# Patient Record
Sex: MALE | Race: WHITE | Employment: OTHER | URBAN - METROPOLITAN AREA
[De-identification: names, ages, dates, MRNs, and addresses within clinical notes are randomized per-mention and may not be internally consistent; named-entity substitution may affect disease eponyms.]

---

## 2017-02-14 RX ORDER — ROSUVASTATIN CALCIUM 10 MG/1
TABLET, FILM COATED ORAL
Qty: 30 TAB | Refills: 6 | Status: SHIPPED | OUTPATIENT
Start: 2017-02-14 | End: 2017-09-26 | Stop reason: SDUPTHER

## 2017-06-06 ENCOUNTER — OFFICE VISIT (OUTPATIENT)
Dept: CARDIOLOGY CLINIC | Age: 77
End: 2017-06-06

## 2017-06-06 VITALS
DIASTOLIC BLOOD PRESSURE: 80 MMHG | BODY MASS INDEX: 27 KG/M2 | HEIGHT: 66 IN | OXYGEN SATURATION: 98 % | SYSTOLIC BLOOD PRESSURE: 130 MMHG | HEART RATE: 82 BPM | WEIGHT: 168 LBS

## 2017-06-06 DIAGNOSIS — I48.20 CHRONIC ATRIAL FIBRILLATION (HCC): Chronic | ICD-10-CM

## 2017-06-06 DIAGNOSIS — I10 ESSENTIAL HYPERTENSION: Chronic | ICD-10-CM

## 2017-06-06 DIAGNOSIS — I25.10 CORONARY ARTERY DISEASE INVOLVING NATIVE CORONARY ARTERY OF NATIVE HEART WITHOUT ANGINA PECTORIS: Primary | ICD-10-CM

## 2017-06-06 DIAGNOSIS — E78.00 HYPERCHOLESTEROLEMIA: Chronic | ICD-10-CM

## 2017-06-06 RX ORDER — CHLORTHALIDONE 25 MG/1
25 TABLET ORAL DAILY
Refills: 0 | COMMUNITY
Start: 2017-06-04 | End: 2018-07-12

## 2017-06-06 RX ORDER — AMLODIPINE BESYLATE 10 MG/1
10 TABLET ORAL DAILY
Refills: 0 | COMMUNITY
Start: 2017-06-04 | End: 2020-01-01 | Stop reason: ALTCHOICE

## 2017-06-06 NOTE — MR AVS SNAPSHOT
Visit Information Date & Time Provider Department Dept. Phone Encounter #  
 6/6/2017 11:40 AM Annamaria Bradley MD [facility] [address] [id number] Upcoming Health Maintenance Date Due DTaP/Tdap/Td series (1 - Tdap) 4/7/1960 ZOSTER VACCINE AGE 60> 4/7/1999 GLAUCOMA SCREENING Q2Y 4/7/2004 Pneumococcal 65+ Low/Medium Risk (1 of 2 - PCV13) 4/7/2004 MEDICARE YEARLY EXAM 4/7/2004 INFLUENZA AGE 9 TO ADULT 8/1/2017 Allergies as of 6/6/2017  Review Complete On: 9/6/2016 By: Annamaria Bradley MD  
  
 Severity Noted Reaction Type Reactions Codeine  06/14/2011    Swelling Morphine  01/03/2014    Itching Sulfa (Sulfonamide Antibiotics)  06/14/2011    Other (comments) Kidney problems. Current Immunizations  Reviewed on 11/3/2012 No immunizations on file. Not reviewed this visit You Were Diagnosed With   
  
 Codes Comments Coronary artery disease involving native coronary artery of native heart without angina pectoris    -  Primary ICD-10-CM: I25.10 ICD-9-CM: 414.01 Chronic atrial fibrillation (HCC)     ICD-10-CM: A52.7 ICD-9-CM: 427.31 Essential hypertension     ICD-10-CM: I10 
ICD-9-CM: 401.9 Hypercholesterolemia     ICD-10-CM: E78.00 ICD-9-CM: 272.0 Vitals BP Pulse Height(growth percentile) Weight(growth percentile) SpO2 BMI  
 130/80 82 5' 6\" (1.676 m) 168 lb (76.2 kg) 98% 27.12 kg/m2 Smoking Status Former Smoker Vitals History BMI and BSA Data Body Mass Index Body Surface Area  
 27.12 kg/m 2 1.88 m 2 Preferred Pharmacy Pharmacy Name Phone [facility], [address] Your Updated Medication List  
  
   
This list is accurate as of: 6/6/17 12:38 PM.  Always use your most recent med list.  
  
  
  
  
 albuterol 90 mcg/actuation inhaler Commonly known as:  PROVENTIL HFA, VENTOLIN HFA, PROAIR HFA Take 2 Puffs by inhalation every four (4) hours as needed for Wheezing or Shortness of Breath. amLODIPine 10 mg tablet Commonly known as:  NORVASC  
  
 aspirin 81 mg chewable tablet Take 1 Tab by mouth daily. calcium-vitamin D 250-125 mg-unit tablet Commonly known as:  OSCAL 250 Take 1 Tab by mouth daily. chlorthalidone 25 mg tablet Commonly known as:  HYGROTEN  
  
 cyclobenzaprine 10 mg tablet Commonly known as:  FLEXERIL Take 1 Tab by mouth three (3) times daily as needed for Muscle Spasm(s). diclofenac EC 50 mg EC tablet Commonly known as:  VOLTAREN Take 1 Tab by mouth three (3) times daily as needed. DIGITEK 0.125 mg tablet Generic drug:  digoxin  
take 1 tablet by mouth once daily  
  
 dilTIAZem 90 mg tablet Commonly known as:  CARDIZEM Take 1 Tab by mouth Before breakfast, lunch, dinner and at bedtime. ferrous sulfate 325 mg (65 mg iron) tablet  
  
 fluticasone 50 mcg/actuation nasal spray Commonly known as:  Monica Fray 2 Sprays by Both Nostrils route daily. furosemide 20 mg tablet Commonly known as:  LASIX OXYGEN-AIR DELIVERY SYSTEMS  
2 L/min by Does Not Apply route daily. Continuous. Company is [employer]  
  
 polyethylene glycol 17 gram/dose powder Commonly known as:  MIRALAX  
  
 pravastatin 40 mg tablet Commonly known as:  PRAVACHOL  
  
 QVAR 80 mcg/actuation Aero Generic drug:  beclomethasone SENNA PLUS 8.6-50 mg per tablet Generic drug:  senna-docusate  
  
 umeclidinium-vilanterol 62.5-25 mcg/actuation inhaler Commonly known as:  Akshat Brooms Take 1 Puff by inhalation daily. Indications: BRONCHOSPASM PREVENTION WITH COPD * warfarin 5 mg tablet Commonly known as:  COUMADIN  
  
 * warfarin 7.5 mg tablet Commonly known as:  COUMADIN Take 1 Tab by mouth every evening. * Notice: This list has 2 medication(s) that are the same as other medications prescribed for you. Read the directions carefully, and ask your doctor or other care provider to review them with you. We Performed the Following AMB POC EKG ROUTINE W/ 12 LEADS, INTER & REP [20270 CPT(R)] Introducing [facility] SERVICES! Michael Mojica introduces [employer] patient portal. Now you can access parts of your medical record, email your doctor's office, and request medication refills online. 1. In your internet browser, go to [email or web address] 2. Click on the First Time User? Click Here link in the Sign In box. You will see the New Member Sign Up page. 3. Enter your [employer] Access Code exactly as it appears below. You will not need to use this code after youve completed the sign-up process. If you do not sign up before the expiration date, you must request a new code. · [employer] Access Code: [email or web address] Expires: 9/4/2017 11:04 AM 
 
4. Enter the last four digits of your Social Security Number (xxxx) and Date of Birth (mm/dd/yyyy) as indicated and click Submit. You will be taken to the next sign-up page. 5. Create a [employer] ID. This will be your [employer] login ID and cannot be changed, so think of one that is secure and easy to remember. 6. Create a [employer] password. You can change your password at any time. 7. Enter your Password Reset Question and Answer. This can be used at a later time if you forget your password. 8. Enter your e-mail address. You will receive e-mail notification when new information is available in [address] 9. Click Sign Up. You can now view and download portions of your medical record. 10. Click the Download Summary menu link to download a portable copy of your medical information. If you have questions, please visit the Frequently Asked Questions section of the [employer] website.  Remember, [employer] is NOT to be used for urgent needs. For medical emergencies, dial 911. Now available from your iPhone and Android! Please provide this summary of care documentation to your next provider. Your primary care clinician is listed as Annette Medina. If you have any questions after today's visit, please call [phone].

## 2017-06-06 NOTE — PROGRESS NOTES
1. Have you been to the ER, urgent care clinic since your last visit? Hospitalized since your last visit? No     2. Have you seen or consulted any other health care providers outside of the [address] [place] [address] since your last visit? Include any pap smears or colon screening.  No

## 2017-06-06 NOTE — PROGRESS NOTES
HISTORY OF PRESENT ILLNESS  Toñito Sanchez is a 65 y.o. male. ASSESSMENT and PLAN    Mr. Berto Reyes has history of chronic atrial fibrillation, mild CAD (by coronary angiography 2012), severe COPD on oxygen therapy, as well as history of AAA stent graft. He is back on Coumadin at this point. In January of 2016, he developed severe back pains, diagnosed with lumbar spine osteomyelitis and abscess. His echocardiogram in February of 2016 showed normal ejection fraction without wall motion abnormality, severely dilated left atrium, and severe pulmonary hypertension with peak pressure of 60 mmHg.  CAD:   He has history of mild CAD by angiography in 2012.  CAF: Atrial fibrillation rate is well controlled. He remains on Coumadin.  BP:   Well controlled   HR:    Controlled A. fib   CHF:   There is no evidence of decompensated CHF noted.  Weight:   His weight today is 168 pounds. This is stable.  Cholesterol:   Target LDL <70. He remains on Pravachol 40 mg daily.  Anti-platelet:   Remains on ASA, and Coumadin. From cardiac standpoint, he should be able to tolerate cataract surgery. I suspect that Coumadin can be continued perioperatively. I will plan on seeing him back in 9 months. Thank you. Encounter Diagnoses   Name Primary?  Coronary artery disease involving native coronary artery of native heart without angina pectoris Yes    Chronic atrial fibrillation ([address]     Essential hypertension     Hypercholesterolemia      current treatment plan is effective, no change in therapy  lab results and schedule of future lab studies reviewed with patient  reviewed diet, exercise and weight control  cardiovascular risk and specific lipid/LDL goals reviewed  use of aspirin to prevent MI and TIA's discussed      HPI   Today, Mr. María Marquez has no complaints of chest pains or increased shortness of breath. He has baseline shortness of breath and dyspnea on exertion without significant change.   He wears oxygen. He denies any orthopnea or PND. He denies any palpitations or dizziness. Review of Systems   Respiratory: Positive for shortness of breath. Cardiovascular: Negative for chest pain, palpitations, orthopnea, claudication, leg swelling and PND. All other systems reviewed and are negative. Physical Exam   Constitutional: He is oriented to person, place, and time. He appears well-developed and well-nourished. HENT:   Head: Normocephalic. Eyes: Conjunctivae are normal.   Neck: Neck supple. No JVD present. Carotid bruit is not present. No thyromegaly present. Cardiovascular: Normal rate. An irregularly irregular rhythm present. Pulmonary/Chest: Breath sounds normal.   Abdominal: Bowel sounds are normal.   Musculoskeletal: He exhibits no edema. Neurological: He is alert and oriented to person, place, and time. Skin: Skin is warm and dry. Nursing note and vitals reviewed. PCP: Og Garsia MD    Past Medical History:   Diagnosis Date    Aneurysm [facility])     AAA repair 2006 & 2012    Aorto-iliac duplex 02/13/2015    Tech difficult. Patent aorta bi-iliac endograft w/o leak or limb dysfunction.  Arrhythmia     a fib    Cardiac cath 11/01/2012    RCA (sm, nondom) patent. LM patent. LAD 25%. CX (dom) 45% mid. LVEDP 12 mmHg. No WMA. PA 27/12. W 10-12. CO/CI 5.2/2.6 (TD).  Cardiac echocardiogram, abnormal 02/20/2016    EF 55%. No WMA. Indeterminate diastolic fx. RVSP 60 mmHg. Severe LAE. Mild MR. Mod TR.  IVCE. Similar to study of 10/26/12.  Cardiovascular aorto-iliac duplex 02/13/2015    Patent aorta bi-iliac endovascular graft w/o leak or limb dysfunction. Sac measures 4.21 x 4.47 cm (4.4 x 4.6 cm on 1/31/14).  Cardiovascular LE peripheral arterial testing 07/29/2013    No significant LE arterial disease bilaterally. R GHADA 1. 12.  L GHADA 1. 12.  R DBI 0.83. L DBI 0.71. Exercise deferred.     Cardiovascular renal duplex 10/31/2012    No RA stenosis. Intrinsic/med disease in left kidney. Patent aortic endograft. Patent, thrombus-free renal veins bilaterally.  Carotid duplex 07/29/2013    Mild <50% bilateral ICA plaquing.  Chronic lung disease     Cigarette smoker     Congestive heart failure (CHF) (HCC)     COPD (chronic obstructive pulmonary disease) (HCC)     and emphysema; likely secondary to tobacco abuse    Difficult intubation     Dyslipidemia     low HDL    Emphysema     HTN (hypertension)     Hypercholesterolemia     Increased prostate specific antigen (PSA) velocity     Long term (current) use of anticoagulants     coumadin    Osteoarthritis     Osteomyelitis (HCC)     Paroxysmal atrial fibrillation (HCC)     Peripheral vascular disease ([address]     AAA repair 12/2007    Persistent atrial Fibrillation     Persistent atrial fibrillation ([address]     Unspecified adverse effect of anesthesia     difficulty breathing placed in ICU Oct 2012 (AAA repair)       Past Surgical History:   Procedure Laterality Date    BRONCHOSCOPY DIAGNOSTIC  11/2/2012         CARDIAC CATHETERIZATION  11/1/2012         COLONOSCOPY N/A 7/26/2016    COLONOSCOPY with Polypectomies performed by Benjamin Galdamez MD at [address] HX AAA REPAIR      2006 & 2012    HX HEART CATHETERIZATION  3/4/2009    1. RCA small, nondominant; patent. 2. LMCA patent. 3. LAD is long, wrapped around apical vessel. Diffuse, 20-30% stenosis noted. 4. CCA is large, dominant vessel. Diffuse 20-30% stenosis. 5. LVEDP is 16 mmHg. 6. Overall left ventricular systolic function mildly diminshed with est. EF 45%. Mild, global hypokinesis noted. 7. No significant mitral regurgitation or aortic stenosis noted.  (see report)    HX HERNIA REPAIR  2/2014    rt inguinal     HX HERNIA REPAIR  01/2016    LEFT INGUINAL HERNIA REPAIR DR. Braulio Nickerson    REPAIR ING HERNIA,5+Y/O,JESSIE Left 1-20-16    Dr. Cathy Washington  11/1/2012            Current Outpatient Prescriptions Medication Sig Dispense Refill    QVAR 80 mcg/actuation aero   0    chlorthalidone (HYGROTEN) 25 mg tablet   0    amLODIPine (NORVASC) 10 mg tablet   0    DIGITEK 125 mcg tablet take 1 tablet by mouth once daily 30 Tab 6    fluticasone (FLONASE) 50 mcg/actuation nasal spray 2 Sprays by Both Nostrils route daily.  pravastatin (PRAVACHOL) 40 mg tablet   0    polyethylene glycol (MIRALAX) 17 gram/dose powder   0    warfarin (COUMADIN) 5 mg tablet   0    warfarin (COUMADIN) 7.5 mg tablet Take 1 Tab by mouth every evening. 3 Tab 0    albuterol (PROVENTIL HFA, VENTOLIN HFA, PROAIR HFA) 90 mcg/actuation inhaler Take 2 Puffs by inhalation every four (4) hours as needed for Wheezing or Shortness of Breath. 1 Inhaler 5    OXYGEN-AIR DELIVERY SYSTEMS 2 L/min by Does Not Apply route daily. Continuous. Company is [employer]         cyclobenzaprine (FLEXERIL) 10 mg tablet Take 1 Tab by mouth three (3) times daily as needed for Muscle Spasm(s). 30 Tab 0    diclofenac EC (VOLTAREN) 50 mg EC tablet Take 1 Tab by mouth three (3) times daily as needed. 30 Tab 0    calcium-vitamin D (OSCAL 250) 250-125 mg-unit tablet Take 1 Tab by mouth daily.  SENNA PLUS 8.6-50 mg per tablet   0    furosemide (LASIX) 20 mg tablet   0    aspirin 81 mg chewable tablet Take 1 Tab by mouth daily. 10 Tab 0    diltiazem (CARDIZEM) 90 mg tablet Take 1 Tab by mouth Before breakfast, lunch, dinner and at bedtime. 60 Tab 0    ferrous sulfate 325 mg (65 mg iron) tablet   0    umeclidinium-vilanterol (ANORO ELLIPTA) 62.5-25 mcg/actuation dsdv Take 1 Puff by inhalation daily.  Indications: BRONCHOSPASM PREVENTION WITH COPD 1 Inhaler 5       The patient has a family history of    Social History   Substance Use Topics    Smoking status: Former Smoker     Packs/day: 1.00     Years: 54.00     Types: Cigarettes     Quit date: 10/23/2012    Smokeless tobacco: Never Used    Alcohol use No      Comment: quit drinking alcohol 26 years ago, patient states stopped in \"1983\"       Lab Results   Component Value Date/Time    HDL Cholesterol 25 03/26/2010 10:30 AM        BP Readings from Last 3 Encounters:   06/06/17 130/80   09/06/16 132/84   08/09/16 135/80        Pulse Readings from Last 3 Encounters:   06/06/17 82   09/06/16 75   08/09/16 72       Wt Readings from Last 3 Encounters:   06/06/17 76.2 kg (168 lb)   09/06/16 77.6 kg (171 lb)   08/08/16 73.7 kg (162 lb 6.4 oz)         EKG: unchanged from previous tracings, nonspecific ST and T waves changes, atrial fibrillation, rate 82.

## 2017-08-11 ENCOUNTER — HOSPITAL ENCOUNTER (OUTPATIENT)
Dept: GENERAL RADIOLOGY | Age: 77
Discharge: HOME | End: 2017-08-11
Payer: MEDICARE

## 2017-08-11 DIAGNOSIS — M79.605 MUSCULOSKELETAL PAIN OF LEFT LOWER EXTREMITY: ICD-10-CM

## 2017-08-11 PROCEDURE — 73502 X-RAY EXAM HIP UNI 2-3 VIEWS: CPT

## 2017-08-11 PROCEDURE — 72100 X-RAY EXAM L-S SPINE 2/3 VWS: CPT

## 2017-08-11 PROCEDURE — 73552 X-RAY EXAM OF FEMUR 2/>: CPT

## 2017-08-25 ENCOUNTER — HOSPITAL ENCOUNTER (INPATIENT)
Age: 77
LOS: 6 days | Discharge: HOME HEALTH | DRG: 871 | End: 2017-08-31
Attending: EMERGENCY MEDICINE | Admitting: FAMILY MEDICINE
Payer: MEDICARE

## 2017-08-25 ENCOUNTER — TELEPHONE (OUTPATIENT)
Dept: ORTHOPEDIC SURGERY | Age: 77
End: 2017-08-25

## 2017-08-25 ENCOUNTER — APPOINTMENT (OUTPATIENT)
Dept: MRI IMAGING | Age: 77
DRG: 871 | End: 2017-08-25
Attending: EMERGENCY MEDICINE
Payer: MEDICARE

## 2017-08-25 ENCOUNTER — APPOINTMENT (OUTPATIENT)
Dept: CT IMAGING | Age: 77
DRG: 871 | End: 2017-08-25
Attending: EMERGENCY MEDICINE
Payer: MEDICARE

## 2017-08-25 ENCOUNTER — APPOINTMENT (OUTPATIENT)
Dept: CT IMAGING | Age: 77
DRG: 871 | End: 2017-08-25
Attending: ORTHOPAEDIC SURGERY
Payer: MEDICARE

## 2017-08-25 DIAGNOSIS — K68.12 PSOAS ABSCESS (HCC): Primary | ICD-10-CM

## 2017-08-25 PROBLEM — L02.91 ABSCESS: Status: ACTIVE | Noted: 2017-08-25

## 2017-08-25 LAB
ANION GAP SERPL CALC-SCNC: 8 MMOL/L (ref 3–18)
APPEARANCE UR: CLEAR
BACTERIA URNS QL MICRO: ABNORMAL /HPF
BASOPHILS # BLD: 0 K/UL (ref 0–0.1)
BASOPHILS NFR BLD: 0 % (ref 0–2)
BILIRUB UR QL: NEGATIVE
BUN SERPL-MCNC: 15 MG/DL (ref 7–18)
BUN/CREAT SERPL: 16 (ref 12–20)
CALCIUM SERPL-MCNC: 9.3 MG/DL (ref 8.5–10.1)
CHLORIDE SERPL-SCNC: 95 MMOL/L (ref 100–108)
CO2 SERPL-SCNC: 29 MMOL/L (ref 21–32)
COLOR UR: YELLOW
CREAT SERPL-MCNC: 0.96 MG/DL (ref 0.6–1.3)
CRP SERPL-MCNC: 17.9 MG/DL (ref 0–0.3)
DIFFERENTIAL METHOD BLD: ABNORMAL
DIGOXIN SERPL-MCNC: 0.9 NG/ML (ref 0.9–2)
EOSINOPHIL # BLD: 0 K/UL (ref 0–0.4)
EOSINOPHIL NFR BLD: 0 % (ref 0–5)
EPITH CASTS URNS QL MICRO: NEGATIVE /LPF (ref 0–5)
ERYTHROCYTE [DISTWIDTH] IN BLOOD BY AUTOMATED COUNT: 15.5 % (ref 11.6–14.5)
ERYTHROCYTE [SEDIMENTATION RATE] IN BLOOD: >140 MM/HR (ref 0–20)
GLUCOSE SERPL-MCNC: 106 MG/DL (ref 74–99)
GLUCOSE UR STRIP.AUTO-MCNC: NEGATIVE MG/DL
HCT VFR BLD AUTO: 31.6 % (ref 36–48)
HGB BLD-MCNC: 9.8 G/DL (ref 13–16)
HGB UR QL STRIP: NEGATIVE
HYALINE CASTS URNS QL MICRO: ABNORMAL /LPF (ref 0–2)
INR PPP: 2.8 (ref 0.8–1.2)
KETONES UR QL STRIP.AUTO: NEGATIVE MG/DL
LACTATE BLD-SCNC: 0.5 MMOL/L (ref 0.4–2)
LEUKOCYTE ESTERASE UR QL STRIP.AUTO: NEGATIVE
LYMPHOCYTES # BLD: 1.6 K/UL (ref 0.9–3.6)
LYMPHOCYTES NFR BLD: 12 % (ref 21–52)
MCH RBC QN AUTO: 26.6 PG (ref 24–34)
MCHC RBC AUTO-ENTMCNC: 31 G/DL (ref 31–37)
MCV RBC AUTO: 85.6 FL (ref 74–97)
MONOCYTES # BLD: 0.8 K/UL (ref 0.05–1.2)
MONOCYTES NFR BLD: 6 % (ref 3–10)
MUCOUS THREADS URNS QL MICRO: ABNORMAL /LPF
NEUTS SEG # BLD: 11 K/UL (ref 1.8–8)
NEUTS SEG NFR BLD: 82 % (ref 40–73)
NITRITE UR QL STRIP.AUTO: NEGATIVE
PH UR STRIP: 7 [PH] (ref 5–8)
PLATELET # BLD AUTO: 324 K/UL (ref 135–420)
PMV BLD AUTO: 8.6 FL (ref 9.2–11.8)
POTASSIUM SERPL-SCNC: 3.5 MMOL/L (ref 3.5–5.5)
PROT UR STRIP-MCNC: 30 MG/DL
PROTHROMBIN TIME: 28.5 SEC (ref 11.5–15.2)
RBC # BLD AUTO: 3.69 M/UL (ref 4.7–5.5)
RBC #/AREA URNS HPF: ABNORMAL /HPF (ref 0–5)
SODIUM SERPL-SCNC: 132 MMOL/L (ref 136–145)
SP GR UR REFRACTOMETRY: 1.02 (ref 1–1.03)
UROBILINOGEN UR QL STRIP.AUTO: 1 EU/DL (ref 0.2–1)
WBC # BLD AUTO: 13.4 K/UL (ref 4.6–13.2)
WBC URNS QL MICRO: ABNORMAL /HPF (ref 0–4)

## 2017-08-25 PROCEDURE — 80048 BASIC METABOLIC PNL TOTAL CA: CPT | Performed by: EMERGENCY MEDICINE

## 2017-08-25 PROCEDURE — 86140 C-REACTIVE PROTEIN: CPT | Performed by: ORTHOPAEDIC SURGERY

## 2017-08-25 PROCEDURE — 81001 URINALYSIS AUTO W/SCOPE: CPT | Performed by: EMERGENCY MEDICINE

## 2017-08-25 PROCEDURE — 80162 ASSAY OF DIGOXIN TOTAL: CPT | Performed by: EMERGENCY MEDICINE

## 2017-08-25 PROCEDURE — 74177 CT ABD & PELVIS W/CONTRAST: CPT

## 2017-08-25 PROCEDURE — 80076 HEPATIC FUNCTION PANEL: CPT

## 2017-08-25 PROCEDURE — 74011250637 HC RX REV CODE- 250/637: Performed by: EMERGENCY MEDICINE

## 2017-08-25 PROCEDURE — 83605 ASSAY OF LACTIC ACID: CPT

## 2017-08-25 PROCEDURE — 93005 ELECTROCARDIOGRAM TRACING: CPT

## 2017-08-25 PROCEDURE — 85610 PROTHROMBIN TIME: CPT | Performed by: EMERGENCY MEDICINE

## 2017-08-25 PROCEDURE — A9585 GADOBUTROL INJECTION: HCPCS | Performed by: EMERGENCY MEDICINE

## 2017-08-25 PROCEDURE — 74011250636 HC RX REV CODE- 250/636: Performed by: EMERGENCY MEDICINE

## 2017-08-25 PROCEDURE — 72157 MRI CHEST SPINE W/O & W/DYE: CPT

## 2017-08-25 PROCEDURE — 96374 THER/PROPH/DIAG INJ IV PUSH: CPT

## 2017-08-25 PROCEDURE — 72158 MRI LUMBAR SPINE W/O & W/DYE: CPT

## 2017-08-25 PROCEDURE — 74011636320 HC RX REV CODE- 636/320: Performed by: EMERGENCY MEDICINE

## 2017-08-25 PROCEDURE — 85025 COMPLETE CBC W/AUTO DIFF WBC: CPT | Performed by: EMERGENCY MEDICINE

## 2017-08-25 PROCEDURE — 65270000029 HC RM PRIVATE

## 2017-08-25 PROCEDURE — 85652 RBC SED RATE AUTOMATED: CPT | Performed by: ORTHOPAEDIC SURGERY

## 2017-08-25 PROCEDURE — 87040 BLOOD CULTURE FOR BACTERIA: CPT | Performed by: ORTHOPAEDIC SURGERY

## 2017-08-25 PROCEDURE — 99285 EMERGENCY DEPT VISIT HI MDM: CPT

## 2017-08-25 RX ORDER — ONDANSETRON 2 MG/ML
4 INJECTION INTRAMUSCULAR; INTRAVENOUS
Status: COMPLETED | OUTPATIENT
Start: 2017-08-25 | End: 2017-08-25

## 2017-08-25 RX ORDER — HYDROCODONE BITARTRATE AND ACETAMINOPHEN 5; 325 MG/1; MG/1
1 TABLET ORAL
Status: COMPLETED | OUTPATIENT
Start: 2017-08-25 | End: 2017-08-25

## 2017-08-25 RX ORDER — IPRATROPIUM BROMIDE AND ALBUTEROL SULFATE 2.5; .5 MG/3ML; MG/3ML
3 SOLUTION RESPIRATORY (INHALATION)
COMMUNITY
End: 2018-05-03 | Stop reason: ALTCHOICE

## 2017-08-25 RX ORDER — WARFARIN 2.5 MG/1
2.5 TABLET ORAL
Status: ON HOLD | COMMUNITY
End: 2017-08-31

## 2017-08-25 RX ADMIN — ONDANSETRON 4 MG: 2 INJECTION INTRAMUSCULAR; INTRAVENOUS at 12:11

## 2017-08-25 RX ADMIN — GADOBUTROL 7 ML: 604.72 INJECTION INTRAVENOUS at 19:43

## 2017-08-25 RX ADMIN — HYDROCODONE BITARTRATE AND ACETAMINOPHEN 1 TABLET: 5; 325 TABLET ORAL at 20:42

## 2017-08-25 RX ADMIN — HYDROCODONE BITARTRATE AND ACETAMINOPHEN 1 TABLET: 5; 325 TABLET ORAL at 12:11

## 2017-08-25 RX ADMIN — IOPAMIDOL 75 ML: 612 INJECTION, SOLUTION INTRAVENOUS at 20:36

## 2017-08-25 NOTE — IP AVS SNAPSHOT
[facility] [place] 
 
 
 [address] [facility] [address] Patient: Christian Melendez MRN: [id number] TMC:9/0/3554 Current Discharge Medication List  
  
START taking these medications Dose & Instructions Dispensing Information Comments Morning Noon Evening Bedtime  
 docusate sodium 100 mg capsule Commonly known as:  Britany Kulwinder Your last dose was: Your next dose is:    
   
   
 Dose:  100 mg Take 1 Cap by mouth two (2) times a day for 90 days. Quantity:  60 Cap Refills:  2 HYDROcodone-acetaminophen  mg tablet Commonly known as:  Leslye Loredo Your last dose was: Your next dose is:    
   
   
 Dose:  1 Tab Take 1 Tab by mouth every four (4) hours as needed. Max Daily Amount: 6 Tabs. Quantity:  15 Tab Refills:  0  
     
   
   
   
  
 OTHER Your last dose was: Your next dose is:    
   
   
 Pt to receive IV antibiotics with home health, ordered by infectious diseases - IV ertapenem Quantity:  1 Each Refills:  0  
     
   
   
   
  
 potassium chloride 20 mEq packet Commonly known as:  KLOR-CON Your last dose was: Your next dose is:    
   
   
 Dose:  20 mEq Take 1 Packet by mouth daily. Quantity:  30 Packet Refills:  0 CONTINUE these medications which have CHANGED Dose & Instructions Dispensing Information Comments Morning Noon Evening Bedtime * polyethylene glycol 17 gram/dose powder Commonly known as:  Shreya Cancel What changed:  Another medication with the same name was added. Make sure you understand how and when to take each. Your last dose was: Your next dose is:    
   
   
  Refills:  0  
     
   
   
   
  
 * polyethylene glycol 17 gram packet Commonly known as:  Shreya Cancel What changed:   You were already taking a medication with the same name, and this prescription was added. Make sure you understand how and when to take each. Your last dose was: Your next dose is:    
   
   
 Dose:  17 g Take 1 Packet by mouth daily. Quantity:  30 Packet Refills:  0  
     
   
   
   
  
 * warfarin 2.5 mg tablet Commonly known as:  COUMADIN What changed:   
- when to take this 
- additional instructions - Another medication with the same name was removed. Continue taking this medication, and follow the directions you see here. Your last dose was: Your next dose is:    
   
   
 Dose:  2.5 mg Take 1 Tab by mouth daily. Take 2 pills (5 mg) Mon, Tues, Wed, Thur, Sat, Sun; and 1 pill only (2.5 mg) Fri  
 Quantity:  60 Tab Refills:  0  
     
   
   
   
  
 * warfarin 5 mg tablet Commonly known as:  COUMADIN What changed:   
- how much to take 
- how to take this - when to take this 
- additional instructions - Another medication with the same name was removed. Continue taking this medication, and follow the directions you see here. Your last dose was: Your next dose is:    
   
   
 Dose:  5 mg Take 1 Tab by mouth once for 1 dose. Only for 9/1 Quantity:  1 Tab Refills:  0  
     
   
   
   
  
 * Notice: This list has 4 medication(s) that are the same as other medications prescribed for you. Read the directions carefully, and ask your doctor or other care provider to review them with you. CONTINUE these medications which have NOT CHANGED Dose & Instructions Dispensing Information Comments Morning Noon Evening Bedtime  
 albuterol 90 mcg/actuation inhaler Commonly known as:  PROVENTIL HFA, VENTOLIN HFA, PROAIR HFA Your last dose was: Your next dose is:    
   
   
 Dose:  2 Puff Take 2 Puffs by inhalation every four (4) hours as needed for Wheezing or Shortness of Breath. Quantity:  1 Inhaler Refills:  5 albuterol-ipratropium 2.5 mg-0.5 mg/3 ml Nebu Commonly known as:  Minal Frame Your last dose was: Your next dose is:    
   
   
 Dose:  3 mL  
3 mL by Nebulization route every six (6) hours as needed. Refills:  0  
     
   
   
   
  
 amLODIPine 10 mg tablet Commonly known as:  Achilles Elkmont Your last dose was: Your next dose is:    
   
   
  Refills:  0  
     
   
   
   
  
 aspirin 81 mg chewable tablet Your last dose was: Your next dose is:    
   
   
 Dose:  81 mg Take 1 Tab by mouth daily. Quantity:  10 Tab Refills:  0  
     
   
   
   
  
 calcium-vitamin D 250-125 mg-unit tablet Commonly known as:  OSCAL 250 Your last dose was: Your next dose is:    
   
   
 Dose:  1 Tab Take 1 Tab by mouth daily. Refills:  0  
     
   
   
   
  
 chlorthalidone 25 mg tablet Commonly known as:  Verlena Alina Your last dose was: Your next dose is:    
   
   
  Refills:  0  
     
   
   
   
  
 cyclobenzaprine 10 mg tablet Commonly known as:  FLEXERIL Your last dose was: Your next dose is:    
   
   
 Dose:  10 mg Take 1 Tab by mouth three (3) times daily as needed for Muscle Spasm(s). Quantity:  30 Tab Refills:  0  
     
   
   
   
  
 diclofenac EC 50 mg EC tablet Commonly known as:  VOLTAREN Your last dose was: Your next dose is:    
   
   
 Dose:  50 mg Take 1 Tab by mouth three (3) times daily as needed. Quantity:  30 Tab Refills:  0 DIGITEK 0.125 mg tablet Generic drug:  digoxin Your last dose was: Your next dose is:    
   
   
 take 1 tablet by mouth once daily Quantity:  30 Tab Refills:  6  
     
   
   
   
  
 dilTIAZem 90 mg tablet Commonly known as:  CARDIZEM Your last dose was: Your next dose is:    
   
   
 Dose:  90 mg Take 1 Tab by mouth Before breakfast, lunch, dinner and at bedtime. Quantity:  60 Tab Refills:  0  
     
   
   
   
  
 ferrous sulfate 325 mg (65 mg iron) tablet Your last dose was: Your next dose is:    
   
   
  Refills:  0  
     
   
   
   
  
 fluticasone 50 mcg/actuation nasal spray Commonly known as:  Mely Mcintyre Your last dose was: Your next dose is:    
   
   
 Dose:  2 Spray 2 Sprays by Both Nostrils route daily. Refills:  0  
     
   
   
   
  
 furosemide 20 mg tablet Commonly known as:  LASIX Your last dose was: Your next dose is:    
   
   
  Refills:  0  
     
   
   
   
  
 ketorolac 0.5 % ophthalmic solution Commonly known as:  Odalys Oneill Your last dose was: Your next dose is:    
   
   
 Dose:  1 Drop Administer 1 Drop to right eye daily. Refills:  0 OXYGEN-AIR DELIVERY SYSTEMS Your last dose was: Your next dose is:    
   
   
 Dose:  2 L/min 2 L/min by Does Not Apply route daily. Continuous. Company is [employer] Refills:  0  
     
   
   
   
  
 pravastatin 40 mg tablet Commonly known as:  PRAVACHOL Your last dose was: Your next dose is:    
   
   
  Refills:  0  
     
   
   
   
  
 prednisoLONE acetate 1 % ophthalmic suspension Commonly known as:  PRED FORTE Your last dose was: Your next dose is:    
   
   
 Dose:  1 Drop Administer 1 Drop to right eye four (4) times daily. Refills:  0 QVAR 80 mcg/actuation [employer] Generic drug:  beclomethasone Your last dose was: Your next dose is:    
   
   
  Refills:  0 SENNA PLUS 8.6-50 mg per tablet Generic drug:  senna-docusate Your last dose was: Your next dose is:    
   
   
  Refills:  0  
     
   
   
   
  
 umeclidinium-vilanterol 62.5-25 mcg/actuation inhaler Commonly known as:  Lanny Chauhan Your last dose was: Your next dose is: Dose:  1 Puff Take 1 Puff by inhalation daily. Indications: BRONCHOSPASM PREVENTION WITH COPD Quantity:  1 Inhaler Refills:  5 Where to Get Your Medications These medications were sent to  [facility], [address] [place] [address], [address] [address] Phone:  [phone]  
  potassium chloride 20 mEq packet Information on where to get these meds will be given to you by the nurse or doctor. ! Ask your nurse or doctor about these medications  
  docusate sodium 100 mg capsule HYDROcodone-acetaminophen  mg tablet OTHER  
 polyethylene glycol 17 gram packet  
 warfarin 2.5 mg tablet  
 warfarin 5 mg tablet

## 2017-08-25 NOTE — Clinical Note
Status[de-identified] Inpatient [101] Type of Bed: Telemetry [19] Inpatient Hospitalization Certified Necessary for the Following Reasons: 3. Patient receiving treatment that can only be provided in an inpatient setting (further clarification in H&P documentation) Admitting Diagnosis: [facility]so[facility] [742009] Admitting Physician: Paul Guerra [[id number]] Attending Physician: Paul Guerra [[id number]] Estimated Length of Stay: 3-4 Midnights Discharge Plan[de-identified] Home with Office Follow-up

## 2017-08-25 NOTE — IP AVS SNAPSHOT
Mariza Quiles 
 
 
 [address] [facility] [address] Patient: Thang Burgess MRN: [id number] YEY:[birth year] You are allergic to the following Allergen Reactions Codeine Swelling Morphine Itching Sulfa (Sulfonamide Antibiotics) Other (comments) Kidney problems. Recent Documentation Height Weight BMI Smoking Status 1.689 m 78.5 kg 27.5 kg/m2 Former Smoker Unresulted Labs Order Current Status CULTURE, BLOOD Preliminary result CULTURE, BLOOD Preliminary result CULTURE, BODY FLUID W GRAM STAIN Preliminary result Emergency Contacts Name Discharge Info Relation Home Work Mobile [employer] 9952 CAREGIVER [3] Spouse [3] [phone] [facility] CAREGIVER [3] Daughter [21] [phone] [facility] DISCHARGE CAREGIVER [3] Daughter [21] [phone] About your hospitalization You were admitted on:  August 25, 2017 You last received care in the:  [address] [place] [address] You were discharged on:  August 31, 2017 Unit phone number:  [phone] Why you were hospitalized Your primary diagnosis was:  Psoas Abscess (Hcc) Your diagnoses also included:  Abscess Providers Seen During Your Hospitalizations Provider Role Specialty Primary office phone Reyna Infante MD Attending Provider Emergency Medicine [phone] Macie Guadalupe DO Attending Provider Emergency Medicine [phone] Jahaira Miranda MD Attending Provider [facility] [phone] Nela Trujillo MD Attending Provider [facility] [phone] Your Primary Care Physician (PCP) Primary Care Physician Office Phone Office Fax Cindy Spencer [phone] Follow-up Information Follow up With Details Comments Contact Info Jessika Mcgraw MD On 9/11/2017 Per office 9/11/17, @ 9:50am. [address] [place] [address] 
[phone] MRI Lumbar Spine On 10/10/2017 Appointment at 1400 Sangita Acosta MD On 9/5/2017 Per office Sept. 5, 2017 , @ 1:20pm.............................................. [facility] [place] [address] 
[phone] Your Appointments Friday September 01, 2017  8:00 AM EDT INFUSION 30 with HBV INFUSION NURSE 4  
HBV OP INFUSION ([place]) [address] [address] 200 [facility] Se  
[phone] Note: Patient must have a  if they take medication(s) that impairs their ability to operate a motor vehicle Saturday September 02, 2017  9:00 AM EDT INFUSION 30 with HBV INFUSION NURSE 1  
HBV OP INFUSION ([place]) [address] 030 200 [facility] Se  
[phone] Note: Patient must have a  if they take medication(s) that impairs their ability to operate a motor vehicle Sunday September 03, 2017  9:00 AM EDT INFUSION 30 with HBV INFUSION NURSE 1  
HBV OP INFUSION ([place]) [address] [address] 200 [facility] [address]  
[phone] Note: Patient must have a  if they take medication(s) that impairs their ability to operate a motor vehicle Monday September 04, 2017  9:30 AM EDT INFUSION 30 with HBV INFUSION NURSE 1  
HBV OP INFUSION ([place]) [address] [address] 200 [facility] Se  
[phone] Note: Patient must have a  if they take medication(s) that impairs their ability to operate a motor vehicle Tuesday September 05, 2017  2:00 PM EDT INFUSION 30 with HBV INFUSION NURSE 5  
HBV OP INFUSION ([place]) [address] 196 200 [facility] Se  
[phone] Note: Patient must have a  if they take medication(s) that impairs their ability to operate a motor vehicle Wednesday September 06, 2017  8:00 AM EDT INFUSION 30 with HBV INFUSION NURSE 3  
HBV OP INFUSION ([place]) [address], Leighann [address] [address] 200 [facility] Se  
[phone] Note: Patient must have a  if they take medication(s) that impairs their ability to operate a motor vehicle Thursday September 07, 2017 11:00 AM EDT INFUSION 30 with HBV INFUSION NURSE 2  
HBV OP INFUSION ([place]) [address], Leighann Dancer 368 200 [facility] Se  
[phone] Note: Patient must have a  if they take medication(s) that impairs their ability to operate a motor vehicle Friday September 08, 2017  9:00 AM EDT INFUSION 30 with HBV INFUSION NURSE 3  
HBV OP INFUSION ([place]) [address], Leighann Dancer 449 200 [facility] Se  
[phone] Note: Patient must have a  if they take medication(s) that impairs their ability to operate a motor vehicle Saturday September 09, 2017  9:00 AM EDT INFUSION 30 with HBV INFUSION NURSE 1  
HBV OP INFUSION ([place]) [address], Leighann Dancer 358 200 [facility] Se  
[phone] Note: Patient must have a  if they take medication(s) that impairs their ability to operate a motor vehicle Jamir September 10, 2017  9:00 AM EDT INFUSION 30 with HBV INFUSION NURSE 1  
HBV OP INFUSION ([place]) [address], Leighann Dancer 713 200 [facility] Se  
[phone] Note: Patient must have a  if they take medication(s) that impairs their ability to operate a motor vehicle Monday September 11, 2017  9:50 AM EDT Follow Up with Patricia Jeans, NP  
[facility] ([address] [place] [address]) [address] [facility] [address]  
[phone] Monday September 11, 2017  1:00 PM EDT INFUSION 30 with HBV INFUSION NURSE 1  
 HBV OP INFUSION ([place]) [address], [facility] 828 200 [facility] [address]  
[phone] Note: Patient must have a  if they take medication(s) that impairs their ability to operate a motor vehicle Tuesday September 12, 2017  9:00 AM EDT INFUSION 30 with HBV INFUSION NURSE 4  
HBV OP INFUSION ([place]) [address], [facility] 057 200 [facility] [address]  
[phone] Note: Patient must have a  if they take medication(s) that impairs their ability to operate a motor vehicle Wednesday September 13, 2017  1:00 PM EDT INFUSION 30 with HBV INFUSION NURSE 1  
HBV OP INFUSION ([place]) [address], [facility] 091 200 [facility] [address]  
[phone] Note: Patient must have a  if they take medication(s) that impairs their ability to operate a motor vehicle Thursday September 14, 2017  1:00 PM EDT INFUSION 30 with HBV INFUSION NURSE 5  
HBV OP INFUSION ([place]) [address], [facility] 335 200 [facility] [address]  
[phone] Note: Patient must have a  if they take medication(s) that impairs their ability to operate a motor vehicle Friday September 15, 2017 11:00 AM EDT INFUSION 30 with HBV INFUSION NURSE 3  
HBV OP INFUSION ([place]) [address], [facility] 886 200 [facility] [address]  
[phone] Note: Patient must have a  if they take medication(s) that impairs their ability to operate a motor vehicle Saturday September 16, 2017  9:00 AM EDT INFUSION 30 with HBV INFUSION NURSE 1  
HBV OP INFUSION ([place]) [address], [facility] 815 200 [facility] [address]  
[phone] Note: Patient must have a  if they take medication(s) that impairs their ability to operate a motor vehicle  Sunday September 17, 2017  9:00 AM EDT  
 INFUSION 30 with HBV INFUSION NURSE 1  
HBV OP INFUSION ([place]) [address] 039 200 [facility] Se  
[phone] Note: Patient must have a  if they take medication(s) that impairs their ability to operate a motor vehicle Monday September 18, 2017 10:00 AM EDT INFUSION 30 with HBV INFUSION NURSE 2  
HBV OP INFUSION ([place]) [address] 771 200 [facility] Se  
[phone] Note: Patient must have a  if they take medication(s) that impairs their ability to operate a motor vehicle Tuesday September 19, 2017 10:00 AM EDT INFUSION 30 with HBV INFUSION NURSE 3  
[facility] ([place]) [facility] 200 [facility] Se  
[phone] Note: Patient must have a  if they take medication(s) that impairs their ability to operate a motor vehicle Wednesday September 20, 2017 10:00 AM EDT INFUSION 30 with HBV INFUSION NURSE 1  
HBV OP INFUSION ([place]) [address] 931 200 [facility] Se  
[phone] Note: Patient must have a  if they take medication(s) that impairs their ability to operate a motor vehicle Thursday September 21, 2017 11:00 AM EDT INFUSION 30 with HBV INFUSION NURSE 3  
HBV OP INFUSION ([place]) [address] 200 [facility] Se  
[phone] Note: Patient must have a  if they take medication(s) that impairs their ability to operate a motor vehicle Friday September 22, 2017 11:00 AM EDT INFUSION 30 with HBV INFUSION NURSE 4  
[facility]USION ([place]) [facility], [place] [address] 200 [facility] Se  
[phone] Note: Patient must have a  if they take medication(s) that impairs their ability to operate a motor vehicle Saturday September 23, 2017  9:00 AM EDT INFUSION 30 with HBV INFUSION NURSE 1  
HBV OP INFUSION ([place]) [address] [address] 200 [facility] Se  
[phone] Note: Patient must have a  if they take medication(s) that impairs their ability to operate a motor vehicle Sunday September 24, 2017  9:00 AM EDT INFUSION 30 with HBV INFUSION NURSE 1  
[facility]USION ([place]) [facility], [place] 936 200 [facility] Se  
[phone] Note: Patient must have a  if they take medication(s) that impairs their ability to operate a motor vehicle Current Discharge Medication List  
  
START taking these medications Dose & Instructions Dispensing Information Comments Morning Noon Evening Bedtime  
 docusate sodium 100 mg capsule Commonly known as:  Alberta Wilkins Your last dose was: Your next dose is:    
   
   
 Dose:  100 mg Take 1 Cap by mouth two (2) times a day for 90 days. Quantity:  60 Cap Refills:  2 HYDROcodone-acetaminophen  mg tablet Commonly known as:  Keke Iron Your last dose was: Your next dose is:    
   
   
 Dose:  1 Tab Take 1 Tab by mouth every four (4) hours as needed. Max Daily Amount: 6 Tabs. Quantity:  15 Tab Refills:  0  
     
   
   
   
  
 OTHER Your last dose was: Your next dose is:    
   
   
 Pt to receive IV antibiotics with home health, ordered by infectious diseases - IV ertapenem Quantity:  1 Each Refills:  0  
     
   
   
   
  
 potassium chloride 20 mEq packet Commonly known as:  KLOR-CON Your last dose was: Your next dose is:    
   
   
 Dose:  20 mEq Take 1 Packet by mouth daily. Quantity:  30 Packet Refills:  0 CONTINUE these medications which have CHANGED Dose & Instructions Dispensing Information Comments Morning Noon Evening Bedtime * polyethylene glycol 17 gram/dose powder Commonly known as:  Abhi Coma What changed:  Another medication with the same name was added. Make sure you understand how and when to take each. Your last dose was: Your next dose is:    
   
   
  Refills:  0  
     
   
   
   
  
 * polyethylene glycol 17 gram packet Commonly known as:  Abhi Coma What changed: You were already taking a medication with the same name, and this prescription was added. Make sure you understand how and when to take each. Your last dose was: Your next dose is:    
   
   
 Dose:  17 g Take 1 Packet by mouth daily. Quantity:  30 Packet Refills:  0  
     
   
   
   
  
 * warfarin 2.5 mg tablet Commonly known as:  COUMADIN What changed:   
- when to take this 
- additional instructions - Another medication with the same name was removed. Continue taking this medication, and follow the directions you see here. Your last dose was: Your next dose is:    
   
   
 Dose:  2.5 mg Take 1 Tab by mouth daily. Take 2 pills (5 mg) Mon, Tues, Wed, Thur, Sat, Sun; and 1 pill only (2.5 mg) Fri  
 Quantity:  60 Tab Refills:  0  
     
   
   
   
  
 * warfarin 5 mg tablet Commonly known as:  COUMADIN What changed:   
- how much to take 
- how to take this - when to take this 
- additional instructions - Another medication with the same name was removed. Continue taking this medication, and follow the directions you see here. Your last dose was: Your next dose is:    
   
   
 Dose:  5 mg Take 1 Tab by mouth once for 1 dose. Only for 9/1 Quantity:  1 Tab Refills:  0  
     
   
   
   
  
 * Notice: This list has 4 medication(s) that are the same as other medications prescribed for you. Read the directions carefully, and ask your doctor or other care provider to review them with you. CONTINUE these medications which have NOT CHANGED Dose & Instructions Dispensing Information Comments Morning Noon Evening Bedtime  
 albuterol 90 mcg/actuation inhaler Commonly known as:  PROVENTIL HFA, VENTOLIN HFA, PROAIR HFA Your last dose was: Your next dose is:    
   
   
 Dose:  2 Puff Take 2 Puffs by inhalation every four (4) hours as needed for Wheezing or Shortness of Breath. Quantity:  1 Inhaler Refills:  5  
     
   
   
   
  
 albuterol-ipratropium 2.5 mg-0.5 mg/3 ml Nebu Commonly known as:  Luana Gerard Your last dose was: Your next dose is:    
   
   
 Dose:  3 mL  
3 mL by Nebulization route every six (6) hours as needed. Refills:  0  
     
   
   
   
  
 amLODIPine 10 mg tablet Commonly known as:  Linda Grebe Your last dose was: Your next dose is:    
   
   
  Refills:  0  
     
   
   
   
  
 aspirin 81 mg chewable tablet Your last dose was: Your next dose is:    
   
   
 Dose:  81 mg Take 1 Tab by mouth daily. Quantity:  10 Tab Refills:  0  
     
   
   
   
  
 calcium-vitamin D 250-125 mg-unit tablet Commonly known as:  OSCAL 250 Your last dose was: Your next dose is:    
   
   
 Dose:  1 Tab Take 1 Tab by mouth daily. Refills:  0  
     
   
   
   
  
 chlorthalidone 25 mg tablet Commonly known as:  Asa Fails Your last dose was: Your next dose is:    
   
   
  Refills:  0  
     
   
   
   
  
 cyclobenzaprine 10 mg tablet Commonly known as:  FLEXERIL Your last dose was: Your next dose is:    
   
   
 Dose:  10 mg Take 1 Tab by mouth three (3) times daily as needed for Muscle Spasm(s). Quantity:  30 Tab Refills:  0  
     
   
   
   
  
 diclofenac EC 50 mg EC tablet Commonly known as:  VOLTAREN Your last dose was: Your next dose is:    
   
   
 Dose:  50 mg Take 1 Tab by mouth three (3) times daily as needed. Quantity:  30 Tab Refills:  0 DIGITEK 0.125 mg tablet Generic drug:  digoxin Your last dose was: Your next dose is:    
   
   
 take 1 tablet by mouth once daily Quantity:  30 Tab Refills:  6  
     
   
   
   
  
 dilTIAZem 90 mg tablet Commonly known as:  CARDIZEM Your last dose was: Your next dose is:    
   
   
 Dose:  90 mg Take 1 Tab by mouth Before breakfast, lunch, dinner and at bedtime. Quantity:  60 Tab Refills:  0  
     
   
   
   
  
 ferrous sulfate 325 mg (65 mg iron) tablet Your last dose was: Your next dose is:    
   
   
  Refills:  0  
     
   
   
   
  
 fluticasone 50 mcg/actuation nasal spray Commonly known as:  Wenda Maze Your last dose was: Your next dose is:    
   
   
 Dose:  2 Spray 2 Sprays by Both Nostrils route daily. Refills:  0  
     
   
   
   
  
 furosemide 20 mg tablet Commonly known as:  LASIX Your last dose was: Your next dose is:    
   
   
  Refills:  0  
     
   
   
   
  
 ketorolac 0.5 % ophthalmic solution Commonly known as:  Pedro Ka Your last dose was: Your next dose is:    
   
   
 Dose:  1 Drop Administer 1 Drop to right eye daily. Refills:  0 OXYGEN-AIR DELIVERY SYSTEMS Your last dose was: Your next dose is:    
   
   
 Dose:  2 L/min 2 L/min by Does Not Apply route daily. Continuous. Company is [employer] Refills:  0  
     
   
   
   
  
 pravastatin 40 mg tablet Commonly known as:  PRAVACHOL Your last dose was: Your next dose is:    
   
   
  Refills:  0  
     
   
   
   
  
 prednisoLONE acetate 1 % ophthalmic suspension Commonly known as:  PRED FORTE Your last dose was: Your next dose is:    
   
   
 Dose:  1 Drop Administer 1 Drop to right eye four (4) times daily. Refills:  0 QVAR 80 mcg/actuation [employer] Generic drug:  beclomethasone Your last dose was: Your next dose is:    
   
   
  Refills:  0 SENNA PLUS 8.6-50 mg per tablet Generic drug:  senna-docusate Your last dose was: Your next dose is:    
   
   
  Refills:  0  
     
   
   
   
  
 umeclidinium-vilanterol 62.5-25 mcg/actuation inhaler Commonly known as:  More Allyer Your last dose was: Your next dose is:    
   
   
 Dose:  1 Puff Take 1 Puff by inhalation daily. Indications: BRONCHOSPASM PREVENTION WITH COPD Quantity:  1 Inhaler Refills:  5 Where to Get Your Medications These medications were sent to [address] [place] [address], [address] [place] [address], [address] [place] [id number] Phone:  [phone]  
  potassium chloride 20 mEq packet Information on where to get these meds will be given to you by the nurse or doctor. ! Ask your nurse or doctor about these medications  
  docusate sodium 100 mg capsule HYDROcodone-acetaminophen  mg tablet OTHER  
 polyethylene glycol 17 gram packet  
 warfarin 2.5 mg tablet  
 warfarin 5 mg tablet Discharge Instructions Skin Abscess: Care Instructions Your Care Instructions A skin abscess is a bacterial infection that forms a pocket of pus. A boil is a kind of skin abscess. The doctor may have cut an opening in the abscess so that the pus can drain out. You may have gauze in the cut so that the abscess will stay open and keep draining. You may need antibiotics. You will need to follow up with your doctor to make sure the infection has gone away. The doctor has checked you carefully, but problems can develop later. If you notice any problems or new symptoms, get medical treatment right away. Follow-up care is a key part of your treatment and safety.  Be sure to make and go to all appointments, and call your doctor if you are having problems. It's also a good idea to know your test results and keep a list of the medicines you take. How can you care for yourself at home? · Apply warm and dry compresses, a heating pad set on low, or a hot water bottle 3 or 4 times a day for pain. Keep a cloth between the heat source and your skin. · If your doctor prescribed antibiotics, take them as directed. Do not stop taking them just because you feel better. You need to take the full course of antibiotics. · Take pain medicines exactly as directed. ¨ If the doctor gave you a prescription medicine for pain, take it as prescribed. ¨ If you are not taking a prescription pain medicine, ask your doctor if you can take an over-the-counter medicine. · Keep your bandage clean and dry. Change the bandage whenever it gets wet or dirty, or at least one time a day. · If the abscess was packed with gauze: 
¨ Keep follow-up appointments to have the gauze changed or removed. If the doctor instructed you to remove the gauze, gently pull out all of the gauze when your doctor tells you to. ¨ After the gauze is removed, soak the area in warm water for 15 to 20 minutes 2 times a day, until the wound closes. When should you call for help? Call your doctor now or seek immediate medical care if: 
· You have signs of worsening infection, such as: 
¨ Increased pain, swelling, warmth, or redness. ¨ Red streaks leading from the infected skin. ¨ Pus draining from the wound. ¨ A fever. Watch closely for changes in your health, and be sure to contact your doctor if: 
· You do not get better as expected. Where can you learn more? Go to http://brittney-eliseo.info/. Enter T216 in the search box to learn more about \"Skin Abscess: Care Instructions. \" Current as of: October 13, 2016 Content Version: 11.3 © 2045-7224 [employer].  Care instructions adapted under license by [address] (which disclaims liability or warranty for this information). If you have questions about a medical condition or this instruction, always ask your healthcare professional. [address] any warranty or liability for your use of this information. Patient armband removed and given to patient to take home. Patient was informed of the privacy risks if armband lost or stolen MyChart Activation Thank you for requesting access to [employer]. Please follow the instructions below to securely access and download your online medical record. S[employer]ak allows you to send messages to your doctor, view your test results, renew your prescriptions, schedule appointments, and more. How Do I Sign Up? 1. In your internet browser, go to [email or web address] 
2. Click on the First Time User? Click Here link in the Sign In box. You will be redirect to the New Member Sign Up page. 3. Enter your Syncbak Access Code exactly as it appears below. You will not need to use this code after youve completed the sign-up process. If you do not sign up before the expiration date, you must request a new code. Syncbak Access Code: [email or web address] Expires: 2017 11:04 AM (This is the date your Syncbak access code will ) 4. Enter the last four digits of your Social Security Number (xxxx) and Date of Birth (mm/dd/yyyy) as indicated and click Submit. You will be taken to the next sign-up page. 5. Create a Syncbak ID. This will be your Syncbak login ID and cannot be changed, so think of one that is secure and easy to remember. 6. Create a Syncbak password. You can change your password at any time. 7. Enter your Password Reset Question and Answer. This can be used at a later time if you forget your password. 8. Enter your e-mail address. You will receive e-mail notification when new information is available in Wilfred Basilio 9. Click Sign Up. You can now view and download portions of your medical record. 10. Click the Download Summary menu link to download a portable copy of your medical information. Additional Information If you have questions, please visit the Frequently Asked Questions section of the [employer] website at [email or web address] Remember, MyChart is NOT to be used for urgent needs. For medical emergencies, dial 911. DISCHARGE SUMMARY from Nurse The following personal items are in your possession at time of discharge: 
 
Dental Appliances: Lowers, Uppers, At bedside Home Medications: None Jewelry: None Clothing: None Other Valuables: At bedside, Cell Phone, With patient PATIENT INSTRUCTIONS: 
 
 
F-face looks uneven A-arms unable to move or move unevenly S-speech slurred or non-existent T-time-call 911 as soon as signs and symptoms begin-DO NOT go Back to bed or wait to see if you get better-TIME IS BRAIN. Warning Signs of HEART ATTACK Call 911 if you have these symptoms: 
? Chest discomfort. Most heart attacks involve discomfort in the center of the chest that lasts more than a few minutes, or that goes away and comes back. It can feel like uncomfortable pressure, squeezing, fullness, or pain. ? Discomfort in other areas of the upper body. Symptoms can include pain or discomfort in one or both arms, the back, neck, jaw, or stomach. ? Shortness of breath with or without chest discomfort. ? Other signs may include breaking out in a cold sweat, nausea, or lightheadedness. Don't wait more than five minutes to call [address]! Fast action can save your life. Calling 911 is almost always the fastest way to get lifesaving treatment. Emergency Medical Services staff can begin treatment when they arrive  up to an hour sooner than if someone gets to the hospital by car. The discharge information has been reviewed with the patient. The patient verbalized understanding. Discharge medications reviewed with the patient and appropriate educational materials and side effects teaching were provided. Discharge Orders Procedure Order Date Status Priority Quantity Spec Type Associated Dx CBC WITH AUTOMATED DIFF 08/31/17 1318 Future Routine 1 Blood CBC WITH AUTOMATED DIFF 08/31/17 1318 Future Routine 1 Blood CBC WITH AUTOMATED DIFF 08/31/17 1318 Future Routine 1 Blood CBC WITH AUTOMATED DIFF 08/31/17 1318 Future Routine 1 Blood CBC WITH AUTOMATED DIFF 08/31/17 1318 Future Routine 1 Blood CBC WITH AUTOMATED DIFF 08/31/17 1318 Future Routine 1 Blood CBC WITH AUTOMATED DIFF 08/31/17 1318 Future Routine 1 Blood CBC WITH AUTOMATED DIFF 08/31/17 1318 Future Routine 1 Blood METABOLIC PANEL, BASIC 35/40/84 1318 Future Routine 1 Blood METABOLIC PANEL, BASIC 01/62/89 1318 Future Routine 1 Blood METABOLIC PANEL, BASIC 13/71/30 1318 Future Routine 1 Blood METABOLIC PANEL, BASIC 52/60/68 1318 Future Routine 1 Blood METABOLIC PANEL, BASIC 52/94/74 1318 Future Routine 1 Blood METABOLIC PANEL, BASIC 54/01/37 1318 Future Routine 1 Blood METABOLIC PANEL, BASIC 52/51/11 1318 Future Routine 1 Blood METABOLIC PANEL, BASIC 99/45/24 1318 Future Routine 1 Blood C REACTIVE PROTEIN, QT 08/31/17 1318 Future Routine 1 Serum C REACTIVE PROTEIN, QT 08/31/17 1318 Future Routine 1 Serum C REACTIVE PROTEIN, QT 08/31/17 1318 Future Routine 1 Serum C REACTIVE PROTEIN, QT 08/31/17 1318 Future Routine 1 Serum C REACTIVE PROTEIN, QT 08/31/17 1318 Future Routine 1 Serum C REACTIVE PROTEIN, QT 08/31/17 1318 Future Routine 1 Serum C REACTIVE PROTEIN, QT 08/31/17 1318 Future Routine 1 Serum C REACTIVE PROTEIN, QT 08/31/17 1318 Future Routine 1 Serum MyChart Announcement We are excited to announce that we are making your provider's discharge notes available to you in [employer]. You will see these notes when they are completed and signed by the physician that discharged you from your recent hospital stay. If you have any questions or concerns about any information you see in [employer], please call the Health Information Department where you were seen or reach out to your Primary Care Provider for more information about your plan of care. Introducing [facility]! [employer] introduces [employer] patient portal. Now you can access parts of your medical record, email your doctor's office, and request medication refills online. 1. In your internet browser, go to [email or web address] 2. Click on the First Time User? Click Here link in the Sign In box. You will see the New Member Sign Up page. 3. Enter your [employer] Access Code exactly as it appears below. You will not need to use this code after youve completed the sign-up process. If you do not sign up before the expiration date, you must request a new code. · [employer] Access Code: [email or web address] Expires: 9/4/2017 11:04 AM 
 
4. Enter the last four digits of your Social Security Number (xxxx) and Date of Birth (mm/dd/yyyy) as indicated and click Submit. You will be taken to the next sign-up page. 5. Create a [employer] ID. This will be your [employer] login ID and cannot be changed, so think of one that is secure and easy to remember. 6. Create a [employer] password. You can change your password at any time. 7. Enter your Password Reset Question and Answer. This can be used at a later time if you forget your password. 8. Enter your e-mail address. You will receive e-mail notification when new information is available in [address] 9. Click Sign Up. You can now view and download portions of your medical record. 10. Click the Download Summary menu link to download a portable copy of your medical information. If you have questions, please visit the Frequently Asked Questions section of the [employer] website. Remember, [employer] is NOT to be used for urgent needs. For medical emergencies, dial 911. Now available from your iPhone and Android! General Information Please provide this summary of care documentation to your next provider. Patient Signature:  ____________________________________________________________ Date:  ____________________________________________________________  
  
[facility] Provider Signature:  ____________________________________________________________ Date:  ____________________________________________________________

## 2017-08-25 NOTE — TELEPHONE ENCOUNTER
Radha called from [facility] ER in reference to patient; she said just call Dr. Candace Orellana back at [phone].   Patient is in [facility] ER #2

## 2017-08-25 NOTE — ED NOTES
Called MRI to check on status of pt wait time for MRI and screening form could not be located, so I refaxed screening form. Pt alert and oriented in bed. Both side rails are up and call button is within reach. Pt in NAD at this time.

## 2017-08-25 NOTE — PROGRESS NOTES
Consult    Patient: Mac Moya MRN: [id number]  SSN: [id number]    YOB: 1940  Age: 65 y.o. Sex: male      Subjective:      Mac Moya is a 65 y.o. male who is being seen for low-back pain. 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 Before this he was treated for a psoas abscess and sepsis. He was treated with IV antx for three months and his infections and pain resolved. He presents today with low-back pain that started about a month ago. He denies any injury or activity that started his pain. His pain is in the lower-back at the S1 location, right at the buttock line. It is diffuse and is radiating into both hips. It achey. He describes it as a really bad \"tooth ache. \" He has no leg pain, no thoracic pain. Denies bladder/bowel dysfunction, saddle paresthesia, BLE weakness other than due to pain, or other neurological deficits. He is also having left hip pain, he has left hip pain upon ROM, he has some arthritis in that hip. He is also reporting right groin pain. It hurts when pressing on it. It is slightly swollen, but it is soft to touch, no redness. He has a hx of a hernia repair. No hernia felt upon exam. He denies any pain with urination, I suspect that this is something other than back pain, could be a difficult to assess re-hernia or other issue. Past Medical History:   Diagnosis Date    Aneurysm [facility])     AAA repair 2006 & 2012    Aorto-iliac duplex 02/13/2015    Tech difficult. Patent aorta bi-iliac endograft w/o leak or limb dysfunction.  Arrhythmia     a fib    Cardiac cath 11/01/2012    RCA (sm, nondom) patent. LM patent. LAD 25%. CX (dom) 45% mid. LVEDP 12 mmHg. No WMA. PA 27/12. W 10-12. CO/CI 5.2/2.6 (TD).  Cardiac echocardiogram, abnormal 02/20/2016    EF 55%. No WMA. Indeterminate diastolic fx. RVSP 60 mmHg. Severe LAE. Mild MR. Mod TR.  IVCE. Similar to study of 10/26/12.    [facility] Cardiovascular aorto-iliac duplex 02/13/2015    Patent aorta bi-iliac endovascular graft w/o leak or limb dysfunction. Sac measures 4.21 x 4.47 cm (4.4 x 4.6 cm on 1/31/14).  Cardiovascular LE peripheral arterial testing 07/29/2013    No significant LE arterial disease bilaterally. R GHADA 1. 12.  L GHADA 1. 12.  R DBI 0.83. L DBI 0.71. Exercise deferred.  Cardiovascular renal duplex 10/31/2012    No RA stenosis. Intrinsic/med disease in left kidney. Patent aortic endograft. Patent, thrombus-free renal veins bilaterally.  Carotid duplex 07/29/2013    Mild <50% bilateral ICA plaquing.  Chronic lung disease     Cigarette smoker     Congestive heart failure (CHF) (HCC)     COPD (chronic obstructive pulmonary disease) (HCC)     and emphysema; likely secondary to tobacco abuse    Difficult intubation     Dyslipidemia     low HDL    Emphysema     HTN (hypertension)     Hypercholesterolemia     Increased prostate specific antigen (PSA) velocity     Long term (current) use of anticoagulants     coumadin    Osteoarthritis     Osteomyelitis (HCC)     Paroxysmal atrial fibrillation (HCC)     Peripheral vascular disease ([address]     AAA repair 12/2007    Persistent atrial Fibrillation     Persistent atrial fibrillation ([address]     Unspecified adverse effect of anesthesia     difficulty breathing placed in ICU Oct 2012 (AAA repair)     Past Surgical History:   Procedure Laterality Date    BRONCHOSCOPY DIAGNOSTIC  11/2/2012         CARDIAC CATHETERIZATION  11/1/2012         COLONOSCOPY N/A 7/26/2016    COLONOSCOPY with Polypectomies performed by Aleshia St MD at [address] HX AAA REPAIR      2006 & 2012    HX HEART CATHETERIZATION  3/4/2009    1. RCA small, nondominant; patent. 2. LMCA patent. 3. LAD is long, wrapped around apical vessel. Diffuse, 20-30% stenosis noted. 4. CCA is large, dominant vessel. Diffuse 20-30% stenosis. 5. LVEDP is 16 mmHg.  6. Overall left ventricular systolic function mildly diminshed with est. EF 45%. Mild, global hypokinesis noted. 7. No significant mitral regurgitation or aortic stenosis noted. (see report)    HX HERNIA REPAIR  2/2014    rt inguinal     HX HERNIA REPAIR  01/2016    LEFT INGUINAL HERNIA REPAIR DR. Ivory Habermann HERNIA,5+Y/O,JESSIE Left 1-20-16    Dr. Karen Garcia  11/1/2012           Family History   Problem Relation Age of Onset    Heart Surgery Father      BYPASS    Stroke Father     Heart Surgery Mother      BYPASS    Coronary Artery Disease Mother     Stroke Mother      Social History   Substance Use Topics    Smoking status: Former Smoker     Packs/day: 1.00     Years: 54.00     Types: Cigarettes     Quit date: 10/23/2012    Smokeless tobacco: Never Used    Alcohol use No      Comment: quit drinking alcohol 26 years ago, patient states stopped in \"1983\"      Current Outpatient Prescriptions   Medication Sig Dispense Refill    QVAR 80 mcg/actuation aero   0    chlorthalidone (HYGROTEN) 25 mg tablet   0    amLODIPine (NORVASC) 10 mg tablet   0    DIGITEK 125 mcg tablet take 1 tablet by mouth once daily 30 Tab 6    cyclobenzaprine (FLEXERIL) 10 mg tablet Take 1 Tab by mouth three (3) times daily as needed for Muscle Spasm(s). 30 Tab 0    pravastatin (PRAVACHOL) 40 mg tablet   0    warfarin (COUMADIN) 5 mg tablet   0    albuterol (PROVENTIL HFA, VENTOLIN HFA, PROAIR HFA) 90 mcg/actuation inhaler Take 2 Puffs by inhalation every four (4) hours as needed for Wheezing or Shortness of Breath. 1 Inhaler 5    OXYGEN-AIR DELIVERY SYSTEMS 2 L/min by Does Not Apply route daily. Continuous. Company is [employer]         diclofenac EC (VOLTAREN) 50 mg EC tablet Take 1 Tab by mouth three (3) times daily as needed. 30 Tab 0    fluticasone (FLONASE) 50 mcg/actuation nasal spray 2 Sprays by Both Nostrils route daily.  calcium-vitamin D (OSCAL 250) 250-125 mg-unit tablet Take 1 Tab by mouth daily.       SENNA PLUS 8.6-50 mg per tablet 0    polyethylene glycol (MIRALAX) 17 gram/dose powder   0    furosemide (LASIX) 20 mg tablet   0    warfarin (COUMADIN) 7.5 mg tablet Take 1 Tab by mouth every evening. 3 Tab 0    aspirin 81 mg chewable tablet Take 1 Tab by mouth daily. 10 Tab 0    diltiazem (CARDIZEM) 90 mg tablet Take 1 Tab by mouth Before breakfast, lunch, dinner and at bedtime. 60 Tab 0    ferrous sulfate 325 mg (65 mg iron) tablet   0    umeclidinium-vilanterol (ANORO ELLIPTA) 62.5-25 mcg/actuation dsdv Take 1 Puff by inhalation daily. Indications: BRONCHOSPASM PREVENTION WITH COPD 1 Inhaler 5        Allergies   Allergen Reactions    Codeine Swelling    Morphine Itching    Sulfa (Sulfonamide Antibiotics) Other (comments)     Kidney problems. Review of Systems:  Gastrointestinal: negative  Genitourinary:negative  Musculoskeletal:positive for myalgias, arthralgias and low-back pain, left hip pain and right groin pain  Neurological: negative    Objective:     Vitals:    08/25/17 1144 08/25/17 1306   BP: (!) 124/95    Pulse: (!) 102    Resp: 21    Temp: 98.2 °F (36.8 °C)    SpO2: 94% 98%   Weight: 163 lb (73.9 kg)    Height: 5' 6.5\" (1.689 m)         Physical Exam:  ABDOMEN: soft, non-tender. No masses,  no organomegaly, abnormal findings:  tenderness moderate right inguinal pain upon palpation with very mild swelling  EXTREMITIES:  extremities normal, atraumatic, no cyanosis or edema  SKIN: Normal.    Musculoskeletal/NEUROLOGIC:    Lumbar spine:  No rash, ecchymosis, or gross obliquity. No fasciculations. No focal atrophy is noted. Tenderness to palpation bilateral low back in the L5/S1 location. No tenderness to palpation at the sciatic notch. Sensation grossly intact to light touch.     Straight leg raise negative bilaterally     Hip Flex Quads Hamstrings Ankle DF EHL Ankle PF   Right +4/5 +4/5 +4/5 +4/5 +4/5 +4/5   Left +4/5 +4/5 +4/5 +4/5 +4/5 +4/5       Assessment:     Hospital Problems  Date Reviewed: 6/6/2017    None Plan:     Thoracic and Lumbar MRIs to be read by Dr. Henna Lai. Investigate right inguinal pain upon palpation.     Signed By: Darion Nixon NP     August 25, 2017

## 2017-08-25 NOTE — ED NOTES
Pt resting comfortably in bed with HOB at 30 degree angle. Pt has daughter in room and call button within reach. Pt has both side rails up.

## 2017-08-25 NOTE — ED NOTES
Assumed care of patient from [facility]. Pt is AOX4; pt on stretcher, in gown, on monitor. Pt has IV access. Pt is awaiting MRI results at this time. Pt rating pain 8/10 in back will notify ED MD. Pt has family bedside.

## 2017-08-25 NOTE — ED NOTES
PT resting in bed with daughter in room. Side rails are up and call button within reach. Pt in NAD at this time.

## 2017-08-25 NOTE — ED PROVIDER NOTES
HPI Comments: 11:47 AM   Sraah Bruno is a 65 y.o. Male with PMHx of COPD, PVD, HTN, COPD, A fib and OA who presents to the ED with a chief complaint of intermittent low back pain for the last month. Pt states pain radiating to both hips equally. He denies any recent falls or trauma. No dysuria or hematuria, no bowel or bladder incontinence, no lower extremity numbness or weakness. No fevers or chills. Pain does not radiate down his legs. He usually treats his pain with tylenol, \"sometimes it works and sometimes it doesn't but it sure didn't work last night\". He has discussed symptoms with his PMD and \"they've got me scheduled to see Dr. Long Adams but not until next month and I can't feel like this for that long\". He denies any other acute symptoms or complaints. The history is provided by the patient. No  was used. Past Medical History:   Diagnosis Date    Aneurysm [facility])     AAA repair 2006 & 2012    Aorto-iliac duplex 02/13/2015    Tech difficult. Patent aorta bi-iliac endograft w/o leak or limb dysfunction.  Arrhythmia     a fib    Cardiac cath 11/01/2012    RCA (sm, nondom) patent. LM patent. LAD 25%. CX (dom) 45% mid. LVEDP 12 mmHg. No WMA. PA 27/12. W 10-12. CO/CI 5.2/2.6 (TD).  Cardiac echocardiogram, abnormal 02/20/2016    EF 55%. No WMA. Indeterminate diastolic fx. RVSP 60 mmHg. Severe LAE. Mild MR. Mod TR.  IVCE. Similar to study of 10/26/12.  Cardiovascular aorto-iliac duplex 02/13/2015    Patent aorta bi-iliac endovascular graft w/o leak or limb dysfunction. Sac measures 4.21 x 4.47 cm (4.4 x 4.6 cm on 1/31/14).  Cardiovascular LE peripheral arterial testing 07/29/2013    No significant LE arterial disease bilaterally. R GHADA 1. 12.  L GHADA 1. 12.  R DBI 0.83. L DBI 0.71. Exercise deferred.  Cardiovascular renal duplex 10/31/2012    No RA stenosis. Intrinsic/med disease in left kidney. Patent aortic endograft.   Patent, thrombus-free renal veins bilaterally.  Carotid duplex 07/29/2013    Mild <50% bilateral ICA plaquing.  Chronic lung disease     Cigarette smoker     Congestive heart failure (CHF) (HCC)     COPD (chronic obstructive pulmonary disease) (HCC)     and emphysema; likely secondary to tobacco abuse    Difficult intubation     Dyslipidemia     low HDL    Emphysema     HTN (hypertension)     Hypercholesterolemia     Increased prostate specific antigen (PSA) velocity     Long term (current) use of anticoagulants     coumadin    Osteoarthritis     Osteomyelitis (HCC)     Paroxysmal atrial fibrillation (HCC)     Peripheral vascular disease ([address]     AAA repair 12/2007    Persistent atrial Fibrillation     Persistent atrial fibrillation ([address]     Unspecified adverse effect of anesthesia     difficulty breathing placed in ICU Oct 2012 (AAA repair)       Past Surgical History:   Procedure Laterality Date    BRONCHOSCOPY DIAGNOSTIC  11/2/2012         CARDIAC CATHETERIZATION  11/1/2012         COLONOSCOPY N/A 7/26/2016    COLONOSCOPY with Polypectomies performed by Lily Philippe MD at [address] HX AAA REPAIR      2006 & 2012    HX HEART CATHETERIZATION  3/4/2009    1. RCA small, nondominant; patent. 2. LMCA patent. 3. LAD is long, wrapped around apical vessel. Diffuse, 20-30% stenosis noted. 4. CCA is large, dominant vessel. Diffuse 20-30% stenosis. 5. LVEDP is 16 mmHg. 6. Overall left ventricular systolic function mildly diminshed with est. EF 45%. Mild, global hypokinesis noted. 7. No significant mitral regurgitation or aortic stenosis noted.  (see report)    HX HERNIA REPAIR  2/2014    rt inguinal     HX HERNIA REPAIR  01/2016    LEFT INGUINAL HERNIA REPAIR DR. Cristiane Rendon    REPAIR ING HERNIA,5+Y/O,JESSIE Left 1-20-16    Dr. Manda Meckel  11/1/2012              Family History:   Problem Relation Age of Onset    Heart Surgery Father      BYPASS    Stroke Father    Sim Weiner Heart Surgery Mother      BYPASS    Coronary Artery Disease Mother     Stroke Mother        Social History     Social History    Marital status:      Spouse name: N/A    Number of children: N/A    Years of education: N/A     Occupational History    Not on file. Social History Main Topics    Smoking status: Former Smoker     Packs/day: 1.00     Years: 54.00     Types: Cigarettes     Quit date: 10/23/2012    Smokeless tobacco: Never Used    Alcohol use No      Comment: quit drinking alcohol 26 years ago, patient states stopped in \"1983\"    Drug use: No    Sexual activity: Not on file     Other Topics Concern    Not on file     Social History Narrative         ALLERGIES: Codeine; Morphine; and Sulfa (sulfonamide antibiotics)    Review of Systems   Constitutional: Negative for chills, diaphoresis and fever. HENT: Negative. Eyes: Negative for visual disturbance. Respiratory: Negative for chest tightness and shortness of breath. Cardiovascular: Negative for chest pain, palpitations and leg swelling. Gastrointestinal: Negative for abdominal pain, constipation, diarrhea and vomiting. Endocrine: Negative. Genitourinary: Negative for difficulty urinating, discharge, dysuria, flank pain, hematuria, penile swelling and scrotal swelling. Musculoskeletal: Positive for arthralgias, back pain and myalgias. Negative for gait problem, neck pain and neck stiffness. Skin: Negative for pallor. Allergic/Immunologic: Negative. Neurological: Negative for dizziness, syncope, weakness, light-headedness, numbness and headaches. Hematological: Does not bruise/bleed easily. There were no vitals filed for this visit. Physical Exam   Constitutional: He is oriented to person, place, and time. He appears well-developed and well-nourished. No distress. Resting comfortably on stretcher   HENT:   Head: Normocephalic and atraumatic.    MM moist   Eyes: Conjunctivae and EOM are normal. No scleral icterus. Sclera clear bilaterally   Neck: Normal range of motion. Neck supple. No JVD present. Non-tender to palpation   Cardiovascular: Normal rate, regular rhythm and normal heart sounds. Exam reveals no gallop and no friction rub. No murmur heard. Pulmonary/Chest: Effort normal and breath sounds normal. No respiratory distress. He has no wheezes. He has no rales. He exhibits no tenderness. No crepitance with palpation   Abdominal: Soft. He exhibits no distension. There is no tenderness. Genitourinary:   Genitourinary Comments: No CVA tenderness   Musculoskeletal: He exhibits tenderness. Normal inspection of upper extremities. No edema noted to bilateral lower extremities. Normal ROM to bilateral hips. Mild tenderness to palpation across his low back and SI joints, no focal midline tenderness. Lymphadenopathy:     He has no cervical adenopathy. Neurological: He is alert and oriented to person, place, and time. No cranial nerve deficit. He exhibits normal muscle tone. Coordination normal.   Normal motor and sensation bilaterally to upper and lower extremities   Skin: Skin is warm and dry. No rash noted. He is not diaphoretic. No erythema. Psychiatric:   Normal mood and affect. Vitals reviewed. MDM  Number of Diagnoses or Management Options  Diagnosis management comments: Pt with a month of intermittent non-traumatic low back pain, exacerbated last night. Unalarming exam and VS.  Will check labs and UA, treat pain and reassess. Pt states he is nauseated because he hasn't had breakfast, will give PO. Reviewed old chart:  MRI L-spine done in May 2016 showed evidence of disciitis to L3-4. Repeat MRI done 8/3/16 showed decreased enhancement to L3-4 but new findings of possible discitis to T10-11, no epidural abscess, no perispinal abscess, no osteomyelitis, resolution of enhancing edema to L psoas. Indium scan on 8/8/16 showed no evidence of spinal infection.  No acute findings on XRs done 8/11/17. Pt denies any trauma or acute neurologic symptoms, no infectious symptoms. Will check labs and UA, treat pain. Anticipate discharge to home with PMD and spine follow up. Spoke with Dr. Micky Dunn NP, Harley Blake. She has discussed case with Dr. Pema Ro and he would like repeat MRI T and L spine with and without contrast.    4:31 PM  Discussed with MRI tech, states that MRI can't be done until 6:30 due to scheduled outpatient studies. She is aware that pt's disposition is pending MRI results that were ordered by consultant. MRI ordered at 13:30.        Amount and/or Complexity of Data Reviewed  Clinical lab tests: ordered and reviewed  Tests in the radiology section of CPT®: reviewed and ordered  Decide to obtain previous medical records or to obtain history from someone other than the patient: yes  Obtain history from someone other than the patient: yes  Review and summarize past medical records: yes  Discuss the patient with other providers: yes  Independent visualization of images, tracings, or specimens: yes    Risk of Complications, Morbidity, and/or Mortality  Presenting problems: moderate  Management options: moderate    Patient Progress  Patient progress: stable    ED Course       Procedures

## 2017-08-25 NOTE — ED NOTES
Rounds made. Pt repositioned for comfort. NAD noted. Denies any needs at this time. Pt informed of status.

## 2017-08-25 NOTE — TELEPHONE ENCOUNTER
I have already spoken with Dr. Kim Fox and Dr. Jeevan Jimenez regarding this pt. Hx of discitis Thoracic and possible lumbar in 2016. Admitted in ER for low-back pain.  Thoracic and Lumbar MRIs to be done

## 2017-08-26 ENCOUNTER — APPOINTMENT (OUTPATIENT)
Dept: GENERAL RADIOLOGY | Age: 77
DRG: 871 | End: 2017-08-26
Attending: FAMILY MEDICINE
Payer: MEDICARE

## 2017-08-26 LAB
AMPHET UR QL SCN: NEGATIVE
ANION GAP SERPL CALC-SCNC: 9 MMOL/L (ref 3–18)
ATRIAL RATE: 110 BPM
ATRIAL RATE: 98 BPM
BARBITURATES UR QL SCN: NEGATIVE
BASOPHILS # BLD: 0 K/UL (ref 0–0.1)
BASOPHILS NFR BLD: 0 % (ref 0–2)
BENZODIAZ UR QL: NEGATIVE
BUN SERPL-MCNC: 18 MG/DL (ref 7–18)
BUN/CREAT SERPL: 17 (ref 12–20)
CALCIUM SERPL-MCNC: 8.4 MG/DL (ref 8.5–10.1)
CALCULATED R AXIS, ECG10: -34 DEGREES
CALCULATED R AXIS, ECG10: -37 DEGREES
CALCULATED T AXIS, ECG11: 63 DEGREES
CALCULATED T AXIS, ECG11: 76 DEGREES
CANNABINOIDS UR QL SCN: NEGATIVE
CHLORIDE SERPL-SCNC: 98 MMOL/L (ref 100–108)
CO2 SERPL-SCNC: 27 MMOL/L (ref 21–32)
COCAINE UR QL SCN: NEGATIVE
CREAT SERPL-MCNC: 1.07 MG/DL (ref 0.6–1.3)
DIAGNOSIS, 93000: NORMAL
DIAGNOSIS, 93000: NORMAL
DIFFERENTIAL METHOD BLD: ABNORMAL
EOSINOPHIL # BLD: 0 K/UL (ref 0–0.4)
EOSINOPHIL NFR BLD: 0 % (ref 0–5)
ERYTHROCYTE [DISTWIDTH] IN BLOOD BY AUTOMATED COUNT: 15.6 % (ref 11.6–14.5)
GLUCOSE SERPL-MCNC: 113 MG/DL (ref 74–99)
HCT VFR BLD AUTO: 28.3 % (ref 36–48)
HDSCOM,HDSCOM: ABNORMAL
HGB BLD-MCNC: 8.7 G/DL (ref 13–16)
INR PPP: 2.1 (ref 0.8–1.2)
LYMPHOCYTES # BLD: 1.5 K/UL (ref 0.9–3.6)
LYMPHOCYTES NFR BLD: 14 % (ref 21–52)
MAGNESIUM SERPL-MCNC: 2 MG/DL (ref 1.6–2.6)
MCH RBC QN AUTO: 26.3 PG (ref 24–34)
MCHC RBC AUTO-ENTMCNC: 30.7 G/DL (ref 31–37)
MCV RBC AUTO: 85.5 FL (ref 74–97)
METHADONE UR QL: NEGATIVE
MONOCYTES # BLD: 0.9 K/UL (ref 0.05–1.2)
MONOCYTES NFR BLD: 8 % (ref 3–10)
NEUTS SEG # BLD: 8.5 K/UL (ref 1.8–8)
NEUTS SEG NFR BLD: 78 % (ref 40–73)
OPIATES UR QL: POSITIVE
PCP UR QL: NEGATIVE
PLATELET # BLD AUTO: 313 K/UL (ref 135–420)
PMV BLD AUTO: 9.2 FL (ref 9.2–11.8)
POTASSIUM SERPL-SCNC: 3.3 MMOL/L (ref 3.5–5.5)
POTASSIUM SERPL-SCNC: 3.7 MMOL/L (ref 3.5–5.5)
PROTHROMBIN TIME: 22.6 SEC (ref 11.5–15.2)
Q-T INTERVAL, ECG07: 320 MS
Q-T INTERVAL, ECG07: 328 MS
QRS DURATION, ECG06: 96 MS
QRS DURATION, ECG06: 98 MS
QTC CALCULATION (BEZET), ECG08: 418 MS
QTC CALCULATION (BEZET), ECG08: 419 MS
RBC # BLD AUTO: 3.31 M/UL (ref 4.7–5.5)
SODIUM SERPL-SCNC: 134 MMOL/L (ref 136–145)
SODIUM UR-SCNC: 80 MMOL/L (ref 20–110)
VENTRICULAR RATE, ECG03: 103 BPM
VENTRICULAR RATE, ECG03: 98 BPM
WBC # BLD AUTO: 11 K/UL (ref 4.6–13.2)

## 2017-08-26 PROCEDURE — 36415 COLL VENOUS BLD VENIPUNCTURE: CPT

## 2017-08-26 PROCEDURE — 80307 DRUG TEST PRSMV CHEM ANLYZR: CPT

## 2017-08-26 PROCEDURE — 97110 THERAPEUTIC EXERCISES: CPT

## 2017-08-26 PROCEDURE — 65660000000 HC RM CCU STEPDOWN

## 2017-08-26 PROCEDURE — 74011250636 HC RX REV CODE- 250/636: Performed by: FAMILY MEDICINE

## 2017-08-26 PROCEDURE — 84300 ASSAY OF URINE SODIUM: CPT

## 2017-08-26 PROCEDURE — 74011000250 HC RX REV CODE- 250: Performed by: FAMILY MEDICINE

## 2017-08-26 PROCEDURE — 87086 URINE CULTURE/COLONY COUNT: CPT

## 2017-08-26 PROCEDURE — 74011250637 HC RX REV CODE- 250/637: Performed by: STUDENT IN AN ORGANIZED HEALTH CARE EDUCATION/TRAINING PROGRAM

## 2017-08-26 PROCEDURE — 94640 AIRWAY INHALATION TREATMENT: CPT

## 2017-08-26 PROCEDURE — 74011000258 HC RX REV CODE- 258: Performed by: FAMILY MEDICINE

## 2017-08-26 PROCEDURE — 85025 COMPLETE CBC W/AUTO DIFF WBC: CPT

## 2017-08-26 PROCEDURE — 83735 ASSAY OF MAGNESIUM: CPT

## 2017-08-26 PROCEDURE — 71020 XR CHEST PA LAT: CPT

## 2017-08-26 PROCEDURE — 74011250636 HC RX REV CODE- 250/636: Performed by: STUDENT IN AN ORGANIZED HEALTH CARE EDUCATION/TRAINING PROGRAM

## 2017-08-26 PROCEDURE — 74011250637 HC RX REV CODE- 250/637: Performed by: FAMILY MEDICINE

## 2017-08-26 PROCEDURE — 97161 PT EVAL LOW COMPLEX 20 MIN: CPT

## 2017-08-26 PROCEDURE — 85610 PROTHROMBIN TIME: CPT | Performed by: FAMILY MEDICINE

## 2017-08-26 PROCEDURE — 84132 ASSAY OF SERUM POTASSIUM: CPT

## 2017-08-26 RX ORDER — METRONIDAZOLE 500 MG/100ML
500 INJECTION, SOLUTION INTRAVENOUS
Status: DISPENSED | OUTPATIENT
Start: 2017-08-26 | End: 2017-08-26

## 2017-08-26 RX ORDER — NALOXONE HYDROCHLORIDE 0.4 MG/ML
0.4 INJECTION, SOLUTION INTRAMUSCULAR; INTRAVENOUS; SUBCUTANEOUS AS NEEDED
Status: DISCONTINUED | OUTPATIENT
Start: 2017-08-26 | End: 2017-08-31 | Stop reason: HOSPADM

## 2017-08-26 RX ORDER — AMLODIPINE BESYLATE 10 MG/1
10 TABLET ORAL DAILY
Status: DISCONTINUED | OUTPATIENT
Start: 2017-08-26 | End: 2017-08-31 | Stop reason: HOSPADM

## 2017-08-26 RX ORDER — METRONIDAZOLE 500 MG/100ML
500 INJECTION, SOLUTION INTRAVENOUS EVERY 6 HOURS
Status: DISCONTINUED | OUTPATIENT
Start: 2017-08-26 | End: 2017-08-28

## 2017-08-26 RX ORDER — CIPROFLOXACIN 2 MG/ML
400 INJECTION, SOLUTION INTRAVENOUS EVERY 12 HOURS
Status: DISCONTINUED | OUTPATIENT
Start: 2017-08-26 | End: 2017-08-27

## 2017-08-26 RX ORDER — HYDROCODONE BITARTRATE AND ACETAMINOPHEN 5; 325 MG/1; MG/1
1 TABLET ORAL
Status: DISCONTINUED | OUTPATIENT
Start: 2017-08-26 | End: 2017-08-27

## 2017-08-26 RX ORDER — METRONIDAZOLE 500 MG/100ML
500 INJECTION, SOLUTION INTRAVENOUS EVERY 6 HOURS
Status: DISCONTINUED | OUTPATIENT
Start: 2017-08-26 | End: 2017-08-26

## 2017-08-26 RX ORDER — SODIUM CHLORIDE 9 MG/ML
125 INJECTION, SOLUTION INTRAVENOUS CONTINUOUS
Status: DISCONTINUED | OUTPATIENT
Start: 2017-08-26 | End: 2017-08-29

## 2017-08-26 RX ORDER — DIGOXIN 125 MCG
0.12 TABLET ORAL DAILY
Status: DISCONTINUED | OUTPATIENT
Start: 2017-08-26 | End: 2017-08-31 | Stop reason: HOSPADM

## 2017-08-26 RX ORDER — CHLORTHALIDONE 25 MG/1
25 TABLET ORAL DAILY
Status: DISCONTINUED | OUTPATIENT
Start: 2017-08-26 | End: 2017-08-31 | Stop reason: HOSPADM

## 2017-08-26 RX ORDER — ACETAMINOPHEN 325 MG/1
650 TABLET ORAL
Status: DISCONTINUED | OUTPATIENT
Start: 2017-08-26 | End: 2017-08-28

## 2017-08-26 RX ORDER — IPRATROPIUM BROMIDE AND ALBUTEROL SULFATE 2.5; .5 MG/3ML; MG/3ML
3 SOLUTION RESPIRATORY (INHALATION)
Status: DISCONTINUED | OUTPATIENT
Start: 2017-08-26 | End: 2017-08-30

## 2017-08-26 RX ORDER — METRONIDAZOLE 500 MG/100ML
500 INJECTION, SOLUTION INTRAVENOUS
Status: COMPLETED | OUTPATIENT
Start: 2017-08-26 | End: 2017-08-28

## 2017-08-26 RX ORDER — DOCUSATE SODIUM 100 MG/1
100 CAPSULE, LIQUID FILLED ORAL 2 TIMES DAILY
Status: DISCONTINUED | OUTPATIENT
Start: 2017-08-26 | End: 2017-08-31 | Stop reason: HOSPADM

## 2017-08-26 RX ORDER — PHYTONADIONE 5 MG/1
2.5 TABLET ORAL
Status: DISCONTINUED | OUTPATIENT
Start: 2017-08-26 | End: 2017-08-26

## 2017-08-26 RX ADMIN — METRONIDAZOLE 500 MG: 500 INJECTION, SOLUTION INTRAVENOUS at 17:14

## 2017-08-26 RX ADMIN — DOCUSATE SODIUM 100 MG: 100 CAPSULE, LIQUID FILLED ORAL at 10:49

## 2017-08-26 RX ADMIN — FLUTICASONE FUROATE 1 PUFF: 100 POWDER RESPIRATORY (INHALATION) at 08:18

## 2017-08-26 RX ADMIN — VANCOMYCIN HYDROCHLORIDE 1500 MG: 10 INJECTION, POWDER, LYOPHILIZED, FOR SOLUTION INTRAVENOUS at 05:53

## 2017-08-26 RX ADMIN — HYDROCODONE BITARTRATE AND ACETAMINOPHEN 1 TABLET: 5; 325 TABLET ORAL at 17:14

## 2017-08-26 RX ADMIN — CIPROFLOXACIN 400 MG: 2 INJECTION, SOLUTION INTRAVENOUS at 22:32

## 2017-08-26 RX ADMIN — DIGOXIN 0.12 MG: 0.12 TABLET ORAL at 10:49

## 2017-08-26 RX ADMIN — LIDOCAINE HYDROCHLORIDE: 10 INJECTION, SOLUTION EPIDURAL; INFILTRATION; INTRACAUDAL; PERINEURAL at 09:00

## 2017-08-26 RX ADMIN — SODIUM CHLORIDE 125 ML/HR: 900 INJECTION, SOLUTION INTRAVENOUS at 10:49

## 2017-08-26 RX ADMIN — LIDOCAINE HYDROCHLORIDE: 10 INJECTION, SOLUTION EPIDURAL; INFILTRATION; INTRACAUDAL; PERINEURAL at 10:48

## 2017-08-26 RX ADMIN — HYDROCODONE BITARTRATE AND ACETAMINOPHEN 1 TABLET: 5; 325 TABLET ORAL at 22:36

## 2017-08-26 RX ADMIN — ACETAMINOPHEN 650 MG: 325 TABLET, FILM COATED ORAL at 13:00

## 2017-08-26 RX ADMIN — METRONIDAZOLE 500 MG: 500 INJECTION, SOLUTION INTRAVENOUS at 11:30

## 2017-08-26 RX ADMIN — HYDROCODONE BITARTRATE AND ACETAMINOPHEN 1 TABLET: 5; 325 TABLET ORAL at 00:42

## 2017-08-26 RX ADMIN — SODIUM CHLORIDE 125 ML/HR: 900 INJECTION, SOLUTION INTRAVENOUS at 00:47

## 2017-08-26 RX ADMIN — METRONIDAZOLE 500 MG: 500 INJECTION, SOLUTION INTRAVENOUS at 10:50

## 2017-08-26 RX ADMIN — HYDROCODONE BITARTRATE AND ACETAMINOPHEN 1 TABLET: 5; 325 TABLET ORAL at 06:42

## 2017-08-26 RX ADMIN — CIPROFLOXACIN 400 MG: 2 INJECTION, SOLUTION INTRAVENOUS at 10:50

## 2017-08-26 RX ADMIN — PHYTONADIONE 1 MG: 10 INJECTION, EMULSION INTRAMUSCULAR; INTRAVENOUS; SUBCUTANEOUS at 16:21

## 2017-08-26 RX ADMIN — AMLODIPINE BESYLATE 10 MG: 10 TABLET ORAL at 10:48

## 2017-08-26 RX ADMIN — DOCUSATE SODIUM 100 MG: 100 CAPSULE, LIQUID FILLED ORAL at 17:14

## 2017-08-26 RX ADMIN — CHLORTHALIDONE 25 MG: 25 TABLET ORAL at 10:49

## 2017-08-26 RX ADMIN — LIDOCAINE HYDROCHLORIDE: 10 INJECTION, SOLUTION EPIDURAL; INFILTRATION; INTRACAUDAL; PERINEURAL at 12:00

## 2017-08-26 RX ADMIN — HYDROCODONE BITARTRATE AND ACETAMINOPHEN 1 TABLET: 5; 325 TABLET ORAL at 10:45

## 2017-08-26 RX ADMIN — LIDOCAINE HYDROCHLORIDE: 10 INJECTION, SOLUTION EPIDURAL; INFILTRATION; INTRACAUDAL; PERINEURAL at 11:00

## 2017-08-26 NOTE — H&P
Admission History and Physical  [facility]      Patient: Caryle Gimenez MRN: [id number]  [facility]: [id number]    YOB: 1940  Age: 65 y.o. Sex: male      DOA: 8/25/2017       HPI:     Mr. Caryle Gimenez is a 65year old male with a PMH significant for chronic atrial fibrillation, hypertension, CAD and COPD who is presenting with lower back and hip pain. Patient notes that the pain began around 3 weeks ago, was relapsing and remitting at first but has become constant over the past three days. The pain is across his lower back and both of his hips, is dull and aching and radiates to the groin. No alleviating factors, standing from a seated position aggravates the pain. No recent falls, no fever/chills, weakness. Patient was treated in February and March of 2016 for L psoas abscess and enterobacter septicemia and T10/11, L3/4 discitis and osteo. He was treated with zosyn, vancomycin, levofloxacin, meropenem and ertapenem. Lumbar, hip and femur x-rays were ordered 8/11 after the patient endorsed pain during a regularly scheduled coumadin visit, X-rays largely unremarkable part from chronic grade 1 anterolisthesis of L3,4 and multilevel degenerative changes. No fractures evident on X-rays. ED Course: Dr. Adalberto Villasenor was consulted who recommended MRI and CT.  EKG indicative of afib  1138   Arrived   1201   CBC WITH AUTOMATED DIFF      METABOLIC PANEL, BASIC      PROTHROMBIN TIME + INR      DIGOXIN     C REACTIVE PROTEIN, QT      SED RATE (ESR)    1211   ONDANSETRON HCL/PF 4 mg     HYDROCODONE/ACETAMINOPHEN 1 Tab   1234   URINALYSIS W/ RFLX MICROSCOPIC      URINE MICROSCOPIC ONLY    1900   MRI [facility] SPINE W WO CONT     MRI LUMB SPINE W WO CONT   1943   GADOBUTROL 7 mL   2036   IOPAMIDOL 75 mL   2042   HYDROCODONE/ACETAMINOPHEN 1 Tab   2101   CT ABD PELV W CONT   2118   EKG, 12 LEAD, INITIAL   2122   Admitted   2130   CULTURE, BLOOD       Review of Systems   Constitutional: Negative for chills and fever. HENT: Negative for congestion, nosebleeds and sore throat. Chronic hearing loss  Occasional tinnitus   Eyes: Negative for blurred vision and double vision. Respiratory: Negative for cough, hemoptysis, sputum production and shortness of breath. Cardiovascular: Negative for chest pain and palpitations. Gastrointestinal: Negative for abdominal pain, blood in stool, constipation, diarrhea, heartburn, melena, nausea and vomiting. Genitourinary: Positive for frequency. Negative for dysuria and hematuria. Musculoskeletal: Negative for falls. Skin: Negative for rash. Neurological: Negative for dizziness, tremors, sensory change, focal weakness, weakness and headaches. Endo/Heme/Allergies: Does not bruise/bleed easily. Past Medical History:   Diagnosis Date    Aneurysm [facility])     AAA repair 2006 & 2012    Aorto-iliac duplex 02/13/2015    Tech difficult. Patent aorta bi-iliac endograft w/o leak or limb dysfunction.  Arrhythmia     a fib    Cardiac cath 11/01/2012    RCA (sm, nondom) patent. LM patent. LAD 25%. CX (dom) 45% mid. LVEDP 12 mmHg. No WMA. PA 27/12. W 10-12. CO/CI 5.2/2.6 (TD).  Cardiac echocardiogram, abnormal 02/20/2016    EF 55%. No WMA. Indeterminate diastolic fx. RVSP 60 mmHg. Severe LAE. Mild MR. Mod TR.  IVCE. Similar to study of 10/26/12.  Cardiovascular aorto-iliac duplex 02/13/2015    Patent aorta bi-iliac endovascular graft w/o leak or limb dysfunction. Sac measures 4.21 x 4.47 cm (4.4 x 4.6 cm on 1/31/14).  Cardiovascular LE peripheral arterial testing 07/29/2013    No significant LE arterial disease bilaterally. R GHADA 1. 12.  L GHADA 1. 12.  R DBI 0.83. L DBI 0.71. Exercise deferred.  Cardiovascular renal duplex 10/31/2012    No RA stenosis. Intrinsic/med disease in left kidney. Patent aortic endograft. Patent, thrombus-free renal veins bilaterally.     Carotid duplex 07/29/2013    Mild <50% bilateral ICA plaquing.  Chronic lung disease     Cigarette smoker     Congestive heart failure (CHF) (HCC)     COPD (chronic obstructive pulmonary disease) (HCC)     and emphysema; likely secondary to tobacco abuse    Difficult intubation     Dyslipidemia     low HDL    Emphysema     HTN (hypertension)     Hypercholesterolemia     Increased prostate specific antigen (PSA) velocity     Long term (current) use of anticoagulants     coumadin    Osteoarthritis     Osteomyelitis (HCC)     Paroxysmal atrial fibrillation (HCC)     Peripheral vascular disease ([address]     AAA repair 12/2007    Persistent atrial Fibrillation     Persistent atrial fibrillation ([address]     Unspecified adverse effect of anesthesia     difficulty breathing placed in ICU Oct 2012 (AAA repair)       Past Surgical History:   Procedure Laterality Date    BRONCHOSCOPY DIAGNOSTIC  11/2/2012         CARDIAC CATHETERIZATION  11/1/2012         COLONOSCOPY N/A 7/26/2016    COLONOSCOPY with Polypectomies performed by Shantal Teran MD at [address] [place] [address] HX AAA REPAIR      2006 & 2012    HX HEART CATHETERIZATION  3/4/2009    1. RCA small, nondominant; patent. 2. LMCA patent. 3. LAD is long, wrapped around apical vessel. Diffuse, 20-30% stenosis noted. 4. CCA is large, dominant vessel. Diffuse 20-30% stenosis. 5. LVEDP is 16 mmHg. 6. Overall left ventricular systolic function mildly diminshed with est. EF 45%. Mild, global hypokinesis noted. 7. No significant mitral regurgitation or aortic stenosis noted.  (see report)    HX HERNIA REPAIR  2/2014    rt inguinal     HX HERNIA REPAIR  01/2016    LEFT INGUINAL HERNIA REPAIR DR. Ivy Fountain ING HERNIA,5+Y/O,JESSIE Left 1-20-16    Dr. Melissa Zayas  11/1/2012            Family History   Problem Relation Age of Onset    Heart Surgery Father      BYPASS    Stroke Father     Heart Surgery Mother      BYPASS    Coronary Artery Disease Mother     Stroke Mother        Social History Social History    Marital status:      Spouse name: N/A    Number of children: N/A    Years of education: N/A     Social History Main Topics    Smoking status: Former Smoker     Packs/day: 1.00     Years: 54.00     Types: Cigarettes     Quit date: 10/23/2012    Smokeless tobacco: Never Used    Alcohol use No      Comment: quit drinking alcohol 26 years ago, patient states stopped in \"\"    Drug use: No    Sexual activity: Not Asked     Other Topics Concern    None     Social History Narrative       Allergies   Allergen Reactions    Codeine Swelling    Morphine Itching    Sulfa (Sulfonamide Antibiotics) Other (comments)     Kidney problems. Prior to Admission Medications   Prescriptions Last Dose Informant Patient Reported? Taking? DIGITEK 125 mcg tablet 2017 at Unknown time  No Yes   Sig: take 1 tablet by mouth once daily   OXYGEN-AIR DELIVERY SYSTEMS 2017 at Unknown time  Yes Yes   Si L/min by Does Not Apply route daily. Continuous. Company is [employer]      QVAR 80 mcg/actuation aero 2017 at Unknown time  Yes Yes   SENNA PLUS 8.6-50 mg per tablet Not Taking at Unknown time  Yes No   albuterol (PROVENTIL HFA, VENTOLIN HFA, PROAIR HFA) 90 mcg/actuation inhaler 2017 at Unknown time  No Yes   Sig: Take 2 Puffs by inhalation every four (4) hours as needed for Wheezing or Shortness of Breath. albuterol-ipratropium (DUO-NEB) 2.5 mg-0.5 mg/3 ml nebu   Yes Yes   Sig: 3 mL by Nebulization route every six (6) hours as needed. amLODIPine (NORVASC) 10 mg tablet 2017 at Unknown time  Yes Yes   aspirin 81 mg chewable tablet Not Taking at Unknown time  No No   Sig: Take 1 Tab by mouth daily. calcium-vitamin D (OSCAL 250) 250-125 mg-unit tablet Not Taking at Unknown time  Yes No   Sig: Take 1 Tab by mouth daily.    chlorthalidone (HYGROTEN) 25 mg tablet 2017 at Unknown time  Yes Yes   cyclobenzaprine (FLEXERIL) 10 mg tablet   No No   Sig: Take 1 Tab by mouth three (3) times daily as needed for Muscle Spasm(s). diclofenac EC (VOLTAREN) 50 mg EC tablet Not Taking at Unknown time  No No   Sig: Take 1 Tab by mouth three (3) times daily as needed. diltiazem (CARDIZEM) 90 mg tablet Not Taking at Unknown time  No No   Sig: Take 1 Tab by mouth Before breakfast, lunch, dinner and at bedtime. ferrous sulfate 325 mg (65 mg iron) tablet Not Taking at Unknown time  Yes No   fluticasone (FLONASE) 50 mcg/actuation nasal spray Not Taking at Unknown time  Yes No   Si Sprays by Both Nostrils route daily. furosemide (LASIX) 20 mg tablet Not Taking at Unknown time  Yes No   polyethylene glycol (MIRALAX) 17 gram/dose powder Not Taking at Unknown time  Yes No   pravastatin (PRAVACHOL) 40 mg tablet   Yes No   umeclidinium-vilanterol (ANORO ELLIPTA) 62.5-25 mcg/actuation dsdv   No No   Sig: Take 1 Puff by inhalation daily. Indications: BRONCHOSPASM PREVENTION WITH COPD   warfarin (COUMADIN) 2.5 mg tablet   Yes Yes   Sig: Take 2.5 mg by mouth Every Friday. 5 mg Mon, Tues, Wed, Thur, Sat, Sun  2.5 mg Fri   warfarin (COUMADIN) 5 mg tablet   Yes Yes   warfarin (COUMADIN) 7.5 mg tablet Not Taking at Unknown time  No No   Sig: Take 1 Tab by mouth every evening.       Facility-Administered Medications: None       Physical Exam:     Patient Vitals for the past 24 hrs:   Temp Pulse Resp BP SpO2   17 0002 99.2 °F (37.3 °C) (!) 116 22 164/83 96 %   17 2300 - (!) 120 24 130/71 98 %   17 2258 - (!) 106 24 - 97 %   175 - (!) 120 26 - 98 %   170 - (!) 117 22 - 99 %   175 - (!) 103 18 - 99 %   17 2200 - 99 16 - 99 %   175 - (!) 104 22 106/61 97 %   170 - (!) 106 18 114/83 99 %   17 - (!) 101 15 109/64 98 %   17 - - - 130/60 97 %   17 - - - - 100 %   17 - - - (!) 113/93 98 %   17 - - - - 99 %   17 - - - - 99 %   17 - - - - 98 %   17 - - - 141/76 -   17 1306 - - - - 98 %   17 1144 98.2 °F (36.8 °C) (!) 102 21 (!) 124/95 94 %       Physical Exam   Constitutional: He is oriented to person, place, and time. He appears well-developed and well-nourished. No distress. HENT:   Head: Normocephalic and atraumatic. Right Ear: External ear normal.   Left Ear: External ear normal.   Mouth/Throat: Oropharynx is clear and moist. No oropharyngeal exudate. Eyes: Conjunctivae and EOM are normal. Pupils are equal, round, and reactive to light. Right eye exhibits no discharge. Left eye exhibits no discharge. No scleral icterus. Neck: Normal range of motion. No tracheal deviation present. Cardiovascular: Exam reveals no gallop and no friction rub. No murmur heard. Irregular rate and rhythm    Pulmonary/Chest: Effort normal and breath sounds normal. No stridor. No respiratory distress. He has no wheezes. He has no rales. Abdominal: Soft. Bowel sounds are normal. He exhibits no distension and no mass. There is no tenderness. There is no rebound and no guarding. Reducible, soft ventral hernia approximately 10 cm x 3 cm    Genitourinary: Rectum normal. Rectal exam shows guaiac negative stool. Genitourinary Comments: Enlarged prostate without tenderness. Prostate is smooth and symmetrically enlarged   Musculoskeletal: Normal range of motion. He exhibits no edema or deformity. Tenderness to the L5 spinous process  Tenderness parapsinal to L5 and across to iliac spines B/L   Neurological: He is alert and oriented to person, place, and time. He has normal reflexes. No cranial nerve deficit. He exhibits normal muscle tone. Skin: Skin is warm and dry. No rash noted. He is not diaphoretic. No erythema. No pallor. Psychiatric: He has a normal mood and affect. His behavior is normal. Judgment and thought content normal.       [email or web address] CT  Impression:      1.  There is a left paraspinal and psoas abscess centered at approximately L3,  measuring 4.2 cm maximally. Adjacent inflammation extends to the posterior and  slightly to the left lateral margin of the aorta at the level of the aortic  aneurysm which is otherwise stable. 2. Infrarenal abdominal aortic aneurysm measures 4.5 cm maximally, similar to  comparison exam, and demonstrates a thickened aortic wall which is also stable. Some periaortic stranding, in part probably related to the adjacent psoas  abscess. Some of this however appears chronic. 3. Multiple hepatic cysts. 4. Sclerotic changes to the L3 vertebral body, likely chronic osteomyelitis. 5. Discussed findings #1 with Dr. Inge Wei on August 25, 2017 at approximately  9:21 PM.       Recent Results (from the past 12 hour(s))   EKG, 12 LEAD, INITIAL    Collection Time: 08/25/17  9:18 PM   Result Value Ref Range    Ventricular Rate 103 BPM    Atrial Rate 110 BPM    QRS Duration 96 ms    Q-T Interval 320 ms    QTC Calculation (Bezet) 419 ms    Calculated R Axis -37 degrees    Calculated T Axis 76 degrees    Diagnosis       Atrial fibrillation with rapid ventricular response  Left axis deviation  Septal infarct , age undetermined  Abnormal ECG  When compared with ECG of 18-FEB-2016 18:18,  Septal infarct is now present  Nonspecific T wave abnormality no longer evident in Inferior leads     POC LACTIC ACID    Collection Time: 08/25/17  9:49 PM   Result Value Ref Range    Lactic Acid (POC) 0.5 0.4 - 2.0 mmol/L         Assessment/Plan:   65 y.o. male with PMH PMH significant for chronic atrial fibrillation, hypertension, CAD (s/p AAA stent placement) and COPD now admitted with psoas abscess with discitis. Sepsis 2/2 psoas abscess and discitis 08/25 MRI pending. 08/25 CT with 4.3cm left paraspinal and psoas abscess at L3, adjacent inflammation extends posterior and to the left lateral margin of aorta; sclerotic changes to L3 likely chronic osteomyelitis.  Patient was treated for T10/11, L3/4 discitis and osteomyelitis and L psoas abscess with enterobacter blood stream infection in 2016.   - CRP elevated at 17.9, lactic acid 0.5  - Leukocytosis 13.4  Management:  - NS @ 125  - Vancomycin dosed per pharmacy  - Cipro dosed per pharmacy   - Metronidazole dosed per pharmcy  - Norco 5:325 Q6H for pain. Colace for opoid induced constipation.   - Ice packs for pain  To do:  - FU blood cultures, urine cultures, respiratory cultures, CXR  - FU UDS  - Consult ID, IR  - NPO in case of intervention tomorrow    Atrial fibrillation, chronic followed by Dr. Ginette Boucher as an outpatient. - Digoxin level 0.9, INR 2.8  - CHADS-VASC score is 4 (HTN, aortic disease, age>75). - Hold home warfarin. Warfarin has a half life of almost 2 days, should expect INR to decrease to 2 within 2-3 days. Consider vitamin K and heparin to bring down INR more rapidly if needed. - Continue home digoxin dose at 125 mcg   - Continue home amlodipine 10 mg   - Daily magnesium  - FU IR recommendations for anticoagulation and INR goal before surgery. Hyponatremia   - 132 on admission  - FU urine Na, serum osmolality    Hypertension  - Continue home chlorthalidone  - Daily BMP to monitor kidney function    COPD former smoker of 58 years  - Duonebs q6H PRN  - Arnuity ellipta 100 mcg (pharmacy substitution for patients home QVAR 80mcg 2 puffs BID)  - On O2 at baseline. Nasal canula titrated to keep O2 between 88-94%    Diet: NPO   DVT Prophylaxis: SCDs  Code Status: Full    Disposition and anticipated LOS: Dispo home; LOS >2 midnights    Tamela Montiel MD   PGY- [facility]  08/26/17 12:34 AM           Senior Resident History and Physical  [facility]    HPI:     Sarah Bruno is a 65 y.o. male with now being admitted with complaint of back pain. HPI, ROS, PMH, PSH, Family Hx, Social Hx, Home medications, and allergies as above in intern H&P. Which has been reviewed in full.  Additional comments to the subjective history include:    Patient was admitted 2/18/2016 - 3/2/2016 treated for enterobacter sepsis secondary to Left illiopsoas abscess L3 osteomyelitis confirmed by MRI lumbar spine and pelvis, treated with IR drainage, vancomycin, zosyn, and levaquin and on discharge a 52 day course of meropenem TID     Patient was again admitted 08/03/2016, discharged 08/09/2016 with discharge diagnosis T10-T11 discitis, iliopsoas and L3-L4 abscess,  treated with Vancomycin and Cefepime IV however with negative blood cultures. Following the above patient reported resolution of hip pain, until 1 month ago when he noticed worsening left hip pain. 08/11/2017 patient was evaluated at [address] [place] [address] complaining of hip pain rated 5-80/10, Radiating down the left leg described as aching like a toothache. Tylenol 650mg every 6 hours did not help. Patient was prescribed Diclofenac Gel 1% and Lidocaine 5% patches which he did not use. Radiographs lumbar spine, left hip, left femur were obtain, which showed: left femur: no acute abnormality; Left hip: osteopenia, degenerative SI joints and pubic symphysis, no fracture; lumbar spine: no convincing evidence of acute fracture or malalignment, Multilevel degenerative changes, and chronic grade 1 anterolisthesis of L3 on L4    Patient was scheduled to followup with Dr Renaldo Gamble for further evaluation given his significant history and concern for another abcess or osteomyelitis, but the appointment was scheduled for September 2017 and patient did not feel he could have waited that long so came to [facility] for evaluation today. He did experience chills the day before presenting to the emergency room.     Physical Exam:     Visit Vitals    /76 (BP 1 Location: Left arm, BP Patient Position: At rest)    Pulse (!) 102    Temp 98.2 °F (36.8 °C)    Resp 21    Ht 5' 6.5\" (1.689 m)    Wt 73.9 kg (163 lb)    SpO2 99%    BMI 25.91 kg/m2       Physical Exam Constitutional: He is oriented to person, place, and time. He appears well-developed and well-nourished. No distress. HENT:   Head: Normocephalic and atraumatic. Right Ear: External ear normal.   Left Ear: External ear normal.   Mouth/Throat: Oropharynx is clear and moist.   Eyes: Conjunctivae and EOM are normal. Pupils are equal, round, and reactive to light. Right eye exhibits no discharge. Left eye exhibits no discharge. No scleral icterus. Neck: Normal range of motion. Neck supple. No JVD present. No tracheal deviation present. No thyromegaly present. Cardiovascular: Normal heart sounds and intact distal pulses. An irregularly irregular rhythm present. Occasional extrasystoles are present. Tachycardia present. PMI is not displaced. Exam reveals no gallop and no friction rub. No murmur heard. Pulmonary/Chest: Effort normal and breath sounds normal. No stridor. No respiratory distress. He has no wheezes. He has no rales. He exhibits no tenderness. Abdominal: Soft. Bowel sounds are normal. There is no tenderness. A hernia is present. Hernia confirmed positive in the right inguinal area (old incision consistent with history of hernia repair). Genitourinary: Testes normal and penis normal. Right testis shows no tenderness. Left testis shows no tenderness. Musculoskeletal:        Right hip: He exhibits decreased range of motion and decreased strength. He exhibits no tenderness. Left hip: He exhibits decreased range of motion and decreased strength. He exhibits no bony tenderness and no swelling. Lumbar back: He exhibits tenderness, bony tenderness and pain. He exhibits no spasm. SLR positive B/L, left side worse than right. B/L hip decreased ROM secondary to pain, L worse than R  Lumbar spine tender to palpation over spinous processes, facet joints, SI joints B/L, piriformis B/L. Lymphadenopathy:     He has no cervical adenopathy. He has no axillary adenopathy. Neurological: He is alert and oriented to person, place, and time. He has normal reflexes. No cranial nerve deficit. Coordination normal.   Skin: Skin is warm and dry. No rash noted. He is not diaphoretic. No erythema. No pallor. Nursing note and vitals reviewed. Labs Reviewed    Imagin2017: MRI lumb spine with Left psoas abscess and L4/5 discitis, read per Dr Wyatt Hendrickson    2016  MRI LUMBAR SPINE WITHOUT AND WITH CONTRAST   History: Left lumbar and hip pain with history of spine infection.   Comparison: MRI 2016   Technique: Lumbar spine scanned with sagittal T1, T2 and STIR sequences followed  by axial T1 and T2 sequences. Contrast was given and followed by sagittal T1  fat-sat and axial T1 postcontrast imaging. 7.5 cc Gadavist was given  intravenously with no immediate complications noted.   Findings:   Numbering of the lumbar vertebra is in concordance with prior imaging. Stable  anterolisthesis of L3 on L4. Vertebral body heights are within normal limits. The conus medullaris looks normal and terminates at the T12 level. No thickening  or clumping of the cauda equina nerve roots. No pathologic distal cord or nerve  enhancement.   There is persistent hyperintense signal within the L3-4 disc space and adjoining  vertebral bodies. The marrow edema is slightly less pronounced. There is  associated disc and bone enhancement, also slightly less pronounced. No epidural  phlegmon or abscess. Asymmetric enhancement within the left psoas muscle with no  evidence of rim-enhancing fluid collection to suggest abscess. Suspected new level of infection at T10-11, although not definitively new as  this level was not previously imaged. There is abnormal STIR hyperintense signal  within the disc space, much more pronounced on the left, with associated  marginal disc enhancement, also most pronounced on the left.  No adjoining marrow  signal abnormality or bone enhancement.    Partially visualized infrarenal aortic aneurysm, bilateral renal and hepatic T2  hyperintense cystic lesions.   Degenerative changes have not significantly changed. There is bulging of disc at  T11-12, T12-L1, L1-2 and L2-3 which cause mild central spinal canal stenoses. Bulges also causes varying degrees of mild to moderate foraminal stenoses. At  L3-4, there is severe multifactorial central spinal canal stenosis due to  combination of bulging disc, markedly hypertrophic facets and ligamentum  flavum. Both neural foramen are stenosed with compression of the exiting L3 nerve  roots. At L4-5, there is bulging disc and moderate facet hypertrophy causing  mild central and moderately severe foraminal stenoses. No disc disease or spinal  stenosis at L5-S1. Impression:  When compared to 5/5/2016 there is persistent but slightly diminished abnormal  signal and enhancement within the L3-4 disc space and adjoining vertebral  bodies. No epidural or paraspinous abscess. Indeterminate whether there is  persistent active infection at this level. Suspected new level of infectious discitis at T10-11 which was not previously  imaged. No adjoining vertebral osteomyelitis. Stable anterolisthesis at L3-4 with severe multifactorial central spinal canal  and foraminal stenoses causing L3 nerve root compression. Other levels of  degenerative change are also stable. Partially imaged known infrarenal abdominal aortic aneurysm and small cystic  lesions in the kidneys and liver. 08/11/2017 LEFT FEMUR RADIOGRAPH-2 VIEWS  INDICATION: Left leg pain  COMPARISON: Correlation with same day left hip x-ray  FINDINGS:  No evidence of acute fracture. Left hip and left knee joint appear intact. Moderate osteoarthrosis noted at the left knee. Vascular calcifications noted. No significant soft tissue abnormality. IMPRESSION  IMPRESSION:  No acute abnormality.     08/11/2017 LEFT HIP RADIOGRAPH-2 VIEWS   INDICATION: Left hip pain   COMPARISON: Correlation with same day left femur x-ray. MRI left hip 8/3/2016.   FINDINGS:  No evidence of acute fracture or dislocation. Bones appear osteopenic. Joint  spaces appear preserved. Degenerative changes noted at the sacroiliac joints and  pubic symphysis. The bony pelvis and sacrum otherwise appear grossly intact. Vascular stents noted at the lower abdomen.   IMPRESSION  IMPRESSION:  No convincing evidence of acute fracture. 8/11/2017. LUMBAR SPINE RADIOGRAPH-3 VIEWS   INDICATION: Left lower back pain. History of osteomyelitis.  COMPARISON: Correlation with same day left hip x-ray. CT abdomen/pelvis  8/4/2016.   FINDINGS: AP, lateral, and spot L5/S1 views were obtained.   Transitional S1 segment with rudimentary S1 disc. No evidence of acute fracture  or malalignment. Evaluation for spondylolysis is limited in the absence of  oblique views. Stable anterolisthesis of L3 on L4. Multilevel degenerative  spondylosis and disc disease noted, most prominent at L4/L5. Chronic mild  wedging of lower thoracic vertebral bodies. Facet joints appear aligned but  demonstrate hypertrophic changes. Visualized portion of the bony pelvis and  sacrum are grossly intact. Aortobiiliac graft again noted, in similar  configuration as prior exam.  IMPRESSION  IMPRESSION:  1. No convincing evidence of acute fracture or malalignment. 2. Multilevel degenerative changes, as above. 3. Chronic grade 1 anterolisthesis of L3 on L4      Assessment/Plan:   65 y.o. male with PMH Osteomyelitis, psoas abscess, HTN, Atrial fibrillation, CAD, Pulmonary hypertension, COPD now admitted with Sepsis secondary to left paraspinal and psoas abscess . Sepsis secondary to left paraspinal and psoas abscess and L4/5 discitis, with bilateral lumbar spinous process, facet joint, and SI joint pain, left hip pain radiating to left groin, B/L SLR positive, in setting of patient with history of multfocal osteomyelitis and prior psoas abscess.  Patient has 3/4 SIRS criteria (HR>90, RR>20, WBC>12,000) however note that patient's atrial fibrillation and COPD may mimic tachycardia and tachypnea respectively. No lactic acidosis (0.5), 1 single episode of hypotension with MAP <70 (/61). Will monitor vitals closely with low threshold for transfer to ICU. CRP elevated at 17.9, ESR>140 further supporting an inflammatory process. · CT abdomen pelvis w/ contrast shows \"left paraspinal and psoas abscess centered at approximately L3, measuring 4.2 cm maximally. Adjacent inflammation extends to the posterior and slightly to the left lateral margin of the aorta at the level of the aortic Aneurysm. ... Sclerotic changes to the L3 vertebral body, likely chronic osteomyelitis. \"   · With wet read of MRI lumbar spine showing Left psoas abscess and L4/5 discitis, read per Dr Kevin Lund. · FU official read MRI  · FU PA/Lat CXR   · No indication for 30ml/kg bolus as patient does not have SBP<90 or lactate >=4 mMol/L  · NS @ 125cc/hr  · Blood, urine, respiratory cultures  · I/Os  · Dr Kevin Lund (Orth[facility]ine T: [phone]) consulted and following  · Interventional radiology consult for percutaneous drainage, consider General Surgery consult also. · UDS given discitis, positive drug screen   · Blood cultures, urine culture, sputum culture   · Broad spectrum antibiotics; vancomycin, ciprofloxacin and metronidazole  · Norco 5/325mg q4hrs PRN pain; Narcan PRN  · Ice packs PRN   · Sepsis goals:   · CVP 8-12mmHg;   · MAP >=65mmHg;   · urine output >=0.5ml/kg/hr;  · Blood glucose goal <180mg/dL  · Transfuse for HGB<7 g/dL    Right inguinal hernia, with tender mass in right inguinal area tender to palpation with pain radiating to testicles. CT finding of normal appendix but noted to extend into a fat-containing right inguinal hernia. Likely exacerbated due to problem #1. Will monitor clinically. Chronic Atrial fibrillation,  follwed by Dr Shannon Sanchez, Cardiology. Pulmonary hypertension with peak pressure 60mmHg. Mild CAD by angiography in 2012  · Current symptoms: none  · Current control agent: digoxin 125mcg  Every day   · Current anticoagulant: [id number] every other day except 2.5mg Fridays  · INR at goal  · Will hold warfarin for anticipated interventional radiology or general surgery intervention,   · monitor daily PT/INR. · Consider vitamin K or FFP if INR slow to respond. · Weight based unfractionated heparin  · Remote cardiac monitoring    Hyponatremia, asymptomatic Na 132. In setting of patient taking chlorthalidone under physiologic stress may be euvolemic, may also be secondary  To dehydration in setting of sepsis. · Will monitor with daily BMP. NS @ 125 cc/hr  · FU serum osmolality, urinary sodium      Hypertension. blood pressure in ED range 106/61141/76  · Vital signs per unit routine  · Continue home amlodipine 10mg every day   · Continue home chlorthalidone 25mg  · Labetalol 5mg IV PRN for BP>220/110    Chronic Obstructive Pulmonary Disease (COPD). Followed in the past by Dr Huyen Camarena. No spirometry or PFTs on record.    · O2 sats goal 90-94%  · Continue home 2L O2 via NC  · Duonebs PRN  · Fluticasone furoate 100mcg/puff every day  · Incentive spirometry       Iza ALDRIDGE,BS   PGY-3 120 [facility]  08/25/17 9:57 PM

## 2017-08-26 NOTE — PROGRESS NOTES
Kinetic Dosing- Initial Progress Note    Pharmacy Consult ordered by Dr. Ravi Grayson     Indication: psoas abscess, possible chronic osteo    Patient clinical status and labs ordered/reviewed. Pt Weight Weight: 76.2 kg (168 lb)   Serum Creatinine Lab Results   Component Value Date/Time    Creatinine 1.07 08/26/2017 04:00 AM    Creatinine, POC 0.8 05/05/2016 05:16 PM       Creatinine Clearance Estimated Creatinine Clearance: 52.3 mL/min (based on Cr of 1.07). BUN Lab Results   Component Value Date/Time    BUN 18 08/26/2017 04:00 AM    BUN, POC 10 07/26/2016 10:50 AM       WBC Lab Results   Component Value Date/Time    WBC 11.0 08/26/2017 04:00 AM      Temperature Temp: (!) 100.6 °F (38.1 °C)     HR Pulse (Heart Rate): 81     BP BP: 125/79           Kinetic Dosing Parameters:   Vd = 63L     K = 0.035 hr-1              t ½ = 20h    Drug Levels:   Vancomycin    No results for input(s): VANCP, VANCT, VANCR, VANRA in the last 72 hours. Gentamicin   No results for input(s): GENP, GENT in the last 72 hours. No lab exists for component:  GENR   Tobramycin   No results for input(s): TOBP, TOBT, TOBR in the last 72 hours. Amikacin   No results for input(s): Stiven Lyons in the last 72 hours.     No lab exists for component:  [place]ear[place] Ohms, AMIKR,  DAMIKR     Dose for naïve patient was initiated at: vancomycin 1500mg ivpb q24h     Continue to monitor    Sign: [facility]  Date: 8/26/2017  Time: 8:01 AM

## 2017-08-26 NOTE — PROGRESS NOTES
Intern Progress Note  [facility]       Patient: Saqib Canas MRN: [id number]  CSN: [id number]    YOB: 1940  Age: 65 y.o. Sex: male    DOA: 8/25/2017 LOS:  LOS: 1 day                    Subjective:     Acute events: Febrile to 100.6F overnight. Patient still reporting left sided paraspinal lumbar spine pain, with pain in left hip radiating into groin. Having difficulty moving lower extremities due to pain. No other acute complaints or concerns. Pain reported as being as bad as 8-10/10 in the ED has been reported as 4/10 with Norco 5/325 PO q4hrs, patient endorses adequate analgesia. Patient MEWS score 3 @ 11:44 yesterday, now his MEWS score is 4 due to tachycardia (however patient does have atrial fibrillation). Review of Systems   Constitutional: Negative for chills, fever and malaise/fatigue. Respiratory: Negative for cough and shortness of breath. Cardiovascular: Negative for chest pain, palpitations and leg swelling. Gastrointestinal: Negative for abdominal pain and heartburn. Musculoskeletal: Positive for back pain and joint pain. Negative for myalgias and neck pain. Neurological: Negative for tingling, tremors, sensory change, speech change and loss of consciousness.        Objective:      Patient Vitals for the past 24 hrs:   Temp Pulse Resp BP SpO2   08/26/17 0002 99.2 °F (37.3 °C) (!) 116 22 164/83 96 %   08/25/17 2300 - (!) 120 24 130/71 98 %   08/25/17 2258 - (!) 106 24 - 97 %   08/25/17 2245 - (!) 120 26 - 98 %   08/25/17 2230 - (!) 117 22 - 99 %   08/25/17 2215 - (!) 103 18 - 99 %   08/25/17 2200 - 99 16 - 99 %   08/25/17 2145 - (!) 104 22 106/61 97 %   08/25/17 2130 - (!) 106 18 114/83 99 %   08/25/17 2115 - (!) 101 15 109/64 98 %   08/25/17 2030 - - - 130/60 97 %   08/25/17 2015 - - - - 100 %   08/25/17 2000 - - - (!) 113/93 98 %   08/25/17 1946 - - - - 99 %   08/25/17 1945 - - - - 99 %   08/25/17 1944 - - - - 98 %   08/25/17 1941 - - - 141/76 - 08/25/17 1306 - - - - 98 %   08/25/17 1144 98.2 °F (36.8 °C) (!) 102 21 (!) 124/95 94 %       No intake or output data in the 24 hours ending 08/26/17 0452    Physical Exam  Physical Exam   Constitutional: He is oriented to person, place, and time. He appears well-developed and well-nourished. No distress. HENT:   Head: Normocephalic and atraumatic. Right Ear: External ear normal.   Left Ear: External ear normal.   Mouth/Throat: Oropharynx is clear and moist.   Eyes: Conjunctivae and EOM are normal. Pupils are equal, round, and reactive to light. Right eye exhibits no discharge. Left eye exhibits no discharge. No scleral icterus. Neck: Normal range of motion. Neck supple. No JVD present. No tracheal deviation present. No thyromegaly present. Cardiovascular: Normal heart sounds and intact distal pulses. An irregularly irregular rhythm present. Occasional extrasystoles are present. Tachycardia present. PMI is not displaced. Exam reveals no gallop and no friction rub. No murmur heard. Pulmonary/Chest: Effort normal and breath sounds normal. No stridor. No respiratory distress. He has no wheezes. He has no rales. He exhibits no tenderness. Abdominal: Soft. Bowel sounds are normal. There is no tenderness. A hernia is present. Hernia confirmed positive in the right inguinal area (old incision consistent with history of hernia repair). Genitourinary: Testes normal and penis normal. Right testis shows no tenderness. Left testis shows no tenderness. Musculoskeletal:        Right hip: He exhibits decreased range of motion and decreased strength. He exhibits no tenderness. Left hip: He exhibits decreased range of motion and decreased strength. He exhibits no bony tenderness and no swelling. Lumbar back: He exhibits tenderness, bony tenderness and pain. He exhibits no spasm. SLR positive B/L, left side worse than right.    B/L hip decreased ROM secondary to pain, L worse than R  Lumbar spine tender to palpation over spinous processes, facet joints, SI joints B/L, piriformis B/L. Lymphadenopathy:     He has no cervical adenopathy. He has no axillary adenopathy. Neurological: He is alert and oriented to person, place, and time. He has normal reflexes. No cranial nerve deficit. Coordination normal.   Skin: Skin is warm and dry. No rash noted. He is not diaphoretic. No erythema. No pallor. Nursing note and vitals reviewed. Lab/Data Reviewed: All lab results for the last 24 hours reviewed. Scheduled Medications Reviewed:  Current Facility-Administered Medications   Medication Dose Route Frequency    chlorthalidone (HYGROTEN) tablet 25 mg  25 mg Oral DAILY    amLODIPine (NORVASC) tablet 10 mg  10 mg Oral DAILY    digoxin (LANOXIN) tablet 0.125 mg  0.125 mg Oral DAILY    fluticasone furoate (ARNUITY ELLIPTA) 100 mcg/puff  1 Puff Inhalation DAILY    0.9% sodium chloride infusion  125 mL/hr IntraVENous CONTINUOUS    docusate sodium (COLACE) capsule 100 mg  100 mg Oral BID    vancomycin (VANCOCIN) 1,500 mg in 0.9% sodium chloride 500 mL IVPB  1,500 mg IntraVENous ONCE         Imaging, microbiology, and EKG/Telemetry:  08/25/2017: MRI lumb spine with Left psoas abscess and L4/5 discitis, read per Dr Rosana La     08/03/2016                MRI LUMBAR SPINE WITHOUT AND WITH CONTRAST   History: Left lumbar and hip pain with history of spine infection.   Comparison: MRI 5/5/2016   Technique: Lumbar spine scanned with sagittal T1, T2 and STIR sequences followed  by axial T1 and T2 sequences. Contrast was given and followed by sagittal T1  fat-sat and axial T1 postcontrast imaging. 7.5 cc Gadavist was given  intravenously with no immediate complications noted.   Findings:   Numbering of the lumbar vertebra is in concordance with prior imaging. Stable  anterolisthesis of L3 on L4. Vertebral body heights are within normal limits.   The conus medullaris looks normal and terminates at the T12 level. No thickening  or clumping of the cauda equina nerve roots. No pathologic distal cord or nerve  enhancement.   There is persistent hyperintense signal within the L3-4 disc space and adjoining  vertebral bodies. The marrow edema is slightly less pronounced. There is  associated disc and bone enhancement, also slightly less pronounced. No epidural  phlegmon or abscess. Asymmetric enhancement within the left psoas muscle with no  evidence of rim-enhancing fluid collection to suggest abscess. Suspected new level of infection at T10-11, although not definitively new as  this level was not previously imaged. There is abnormal STIR hyperintense signal  within the disc space, much more pronounced on the left, with associated  marginal disc enhancement, also most pronounced on the left. No adjoining marrow  signal abnormality or bone enhancement.    Partially visualized infrarenal aortic aneurysm, bilateral renal and hepatic T2  hyperintense cystic lesions.   Degenerative changes have not significantly changed. There is bulging of disc at  T11-12, T12-L1, L1-2 and L2-3 which cause mild central spinal canal stenoses. Bulges also causes varying degrees of mild to moderate foraminal stenoses. At  L3-4, there is severe multifactorial central spinal canal stenosis due to  combination of bulging disc, markedly hypertrophic facets and ligamentum  flavum. Both neural foramen are stenosed with compression of the exiting L3 nerve  roots. At L4-5, there is bulging disc and moderate facet hypertrophy causing  mild central and moderately severe foraminal stenoses. No disc disease or spinal  stenosis at L5-S1. Impression:  When compared to 5/5/2016 there is persistent but slightly diminished abnormal  signal and enhancement within the L3-4 disc space and adjoining vertebral  bodies. No epidural or paraspinous abscess. Indeterminate whether there is  persistent active infection at this level.   Suspected new level of infectious discitis at T10-11 which was not previously  imaged. No adjoining vertebral osteomyelitis. Stable anterolisthesis at L3-4 with severe multifactorial central spinal canal  and foraminal stenoses causing L3 nerve root compression. Other levels of  degenerative change are also stable. Partially imaged known infrarenal abdominal aortic aneurysm and small cystic  lesions in the kidneys and liver.        08/11/2017 LEFT FEMUR RADIOGRAPH-2 VIEWS  INDICATION: Left leg pain  COMPARISON: Correlation with same day left hip x-ray  FINDINGS:  No evidence of acute fracture. Left hip and left knee joint appear intact. Moderate osteoarthrosis noted at the left knee. Vascular calcifications noted. No significant soft tissue abnormality. IMPRESSION  IMPRESSION:  No acute abnormality.     08/11/2017 LEFT HIP RADIOGRAPH-2 VIEWS   INDICATION: Left hip pain   COMPARISON: Correlation with same day left femur x-ray. MRI left hip 8/3/2016.   FINDINGS:  No evidence of acute fracture or dislocation. Bones appear osteopenic. Joint  spaces appear preserved. Degenerative changes noted at the sacroiliac joints and  pubic symphysis. The bony pelvis and sacrum otherwise appear grossly intact. Vascular stents noted at the lower abdomen.   IMPRESSION  IMPRESSION:  No convincing evidence of acute fracture.     8/11/2017. LUMBAR SPINE RADIOGRAPH-3 VIEWS   INDICATION: Left lower back pain. History of osteomyelitis.  COMPARISON: Correlation with same day left hip x-ray. CT abdomen/pelvis  8/4/2016.   FINDINGS: AP, lateral, and spot L5/S1 views were obtained.   Transitional S1 segment with rudimentary S1 disc. No evidence of acute fracture  or malalignment. Evaluation for spondylolysis is limited in the absence of  oblique views. Stable anterolisthesis of L3 on L4. Multilevel degenerative  spondylosis and disc disease noted, most prominent at L4/L5. Chronic mild  wedging of lower thoracic vertebral bodies.  Facet joints appear aligned but  demonstrate hypertrophic changes. Visualized portion of the bony pelvis and  sacrum are grossly intact. Aortobiiliac graft again noted, in similar  configuration as prior exam.  IMPRESSION  IMPRESSION:  1. No convincing evidence of acute fracture or malalignment. 2. Multilevel degenerative changes, as above. 3. Chronic grade 1 anterolisthesis of L3 on L4    Assessment/Plan     65 y.o. male with PMH Osteomyelitis, psoas abscess, HTN, Atrial fibrillation, CAD, Pulmonary hypertension, COPD now admitted with Sepsis secondary to left paraspinal and psoas abscess .        Sepsis secondary to left paraspinal and psoas abscess and L4/5 discitis, with bilateral lumbar spinous process, facet joint, and SI joint pain, left hip pain radiating to left groin, B/L SLR positive, in setting of patient with history of multfocal osteomyelitis and prior psoas abscess. Patient has 3/4 SIRS criteria (HR>90, RR>20, WBC>12,000) however note that patient's atrial fibrillation and COPD may mimic tachycardia and tachypnea respectively. No lactic acidosis (0.5), 1 single episode of hypotension with MAP <70 (/61). Will monitor vitals closely with low threshold for transfer to ICU. CRP elevated at 17.9, ESR>140 further supporting an inflammatory process. · CT abdomen pelvis w/ contrast shows \"left paraspinal and psoas abscess centered at approximately L3, measuring 4.2 cm maximally. Adjacent inflammation extends to the posterior and slightly to the left lateral margin of the aorta at the level of the aortic Aneurysm. ... Sclerotic changes to the L3 vertebral body, likely chronic osteomyelitis. \"   · With wet read of MRI lumbar spine showing Left psoas abscess and L4/5 discitis, read per Dr Simone Woody.    · FU official read MRI  · FU PA/Lat CXR   · No indication for 30ml/kg bolus as patient does not have SBP<90 or lactate >=4 mMol/L  · NS @ 125cc/hr  · Blood, urine, respiratory cultures  · I/Os  · Dr Simone Woody (Ortho Spine T: [phone]) consulted and following  · Interventional radiology consult for percutaneous drainage, consider General Surgery consult also. · UDS given discitis, positive drug screen   · Blood cultures, urine culture, sputum culture   · Broad spectrum antibiotics; vancomycin, ciprofloxacin and metronidazole  · Norco 5/325mg q4hrs PRN pain; Narcan PRN  · Ice packs PRN   · Sepsis goals:   · CVP 8-12mmHg;   · MAP >=65mmHg;   · urine output >=0.5ml/kg/hr;  · Blood glucose goal <180mg/dL  · Transfuse for HGB<7 g/dL     Right inguinal hernia, with tender mass in right inguinal area tender to palpation with pain radiating to testicles. CT finding of normal appendix but noted to extend into a fat-containing right inguinal hernia. Likely exacerbated due to problem #1. Will monitor clinically.      Chronic Atrial fibrillation,  follwed by Dr Dwight Lin, Cardiology. Pulmonary hypertension with peak pressure 60mmHg. Mild CAD by angiography in 2012  · Current symptoms: none  · Current control agent: digoxin 125mcg  Every day   · Current anticoagulant: [id number] every other day except 2.5mg Fridays  · INR at goal  · Will hold warfarin for anticipated interventional radiology or general surgery intervention,   · monitor daily PT/INR. · Consider vitamin K or FFP if INR slow to respond. · Weight based unfractionated heparin  · Remote cardiac monitoring     Hyponatremia, asymptomatic Na 132. In setting of patient taking chlorthalidone under physiologic stress may be euvolemic, may also be secondary  To dehydration in setting of sepsis. · Will monitor with daily BMP. NS @ 125 cc/hr  · Urine sodium 80; FU serum osmolality      Hypertension. blood pressure overnight range 106/61164/83  · Vital signs per unit routine  · Continue home amlodipine 10mg every day   · Continue home chlorthalidone 25mg  · Labetalol 5mg IV PRN for BP>220/110     Chronic Obstructive Pulmonary Disease (COPD). Followed in the past by Dr Khushi Mckinley.  No spirometry or PFTs on record. · O2 sats goal 90-94%  · Continue home 2L O2 via NC  · Duonebs PRN  · Fluticasone furoate 100mcg/puff every day  · Incentive spirometry       Diet: NPO today for possible surgical intervention  DVT Prophylaxis: SCDs  Code Status: FULL  Point of Contact:     Name Relation Home Work Mobile    Sushil Daughter [phone]     Adriana Nathan Daughter [phone]       Mavis Blade [phone]               Disposition: 3N at present. Discharge disposition pending patient's progress during hospital course.      Tommy JONES   PGY-3 120 [facility]  08/26/17 4:52 AM

## 2017-08-26 NOTE — PROGRESS NOTES
conducted an initial consultation and Spiritual Assessment for Pineda Jackson, who is a 65 y. o.,male. Patients Primary Language is: [place]. According to the patients EMR Hoahaoism Affiliation is: Josue Arellano. The reason the Patient came to the hospital is:   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[address] 08/25/2017    Abscess 08/25/2017    Discitis 08/04/2016    Pyogenic inflammation of bone ([address] 07/01/2016    Sepsis ([address] 02/19/2016    Intraabdominal fluid collection 02/18/2016    Abdominal pain 02/18/2016    Warfarin-induced coagulopathy ([address] 01/19/2016    Hypoxemia requiring supplemental oxygen 12/28/2015    CAD (coronary artery disease) 12/08/2015    Left inguinal hernia 02/14/2014    Right inguinal hernia 12/24/2013    RLQ abdominal pain 12/23/2013    Status post AAA (abdominal aortic aneurysm) repair 12/23/2013    AAA (abdominal aortic aneurysm, ruptured) ([address] 12/23/2013    Unspecified constipation 12/23/2013    Chronic atrial fibrillation ([address] 05/08/2012    HTN (hypertension)     Hypercholesterolemia     Congestive heart failure (CHF) (HCC)     Peripheral vascular disease (HCC)     COPD (chronic obstructive pulmonary disease) ([address]         The  provided the following Interventions:  Initiated a relationship of care and support. Explored issues of priscilla, belief, spirituality and Sabianist/ritual needs while hospitalized. Listened empathically. Provided chaplaincy education. Provided information about Spiritual Care Services. Offered prayer and assurance of continued prayers on patient's behalf. Chart reviewed. The following outcomes were achieved:  Patient shared limited information about both their medical narrative and spiritual journey/beliefs. Patient processed feeling about current hospitalization. Patient expressed gratitude for the 's visit.     Assessment:  Patient does not have any Sabianist/cultural needs that will affect patients preferences in health care. Plan:  Chaplains will continue to follow and will provide pastoral care on an as needed/requested basis.  recommends bedside caregivers page  on duty if patient shows signs of acute spiritual or emotional distress.      Libra Alec

## 2017-08-26 NOTE — PROGRESS NOTES
Problem: Mobility Impaired (Adult and Pediatric)  Goal: *Acute Goals and Plan of Care (Insert Text)  Physical Therapy Goals  Initiated 8/26/2017 and to be accomplished within 7 day(s)  1. Patient will move from supine to sit and sit to supine in bed with independence. 2. Patient will transfer from bed to chair and chair to bed with minimal assistance/contact guard assist using the least restrictive device. 3. Patient will perform sit to stand with modified independence. 4. Patient will ambulate with minimal assistance/contact guard assist for 100 feet with the least restrictive device. 5. Patient will ascend/descend 5 stairs with bilateral handrail(s) with minimal assistance/contact guard assist with LRAD. PHYSICAL THERAPY EVALUATION     Patient: Chuyita Irizarry (89 y.o. male)  Date: 8/26/2017  Primary Diagnosis: Psoas abscess (HCC)  Psoas abscess ([facility] [address]  Abscess        Precautions: Fall, Sepsis         ASSESSMENT :  Based on the objective data described below, the patient presents with increased pain, decreased strength, decreased functional activity and impaired balance due to admission for sepsis from paraspinal and psoas abcess. Pt has history of chronic a-fib. Pt received in bed and agreeable to PT session. Pt cleared with RN, with 2L O2 via nasal canulla. Pt is able to complete bed mobility with mod I using bedrails and increased time to complete. Supine to sit with SBA. Able to sit on EOB without UE support with BLEs on floor. Sit to stand to RW with CGA. Trained and completed exercise in standing with bilateral UE support on RW. Completed standing hip flexion and standing marching, supine heel slide. Able to take several steps forward but ambulation and gait training limited due to bilateral IV lines. Pt returned to sitting with SBA with verbal cuing for proper hand placement. Sit to supine with mod I using bedrails. Able to scoot up and down bed independently.  Pt left in supine with all needs met and call bell in reach. Patient will benefit from skilled intervention to address the above impairments. Patients rehabilitation potential is considered to be Good  Factors which may influence rehabilitation potential include:   [ ]         None noted  [ ]         Mental ability/status  [ ]         Medical condition  [X]         Home/family situation and support systems  [ ]         Safety awareness  [X]         Pain tolerance/management  [ ]         Other:        PLAN :  Recommendations and Planned Interventions:  [X]           Bed Mobility Training             [ ]    Neuromuscular Re-Education  [X]           Transfer Training                   [ ]    Orthotic/Prosthetic Training  [X]           Gait Training                          [ ]    Modalities  [X]           Therapeutic Exercises          [ ]    Edema Management/Control  [X]           Therapeutic Activities            [X]    Patient and Family Training/Education  [ ]           Other (comment):     Frequency/Duration: Patient will be followed by physical therapy 1-2 times per day/4-7 days per week to address goals. Discharge Recommendations: To Be Determined  Further Equipment Recommendations for Discharge: states he has w/c and rolling walker at home       SUBJECTIVE:   Patient stated I won't do it if you hurt me.       OBJECTIVE DATA SUMMARY:       Past Medical History:   Diagnosis Date    Aneurysm [facility])       AAA repair 2006 & 2012    Aorto-iliac duplex 02/13/2015     Tech difficult. Patent aorta bi-iliac endograft w/o leak or limb dysfunction.  Arrhythmia       a fib    Cardiac cath 11/01/2012     RCA (sm, nondom) patent. LM patent. LAD 25%. CX (dom) 45% mid. LVEDP 12 mmHg. No WMA. PA 27/12. W 10-12. CO/CI 5.2/2.6 (TD).  Cardiac echocardiogram, abnormal 02/20/2016     EF 55%. No WMA. Indeterminate diastolic fx. RVSP 60 mmHg. Severe LAE. Mild MR. Mod TR.  IVCE. Similar to study of 10/26/12.     Cardiovascular aorto-iliac duplex 02/13/2015     Patent aorta bi-iliac endovascular graft w/o leak or limb dysfunction. Sac measures 4.21 x 4.47 cm (4.4 x 4.6 cm on 1/31/14).  Cardiovascular LE peripheral arterial testing 07/29/2013     No significant LE arterial disease bilaterally. R GHADA 1. 12.  L GHADA 1. 12.  R DBI 0.83. L DBI 0.71. Exercise deferred.  Cardiovascular renal duplex 10/31/2012     No RA stenosis. Intrinsic/med disease in left kidney. Patent aortic endograft. Patent, thrombus-free renal veins bilaterally.  Carotid duplex 07/29/2013     Mild <50% bilateral ICA plaquing.  Chronic lung disease      Cigarette smoker      Congestive heart failure (CHF) (HCC)      COPD (chronic obstructive pulmonary disease) (HCC)       and emphysema; likely secondary to tobacco abuse    Difficult intubation      Dyslipidemia       low HDL    Emphysema      HTN (hypertension)      Hypercholesterolemia      Increased prostate specific antigen (PSA) velocity      Long term (current) use of anticoagulants       coumadin    Osteoarthritis      Osteomyelitis (HCC)      Paroxysmal atrial fibrillation (HCC)      Peripheral vascular disease ([address]       AAA repair 12/2007    Persistent atrial Fibrillation      Persistent atrial fibrillation ([address]      Unspecified adverse effect of anesthesia       difficulty breathing placed in ICU Oct 2012 (AAA repair)     Past Surgical History:   Procedure Laterality Date    BRONCHOSCOPY DIAGNOSTIC   11/2/2012          CARDIAC CATHETERIZATION   11/1/2012          COLONOSCOPY N/A 7/26/2016     COLONOSCOPY with Polypectomies performed by Myra Ramos MD at [address] HX AAA REPAIR         2006 & 2012    HX HEART CATHETERIZATION   3/4/2009     1. RCA small, nondominant; patent. 2. LMCA patent. 3. LAD is long, wrapped around apical vessel. Diffuse, 20-30% stenosis noted. 4. CCA is large, dominant vessel. Diffuse 20-30% stenosis. 5. LVEDP is 16 mmHg.  6. Overall left ventricular systolic function mildly diminshed with est. EF 45%. Mild, global hypokinesis noted. 7. No significant mitral regurgitation or aortic stenosis noted. (see report)    HX HERNIA REPAIR   2/2014     rt inguinal     HX HERNIA REPAIR   01/2016     LEFT INGUINAL HERNIA REPAIR DR. Steve Travis    REPAIR ING HERNIA,5+Y/O,JESSIE Left 1-20-16     Dr. Savita Jeter   11/1/2012           Barriers to Learning/Limitations: None  Compensate with: N/A  Prior Level of Function/Home Situation: Pt reports living in 3 story house, but does not ascend to higher floors. He has 5 GAMAL house with handrails. He lives with his brother, with family close by for help if needed. He states that he is currently driving independently. Pt reports having w/c and RW at home for his use if needed. Home Situation  Home Environment: Private residence  # Steps to Enter: 5  One/Two Story Residence: Two story, live on 1st floor  Living Alone: No  Support Systems: Child(lakhwinder), Family member(s)  Patient Expects to be Discharged to[de-identified] Private residence  Current DME Used/Available at Home: [employer] Golas, Wheelchair  Strength:    Strength: Within functional limits (except for hip flexion c/o pain) 3-/5  Tone & Sensation:   Tone: Normal  Sensation: Intact   Range Of Motion:  AROM: Within functional limits (increased pain with hip flexion in supine)  Functional Mobility:  Bed Mobility:     Supine to Sit: Modified independent  Sit to Supine: Modified independent  Scooting: Modified independent (using bedrails to complete )  Transfers:  Sit to Stand: Contact guard assistance  Stand to Sit: Contact guard assistance  Balance:   Sitting: Intact  Standing: Impaired  Standing - Static: Good; Unsupported (within ELIZA)  Standing - Dynamic : Fair  Ambulation/Gait Training:             Only have to take several steps forward with RW without compaints of pain and no LOB and CGA              Therapeutic Exercises:   Trained in standing hip flexion and standing marching x10, supine heel slide x5   Pain:      3-4/10              Activity Tolerance:   Fair activity tolerance, increase in RR with minimal activity. Verbal cuing to breathe through nose instead of mouth. Please refer to the flowsheet for vital signs taken during this treatment. After treatment:   [ ] Patient left in no apparent distress sitting up in chair  [ ] Patient left sitting on EOB  [X] Patient left in no apparent distress in bed  [X] Call bell left within reach  [X] Nursing notified(Jamaal)  [ ] Caregiver present  [ ] Bed alarm activated         COMMUNICATION/EDUCATION:   [X]         Fall prevention education was provided and the patient/caregiver indicated understanding. [X]         Patient/family have participated as able in goal setting and plan of care. [X]         Patient/family agree to work toward stated goals and plan of care. [ ]         Patient understands intent and goals of therapy, but is neutral about his/her participation. [ ]         Patient is unable to participate in goal setting and plan of care. Thank you for this referral.  Lauren Sanches, PT   Time Calculation: 23 mins      Mobility  Current  CJ= 20-39%   Goal  CI= 1-19%. The severity rating is based on the Level of Assistance required for Functional Mobility and ADLs.   Eval Complexity: History: MEDIUM  Complexity : 1-2 comorbidities / personal factors will impact the outcome/ POC Exam:MEDIUM Complexity : 3 Standardized tests and measures addressing body structure, function, activity limitation and / or participation in recreation  Presentation: LOW Complexity : Stable, uncomplicated  Overall Complexity:LOW

## 2017-08-26 NOTE — ED NOTES
Hourly rounding complete. Safety  Pt resting   [x]  On stretcher with side rails up and bed in locked position, call bell within reach  [  ]  Sitting in chair with casters locked, call bell within reach    Toileting  [x  ] pt denies need to use bathroom  [  ] pt assisted to bathroom  [  ] pt assisted with bedpan  [  ] pt independent to bathroom as needed    Ongoing Plan of Care  Plan of care and expected time for test and results reviewed with pt.     Pain Management / Comfort  [  ] dimmed lights  [  ] warm blanket provided  [  ] pain assessed  [x  ] monitor alarms reviewed

## 2017-08-26 NOTE — ROUTINE PROCESS
TRANSFER - OUT REPORT:    Verbal report given to Lily Mooney RN (name) on Deni Carrera  being transferred to [address] [place] [address]  (unit) for routine progression of care       Report consisted of patients Situation, Background, Assessment and   Recommendations(SBAR). Information from the following report(s) SBAR, ED Summary, Intake/Output, MAR, Med Rec Status and Cardiac Rhythm AFib was reviewed with the receiving nurse. Lines:   Peripheral IV 08/25/17 Left Antecubital (Active)   Site Assessment Clean, dry, & intact 8/25/2017 12:06 PM   Phlebitis Assessment 0 8/25/2017 12:06 PM   Infiltration Assessment 0 8/25/2017 12:06 PM   Dressing Status Clean, dry, & intact 8/25/2017 12:06 PM   Dressing Type Transparent 8/25/2017 12:06 PM       Peripheral IV 08/25/17 Right Antecubital (Active)   Site Assessment Clean, dry, & intact 8/25/2017  9:49 PM   Phlebitis Assessment 0 8/25/2017  9:49 PM   Infiltration Assessment 0 8/25/2017  9:49 PM   Dressing Status Clean, dry, & intact 8/25/2017  9:49 PM   Dressing Type Transparent 8/25/2017  9:49 PM   Hub Color/Line Status Patent; Flushed 8/25/2017  9:49 PM        Opportunity for questions and clarification was provided.       Patient transported with:   Monitor  O2 @ 2 liters  Registered Nurse

## 2017-08-26 NOTE — ED NOTES
7:00 PM :Pt care assumed from Dr. Uche Loredo , ED provider. Pt complaint(s), current treatment plan, progression and available diagnostic results have been discussed thoroughly. Rounding occurred: yes  Intended Disposition: ADMIT   Pending diagnostic reports and/or labs (please list): CT scan results    9:10 PM Consult: I discussed care with Dr. Bright Adler (Ortho spine). It was a standard discussion including patient history, chief complaint, available diagnostic results, and predicted treatment course. Would like the pt admitted to [address] [place] [address]. He discussed the case with Dr. Teo Dumont, who agrees to admit the pt. Scribe Attestation      Alexis Greenfield acting as a scribe for and in the presence of Erna Richmond DO      August 25, 2017 at 9:30 PM       Provider Attestation:      I personally performed the services described in the documentation, reviewed the documentation, as recorded by the scribe in my presence, and it accurately and completely records my words and actions.  August 25, 2017 at 9:30 PM - Erna Richmond DO

## 2017-08-26 NOTE — PROGRESS NOTES
Reviewed results of MRI- appears to demonstrate new/ recurrent left psoas abscess - possibly associated with recurrent discitis at l4/5. PE vss afeb   Pain in sacrum and hip, pain on psoas stretch  No neurology    Labs  WBC 13k    ESR-p  CRP-p    CT abd/ pelvis pending    AP    Patient with recurrence of abscess  Similar to that which he had 18 months ago- this one less involving AAA. No involvement of epidural space. Last episode resolved fully with interventional radiology drainage and course of antibiotics. Had gm negative infection. Have ordered CT to better evaluate collection. Patient not septic, afebrile. Patient anti coagulated, may need to wait for coags to correct prior to drainage procedure. Consider ID consult. Given age and medical history would avoid surgical intervention unless patient fails non-operative intervention. Will follow with you.

## 2017-08-27 ENCOUNTER — APPOINTMENT (OUTPATIENT)
Dept: CT IMAGING | Age: 77
DRG: 871 | End: 2017-08-27
Attending: FAMILY MEDICINE
Payer: MEDICARE

## 2017-08-27 LAB
ANION GAP SERPL CALC-SCNC: 10 MMOL/L (ref 3–18)
BACTERIA SPEC CULT: NORMAL
BASOPHILS # BLD: 0 K/UL (ref 0–0.06)
BASOPHILS NFR BLD: 0 % (ref 0–3)
BUN SERPL-MCNC: 15 MG/DL (ref 7–18)
BUN/CREAT SERPL: 14 (ref 12–20)
CALCIUM SERPL-MCNC: 8.1 MG/DL (ref 8.5–10.1)
CHLORIDE SERPL-SCNC: 97 MMOL/L (ref 100–108)
CO2 SERPL-SCNC: 26 MMOL/L (ref 21–32)
CREAT SERPL-MCNC: 1.04 MG/DL (ref 0.6–1.3)
DIFFERENTIAL METHOD BLD: ABNORMAL
EOSINOPHIL # BLD: 0 K/UL (ref 0–0.4)
EOSINOPHIL NFR BLD: 0 % (ref 0–5)
ERYTHROCYTE [DISTWIDTH] IN BLOOD BY AUTOMATED COUNT: 15.4 % (ref 11.6–14.5)
GLUCOSE SERPL-MCNC: 103 MG/DL (ref 74–99)
HCT VFR BLD AUTO: 25.1 % (ref 36–48)
HGB BLD-MCNC: 7.7 G/DL (ref 13–16)
INR PPP: 1.8 (ref 0.8–1.2)
LYMPHOCYTES # BLD: 0.7 K/UL (ref 0.8–3.5)
LYMPHOCYTES NFR BLD: 7 % (ref 20–51)
MAGNESIUM SERPL-MCNC: 1.9 MG/DL (ref 1.6–2.6)
MCH RBC QN AUTO: 26.4 PG (ref 24–34)
MCHC RBC AUTO-ENTMCNC: 30.7 G/DL (ref 31–37)
MCV RBC AUTO: 86 FL (ref 74–97)
MONOCYTES # BLD: 0.4 K/UL (ref 0–1)
MONOCYTES NFR BLD: 4 % (ref 2–9)
NEUTS BAND NFR BLD MANUAL: 5 % (ref 0–5)
NEUTS SEG # BLD: 9.5 K/UL (ref 1.8–8)
NEUTS SEG NFR BLD: 84 % (ref 42–75)
OSMOLALITY SERPL: 279 MOSM/KG H2O (ref 280–300)
PLATELET # BLD AUTO: 240 K/UL (ref 135–420)
PLATELET COMMENTS,PCOM: ABNORMAL
PMV BLD AUTO: 9.3 FL (ref 9.2–11.8)
POTASSIUM SERPL-SCNC: 3.1 MMOL/L (ref 3.5–5.5)
POTASSIUM SERPL-SCNC: 3.6 MMOL/L (ref 3.5–5.5)
PROTHROMBIN TIME: 19.8 SEC (ref 11.5–15.2)
RBC # BLD AUTO: 2.92 M/UL (ref 4.7–5.5)
RBC MORPH BLD: ABNORMAL
RBC MORPH BLD: ABNORMAL
SERVICE CMNT-IMP: NORMAL
SODIUM SERPL-SCNC: 133 MMOL/L (ref 136–145)
WBC # BLD AUTO: 10.6 K/UL (ref 4.6–13.2)

## 2017-08-27 PROCEDURE — 36415 COLL VENOUS BLD VENIPUNCTURE: CPT | Performed by: FAMILY MEDICINE

## 2017-08-27 PROCEDURE — 74011250637 HC RX REV CODE- 250/637: Performed by: FAMILY MEDICINE

## 2017-08-27 PROCEDURE — 85610 PROTHROMBIN TIME: CPT | Performed by: FAMILY MEDICINE

## 2017-08-27 PROCEDURE — 74011000250 HC RX REV CODE- 250: Performed by: FAMILY MEDICINE

## 2017-08-27 PROCEDURE — 77030002986 CT DRAIN ABS W CATH PERC

## 2017-08-27 PROCEDURE — 0K9P30Z DRAINAGE OF LEFT HIP MUSCLE WITH DRAINAGE DEVICE, PERCUTANEOUS APPROACH: ICD-10-PCS | Performed by: RADIOLOGY

## 2017-08-27 PROCEDURE — 83930 ASSAY OF BLOOD OSMOLALITY: CPT | Performed by: FAMILY MEDICINE

## 2017-08-27 PROCEDURE — 77010033678 HC OXYGEN DAILY

## 2017-08-27 PROCEDURE — 74011000258 HC RX REV CODE- 258: Performed by: FAMILY MEDICINE

## 2017-08-27 PROCEDURE — 85025 COMPLETE CBC W/AUTO DIFF WBC: CPT

## 2017-08-27 PROCEDURE — 74011250637 HC RX REV CODE- 250/637: Performed by: STUDENT IN AN ORGANIZED HEALTH CARE EDUCATION/TRAINING PROGRAM

## 2017-08-27 PROCEDURE — 74011250636 HC RX REV CODE- 250/636: Performed by: FAMILY MEDICINE

## 2017-08-27 PROCEDURE — 84132 ASSAY OF SERUM POTASSIUM: CPT | Performed by: FAMILY MEDICINE

## 2017-08-27 PROCEDURE — 26990 DRAINAGE OF PELVIS LESION: CPT

## 2017-08-27 PROCEDURE — 94640 AIRWAY INHALATION TREATMENT: CPT

## 2017-08-27 PROCEDURE — 74011250636 HC RX REV CODE- 250/636: Performed by: RADIOLOGY

## 2017-08-27 PROCEDURE — 87040 BLOOD CULTURE FOR BACTERIA: CPT | Performed by: FAMILY MEDICINE

## 2017-08-27 PROCEDURE — 83735 ASSAY OF MAGNESIUM: CPT | Performed by: FAMILY MEDICINE

## 2017-08-27 PROCEDURE — 65660000000 HC RM CCU STEPDOWN

## 2017-08-27 PROCEDURE — 49406 IMAGE CATH FLUID PERI/RETRO: CPT

## 2017-08-27 PROCEDURE — 74011250636 HC RX REV CODE- 250/636: Performed by: STUDENT IN AN ORGANIZED HEALTH CARE EDUCATION/TRAINING PROGRAM

## 2017-08-27 RX ORDER — FENTANYL CITRATE 50 UG/ML
12.5-5 INJECTION, SOLUTION INTRAMUSCULAR; INTRAVENOUS
Status: DISCONTINUED | OUTPATIENT
Start: 2017-08-27 | End: 2017-08-27

## 2017-08-27 RX ORDER — HYDROCODONE BITARTRATE AND ACETAMINOPHEN 10; 325 MG/1; MG/1
1 TABLET ORAL
Status: DISCONTINUED | OUTPATIENT
Start: 2017-08-27 | End: 2017-08-31 | Stop reason: HOSPADM

## 2017-08-27 RX ADMIN — HYDROCODONE BITARTRATE AND ACETAMINOPHEN 1 TABLET: 10; 325 TABLET ORAL at 19:54

## 2017-08-27 RX ADMIN — PIPERACILLIN AND TAZOBACTAM 3.38 G: 3; .375 INJECTION, POWDER, LYOPHILIZED, FOR SOLUTION INTRAVENOUS; PARENTERAL at 21:27

## 2017-08-27 RX ADMIN — DOCUSATE SODIUM 100 MG: 100 CAPSULE, LIQUID FILLED ORAL at 10:06

## 2017-08-27 RX ADMIN — METRONIDAZOLE 500 MG: 500 INJECTION, SOLUTION INTRAVENOUS at 00:17

## 2017-08-27 RX ADMIN — LIDOCAINE HYDROCHLORIDE: 10 INJECTION, SOLUTION EPIDURAL; INFILTRATION; INTRACAUDAL; PERINEURAL at 11:10

## 2017-08-27 RX ADMIN — FENTANYL CITRATE 100 MCG: 50 INJECTION INTRAMUSCULAR; INTRAVENOUS at 09:00

## 2017-08-27 RX ADMIN — DIGOXIN 0.12 MG: 0.12 TABLET ORAL at 10:05

## 2017-08-27 RX ADMIN — ACETAMINOPHEN 650 MG: 325 TABLET, FILM COATED ORAL at 00:17

## 2017-08-27 RX ADMIN — METRONIDAZOLE 500 MG: 500 INJECTION, SOLUTION INTRAVENOUS at 12:59

## 2017-08-27 RX ADMIN — LIDOCAINE HYDROCHLORIDE: 10 INJECTION, SOLUTION EPIDURAL; INFILTRATION; INTRACAUDAL; PERINEURAL at 10:17

## 2017-08-27 RX ADMIN — METRONIDAZOLE 500 MG: 500 INJECTION, SOLUTION INTRAVENOUS at 05:54

## 2017-08-27 RX ADMIN — PIPERACILLIN AND TAZOBACTAM 3.38 G: 3; .375 INJECTION, POWDER, LYOPHILIZED, FOR SOLUTION INTRAVENOUS; PARENTERAL at 01:52

## 2017-08-27 RX ADMIN — HYDROCODONE BITARTRATE AND ACETAMINOPHEN 1 TABLET: 10; 325 TABLET ORAL at 15:19

## 2017-08-27 RX ADMIN — LIDOCAINE HYDROCHLORIDE: 10 INJECTION, SOLUTION EPIDURAL; INFILTRATION; INTRACAUDAL; PERINEURAL at 12:15

## 2017-08-27 RX ADMIN — METRONIDAZOLE 500 MG: 500 INJECTION, SOLUTION INTRAVENOUS at 17:58

## 2017-08-27 RX ADMIN — DOCUSATE SODIUM 100 MG: 100 CAPSULE, LIQUID FILLED ORAL at 17:58

## 2017-08-27 RX ADMIN — AMLODIPINE BESYLATE 10 MG: 10 TABLET ORAL at 10:05

## 2017-08-27 RX ADMIN — HYDROCODONE BITARTRATE AND ACETAMINOPHEN 1 TABLET: 10; 325 TABLET ORAL at 01:54

## 2017-08-27 RX ADMIN — ACETAMINOPHEN 650 MG: 325 TABLET, FILM COATED ORAL at 12:59

## 2017-08-27 RX ADMIN — PIPERACILLIN AND TAZOBACTAM 3.38 G: 3; .375 INJECTION, POWDER, LYOPHILIZED, FOR SOLUTION INTRAVENOUS; PARENTERAL at 14:50

## 2017-08-27 RX ADMIN — CHLORTHALIDONE 25 MG: 25 TABLET ORAL at 10:06

## 2017-08-27 RX ADMIN — SODIUM CHLORIDE 125 ML/HR: 900 INJECTION, SOLUTION INTRAVENOUS at 04:28

## 2017-08-27 RX ADMIN — FLUTICASONE FUROATE 1 PUFF: 100 POWDER RESPIRATORY (INHALATION) at 10:07

## 2017-08-27 RX ADMIN — HYDROCODONE BITARTRATE AND ACETAMINOPHEN 1 TABLET: 10; 325 TABLET ORAL at 10:16

## 2017-08-27 RX ADMIN — SODIUM CHLORIDE 125 ML/HR: 900 INJECTION, SOLUTION INTRAVENOUS at 20:18

## 2017-08-27 RX ADMIN — LIDOCAINE HYDROCHLORIDE: 10 INJECTION, SOLUTION EPIDURAL; INFILTRATION; INTRACAUDAL; PERINEURAL at 13:20

## 2017-08-27 RX ADMIN — VANCOMYCIN HYDROCHLORIDE 1500 MG: 10 INJECTION, POWDER, LYOPHILIZED, FOR SOLUTION INTRAVENOUS at 04:27

## 2017-08-27 NOTE — PROGRESS NOTES
Intern Progress Note  [facility]       Patient: Tam Hadley MRN: [id number]  CSN: [id number]    YOB: 1940  Age: 65 y.o. Sex: male    DOA: 8/25/2017 LOS:  LOS: 2 days                    Subjective:     Acute events:  Febrile to 102.9F overnight, tylenol given. Patient's still  left sided paraspinal lumbar spine pain. Pain regimen changed to Norco 10-325mg 1-2 tabs q4h PRN overnight. Since then, pain has been bearable rated as 2/10 in severity currently . Brief ROS: denies chest pain,  n/v, diarrhea    Objective:      Patient Vitals for the past 24 hrs:   Temp Pulse Resp BP SpO2   08/27/17 0416 99.2 °F (37.3 °C) (!) 107 20 127/76 94 %   08/27/17 0153 (!) 101 °F (38.3 °C) - - - -   08/27/17 0058 (!) 102.9 °F (39.4 °C) - - - -   08/26/17 2347 (!) 102.4 °F (39.1 °C) (!) 124 20 105/58 95 %   08/26/17 1956 99.3 °F (37.4 °C) (!) 137 20 126/73 95 %   08/26/17 1535 98.9 °F (37.2 °C) (!) 105 20 106/67 95 %   08/26/17 1421 99.8 °F (37.7 °C) - - - -   08/26/17 1150 (!) 101.2 °F (38.4 °C) (!) 114 20 106/67 98 %   08/26/17 0820 - - - - 98 %   08/26/17 0739 98.8 °F (37.1 °C) 100 20 112/71 96 %       Physical Exam:   Constitutional: He is oriented to person, place, and time. He appears well-developed and well-nourished. No distress. HENT: PERRLA  Cardiovascular: Normal heart sounds and intact distal pulses.  An irregularly irregular rhythm present.  Occasional extrasystoles are present. Tachycardia present.  PMI is not displaced.  Exam reveals no gallop and no friction rub. No murmur heard. Pulmonary/Chest: CTAB  Abdominal: Soft, distended. Bowel sounds are normal. There is no tenderness. A hernia is present. Hernia confirmed positive in the right inguinal area (old incision consistent with history of hernia repair). Lab/Data Reviewed:  Results for Rafiq Valle (MRN [id number]) as of 8/27/2017 06:42   Ref.  Range 8/27/2017 03:38   Sodium Latest Ref Range: 136 - 145 mmol/L 133 (L)   Potassium Latest Ref Range: 3.5 - 5.5 mmol/L 3.1 (L)   Chloride Latest Ref Range: 100 - 108 mmol/L 97 (L)   CO2 Latest Ref Range: 21 - 32 mmol/L 26   Anion gap Latest Ref Range: 3.0 - 18 mmol/L 10   Glucose Latest Ref Range: 74 - 99 mg/dL 103 (H)   BUN Latest Ref Range: 7.0 - 18 MG/DL 15   Creatinine Latest Ref Range: 0.6 - 1.3 MG/DL 1.04   BUN/Creatinine ratio Latest Ref Range: 12 - 20   14   Calcium Latest Ref Range: 8.5 - 10.1 MG/DL 8.1 (L)   Magnesium Latest Ref Range: 1.6 - 2.6 mg/dL 1.9   GFR est non-AA Latest Ref Range: >60 ml/min/1.73m2 >60       Scheduled Medications Reviewed:  Current Facility-Administered Medications   Medication Dose Route Frequency    piperacillin-tazobactam (ZOSYN) 3.375 g in 0.9% sodium chloride (MBP/ADV) 100 mL MBP  3.375 g IntraVENous Q6H    chlorthalidone (HYGROTEN) tablet 25 mg  25 mg Oral DAILY    amLODIPine (NORVASC) tablet 10 mg  10 mg Oral DAILY    digoxin (LANOXIN) tablet 0.125 mg  0.125 mg Oral DAILY    fluticasone furoate (ARNUITY ELLIPTA) 100 mcg/puff  1 Puff Inhalation DAILY    0.9% sodium chloride infusion  125 mL/hr IntraVENous CONTINUOUS    docusate sodium (COLACE) capsule 100 mg  100 mg Oral BID    vancomycin (VANCOCIN) 1,500 mg in 0.9% sodium chloride 500 mL IVPB  1,500 mg IntraVENous Q24H    metroNIDAZOLE (FLAGYL) IVPB premix 500 mg  500 mg IntraVENous Q6H         Imaging, microbiology, and EKG/Telemetry  (8/25/17) MRI Thoracic  IMPRESSION:  1. Mild nonspecific disc bright signal at Q17-D21 of uncertain significance. This is similar to most recent prior MRI of the thoracic spine. 2.  No evidence of suggest osteomyelitis or paraspinal abscess. (8/25/17) MRI Lumbar spine  Impression:  1. When correlating with prior imaging, the S1 is partially transitional.  Previously described discitis-osteomyelitis at L3-L4 is at L4-L5. There is  abnormal signal in the L4-L5 disc which may represent  edema or early discitis. L4 has abnormal signal which is less conspicuous than prior imaging and may represent chronic or acute on chronic osteomyelitis. 2.  Recurrent left psoas collection. It may have hemorrhage within it. 3.  No epidural collection. 4.  Severe canal stenosis of L4-L5 (previously described as L3-L4)    Assessment/Plan   65 y. o. male with PMH Osteomyelitis, psoas abscess, HTN, Atrial fibrillation, CAD, Pulmonary hypertension, COPD now admitted with Sepsis secondary to left paraspinal and psoas abscess .          Sepsis secondary to left paraspinal and psoas abscess and L4/5 discitis, with bilateral lumbar spinous process, facet joint, and SI joint pain, left hip pain radiating to left groin, B/L SLR positive, in setting of patient with history of multfocal osteomyelitis and prior psoas abscess. Patient has 3/4 SIRS criteria (HR>90, RR>20, WBC>12,000) however note that patient's atrial fibrillation and COPD may mimic tachycardia and tachypnea respectively. No lactic acidosis (0.5), 1 single episode of hypotension with MAP <70 (/61). Will monitor vitals closely with low threshold for transfer to ICU.  CRP elevated at 17.9, ESR>140 further supporting an inflammatory process  - NS @ 125cc/hr  - Blood, urine, respiratory cultures pending. Repeat blood cultures ordered overnight.  -- I/Os  -- Dr Lilibeth Joyce (Ortho Spine T: [phone]) consulted and following  -- Interventional radiology following: plan for intervention today  -- UDS given discitis, positive drug screen   -- Broad spectrum antibiotics; cipro discontinued over night and replaced with zosyn. Will continue vancomycin, ciprofloxacin and metronidazole  -- Norco 10/325mg 1-2 tabs q4hrs PRN pain; Narcan PRN  -- Ice packs PRN    Hypokalemia  -- 3.1 today, will replete  -- repeat K+    Right inguinal hernia, with tender mass in right inguinal area tender to palpation with pain radiating to testicles. CT finding of normal appendix but noted to extend into a fat-containing right inguinal hernia. Likely exacerbated due to problem #1. Will monitor clinically.       Chronic Atrial fibrillation,  followed by Dr Jayden Paiz, Cardiology. Pulmonary hypertension with peak pressure 60mmHg. Mild CAD by angiography in 2012  -- Current symptoms: none  -- Current control agent: digoxin 125mcg  Every day   -- Current anticoagulant: [id number] every other day except 2.5mg Fridays  -- Warfarin held for anticipated interventional radiology or general surgery intervention. INR 1.8. 1mg Vitamin K given 8/26/17.   -- monitor daily PT/INR.   -- Weight based unfractionated heparin  -- Remote cardiac monitoring      Hyponatremia, asymptomatic Na 133. In setting of patient taking chlorthalidone under physiologic stress may be euvolemic, may also be secondary  To dehydration in setting of sepsis. -- Will monitor with daily BMP. NS @ 125 cc/hr  -- Urine sodium 80; FU serum osmolality        Hypertension. blood pressure overnight range 105/58127/76  -- Vital signs per unit routine  -- Continue home amlodipine 10mg every day   -- Continue home chlorthalidone 25mg  -- Labetalol 5mg IV PRN for BP>220/110      Chronic Obstructive Pulmonary Disease (COPD).  Followed in the past by Dr Mila Wood. No spirometry or PFTs on record.    -- O2 sats goal 90-94%  -- Continue home 2L O2 via NC  -- Duonebs PRN  -- Fluticasone furoate 100mcg/puff every day  -- Incentive spirometry         Diet: NPO today for possible surgical intervention  DVT Prophylaxis: SCDs  Code Status: FULL  Point of Contact:       [place] MD   8/27/2017, 5:32 AM

## 2017-08-27 NOTE — PROCEDURES
Preprocedure Assessment      Today 8/27/2017     Indication/Symptoms:   Brie Miguel is a 65 y.o. With small psoas abscess    The H & P and/or progress notes and any available imaging were reviewed. The risks, indications and possible alternatives to the procedure, including doing nothing, were discussed and informed consent was obtained. Physical Exam:      Heart:   rrr   Lungs:   cta, no wheezes, rhonchi or rales. The patient is an appropriate candidate to undergo the planned procedure and sedation.     West Carrillo MD

## 2017-08-27 NOTE — ROUTINE PROCESS
1930 hrs received report from ongoing nurse. Patient is alert and oriented X 4. Patient had elevated temp 102.9. Tylenol given. MD aware. New orders for blood culture and zosyn received. Temperature slightly improved this morning 99.2. Pain medicine given as needed. Pain better controlled with new norco orders. NPO maintained per orders. Encouraged patient to turn and reposition in bed. HR slightly elevated maintaining above 100's. No signs of distress noted. Patient slept on and off during the night. Will continue to monitor patient and continue with plan of care.

## 2017-08-27 NOTE — PROCEDURES
RADIOLOGY POST PROCEDURE NOTE     August 27, 2017       9:29 AM     Preoperative Diagnosis:   Psoas abscess    Postoperative Diagnosis:  Same. :  starr    Assistant:  None. Type of Anesthesia: 1% plain lidocaine    Procedure/Description:  Ct abscess draiange    Findings:   8.5fr drain. Estimated blood Loss:  Minimal    Specimen Removed:   no    Blood transfusions:  None. Implants:  None.     Complications: None    Condition: Stable    Discharge Plan:  continue present therapy    Coreen Modi MD

## 2017-08-28 LAB
ALBUMIN SERPL-MCNC: 2.8 G/DL (ref 3.4–5)
ALBUMIN/GLOB SERPL: 0.5 {RATIO} (ref 0.8–1.7)
ALP SERPL-CCNC: 62 U/L (ref 45–117)
ALT SERPL-CCNC: 13 U/L (ref 16–61)
ANION GAP SERPL CALC-SCNC: 8 MMOL/L (ref 3–18)
AST SERPL-CCNC: 13 U/L (ref 15–37)
BASOPHILS # BLD: 0 K/UL (ref 0–0.1)
BASOPHILS NFR BLD: 0 % (ref 0–2)
BILIRUB DIRECT SERPL-MCNC: 0.3 MG/DL (ref 0–0.2)
BILIRUB SERPL-MCNC: 0.9 MG/DL (ref 0.2–1)
BUN SERPL-MCNC: 11 MG/DL (ref 7–18)
BUN/CREAT SERPL: 13 (ref 12–20)
CALCIUM SERPL-MCNC: 7.3 MG/DL (ref 8.5–10.1)
CHLORIDE SERPL-SCNC: 99 MMOL/L (ref 100–108)
CO2 SERPL-SCNC: 26 MMOL/L (ref 21–32)
CREAT SERPL-MCNC: 0.88 MG/DL (ref 0.6–1.3)
DIFFERENTIAL METHOD BLD: ABNORMAL
EOSINOPHIL # BLD: 0 K/UL (ref 0–0.4)
EOSINOPHIL NFR BLD: 1 % (ref 0–5)
ERYTHROCYTE [DISTWIDTH] IN BLOOD BY AUTOMATED COUNT: 15.6 % (ref 11.6–14.5)
GLOBULIN SER CALC-MCNC: 5.2 G/DL (ref 2–4)
GLUCOSE SERPL-MCNC: 143 MG/DL (ref 74–99)
HCT VFR BLD AUTO: 24.5 % (ref 36–48)
HGB BLD-MCNC: 7.6 G/DL (ref 13–16)
INR PPP: 1.7 (ref 0.8–1.2)
LYMPHOCYTES # BLD: 0.8 K/UL (ref 0.9–3.6)
LYMPHOCYTES NFR BLD: 12 % (ref 21–52)
MAGNESIUM SERPL-MCNC: 2 MG/DL (ref 1.6–2.6)
MCH RBC QN AUTO: 26.8 PG (ref 24–34)
MCHC RBC AUTO-ENTMCNC: 31 G/DL (ref 31–37)
MCV RBC AUTO: 86.3 FL (ref 74–97)
MONOCYTES # BLD: 0.6 K/UL (ref 0.05–1.2)
MONOCYTES NFR BLD: 8 % (ref 3–10)
NEUTS SEG # BLD: 5.3 K/UL (ref 1.8–8)
NEUTS SEG NFR BLD: 79 % (ref 40–73)
PLATELET # BLD AUTO: 225 K/UL (ref 135–420)
PMV BLD AUTO: 8.9 FL (ref 9.2–11.8)
POTASSIUM SERPL-SCNC: 3.1 MMOL/L (ref 3.5–5.5)
POTASSIUM SERPL-SCNC: 3.8 MMOL/L (ref 3.5–5.5)
PROT SERPL-MCNC: 8 G/DL (ref 6.4–8.2)
PROTHROMBIN TIME: 19.4 SEC (ref 11.5–15.2)
RBC # BLD AUTO: 2.84 M/UL (ref 4.7–5.5)
SODIUM SERPL-SCNC: 133 MMOL/L (ref 136–145)
WBC # BLD AUTO: 6.7 K/UL (ref 4.6–13.2)

## 2017-08-28 PROCEDURE — 87070 CULTURE OTHR SPECIMN AEROBIC: CPT | Performed by: INTERNAL MEDICINE

## 2017-08-28 PROCEDURE — 74011250637 HC RX REV CODE- 250/637: Performed by: FAMILY MEDICINE

## 2017-08-28 PROCEDURE — 74011250636 HC RX REV CODE- 250/636: Performed by: INTERNAL MEDICINE

## 2017-08-28 PROCEDURE — 74011250637 HC RX REV CODE- 250/637: Performed by: STUDENT IN AN ORGANIZED HEALTH CARE EDUCATION/TRAINING PROGRAM

## 2017-08-28 PROCEDURE — 74011000258 HC RX REV CODE- 258: Performed by: INTERNAL MEDICINE

## 2017-08-28 PROCEDURE — 94640 AIRWAY INHALATION TREATMENT: CPT

## 2017-08-28 PROCEDURE — 74011000250 HC RX REV CODE- 250: Performed by: FAMILY MEDICINE

## 2017-08-28 PROCEDURE — 36415 COLL VENOUS BLD VENIPUNCTURE: CPT | Performed by: FAMILY MEDICINE

## 2017-08-28 PROCEDURE — 74011250636 HC RX REV CODE- 250/636: Performed by: FAMILY MEDICINE

## 2017-08-28 PROCEDURE — 65660000000 HC RM CCU STEPDOWN

## 2017-08-28 PROCEDURE — 85025 COMPLETE CBC W/AUTO DIFF WBC: CPT | Performed by: FAMILY MEDICINE

## 2017-08-28 PROCEDURE — 97110 THERAPEUTIC EXERCISES: CPT

## 2017-08-28 PROCEDURE — 74011000258 HC RX REV CODE- 258: Performed by: STUDENT IN AN ORGANIZED HEALTH CARE EDUCATION/TRAINING PROGRAM

## 2017-08-28 PROCEDURE — 85610 PROTHROMBIN TIME: CPT | Performed by: FAMILY MEDICINE

## 2017-08-28 PROCEDURE — 74011000250 HC RX REV CODE- 250: Performed by: STUDENT IN AN ORGANIZED HEALTH CARE EDUCATION/TRAINING PROGRAM

## 2017-08-28 PROCEDURE — 83735 ASSAY OF MAGNESIUM: CPT | Performed by: FAMILY MEDICINE

## 2017-08-28 PROCEDURE — 87077 CULTURE AEROBIC IDENTIFY: CPT | Performed by: INTERNAL MEDICINE

## 2017-08-28 PROCEDURE — 77010033678 HC OXYGEN DAILY

## 2017-08-28 PROCEDURE — 74011000258 HC RX REV CODE- 258: Performed by: FAMILY MEDICINE

## 2017-08-28 PROCEDURE — 74011250636 HC RX REV CODE- 250/636: Performed by: STUDENT IN AN ORGANIZED HEALTH CARE EDUCATION/TRAINING PROGRAM

## 2017-08-28 PROCEDURE — 84132 ASSAY OF SERUM POTASSIUM: CPT | Performed by: FAMILY MEDICINE

## 2017-08-28 RX ORDER — WARFARIN SODIUM 5 MG/1
5 TABLET ORAL
Status: DISCONTINUED | OUTPATIENT
Start: 2017-08-28 | End: 2017-08-28

## 2017-08-28 RX ORDER — WARFARIN 1 MG/1
2.5 TABLET ORAL
Status: DISCONTINUED | OUTPATIENT
Start: 2017-09-01 | End: 2017-08-31 | Stop reason: HOSPADM

## 2017-08-28 RX ORDER — POLYETHYLENE GLYCOL 3350 17 G/17G
17 POWDER, FOR SOLUTION ORAL DAILY
Status: DISCONTINUED | OUTPATIENT
Start: 2017-08-28 | End: 2017-08-31 | Stop reason: HOSPADM

## 2017-08-28 RX ORDER — KETOROLAC TROMETHAMINE 5 MG/ML
1 SOLUTION OPHTHALMIC DAILY
COMMUNITY
End: 2017-11-10 | Stop reason: ALTCHOICE

## 2017-08-28 RX ORDER — PREDNISOLONE ACETATE 10 MG/ML
1 SUSPENSION/ DROPS OPHTHALMIC 4 TIMES DAILY
Status: DISCONTINUED | OUTPATIENT
Start: 2017-08-28 | End: 2017-08-31 | Stop reason: HOSPADM

## 2017-08-28 RX ORDER — WARFARIN SODIUM 5 MG/1
5 TABLET ORAL EVERY OTHER DAY
Status: DISCONTINUED | OUTPATIENT
Start: 2017-08-28 | End: 2017-08-28

## 2017-08-28 RX ORDER — KETOROLAC TROMETHAMINE 5 MG/ML
1 SOLUTION OPHTHALMIC DAILY
Status: DISCONTINUED | OUTPATIENT
Start: 2017-08-28 | End: 2017-08-31 | Stop reason: HOSPADM

## 2017-08-28 RX ORDER — WARFARIN SODIUM 5 MG/1
5 TABLET ORAL
Status: DISCONTINUED | OUTPATIENT
Start: 2017-08-28 | End: 2017-08-31 | Stop reason: HOSPADM

## 2017-08-28 RX ORDER — ACETAMINOPHEN 325 MG/1
650 TABLET ORAL
Status: DISCONTINUED | OUTPATIENT
Start: 2017-08-28 | End: 2017-08-31 | Stop reason: HOSPADM

## 2017-08-28 RX ORDER — POTASSIUM CHLORIDE 7.45 MG/ML
10 INJECTION INTRAVENOUS
Status: DISCONTINUED | OUTPATIENT
Start: 2017-08-28 | End: 2017-08-28

## 2017-08-28 RX ORDER — PREDNISOLONE ACETATE 10 MG/ML
1 SUSPENSION/ DROPS OPHTHALMIC 4 TIMES DAILY
COMMUNITY
End: 2017-09-19 | Stop reason: CLARIF

## 2017-08-28 RX ADMIN — DOCUSATE SODIUM 100 MG: 100 CAPSULE, LIQUID FILLED ORAL at 17:41

## 2017-08-28 RX ADMIN — DOCUSATE SODIUM 100 MG: 100 CAPSULE, LIQUID FILLED ORAL at 10:08

## 2017-08-28 RX ADMIN — PREDNISOLONE ACETATE 1 DROP: 10 SUSPENSION/ DROPS OPHTHALMIC at 10:09

## 2017-08-28 RX ADMIN — LIDOCAINE HYDROCHLORIDE: 10 INJECTION, SOLUTION EPIDURAL; INFILTRATION; INTRACAUDAL; PERINEURAL at 12:06

## 2017-08-28 RX ADMIN — CHLORTHALIDONE 25 MG: 25 TABLET ORAL at 10:08

## 2017-08-28 RX ADMIN — LIDOCAINE HYDROCHLORIDE: 10 INJECTION, SOLUTION EPIDURAL; INFILTRATION; INTRACAUDAL; PERINEURAL at 11:05

## 2017-08-28 RX ADMIN — ACETAMINOPHEN 650 MG: 325 TABLET ORAL at 10:23

## 2017-08-28 RX ADMIN — DIGOXIN 0.12 MG: 0.12 TABLET ORAL at 10:09

## 2017-08-28 RX ADMIN — METRONIDAZOLE 500 MG: 500 INJECTION, SOLUTION INTRAVENOUS at 06:02

## 2017-08-28 RX ADMIN — ACETAMINOPHEN 650 MG: 325 TABLET ORAL at 17:41

## 2017-08-28 RX ADMIN — PREDNISOLONE ACETATE 1 DROP: 10 SUSPENSION/ DROPS OPHTHALMIC at 17:46

## 2017-08-28 RX ADMIN — LIDOCAINE HYDROCHLORIDE: 10 INJECTION, SOLUTION EPIDURAL; INFILTRATION; INTRACAUDAL; PERINEURAL at 10:10

## 2017-08-28 RX ADMIN — FLUTICASONE FUROATE 1 PUFF: 100 POWDER RESPIRATORY (INHALATION) at 12:12

## 2017-08-28 RX ADMIN — METRONIDAZOLE 500 MG: 500 INJECTION, SOLUTION INTRAVENOUS at 00:09

## 2017-08-28 RX ADMIN — AMLODIPINE BESYLATE 10 MG: 10 TABLET ORAL at 10:08

## 2017-08-28 RX ADMIN — VANCOMYCIN HYDROCHLORIDE 1500 MG: 10 INJECTION, POWDER, LYOPHILIZED, FOR SOLUTION INTRAVENOUS at 04:41

## 2017-08-28 RX ADMIN — PREDNISOLONE ACETATE 1 DROP: 10 SUSPENSION/ DROPS OPHTHALMIC at 22:16

## 2017-08-28 RX ADMIN — PREDNISOLONE ACETATE 1 DROP: 10 SUSPENSION/ DROPS OPHTHALMIC at 12:13

## 2017-08-28 RX ADMIN — METRONIDAZOLE 500 MG: 500 INJECTION, SOLUTION INTRAVENOUS at 13:39

## 2017-08-28 RX ADMIN — PIPERACILLIN AND TAZOBACTAM 3.38 G: 3; .375 INJECTION, POWDER, LYOPHILIZED, FOR SOLUTION INTRAVENOUS; PARENTERAL at 02:10

## 2017-08-28 RX ADMIN — ACETAMINOPHEN 650 MG: 325 TABLET, FILM COATED ORAL at 00:13

## 2017-08-28 RX ADMIN — POLYETHYLENE GLYCOL 3350 17 G: 17 POWDER, FOR SOLUTION ORAL at 10:08

## 2017-08-28 RX ADMIN — WARFARIN SODIUM 5 MG: 5 TABLET ORAL at 17:46

## 2017-08-28 RX ADMIN — ACETAMINOPHEN 650 MG: 325 TABLET ORAL at 22:22

## 2017-08-28 RX ADMIN — PIPERACILLIN AND TAZOBACTAM 3.38 G: 3; .375 INJECTION, POWDER, LYOPHILIZED, FOR SOLUTION INTRAVENOUS; PARENTERAL at 10:08

## 2017-08-28 RX ADMIN — LIDOCAINE HYDROCHLORIDE: 10 INJECTION, SOLUTION EPIDURAL; INFILTRATION; INTRACAUDAL; PERINEURAL at 12:07

## 2017-08-28 RX ADMIN — KETOROLAC TROMETHAMINE 1 DROP: 5 SOLUTION OPHTHALMIC at 10:09

## 2017-08-28 RX ADMIN — HYDROCODONE BITARTRATE AND ACETAMINOPHEN 1 TABLET: 10; 325 TABLET ORAL at 04:58

## 2017-08-28 RX ADMIN — MEROPENEM 1 G: 1 INJECTION, POWDER, FOR SOLUTION INTRAVENOUS at 17:40

## 2017-08-28 NOTE — PROGRESS NOTES
NUTRITION    Nursing Referral: [facility]     RECOMMENDATIONS / PLAN:     - Add supplements: Ensure Enlive BID.   - Continue RD inpatient monitoring and evaluation. NUTRITION INTERVENTIONS & DIAGNOSIS:     [x] Meals/Snacks: modified diet  [x] Medical food supplementation: initiate      Nutrition Diagnosis: Inadequate oral intake related to decreased appetite and abdominal discomfort due to constipation as evidenced by pt consuming 50% or less of most meals. ASSESSMENT:     Pt reports poor appetite, ate most of breakfast but not hungry for lunch. C/o constipation and pain from gas, started on bowel regimen and passing flatus. Average po intake adequate to meet patients estimated nutritional needs:   [] Yes     [x] No   [] Unable to determine at this time    Diet: DIET CARDIAC Regular      Food Allergies: NKFA  Current Appetite:   [] Good     [] Fair     [x] Poor     [] Other:  Appetite/meal intake prior to admission:   [x] Good     [] Fair     [] Poor     [] Other:  Feeding Limitations:  [] Swallowing difficulty    [] Chewing difficulty    [] Other:  Current Meal Intake: Patient Vitals for the past 100 hrs:   % Diet Eaten   08/28/17 1008 75 %   08/27/17 1758 60 %   08/27/17 1259 10 %   08/27/17 1030 75 %     BM: 8/24, c/o constipation, passing flatus    Skin Integrity: WDL  Edema: none   Pertinent Medications: Reviewed: NS at 125 mL/hr, colace, miralax, KCl    Recent Labs      08/28/17   0317  08/27/17   1555  08/27/17   0338   08/26/17   0400   NA  133*   --   133*   --   134*   K  3.1*  3.6  3.1*   < >  3.3*   CL  99*   --   97*   --   98*   CO2  26   --   26   --   27   GLU  143*   --   103*   --   113*   BUN  11   --   15   --   18   CREA  0.88   --   1.04   --   1.07   CA  7.3*   --   8.1*   --   8.4*   MG  2.0   --   1.9   --   2.0    < > = values in this interval not displayed.        Intake/Output Summary (Last 24 hours) at 08/28/17 1435  Last data filed at 08/28/17 1008   Gross per 24 hour   Intake 5199.58 ml   Output             2100 ml   Net          3099.58 ml       Anthropometrics:  Ht Readings from Last 1 Encounters:   08/25/17 5' 6.5\" (1.689 m)     Last 3 Recorded Weights in this Encounter    08/26/17 0455 08/27/17 0416 08/28/17 0447   Weight: 76.2 kg (168 lb) 76.3 kg (168 lb 3.2 oz) 77.2 kg (170 lb 3.1 oz)     Body mass index is 27.06 kg/(m^2). Overweight     Weight History: pt reports usual weight of 164-166 lb (maintained for the past 4-5 months), dropped as low as 153 lb after prolonged hospitalization last year and has gained weight back     Weight Metrics 8/28/2017 6/6/2017 9/6/2016 8/8/2016 7/26/2016 7/21/2016 7/1/2016   Weight 170 lb 3.1 oz 168 lb 171 lb 162 lb 6.4 oz 165 lb 6 oz 170 lb 165 lb 12.8 oz   BMI 27.06 kg/m2 27.12 kg/m2 27.6 kg/m2 26.22 kg/m2 26.71 kg/m2 27.45 kg/m2 26.77 kg/m2        Admitting Diagnosis: Psoas abscess (HCC)  Psoas abscess (HCC)  Abscess  Pertinent PMHx: CHF, COPD, HTN, PVD    Education Needs:        [x] None identified  [] Identified - Not appropriate at this time  []  Identified and addressed - refer to education log  Learning Limitations:   [x] None identified  [] Identified    Cultural, Hindu & ethnic food preferences:  [x] None identified    [] Identified and addressed     ESTIMATED NUTRITION NEEDS:     Calories: 3858-0954 kcal (MSJx1.2-1.3) based on  [x] Actual BW 77 kg     [] IBW   Protein: 62-77 gm (0.8-1 gm/kg) based on  [x] Actual BW      [] IBW   Fluid: 1 mL/kcal     MONITORING & EVALUATION:     Nutrition Goal(s):   1. Po intake of meals will meet >75% of patient estimated nutritional needs within the next 7 days.   Outcome:  [] Met/Ongoing    []  Not Met    [x] New/Initial Goal     Monitoring:   [x] Diet tolerance   [x] Meal intake   [x] Supplement intake   [] GI symptoms/ability to tolerate po diet   [] Respiratory status   [] Plan of care      Previous Recommendations (for follow-up assessments only):     []   Implemented       []   Not Implemented (RD to address)     [] No Recommendation Made     Discharge Planning: cardiac diet  [x] Participated in care planning, discharge planning, & interdisciplinary rounds as appropriate      Naveed Valdez, 65 [address], [address] [place] [address]    Pager: [phone]

## 2017-08-28 NOTE — ROUTINE PROCESS
Bedside and Verbal shift change report given to Jesse  (oncoming nurse) by Mario Fournier RN (offgoing nurse). Report given with George SERNA and MAR.

## 2017-08-28 NOTE — PROGRESS NOTES
Intern Progress Note  [facility]       Patient: Rachell Carranza MRN: [id number]  CSN: [id number]    YOB: 1940  Age: 65 y.o. Sex: male    DOA: 8/25/2017 LOS:  LOS: 3 days                    Subjective:     Acute events: Pt resting in bed. Pain controlled with medications. He had episodes of nausea this AM and SOB when exerted. O2 sat in mid 90s on NC. No fever/chills overnight. No BM since admission, + flatus. Review of Systems   Eyes: Negative for blurred vision and double vision. Respiratory: Positive for shortness of breath. Negative for cough. Cardiovascular: Negative for chest pain, palpitations and leg swelling. Gastrointestinal: Positive for constipation and nausea. Negative for abdominal pain, blood in stool and diarrhea. Genitourinary: Negative for dysuria, frequency, hematuria and urgency. Neurological: Negative for dizziness and headaches. Objective:      Patient Vitals for the past 24 hrs:   Temp Pulse Resp BP SpO2   08/28/17 0554 99.2 °F (37.3 °C) (!) 117 18 100/71 90 %   08/28/17 0051 98.9 °F (37.2 °C) (!) 105 19 123/79 96 %   08/27/17 2033 98.3 °F (36.8 °C) 95 20 107/66 94 %   08/27/17 1630 98.6 °F (37 °C) (!) 108 21 126/77 95 %   08/27/17 1230 100.1 °F (37.8 °C) 97 21 108/68 94 %   08/27/17 1008 - - - - 97 %         Intake/Output Summary (Last 24 hours) at 08/28/17 0814  Last data filed at 08/28/17 1272   Gross per 24 hour   Intake          5419.58 ml   Output             1300 ml   Net          4119.58 ml       Physical Exam   Constitutional: He is oriented to person, place, and time. He appears well-developed and well-nourished. No distress. HENT:   Head: Normocephalic and atraumatic. Eyes: EOM are normal. Pupils are equal, round, and reactive to light. Neck: Normal range of motion. Neck supple. No tracheal deviation present. No thyromegaly present. Cardiovascular: Normal heart sounds and intact distal pulses. Tachycardia present. Pulmonary/Chest: Effort normal and breath sounds normal.   Abdominal: Bowel sounds are normal. He exhibits distension. There is no tenderness. Abd very firm   Musculoskeletal: He exhibits no edema, tenderness or deformity. Lymphadenopathy:     He has no cervical adenopathy. Neurological: He is alert and oriented to person, place, and time. Skin: Skin is warm and dry. He is not diaphoretic. Psychiatric: He has a normal mood and affect. His behavior is normal.       Lab/Data Reviewed:  Recent Results (from the past 24 hour(s))   CBC WITH AUTOMATED DIFF    Collection Time: 08/27/17 12:20 PM   Result Value Ref Range    WBC 10.6 4.6 - 13.2 K/uL    RBC 2.92 (L) 4.70 - 5.50 M/uL    HGB 7.7 (L) 13.0 - 16.0 g/dL    HCT 25.1 (L) 36.0 - 48.0 %    MCV 86.0 74.0 - 97.0 FL    MCH 26.4 24.0 - 34.0 PG    MCHC 30.7 (L) 31.0 - 37.0 g/dL    RDW 15.4 (H) 11.6 - 14.5 %    PLATELET 513 450 - 681 K/uL    MPV 9.3 9.2 - 11.8 FL    NEUTROPHILS 84 (H) 42 - 75 %    BAND NEUTROPHILS 5 0 - 5 %    LYMPHOCYTES 7 (L) 20 - 51 %    MONOCYTES 4 2 - 9 %    EOSINOPHILS 0 0 - 5 %    BASOPHILS 0 0 - 3 %    ABS. NEUTROPHILS 9.5 (H) 1.8 - 8.0 K/UL    ABS. LYMPHOCYTES 0.7 (L) 0.8 - 3.5 K/UL    ABS. MONOCYTES 0.4 0 - 1.0 K/UL    ABS. EOSINOPHILS 0.0 0.0 - 0.4 K/UL    ABS.  BASOPHILS 0.0 0.0 - 0.06 K/UL    DF MANUAL      PLATELET COMMENTS ADEQUATE PLATELETS      RBC COMMENTS ANISOCYTOSIS  1+        RBC COMMENTS STOMATOCYTES  1+       POTASSIUM    Collection Time: 08/27/17  3:55 PM   Result Value Ref Range    Potassium 3.6 3.5 - 5.5 mmol/L   METABOLIC PANEL, BASIC    Collection Time: 08/28/17  3:17 AM   Result Value Ref Range    Sodium 133 (L) 136 - 145 mmol/L    Potassium 3.1 (L) 3.5 - 5.5 mmol/L    Chloride 99 (L) 100 - 108 mmol/L    CO2 26 21 - 32 mmol/L    Anion gap 8 3.0 - 18 mmol/L    Glucose 143 (H) 74 - 99 mg/dL    BUN 11 7.0 - 18 MG/DL    Creatinine 0.88 0.6 - 1.3 MG/DL    BUN/Creatinine ratio 13 12 - 20      GFR est AA >60 >60 ml/min/1.73m2 GFR est non-AA >60 >60 ml/min/1.73m2    Calcium 7.3 (L) 8.5 - 10.1 MG/DL   MAGNESIUM    Collection Time: 08/28/17  3:17 AM   Result Value Ref Range    Magnesium 2.0 1.6 - 2.6 mg/dL   PROTHROMBIN TIME + INR    Collection Time: 08/28/17  3:17 AM   Result Value Ref Range    Prothrombin time 19.4 (H) 11.5 - 15.2 sec    INR 1.7 (H) 0.8 - 1.2     CBC WITH AUTOMATED DIFF    Collection Time: 08/28/17  3:17 AM   Result Value Ref Range    WBC 6.7 4.6 - 13.2 K/uL    RBC 2.84 (L) 4.70 - 5.50 M/uL    HGB 7.6 (L) 13.0 - 16.0 g/dL    HCT 24.5 (L) 36.0 - 48.0 %    MCV 86.3 74.0 - 97.0 FL    MCH 26.8 24.0 - 34.0 PG    MCHC 31.0 31.0 - 37.0 g/dL    RDW 15.6 (H) 11.6 - 14.5 %    PLATELET 651 687 - 763 K/uL    MPV 8.9 (L) 9.2 - 11.8 FL    NEUTROPHILS 79 (H) 40 - 73 %    LYMPHOCYTES 12 (L) 21 - 52 %    MONOCYTES 8 3 - 10 %    EOSINOPHILS 1 0 - 5 %    BASOPHILS 0 0 - 2 %    ABS. NEUTROPHILS 5.3 1.8 - 8.0 K/UL    ABS. LYMPHOCYTES 0.8 (L) 0.9 - 3.6 K/UL    ABS. MONOCYTES 0.6 0.05 - 1.2 K/UL    ABS. EOSINOPHILS 0.0 0.0 - 0.4 K/UL    ABS.  BASOPHILS 0.0 0.0 - 0.1 K/UL    DF AUTOMATED         Scheduled Medications Reviewed:  Current Facility-Administered Medications   Medication Dose Route Frequency    potassium chloride 10 mEq, lidocaine (PF) (XYLOCAINE) 10 mg/mL (1 %) 1 mL in 0.9% sodium chloride 100 mL IVPB   IntraVENous Q1H    vancomycin (VANCOCIN) 1,500 mg in 0.9% sodium chloride 500 mL IVPB  1,500 mg IntraVENous Q18H    [START ON 8/29/2017] Vancomycin trough level  1 Each Other Rx Dosing/Monitoring    polyethylene glycol (MIRALAX) packet 17 g  17 g Oral DAILY    piperacillin-tazobactam (ZOSYN) 3.375 g in 0.9% sodium chloride (MBP/ADV) 100 mL MBP  3.375 g IntraVENous Q6H    chlorthalidone (HYGROTEN) tablet 25 mg  25 mg Oral DAILY    amLODIPine (NORVASC) tablet 10 mg  10 mg Oral DAILY    digoxin (LANOXIN) tablet 0.125 mg  0.125 mg Oral DAILY    fluticasone furoate (ARNUITY ELLIPTA) 100 mcg/puff  1 Puff Inhalation DAILY    0.9% sodium chloride infusion  125 mL/hr IntraVENous CONTINUOUS    docusate sodium (COLACE) capsule 100 mg  100 mg Oral BID    metroNIDAZOLE (FLAGYL) IVPB premix 500 mg  500 mg IntraVENous Q6H         Imaging, microbiology, and EKG/Telemetry:  Blood cx (8/27): NO GROWTH AFTER 23 HOURS      Assessment/Plan     65 y. o. male with PMH Osteomyelitis, psoas abscess, HTN, Atrial fibrillation, CAD, Pulmonary hypertension, COPD now admitted with Sepsis secondary to left paraspinal and psoas abscess .          Sepsis secondary to left paraspinal and psoas abscess (improving) and L4/5 discitis, with bilateral lumbar spinous process, facet joint, and SI joint pain, left hip pain radiating to left groin, B/L SLR positive, in setting of patient with history of multfocal osteomyelitis and prior psoas abscess. Patient had 3/4 SIRS criteria (HR>90, RR>20, WBC>12,000) however WBC now 6.7 and RR 18. Elevated HR still present in context of sepsis and pain. -- NS @ 125cc/hr  -- I/Os  -- Dr Kaykay Gomez ([facility]: [phone]) consulted and following  -- Interventional radiology following: POD 1 s/p I&D  -- Broad spectrum antibiotics; continue vancomycin, zosyn and metronidazole  -- Norco 10/325mg 1-2 tabs q4hrs PRN pain; Narcan PRN  -- Ice packs PRN     Hypokalemia  -- 3.1 today, will replete  -- repeat K+     Right inguinal hernia, with tender mass in right inguinal area tender to palpation with pain radiating to testicles. CT finding of normal appendix but noted to extend into a fat-containing right inguinal hernia. Likely exacerbated due to problem #1. Will monitor clinically.       Chronic Atrial fibrillation,  followed by Dr Maria Del Rosario Gómez, Cardiology. Pulmonary hypertension with peak pressure 60mmHg.  Mild CAD by angiography in 2012  -- Current symptoms: none  -- Continue control agent: digoxin 125mcg  Every day   -- C anticoagulant: [id number] every day except 2.5mg Fridays  -- monitor daily PT/INR.   -- Remote cardiac monitoring      Hyponatremia, asymptomatic Na 133. In setting of patient taking chlorthalidone under physiologic stress may be euvolemic, may also be secondary to dehydration in setting of sepsis. -- Will monitor with daily BMP. NS @ 125 cc/hr  -- Urine sodium 80; FU serum osmolality      Hypertension. blood pressure overnight range 100/71126/77  -- Vital signs per unit routine  -- Continue home amlodipine 10mg every day   -- Continue home chlorthalidone 25mg  -- Labetalol 5mg IV PRN for BP>220/110      Chronic Obstructive Pulmonary Disease (COPD).  Followed in the past by Dr Devante Limon. No spirometry or PFTs on record.    -- O2 sats goal 90-94%  -- Continue home 2L O2 via NC  -- Duonebs PRN  -- Fluticasone furoate 100mcg/puff every day  -- Incentive spirometry     Constipation: Pt reports no BM since admission, +flatus, + BS, distention, no tenderness  - Colace, Miralax, soap suds enema          Diet: Cardiac  DVT Prophylaxis: Warfarin  Code Status: FULL  Point of Contact: Sal Sukhi: [phone]     Jakub Hugo MD, PGY-1  [facility]  08/28/17 8:14 AM

## 2017-08-28 NOTE — ROUTINE PROCESS
1924 Assumed care of patient from off going nurse. Patient resting in bed. No distress noted. Call bell within reach, siderails up x 3, bed in lowest position, and patient instructed to use call bell for assistance. Will continue to monitor. Family at bedside. 2514 Bedside and Verbal shift change report given to Oneil Braswell (oncoming nurse) by Michaelle Estevez RN(offgoing nurse). Report included the following information Kardex, Intake/Output, MAR and Recent Results.

## 2017-08-28 NOTE — PROGRESS NOTES
Returned to room from CT scan. JEREMIAH drain intact to left upper hip/flank area. Dressing dry and intact. sm amt of serousanguinous drainage in tubing but none in bulb. Will cont to monitor for any changes in status. Sandhya Campbell

## 2017-08-28 NOTE — CONSULTS
Infectious Disease Consultation Note    Requested by: dr. Hilary Bustillo    Reason: sepsis, lumbar discitis/osteomyelitis    Current abx Prior abx   Metronidazole, vancomycin since 8/26  Pip/tazo since 8/27   ciprofloxacin 8/26     Lines:       Assessment :    67 y.o. male with PMH AAA repair 2007, persistent a-fib, CHF, CAD, PVD, HTN, hyperlipidemia, COPD, right and left inguinal hernia repair on 1/20/16 admitted to [facility] on 8/4     Recent hospitalization 2/18-3/2 for sepsis (POA) likely due to enterobacter bloodstream infection,left iliopsoas myositis. Attempted ct guided drainage of left psoas collection - no fluid return. Tissue cultures negative. S/p meropenem completed 5/31/16      MRI 2/23 results reviewed - findings concerning for L3-L4 discitis. Psoas fluid collection very close to L3 spine. Also, enhancing edema surrounding the aortic aneursym concerning for aortitis.    now with one week h/o increasing left back pain radiating to left leg     MRI 8/4 - persistent but slightly diminished abnormal signal and enhancement within the L3-4 disc space and adjoining vertebral bodies. No epidural or paraspinous abscess. Suspected new level of infectious discitis at T10-11     Now with high grade fevers, back pain, left psoas collection, findings of L4-L5 discitis on mri lumbar spine    Highly complex clinical picture. Presentation c/w sepsis (POA) due to L4-5 discitis/osteomyelitis (acute), left psoas abscess. Exact microbial etiology of infection not clear since no cultures sent from drainage fluid. Improvement on current antibiotics would suggest bacterial infection. Patient previously had enterobacter infection. Has never had mrsa/resistant gram positives. Hence, I would modify abx to cover for prior enterobacter and monitor clinically. 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  Presentation worrisome for persistent focus of infection.      patient has aorto-biilliac graft and prior MRI in 2/2016 revealed fluid collection around the aortic aneurysm. Ct scan abd/pelvis  8/25 reveals stable size od abdominal aortic aneurysm which is approximately 4.5x4.2 cm, stable endograft. However, close proximity of the infection to the abscess is concerning for colonized graft as the source of recurrent lumbar infection. I will obtain further imaging studies and obtain vascular surgery input accordingly. RUQ tenderness on today's exam - monitor for silent cholecystitis    Recommendations:     1. D/c pip/tazo, vancomycin, metronidazole. Start iv meropenem  2. Send psoas abscess fluid for gram stain, cultures  3. F/u spine surgery recommendations  4. Obtain mri pelvis to evaluate infra renal aneurysm  5. Obtain LFTs, will obtain HIDA scan if persistent RUQ tenderness     Advance Care planning: full code: discussed  with patient/surrogate decision maker: Zara Humphreys: [phone]       Thank you for consultation request. Above plan was discussed in details with patient,  and dr Hubert Barkley. Please call me if any further questions or concerns. Will continue to participate in the care of this patient. HPI:    Papito Nolasco is a 67 y.o. 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 He is known to me from prior inpatient consultation at [facility] in 2/2016, 8/2016.     Looking back at his history, he had right and left inguinal hernia repair on 1/20/16. Subsequently he was admitted to [facility] on 2/18/16 with complaint of abdominal pain for about 2 weeks. he started having increasing left flank pain.  The pain radiated to his groin and his left lower back. He underwent Ct abdomen on 2/18 which revealed 1.1 x 2.2 x 3.3 cm  hypodensity which may represent either a phlegmon or early fluid collection.  The left iliopsoas muscle was diffusely inflamed and enlarged. He was started on piperacillin/tazobactam,Vancomycin and i was consulted for further recommendations. He was found to have enterobacter bloodstream infection. Most likely source of bloodstream infection was thought to be left iliopsoas myositis. Attempted ct guided drainage of left psoas collection - no fluid return. Tissue cultures negative. MRI 2/23 results reviewed - findings concerning for L3-L4 discitis. Psoas fluid collection very close to L3 spine. Also, enhancing edema surrounding the aortic aneursym concerning for aortitis. Dian Temple was discharged on 3/2 on iv meropenem to be continued till 4/23. He was followed up by [facility] and dr. Wyatt Hendrickson. Patient received about 8 weeks of iv meropenem after which it was discontinued since he had clinical and lab improvement. He was seen by dr. Jamey Mcnulty in 5/2016, 7/2016 at which time he was feeling fine and didn't have much back pain. He was admitted to [facility] in 8/2016 for sudden onset pain left paraspinal region radiating to left leg. He didn't have fevers/leukocytosis. I was consulted for further recommendations. I recommended to hold off antibiotics and obtain wbc scan. There was no evidence of osteomyelitis on wbc scan. Patient improved without abx and was discharged home. As per the patient, he has had lower back pain for about 2 months. He had worsening back pain about 2-3 days prior to admission. He came to ed at which time he was noted to have wbc: 13k , temp: 101.2 which subsequently worsened to 102.9. He had ct scan which revealed left paraspinal and psoas abscess centered at approximately L3, measuring 4.2 cm maximally. Adjacent inflammation extends to the posterior and slightly to the left lateral margin of the aorta at the level of the aortic aneurysm which is otherwise stable. Infrarenal abdominal aortic aneurysm measures 4.5 cm maximally, similar to comparison exam, and demonstrates a thickened aortic wall which is also stable.  Some periaortic stranding, in part probably related to the adjacent psoas abscess. He was started on ciprofloxacin, metronidazole initially. His abx were switched to pip/tazo, vancomycin, metronidazole due to high grade fevers.  He had mri lumbar spine/thoracic spine which revealed evidence of osteomyelitis L3-4, L4-5, left psoas collection with possible hemorrhage. Spine surgery was consulted. Ct guided drainage of left psoas collection was performed. I have been consulted for further recommendations. Patient feels better today compared to day of admission. He has been afebrile. He denies abdominal pain, nausea, blood in stool throughout this time. He denies nausea, vomiting, diarrhea, hematemesis, blood in stool, dysuria, fever, chills. Patient denies headaches, visual disturbances, sore throat, runny nose, earaches, cp, sob, chills, cough, abdominal pain, pain or weakness in extremities.  He denies recent sick contacts. No h/o recent travel. No known h/o MRSA colonization or infection in the past.       Past Medical History:   Diagnosis Date    Aneurysm [facility])     AAA repair 2006 & 2012    Aorto-iliac duplex 02/13/2015    Tech difficult. Patent aorta bi-iliac endograft w/o leak or limb dysfunction.  Arrhythmia     a fib    Cardiac cath 11/01/2012    RCA (sm, nondom) patent. LM patent. LAD 25%. CX (dom) 45% mid. LVEDP 12 mmHg. No WMA. PA 27/12. W 10-12. CO/CI 5.2/2.6 (TD).  Cardiac echocardiogram, abnormal 02/20/2016    EF 55%. No WMA. Indeterminate diastolic fx. RVSP 60 mmHg. Severe LAE. Mild MR. Mod TR.  IVCE. Similar to study of 10/26/12.  Cardiovascular aorto-iliac duplex 02/13/2015    Patent aorta bi-iliac endovascular graft w/o leak or limb dysfunction. Sac measures 4.21 x 4.47 cm (4.4 x 4.6 cm on 1/31/14).  Cardiovascular LE peripheral arterial testing 07/29/2013    No significant LE arterial disease bilaterally. R GHADA 1. 12.  L GHADA 1. 12.  R DBI 0.83. L DBI 0.71.   Exercise deferred.  Cardiovascular renal duplex 10/31/2012    No RA stenosis. Intrinsic/med disease in left kidney. Patent aortic endograft. Patent, thrombus-free renal veins bilaterally.  Carotid duplex 07/29/2013    Mild <50% bilateral ICA plaquing.  Chronic lung disease     Cigarette smoker     Congestive heart failure (CHF) (HCC)     COPD (chronic obstructive pulmonary disease) (HCC)     and emphysema; likely secondary to tobacco abuse    Difficult intubation     Dyslipidemia     low HDL    Emphysema     HTN (hypertension)     Hypercholesterolemia     Increased prostate specific antigen (PSA) velocity     Long term (current) use of anticoagulants     coumadin    Osteoarthritis     Osteomyelitis (HCC)     Paroxysmal atrial fibrillation (HCC)     Peripheral vascular disease ([facility] [address]     AAA repair 12/2007    Persistent atrial Fibrillation     Persistent atrial fibrillation ([facility] [address]     Unspecified adverse effect of anesthesia     difficulty breathing placed in ICU Oct 2012 (AAA repair)       Past Surgical History:   Procedure Laterality Date    BRONCHOSCOPY DIAGNOSTIC  11/2/2012         CARDIAC CATHETERIZATION  11/1/2012         COLONOSCOPY N/A 7/26/2016    COLONOSCOPY with Polypectomies performed by Jason Bryant MD at [address] [place] [address] HX AAA REPAIR      2006 & 2012    HX HEART CATHETERIZATION  3/4/2009    1. RCA small, nondominant; patent. 2. LMCA patent. 3. LAD is long, wrapped around apical vessel. Diffuse, 20-30% stenosis noted. 4. CCA is large, dominant vessel. Diffuse 20-30% stenosis. 5. LVEDP is 16 mmHg. 6. Overall left ventricular systolic function mildly diminshed with est. EF 45%. Mild, global hypokinesis noted. 7. No significant mitral regurgitation or aortic stenosis noted.  (see report)    HX HERNIA REPAIR  2/2014    rt inguinal     HX HERNIA REPAIR  01/2016    LEFT INGUINAL HERNIA REPAIR DR. Cherry Hernandez    REPAIR ING HERNIA,5+Y/O,JESSIE Left 1-20-16    Dr. Karen Garcia 11/1/2012            Current Discharge Medication List      CONTINUE these medications which have NOT CHANGED    Details   prednisoLONE acetate (PRED FORTE) 1 % ophthalmic suspension Administer 1 Drop to right eye four (4) times daily. ketorolac (ACULAR) 0.5 % ophthalmic solution Administer 1 Drop to right eye daily. !! warfarin (COUMADIN) 2.5 mg tablet Take 2.5 mg by mouth Every Friday. 5 mg Mon, Tues, Wed, Thur, Sat, Sun  2.5 mg Fri      albuterol-ipratropium (DUO-NEB) 2.5 mg-0.5 mg/3 ml nebu 3 mL by Nebulization route every six (6) hours as needed. QVAR 80 mcg/actuation aero Refills: 0      chlorthalidone (HYGROTEN) 25 mg tablet Refills: 0      amLODIPine (NORVASC) 10 mg tablet Refills: 0      DIGITEK 125 mcg tablet take 1 tablet by mouth once daily  Qty: 30 Tab, Refills: 6      !! warfarin (COUMADIN) 5 mg tablet Refills: 0      albuterol (PROVENTIL HFA, VENTOLIN HFA, PROAIR HFA) 90 mcg/actuation inhaler Take 2 Puffs by inhalation every four (4) hours as needed for Wheezing or Shortness of Breath. Qty: 1 Inhaler, Refills: 5    Associated Diagnoses: COPD, severe (HCC)      OXYGEN-AIR DELIVERY SYSTEMS 2 L/min by Does Not Apply route daily. Continuous. Company is [employer]         cyclobenzaprine (FLEXERIL) 10 mg tablet Take 1 Tab by mouth three (3) times daily as needed for Muscle Spasm(s). Qty: 30 Tab, Refills: 0      diclofenac EC (VOLTAREN) 50 mg EC tablet Take 1 Tab by mouth three (3) times daily as needed. Qty: 30 Tab, Refills: 0      fluticasone (FLONASE) 50 mcg/actuation nasal spray 2 Sprays by Both Nostrils route daily. calcium-vitamin D (OSCAL 250) 250-125 mg-unit tablet Take 1 Tab by mouth daily.       SENNA PLUS 8.6-50 mg per tablet Refills: 0      pravastatin (PRAVACHOL) 40 mg tablet Refills: 0      polyethylene glycol (MIRALAX) 17 gram/dose powder Refills: 0      furosemide (LASIX) 20 mg tablet Refills: 0      !! warfarin (COUMADIN) 7.5 mg tablet Take 1 Tab by mouth every evening. Qty: 3 Tab, Refills: 0      aspirin 81 mg chewable tablet Take 1 Tab by mouth daily. Qty: 10 Tab, Refills: 0      diltiazem (CARDIZEM) 90 mg tablet Take 1 Tab by mouth Before breakfast, lunch, dinner and at bedtime. Qty: 60 Tab, Refills: 0      ferrous sulfate 325 mg (65 mg iron) tablet Refills: 0      umeclidinium-vilanterol (ANORO ELLIPTA) 62.5-25 mcg/actuation dsdv Take 1 Puff by inhalation daily. Indications: BRONCHOSPASM PREVENTION WITH COPD  Qty: 1 Inhaler, Refills: 5       !! - Potential duplicate medications found. Please discuss with provider.           Current Facility-Administered Medications   Medication Dose Route Frequency    vancomycin (VANCOCIN) 1,500 mg in 0.9% sodium chloride 500 mL IVPB  1,500 mg IntraVENous Q18H    [START ON 8/29/2017] Vancomycin trough level  1 Each Other Rx Dosing/Monitoring    polyethylene glycol (MIRALAX) packet 17 g  17 g Oral DAILY    prednisoLONE acetate (PRED FORTE) 1 % ophthalmic suspension 1 Drop  1 Drop Right Eye QID    ketorolac (ACULAR) 0.5 % ophthalmic solution 1 Drop  1 Drop Right Eye DAILY    [START ON 9/1/2017] warfarin (COUMADIN) tablet 2.5 mg  2.5 mg Oral every Friday    WARFARIN INFORMATION NOTE (COUMADIN)   Other Q24H    warfarin (COUMADIN) tablet 5 mg  5 mg Oral Once per day on Sun Mon Tue Wed Thu Sat    acetaminophen (TYLENOL) tablet 650 mg  650 mg Oral Q6H PRN    HYDROcodone-acetaminophen (NORCO)  mg tablet 1 Tab  1 Tab Oral Q4H PRN    piperacillin-tazobactam (ZOSYN) 3.375 g in 0.9% sodium chloride (MBP/ADV) 100 mL MBP  3.375 g IntraVENous Q6H    albuterol-ipratropium (DUO-NEB) 2.5 MG-0.5 MG/3 ML  3 mL Nebulization Q6H PRN    chlorthalidone (HYGROTEN) tablet 25 mg  25 mg Oral DAILY    amLODIPine (NORVASC) tablet 10 mg  10 mg Oral DAILY    digoxin (LANOXIN) tablet 0.125 mg  0.125 mg Oral DAILY    fluticasone furoate (ARNUITY ELLIPTA) 100 mcg/puff  1 Puff Inhalation DAILY    0.9% sodium chloride infusion  125 mL/hr IntraVENous CONTINUOUS    naloxone (NARCAN) injection 0.4 mg  0.4 mg IntraVENous PRN    docusate sodium (COLACE) capsule 100 mg  100 mg Oral BID    metroNIDAZOLE (FLAGYL) IVPB premix 500 mg  500 mg IntraVENous Q6H       Allergies: Codeine; Morphine; and Sulfa (sulfonamide antibiotics)    Family History   Problem Relation Age of Onset    Heart Surgery Father      BYPASS    Stroke Father     Heart Surgery Mother      BYPASS    Coronary Artery Disease Mother     Stroke Mother      Social History     Social History    Marital status:      Spouse name: N/A    Number of children: N/A    Years of education: N/A     Occupational History    Not on file. Social History Main Topics    Smoking status: Former Smoker     Packs/day: 1.00     Years: 54.00     Types: Cigarettes     Quit date: 10/23/2012    Smokeless tobacco: Never Used    Alcohol use No      Comment: quit drinking alcohol 26 years ago, patient states stopped in \"\"    Drug use: No    Sexual activity: Not on file     Other Topics Concern    Not on file     Social History Narrative     History   Smoking Status    Former Smoker    Packs/day: 1.00    Years: 54.00    Types: Cigarettes    Quit date: 10/23/2012   Smokeless Tobacco    Never Used        Temp (24hrs), Av.8 °F (37.1 °C), Min:98.3 °F (36.8 °C), Max:99.2 °F (37.3 °C)    Visit Vitals    /71 (BP 1 Location: Left arm, BP Patient Position: At rest)    Pulse (!) 117    Temp 99.2 °F (37.3 °C)    Resp 18    Ht 5' 6.5\" (1.689 m)    Wt 77.2 kg (170 lb 3.1 oz)    SpO2 96%    BMI 27.06 kg/m2       ROS: 12 point ROS obtained in details.  Pertinent positives as mentioned in HPI,   otherwise negative    Physical Exam:    General: Well developed, well nourished male laying on the bed, alert, in no acute distress.      General:    sleepy, oriented x3    HEENT:   Normocephalic, atraumatic, PERRL, EOMI, no scleral icterus or pallor; no conjunctival hemmohage;  nasal and oral mucous are moist and without evidence of lesions. No thrush. Neck supple, no bruits.    Lymph Nodes:    no cervical, axillary or inguinal adenopathy    Lungs:    non-labored, bilaterally clear to aspiration- no crackles wheezes rales or rhonchi    Heart:   RRR, s1 and s2 irregular; no murmurs rubs or gallops, no edema, + pedal pulses    Abdomen:   soft, non-distended, active bowel sounds, no hepatomegaly, no splenomegaly. Appropriate surgical scars for stated surgeries. no tenderness left inguinal surgical site. No cva tenderness. No erythema of abdominal wall, mild RUQ tenderness   Genitourinary:   deferred    Extremities:    no clubbing, cyanosis; no joint effusions or swelling; Full ROM of all large joints to the upper and lower extremities; muscle mass appropriate for age    Neurologic:   No gross focal sensory abnormalities; 5/5 muscle strength to upper and lower extremities. Speech appropriate. Cranial nerves intact                         Skin:   No rash or ulcers noted    Wound:   Left inguinal surgical site wound healed       Back:  no spinal tenderness , minimal left CVA tenderness, no paraspinal guarding or rigidity       Psychiatric:   No suicidal or homicidal ideations, appropriate mood and affect                  Labs: Results:   Chemistry Recent Labs      08/28/17   0317  08/27/17   1555  08/27/17   0338   08/26/17   0400   GLU  143*   --   103*   --   113*   NA  133*   --   133*   --   134*   K  3.1*  3.6  3.1*   < >  3.3*   CL  99*   --   97*   --   98*   CO2  26   --   26   --   27   BUN  11   --   15   --   18   CREA  0.88   --   1.04   --   1.07   CA  7.3*   --   8.1*   --   8.4*   AGAP  8   --   10   --   9   BUCR  13   --   14   --   17    < > = values in this interval not displayed.       CBC w/Diff Recent Labs      08/28/17   0317  08/27/17   1220  08/26/17   0400   WBC  6.7  10.6  11.0   RBC  2.84*  2.92*  3.31*   HGB  7.6*  7.7*  8.7*   HCT  24.5*  25.1*  28.3*   PLT  225  240  313   GRANS 79*  84*  78*   LYMPH  12*  7*  14*   EOS  1  0  0      Microbiology Recent Labs      08/27/17   0625  08/27/17   0606  08/26/17   0219  08/25/17   2147  08/25/17   2130   CULT  NO GROWTH AFTER 23 HOURS  NO GROWTH AFTER 23 HOURS  23175  COLONIES/mL  MIXED GRAM POSITIVE RUDY, PROBABLE SKIN/GENITAL CONTAMINATION.     NO GROWTH 3 DAYS  NO GROWTH 3 DAYS          RADIOLOGY:    All available imaging studies/reports in [facility] for this admission were reviewed    Dr. Sonam Esparza, Infectious Disease Specialist  [phone]  August 28, 2017  2:19 PM

## 2017-08-29 ENCOUNTER — APPOINTMENT (OUTPATIENT)
Dept: MRI IMAGING | Age: 77
DRG: 871 | End: 2017-08-29
Attending: INTERNAL MEDICINE
Payer: MEDICARE

## 2017-08-29 ENCOUNTER — APPOINTMENT (OUTPATIENT)
Dept: INTERVENTIONAL RADIOLOGY/VASCULAR | Age: 77
DRG: 871 | End: 2017-08-29
Attending: INTERNAL MEDICINE
Payer: MEDICARE

## 2017-08-29 LAB
ALBUMIN SERPL-MCNC: 2.3 G/DL (ref 3.4–5)
ALBUMIN/GLOB SERPL: 0.5 {RATIO} (ref 0.8–1.7)
ALP SERPL-CCNC: 54 U/L (ref 45–117)
ALT SERPL-CCNC: 14 U/L (ref 16–61)
ANION GAP SERPL CALC-SCNC: 9 MMOL/L (ref 3–18)
AST SERPL-CCNC: 19 U/L (ref 15–37)
BASOPHILS # BLD: 0 K/UL (ref 0–0.1)
BASOPHILS NFR BLD: 0 % (ref 0–2)
BILIRUB DIRECT SERPL-MCNC: 0.2 MG/DL (ref 0–0.2)
BILIRUB SERPL-MCNC: 0.4 MG/DL (ref 0.2–1)
BUN SERPL-MCNC: 7 MG/DL (ref 7–18)
BUN/CREAT SERPL: 9 (ref 12–20)
CALCIUM SERPL-MCNC: 8 MG/DL (ref 8.5–10.1)
CHLORIDE SERPL-SCNC: 100 MMOL/L (ref 100–108)
CO2 SERPL-SCNC: 28 MMOL/L (ref 21–32)
CREAT SERPL-MCNC: 0.74 MG/DL (ref 0.6–1.3)
DATE LAST DOSE: ABNORMAL
DIFFERENTIAL METHOD BLD: ABNORMAL
EOSINOPHIL # BLD: 0 K/UL (ref 0–0.4)
EOSINOPHIL NFR BLD: 1 % (ref 0–5)
ERYTHROCYTE [DISTWIDTH] IN BLOOD BY AUTOMATED COUNT: 15.6 % (ref 11.6–14.5)
GLOBULIN SER CALC-MCNC: 4.7 G/DL (ref 2–4)
GLUCOSE SERPL-MCNC: 97 MG/DL (ref 74–99)
HCT VFR BLD AUTO: 25 % (ref 36–48)
HGB BLD-MCNC: 7.7 G/DL (ref 13–16)
INR PPP: 1.7 (ref 0.8–1.2)
LYMPHOCYTES # BLD: 0.8 K/UL (ref 0.9–3.6)
LYMPHOCYTES NFR BLD: 13 % (ref 21–52)
MAGNESIUM SERPL-MCNC: 2.1 MG/DL (ref 1.6–2.6)
MCH RBC QN AUTO: 26.5 PG (ref 24–34)
MCHC RBC AUTO-ENTMCNC: 30.8 G/DL (ref 31–37)
MCV RBC AUTO: 85.9 FL (ref 74–97)
MONOCYTES # BLD: 0.6 K/UL (ref 0.05–1.2)
MONOCYTES NFR BLD: 11 % (ref 3–10)
NEUTS SEG # BLD: 4.4 K/UL (ref 1.8–8)
NEUTS SEG NFR BLD: 75 % (ref 40–73)
PLATELET # BLD AUTO: 243 K/UL (ref 135–420)
PMV BLD AUTO: 8.9 FL (ref 9.2–11.8)
POTASSIUM SERPL-SCNC: 3.2 MMOL/L (ref 3.5–5.5)
POTASSIUM SERPL-SCNC: 3.4 MMOL/L (ref 3.5–5.5)
PROT SERPL-MCNC: 7 G/DL (ref 6.4–8.2)
PROTHROMBIN TIME: 19.6 SEC (ref 11.5–15.2)
RBC # BLD AUTO: 2.91 M/UL (ref 4.7–5.5)
REPORTED DOSE,DOSE: ABNORMAL UNITS
REPORTED DOSE/TIME,TMG: 2200
SODIUM SERPL-SCNC: 137 MMOL/L (ref 136–145)
VANCOMYCIN TROUGH SERPL-MCNC: 2.9 UG/ML (ref 10–20)
WBC # BLD AUTO: 5.8 K/UL (ref 4.6–13.2)

## 2017-08-29 PROCEDURE — 02HV33Z INSERTION OF INFUSION DEVICE INTO SUPERIOR VENA CAVA, PERCUTANEOUS APPROACH: ICD-10-PCS | Performed by: RADIOLOGY

## 2017-08-29 PROCEDURE — 85610 PROTHROMBIN TIME: CPT | Performed by: FAMILY MEDICINE

## 2017-08-29 PROCEDURE — 77001 FLUOROGUIDE FOR VEIN DEVICE: CPT

## 2017-08-29 PROCEDURE — 84132 ASSAY OF SERUM POTASSIUM: CPT

## 2017-08-29 PROCEDURE — 74011250637 HC RX REV CODE- 250/637: Performed by: FAMILY MEDICINE

## 2017-08-29 PROCEDURE — 65660000000 HC RM CCU STEPDOWN

## 2017-08-29 PROCEDURE — 94640 AIRWAY INHALATION TREATMENT: CPT

## 2017-08-29 PROCEDURE — 85025 COMPLETE CBC W/AUTO DIFF WBC: CPT | Performed by: FAMILY MEDICINE

## 2017-08-29 PROCEDURE — 77010033678 HC OXYGEN DAILY

## 2017-08-29 PROCEDURE — 74011250636 HC RX REV CODE- 250/636: Performed by: FAMILY MEDICINE

## 2017-08-29 PROCEDURE — 97110 THERAPEUTIC EXERCISES: CPT

## 2017-08-29 PROCEDURE — 74011250636 HC RX REV CODE- 250/636: Performed by: INTERNAL MEDICINE

## 2017-08-29 PROCEDURE — 97116 GAIT TRAINING THERAPY: CPT

## 2017-08-29 PROCEDURE — A9585 GADOBUTROL INJECTION: HCPCS | Performed by: FAMILY MEDICINE

## 2017-08-29 PROCEDURE — 80202 ASSAY OF VANCOMYCIN: CPT | Performed by: FAMILY MEDICINE

## 2017-08-29 PROCEDURE — 93005 ELECTROCARDIOGRAM TRACING: CPT

## 2017-08-29 PROCEDURE — 74011250637 HC RX REV CODE- 250/637: Performed by: STUDENT IN AN ORGANIZED HEALTH CARE EDUCATION/TRAINING PROGRAM

## 2017-08-29 PROCEDURE — 80076 HEPATIC FUNCTION PANEL: CPT | Performed by: FAMILY MEDICINE

## 2017-08-29 PROCEDURE — 74011000258 HC RX REV CODE- 258: Performed by: INTERNAL MEDICINE

## 2017-08-29 PROCEDURE — 36415 COLL VENOUS BLD VENIPUNCTURE: CPT | Performed by: FAMILY MEDICINE

## 2017-08-29 PROCEDURE — 74011000250 HC RX REV CODE- 250: Performed by: STUDENT IN AN ORGANIZED HEALTH CARE EDUCATION/TRAINING PROGRAM

## 2017-08-29 PROCEDURE — 74011000258 HC RX REV CODE- 258: Performed by: STUDENT IN AN ORGANIZED HEALTH CARE EDUCATION/TRAINING PROGRAM

## 2017-08-29 PROCEDURE — 77030021566 MRA ABD W WO CONT

## 2017-08-29 PROCEDURE — 74011250636 HC RX REV CODE- 250/636: Performed by: STUDENT IN AN ORGANIZED HEALTH CARE EDUCATION/TRAINING PROGRAM

## 2017-08-29 PROCEDURE — 83735 ASSAY OF MAGNESIUM: CPT | Performed by: FAMILY MEDICINE

## 2017-08-29 RX ORDER — SODIUM CHLORIDE AND POTASSIUM CHLORIDE .9; .15 G/100ML; G/100ML
SOLUTION INTRAVENOUS CONTINUOUS
Status: DISCONTINUED | OUTPATIENT
Start: 2017-08-29 | End: 2017-08-29

## 2017-08-29 RX ORDER — POTASSIUM CHLORIDE 1.5 G/1.77G
20 POWDER, FOR SOLUTION ORAL 2 TIMES DAILY WITH MEALS
Status: DISCONTINUED | OUTPATIENT
Start: 2017-08-29 | End: 2017-08-29

## 2017-08-29 RX ORDER — POTASSIUM CHLORIDE 1.5 G/1.77G
20 POWDER, FOR SOLUTION ORAL DAILY
Status: DISCONTINUED | OUTPATIENT
Start: 2017-08-30 | End: 2017-08-31 | Stop reason: HOSPADM

## 2017-08-29 RX ORDER — POTASSIUM CHLORIDE 1.5 G/1.77G
40 POWDER, FOR SOLUTION ORAL
Status: COMPLETED | OUTPATIENT
Start: 2017-08-29 | End: 2017-08-29

## 2017-08-29 RX ADMIN — DOCUSATE SODIUM 100 MG: 100 CAPSULE, LIQUID FILLED ORAL at 17:10

## 2017-08-29 RX ADMIN — GADOBUTROL 10 ML: 604.72 INJECTION INTRAVENOUS at 15:43

## 2017-08-29 RX ADMIN — POTASSIUM CHLORIDE 40 MEQ: 1.5 POWDER, FOR SOLUTION ORAL at 17:09

## 2017-08-29 RX ADMIN — HYDROCODONE BITARTRATE AND ACETAMINOPHEN 1 TABLET: 10; 325 TABLET ORAL at 17:10

## 2017-08-29 RX ADMIN — DIGOXIN 0.12 MG: 0.12 TABLET ORAL at 08:23

## 2017-08-29 RX ADMIN — SODIUM CHLORIDE 125 ML/HR: 900 INJECTION, SOLUTION INTRAVENOUS at 00:53

## 2017-08-29 RX ADMIN — PREDNISOLONE ACETATE 1 DROP: 10 SUSPENSION/ DROPS OPHTHALMIC at 09:18

## 2017-08-29 RX ADMIN — KETOROLAC TROMETHAMINE 1 DROP: 5 SOLUTION OPHTHALMIC at 09:18

## 2017-08-29 RX ADMIN — HYDROCODONE BITARTRATE AND ACETAMINOPHEN 1 TABLET: 10; 325 TABLET ORAL at 08:23

## 2017-08-29 RX ADMIN — POTASSIUM CHLORIDE: 2 INJECTION, SOLUTION, CONCENTRATE INTRAVENOUS at 09:19

## 2017-08-29 RX ADMIN — WARFARIN SODIUM 5 MG: 5 TABLET ORAL at 17:12

## 2017-08-29 RX ADMIN — DOCUSATE SODIUM 100 MG: 100 CAPSULE, LIQUID FILLED ORAL at 08:23

## 2017-08-29 RX ADMIN — AMLODIPINE BESYLATE 10 MG: 10 TABLET ORAL at 08:23

## 2017-08-29 RX ADMIN — PREDNISOLONE ACETATE 1 DROP: 10 SUSPENSION/ DROPS OPHTHALMIC at 19:10

## 2017-08-29 RX ADMIN — HYDROCODONE BITARTRATE AND ACETAMINOPHEN 1 TABLET: 10; 325 TABLET ORAL at 21:38

## 2017-08-29 RX ADMIN — CHLORTHALIDONE 25 MG: 25 TABLET ORAL at 08:23

## 2017-08-29 RX ADMIN — MEROPENEM 1 G: 1 INJECTION, POWDER, FOR SOLUTION INTRAVENOUS at 00:53

## 2017-08-29 RX ADMIN — MEROPENEM 1 G: 1 INJECTION, POWDER, FOR SOLUTION INTRAVENOUS at 17:09

## 2017-08-29 RX ADMIN — MEROPENEM 1 G: 1 INJECTION, POWDER, FOR SOLUTION INTRAVENOUS at 08:23

## 2017-08-29 RX ADMIN — PREDNISOLONE ACETATE 1 DROP: 10 SUSPENSION/ DROPS OPHTHALMIC at 12:10

## 2017-08-29 RX ADMIN — FLUTICASONE FUROATE 1 PUFF: 100 POWDER RESPIRATORY (INHALATION) at 09:10

## 2017-08-29 RX ADMIN — SODIUM CHLORIDE 125 ML/HR: 900 INJECTION, SOLUTION INTRAVENOUS at 07:53

## 2017-08-29 RX ADMIN — POLYETHYLENE GLYCOL 3350 17 G: 17 POWDER, FOR SOLUTION ORAL at 08:23

## 2017-08-29 RX ADMIN — HYDROCODONE BITARTRATE AND ACETAMINOPHEN 1 TABLET: 10; 325 TABLET ORAL at 02:55

## 2017-08-29 NOTE — PROGRESS NOTES
Occupational Therapy Note:  Orders received, chart reviewed and OT evaluation attempted. This patient is currently off the floor for testing. Will f/u as appropriate for this patient.  Sheryle Kidd Critser, OTR/L

## 2017-08-29 NOTE — PROGRESS NOTES
Problem: Mobility Impaired (Adult and Pediatric)  Goal: *Acute Goals and Plan of Care (Insert Text)  Physical Therapy Goals  Initiated 8/26/2017 and to be accomplished within 7 day(s)  1. Patient will move from supine to sit and sit to supine in bed with independence. 2. Patient will transfer from bed to chair and chair to bed with minimal assistance/contact guard assist using the least restrictive device. 3. Patient will perform sit to stand with modified independence. 4. Patient will ambulate with minimal assistance/contact guard assist for 100 feet with the least restrictive device. 5. Patient will ascend/descend 5 stairs with bilateral handrail(s) with minimal assistance/contact guard assist with LRAD. PHYSICAL THERAPY TREATMENT     Patient: Jennifer Mccracken (49 y.o. male)  Date: 8/29/2017  Diagnosis: Psoas abscess ([facility] [address]  Psoas abscess ([facility] [address]  Abscess Psoas abscess [facility])       Precautions: Fall  Chart, physical therapy assessment, plan of care and goals were reviewed. ASSESSMENT:  Increased transfer and ambulation proficiency noted as pt is able to perform throughout with decreased assist req'd. Pt demo's good use of RW with good compliance noted to all recommendations regarding safe and effective use of the AD. Activity tolerance cont's to improve as demo'd by increased single trial (w/o sitting) ambulation distance with decreased assist.  Progression toward goals:  [X]      Improving appropriately and progressing toward goals  [X]      Improving slowly and progressing toward goals  [ ]      Not making progress toward goals and plan of care will be adjusted       PLAN:   Patient continues to benefit from skilled intervention to address the above impairments. Continue treatment per established plan of care. Discharge Recommendations:  Home Health  Further Equipment Recommendations for Discharge:  N/A       SUBJECTIVE:   Patient stated Can I show you?   Pt's response to initiation of seated exercise routine. OBJECTIVE DATA SUMMARY:   Critical Behavior:  Neurologic State: Alert  Orientation Level: Oriented X4  Cognition: Appropriate for age attention/concentration     Functional Mobility Training:  Transfers:  Sit to Stand: Supervision;Modified independent (to RW)  Stand to Sit: Supervision;Modified independent  Balance:  Sitting: Intact  Standing: Impaired; With support  Standing - Static: Good  Standing - Dynamic : Fair  Ambulation/Gait Training:  Distance (ft): 120 Feet (ft) (0 standing rest periods)  Assistive Device: Walker, rolling (verbal cues for proximity to RW,step length,& erect posture)  Ambulation - Level of Assistance: Supervision  Gait Abnormalities: Decreased step clearance  Speed/Sylvia: Pace decreased (<100 feet/min)  Step Length: Left shortened;Right shortened  Therapeutic Exercises:   Hip IR/ER,hip abd/add,laq,h/t raises  Sets: 2  Reps: 10  Assist level: supervision  Pain:  Pt reports 0/10 pain or discomfort prior to treatment. Pt reports 0/10 pain or discomfort post treatment. Activity Tolerance:   Fair/fair+ considering pt's premorbid state. Please refer to the flowsheet for vital signs taken during this treatment.   After treatment:   [ ] Patient left in no apparent distress sitting up in chair  [X] Patient left in no apparent distress seated edge of bed  [X] Call bell left within reach  [ ] Nursing notified  [ ] Caregiver present  [ ] Bed alarm activated      Juhi Jasso PTA   Time Calculation: 23 mins

## 2017-08-29 NOTE — PROGRESS NOTES
*ATTENTION:  This note has been created by a medical student for educational purposes only. Please do not refer to the content of this note for clinical decision-making, billing, or other purposes. Please see attending physicians note to obtain clinical information on this patient. *       Progress Note  [facility]       Patient: Chaya Lindsey MRN: [id number]  CSN: [id number]    YOB: 1940  Age: 65 y.o. Sex: male    DOA: 8/25/2017 LOS:  LOS: 4 days                    Subjective:     Acute events: Pt sitting up and eating in bed. Pain controlled with medications, currently 6-7/10. Continue to have SOB when exerted. O2 sat in mid-upper 90s on NC. No fever/chills overnight. One BM occurred yesterday. Appetite improving. Review of Symptoms:  Eyes: Negative for diminished vision. Respiratory: Positive for shortness of breath. Negative for cough. Cardiovascular: Negative for chest pain, palpitations and leg swelling. Gastrointestinal: Positive for constipation. Negative for abdominal pain, blood in stool  Genitourinary: Negative for dysuria, frequency, hematuria and urgency.          Objective:      Patient Vitals for the past 24 hrs:   Temp Pulse Resp BP SpO2   08/29/17 0045 98.3 °F (36.8 °C) (!) 102 18 134/89 95 %   08/28/17 2048 98.5 °F (36.9 °C) 92 18 121/75 96 %   08/28/17 1641 97.8 °F (36.6 °C) 86 18 133/81 93 %   08/28/17 1212 97.8 °F (36.6 °C) 86 18 115/73 96 %   08/28/17 1008 - - - - 94 %         Intake/Output Summary (Last 24 hours) at 08/29/17 0832  Last data filed at 08/29/17 0831   Gross per 24 hour   Intake              490 ml   Output             2405 ml   Net            -1915 ml       Intake and Output:   Current Shift: 08/29 0701 - 08/29 1900  In: -   Out: 700 [Urine:700]  Last three shifts: 08/27 1901 - 08/29 0700  In: 3269.6 [P.O.:960; I.V.:2289.6]  Out: 3005 [Urine:3000; Drains:5]    Physical Exam:   Constitutional: He is oriented to person, place, and time. He appears well-developed and well-nourished. No distress. HENT:   Head: Normocephalic and atraumatic. Eyes: EOM are normal. Pupils are equal, round, and reactive to light. Neck: Normal range of motion. Neck supple. No tracheal deviation present. No thyromegaly present. Cardiovascular: Irregular rate and rhythm. Tachycardia present. Pulmonary/Chest: Effort normal and breath sounds normal.   Abdominal: Bowel sounds are normal. He exhibits less distension today. There is no tenderness. Abd is less firm today  Musculoskeletal: He exhibits no edema or deformity. Mild tenderness in lower R. back with palpation  Neurological: He is alert and oriented to person, place, and time. Skin: Skin is warm and dry. He is not diaphoretic. Psychiatric: He has a normal mood and affect. His behavior is normal.   Wound: bandage on left abdomin. Clean, intact and dry. Drains: Serosanguinous fluid drainage in left abdominal drain.   Lab/Data Review:   Recent Results (from the past 24 hour(s))   CULTURE, BODY FLUID W GRAM STAIN    Collection Time: 08/28/17  4:30 PM   Result Value Ref Range    Special Requests: PSOAS ABSCESS     GRAM STAIN RARE WBC'S      GRAM STAIN NO ORGANISMS SEEN      Culture result: PENDING    POTASSIUM    Collection Time: 08/28/17  4:35 PM   Result Value Ref Range    Potassium 3.8 3.5 - 5.5 mmol/L   CULTURE, RESPIRATORY/SPUTUM/BRONCH W GRAM STAIN    Collection Time: 08/28/17  5:15 PM   Result Value Ref Range    Special Requests: NO SPECIAL REQUESTS      GRAM STAIN >25 WBC/lpf      GRAM STAIN <10 EPI/lpf      GRAM STAIN FEW YEAST      GRAM STAIN MUCUS PRESENT      Culture result: PENDING    MAGNESIUM    Collection Time: 08/29/17  3:01 AM   Result Value Ref Range    Magnesium 2.1 1.6 - 2.6 mg/dL   PROTHROMBIN TIME + INR    Collection Time: 08/29/17  3:01 AM   Result Value Ref Range    Prothrombin time 19.6 (H) 11.5 - 15.2 sec    INR 1.7 (H) 0.8 - 1.2     CBC WITH AUTOMATED DIFF    Collection Time: 08/29/17  3:01 AM   Result Value Ref Range    WBC 5.8 4.6 - 13.2 K/uL    RBC 2.91 (L) 4.70 - 5.50 M/uL    HGB 7.7 (L) 13.0 - 16.0 g/dL    HCT 25.0 (L) 36.0 - 48.0 %    MCV 85.9 74.0 - 97.0 FL    MCH 26.5 24.0 - 34.0 PG    MCHC 30.8 (L) 31.0 - 37.0 g/dL    RDW 15.6 (H) 11.6 - 14.5 %    PLATELET 214 663 - 132 K/uL    MPV 8.9 (L) 9.2 - 11.8 FL    NEUTROPHILS 75 (H) 40 - 73 %    LYMPHOCYTES 13 (L) 21 - 52 %    MONOCYTES 11 (H) 3 - 10 %    EOSINOPHILS 1 0 - 5 %    BASOPHILS 0 0 - 2 %    ABS. NEUTROPHILS 4.4 1.8 - 8.0 K/UL    ABS. LYMPHOCYTES 0.8 (L) 0.9 - 3.6 K/UL    ABS. MONOCYTES 0.6 0.05 - 1.2 K/UL    ABS. EOSINOPHILS 0.0 0.0 - 0.4 K/UL    ABS. BASOPHILS 0.0 0.0 - 0.1 K/UL    DF AUTOMATED     HEPATIC FUNCTION PANEL    Collection Time: 08/29/17  3:01 AM   Result Value Ref Range    Protein, total 7.0 6.4 - 8.2 g/dL    Albumin 2.3 (L) 3.4 - 5.0 g/dL    Globulin 4.7 (H) 2.0 - 4.0 g/dL    A-G Ratio 0.5 (L) 0.8 - 1.7      Bilirubin, total 0.4 0.2 - 1.0 MG/DL    Bilirubin, direct 0.2 0.0 - 0.2 MG/DL    Alk.  phosphatase 54 45 - 117 U/L    AST (SGOT) 19 15 - 37 U/L    ALT (SGPT) 14 (L) 16 - 61 U/L   METABOLIC PANEL, BASIC    Collection Time: 08/29/17  3:01 AM   Result Value Ref Range    Sodium 137 136 - 145 mmol/L    Potassium 3.4 (L) 3.5 - 5.5 mmol/L    Chloride 100 100 - 108 mmol/L    CO2 28 21 - 32 mmol/L    Anion gap 9 3.0 - 18 mmol/L    Glucose 97 74 - 99 mg/dL    BUN 7 7.0 - 18 MG/DL    Creatinine 0.74 0.6 - 1.3 MG/DL    BUN/Creatinine ratio 9 (L) 12 - 20      GFR est AA >60 >60 ml/min/1.73m2    GFR est non-AA >60 >60 ml/min/1.73m2    Calcium 8.0 (L) 8.5 - 10.1 MG/DL       Scheduled Medications Reviewed:  Current Facility-Administered Medications   Medication Dose Route Frequency    potassium chloride 10 mEq, lidocaine (PF) (XYLOCAINE) 10 mg/mL (1 %) 1 mL in 0.9% sodium chloride 100 mL IVPB   IntraVENous Q1H    Vancomycin trough level  1 Each Other Rx Dosing/Monitoring    polyethylene glycol (MIRALAX) packet 17 g  17 g Oral DAILY    prednisoLONE acetate (PRED FORTE) 1 % ophthalmic suspension 1 Drop  1 Drop Right Eye QID    ketorolac (ACULAR) 0.5 % ophthalmic solution 1 Drop  1 Drop Right Eye DAILY    [START ON 9/1/2017] warfarin (COUMADIN) tablet 2.5 mg  2.5 mg Oral every Friday    WARFARIN INFORMATION NOTE (COUMADIN)   Other Q24H    warfarin (COUMADIN) tablet 5 mg  5 mg Oral Once per day on Sun Mon Tue Wed Thu Sat    meropenem (MERREM) 1 g in 0.9% sodium chloride (MBP/ADV) 50 mL MBP  1 g IntraVENous Q8H    chlorthalidone (HYGROTEN) tablet 25 mg  25 mg Oral DAILY    amLODIPine (NORVASC) tablet 10 mg  10 mg Oral DAILY    digoxin (LANOXIN) tablet 0.125 mg  0.125 mg Oral DAILY    fluticasone furoate (ARNUITY ELLIPTA) 100 mcg/puff  1 Puff Inhalation DAILY    0.9% sodium chloride infusion  125 mL/hr IntraVENous CONTINUOUS    docusate sodium (COLACE) capsule 100 mg  100 mg Oral BID         Assessment/Plan     65 y. o. male with PMH Osteomyelitis, psoas abscess, HTN, Atrial fibrillation, CAD, Pulmonary hypertension, COPD now admitted with Sepsis secondary to left paraspinal and psoas abscess .      Sepsis secondary to left paraspinal and psoas abscess (improving) and L4/5 discitis, with bilateral lumbar spinous process, facet joint, and SI joint pain, left hip pain radiating to left groin, B/L SLR positive, in setting of patient with history of multfocal osteomyelitis and prior psoas abscess. Patient had 3/4 SIRS criteria (HR>90, RR>20, WBC>12,000) however WBC now 5.8 and RR 18. Elevated HR still present.   --Continue NS @ 125cc/hr due to poor PO intake  -- I/Os  -- Dr Simone Woody ([facility]: [phone])  following  -- Interventional radiology following: POD 2 s/p I&D  -- Broad spectrum antibiotics; continue vancomycin, zosyn and metronidazole  -- Norco 10/325mg 1-2 tabs q4hrs PRN pain; Narcan PRN  -- Ice packs PRN  -- MRI pelvis ordered      Hypokalemia repleted this morning, was 3.4    -- FU serum osmolality          Chronic Atrial fibrillation,  followed by Dr Christel Au, Cardiology. Pulmonary hypertension with peak pressure 60mmHg. Mild CAD by angiography in 2012  -- Current symptoms: none  -- Continue control agent: digoxin 125mcg  Every day   -- C anticoagulant: [id number] every day except 2.5mg Fridays  -- monitor daily PT/INR.   -- Remote cardiac monitoring      Hyponatremia, resolved Na 137 today. --  FU serum osmolality      Hypertension. blood pressure overnight range 100/71142/84  -- Vital signs per unit routine  -- Continue home amlodipine 10mg every day   -- Continue home chlorthalidone 25mg  -- Labetalol 5mg IV PRN for BP>220/110      Chronic Obstructive Pulmonary Disease (COPD).  Followed in the past by Dr Mac Richmond. No spirometry or PFTs on record.    -- O2 sats goal 90-94%  -- Continue home 2L O2 via NC  -- Duonebs PRN  -- Fluticasone furoate 100mcg/puff every day  -- Incentive spirometry      Constipation: One BM last night, , +flatus, + BS, less distention, firm, no tenderness  - Continue colace, Miralax    Diet: Cardiac  DVT Prophylaxis: Warfarin  Code Status: FULL  Point of Contact: Megha santo: [phone]     Marco A Ramires, MS4 EVMS  8/29/2017

## 2017-08-29 NOTE — PROGRESS NOTES
Intern Progress Note  [facility]       Patient: Deni Carrera MRN: [id number]  CSN: [id number]    YOB: 1940  Age: 65 y.o. Sex: male    DOA: 8/25/2017 LOS:  LOS: 4 days                    Subjective:     Acute events: Pt resting in bed. Pain managed with medications. Pt feeling better after BM yesterday. Abd less distended and soft with good bowel sounds. Pt afebrile overnight and WBC improving. SOB improved. Pt still has RUQ tenderness and poor PO intake. Review of Systems   Constitutional: Negative for chills and fever. Eyes: Negative for blurred vision. Respiratory: Positive for shortness of breath. Negative for cough. Cardiovascular: Negative for chest pain, palpitations and leg swelling. Gastrointestinal: Positive for abdominal pain. Negative for blood in stool, constipation, diarrhea, nausea and vomiting. Genitourinary: Negative for dysuria, frequency, hematuria and urgency. Neurological: Negative for dizziness and headaches. Objective:      Patient Vitals for the past 24 hrs:   Temp Pulse Resp BP SpO2   08/29/17 0823 - 100 - 129/78 -   08/29/17 0045 98.3 °F (36.8 °C) (!) 102 18 134/89 95 %   08/28/17 2048 98.5 °F (36.9 °C) 92 18 121/75 96 %   08/28/17 1641 97.8 °F (36.6 °C) 86 18 133/81 93 %   08/28/17 1212 97.8 °F (36.6 °C) 86 18 115/73 96 %   08/28/17 1008 - - - - 94 %         Intake/Output Summary (Last 24 hours) at 08/29/17 0841  Last data filed at 08/29/17 0831   Gross per 24 hour   Intake              490 ml   Output             2405 ml   Net            -1915 ml       Physical Exam   Constitutional: He is oriented to person, place, and time. He appears well-developed and well-nourished. No distress. HENT:   Head: Normocephalic and atraumatic. Eyes: EOM are normal. Pupils are equal, round, and reactive to light. Neck: Normal range of motion. Neck supple. No tracheal deviation present. No thyromegaly present.    Cardiovascular: Normal rate, regular rhythm, normal heart sounds and intact distal pulses. Pulmonary/Chest: Effort normal and breath sounds normal.   Abdominal: Soft. Bowel sounds are normal. He exhibits distension. There is tenderness. Abd distension improved, Postive RUQ tenderness   Musculoskeletal: He exhibits no edema, tenderness or deformity. Lymphadenopathy:     He has no cervical adenopathy. Neurological: He is alert and oriented to person, place, and time. Skin: Skin is warm and dry. He is not diaphoretic. Psychiatric: He has a normal mood and affect.  His behavior is normal.       Lab/Data Reviewed:  Recent Results (from the past 24 hour(s))   CULTURE, BODY FLUID W GRAM STAIN    Collection Time: 08/28/17  4:30 PM   Result Value Ref Range    Special Requests: PSOAS ABSCESS     GRAM STAIN RARE WBC'S      GRAM STAIN NO ORGANISMS SEEN      Culture result: PENDING    POTASSIUM    Collection Time: 08/28/17  4:35 PM   Result Value Ref Range    Potassium 3.8 3.5 - 5.5 mmol/L   CULTURE, RESPIRATORY/SPUTUM/BRONCH W GRAM STAIN    Collection Time: 08/28/17  5:15 PM   Result Value Ref Range    Special Requests: NO SPECIAL REQUESTS      GRAM STAIN >25 WBC/lpf      GRAM STAIN <10 EPI/lpf      GRAM STAIN FEW YEAST      GRAM STAIN MUCUS PRESENT      Culture result: PENDING    MAGNESIUM    Collection Time: 08/29/17  3:01 AM   Result Value Ref Range    Magnesium 2.1 1.6 - 2.6 mg/dL   PROTHROMBIN TIME + INR    Collection Time: 08/29/17  3:01 AM   Result Value Ref Range    Prothrombin time 19.6 (H) 11.5 - 15.2 sec    INR 1.7 (H) 0.8 - 1.2     CBC WITH AUTOMATED DIFF    Collection Time: 08/29/17  3:01 AM   Result Value Ref Range    WBC 5.8 4.6 - 13.2 K/uL    RBC 2.91 (L) 4.70 - 5.50 M/uL    HGB 7.7 (L) 13.0 - 16.0 g/dL    HCT 25.0 (L) 36.0 - 48.0 %    MCV 85.9 74.0 - 97.0 FL    MCH 26.5 24.0 - 34.0 PG    MCHC 30.8 (L) 31.0 - 37.0 g/dL    RDW 15.6 (H) 11.6 - 14.5 %    PLATELET 607 211 - 340 K/uL    MPV 8.9 (L) 9.2 - 11.8 FL    NEUTROPHILS 75 (H) 40 - 73 %    LYMPHOCYTES 13 (L) 21 - 52 %    MONOCYTES 11 (H) 3 - 10 %    EOSINOPHILS 1 0 - 5 %    BASOPHILS 0 0 - 2 %    ABS. NEUTROPHILS 4.4 1.8 - 8.0 K/UL    ABS. LYMPHOCYTES 0.8 (L) 0.9 - 3.6 K/UL    ABS. MONOCYTES 0.6 0.05 - 1.2 K/UL    ABS. EOSINOPHILS 0.0 0.0 - 0.4 K/UL    ABS. BASOPHILS 0.0 0.0 - 0.1 K/UL    DF AUTOMATED     HEPATIC FUNCTION PANEL    Collection Time: 08/29/17  3:01 AM   Result Value Ref Range    Protein, total 7.0 6.4 - 8.2 g/dL    Albumin 2.3 (L) 3.4 - 5.0 g/dL    Globulin 4.7 (H) 2.0 - 4.0 g/dL    A-G Ratio 0.5 (L) 0.8 - 1.7      Bilirubin, total 0.4 0.2 - 1.0 MG/DL    Bilirubin, direct 0.2 0.0 - 0.2 MG/DL    Alk.  phosphatase 54 45 - 117 U/L    AST (SGOT) 19 15 - 37 U/L    ALT (SGPT) 14 (L) 16 - 61 U/L   METABOLIC PANEL, BASIC    Collection Time: 08/29/17  3:01 AM   Result Value Ref Range    Sodium 137 136 - 145 mmol/L    Potassium 3.4 (L) 3.5 - 5.5 mmol/L    Chloride 100 100 - 108 mmol/L    CO2 28 21 - 32 mmol/L    Anion gap 9 3.0 - 18 mmol/L    Glucose 97 74 - 99 mg/dL    BUN 7 7.0 - 18 MG/DL    Creatinine 0.74 0.6 - 1.3 MG/DL    BUN/Creatinine ratio 9 (L) 12 - 20      GFR est AA >60 >60 ml/min/1.73m2    GFR est non-AA >60 >60 ml/min/1.73m2    Calcium 8.0 (L) 8.5 - 10.1 MG/DL        Scheduled Medications Reviewed:  Current Facility-Administered Medications   Medication Dose Route Frequency    potassium chloride 10 mEq, lidocaine (PF) (XYLOCAINE) 10 mg/mL (1 %) 1 mL in 0.9% sodium chloride 100 mL IVPB   IntraVENous Q1H    Vancomycin trough level  1 Each Other Rx Dosing/Monitoring    polyethylene glycol (MIRALAX) packet 17 g  17 g Oral DAILY    prednisoLONE acetate (PRED FORTE) 1 % ophthalmic suspension 1 Drop  1 Drop Right Eye QID    ketorolac (ACULAR) 0.5 % ophthalmic solution 1 Drop  1 Drop Right Eye DAILY    [START ON 9/1/2017] warfarin (COUMADIN) tablet 2.5 mg  2.5 mg Oral every Friday    WARFARIN INFORMATION NOTE (COUMADIN)   Other Q24H    warfarin (COUMADIN) tablet 5 mg  5 mg Oral Once per day on Sun Mon Tue Wed Thu Sat    meropenem (MERREM) 1 g in 0.9% sodium chloride (MBP/ADV) 50 mL MBP  1 g IntraVENous Q8H    chlorthalidone (HYGROTEN) tablet 25 mg  25 mg Oral DAILY    amLODIPine (NORVASC) tablet 10 mg  10 mg Oral DAILY    digoxin (LANOXIN) tablet 0.125 mg  0.125 mg Oral DAILY    fluticasone furoate (ARNUITY ELLIPTA) 100 mcg/puff  1 Puff Inhalation DAILY    0.9% sodium chloride infusion  125 mL/hr IntraVENous CONTINUOUS    docusate sodium (COLACE) capsule 100 mg  100 mg Oral BID         Imaging, microbiology, and EKG/Telemetry:  Chest Xray no acute disease  8/25 Blood cx NG4D  8/27 blood cx NG2D  8/26 Urine cx: 90913 COLONIES/mL MIXED GRAM POSITIVE RUDY, PROBABLE SKIN/GENITAL CONTAMINATION. 8/28 Resp cx: few yeast  8/28 drain cx: No organisms seen  8/25 CT4.3cm left paraspinal and psoas abscess at L3, adjacent inflammation extends posterior and to the left lateral margin of aorta; sclerotic changes to L3 likely chronic osteomyelitis. 8/26: MRI thoracic spine :Mild nonspecific disc bright signal at U18-T91 of uncertain significance. No evidence of suggest osteomyelitis or paraspinal abscess. Assessment/Plan     65 y. o. male with PMH Osteomyelitis, psoas abscess, HTN, Atrial fibrillation, CAD, Pulmonary hypertension, COPD now admitted with Sepsis secondary to left paraspinal and psoas abscess .          Sepsis secondary to left paraspinal and psoas abscess (improving) and L4/5 discitis, with bilateral lumbar spinous process, facet joint, and SI joint pain, left hip pain radiating to left groin, B/L SLR positive, in setting of patient with history of multfocal osteomyelitis and prior psoas abscess. Patient had 3/4 SIRS criteria (HR>90, RR>20, WBC>12,000) however WBC now 5.8 and RR 18. Elevated HR improved with pain control.   -- NS 20 KCl @ 755cc/hr  -- I/Os  -- Dr Saqib Washington ([facility]: [phone]) consulted and following  -- Interventional radiology following: POD 2 s/p I&D  -- ID following: continue meropenem pending cultures   -- Norco 10/325mg 1 tab q4hrs PRN pain; Narcan PRN, Tylenol 650 q6h PRN  -- Ice packs PRN      Hypokalemia  -- 3.4 today, changing IVF to ns 20KCl @ 75mL/hr  -- repeat K+      Right inguinal hernia, with tender mass in right inguinal area tender to palpation with pain radiating to testicles. CT finding of normal appendix but noted to extend into a fat-containing right inguinal hernia. Likely exacerbated due to problem #1. Will monitor clinically.       Chronic Atrial fibrillation,  followed by Dr Jayden Paiz, Cardiology. Pulmonary hypertension with peak pressure 60mmHg. Mild CAD by angiography in 2012  -- Current symptoms: none  -- Continue control agent: digoxin 125mcg  Every day   -- C anticoagulant: [id number] every day except 2.5mg Fridays  -- monitor daily PT/INR.   -- Remote cardiac monitoring      Hyponatremia (resolved), Na 137 this AM   -- Will monitor with daily BMP. NS 20KCl @ 75 cc/hr  -- Urine sodium 80; FU serum osmolality      Hypertension (controlled). blood pressure overnight range 115/73142/84  -- Vital signs per unit routine  -- Continue home amlodipine 10mg every day   -- Continue home chlorthalidone 25mg  -- Labetalol 5mg IV PRN for BP>220/110      Chronic Obstructive Pulmonary Disease (COPD).  Followed in the past by Dr Mila Wood. No spirometry or PFTs on record.    -- O2 sats goal 90-94%  -- Continue home 2L O2 via NC  -- Duonebs PRN  -- Fluticasone furoate 100mcg/puff every day  -- Incentive spirometry      Constipation (improved): Pt had BM yesterday, +flatus, + BS, distention, RUQ tenderness  - Colace, Miralax, soap suds enema  - LFTs wnl  - MRI abd pelvis pending          Diet: Cardiac w/ nutritional supplements per nutrition  DVT Prophylaxis: Warfarin  Code Status: FULL  Point of Contact: Harmeet Blunt: [phone]     Marva Flor MD, PGY-1  [facility] Medicine  08/29/17 8:41 AM

## 2017-08-29 NOTE — PROGRESS NOTES
Infectious Disease Progress Note    Requested by: dr. Macel Gitelman    Reason: sepsis, lumbar discitis/osteomyelitis    Current abx Prior abx     Meropenem since 8/28 ciprofloxacin 8/26  Metronidazole, vancomycin  8/26-8/28  Pip/tazo since 8/27-8/28     Lines:       Assessment :    67 y.o. male with PMH AAA repair 2007, persistent a-fib, CHF, CAD, PVD, HTN, hyperlipidemia, COPD, right and left inguinal hernia repair on 1/20/16 admitted to [address] [place] [address] on 8/4     Recent hospitalization 2/18-3/2 for sepsis (POA) likely due to enterobacter bloodstream infection,left iliopsoas myositis. Attempted ct guided drainage of left psoas collection - no fluid return. Tissue cultures negative. S/p meropenem completed 5/31/16      MRI 2/23 results reviewed - findings concerning for L3-L4 discitis. Psoas fluid collection very close to L3 spine. Also, enhancing edema surrounding the aortic aneursym concerning for aortitis.    now with one week h/o increasing left back pain radiating to left leg     MRI 8/4 - persistent but slightly diminished abnormal signal and enhancement within the L3-4 disc space and adjoining vertebral bodies. No epidural or paraspinous abscess. Suspected new level of infectious discitis at T10-11     Now with high grade fevers, back pain, left psoas collection, findings of L4-L5 discitis on mri lumbar spine    Highly complex clinical picture. Presentation c/w sepsis (POA) due to L4-5 discitis/osteomyelitis (acute), left psoas abscess. Exact microbial etiology of infection not clear since no cultures sent from drainage fluid. Improvement on current antibiotics would suggest bacterial infection. Patient previously had enterobacter infection. Has never had mrsa/resistant gram positives. Hence, I would modify abx to cover for prior enterobacter and monitor clinically. 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  Presentation worrisome for persistent focus of infection.      patient has aorto-biilliac graft and prior MRI in 2/2016 revealed fluid collection around the aortic aneurysm. Ct scan abd/pelvis  8/25 reveals stable size od abdominal aortic aneurysm which is approximately 4.5x4.2 cm, stable endograft. However, close proximity of the infection to the abscess is concerning for colonized graft as the source of recurrent lumbar infection. I will obtain further imaging studies and obtain vascular surgery input accordingly. Clinically stable    Recommendations:     1. Continue iv meropenem  2. F/u psoas abscess cultures  3. F/u spine surgery recommendations  4. Obtain mri pelvis to evaluate infra renal aneurysm  5. Vascular surgery consultation based on mri results  6. picc line for outpt iv abx     Advance Care planning: full code: discussed  with patient/surrogate decision maker: Debby Long: [phone]     Above plan was discussed in details with patient,  and dr Linnette Peña. Please call me if any further questions or concerns. Will continue to participate in the care of this patient. subjective:      Patient feels better today compared to day of admission. He has been afebrile. He denies abdominal pain, nausea, blood in stool/urine. He denies nausea, vomiting, diarrhea, hematemesis, blood in stool, dysuria, fever, chills. Patient denies headaches, visual disturbances, sore throat, runny nose, earaches, cp, sob, chills, cough, abdominal pain, pain or weakness in extremities.        Home Medication List    Details   prednisoLONE acetate (PRED FORTE) 1 % ophthalmic suspension Administer 1 Drop to right eye four (4) times daily. ketorolac (ACULAR) 0.5 % ophthalmic solution Administer 1 Drop to right eye daily. !! warfarin (COUMADIN) 2.5 mg tablet Take 2.5 mg by mouth Every Friday. 5 mg Mon, Tues, Wed, Thur, Sat, Sun  2.5 mg Fri      albuterol-ipratropium (DUO-NEB) 2.5 mg-0.5 mg/3 ml nebu 3 mL by Nebulization route every six (6) hours as needed.       QVAR 80 mcg/actuation aero Refills: 0      chlorthalidone (HYGROTEN) 25 mg tablet Refills: 0      amLODIPine (NORVASC) 10 mg tablet Refills: 0      DIGITEK 125 mcg tablet take 1 tablet by mouth once daily  Qty: 30 Tab, Refills: 6      !! warfarin (COUMADIN) 5 mg tablet Refills: 0      albuterol (PROVENTIL HFA, VENTOLIN HFA, PROAIR HFA) 90 mcg/actuation inhaler Take 2 Puffs by inhalation every four (4) hours as needed for Wheezing or Shortness of Breath. Qty: 1 Inhaler, Refills: 5    Associated Diagnoses: COPD, severe (HCC)      OXYGEN-AIR DELIVERY SYSTEMS 2 L/min by Does Not Apply route daily. Continuous. Company is [employer]         cyclobenzaprine (FLEXERIL) 10 mg tablet Take 1 Tab by mouth three (3) times daily as needed for Muscle Spasm(s). Qty: 30 Tab, Refills: 0      diclofenac EC (VOLTAREN) 50 mg EC tablet Take 1 Tab by mouth three (3) times daily as needed. Qty: 30 Tab, Refills: 0      fluticasone (FLONASE) 50 mcg/actuation nasal spray 2 Sprays by Both Nostrils route daily. calcium-vitamin D (OSCAL 250) 250-125 mg-unit tablet Take 1 Tab by mouth daily. SENNA PLUS 8.6-50 mg per tablet Refills: 0      pravastatin (PRAVACHOL) 40 mg tablet Refills: 0      polyethylene glycol (MIRALAX) 17 gram/dose powder Refills: 0      furosemide (LASIX) 20 mg tablet Refills: 0      !! warfarin (COUMADIN) 7.5 mg tablet Take 1 Tab by mouth every evening. Qty: 3 Tab, Refills: 0      aspirin 81 mg chewable tablet Take 1 Tab by mouth daily. Qty: 10 Tab, Refills: 0      diltiazem (CARDIZEM) 90 mg tablet Take 1 Tab by mouth Before breakfast, lunch, dinner and at bedtime. Qty: 60 Tab, Refills: 0      ferrous sulfate 325 mg (65 mg iron) tablet Refills: 0      umeclidinium-vilanterol (ANORO ELLIPTA) 62.5-25 mcg/actuation dsdv Take 1 Puff by inhalation daily. Indications: BRONCHOSPASM PREVENTION WITH COPD  Qty: 1 Inhaler, Refills: 5       !! - Potential duplicate medications found. Please discuss with provider.           Current Facility-Administered Medications   Medication Dose Route Frequency    potassium chloride 10 mEq, lidocaine (PF) (XYLOCAINE) 10 mg/mL (1 %) 1 mL in 0.9% sodium chloride 100 mL IVPB   IntraVENous Q1H    Vancomycin trough level  1 Each Other Rx Dosing/Monitoring    polyethylene glycol (MIRALAX) packet 17 g  17 g Oral DAILY    prednisoLONE acetate (PRED FORTE) 1 % ophthalmic suspension 1 Drop  1 Drop Right Eye QID    ketorolac (ACULAR) 0.5 % ophthalmic solution 1 Drop  1 Drop Right Eye DAILY    [START ON 2017] warfarin (COUMADIN) tablet 2.5 mg  2.5 mg Oral every Friday    WARFARIN INFORMATION NOTE (COUMADIN)   Other Q24H    warfarin (COUMADIN) tablet 5 mg  5 mg Oral Once per day on Sun  Sat    acetaminophen (TYLENOL) tablet 650 mg  650 mg Oral Q6H PRN    meropenem (MERREM) 1 g in 0.9% sodium chloride (MBP/ADV) 50 mL MBP  1 g IntraVENous Q8H    HYDROcodone-acetaminophen (NORCO)  mg tablet 1 Tab  1 Tab Oral Q4H PRN    albuterol-ipratropium (DUO-NEB) 2.5 MG-0.5 MG/3 ML  3 mL Nebulization Q6H PRN    chlorthalidone (HYGROTEN) tablet 25 mg  25 mg Oral DAILY    amLODIPine (NORVASC) tablet 10 mg  10 mg Oral DAILY    digoxin (LANOXIN) tablet 0.125 mg  0.125 mg Oral DAILY    fluticasone furoate (ARNUITY ELLIPTA) 100 mcg/puff  1 Puff Inhalation DAILY    0.9% sodium chloride infusion  125 mL/hr IntraVENous CONTINUOUS    naloxone (NARCAN) injection 0.4 mg  0.4 mg IntraVENous PRN    docusate sodium (COLACE) capsule 100 mg  100 mg Oral BID       Allergies: Codeine; Morphine; and Sulfa (sulfonamide antibiotics)    Temp (24hrs), Av.2 °F (36.8 °C), Min:97.8 °F (36.6 °C), Max:98.5 °F (36.9 °C)    Visit Vitals    /78    Pulse 100    Temp 98.5 °F (36.9 °C)    Resp 18    Ht 5' 6.5\" (1.689 m)    Wt 79.3 kg (174 lb 12.8 oz)    SpO2 98%    BMI 27.79 kg/m2       ROS: 12 point ROS obtained in details.  Pertinent positives as mentioned in HPI,   otherwise negative    Physical Exam:    General: Well developed, well nourished male laying on the bed, alert, in no acute distress.      General:    sleepy, oriented x3    HEENT:   Normocephalic, atraumatic, PERRL, EOMI, no scleral icterus or pallor; no conjunctival hemmohage;  nasal and oral mucous are moist and without evidence of lesions. No thrush. Neck supple, no bruits.    Lymph Nodes:    no cervical, axillary or inguinal adenopathy    Lungs:    non-labored, bilaterally clear to aspiration- no crackles wheezes rales or rhonchi    Heart:   RRR, s1 and s2 irregular; no murmurs rubs or gallops, no edema, + pedal pulses    Abdomen:   soft, non-distended, active bowel sounds, no hepatomegaly, no splenomegaly. Appropriate surgical scars for stated surgeries. no tenderness left inguinal surgical site. No cva tenderness. No erythema of abdominal wall, no RUQ tenderness   Genitourinary:   deferred    Extremities:    no clubbing, cyanosis; no joint effusions or swelling; Full ROM of all large joints to the upper and lower extremities; muscle mass appropriate for age    Neurologic:   No gross focal sensory abnormalities; 5/5 muscle strength to upper and lower extremities. Speech appropriate.  Cranial nerves intact                         Skin:   No rash or ulcers noted    Wound:   Left inguinal surgical site wound healed       Back:  no spinal tenderness , minimal left CVA tenderness, no paraspinal guarding or rigidity       Psychiatric:   No suicidal or homicidal ideations, appropriate mood and affect                  Labs: Results:   Chemistry Recent Labs      08/29/17   0301  08/28/17   1635  08/28/17   0317   08/27/17   0338   GLU  97   --   143*   --   103*   NA  137   --   133*   --   133*   K  3.4*  3.8  3.1*   < >  3.1*   CL  100   --   99*   --   97*   CO2  28   --   26   --   26   BUN  7   --   11   --   15   CREA  0.74   --   0.88   --   1.04   CA  8.0*   --   7.3*   --   8.1*   AGAP  9   --   8   --   10   BUCR  9*   --   13   --   14   AP  54   --    --    --    --    TP  7.0   --    -- --    --    ALB  2.3*   --    --    --    --    GLOB  4.7*   --    --    --    --    AGRAT  0.5*   --    --    --    --     < > = values in this interval not displayed.       CBC w/Diff Recent Labs      08/29/17   0301  08/28/17   0317  08/27/17   1220   WBC  5.8  6.7  10.6   RBC  2.91*  2.84*  2.92*   HGB  7.7*  7.6*  7.7*   HCT  25.0*  24.5*  25.1*   PLT  243  225  240   GRANS  75*  79*  84*   LYMPH  13*  12*  7*   EOS  1  1  0      Microbiology Recent Labs      08/28/17   1715  08/28/17   1630  08/27/17   0625  08/27/17   0606   CULT  PENDING  PENDING  NO GROWTH 2 DAYS  NO GROWTH 2 DAYS          RADIOLOGY:    All available imaging studies/reports in [facility] for this admission were reviewed    Dr. Jennifer Lundy, Infectious Disease Specialist  [phone]  August 29, 2017  2:19 PM

## 2017-08-29 NOTE — DISCHARGE SUMMARY
Discharge Summary  [facility] [facility]      Patient: Godfrey Sandoval Age: 65 y.o. Sex: male  : 1940    MRN: [id number]      DOA: 2017      Discharge Date: 2017      Karyn Abreu MD      PCP: Adalberto Jamil MD        ================================================================    Reason for Admission:   Psoas abscess [facility])    Discharge Diagnoses:   Psoas abscess- improving with IV abx and I&D  Ileus- improving with bowel regimen  A fib- Stable on home medication  COPD- stable on 2L O2 at home, Duonebs BID  HTN- Controlled on home medication  CAD- stable on home medication  H/o AAA repair- stable      Important notes to PCP/ follow-up studies and evaluations   - Pt will require outpatient IV abx per picc line until 10/27. Ordered by infectious disease and will require weekly CBC, BMP, CRP while on abx. Results to be faxed to Dr. Tiara James  - Pt will need close follow up to monitor INR. Warfarin held for I&D & INR subtherapeutic  - Pt has R direct inguinal hernia. Will need follow up for possible repair. Pending labs and studies:  None  Operative Procedures:   I&D of psoas abscess by Dr. Vickie Torres    Discharge Medications:     Current Discharge Medication List      START taking these medications    Details   docusate sodium (COLACE) 100 mg capsule Take 1 Cap by mouth two (2) times a day for 90 days. Qty: 60 Cap, Refills: 2      HYDROcodone-acetaminophen (NORCO)  mg tablet Take 1 Tab by mouth every four (4) hours as needed. Max Daily Amount: 6 Tabs. Qty: 15 Tab, Refills: 0      polyethylene glycol (MIRALAX) 17 gram packet Take 1 Packet by mouth daily. Qty: 30 Packet, Refills: 0      potassium chloride (KLOR-CON) 20 mEq packet Take 1 Packet by mouth daily.   Qty: 30 Packet, Refills: 0      OTHER Pt to receive IV antibiotics with home health, ordered by infectious diseases  - IV ertapenem  Qty: 1 Each, Refills: 0         CONTINUE these medications which have CHANGED    Details   warfarin (COUMADIN) 2.5 mg tablet Take 1 Tab by mouth daily. Take 2 pills (5 mg) Mon, Tues, Wed, Thur, Sat, Sun; and 1 pill only  (2.5 mg) Fri  Qty: 60 Tab, Refills: 0      warfarin (COUMADIN) 5 mg tablet Take 1 Tab by mouth once for 1 dose. Only for 9/1  Qty: 1 Tab, Refills: 0      Pt normally takes 2.5mg warfarin on Fridays. Was given 5mg once to take tomorrow to help achieve therapeutic INR then will resume normal regimen. CONTINUE these medications which have NOT CHANGED    Details   prednisoLONE acetate (PRED FORTE) 1 % ophthalmic suspension Administer 1 Drop to right eye four (4) times daily. ketorolac (ACULAR) 0.5 % ophthalmic solution Administer 1 Drop to right eye daily. albuterol-ipratropium (DUO-NEB) 2.5 mg-0.5 mg/3 ml nebu 3 mL by Nebulization route every six (6) hours as needed. QVAR 80 mcg/actuation aero Refills: 0      chlorthalidone (HYGROTEN) 25 mg tablet Refills: 0      amLODIPine (NORVASC) 10 mg tablet Refills: 0      DIGITEK 125 mcg tablet take 1 tablet by mouth once daily  Qty: 30 Tab, Refills: 6      albuterol (PROVENTIL HFA, VENTOLIN HFA, PROAIR HFA) 90 mcg/actuation inhaler Take 2 Puffs by inhalation every four (4) hours as needed for Wheezing or Shortness of Breath. Qty: 1 Inhaler, Refills: 5    Associated Diagnoses: COPD, severe (HCC)      OXYGEN-AIR DELIVERY SYSTEMS 2 L/min by Does Not Apply route daily. Continuous. Company is [employer]         cyclobenzaprine (FLEXERIL) 10 mg tablet Take 1 Tab by mouth three (3) times daily as needed for Muscle Spasm(s). Qty: 30 Tab, Refills: 0      diclofenac EC (VOLTAREN) 50 mg EC tablet Take 1 Tab by mouth three (3) times daily as needed. Qty: 30 Tab, Refills: 0      fluticasone (FLONASE) 50 mcg/actuation nasal spray 2 Sprays by Both Nostrils route daily. calcium-vitamin D (OSCAL 250) 250-125 mg-unit tablet Take 1 Tab by mouth daily.       SENNA PLUS 8.6-50 mg per tablet Refills: 0      pravastatin (PRAVACHOL) 40 mg tablet Refills: 0      polyethylene glycol (MIRALAX) 17 gram/dose powder Refills: 0      furosemide (LASIX) 20 mg tablet Refills: 0      aspirin 81 mg chewable tablet Take 1 Tab by mouth daily. Qty: 10 Tab, Refills: 0      diltiazem (CARDIZEM) 90 mg tablet Take 1 Tab by mouth Before breakfast, lunch, dinner and at bedtime. Qty: 60 Tab, Refills: 0      ferrous sulfate 325 mg (65 mg iron) tablet Refills: 0      umeclidinium-vilanterol (ANORO ELLIPTA) 62.5-25 mcg/actuation dsdv Take 1 Puff by inhalation daily. Indications: BRONCHOSPASM PREVENTION WITH COPD  Qty: 1 Inhaler, Refills: 5                Disposition: Home with Home Health    Consultants:    Katrina Atkins MD- Interventional Radiology  Tessy Gardner MD- Infectious Diseases  [address] Course (including pertinent history and physical findings)   Pt is a 65year old male with a PMH significant for chronic atrial fibrillation, hypertension, CAD and COPD who presented to ED on 8/25 with lower back and hip pain. He had MRI lumbar spine/thoracic spine that showed psoas abscess, I&D was performed and pt improved on abx. Symptoms improved and labs normalized at discharge. Psoas abscess: Pt started on ciprofloxacin (8/26), metronidazole (8/26-8/28) initially. His abx were switched to pip/tazo(8/27-8/28), vancomycin , metronidazole due to high grade fevers and ortho surgery was consulted. Ct guided drainage of left psoas collection was performed on 8/27/2017 by Dr. Veronica Chang. JEREMIAH drain was placed to left upper hip/flank area, with serousanguinous drainage, removed before discharge. ID was consulted for antibiotic regimen: vancomycin and metronidazole were discontinued and meropenem (8/29) was started in place. Patient pain was managed with [place] and Tylenol PRN. Meropenem was changed to ertapenem IV as outpatient per ID and picc line in place for abx.  Pt will require weekly CBC, BMP, and CRP per ID and results should be faxed to Dr. Chano Denny. Ileus: likely 2/2 opioid treatment. Distended and firm abdomen with minimal tenderness, and did not have a bowel movement from admission until 8/28/2017. Patient continued a daily regimen of colace and miralax (8/28-) to assist with bowel movement. Pt had another BM on day of discharge and is being discharged with bowel regimen. Hypokalemia/Hyponatremia: Patient was noted to have hyponatremia and hypokalemia, which was managed with K+ repletion and IV NS. K repletion switched to PO and IVF stopped after PO intake improved. Chronic Afib: Home medications were continued for cAF, with warfarin restarted on 8/28 after I&D. HTN was well managed throughout with home medications. COPD O2 sats goal 90-94% was managed with home regimen and home oxygen 2L. Pt also received Duoneb treatments BID. Pt discharged with home regimen unchanged. Pt's other chronic conditions were managed with home medication regimen and pt discharged with current regimen unchanged. Summarized key findings and results (labs, imaging studies, ECHO, cardiac cath, endoscopies, etc):  Lactic acid 0.5  WBC 13.4>11.0>1.6>6.7>5.8>6.5>7.8  Na 132>134>133<133>137>134>134 K 3.5>3.3>3.6>3.1>3.4>3.2>3.5>3.5  INR 2.8>2.1>1.8>1.7>1.6>1.6  8/25 Blood cx NG6D  8/27 blood cx NG4D  8/26 Urine cx: 88509 COLONIES/mL MIXED GRAM POSITIVE RUDY, PROBABLE SKIN/GENITAL CONTAMINATION. 8/28 Resp cx: moderate candida tropicalis, few candida albicans  8/28 drain cx: NG3D  8/25 CT4.3cm left paraspinal and psoas abscess at L3, adjacent inflammation extends posterior and to the left lateral margin of aorta; sclerotic changes to L3 likely chronic osteomyelitis. 8/26: MRI thoracic spine :Mild nonspecific disc bright signal at C81-D90 of uncertain significance. No evidence of suggest osteomyelitis or paraspinal abscess.   EKG: Atrial fibrillation with premature ventricular or aberrantly conducted Complexes, Left axis deviation, Septal infarct (cited on or before 25-AUG-2017)  MRA: Recurrent psoas abscess due to L3/L4 discitis/osteomyelitis. Stable size since drain placement. Psoas collection abuts the aorta which is stable in appearance with wall thickening and enhancement as before. US Pelvis: Large fat-containing right inguinal hernia. On recent CT scan (August 25, 2017), the appendix is seen extending through the internal inguinal ring into the inguinal hernia. This is not redemonstrated on today's ultrasound but potentially if the appendix does not have air within it, it may be difficult to visualize sonographically. Functional status and cognitive function:    Tachycardia with ambulation.  Pt requires home O2  Status: alert, cooperative, no distress, appears stated age    Diet: Cardiac with nutritional supplements    Code status and advanced care plan: Full  [facility] of Contact: Saracarrie Perlao: [phone]     Follow-up:   Follow-up Information     Follow up With Details Comments 2500 Jarrod Cheatham MD On 9/5/2017  [address] [facility] 113 [address] [facility] JENNIFERKala Govea MD On 9/11/2017  . [facility]  [facility] [phone]      MRI Lumbar Spine On 10/10/2017 Appointment at 1400             ================================================================  Dali Vo MD, PGY-1  [facility]  08/31/17 3:37 PM

## 2017-08-29 NOTE — PROGRESS NOTES
vss afeb  Cont tachy  Pain improving wbc declining  Still no positive culture  Agree with roldan per ID

## 2017-08-30 LAB
ANION GAP SERPL CALC-SCNC: 6 MMOL/L (ref 3–18)
ATRIAL RATE: 267 BPM
BASOPHILS # BLD: 0 K/UL (ref 0–0.1)
BASOPHILS NFR BLD: 0 % (ref 0–2)
BUN SERPL-MCNC: 12 MG/DL (ref 7–18)
BUN/CREAT SERPL: 18 (ref 12–20)
CALCIUM SERPL-MCNC: 8.4 MG/DL (ref 8.5–10.1)
CALCULATED R AXIS, ECG10: -39 DEGREES
CALCULATED T AXIS, ECG11: 73 DEGREES
CHLORIDE SERPL-SCNC: 96 MMOL/L (ref 100–108)
CO2 SERPL-SCNC: 32 MMOL/L (ref 21–32)
CREAT SERPL-MCNC: 0.67 MG/DL (ref 0.6–1.3)
DIAGNOSIS, 93000: NORMAL
DIFFERENTIAL METHOD BLD: ABNORMAL
EOSINOPHIL # BLD: 0.1 K/UL (ref 0–0.4)
EOSINOPHIL NFR BLD: 1 % (ref 0–5)
ERYTHROCYTE [DISTWIDTH] IN BLOOD BY AUTOMATED COUNT: 15.5 % (ref 11.6–14.5)
GLUCOSE SERPL-MCNC: 105 MG/DL (ref 74–99)
HCT VFR BLD AUTO: 24.1 % (ref 36–48)
HGB BLD-MCNC: 7.5 G/DL (ref 13–16)
INR PPP: 1.6 (ref 0.8–1.2)
LYMPHOCYTES # BLD: 1.4 K/UL (ref 0.9–3.6)
LYMPHOCYTES NFR BLD: 22 % (ref 21–52)
MAGNESIUM SERPL-MCNC: 2.1 MG/DL (ref 1.6–2.6)
MCH RBC QN AUTO: 26.2 PG (ref 24–34)
MCHC RBC AUTO-ENTMCNC: 31.1 G/DL (ref 31–37)
MCV RBC AUTO: 84.3 FL (ref 74–97)
MONOCYTES # BLD: 0.7 K/UL (ref 0.05–1.2)
MONOCYTES NFR BLD: 12 % (ref 3–10)
NEUTS SEG # BLD: 4.2 K/UL (ref 1.8–8)
NEUTS SEG NFR BLD: 65 % (ref 40–73)
PLATELET # BLD AUTO: 284 K/UL (ref 135–420)
PMV BLD AUTO: 8.8 FL (ref 9.2–11.8)
POTASSIUM SERPL-SCNC: 3.5 MMOL/L (ref 3.5–5.5)
PROTHROMBIN TIME: 18.2 SEC (ref 11.5–15.2)
Q-T INTERVAL, ECG07: 326 MS
QRS DURATION, ECG06: 96 MS
QTC CALCULATION (BEZET), ECG08: 405 MS
RBC # BLD AUTO: 2.86 M/UL (ref 4.7–5.5)
SODIUM SERPL-SCNC: 134 MMOL/L (ref 136–145)
VENTRICULAR RATE, ECG03: 93 BPM
WBC # BLD AUTO: 6.5 K/UL (ref 4.6–13.2)

## 2017-08-30 PROCEDURE — 94640 AIRWAY INHALATION TREATMENT: CPT

## 2017-08-30 PROCEDURE — 77010033678 HC OXYGEN DAILY

## 2017-08-30 PROCEDURE — 36415 COLL VENOUS BLD VENIPUNCTURE: CPT | Performed by: FAMILY MEDICINE

## 2017-08-30 PROCEDURE — 65660000000 HC RM CCU STEPDOWN

## 2017-08-30 PROCEDURE — 85610 PROTHROMBIN TIME: CPT | Performed by: FAMILY MEDICINE

## 2017-08-30 PROCEDURE — 74011000250 HC RX REV CODE- 250: Performed by: FAMILY MEDICINE

## 2017-08-30 PROCEDURE — 74011250637 HC RX REV CODE- 250/637: Performed by: FAMILY MEDICINE

## 2017-08-30 PROCEDURE — 77030027138 HC INCENT SPIROMETER -A

## 2017-08-30 PROCEDURE — 74011250636 HC RX REV CODE- 250/636: Performed by: INTERNAL MEDICINE

## 2017-08-30 PROCEDURE — 83735 ASSAY OF MAGNESIUM: CPT | Performed by: FAMILY MEDICINE

## 2017-08-30 PROCEDURE — 74011000258 HC RX REV CODE- 258: Performed by: INTERNAL MEDICINE

## 2017-08-30 PROCEDURE — 80048 BASIC METABOLIC PNL TOTAL CA: CPT | Performed by: FAMILY MEDICINE

## 2017-08-30 PROCEDURE — 85025 COMPLETE CBC W/AUTO DIFF WBC: CPT | Performed by: FAMILY MEDICINE

## 2017-08-30 PROCEDURE — 74011250637 HC RX REV CODE- 250/637: Performed by: STUDENT IN AN ORGANIZED HEALTH CARE EDUCATION/TRAINING PROGRAM

## 2017-08-30 RX ORDER — WARFARIN 1 MG/1
2.5 TABLET ORAL ONCE
Status: COMPLETED | OUTPATIENT
Start: 2017-08-30 | End: 2017-08-30

## 2017-08-30 RX ORDER — IPRATROPIUM BROMIDE AND ALBUTEROL SULFATE 2.5; .5 MG/3ML; MG/3ML
3 SOLUTION RESPIRATORY (INHALATION)
Status: DISCONTINUED | OUTPATIENT
Start: 2017-08-30 | End: 2017-08-31 | Stop reason: HOSPADM

## 2017-08-30 RX ADMIN — KETOROLAC TROMETHAMINE 1 DROP: 5 SOLUTION OPHTHALMIC at 10:00

## 2017-08-30 RX ADMIN — HYDROCODONE BITARTRATE AND ACETAMINOPHEN 1 TABLET: 10; 325 TABLET ORAL at 18:30

## 2017-08-30 RX ADMIN — POTASSIUM CHLORIDE 20 MEQ: 1.5 POWDER, FOR SOLUTION ORAL at 09:48

## 2017-08-30 RX ADMIN — DOCUSATE SODIUM 100 MG: 100 CAPSULE, LIQUID FILLED ORAL at 17:34

## 2017-08-30 RX ADMIN — POLYETHYLENE GLYCOL 3350 17 G: 17 POWDER, FOR SOLUTION ORAL at 09:48

## 2017-08-30 RX ADMIN — DOCUSATE SODIUM 100 MG: 100 CAPSULE, LIQUID FILLED ORAL at 09:49

## 2017-08-30 RX ADMIN — HYDROCODONE BITARTRATE AND ACETAMINOPHEN 1 TABLET: 10; 325 TABLET ORAL at 09:49

## 2017-08-30 RX ADMIN — MEROPENEM 1 G: 1 INJECTION, POWDER, FOR SOLUTION INTRAVENOUS at 17:39

## 2017-08-30 RX ADMIN — AMLODIPINE BESYLATE 10 MG: 10 TABLET ORAL at 09:49

## 2017-08-30 RX ADMIN — WARFARIN SODIUM 5 MG: 5 TABLET ORAL at 17:55

## 2017-08-30 RX ADMIN — HYDROCODONE BITARTRATE AND ACETAMINOPHEN 1 TABLET: 10; 325 TABLET ORAL at 01:20

## 2017-08-30 RX ADMIN — MEROPENEM 1 G: 1 INJECTION, POWDER, FOR SOLUTION INTRAVENOUS at 01:20

## 2017-08-30 RX ADMIN — CHLORTHALIDONE 25 MG: 25 TABLET ORAL at 09:49

## 2017-08-30 RX ADMIN — PREDNISOLONE ACETATE 1 DROP: 10 SUSPENSION/ DROPS OPHTHALMIC at 10:00

## 2017-08-30 RX ADMIN — FLUTICASONE FUROATE 1 PUFF: 100 POWDER RESPIRATORY (INHALATION) at 08:19

## 2017-08-30 RX ADMIN — PREDNISOLONE ACETATE 1 DROP: 10 SUSPENSION/ DROPS OPHTHALMIC at 21:48

## 2017-08-30 RX ADMIN — WARFARIN SODIUM 2.5 MG: 1 TABLET ORAL at 17:33

## 2017-08-30 RX ADMIN — HYDROCODONE BITARTRATE AND ACETAMINOPHEN 1 TABLET: 10; 325 TABLET ORAL at 13:48

## 2017-08-30 RX ADMIN — MEROPENEM 1 G: 1 INJECTION, POWDER, FOR SOLUTION INTRAVENOUS at 09:47

## 2017-08-30 RX ADMIN — DIGOXIN 0.12 MG: 0.12 TABLET ORAL at 09:48

## 2017-08-30 RX ADMIN — IPRATROPIUM BROMIDE AND ALBUTEROL SULFATE 3 ML: .5; 3 SOLUTION RESPIRATORY (INHALATION) at 11:56

## 2017-08-30 RX ADMIN — PREDNISOLONE ACETATE 1 DROP: 10 SUSPENSION/ DROPS OPHTHALMIC at 17:57

## 2017-08-30 RX ADMIN — PREDNISOLONE ACETATE 1 DROP: 10 SUSPENSION/ DROPS OPHTHALMIC at 13:48

## 2017-08-30 RX ADMIN — IPRATROPIUM BROMIDE AND ALBUTEROL SULFATE 3 ML: .5; 3 SOLUTION RESPIRATORY (INHALATION) at 20:12

## 2017-08-30 NOTE — PROGRESS NOTES
Occupational Therapy Note:  This patient is requesting therapy hold until tomorrow. Will f/u as appropriate for this patient.  Sheryle Kidd Critser, OTR/L

## 2017-08-30 NOTE — HOME CARE
Rec HC Order for IV abx at home - sent information to HCP - Rec call back - pt has no coverage for IV abx at home - pt cost would be $156.44/day for IV abx at home - [facility] is option for IV abx as oupatient - CM notifed - Altagracia Viveros will speak with pt and offer options - [facility] will be available to any other [facility] needs as needed - ZAK Granados RN

## 2017-08-30 NOTE — PROGRESS NOTES
Received report on pt.from off going RN. Resting quietly in bed on rounds. Denies c/o pain or SOB at this time. No acute distress noted. Will cont to monitor for any changes in status.

## 2017-08-30 NOTE — PROGRESS NOTES
Problem: Mobility Impaired (Adult and Pediatric)  Goal: *Acute Goals and Plan of Care (Insert Text)  Physical Therapy Goals  Initiated 8/26/2017 and to be accomplished within 7 day(s)  1. Patient will move from supine to sit and sit to supine in bed with independence. 2. Patient will transfer from bed to chair and chair to bed with minimal assistance/contact guard assist using the least restrictive device. 3. Patient will perform sit to stand with modified independence. 4. Patient will ambulate with minimal assistance/contact guard assist for 100 feet with the least restrictive device. 5. Patient will ascend/descend 5 stairs with bilateral handrail(s) with minimal assistance/contact guard assist with LRAD. Attempted PT treatment, patient adamantly refuses to participate today. Patient states that he \"did too much yesterday\" and now wants to rest today. Educated patient on benefits of continuing to mobilize, however, patient continued to refuse. Will follow-up with patient tomorrow.

## 2017-08-30 NOTE — ROUTINE PROCESS
Assumed patient care after getting report from Geoffrey. Patient in bed, awake, alert and oriented x3. Complained of discomforts to back, made aware for the next time pain medication is due, agrees with th plan. Patient eating dinner at this time, with family at bedside. Call light placed within reach. Bed in low position. 2150 - Noted MEWS score of 3 due to HR, patient is resting in bed, no changes from previous assessment. Pain medication given for pain on his back. Will continue to monitor. 2348 - EKG done per [facility] order. Patient resting in bed in no distress. On monitor patient is afib with HR fluctuates in the high 90's - 120's non sustaining. Noted that heart rate goes up in the 150's when patient is up walking to the bathroom.

## 2017-08-30 NOTE — PROGRESS NOTES
Intern Progress Note  [facility]       Patient: Pineda Jackson MRN: [id number]  CSN: [id number]    YOB: 1940  Age: 65 y.o. Sex: male    DOA: 8/25/2017 LOS:  LOS: 5 days                    Subjective:     Acute events: Pt resting in bed comfortably. Pain well controlled with medications- 0/10 this AM. No BM in the last day, + flatus. Abd more distended and firm. Pt still has RUQ tenderness. No other complaints. Pt had elevated HR overnight while walking but remained asymptomatic. Review of Systems   Constitutional: Negative for chills and fever. Eyes: Negative for blurred vision and double vision. Respiratory: Negative for cough and shortness of breath. Cardiovascular: Negative for chest pain, palpitations and leg swelling. Gastrointestinal: Positive for constipation. Negative for abdominal pain, diarrhea, nausea and vomiting. Genitourinary: Negative for dysuria, frequency, hematuria and urgency. Neurological: Negative for dizziness and headaches. Objective:      Patient Vitals for the past 24 hrs:   Temp Pulse Resp BP SpO2   08/30/17 0350 97 °F (36.1 °C) 99 24 138/78 97 %   08/30/17 0132 98.2 °F (36.8 °C) (!) 101 18 125/69 95 %   08/29/17 2113 99 °F (37.2 °C) (!) 113 18 122/78 96 %   08/29/17 1729 98.7 °F (37.1 °C) (!) 105 18 120/58 95 %   08/29/17 1223 97 °F (36.1 °C) (!) 116 19 130/69 99 %   08/29/17 0823 - 100 - 129/78 -   08/29/17 0813 98.5 °F (36.9 °C) 94 18 116/70 98 %         Intake/Output Summary (Last 24 hours) at 08/30/17 0733  Last data filed at 08/30/17 0658   Gross per 24 hour   Intake              365 ml   Output             2000 ml   Net            -1635 ml       Physical Exam   Constitutional: He is oriented to person, place, and time. He appears well-developed and well-nourished. No distress. HENT:   Head: Normocephalic and atraumatic. Eyes: EOM are normal. Pupils are equal, round, and reactive to light. Neck: Normal range of motion. Neck supple. No tracheal deviation present. No thyromegaly present. Cardiovascular: Normal heart sounds and normal pulses. An irregularly irregular rhythm present. Pulmonary/Chest: Effort normal. He has wheezes. Abdominal: Soft. Bowel sounds are normal. He exhibits distension. There is tenderness. Musculoskeletal: He exhibits no edema, tenderness or deformity. Lymphadenopathy:     He has no cervical adenopathy. Neurological: He is alert and oriented to person, place, and time. Skin: Skin is warm and dry. He is not diaphoretic. Psychiatric: He has a normal mood and affect.  His behavior is normal.       Lab/Data Reviewed:  Recent Results (from the past 24 hour(s))   POTASSIUM    Collection Time: 08/29/17  1:33 PM   Result Value Ref Range    Potassium 3.2 (L) 3.5 - 5.5 mmol/L   VANCOMYCIN, TROUGH    Collection Time: 08/29/17  5:25 PM   Result Value Ref Range    Vancomycin,trough 2.9 (L) 10.0 - 20.0 ug/mL    Reported dose date: 20170828      Reported dose time: 2200      Reported dose: 1500 MG UNITS   EKG, 12 LEAD, INITIAL    Collection Time: 08/29/17 11:41 PM   Result Value Ref Range    Ventricular Rate 93 BPM    Atrial Rate 267 BPM    QRS Duration 96 ms    Q-T Interval 326 ms    QTC Calculation (Bezet) 405 ms    Calculated R Axis -39 degrees    Calculated T Axis 73 degrees    Diagnosis       Atrial fibrillation with premature ventricular or aberrantly conducted   complexes  Left axis deviation  Septal infarct (cited on or before 25-AUG-2017)  Abnormal ECG  When compared with ECG of 25-AUG-2017 21:18,  Questionable change in initial forces of Septal leads     MAGNESIUM    Collection Time: 08/30/17  3:16 AM   Result Value Ref Range    Magnesium 2.1 1.6 - 2.6 mg/dL   PROTHROMBIN TIME + INR    Collection Time: 08/30/17  3:16 AM   Result Value Ref Range    Prothrombin time 18.2 (H) 11.5 - 15.2 sec    INR 1.6 (H) 0.8 - 1.2     CBC WITH AUTOMATED DIFF    Collection Time: 08/30/17  3:16 AM   Result Value Ref Range    WBC 6.5 4.6 - 13.2 K/uL    RBC 2.86 (L) 4.70 - 5.50 M/uL    HGB 7.5 (L) 13.0 - 16.0 g/dL    HCT 24.1 (L) 36.0 - 48.0 %    MCV 84.3 74.0 - 97.0 FL    MCH 26.2 24.0 - 34.0 PG    MCHC 31.1 31.0 - 37.0 g/dL    RDW 15.5 (H) 11.6 - 14.5 %    PLATELET 244 647 - 765 K/uL    MPV 8.8 (L) 9.2 - 11.8 FL    NEUTROPHILS 65 40 - 73 %    LYMPHOCYTES 22 21 - 52 %    MONOCYTES 12 (H) 3 - 10 %    EOSINOPHILS 1 0 - 5 %    BASOPHILS 0 0 - 2 %    ABS. NEUTROPHILS 4.2 1.8 - 8.0 K/UL    ABS. LYMPHOCYTES 1.4 0.9 - 3.6 K/UL    ABS. MONOCYTES 0.7 0.05 - 1.2 K/UL    ABS. EOSINOPHILS 0.1 0.0 - 0.4 K/UL    ABS.  BASOPHILS 0.0 0.0 - 0.1 K/UL    DF AUTOMATED     METABOLIC PANEL, BASIC    Collection Time: 08/30/17  3:16 AM   Result Value Ref Range    Sodium 134 (L) 136 - 145 mmol/L    Potassium 3.5 3.5 - 5.5 mmol/L    Chloride 96 (L) 100 - 108 mmol/L    CO2 32 21 - 32 mmol/L    Anion gap 6 3.0 - 18 mmol/L    Glucose 105 (H) 74 - 99 mg/dL    BUN 12 7.0 - 18 MG/DL    Creatinine 0.67 0.6 - 1.3 MG/DL    BUN/Creatinine ratio 18 12 - 20      GFR est AA >60 >60 ml/min/1.73m2    GFR est non-AA >60 >60 ml/min/1.73m2    Calcium 8.4 (L) 8.5 - 10.1 MG/DL       Scheduled Medications Reviewed:  Current Facility-Administered Medications   Medication Dose Route Frequency    potassium chloride (KLOR-CON) packet 20 mEq  20 mEq Oral DAILY    polyethylene glycol (MIRALAX) packet 17 g  17 g Oral DAILY    prednisoLONE acetate (PRED FORTE) 1 % ophthalmic suspension 1 Drop  1 Drop Right Eye QID    ketorolac (ACULAR) 0.5 % ophthalmic solution 1 Drop  1 Drop Right Eye DAILY    [START ON 9/1/2017] warfarin (COUMADIN) tablet 2.5 mg  2.5 mg Oral every Friday    WARFARIN INFORMATION NOTE (COUMADIN)   Other Q24H    warfarin (COUMADIN) tablet 5 mg  5 mg Oral Once per day on Sun Mon Tue Wed Thu Sat    meropenem (MERREM) 1 g in 0.9% sodium chloride (MBP/ADV) 50 mL MBP  1 g IntraVENous Q8H    chlorthalidone (HYGROTEN) tablet 25 mg  25 mg Oral DAILY    amLODIPine (NORVASC) tablet 10 mg  10 mg Oral DAILY    digoxin (LANOXIN) tablet 0.125 mg  0.125 mg Oral DAILY    fluticasone furoate (ARNUITY ELLIPTA) 100 mcg/puff  1 Puff Inhalation DAILY    docusate sodium (COLACE) capsule 100 mg  100 mg Oral BID         Imaging, microbiology, and EKG/Telemetry:  EKG: Atrial fibrillation with premature ventricular or aberrantly conducted Complexes, Left axis deviation, Septal infarct (cited on or before 25-AUG-2017)    Assessment/Plan     65 y. o. male with PMH Osteomyelitis, psoas abscess, HTN, Atrial fibrillation, CAD, Pulmonary hypertension, COPD now admitted with Sepsis secondary to left paraspinal and psoas abscess .          Sepsis secondary to left paraspinal and psoas abscess (improving) and L4/5 discitis, with bilateral lumbar spinous process, B/L SLR positive, in setting of patient with history of multfocal osteomyelitis and prior psoas abscess. WBC now 6.5 and RR 24. Elevated HR with walking.  -- I/Os  -- Dr Ghazal Rivera ([facility]: [phone]) consulted and following  -- Interventional radiology following: POD 3 s/p I&D  -- ID following: continue meropenem pending cultures; picc line placed yesterday for abx   -- MRI ordered to r/o infrarenal aneurysm. Results pending. -- Norco 10/325mg 1 tab q4hrs PRN pain; Narcan PRN, Tylenol 650 q6h PRN  -- Ice packs PRN      Hypokalemia  -- 3.5 today, Klor-con 20mEq daily  -- repeat K+      Right inguinal hernia, with tender mass in right inguinal area tender to palpation with pain radiating to testicles. CT finding of normal appendix but noted to extend into a fat-containing right inguinal hernia. Likely exacerbated due to problem #1. Will monitor clinically.       Chronic Atrial fibrillation,  followed by Dr Chad Bass, Cardiology. Pulmonary hypertension with peak pressure 60mmHg.  Mild CAD by angiography in 2012; INR 1.6  -- Current symptoms: none  -- Continue control agent: digoxin 125mcg  Every day   -- C anticoagulant: [id number] every day except 2.5mg Fridays- added warfarin 2.5mg once and will increase Friday dose to 5mg. Will need close follow up for INR check outpatient. -- monitor daily PT/INR.   -- Remote cardiac monitoring  -- EKG showed afib w/ PVC      Hyponatremia (resolved), Na 137 this AM   -- Will monitor with daily BMP. NS 20KCl @ 75 cc/hr  -- Urine sodium 80; FU serum osmolality      Hypertension (controlled). blood pressure overnight range 116/70138/78  -- Vital signs per unit routine  -- Continue home amlodipine 10mg every day   -- Continue home chlorthalidone 25mg  -- Labetalol 5mg IV PRN for BP>220/110      Chronic Obstructive Pulmonary Disease (COPD).  Followed in the past by Dr Rigoberto Tom. No spirometry or PFTs on record.    -- O2 sats goal 90-94%  -- Continue home 2L O2 via NC  -- Duonebs BID  -- Fluticasone furoate 100mcg/puff every day  -- Incentive spirometry       Constipation (improved): No BM yesterday, +flatus, + BS, distention, RUQ tenderness  - Colace, Miralax, soap suds enema  - LFTs wnl          Diet: Cardiac w/ nutritional supplements per nutrition  DVT Prophylaxis: Warfarin  Code Status: FULL  Point of Contact: [place]: [phone]    Chriss Primrose, MD, PGY-1  [facility]  08/30/17 7:33 AM

## 2017-08-30 NOTE — PROGRESS NOTES
Care Management Interventions  PCP Verified by CM: Yes (PFM)  Mode of Transport at Discharge:  (TBD)  Transition of Care Consult (CM Consult): [address]: Yes  Physical Therapy Consult: Yes  Occupational Therapy Consult: Yes  Current Support Network: Own Home (Younger brother who is mild MR lives with pt )  Confirm Follow Up Transport: Family  Plan discussed with Pt/Family/Caregiver: Yes  Freedom of Choice Offered: Yes (home health)  Discharge Location  Discharge Placement: Home with home health    Pt is a 65year old admitted for psoas abscess. Pt is alert and oriented and alone in room. Pt reports that he lives alone and then states that his younger brother who is mild MR lives with him. Pt reports that prior to admission he was independent in his ADLs and that he has O2, walker, wheelchair, BSC, and hospital bed at home. Pt reports that his daughter Cherelle Finley [phone] is his emergency contact. Pt reports that he plans to return home on discharge. Pt needs IV antibiotics long term. Pt chose [facility]. FOC signed. Referral sent. Spoke to [place]. [address] called and states pt has a copay for his antibiotic of over $100 per day with home health but his antibiotic would be covered if he goes outpatient infusion. Pt chose to go outpatient at [address] [place] [address] 9-1-17 at 8am, [place] 9-2-17 at [facility] 9-3-17 at [address] [facility] infusion states they will schedule the rest of pt's appts when he come to infusion center. Pt given address, phone number [phone], and appt times. Order faxed to outpatient infusion center central scheduling [phone].

## 2017-08-30 NOTE — PROGRESS NOTES
3661 Receive pt report from night shift RN, Amanda Whyte. 1856 Pt received education about fall prevention    1945 pt stable all day, visited with family, pain remained under control, last pain med given at 31 75 62. Pt has been toileting and report given to oncoming night shift.  Pt resting comfortably in bed,

## 2017-08-30 NOTE — PROGRESS NOTES
Infectious Disease Progress Note    Requested by: dr. Dudley Boyd    Reason: sepsis, lumbar discitis/osteomyelitis    Current abx Prior abx     Meropenem since 8/28 ciprofloxacin 8/26  Metronidazole, vancomycin  8/26-8/28  Pip/tazo since 8/27-8/28     Lines:       Assessment :    67 y.o. male with PMH AAA repair 2007, persistent a-fib, CHF, CAD, PVD, HTN, hyperlipidemia, COPD, right and left inguinal hernia repair on 1/20/16 admitted to [facility] on 8/4     Recent hospitalization 2/18-3/2 for sepsis (POA) likely due to enterobacter bloodstream infection,left iliopsoas myositis. Attempted ct guided drainage of left psoas collection - no fluid return. Tissue cultures negative. S/p meropenem completed 5/31/16      MRI 2/23 results reviewed - findings concerning for L3-L4 discitis. Psoas fluid collection very close to L3 spine. Also, enhancing edema surrounding the aortic aneursym concerning for aortitis.    now with one week h/o increasing left back pain radiating to left leg     MRI 8/4 - persistent but slightly diminished abnormal signal and enhancement within the L3-4 disc space and adjoining vertebral bodies. No epidural or paraspinous abscess. Suspected new level of infectious discitis at T10-11     Now with high grade fevers, back pain, left psoas collection, findings of L4-L5 discitis on mri lumbar spine    Highly complex clinical picture. Presentation c/w sepsis (POA) due to L4-5 discitis/osteomyelitis (acute), left psoas abscess. Exact microbial etiology of infection not clear since no cultures sent from drainage fluid. Improvement on current antibiotics would suggest bacterial infection. Patient previously had enterobacter infection. Has never had mrsa/resistant gram positives. Hence, I would modify abx to cover for prior enterobacter and monitor clinically. 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  Presentation worrisome for persistent focus of infection.      patient has aorto-biilliac graft and prior MRI in 2/2016 revealed fluid collection around the aortic aneurysm. Ct scan abd/pelvis  8/25 reveals stable size od abdominal aortic aneurysm which is approximately 4.5x4.2 cm, stable endograft. However, close proximity of the infection to the abscess is concerning for colonized graft as the source of recurrent lumbar infection. I will obtain further imaging studies and obtain vascular surgery input accordingly. Clinically stable     RUQ pain/tenderness - normal bilirubin, no cholecystitis noted on CT scan. Await MRA abdomen. Recommendations:     1. Continue iv meropenem   2. F/u psoas abscess cultures  3. F/u spine surgery recommendations  4. F/u MRA abdomen results to evaluate for possible infection of infra renal/aortic aneurysm   5. Vascular surgery consultation to determine likelihood of colonized vascular graft leading to recurrent left psoas abscess - called    Will start making arrangements for outpatient iv abx - iv abx orders written     Advance Care planning: full code: discussed  with patient/surrogate decision maker: Rosario Stage: [phone]     Above plan was discussed in details with patient. Please call me if any further questions or concerns. Will continue to participate in the care of this patient. subjective:      Patient feels better today. Improved back pain. He has been afebrile. He denies abdominal pain, nausea, blood in stool/urine. He denies nausea, vomiting, diarrhea, hematemesis, blood in stool, dysuria, fever, chills. Patient denies headaches, visual disturbances, sore throat, runny nose, earaches, cp, sob, chills, cough, abdominal pain, pain or weakness in extremities.        Home Medication List    Details   prednisoLONE acetate (PRED FORTE) 1 % ophthalmic suspension Administer 1 Drop to right eye four (4) times daily. ketorolac (ACULAR) 0.5 % ophthalmic solution Administer 1 Drop to right eye daily.       !! warfarin (COUMADIN) 2.5 mg tablet Take 2.5 mg by mouth Every Friday. 5 mg Mon, Tues, Wed, Thur, Sat, Sun  2.5 mg Fri      albuterol-ipratropium (DUO-NEB) 2.5 mg-0.5 mg/3 ml nebu 3 mL by Nebulization route every six (6) hours as needed. QVAR 80 mcg/actuation aero Refills: 0      chlorthalidone (HYGROTEN) 25 mg tablet Refills: 0      amLODIPine (NORVASC) 10 mg tablet Refills: 0      DIGITEK 125 mcg tablet take 1 tablet by mouth once daily  Qty: 30 Tab, Refills: 6      !! warfarin (COUMADIN) 5 mg tablet Refills: 0      albuterol (PROVENTIL HFA, VENTOLIN HFA, PROAIR HFA) 90 mcg/actuation inhaler Take 2 Puffs by inhalation every four (4) hours as needed for Wheezing or Shortness of Breath. Qty: 1 Inhaler, Refills: 5    Associated Diagnoses: COPD, severe (HCC)      OXYGEN-AIR DELIVERY SYSTEMS 2 L/min by Does Not Apply route daily. Continuous. Company is [employer]         cyclobenzaprine (FLEXERIL) 10 mg tablet Take 1 Tab by mouth three (3) times daily as needed for Muscle Spasm(s). Qty: 30 Tab, Refills: 0      diclofenac EC (VOLTAREN) 50 mg EC tablet Take 1 Tab by mouth three (3) times daily as needed. Qty: 30 Tab, Refills: 0      fluticasone (FLONASE) 50 mcg/actuation nasal spray 2 Sprays by Both Nostrils route daily. calcium-vitamin D (OSCAL 250) 250-125 mg-unit tablet Take 1 Tab by mouth daily. SENNA PLUS 8.6-50 mg per tablet Refills: 0      pravastatin (PRAVACHOL) 40 mg tablet Refills: 0      polyethylene glycol (MIRALAX) 17 gram/dose powder Refills: 0      furosemide (LASIX) 20 mg tablet Refills: 0      !! warfarin (COUMADIN) 7.5 mg tablet Take 1 Tab by mouth every evening. Qty: 3 Tab, Refills: 0      aspirin 81 mg chewable tablet Take 1 Tab by mouth daily. Qty: 10 Tab, Refills: 0      diltiazem (CARDIZEM) 90 mg tablet Take 1 Tab by mouth Before breakfast, lunch, dinner and at bedtime.   Qty: 60 Tab, Refills: 0      ferrous sulfate 325 mg (65 mg iron) tablet Refills: 0      umeclidinium-vilanterol (ANORO ELLIPTA) 62.5-25 mcg/actuation dsdv Take 1 Puff by inhalation daily. Indications: BRONCHOSPASM PREVENTION WITH COPD  Qty: 1 Inhaler, Refills: 5       !! - Potential duplicate medications found. Please discuss with provider.           Current Facility-Administered Medications   Medication Dose Route Frequency    warfarin (COUMADIN) tablet 2.5 mg  2.5 mg Oral NOW    potassium chloride (KLOR-CON) packet 20 mEq  20 mEq Oral DAILY    polyethylene glycol (MIRALAX) packet 17 g  17 g Oral DAILY    prednisoLONE acetate (PRED FORTE) 1 % ophthalmic suspension 1 Drop  1 Drop Right Eye QID    ketorolac (ACULAR) 0.5 % ophthalmic solution 1 Drop  1 Drop Right Eye DAILY    [START ON 2017] warfarin (COUMADIN) tablet 2.5 mg  2.5 mg Oral every Friday    WARFARIN INFORMATION NOTE (COUMADIN)   Other Q24H    warfarin (COUMADIN) tablet 5 mg  5 mg Oral Once per day on Sun Mon Tue Wed Thu Sat    acetaminophen (TYLENOL) tablet 650 mg  650 mg Oral Q6H PRN    meropenem (MERREM) 1 g in 0.9% sodium chloride (MBP/ADV) 50 mL MBP  1 g IntraVENous Q8H    HYDROcodone-acetaminophen (NORCO)  mg tablet 1 Tab  1 Tab Oral Q4H PRN    albuterol-ipratropium (DUO-NEB) 2.5 MG-0.5 MG/3 ML  3 mL Nebulization Q6H PRN    chlorthalidone (HYGROTEN) tablet 25 mg  25 mg Oral DAILY    amLODIPine (NORVASC) tablet 10 mg  10 mg Oral DAILY    digoxin (LANOXIN) tablet 0.125 mg  0.125 mg Oral DAILY    fluticasone furoate (ARNUITY ELLIPTA) 100 mcg/puff  1 Puff Inhalation DAILY    naloxone (NARCAN) injection 0.4 mg  0.4 mg IntraVENous PRN    docusate sodium (COLACE) capsule 100 mg  100 mg Oral BID       Allergies: Codeine; Morphine; and Sulfa (sulfonamide antibiotics)    Temp (24hrs), Av °F (36.7 °C), Min:97 °F (36.1 °C), Max:99 °F (37.2 °C)    Visit Vitals    /78 (BP 1 Location: Left arm, BP Patient Position: At rest)    Pulse 99    Temp 97 °F (36.1 °C)    Resp 24    Ht 5' 6.5\" (1.689 m)    Wt 79.3 kg (174 lb 12.8 oz)    SpO2 93%    BMI 27.79 kg/m2       ROS: 12 point ROS obtained in details. Pertinent positives as mentioned in HPI,   otherwise negative    Physical Exam:    General: Well developed, well nourished male laying on the bed, alert, in no acute distress.      General:    sleepy, oriented x3    HEENT:   Normocephalic, atraumatic, PERRL, EOMI, no scleral icterus or pallor; no conjunctival hemmohage;  nasal and oral mucous are moist and without evidence of lesions. No thrush. Neck supple, no bruits.    Lymph Nodes:    no cervical, axillary or inguinal adenopathy    Lungs:    non-labored, bilaterally clear to aspiration- no crackles wheezes rales or rhonchi    Heart:   RRR, s1 and s2 irregular; no murmurs rubs or gallops, no edema, + pedal pulses    Abdomen:   soft, non-distended, active bowel sounds, no hepatomegaly, no splenomegaly. Appropriate surgical scars for stated surgeries. no tenderness left inguinal surgical site. No cva tenderness. No erythema of abdominal wall, no RUQ tenderness   Genitourinary:   deferred    Extremities:    no clubbing, cyanosis; no joint effusions or swelling; Full ROM of all large joints to the upper and lower extremities; muscle mass appropriate for age    Neurologic:   No gross focal sensory abnormalities; 5/5 muscle strength to upper and lower extremities. Speech appropriate.  Cranial nerves intact                         Skin:   No rash or ulcers noted    Wound:   Left inguinal surgical site wound healed       Back:  no spinal tenderness , minimal left CVA tenderness, no paraspinal guarding or rigidity       Psychiatric:   No suicidal or homicidal ideations, appropriate mood and affect                  Labs: Results:   Chemistry Recent Labs      08/30/17   0316  08/29/17   1333  08/29/17   0301   08/28/17   0317   GLU  105*   --   97   --   143*   NA  134*   --   137   --   133*   K  3.5  3.2*  3.4*   < >  3.1*   CL  96*   --   100   --   99*   CO2  32   --   28   --   26   BUN  12   --   7   --   11   CREA  0.67   --   0.74   --   0.88   CA 8.4*   --   8.0*   --   7.3*   AGAP  6   --   9   --   8   BUCR  18   --   9*   --   13   AP   --    --   54   --    --    TP   --    --   7.0   --    --    ALB   --    --   2.3*   --    --    GLOB   --    --   4.7*   --    --    AGRAT   --    --   0.5*   --    --     < > = values in this interval not displayed.       CBC w/Diff Recent Labs      08/30/17   0316  08/29/17   0301  08/28/17   0317   WBC  6.5  5.8  6.7   RBC  2.86*  2.91*  2.84*   HGB  7.5*  7.7*  7.6*   HCT  24.1*  25.0*  24.5*   PLT  284  243  225   GRANS  65  75*  79*   LYMPH  22  13*  12*   EOS  1  1  1      Microbiology Recent Labs      08/28/17   1715  08/28/17   1630   CULT  MODERATE YEAST*  NO GROWTH AFTER 18 HOURS          RADIOLOGY:    All available imaging studies/reports in [facility] for this admission were reviewed    Dr. Alfredo Hernandez, Infectious Disease Specialist  [phone]  August 30, 2017  2:19 PM

## 2017-08-30 NOTE — PROGRESS NOTES
Awaiting final result of MRA abd for possible filter infection, Dr. Antonietta English consulted  WBC 6.5  Blood Cultures Negative  +Yeast in respiratory culture  Spine: Reports that low-back pain has improved. No leg pain. 4/5 BLE strength throughout. Neuro Intact. Still having left psoas pain, pt reports that he feels it's higher up than previous infection site.     Rebekah Spring, NP

## 2017-08-30 NOTE — PROGRESS NOTES
*ATTENTION:  This note has been created by a medical student for educational purposes only. Please do not refer to the content of this note for clinical decision-making, billing, or other purposes. Please see attending physicians note to obtain clinical information on this patient. *       Intern Progress Note  [facility]       Patient: Skyla Meadows MRN: [id number]  CSN: [id number]    YOB: 1940  Age: 65 y.o. Sex: male    DOA: 8/25/2017 LOS:  LOS: 5 days                    Subjective:   Mr. Rasta Lofton is a 66 y/o M with a PMH of HTN, COPD, and Afib who presented on 08/25 with a L psoas abscess. Overnight complained of back pain and was given Norco. Nurse noted that heart rate increased with activity, but was otherwise asymptomatic. This morning, pt states he is generally doing well. Back pain is currently 2-3/10, much improved from the 10/10 upon admission. Has a little SPB. Denies fever, abd pain, palpitations. Still constipated, hasn't had a BM in 2 days, flatus. ROS   Review of Systems   Constitutional: Negative for chills and fever. Eyes: Negative for blurred vision and double vision. Respiratory: Negative for cough and shortness of breath. Cardiovascular: Negative for chest pain, palpitations and leg swelling. Gastrointestinal: Positive for constipation. Negative for abdominal pain, diarrhea, nausea and vomiting. Genitourinary: Negative for dysuria, frequency, hematuria and urgency. Neurological: Negative for dizziness and headaches.      Objective:      Patient Vitals for the past 24 hrs:   Temp Pulse Resp BP SpO2   08/30/17 0819 - - - - 93 %   08/30/17 0350 97 °F (36.1 °C) 99 24 138/78 97 %   08/30/17 0132 98.2 °F (36.8 °C) (!) 101 18 125/69 95 %   08/29/17 2113 99 °F (37.2 °C) (!) 113 18 122/78 96 %   08/29/17 1729 98.7 °F (37.1 °C) (!) 105 18 120/58 95 %   08/29/17 1223 97 °F (36.1 °C) (!) 116 19 130/69 99 %         Intake/Output Summary (Last 24 hours) at 08/30/17 9103  Last data filed at 08/30/17 2058   Gross per 24 hour   Intake              415 ml   Output             1300 ml   Net             -885 ml       Physical Exam   General: Alert, oriented, NAD  CV: irregularly irregular rhythm  Resp: wheezes bilaterally   Abd: slightly firm, distended, tenderness RUQ 5/10 upon palpation 0/10 when not palpated    Scheduled Medications Reviewed:  Current Facility-Administered Medications   Medication Dose Route Frequency    warfarin (COUMADIN) tablet 2.5 mg  2.5 mg Oral NOW    potassium chloride (KLOR-CON) packet 20 mEq  20 mEq Oral DAILY    polyethylene glycol (MIRALAX) packet 17 g  17 g Oral DAILY    prednisoLONE acetate (PRED FORTE) 1 % ophthalmic suspension 1 Drop  1 Drop Right Eye QID    ketorolac (ACULAR) 0.5 % ophthalmic solution 1 Drop  1 Drop Right Eye DAILY    [START ON 9/1/2017] warfarin (COUMADIN) tablet 2.5 mg  2.5 mg Oral every Friday    WARFARIN INFORMATION NOTE (COUMADIN)   Other Q24H    warfarin (COUMADIN) tablet 5 mg  5 mg Oral Once per day on Sun Mon Tue Wed Thu Sat    meropenem (MERREM) 1 g in 0.9% sodium chloride (MBP/ADV) 50 mL MBP  1 g IntraVENous Q8H    chlorthalidone (HYGROTEN) tablet 25 mg  25 mg Oral DAILY    amLODIPine (NORVASC) tablet 10 mg  10 mg Oral DAILY    digoxin (LANOXIN) tablet 0.125 mg  0.125 mg Oral DAILY    fluticasone furoate (ARNUITY ELLIPTA) 100 mcg/puff  1 Puff Inhalation DAILY    docusate sodium (COLACE) capsule 100 mg  100 mg Oral BID         Imaging, microbiology, and EKG/Telemetry:  Blood cultures - NGTD  EKG - A fib   MRA - pending    Assessment/Plan   L Psoas Abscess & L4/L5 Discitis  - s/p 3 I&D  - IV meropenem 1g q8  - Norco, Narcan, Tylenol PRN for pain  - Followed by: Ortho, ID, IR  - pending MRA to r/o infrarenal aneurysm per ID, WBC improved (6.5 today from 5.8 yesterday), afebrile currently, cultures neg  - continue to monitor for fever and pain    COPD  - on 2L nasal cannula - today 97%  - duonebs 3ml PRN  - fluticasone 100 mcg 1 puff daily    HTN  - amlodipine 10 mg daily PO  - chlorthalidone 25 mg daily    Chronic Afib   - asymptomatic  - digoxin 0.125mg daily   - warfarin 5mg daily 6 days/week - INR was 1.6 and PT 18.2, goal INR should be between 2-3 so will add additional 2.5 mg and get an INR check in 3 days     Constipation  - miralx (17 g) and colace (100 mg 2/day)  - soap suds enema tomorrow if constipation continues    Hypokalemia  - today 3.5 - up from 3.4 yesterday  - continue to monitor and replete 20 meQ PO daily if drops    Hyponatremia  - currently 134, drop from 137 yesterday  - likely due to poor PO intake  - monitor - if continues to drop replete with NS    Diet: Cardiac  DVT Prophylaxis: Warfarin  Code Status: Full      @SIG@

## 2017-08-30 NOTE — ROUTINE PROCESS
Bedside shift change report given to Clayton Dubois. (oncoming nurse) by Kristian Alexander RN (offgoing nurse). Report given with SBAR, Kardex, Intake/Output, MAR and Recent Results.

## 2017-08-30 NOTE — PROGRESS NOTES
Consult requested by ID  Patient with a recurrent psoas abscess and this is in close proximity to prior aneurysm repair    In February of 2016, this case was reviewed by Dr Rosana Shukla, when he was admitted at that time with the psoas abscess  There was concern of aortitis, EVAR 2007, extension proximal secondary to proximal growth 2012, never open procedure  It was noted that he had thickened aorta on all CT's available since 2012, prior 2008  No fluid on guided aspiration at that time    He now has fevers, back pain, left psoas collection, findings of L4-L5 discitis on mri lumbar spine     Etiology of infection not clear, and no cultures sent from drainage fluid done by IR. Close proximity of the infection to the abscess is concerning for colonized graft as the source of recurrent lumbar infection. MRA has been ordered and is pending review    Will notify Dr Rosana Shukla to review imaging once available and to offer suggestion/feedback     Reviewed ct.  EVAR stable  Psoas abcess noted, agree with MRI (pnd)  Consider GI source if disc normal

## 2017-08-30 NOTE — PROGRESS NOTES
Problem: Patient Education: Go to Patient Education Activity  Goal: Patient/Family Education  Outcome: Resolved/Met Date Met:  08/30/17  Pt repeated back that he knows when to call for help

## 2017-08-30 NOTE — PROGRESS NOTES
Problem: Falls - Risk of  Goal: *Absence of Falls  Document Jennifer Fall Risk and appropriate interventions in the flowsheet.    Outcome: Progressing Towards Goal  Fall Risk Interventions:  Mobility Interventions: Patient to call before getting OOB           Medication Interventions: Assess postural VS orthostatic hypotension, Patient to call before getting OOB, Teach patient to arise slowly     Elimination Interventions: Call light in reach, Patient to call for help with toileting needs, Urinal in reach     History of Falls Interventions: Door open when patient unattended

## 2017-08-30 NOTE — PROGRESS NOTES
*ATTENTION:  This note has been created by a medical student for educational purposes only. Please do not refer to the content of this note for clinical decision-making, billing, or other purposes. Please see attending physicians note to obtain clinical information on this patient. *         Progress Note  [facility]       Patient: Edgar Handy MRN: [id number]  CSN: [id number]    YOB: 1940  Age: 65 y.o. Sex: male    DOA: 8/25/2017 LOS:  LOS: 5 days                    Subjective:     Acute events: Pt resting in bed comfortably. Pain well controlled with medications- 2/10 this AM. No BM in the last day, + flatus. Abd more distended and less firm. Pt still has RUQ tenderness, unchanged from yesterday. Pt had elevated HR overnight while walking but remained asymptomatic with no other complaints.        Review of Systems   Constitutional: Negative for chills and fever. Eyes: Negative for blurred vision  Respiratory: Negative for cough and shortness of breath. Cardiovascular: Negative for chest pain, palpitations and leg swelling. Gastrointestinal: Positive for constipation. Negative for abdominal pain, diarrhea, nausea and vomiting. Genitourinary: Negative for dysuria, frequency, hematuria and urgency. Neurological: Negative for dizziness and headaches.    MSK: Negative for swelling       Objective:      Patient Vitals for the past 24 hrs:   Temp Pulse Resp BP SpO2   08/30/17 0350 97 °F (36.1 °C) 99 24 138/78 97 %   08/30/17 0132 98.2 °F (36.8 °C) (!) 101 18 125/69 95 %   08/29/17 2113 99 °F (37.2 °C) (!) 113 18 122/78 96 %   08/29/17 1729 98.7 °F (37.1 °C) (!) 105 18 120/58 95 %   08/29/17 1223 97 °F (36.1 °C) (!) 116 19 130/69 99 %   08/29/17 0823 - 100 - 129/78 -         Intake/Output Summary (Last 24 hours) at 08/30/17 0815  Last data filed at 08/30/17 0658   Gross per 24 hour   Intake              415 ml   Output             1800 ml   Net            -1385 ml Intake and Output:   Current Shift:    Last three shifts: 08/28 1901 - 08/30 0700  In: 415 [P.O.:360; I.V.:50]  Out: 2900 [Urine:2900]    Physical Exam:   GENERAL: alert, cooperative, no distress, appears stated age  LUNG: clear to auscultation bilaterally, diminished breath sounds R anterior, L anterior  HEART: irregularly irregular rhythm  ABDOMEN: soft, non-tender. Bowel sounds hyperactive. Less distention and less firm today. RUQ tenderness with palpation. EXTREMITIES:  extremities normal, atraumatic, no cyanosis or edema. Bruising on extremities. WOUND: Clean dry intact. Serosanguinous fluid appreciated in drainage  PSYCH: He has a normal mood and affect.  His behavior is normal.      Lab/Data Review:   Recent Results (from the past 24 hour(s))   POTASSIUM    Collection Time: 08/29/17  1:33 PM   Result Value Ref Range    Potassium 3.2 (L) 3.5 - 5.5 mmol/L   VANCOMYCIN, TROUGH    Collection Time: 08/29/17  5:25 PM   Result Value Ref Range    Vancomycin,trough 2.9 (L) 10.0 - 20.0 ug/mL    Reported dose date: 20170828      Reported dose time: 2200      Reported dose: 1500 MG UNITS   EKG, 12 LEAD, INITIAL    Collection Time: 08/29/17 11:41 PM   Result Value Ref Range    Ventricular Rate 93 BPM    Atrial Rate 267 BPM    QRS Duration 96 ms    Q-T Interval 326 ms    QTC Calculation (Bezet) 405 ms    Calculated R Axis -39 degrees    Calculated T Axis 73 degrees    Diagnosis       Atrial fibrillation with premature ventricular or aberrantly conducted   complexes  Left axis deviation  Septal infarct (cited on or before 25-AUG-2017)  Abnormal ECG  When compared with ECG of 25-AUG-2017 21:18,  Questionable change in initial forces of Septal leads     MAGNESIUM    Collection Time: 08/30/17  3:16 AM   Result Value Ref Range    Magnesium 2.1 1.6 - 2.6 mg/dL   PROTHROMBIN TIME + INR    Collection Time: 08/30/17  3:16 AM   Result Value Ref Range    Prothrombin time 18.2 (H) 11.5 - 15.2 sec    INR 1.6 (H) 0.8 - 1.2 CBC WITH AUTOMATED DIFF    Collection Time: 08/30/17  3:16 AM   Result Value Ref Range    WBC 6.5 4.6 - 13.2 K/uL    RBC 2.86 (L) 4.70 - 5.50 M/uL    HGB 7.5 (L) 13.0 - 16.0 g/dL    HCT 24.1 (L) 36.0 - 48.0 %    MCV 84.3 74.0 - 97.0 FL    MCH 26.2 24.0 - 34.0 PG    MCHC 31.1 31.0 - 37.0 g/dL    RDW 15.5 (H) 11.6 - 14.5 %    PLATELET 265 784 - 842 K/uL    MPV 8.8 (L) 9.2 - 11.8 FL    NEUTROPHILS 65 40 - 73 %    LYMPHOCYTES 22 21 - 52 %    MONOCYTES 12 (H) 3 - 10 %    EOSINOPHILS 1 0 - 5 %    BASOPHILS 0 0 - 2 %    ABS. NEUTROPHILS 4.2 1.8 - 8.0 K/UL    ABS. LYMPHOCYTES 1.4 0.9 - 3.6 K/UL    ABS. MONOCYTES 0.7 0.05 - 1.2 K/UL    ABS. EOSINOPHILS 0.1 0.0 - 0.4 K/UL    ABS.  BASOPHILS 0.0 0.0 - 0.1 K/UL    DF AUTOMATED     METABOLIC PANEL, BASIC    Collection Time: 08/30/17  3:16 AM   Result Value Ref Range    Sodium 134 (L) 136 - 145 mmol/L    Potassium 3.5 3.5 - 5.5 mmol/L    Chloride 96 (L) 100 - 108 mmol/L    CO2 32 21 - 32 mmol/L    Anion gap 6 3.0 - 18 mmol/L    Glucose 105 (H) 74 - 99 mg/dL    BUN 12 7.0 - 18 MG/DL    Creatinine 0.67 0.6 - 1.3 MG/DL    BUN/Creatinine ratio 18 12 - 20      GFR est AA >60 >60 ml/min/1.73m2    GFR est non-AA >60 >60 ml/min/1.73m2    Calcium 8.4 (L) 8.5 - 10.1 MG/DL       Scheduled Medications Reviewed:  Current Facility-Administered Medications   Medication Dose Route Frequency    potassium chloride (KLOR-CON) packet 20 mEq  20 mEq Oral DAILY    polyethylene glycol (MIRALAX) packet 17 g  17 g Oral DAILY    prednisoLONE acetate (PRED FORTE) 1 % ophthalmic suspension 1 Drop  1 Drop Right Eye QID    ketorolac (ACULAR) 0.5 % ophthalmic solution 1 Drop  1 Drop Right Eye DAILY    [START ON 9/1/2017] warfarin (COUMADIN) tablet 2.5 mg  2.5 mg Oral every Friday    WARFARIN INFORMATION NOTE (COUMADIN)   Other Q24H    warfarin (COUMADIN) tablet 5 mg  5 mg Oral Once per day on Sun Mon Tue Wed Thu Sat    meropenem (MERREM) 1 g in 0.9% sodium chloride (MBP/ADV) 50 mL MBP  1 g IntraVENous Q8H    chlorthalidone (HYGROTEN) tablet 25 mg  25 mg Oral DAILY    amLODIPine (NORVASC) tablet 10 mg  10 mg Oral DAILY    digoxin (LANOXIN) tablet 0.125 mg  0.125 mg Oral DAILY    fluticasone furoate (ARNUITY ELLIPTA) 100 mcg/puff  1 Puff Inhalation DAILY    docusate sodium (COLACE) capsule 100 mg  100 mg Oral BID         Imaging, microbiology, and EKG/Telemetry:  8/28/2017: Culture Respiratory Moderate yeast  8/30/2017: MRA ABD W WO CONT: Pending  8/29/2017: EKG 12 lead: Questionable change in initial forces of Septal leads (cited on or before 25-AUG-2017)  Assessment/Plan     65 y. o. male with PMH Osteomyelitis, psoas abscess, HTN, Atrial fibrillation, CAD, Pulmonary hypertension, COPD now admitted with Sepsis secondary to left paraspinal and psoas abscess .      Sepsis secondary to left paraspinal and psoas abscess (improving) and L4/5 discitis, with bilateral lumbar spinous process, B/L SLR positive, in setting of patient with history of multfocal osteomyelitis and prior psoas abscess. WBC now 6.5 and RR 24. Elevated HR with walking noted last night.  -- I/Os  -- Dr Emma Blackburn ([facility]: [phone]) consulted and following  -- Interventional radiology following: POD 3 s/p I&D  -- ID following: continue meropenem pending cultures. Moderate yeast in respiratory managed by ID.  -- MRI ordered to r/o infrarenal aneurysm. Results pending. Changed to MRA  -- Norco 10/325mg 1 tab q4hrs PRN pain; Narcan PRN, Tylenol 650 q6h PRN  -- Ice packs PRN      Hypokalemia  -- 3.5 today up from 3.2, Klor-con 20mEq daily  -- repeat K+ daily      Chronic Atrial fibrillation,  followed by Dr Juan Merritt, Cardiology. Pulmonary hypertension with peak pressure 60mmHg.  Mild CAD by angiography in 2012  -- Current symptoms: none  -- Continue control agent: digoxin 125mcg  Every day   -- C anticoagulant: [id number] every day except 2.5mg Fridays  -- INR 1.6- monitor daily PT/INR.   -- Remote cardiac monitoring      Hyponatremia (resolved), Na 134 this AM   -- Will monitor with daily BMP. DC'd NS yesterday  -- Encourage PO intake  Hypertension (controlled). blood pressure overnight range 116/70138/78  -- Vital signs per unit routine  -- Continue home amlodipine 10mg every day   -- Continue home chlorthalidone 25mg  -- Labetalol 5mg IV PRN for BP>220/110      Chronic Obstructive Pulmonary Disease (COPD).  Followed in the past by Dr Epifanio Marrero. No spirometry or PFTs on record.    -- O2 sats goal 90-94%  -- Continue home 2L O2 via NC  -- Duonebs PRN  -- Fluticasone furoate 100mcg/puff every day  -- Incentive spirometry       Constipation (improved): No BM yesterday, +flatus, + BS, distention, RUQ tenderness  - Colace, Miralax,   - soap suds enema ordered  - LFTs wnl  - MRA pending      Diet: Cardiac w/ nutritional supplements per nutrition  DVT Prophylaxis: Warfarin  Code Status: FULL  Point of Contact: Sharon Dire: [phone]    Alfie Austin MS4 OPALMS  8/30/2017

## 2017-08-31 ENCOUNTER — APPOINTMENT (OUTPATIENT)
Dept: ULTRASOUND IMAGING | Age: 77
DRG: 871 | End: 2017-08-31
Attending: FAMILY MEDICINE
Payer: MEDICARE

## 2017-08-31 VITALS
DIASTOLIC BLOOD PRESSURE: 71 MMHG | SYSTOLIC BLOOD PRESSURE: 107 MMHG | BODY MASS INDEX: 27.15 KG/M2 | OXYGEN SATURATION: 97 % | RESPIRATION RATE: 18 BRPM | TEMPERATURE: 98.2 F | HEIGHT: 67 IN | WEIGHT: 173 LBS | HEART RATE: 91 BPM

## 2017-08-31 LAB
ANION GAP SERPL CALC-SCNC: 6 MMOL/L (ref 3–18)
BACTERIA SPEC CULT: ABNORMAL
BACTERIA SPEC CULT: ABNORMAL
BACTERIA SPEC CULT: NORMAL
BACTERIA SPEC CULT: NORMAL
BASOPHILS # BLD: 0 K/UL (ref 0–0.1)
BASOPHILS NFR BLD: 0 % (ref 0–2)
BUN SERPL-MCNC: 12 MG/DL (ref 7–18)
BUN/CREAT SERPL: 17 (ref 12–20)
CALCIUM SERPL-MCNC: 8.6 MG/DL (ref 8.5–10.1)
CHLORIDE SERPL-SCNC: 93 MMOL/L (ref 100–108)
CO2 SERPL-SCNC: 35 MMOL/L (ref 21–32)
CREAT SERPL-MCNC: 0.72 MG/DL (ref 0.6–1.3)
DIFFERENTIAL METHOD BLD: ABNORMAL
EOSINOPHIL # BLD: 0.2 K/UL (ref 0–0.4)
EOSINOPHIL NFR BLD: 2 % (ref 0–5)
ERYTHROCYTE [DISTWIDTH] IN BLOOD BY AUTOMATED COUNT: 15.7 % (ref 11.6–14.5)
GLUCOSE SERPL-MCNC: 89 MG/DL (ref 74–99)
GRAM STN SPEC: ABNORMAL
HCT VFR BLD AUTO: 24.8 % (ref 36–48)
HGB BLD-MCNC: 7.7 G/DL (ref 13–16)
INR PPP: 1.6 (ref 0.8–1.2)
LYMPHOCYTES # BLD: 1.9 K/UL (ref 0.9–3.6)
LYMPHOCYTES NFR BLD: 24 % (ref 21–52)
MAGNESIUM SERPL-MCNC: 1.9 MG/DL (ref 1.6–2.6)
MCH RBC QN AUTO: 26.4 PG (ref 24–34)
MCHC RBC AUTO-ENTMCNC: 31 G/DL (ref 31–37)
MCV RBC AUTO: 84.9 FL (ref 74–97)
MONOCYTES # BLD: 0.8 K/UL (ref 0.05–1.2)
MONOCYTES NFR BLD: 10 % (ref 3–10)
NEUTS SEG # BLD: 5 K/UL (ref 1.8–8)
NEUTS SEG NFR BLD: 64 % (ref 40–73)
PLATELET # BLD AUTO: 317 K/UL (ref 135–420)
PMV BLD AUTO: 9.3 FL (ref 9.2–11.8)
POTASSIUM SERPL-SCNC: 3.5 MMOL/L (ref 3.5–5.5)
PROTHROMBIN TIME: 18.7 SEC (ref 11.5–15.2)
RBC # BLD AUTO: 2.92 M/UL (ref 4.7–5.5)
SERVICE CMNT-IMP: ABNORMAL
SERVICE CMNT-IMP: NORMAL
SERVICE CMNT-IMP: NORMAL
SODIUM SERPL-SCNC: 134 MMOL/L (ref 136–145)
WBC # BLD AUTO: 7.8 K/UL (ref 4.6–13.2)

## 2017-08-31 PROCEDURE — 36592 COLLECT BLOOD FROM PICC: CPT

## 2017-08-31 PROCEDURE — 97165 OT EVAL LOW COMPLEX 30 MIN: CPT

## 2017-08-31 PROCEDURE — 76882 US LMTD JT/FCL EVL NVASC XTR: CPT

## 2017-08-31 PROCEDURE — 80048 BASIC METABOLIC PNL TOTAL CA: CPT | Performed by: FAMILY MEDICINE

## 2017-08-31 PROCEDURE — 74011250636 HC RX REV CODE- 250/636: Performed by: INTERNAL MEDICINE

## 2017-08-31 PROCEDURE — 83735 ASSAY OF MAGNESIUM: CPT | Performed by: FAMILY MEDICINE

## 2017-08-31 PROCEDURE — 97530 THERAPEUTIC ACTIVITIES: CPT

## 2017-08-31 PROCEDURE — 74011250637 HC RX REV CODE- 250/637: Performed by: STUDENT IN AN ORGANIZED HEALTH CARE EDUCATION/TRAINING PROGRAM

## 2017-08-31 PROCEDURE — 85610 PROTHROMBIN TIME: CPT | Performed by: FAMILY MEDICINE

## 2017-08-31 PROCEDURE — 97116 GAIT TRAINING THERAPY: CPT

## 2017-08-31 PROCEDURE — 74011000250 HC RX REV CODE- 250: Performed by: FAMILY MEDICINE

## 2017-08-31 PROCEDURE — 74011250637 HC RX REV CODE- 250/637: Performed by: FAMILY MEDICINE

## 2017-08-31 PROCEDURE — 94640 AIRWAY INHALATION TREATMENT: CPT

## 2017-08-31 PROCEDURE — 0KPYX0Z REMOVAL OF DRAINAGE DEVICE FROM LOWER MUSCLE, EXTERNAL APPROACH: ICD-10-PCS | Performed by: ORTHOPAEDIC SURGERY

## 2017-08-31 PROCEDURE — 74011000258 HC RX REV CODE- 258: Performed by: INTERNAL MEDICINE

## 2017-08-31 PROCEDURE — 85025 COMPLETE CBC W/AUTO DIFF WBC: CPT | Performed by: FAMILY MEDICINE

## 2017-08-31 RX ORDER — DOCUSATE SODIUM 100 MG/1
100 CAPSULE, LIQUID FILLED ORAL 2 TIMES DAILY
Qty: 60 CAP | Refills: 2 | Status: SHIPPED | OUTPATIENT
Start: 2017-08-31 | End: 2017-11-10 | Stop reason: ALTCHOICE

## 2017-08-31 RX ORDER — POTASSIUM CHLORIDE 1.5 G/1.77G
20 POWDER, FOR SOLUTION ORAL DAILY
Qty: 30 PACKET | Refills: 0 | Status: SHIPPED | OUTPATIENT
Start: 2017-08-31 | End: 2017-11-10 | Stop reason: ALTCHOICE

## 2017-08-31 RX ORDER — HYDROCODONE BITARTRATE AND ACETAMINOPHEN 10; 325 MG/1; MG/1
1 TABLET ORAL
Qty: 15 TAB | Refills: 0 | Status: SHIPPED | OUTPATIENT
Start: 2017-08-31 | End: 2017-11-10 | Stop reason: ALTCHOICE

## 2017-08-31 RX ORDER — WARFARIN 2.5 MG/1
2.5 TABLET ORAL DAILY
Qty: 60 TAB | Refills: 0 | Status: SHIPPED | OUTPATIENT
Start: 2017-08-31 | End: 2019-05-29

## 2017-08-31 RX ORDER — POLYETHYLENE GLYCOL 3350 17 G/17G
17 POWDER, FOR SOLUTION ORAL DAILY
Qty: 30 PACKET | Refills: 0 | Status: SHIPPED | OUTPATIENT
Start: 2017-08-31 | End: 2017-11-10 | Stop reason: ALTCHOICE

## 2017-08-31 RX ORDER — WARFARIN SODIUM 5 MG/1
5 TABLET ORAL ONCE
Qty: 1 TAB | Refills: 0 | Status: SHIPPED | OUTPATIENT
Start: 2017-08-31 | End: 2017-08-31

## 2017-08-31 RX ADMIN — CHLORTHALIDONE 25 MG: 25 TABLET ORAL at 08:47

## 2017-08-31 RX ADMIN — PREDNISOLONE ACETATE 1 DROP: 10 SUSPENSION/ DROPS OPHTHALMIC at 12:18

## 2017-08-31 RX ADMIN — HYDROCODONE BITARTRATE AND ACETAMINOPHEN 1 TABLET: 10; 325 TABLET ORAL at 08:47

## 2017-08-31 RX ADMIN — MEROPENEM 1 G: 1 INJECTION, POWDER, FOR SOLUTION INTRAVENOUS at 08:46

## 2017-08-31 RX ADMIN — FLUTICASONE FUROATE 1 PUFF: 100 POWDER RESPIRATORY (INHALATION) at 08:05

## 2017-08-31 RX ADMIN — MEROPENEM 1 G: 1 INJECTION, POWDER, FOR SOLUTION INTRAVENOUS at 00:16

## 2017-08-31 RX ADMIN — HYDROCODONE BITARTRATE AND ACETAMINOPHEN 1 TABLET: 10; 325 TABLET ORAL at 15:50

## 2017-08-31 RX ADMIN — POLYETHYLENE GLYCOL 3350 17 G: 17 POWDER, FOR SOLUTION ORAL at 08:47

## 2017-08-31 RX ADMIN — IPRATROPIUM BROMIDE AND ALBUTEROL SULFATE 3 ML: .5; 3 SOLUTION RESPIRATORY (INHALATION) at 08:05

## 2017-08-31 RX ADMIN — HYDROCODONE BITARTRATE AND ACETAMINOPHEN 1 TABLET: 10; 325 TABLET ORAL at 00:16

## 2017-08-31 RX ADMIN — MEROPENEM 1 G: 1 INJECTION, POWDER, FOR SOLUTION INTRAVENOUS at 15:53

## 2017-08-31 RX ADMIN — PREDNISOLONE ACETATE 1 DROP: 10 SUSPENSION/ DROPS OPHTHALMIC at 08:54

## 2017-08-31 RX ADMIN — POTASSIUM CHLORIDE 20 MEQ: 1.5 POWDER, FOR SOLUTION ORAL at 08:47

## 2017-08-31 RX ADMIN — DIGOXIN 0.12 MG: 0.12 TABLET ORAL at 08:47

## 2017-08-31 RX ADMIN — KETOROLAC TROMETHAMINE 1 DROP: 5 SOLUTION OPHTHALMIC at 08:54

## 2017-08-31 RX ADMIN — ACETAMINOPHEN 650 MG: 325 TABLET ORAL at 03:47

## 2017-08-31 RX ADMIN — AMLODIPINE BESYLATE 10 MG: 10 TABLET ORAL at 08:47

## 2017-08-31 RX ADMIN — DOCUSATE SODIUM 100 MG: 100 CAPSULE, LIQUID FILLED ORAL at 08:47

## 2017-08-31 NOTE — PROGRESS NOTES
Problem: Self Care Deficits Care Plan (Adult)  Goal: *Acute Goals and Plan of Care (Insert Text)  Outcome: Resolved/Met Date Met:  08/31/17  OCCUPATIONAL THERAPY EVALUATION/DISCHARGE     Patient: Rachell Carranza (48 y.o. male)  Date: 8/31/2017  Primary Diagnosis: Psoas abscess ([address]  Psoas abscess ([address]  Abscess        Precautions:   Fall, Skin      ASSESSMENT AND RECOMMENDATIONS:  Based on the objective data described below, the patient was in bed upon arrival. Patient was independent with bed mobility and needed supervision during functional mobility/simulated toilet transfer with no AD. Patient was able to perform LE dressing(donning underwear) with supervision; educated patient to sit during self care tasks for safety. Patient reported he doesn't wear socks at home. Patient deferred to PT for mobility. Skilled acute care occupational therapy is not indicated at this time. Discharge Recommendations: Home Health  Further Equipment Recommendations for Discharge: shower chair (educated patient on recommendation)      Barriers to Learning/Limitations: None      COMPLEXITY      Eval Complexity: History: LOW Complexity : Brief history review ; Examination: LOW Complexity : 1-3 performance deficits relating to physical, cognitive , or psychosocial skils that result in activity limitations and / or participation restrictions ; Decision Making:LOW Complexity : No comorbidities that affect functional and no verbal or physical assistance needed to complete eval tasks  Assessment: Low Complexity          G-CODES:      Self Care  Current  CI= 1-19%   Goal  CI= 1-19%   D/C  CI= 1-19%. The severity rating is based on the Level of Assistance required for Functional Mobility and ADLs. SUBJECTIVE:   Patient stated I can do that.       OBJECTIVE DATA SUMMARY:       Past Medical History:   Diagnosis Date    Aneurysm [facility])       AAA repair 2006 & 2012    Aorto-iliac duplex 02/13/2015     Tech difficult. Patent aorta bi-iliac endograft w/o leak or limb dysfunction.  Arrhythmia       a fib    Cardiac cath 11/01/2012     RCA (sm, nondom) patent. LM patent. LAD 25%. CX (dom) 45% mid. LVEDP 12 mmHg. No WMA. PA 27/12. W 10-12. CO/CI 5.2/2.6 (TD).  Cardiac echocardiogram, abnormal 02/20/2016     EF 55%. No WMA. Indeterminate diastolic fx. RVSP 60 mmHg. Severe LAE. Mild MR. Mod TR.  IVCE. Similar to study of 10/26/12.  Cardiovascular aorto-iliac duplex 02/13/2015     Patent aorta bi-iliac endovascular graft w/o leak or limb dysfunction. Sac measures 4.21 x 4.47 cm (4.4 x 4.6 cm on 1/31/14).  Cardiovascular LE peripheral arterial testing 07/29/2013     No significant LE arterial disease bilaterally. R GHADA 1. 12.  L GHADA 1. 12.  R DBI 0.83. L DBI 0.71. Exercise deferred.  Cardiovascular renal duplex 10/31/2012     No RA stenosis. Intrinsic/med disease in left kidney. Patent aortic endograft. Patent, thrombus-free renal veins bilaterally.  Carotid duplex 07/29/2013     Mild <50% bilateral ICA plaquing.       Chronic lung disease      Cigarette smoker      Congestive heart failure (CHF) (HCC)      COPD (chronic obstructive pulmonary disease) (HCC)       and emphysema; likely secondary to tobacco abuse    Difficult intubation      Dyslipidemia       low HDL    Emphysema      HTN (hypertension)      Hypercholesterolemia      Increased prostate specific antigen (PSA) velocity      Long term (current) use of anticoagulants       coumadin    Osteoarthritis      Osteomyelitis (HCC)      Paroxysmal atrial fibrillation (HCC)      Peripheral vascular disease ([address]       AAA repair 12/2007    Persistent atrial Fibrillation      Persistent atrial fibrillation ([address]      Unspecified adverse effect of anesthesia       difficulty breathing placed in ICU Oct 2012 (AAA repair)     Past Surgical History:   Procedure Laterality Date    BRONCHOSCOPY DIAGNOSTIC   11/2/2012          CARDIAC CATHETERIZATION   11/1/2012          COLONOSCOPY N/A 7/26/2016     COLONOSCOPY with Polypectomies performed by Judy Thomason MD at [address] HX AAA REPAIR         2006 & 2012    HX HEART CATHETERIZATION   3/4/2009     1. RCA small, nondominant; patent. 2. LMCA patent. 3. LAD is long, wrapped around apical vessel. Diffuse, 20-30% stenosis noted. 4. CCA is large, dominant vessel. Diffuse 20-30% stenosis. 5. LVEDP is 16 mmHg. 6. Overall left ventricular systolic function mildly diminshed with est. EF 45%. Mild, global hypokinesis noted. 7. No significant mitral regurgitation or aortic stenosis noted. (see report)    HX HERNIA REPAIR   2/2014     rt inguinal     HX HERNIA REPAIR   01/2016     LEFT INGUINAL HERNIA REPAIR DR. Frederick Blackburn    REPAIR ING HERNIA,5+Y/O,JESSIE Left 1-20-16     Dr. Bakari Judd   11/1/2012           Prior Level of Function/Home Situation: Patient reported he was independent in basic self care tasks and functional mobility PTA. Home Situation  Home Environment: Private residence  # Steps to Enter: 5  One/Two Story Residence: Two story, live on 1st floor  Living Alone: No  Support Systems: Family member(s)  Patient Expects to be Discharged to[de-identified] Private residence  Current DME Used/Available at Home: Conner Diana, Wheelchair  Tub or Shower Type: Shower  [X]     Right hand dominant       [ ]     Left hand dominant  Cognitive/Behavioral Status:  Neurologic State: Alert  Orientation Level: Oriented X4  Cognition: Follows commands     Skin: No skin changes noted     Edema: No edema noted     Vision/Perceptual:       Acuity: Within Defined Limits       Coordination:  Coordination: Within functional limits (BUEs)       Balance:  Sitting: Intact  Standing: Impaired; Without support  Standing - Static: Good  Standing - Dynamic : Fair     Strength:  Strength:  Within functional limits ( strength)     Tone & Sensation:  Tone: Normal (BUEs)  Sensation: Intact (BUEs)     Range of Motion:  AROM: Within functional limits (BUEs)     Functional Mobility and Transfers for ADLs:  Bed Mobility:  Rolling: Independent  Supine to Sit: Independent  Sit to Supine: Independent  Scooting: Independent  Transfers:  Sit to Stand: Supervision              Toilet Transfer : Supervision (simulated with no AD)                 ADL Assessment:(clinical judgement based on functional mobility)  Feeding: Independent     Oral Facial Hygiene/Grooming: Supervision     Bathing: Supervision     Upper Body Dressing: Independent     Lower Body Dressing: Supervision     Toileting: Supervision     Pain:  Pt reports 7/10 pain or discomfort prior to treatment. Pt reports 7/10 pain or discomfort post treatment. Activity Tolerance:   Fair     Please refer to the flowsheet for vital signs taken during this treatment. After treatment:   [ ]  Patient left in no apparent distress sitting up in chair  [X]  Patient left in no apparent distress in bed  [X]  Call bell left within reach  [X]  Nursing notified  [X]  Caregiver present  [ ]  Bed alarm activated      COMMUNICATION/EDUCATION:   Communication/Collaboration:  [X]      Home safety education was provided and the patient/caregiver indicated understanding. [X]      Patient/family have participated as able and agree with findings and recommendations. [ ]      Patient is unable to participate in plan of care at this time.      Gloria Nicholas OTR/L  Time Calculation: 15 mins

## 2017-08-31 NOTE — ROUTINE PROCESS
Bedside shift change report given to [place] (oncoming nurse) by Palak Ansari RN (offgoing nurse). Report given with SBAR, Kardex, Intake/Output, MAR and Recent Results.

## 2017-08-31 NOTE — PROGRESS NOTES
Bedside and Verbal shift change report given to Anabel Mishra RN (oncoming nurse) by Rodney Manley RN (offgoing nurse). Report given with George SERNA and MAR.

## 2017-08-31 NOTE — PROGRESS NOTES
For dc today with fu for OP IV Antibiotics as set up yesterday by FABI. Pt has a copy of appointment dates & times with location at bedside & copy placed in chart.     HU Hankins RN Discharge Nurse and pt's RN Roxanen Ram

## 2017-08-31 NOTE — PROGRESS NOTES
vss afeb  Neuro intact  Pain much improved  Drain essentially dry  AP    Awaiting results of MRA and vascular consult  Please remove drain  I will see patient at already scheduled appointment on September 11 for follow up  Continue abx per ID  Assume DC if MRA cleared by vascular surgery

## 2017-08-31 NOTE — PROGRESS NOTES
Infectious Disease Progress Note    Requested by: dr. Eloy Vu    Reason: sepsis, lumbar discitis/osteomyelitis    Current abx Prior abx     Meropenem since 8/28 ciprofloxacin 8/26  Metronidazole, vancomycin  8/26-8/28  Pip/tazo since 8/27-8/28     Lines:       Assessment :    67 y.o. male with PMH AAA repair 2007, persistent a-fib, CHF, CAD, PVD, HTN, hyperlipidemia, COPD, right and left inguinal hernia repair on 1/20/16 admitted to [facility] on 8/4     Recent hospitalization 2/18-3/2 for sepsis (POA) likely due to enterobacter bloodstream infection,left iliopsoas myositis. Attempted ct guided drainage of left psoas collection - no fluid return. Tissue cultures negative. S/p meropenem completed 5/31/16      MRI 2/23 results reviewed - findings concerning for L3-L4 discitis. Psoas fluid collection very close to L3 spine. Also, enhancing edema surrounding the aortic aneursym concerning for aortitis.    now with one week h/o increasing left back pain radiating to left leg     MRI 8/4 - persistent but slightly diminished abnormal signal and enhancement within the L3-4 disc space and adjoining vertebral bodies. No epidural or paraspinous abscess. Suspected new level of infectious discitis at T10-11     Now with high grade fevers, back pain, left psoas collection, findings of L4-L5 discitis on mri lumbar spine    Highly complex clinical picture. Presentation c/w sepsis (POA) due to L4-5 discitis/osteomyelitis (acute), left psoas abscess. Exact microbial etiology of infection not clear since no cultures sent from drainage fluid. Improvement on current antibiotics would suggest bacterial infection. Patient previously had enterobacter infection. Has never had mrsa/resistant gram positives. Hence, I would modify abx to cover for prior enterobacter and monitor clinically. 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  Presentation worrisome for persistent focus of infection.      patient has aorto-biilliac graft and prior MRI in 2/2016 revealed fluid collection around the aortic aneurysm. Ct scan abd/pelvis  8/25 reveals stable size od abdominal aortic aneurysm which is approximately 4.5x4.2 cm, stable endograft. However, close proximity of the infection to the abscess is concerning for colonized graft as the source of recurrent lumbar infection. I will obtain further imaging studies and obtain vascular surgery input accordingly. Clinically stable     RUQ pain/tenderness - normal bilirubin, no cholecystitis noted on CT scan. Await MRA abdomen. Recommendations:     1. Continue iv meropenem while inpatient. Switch to iv ertapenem till 10/27/17 when discharged   2. Weekly cbc, bmp, crp while on antibiotics (labs to be faxed to me)  3. F/u with spine surgery as outpatient   4. F/u MRA abdomen results to evaluate for possible infection of infra renal/aortic aneurysm   5. Await Vascular surgery opinion regarding likelihood of colonized vascular graft leading to recurrent left psoas abscess - called  6. Obtain MRI lumbar spine after 6 weeks     Will start making arrangements for outpatient iv abx - iv abx orders written     Advance Care planning: full code: discussed  with patient/surrogate decision maker: Kathrin Gosselin: [phone]     Above plan was discussed in details with patient. Please call me if any further questions or concerns. Will continue to participate in the care of this patient. subjective:      Patient feels better today. Improved back pain. He has been afebrile. He denies abdominal pain, nausea, blood in stool/urine. He denies nausea, vomiting, diarrhea, hematemesis, blood in stool, dysuria, fever, chills.  Patient denies headaches, visual disturbances, sore throat, runny nose, earaches, cp, sob, chills, cough, abdominal pain, pain or weakness in extremities.        Home Medication List    Details   prednisoLONE acetate (PRED FORTE) 1 % ophthalmic suspension Administer 1 Drop to right eye four (4) times daily.      ketorolac (ACULAR) 0.5 % ophthalmic solution Administer 1 Drop to right eye daily. !! warfarin (COUMADIN) 2.5 mg tablet Take 2.5 mg by mouth Every Friday. 5 mg Mon, Tues, Wed, Thur, Sat, Sun  2.5 mg Fri      albuterol-ipratropium (DUO-NEB) 2.5 mg-0.5 mg/3 ml nebu 3 mL by Nebulization route every six (6) hours as needed. QVAR 80 mcg/actuation aero Refills: 0      chlorthalidone (HYGROTEN) 25 mg tablet Refills: 0      amLODIPine (NORVASC) 10 mg tablet Refills: 0      DIGITEK 125 mcg tablet take 1 tablet by mouth once daily  Qty: 30 Tab, Refills: 6      !! warfarin (COUMADIN) 5 mg tablet Refills: 0      albuterol (PROVENTIL HFA, VENTOLIN HFA, PROAIR HFA) 90 mcg/actuation inhaler Take 2 Puffs by inhalation every four (4) hours as needed for Wheezing or Shortness of Breath. Qty: 1 Inhaler, Refills: 5    Associated Diagnoses: COPD, severe (HCC)      OXYGEN-AIR DELIVERY SYSTEMS 2 L/min by Does Not Apply route daily. Continuous. Company is [employer]         cyclobenzaprine (FLEXERIL) 10 mg tablet Take 1 Tab by mouth three (3) times daily as needed for Muscle Spasm(s). Qty: 30 Tab, Refills: 0      diclofenac EC (VOLTAREN) 50 mg EC tablet Take 1 Tab by mouth three (3) times daily as needed. Qty: 30 Tab, Refills: 0      fluticasone (FLONASE) 50 mcg/actuation nasal spray 2 Sprays by Both Nostrils route daily. calcium-vitamin D (OSCAL 250) 250-125 mg-unit tablet Take 1 Tab by mouth daily. SENNA PLUS 8.6-50 mg per tablet Refills: 0      pravastatin (PRAVACHOL) 40 mg tablet Refills: 0      polyethylene glycol (MIRALAX) 17 gram/dose powder Refills: 0      furosemide (LASIX) 20 mg tablet Refills: 0      !! warfarin (COUMADIN) 7.5 mg tablet Take 1 Tab by mouth every evening. Qty: 3 Tab, Refills: 0      aspirin 81 mg chewable tablet Take 1 Tab by mouth daily. Qty: 10 Tab, Refills: 0      diltiazem (CARDIZEM) 90 mg tablet Take 1 Tab by mouth Before breakfast, lunch, dinner and at bedtime.   Qty: 60 Tab, Refills: 0      ferrous sulfate 325 mg (65 mg iron) tablet Refills: 0      umeclidinium-vilanterol (ANORO ELLIPTA) 62.5-25 mcg/actuation dsdv Take 1 Puff by inhalation daily. Indications: BRONCHOSPASM PREVENTION WITH COPD  Qty: 1 Inhaler, Refills: 5       !! - Potential duplicate medications found. Please discuss with provider.           Current Facility-Administered Medications   Medication Dose Route Frequency    albuterol-ipratropium (DUO-NEB) 2.5 MG-0.5 MG/3 ML  3 mL Nebulization BID RT    potassium chloride (KLOR-CON) packet 20 mEq  20 mEq Oral DAILY    polyethylene glycol (MIRALAX) packet 17 g  17 g Oral DAILY    prednisoLONE acetate (PRED FORTE) 1 % ophthalmic suspension 1 Drop  1 Drop Right Eye QID    ketorolac (ACULAR) 0.5 % ophthalmic solution 1 Drop  1 Drop Right Eye DAILY    [START ON 2017] warfarin (COUMADIN) tablet 2.5 mg  2.5 mg Oral every Friday    WARFARIN INFORMATION NOTE (COUMADIN)   Other Q24H    warfarin (COUMADIN) tablet 5 mg  5 mg Oral Once per day on Sun Mon Tue Wed Thu Sat    acetaminophen (TYLENOL) tablet 650 mg  650 mg Oral Q6H PRN    meropenem (MERREM) 1 g in 0.9% sodium chloride (MBP/ADV) 50 mL MBP  1 g IntraVENous Q8H    HYDROcodone-acetaminophen (NORCO)  mg tablet 1 Tab  1 Tab Oral Q4H PRN    chlorthalidone (HYGROTEN) tablet 25 mg  25 mg Oral DAILY    amLODIPine (NORVASC) tablet 10 mg  10 mg Oral DAILY    digoxin (LANOXIN) tablet 0.125 mg  0.125 mg Oral DAILY    fluticasone furoate (ARNUITY ELLIPTA) 100 mcg/puff  1 Puff Inhalation DAILY    naloxone (NARCAN) injection 0.4 mg  0.4 mg IntraVENous PRN    docusate sodium (COLACE) capsule 100 mg  100 mg Oral BID       Allergies: Codeine; Morphine; and Sulfa (sulfonamide antibiotics)    Temp (24hrs), Av.1 °F (36.7 °C), Min:97.3 °F (36.3 °C), Max:98.9 °F (37.2 °C)    Visit Vitals    /71 (BP 1 Location: Left arm, BP Patient Position: At rest)    Pulse 78    Temp 98.4 °F (36.9 °C)    Resp 18    Ht 5' 6.5\" (1.689 m)    Wt 78.5 kg (173 lb)    SpO2 93%    BMI 27.5 kg/m2       ROS: 12 point ROS obtained in details. Pertinent positives as mentioned in HPI,   otherwise negative    Physical Exam:    General: Well developed, well nourished male laying on the bed, alert, in no acute distress.      General:    sleepy, oriented x3    HEENT:   Normocephalic, atraumatic, PERRL, EOMI, no scleral icterus or pallor; no conjunctival hemmohage;  nasal and oral mucous are moist and without evidence of lesions. No thrush. Neck supple, no bruits.    Lymph Nodes:    no cervical, axillary or inguinal adenopathy    Lungs:    non-labored, bilaterally clear to aspiration- no crackles wheezes rales or rhonchi    Heart:   RRR, s1 and s2 irregular; no murmurs rubs or gallops, no edema, + pedal pulses    Abdomen:   soft, non-distended, active bowel sounds, no hepatomegaly, no splenomegaly. Appropriate surgical scars for stated surgeries. no tenderness left inguinal surgical site. No cva tenderness. No erythema of abdominal wall, no RUQ tenderness   Genitourinary:   deferred    Extremities:    no clubbing, cyanosis; no joint effusions or swelling; Full ROM of all large joints to the upper and lower extremities; muscle mass appropriate for age    Neurologic:   No gross focal sensory abnormalities; 5/5 muscle strength to upper and lower extremities. Speech appropriate.  Cranial nerves intact                         Skin:   No rash or ulcers noted    Wound:   Left inguinal surgical site wound healed       Back:  no spinal tenderness , minimal left CVA tenderness, no paraspinal guarding or rigidity       Psychiatric:   No suicidal or homicidal ideations, appropriate mood and affect                  Labs: Results:   Chemistry Recent Labs      08/31/17   0200  08/30/17   0316  08/29/17   1333  08/29/17   0301   GLU  89  105*   --   97   NA  134*  134*   --   137   K  3.5  3.5  3.2*  3.4*   CL  93*  96*   --   100   CO2  35*  32   --   28   BUN 12  12   --   7   CREA  0.72  0.67   --   0.74   CA  8.6  8.4*   --   8.0*   AGAP  6  6   --   9   BUCR  17  18   --   9*   AP   --    --    --   54   TP   --    --    --   7.0   ALB   --    --    --   2.3*   GLOB   --    --    --   4.7*   AGRAT   --    --    --   0.5*      CBC w/Diff Recent Labs      08/31/17   0200  08/30/17   0316  08/29/17   0301   WBC  7.8  6.5  5.8   RBC  2.92*  2.86*  2.91*   HGB  7.7*  7.5*  7.7*   HCT  24.8*  24.1*  25.0*   PLT  317  284  243   GRANS  64  65  75*   LYMPH  24  22  13*   EOS  2  1  1      Microbiology Recent Labs      08/28/17   1715  08/28/17   1630   CULT  MODERATE YEAST*  FEW CANDIDA ALBICANS*  NO GROWTH 2 DAYS          RADIOLOGY:    All available imaging studies/reports in [facility] for this admission were reviewed    Dr. Alfredo Hernandez, Infectious Disease Specialist  [phone]  August 31, 2017  2:19 PM

## 2017-08-31 NOTE — PROGRESS NOTES
Intern Progress Note  [facility]       Patient: Rachell Carranza MRN: [id number]  CSN: [id number]    YOB: 1940  Age: 65 y.o. Sex: male    DOA: 8/25/2017 LOS:  LOS: 6 days                    Subjective:     Acute events: Pt resting in bed comfortably. He had 1 watery BM this morning. No urinary complaints. No abd pain. No CP/SOB/cough. Pain well controlled with medications. No fever/chills. Pt reports some mild bilateral leg swelling. Review of Systems   Constitutional: Negative for chills and fever. Eyes: Negative for blurred vision. Respiratory: Negative for cough and shortness of breath. Cardiovascular: Positive for leg swelling. Negative for chest pain and palpitations. Gastrointestinal: Negative for abdominal pain, blood in stool, constipation, diarrhea and nausea. Genitourinary: Negative for dysuria, frequency, hematuria and urgency. Neurological: Negative for dizziness and headaches. Objective:      Patient Vitals for the past 24 hrs:   Temp Pulse Resp BP SpO2   08/31/17 0725 98.4 °F (36.9 °C) 78 18 122/71 93 %   08/31/17 0523 98.6 °F (37 °C) 74 18 124/76 97 %   08/31/17 0035 98.9 °F (37.2 °C) 79 19 123/75 95 %   08/30/17 1927 97.4 °F (36.3 °C) 67 18 115/74 98 %   08/30/17 1600 97.9 °F (36.6 °C) 96 20 117/71 96 %   08/30/17 0947 - 96 - 130/83 -   08/30/17 0819 - - - - 93 %         Intake/Output Summary (Last 24 hours) at 08/31/17 0802  Last data filed at 08/31/17 0736   Gross per 24 hour   Intake             1440 ml   Output             3700 ml   Net            -2260 ml       Physical Exam   Constitutional: He is oriented to person, place, and time. He appears well-developed and well-nourished. No distress. HENT:   Head: Normocephalic and atraumatic. Eyes: EOM are normal. Pupils are equal, round, and reactive to light. Neck: Normal range of motion. Neck supple. No thyromegaly present. Cardiovascular: Normal heart sounds.   An irregularly irregular rhythm present. Pulmonary/Chest: Effort normal. He has wheezes. Abdominal: Soft. Bowel sounds are normal. He exhibits distension. There is tenderness in the right upper quadrant. Musculoskeletal: He exhibits no tenderness or deformity. 1+ pitting edema bilaterally, non tender, warm to touch   Lymphadenopathy:     He has no cervical adenopathy. Neurological: He is alert and oriented to person, place, and time. Skin: Skin is warm and dry. He is not diaphoretic. Psychiatric: He has a normal mood and affect. His behavior is normal.       Lab/Data Reviewed:  Recent Results (from the past 24 hour(s))   MAGNESIUM    Collection Time: 08/31/17  2:00 AM   Result Value Ref Range    Magnesium 1.9 1.6 - 2.6 mg/dL   PROTHROMBIN TIME + INR    Collection Time: 08/31/17  2:00 AM   Result Value Ref Range    Prothrombin time 18.7 (H) 11.5 - 15.2 sec    INR 1.6 (H) 0.8 - 1.2     CBC WITH AUTOMATED DIFF    Collection Time: 08/31/17  2:00 AM   Result Value Ref Range    WBC 7.8 4.6 - 13.2 K/uL    RBC 2.92 (L) 4.70 - 5.50 M/uL    HGB 7.7 (L) 13.0 - 16.0 g/dL    HCT 24.8 (L) 36.0 - 48.0 %    MCV 84.9 74.0 - 97.0 FL    MCH 26.4 24.0 - 34.0 PG    MCHC 31.0 31.0 - 37.0 g/dL    RDW 15.7 (H) 11.6 - 14.5 %    PLATELET 841 427 - 223 K/uL    MPV 9.3 9.2 - 11.8 FL    NEUTROPHILS 64 40 - 73 %    LYMPHOCYTES 24 21 - 52 %    MONOCYTES 10 3 - 10 %    EOSINOPHILS 2 0 - 5 %    BASOPHILS 0 0 - 2 %    ABS. NEUTROPHILS 5.0 1.8 - 8.0 K/UL    ABS. LYMPHOCYTES 1.9 0.9 - 3.6 K/UL    ABS. MONOCYTES 0.8 0.05 - 1.2 K/UL    ABS. EOSINOPHILS 0.2 0.0 - 0.4 K/UL    ABS.  BASOPHILS 0.0 0.0 - 0.1 K/UL    DF AUTOMATED     METABOLIC PANEL, BASIC    Collection Time: 08/31/17  2:00 AM   Result Value Ref Range    Sodium 134 (L) 136 - 145 mmol/L    Potassium 3.5 3.5 - 5.5 mmol/L    Chloride 93 (L) 100 - 108 mmol/L    CO2 35 (H) 21 - 32 mmol/L    Anion gap 6 3.0 - 18 mmol/L    Glucose 89 74 - 99 mg/dL    BUN 12 7.0 - 18 MG/DL    Creatinine 0.72 0.6 - 1.3 MG/DL    BUN/Creatinine ratio 17 12 - 20      GFR est AA >60 >60 ml/min/1.73m2    GFR est non-AA >60 >60 ml/min/1.73m2    Calcium 8.6 8.5 - 10.1 MG/DL        Scheduled Medications Reviewed:  Current Facility-Administered Medications   Medication Dose Route Frequency    albuterol-ipratropium (DUO-NEB) 2.5 MG-0.5 MG/3 ML  3 mL Nebulization BID RT    potassium chloride (KLOR-CON) packet 20 mEq  20 mEq Oral DAILY    polyethylene glycol (MIRALAX) packet 17 g  17 g Oral DAILY    prednisoLONE acetate (PRED FORTE) 1 % ophthalmic suspension 1 Drop  1 Drop Right Eye QID    ketorolac (ACULAR) 0.5 % ophthalmic solution 1 Drop  1 Drop Right Eye DAILY    [START ON 9/1/2017] warfarin (COUMADIN) tablet 2.5 mg  2.5 mg Oral every Friday    WARFARIN INFORMATION NOTE (COUMADIN)   Other Q24H    warfarin (COUMADIN) tablet 5 mg  5 mg Oral Once per day on Sun Mon Tue Wed Thu Sat    meropenem (MERREM) 1 g in 0.9% sodium chloride (MBP/ADV) 50 mL MBP  1 g IntraVENous Q8H    chlorthalidone (HYGROTEN) tablet 25 mg  25 mg Oral DAILY    amLODIPine (NORVASC) tablet 10 mg  10 mg Oral DAILY    digoxin (LANOXIN) tablet 0.125 mg  0.125 mg Oral DAILY    fluticasone furoate (ARNUITY ELLIPTA) 100 mcg/puff  1 Puff Inhalation DAILY    docusate sodium (COLACE) capsule 100 mg  100 mg Oral BID         Imaging, microbiology, and EKG/Telemetry:  8/25 Blood cx NG6D  8/27 blood cx NG4D    Assessment/Plan     65 y. o. male with PMH Osteomyelitis, psoas abscess, HTN, Atrial fibrillation, CAD, Pulmonary hypertension, COPD now admitted with Sepsis secondary to left paraspinal and psoas abscess .          Sepsis secondary to left paraspinal and psoas abscess (improving) and L4/5 discitis, with bilateral lumbar spinous process, B/L SLR positive, in setting of patient with history of multfocal osteomyelitis and prior psoas abscess.  WBC now 7.8 and RR 18. HR improved over last day.  -- I/Os  -- Dr Jose Armando Amado following: pt cleared for d/c pending MRA  -- Interventional radiology following: POD 4 s/p I&D  -- ID following: continue meropenem pending cultures; picc line placed for abx   -- MRI ordered to r/o infrarenal aneurysm. Results pending. Vascular sx following  -- Norco 10/325mg 1 tab q4hrs PRN pain; Narcan PRN, Tylenol 650 q6h PRN  -- Ice packs PRN      Hypokalemia  -- 3.5 today, Klor-con 20mEq daily  -- repeat K+      Right inguinal hernia, with tender mass in right inguinal area tender to palpation with pain radiating to testicles. CT finding of normal appendix but noted to extend into a fat-containing right inguinal hernia. Likely exacerbated due to problem #1. Will monitor clinically.       Chronic Atrial fibrillation,  followed by Dr Aurora Canales, Cardiology. Pulmonary hypertension with peak pressure 60mmHg. Mild CAD by angiography in 2012; INR 1.6  -- Current symptoms: none  -- Continue control agent: digoxin 125mcg  Every day   -- C anticoagulant: [id number] every day except 2.5mg Fridays- added warfarin 2.5mg once and will increase Friday dose to 5mg. Will need close follow up for INR check outpatient. -- monitor daily PT/INR.   -- Remote cardiac monitoring  -- EKG showed afib w/ PVC      Hyponatremia (resolved), Na 134 this AM   -- Will monitor with daily BMP. NS 20KCl @ 75 cc/hr  -- Urine sodium 80; FU serum osmolality      Hypertension (controlled). blood pressure overnight range 115/74130/83  -- Vital signs per unit routine  -- Continue home amlodipine 10mg every day   -- Continue home chlorthalidone 25mg  -- Labetalol 5mg IV PRN for BP>220/110      Chronic Obstructive Pulmonary Disease (COPD).  Followed in the past by Dr Forrest Midland. No spirometry or PFTs on record. -- O2 sats goal 90-94%  -- Continue home 2L O2 via NC  -- Duonebs BID  -- Fluticasone furoate 100mcg/puff every day  -- Incentive spirometry       Constipation (improved):  BM this morning, +flatus, + BS, distention, RUQ tenderness  - Colace, Miralax  - LFTs wnl          Diet: Cardiac w/ nutritional supplements per nutrition  DVT Prophylaxis: Warfarin  Code Status: FULL  Point of Contact: Rosario Stage: [phone]    Isacc Forde MD, PGY-1  [facility]  08/31/17 8:02 AM

## 2017-08-31 NOTE — ROUTINE PROCESS
Assumed patient care. Patient in bed, awake, alert and oriented x3. Denies pain or discomforts at this time. Family at bedside. Call light placed within reach. Bed in low position.

## 2017-08-31 NOTE — PROGRESS NOTES
Problem: Falls - Risk of  Goal: *Absence of Falls  Document Jennifer Fall Risk and appropriate interventions in the flowsheet.    Outcome: Progressing Towards Goal  Fall Risk Interventions:  Mobility Interventions: Assess mobility with egress test, Communicate number of staff needed for ambulation/transfer, Patient to call before getting OOB, PT Consult for mobility concerns, PT Consult for assist device competence, Strengthening exercises (ROM-active/passive)           Medication Interventions: Assess postural VS orthostatic hypotension, Evaluate medications/consider consulting pharmacy, Patient to call before getting OOB, Teach patient to arise slowly     Elimination Interventions: Call light in reach, Patient to call for help with toileting needs     History of Falls Interventions: Consult care management for discharge planning, Door open when patient unattended, Evaluate medications/consider consulting pharmacy, Room close to nurse's station

## 2017-08-31 NOTE — DISCHARGE INSTRUCTIONS
Skin Abscess: Care Instructions  Your Care Instructions    A skin abscess is a bacterial infection that forms a pocket of pus. A boil is a kind of skin abscess. The doctor may have cut an opening in the abscess so that the pus can drain out. You may have gauze in the cut so that the abscess will stay open and keep draining. You may need antibiotics. You will need to follow up with your doctor to make sure the infection has gone away. The doctor has checked you carefully, but problems can develop later. If you notice any problems or new symptoms, get medical treatment right away. Follow-up care is a key part of your treatment and safety. Be sure to make and go to all appointments, and call your doctor if you are having problems. It's also a good idea to know your test results and keep a list of the medicines you take. How can you care for yourself at home? · Apply warm and dry compresses, a heating pad set on low, or a hot water bottle 3 or 4 times a day for pain. Keep a cloth between the heat source and your skin. · If your doctor prescribed antibiotics, take them as directed. Do not stop taking them just because you feel better. You need to take the full course of antibiotics. · Take pain medicines exactly as directed. ¨ If the doctor gave you a prescription medicine for pain, take it as prescribed. ¨ If you are not taking a prescription pain medicine, ask your doctor if you can take an over-the-counter medicine. · Keep your bandage clean and dry. Change the bandage whenever it gets wet or dirty, or at least one time a day. · If the abscess was packed with gauze:  ¨ Keep follow-up appointments to have the gauze changed or removed. If the doctor instructed you to remove the gauze, gently pull out all of the gauze when your doctor tells you to. ¨ After the gauze is removed, soak the area in warm water for 15 to 20 minutes 2 times a day, until the wound closes. When should you call for help?   Call your doctor now or seek immediate medical care if:  · You have signs of worsening infection, such as:  ¨ Increased pain, swelling, warmth, or redness. ¨ Red streaks leading from the infected skin. ¨ Pus draining from the wound. ¨ A fever. Watch closely for changes in your health, and be sure to contact your doctor if:  · You do not get better as expected. Where can you learn more? Go to http://brittney-eliseo.info/. Enter C060 in the search box to learn more about \"Skin Abscess: Care Instructions. \"  Current as of: October 13, 2016  Content Version: 11.3  © 1478-4046 [employer]. Care instructions adapted under license by [employer] (which disclaims liability or warranty for this information). If you have questions about a medical condition or this instruction, always ask your healthcare professional. [place] [address] any warranty or liability for your use of this information. Patient armband removed and given to patient to take home. Patient was informed of the privacy risks if armband lost or stolen    MyChart Activation    Thank you for requesting access to [employer]. Please follow the instructions below to securely access and download your online medical record. [employer] allows you to send messages to your doctor, view your test results, renew your prescriptions, schedule appointments, and more. How Do I Sign Up? 1. In your internet browser, go to [email or web address]  2. Click on the First Time User? Click Here link in the Sign In box. You will be redirect to the New Member Sign Up page. 3. Enter your [employer] Access Code exactly as it appears below. You will not need to use this code after youve completed the sign-up process. If you do not sign up before the expiration date, you must request a new code.     [facility] Access Code: [email or web address]  Expires: 9/4/2017 11:04 AM (This is the date your [employer] access code will )    4. Enter the last four digits of your Social Security Number (xxxx) and Date of Birth (mm/dd/yyyy) as indicated and click Submit. You will be taken to the next sign-up page. 5. Create a BuyItRideIt ID. This will be your [employer]It[employer]It login ID and cannot be changed, so think of one that is secure and easy to remember. 6. Create a BuyIt[employer]ideIt password. You can change your password at any time. 7. Enter your Password Reset Question and Answer. This can be used at a later time if you forget your password. 8. Enter your e-mail address. You will receive e-mail notification when new information is available in [address] 9. Click Sign Up. You can now view and download portions of your medical record. 10. Click the Download Summary menu link to download a portable copy of your medical information. Additional Information    If you have questions, please visit the Frequently Asked Questions section of the [employer]It[employer]It website at [email or web address] Remember, BuyItRideIt is NOT to be used for urgent needs. For medical emergencies, dial 911. DISCHARGE SUMMARY from Nurse    The following personal items are in your possession at time of discharge:    Dental Appliances: Lowers, Uppers, At bedside        Home Medications: None  Jewelry: None  Clothing: None  Other Valuables: At bedside, Cell Phone, With patient             PATIENT INSTRUCTIONS:    After general anesthesia or intravenous sedation, for 24 hours or while taking prescription Narcotics:  · Limit your activities  · Do not drive and operate hazardous machinery  · Do not make important personal or business decisions  · Do  not drink alcoholic beverages  · If you have not urinated within 8 hours after discharge, please contact your surgeon on call.     Report the following to your surgeon:  · Excessive pain, swelling, redness or odor of or around the surgical area  · Temperature over 100.5  · Nausea and vomiting lasting longer than 4 hours or if unable to take medications  · Any signs of decreased circulation or nerve impairment to extremity: change in color, persistent  numbness, tingling, coldness or increase pain  · Any questions        What to do at Home:  Recommended activity: Activity as tolerated    If you experience any of the following symptoms Nausea, vomiting, diarrhea, fever greater than 100.5, dizziness, severe headache, shortness of breath, chest pain, increased pain, please follow up with PCP. *  Please give a list of your current medications to your Primary Care Provider. *  Please update this list whenever your medications are discontinued, doses are      changed, or new medications (including over-the-counter products) are added. *  Please carry medication information at all times in case of emergency situations. These are general instructions for a healthy lifestyle:    No smoking/ No tobacco products/ Avoid exposure to second hand smoke    Surgeon General's Warning:  Quitting smoking now greatly reduces serious risk to your health. Obesity, smoking, and sedentary lifestyle greatly increases your risk for illness    A healthy diet, regular physical exercise & weight monitoring are important for maintaining a healthy lifestyle    You may be retaining fluid if you have a history of heart failure or if you experience any of the following symptoms:  Weight gain of 3 pounds or more overnight or 5 pounds in a week, increased swelling in our hands or feet or shortness of breath while lying flat in bed. Please call your doctor as soon as you notice any of these symptoms; do not wait until your next office visit. Recognize signs and symptoms of STROKE:    F-face looks uneven    A-arms unable to move or move unevenly    S-speech slurred or non-existent    T-time-call 911 as soon as signs and symptoms begin-DO NOT go       Back to bed or wait to see if you get better-TIME IS BRAIN.     Warning Signs of HEART ATTACK     Call 911 if you have these symptoms:   Chest discomfort. Most heart attacks involve discomfort in the center of the chest that lasts more than a few minutes, or that goes away and comes back. It can feel like uncomfortable pressure, squeezing, fullness, or pain.  Discomfort in other areas of the upper body. Symptoms can include pain or discomfort in one or both arms, the back, neck, jaw, or stomach.  Shortness of breath with or without chest discomfort.  Other signs may include breaking out in a cold sweat, nausea, or lightheadedness. Don't wait more than five minutes to call 911 - MINUTES MATTER! Fast action can save your life. Calling 911 is almost always the fastest way to get lifesaving treatment. Emergency Medical Services staff can begin treatment when they arrive -- up to an hour sooner than if someone gets to the hospital by car. The discharge information has been reviewed with the patient. The patient verbalized understanding. Discharge medications reviewed with the patient and appropriate educational materials and side effects teaching were provided.

## 2017-08-31 NOTE — PROGRESS NOTES
*ATTENTION:  This note has been created by a medical student for educational purposes only. Please do not refer to the content of this note for clinical decision-making, billing, or other purposes. Please see attending physicians note to obtain clinical information on this patient. *       Progress Note  [facility]       Patient: Mary Walker MRN: [id number]  CSN: [id number]    YOB: 1940  Age: 65 y.o. Sex: male    DOA: 8/25/2017 LOS:  LOS: 6 days                    Subjective:     Acute events: Pt sitting up comfortably in bed. Pain well controlled with medications per patient- 7/10 this AM. BM occurred this AM, + flatus. Abd continue to be distended and firm. Pt still has RUQ tenderness, unchanged. Pt had no elevated HR overnight. States breathing has been easier since albuterol treatments. Occupational therapy was declined yesterday. Has itchy eyes relieved by eyedrops from cataract surgery. No other complaints.      Review of Systems   Constitutional: Negative for chills and fever. Eyes: Negative for blurred vision  Respiratory: Negative for cough and shortness of breath.    Cardiovascular: Negative for chest pain, palpitations. Positive for leg swelling. Gastrointestinal: Positive for constipation leg swelling. Negative for abdominal pain, diarrhea, nausea and vomiting. Genitourinary: Negative for dysuria, frequency, and urgency. Neurological: Negative for dizziness and headaches. Orthopedic surgery request drain removal, assumed DC if cleared by vascular surgery  Occupational therapy was held yesterday  Left lower wound bandaged, clean dry and intact.  Minimal serosanguinous fluid appreciated in drain  Outpatient Abx therapy     Objective:      Patient Vitals for the past 24 hrs:   Temp Pulse Resp BP SpO2   08/31/17 0725 98.4 °F (36.9 °C) 78 18 122/71 93 %   08/31/17 0523 98.6 °F (37 °C) 74 18 124/76 97 %   08/31/17 0035 98.9 °F (37.2 °C) 79 19 123/75 95 % 08/30/17 1927 97.4 °F (36.3 °C) 67 18 115/74 98 %   08/30/17 1600 97.9 °F (36.6 °C) 96 20 117/71 96 %   08/30/17 0947 - 96 - 130/83 -   08/30/17 0819 - - - - 93 %         Intake/Output Summary (Last 24 hours) at 08/31/17 0827  Last data filed at 08/31/17 0736   Gross per 24 hour   Intake             1080 ml   Output             3700 ml   Net            -2620 ml       Physical Exam:   GENERAL: alert, cooperative, no distress, appears stated age  LUNG: clear to auscultation bilaterally, diminished breath sounds R anterior. Wheezing improved  HEART: irregularly irregular rhythm  ABDOMEN: soft, non-tender. Bowel sounds hyperactive. Diffuse distention and firmness. RUQ tenderness with palpation. EXTREMITIES:  Pitting edema +1 in bilateral lower extremeties. WOUND: Clean dry intact. Serosanguinous fluid appreciated in drainage site. PSYCH: He has a normal mood and affect. His behavior is normal.      Lab/Data Review:   Recent Results (from the past 24 hour(s))   MAGNESIUM    Collection Time: 08/31/17  2:00 AM   Result Value Ref Range    Magnesium 1.9 1.6 - 2.6 mg/dL   PROTHROMBIN TIME + INR    Collection Time: 08/31/17  2:00 AM   Result Value Ref Range    Prothrombin time 18.7 (H) 11.5 - 15.2 sec    INR 1.6 (H) 0.8 - 1.2     CBC WITH AUTOMATED DIFF    Collection Time: 08/31/17  2:00 AM   Result Value Ref Range    WBC 7.8 4.6 - 13.2 K/uL    RBC 2.92 (L) 4.70 - 5.50 M/uL    HGB 7.7 (L) 13.0 - 16.0 g/dL    HCT 24.8 (L) 36.0 - 48.0 %    MCV 84.9 74.0 - 97.0 FL    MCH 26.4 24.0 - 34.0 PG    MCHC 31.0 31.0 - 37.0 g/dL    RDW 15.7 (H) 11.6 - 14.5 %    PLATELET 390 936 - 265 K/uL    MPV 9.3 9.2 - 11.8 FL    NEUTROPHILS 64 40 - 73 %    LYMPHOCYTES 24 21 - 52 %    MONOCYTES 10 3 - 10 %    EOSINOPHILS 2 0 - 5 %    BASOPHILS 0 0 - 2 %    ABS. NEUTROPHILS 5.0 1.8 - 8.0 K/UL    ABS. LYMPHOCYTES 1.9 0.9 - 3.6 K/UL    ABS. MONOCYTES 0.8 0.05 - 1.2 K/UL    ABS. EOSINOPHILS 0.2 0.0 - 0.4 K/UL    ABS.  BASOPHILS 0.0 0.0 - 0.1 K/UL DF AUTOMATED     METABOLIC PANEL, BASIC    Collection Time: 08/31/17  2:00 AM   Result Value Ref Range    Sodium 134 (L) 136 - 145 mmol/L    Potassium 3.5 3.5 - 5.5 mmol/L    Chloride 93 (L) 100 - 108 mmol/L    CO2 35 (H) 21 - 32 mmol/L    Anion gap 6 3.0 - 18 mmol/L    Glucose 89 74 - 99 mg/dL    BUN 12 7.0 - 18 MG/DL    Creatinine 0.72 0.6 - 1.3 MG/DL    BUN/Creatinine ratio 17 12 - 20      GFR est AA >60 >60 ml/min/1.73m2    GFR est non-AA >60 >60 ml/min/1.73m2    Calcium 8.6 8.5 - 10.1 MG/DL       Scheduled Medications Reviewed:  Current Facility-Administered Medications   Medication Dose Route Frequency    albuterol-ipratropium (DUO-NEB) 2.5 MG-0.5 MG/3 ML  3 mL Nebulization BID RT    potassium chloride (KLOR-CON) packet 20 mEq  20 mEq Oral DAILY    polyethylene glycol (MIRALAX) packet 17 g  17 g Oral DAILY    prednisoLONE acetate (PRED FORTE) 1 % ophthalmic suspension 1 Drop  1 Drop Right Eye QID    ketorolac (ACULAR) 0.5 % ophthalmic solution 1 Drop  1 Drop Right Eye DAILY    [START ON 9/1/2017] warfarin (COUMADIN) tablet 2.5 mg  2.5 mg Oral every Friday    WARFARIN INFORMATION NOTE (COUMADIN)   Other Q24H    warfarin (COUMADIN) tablet 5 mg  5 mg Oral Once per day on Sun Mon Tue Wed Thu Sat    meropenem (MERREM) 1 g in 0.9% sodium chloride (MBP/ADV) 50 mL MBP  1 g IntraVENous Q8H    chlorthalidone (HYGROTEN) tablet 25 mg  25 mg Oral DAILY    amLODIPine (NORVASC) tablet 10 mg  10 mg Oral DAILY    digoxin (LANOXIN) tablet 0.125 mg  0.125 mg Oral DAILY    fluticasone furoate (ARNUITY ELLIPTA) 100 mcg/puff  1 Puff Inhalation DAILY    docusate sodium (COLACE) capsule 100 mg  100 mg Oral BID       8/29 MRA abd and pelvis Completed and pending  Assessment/Plan     65 y. o. male with PMH Osteomyelitis, psoas abscess, HTN, Atrial fibrillation, CAD, Pulmonary hypertension, COPD admitted with sepsis (now resolved) secondary to left paraspinal and psoas abscess .      Sepsis secondary to left paraspinal and psoas abscess (resolved) and L4/5 discitis, with bilateral lumbar spinous process, B/L SLR positive, in setting of patient with history of multfocal osteomyelitis and prior psoas abscess. WBC now 7.8 and RR 18. No elevated HR noted last night.  -- I/Os  -- Dr Vik Jeter ([facility]: [phone]) consulted and following  -- Interventional radiology following: POD 4 s/p I&D  -- ID following: continue meropenem pending cultures. Moderate yeast in respiratory managed by ID.  --  -- MRA ordered to r/o infrarenal aneurysm. Results pending. -- Norco 10/325mg 1 tab q4hrs PRN pain; Narcan PRN, Tylenol 650 q6h PRN  -- Ice packs PRN      Hypokalemia  -- 3.5 today, Klor-con 20mEq daily  -- repeat K+ daily      Chronic Atrial fibrillation,  followed by Dr Tash Loza, Cardiology. Pulmonary hypertension with peak pressure 60mmHg. Mild CAD by angiography in 2012  -- Current symptoms: none  -- Continue control agent: digoxin 125mcg  Every day   -- Additional 2.5 mg warfarin given yesterday due to INR not being therapeutic range. 2.5 mg warfarin on Friday plus additional 2.5 mg for Friday for INR. Continue anticoagulant: warfarin 5mg every day except 2.5mg Fridays  -- INR 1.6- monitor daily PT/INR.   -- Remote cardiac monitoring      Hyponatremia (resolved), Na 134 this AM   -- Will monitor with daily BMP. -- Encourage PO intake    Hypertension (controlled). blood pressure overnight range 120/70's  -- Vital signs per unit routine  -- Continue home amlodipine 10mg every day   -- Continue home chlorthalidone 25mg  -- Labetalol 5mg IV PRN for BP>220/110  -- Mild pitting edema 1+ noted, will continue to monitor. Consider furosemide if continue or worsens. PT was declined yesterday, promote activity      Chronic Obstructive Pulmonary Disease (COPD).  Followed in the past by Dr Jazmyn Mckinney. No spirometry or PFTs on record.    -- O2 sats goal 90-94%, O2 liters via NC was noted to elevated in room and turned back down to 2L  -- Continue home 2L O2 via NC  -- Duonebs 2 times daily  -- Fluticasone furoate 100mcg/puff every day  -- Incentive spirometry       Constipation (improved):  BM today, +flatus, + BS, distention, RUQ tenderness  - Colace, Miralax  - Desires enema, soap suds enema ordered no given yet  - MRA pending        Diet: Cardiac w/ nutritional supplements per nutrition  DVT Prophylaxis: Warfarin  Code Status: FULL  Point of Contact: Megha santo: [phone]    GLORIA Patrick [facility]  8:40AM  8/31/2017

## 2017-08-31 NOTE — ROUTINE PROCESS
Pt A/O x4, NAD, discharge instructions reviewed with pt/family by discharge nurse, [address] [place] [address] Ave DL PICC flushed/patent, personal belongings gathered, awaiting transport by hospital transport team    Dressing to left lower back, clean, dry, intact

## 2017-08-31 NOTE — PROGRESS NOTES
Problem: Mobility Impaired (Adult and Pediatric)  Goal: *Acute Goals and Plan of Care (Insert Text)  Physical Therapy Goals  Initiated 8/26/2017 and to be accomplished within 7 day(s)  1. Patient will move from supine to sit and sit to supine in bed with independence. 2. Patient will transfer from bed to chair and chair to bed with minimal assistance/contact guard assist using the least restrictive device. 3. Patient will perform sit to stand with modified independence. 4. Patient will ambulate with minimal assistance/contact guard assist for 100 feet with the least restrictive device. 5. Patient will ascend/descend 5 stairs with bilateral handrail(s) with minimal assistance/contact guard assist with LRAD. PHYSICAL THERAPY TREATMENT     Patient: Rachell Carranza (45 y.o. male)  Date: 8/31/2017  Diagnosis: Psoas abscess ([address]  Psoas abscess ([address]  Abscess Psoas abscess ([address]       Precautions: O2 dependent PLOF  Chart, physical therapy assessment, plan of care and goals were reviewed. ASSESSMENT:  Pt demo's increased ambulation proficiency and standing balance as indicated by the ability to ambulate consistent distance w/o AD. Pt activity tolerance improving as indicated by the ambulate with decreased O2 needs from previous session w/o difficulty. Pt [facility] to promote maximal strength,increased activity tolerance, and optimal safety with functional mobility. Progression toward goals:  [X]      Improving appropriately and progressing toward goals  [ ]      Improving slowly and progressing toward goals  [ ]      Not making progress toward goals and plan of care will be adjusted       PLAN:   Patient continues to benefit from skilled intervention to address the above impairments. Continue treatment per established plan of care.   Discharge Recommendations:  Home Health  Further Equipment Recommendations for Discharge:  N/A       SUBJECTIVE:   Patient stated I guess I will.   Pt reports that he will get up for gait training. OBJECTIVE DATA SUMMARY:   Critical Behavior:  Neurologic State: Alert  Orientation Level: Oriented X4  Cognition: Appropriate for age attention/concentration     Functional Mobility Training:  Bed Mobility:  Rolling: Independent  Supine to Sit: Independent  Transfers:  Sit to Stand: Supervision (distant supervision)  Stand to Sit: Supervision (slight cues for safety)  Bed to Chair: Supervision (distant supervision)  Other: stand step with 0 AD  Balance:  Sitting: Intact  Standing: Impaired; Without support  Standing - Static: Good  Standing - Dynamic : Fair  Ambulation/Gait Training:  Distance (ft): 110 Feet (ft) (Timed GUG 15 ft one way (46\" to complete))  Assistive Device:  (0 AD)  Ambulation - Level of Assistance: Supervision  Gait Abnormalities: Decreased step clearance  Base of Support: Widened  Speed/Sylvia: Slow  Step Length: Right shortened;Left shortened  Therapeutic Exercises:      Pain:  Pt reports 0/10 pain or discomfort prior to treatment. Pt reports 0/10 pain or discomfort post treatment. Activity Tolerance:   Fair: O2 requirements decreased to PLOF 2L O2 via NC. Please refer to the flowsheet for vital signs taken during this treatment.   After treatment:   [X] Patient left in no apparent distress sitting up in chair  [ ] Patient left in no apparent distress in bed  [X] Call bell left within reach  [ ] Nursing notified  [ ] Caregiver present  [ ] Bed alarm activated      Vickie uJarez PTA   Time Calculation: 23 mins

## 2017-09-01 ENCOUNTER — HOSPITAL ENCOUNTER (OUTPATIENT)
Dept: INFUSION THERAPY | Age: 77
Discharge: HOME | End: 2017-09-01
Payer: MEDICARE

## 2017-09-01 ENCOUNTER — APPOINTMENT (OUTPATIENT)
Dept: INFUSION THERAPY | Age: 77
End: 2017-09-01
Payer: MEDICARE

## 2017-09-01 VITALS
TEMPERATURE: 97.7 F | OXYGEN SATURATION: 96 % | DIASTOLIC BLOOD PRESSURE: 80 MMHG | SYSTOLIC BLOOD PRESSURE: 129 MMHG | RESPIRATION RATE: 20 BRPM | HEART RATE: 81 BPM

## 2017-09-01 PROCEDURE — 96365 THER/PROPH/DIAG IV INF INIT: CPT

## 2017-09-01 PROCEDURE — 74011250636 HC RX REV CODE- 250/636: Performed by: INTERNAL MEDICINE

## 2017-09-01 PROCEDURE — 74011000258 HC RX REV CODE- 258: Performed by: INTERNAL MEDICINE

## 2017-09-01 RX ORDER — HEPARIN SODIUM (PORCINE) LOCK FLUSH IV SOLN 100 UNIT/ML 100 UNIT/ML
SOLUTION INTRAVENOUS
Status: DISPENSED
Start: 2017-09-01 | End: 2017-09-01

## 2017-09-01 RX ORDER — SODIUM CHLORIDE 0.9 % (FLUSH) 0.9 %
10-40 SYRINGE (ML) INJECTION AS NEEDED
Status: DISCONTINUED | OUTPATIENT
Start: 2017-09-01 | End: 2017-09-05 | Stop reason: HOSPADM

## 2017-09-01 RX ORDER — HEPARIN SODIUM (PORCINE) LOCK FLUSH IV SOLN 100 UNIT/ML 100 UNIT/ML
500 SOLUTION INTRAVENOUS AS NEEDED
Status: ACTIVE | OUTPATIENT
Start: 2017-09-01 | End: 2017-09-02

## 2017-09-01 RX ADMIN — Medication 10 ML: at 09:12

## 2017-09-01 RX ADMIN — HEPARIN SODIUM (PORCINE) LOCK FLUSH IV SOLN 100 UNIT/ML 500 UNITS: 100 SOLUTION at 09:12

## 2017-09-01 RX ADMIN — Medication 10 ML: at 08:25

## 2017-09-01 RX ADMIN — SODIUM CHLORIDE 1 G: 900 INJECTION INTRAVENOUS at 08:29

## 2017-09-01 NOTE — PROGRESS NOTES
[facility] Progress Note    Date: 2017    Name: Tierney Osborne    MRN: [id number]         : 1940    Ertapenem 1 g Infusion    Mr. Aisha Perez to [facility], ambulatory at [phone] accompanied by his daughter. Pt was assessed and education was provided. Care notes reviewed and signed. Mr. Johnson Shoulder vitals were reviewed. Visit Vitals    /80 (BP 1 Location: Left arm, BP Patient Position: Sitting)    Pulse 81    Temp 97.7 °F (36.5 °C)    Resp 20    SpO2 96%       Right upper arm PICC flushed easily and had brisk blood return via red port. PICC dressing c/d/i and not due to be changed. Last dressing date 17. No swelling, redness, streaking, warmth or drainage noted in arm. Pt denied c/o pain in arm. Ertapenem 1g was infused at 100 ml/hr (over approximately 30 minutes). Mr. Aisha Perez tolerated infusion, and had no complaints at this time. He stated he did feel nauseated but he has been experiencing that on occasion. Red lumen of PICC flushed with NS 10 ml and Heparin 500 units. Lumens wrapped with guaze and paper tape. Stockinette placed over dressing for protection. Patient armband removed and shredded. Patient declined to stay for observation period, he stated he has already been receiving this medication in the hospital.    Mr. Aisha Perez was discharged from [place] [address] in stable condition at [address] [place] [address]. He is to return on 17 at 0900 for his next antibiotic appointment.     Ben Milner RN  2017

## 2017-09-02 ENCOUNTER — HOSPITAL ENCOUNTER (OUTPATIENT)
Dept: INFUSION THERAPY | Age: 77
Discharge: HOME | End: 2017-09-02
Payer: MEDICARE

## 2017-09-02 VITALS
DIASTOLIC BLOOD PRESSURE: 68 MMHG | RESPIRATION RATE: 20 BRPM | SYSTOLIC BLOOD PRESSURE: 111 MMHG | OXYGEN SATURATION: 98 % | TEMPERATURE: 98.1 F | HEART RATE: 77 BPM

## 2017-09-02 LAB
BACTERIA SPEC CULT: NORMAL
GRAM STN SPEC: NORMAL
GRAM STN SPEC: NORMAL
SERVICE CMNT-IMP: NORMAL

## 2017-09-02 PROCEDURE — 96365 THER/PROPH/DIAG IV INF INIT: CPT

## 2017-09-02 PROCEDURE — 74011250636 HC RX REV CODE- 250/636: Performed by: INTERNAL MEDICINE

## 2017-09-02 PROCEDURE — 74011000258 HC RX REV CODE- 258: Performed by: INTERNAL MEDICINE

## 2017-09-02 RX ORDER — HEPARIN SODIUM (PORCINE) LOCK FLUSH IV SOLN 100 UNIT/ML 100 UNIT/ML
500 SOLUTION INTRAVENOUS AS NEEDED
Status: DISPENSED | OUTPATIENT
Start: 2017-09-02 | End: 2017-09-03

## 2017-09-02 RX ORDER — SODIUM CHLORIDE 0.9 % (FLUSH) 0.9 %
10-40 SYRINGE (ML) INJECTION AS NEEDED
Status: DISCONTINUED | OUTPATIENT
Start: 2017-09-02 | End: 2017-09-05 | Stop reason: ALTCHOICE

## 2017-09-02 RX ADMIN — Medication 20 ML: at 09:13

## 2017-09-02 RX ADMIN — Medication 10 ML: at 09:49

## 2017-09-02 RX ADMIN — HEPARIN SODIUM (PORCINE) LOCK FLUSH IV SOLN 100 UNIT/ML 500 UNITS: 100 SOLUTION at 09:49

## 2017-09-02 RX ADMIN — SODIUM CHLORIDE 1 G: 900 INJECTION INTRAVENOUS at 09:14

## 2017-09-02 NOTE — PROGRESS NOTES
[facility] OPIC Progress Note    Date: 2017    Name: Elda Yeung    MRN: [id number]         : 1940      Mr. Reis arrived to [facility] at 0900. Mr. Fabián Ortega was assessed and education was provided. Mr. Syed Russo vitals were reviewed. Visit Vitals    /68 (BP 1 Location: Left arm, BP Patient Position: Sitting)    Pulse 77    Temp 98.1 °F (36.7 °C)    Resp 20    SpO2 98%       Pt with right upper arm double lumen PICC line. Each lumen flushes without difficulty and positive for blood return. Dressing CDI. No redness, bruising, or swelling noted at site and/ or extremity. Pt denies tenderness. Pt states he wasn't sure if he was supposed to be eating anything prior to his infusion visits and nurse informed him that it was okay for him to eat. Pt verbalized understanding. Asked pt if he would like something to eat or drink and pt declined snacks but asked for ice water which was provided to him as requested. Invanz 1gm IV was administered as ordered via purple lumen followed by normal saline flush. Pt did experience nausea/ dry heaves during infusion. Pt states this also happened yesterday during his infusion. Pt states he has been taking his morning medications on an empty stomach and reports not eating today (as mentioned previously). Encouraged pt to eat something in the morning (even if it's just crackers) to avoid taking his medications on an empty stomach. Advised pt if this doesn't help alleviate the nausea then he should notify his doctor to get a rx for an anti-nausea medication. Pt verbalized understanding. Mr. Fabián Ortega tolerated well without complaints. Flushed each lumen of pt's PICC line with heparin per order. Mr. Fabián Ortega was discharged from [place] [address] in stable condition at 0950. He is to return on 9/3/17 at 0900 for his next appointment.     Sage Hernandez RN  2017

## 2017-09-03 ENCOUNTER — HOSPITAL ENCOUNTER (OUTPATIENT)
Dept: INFUSION THERAPY | Age: 77
Discharge: HOME | End: 2017-09-03
Payer: MEDICARE

## 2017-09-03 VITALS
HEART RATE: 88 BPM | TEMPERATURE: 97.3 F | OXYGEN SATURATION: 97 % | RESPIRATION RATE: 20 BRPM | SYSTOLIC BLOOD PRESSURE: 121 MMHG | DIASTOLIC BLOOD PRESSURE: 79 MMHG

## 2017-09-03 PROCEDURE — 74011250636 HC RX REV CODE- 250/636: Performed by: INTERNAL MEDICINE

## 2017-09-03 PROCEDURE — 96365 THER/PROPH/DIAG IV INF INIT: CPT

## 2017-09-03 PROCEDURE — 74011000258 HC RX REV CODE- 258: Performed by: INTERNAL MEDICINE

## 2017-09-03 RX ORDER — HEPARIN SODIUM (PORCINE) LOCK FLUSH IV SOLN 100 UNIT/ML 100 UNIT/ML
500 SOLUTION INTRAVENOUS AS NEEDED
Status: DISPENSED | OUTPATIENT
Start: 2017-09-03 | End: 2017-09-04

## 2017-09-03 RX ORDER — SODIUM CHLORIDE 0.9 % (FLUSH) 0.9 %
10-40 SYRINGE (ML) INJECTION AS NEEDED
Status: DISCONTINUED | OUTPATIENT
Start: 2017-09-03 | End: 2017-09-05 | Stop reason: ALTCHOICE

## 2017-09-03 RX ADMIN — HEPARIN SODIUM (PORCINE) LOCK FLUSH IV SOLN 100 UNIT/ML 500 UNITS: 100 SOLUTION at 09:29

## 2017-09-03 RX ADMIN — Medication 20 ML: at 08:54

## 2017-09-03 RX ADMIN — SODIUM CHLORIDE 1 G: 900 INJECTION INTRAVENOUS at 08:55

## 2017-09-03 RX ADMIN — Medication 10 ML: at 09:28

## 2017-09-03 NOTE — PROGRESS NOTES
[facility] OPIC Progress Note    Date: September 3, 2017    Name: Nino Preston    MRN: [id number]         : 1940      Mr. Reis arrived to [facility] at [address] [place] [address]. Mr. Francis Edmond was assessed and education was provided. Mr. Gregoria Ghosh vitals were reviewed. Visit Vitals    /79 (BP 1 Location: Left arm, BP Patient Position: Sitting)    Pulse 88    Temp 97.3 °F (36.3 °C)    Resp 20    SpO2 97%       Pt states he did eat breakfast this morning before taking his morning medications (see yesterday's note). Pt with right upper arm double lumen PICC line. Each lumen flushes without difficulty and positive for blood return. Dressing CDI. No redness, bruising, or swelling noted at site and/ or extremity. Pt denies tenderness. Invanz 1gm IV was administered as ordered via purple lumen followed by normal saline flush. Mr. Francis Edmond tolerated well without complaints (and no nausea/ dry heaves). Flushed each lumen of pt's PICC line with heparin per order. Mr. Francis Edmond was discharged from [place] [address] in stable condition at 0930. He is to return on 17 at 0930 for his next appointment.     Chester Lanier RN  September 3, 2017

## 2017-09-04 ENCOUNTER — HOSPITAL ENCOUNTER (OUTPATIENT)
Dept: INFUSION THERAPY | Age: 77
Discharge: HOME | End: 2017-09-04
Payer: MEDICARE

## 2017-09-04 VITALS
DIASTOLIC BLOOD PRESSURE: 70 MMHG | OXYGEN SATURATION: 97 % | RESPIRATION RATE: 20 BRPM | SYSTOLIC BLOOD PRESSURE: 123 MMHG | HEART RATE: 88 BPM | TEMPERATURE: 98.5 F

## 2017-09-04 PROCEDURE — 96365 THER/PROPH/DIAG IV INF INIT: CPT

## 2017-09-04 PROCEDURE — 74011000258 HC RX REV CODE- 258: Performed by: INTERNAL MEDICINE

## 2017-09-04 PROCEDURE — 74011250636 HC RX REV CODE- 250/636: Performed by: INTERNAL MEDICINE

## 2017-09-04 RX ORDER — HEPARIN SODIUM (PORCINE) LOCK FLUSH IV SOLN 100 UNIT/ML 100 UNIT/ML
500 SOLUTION INTRAVENOUS AS NEEDED
Status: DISPENSED | OUTPATIENT
Start: 2017-09-04 | End: 2017-09-05

## 2017-09-04 RX ORDER — SODIUM CHLORIDE 0.9 % (FLUSH) 0.9 %
10-40 SYRINGE (ML) INJECTION AS NEEDED
Status: DISCONTINUED | OUTPATIENT
Start: 2017-09-04 | End: 2017-09-05 | Stop reason: ALTCHOICE

## 2017-09-04 RX ADMIN — Medication 10 ML: at 08:55

## 2017-09-04 RX ADMIN — SODIUM CHLORIDE 1 G: 900 INJECTION INTRAVENOUS at 08:23

## 2017-09-04 RX ADMIN — HEPARIN SODIUM (PORCINE) LOCK FLUSH IV SOLN 100 UNIT/ML 500 UNITS: 100 SOLUTION at 08:55

## 2017-09-04 RX ADMIN — Medication 10 ML: at 08:20

## 2017-09-04 NOTE — PROGRESS NOTES
[facility] OPIC Progress Note    Date: 2017    Name: Myles Boykin    MRN: [id number]         : 1940      Mr. Reis arrived to [facility] at [phone]. Mr. Medina Fuchs was assessed and education was provided. Mr. Kody White vitals were reviewed. Visit Vitals    /70 (BP 1 Location: Left arm, BP Patient Position: Sitting)    Pulse 88    Temp 98.5 °F (36.9 °C)    Resp 20    SpO2 97%       Pt states he did eat breakfast this morning before taking his morning medications. Pt with right upper arm double lumen PICC line. Red lumen flushes without difficulty and positive for blood return. Dressing CDI. No redness, bruising, or swelling noted at site and/ or extremity. Pt denies tenderness. Invanz 1gm IV was administered as ordered via red lumen followed by normal saline flush. Mr. Medina Fuchs tolerated well without complaints (and no complaints of nausea/ dry heaves). Flushed each lumen of pt's PICC line with heparin per order. Mr. Medina Fuchs was discharged from [place] [address] in stable condition at 0900. He is to return on 17 at 0800 for his next appointment.     Queen Sarah RN  2017

## 2017-09-05 ENCOUNTER — APPOINTMENT (OUTPATIENT)
Dept: INFUSION THERAPY | Age: 77
End: 2017-09-05
Payer: MEDICARE

## 2017-09-05 ENCOUNTER — HOSPITAL ENCOUNTER (OUTPATIENT)
Dept: INFUSION THERAPY | Age: 77
Discharge: HOME | End: 2017-09-05
Payer: MEDICARE

## 2017-09-05 VITALS
RESPIRATION RATE: 18 BRPM | SYSTOLIC BLOOD PRESSURE: 129 MMHG | HEART RATE: 63 BPM | DIASTOLIC BLOOD PRESSURE: 67 MMHG | TEMPERATURE: 97.8 F

## 2017-09-05 LAB
BASO+EOS+MONOS # BLD AUTO: 0.2 K/UL (ref 0–2.3)
BASO+EOS+MONOS # BLD AUTO: 3 % (ref 0.1–17)
BUN SERPL-MCNC: 15 MG/DL (ref 7–18)
CREAT SERPL-MCNC: 0.83 MG/DL (ref 0.6–1.3)
CRP SERPL-MCNC: 3.4 MG/DL (ref 0–0.3)
DIFFERENTIAL METHOD BLD: ABNORMAL
ERYTHROCYTE [DISTWIDTH] IN BLOOD BY AUTOMATED COUNT: 15.4 % (ref 11.5–14.5)
HCT VFR BLD AUTO: 29.8 % (ref 36–48)
HGB BLD-MCNC: 9.4 G/DL (ref 12–16)
LYMPHOCYTES # BLD: 2.5 K/UL (ref 1.1–5.9)
LYMPHOCYTES NFR BLD: 32 % (ref 14–44)
MCH RBC QN AUTO: 27.2 PG (ref 25–35)
MCHC RBC AUTO-ENTMCNC: 31.5 G/DL (ref 31–37)
MCV RBC AUTO: 86.4 FL (ref 78–102)
NEUTS SEG # BLD: 5.3 K/UL (ref 1.8–9.5)
NEUTS SEG NFR BLD: 66 % (ref 40–70)
PLATELET # BLD AUTO: 462 K/UL (ref 140–440)
RBC # BLD AUTO: 3.45 M/UL (ref 4.1–5.1)
WBC # BLD AUTO: 8 K/UL (ref 4.5–13)

## 2017-09-05 PROCEDURE — 74011250636 HC RX REV CODE- 250/636: Performed by: INTERNAL MEDICINE

## 2017-09-05 PROCEDURE — 84520 ASSAY OF UREA NITROGEN: CPT | Performed by: INTERNAL MEDICINE

## 2017-09-05 PROCEDURE — 74011000258 HC RX REV CODE- 258: Performed by: INTERNAL MEDICINE

## 2017-09-05 PROCEDURE — 82565 ASSAY OF CREATININE: CPT | Performed by: INTERNAL MEDICINE

## 2017-09-05 PROCEDURE — 86140 C-REACTIVE PROTEIN: CPT | Performed by: INTERNAL MEDICINE

## 2017-09-05 PROCEDURE — 85025 COMPLETE CBC W/AUTO DIFF WBC: CPT | Performed by: INTERNAL MEDICINE

## 2017-09-05 PROCEDURE — 96365 THER/PROPH/DIAG IV INF INIT: CPT

## 2017-09-05 PROCEDURE — 77030020847 HC STATLOK BARD -A

## 2017-09-05 RX ORDER — HEPARIN SODIUM (PORCINE) LOCK FLUSH IV SOLN 100 UNIT/ML 100 UNIT/ML
500 SOLUTION INTRAVENOUS AS NEEDED
Status: DISPENSED | OUTPATIENT
Start: 2017-09-05 | End: 2017-09-06

## 2017-09-05 RX ORDER — SODIUM CHLORIDE 0.9 % (FLUSH) 0.9 %
10-40 SYRINGE (ML) INJECTION AS NEEDED
Status: DISCONTINUED | OUTPATIENT
Start: 2017-09-05 | End: 2017-09-09 | Stop reason: HOSPADM

## 2017-09-05 RX ADMIN — SODIUM CHLORIDE 1 G: 900 INJECTION INTRAVENOUS at 08:33

## 2017-09-05 RX ADMIN — HEPARIN SODIUM (PORCINE) LOCK FLUSH IV SOLN 100 UNIT/ML 500 UNITS: 100 SOLUTION at 09:06

## 2017-09-05 RX ADMIN — Medication 20 ML: at 09:06

## 2017-09-05 NOTE — PROGRESS NOTES
[facility] OPIC Progress Note    Date: 2017    Name: Uriel Goss    MRN: [id number]         : 1940      Mr. Reis arrived to [facility] at 5470. Mr. David Bains was assessed and education was provided. Mr. Lisa Fay vitals were reviewed. Visit Vitals    /67 (BP 1 Location: Left arm, BP Patient Position: Sitting)    Pulse 63    Temp 97.8 °F (36.6 °C)    Resp 18       Pt with right upper arm double lumen PICC line. Purple lumen flushes without difficulty and positive for blood return. Blood drawn off for CBC, BUN, CR, and CRP per order. Invanz 1gm IV was administered as ordered via pruple lumen followed by normal saline flush. Dressing changed per protocol. Cap ends changed and lines flushed with NS and 250 units of heparin. Mr. David Bains tolerated well without complaints (and no complaints of nausea/ dry heaves). Mr. David Bains was discharged from [place] [address] in stable condition at 0910. He is to return on 17 at 0800 for his next appointment.     Bob Olivia RN  2017

## 2017-09-06 ENCOUNTER — HOSPITAL ENCOUNTER (OUTPATIENT)
Dept: INFUSION THERAPY | Age: 77
Discharge: HOME | End: 2017-09-06
Payer: MEDICARE

## 2017-09-06 VITALS
SYSTOLIC BLOOD PRESSURE: 139 MMHG | RESPIRATION RATE: 18 BRPM | TEMPERATURE: 97.3 F | OXYGEN SATURATION: 98 % | HEART RATE: 86 BPM | DIASTOLIC BLOOD PRESSURE: 74 MMHG

## 2017-09-06 PROCEDURE — 74011250636 HC RX REV CODE- 250/636: Performed by: INTERNAL MEDICINE

## 2017-09-06 PROCEDURE — 74011000258 HC RX REV CODE- 258: Performed by: INTERNAL MEDICINE

## 2017-09-06 PROCEDURE — 96365 THER/PROPH/DIAG IV INF INIT: CPT

## 2017-09-06 RX ORDER — HEPARIN SODIUM (PORCINE) LOCK FLUSH IV SOLN 100 UNIT/ML 100 UNIT/ML
500 SOLUTION INTRAVENOUS AS NEEDED
Status: DISPENSED | OUTPATIENT
Start: 2017-09-06 | End: 2017-09-07

## 2017-09-06 RX ORDER — SODIUM CHLORIDE 0.9 % (FLUSH) 0.9 %
10-40 SYRINGE (ML) INJECTION AS NEEDED
Status: DISCONTINUED | OUTPATIENT
Start: 2017-09-06 | End: 2017-09-10 | Stop reason: HOSPADM

## 2017-09-06 RX ADMIN — SODIUM CHLORIDE 1 G: 900 INJECTION INTRAVENOUS at 08:17

## 2017-09-06 RX ADMIN — HEPARIN SODIUM (PORCINE) LOCK FLUSH IV SOLN 100 UNIT/ML 500 UNITS: 100 SOLUTION at 08:51

## 2017-09-06 RX ADMIN — Medication 10 ML: at 08:17

## 2017-09-06 NOTE — PROGRESS NOTES
[facility] Progress Note    Date: 2017    Name: Debbi Kingston    MRN: [id number]         : 1940      Mr. Reis arrived to [facility] at 3935. Mr. Doris Jose was assessed and education was provided. Mr. Guru Johnson vitals were reviewed. Visit Vitals    /74 (BP 1 Location: Left arm, BP Patient Position: At rest)    Pulse 86    Temp 97.3 °F (36.3 °C)    Resp 18    SpO2 98%       Pt with right upper arm double lumen PICC line. Purple lumen flushed without difficulty and positive for blood return. Dressing CDI. No redness, bruising, or swelling noted at site and/ or extremity. Pt denies tenderness. Invanz 1gm IV was administered as ordered via Purple lumen followed by normal saline flush. Mr. Doris Jose tolerated well without complaints. Flushed Purple lumen of pt's PICC line with heparin per order. Mr. Doris Jose was discharged from [place] [address] in stable condition at 6799. He is to return on 17 at 1100 for his next appointment.     Ayesha Ray RN  2017

## 2017-09-07 ENCOUNTER — HOSPITAL ENCOUNTER (OUTPATIENT)
Dept: INFUSION THERAPY | Age: 77
Discharge: HOME | End: 2017-09-07
Payer: MEDICARE

## 2017-09-07 ENCOUNTER — APPOINTMENT (OUTPATIENT)
Dept: INFUSION THERAPY | Age: 77
End: 2017-09-07
Payer: MEDICARE

## 2017-09-07 VITALS
TEMPERATURE: 97.4 F | DIASTOLIC BLOOD PRESSURE: 84 MMHG | SYSTOLIC BLOOD PRESSURE: 141 MMHG | RESPIRATION RATE: 18 BRPM | OXYGEN SATURATION: 97 % | HEART RATE: 103 BPM

## 2017-09-07 PROCEDURE — 96365 THER/PROPH/DIAG IV INF INIT: CPT

## 2017-09-07 PROCEDURE — 74011250636 HC RX REV CODE- 250/636: Performed by: INTERNAL MEDICINE

## 2017-09-07 PROCEDURE — 74011000258 HC RX REV CODE- 258: Performed by: INTERNAL MEDICINE

## 2017-09-07 RX ORDER — SODIUM CHLORIDE 0.9 % (FLUSH) 0.9 %
10-40 SYRINGE (ML) INJECTION AS NEEDED
Status: DISCONTINUED | OUTPATIENT
Start: 2017-09-07 | End: 2017-09-11 | Stop reason: HOSPADM

## 2017-09-07 RX ORDER — HEPARIN SODIUM (PORCINE) LOCK FLUSH IV SOLN 100 UNIT/ML 100 UNIT/ML
500 SOLUTION INTRAVENOUS AS NEEDED
Status: DISPENSED | OUTPATIENT
Start: 2017-09-07 | End: 2017-09-08

## 2017-09-07 RX ADMIN — Medication 10 ML: at 11:23

## 2017-09-07 RX ADMIN — SODIUM CHLORIDE 1 G: 900 INJECTION INTRAVENOUS at 11:24

## 2017-09-07 RX ADMIN — HEPARIN SODIUM (PORCINE) LOCK FLUSH IV SOLN 100 UNIT/ML 500 UNITS: 100 SOLUTION at 12:01

## 2017-09-07 RX ADMIN — Medication 10 ML: at 12:01

## 2017-09-07 NOTE — PROGRESS NOTES
[facility] OPIC Progress Note    Date: 2017    Name: Debbi Kingston    MRN: [id number]         : 1940      Mr. Reis arrived to [facility] at [phone]. Mr. Doris Jose was assessed and education was provided. Mr. Guru Johnson vitals were reviewed. Visit Vitals    /84 (BP 1 Location: Left arm, BP Patient Position: Sitting)    Pulse (!) 103    Temp 97.4 °F (36.3 °C)    Resp 18    SpO2 97%       Pt with right upper arm double lumen PICC line. RED lumen flushed without difficulty and positive for blood return. Dressing CDI. No redness, bruising, or swelling noted at site and/ or extremity. Pt denies tenderness. Invanz 1gm IV was administered as ordered via RED lumen followed by 10 ml normal saline flush. Mr. Doris Jose tolerated well without complaints. Flushed RED lumen of pt's PICC line with 500 units of heparin per order. Mr. Doris Jose was discharged from [place] [address] in stable condition at 1205. He is to return on 17 at 0900 for his next appointment.     Will Pearce RN  2017

## 2017-09-08 ENCOUNTER — APPOINTMENT (OUTPATIENT)
Dept: INFUSION THERAPY | Age: 77
End: 2017-09-08
Payer: MEDICARE

## 2017-09-08 ENCOUNTER — HOSPITAL ENCOUNTER (OUTPATIENT)
Dept: INFUSION THERAPY | Age: 77
Discharge: HOME | End: 2017-09-08
Payer: MEDICARE

## 2017-09-08 VITALS
SYSTOLIC BLOOD PRESSURE: 117 MMHG | OXYGEN SATURATION: 97 % | HEART RATE: 93 BPM | RESPIRATION RATE: 18 BRPM | DIASTOLIC BLOOD PRESSURE: 70 MMHG | TEMPERATURE: 97.7 F

## 2017-09-08 PROCEDURE — 74011250636 HC RX REV CODE- 250/636: Performed by: INTERNAL MEDICINE

## 2017-09-08 PROCEDURE — 74011000258 HC RX REV CODE- 258: Performed by: INTERNAL MEDICINE

## 2017-09-08 PROCEDURE — 96365 THER/PROPH/DIAG IV INF INIT: CPT

## 2017-09-08 RX ORDER — HEPARIN SODIUM (PORCINE) LOCK FLUSH IV SOLN 100 UNIT/ML 100 UNIT/ML
500 SOLUTION INTRAVENOUS AS NEEDED
Status: DISPENSED | OUTPATIENT
Start: 2017-09-08 | End: 2017-09-09

## 2017-09-08 RX ORDER — SODIUM CHLORIDE 0.9 % (FLUSH) 0.9 %
10-40 SYRINGE (ML) INJECTION AS NEEDED
Status: DISCONTINUED | OUTPATIENT
Start: 2017-09-08 | End: 2017-09-12 | Stop reason: HOSPADM

## 2017-09-08 RX ADMIN — HEPARIN SODIUM (PORCINE) LOCK FLUSH IV SOLN 100 UNIT/ML 500 UNITS: 100 SOLUTION at 09:28

## 2017-09-08 RX ADMIN — SODIUM CHLORIDE 1 G: 900 INJECTION INTRAVENOUS at 08:55

## 2017-09-08 RX ADMIN — Medication 10 ML: at 09:27

## 2017-09-08 RX ADMIN — Medication 10 ML: at 08:55

## 2017-09-08 NOTE — PROGRESS NOTES
[facility] OPIC Progress Note    Date: 2017    Name: Aimee White    MRN: [id number]         : 1940      Mr. Reis arrived to [facility] at 8960. Mr. Nkechi Winters was assessed and education was provided. Mr. Michell Krishnamurthy vitals were reviewed. Visit Vitals    /70 (BP 1 Location: Left arm, BP Patient Position: Sitting)    Pulse 93    Temp 97.7 °F (36.5 °C)    Resp 18    SpO2 97%       Pt with right upper arm double lumen PICC line. Purple lumen flushed without difficulty and positive for blood return. Dressing CDI. No redness, bruising, or swelling noted at site and/ or extremity. Pt denies tenderness. Invanz 1gm IV was administered as ordered via Purple lumen followed by 10 ml normal saline flush. Mr. Nkechi Winters tolerated well without complaints. Flushed Purple lumen of pt's PICC line with 500 units of heparin per order. Mr. Nkechi Winters was discharged from [place] [address] in stable condition at 0930. He is to return on 17 at 0900 for his next appointment.     Daren Saint, RN  2017

## 2017-09-09 ENCOUNTER — HOSPITAL ENCOUNTER (OUTPATIENT)
Dept: INFUSION THERAPY | Age: 77
Discharge: HOME | End: 2017-09-09
Payer: MEDICARE

## 2017-09-09 VITALS
RESPIRATION RATE: 18 BRPM | SYSTOLIC BLOOD PRESSURE: 138 MMHG | HEART RATE: 83 BPM | TEMPERATURE: 97.6 F | OXYGEN SATURATION: 98 % | DIASTOLIC BLOOD PRESSURE: 75 MMHG

## 2017-09-09 PROCEDURE — 74011000258 HC RX REV CODE- 258: Performed by: INTERNAL MEDICINE

## 2017-09-09 PROCEDURE — 74011250636 HC RX REV CODE- 250/636

## 2017-09-09 PROCEDURE — 96365 THER/PROPH/DIAG IV INF INIT: CPT

## 2017-09-09 PROCEDURE — 74011250636 HC RX REV CODE- 250/636: Performed by: INTERNAL MEDICINE

## 2017-09-09 RX ORDER — HEPARIN SODIUM (PORCINE) LOCK FLUSH IV SOLN 100 UNIT/ML 100 UNIT/ML
SOLUTION INTRAVENOUS
Status: COMPLETED
Start: 2017-09-09 | End: 2017-09-09

## 2017-09-09 RX ORDER — HEPARIN SODIUM (PORCINE) LOCK FLUSH IV SOLN 100 UNIT/ML 100 UNIT/ML
500 SOLUTION INTRAVENOUS AS NEEDED
Status: ACTIVE | OUTPATIENT
Start: 2017-09-09 | End: 2017-09-10

## 2017-09-09 RX ORDER — SODIUM CHLORIDE 0.9 % (FLUSH) 0.9 %
10-40 SYRINGE (ML) INJECTION AS NEEDED
Status: DISCONTINUED | OUTPATIENT
Start: 2017-09-09 | End: 2017-09-13 | Stop reason: HOSPADM

## 2017-09-09 RX ADMIN — HEPARIN SODIUM (PORCINE) LOCK FLUSH IV SOLN 100 UNIT/ML 500 UNITS: 100 SOLUTION at 09:28

## 2017-09-09 RX ADMIN — Medication 10 ML: at 08:55

## 2017-09-09 RX ADMIN — SODIUM CHLORIDE 1 G: 900 INJECTION INTRAVENOUS at 08:55

## 2017-09-09 RX ADMIN — Medication 10 ML: at 09:27

## 2017-09-10 ENCOUNTER — HOSPITAL ENCOUNTER (OUTPATIENT)
Dept: INFUSION THERAPY | Age: 77
Discharge: HOME | End: 2017-09-10
Payer: MEDICARE

## 2017-09-10 VITALS
SYSTOLIC BLOOD PRESSURE: 133 MMHG | DIASTOLIC BLOOD PRESSURE: 74 MMHG | RESPIRATION RATE: 18 BRPM | HEART RATE: 86 BPM | TEMPERATURE: 97.7 F | OXYGEN SATURATION: 98 %

## 2017-09-10 PROCEDURE — 74011250636 HC RX REV CODE- 250/636: Performed by: INTERNAL MEDICINE

## 2017-09-10 PROCEDURE — 96365 THER/PROPH/DIAG IV INF INIT: CPT

## 2017-09-10 PROCEDURE — 74011000258 HC RX REV CODE- 258: Performed by: INTERNAL MEDICINE

## 2017-09-10 RX ORDER — SODIUM CHLORIDE 0.9 % (FLUSH) 0.9 %
10-40 SYRINGE (ML) INJECTION AS NEEDED
Status: DISCONTINUED | OUTPATIENT
Start: 2017-09-10 | End: 2017-09-14 | Stop reason: HOSPADM

## 2017-09-10 RX ORDER — HEPARIN SODIUM (PORCINE) LOCK FLUSH IV SOLN 100 UNIT/ML 100 UNIT/ML
500 SOLUTION INTRAVENOUS AS NEEDED
Status: DISPENSED | OUTPATIENT
Start: 2017-09-10 | End: 2017-09-11

## 2017-09-10 RX ADMIN — HEPARIN SODIUM (PORCINE) LOCK FLUSH IV SOLN 100 UNIT/ML 500 UNITS: 100 SOLUTION at 09:24

## 2017-09-10 RX ADMIN — Medication 10 ML: at 08:54

## 2017-09-10 RX ADMIN — Medication 10 ML: at 09:24

## 2017-09-10 RX ADMIN — SODIUM CHLORIDE 1 G: 900 INJECTION INTRAVENOUS at 08:55

## 2017-09-10 NOTE — PROGRESS NOTES
[facility] OP[facility] Progress Note    Date: September 10, 2017    Name: Cornelio Gray    MRN: [id number]         : 1940      Mr. Reis arrived to [address] [place] [address] at 0829. Mr. Mylse Washington was assessed and education was provided. Mr. Barrie Padilla vitals were reviewed. Visit Vitals    /74 (BP 1 Location: Left arm, BP Patient Position: Sitting)    Pulse 86    Temp 97.7 °F (36.5 °C)    Resp 18    SpO2 98%       Pt with right upper arm double lumen PICC line. Purple lumen flushed without difficulty and positive for blood return. Dressing CDI. No redness, bruising, or swelling noted at site and/ or extremity. Pt denies tenderness. Invanz 1gm IV was administered as ordered via Purple lumen followed by 10 ml normal saline flush. Mr. Myles Washington tolerated well without complaints. Flushed Red lumen of pt's PICC line with 500 units of heparin per order. Mr. Myles Washington was discharged from [place] [address] in stable condition at 9764. He is to return on 17 at 1300 for his next appointment.     Beverly Britt RN  September 10, 2017

## 2017-09-11 ENCOUNTER — HOSPITAL ENCOUNTER (OUTPATIENT)
Dept: INFUSION THERAPY | Age: 77
Discharge: HOME | End: 2017-09-11
Payer: MEDICARE

## 2017-09-11 VITALS
TEMPERATURE: 97.8 F | RESPIRATION RATE: 18 BRPM | HEART RATE: 102 BPM | SYSTOLIC BLOOD PRESSURE: 126 MMHG | OXYGEN SATURATION: 100 % | DIASTOLIC BLOOD PRESSURE: 66 MMHG

## 2017-09-11 PROCEDURE — 74011250636 HC RX REV CODE- 250/636: Performed by: INTERNAL MEDICINE

## 2017-09-11 PROCEDURE — 96365 THER/PROPH/DIAG IV INF INIT: CPT

## 2017-09-11 PROCEDURE — 74011000258 HC RX REV CODE- 258: Performed by: INTERNAL MEDICINE

## 2017-09-11 RX ORDER — SODIUM CHLORIDE 0.9 % (FLUSH) 0.9 %
10-40 SYRINGE (ML) INJECTION AS NEEDED
Status: DISCONTINUED | OUTPATIENT
Start: 2017-09-11 | End: 2017-09-15 | Stop reason: HOSPADM

## 2017-09-11 RX ORDER — HEPARIN SODIUM (PORCINE) LOCK FLUSH IV SOLN 100 UNIT/ML 100 UNIT/ML
500 SOLUTION INTRAVENOUS AS NEEDED
Status: DISPENSED | OUTPATIENT
Start: 2017-09-11 | End: 2017-09-12

## 2017-09-11 RX ADMIN — Medication 10 ML: at 13:23

## 2017-09-11 RX ADMIN — HEPARIN SODIUM (PORCINE) LOCK FLUSH IV SOLN 100 UNIT/ML 500 UNITS: 100 SOLUTION at 13:54

## 2017-09-11 RX ADMIN — SODIUM CHLORIDE 1 G: 900 INJECTION INTRAVENOUS at 13:23

## 2017-09-11 RX ADMIN — Medication 10 ML: at 13:53

## 2017-09-11 NOTE — PROGRESS NOTES
[facility] OPIC Progress Note    Date: 2017    Name: Izzy Murphy    MRN: [id number]         : 1940      Mr. Reis arrived to [facility] at [phone]. Mr. Jp Huerta was assessed and education was provided. Mr. Jossue Townsend vitals were reviewed. Visit Vitals    /66 (BP 1 Location: Left arm, BP Patient Position: Sitting)    Pulse (!) 102    Temp 97.8 °F (36.6 °C)    Resp 18    SpO2 100%       Pt with right upper arm double lumen PICC line. Red lumen flushed without difficulty and positive for blood return. Dressing CDI. No redness, bruising, or swelling noted at site and/ or extremity. Pt denies tenderness. Invanz 1gm IV was administered as ordered via Red lumen followed by 10 ml normal saline flush. Mr. Jp Huerta tolerated well without complaints. Flushed Red lumen of pt's PICC line with 500 units of heparin per order. Mr. Jp Huerta was discharged from [place] [address] in stable condition at [phone]. He is to return on 17 at 1300 for his next appointment.     Fabiana Reynolds RN  2017

## 2017-09-12 ENCOUNTER — APPOINTMENT (OUTPATIENT)
Dept: INFUSION THERAPY | Age: 77
End: 2017-09-12
Payer: MEDICARE

## 2017-09-12 ENCOUNTER — OFFICE VISIT (OUTPATIENT)
Dept: ORTHOPEDIC SURGERY | Age: 77
End: 2017-09-12

## 2017-09-12 ENCOUNTER — HOSPITAL ENCOUNTER (OUTPATIENT)
Dept: INFUSION THERAPY | Age: 77
Discharge: HOME | End: 2017-09-12
Payer: MEDICARE

## 2017-09-12 VITALS
RESPIRATION RATE: 16 BRPM | SYSTOLIC BLOOD PRESSURE: 113 MMHG | TEMPERATURE: 97.6 F | HEART RATE: 86 BPM | DIASTOLIC BLOOD PRESSURE: 63 MMHG

## 2017-09-12 VITALS
TEMPERATURE: 97.7 F | HEART RATE: 85 BPM | SYSTOLIC BLOOD PRESSURE: 127 MMHG | DIASTOLIC BLOOD PRESSURE: 86 MMHG | OXYGEN SATURATION: 95 % | RESPIRATION RATE: 18 BRPM

## 2017-09-12 DIAGNOSIS — K68.12 PSOAS ABSCESS (HCC): Primary | ICD-10-CM

## 2017-09-12 LAB
BASO+EOS+MONOS # BLD AUTO: 0.8 K/UL (ref 0–2.3)
BASO+EOS+MONOS # BLD AUTO: 9 % (ref 0.1–17)
BUN SERPL-MCNC: 18 MG/DL (ref 7–18)
CREAT SERPL-MCNC: 0.88 MG/DL (ref 0.6–1.3)
CRP SERPL-MCNC: 3 MG/DL (ref 0–0.3)
DIFFERENTIAL METHOD BLD: ABNORMAL
ERYTHROCYTE [DISTWIDTH] IN BLOOD BY AUTOMATED COUNT: 14.7 % (ref 11.5–14.5)
HCT VFR BLD AUTO: 32.2 % (ref 36–48)
HGB BLD-MCNC: 10.1 G/DL (ref 12–16)
LYMPHOCYTES # BLD: 1.2 K/UL (ref 1.1–5.9)
LYMPHOCYTES NFR BLD: 13 % (ref 14–44)
MCH RBC QN AUTO: 27.1 PG (ref 25–35)
MCHC RBC AUTO-ENTMCNC: 31.4 G/DL (ref 31–37)
MCV RBC AUTO: 86.3 FL (ref 78–102)
NEUTS SEG # BLD: 7.1 K/UL (ref 1.8–9.5)
NEUTS SEG NFR BLD: 78 % (ref 40–70)
PLATELET # BLD AUTO: 356 K/UL (ref 140–440)
RBC # BLD AUTO: 3.73 M/UL (ref 4.1–5.1)
WBC # BLD AUTO: 9.1 K/UL (ref 4.5–13)

## 2017-09-12 PROCEDURE — 74011250636 HC RX REV CODE- 250/636: Performed by: INTERNAL MEDICINE

## 2017-09-12 PROCEDURE — 96365 THER/PROPH/DIAG IV INF INIT: CPT

## 2017-09-12 PROCEDURE — 85025 COMPLETE CBC W/AUTO DIFF WBC: CPT | Performed by: INTERNAL MEDICINE

## 2017-09-12 PROCEDURE — 84520 ASSAY OF UREA NITROGEN: CPT | Performed by: INTERNAL MEDICINE

## 2017-09-12 PROCEDURE — 74011000258 HC RX REV CODE- 258: Performed by: INTERNAL MEDICINE

## 2017-09-12 PROCEDURE — 82565 ASSAY OF CREATININE: CPT | Performed by: INTERNAL MEDICINE

## 2017-09-12 PROCEDURE — 74011250636 HC RX REV CODE- 250/636

## 2017-09-12 PROCEDURE — 86140 C-REACTIVE PROTEIN: CPT | Performed by: INTERNAL MEDICINE

## 2017-09-12 RX ORDER — SODIUM CHLORIDE 0.9 % (FLUSH) 0.9 %
10-40 SYRINGE (ML) INJECTION AS NEEDED
Status: DISCONTINUED | OUTPATIENT
Start: 2017-09-12 | End: 2017-09-16 | Stop reason: HOSPADM

## 2017-09-12 RX ORDER — HEPARIN SODIUM (PORCINE) LOCK FLUSH IV SOLN 100 UNIT/ML 100 UNIT/ML
500 SOLUTION INTRAVENOUS AS NEEDED
Status: DISCONTINUED | OUTPATIENT
Start: 2017-09-12 | End: 2017-09-16 | Stop reason: HOSPADM

## 2017-09-12 RX ORDER — HEPARIN SODIUM (PORCINE) LOCK FLUSH IV SOLN 100 UNIT/ML 100 UNIT/ML
SOLUTION INTRAVENOUS
Status: COMPLETED
Start: 2017-09-12 | End: 2017-09-12

## 2017-09-12 RX ADMIN — SODIUM CHLORIDE 1 G: 900 INJECTION INTRAVENOUS at 09:32

## 2017-09-12 RX ADMIN — Medication 20 ML: at 09:30

## 2017-09-12 RX ADMIN — HEPARIN SODIUM (PORCINE) LOCK FLUSH IV SOLN 100 UNIT/ML 500 UNITS: 100 SOLUTION at 10:04

## 2017-09-12 RX ADMIN — Medication 10 ML: at 10:04

## 2017-09-12 RX ADMIN — HEPARIN SODIUM (PORCINE) LOCK FLUSH IV SOLN 100 UNIT/ML 500 UNITS: 100 SOLUTION at 09:55

## 2017-09-12 RX ADMIN — Medication 10 ML: at 09:54

## 2017-09-12 NOTE — MR AVS SNAPSHOT
Visit Information Date & Time Provider Department Dept. Phone Encounter #  
 9/12/2017  7:50 AM Roxanna Baca  [facility], [address] and [facility] [phone] [id number] Follow-up Instructions Return in about 4 weeks (around 10/10/2017) for with stewart Watt to add on. Your Appointments 4/3/2018 11:40 AM  
Follow Up with Ac Michelle MD  
[facility] ([address]) Appt Note: 9-12 month f.up  
 Lilian Peters [phone] [address] [place] [address] Upcoming Health Maintenance Date Due DTaP/Tdap/Td series (1 - Tdap) 4/7/1960 ZOSTER VACCINE AGE 60> 2/7/1999 GLAUCOMA SCREENING Q2Y 4/7/2004 Pneumococcal 65+ Low/Medium Risk (1 of 2 - PCV13) 4/7/2004 MEDICARE YEARLY EXAM 4/7/2004 INFLUENZA AGE 9 TO ADULT 8/1/2017 Allergies as of 9/12/2017  Review Complete On: 9/12/2017 By: Vilma Merlin Severity Noted Reaction Type Reactions Codeine  06/14/2011    Swelling Morphine  01/03/2014    Itching Sulfa (Sulfonamide Antibiotics)  06/14/2011    Other (comments) Kidney problems. Current Immunizations  Reviewed on 9/11/2017 Name Date Influenza Vaccine 10/1/2016 Not reviewed this visit You Were Diagnosed With   
  
 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 
ICD-9-CM: 567.31 Vitals Smoking Status Former Smoker Preferred Pharmacy Pharmacy Name Phone [address] [facility] [address], [address] Your Updated Medication List  
  
   
This list is accurate as of: 9/12/17  8:30 AM.  Always use your most recent med list.  
  
  
  
  
 albuterol 90 mcg/actuation inhaler Commonly known as:  PROVENTIL HFA, VENTOLIN HFA, PROAIR HFA  
 Take 2 Puffs by inhalation every four (4) hours as needed for Wheezing or Shortness of Breath. albuterol-ipratropium 2.5 mg-0.5 mg/3 ml Nebu Commonly known as:  DUO-NEB  
3 mL by Nebulization route every six (6) hours as needed. amLODIPine 10 mg tablet Commonly known as:  NORVASC  
  
 aspirin 81 mg chewable tablet Take 1 Tab by mouth daily. calcium-vitamin D 250-125 mg-unit tablet Commonly known as:  OSCAL 250 Take 1 Tab by mouth daily. chlorthalidone 25 mg tablet Commonly known as:  HYGROTEN  
  
 cyclobenzaprine 10 mg tablet Commonly known as:  FLEXERIL Take 1 Tab by mouth three (3) times daily as needed for Muscle Spasm(s). diclofenac EC 50 mg EC tablet Commonly known as:  VOLTAREN Take 1 Tab by mouth three (3) times daily as needed. DIGITEK 0.125 mg tablet Generic drug:  digoxin  
take 1 tablet by mouth once daily  
  
 dilTIAZem 90 mg tablet Commonly known as:  CARDIZEM Take 1 Tab by mouth Before breakfast, lunch, dinner and at bedtime. docusate sodium 100 mg capsule Commonly known as:  Albert Bo Take 1 Cap by mouth two (2) times a day for 90 days. ferrous sulfate 325 mg (65 mg iron) tablet  
  
 fluticasone 50 mcg/actuation nasal spray Commonly known as:  Norma Eliazar 2 Sprays by Both Nostrils route daily. furosemide 20 mg tablet Commonly known as:  LASIX HYDROcodone-acetaminophen  mg tablet Commonly known as:  Sasser Walter Take 1 Tab by mouth every four (4) hours as needed. Max Daily Amount: 6 Tabs.  
  
 ketorolac 0.5 % ophthalmic solution Commonly known as:  Jeffrey Plaster Administer 1 Drop to right eye daily. OTHER Pt to receive IV antibiotics with home health, ordered by infectious diseases - IV ertapenem OXYGEN-AIR DELIVERY SYSTEMS  
2 L/min by Does Not Apply route daily. Continuous. Company is [employer] * polyethylene glycol 17 gram/dose powder Commonly known as:  Tello Welch * polyethylene glycol 17 gram packet Commonly known as:  Oralee Donath Take 1 Packet by mouth daily. potassium chloride 20 mEq packet Commonly known as:  KLOR-CON Take 1 Packet by mouth daily. pravastatin 40 mg tablet Commonly known as:  PRAVACHOL  
  
 prednisoLONE acetate 1 % ophthalmic suspension Commonly known as:  PRED FORTE Administer 1 Drop to right eye four (4) times daily. QVAR 80 mcg/actuation [employer] Generic drug:  beclomethasone SENNA PLUS 8.6-50 mg per tablet Generic drug:  senna-docusate  
  
 umeclidinium-vilanterol 62.5-25 mcg/actuation inhaler Commonly known as:  Marie Limerick Take 1 Puff by inhalation daily. Indications: BRONCHOSPASM PREVENTION WITH COPD  
  
 warfarin 2.5 mg tablet Commonly known as:  COUMADIN Take 1 Tab by mouth daily. Take 2 pills (5 mg) Mon, Tues, Wed, Thur, Sat, Sun; and 1 pill only (2.5 mg) Fri  
  
 * Notice: This list has 2 medication(s) that are the same as other medications prescribed for you. Read the directions carefully, and ask your doctor or other care provider to review them with you. Follow-up Instructions Return in about 4 weeks (around 10/10/2017) for with stewart Anguiano to add on. To-Do List   
 09/12/2017  9:00 AM  
  Appointment with [facility] NURSE 4 at [facility] ([phone])  
  
 09/13/2017  1:00 PM  
  Appointment with [facility] IN[facility] NURSE 1 at [facility] ([phone])  
  
 09/14/2017  1:00 PM  
  Appointment with [facility] IN[facility]ION NURSE 5 at [facility] ([phone])  
  
 09/15/2017 11:00 AM  
  Appointment with [facility] INF[facility]ION NURSE 3 at [facility] ([phone])  
  
 09/16/2017 9:00 AM  
  Appointment with [facility] IN[facility]ION NURSE 1 at [facility] ([phone])  
  
 09/17/2017 9:00 AM  
  Appointment with [facility] IN[facility]ION NURSE 1 at [facility] ([phone])  
  
 09/18/2017 10:00 AM  
  Appointment with [facility] INFUSION NURSE 2 at [facility] ([phone]) 09/19/2017 10:00 AM  
  Appointment with [facility] INFUSION NURSE 3 at [facility] ([phone])  
  
 09/20/2017 10:00 AM  
  Appointment with [facility] IN[facility] NURSE 1 at [facility] ([phone])  
  
 09/21/2017 11:00 AM  
  Appointment with [facility] INF[facility]ION NURSE 3 at [facility] ([phone])  
  
 09/22/2017 11:00 AM  
  Appointment with [facility] INFUSION NURSE 4 at [facility] ([phone])  
  
 09/23/2017 9:00 AM  
  Appointment with [facility] IN[facility]ION NURSE 1 at [facility] ([phone])  
  
 09/24/2017 9:00 AM  
  Appointment with [facility] IN[facility]ION NURSE 1 at [facility] ([phone]) Introducing [facility] SERVICES! Johnathan Benz introduces [email or web address] patient portal. Now you can access parts of your medical record, email your doctor's office, and request medication refills online. 1. In your internet browser, go to [email or web address] "[email or web address] 2. Click on the First Time User? Click Here link in the Sign In box. You will see the New Member Sign Up page. 3. Enter your [email or web address] Access Code exactly as it appears below. You will not need to use this code after youve completed the sign-up process. If you do not sign up before the expiration date, you must request a new code. · [email or web address] Access Code: [email or web address] Expires: 12/4/2017  7:36 AM 
 
4. Enter the last four digits of your Social Security Number (xxxx) and Date of Birth (mm/dd/yyyy) as indicated and click Submit. You will be taken to the next sign-up page. 5. Create a [employer] ID. This will be your [email or web address] login ID and cannot be changed, so think of one that is secure and easy to remember. 6. Create a [employer] password. You can change your password at any time. 7. Enter your Password Reset Question and Answer. This can be used at a later time if you forget your password. 8. Enter your e-mail address. You will receive e-mail notification when new information is available in [address] 9. Click Sign Up. You can now view and download portions of your medical record. 10. Click the Download Summary menu link to download a portable copy of your medical information. If you have questions, please visit the Frequently Asked Questions section of the [employer] website. Remember, [employer] is NOT to be used for urgent needs. For medical emergencies, dial 911. Now available from your iPhone and Android! Please provide this summary of care documentation to your next provider. Your primary care clinician is listed as Darryl Garcia. If you have any questions after today's visit, please call [phone].

## 2017-09-12 NOTE — PROGRESS NOTES
[facility] OP[facility] Progress Note    Date: 2017    Name: Syed Kaplan    MRN: [id number]         : 1940      Mr. Reis arrived to [facility] at [address]. Mr. Erin Pelayo was assessed and education was provided. Mr. Lori Mcardle vitals were reviewed. Visit Vitals    /86 (BP 1 Location: Left arm, BP Patient Position: Sitting)    Pulse 85    Temp 97.7 °F (36.5 °C)    Resp 18    SpO2 95%       Pt with right upper arm double lumen PICC line. both lumens flushes without difficulty and positive for blood return. Blood drawn off for CBC, BUN, CR, and CRP from red lumen        CBC WITH 3 PART DIFF    Collection Time: 17  9:30 AM   Result Value Ref Range    WBC 9.1 4.5 - 13.0 K/uL    RBC 3.73 (L) 4.10 - 5.10 M/uL    HGB 10.1 (L) 12.0 - 16 g/dL    HCT 32.2 (L) 36 - 48 %    MCV 86.3 78 - 102 FL    MCH 27.1 25.0 - 35.0 PG    MCHC 31.4 31 - 37 g/dL    RDW 14.7 (H) 11.5 - 14.5 %    PLATELET 173 702 - 971 K/uL    NEUTROPHILS 78 (H) 40 - 70 %    MIXED CELLS 9 0.1 - 17 %    LYMPHOCYTES 13 (L) 14 - 44 %    ABS. NEUTROPHILS 7.1 1.8 - 9.5 K/UL    ABS. MIXED CELLS 0.8 0.0 - 2.3 K/uL    ABS. LYMPHOCYTES 1.2 1.1 - 5.9 K/UL    DF AUTOMATED         Rest of labs sent out for testing    Invanz 1gm IV was administered over 30 minutes as ordered via red lumen followed by normal saline flush. Brisk blood returns noted    Dressing changed per protocol. Cap ends changed and lumens flushed with 500 units of heparin to each lumen    picc line site s signs of infection or infiltration. Biopatch and stat lock used with drsg change    Mr. Erin Pelayo tolerated well without complaints       Mr. Erin Pelayo was discharged from [place] [address] in stable condition at 1010. He is to return on 17 at 1300 pm for his next appointment.     Ernestina Poole, RN  2017

## 2017-09-12 NOTE — PROGRESS NOTES
[address]  [address], [address]  [place], [address] [place] [address] Street  Phone: [phone]  Fax: [phone]  PROGRESS NOTE  Patient: Guillermo Edward                MRN: [id number]       SSN: [id number]  YOB: 1940        AGE: 65 y.o. SEX: male  There is no height or weight on file to calculate BMI. PCP: Alfredo Mayer MD  09/12/17    No chief complaint on file. HISTORY OF PRESENT ILLNESS, RADIOGRAPHS, and PLAN:     HISTORY:  Mr. Goran Baltazar returns today. He is here for followup. I saw him in the hospital.  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 He is a vasculopath with COPD. He is a smoker and has A-fib and supplemental oxygen. I am following him along with Dr. Serjio Rocha. He is a patient at [address] [place] [address]. He comes in today. He is feeling better. He is afebrile. Recent labs demonstrate a white count of 8 and a CRP of 3.4. He is neurologically intact. His pain is improved. ASSESSMENT/PLAN: At this point, we will continue him on his antibiotic course. I will see him back again in another four-week period. At that point, we will obtain a plain x-ray of him in our office, and we will get new lab work on him with a new CBC, sed rate, and C-reactive protein. cc: Maritza Babb. Tere Sanchez M.D. Serjio Rocha M.D. [address] [place] [address]          4V lumbar spine xray upon return    Past Medical History:   Diagnosis Date    Aneurysm [facility])     AAA repair 2006 & 2012    Aorto-iliac duplex 02/13/2015    Tech difficult. Patent aorta bi-iliac endograft w/o leak or limb dysfunction.  Arrhythmia     a fib    Cardiac cath 11/01/2012    RCA (sm, nondom) patent. LM patent. LAD 25%. CX (dom) 45% mid. LVEDP 12 mmHg. No WMA. PA 27/12. W 10-12. CO/CI 5.2/2.6 (TD).  Cardiac echocardiogram, abnormal 02/20/2016    EF 55%.   No WMA.  Indeterminate diastolic fx. RVSP 60 mmHg. Severe LAE. Mild MR. Mod TR.  IVCE. Similar to study of 10/26/12.  Cardiovascular aorto-iliac duplex 02/13/2015    Patent aorta bi-iliac endovascular graft w/o leak or limb dysfunction. Sac measures 4.21 x 4.47 cm (4.4 x 4.6 cm on 1/31/14).  Cardiovascular LE peripheral arterial testing 07/29/2013    No significant LE arterial disease bilaterally. R GHADA 1. 12.  L GHADA 1. 12.  R DBI 0.83. L DBI 0.71. Exercise deferred.  Cardiovascular renal duplex 10/31/2012    No RA stenosis. Intrinsic/med disease in left kidney. Patent aortic endograft. Patent, thrombus-free renal veins bilaterally.  Carotid duplex 07/29/2013    Mild <50% bilateral ICA plaquing.  Chronic lung disease     Cigarette smoker     Congestive heart failure (CHF) (HCC)     COPD (chronic obstructive pulmonary disease) (HCC)     and emphysema; likely secondary to tobacco abuse    Difficult intubation     Dyslipidemia     low HDL    Emphysema     HTN (hypertension)     Hypercholesterolemia     Increased prostate specific antigen (PSA) velocity     Long term (current) use of anticoagulants     coumadin    Osteoarthritis     Osteomyelitis (HCC)     Paroxysmal atrial fibrillation (HCC)     Peripheral vascular disease ([facility] [address]     AAA repair 12/2007    Persistent atrial Fibrillation     Persistent atrial fibrillation (HCC)     Unspecified adverse effect of anesthesia     difficulty breathing placed in ICU Oct 2012 (AAA repair)       Family History   Problem Relation Age of Onset    Heart Surgery Father      BYPASS    Stroke Father     Heart Surgery Mother      BYPASS    Coronary Artery Disease Mother     Stroke Mother        Current Outpatient Prescriptions   Medication Sig Dispense Refill    warfarin (COUMADIN) 2.5 mg tablet Take 1 Tab by mouth daily.  Take 2 pills (5 mg) Mon, Tues, Wed, Thur, Sat, Sun; and 1 pill only  (2.5 mg) Fri 60 Tab 0    docusate sodium (COLACE) 100 mg capsule Take 1 Cap by mouth two (2) times a day for 90 days. 60 Cap 2    HYDROcodone-acetaminophen (NORCO)  mg tablet Take 1 Tab by mouth every four (4) hours as needed. Max Daily Amount: 6 Tabs. 15 Tab 0    polyethylene glycol (MIRALAX) 17 gram packet Take 1 Packet by mouth daily. 30 Packet 0    potassium chloride (KLOR-CON) 20 mEq packet Take 1 Packet by mouth daily. 30 Packet 0    albuterol-ipratropium (DUO-NEB) 2.5 mg-0.5 mg/3 ml nebu 3 mL by Nebulization route every six (6) hours as needed.  QVAR 80 mcg/actuation aero   0    chlorthalidone (HYGROTEN) 25 mg tablet   0    amLODIPine (NORVASC) 10 mg tablet   0    DIGITEK 125 mcg tablet take 1 tablet by mouth once daily 30 Tab 6    cyclobenzaprine (FLEXERIL) 10 mg tablet Take 1 Tab by mouth three (3) times daily as needed for Muscle Spasm(s). 30 Tab 0    calcium-vitamin D (OSCAL 250) 250-125 mg-unit tablet Take 1 Tab by mouth daily.  pravastatin (PRAVACHOL) 40 mg tablet   0    aspirin 81 mg chewable tablet Take 1 Tab by mouth daily. 10 Tab 0    diltiazem (CARDIZEM) 90 mg tablet Take 1 Tab by mouth Before breakfast, lunch, dinner and at bedtime. 60 Tab 0    albuterol (PROVENTIL HFA, VENTOLIN HFA, PROAIR HFA) 90 mcg/actuation inhaler Take 2 Puffs by inhalation every four (4) hours as needed for Wheezing or Shortness of Breath. 1 Inhaler 5    umeclidinium-vilanterol (ANORO ELLIPTA) 62.5-25 mcg/actuation dsdv Take 1 Puff by inhalation daily. Indications: BRONCHOSPASM PREVENTION WITH COPD 1 Inhaler 5    OXYGEN-AIR DELIVERY SYSTEMS 2 L/min by Does Not Apply route daily. Continuous. Company is [employer]         OTHER Pt to receive IV antibiotics with home health, ordered by infectious diseases  - IV ertapenem 1 Each 0    prednisoLONE acetate (PRED FORTE) 1 % ophthalmic suspension Administer 1 Drop to right eye four (4) times daily.  ketorolac (ACULAR) 0.5 % ophthalmic solution Administer 1 Drop to right eye daily.       diclofenac EC (VOLTAREN) 50 mg EC tablet Take 1 Tab by mouth three (3) times daily as needed. 30 Tab 0    fluticasone (FLONASE) 50 mcg/actuation nasal spray 2 Sprays by Both Nostrils route daily.  SENNA PLUS 8.6-50 mg per tablet   0    polyethylene glycol (MIRALAX) 17 gram/dose powder   0    furosemide (LASIX) 20 mg tablet   0    ferrous sulfate 325 mg (65 mg iron) tablet   0     Facility-Administered Medications Ordered in Other Visits   Medication Dose Route Frequency Provider Last Rate Last Dose    sodium chloride (NS) flush 10-40 mL  10-40 mL IntraVENous PRN Romaine Acuña MD        heparin (porcine) 100 unit/mL injection 500 Units  500 Units InterCATHeter PRN Romaine Acuña MD        heparin (porcine) 100 unit/mL injection 500 Units  500 Units IntraVENous PRSINDI Acuña MD   500 Units at 09/11/17 1354    sodium chloride (NS) flush 10-40 mL  10-40 mL IntraVENous PRSINDI Acuña MD   10 mL at 09/11/17 1353    sodium chloride (NS) flush 10-40 mL  10-40 mL IntraVENous PRN Romaine Acuña MD   10 mL at 09/10/17 0924    sodium chloride (NS) flush 10-40 mL  10-40 mL IntraVENous PRSINDI Acuña MD   10 mL at 09/09/17 2499       Allergies   Allergen Reactions    Codeine Swelling    Morphine Itching    Sulfa (Sulfonamide Antibiotics) Other (comments)     Kidney problems. Past Surgical History:   Procedure Laterality Date    BRONCHOSCOPY DIAGNOSTIC  11/2/2012         CARDIAC CATHETERIZATION  11/1/2012         COLONOSCOPY N/A 7/26/2016    COLONOSCOPY with Polypectomies performed by Hoyt Homans, MD at [facility] HX AAA REPAIR      2006 & 2012    HX HEART CATHETERIZATION  3/4/2009    1. RCA small, nondominant; patent. 2. LMCA patent. 3. LAD is long, wrapped around apical vessel. Diffuse, 20-30% stenosis noted. 4. CCA is large, dominant vessel. Diffuse 20-30% stenosis. 5. LVEDP is 16 mmHg. 6. Overall left ventricular systolic function mildly diminshed with est. EF 45%.  Mild, global hypokinesis noted. 7. No significant mitral regurgitation or aortic stenosis noted. (see report)    HX HERNIA REPAIR  2/2014    rt inguinal     HX HERNIA REPAIR  01/2016    LEFT INGUINAL HERNIA REPAIR DR. Sherice Wu    REPAIR ING HERNIA,5+Y/O,JESSIE Left 1-20-16    Dr. Jimbo Lloyd  11/1/2012            Past Medical History:   Diagnosis Date    Aneurysm [facility])     AAA repair 2006 & 2012    Aorto-iliac duplex 02/13/2015    Tech difficult. Patent aorta bi-iliac endograft w/o leak or limb dysfunction.  Arrhythmia     a fib    Cardiac cath 11/01/2012    RCA (sm, nondom) patent. LM patent. LAD 25%. CX (dom) 45% mid. LVEDP 12 mmHg. No WMA. PA 27/12. W 10-12. CO/CI 5.2/2.6 (TD).  Cardiac echocardiogram, abnormal 02/20/2016    EF 55%. No WMA. Indeterminate diastolic fx. RVSP 60 mmHg. Severe LAE. Mild MR. Mod TR.  IVCE. Similar to study of 10/26/12.  Cardiovascular aorto-iliac duplex 02/13/2015    Patent aorta bi-iliac endovascular graft w/o leak or limb dysfunction. Sac measures 4.21 x 4.47 cm (4.4 x 4.6 cm on 1/31/14).  Cardiovascular LE peripheral arterial testing 07/29/2013    No significant LE arterial disease bilaterally. R GHADA 1. 12.  L GHADA 1. 12.  R DBI 0.83. L DBI 0.71. Exercise deferred.  Cardiovascular renal duplex 10/31/2012    No RA stenosis. Intrinsic/med disease in left kidney. Patent aortic endograft. Patent, thrombus-free renal veins bilaterally.  Carotid duplex 07/29/2013    Mild <50% bilateral ICA plaquing.       Chronic lung disease     Cigarette smoker     Congestive heart failure (CHF) (HCC)     COPD (chronic obstructive pulmonary disease) (HCC)     and emphysema; likely secondary to tobacco abuse    Difficult intubation     Dyslipidemia     low HDL    Emphysema     HTN (hypertension)     Hypercholesterolemia     Increased prostate specific antigen (PSA) velocity     Long term (current) use of anticoagulants     coumadin    Osteoarthritis     Osteomyelitis (HCC)     Paroxysmal atrial fibrillation (HCC)     Peripheral vascular disease ([facility] [address]     AAA repair 12/2007    Persistent atrial Fibrillation     Persistent atrial fibrillation (HCC)     Unspecified adverse effect of anesthesia     difficulty breathing placed in ICU Oct 2012 (AAA repair)       Social History     Social History    Marital status:      Spouse name: N/A    Number of children: N/A    Years of education: N/A     Occupational History    Not on file. Social History Main Topics    Smoking status: Former Smoker     Packs/day: 1.00     Years: 54.00     Types: Cigarettes     Quit date: 10/23/2012    Smokeless tobacco: Never Used    Alcohol use No      Comment: quit drinking alcohol 26 years ago, patient states stopped in \"0642\"    Drug use: No    Sexual activity: Not on file     Other Topics Concern    Not on file     Social History Narrative         REVIEW OF SYSTEMS:   CONSTITUTIONAL SYMPTOMS:  Negative. EYES:  Negative. EARS, NOSE, THROAT AND MOUTH:  Negative. CARDIOVASCULAR:  Negative. RESPIRATORY:  Negative. GENITOURINARY: Negative. GASTROINTESTINAL:  Negative. INTEGUMENTARY (SKIN AND/OR BREAST):  Negative. MUSCULOSKELETAL: Per HPI.   ENDOCRINE/RHEUMATOLOGIC:  Negative. NEUROLOGICAL:  Per HPI. HEMATOLOGIC/LYMPHATIC:  Negative. ALLERGIC/IMMUNOLOGIC:  Negative. PSYCHIATRIC:  Negative. PHYSICAL EXAMINATION:   There were no vitals taken for this visit. PAIN SCALE: /10    CONSTITUTIONAL: The patient is in no apparent distress and is alert and oriented x 3. HEENT: Normocephalic. Hearing grossly intact. NECK: Supple and symmetric. no tenderness, or masses were felt. RESPIRATORY: No labored breathing. CARDIOVASCULAR: The carotid pulses were normal. Peripheral pulses were 2+. CHEST: Normal AP diameter and normal contour without any kyphoscoliosis.   LYMPHATIC: No lymphadenopathy was appreciated in the neck, axillae or groin.  SKIN:Negative for scars, rashes, lesions, or ulcers on the right upper, right lower, left upper, left lower and trunk. NEUROLOGICAL: Alert and oriented x 3. Ambulation with walker. FWB. EXTREMITIES: See musculoskeletal.  MUSCULOSKELETAL:   Head and Neck:  Negative for misalignment, asymmetry, crepitation, defects, tenderness masses or effusions.  Left Upper Extremity: Inspection, percussion and palpation preformed. Vus sign is negative.  Right Upper Extremity: Inspection, percussion and palpation preformed. Vus sign is negative.  Spine, Ribs and Pelvis: Inspection, percussion and palpation preformed. Negative for misalignment, asymmetry, crepitation, defects, tenderness masses or effusions.  Left Lower Extremity: Inspection, percussion and palpation preformed. Negative straight leg raise.  Right Lower Extremity: Inspection, percussion and palpation preformed. Negative straight leg raise. SPINE EXAM:     Lumbar spine: No rash, ecchymosis, or gross obliquity. No focal atrophy is noted. ASSESSMENT    ICD-10-CM ICD-9-CM    1. [facility] K68.12 567.31 CBC WITH AUTOMATED DIFF      SED RATE (ESR)      C REACTIVE PROTEIN, QT       Written by Kale Queen, as dictated by Ulises Caceres MD.    I, Dr. Ulises Caceres MD, confirm that all documentation is accurate.

## 2017-09-13 ENCOUNTER — HOSPITAL ENCOUNTER (OUTPATIENT)
Dept: INFUSION THERAPY | Age: 77
Discharge: HOME | End: 2017-09-13
Payer: MEDICARE

## 2017-09-13 VITALS
DIASTOLIC BLOOD PRESSURE: 76 MMHG | HEART RATE: 93 BPM | SYSTOLIC BLOOD PRESSURE: 135 MMHG | TEMPERATURE: 97.7 F | RESPIRATION RATE: 18 BRPM | OXYGEN SATURATION: 98 %

## 2017-09-13 PROCEDURE — 74011250636 HC RX REV CODE- 250/636: Performed by: INTERNAL MEDICINE

## 2017-09-13 PROCEDURE — 74011000258 HC RX REV CODE- 258: Performed by: INTERNAL MEDICINE

## 2017-09-13 PROCEDURE — 96365 THER/PROPH/DIAG IV INF INIT: CPT

## 2017-09-13 RX ORDER — SODIUM CHLORIDE 0.9 % (FLUSH) 0.9 %
10-40 SYRINGE (ML) INJECTION AS NEEDED
Status: DISCONTINUED | OUTPATIENT
Start: 2017-09-13 | End: 2017-09-17 | Stop reason: HOSPADM

## 2017-09-13 RX ORDER — HEPARIN SODIUM (PORCINE) LOCK FLUSH IV SOLN 100 UNIT/ML 100 UNIT/ML
500 SOLUTION INTRAVENOUS AS NEEDED
Status: DISPENSED | OUTPATIENT
Start: 2017-09-13 | End: 2017-09-14

## 2017-09-13 RX ADMIN — Medication 20 ML: at 13:25

## 2017-09-13 RX ADMIN — HEPARIN SODIUM (PORCINE) LOCK FLUSH IV SOLN 100 UNIT/ML 500 UNITS: 100 SOLUTION at 13:25

## 2017-09-13 RX ADMIN — SODIUM CHLORIDE 1 G: 900 INJECTION INTRAVENOUS at 12:48

## 2017-09-13 RX ADMIN — Medication 10 ML: at 12:48

## 2017-09-13 NOTE — PROGRESS NOTES
[facility] Progress Note    Date: 2017    Name: Laxmi Bautista    MRN: [id number]         : 1940      Mr. Reis arrived to [facility] at [phone]. Mr. Anna Zarate was assessed and education was provided. Mr. Hilton Bailey vitals were reviewed. Visit Vitals    /76 (BP 1 Location: Left arm, BP Patient Position: Sitting)    Pulse 93    Temp 97.7 °F (36.5 °C)    Resp 18    SpO2 98%       Pt with right upper arm double lumen PICC line. Purple lumen flushed without difficulty and positive for blood return. Dressing CDI. No redness, bruising, or swelling noted at site and/ or extremity. Pt denies tenderness. Invanz 1gm IV was administered as ordered via Purple lumen followed by 10 ml normal saline flush. Mr. Anna Zarate tolerated well without complaints. Flushed purple lumen of pt's PICC line with 500 units of heparin per order. Mr. Anna Zarate was discharged from [place] [address] in stable condition at 1325. He is to return on 17 at 1300 for his next appointment.     Jesse Love RN  2017

## 2017-09-14 ENCOUNTER — HOSPITAL ENCOUNTER (OUTPATIENT)
Dept: INFUSION THERAPY | Age: 77
Discharge: HOME | End: 2017-09-14
Payer: MEDICARE

## 2017-09-14 ENCOUNTER — APPOINTMENT (OUTPATIENT)
Dept: INFUSION THERAPY | Age: 77
End: 2017-09-14
Payer: MEDICARE

## 2017-09-14 VITALS
RESPIRATION RATE: 18 BRPM | OXYGEN SATURATION: 96 % | SYSTOLIC BLOOD PRESSURE: 126 MMHG | DIASTOLIC BLOOD PRESSURE: 66 MMHG | TEMPERATURE: 97.9 F | HEART RATE: 96 BPM

## 2017-09-14 PROCEDURE — 74011250636 HC RX REV CODE- 250/636: Performed by: INTERNAL MEDICINE

## 2017-09-14 PROCEDURE — 96365 THER/PROPH/DIAG IV INF INIT: CPT

## 2017-09-14 PROCEDURE — 74011000258 HC RX REV CODE- 258: Performed by: INTERNAL MEDICINE

## 2017-09-14 RX ORDER — HEPARIN SODIUM (PORCINE) LOCK FLUSH IV SOLN 100 UNIT/ML 100 UNIT/ML
500 SOLUTION INTRAVENOUS AS NEEDED
Status: DISPENSED | OUTPATIENT
Start: 2017-09-14 | End: 2017-09-15

## 2017-09-14 RX ORDER — SODIUM CHLORIDE 0.9 % (FLUSH) 0.9 %
10-40 SYRINGE (ML) INJECTION AS NEEDED
Status: DISCONTINUED | OUTPATIENT
Start: 2017-09-14 | End: 2017-09-18 | Stop reason: HOSPADM

## 2017-09-14 RX ADMIN — Medication 10 ML: at 13:49

## 2017-09-14 RX ADMIN — SODIUM CHLORIDE 1 G: 900 INJECTION INTRAVENOUS at 13:14

## 2017-09-14 RX ADMIN — Medication 10 ML: at 13:14

## 2017-09-14 RX ADMIN — HEPARIN SODIUM (PORCINE) LOCK FLUSH IV SOLN 100 UNIT/ML 500 UNITS: 100 SOLUTION at 13:49

## 2017-09-14 NOTE — PROGRESS NOTES
[facility] Progress Note    Date: 2017    Name: Aubrey Frederick    MRN: [id number]         : 1940      Mr. Reis arrived to [facility] at . Mr. Willis Garcia was assessed and education was provided. Mr. Verónica Amezquita vitals were reviewed. Visit Vitals    /66 (BP 1 Location: Left arm, BP Patient Position: Sitting)    Pulse 96    Temp 97.9 °F (36.6 °C)    Resp 18    SpO2 96%       Pt with right upper arm double lumen PICC line. Purple lumen flushed without difficulty and positive for blood return. Dressing CDI. No redness, bruising, or swelling noted at site and/ or extremity. Pt denies tenderness. Invanz 1gm IV was administered as ordered via Purple lumen followed by 10 ml normal saline flush. Mr. Willis Garcia tolerated well without complaints. Flushed Purple lumen of pt's PICC line with 500 units of heparin per order. Mr. Willis Garcia was discharged from [place] [address] in stable condition at [phone]. He is to return on 9/15/17 at 1300 for his next appointment.     Jennie Verma RN  2017

## 2017-09-15 ENCOUNTER — HOSPITAL ENCOUNTER (OUTPATIENT)
Dept: INFUSION THERAPY | Age: 77
Discharge: HOME | End: 2017-09-15
Payer: MEDICARE

## 2017-09-15 ENCOUNTER — APPOINTMENT (OUTPATIENT)
Dept: INFUSION THERAPY | Age: 77
End: 2017-09-15
Payer: MEDICARE

## 2017-09-15 VITALS
TEMPERATURE: 98.1 F | RESPIRATION RATE: 18 BRPM | OXYGEN SATURATION: 96 % | HEART RATE: 93 BPM | DIASTOLIC BLOOD PRESSURE: 69 MMHG | SYSTOLIC BLOOD PRESSURE: 103 MMHG

## 2017-09-15 PROCEDURE — 74011000258 HC RX REV CODE- 258: Performed by: INTERNAL MEDICINE

## 2017-09-15 PROCEDURE — 96365 THER/PROPH/DIAG IV INF INIT: CPT

## 2017-09-15 PROCEDURE — 74011250636 HC RX REV CODE- 250/636

## 2017-09-15 PROCEDURE — 74011250636 HC RX REV CODE- 250/636: Performed by: INTERNAL MEDICINE

## 2017-09-15 RX ORDER — HEPARIN SODIUM (PORCINE) LOCK FLUSH IV SOLN 100 UNIT/ML 100 UNIT/ML
500 SOLUTION INTRAVENOUS AS NEEDED
Status: DISCONTINUED | OUTPATIENT
Start: 2017-09-15 | End: 2017-09-19 | Stop reason: HOSPADM

## 2017-09-15 RX ORDER — SODIUM CHLORIDE 0.9 % (FLUSH) 0.9 %
10-40 SYRINGE (ML) INJECTION AS NEEDED
Status: DISCONTINUED | OUTPATIENT
Start: 2017-09-15 | End: 2017-09-19 | Stop reason: HOSPADM

## 2017-09-15 RX ORDER — HEPARIN SODIUM (PORCINE) LOCK FLUSH IV SOLN 100 UNIT/ML 100 UNIT/ML
SOLUTION INTRAVENOUS
Status: COMPLETED
Start: 2017-09-15 | End: 2017-09-15

## 2017-09-15 RX ADMIN — Medication 10 ML: at 13:06

## 2017-09-15 RX ADMIN — Medication 10 ML: at 13:41

## 2017-09-15 RX ADMIN — HEPARIN SODIUM (PORCINE) LOCK FLUSH IV SOLN 100 UNIT/ML 500 UNITS: 100 SOLUTION at 13:42

## 2017-09-15 RX ADMIN — SODIUM CHLORIDE 1 G: 900 INJECTION INTRAVENOUS at 13:10

## 2017-09-15 NOTE — PROGRESS NOTES
[facility] Progress Note    Date: September 15, 2017    Name: Uriel Goss    MRN: [id number]         : 1940      Mr. Reis arrived to [facility] at 1300. Mr. David Bains was assessed and education was provided. Generalized pain 5/10. Took pain meds today. Positioned for comfort    Mr. Lisa Fay vitals were reviewed. Visit Vitals    /69 (BP 1 Location: Left arm, BP Patient Position: Sitting)    Pulse 93    Temp 98.1 °F (36.7 °C)    Resp 18    SpO2 96%       Pt with right upper arm double lumen PICC line. Red lumen flushed without difficulty and positive for blood return. Dressing CDI. No redness, bruising, or swelling noted at site and/ or extremity. Pt denies tenderness. Invanz 1gm IV was administered as ordered via red lumen followed by 10 ml normal saline flush. Picc line site and drsg remains unchanged. Mr. David Bains tolerated well without complaints. Flushed red lumen of pt's PICC line with 500 units of heparin per order. Lumens wrapped with coban and tubular drsg applied    Mr. David Bains was discharged from [place] [address] in stable condition at 1345. He is to return on 17 at 0900 for his next appointment.     Dewey Leiva RN  September 15, 2017

## 2017-09-16 ENCOUNTER — HOSPITAL ENCOUNTER (OUTPATIENT)
Dept: INFUSION THERAPY | Age: 77
Discharge: HOME | End: 2017-09-16
Payer: MEDICARE

## 2017-09-16 VITALS
OXYGEN SATURATION: 97 % | SYSTOLIC BLOOD PRESSURE: 117 MMHG | RESPIRATION RATE: 18 BRPM | DIASTOLIC BLOOD PRESSURE: 75 MMHG | HEART RATE: 87 BPM | TEMPERATURE: 97.7 F

## 2017-09-16 PROCEDURE — 96365 THER/PROPH/DIAG IV INF INIT: CPT

## 2017-09-16 PROCEDURE — 74011250636 HC RX REV CODE- 250/636: Performed by: INTERNAL MEDICINE

## 2017-09-16 PROCEDURE — 74011250636 HC RX REV CODE- 250/636

## 2017-09-16 PROCEDURE — 74011000258 HC RX REV CODE- 258: Performed by: INTERNAL MEDICINE

## 2017-09-16 RX ORDER — HEPARIN SODIUM (PORCINE) LOCK FLUSH IV SOLN 100 UNIT/ML 100 UNIT/ML
500 SOLUTION INTRAVENOUS AS NEEDED
Status: DISCONTINUED | OUTPATIENT
Start: 2017-09-16 | End: 2017-09-20 | Stop reason: HOSPADM

## 2017-09-16 RX ORDER — HEPARIN SODIUM (PORCINE) LOCK FLUSH IV SOLN 100 UNIT/ML 100 UNIT/ML
SOLUTION INTRAVENOUS
Status: COMPLETED
Start: 2017-09-16 | End: 2017-09-16

## 2017-09-16 RX ORDER — SODIUM CHLORIDE 0.9 % (FLUSH) 0.9 %
10-40 SYRINGE (ML) INJECTION AS NEEDED
Status: DISCONTINUED | OUTPATIENT
Start: 2017-09-16 | End: 2017-09-20 | Stop reason: HOSPADM

## 2017-09-16 RX ADMIN — Medication 10 ML: at 09:31

## 2017-09-16 RX ADMIN — SODIUM CHLORIDE 1 G: 900 INJECTION INTRAVENOUS at 09:00

## 2017-09-16 RX ADMIN — Medication 10 ML: at 09:00

## 2017-09-16 RX ADMIN — HEPARIN SODIUM (PORCINE) LOCK FLUSH IV SOLN 100 UNIT/ML 500 UNITS: 100 SOLUTION at 09:31

## 2017-09-16 NOTE — PROGRESS NOTES
[facility] Progress Note    Date: 2017    Name: Luisa Dacosta    MRN: [id number]         : 1940      Mr. Reis arrived to [facility] at 3435. Mr. Viki Washburn was assessed and education was provided. No complaints or concerns voiced. Positioned for comfort    Mr. Maryann Hanson vitals were reviewed. Visit Vitals    /75 (BP 1 Location: Left arm, BP Patient Position: Sitting)    Pulse 87    Temp 97.7 °F (36.5 °C)    Resp 18    SpO2 97%       Pt with right upper arm double lumen PICC line. purple lumen flushed without difficulty and positive for blood return. Dressing CDI. No redness, bruising, or swelling noted at site and/ or extremity. Pt denies tenderness. Invanz 1gm IV was administered as ordered via purple lumen followed by 10 ml normal saline flush. Picc line site and drsg remains unchanged. Mr. Viki Washburn tolerated well without complaints. Flushed purple lumen of pt's PICC line with 500 units of heparin per order. Lumens wrapped with coban and tubular drsg applied. Mr. Viki Washburn was discharged from [place] [address] in stable condition at 5671. He is to return on 17 at 0900 for his next appointment.     Clive Abreu RN  2017

## 2017-09-17 ENCOUNTER — HOSPITAL ENCOUNTER (OUTPATIENT)
Dept: INFUSION THERAPY | Age: 77
Discharge: HOME | End: 2017-09-17
Payer: MEDICARE

## 2017-09-17 VITALS
HEART RATE: 88 BPM | OXYGEN SATURATION: 96 % | SYSTOLIC BLOOD PRESSURE: 136 MMHG | RESPIRATION RATE: 18 BRPM | TEMPERATURE: 98.5 F | DIASTOLIC BLOOD PRESSURE: 80 MMHG

## 2017-09-17 PROCEDURE — 74011250636 HC RX REV CODE- 250/636

## 2017-09-17 PROCEDURE — 74011250636 HC RX REV CODE- 250/636: Performed by: INTERNAL MEDICINE

## 2017-09-17 PROCEDURE — 96365 THER/PROPH/DIAG IV INF INIT: CPT

## 2017-09-17 PROCEDURE — 74011000258 HC RX REV CODE- 258: Performed by: INTERNAL MEDICINE

## 2017-09-17 RX ORDER — SODIUM CHLORIDE 0.9 % (FLUSH) 0.9 %
10-40 SYRINGE (ML) INJECTION AS NEEDED
Status: DISCONTINUED | OUTPATIENT
Start: 2017-09-17 | End: 2017-09-21 | Stop reason: HOSPADM

## 2017-09-17 RX ORDER — HEPARIN SODIUM (PORCINE) LOCK FLUSH IV SOLN 100 UNIT/ML 100 UNIT/ML
500 SOLUTION INTRAVENOUS AS NEEDED
Status: DISCONTINUED | OUTPATIENT
Start: 2017-09-17 | End: 2017-09-21 | Stop reason: HOSPADM

## 2017-09-17 RX ORDER — HEPARIN SODIUM (PORCINE) LOCK FLUSH IV SOLN 100 UNIT/ML 100 UNIT/ML
SOLUTION INTRAVENOUS
Status: COMPLETED
Start: 2017-09-17 | End: 2017-09-17

## 2017-09-17 RX ADMIN — Medication 10 ML: at 09:26

## 2017-09-17 RX ADMIN — Medication 10 ML: at 08:55

## 2017-09-17 RX ADMIN — HEPARIN SODIUM (PORCINE) LOCK FLUSH IV SOLN 100 UNIT/ML 500 UNITS: 100 SOLUTION at 09:27

## 2017-09-17 RX ADMIN — SODIUM CHLORIDE 1 G: 900 INJECTION INTRAVENOUS at 08:55

## 2017-09-17 NOTE — PROGRESS NOTES
[facility] Progress Note    Date: 2017    Name: Isadora Mora    MRN: [id number]         : 1940      Mr. Reis arrived to [facility] at [address], ambulatory, and accompanied by family. Mr. Jessica Palacios was assessed and education was provided. Patient complained of nausea. States it started at home. States he does not have any pills for nausea at home. Patient currently drinking coffee. Advised not to drink any more coffee for now. Ginger ale offered, but patient declined same. Patient also short of breath at rest.    . O2 AT 2L CURRENTLY IN USE. SATS 90% ON ROOM AIR. O2 WAS NOT PLACED IN PATIENT'S NOSTRILS PROPERLY. RE-ADJUSTED. Positioned for comfort and reassured. Will monitor    Mr. Yelena Worley vitals were reviewed. Visit Vitals    /80 (BP 1 Location: Left arm, BP Patient Position: At rest)    Pulse 88    Temp 98.5 °F (36.9 °C)    Resp 18    SpO2 96%       Pt with right upper arm double lumen PICC line. red lumen flushed without difficulty and positive for blood return. Dressing CDI. No redness, bruising, or swelling noted at site and/ or extremity. Pt denies tenderness. Invanz 1gm IV was administered as ordered via red lumen followed by 10 ml normal saline flush. Picc line site and drsg remains unchanged. Mr. Jessica Palacios tolerated well without complaints. No complaints of nausea at end of treatment and shortness of breath noted only on exertion. sats 96%    Patient Vitals for the past 4 hrs:   Temp Pulse Resp BP SpO2   17 0928 98.5 °F (36.9 °C) 88 18 136/80 96 %   17 0848 97.8 °F (36.6 °C) 100 22 108/67 90 %         Flushed red lumen of pt's PICC line with 500 units of heparin per order. Lumens wrapped with coban and tubular drsg applied. Patient instructed to go to er if symptoms return, and no relief with home treatment such as peppermint and ginger tea as suggested. Mr. Jessica Palacios was discharged from [place] [address] in stable condition at 0930,accompanied by family. He is to return on 9/18/17 at 1000 for his next appointment.     Sreedhar Fuller RN  September 17, 2017

## 2017-09-18 ENCOUNTER — HOSPITAL ENCOUNTER (OUTPATIENT)
Dept: INFUSION THERAPY | Age: 77
Discharge: HOME | End: 2017-09-18
Payer: MEDICARE

## 2017-09-18 VITALS
SYSTOLIC BLOOD PRESSURE: 120 MMHG | TEMPERATURE: 97.4 F | DIASTOLIC BLOOD PRESSURE: 63 MMHG | HEART RATE: 78 BPM | OXYGEN SATURATION: 99 % | RESPIRATION RATE: 22 BRPM

## 2017-09-18 PROCEDURE — 74011250636 HC RX REV CODE- 250/636: Performed by: INTERNAL MEDICINE

## 2017-09-18 PROCEDURE — 96365 THER/PROPH/DIAG IV INF INIT: CPT

## 2017-09-18 PROCEDURE — 74011000258 HC RX REV CODE- 258: Performed by: INTERNAL MEDICINE

## 2017-09-18 RX ORDER — SODIUM CHLORIDE 0.9 % (FLUSH) 0.9 %
10-40 SYRINGE (ML) INJECTION AS NEEDED
Status: DISCONTINUED | OUTPATIENT
Start: 2017-09-18 | End: 2017-09-22 | Stop reason: HOSPADM

## 2017-09-18 RX ORDER — HEPARIN SODIUM (PORCINE) LOCK FLUSH IV SOLN 100 UNIT/ML 100 UNIT/ML
500 SOLUTION INTRAVENOUS AS NEEDED
Status: DISPENSED | OUTPATIENT
Start: 2017-09-18 | End: 2017-09-19

## 2017-09-18 RX ADMIN — SODIUM CHLORIDE 1 G: 900 INJECTION INTRAVENOUS at 10:03

## 2017-09-18 RX ADMIN — Medication 20 ML: at 10:44

## 2017-09-18 RX ADMIN — HEPARIN SODIUM (PORCINE) LOCK FLUSH IV SOLN 100 UNIT/ML 500 UNITS: 100 SOLUTION at 10:43

## 2017-09-18 NOTE — PROGRESS NOTES
[facility] Progress Note    Date: 2017    Name: Matt Rodriguez    MRN: [id number]         : 1940      Mr. Reis arrived to [facility] at 2735. Mr. Barrie Coelho was assessed and education was provided. Mr. Aamir Quarles vitals were reviewed. Visit Vitals    /63 (BP 1 Location: Left arm, BP Patient Position: At rest)    Pulse 78    Temp 97.4 °F (36.3 °C)    Resp 22    SpO2 99%       Pt with right upper arm double lumen PICC line. Purple lumen flushed without difficulty and positive for blood return. Dressing CDI. No redness, bruising, or swelling noted at site and/ or extremity. Pt denies tenderness. Invanz 1gm IV was administered as ordered via Purple lumen followed by 10 ml normal saline flush. Mr. Barrie Coelho tolerated well without complaints. Flushed Purple lumen of pt's PICC line with 500 units of heparin per order. Mr. Barrie Coelho was discharged from [place] [address] in stable condition at 1045. He is to return on 17 at 1000 for his next appointment.     Solomon Hoover RN  2017

## 2017-09-19 ENCOUNTER — APPOINTMENT (OUTPATIENT)
Dept: INFUSION THERAPY | Age: 77
End: 2017-09-19
Payer: MEDICARE

## 2017-09-19 ENCOUNTER — HOSPITAL ENCOUNTER (OUTPATIENT)
Dept: INFUSION THERAPY | Age: 77
Discharge: HOME | End: 2017-09-19
Payer: MEDICARE

## 2017-09-19 VITALS
HEART RATE: 74 BPM | SYSTOLIC BLOOD PRESSURE: 109 MMHG | RESPIRATION RATE: 22 BRPM | TEMPERATURE: 97.7 F | DIASTOLIC BLOOD PRESSURE: 64 MMHG

## 2017-09-19 PROCEDURE — 96365 THER/PROPH/DIAG IV INF INIT: CPT

## 2017-09-19 PROCEDURE — 77030020847 HC STATLOK BARD -A

## 2017-09-19 PROCEDURE — 74011250636 HC RX REV CODE- 250/636: Performed by: INTERNAL MEDICINE

## 2017-09-19 PROCEDURE — 74011000258 HC RX REV CODE- 258: Performed by: INTERNAL MEDICINE

## 2017-09-19 RX ORDER — SODIUM CHLORIDE 0.9 % (FLUSH) 0.9 %
10-40 SYRINGE (ML) INJECTION AS NEEDED
Status: DISCONTINUED | OUTPATIENT
Start: 2017-09-19 | End: 2017-09-23 | Stop reason: HOSPADM

## 2017-09-19 RX ORDER — HEPARIN SODIUM (PORCINE) LOCK FLUSH IV SOLN 100 UNIT/ML 100 UNIT/ML
500 SOLUTION INTRAVENOUS AS NEEDED
Status: DISPENSED | OUTPATIENT
Start: 2017-09-19 | End: 2017-09-20

## 2017-09-19 RX ADMIN — SODIUM CHLORIDE 1 G: 900 INJECTION INTRAVENOUS at 10:26

## 2017-09-19 RX ADMIN — HEPARIN SODIUM (PORCINE) LOCK FLUSH IV SOLN 100 UNIT/ML 500 UNITS: 100 SOLUTION at 11:00

## 2017-09-19 RX ADMIN — Medication 20 ML: at 10:24

## 2017-09-19 RX ADMIN — Medication 30 ML: at 11:00

## 2017-09-19 NOTE — PROGRESS NOTES
[facility] Progress Note    Date: 2017    Name: Gretel Perez    MRN: [id number]         : 1940    Mr. Reis was assessed and education was provided. Prior to arrival, patient was nauseated and took a Zofran. On arrival he had an episode of vomiting, dark brown clear fluid, approximately 200 ml. Mr. Sherrill Matias vitals were reviewed. Visit Vitals    /64 (BP 1 Location: Left arm, BP Patient Position: Sitting)    Pulse 74    Temp 97.7 °F (36.5 °C)    Resp 22     Patient Vitals for the past 12 hrs:   Temp Pulse Resp BP   17 1052 - 74 22 109/64   17 1000 97.7 °F (36.5 °C) (!) 106 22 150/76     Good blood return obtained from each lumen of PICC line at right upper arm. Each lumen then flushed with 10 ml NS. Lab results were obtained and reviewed. No results found for this or any previous visit (from the past 12 hour(s)). []  Vancomycin     [x]  Invanz 1 gram in 50 ml NS     []  Cubicin     []  Rocephin    was infused, via purple lumen, at 100 ml/hr. No s/s reaction noted. Purple lumen flushed with 10 ml NS. Dressing change at PICC line site. No irritation, drainage, redness or ecchymosis noted at site. Suture noted at insertion site, with 1 cm of catheter exposed. New  Stat Klaus,   Bio Patch, and Tegaderm dressing applied. Each lumen flushed with 10 ml NS and 2.5 ml of 100 units/ml Heparin with application of new Needless IV connectors. Lumens wrapped and secured. Mr. Yasmine Brooks tolerated infusion, and had no complaints at this time. Patient armband removed and shredded. Mr. Yasmine Brooks was discharged from [place] [address] in stable condition at 1105. He is to return on 2017 at 1000 for his next appointment for antibiotic.     David Corbett RN  2017  2:27 PM

## 2017-09-20 ENCOUNTER — HOSPITAL ENCOUNTER (OUTPATIENT)
Dept: INFUSION THERAPY | Age: 77
Discharge: HOME | End: 2017-09-20
Payer: MEDICARE

## 2017-09-20 VITALS
RESPIRATION RATE: 20 BRPM | SYSTOLIC BLOOD PRESSURE: 115 MMHG | OXYGEN SATURATION: 96 % | DIASTOLIC BLOOD PRESSURE: 69 MMHG | HEART RATE: 99 BPM | TEMPERATURE: 97.7 F

## 2017-09-20 LAB
BASO+EOS+MONOS # BLD AUTO: 0.4 K/UL (ref 0–2.3)
BASO+EOS+MONOS # BLD AUTO: 7 % (ref 0.1–17)
BUN SERPL-MCNC: 15 MG/DL (ref 7–18)
CREAT SERPL-MCNC: 0.91 MG/DL (ref 0.6–1.3)
CRP SERPL-MCNC: 2.5 MG/DL (ref 0–0.3)
DIFFERENTIAL METHOD BLD: ABNORMAL
ERYTHROCYTE [DISTWIDTH] IN BLOOD BY AUTOMATED COUNT: 14.4 % (ref 11.5–14.5)
HCT VFR BLD AUTO: 32.1 % (ref 36–48)
HGB BLD-MCNC: 10.3 G/DL (ref 12–16)
LYMPHOCYTES # BLD: 1.8 K/UL (ref 1.1–5.9)
LYMPHOCYTES NFR BLD: 28 % (ref 14–44)
MCH RBC QN AUTO: 27.5 PG (ref 25–35)
MCHC RBC AUTO-ENTMCNC: 32.1 G/DL (ref 31–37)
MCV RBC AUTO: 85.6 FL (ref 78–102)
NEUTS SEG # BLD: 4.3 K/UL (ref 1.8–9.5)
NEUTS SEG NFR BLD: 66 % (ref 40–70)
PLATELET # BLD AUTO: 283 K/UL (ref 140–440)
RBC # BLD AUTO: 3.75 M/UL (ref 4.1–5.1)
WBC # BLD AUTO: 6.5 K/UL (ref 4.5–13)

## 2017-09-20 PROCEDURE — 74011250636 HC RX REV CODE- 250/636: Performed by: INTERNAL MEDICINE

## 2017-09-20 PROCEDURE — 82565 ASSAY OF CREATININE: CPT | Performed by: INTERNAL MEDICINE

## 2017-09-20 PROCEDURE — 84520 ASSAY OF UREA NITROGEN: CPT | Performed by: INTERNAL MEDICINE

## 2017-09-20 PROCEDURE — 86140 C-REACTIVE PROTEIN: CPT | Performed by: INTERNAL MEDICINE

## 2017-09-20 PROCEDURE — 74011000258 HC RX REV CODE- 258: Performed by: INTERNAL MEDICINE

## 2017-09-20 PROCEDURE — 96365 THER/PROPH/DIAG IV INF INIT: CPT

## 2017-09-20 PROCEDURE — 85025 COMPLETE CBC W/AUTO DIFF WBC: CPT | Performed by: INTERNAL MEDICINE

## 2017-09-20 RX ORDER — SODIUM CHLORIDE 0.9 % (FLUSH) 0.9 %
10-40 SYRINGE (ML) INJECTION AS NEEDED
Status: DISCONTINUED | OUTPATIENT
Start: 2017-09-20 | End: 2017-09-24 | Stop reason: HOSPADM

## 2017-09-20 RX ORDER — HEPARIN SODIUM (PORCINE) LOCK FLUSH IV SOLN 100 UNIT/ML 100 UNIT/ML
500 SOLUTION INTRAVENOUS AS NEEDED
Status: DISPENSED | OUTPATIENT
Start: 2017-09-20 | End: 2017-09-21

## 2017-09-20 RX ADMIN — SODIUM CHLORIDE 1 G: 900 INJECTION INTRAVENOUS at 10:10

## 2017-09-20 RX ADMIN — HEPARIN SODIUM (PORCINE) LOCK FLUSH IV SOLN 100 UNIT/ML 500 UNITS: 100 SOLUTION at 10:46

## 2017-09-20 RX ADMIN — Medication 10 ML: at 10:46

## 2017-09-20 RX ADMIN — Medication 20 ML: at 10:10

## 2017-09-21 ENCOUNTER — HOSPITAL ENCOUNTER (OUTPATIENT)
Dept: INFUSION THERAPY | Age: 77
Discharge: HOME | End: 2017-09-21
Payer: MEDICARE

## 2017-09-21 ENCOUNTER — APPOINTMENT (OUTPATIENT)
Dept: INFUSION THERAPY | Age: 77
End: 2017-09-21
Payer: MEDICARE

## 2017-09-21 VITALS
DIASTOLIC BLOOD PRESSURE: 65 MMHG | HEART RATE: 100 BPM | SYSTOLIC BLOOD PRESSURE: 115 MMHG | TEMPERATURE: 98 F | OXYGEN SATURATION: 97 % | RESPIRATION RATE: 20 BRPM

## 2017-09-21 PROCEDURE — 74011000258 HC RX REV CODE- 258: Performed by: INTERNAL MEDICINE

## 2017-09-21 PROCEDURE — 74011250636 HC RX REV CODE- 250/636: Performed by: INTERNAL MEDICINE

## 2017-09-21 PROCEDURE — 96365 THER/PROPH/DIAG IV INF INIT: CPT

## 2017-09-21 RX ORDER — SODIUM CHLORIDE 0.9 % (FLUSH) 0.9 %
10-40 SYRINGE (ML) INJECTION AS NEEDED
Status: DISCONTINUED | OUTPATIENT
Start: 2017-09-21 | End: 2017-09-25 | Stop reason: HOSPADM

## 2017-09-21 RX ORDER — HEPARIN SODIUM (PORCINE) LOCK FLUSH IV SOLN 100 UNIT/ML 100 UNIT/ML
500 SOLUTION INTRAVENOUS AS NEEDED
Status: DISPENSED | OUTPATIENT
Start: 2017-09-21 | End: 2017-09-22

## 2017-09-21 RX ADMIN — Medication 10 ML: at 11:00

## 2017-09-21 RX ADMIN — HEPARIN SODIUM (PORCINE) LOCK FLUSH IV SOLN 100 UNIT/ML 500 UNITS: 100 SOLUTION at 11:34

## 2017-09-21 RX ADMIN — SODIUM CHLORIDE 1 G: 900 INJECTION INTRAVENOUS at 11:04

## 2017-09-21 RX ADMIN — Medication 10 ML: at 11:34

## 2017-09-22 ENCOUNTER — HOSPITAL ENCOUNTER (OUTPATIENT)
Dept: INFUSION THERAPY | Age: 77
Discharge: HOME | End: 2017-09-22
Payer: MEDICARE

## 2017-09-22 ENCOUNTER — APPOINTMENT (OUTPATIENT)
Dept: INFUSION THERAPY | Age: 77
End: 2017-09-22
Payer: MEDICARE

## 2017-09-22 VITALS
RESPIRATION RATE: 18 BRPM | DIASTOLIC BLOOD PRESSURE: 73 MMHG | TEMPERATURE: 98.1 F | OXYGEN SATURATION: 98 % | HEART RATE: 95 BPM | SYSTOLIC BLOOD PRESSURE: 128 MMHG

## 2017-09-22 PROCEDURE — 74011000258 HC RX REV CODE- 258: Performed by: INTERNAL MEDICINE

## 2017-09-22 PROCEDURE — 96365 THER/PROPH/DIAG IV INF INIT: CPT

## 2017-09-22 PROCEDURE — 74011250636 HC RX REV CODE- 250/636: Performed by: INTERNAL MEDICINE

## 2017-09-22 PROCEDURE — 74011250636 HC RX REV CODE- 250/636

## 2017-09-22 RX ORDER — HEPARIN SODIUM (PORCINE) LOCK FLUSH IV SOLN 100 UNIT/ML 100 UNIT/ML
500 SOLUTION INTRAVENOUS AS NEEDED
Status: ACTIVE | OUTPATIENT
Start: 2017-09-22 | End: 2017-09-23

## 2017-09-22 RX ORDER — SODIUM CHLORIDE 0.9 % (FLUSH) 0.9 %
10-40 SYRINGE (ML) INJECTION AS NEEDED
Status: DISCONTINUED | OUTPATIENT
Start: 2017-09-22 | End: 2017-09-26 | Stop reason: HOSPADM

## 2017-09-22 RX ORDER — HEPARIN SODIUM (PORCINE) LOCK FLUSH IV SOLN 100 UNIT/ML 100 UNIT/ML
SOLUTION INTRAVENOUS
Status: COMPLETED
Start: 2017-09-22 | End: 2017-09-22

## 2017-09-22 RX ADMIN — SODIUM CHLORIDE 1 G: 900 INJECTION INTRAVENOUS at 10:51

## 2017-09-22 RX ADMIN — Medication 10 ML: at 10:51

## 2017-09-22 RX ADMIN — HEPARIN SODIUM (PORCINE) LOCK FLUSH IV SOLN 100 UNIT/ML 500 UNITS: 100 SOLUTION at 11:23

## 2017-09-22 RX ADMIN — Medication 10 ML: at 11:23

## 2017-09-22 NOTE — PROGRESS NOTES
[facility] Progress Note    Date: 2017    Name: Kemar Carmen    MRN: [id number]         : 1940      Mr. Reis arrived to [facility] at [address]. Mr. Irlanda Mix was assessed and education was provided. Mr. Riana Yang vitals were reviewed. Visit Vitals    /73 (BP 1 Location: Left arm, BP Patient Position: Sitting)    Pulse 95    Temp 98.1 °F (36.7 °C)    Resp 18    SpO2 98%       Pt with right upper arm double lumen PICC line. Purple lumen flushed without difficulty and positive for blood return. Dressing CDI. No redness, bruising, or swelling noted at site and/ or extremity. Pt denies tenderness. Invanz 1gm IV was administered as ordered via Purple lumen followed by 10 ml normal saline flush. Mr. Irlanda Mix tolerated well without complaints. Flushed Purple lumen of pt's PICC line with 500 units of heparin per order. Mr. Irlanda Mix was discharged from [place] [address] in stable condition at 1125. He is to return on 17 at 0900 for his next appointment.     June Gunter RN  2017

## 2017-09-23 ENCOUNTER — HOSPITAL ENCOUNTER (OUTPATIENT)
Dept: INFUSION THERAPY | Age: 77
Discharge: HOME | End: 2017-09-23
Payer: MEDICARE

## 2017-09-23 VITALS
RESPIRATION RATE: 20 BRPM | TEMPERATURE: 97.7 F | DIASTOLIC BLOOD PRESSURE: 79 MMHG | SYSTOLIC BLOOD PRESSURE: 128 MMHG | HEART RATE: 90 BPM

## 2017-09-23 PROCEDURE — 96365 THER/PROPH/DIAG IV INF INIT: CPT

## 2017-09-23 PROCEDURE — 74011250636 HC RX REV CODE- 250/636: Performed by: INTERNAL MEDICINE

## 2017-09-23 PROCEDURE — 74011000258 HC RX REV CODE- 258: Performed by: INTERNAL MEDICINE

## 2017-09-23 RX ORDER — HEPARIN SODIUM (PORCINE) LOCK FLUSH IV SOLN 100 UNIT/ML 100 UNIT/ML
500 SOLUTION INTRAVENOUS AS NEEDED
Status: DISPENSED | OUTPATIENT
Start: 2017-09-23 | End: 2017-09-24

## 2017-09-23 RX ORDER — SODIUM CHLORIDE 0.9 % (FLUSH) 0.9 %
10-40 SYRINGE (ML) INJECTION AS NEEDED
Status: DISCONTINUED | OUTPATIENT
Start: 2017-09-23 | End: 2017-09-27 | Stop reason: HOSPADM

## 2017-09-23 RX ADMIN — SODIUM CHLORIDE 1 G: 900 INJECTION INTRAVENOUS at 08:55

## 2017-09-23 RX ADMIN — HEPARIN SODIUM (PORCINE) LOCK FLUSH IV SOLN 100 UNIT/ML 500 UNITS: 100 SOLUTION at 09:31

## 2017-09-23 RX ADMIN — Medication 10 ML: at 09:31

## 2017-09-23 RX ADMIN — Medication 10 ML: at 08:56

## 2017-09-23 NOTE — PROGRESS NOTES
[facility] OPIC Progress Note    Date: 2017    Name: Izzy Murphy    MRN: [id number]         : 1940      Mr. Reis arrived to [facility] at 0089. Mr. Jp Huerta was assessed and education was provided. Mr. Jossue Townsend vitals were reviewed. Visit Vitals    /79 (BP 1 Location: Left arm, BP Patient Position: Sitting)    Pulse 90    Temp 97.7 °F (36.5 °C)    Resp 20       Pt with right upper arm double lumen PICC line. Red lumen flushed without difficulty and positive for blood return. Dressing CDI. No redness, bruising, or swelling noted at site and/ or extremity. Pt denies tenderness. Invanz 1gm IV was administered as ordered via red lumen followed by 10 ml normal saline flush. Mr. Jp Huerta tolerated well without complaints. Flushed red lumen of pt's PICC line with 500 units of heparin per order. Mr. Jp Huerta was discharged from [place] [address] in stable condition at [address] [place] [address]. He is to return on 17 at 0900 for his next appointment.     Lynda Kaur RN  2017

## 2017-09-24 ENCOUNTER — HOSPITAL ENCOUNTER (OUTPATIENT)
Dept: INFUSION THERAPY | Age: 77
Discharge: HOME | End: 2017-09-24
Payer: MEDICARE

## 2017-09-24 VITALS
RESPIRATION RATE: 18 BRPM | TEMPERATURE: 98.6 F | DIASTOLIC BLOOD PRESSURE: 66 MMHG | OXYGEN SATURATION: 98 % | SYSTOLIC BLOOD PRESSURE: 114 MMHG | HEART RATE: 91 BPM

## 2017-09-24 PROCEDURE — 96365 THER/PROPH/DIAG IV INF INIT: CPT

## 2017-09-24 PROCEDURE — 74011000258 HC RX REV CODE- 258: Performed by: INTERNAL MEDICINE

## 2017-09-24 PROCEDURE — 74011250636 HC RX REV CODE- 250/636: Performed by: INTERNAL MEDICINE

## 2017-09-24 RX ORDER — SODIUM CHLORIDE 0.9 % (FLUSH) 0.9 %
10-40 SYRINGE (ML) INJECTION AS NEEDED
Status: DISCONTINUED | OUTPATIENT
Start: 2017-09-24 | End: 2017-09-28 | Stop reason: HOSPADM

## 2017-09-24 RX ORDER — HEPARIN SODIUM (PORCINE) LOCK FLUSH IV SOLN 100 UNIT/ML 100 UNIT/ML
500 SOLUTION INTRAVENOUS AS NEEDED
Status: ACTIVE | OUTPATIENT
Start: 2017-09-24 | End: 2017-09-25

## 2017-09-24 RX ORDER — HEPARIN SODIUM (PORCINE) LOCK FLUSH IV SOLN 100 UNIT/ML 100 UNIT/ML
SOLUTION INTRAVENOUS
Status: DISPENSED
Start: 2017-09-24 | End: 2017-09-24

## 2017-09-24 RX ADMIN — Medication 10 ML: at 08:51

## 2017-09-24 RX ADMIN — SODIUM CHLORIDE 1 G: 900 INJECTION INTRAVENOUS at 08:51

## 2017-09-24 RX ADMIN — HEPARIN SODIUM (PORCINE) LOCK FLUSH IV SOLN 100 UNIT/ML 500 UNITS: 100 SOLUTION at 09:21

## 2017-09-24 RX ADMIN — Medication 10 ML: at 09:20

## 2017-09-24 NOTE — PROGRESS NOTES
[facility] Progress Note    Date: 2017    Name: Ree Voss    MRN: [id number]         : 1940      Mr. Reis arrived to [place] at [address] [place] [address]. Mr. Mark Delarosa was assessed and education was provided. Mr. Percy Hammonds vitals were reviewed. Visit Vitals    /66 (BP 1 Location: Left arm, BP Patient Position: Sitting)    Pulse 91    Temp 98.6 °F (37 °C)    Resp 18    SpO2 98%     Pt with right upper arm double lumen PICC line. Purple lumen flushed without difficulty and positive for blood return. Dressing CDI. No redness, bruising, or swelling noted at site and/ or extremity. Pt denies tenderness. Invanz 1gm IV was administered as ordered via purple lumen followed by 10 ml normal saline flush. Mr. Mark Delarosa tolerated well without complaints. Flushed purple lumen of pt's PICC line with 500 units of heparin per order. Mr. Mark Delarosa was discharged from [place] [address] in stable condition at 7913. He is to return on 17 at 1000 for his next appointment.     Ruth Connor RN  2017

## 2017-09-25 ENCOUNTER — HOSPITAL ENCOUNTER (OUTPATIENT)
Dept: INFUSION THERAPY | Age: 77
Discharge: HOME | End: 2017-09-25
Payer: MEDICARE

## 2017-09-25 VITALS
HEART RATE: 100 BPM | RESPIRATION RATE: 22 BRPM | DIASTOLIC BLOOD PRESSURE: 72 MMHG | OXYGEN SATURATION: 97 % | TEMPERATURE: 97.7 F | SYSTOLIC BLOOD PRESSURE: 114 MMHG

## 2017-09-25 PROCEDURE — 74011000258 HC RX REV CODE- 258: Performed by: INTERNAL MEDICINE

## 2017-09-25 PROCEDURE — 74011250636 HC RX REV CODE- 250/636: Performed by: INTERNAL MEDICINE

## 2017-09-25 PROCEDURE — 96365 THER/PROPH/DIAG IV INF INIT: CPT

## 2017-09-25 RX ORDER — HEPARIN SODIUM (PORCINE) LOCK FLUSH IV SOLN 100 UNIT/ML 100 UNIT/ML
500 SOLUTION INTRAVENOUS AS NEEDED
Status: DISPENSED | OUTPATIENT
Start: 2017-09-25 | End: 2017-09-26

## 2017-09-25 RX ORDER — SODIUM CHLORIDE 0.9 % (FLUSH) 0.9 %
10-40 SYRINGE (ML) INJECTION AS NEEDED
Status: DISCONTINUED | OUTPATIENT
Start: 2017-09-25 | End: 2017-09-29 | Stop reason: HOSPADM

## 2017-09-25 RX ADMIN — Medication 40 ML: at 10:41

## 2017-09-25 RX ADMIN — HEPARIN SODIUM (PORCINE) LOCK FLUSH IV SOLN 100 UNIT/ML 500 UNITS: 100 SOLUTION at 10:41

## 2017-09-25 RX ADMIN — SODIUM CHLORIDE 1 G: 900 INJECTION INTRAVENOUS at 10:04

## 2017-09-25 NOTE — PROGRESS NOTES
[facility] O[facility] Progress Note    Date: 2017    Name: Aimee White    MRN: [id number]         : 1940      Mr. Reis arrived to [facility] at 1000. Mr. Nkechi Winters was assessed and education was provided. Mr. Michell Krishnamurthy vitals were reviewed. Visit Vitals    /72 (BP 1 Location: Left arm, BP Patient Position: Sitting)    Pulse 100    Temp 97.7 °F (36.5 °C)    Resp 22    SpO2 97%       Pt with right upper arm double lumen PICC line. Red lumen flushed without difficulty and positive for blood return. Dressing CDI. No redness, bruising, or swelling noted at site and/ or extremity. Pt denies tenderness. Invanz 1gm IV was administered as ordered via red lumen followed by 10 ml normal saline flush. Mr. Nkechi Winters tolerated well without complaints. Flushed red lumen of pt's PICC line with 500 units of heparin per order. Mr. Nkechi Winters was discharged from [place] [address] in stable condition at 1045. He is to return on 17 at 1000 for his next appointment.     Daphne Alvarez RN  2017

## 2017-09-26 ENCOUNTER — HOSPITAL ENCOUNTER (OUTPATIENT)
Dept: INFUSION THERAPY | Age: 77
Discharge: HOME | End: 2017-09-26
Payer: MEDICARE

## 2017-09-26 VITALS
DIASTOLIC BLOOD PRESSURE: 75 MMHG | HEART RATE: 80 BPM | RESPIRATION RATE: 18 BRPM | SYSTOLIC BLOOD PRESSURE: 130 MMHG | TEMPERATURE: 97.9 F | OXYGEN SATURATION: 96 %

## 2017-09-26 PROCEDURE — 96365 THER/PROPH/DIAG IV INF INIT: CPT

## 2017-09-26 PROCEDURE — 74011250636 HC RX REV CODE- 250/636: Performed by: INTERNAL MEDICINE

## 2017-09-26 PROCEDURE — 74011000258 HC RX REV CODE- 258: Performed by: INTERNAL MEDICINE

## 2017-09-26 PROCEDURE — 74011250636 HC RX REV CODE- 250/636

## 2017-09-26 PROCEDURE — 77030020847 HC STATLOK BARD -A

## 2017-09-26 RX ORDER — HEPARIN SODIUM (PORCINE) LOCK FLUSH IV SOLN 100 UNIT/ML 100 UNIT/ML
SOLUTION INTRAVENOUS
Status: COMPLETED
Start: 2017-09-26 | End: 2017-09-26

## 2017-09-26 RX ORDER — DIGOXIN 125 MCG
TABLET ORAL
Qty: 90 TAB | Refills: 3 | Status: SHIPPED | OUTPATIENT
Start: 2017-09-26 | End: 2020-01-01

## 2017-09-26 RX ORDER — HEPARIN SODIUM (PORCINE) LOCK FLUSH IV SOLN 100 UNIT/ML 100 UNIT/ML
500 SOLUTION INTRAVENOUS AS NEEDED
Status: DISCONTINUED | OUTPATIENT
Start: 2017-09-26 | End: 2017-09-30 | Stop reason: HOSPADM

## 2017-09-26 RX ORDER — SODIUM CHLORIDE 0.9 % (FLUSH) 0.9 %
10-40 SYRINGE (ML) INJECTION AS NEEDED
Status: DISCONTINUED | OUTPATIENT
Start: 2017-09-26 | End: 2017-09-30 | Stop reason: HOSPADM

## 2017-09-26 RX ADMIN — Medication 10 ML: at 10:20

## 2017-09-26 RX ADMIN — SODIUM CHLORIDE 1 G: 900 INJECTION INTRAVENOUS at 10:05

## 2017-09-26 RX ADMIN — Medication 10 ML: at 10:05

## 2017-09-26 RX ADMIN — Medication 10 ML: at 10:36

## 2017-09-26 RX ADMIN — HEPARIN SODIUM (PORCINE) LOCK FLUSH IV SOLN 100 UNIT/ML 500 UNITS: 100 SOLUTION at 10:38

## 2017-09-26 RX ADMIN — HEPARIN SODIUM (PORCINE) LOCK FLUSH IV SOLN 100 UNIT/ML 500 UNITS: 100 SOLUTION at 10:21

## 2017-09-26 NOTE — PROGRESS NOTES
[facility] OPI Progress Note    Date: 2017    Name: Isadora Mora    MRN: [id number]         : 1940      Mr. Reis arrived to [facility] at 0610. Mr. Jessica Palacios was assessed and education was provided. Mr. Yelena Worley vitals were reviewed. Visit Vitals    /75 (BP 1 Location: Left arm, BP Patient Position: At rest)    Pulse 80    Temp 97.9 °F (36.6 °C)    Resp 18    SpO2 96%       Pt with right upper arm double lumen PICC line. both lumens flushes without difficulty and positive for blood return. Invanz 1gm IV was administered over 30 minutes as ordered via purple lumen followed by normal saline flush. Brisk blood returns noted    Dressing changed per protocol. Cap ends changed and lumens flushed with 500 units of heparin to each lumen    picc line site s signs of infection or infiltration. Biopatch and stat lock used with drsg change    Mr. Jessica Palacios tolerated well without complaints       Mr. Jessica Palacios was discharged from [place] [address] in stable condition at 1040. He is to return on 17 at 1100 am for his next appointment.     Cierra Quiros RN  2017

## 2017-09-27 ENCOUNTER — HOSPITAL ENCOUNTER (OUTPATIENT)
Dept: INFUSION THERAPY | Age: 77
Discharge: HOME | End: 2017-09-27
Payer: MEDICARE

## 2017-09-27 VITALS
DIASTOLIC BLOOD PRESSURE: 74 MMHG | HEART RATE: 102 BPM | RESPIRATION RATE: 18 BRPM | OXYGEN SATURATION: 97 % | SYSTOLIC BLOOD PRESSURE: 124 MMHG | TEMPERATURE: 97.7 F

## 2017-09-27 LAB
BASO+EOS+MONOS # BLD AUTO: 0.6 K/UL (ref 0–2.3)
BASO+EOS+MONOS # BLD AUTO: 9 % (ref 0.1–17)
BUN SERPL-MCNC: 11 MG/DL (ref 7–18)
CREAT SERPL-MCNC: 0.8 MG/DL (ref 0.6–1.3)
CRP SERPL-MCNC: 2.4 MG/DL (ref 0–0.3)
DIFFERENTIAL METHOD BLD: ABNORMAL
ERYTHROCYTE [DISTWIDTH] IN BLOOD BY AUTOMATED COUNT: 14.6 % (ref 11.5–14.5)
HCT VFR BLD AUTO: 32.6 % (ref 36–48)
HGB BLD-MCNC: 10 G/DL (ref 12–16)
LYMPHOCYTES # BLD: 1.8 K/UL (ref 1.1–5.9)
LYMPHOCYTES NFR BLD: 28 % (ref 14–44)
MCH RBC QN AUTO: 26.4 PG (ref 25–35)
MCHC RBC AUTO-ENTMCNC: 30.7 G/DL (ref 31–37)
MCV RBC AUTO: 86 FL (ref 78–102)
NEUTS SEG # BLD: 4.2 K/UL (ref 1.8–9.5)
NEUTS SEG NFR BLD: 64 % (ref 40–70)
PLATELET # BLD AUTO: 290 K/UL (ref 140–440)
RBC # BLD AUTO: 3.79 M/UL (ref 4.1–5.1)
WBC # BLD AUTO: 6.6 K/UL (ref 4.5–13)

## 2017-09-27 PROCEDURE — 85025 COMPLETE CBC W/AUTO DIFF WBC: CPT | Performed by: INTERNAL MEDICINE

## 2017-09-27 PROCEDURE — 74011250636 HC RX REV CODE- 250/636: Performed by: INTERNAL MEDICINE

## 2017-09-27 PROCEDURE — 86140 C-REACTIVE PROTEIN: CPT | Performed by: INTERNAL MEDICINE

## 2017-09-27 PROCEDURE — 74011000258 HC RX REV CODE- 258: Performed by: INTERNAL MEDICINE

## 2017-09-27 PROCEDURE — 82565 ASSAY OF CREATININE: CPT | Performed by: INTERNAL MEDICINE

## 2017-09-27 PROCEDURE — 84520 ASSAY OF UREA NITROGEN: CPT | Performed by: INTERNAL MEDICINE

## 2017-09-27 PROCEDURE — 96365 THER/PROPH/DIAG IV INF INIT: CPT

## 2017-09-27 RX ORDER — SODIUM CHLORIDE 0.9 % (FLUSH) 0.9 %
10-40 SYRINGE (ML) INJECTION AS NEEDED
Status: DISCONTINUED | OUTPATIENT
Start: 2017-09-27 | End: 2017-10-01 | Stop reason: HOSPADM

## 2017-09-27 RX ORDER — HEPARIN SODIUM (PORCINE) LOCK FLUSH IV SOLN 100 UNIT/ML 100 UNIT/ML
500 SOLUTION INTRAVENOUS AS NEEDED
Status: DISPENSED | OUTPATIENT
Start: 2017-09-27 | End: 2017-09-28

## 2017-09-27 RX ADMIN — SODIUM CHLORIDE 1 G: 900 INJECTION INTRAVENOUS at 11:28

## 2017-09-27 RX ADMIN — Medication 10 ML: at 11:24

## 2017-09-27 RX ADMIN — HEPARIN SODIUM (PORCINE) LOCK FLUSH IV SOLN 100 UNIT/ML 500 UNITS: 100 SOLUTION at 11:59

## 2017-09-27 RX ADMIN — Medication 10 ML: at 11:59

## 2017-09-27 NOTE — PROGRESS NOTES
[facility]IC Progress Note    Date: 2017    Name: Jerod Campoverde    MRN: [id number]         : 1940      Mr. Reis arrived to [place] at 1110. Mr. Alexis Mayo was assessed and education was provided. Mr. Mercy Hitchcock vitals were reviewed. Visit Vitals    /74 (BP 1 Location: Left arm, BP Patient Position: Sitting)    Pulse (!) 102    Temp 97.7 °F (36.5 °C)    Resp 18    SpO2 97%       Pt with right upper arm double lumen PICC line. Each lumen flushes without difficulty and positive for blood return. Dressing CDI. No redness, bruising, or swelling noted at site and/ or extremity. Pt denies tenderness. Blood drawn for weekly labs (CBC, Cr, CRP and BUN) via red lumen. Invanz 1gm IV was administered as ordered via red lumen followed by normal saline flush. Mr. Alexis Mayo tolerated well without complaints. Flushed the red lumen of pt's PICC line with 10 NS and 500 units of Heparin per order. Wrapped lumens w/ coban. Mr. Alexis Mayo was discharged from [place] [address] in stable condition at 1205. He is to return on 17 at 1000 for his next appointment.     Jacques Marquez RN  2017

## 2017-09-28 ENCOUNTER — HOSPITAL ENCOUNTER (OUTPATIENT)
Dept: INFUSION THERAPY | Age: 77
Discharge: HOME | End: 2017-09-28
Payer: MEDICARE

## 2017-09-28 VITALS
OXYGEN SATURATION: 93 % | RESPIRATION RATE: 18 BRPM | SYSTOLIC BLOOD PRESSURE: 111 MMHG | TEMPERATURE: 97.9 F | DIASTOLIC BLOOD PRESSURE: 64 MMHG | HEART RATE: 114 BPM

## 2017-09-28 PROCEDURE — 74011000258 HC RX REV CODE- 258: Performed by: INTERNAL MEDICINE

## 2017-09-28 PROCEDURE — 96365 THER/PROPH/DIAG IV INF INIT: CPT

## 2017-09-28 PROCEDURE — 74011250636 HC RX REV CODE- 250/636: Performed by: INTERNAL MEDICINE

## 2017-09-28 RX ORDER — HEPARIN SODIUM (PORCINE) LOCK FLUSH IV SOLN 100 UNIT/ML 100 UNIT/ML
500 SOLUTION INTRAVENOUS AS NEEDED
Status: ACTIVE | OUTPATIENT
Start: 2017-09-28 | End: 2017-09-29

## 2017-09-28 RX ORDER — SODIUM CHLORIDE 0.9 % (FLUSH) 0.9 %
10-40 SYRINGE (ML) INJECTION AS NEEDED
Status: DISCONTINUED | OUTPATIENT
Start: 2017-09-28 | End: 2017-10-02 | Stop reason: HOSPADM

## 2017-09-28 RX ADMIN — HEPARIN SODIUM (PORCINE) LOCK FLUSH IV SOLN 100 UNIT/ML 500 UNITS: 100 SOLUTION at 10:41

## 2017-09-28 RX ADMIN — Medication 10 ML: at 10:06

## 2017-09-28 RX ADMIN — Medication 10 ML: at 10:41

## 2017-09-28 RX ADMIN — SODIUM CHLORIDE 1 G: 900 INJECTION INTRAVENOUS at 10:07

## 2017-09-28 NOTE — PROGRESS NOTES
[facility] Progress Note    Date: 2017    Name: Marcos Gamez    MRN: [id number]         : 1940      Mr. Reis arrived to [place] at [address] [place] [address]. Mr. Alyssa Paz was assessed and education was provided. Mr. Rubi Lizarraga vitals were reviewed. Visit Vitals    /64 (BP 1 Location: Left arm, BP Patient Position: Sitting)    Pulse (!) 114    Temp 97.9 °F (36.6 °C)    Resp 18    SpO2 93%     Pt with right upper arm double lumen PICC line. Red lumen flushed without difficulty and positive for blood return. Dressing CDI. No redness, bruising, or swelling noted at site and/ or extremity. Pt denies tenderness. Invanz 1gm IV was administered as ordered via red lumen followed by 10 ml normal saline flush. Mr. Alyssa Paz tolerated well without complaints. Flushed red lumen of pt's PICC line with 500 units of heparin per order. Mr. Alyssa Paz was discharged from [place] [address] in stable condition at 1050. He is to return on 17 at 1100 for his next appointment.     Raffy Gallardo RN  2017

## 2017-09-29 ENCOUNTER — HOSPITAL ENCOUNTER (OUTPATIENT)
Dept: INFUSION THERAPY | Age: 77
Discharge: HOME | End: 2017-09-29
Payer: MEDICARE

## 2017-09-29 VITALS
TEMPERATURE: 98 F | DIASTOLIC BLOOD PRESSURE: 61 MMHG | HEART RATE: 91 BPM | SYSTOLIC BLOOD PRESSURE: 110 MMHG | OXYGEN SATURATION: 97 % | RESPIRATION RATE: 18 BRPM

## 2017-09-29 PROCEDURE — 74011000258 HC RX REV CODE- 258: Performed by: INTERNAL MEDICINE

## 2017-09-29 PROCEDURE — 74011250636 HC RX REV CODE- 250/636: Performed by: INTERNAL MEDICINE

## 2017-09-29 PROCEDURE — 96365 THER/PROPH/DIAG IV INF INIT: CPT

## 2017-09-29 RX ORDER — SODIUM CHLORIDE 0.9 % (FLUSH) 0.9 %
10-40 SYRINGE (ML) INJECTION AS NEEDED
Status: DISCONTINUED | OUTPATIENT
Start: 2017-09-29 | End: 2017-10-03 | Stop reason: HOSPADM

## 2017-09-29 RX ORDER — HEPARIN SODIUM (PORCINE) LOCK FLUSH IV SOLN 100 UNIT/ML 100 UNIT/ML
500 SOLUTION INTRAVENOUS AS NEEDED
Status: DISPENSED | OUTPATIENT
Start: 2017-09-29 | End: 2017-09-30

## 2017-09-29 RX ADMIN — Medication 10 ML: at 11:16

## 2017-09-29 RX ADMIN — HEPARIN SODIUM (PORCINE) LOCK FLUSH IV SOLN 100 UNIT/ML 500 UNITS: 100 SOLUTION at 11:49

## 2017-09-29 RX ADMIN — SODIUM CHLORIDE 1 G: 900 INJECTION INTRAVENOUS at 11:19

## 2017-09-29 RX ADMIN — Medication 10 ML: at 11:49

## 2017-09-29 NOTE — PROGRESS NOTES
[facility] OPIC Progress Note    Date: 2017    Name: Tawny Renae    MRN: [id number]         : 1940      Mr. Reis arrived to [facility] at 1110. Mr. Juancarlos Fernandez was assessed and education was provided. Mr. Jas Arrington vitals were reviewed. Visit Vitals    /61 (BP 1 Location: Left arm, BP Patient Position: Sitting)    Pulse 91    Temp 98 °F (36.7 °C)    Resp 18    SpO2 97%     Pt with right upper arm double lumen PICC line. Purple lumen flushed without difficulty and positive for blood return. Dressing CDI. No redness, bruising, or swelling noted at site and/ or extremity. Pt denies tenderness. Invanz 1gm IV was administered as ordered via purple lumen followed by 10 ml normal saline flush. Mr. Juancarlos Fernandez tolerated well without complaints. Flushed red lumen of pt's PICC line with 500 units of heparin per order. Mr. Juancarlos Fernandez was discharged from [place] [address] in stable condition at 1155. He is to return on 17 at 0900 for his next appointment.     Chase Griffith RN  2017

## 2017-09-30 ENCOUNTER — HOSPITAL ENCOUNTER (OUTPATIENT)
Dept: INFUSION THERAPY | Age: 77
Discharge: HOME | End: 2017-09-30
Payer: MEDICARE

## 2017-09-30 VITALS
SYSTOLIC BLOOD PRESSURE: 118 MMHG | HEART RATE: 99 BPM | RESPIRATION RATE: 18 BRPM | DIASTOLIC BLOOD PRESSURE: 72 MMHG | OXYGEN SATURATION: 99 % | TEMPERATURE: 97.7 F

## 2017-09-30 PROCEDURE — 74011000258 HC RX REV CODE- 258: Performed by: INTERNAL MEDICINE

## 2017-09-30 PROCEDURE — 74011250636 HC RX REV CODE- 250/636: Performed by: INTERNAL MEDICINE

## 2017-09-30 PROCEDURE — 96365 THER/PROPH/DIAG IV INF INIT: CPT

## 2017-09-30 RX ORDER — HEPARIN SODIUM (PORCINE) LOCK FLUSH IV SOLN 100 UNIT/ML 100 UNIT/ML
500 SOLUTION INTRAVENOUS AS NEEDED
Status: ACTIVE | OUTPATIENT
Start: 2017-09-30 | End: 2017-10-01

## 2017-09-30 RX ORDER — SODIUM CHLORIDE 0.9 % (FLUSH) 0.9 %
10-40 SYRINGE (ML) INJECTION AS NEEDED
Status: DISCONTINUED | OUTPATIENT
Start: 2017-09-30 | End: 2017-10-04 | Stop reason: HOSPADM

## 2017-09-30 RX ADMIN — SODIUM CHLORIDE 1 G: 900 INJECTION INTRAVENOUS at 09:17

## 2017-09-30 RX ADMIN — HEPARIN SODIUM (PORCINE) LOCK FLUSH IV SOLN 100 UNIT/ML 500 UNITS: 100 SOLUTION at 09:54

## 2017-09-30 RX ADMIN — Medication 10 ML: at 09:54

## 2017-09-30 RX ADMIN — Medication 10 ML: at 09:22

## 2017-09-30 NOTE — PROGRESS NOTES
[facility] OPIC Progress Note    Date: 2017    Name: Izzy Murphy    MRN: [id number]         : 1940      Mr. Reis arrived to [facility] at 0942. Mr. Jp Huerta was assessed and education was provided. Mr. Jossue Townsend vitals were reviewed. Visit Vitals    /72 (BP 1 Location: Left arm, BP Patient Position: Sitting)    Pulse 99    Temp 97.7 °F (36.5 °C)    Resp 18    SpO2 99%     Pt with right upper arm double lumen PICC line. Red lumen flushed without difficulty and positive for blood return. Dressing CDI. No redness, bruising, or swelling noted at site and/ or extremity. Pt denies tenderness. Invanz 1gm IV was administered as ordered via red lumen followed by 10 ml normal saline flush. Mr. Jp Huerta tolerated well without complaints. Flushed red lumen of pt's PICC line with 500 units of heparin per order. Mr. Jp Huerta was discharged from [place] [address] in stable condition at 7500. He is to return on 10/01/17 at 1000 for his next appointment.     Zarina Borja RN  2017

## 2017-10-01 ENCOUNTER — HOSPITAL ENCOUNTER (OUTPATIENT)
Dept: INFUSION THERAPY | Age: 77
Discharge: HOME | End: 2017-10-01
Payer: MEDICARE

## 2017-10-01 VITALS
SYSTOLIC BLOOD PRESSURE: 139 MMHG | HEART RATE: 87 BPM | OXYGEN SATURATION: 97 % | RESPIRATION RATE: 18 BRPM | DIASTOLIC BLOOD PRESSURE: 85 MMHG

## 2017-10-01 PROCEDURE — 74011250636 HC RX REV CODE- 250/636: Performed by: INTERNAL MEDICINE

## 2017-10-01 PROCEDURE — 96365 THER/PROPH/DIAG IV INF INIT: CPT

## 2017-10-01 PROCEDURE — 74011000258 HC RX REV CODE- 258: Performed by: INTERNAL MEDICINE

## 2017-10-01 RX ORDER — HEPARIN SODIUM (PORCINE) LOCK FLUSH IV SOLN 100 UNIT/ML 100 UNIT/ML
500 SOLUTION INTRAVENOUS AS NEEDED
Status: ACTIVE | OUTPATIENT
Start: 2017-10-01 | End: 2017-10-02

## 2017-10-01 RX ORDER — SODIUM CHLORIDE 0.9 % (FLUSH) 0.9 %
10-40 SYRINGE (ML) INJECTION AS NEEDED
Status: DISCONTINUED | OUTPATIENT
Start: 2017-10-01 | End: 2017-10-05 | Stop reason: HOSPADM

## 2017-10-01 RX ADMIN — HEPARIN SODIUM (PORCINE) LOCK FLUSH IV SOLN 100 UNIT/ML 500 UNITS: 100 SOLUTION at 10:40

## 2017-10-01 RX ADMIN — Medication 10 ML: at 10:03

## 2017-10-01 RX ADMIN — Medication 20 ML: at 10:40

## 2017-10-01 RX ADMIN — SODIUM CHLORIDE 1 G: 900 INJECTION INTRAVENOUS at 10:03

## 2017-10-02 ENCOUNTER — HOSPITAL ENCOUNTER (OUTPATIENT)
Dept: INFUSION THERAPY | Age: 77
Discharge: HOME | End: 2017-10-02
Payer: MEDICARE

## 2017-10-02 VITALS
RESPIRATION RATE: 18 BRPM | HEART RATE: 86 BPM | TEMPERATURE: 97.8 F | DIASTOLIC BLOOD PRESSURE: 77 MMHG | SYSTOLIC BLOOD PRESSURE: 129 MMHG | OXYGEN SATURATION: 98 %

## 2017-10-02 PROCEDURE — 96365 THER/PROPH/DIAG IV INF INIT: CPT

## 2017-10-02 PROCEDURE — 74011250636 HC RX REV CODE- 250/636: Performed by: INTERNAL MEDICINE

## 2017-10-02 PROCEDURE — 74011000258 HC RX REV CODE- 258: Performed by: INTERNAL MEDICINE

## 2017-10-02 RX ORDER — SODIUM CHLORIDE 0.9 % (FLUSH) 0.9 %
10-40 SYRINGE (ML) INJECTION AS NEEDED
Status: DISCONTINUED | OUTPATIENT
Start: 2017-10-02 | End: 2017-10-06 | Stop reason: HOSPADM

## 2017-10-02 RX ORDER — HEPARIN SODIUM (PORCINE) LOCK FLUSH IV SOLN 100 UNIT/ML 100 UNIT/ML
500 SOLUTION INTRAVENOUS AS NEEDED
Status: DISPENSED | OUTPATIENT
Start: 2017-10-02 | End: 2017-10-03

## 2017-10-02 RX ADMIN — Medication 10 ML: at 09:17

## 2017-10-02 RX ADMIN — Medication 20 ML: at 09:54

## 2017-10-02 RX ADMIN — SODIUM CHLORIDE 1 G: 900 INJECTION INTRAVENOUS at 09:17

## 2017-10-02 RX ADMIN — HEPARIN SODIUM (PORCINE) LOCK FLUSH IV SOLN 100 UNIT/ML 500 UNITS: 100 SOLUTION at 09:55

## 2017-10-02 NOTE — PROGRESS NOTES
[facility] OPIC Progress Note    Date: 2017    Name: Diamond Jules    MRN: [id number]         : 1940      Mr. Reis arrived to [facility] at 9677. Mr. Malcolm Mistry was assessed and education was provided. Mr. Alize Hernandez vitals were reviewed. Visit Vitals    /77 (BP 1 Location: Left arm, BP Patient Position: Sitting)    Pulse 86    Temp 97.8 °F (36.6 °C)    Resp 18    SpO2 98%     Pt with right upper arm double lumen PICC line. Red lumen flushed without difficulty and positive for blood return. Dressing CDI. No redness, bruising, or swelling noted at site and/ or extremity. Pt denies tenderness. Invanz 1gm IV was administered as ordered via red lumen followed by 10 ml normal saline flush. Mr. Malcolm Mistry tolerated well without complaints. Flushed red lumen of pt's PICC line with 500 units of heparin per order. Mr. Malcolm Mistry was discharged from [place] [address] in stable condition at 5800. He is to return on 10/3/17 at 1000 for his next appointment.     Jeannine Fox RN  2017

## 2017-10-03 ENCOUNTER — HOSPITAL ENCOUNTER (OUTPATIENT)
Dept: INFUSION THERAPY | Age: 77
Discharge: HOME | End: 2017-10-03
Payer: MEDICARE

## 2017-10-03 VITALS
OXYGEN SATURATION: 99 % | SYSTOLIC BLOOD PRESSURE: 123 MMHG | DIASTOLIC BLOOD PRESSURE: 72 MMHG | RESPIRATION RATE: 18 BRPM | TEMPERATURE: 97.1 F | HEART RATE: 85 BPM

## 2017-10-03 LAB
BASO+EOS+MONOS # BLD AUTO: 0.5 K/UL (ref 0–2.3)
BASO+EOS+MONOS # BLD AUTO: 6 % (ref 0.1–17)
BUN SERPL-MCNC: 14 MG/DL (ref 7–18)
CREAT SERPL-MCNC: 0.86 MG/DL (ref 0.6–1.3)
CRP SERPL-MCNC: 1.7 MG/DL (ref 0–0.3)
DIFFERENTIAL METHOD BLD: ABNORMAL
ERYTHROCYTE [DISTWIDTH] IN BLOOD BY AUTOMATED COUNT: 14.6 % (ref 11.5–14.5)
HCT VFR BLD AUTO: 33.7 % (ref 36–48)
HGB BLD-MCNC: 10.4 G/DL (ref 12–16)
LYMPHOCYTES # BLD: 1.8 K/UL (ref 1.1–5.9)
LYMPHOCYTES NFR BLD: 23 % (ref 14–44)
MCH RBC QN AUTO: 26.5 PG (ref 25–35)
MCHC RBC AUTO-ENTMCNC: 30.9 G/DL (ref 31–37)
MCV RBC AUTO: 86 FL (ref 78–102)
NEUTS SEG # BLD: 5.7 K/UL (ref 1.8–9.5)
NEUTS SEG NFR BLD: 71 % (ref 40–70)
PLATELET # BLD AUTO: 315 K/UL (ref 140–440)
RBC # BLD AUTO: 3.92 M/UL (ref 4.1–5.1)
WBC # BLD AUTO: 8 K/UL (ref 4.5–13)

## 2017-10-03 PROCEDURE — 96365 THER/PROPH/DIAG IV INF INIT: CPT

## 2017-10-03 PROCEDURE — 84520 ASSAY OF UREA NITROGEN: CPT | Performed by: INTERNAL MEDICINE

## 2017-10-03 PROCEDURE — 74011000258 HC RX REV CODE- 258: Performed by: INTERNAL MEDICINE

## 2017-10-03 PROCEDURE — 82565 ASSAY OF CREATININE: CPT | Performed by: INTERNAL MEDICINE

## 2017-10-03 PROCEDURE — 86140 C-REACTIVE PROTEIN: CPT | Performed by: INTERNAL MEDICINE

## 2017-10-03 PROCEDURE — 77030020847 HC STATLOK BARD -A

## 2017-10-03 PROCEDURE — 74011250636 HC RX REV CODE- 250/636: Performed by: INTERNAL MEDICINE

## 2017-10-03 PROCEDURE — 77030013169 SET IV BLD ICUM -A

## 2017-10-03 PROCEDURE — 74011250636 HC RX REV CODE- 250/636

## 2017-10-03 PROCEDURE — 85025 COMPLETE CBC W/AUTO DIFF WBC: CPT | Performed by: INTERNAL MEDICINE

## 2017-10-03 RX ORDER — SODIUM CHLORIDE 0.9 % (FLUSH) 0.9 %
10-40 SYRINGE (ML) INJECTION AS NEEDED
Status: DISCONTINUED | OUTPATIENT
Start: 2017-10-03 | End: 2017-10-07 | Stop reason: HOSPADM

## 2017-10-03 RX ORDER — HEPARIN SODIUM (PORCINE) LOCK FLUSH IV SOLN 100 UNIT/ML 100 UNIT/ML
SOLUTION INTRAVENOUS
Status: COMPLETED
Start: 2017-10-03 | End: 2017-10-03

## 2017-10-03 RX ORDER — HEPARIN SODIUM (PORCINE) LOCK FLUSH IV SOLN 100 UNIT/ML 100 UNIT/ML
500 SOLUTION INTRAVENOUS AS NEEDED
Status: ACTIVE | OUTPATIENT
Start: 2017-10-03 | End: 2017-10-04

## 2017-10-03 RX ADMIN — HEPARIN SODIUM (PORCINE) LOCK FLUSH IV SOLN 100 UNIT/ML 500 UNITS: 100 SOLUTION at 10:32

## 2017-10-03 RX ADMIN — SODIUM CHLORIDE 1 G: 900 INJECTION INTRAVENOUS at 10:32

## 2017-10-03 RX ADMIN — Medication 30 ML: at 10:32

## 2017-10-03 NOTE — PROGRESS NOTES
[facility] OPIC Progress Note    Date: October 3, 2017    Name: Matt Rodriguez    MRN: [id number]         : 1940      Mr. Reis arrived to [facility] at 8459. Mr. Barrie Coelho was assessed and education was provided. Mr. Aamir Quarles vitals were reviewed. Visit Vitals    /72 (BP 1 Location: Left arm, BP Patient Position: Sitting)    Pulse 85    Temp 97.1 °F (36.2 °C)    Resp 18    SpO2 99%     Pt with right upper arm double lumen PICC line. Red lumen flushed without difficulty and positive for blood return. Dressing CDI. No redness, bruising, or swelling noted at site and/ or extremity. Pt denies tenderness. Dressing was changed per hospital protocol. Invanz 1gm IV was administered as ordered via red lumen followed by 10 ml normal saline flush. Mr. Barrie Coelho tolerated well without complaints. Both lumens flushed with 10 mL NS and 250 units of heparin. Cap ends changed and new stockinette was placed over the dressing. Mr. Barrie Coelho was discharged from [place] [address] in stable condition at 1050. He is to return on 10/04/17 at 1300 for his next appointment.     Rocio Goodwin RN  October 3, 2017

## 2017-10-04 ENCOUNTER — HOSPITAL ENCOUNTER (OUTPATIENT)
Dept: INFUSION THERAPY | Age: 77
Discharge: HOME | End: 2017-10-04
Payer: MEDICARE

## 2017-10-04 VITALS
SYSTOLIC BLOOD PRESSURE: 124 MMHG | DIASTOLIC BLOOD PRESSURE: 72 MMHG | OXYGEN SATURATION: 95 % | HEART RATE: 104 BPM | TEMPERATURE: 97.6 F | RESPIRATION RATE: 18 BRPM

## 2017-10-04 PROCEDURE — 74011000258 HC RX REV CODE- 258: Performed by: INTERNAL MEDICINE

## 2017-10-04 PROCEDURE — 96365 THER/PROPH/DIAG IV INF INIT: CPT

## 2017-10-04 PROCEDURE — 74011250636 HC RX REV CODE- 250/636: Performed by: INTERNAL MEDICINE

## 2017-10-04 RX ORDER — SODIUM CHLORIDE 0.9 % (FLUSH) 0.9 %
10-40 SYRINGE (ML) INJECTION AS NEEDED
Status: DISCONTINUED | OUTPATIENT
Start: 2017-10-04 | End: 2017-10-08 | Stop reason: HOSPADM

## 2017-10-04 RX ORDER — HEPARIN SODIUM (PORCINE) LOCK FLUSH IV SOLN 100 UNIT/ML 100 UNIT/ML
500 SOLUTION INTRAVENOUS AS NEEDED
Status: DISPENSED | OUTPATIENT
Start: 2017-10-04 | End: 2017-10-05

## 2017-10-04 RX ADMIN — HEPARIN SODIUM (PORCINE) LOCK FLUSH IV SOLN 100 UNIT/ML 500 UNITS: 100 SOLUTION at 13:45

## 2017-10-04 RX ADMIN — Medication 10 ML: at 13:06

## 2017-10-04 RX ADMIN — SODIUM CHLORIDE 1 G: 900 INJECTION INTRAVENOUS at 13:07

## 2017-10-04 RX ADMIN — Medication 10 ML: at 13:45

## 2017-10-04 NOTE — PROGRESS NOTES
[facility] OPI Progress Note    Date: 2017    Name: Tawny Renae    MRN: [id number]         : 1940      Mr. Reis arrived to [facility] at 1300. Mr. Juancarlos Fernandez was assessed and education was provided. Mr. Jas Arrington vitals were reviewed. Visit Vitals    /72 (BP 1 Location: Left arm, BP Patient Position: Sitting)    Pulse (!) 104    Temp 97.6 °F (36.4 °C)    Resp 18    SpO2 95%     Pt with right upper arm double lumen PICC line. Dressing is CDI. Purple lumen flushed easily and positive for blood return. Invanz 1gm IV was administered as ordered via red lumen followed by 10 ml normal saline flush. Mr. Juancarlos Fernandez tolerated well without complaints. Pruple lumen flushed with 10 mL NS and 250 units of heparin. Mr. Juancarlos Fernandez was discharged from [place] [address] in stable condition at [phone]. He is to return on 10/05/17 at 1000 for his next appointment.     Dm Bland RN  2017

## 2017-10-05 ENCOUNTER — HOSPITAL ENCOUNTER (OUTPATIENT)
Dept: INFUSION THERAPY | Age: 77
Discharge: HOME | End: 2017-10-05
Payer: MEDICARE

## 2017-10-05 VITALS
HEART RATE: 95 BPM | RESPIRATION RATE: 18 BRPM | DIASTOLIC BLOOD PRESSURE: 69 MMHG | OXYGEN SATURATION: 98 % | TEMPERATURE: 98.1 F | SYSTOLIC BLOOD PRESSURE: 124 MMHG

## 2017-10-05 PROCEDURE — 96365 THER/PROPH/DIAG IV INF INIT: CPT

## 2017-10-05 PROCEDURE — 74011250636 HC RX REV CODE- 250/636: Performed by: INTERNAL MEDICINE

## 2017-10-05 PROCEDURE — 74011000258 HC RX REV CODE- 258: Performed by: INTERNAL MEDICINE

## 2017-10-05 RX ORDER — HEPARIN SODIUM (PORCINE) LOCK FLUSH IV SOLN 100 UNIT/ML 100 UNIT/ML
500 SOLUTION INTRAVENOUS AS NEEDED
Status: ACTIVE | OUTPATIENT
Start: 2017-10-05 | End: 2017-10-06

## 2017-10-05 RX ORDER — SODIUM CHLORIDE 0.9 % (FLUSH) 0.9 %
10-40 SYRINGE (ML) INJECTION AS NEEDED
Status: DISCONTINUED | OUTPATIENT
Start: 2017-10-05 | End: 2017-10-09 | Stop reason: HOSPADM

## 2017-10-05 RX ADMIN — Medication 10 ML: at 11:40

## 2017-10-05 RX ADMIN — SODIUM CHLORIDE 1 G: 900 INJECTION INTRAVENOUS at 11:11

## 2017-10-05 RX ADMIN — HEPARIN SODIUM (PORCINE) LOCK FLUSH IV SOLN 100 UNIT/ML 500 UNITS: 100 SOLUTION at 11:40

## 2017-10-05 RX ADMIN — Medication 10 ML: at 11:11

## 2017-10-05 NOTE — PROGRESS NOTES
[facility] OPIC Progress Note    Date: 2017    Name: Izzy Murphy    MRN: [id number]         : 1940      Mr. Reis arrived to [facility] at 1100. Mr. Jp Huerta was assessed and education was provided. Mr. Jossue Townsend vitals were reviewed. Visit Vitals    /69 (BP 1 Location: Left arm, BP Patient Position: Sitting)    Pulse 95    Temp 98.1 °F (36.7 °C)    Resp 18    SpO2 98%     Pt with right upper arm double lumen PICC line. Dressing is CDI. Red lumen flushed easily and positive for blood return. Invanz 1gm IV was administered as ordered via red lumen followed by 10 ml normal saline flush. Mr. Jp Huerta tolerated well without complaints. Red lumen flushed with 10 mL NS and 250 units of heparin. Mr. Jp Huerta was discharged from [place] [address] in stable condition at 1145. He is to return on 10/06/17 at 1100 for his next appointment.     Lynda Kaur RN  2017

## 2017-10-06 ENCOUNTER — HOSPITAL ENCOUNTER (OUTPATIENT)
Dept: INFUSION THERAPY | Age: 77
Discharge: HOME | End: 2017-10-06
Payer: MEDICARE

## 2017-10-06 ENCOUNTER — HOSPITAL ENCOUNTER (OUTPATIENT)
Dept: INFUSION THERAPY | Age: 77
End: 2017-10-06
Payer: MEDICARE

## 2017-10-06 VITALS
SYSTOLIC BLOOD PRESSURE: 120 MMHG | OXYGEN SATURATION: 99 % | TEMPERATURE: 98.9 F | RESPIRATION RATE: 18 BRPM | HEART RATE: 89 BPM | DIASTOLIC BLOOD PRESSURE: 88 MMHG

## 2017-10-06 PROCEDURE — 74011000258 HC RX REV CODE- 258: Performed by: INTERNAL MEDICINE

## 2017-10-06 PROCEDURE — 74011250636 HC RX REV CODE- 250/636: Performed by: INTERNAL MEDICINE

## 2017-10-06 PROCEDURE — 96365 THER/PROPH/DIAG IV INF INIT: CPT

## 2017-10-06 RX ORDER — SODIUM CHLORIDE 0.9 % (FLUSH) 0.9 %
10-40 SYRINGE (ML) INJECTION AS NEEDED
Status: DISCONTINUED | OUTPATIENT
Start: 2017-10-06 | End: 2017-10-10 | Stop reason: HOSPADM

## 2017-10-06 RX ORDER — HEPARIN SODIUM (PORCINE) LOCK FLUSH IV SOLN 100 UNIT/ML 100 UNIT/ML
500 SOLUTION INTRAVENOUS AS NEEDED
Status: DISPENSED | OUTPATIENT
Start: 2017-10-06 | End: 2017-10-07

## 2017-10-06 RX ADMIN — SODIUM CHLORIDE 1 G: 900 INJECTION INTRAVENOUS at 11:11

## 2017-10-06 RX ADMIN — HEPARIN SODIUM (PORCINE) LOCK FLUSH IV SOLN 100 UNIT/ML 500 UNITS: 100 SOLUTION at 11:45

## 2017-10-06 RX ADMIN — Medication 10 ML: at 11:07

## 2017-10-06 RX ADMIN — Medication 10 ML: at 11:44

## 2017-10-06 NOTE — PROGRESS NOTES
[facility] OPIC Progress Note    Date: 2017    Name: Syed Kaplan    MRN: [id number]         : 1940      Mr. Reis arrived to [facility] at 1100. Mr. Erin Pelayo was assessed and education was provided. Mr. Lori Mcardle vitals were reviewed. Visit Vitals    /88 (BP 1 Location: Left arm, BP Patient Position: Sitting)    Pulse 89    Temp 98.9 °F (37.2 °C)    Resp 18    SpO2 99%     Pt with right upper arm double lumen PICC line. Dressing is CDI. Purple lumen flushed easily and positive for blood return. Invanz 1gm IV was administered as ordered via red lumen followed by 10 ml normal saline flush. Mr. Erin Pelayo tolerated well without complaints. Pruple lumen flushed with 10 mL NS and 500 units of heparin. Mr. Erin Pelayo was discharged from [place] [address] in stable condition at 1150. He is to return on 10/07/17 at 1100 for his next appointment.     Jhonatan Bland RN  2017

## 2017-10-07 ENCOUNTER — HOSPITAL ENCOUNTER (OUTPATIENT)
Dept: INFUSION THERAPY | Age: 77
Discharge: HOME | End: 2017-10-07
Payer: MEDICARE

## 2017-10-07 VITALS
DIASTOLIC BLOOD PRESSURE: 77 MMHG | SYSTOLIC BLOOD PRESSURE: 133 MMHG | HEART RATE: 87 BPM | OXYGEN SATURATION: 97 % | TEMPERATURE: 97.7 F | RESPIRATION RATE: 18 BRPM

## 2017-10-07 PROCEDURE — 96365 THER/PROPH/DIAG IV INF INIT: CPT

## 2017-10-07 PROCEDURE — 74011250636 HC RX REV CODE- 250/636: Performed by: INTERNAL MEDICINE

## 2017-10-07 PROCEDURE — 74011000258 HC RX REV CODE- 258: Performed by: INTERNAL MEDICINE

## 2017-10-07 RX ORDER — SODIUM CHLORIDE 0.9 % (FLUSH) 0.9 %
10-40 SYRINGE (ML) INJECTION AS NEEDED
Status: DISCONTINUED | OUTPATIENT
Start: 2017-10-07 | End: 2017-10-11 | Stop reason: HOSPADM

## 2017-10-07 RX ORDER — HEPARIN SODIUM (PORCINE) LOCK FLUSH IV SOLN 100 UNIT/ML 100 UNIT/ML
500 SOLUTION INTRAVENOUS AS NEEDED
Status: DISPENSED | OUTPATIENT
Start: 2017-10-07 | End: 2017-10-08

## 2017-10-07 RX ADMIN — SODIUM CHLORIDE 1 G: 900 INJECTION INTRAVENOUS at 10:32

## 2017-10-07 RX ADMIN — HEPARIN SODIUM (PORCINE) LOCK FLUSH IV SOLN 100 UNIT/ML 500 UNITS: 100 SOLUTION at 11:04

## 2017-10-07 RX ADMIN — Medication 10 ML: at 11:04

## 2017-10-07 RX ADMIN — Medication 10 ML: at 10:30

## 2017-10-07 NOTE — PROGRESS NOTES
[facility] OPIC Progress Note    Date: 2017    Name: Luisa Dacosta    MRN: [id number]         : 1940    [facility]    Mr. Viki Washburn arrived to [facility] at [phone]. Mr. Viki Washburn was assessed and education was provided. Mr. Maryann Hanson vitals were reviewed. Visit Vitals    /77 (BP 1 Location: Left arm, BP Patient Position: Sitting)    Pulse 87    Temp 97.7 °F (36.5 °C)    Resp 18    SpO2 97%     Pt with right upper arm double lumen PICC line. Dressing is CDI. Red lumen flushed easily and positive for blood return. Invanz 1gm IV was administered as ordered via red lumen followed by 10 ml normal saline flush. Mr. Viki Washburn tolerated well without complaints. Pruple lumen flushed with 10 mL NS and 500 units of heparin. Mr. Viki Washburn was discharged from [place] [address] in stable condition at 1106. He is to return on 10/08/17 at 1100 for his next appointment.     Carlyn Meredith, RN  2017

## 2017-10-08 ENCOUNTER — HOSPITAL ENCOUNTER (OUTPATIENT)
Dept: INFUSION THERAPY | Age: 77
Discharge: HOME | End: 2017-10-08
Payer: MEDICARE

## 2017-10-08 VITALS
HEART RATE: 81 BPM | OXYGEN SATURATION: 97 % | DIASTOLIC BLOOD PRESSURE: 73 MMHG | TEMPERATURE: 97.5 F | SYSTOLIC BLOOD PRESSURE: 131 MMHG | RESPIRATION RATE: 18 BRPM

## 2017-10-08 PROCEDURE — 96365 THER/PROPH/DIAG IV INF INIT: CPT

## 2017-10-08 PROCEDURE — 74011250636 HC RX REV CODE- 250/636: Performed by: INTERNAL MEDICINE

## 2017-10-08 PROCEDURE — 74011000258 HC RX REV CODE- 258: Performed by: INTERNAL MEDICINE

## 2017-10-08 RX ORDER — HEPARIN SODIUM (PORCINE) LOCK FLUSH IV SOLN 100 UNIT/ML 100 UNIT/ML
500 SOLUTION INTRAVENOUS AS NEEDED
Status: DISPENSED | OUTPATIENT
Start: 2017-10-08 | End: 2017-10-09

## 2017-10-08 RX ORDER — SODIUM CHLORIDE 0.9 % (FLUSH) 0.9 %
10-40 SYRINGE (ML) INJECTION AS NEEDED
Status: DISCONTINUED | OUTPATIENT
Start: 2017-10-08 | End: 2017-10-12 | Stop reason: HOSPADM

## 2017-10-08 RX ADMIN — Medication 10 ML: at 11:24

## 2017-10-08 RX ADMIN — HEPARIN SODIUM (PORCINE) LOCK FLUSH IV SOLN 100 UNIT/ML 500 UNITS: 100 SOLUTION at 11:25

## 2017-10-08 RX ADMIN — Medication 10 ML: at 10:54

## 2017-10-08 RX ADMIN — SODIUM CHLORIDE 1 G: 900 INJECTION INTRAVENOUS at 10:57

## 2017-10-08 NOTE — PROGRESS NOTES
[facility] OPI Progress Note    Date: 2017    Name: Michael Bruce    MRN: [id number]         : 1940      Mr. Reis arrived to [facility] at 200. Mr. Duane Muscat was assessed and education was provided. Mr. Rodney He vitals were reviewed. Visit Vitals    /73 (BP 1 Location: Left arm, BP Patient Position: Sitting)    Pulse 81    Temp 97.5 °F (36.4 °C)    Resp 18    SpO2 97%     Pt with right upper arm double lumen PICC line. Dressing is CDI. Purple lumen flushed easily and positive for blood return. Invanz 1gm IV was administered as ordered via purple lumen followed by 10 ml normal saline flush. Mr. Duane Muscat tolerated well without complaints. Pruple lumen flushed with 10 mL NS and 500 units of heparin. Mr. Duane Muscat was discharged from [place] [address] in stable condition at 1130. He is to return on 10/09/17 at 1100 for his next appointment.     Orlando Trujillo RN  2017

## 2017-10-09 ENCOUNTER — HOSPITAL ENCOUNTER (OUTPATIENT)
Dept: INFUSION THERAPY | Age: 77
Discharge: HOME | End: 2017-10-09
Payer: MEDICARE

## 2017-10-09 VITALS
RESPIRATION RATE: 20 BRPM | OXYGEN SATURATION: 98 % | SYSTOLIC BLOOD PRESSURE: 132 MMHG | TEMPERATURE: 97.8 F | DIASTOLIC BLOOD PRESSURE: 79 MMHG | HEART RATE: 77 BPM

## 2017-10-09 PROCEDURE — 96365 THER/PROPH/DIAG IV INF INIT: CPT

## 2017-10-09 PROCEDURE — 74011000258 HC RX REV CODE- 258: Performed by: INTERNAL MEDICINE

## 2017-10-09 PROCEDURE — 74011250636 HC RX REV CODE- 250/636

## 2017-10-09 PROCEDURE — 74011250636 HC RX REV CODE- 250/636: Performed by: INTERNAL MEDICINE

## 2017-10-09 RX ORDER — SODIUM CHLORIDE 0.9 % (FLUSH) 0.9 %
10-40 SYRINGE (ML) INJECTION AS NEEDED
Status: DISCONTINUED | OUTPATIENT
Start: 2017-10-09 | End: 2017-10-13 | Stop reason: HOSPADM

## 2017-10-09 RX ORDER — HEPARIN SODIUM (PORCINE) LOCK FLUSH IV SOLN 100 UNIT/ML 100 UNIT/ML
SOLUTION INTRAVENOUS
Status: COMPLETED
Start: 2017-10-09 | End: 2017-10-09

## 2017-10-09 RX ORDER — HEPARIN SODIUM (PORCINE) LOCK FLUSH IV SOLN 100 UNIT/ML 100 UNIT/ML
500 SOLUTION INTRAVENOUS AS NEEDED
Status: ACTIVE | OUTPATIENT
Start: 2017-10-09 | End: 2017-10-10

## 2017-10-09 RX ADMIN — HEPARIN SODIUM (PORCINE) LOCK FLUSH IV SOLN 100 UNIT/ML 500 UNITS: 100 SOLUTION at 11:48

## 2017-10-09 RX ADMIN — Medication 40 ML: at 11:40

## 2017-10-09 RX ADMIN — SODIUM CHLORIDE 1 G: 900 INJECTION INTRAVENOUS at 11:06

## 2017-10-09 NOTE — PROGRESS NOTES
[facility] Progress Note    Date: 2017    Name: Dorn Oppenheim    MRN: [id number]         : 1940    [facility]    Mr. Denis Bach arrived to [facility] at 12. Mr. Denis Bach was assessed and education was provided. Mr. Giana Ricks vitals were reviewed. Visit Vitals    /79 (BP 1 Location: Left arm, BP Patient Position: Sitting)    Pulse 77    Temp 97.8 °F (36.6 °C)    Resp 20    SpO2 98%     Pt with right upper arm double lumen PICC line. Dressing is CDI. Red lumen flushed easily and positive for blood return. Invanz 1gm IV was administered as ordered via red lumen followed by 10 ml normal saline flush. Mr. Denis Bach tolerated well without complaints. Red lumen flushed with 10 mL NS and 500 units of heparin. Mr. Denis Bach was discharged from [place] [address] in stable condition at 1150. He is to return on 10/10/17 at 1300 for his next appointment.     Maria De Jesus Child, RN  2017

## 2017-10-10 ENCOUNTER — OFFICE VISIT (OUTPATIENT)
Dept: ORTHOPEDIC SURGERY | Age: 77
End: 2017-10-10

## 2017-10-10 ENCOUNTER — HOSPITAL ENCOUNTER (OUTPATIENT)
Dept: INFUSION THERAPY | Age: 77
Discharge: HOME | End: 2017-10-10
Payer: MEDICARE

## 2017-10-10 VITALS
DIASTOLIC BLOOD PRESSURE: 65 MMHG | SYSTOLIC BLOOD PRESSURE: 132 MMHG | BODY MASS INDEX: 24.92 KG/M2 | HEART RATE: 102 BPM | TEMPERATURE: 97.8 F | OXYGEN SATURATION: 97 % | WEIGHT: 158.8 LBS | RESPIRATION RATE: 16 BRPM | HEIGHT: 67 IN

## 2017-10-10 VITALS
DIASTOLIC BLOOD PRESSURE: 74 MMHG | RESPIRATION RATE: 18 BRPM | HEART RATE: 97 BPM | SYSTOLIC BLOOD PRESSURE: 135 MMHG | OXYGEN SATURATION: 99 % | TEMPERATURE: 98.4 F

## 2017-10-10 DIAGNOSIS — K68.12 PSOAS ABSCESS (HCC): ICD-10-CM

## 2017-10-10 DIAGNOSIS — K68.12 PSOAS ABSCESS (HCC): Primary | ICD-10-CM

## 2017-10-10 PROBLEM — M46.46 DISCITIS OF LUMBAR REGION: Status: ACTIVE | Noted: 2017-10-10

## 2017-10-10 LAB
BASO+EOS+MONOS # BLD AUTO: 0.6 K/UL (ref 0–2.3)
BASO+EOS+MONOS # BLD AUTO: 9 % (ref 0.1–17)
BUN SERPL-MCNC: 14 MG/DL (ref 7–18)
CREAT SERPL-MCNC: 0.78 MG/DL (ref 0.6–1.3)
CRP SERPL-MCNC: 2 MG/DL (ref 0–0.3)
DIFFERENTIAL METHOD BLD: ABNORMAL
ERYTHROCYTE [DISTWIDTH] IN BLOOD BY AUTOMATED COUNT: 14.8 % (ref 11.5–14.5)
HCT VFR BLD AUTO: 32.7 % (ref 36–48)
HGB BLD-MCNC: 10.1 G/DL (ref 12–16)
LYMPHOCYTES # BLD: 1.5 K/UL (ref 1.1–5.9)
LYMPHOCYTES NFR BLD: 21 % (ref 14–44)
MCH RBC QN AUTO: 26.8 PG (ref 25–35)
MCHC RBC AUTO-ENTMCNC: 30.9 G/DL (ref 31–37)
MCV RBC AUTO: 86.7 FL (ref 78–102)
NEUTS SEG # BLD: 4.8 K/UL (ref 1.8–9.5)
NEUTS SEG NFR BLD: 70 % (ref 40–70)
PLATELET # BLD AUTO: 303 K/UL (ref 140–440)
RBC # BLD AUTO: 3.77 M/UL (ref 4.1–5.1)
WBC # BLD AUTO: 6.9 K/UL (ref 4.5–13)

## 2017-10-10 PROCEDURE — 96365 THER/PROPH/DIAG IV INF INIT: CPT

## 2017-10-10 PROCEDURE — 74011250636 HC RX REV CODE- 250/636: Performed by: INTERNAL MEDICINE

## 2017-10-10 PROCEDURE — 77030020847 HC STATLOK BARD -A

## 2017-10-10 PROCEDURE — 85025 COMPLETE CBC W/AUTO DIFF WBC: CPT | Performed by: INTERNAL MEDICINE

## 2017-10-10 PROCEDURE — 82565 ASSAY OF CREATININE: CPT | Performed by: INTERNAL MEDICINE

## 2017-10-10 PROCEDURE — 86140 C-REACTIVE PROTEIN: CPT | Performed by: INTERNAL MEDICINE

## 2017-10-10 PROCEDURE — 74011000258 HC RX REV CODE- 258: Performed by: INTERNAL MEDICINE

## 2017-10-10 PROCEDURE — 84520 ASSAY OF UREA NITROGEN: CPT | Performed by: INTERNAL MEDICINE

## 2017-10-10 RX ORDER — HEPARIN SODIUM (PORCINE) LOCK FLUSH IV SOLN 100 UNIT/ML 100 UNIT/ML
500 SOLUTION INTRAVENOUS AS NEEDED
Status: DISPENSED | OUTPATIENT
Start: 2017-10-10 | End: 2017-10-11

## 2017-10-10 RX ORDER — SODIUM CHLORIDE 0.9 % (FLUSH) 0.9 %
10-40 SYRINGE (ML) INJECTION AS NEEDED
Status: DISCONTINUED | OUTPATIENT
Start: 2017-10-10 | End: 2017-10-14 | Stop reason: HOSPADM

## 2017-10-10 RX ADMIN — Medication 20 ML: at 13:31

## 2017-10-10 RX ADMIN — HEPARIN SODIUM (PORCINE) LOCK FLUSH IV SOLN 100 UNIT/ML 500 UNITS: 100 SOLUTION at 13:31

## 2017-10-10 RX ADMIN — HEPARIN SODIUM (PORCINE) LOCK FLUSH IV SOLN 100 UNIT/ML 500 UNITS: 100 SOLUTION at 13:32

## 2017-10-10 RX ADMIN — SODIUM CHLORIDE 1 G: 900 INJECTION INTRAVENOUS at 12:59

## 2017-10-10 RX ADMIN — Medication 20 ML: at 13:30

## 2017-10-10 RX ADMIN — Medication 20 ML: at 12:58

## 2017-10-10 NOTE — PROGRESS NOTES
[address]  [address], [address]  [place], [facility] [address]  Phone: [phone]  Fax: [phone]  PROGRESS NOTE  Patient: Leda Alvarez                MRN: [id number]       SSN: [id number]  YOB: 1940        AGE: 65 y.o. SEX: male  Body mass index is 25.25 kg/(m^2). PCP: Marika Elias MD  10/10/17    Chief Complaint   Patient presents with    Back Pain     1 month follow up       [facility], RADIOGRAPHS, and PLAN:     HISTORY:  Mr. Will Carty returns today. He is status post his recurrent discitis, psoas abscess treated with extended antibiotic treatment by Dr. Deann Bradley. He is a vasculopath with COPD. He smokes, has A-fib, and is on supplemental oxygen. The patient deports from [facility]. He states he gets morning stiffness and pain. He is doing better. He has been on long-term antibiotics. His last blood work I am seeing from October 3, 2017, demonstrates a white count of 8. His C-reactive protein has now fallen to 1.7. At his last visit, it was at 3.4. His x-rays demonstrate a stable spine, osteopenia, no progressive erosion or evidence of progression of his osteomyelitis discitis. ASSESSMENT/PLAN: I am pleased by his continuing improvement. I would continue him on antibiotics at this point, as long as we continue to have his C-reactive protein keep falling to a more normal level. I will leave the ultimate determination as to the length of treatment to Dr. Shayy Morales of Infectious Disease. cc: Deann Bradley M.D. Marika Elias M.D.       Eloy Slider lumbar xray films upon return      Past Medical History:   Diagnosis Date    Aneurysm [facility])     AAA repair 2006 & 2012    Aorto-iliac duplex 02/13/2015    Tech difficult. Patent aorta bi-iliac endograft w/o leak or limb dysfunction.  Arrhythmia     a fib    Cardiac cath 11/01/2012    RCA (sm, nondom) patent. LM patent. LAD 25%. CX (dom) 45% mid. LVEDP 12 mmHg. No WMA. PA 27/12. W 10-12. CO/CI 5.2/2.6 (TD).  Cardiac echocardiogram, abnormal 02/20/2016    EF 55%. No WMA. Indeterminate diastolic fx. RVSP 60 mmHg. Severe LAE. Mild MR. Mod TR.  IVCE. Similar to study of 10/26/12.  Cardiovascular aorto-iliac duplex 02/13/2015    Patent aorta bi-iliac endovascular graft w/o leak or limb dysfunction. Sac measures 4.21 x 4.47 cm (4.4 x 4.6 cm on 1/31/14).  Cardiovascular LE peripheral arterial testing 07/29/2013    No significant LE arterial disease bilaterally. R GHADA 1. 12.  L GHADA 1. 12.  R DBI 0.83. L DBI 0.71. Exercise deferred.  Cardiovascular renal duplex 10/31/2012    No RA stenosis. Intrinsic/med disease in left kidney. Patent aortic endograft. Patent, thrombus-free renal veins bilaterally.  Carotid duplex 07/29/2013    Mild <50% bilateral ICA plaquing.       Chronic lung disease     Cigarette smoker     Congestive heart failure (CHF) (HCC)     COPD (chronic obstructive pulmonary disease) (HCC)     and emphysema; likely secondary to tobacco abuse    Difficult intubation     Dyslipidemia     low HDL    Emphysema     HTN (hypertension)     Hypercholesterolemia     Increased prostate specific antigen (PSA) velocity     Long term (current) use of anticoagulants     coumadin    Osteoarthritis     Osteomyelitis (HCC)     Paroxysmal atrial fibrillation (HCC)     Peripheral vascular disease ([facility] [address]     AAA repair 12/2007    Persistent atrial Fibrillation     Persistent atrial fibrillation (HCC)     Unspecified adverse effect of anesthesia     difficulty breathing placed in ICU Oct 2012 (AAA repair)       Family History   Problem Relation Age of Onset    Heart Surgery Father      BYPASS    Stroke Father     Heart Surgery Mother      BYPASS    Coronary Artery Disease Mother     Stroke Mother        Current Outpatient Prescriptions   Medication Sig Dispense Refill    digoxin (DIGITEK) 0.125 mg tablet take 1 tablet by mouth once daily 90 Tab 3    warfarin (COUMADIN) 2.5 mg tablet Take 1 Tab by mouth daily. Take 2 pills (5 mg) Mon, Tues, Wed, Thur, Sat, Sun; and 1 pill only  (2.5 mg) Fri 60 Tab 0    docusate sodium (COLACE) 100 mg capsule Take 1 Cap by mouth two (2) times a day for 90 days. 60 Cap 2    HYDROcodone-acetaminophen (NORCO)  mg tablet Take 1 Tab by mouth every four (4) hours as needed. Max Daily Amount: 6 Tabs. 15 Tab 0    polyethylene glycol (MIRALAX) 17 gram packet Take 1 Packet by mouth daily. 30 Packet 0    potassium chloride (KLOR-CON) 20 mEq packet Take 1 Packet by mouth daily. 30 Packet 0    QVAR 80 mcg/actuation aero   0    chlorthalidone (HYGROTEN) 25 mg tablet   0    amLODIPine (NORVASC) 10 mg tablet   0    fluticasone (FLONASE) 50 mcg/actuation nasal spray 2 Sprays by Both Nostrils route daily.  pravastatin (PRAVACHOL) 40 mg tablet   0    aspirin 81 mg chewable tablet Take 1 Tab by mouth daily. 10 Tab 0    albuterol (PROVENTIL HFA, VENTOLIN HFA, PROAIR HFA) 90 mcg/actuation inhaler Take 2 Puffs by inhalation every four (4) hours as needed for Wheezing or Shortness of Breath. 1 Inhaler 5    umeclidinium-vilanterol (ANORO ELLIPTA) 62.5-25 mcg/actuation dsdv Take 1 Puff by inhalation daily. Indications: BRONCHOSPASM PREVENTION WITH COPD 1 Inhaler 5    OXYGEN-AIR DELIVERY SYSTEMS 2 L/min by Does Not Apply route daily. Continuous. Company is [employer]         ketorolac (ACULAR) 0.5 % ophthalmic solution Administer 1 Drop to right eye daily.  albuterol-ipratropium (DUO-NEB) 2.5 mg-0.5 mg/3 ml nebu 3 mL by Nebulization route every six (6) hours as needed.        Facility-Administered Medications Ordered in Other Visits   Medication Dose Route Frequency Provider Last Rate Last Dose    heparin (porcine) 100 unit/mL injection 500 Units  500 Units IntraVENous PRN Alexei Steen MD        sodium chloride (NS) flush 10-40 mL  10-40 mL IntraVENous PRN Alexei Steen MD   40 mL at 10/09/17 1140    sodium chloride (NS) flush 10-40 mL  10-40 mL IntraVENous PRN Madie De La Fuente MD   10 mL at 10/08/17 1124    sodium chloride (NS) flush 10-40 mL  10-40 mL IntraVENous PRN Madie De La Fuente MD   10 mL at 10/07/17 1104       Allergies   Allergen Reactions    Codeine Swelling    Morphine Itching    Sulfa (Sulfonamide Antibiotics) Other (comments)     Kidney problems. Past Surgical History:   Procedure Laterality Date    BRONCHOSCOPY DIAGNOSTIC  11/2/2012         CARDIAC CATHETERIZATION  11/1/2012         COLONOSCOPY N/A 7/26/2016    COLONOSCOPY with Polypectomies performed by Julian Echavarria MD at [address] HX AAA REPAIR      2006 & 2012    HX HEART CATHETERIZATION  3/4/2009    1. RCA small, nondominant; patent. 2. LMCA patent. 3. LAD is long, wrapped around apical vessel. Diffuse, 20-30% stenosis noted. 4. CCA is large, dominant vessel. Diffuse 20-30% stenosis. 5. LVEDP is 16 mmHg. 6. Overall left ventricular systolic function mildly diminshed with est. EF 45%. Mild, global hypokinesis noted. 7. No significant mitral regurgitation or aortic stenosis noted. (see report)    HX HERNIA REPAIR  2/2014    rt inguinal     HX HERNIA REPAIR  01/2016    LEFT INGUINAL HERNIA REPAIR DR. Martinez Moroccan    REPAIR ING HERNIA,5+Y/O,JESSIE Left 1-20-16    Dr. Billy Zambrano  11/1/2012            Past Medical History:   Diagnosis Date    Aneurysm [facility])     AAA repair 2006 & 2012    Aorto-iliac duplex 02/13/2015    Tech difficult. Patent aorta bi-iliac endograft w/o leak or limb dysfunction.  Arrhythmia     a fib    Cardiac cath 11/01/2012    RCA (sm, nondom) patent. LM patent. LAD 25%. CX (dom) 45% mid. LVEDP 12 mmHg. No WMA. PA 27/12. W 10-12. CO/CI 5.2/2.6 (TD).  Cardiac echocardiogram, abnormal 02/20/2016    EF 55%. No WMA. Indeterminate diastolic fx. RVSP 60 mmHg. Severe LAE. Mild MR. Mod TR.  IVCE. Similar to study of 10/26/12.     Cardiovascular aorto-iliac duplex 02/13/2015 Patent aorta bi-iliac endovascular graft w/o leak or limb dysfunction. Sac measures 4.21 x 4.47 cm (4.4 x 4.6 cm on 1/31/14).  Cardiovascular LE peripheral arterial testing 07/29/2013    No significant LE arterial disease bilaterally. R GHADA 1. 12.  L GHADA 1. 12.  R DBI 0.83. L DBI 0.71. Exercise deferred.  Cardiovascular renal duplex 10/31/2012    No RA stenosis. Intrinsic/med disease in left kidney. Patent aortic endograft. Patent, thrombus-free renal veins bilaterally.  Carotid duplex 07/29/2013    Mild <50% bilateral ICA plaquing.  Chronic lung disease     Cigarette smoker     Congestive heart failure (CHF) (HCC)     COPD (chronic obstructive pulmonary disease) (HCC)     and emphysema; likely secondary to tobacco abuse    Difficult intubation     Dyslipidemia     low HDL    Emphysema     HTN (hypertension)     Hypercholesterolemia     Increased prostate specific antigen (PSA) velocity     Long term (current) use of anticoagulants     coumadin    Osteoarthritis     Osteomyelitis (HCC)     Paroxysmal atrial fibrillation (HCC)     Peripheral vascular disease ([address]     AAA repair 12/2007    Persistent atrial Fibrillation     Persistent atrial fibrillation (HCC)     Unspecified adverse effect of anesthesia     difficulty breathing placed in ICU Oct 2012 (AAA repair)       Social History     Social History    Marital status:      Spouse name: N/A    Number of children: N/A    Years of education: N/A     Occupational History    Not on file.      Social History Main Topics    Smoking status: Former Smoker     Packs/day: 1.00     Years: 54.00     Types: Cigarettes     Quit date: 10/23/2012    Smokeless tobacco: Never Used    Alcohol use No      Comment: quit drinking alcohol 26 years ago, patient states stopped in \"1983\"    Drug use: No    Sexual activity: Not on file     Other Topics Concern    Not on file     Social History Narrative         REVIEW OF SYSTEMS:   CONSTITUTIONAL SYMPTOMS:  Negative. EYES:  Negative. EARS, NOSE, THROAT AND MOUTH:  Negative. CARDIOVASCULAR:  Negative. RESPIRATORY:  Negative. GENITOURINARY: Negative. GASTROINTESTINAL:  Negative. INTEGUMENTARY (SKIN AND/OR BREAST):  Negative. MUSCULOSKELETAL: Per HPI.   ENDOCRINE/RHEUMATOLOGIC:  Negative. NEUROLOGICAL:  Per HPI. HEMATOLOGIC/LYMPHATIC:  Negative. ALLERGIC/IMMUNOLOGIC:  Negative. PSYCHIATRIC:  Negative. PHYSICAL EXAMINATION:   Visit Vitals    /65    Pulse (!) 102    Temp 97.8 °F (36.6 °C) (Oral)    Resp 16    Ht 5' 6.5\" (1.689 m)    Wt 158 lb 12.8 oz (72 kg)    SpO2 97%    BMI 25.25 kg/m2    PAIN SCALE: 4/10    CONSTITUTIONAL: The patient is in no apparent distress and is alert and oriented x 3. HEENT: Normocephalic. Hearing grossly intact. NECK: Supple and symmetric. no tenderness, or masses were felt. RESPIRATORY: No labored breathing. CARDIOVASCULAR: The carotid pulses were normal. Peripheral pulses were 2+. CHEST: Normal AP diameter and normal contour without any kyphoscoliosis. LYMPHATIC: No lymphadenopathy was appreciated in the neck, axillae or groin. SKIN: Negative for scars, rashes, lesions, or ulcers on the right upper, right lower, left upper, left lower and trunk. NEUROLOGICAL: Alert and oriented x 3. Ambulation without assistive device. FWB. EXTREMITIES: See musculoskeletal.  MUSCULOSKELETAL:   Head and Neck:  Negative for misalignment, asymmetry, crepitation, defects, tenderness masses or effusions.  Left Upper Extremity: Inspection, percussion and palpation preformed. Vus sign is negative.  Right Upper Extremity: Inspection, percussion and palpation preformed. Vus sign is negative.  Spine, Ribs and Pelvis: Inspection, percussion and palpation preformed. Negative for misalignment, asymmetry, crepitation, defects, tenderness masses or effusions.     Left Lower Extremity: Inspection, percussion and palpation preformed. Negative straight leg raise.  Right Lower Extremity: Inspection, percussion and palpation preformed. Negative straight leg raise. SPINE EXAM:     Lumbar spine: No rash, ecchymosis, or gross obliquity. No focal atrophy is noted. ASSESSMENT    ICD-10-CM ICD-9-CM    1. Psoas abscess ([facility] K68.12 567.31 AMB POC XRAY, SPINE, LUMBOSACRAL; 4+      CBC WITH AUTOMATED DIFF      SED RATE (ESR)      C REACTIVE PROTEIN, QT       Written by Odette Acosta, as dictated by Sudeep Engle MD.    I, Dr. Sudeep Engle MD, confirm that all documentation is accurate.

## 2017-10-10 NOTE — PROGRESS NOTES
[facility] Progress Note    Date: October 10, 2017    Name: Laxmi Bautista    MRN: [id number]         : 1940      Mr. Reis arrived to [facility] at [id number]. Mr. Anna Zarate was assessed and education was provided. Mr. Hilton Bailey vitals were reviewed. Visit Vitals    /74 (BP 1 Location: Left arm, BP Patient Position: Sitting)    Pulse 97    Temp 98.4 °F (36.9 °C)    Resp 18    SpO2 99%     Pt with right upper arm double lumen PICC line. Both lumen flushed without difficulty and positive for blood return. Dressing CDI. No redness, bruising, or swelling noted at site and/ or extremity. Pt denies tenderness. Blood drawn for weekly labs via the RED lumen for CBC, BUN, Dr and CRP. Port flushed with 10 ml NS. Lab results obtained and reviewed. Recent Results (from the past 12 hour(s))   CBC WITH 3 PART DIFF    Collection Time: 10/10/17 12:58 PM   Result Value Ref Range    WBC 6.9 4.5 - 13.0 K/uL    RBC 3.77 (L) 4.10 - 5.10 M/uL    HGB 10.1 (L) 12.0 - 16 g/dL    HCT 32.7 (L) 36 - 48 %    MCV 86.7 78 - 102 FL    MCH 26.8 25.0 - 35.0 PG    MCHC 30.9 (L) 31 - 37 g/dL    RDW 14.8 (H) 11.5 - 14.5 %    PLATELET 829 878 - 164 K/uL    NEUTROPHILS 70 40 - 70 %    MIXED CELLS 9 0.1 - 17 %    LYMPHOCYTES 21 14 - 44 %    ABS. NEUTROPHILS 4.8 1.8 - 9.5 K/UL    ABS. MIXED CELLS 0.6 0.0 - 2.3 K/uL    ABS. LYMPHOCYTES 1.5 1.1 - 5.9 K/UL    DF AUTOMATED       Dressing was changed per hospital protocol. Invanz 1gm IV was administered as ordered via RED lumen followed by 10 ml normal saline flush. Mr. Anna Zarate tolerated well without complaints. Both lumens flushed with 20 mL NS and 500 units of heparin. Cap ends changed and new stockinette was placed over the dressing. Mr. Anna Zarate was discharged from [place] [address] in stable condition at [phone]. He is to return on 10/11/17 at 1100 for his next appointment.     Brooklyn Irwin RN  October 10, 2017

## 2017-10-10 NOTE — MR AVS SNAPSHOT
Visit Information Date & Time Provider Department Dept. Phone Encounter #  
 10/10/2017 10:40 AM Higinio Closs,  [facility], [address] and [facility] [phone] [id number] Follow-up Instructions Return in about 1 month (around 11/10/2017) for with stewart Mauro to add on. Your Appointments 4/3/2018 11:40 AM  
Follow Up with Rima Mathur MD  
[facility] Appt Note: 9-12 month f.up  
 [place] [address] [address] [address] [phone] [address] [facility] [address] [facility] [address] Upcoming Health Maintenance Date Due DTaP/Tdap/Td series (1 - Tdap) 4/7/1960 ZOSTER VACCINE AGE 60> 2/7/1999 GLAUCOMA SCREENING Q2Y 4/7/2004 Pneumococcal 65+ Low/Medium Risk (1 of 2 - PCV13) 4/7/2004 MEDICARE YEARLY EXAM 4/7/2004 INFLUENZA AGE 9 TO ADULT 8/1/2017 Allergies as of 10/10/2017  Review Complete On: 10/10/2017 By: Higinio Closs, MD  
  
 Severity Noted Reaction Type Reactions Codeine  06/14/2011    Swelling Morphine  01/03/2014    Itching Sulfa (Sulfonamide Antibiotics)  06/14/2011    Other (comments) Kidney problems. Current Immunizations  Reviewed on 10/9/2017 Name Date Influenza Vaccine 10/1/2016 Not reviewed this visit You Were Diagnosed With   
  
 Codes Comments [facility]so[facility] ([facility]    -  Primary ICD-10-CM: Q91.51 
ICD-9-CM: 567.31 Vitals BP Pulse Temp Resp Height(growth percentile) Weight(growth percentile) 132/65 (!) 102 97.8 °F (36.6 °C) (Oral) 16 5' 6.5\" (1.689 m) 158 lb 12.8 oz (72 kg) SpO2 BMI Smoking Status 97% 25.25 kg/m2 Former Smoker BMI and BSA Data Body Mass Index Body Surface Area  
 25.25 kg/m 2 1.84 m 2 Preferred Pharmacy Pharmacy Name Phone [address], [address] [facility] Your Updated Medication List  
  
   
This list is accurate as of: 10/10/17 11:29 AM.  Always use your most recent med list.  
  
  
  
  
 albuterol 90 mcg/actuation inhaler Commonly known as:  PROVENTIL HFA, VENTOLIN HFA, PROAIR HFA Take 2 Puffs by inhalation every four (4) hours as needed for Wheezing or Shortness of Breath. albuterol-ipratropium 2.5 mg-0.5 mg/3 ml Nebu Commonly known as:  DUO-NEB  
3 mL by Nebulization route every six (6) hours as needed. amLODIPine 10 mg tablet Commonly known as:  NORVASC  
  
 aspirin 81 mg chewable tablet Take 1 Tab by mouth daily. chlorthalidone 25 mg tablet Commonly known as:  HYGROTEN  
  
 digoxin 0.125 mg tablet Commonly known as:  DIGITEK  
take 1 tablet by mouth once daily  
  
 docusate sodium 100 mg capsule Commonly known as:  Devens Peels Take 1 Cap by mouth two (2) times a day for 90 days. fluticasone 50 mcg/actuation nasal spray Commonly known as:  Enrique Peel 2 Sprays by Both Nostrils route daily. HYDROcodone-acetaminophen  mg tablet Commonly known as:  Yinka Yoon Take 1 Tab by mouth every four (4) hours as needed. Max Daily Amount: 6 Tabs.  
  
 ketorolac 0.5 % ophthalmic solution Commonly known as:  Alberto Barnes Administer 1 Drop to right eye daily. OXYGEN-AIR DELIVERY SYSTEMS  
2 L/min by Does Not Apply route daily. Continuous. Company is [employer]  
  
 polyethylene glycol 17 gram packet Commonly known as:  Ritu Floor Take 1 Packet by mouth daily. potassium chloride 20 mEq packet Commonly known as:  KLOR-CON Take 1 Packet by mouth daily. pravastatin 40 mg tablet Commonly known as:  PRAVACHOL  
  
 QVAR 80 mcg/actuation Aero Generic drug:  beclomethasone  
  
 umeclidinium-vilanterol 62.5-25 mcg/actuation inhaler Commonly known as:  Michail Shayne Take 1 Puff by inhalation daily. Indications: BRONCHOSPASM PREVENTION WITH COPD  
  
 warfarin 2.5 mg tablet Commonly known as:  COUMADIN Take 1 Tab by mouth daily. Take 2 pills (5 mg) Mon, Tues, Wed, Thur, Sat, Sun; and 1 pill only (2.5 mg) Fri We Performed the Following AMB POC XRAY, SPINE, LUMBOSACRAL; 4+ [id number] CPT(R)] Follow-up Instructions Return in about 1 month (around 11/10/2017) for with stewart Rutherford to add on. To-Do List   
 10/10/2017  1:00 PM  
  Appointment with [facility] NURSE 4 at [facility] ([phone]) 10/11/2017 11:00 AM  
  Appointment with [facility] NURSE 3 at [facility] ([phone]) 10/12/2017 10:00 AM  
  Appointment with [facility] NURSE 4 at [facility] ([phone]) 10/13/2017 11:00 AM  
  Appointment with [facility] NURSE 1 at [facility] ([phone]) 10/14/2017 11:00 AM  
  Appointment with [facility] NURSE 1 at [facility] ([phone]) 10/15/2017 9:00 AM  
  Appointment with [facility] IN[facility] NURSE 1 at [facility] ([phone]) 10/16/2017 10:00 AM  
  Appointment with [facility] IN[facility] NURSE 2 at [facility] ([phone]) 10/17/2017 10:00 AM  
  Appointment with [facility] INF[facility] NURSE 2 at [facility] ([phone]) 10/18/2017 10:00 AM  
  Appointment with [facility] INF[facility]ION NURSE 3 at [facility] ([phone])  10/19/2017 10:00 AM  
  Appointment with [facility] IN[facility] NURSE 5 at [facility] ([phone])  
  
 10/20/2017 11:00 AM  
  Appointment with [facility] INF[facility]ION NURSE 3 at [facility] ([phone])  
  
 10/21/2017 11:00 AM  
  Appointment with [facility] IN[facility] NURSE 1 at [facility] ([phone])  
  
 10/22/2017 11:00 AM  
  Appointment with [facility] INF[facility]ION NURSE 1 at [facility] ([phone])  
  
 10/23/2017 10:00 AM  
  Appointment with [facility] INF[facility] NURSE 2 at [facility] ([phone])  
  
 10/24/2017 10:00 AM  
  Appointment with [facility] IN[facility]ION NURSE 5 at [facility] ([phone])  
  
 10/25/2017 10:00 AM  
 Appointment with [facility] IN[facility] NURSE 3 at [facility] ([phone])  
  
 10/26/2017 10:00 AM  
  Appointment with HBV INFUSION NURSE 5 at [facility] ([phone])  
  
 10/27/2017 10:00 AM  
  Appointment with [facility] IN[facility]ION NURSE 1 at [facility] ([phone]) Introducing [facility] SERVICES! Aby Jose introduces [employer] patient portal. Now you can access parts of your medical record, email your doctor's office, and request medication refills online. 1. In your internet browser, go to [email or web address]. [email or web address]/[email or web address] 2. Click on the First Time User? Click Here link in the Sign In box. You will see the New Member Sign Up page. 3. Enter your [employer] Access Code exactly as it appears below. You will not need to use this code after youve completed the sign-up process. If you do not sign up before the expiration date, you must request a new code. · [employer] Access Code: [email or web address] Expires: 12/4/2017  7:36 AM 
 
4. Enter the last four digits of your Social Security Number (xxxx) and Date of Birth (mm/dd/yyyy) as indicated and click Submit. You will be taken to the next sign-up page. 5. Create a [employer] ID. This will be your [employer] login ID and cannot be changed, so think of one that is secure and easy to remember. 6. Create a [employer] password. You can change your password at any time. 7. Enter your Password Reset Question and Answer. This can be used at a later time if you forget your password. 8. Enter your e-mail address. You will receive e-mail notification when new information is available in [address] 9. Click Sign Up. You can now view and download portions of your medical record. 10. Click the Download Summary menu link to download a portable copy of your medical information. If you have questions, please visit the Frequently Asked Questions section of the [employer] website. Remember, [employer] is NOT to be used for urgent needs. For medical emergencies, dial 911. Now available from your iPhone and Android! Please provide this summary of care documentation to your next provider. Your primary care clinician is listed as Oralia Banks. If you have any questions after today's visit, please call [phone].

## 2017-10-11 ENCOUNTER — HOSPITAL ENCOUNTER (OUTPATIENT)
Dept: INFUSION THERAPY | Age: 77
Discharge: HOME | End: 2017-10-11
Payer: MEDICARE

## 2017-10-11 VITALS
HEART RATE: 84 BPM | OXYGEN SATURATION: 100 % | TEMPERATURE: 97.7 F | SYSTOLIC BLOOD PRESSURE: 129 MMHG | DIASTOLIC BLOOD PRESSURE: 77 MMHG | RESPIRATION RATE: 20 BRPM

## 2017-10-11 PROCEDURE — 96365 THER/PROPH/DIAG IV INF INIT: CPT

## 2017-10-11 PROCEDURE — 74011000258 HC RX REV CODE- 258: Performed by: INTERNAL MEDICINE

## 2017-10-11 PROCEDURE — 74011250636 HC RX REV CODE- 250/636: Performed by: INTERNAL MEDICINE

## 2017-10-11 RX ORDER — SODIUM CHLORIDE 0.9 % (FLUSH) 0.9 %
10-40 SYRINGE (ML) INJECTION AS NEEDED
Status: DISCONTINUED | OUTPATIENT
Start: 2017-10-11 | End: 2017-10-15 | Stop reason: HOSPADM

## 2017-10-11 RX ORDER — HEPARIN SODIUM (PORCINE) LOCK FLUSH IV SOLN 100 UNIT/ML 100 UNIT/ML
500 SOLUTION INTRAVENOUS AS NEEDED
Status: DISPENSED | OUTPATIENT
Start: 2017-10-11 | End: 2017-10-12

## 2017-10-11 RX ADMIN — Medication 10 ML: at 10:51

## 2017-10-11 RX ADMIN — Medication 20 ML: at 11:31

## 2017-10-11 RX ADMIN — Medication 10 ML: at 10:50

## 2017-10-11 RX ADMIN — Medication 20 ML: at 11:30

## 2017-10-11 RX ADMIN — HEPARIN SODIUM (PORCINE) LOCK FLUSH IV SOLN 100 UNIT/ML 500 UNITS: 100 SOLUTION at 11:30

## 2017-10-11 RX ADMIN — HEPARIN SODIUM (PORCINE) LOCK FLUSH IV SOLN 100 UNIT/ML 500 UNITS: 100 SOLUTION at 11:31

## 2017-10-11 RX ADMIN — SODIUM CHLORIDE 1 G: 900 INJECTION INTRAVENOUS at 11:00

## 2017-10-11 NOTE — PROGRESS NOTES
[facility] Progress Note    Date: 2017    Name: Sha Pham    MRN: [id number]         : 1940      Mr. Reis [facility] in the [facility] today, at [address] [facility], in stable condition, here for Daily IV Invanz antibiotic therapy. He was assessed and education was provided. Mr. Katy Chambers vitals were reviewed. Visit Vitals    /74 (BP 1 Location: Left arm, BP Patient Position: At rest;Sitting)    Pulse 89    Temp 97.6 °F (36.4 °C)    Resp 24    SpO2 100%             His right upper arm double lumen PICC line was noted to be dry and intact. Invanz (Ertapenem) 1 gram IV, was administered per order, and without incident, via the red lumen of his PICC line. After completion of the IV Invanz, both lumens of his PICC line were flushed very well per protocol, and without incident, and the PICC line was left dry and intact. Mr. Mikael Tejada tolerated well, and had no complaints. Mr. Mikael Tejada was discharged from [place] [address] in stable condition at 1135. Galileo Orellana He is to return on tomorrow, Thursday, 10-12-17,  at 1000,  for his next appointment, for daily IV Invanz antibiotic therapy.      Estiven Trinidad RN  2017  11:12 AM

## 2017-10-12 ENCOUNTER — HOSPITAL ENCOUNTER (OUTPATIENT)
Dept: INFUSION THERAPY | Age: 77
Discharge: HOME | End: 2017-10-12
Payer: MEDICARE

## 2017-10-12 VITALS
TEMPERATURE: 97.6 F | RESPIRATION RATE: 20 BRPM | OXYGEN SATURATION: 95 % | DIASTOLIC BLOOD PRESSURE: 79 MMHG | HEART RATE: 88 BPM | SYSTOLIC BLOOD PRESSURE: 130 MMHG

## 2017-10-12 PROCEDURE — 74011250636 HC RX REV CODE- 250/636: Performed by: INTERNAL MEDICINE

## 2017-10-12 PROCEDURE — 74011000258 HC RX REV CODE- 258: Performed by: INTERNAL MEDICINE

## 2017-10-12 PROCEDURE — 96365 THER/PROPH/DIAG IV INF INIT: CPT

## 2017-10-12 RX ORDER — SODIUM CHLORIDE 0.9 % (FLUSH) 0.9 %
10-40 SYRINGE (ML) INJECTION AS NEEDED
Status: DISCONTINUED | OUTPATIENT
Start: 2017-10-12 | End: 2017-10-16 | Stop reason: HOSPADM

## 2017-10-12 RX ORDER — HEPARIN SODIUM (PORCINE) LOCK FLUSH IV SOLN 100 UNIT/ML 100 UNIT/ML
500 SOLUTION INTRAVENOUS AS NEEDED
Status: ACTIVE | OUTPATIENT
Start: 2017-10-12 | End: 2017-10-13

## 2017-10-12 RX ADMIN — SODIUM CHLORIDE 1 G: 900 INJECTION INTRAVENOUS at 10:25

## 2017-10-12 RX ADMIN — Medication 20 ML: at 10:21

## 2017-10-12 RX ADMIN — Medication 20 ML: at 10:54

## 2017-10-12 RX ADMIN — HEPARIN SODIUM (PORCINE) LOCK FLUSH IV SOLN 100 UNIT/ML 500 UNITS: 100 SOLUTION at 10:54

## 2017-10-12 NOTE — PROGRESS NOTES
[address] [facility] Progress Note    Date: 2017    Name: Myles Boykin    MRN: [id number]         : 1940      Mr. Reis arrived in the [facility] today, at [phone], in stable condition, here for Daily IV Invanz antibiotic therapy. He was assessed and education was provided. Mr. Kody White vitals were reviewed. Visit Vitals    /79 (BP 1 Location: Left arm, BP Patient Position: Sitting)    Pulse 88    Temp 97.6 °F (36.4 °C)    Resp 20    SpO2 95%             His right upper arm double lumen PICC line was noted to be dry and intact. Invanz (Ertapenem) 1 gram IV, was administered per order, and without incident, via the red lumen of his PICC line. After completion of the IV Invanz, red lumen of his PICC line was flushed per protocol, and without incident, and the PICC line was left dry and intact. Mr. Mdeina Fuchs tolerated well, and had no complaints. Mr. Medina Fuchs was discharged from [place] [address] in stable condition at 1100. He is to return 10-13-17,  at 1100,  for his next appointment, for daily IV Invanz antibiotic therapy.      Emmie Shaw RN  2017  1400

## 2017-10-13 ENCOUNTER — HOSPITAL ENCOUNTER (OUTPATIENT)
Dept: INFUSION THERAPY | Age: 77
Discharge: HOME | End: 2017-10-13
Payer: MEDICARE

## 2017-10-13 VITALS
RESPIRATION RATE: 20 BRPM | SYSTOLIC BLOOD PRESSURE: 139 MMHG | TEMPERATURE: 97.5 F | OXYGEN SATURATION: 99 % | DIASTOLIC BLOOD PRESSURE: 72 MMHG | HEART RATE: 92 BPM

## 2017-10-13 PROCEDURE — 96365 THER/PROPH/DIAG IV INF INIT: CPT

## 2017-10-13 PROCEDURE — 74011000258 HC RX REV CODE- 258: Performed by: INTERNAL MEDICINE

## 2017-10-13 PROCEDURE — 74011250636 HC RX REV CODE- 250/636: Performed by: INTERNAL MEDICINE

## 2017-10-13 RX ORDER — SODIUM CHLORIDE 0.9 % (FLUSH) 0.9 %
10-40 SYRINGE (ML) INJECTION AS NEEDED
Status: DISCONTINUED | OUTPATIENT
Start: 2017-10-13 | End: 2017-10-17 | Stop reason: HOSPADM

## 2017-10-13 RX ORDER — HEPARIN SODIUM (PORCINE) LOCK FLUSH IV SOLN 100 UNIT/ML 100 UNIT/ML
500 SOLUTION INTRAVENOUS AS NEEDED
Status: DISPENSED | OUTPATIENT
Start: 2017-10-13 | End: 2017-10-14

## 2017-10-13 RX ADMIN — SODIUM CHLORIDE 1 G: 900 INJECTION INTRAVENOUS at 11:04

## 2017-10-13 RX ADMIN — HEPARIN SODIUM (PORCINE) LOCK FLUSH IV SOLN 100 UNIT/ML 500 UNITS: 100 SOLUTION at 11:38

## 2017-10-13 RX ADMIN — Medication 20 ML: at 11:38

## 2017-10-13 RX ADMIN — Medication 10 ML: at 11:04

## 2017-10-13 NOTE — PROGRESS NOTES
[facility] Progress Note    Date: 2017    Name: Nino Preston    MRN: [id number]         : 1940      Mr. Reis arrived in the [address] [place] [address] today, at 21 , in stable condition, here for Daily IV Invanz antibiotic therapy. He was assessed and education was provided. Mr. Gregoria Ghosh vitals were reviewed. Visit Vitals    /72 (BP 1 Location: Left arm, BP Patient Position: Sitting)    Pulse 92    Temp 97.5 °F (36.4 °C)    Resp 20    SpO2 99%     His right upper arm double lumen PICC line was noted to be dry and intact. Invanz (Ertapenem) 1 gram IV, was administered per order, and without incident, via the purple lumen of his PICC line. After completion of the IV Invanz,purple lumen of his PICC line was flushed per protocol, and without incident, and the PICC line was left dry and intact. Mr. Francis Edmond tolerated well, and had no complaints. Mr. Francis Edmond was discharged from [place] [address] in stable condition at 1140. He is to return 10-14-17,  at 1000,  for his next appointment, for daily IV Invanz antibiotic therapy.      Miller Casey RN  2017

## 2017-10-14 ENCOUNTER — HOSPITAL ENCOUNTER (OUTPATIENT)
Dept: INFUSION THERAPY | Age: 77
Discharge: HOME | End: 2017-10-14
Payer: MEDICARE

## 2017-10-14 VITALS
HEART RATE: 55 BPM | DIASTOLIC BLOOD PRESSURE: 73 MMHG | OXYGEN SATURATION: 99 % | TEMPERATURE: 97.9 F | SYSTOLIC BLOOD PRESSURE: 126 MMHG | RESPIRATION RATE: 20 BRPM

## 2017-10-14 PROCEDURE — 74011250636 HC RX REV CODE- 250/636

## 2017-10-14 PROCEDURE — 74011250636 HC RX REV CODE- 250/636: Performed by: INTERNAL MEDICINE

## 2017-10-14 PROCEDURE — 74011000258 HC RX REV CODE- 258: Performed by: INTERNAL MEDICINE

## 2017-10-14 PROCEDURE — 96365 THER/PROPH/DIAG IV INF INIT: CPT

## 2017-10-14 RX ORDER — SODIUM CHLORIDE 0.9 % (FLUSH) 0.9 %
10-40 SYRINGE (ML) INJECTION AS NEEDED
Status: DISCONTINUED | OUTPATIENT
Start: 2017-10-14 | End: 2017-10-18 | Stop reason: HOSPADM

## 2017-10-14 RX ORDER — HEPARIN SODIUM (PORCINE) LOCK FLUSH IV SOLN 100 UNIT/ML 100 UNIT/ML
SOLUTION INTRAVENOUS
Status: COMPLETED
Start: 2017-10-14 | End: 2017-10-14

## 2017-10-14 RX ORDER — HEPARIN SODIUM (PORCINE) LOCK FLUSH IV SOLN 100 UNIT/ML 100 UNIT/ML
500 SOLUTION INTRAVENOUS AS NEEDED
Status: ACTIVE | OUTPATIENT
Start: 2017-10-14 | End: 2017-10-15

## 2017-10-14 RX ADMIN — Medication 20 ML: at 11:30

## 2017-10-14 RX ADMIN — HEPARIN SODIUM (PORCINE) LOCK FLUSH IV SOLN 100 UNIT/ML 500 UNITS: 100 SOLUTION at 11:30

## 2017-10-14 RX ADMIN — HEPARIN SODIUM (PORCINE) LOCK FLUSH IV SOLN 100 UNIT/ML 500 UNITS: 100 SOLUTION at 11:31

## 2017-10-14 RX ADMIN — SODIUM CHLORIDE 1 G: 900 INJECTION INTRAVENOUS at 10:55

## 2017-10-14 RX ADMIN — Medication 10 ML: at 10:46

## 2017-10-14 RX ADMIN — Medication 20 ML: at 11:31

## 2017-10-14 RX ADMIN — Medication 10 ML: at 10:45

## 2017-10-14 NOTE — PROGRESS NOTES
[facility] Progress Note    Date: 2017    Name: Isadora Mora    MRN: [id number]         : 1940      Mr. Reis arrived in the [facility] today, at [phone], in stable condition, here for Daily IV Invanz antibiotic therapy. He was assessed and education was provided. Mr. Yelena Worley vitals were reviewed. Visit Vitals    /79 (BP 1 Location: Left arm, BP Patient Position: At rest;Sitting)    Pulse 73    Temp 98.7 °F (37.1 °C)    Resp 20    SpO2 99%                 His right upper arm double lumen PICC line was noted to be dry and intact.         Invanz (Ertapenem) 1 gram IV, was administered per order, and without incident, via the red lumen of his PICC line.         After completion of the IV Invanz, both lumens of his PICC line were flushed very well per protocol, and without incident, and the PICC line was left dry and intact. Mr. Jessica Palacios tolerated well, and had no complaints. Mr. Jessica Palacios was discharged from [place] [address] in stable condition at 1135. [facility] He is to return on tomorrow, , 10-15-17, at 0900, for his next appointment, for his next dose of IV Invanz antibiotic therapy.      Kourtney De León RN  2017  11:08 AM

## 2017-10-15 ENCOUNTER — HOSPITAL ENCOUNTER (OUTPATIENT)
Dept: INFUSION THERAPY | Age: 77
Discharge: HOME | End: 2017-10-15
Payer: MEDICARE

## 2017-10-15 VITALS
RESPIRATION RATE: 20 BRPM | DIASTOLIC BLOOD PRESSURE: 70 MMHG | TEMPERATURE: 98 F | HEART RATE: 83 BPM | OXYGEN SATURATION: 98 % | SYSTOLIC BLOOD PRESSURE: 122 MMHG

## 2017-10-15 PROCEDURE — 74011250636 HC RX REV CODE- 250/636: Performed by: INTERNAL MEDICINE

## 2017-10-15 PROCEDURE — 74011000258 HC RX REV CODE- 258: Performed by: INTERNAL MEDICINE

## 2017-10-15 PROCEDURE — 96365 THER/PROPH/DIAG IV INF INIT: CPT

## 2017-10-15 PROCEDURE — 74011250636 HC RX REV CODE- 250/636

## 2017-10-15 RX ORDER — SODIUM CHLORIDE 0.9 % (FLUSH) 0.9 %
10-40 SYRINGE (ML) INJECTION AS NEEDED
Status: DISCONTINUED | OUTPATIENT
Start: 2017-10-15 | End: 2017-10-19 | Stop reason: HOSPADM

## 2017-10-15 RX ORDER — HEPARIN SODIUM (PORCINE) LOCK FLUSH IV SOLN 100 UNIT/ML 100 UNIT/ML
500 SOLUTION INTRAVENOUS AS NEEDED
Status: ACTIVE | OUTPATIENT
Start: 2017-10-15 | End: 2017-10-16

## 2017-10-15 RX ORDER — HEPARIN SODIUM (PORCINE) LOCK FLUSH IV SOLN 100 UNIT/ML 100 UNIT/ML
SOLUTION INTRAVENOUS
Status: COMPLETED
Start: 2017-10-15 | End: 2017-10-15

## 2017-10-15 RX ADMIN — HEPARIN SODIUM (PORCINE) LOCK FLUSH IV SOLN 100 UNIT/ML 500 UNITS: 100 SOLUTION at 10:00

## 2017-10-15 RX ADMIN — SODIUM CHLORIDE 1 G: 900 INJECTION INTRAVENOUS at 09:15

## 2017-10-15 RX ADMIN — Medication 10 ML: at 09:06

## 2017-10-15 RX ADMIN — Medication 20 ML: at 10:00

## 2017-10-15 RX ADMIN — Medication 20 ML: at 10:01

## 2017-10-15 RX ADMIN — HEPARIN SODIUM (PORCINE) LOCK FLUSH IV SOLN 100 UNIT/ML 500 UNITS: 100 SOLUTION at 10:01

## 2017-10-15 RX ADMIN — Medication 10 ML: at 09:05

## 2017-10-15 NOTE — PROGRESS NOTES
[facility] Progress Note    Date: October 15, 2017    Name: Tawny Renae    MRN: [id number]         : 1940      Mr. Reis arrived in the [facility] today, at 0900, in stable condition, here for Daily IV Invanz antibiotic therapy. He was assessed and education was provided. Mr. Jas Arrington vitals were reviewed. Visit Vitals    /73 (BP 1 Location: Left arm, BP Patient Position: At rest;Sitting)    Pulse 84    Temp 98.1 °F (36.7 °C)    Resp 20    SpO2 98%               His right upper arm double lumen PICC line was noted to be dry and intact.           Invanz (Ertapenem) 1 gram IV, was administered per order, and without incident, via the red lumen of his PICC line.           After completion of the IV Invanz, both lumens of his PICC line were flushed very well per protocol, and without incident, and the PICC line was left dry and intact. Mr. Juancarlos Fernandez tolerated well, and had no complaints. Mr. Juancarlos Fernandez was discharged from [place] [address] in stable condition at 1005. Nahun Washington He is to return on tomorrow, Monday, 10-16-17,  at 1000,  for his next appointment, for daily IV Invanz antibiotic therapy.      Susan Waggoner RN  October 15, 2017  9:22 AM

## 2017-10-16 ENCOUNTER — HOSPITAL ENCOUNTER (OUTPATIENT)
Dept: INFUSION THERAPY | Age: 77
Discharge: HOME | End: 2017-10-16
Payer: MEDICARE

## 2017-10-16 VITALS
DIASTOLIC BLOOD PRESSURE: 73 MMHG | TEMPERATURE: 98.7 F | HEART RATE: 97 BPM | RESPIRATION RATE: 20 BRPM | OXYGEN SATURATION: 97 % | SYSTOLIC BLOOD PRESSURE: 125 MMHG

## 2017-10-16 PROCEDURE — 74011250636 HC RX REV CODE- 250/636: Performed by: INTERNAL MEDICINE

## 2017-10-16 PROCEDURE — 74011000258 HC RX REV CODE- 258: Performed by: INTERNAL MEDICINE

## 2017-10-16 PROCEDURE — 96365 THER/PROPH/DIAG IV INF INIT: CPT

## 2017-10-16 RX ORDER — SODIUM CHLORIDE 0.9 % (FLUSH) 0.9 %
10-40 SYRINGE (ML) INJECTION AS NEEDED
Status: DISCONTINUED | OUTPATIENT
Start: 2017-10-16 | End: 2017-10-20 | Stop reason: HOSPADM

## 2017-10-16 RX ORDER — HEPARIN SODIUM (PORCINE) LOCK FLUSH IV SOLN 100 UNIT/ML 100 UNIT/ML
500 SOLUTION INTRAVENOUS AS NEEDED
Status: DISPENSED | OUTPATIENT
Start: 2017-10-16 | End: 2017-10-17

## 2017-10-16 RX ADMIN — SODIUM CHLORIDE 1 G: 900 INJECTION INTRAVENOUS at 10:15

## 2017-10-16 RX ADMIN — Medication 10 ML: at 10:45

## 2017-10-16 RX ADMIN — Medication 10 ML: at 10:13

## 2017-10-16 RX ADMIN — HEPARIN SODIUM (PORCINE) LOCK FLUSH IV SOLN 100 UNIT/ML 500 UNITS: 100 SOLUTION at 10:45

## 2017-10-16 NOTE — PROGRESS NOTES
[facility] Progress Note    Date: 2017    Name: Nino Preston    MRN: [id number]         : 1940      Mr. Reis arrived to [facility] at [phone]. Mr. Francis Edmond was assessed and education was provided. Mr. Gregoria Ghosh vitals were reviewed. Visit Vitals    /73 (BP 1 Location: Left arm, BP Patient Position: Sitting)    Pulse 97    Temp 98.7 °F (37.1 °C)    Resp 20    SpO2 97%     Pt with right upper arm double lumen PICC line. Dressing is CDI. Purple lumen flushed easily and positive for blood return. Invanz 1gm IV was administered as ordered via purple lumen followed by 10 ml normal saline flush. Mr. Francis Edmond tolerated well without complaints. Pruple lumen flushed with 10 mL NS and 500 units of heparin. Mr. Francis Edmond was discharged from [place] [address] in stable condition at 1050. He is to return on 10/17/17 at 1000 for his next appointment.     Zeus Rutledge RN  2017

## 2017-10-17 ENCOUNTER — HOSPITAL ENCOUNTER (OUTPATIENT)
Dept: INFUSION THERAPY | Age: 77
Discharge: HOME | End: 2017-10-17
Payer: MEDICARE

## 2017-10-17 VITALS
TEMPERATURE: 98.1 F | RESPIRATION RATE: 18 BRPM | SYSTOLIC BLOOD PRESSURE: 144 MMHG | HEART RATE: 91 BPM | DIASTOLIC BLOOD PRESSURE: 76 MMHG | OXYGEN SATURATION: 100 %

## 2017-10-17 LAB
BASO+EOS+MONOS # BLD AUTO: 0.7 K/UL (ref 0–2.3)
BASO+EOS+MONOS # BLD AUTO: 10 % (ref 0.1–17)
BUN SERPL-MCNC: 12 MG/DL (ref 7–18)
CREAT SERPL-MCNC: 0.8 MG/DL (ref 0.6–1.3)
CRP SERPL-MCNC: 2.6 MG/DL (ref 0–0.3)
DIFFERENTIAL METHOD BLD: ABNORMAL
ERYTHROCYTE [DISTWIDTH] IN BLOOD BY AUTOMATED COUNT: 15.4 % (ref 11.5–14.5)
HCT VFR BLD AUTO: 32.3 % (ref 36–48)
HGB BLD-MCNC: 10 G/DL (ref 12–16)
LYMPHOCYTES # BLD: 2.1 K/UL (ref 1.1–5.9)
LYMPHOCYTES NFR BLD: 28 % (ref 14–44)
MCH RBC QN AUTO: 27 PG (ref 25–35)
MCHC RBC AUTO-ENTMCNC: 31 G/DL (ref 31–37)
MCV RBC AUTO: 87.3 FL (ref 78–102)
NEUTS SEG # BLD: 4.7 K/UL (ref 1.8–9.5)
NEUTS SEG NFR BLD: 63 % (ref 40–70)
PLATELET # BLD AUTO: 301 K/UL (ref 140–440)
RBC # BLD AUTO: 3.7 M/UL (ref 4.1–5.1)
WBC # BLD AUTO: 7.5 K/UL (ref 4.5–13)

## 2017-10-17 PROCEDURE — 36591 DRAW BLOOD OFF VENOUS DEVICE: CPT

## 2017-10-17 PROCEDURE — 85025 COMPLETE CBC W/AUTO DIFF WBC: CPT | Performed by: INTERNAL MEDICINE

## 2017-10-17 PROCEDURE — 77030020847 HC STATLOK BARD -A

## 2017-10-17 PROCEDURE — 74011250636 HC RX REV CODE- 250/636: Performed by: INTERNAL MEDICINE

## 2017-10-17 PROCEDURE — 74011000258 HC RX REV CODE- 258: Performed by: INTERNAL MEDICINE

## 2017-10-17 PROCEDURE — 84520 ASSAY OF UREA NITROGEN: CPT | Performed by: INTERNAL MEDICINE

## 2017-10-17 PROCEDURE — 96365 THER/PROPH/DIAG IV INF INIT: CPT

## 2017-10-17 PROCEDURE — 86140 C-REACTIVE PROTEIN: CPT | Performed by: INTERNAL MEDICINE

## 2017-10-17 PROCEDURE — 82565 ASSAY OF CREATININE: CPT | Performed by: INTERNAL MEDICINE

## 2017-10-17 PROCEDURE — 74011250636 HC RX REV CODE- 250/636

## 2017-10-17 RX ORDER — SODIUM CHLORIDE 0.9 % (FLUSH) 0.9 %
10-40 SYRINGE (ML) INJECTION AS NEEDED
Status: DISCONTINUED | OUTPATIENT
Start: 2017-10-17 | End: 2017-10-21 | Stop reason: HOSPADM

## 2017-10-17 RX ORDER — HEPARIN SODIUM (PORCINE) LOCK FLUSH IV SOLN 100 UNIT/ML 100 UNIT/ML
500 SOLUTION INTRAVENOUS AS NEEDED
Status: ACTIVE | OUTPATIENT
Start: 2017-10-17 | End: 2017-10-18

## 2017-10-17 RX ORDER — HEPARIN SODIUM (PORCINE) LOCK FLUSH IV SOLN 100 UNIT/ML 100 UNIT/ML
SOLUTION INTRAVENOUS
Status: COMPLETED
Start: 2017-10-17 | End: 2017-10-17

## 2017-10-17 RX ADMIN — HEPARIN SODIUM (PORCINE) LOCK FLUSH IV SOLN 100 UNIT/ML 500 UNITS: 100 SOLUTION at 10:37

## 2017-10-17 RX ADMIN — Medication 10 ML: at 10:38

## 2017-10-17 RX ADMIN — SODIUM CHLORIDE 1 G: 900 INJECTION INTRAVENOUS at 10:04

## 2017-10-17 RX ADMIN — HEPARIN SODIUM (PORCINE) LOCK FLUSH IV SOLN 100 UNIT/ML 500 UNITS: 100 SOLUTION at 10:39

## 2017-10-17 RX ADMIN — Medication 10 ML: at 10:00

## 2017-10-17 RX ADMIN — Medication 30 ML: at 10:36

## 2017-10-17 NOTE — PROGRESS NOTES
[facility] Progress Note    Date: 2017    Name: Luisa Dacosta    MRN: [id number]         : 1940      Mr. Reis arrived to [facility] at 3086. Mr. Viki Washburn was assessed and education was provided. No acute distress noted. Positioned for comfort. Back pain 4/10. Took pain meds prior opic admit    Mr. Maryann aHnson vitals were reviewed. Visit Vitals    /76 (BP 1 Location: Left arm, BP Patient Position: At rest)    Pulse 91    Temp 98.1 °F (36.7 °C)    Resp 18    SpO2 100%       Pt with right upper arm double lumen PICC line. Drsg to site d/i. No redness or swelling noted. Both lumens flushes without difficulty and positive for blood return. Blood drawn off for CBC, BUN, CR, and CRP from red lumen        CBC WITH 3 PART DIFF    Collection Time: 10/17/17 10:40 AM   Result Value Ref Range    WBC 7.5 4.5 - 13.0 K/uL    RBC 3.70 (L) 4.10 - 5.10 M/uL    HGB 10.0 (L) 12.0 - 16 g/dL    HCT 32.3 (L) 36 - 48 %    MCV 87.3 78 - 102 FL    MCH 27.0 25.0 - 35.0 PG    MCHC 31.0 31 - 37 g/dL    RDW 15.4 (H) 11.5 - 14.5 %    PLATELET 850 384 - 357 K/uL    NEUTROPHILS 63 40 - 70 %    MIXED CELLS 10 0.1 - 17 %    LYMPHOCYTES 28 14 - 44 %    ABS. NEUTROPHILS 4.7 1.8 - 9.5 K/UL    ABS. MIXED CELLS 0.7 0.0 - 2.3 K/uL    ABS. LYMPHOCYTES 2.1 1.1 - 5.9 K/UL    DF AUTOMATED         Rest of labs sent out for testing    Invanz 1gm IV was administered over 30 minutes as ordered via red lumen followed by normal saline flush. Brisk blood returns noted    Dressing changed per protocol. Cap ends changed and lumens flushed with 500 units of heparin to each lumen    picc line site s signs of infection or infiltration. Biopatch and stat lock used with drsg change. LUMENS WRAPPED WITH COBAN AND TUBULAR NET DRSG APPLIED. Mr. Viki Washburn tolerated well without complaints       Mr. Viki Washburn was discharged from [place] [address] in stable condition at 1045. He is to return on 10/18/17 at 1000 am for his next appointment.     Renata Ramos PRAVEEN Mcghee  October 17, 2017

## 2017-10-18 ENCOUNTER — HOSPITAL ENCOUNTER (OUTPATIENT)
Dept: INFUSION THERAPY | Age: 77
Discharge: HOME | End: 2017-10-18
Payer: MEDICARE

## 2017-10-18 VITALS
HEART RATE: 87 BPM | TEMPERATURE: 98.8 F | RESPIRATION RATE: 18 BRPM | OXYGEN SATURATION: 98 % | SYSTOLIC BLOOD PRESSURE: 124 MMHG | DIASTOLIC BLOOD PRESSURE: 71 MMHG

## 2017-10-18 PROCEDURE — 74011250636 HC RX REV CODE- 250/636

## 2017-10-18 PROCEDURE — 96365 THER/PROPH/DIAG IV INF INIT: CPT

## 2017-10-18 PROCEDURE — 74011000258 HC RX REV CODE- 258: Performed by: INTERNAL MEDICINE

## 2017-10-18 PROCEDURE — 74011250636 HC RX REV CODE- 250/636: Performed by: INTERNAL MEDICINE

## 2017-10-18 RX ORDER — SODIUM CHLORIDE 0.9 % (FLUSH) 0.9 %
10-40 SYRINGE (ML) INJECTION AS NEEDED
Status: DISCONTINUED | OUTPATIENT
Start: 2017-10-18 | End: 2017-10-22 | Stop reason: HOSPADM

## 2017-10-18 RX ORDER — HEPARIN SODIUM (PORCINE) LOCK FLUSH IV SOLN 100 UNIT/ML 100 UNIT/ML
SOLUTION INTRAVENOUS
Status: COMPLETED
Start: 2017-10-18 | End: 2017-10-18

## 2017-10-18 RX ORDER — HEPARIN SODIUM (PORCINE) LOCK FLUSH IV SOLN 100 UNIT/ML 100 UNIT/ML
500 SOLUTION INTRAVENOUS AS NEEDED
Status: DISCONTINUED | OUTPATIENT
Start: 2017-10-18 | End: 2017-10-22 | Stop reason: HOSPADM

## 2017-10-18 RX ADMIN — SODIUM CHLORIDE 1 G: 900 INJECTION INTRAVENOUS at 10:12

## 2017-10-18 RX ADMIN — HEPARIN SODIUM (PORCINE) LOCK FLUSH IV SOLN 100 UNIT/ML 500 UNITS: 100 SOLUTION at 10:41

## 2017-10-18 RX ADMIN — Medication 10 ML: at 10:10

## 2017-10-18 RX ADMIN — Medication 20 ML: at 10:41

## 2017-10-18 NOTE — PROGRESS NOTES
[facility] Progress Note    Date: 2017    Name: Uriel Goss    MRN: [id number]         : 1940      Mr. Reis arrived to [facility] at 1. No complaints or concerns voiced    Mr. Reis was assessed and education was provided. Mr. Lisa Fay vitals were reviewed. Visit Vitals    /71 (BP 1 Location: Left arm, BP Patient Position: Sitting)    Pulse 87    Temp 98.8 °F (37.1 °C)    Resp 18    SpO2 98%       Pt with right upper arm double lumen PICC line. Both lumens flushes without difficulty and positive for blood return. Site with dry drsg and without redness, tenderness or swelling. Invanz 1gm IV was administered over 30 minutes as ordered via purple lumen followed by normal saline flush. Brisk blood returns noted      Picc line remains intact with dry drsg at site. No redness or swelling noted. Lumens wrapped with coban and tubular net drsg applied        Mr. Reis tolerated well without complaints       Mr. David Bains was discharged from [place] [address] in stable condition at 1045. He is to return on 10/19/17 at 2 pm for his next appointment.     Dewey Leiva RN  2017

## 2017-10-19 ENCOUNTER — HOSPITAL ENCOUNTER (OUTPATIENT)
Dept: INFUSION THERAPY | Age: 77
Discharge: HOME | End: 2017-10-19
Payer: MEDICARE

## 2017-10-19 ENCOUNTER — APPOINTMENT (OUTPATIENT)
Dept: INFUSION THERAPY | Age: 77
End: 2017-10-19
Payer: MEDICARE

## 2017-10-19 VITALS
OXYGEN SATURATION: 97 % | RESPIRATION RATE: 18 BRPM | HEART RATE: 116 BPM | TEMPERATURE: 97.8 F | SYSTOLIC BLOOD PRESSURE: 148 MMHG | DIASTOLIC BLOOD PRESSURE: 80 MMHG

## 2017-10-19 PROCEDURE — 74011000258 HC RX REV CODE- 258: Performed by: INTERNAL MEDICINE

## 2017-10-19 PROCEDURE — 74011250636 HC RX REV CODE- 250/636: Performed by: INTERNAL MEDICINE

## 2017-10-19 PROCEDURE — 96365 THER/PROPH/DIAG IV INF INIT: CPT

## 2017-10-19 RX ORDER — SODIUM CHLORIDE 0.9 % (FLUSH) 0.9 %
10-40 SYRINGE (ML) INJECTION AS NEEDED
Status: DISCONTINUED | OUTPATIENT
Start: 2017-10-19 | End: 2017-10-23 | Stop reason: HOSPADM

## 2017-10-19 RX ORDER — HEPARIN SODIUM (PORCINE) LOCK FLUSH IV SOLN 100 UNIT/ML 100 UNIT/ML
500 SOLUTION INTRAVENOUS AS NEEDED
Status: DISPENSED | OUTPATIENT
Start: 2017-10-19 | End: 2017-10-20

## 2017-10-19 RX ADMIN — Medication 20 ML: at 14:32

## 2017-10-19 RX ADMIN — HEPARIN SODIUM (PORCINE) LOCK FLUSH IV SOLN 100 UNIT/ML 500 UNITS: 100 SOLUTION at 14:32

## 2017-10-19 RX ADMIN — SODIUM CHLORIDE 1 G: 900 INJECTION INTRAVENOUS at 14:00

## 2017-10-19 RX ADMIN — Medication 10 ML: at 14:00

## 2017-10-19 NOTE — PROGRESS NOTES
[facility] Progress Note    Date: 2017    Name: Ezio Toussaint    MRN: [id number]         : 1940      Mr. Reis arrived to [facility] at 1400. Mr. Jerry Armstrong was assessed and education was provided. Mr. Frederick Wagner vitals were reviewed. Visit Vitals    /80 (BP 1 Location: Left arm, BP Patient Position: Post activity)    Pulse (!) 116    Temp 97.8 °F (36.6 °C)    Resp 18    SpO2 97%     Pt with right upper arm double lumen PICC line. Dressing is CDI. Both lumens flushed easily and positive for blood return. Invanz 1gm IV was administered as ordered via red lumen followed by 10 ml normal saline flush. Mr. Jerry Armstrong tolerated well without complaints. Both lumens flushed with 10 mL NS and 250 units of heparin. Mr. Jerry Armstrong was discharged from [place] [address] in stable condition at 1440. He is to return on 10/20/17 at 1100 for his next appointment.     Geovanni Cho RN  2017

## 2017-10-20 ENCOUNTER — HOSPITAL ENCOUNTER (OUTPATIENT)
Dept: INFUSION THERAPY | Age: 77
Discharge: HOME | End: 2017-10-20
Payer: MEDICARE

## 2017-10-20 VITALS
TEMPERATURE: 98 F | OXYGEN SATURATION: 99 % | DIASTOLIC BLOOD PRESSURE: 68 MMHG | RESPIRATION RATE: 22 BRPM | SYSTOLIC BLOOD PRESSURE: 132 MMHG | HEART RATE: 88 BPM

## 2017-10-20 PROCEDURE — 74011000258 HC RX REV CODE- 258: Performed by: INTERNAL MEDICINE

## 2017-10-20 PROCEDURE — 74011250636 HC RX REV CODE- 250/636: Performed by: INTERNAL MEDICINE

## 2017-10-20 PROCEDURE — 96365 THER/PROPH/DIAG IV INF INIT: CPT

## 2017-10-20 RX ORDER — HEPARIN SODIUM (PORCINE) LOCK FLUSH IV SOLN 100 UNIT/ML 100 UNIT/ML
500 SOLUTION INTRAVENOUS AS NEEDED
Status: ACTIVE | OUTPATIENT
Start: 2017-10-20 | End: 2017-10-21

## 2017-10-20 RX ORDER — SODIUM CHLORIDE 0.9 % (FLUSH) 0.9 %
10-40 SYRINGE (ML) INJECTION AS NEEDED
Status: DISCONTINUED | OUTPATIENT
Start: 2017-10-20 | End: 2017-10-24 | Stop reason: HOSPADM

## 2017-10-20 RX ADMIN — Medication 10 ML: at 11:51

## 2017-10-20 RX ADMIN — SODIUM CHLORIDE 1 G: 900 INJECTION INTRAVENOUS at 11:15

## 2017-10-20 RX ADMIN — HEPARIN SODIUM (PORCINE) LOCK FLUSH IV SOLN 100 UNIT/ML 500 UNITS: 100 SOLUTION at 11:52

## 2017-10-20 RX ADMIN — Medication 20 ML: at 11:15

## 2017-10-21 ENCOUNTER — HOSPITAL ENCOUNTER (OUTPATIENT)
Dept: INFUSION THERAPY | Age: 77
Discharge: HOME | End: 2017-10-21
Payer: MEDICARE

## 2017-10-21 VITALS
HEART RATE: 89 BPM | TEMPERATURE: 97.4 F | RESPIRATION RATE: 20 BRPM | DIASTOLIC BLOOD PRESSURE: 87 MMHG | SYSTOLIC BLOOD PRESSURE: 138 MMHG

## 2017-10-21 PROCEDURE — 74011000258 HC RX REV CODE- 258: Performed by: INTERNAL MEDICINE

## 2017-10-21 PROCEDURE — 96365 THER/PROPH/DIAG IV INF INIT: CPT

## 2017-10-21 PROCEDURE — 74011250636 HC RX REV CODE- 250/636

## 2017-10-21 PROCEDURE — 74011250636 HC RX REV CODE- 250/636: Performed by: INTERNAL MEDICINE

## 2017-10-21 RX ORDER — SODIUM CHLORIDE 0.9 % (FLUSH) 0.9 %
10-40 SYRINGE (ML) INJECTION AS NEEDED
Status: DISCONTINUED | OUTPATIENT
Start: 2017-10-21 | End: 2017-10-25 | Stop reason: HOSPADM

## 2017-10-21 RX ORDER — HEPARIN SODIUM (PORCINE) LOCK FLUSH IV SOLN 100 UNIT/ML 100 UNIT/ML
SOLUTION INTRAVENOUS
Status: COMPLETED
Start: 2017-10-21 | End: 2017-10-21

## 2017-10-21 RX ORDER — HEPARIN SODIUM (PORCINE) LOCK FLUSH IV SOLN 100 UNIT/ML 100 UNIT/ML
500 SOLUTION INTRAVENOUS AS NEEDED
Status: ACTIVE | OUTPATIENT
Start: 2017-10-21 | End: 2017-10-22

## 2017-10-21 RX ADMIN — HEPARIN SODIUM (PORCINE) LOCK FLUSH IV SOLN 100 UNIT/ML 1000 UNITS: 100 SOLUTION at 10:12

## 2017-10-21 RX ADMIN — Medication 20 ML: at 09:38

## 2017-10-21 RX ADMIN — Medication 10 ML: at 10:13

## 2017-10-21 RX ADMIN — SODIUM CHLORIDE 1 G: 9 INJECTION, SOLUTION INTRAVENOUS at 09:39

## 2017-10-21 NOTE — PROGRESS NOTES
[facility] Progress Note    Date: 2017    Name: Leah Mercado    MRN: [id number]         : 1940      Mr. Reis arrived to [facility] at 2968. Mr. Hallie Quinones was assessed and education was provided. Mr. Leodan Rebolledo vitals were reviewed. Visit Vitals    /87 (BP 1 Location: Left arm, BP Patient Position: At rest;Sitting)    Pulse 89    Temp 97.4 °F (36.3 °C)    Resp 20       Pt with right upper arm double lumen PICC line. Each lumen flushes without difficulty and positive for blood return. Dressing CDI. No redness, bruising, or swelling noted at site and/ or extremity. Pt denies tenderness. Invanz 1gm IV was administered as ordered via purple lumen followed by normal saline flush. Mr. Hallie Quinones tolerated well without complaints. Flushed each lumen of pt's PICC line with heparin per order. Wrapped lumens w/ coban. Mr. Hallie Quinones was discharged from [place] [address] in stable condition at 1015. He is to return on 10/22/17 at 0930 for his next appointment.     Tian Max RN  2017  1057

## 2017-10-22 ENCOUNTER — HOSPITAL ENCOUNTER (OUTPATIENT)
Dept: INFUSION THERAPY | Age: 77
Discharge: HOME | End: 2017-10-22
Payer: MEDICARE

## 2017-10-22 VITALS
TEMPERATURE: 97.5 F | SYSTOLIC BLOOD PRESSURE: 150 MMHG | HEART RATE: 85 BPM | RESPIRATION RATE: 20 BRPM | DIASTOLIC BLOOD PRESSURE: 87 MMHG

## 2017-10-22 PROCEDURE — 96365 THER/PROPH/DIAG IV INF INIT: CPT

## 2017-10-22 PROCEDURE — 74011250636 HC RX REV CODE- 250/636: Performed by: INTERNAL MEDICINE

## 2017-10-22 PROCEDURE — 74011000258 HC RX REV CODE- 258: Performed by: INTERNAL MEDICINE

## 2017-10-22 PROCEDURE — 74011250636 HC RX REV CODE- 250/636

## 2017-10-22 RX ORDER — HEPARIN SODIUM (PORCINE) LOCK FLUSH IV SOLN 100 UNIT/ML 100 UNIT/ML
500 SOLUTION INTRAVENOUS AS NEEDED
Status: ACTIVE | OUTPATIENT
Start: 2017-10-22 | End: 2017-10-23

## 2017-10-22 RX ORDER — SODIUM CHLORIDE 0.9 % (FLUSH) 0.9 %
10-40 SYRINGE (ML) INJECTION AS NEEDED
Status: DISCONTINUED | OUTPATIENT
Start: 2017-10-22 | End: 2017-10-25 | Stop reason: ALTCHOICE

## 2017-10-22 RX ORDER — HEPARIN SODIUM (PORCINE) LOCK FLUSH IV SOLN 100 UNIT/ML 100 UNIT/ML
SOLUTION INTRAVENOUS
Status: COMPLETED
Start: 2017-10-22 | End: 2017-10-22

## 2017-10-22 RX ADMIN — HEPARIN SODIUM (PORCINE) LOCK FLUSH IV SOLN 100 UNIT/ML 1000 UNITS: 100 SOLUTION at 09:51

## 2017-10-22 RX ADMIN — Medication 20 ML: at 09:15

## 2017-10-22 RX ADMIN — Medication 10 ML: at 09:51

## 2017-10-22 RX ADMIN — SODIUM CHLORIDE 1 G: 900 INJECTION, SOLUTION INTRAVENOUS at 09:15

## 2017-10-22 NOTE — PROGRESS NOTES
[facility] Progress Note    Date: 2017    Name: Ree Voss    MRN: [id number]         : 1940      Mr. Reis arrived to [facility] at 1127. Mr. Mark Delarosa was assessed and education was provided. Mr. Percy Hammonds vitals were reviewed. Visit Vitals    /87 (BP 1 Location: Left arm, BP Patient Position: At rest;Sitting)    Pulse 85    Temp 97.5 °F (36.4 °C)    Resp 20       Pt with right upper arm double lumen PICC line. Each lumen flushes without difficulty and positive for blood return. Dressing CDI. No redness, bruising, or swelling noted at site and/ or extremity. Pt denies tenderness. Invanz 1gm IV was administered as ordered via red lumen followed by normal saline flush. Mr. Mark Delarosa tolerated well without complaints. Flushed each lumen of pt's PICC line with heparin per order. Wrapped lumens w/ coban. Mr. Mark Delarosa was discharged from [place] [address] in stable condition at 5914. He is to return on 10/23/17 at 1000 for his next appointment.     Shelly Goff RN  2017  1052

## 2017-10-23 ENCOUNTER — HOSPITAL ENCOUNTER (OUTPATIENT)
Dept: INFUSION THERAPY | Age: 77
Discharge: HOME | End: 2017-10-23
Payer: MEDICARE

## 2017-10-23 VITALS
RESPIRATION RATE: 20 BRPM | TEMPERATURE: 98.1 F | OXYGEN SATURATION: 95 % | DIASTOLIC BLOOD PRESSURE: 77 MMHG | HEART RATE: 91 BPM | SYSTOLIC BLOOD PRESSURE: 129 MMHG

## 2017-10-23 PROCEDURE — 74011250636 HC RX REV CODE- 250/636: Performed by: INTERNAL MEDICINE

## 2017-10-23 PROCEDURE — 96365 THER/PROPH/DIAG IV INF INIT: CPT

## 2017-10-23 PROCEDURE — 74011000258 HC RX REV CODE- 258: Performed by: INTERNAL MEDICINE

## 2017-10-23 RX ORDER — SODIUM CHLORIDE 0.9 % (FLUSH) 0.9 %
10-40 SYRINGE (ML) INJECTION AS NEEDED
Status: DISCONTINUED | OUTPATIENT
Start: 2017-10-23 | End: 2017-10-25 | Stop reason: ALTCHOICE

## 2017-10-23 RX ORDER — HEPARIN SODIUM (PORCINE) LOCK FLUSH IV SOLN 100 UNIT/ML 100 UNIT/ML
SOLUTION INTRAVENOUS
Status: DISPENSED
Start: 2017-10-23 | End: 2017-10-23

## 2017-10-23 RX ORDER — HEPARIN SODIUM (PORCINE) LOCK FLUSH IV SOLN 100 UNIT/ML 100 UNIT/ML
500 SOLUTION INTRAVENOUS AS NEEDED
Status: ACTIVE | OUTPATIENT
Start: 2017-10-23 | End: 2017-10-24

## 2017-10-23 RX ADMIN — HEPARIN SODIUM (PORCINE) LOCK FLUSH IV SOLN 100 UNIT/ML 500 UNITS: 100 SOLUTION at 10:44

## 2017-10-23 RX ADMIN — HEPARIN SODIUM (PORCINE) LOCK FLUSH IV SOLN 100 UNIT/ML 500 UNITS: 100 SOLUTION at 10:45

## 2017-10-23 RX ADMIN — Medication 10 ML: at 10:05

## 2017-10-23 RX ADMIN — SODIUM CHLORIDE 1 G: 900 INJECTION, SOLUTION INTRAVENOUS at 10:05

## 2017-10-23 RX ADMIN — Medication 40 ML: at 10:44

## 2017-10-23 NOTE — PROGRESS NOTES
[facility] Progress Note    Date: 2017    Name: Ritu Alvarez    MRN: [id number]         : 1940      Mr. Reis arrived to [facility] at 1000. Mr. Ronny Cole was assessed and education was provided. Mr. Bettye Link vitals were reviewed. Visit Vitals    /77 (BP 1 Location: Left arm, BP Patient Position: Sitting)    Pulse 91    Temp 98.1 °F (36.7 °C)    Resp 20    SpO2 95%       Pt with right upper arm double lumen PICC line. Each lumen flushes without difficulty and positive for blood return. Dressing CDI. No redness, bruising, or swelling noted at site and/ or extremity. Pt denies tenderness. Invanz 1gm IV was administered as ordered via purple lumen followed by normal saline flush. Mr. Ronny Cole tolerated well without complaints. Flushed each lumen of pt's PICC line with heparin per order. Wrapped lumens w/ coban. Mr. Ronny Cole was discharged from [place] [address] in stable condition at 1045. He is to return on 10/24/17 at 1000 for his next appointment.     Lulú Ruby RN  2017

## 2017-10-24 ENCOUNTER — HOSPITAL ENCOUNTER (OUTPATIENT)
Dept: INFUSION THERAPY | Age: 77
Discharge: HOME | End: 2017-10-24
Payer: MEDICARE

## 2017-10-24 VITALS
HEART RATE: 80 BPM | OXYGEN SATURATION: 99 % | SYSTOLIC BLOOD PRESSURE: 126 MMHG | DIASTOLIC BLOOD PRESSURE: 84 MMHG | RESPIRATION RATE: 20 BRPM | TEMPERATURE: 98.1 F

## 2017-10-24 LAB
BASO+EOS+MONOS # BLD AUTO: 0.8 K/UL (ref 0–2.3)
BASO+EOS+MONOS # BLD AUTO: 10 % (ref 0.1–17)
BUN SERPL-MCNC: 14 MG/DL (ref 7–18)
CREAT SERPL-MCNC: 0.83 MG/DL (ref 0.6–1.3)
CRP SERPL-MCNC: 2.3 MG/DL (ref 0–0.3)
DIFFERENTIAL METHOD BLD: ABNORMAL
ERYTHROCYTE [DISTWIDTH] IN BLOOD BY AUTOMATED COUNT: 14.7 % (ref 11.5–14.5)
HCT VFR BLD AUTO: 33.9 % (ref 36–48)
HGB BLD-MCNC: 10.6 G/DL (ref 12–16)
LYMPHOCYTES # BLD: 2.1 K/UL (ref 1.1–5.9)
LYMPHOCYTES NFR BLD: 27 % (ref 14–44)
MCH RBC QN AUTO: 26.9 PG (ref 25–35)
MCHC RBC AUTO-ENTMCNC: 31.3 G/DL (ref 31–37)
MCV RBC AUTO: 86 FL (ref 78–102)
NEUTS SEG # BLD: 5.1 K/UL (ref 1.8–9.5)
NEUTS SEG NFR BLD: 64 % (ref 40–70)
PLATELET # BLD AUTO: 289 K/UL (ref 140–440)
RBC # BLD AUTO: 3.94 M/UL (ref 4.1–5.1)
WBC # BLD AUTO: 8 K/UL (ref 4.5–13)

## 2017-10-24 PROCEDURE — 74011250636 HC RX REV CODE- 250/636: Performed by: INTERNAL MEDICINE

## 2017-10-24 PROCEDURE — 86140 C-REACTIVE PROTEIN: CPT | Performed by: INTERNAL MEDICINE

## 2017-10-24 PROCEDURE — 84520 ASSAY OF UREA NITROGEN: CPT | Performed by: INTERNAL MEDICINE

## 2017-10-24 PROCEDURE — 85025 COMPLETE CBC W/AUTO DIFF WBC: CPT | Performed by: INTERNAL MEDICINE

## 2017-10-24 PROCEDURE — 82565 ASSAY OF CREATININE: CPT | Performed by: INTERNAL MEDICINE

## 2017-10-24 PROCEDURE — 74011000258 HC RX REV CODE- 258: Performed by: INTERNAL MEDICINE

## 2017-10-24 PROCEDURE — 96365 THER/PROPH/DIAG IV INF INIT: CPT

## 2017-10-24 RX ORDER — HEPARIN SODIUM (PORCINE) LOCK FLUSH IV SOLN 100 UNIT/ML 100 UNIT/ML
500 SOLUTION INTRAVENOUS AS NEEDED
Status: DISPENSED | OUTPATIENT
Start: 2017-10-24 | End: 2017-10-25

## 2017-10-24 RX ORDER — SODIUM CHLORIDE 0.9 % (FLUSH) 0.9 %
10-40 SYRINGE (ML) INJECTION AS NEEDED
Status: DISCONTINUED | OUTPATIENT
Start: 2017-10-24 | End: 2017-10-25 | Stop reason: ALTCHOICE

## 2017-10-24 RX ADMIN — Medication 10 ML: at 10:11

## 2017-10-24 RX ADMIN — HEPARIN SODIUM (PORCINE) LOCK FLUSH IV SOLN 100 UNIT/ML 500 UNITS: 100 SOLUTION at 10:47

## 2017-10-24 RX ADMIN — Medication 20 ML: at 10:46

## 2017-10-24 RX ADMIN — SODIUM CHLORIDE 1 G: 900 INJECTION, SOLUTION INTRAVENOUS at 10:12

## 2017-10-24 RX ADMIN — HEPARIN SODIUM (PORCINE) LOCK FLUSH IV SOLN 100 UNIT/ML 500 UNITS: 100 SOLUTION at 10:48

## 2017-10-24 RX ADMIN — Medication 20 ML: at 10:47

## 2017-10-24 NOTE — PROGRESS NOTES
[facility]    Date: 2017    Name: Myles Boykin    MRN: [id number]         : 1940    Ertapenem Infusion    Mr. Medina Fuchs arrived to [place] at 1000. No changes noted since yesterday. Mr. Medina Fuchs was assessed and education was provided. Mr. Kody White vitals were reviewed. Visit Vitals    /84 (BP 1 Location: Left arm, BP Patient Position: Sitting)    Pulse 80    Temp 98.1 °F (36.7 °C)    Resp 20    SpO2 99%     Pt with right upper arm double lumen PICC line. Both lumen flushed without difficulty and positive for blood return. Dressing CDI. No redness, bruising, or swelling noted at site and/ or extremity. Pt denies tenderness. Blood drawn for weekly labs via the RED lumen for CBC, BUN, Cr and CRP. Port flushed with 10 ml NS. Lab results obtained and reviewed. Recent Results (from the past 12 hour(s))   CBC WITH 3 PART DIFF    Collection Time: 10/24/17 10:12 AM   Result Value Ref Range    WBC 8.0 4.5 - 13.0 K/uL    RBC 3.94 (L) 4.10 - 5.10 M/uL    HGB 10.6 (L) 12.0 - 16 g/dL    HCT 33.9 (L) 36 - 48 %    MCV 86.0 78 - 102 FL    MCH 26.9 25.0 - 35.0 PG    MCHC 31.3 31 - 37 g/dL    RDW 14.7 (H) 11.5 - 14.5 %    PLATELET 838 876 - 377 K/uL    NEUTROPHILS 64 40 - 70 %    MIXED CELLS 10 0.1 - 17 %    LYMPHOCYTES 27 14 - 44 %    ABS. NEUTROPHILS 5.1 1.8 - 9.5 K/UL    ABS. MIXED CELLS 0.8 0.0 - 2.3 K/uL    ABS. LYMPHOCYTES 2.1 1.1 - 5.9 K/UL    DF AUTOMATED       Dressing was changed per hospital protocol. Invanz 1gm IV was administered as ordered via RED lumen followed by 10 ml normal saline flush. Mr. Medina Fuchs tolerated well without complaints. Both lumens flushed with 20 mL NS and 500 units of heparin. Cap ends changed and new stockinette was placed over the dressing. Mr. Medina Fuchs was discharged from [place] [address] in stable condition at 1100. He is to return on 10/25/17 at 1000 for his next appointment.     Queen Sarah RN  2017

## 2017-10-25 ENCOUNTER — HOSPITAL ENCOUNTER (OUTPATIENT)
Dept: INFUSION THERAPY | Age: 77
Discharge: HOME | End: 2017-10-25
Payer: MEDICARE

## 2017-10-25 VITALS
SYSTOLIC BLOOD PRESSURE: 133 MMHG | OXYGEN SATURATION: 99 % | TEMPERATURE: 98.2 F | DIASTOLIC BLOOD PRESSURE: 84 MMHG | RESPIRATION RATE: 20 BRPM | HEART RATE: 89 BPM

## 2017-10-25 PROCEDURE — 74011250636 HC RX REV CODE- 250/636: Performed by: INTERNAL MEDICINE

## 2017-10-25 PROCEDURE — 74011000258 HC RX REV CODE- 258: Performed by: INTERNAL MEDICINE

## 2017-10-25 PROCEDURE — 96365 THER/PROPH/DIAG IV INF INIT: CPT

## 2017-10-25 RX ORDER — SODIUM CHLORIDE 0.9 % (FLUSH) 0.9 %
10-40 SYRINGE (ML) INJECTION AS NEEDED
Status: DISCONTINUED | OUTPATIENT
Start: 2017-10-25 | End: 2017-10-29 | Stop reason: HOSPADM

## 2017-10-25 RX ORDER — HEPARIN SODIUM (PORCINE) LOCK FLUSH IV SOLN 100 UNIT/ML 100 UNIT/ML
500 SOLUTION INTRAVENOUS AS NEEDED
Status: DISPENSED | OUTPATIENT
Start: 2017-10-25 | End: 2017-10-26

## 2017-10-25 RX ADMIN — Medication 20 ML: at 10:38

## 2017-10-25 RX ADMIN — HEPARIN SODIUM (PORCINE) LOCK FLUSH IV SOLN 100 UNIT/ML 500 UNITS: 100 SOLUTION at 10:39

## 2017-10-25 RX ADMIN — SODIUM CHLORIDE 1 G: 9 INJECTION, SOLUTION INTRAVENOUS at 09:57

## 2017-10-25 RX ADMIN — Medication 20 ML: at 10:39

## 2017-10-25 RX ADMIN — HEPARIN SODIUM (PORCINE) LOCK FLUSH IV SOLN 100 UNIT/ML 500 UNITS: 100 SOLUTION at 10:38

## 2017-10-25 RX ADMIN — Medication 20 ML: at 09:57

## 2017-10-25 NOTE — PROGRESS NOTES
[facility] Progress Note    Date: 2017    Name: Leah Mercado    MRN: [id number]         : 1940      Mr. Reis arrived to [facility] at [phone]. Mr. Hallie Quinones was assessed and education was provided. Mr. Leodan Rebolledo vitals were reviewed. Visit Vitals    /84 (BP 1 Location: Left arm, BP Patient Position: Sitting)    Pulse 89    Temp 98.2 °F (36.8 °C)    Resp 20    SpO2 99%       Pt with right upper arm double lumen PICC line. Each lumen flushes without difficulty and positive for blood return. Dressing CDI. No redness, bruising, or swelling noted at site and/ or extremity. Pt denies tenderness. Invanz 1gm IV was administered as ordered via purple lumen followed by normal saline flush. Mr. Hallie Quinones tolerated well without complaints. Flushed each lumen of pt's PICC line with heparin per order. Wrapped lumens w/ coban. Mr. Hallie Quinones was discharged from [place] [address] in stable condition at 1050. He is to return on 10/26/17 at 1000 for his next appointment.     Ninfa Guzman RN  2017

## 2017-10-26 ENCOUNTER — HOSPITAL ENCOUNTER (OUTPATIENT)
Dept: INFUSION THERAPY | Age: 77
Discharge: HOME | End: 2017-10-26
Payer: MEDICARE

## 2017-10-26 VITALS
RESPIRATION RATE: 22 BRPM | OXYGEN SATURATION: 99 % | DIASTOLIC BLOOD PRESSURE: 87 MMHG | TEMPERATURE: 97.9 F | SYSTOLIC BLOOD PRESSURE: 138 MMHG | HEART RATE: 78 BPM

## 2017-10-26 PROCEDURE — 96365 THER/PROPH/DIAG IV INF INIT: CPT

## 2017-10-26 PROCEDURE — 74011250636 HC RX REV CODE- 250/636: Performed by: INTERNAL MEDICINE

## 2017-10-26 PROCEDURE — 74011000258 HC RX REV CODE- 258: Performed by: INTERNAL MEDICINE

## 2017-10-26 RX ORDER — HEPARIN SODIUM (PORCINE) LOCK FLUSH IV SOLN 100 UNIT/ML 100 UNIT/ML
500 SOLUTION INTRAVENOUS AS NEEDED
Status: DISPENSED | OUTPATIENT
Start: 2017-10-26 | End: 2017-10-27

## 2017-10-26 RX ORDER — SODIUM CHLORIDE 0.9 % (FLUSH) 0.9 %
10-40 SYRINGE (ML) INJECTION AS NEEDED
Status: DISCONTINUED | OUTPATIENT
Start: 2017-10-26 | End: 2017-10-30 | Stop reason: HOSPADM

## 2017-10-26 RX ADMIN — SODIUM CHLORIDE 1 G: 9 INJECTION, SOLUTION INTRAVENOUS at 10:36

## 2017-10-26 RX ADMIN — Medication 20 ML: at 11:13

## 2017-10-26 RX ADMIN — Medication 20 ML: at 10:35

## 2017-10-26 RX ADMIN — HEPARIN SODIUM (PORCINE) LOCK FLUSH IV SOLN 100 UNIT/ML 500 UNITS: 100 SOLUTION at 11:14

## 2017-10-26 NOTE — PROGRESS NOTES
[facility] Progress Note    Date: 2017    Name: Diamond Jules    MRN: [id number]         : 1940    Mr. Reis was assessed and education was provided. Mr. Alize Hernandez vitals were reviewed. Visit Vitals    /87 (BP 1 Location: Left arm, BP Patient Position: Sitting)    Pulse 78    Temp 97.9 °F (36.6 °C)    Resp 22    SpO2 99%       Lab results were obtained and reviewed. No results found for this or any previous visit (from the past 12 hour(s)). Good blood return obtained frim each lumen of PICC line at right upper arm. Each lumen then flushed with 10 ml NS.        []  Vancomycin     [x]  Invanz 1 gram in 50 ml NS     []  Cubicin     []  Rocephin    was infused via purple lumen at 100 ml/hr. No s/s reaction noted. Each lumen flushed with 10 ml NS and 2.5 ml of 100 units/ml Heparin, then wrapped and secured. Mr. Malcolm Mistry tolerated infusion, and had no complaints at this time. Patient armband removed and shredded. Mr. Malcolm Mistry was discharged from [place] [address] in stable condition at 1115. He is to return on 10/27/2017 at 1000 for his next appointment last abx infusion.     Aviva Rowland RN  2017  11:18 AM

## 2017-10-27 ENCOUNTER — HOSPITAL ENCOUNTER (OUTPATIENT)
Dept: INFUSION THERAPY | Age: 77
Discharge: HOME | End: 2017-10-27
Payer: MEDICARE

## 2017-10-27 VITALS
TEMPERATURE: 97.9 F | RESPIRATION RATE: 18 BRPM | SYSTOLIC BLOOD PRESSURE: 133 MMHG | OXYGEN SATURATION: 98 % | DIASTOLIC BLOOD PRESSURE: 79 MMHG | HEART RATE: 88 BPM

## 2017-10-27 PROCEDURE — 74011000258 HC RX REV CODE- 258: Performed by: INTERNAL MEDICINE

## 2017-10-27 PROCEDURE — 74011250636 HC RX REV CODE- 250/636: Performed by: INTERNAL MEDICINE

## 2017-10-27 PROCEDURE — 96365 THER/PROPH/DIAG IV INF INIT: CPT

## 2017-10-27 PROCEDURE — 99212 OFFICE O/P EST SF 10 MIN: CPT

## 2017-10-27 RX ORDER — SODIUM CHLORIDE 0.9 % (FLUSH) 0.9 %
10-40 SYRINGE (ML) INJECTION AS NEEDED
Status: DISCONTINUED | OUTPATIENT
Start: 2017-10-27 | End: 2017-10-31 | Stop reason: HOSPADM

## 2017-10-27 RX ADMIN — Medication 10 ML: at 10:11

## 2017-10-27 RX ADMIN — SODIUM CHLORIDE 1 G: 9 INJECTION, SOLUTION INTRAVENOUS at 10:11

## 2017-10-27 NOTE — PROGRESS NOTES
[facility] Progress Note    Date: 2017    Name: Susan Resendez    MRN: [id number]         : 1940      Mr. Reis arrived to [facility] at [phone]. Mr. Soraida Middleton was assessed and education was provided. Mr. Dodie Laguna vitals were reviewed. Visit Vitals    /79 (BP 1 Location: Left arm, BP Patient Position: Sitting)    Pulse 88    Temp 97.9 °F (36.6 °C)    Resp 18    SpO2 98%     Pt with right upper arm double lumen PICC line. Dressing is CDI. Both lumens flushed easily and positive for blood return. Invanz 1gm IV was administered as ordered via red lumen followed by 10 ml normal saline flush. PICC line was removed per order. Pressure held over site and sterile dressing placed over site. Mr. Soraida Middleton tolerated well without complaints. VS stable. Patient Vitals for the past 12 hrs:   Temp Pulse Resp BP SpO2   10/27/17 1117 - 88 - 133/79 98 %   10/27/17 1007 97.9 °F (36.6 °C) (!) 104 18 119/74 97 %       Mr. Soraida Middleton was discharged from [place] [address] in stable condition at 1120. He has no further appointments with [facility] at this time.      Ashli Mendiola RN  2017

## 2017-11-10 ENCOUNTER — OFFICE VISIT (OUTPATIENT)
Dept: ORTHOPEDIC SURGERY | Age: 77
End: 2017-11-10

## 2017-11-10 VITALS
RESPIRATION RATE: 18 BRPM | OXYGEN SATURATION: 97 % | TEMPERATURE: 97.8 F | WEIGHT: 162.8 LBS | SYSTOLIC BLOOD PRESSURE: 145 MMHG | HEIGHT: 67 IN | HEART RATE: 105 BPM | DIASTOLIC BLOOD PRESSURE: 68 MMHG | BODY MASS INDEX: 25.55 KG/M2

## 2017-11-10 DIAGNOSIS — M46.46 DISCITIS OF LUMBAR REGION: ICD-10-CM

## 2017-11-10 DIAGNOSIS — K68.12 PSOAS ABSCESS (HCC): Primary | ICD-10-CM

## 2017-11-10 NOTE — PROGRESS NOTES
Eddie Palmer [facility] [address].  [address], [address]  [place] [address]  Phone: [phone]  Fax: [phone]  PROGRESS NOTE  Patient: Evelia Santos                MRN: [id number]       SSN: [id number]  YOB: 1940        AGE: 65 y.o. SEX: male  Body mass index is 25.88 kg/(m^2). PCP: Shireen Salvador MD  11/10/17    Chief Complaint   Patient presents with    Back Pain      month follow up       85 [facility], RADIOGRAPHS, and PLAN:     HISTORY:  Mr. Adalgisa Martinez returns today. He is status post his recurrent discitis with psoas abscess. He is off his antibiotics now for several weeks. His PICC line is out. He is feeling well. He is having minimal pain, just morning stiffness. No fevers or chills. His last C-reactive protein was 2.3, which is down from 2.6. His last white count was 8. ASSESSMENT/PLAN: He is doing as well as I could hope at this point. He has multiple co-morbidities. I think it is just a question of us watching and waiting. There is nothing really to be done. Hopefully, this has resolved his infectious process. If he has another flare, he may need another course of antibiotics and further treatment. He certainly is not a surgical candidate. He is extremely debilitated. I will see him back in three months or as needed. cc:  Johnny Renae M.D. Shireen Salvador MD         Past Medical History:   Diagnosis Date    Aneurysm [facility])     AAA repair 2006 & 2012    Aorto-iliac duplex 02/13/2015    Tech difficult. Patent aorta bi-iliac endograft w/o leak or limb dysfunction.  Arrhythmia     a fib    Cardiac cath 11/01/2012    RCA (sm, nondom) patent. LM patent. LAD 25%. CX (dom) 45% mid. LVEDP 12 mmHg. No WMA. PA 27/12. W 10-12. CO/CI 5.2/2.6 (TD).  Cardiac echocardiogram, abnormal 02/20/2016    EF 55%. No WMA. Indeterminate diastolic fx. RVSP 60 mmHg. Severe LAE. Mild MR. Mod TR.  IVCE. Similar to study of 10/26/12.  Cardiovascular aorto-iliac duplex 02/13/2015    Patent aorta bi-iliac endovascular graft w/o leak or limb dysfunction. Sac measures 4.21 x 4.47 cm (4.4 x 4.6 cm on 1/31/14).  Cardiovascular LE peripheral arterial testing 07/29/2013    No significant LE arterial disease bilaterally. R GHADA 1. 12.  L GHADA 1. 12.  R DBI 0.83. L DBI 0.71. Exercise deferred.  Cardiovascular renal duplex 10/31/2012    No RA stenosis. Intrinsic/med disease in left kidney. Patent aortic endograft. Patent, thrombus-free renal veins bilaterally.  Carotid duplex 07/29/2013    Mild <50% bilateral ICA plaquing.  Chronic lung disease     Cigarette smoker     Congestive heart failure (CHF) (HCC)     COPD (chronic obstructive pulmonary disease) (HCC)     and emphysema; likely secondary to tobacco abuse    Difficult intubation     Dyslipidemia     low HDL    Emphysema     HTN (hypertension)     Hypercholesterolemia     Increased prostate specific antigen (PSA) velocity     Long term (current) use of anticoagulants     coumadin    Osteoarthritis     Osteomyelitis (HCC)     Paroxysmal atrial fibrillation (HCC)     Peripheral vascular disease ([facility] [address]     AAA repair 12/2007    Persistent atrial Fibrillation     Persistent atrial fibrillation (HCC)     Unspecified adverse effect of anesthesia     difficulty breathing placed in ICU Oct 2012 (AAA repair)       Family History   Problem Relation Age of Onset    Heart Surgery Father      BYPASS    Stroke Father     Heart Surgery Mother      BYPASS    Coronary Artery Disease Mother     Stroke Mother        Current Outpatient Prescriptions   Medication Sig Dispense Refill    digoxin (DIGITEK) 0.125 mg tablet take 1 tablet by mouth once daily 90 Tab 3    warfarin (COUMADIN) 2.5 mg tablet Take 1 Tab by mouth daily.  Take 2 pills (5 mg) Mon, Tues, Wed, Thur, Sat, Sun; and 1 pill only  (2.5 mg) Fri 60 Tab 0    albuterol-ipratropium ([place]ball) 2.5 mg-0.5 mg/3 ml nebu 3 mL by Nebulization route every six (6) hours as needed.  QVAR 80 mcg/actuation aero   0    chlorthalidone (HYGROTEN) 25 mg tablet   0    amLODIPine (NORVASC) 10 mg tablet   0    pravastatin (PRAVACHOL) 40 mg tablet   0    albuterol (PROVENTIL HFA, VENTOLIN HFA, PROAIR HFA) 90 mcg/actuation inhaler Take 2 Puffs by inhalation every four (4) hours as needed for Wheezing or Shortness of Breath. 1 Inhaler 5    OXYGEN-AIR DELIVERY SYSTEMS 2 L/min by Does Not Apply route daily. Continuous. Company is [employer]            Allergies   Allergen Reactions    Codeine Swelling    Morphine Itching    Sulfa (Sulfonamide Antibiotics) Other (comments)     Kidney problems. Past Surgical History:   Procedure Laterality Date    BRONCHOSCOPY DIAGNOSTIC  11/2/2012         CARDIAC CATHETERIZATION  11/1/2012         COLONOSCOPY N/A 7/26/2016    COLONOSCOPY with Polypectomies performed by Lisbeth Cummings MD at [address] HX AAA REPAIR      2006 & 2012    HX HEART CATHETERIZATION  3/4/2009    1. RCA small, nondominant; patent. 2. LMCA patent. 3. LAD is long, wrapped around apical vessel. Diffuse, 20-30% stenosis noted. 4. CCA is large, dominant vessel. Diffuse 20-30% stenosis. 5. LVEDP is 16 mmHg. 6. Overall left ventricular systolic function mildly diminshed with est. EF 45%. Mild, global hypokinesis noted. 7. No significant mitral regurgitation or aortic stenosis noted. (see report)    HX HERNIA REPAIR  2/2014    rt inguinal     HX HERNIA REPAIR  01/2016    LEFT INGUINAL HERNIA REPAIR DR. Rachel Silva    REPAIR ING HERNIA,5+Y/O,JESSIE Left 1-20-16    Dr. Haris Blas  11/1/2012            Past Medical History:   Diagnosis Date    Aneurysm [facility])     AAA repair 2006 & 2012    Aorto-iliac duplex 02/13/2015    Tech difficult. Patent aorta bi-iliac endograft w/o leak or limb dysfunction.  Arrhythmia     a fib    Cardiac cath 11/01/2012    RCA (sm, nondom) patent.   LM patent. LAD 25%. CX (dom) 45% mid. LVEDP 12 mmHg. No WMA. PA 27/12. W 10-12. CO/CI 5.2/2.6 (TD).  Cardiac echocardiogram, abnormal 02/20/2016    EF 55%. No WMA. Indeterminate diastolic fx. RVSP 60 mmHg. Severe LAE. Mild MR. Mod TR.  IVCE. Similar to study of 10/26/12.  Cardiovascular aorto-iliac duplex 02/13/2015    Patent aorta bi-iliac endovascular graft w/o leak or limb dysfunction. Sac measures 4.21 x 4.47 cm (4.4 x 4.6 cm on 1/31/14).  Cardiovascular LE peripheral arterial testing 07/29/2013    No significant LE arterial disease bilaterally. R GHADA 1. 12.  L GHADA 1. 12.  R DBI 0.83. L DBI 0.71. Exercise deferred.  Cardiovascular renal duplex 10/31/2012    No RA stenosis. Intrinsic/med disease in left kidney. Patent aortic endograft. Patent, thrombus-free renal veins bilaterally.  Carotid duplex 07/29/2013    Mild <50% bilateral ICA plaquing.  Chronic lung disease     Cigarette smoker     Congestive heart failure (CHF) (HCC)     COPD (chronic obstructive pulmonary disease) (HCC)     and emphysema; likely secondary to tobacco abuse    Difficult intubation     Dyslipidemia     low HDL    Emphysema     HTN (hypertension)     Hypercholesterolemia     Increased prostate specific antigen (PSA) velocity     Long term (current) use of anticoagulants     coumadin    Osteoarthritis     Osteomyelitis (HCC)     Paroxysmal atrial fibrillation (HCC)     Peripheral vascular disease ([facility] [address]     AAA repair 12/2007    Persistent atrial Fibrillation     Persistent atrial fibrillation (HCC)     Unspecified adverse effect of anesthesia     difficulty breathing placed in ICU Oct 2012 (AAA repair)       Social History     Social History    Marital status:      Spouse name: N/A    Number of children: N/A    Years of education: N/A     Occupational History    Not on file.      Social History Main Topics    Smoking status: Former Smoker     Packs/day: 1.00     Years: 54.00 Types: Cigarettes     Quit date: 10/23/2012    Smokeless tobacco: Never Used    Alcohol use No      Comment: quit drinking alcohol 26 years ago, patient states stopped in \"1983\"    Drug use: No    Sexual activity: Not on file     Other Topics Concern    Not on file     Social History Narrative         REVIEW OF SYSTEMS:   CONSTITUTIONAL SYMPTOMS:  Negative. EYES:  Negative. EARS, NOSE, THROAT AND MOUTH:  Negative. CARDIOVASCULAR:  Negative. RESPIRATORY:  Negative. GENITOURINARY: Negative. GASTROINTESTINAL:  Negative. INTEGUMENTARY (SKIN AND/OR BREAST):  Negative. MUSCULOSKELETAL: Per HPI.   ENDOCRINE/RHEUMATOLOGIC:  Negative. NEUROLOGICAL:  Per HPI. HEMATOLOGIC/LYMPHATIC:  Negative. ALLERGIC/IMMUNOLOGIC:  Negative. PSYCHIATRIC:  Negative. PHYSICAL EXAMINATION:   Visit Vitals    /68    Pulse (!) 105    Temp 97.8 °F (36.6 °C) (Oral)    Resp 18    Ht 5' 6.5\" (1.689 m)    Wt 162 lb 12.8 oz (73.8 kg)    SpO2 97%    BMI 25.88 kg/m2    PAIN SCALE: 3/10    CONSTITUTIONAL: The patient is in no apparent distress and is alert and oriented x 3. HEENT: Normocephalic. Hearing grossly intact. NECK: Supple and symmetric. no tenderness, or masses were felt. RESPIRATORY: No labored breathing. CARDIOVASCULAR: The carotid pulses were normal. Peripheral pulses were 2+. CHEST: Normal AP diameter and normal contour without any kyphoscoliosis. LYMPHATIC: No lymphadenopathy was appreciated in the neck, axillae or groin. SKIN:  Negative for scars, rashes, lesions, or ulcers on the right upper, right lower, left upper, left lower and trunk. NEUROLOGICAL: Alert and oriented x 3. Ambulation without assistive device. FWB. EXTREMITIES: See musculoskeletal.  MUSCULOSKELETAL:   Head and Neck:  Negative for misalignment, asymmetry, crepitation, defects, tenderness masses or effusions.  Left Upper Extremity: Inspection, percussion and palpation preformed.   Vus sign is negative.  Right Upper Extremity: Inspection, percussion and palpation preformed. Vus sign is negative.  Spine, Ribs and Pelvis: Inspection, percussion and palpation preformed. Negative for misalignment, asymmetry, crepitation, defects, tenderness masses or effusions.  Left Lower Extremity: Inspection, percussion and palpation preformed. Negative straight leg raise.  Right Lower Extremity: Inspection, percussion and palpation preformed. Negative straight leg raise. SPINE EXAM:     Lumbar spine: No rash, ecchymosis, or gross obliquity. No focal atrophy is noted. ASSESSMENT    ICD-10-CM ICD-9-CM    1. [facility]so[facility] ([facility] K68.12 567.31    2. Discitis of lumbar region M46.46 722.93        Written by Lauren Hennessy, as dictated by Natalie Catherine MD.    I, Dr. Natalie Catherine MD, confirm that all documentation is accurate.

## 2017-11-10 NOTE — MR AVS SNAPSHOT
Visit Information Date & Time Provider Department Dept. Phone Encounter #  
 11/10/2017 12:50 PM Alyssa Fox MD [facility] [phone] [id number] Follow-up Instructions Return in about 1 year (around 11/10/2018). Your Appointments 4/3/2018 11:40 AM  
Follow Up with Machelle Jacobs MD  
[facility] ([address]) Appt Note: 9-12 month f.up  
 [facility] [address] [address] [address] [phone] [facility] [address] Upcoming Health Maintenance Date Due DTaP/Tdap/Td series (1 - Tdap) 4/7/1960 ZOSTER VACCINE AGE 60> 2/7/1999 GLAUCOMA SCREENING Q2Y 4/7/2004 Pneumococcal 65+ Low/Medium Risk (1 of 2 - PCV13) 4/7/2004 MEDICARE YEARLY EXAM 4/7/2004 Allergies as of 11/10/2017  Review Complete On: 11/10/2017 By: Alyssa Fox MD  
  
 Severity Noted Reaction Type Reactions Codeine  06/14/2011    Swelling Morphine  01/03/2014    Itching Sulfa (Sulfonamide Antibiotics)  06/14/2011    Other (comments) Kidney problems. Current Immunizations  Reviewed on 10/25/2017 Name Date Influenza Vaccine 10/4/2017, 10/1/2016 Not reviewed this visit You Were Diagnosed With   
  
 Codes Comments Psoas abscess ([facility] [address]    -  Primary ICD-10-CM: Q64.27 
ICD-9-CM: 567.31 Discitis of lumbar region     ICD-10-CM: M46.46 
ICD-9-CM: 722.93 Vitals BP Pulse Temp Resp Height(growth percentile) Weight(growth percentile) 145/68 (!) 105 97.8 °F (36.6 °C) (Oral) 18 5' 6.5\" (1.689 m) 162 lb 12.8 oz (73.8 kg) SpO2 BMI Smoking Status 97% 25.88 kg/m2 Former Smoker BMI and BSA Data Body Mass Index Body Surface Area  
 25.88 kg/m 2 1.86 m 2 Preferred Pharmacy Pharmacy Name Phone [address] [facility], [address] [facility] Your Updated Medication List  
  
   
This list is accurate as of: 11/10/17  1:55 PM.  Always use your most recent med list.  
  
  
  
  
 albuterol 90 mcg/actuation inhaler Commonly known as:  PROVENTIL HFA, VENTOLIN HFA, PROAIR HFA Take 2 Puffs by inhalation every four (4) hours as needed for Wheezing or Shortness of Breath. albuterol-ipratropium 2.5 mg-0.5 mg/3 ml Nebu Commonly known as:  DUO-NEB  
3 mL by Nebulization route every six (6) hours as needed. amLODIPine 10 mg tablet Commonly known as:  NORVASC  
  
 chlorthalidone 25 mg tablet Commonly known as:  HYGROTEN  
  
 digoxin 0.125 mg tablet Commonly known as:  DIGITEK  
take 1 tablet by mouth once daily OXYGEN-AIR DELIVERY SYSTEMS  
2 L/min by Does Not Apply route daily. Continuous. Company is [employer]  
  
 pravastatin 40 mg tablet Commonly known as:  PRAVACHOL  
  
 QVAR 80 mcg/actuation Aero Generic drug:  beclomethasone  
  
 warfarin 2.5 mg tablet Commonly known as:  COUMADIN Take 1 Tab by mouth daily. Take 2 pills (5 mg) Mon, Tues, Wed, Thur, Sat, Sun; and 1 pill only (2.5 mg) Fri Follow-up Instructions Return in about 1 year (around 11/10/2018). Introducing [facility]! Emma Valdes introduces [employer] patient portal. Now you can access parts of your medical record, email your doctor's office, and request medication refills online. 1. In your internet browser, go to [email or web address] 2. Click on the First Time User? Click Here link in the Sign In box. You will see the New Member Sign Up page. 3. Enter your [employer] Access Code exactly as it appears below. You will not need to use this code after youve completed the sign-up process. If you do not sign up before the expiration date, you must request a new code. · [employer] Access Code: [email or web address] Expires: 12/4/2017  6:36 AM 
 
 4. Enter the last four digits of your Social Security Number (xxxx) and Date of Birth (mm/dd/yyyy) as indicated and click Submit. You will be taken to the next sign-up page. 5. Create a [employer] ID. This will be your [employer] login ID and cannot be changed, so think of one that is secure and easy to remember. 6. Create a [employer] password. You can change your password at any time. 7. Enter your Password Reset Question and Answer. This can be used at a later time if you forget your password. 8. Enter your e-mail address. You will receive e-mail notification when new information is available in [address] 9. Click Sign Up. You can now view and download portions of your medical record. 10. Click the Download Summary menu link to download a portable copy of your medical information. If you have questions, please visit the Frequently Asked Questions section of the [employer] website. Remember, [employer] is NOT to be used for urgent needs. For medical emergencies, dial 911. Now available from your iPhone and Android! Please provide this summary of care documentation to your next provider. Your primary care clinician is listed as Mohinder Love. If you have any questions after today's visit, please call [phone].

## 2018-03-26 ENCOUNTER — HOSPITAL ENCOUNTER (EMERGENCY)
Age: 78
Discharge: HOME | End: 2018-03-27
Attending: EMERGENCY MEDICINE
Payer: MEDICARE

## 2018-03-26 ENCOUNTER — APPOINTMENT (OUTPATIENT)
Dept: CT IMAGING | Age: 78
End: 2018-03-26
Attending: EMERGENCY MEDICINE
Payer: MEDICARE

## 2018-03-26 VITALS
TEMPERATURE: 100.1 F | RESPIRATION RATE: 17 BRPM | HEART RATE: 91 BPM | OXYGEN SATURATION: 99 % | SYSTOLIC BLOOD PRESSURE: 140 MMHG | DIASTOLIC BLOOD PRESSURE: 92 MMHG

## 2018-03-26 DIAGNOSIS — R10.31 ABDOMINAL PAIN, RIGHT LOWER QUADRANT: Primary | ICD-10-CM

## 2018-03-26 LAB
ABO + RH BLD: NORMAL
ALBUMIN SERPL-MCNC: 3.5 G/DL (ref 3.4–5)
ALBUMIN/GLOB SERPL: 0.6 {RATIO} (ref 0.8–1.7)
ALP SERPL-CCNC: 67 U/L (ref 45–117)
ALT SERPL-CCNC: 15 U/L (ref 16–61)
ANION GAP SERPL CALC-SCNC: 11 MMOL/L (ref 3–18)
APPEARANCE UR: CLEAR
APTT PPP: 39.6 SEC (ref 23–36.4)
AST SERPL-CCNC: 16 U/L (ref 15–37)
BASOPHILS # BLD: 0 K/UL (ref 0–0.1)
BASOPHILS NFR BLD: 0 % (ref 0–2)
BILIRUB SERPL-MCNC: 0.6 MG/DL (ref 0.2–1)
BILIRUB UR QL: NEGATIVE
BLOOD GROUP ANTIBODIES SERPL: NORMAL
BUN SERPL-MCNC: 18 MG/DL (ref 7–18)
BUN/CREAT SERPL: 18 (ref 12–20)
CALCIUM SERPL-MCNC: 9.1 MG/DL (ref 8.5–10.1)
CHLORIDE SERPL-SCNC: 95 MMOL/L (ref 100–108)
CO2 SERPL-SCNC: 26 MMOL/L (ref 21–32)
COLOR UR: ABNORMAL
CREAT SERPL-MCNC: 1.02 MG/DL (ref 0.6–1.3)
DIFFERENTIAL METHOD BLD: ABNORMAL
EOSINOPHIL # BLD: 0 K/UL (ref 0–0.4)
EOSINOPHIL NFR BLD: 0 % (ref 0–5)
EPITH CASTS URNS QL MICRO: ABNORMAL /LPF (ref 0–5)
ERYTHROCYTE [DISTWIDTH] IN BLOOD BY AUTOMATED COUNT: 13.9 % (ref 11.6–14.5)
GLOBULIN SER CALC-MCNC: 5.6 G/DL (ref 2–4)
GLUCOSE SERPL-MCNC: 95 MG/DL (ref 74–99)
GLUCOSE UR STRIP.AUTO-MCNC: NEGATIVE MG/DL
HCT VFR BLD AUTO: 31.7 % (ref 36–48)
HGB BLD-MCNC: 10.1 G/DL (ref 13–16)
HGB UR QL STRIP: NEGATIVE
HYALINE CASTS URNS QL MICRO: ABNORMAL /LPF (ref 0–2)
INR PPP: 1.8 (ref 0.8–1.2)
KETONES UR QL STRIP.AUTO: ABNORMAL MG/DL
LEUKOCYTE ESTERASE UR QL STRIP.AUTO: NEGATIVE
LIPASE SERPL-CCNC: 147 U/L (ref 73–393)
LYMPHOCYTES # BLD: 1.4 K/UL (ref 0.9–3.6)
LYMPHOCYTES NFR BLD: 13 % (ref 21–52)
MCH RBC QN AUTO: 27.8 PG (ref 24–34)
MCHC RBC AUTO-ENTMCNC: 31.9 G/DL (ref 31–37)
MCV RBC AUTO: 87.3 FL (ref 74–97)
MONOCYTES # BLD: 0.6 K/UL (ref 0.05–1.2)
MONOCYTES NFR BLD: 6 % (ref 3–10)
MUCOUS THREADS URNS QL MICRO: ABNORMAL /LPF
NEUTS SEG # BLD: 8.6 K/UL (ref 1.8–8)
NEUTS SEG NFR BLD: 81 % (ref 40–73)
NITRITE UR QL STRIP.AUTO: NEGATIVE
PH UR STRIP: 6.5 [PH] (ref 5–8)
PLATELET # BLD AUTO: 321 K/UL (ref 135–420)
PMV BLD AUTO: 8.6 FL (ref 9.2–11.8)
POTASSIUM SERPL-SCNC: 3.7 MMOL/L (ref 3.5–5.5)
PROT SERPL-MCNC: 9.1 G/DL (ref 6.4–8.2)
PROT UR STRIP-MCNC: 100 MG/DL
PROTHROMBIN TIME: 20 SEC (ref 11.5–15.2)
RBC # BLD AUTO: 3.63 M/UL (ref 4.7–5.5)
RBC #/AREA URNS HPF: ABNORMAL /HPF (ref 0–5)
SODIUM SERPL-SCNC: 132 MMOL/L (ref 136–145)
SP GR UR REFRACTOMETRY: 1.02 (ref 1–1.03)
SPECIMEN EXP DATE BLD: NORMAL
UROBILINOGEN UR QL STRIP.AUTO: 1 EU/DL (ref 0.2–1)
WBC # BLD AUTO: 10.6 K/UL (ref 4.6–13.2)
WBC URNS QL MICRO: ABNORMAL /HPF (ref 0–4)

## 2018-03-26 PROCEDURE — 74011250637 HC RX REV CODE- 250/637: Performed by: EMERGENCY MEDICINE

## 2018-03-26 PROCEDURE — 99285 EMERGENCY DEPT VISIT HI MDM: CPT

## 2018-03-26 PROCEDURE — 85730 THROMBOPLASTIN TIME PARTIAL: CPT | Performed by: EMERGENCY MEDICINE

## 2018-03-26 PROCEDURE — 74174 CTA ABD&PLVS W/CONTRAST: CPT

## 2018-03-26 PROCEDURE — 85025 COMPLETE CBC W/AUTO DIFF WBC: CPT | Performed by: EMERGENCY MEDICINE

## 2018-03-26 PROCEDURE — 80053 COMPREHEN METABOLIC PANEL: CPT | Performed by: EMERGENCY MEDICINE

## 2018-03-26 PROCEDURE — 81001 URINALYSIS AUTO W/SCOPE: CPT | Performed by: EMERGENCY MEDICINE

## 2018-03-26 PROCEDURE — 85610 PROTHROMBIN TIME: CPT | Performed by: EMERGENCY MEDICINE

## 2018-03-26 PROCEDURE — 83690 ASSAY OF LIPASE: CPT | Performed by: EMERGENCY MEDICINE

## 2018-03-26 PROCEDURE — 74011636320 HC RX REV CODE- 636/320: Performed by: EMERGENCY MEDICINE

## 2018-03-26 PROCEDURE — 86900 BLOOD TYPING SEROLOGIC ABO: CPT | Performed by: EMERGENCY MEDICINE

## 2018-03-26 RX ORDER — TRAMADOL HYDROCHLORIDE 50 MG/1
50 TABLET ORAL
Qty: 10 TAB | Refills: 0 | Status: SHIPPED | OUTPATIENT
Start: 2018-03-26 | End: 2018-06-27

## 2018-03-26 RX ORDER — TRAMADOL HYDROCHLORIDE 50 MG/1
50 TABLET ORAL
Status: COMPLETED | OUTPATIENT
Start: 2018-03-26 | End: 2018-03-26

## 2018-03-26 RX ADMIN — TRAMADOL HYDROCHLORIDE 50 MG: 50 TABLET, FILM COATED ORAL at 23:31

## 2018-03-26 RX ADMIN — IOPAMIDOL 75 ML: 755 INJECTION, SOLUTION INTRAVENOUS at 20:42

## 2018-03-26 NOTE — ED TRIAGE NOTES
Patient complains of lower back pain and abdominal pain, he reports he was sent here by his provider

## 2018-03-26 NOTE — ED PROVIDER NOTES
EMERGENCY DEPARTMENT HISTORY AND PHYSICAL EXAM    7:43 PM      Date: 3/26/2018  Patient Name: Darrin Spencer    History of Presenting Illness     Chief Complaint   Patient presents with    Back Pain    Abdominal Pain         History Provided By: Patient    Chief Complaint: abd pain  Duration:  today  Timing:  Constant  Location: lower  Severity: 6 out of 10  Modifying Factors: Pt had a AAA stint placed in 2005 then later had it repaired. Associated Symptoms: back pain. Denies nausea, dysuria, hematuria, and vomiting. Additional History (Context): Darrin Spencer is a 65 y.o. male with COPD, Afib, HTN, CHF who presents with constant lower abd pain that started today. Associated sx are back pain. Denies nausea, dysuria, hematuria, and vomiting. Pt rates severity at a 6/10. Pt had a AAA stint placed in 2005 then later had it repaired. Pt has a shx of tobacco use but no recent shx of alcohol use. PCP: Juan Gasca MD    Current Outpatient Prescriptions   Medication Sig Dispense Refill    digoxin (DIGITEK) 0.125 mg tablet take 1 tablet by mouth once daily 90 Tab 3    warfarin (COUMADIN) 2.5 mg tablet Take 1 Tab by mouth daily. Take 2 pills (5 mg) Mon, Tues, Wed, Thur, Sat, Sun; and 1 pill only  (2.5 mg) Fri 60 Tab 0    albuterol-ipratropium (DUO-NEB) 2.5 mg-0.5 mg/3 ml nebu 3 mL by Nebulization route every six (6) hours as needed.  QVAR 80 mcg/actuation aero   0    chlorthalidone (HYGROTEN) 25 mg tablet   0    amLODIPine (NORVASC) 10 mg tablet   0    pravastatin (PRAVACHOL) 40 mg tablet   0    albuterol (PROVENTIL HFA, VENTOLIN HFA, PROAIR HFA) 90 mcg/actuation inhaler Take 2 Puffs by inhalation every four (4) hours as needed for Wheezing or Shortness of Breath. 1 Inhaler 5    OXYGEN-AIR DELIVERY SYSTEMS 2 L/min by Does Not Apply route daily. Continuous.  Company is [employer]            Past History     Past Medical History:  Past Medical History:   Diagnosis Date    Aneurysm ([address] AAA repair 2006 & 2012    Aorto-iliac duplex 02/13/2015    Tech difficult. Patent aorta bi-iliac endograft w/o leak or limb dysfunction.  Arrhythmia     a fib    Cardiac cath 11/01/2012    RCA (sm, nondom) patent. LM patent. LAD 25%. CX (dom) 45% mid. LVEDP 12 mmHg. No WMA. PA 27/12. W 10-12. CO/CI 5.2/2.6 (TD).  Cardiac echocardiogram, abnormal 02/20/2016    EF 55%. No WMA. Indeterminate diastolic fx. RVSP 60 mmHg. Severe LAE. Mild MR. Mod TR.  IVCE. Similar to study of 10/26/12.  Cardiovascular aorto-iliac duplex 02/13/2015    Patent aorta bi-iliac endovascular graft w/o leak or limb dysfunction. Sac measures 4.21 x 4.47 cm (4.4 x 4.6 cm on 1/31/14).  Cardiovascular LE peripheral arterial testing 07/29/2013    No significant LE arterial disease bilaterally. R GHADA 1. 12.  L GHADA 1. 12.  R DBI 0.83. L DBI 0.71. Exercise deferred.  Cardiovascular renal duplex 10/31/2012    No RA stenosis. Intrinsic/med disease in left kidney. Patent aortic endograft. Patent, thrombus-free renal veins bilaterally.  Carotid duplex 07/29/2013    Mild <50% bilateral ICA plaquing.       Chronic lung disease     Cigarette smoker     Congestive heart failure (CHF) (HCC)     COPD (chronic obstructive pulmonary disease) (HCC)     and emphysema; likely secondary to tobacco abuse    Difficult intubation     Dyslipidemia     low HDL    Emphysema     HTN (hypertension)     Hypercholesterolemia     Increased prostate specific antigen (PSA) velocity     Long term (current) use of anticoagulants     coumadin    Osteoarthritis     Osteomyelitis (HCC)     Paroxysmal atrial fibrillation (HCC)     Peripheral vascular disease ([address]     AAA repair 12/2007    Persistent atrial Fibrillation     Persistent atrial fibrillation (HCC)     Unspecified adverse effect of anesthesia     difficulty breathing placed in ICU Oct 2012 (AAA repair)       Past Surgical History:  Past Surgical History: Procedure Laterality Date    BRONCHOSCOPY DIAGNOSTIC  11/2/2012         CARDIAC CATHETERIZATION  11/1/2012         COLONOSCOPY N/A 7/26/2016    COLONOSCOPY with Polypectomies performed by Damian Guerra MD at [address] HX AAA REPAIR      2006 & 2012    HX HEART CATHETERIZATION  3/4/2009    1. RCA small, nondominant; patent. 2. LMCA patent. 3. LAD is long, wrapped around apical vessel. Diffuse, 20-30% stenosis noted. 4. CCA is large, dominant vessel. Diffuse 20-30% stenosis. 5. LVEDP is 16 mmHg. 6. Overall left ventricular systolic function mildly diminshed with est. EF 45%. Mild, global hypokinesis noted. 7. No significant mitral regurgitation or aortic stenosis noted. (see report)    HX HERNIA REPAIR  2/2014    rt inguinal     HX HERNIA REPAIR  01/2016    LEFT INGUINAL HERNIA REPAIR DR. Lauren Serna ING HERNIA,5+Y/O,JESSIE Left 1-20-16    Dr. Landen Sun  11/1/2012            Family History:  Family History   Problem Relation Age of Onset    Heart Surgery Father      BYPASS    Stroke Father     Heart Surgery Mother      BYPASS    Coronary Artery Disease Mother     Stroke Mother        Social History:  Social History   Substance Use Topics    Smoking status: Former Smoker     Packs/day: 1.00     Years: 54.00     Types: Cigarettes     Quit date: 10/23/2012    Smokeless tobacco: Never Used    Alcohol use No      Comment: quit drinking alcohol 26 years ago, patient states stopped in \"4119\"       Allergies: Allergies   Allergen Reactions    Codeine Swelling    Morphine Itching    Sulfa (Sulfonamide Antibiotics) Other (comments)     Kidney problems. Review of Systems       Review of Systems   Gastrointestinal: Positive for abdominal pain. Negative for nausea and vomiting. Genitourinary: Negative for dysuria and hematuria. Musculoskeletal: Positive for back pain. All other systems reviewed and are negative.         Physical Exam     Visit Vitals    BP (!) 140/92  Pulse 91    Temp 100.1 °F (37.8 °C)    Resp 17    SpO2 99%       Physical Exam:  . Patient Vitals for the past 12 hrs:   Temp Pulse Resp BP SpO2   03/26/18 2315 - 91 17 (!) 140/92 99 %   03/26/18 2300 - 95 19 138/86 98 %   03/26/18 2245 - 100 18 (!) 158/128 100 %   03/26/18 2230 - 99 21 141/82 100 %   03/26/18 2215 - (!) 111 23 (!) 148/97 95 %   03/26/18 2200 - 94 17 123/77 99 %   03/26/18 2145 - (!) 103 23 133/89 100 %   03/26/18 2130 - 94 18 139/86 100 %   03/26/18 2115 - (!) 102 24 144/80 100 %   03/26/18 2100 - 96 15 146/67 99 %   03/26/18 1945 - 98 15 127/68 99 %   03/26/18 1750 100.1 °F (37.8 °C) (!) 115 25 124/84 97 %     Gen: Well developed, well nourished 65 y.o. male  HEENT: Normocephalic, atraumatic  Respiratory: No accessory muscle use No wheeze, No rales, No rhonchi. Normal chest wall excursion. No subcutaneous air, no rib crepitus  Cardiovascular: Regular rhythm and rate, Normal pulses, Normal perfusion. No edema  Gastrointestinal: Non distended, Non tender, No masses. No ascites. No organomegaly. No evidence of trauma  Musculoskeletal: Full range of motion at all other tested joints. No joint effusions. Neuro: Normal strength, Normal sensation. Normal speech. No ataxia. Cranial Nerves II-XII normal as tested. Skin: No rash, petechia or purpura. Warm and dry  Psyche: No suicidal ideation, No homicidal ideation. No hallucinations. Organized thoughts.   Heme: Normal  : Deferred  Abd: non distended soft non-tender    Physical Exam      Diagnostic Study Results     Labs -  Recent Results (from the past 12 hour(s))   CBC WITH AUTOMATED DIFF    Collection Time: 03/26/18  7:10 PM   Result Value Ref Range    WBC 10.6 4.6 - 13.2 K/uL    RBC 3.63 (L) 4.70 - 5.50 M/uL    HGB 10.1 (L) 13.0 - 16.0 g/dL    HCT 31.7 (L) 36.0 - 48.0 %    MCV 87.3 74.0 - 97.0 FL    MCH 27.8 24.0 - 34.0 PG    MCHC 31.9 31.0 - 37.0 g/dL    RDW 13.9 11.6 - 14.5 %    PLATELET 668 927 - 730 K/uL    MPV 8.6 (L) 9.2 - 11.8 FL NEUTROPHILS 81 (H) 40 - 73 %    LYMPHOCYTES 13 (L) 21 - 52 %    MONOCYTES 6 3 - 10 %    EOSINOPHILS 0 0 - 5 %    BASOPHILS 0 0 - 2 %    ABS. NEUTROPHILS 8.6 (H) 1.8 - 8.0 K/UL    ABS. LYMPHOCYTES 1.4 0.9 - 3.6 K/UL    ABS. MONOCYTES 0.6 0.05 - 1.2 K/UL    ABS. EOSINOPHILS 0.0 0.0 - 0.4 K/UL    ABS. BASOPHILS 0.0 0.0 - 0.1 K/UL    DF AUTOMATED     METABOLIC PANEL, COMPREHENSIVE    Collection Time: 03/26/18  7:10 PM   Result Value Ref Range    Sodium 132 (L) 136 - 145 mmol/L    Potassium 3.7 3.5 - 5.5 mmol/L    Chloride 95 (L) 100 - 108 mmol/L    CO2 26 21 - 32 mmol/L    Anion gap 11 3.0 - 18 mmol/L    Glucose 95 74 - 99 mg/dL    BUN 18 7.0 - 18 MG/DL    Creatinine 1.02 0.6 - 1.3 MG/DL    BUN/Creatinine ratio 18 12 - 20      GFR est AA >60 >60 ml/min/1.73m2    GFR est non-AA >60 >60 ml/min/1.73m2    Calcium 9.1 8.5 - 10.1 MG/DL    Bilirubin, total 0.6 0.2 - 1.0 MG/DL    ALT (SGPT) 15 (L) 16 - 61 U/L    AST (SGOT) 16 15 - 37 U/L    Alk.  phosphatase 67 45 - 117 U/L    Protein, total 9.1 (H) 6.4 - 8.2 g/dL    Albumin 3.5 3.4 - 5.0 g/dL    Globulin 5.6 (H) 2.0 - 4.0 g/dL    A-G Ratio 0.6 (L) 0.8 - 1.7     LIPASE    Collection Time: 03/26/18  7:10 PM   Result Value Ref Range    Lipase 147 73 - 393 U/L   PROTHROMBIN TIME + INR    Collection Time: 03/26/18  7:10 PM   Result Value Ref Range    Prothrombin time 20.0 (H) 11.5 - 15.2 sec    INR 1.8 (H) 0.8 - 1.2     PTT    Collection Time: 03/26/18  7:10 PM   Result Value Ref Range    aPTT 39.6 (H) 23.0 - 36.4 SEC   TYPE & SCREEN    Collection Time: 03/26/18  8:16 PM   Result Value Ref Range    Crossmatch Expiration 03/29/2018     ABO/Rh(D) Ivania More POSITIVE     Antibody screen NEG    URINALYSIS W/ RFLX MICROSCOPIC    Collection Time: 03/26/18  8:31 PM   Result Value Ref Range    Color DARK YELLOW      Appearance CLEAR      Specific gravity 1.024 1.005 - 1.030      pH (UA) 6.5 5.0 - 8.0      Protein 100 (A) NEG mg/dL    Glucose NEGATIVE  NEG mg/dL    Ketone TRACE (A) NEG mg/dL Bilirubin NEGATIVE  NEG      Blood NEGATIVE  NEG      Urobilinogen 1.0 0.2 - 1.0 EU/dL    Nitrites NEGATIVE  NEG      Leukocyte Esterase NEGATIVE  NEG     URINE MICROSCOPIC ONLY    Collection Time: 03/26/18  8:31 PM   Result Value Ref Range    WBC 0 to 3 0 - 4 /hpf    RBC 0 to 3 0 - 5 /hpf    Epithelial cells 1+ 0 - 5 /lpf    Mucus 4+ (A) NEG /lpf    Hyaline cast 4 to 10 0 - 2 /lpf       Radiologic Studies -   CTA ABDOMEN PELV W CONT      no endoleak identified. stable aortic wall thickening. no acute abnormality or pelvic abnormality. stable hepatic and renal cysts. normal appendix win a right inguinal hernia. resolution of the left psoas abscess. chronic l3 sclerosis           Medical Decision Making   I am the first provider for this patient. I reviewed the vital signs, available nursing notes, past medical history, past surgical history, family history and social history. Vital Signs-Reviewed the patient's vital signs. Records Reviewed: Nursing Notes (Time of Review: 7:43 PM)    ED Course: Progress Notes, Reevaluation, and Consults:  11:28 PM Pt is tachy when he got up. He declines any further treatment in ER. Advised of risk and he is a low risk for out patient management. Diagnosis     Clinical Impression:   1. Abdominal pain, right lower quadrant        Disposition:     Follow-up Information     Follow up With Details Comments Contact Info     Call in 1 day      Glendy Brito MD Call in 1 day  [address] [facility]  [address] [facility]      Glendy Brito MD   [address] [facility]  [phone]             Patient's Medications   Start Taking    No medications on file   Continue Taking    ALBUTEROL (PROVENTIL HFA, VENTOLIN HFA, PROAIR HFA) 90 MCG/ACTUATION INHALER    Take 2 Puffs by inhalation every four (4) hours as needed for Wheezing or Shortness of Breath.     ALBUTEROL-IPRATROPIUM (DUO-NEB) 2.5 MG-0.5 MG/3 ML NEBU    3 mL by Nebulization route every six (6) hours as needed. AMLODIPINE (NORVASC) 10 MG TABLET        CHLORTHALIDONE (HYGROTEN) 25 MG TABLET        DIGOXIN (DIGITEK) 0.125 MG TABLET    take 1 tablet by mouth once daily    OXYGEN-AIR DELIVERY SYSTEMS    2 L/min by Does Not Apply route daily. Continuous. Company is [employer]       PRAVASTATIN (PRAVACHOL) 40 MG TABLET        QVAR 80 MCG/ACTUATION AERO        WARFARIN (COUMADIN) 2.5 MG TABLET    Take 1 Tab by mouth daily. Take 2 pills (5 mg) Mon, Tues, Wed, Thur, Sat, Sun; and 1 pill only  (2.5 mg) Fri   These Medications have changed    No medications on file   Stop Taking    No medications on file     _______________________________    Attestations:  [address] acting as a scribe for and in the presence of Kamaljit Belle MD      March 26, 2018 at 7:43 PM       Provider Attestation:      I personally performed the services described in the documentation, reviewed the documentation, as recorded by the scribe in my presence, and it accurately and completely records my words and actions.  March 26, 2018 at 7:43 PM - Thai Dennison MD    _______________________________

## 2018-03-27 NOTE — ED NOTES
PT's HR increases to 120s with movement - pt refuses fluids and states he is ready to go - requesting pain meds to take home.

## 2018-03-27 NOTE — DISCHARGE INSTRUCTIONS
Abdominal Pain: Care Instructions  Your Care Instructions    Abdominal pain has many possible causes. Some aren't serious and get better on their own in a few days. Others need more testing and treatment. If your pain continues or gets worse, you need to be rechecked and may need more tests to find out what is wrong. You may need surgery to correct the problem. Don't ignore new symptoms, such as fever, nausea and vomiting, urination problems, pain that gets worse, and dizziness. These may be signs of a more serious problem. Your doctor may have recommended a follow-up visit in the next 8 to 12 hours. If you are not getting better, you may need more tests or treatment. The doctor has checked you carefully, but problems can develop later. If you notice any problems or new symptoms, get medical treatment right away. Follow-up care is a key part of your treatment and safety. Be sure to make and go to all appointments, and call your doctor if you are having problems. It's also a good idea to know your test results and keep a list of the medicines you take. How can you care for yourself at home? · Rest until you feel better. · To prevent dehydration, drink plenty of fluids, enough so that your urine is light yellow or clear like water. Choose water and other caffeine-free clear liquids until you feel better. If you have kidney, heart, or liver disease and have to limit fluids, talk with your doctor before you increase the amount of fluids you drink. · If your stomach is upset, eat mild foods, such as rice, dry toast or crackers, bananas, and applesauce. Try eating several small meals instead of two or three large ones. · Wait until 48 hours after all symptoms have gone away before you have spicy foods, alcohol, and drinks that contain caffeine. · Do not eat foods that are high in fat. · Avoid anti-inflammatory medicines such as aspirin, ibuprofen (Advil, Motrin), and naproxen (Aleve).  These can cause stomach upset. Talk to your doctor if you take daily aspirin for another health problem. When should you call for help? Call 911 anytime you think you may need emergency care. For example, call if:  ? · You passed out (lost consciousness). ? · You pass maroon or very bloody stools. ? · You vomit blood or what looks like coffee grounds. ? · You have new, severe belly pain. ?Call your doctor now or seek immediate medical care if:  ? · Your pain gets worse, especially if it becomes focused in one area of your belly. ? · You have a new or higher fever. ? · Your stools are black and look like tar, or they have streaks of blood. ? · You have unexpected vaginal bleeding. ? · You have symptoms of a urinary tract infection. These may include:  ¨ Pain when you urinate. ¨ Urinating more often than usual.  ¨ Blood in your urine. ? · You are dizzy or lightheaded, or you feel like you may faint. ? Watch closely for changes in your health, and be sure to contact your doctor if:  ? · You are not getting better after 1 day (24 hours). Where can you learn more? Go to http://brittney-eliseo.info/. Enter M633 in the search box to learn more about \"Abdominal Pain: Care Instructions. \"  Current as of: March 20, 2017  Content Version: 11.4  © 9269-8263 [employer]. Care instructions adapted under license by [employer] (which disclaims liability or warranty for this information). If you have questions about a medical condition or this instruction, always ask your healthcare professional. [place] [address] any warranty or liability for your use of this information.

## 2018-03-27 NOTE — ED NOTES
I have reviewed discharge instructions with the patient. The patient verbalized understanding. Patient armband removed and shredded PT wheeled out of ED by RN.

## 2018-03-27 NOTE — ED NOTES
Pt placed into room from triage - pt is A. Ox4 and complaining of back and lower abd pain. HX of AAA repair. Type and Screen and Urine sent. Pt changed himself into gown. 20 - Left AC. Daughter at bedside.

## 2018-03-30 ENCOUNTER — OFFICE VISIT (OUTPATIENT)
Dept: SURGERY | Age: 78
End: 2018-03-30

## 2018-03-30 VITALS
RESPIRATION RATE: 22 BRPM | HEIGHT: 67 IN | SYSTOLIC BLOOD PRESSURE: 132 MMHG | OXYGEN SATURATION: 98 % | WEIGHT: 162.2 LBS | HEART RATE: 101 BPM | TEMPERATURE: 97.8 F | DIASTOLIC BLOOD PRESSURE: 60 MMHG | BODY MASS INDEX: 25.46 KG/M2

## 2018-03-30 DIAGNOSIS — K40.91 RECURRENT LEFT INGUINAL HERNIA: ICD-10-CM

## 2018-03-30 DIAGNOSIS — K40.91 RECURRENT RIGHT INGUINAL HERNIA: Primary | ICD-10-CM

## 2018-03-30 NOTE — PATIENT INSTRUCTIONS
If you have any questions or concerns about today's appointment, the verbal and/or written instructions you were given for follow up care, please call our office at [phone].     [facility] - [address]    [phone] office  [phone]

## 2018-04-02 NOTE — PROGRESS NOTES
General Surgery Consult      Edgar Handy  Admit date: (Not on file)    MRN: [id number]     : 1940     Age: 65 y.o. Attending Physician: Arely Lama MD [facility]      History of Present Illness:     Edgar Handy is a 65 y.o. male was referred for evaluation of Recurrent bilateral inguinal hernias. The patient has a complicated medical and surgical history. He has multiple cardiac and pulmonary condition including severe hypoxemia on oxygen. He had bilateral open inguinal hernia repair in the past. He has been complaining of bilateral groin pain. The pain had started about a year ago, but it's getting worse. His pain is localized in the groin area on the right and the left. At one point he went to the emergency room where a CT scan showed a right inguinal hernia but no evidence of obstruction. He was referred to me for possible surgical evaluation. I noticed today that the patient is on home oxygen and he is already short of breath. He said that his last surgery was done under spinal anesthesia.     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[address] 2017    Abscess 2017    Discitis of lumbar region 2016    Pyogenic inflammation of bone ([address] 2016    Sepsis ([address] 2016    Intraabdominal fluid collection 2016    Abdominal pain 2016    Warfarin-induced coagulopathy ([address] 2016    Hypoxemia requiring supplemental oxygen 2015    CAD (coronary artery disease) 2015    Left inguinal hernia 2014    Right inguinal hernia 2013    RLQ abdominal pain 2013    Status post AAA (abdominal aortic aneurysm) repair 2013    AAA (abdominal aortic aneurysm, ruptured) ([address] 2013    Unspecified constipation 2013    Chronic atrial fibrillation ([address] 2012    HTN (hypertension)     Hypercholesterolemia     Congestive heart failure (CHF) ([address]     Peripheral vascular disease ([address]     COPD (chronic obstructive pulmonary disease) ([address]      Past Medical History:   Diagnosis Date    Aneurysm [facility])     AAA repair 2006 & 2012    Aorto-iliac duplex 02/13/2015    Tech difficult. Patent aorta bi-iliac endograft w/o leak or limb dysfunction.  Arrhythmia     a fib    Cardiac cath 11/01/2012    RCA (sm, nondom) patent. LM patent. LAD 25%. CX (dom) 45% mid. LVEDP 12 mmHg. No WMA. PA 27/12. W 10-12. CO/CI 5.2/2.6 (TD).  Cardiac echocardiogram, abnormal 02/20/2016    EF 55%. No WMA. Indeterminate diastolic fx. RVSP 60 mmHg. Severe LAE. Mild MR. Mod TR.  IVCE. Similar to study of 10/26/12.  Cardiovascular aorto-iliac duplex 02/13/2015    Patent aorta bi-iliac endovascular graft w/o leak or limb dysfunction. Sac measures 4.21 x 4.47 cm (4.4 x 4.6 cm on 1/31/14).  Cardiovascular LE peripheral arterial testing 07/29/2013    No significant LE arterial disease bilaterally. R GHADA 1. 12.  L GHADA 1. 12.  R DBI 0.83. L DBI 0.71. Exercise deferred.  Cardiovascular renal duplex 10/31/2012    No RA stenosis. Intrinsic/med disease in left kidney. Patent aortic endograft. Patent, thrombus-free renal veins bilaterally.  Carotid duplex 07/29/2013    Mild <50% bilateral ICA plaquing.       Chronic lung disease     Cigarette smoker     Congestive heart failure (CHF) (HCC)     COPD (chronic obstructive pulmonary disease) (HCC)     and emphysema; likely secondary to tobacco abuse    Difficult intubation     Dyslipidemia     low HDL    Emphysema     HTN (hypertension)     Hypercholesterolemia     Increased prostate specific antigen (PSA) velocity     Long term (current) use of anticoagulants     coumadin    Osteoarthritis     Osteomyelitis (HCC)     Paroxysmal atrial fibrillation ([address]     Peripheral vascular disease ([address]     AAA repair 12/2007    Persistent atrial Fibrillation     Persistent atrial fibrillation ([address]     Unspecified adverse effect of anesthesia     difficulty breathing placed in ICU Oct 2012 (AAA repair)      Past Surgical History:   Procedure Laterality Date    BRONCHOSCOPY DIAGNOSTIC  11/2/2012         CARDIAC CATHETERIZATION  11/1/2012         COLONOSCOPY N/A 7/26/2016    COLONOSCOPY with Polypectomies performed by Magui Gonzalez MD at [address] [place] [address] HX AAA REPAIR      2006 & 2012    HX HEART CATHETERIZATION  3/4/2009    1. RCA small, nondominant; patent. 2. LMCA patent. 3. LAD is long, wrapped around apical vessel. Diffuse, 20-30% stenosis noted. 4. CCA is large, dominant vessel. Diffuse 20-30% stenosis. 5. LVEDP is 16 mmHg. 6. Overall left ventricular systolic function mildly diminshed with est. EF 45%. Mild, global hypokinesis noted. 7. No significant mitral regurgitation or aortic stenosis noted. (see report)    HX HERNIA REPAIR  2/2014    rt inguinal     HX HERNIA REPAIR  01/2016    LEFT INGUINAL HERNIA REPAIR DR. Amanda Yates    REPAIR ING HERNIA,5+Y/O,JESSIE Left 1-20-16    Dr. Tereza Harrison  11/1/2012           Social History   Substance Use Topics    Smoking status: Former Smoker     Packs/day: 1.00     Years: 54.00     Types: Cigarettes     Quit date: 10/23/2012    Smokeless tobacco: Never Used    Alcohol use No      Comment: quit drinking alcohol 26 years ago, patient states stopped in \"1983\"      History   Smoking Status    Former Smoker    Packs/day: 1.00    Years: 54.00    Types: Cigarettes    Quit date: 10/23/2012   Smokeless Tobacco    Never Used     Family History   Problem Relation Age of Onset    Heart Surgery Father      BYPASS    Stroke Father     Heart Surgery Mother      BYPASS    Coronary Artery Disease Mother     Stroke Mother       Current Outpatient Prescriptions   Medication Sig    traMADol (ULTRAM) 50 mg tablet Take 1 Tab by mouth every six (6) hours as needed for Pain. Max Daily Amount: 200 mg.     digoxin (DIGITEK) 0.125 mg tablet take 1 tablet by mouth once daily    warfarin (COUMADIN) 2.5 mg tablet Take 1 Tab by mouth daily. Take 2 pills (5 mg) Mon, Tues, Wed, Thur, Sat, Sun; and 1 pill only  (2.5 mg) Fri    albuterol-ipratropium (DUO-NEB) 2.5 mg-0.5 mg/3 ml nebu 3 mL by Nebulization route every six (6) hours as needed.  chlorthalidone (HYGROTEN) 25 mg tablet     amLODIPine (NORVASC) 10 mg tablet     albuterol (PROVENTIL HFA, VENTOLIN HFA, PROAIR HFA) 90 mcg/actuation inhaler Take 2 Puffs by inhalation every four (4) hours as needed for Wheezing or Shortness of Breath.  OXYGEN-AIR DELIVERY SYSTEMS 2 L/min by Does Not Apply route daily. Continuous. Company is [employer]       QVAR 80 mcg/actuation aero     pravastatin (PRAVACHOL) 40 mg tablet      No current facility-administered medications for this visit. Allergies   Allergen Reactions    Codeine Swelling    Morphine Itching    Sulfa (Sulfonamide Antibiotics) Other (comments)     Kidney problems. Review of Systems:  Constitutional: negative  Eyes: negative  Ears, Nose, Mouth, Throat, and Face: negative  Respiratory: positive for cough or dyspnea on exertion  Cardiovascular: negative  Gastrointestinal: positive for abdominal pain  Genitourinary:negative  Integument/Breast: negative  Hematologic/Lymphatic: negative  Musculoskeletal:negative  Neurological: negative  Behavioral/Psychiatric: negative  Endocrine: negative  Allergic/Immunologic: negative    Objective:     Visit Vitals    /60 (BP 1 Location: Left arm, BP Patient Position: Sitting)    Pulse (!) 101    Temp 97.8 °F (36.6 °C) (Oral)    Resp 22    Ht 5' 6.5\" (1.689 m)    Wt 73.6 kg (162 lb 3.2 oz)    SpO2 98%    BMI 25.79 kg/m2       Physical Exam:      General:  alert, oriented times 3 and afebrile   Eyes:  conjunctivae and sclerae normal, pupils equal, round, reactive to light   Throat & Neck: no erythema or exudates noted and neck supple and symmetrical; no palpable masses   Lungs:   mild retractions, wheezes diffuse.     Heart: Regular rate and rhythm   Abdomen:   rounded, soft, nontender, nondistended, no masses or organomegaly. There is a right inguinal hernia that is mildly tender to palpation and partially reducible. There is a left groin tenderness but no clear evidence of a hernia. Extremities: extremities normal, atraumatic, no cyanosis or edema   Skin: Normal.       Imaging and Lab Review:     CBC:   Lab Results   Component Value Date/Time    WBC 10.6 03/26/2018 07:10 PM    RBC 3.63 (L) 03/26/2018 07:10 PM    HGB 10.1 (L) 03/26/2018 07:10 PM    HCT 31.7 (L) 03/26/2018 07:10 PM    PLATELET 730 91/51/6740 07:10 PM     BMP:   Lab Results   Component Value Date/Time    Glucose 95 03/26/2018 07:10 PM    Sodium 132 (L) 03/26/2018 07:10 PM    Potassium 3.7 03/26/2018 07:10 PM    Chloride 95 (L) 03/26/2018 07:10 PM    CO2 26 03/26/2018 07:10 PM    BUN 18 03/26/2018 07:10 PM    Creatinine 1.02 03/26/2018 07:10 PM    Calcium 9.1 03/26/2018 07:10 PM     CMP:  Lab Results   Component Value Date/Time    Glucose 95 03/26/2018 07:10 PM    Sodium 132 (L) 03/26/2018 07:10 PM    Potassium 3.7 03/26/2018 07:10 PM    Chloride 95 (L) 03/26/2018 07:10 PM    CO2 26 03/26/2018 07:10 PM    BUN 18 03/26/2018 07:10 PM    Creatinine 1.02 03/26/2018 07:10 PM    Calcium 9.1 03/26/2018 07:10 PM    Anion gap 11 03/26/2018 07:10 PM    BUN/Creatinine ratio 18 03/26/2018 07:10 PM    Alk. phosphatase 67 03/26/2018 07:10 PM    Protein, total 9.1 (H) 03/26/2018 07:10 PM    Albumin 3.5 03/26/2018 07:10 PM    Globulin 5.6 (H) 03/26/2018 07:10 PM    A-G Ratio 0.6 (L) 03/26/2018 07:10 PM       No results found for this or any previous visit (from the past 24 hour(s)). images and reports reviewed    Assessment:   Jennifer Mccracken is a 65 y.o. male is presenting with a picture of symptomatic of recurrent right inguinal hernia and possibly recurrent left inguinal hernia.  I explained to the patient that his condition is very difficult because even if there is a recurrent inguinal hernias usually it is a very difficult surgery but his condition makes it more difficult because of his medical problems including his pulmonary function. I Discussed the possibility of strangulation, enlargement in size over time, and the risk of emergency surgery in the face of strangulation. I also discussed the use of prosthetic materials (mesh), including the risk of infection. Also discussed the risk of surgery including recurrence and the possible need for reoperation and removal of mesh if used, possibility of postoperative small bowel injury, obstruction or ileus, and the risks of general anesthetic. I explained to the the patient about the robotic hernia repair procedure. At this point I think it is better not to proceed with the surgery giving his medical condition. The patient needs to be stabilized and I would like to get medical clearance from his primary care and pulmonary before proceeding with anesthesia since it will be under general anesthesia because of it would be performed robotically ( because it was done open before, I cannot do it again under spinal).      Plan:     Get medical clearance for general anesthesia  If cleared up then we will proceed with robotic right possible bilateral recurrent inguinal hernias repair with placement of mesh    Please call me if you have any questions (cell phone: [phone])     Signed By: Carly Harden MD     April 2, 2018

## 2018-04-03 ENCOUNTER — OFFICE VISIT (OUTPATIENT)
Dept: CARDIOLOGY CLINIC | Age: 78
End: 2018-04-03

## 2018-04-03 VITALS
HEIGHT: 67 IN | BODY MASS INDEX: 25.43 KG/M2 | WEIGHT: 162 LBS | SYSTOLIC BLOOD PRESSURE: 148 MMHG | DIASTOLIC BLOOD PRESSURE: 78 MMHG | HEART RATE: 81 BPM | OXYGEN SATURATION: 98 %

## 2018-04-03 DIAGNOSIS — I27.20 PULMONARY HYPERTENSION (HCC): ICD-10-CM

## 2018-04-03 DIAGNOSIS — J43.9 PULMONARY EMPHYSEMA, UNSPECIFIED EMPHYSEMA TYPE (HCC): Chronic | ICD-10-CM

## 2018-04-03 DIAGNOSIS — I10 ESSENTIAL HYPERTENSION: Chronic | ICD-10-CM

## 2018-04-03 DIAGNOSIS — I48.20 CHRONIC ATRIAL FIBRILLATION (HCC): Primary | Chronic | ICD-10-CM

## 2018-04-03 DIAGNOSIS — E78.00 HYPERCHOLESTEROLEMIA: Chronic | ICD-10-CM

## 2018-04-03 NOTE — PROGRESS NOTES
1. Have you been to the ER, urgent care clinic since your last visit? Hospitalized since your last visit? Yes, radha peters on 8/25/17- 8/31/17     2. Have you seen or consulted any other health care providers outside of the [facility] since your last visit? Include any pap smears or colon screening.  No

## 2018-04-03 NOTE — PROGRESS NOTES
HISTORY OF PRESENT ILLNESS  Chuyita Irizarry is a 65 y.o. male. ASSESSMENT and PLAN    Mr. Gloria Gillis has history of chronic atrial fibrillation, mild CAD (by coronary angiography 2012), severe COPD on oxygen therapy, as well as history of AAA stent graft. He is back on Coumadin at this point. In January of 2016, he developed severe back pains, diagnosed with lumbar spine osteomyelitis and abscess. His echocardiogram in February of 2016 showed normal ejection fraction without wall motion abnormality, severely dilated left atrium, and severe pulmonary hypertension with peak pressure of 60 mmHg.  CAD:   He has history of only mild CAD by heart catheterization in 2012.  CAF: Remains in atrial fibrillation. Rate controlled.  BP:   Upper normal but acceptable.  HR:    Stable.  CHF:   Currently, there is no evidence of decompensated CHF.  Weight:   His weight today is 162 pounds. His baseline weight is 168 pounds.  Cholesterol:   Target LDL <70. Remains on Pravachol 40 mg daily.  Anti-platelet:   Remains on ASA, and Coumadin. He states that he may need hernia surgery. I will check a repeat echocardiogram to reassess his overall left ventricular systolic function and valvular integrity. I suspect that from cardiac standpoint, he would be able to tolerate the hernia surgery. Unfortunately, he has severe COPD and pulmonary hypertension; this may be the limiting factor. I will plan on seeing him back in 9 months. Thank you. Encounter Diagnoses   Name Primary?     Chronic atrial fibrillation (HCC) Yes    Essential hypertension     Hypercholesterolemia     Pulmonary emphysema, unspecified emphysema type ([address]     Pulmonary hypertension ([address]      current treatment plan is effective, no change in therapy  lab results and schedule of future lab studies reviewed with patient  reviewed diet, exercise and weight control  cardiovascular risk and specific lipid/LDL goals reviewed  use of aspirin to prevent MI and TIA's discussed      HPI  Today, Mr. Mao Bound has no complaints of chest pains. He has chronic shortness of breath and dyspnea on exertion. He denies any significant changes. He is continuously dependent on oxygen support. He denies any worsening orthopnea or PND. He denies any worsening shortness of breath or swelling. Review of Systems   Respiratory: Positive for cough, shortness of breath and wheezing. Cardiovascular: Negative for chest pain, palpitations, orthopnea, claudication, leg swelling and PND. All other systems reviewed and are negative. Physical Exam   Constitutional: He is oriented to person, place, and time. He appears well-developed and well-nourished. HENT:   Head: Normocephalic. Eyes: Conjunctivae are normal.   Neck: Neck supple. No JVD present. Carotid bruit is not present. No thyromegaly present. Cardiovascular: Normal rate. An irregularly irregular rhythm present. Pulmonary/Chest: Breath sounds normal.   Abdominal: Bowel sounds are normal.   Musculoskeletal: He exhibits no edema. Neurological: He is alert and oriented to person, place, and time. Skin: Skin is warm and dry. Nursing note and vitals reviewed. PCP: Paula Garcia MD    Past Medical History:   Diagnosis Date    Aneurysm [facility])     AAA repair 2006 & 2012    Aorto-iliac duplex 02/13/2015    Tech difficult. Patent aorta bi-iliac endograft w/o leak or limb dysfunction.  Arrhythmia     a fib    Cardiac cath 11/01/2012    RCA (sm, nondom) patent. LM patent. LAD 25%. CX (dom) 45% mid. LVEDP 12 mmHg. No WMA. PA 27/12. W 10-12. CO/CI 5.2/2.6 (TD).  Cardiac echocardiogram, abnormal 02/20/2016    EF 55%. No WMA. Indeterminate diastolic fx. RVSP 60 mmHg. Severe LAE. Mild MR. Mod TR.  IVCE. Similar to study of 10/26/12.  Cardiovascular aorto-iliac duplex 02/13/2015    Patent aorta bi-iliac endovascular graft w/o leak or limb dysfunction.   Sac measures 4.21 x 4.47 cm (4.4 x 4.6 cm on 1/31/14).  Cardiovascular LE peripheral arterial testing 07/29/2013    No significant LE arterial disease bilaterally. R GHADA 1. 12.  L GHADA 1. 12.  R DBI 0.83. L DBI 0.71. Exercise deferred.  Cardiovascular renal duplex 10/31/2012    No RA stenosis. Intrinsic/med disease in left kidney. Patent aortic endograft. Patent, thrombus-free renal veins bilaterally.  Carotid duplex 07/29/2013    Mild <50% bilateral ICA plaquing.  Chronic lung disease     Cigarette smoker     Congestive heart failure (CHF) (HCC)     COPD (chronic obstructive pulmonary disease) (HCC)     and emphysema; likely secondary to tobacco abuse    Difficult intubation     Dyslipidemia     low HDL    Emphysema     HTN (hypertension)     Hypercholesterolemia     Increased prostate specific antigen (PSA) velocity     Long term (current) use of anticoagulants     coumadin    Osteoarthritis     Osteomyelitis (HCC)     Paroxysmal atrial fibrillation (HCC)     Peripheral vascular disease ([address]     AAA repair 12/2007    Persistent atrial Fibrillation     Persistent atrial fibrillation [facility]     Unspecified adverse effect of anesthesia     difficulty breathing placed in ICU Oct 2012 (AAA repair)       Past Surgical History:   Procedure Laterality Date    BRONCHOSCOPY DIAGNOSTIC  11/2/2012         CARDIAC CATHETERIZATION  11/1/2012         COLONOSCOPY N/A 7/26/2016    COLONOSCOPY with Polypectomies performed by Marielena Santana MD at [address] HX AAA REPAIR      2006 & 2012    HX HEART CATHETERIZATION  3/4/2009    1. RCA small, nondominant; patent. 2. LMCA patent. 3. LAD is long, wrapped around apical vessel. Diffuse, 20-30% stenosis noted. 4. CCA is large, dominant vessel. Diffuse 20-30% stenosis. 5. LVEDP is 16 mmHg. 6. Overall left ventricular systolic function mildly diminshed with est. EF 45%. Mild, global hypokinesis noted. 7. No significant mitral regurgitation or aortic stenosis noted. (see report)    HX HERNIA REPAIR  2/2014    rt inguinal     HX HERNIA REPAIR  01/2016    LEFT INGUINAL HERNIA REPAIR DR. Amanda Camara    REPAIR ING HERNIA,5+Y/O,JESSIE Left 1-20-16    Dr. King Travis  11/1/2012            Current Outpatient Prescriptions   Medication Sig Dispense Refill    traMADol (ULTRAM) 50 mg tablet Take 1 Tab by mouth every six (6) hours as needed for Pain. Max Daily Amount: 200 mg. 10 Tab 0    digoxin (DIGITEK) 0.125 mg tablet take 1 tablet by mouth once daily 90 Tab 3    warfarin (COUMADIN) 2.5 mg tablet Take 1 Tab by mouth daily. Take 2 pills (5 mg) Mon, Tues, Wed, Thur, Sat, Sun; and 1 pill only  (2.5 mg) Fri 60 Tab 0    albuterol-ipratropium (DUO-NEB) 2.5 mg-0.5 mg/3 ml nebu 3 mL by Nebulization route every six (6) hours as needed.  QVAR 80 mcg/actuation aero   0    chlorthalidone (HYGROTEN) 25 mg tablet Take 25 mg by mouth daily. 0    amLODIPine (NORVASC) 10 mg tablet Take 10 mg by mouth daily. 0    OXYGEN-AIR DELIVERY SYSTEMS 2 L/min by Does Not Apply route daily. Continuous. Company is [employer]            The patient has a family history of    Social History   Substance Use Topics    Smoking status: Former Smoker     Packs/day: 1.00     Years: 54.00     Types: Cigarettes     Quit date: 10/23/2012    Smokeless tobacco: Never Used    Alcohol use No      Comment: quit drinking alcohol 26 years ago, patient states stopped in \"1983\"       Lab Results   Component Value Date/Time    HDL Cholesterol 25 (L) 03/26/2010 10:30 AM        BP Readings from Last 3 Encounters:   04/03/18 148/78   03/30/18 132/60   03/26/18 (!) 140/92        Pulse Readings from Last 3 Encounters:   04/03/18 81   03/30/18 (!) 101   03/26/18 91       Wt Readings from Last 3 Encounters:   04/03/18 73.5 kg (162 lb)   03/30/18 73.6 kg (162 lb 3.2 oz)   11/10/17 73.8 kg (162 lb 12.8 oz)         EKG: unchanged from previous tracings, atrial fibrillation, rate 81, IVCD, rare PVC.

## 2018-04-03 NOTE — MR AVS SNAPSHOT
[facility] [address] [address] [place] [id number] [phone] Patient: Deni Carrera MRN: [id number] KZY:[birth year] Visit Information Date & Time Provider Department Dept. Phone Encounter #  
 4/3/2018 11:40 AM Manuel Zuniga MD [facility] [address] [id number] Your Appointments 6/29/2018  1:15 PM  
Follow Up with Farnaz Hoffman MD  
[facility]) Appt Note: 6mo fu per patient request  
 [address] [facility] [address] [phone]  
  
   
 [address] [facility] [facility] [address] Upcoming Health Maintenance Date Due DTaP/Tdap/Td series (1 - Tdap) 4/7/1960 ZOSTER VACCINE AGE 60> 2/7/1999 GLAUCOMA SCREENING Q2Y 4/7/2004 Pneumococcal 65+ Low/Medium Risk (1 of 2 - PCV13) 4/7/2004 MEDICARE YEARLY EXAM 3/14/2018 Allergies as of 4/3/2018  Review Complete On: 3/30/2018 By: Davina Edwards Severity Noted Reaction Type Reactions Codeine  06/14/2011    Swelling Morphine  01/03/2014    Itching Sulfa (Sulfonamide Antibiotics)  06/14/2011    Other (comments) Kidney problems. Current Immunizations  Reviewed on 10/25/2017 Name Date Influenza Vaccine 10/4/2017, 10/1/2016 Not reviewed this visit You Were Diagnosed With   
  
 Codes Comments Chronic atrial fibrillation (HCC)    -  Primary ICD-10-CM: O09.1 ICD-9-CM: 427.31 Essential hypertension     ICD-10-CM: I10 
ICD-9-CM: 401.9 Hypercholesterolemia     ICD-10-CM: E78.00 ICD-9-CM: 272.0 Vitals BP Pulse Height(growth percentile) Weight(growth percentile) SpO2 BMI  
 148/78 81 5' 6.5\" (1.689 m) 162 lb (73.5 kg) 98% 25.76 kg/m2 Smoking Status Former Smoker Vitals History BMI and BSA Data Body Mass Index Body Surface Area  25.76 kg/m 2 1.86 m 2  
  
  
 Preferred Pharmacy Pharmacy Name Phone [facility], [address] Your Updated Medication List  
  
   
This list is accurate as of 4/3/18 12:18 PM.  Always use your most recent med list.  
  
  
  
  
 albuterol-ipratropium 2.5 mg-0.5 mg/3 ml Nebu Commonly known as:  DUO-NEB  
3 mL by Nebulization route every six (6) hours as needed. amLODIPine 10 mg tablet Commonly known as:  Kaiser Matar Take 10 mg by mouth daily. chlorthalidone 25 mg tablet Commonly known as:  Yanna Nirali Take 25 mg by mouth daily. digoxin 0.125 mg tablet Commonly known as:  DIGITEK  
take 1 tablet by mouth once daily OXYGEN-AIR DELIVERY SYSTEMS  
2 L/min by Does Not Apply route daily. Continuous. Company is [employer] QVAR 80 mcg/actuation [employer] Generic drug:  beclomethasone  
  
 traMADol 50 mg tablet Commonly known as:  ULTRAM  
Take 1 Tab by mouth every six (6) hours as needed for Pain. Max Daily Amount: 200 mg.  
  
 warfarin 2.5 mg tablet Commonly known as:  COUMADIN Take 1 Tab by mouth daily. Take 2 pills (5 mg) Mon, Tues, Wed, Thur, Sat, Sun; and 1 pill only (2.5 mg) Fri We Performed the Following AMB POC EKG ROUTINE W/ 12 LEADS, INTER & REP [52687 CPT(R)] To-Do List   
 04/03/2018 ECHO:  2D ECHO COMPLETE ADULT (TTE) W OR WO CONTR Introducing [facility]! Sylvain Blas introduces [facility] patient portal. Now you can access parts of your medical record, email your doctor's office, and request medication refills online. 1. In your internet browser, go to [email or web address] 2. Click on the First Time User? Click Here link in the Sign In box. You will see the New Member Sign Up page. 3. Enter your [facility] Access Code exactly as it appears below. You will not need to use this code after youve completed the sign-up process.  If you do not sign up before the expiration date, you must request a new code. · [employer] Access Code: [email or web address] Expires: 5/24/2018 12:26 PM 
 
4. Enter the last four digits of your Social Security Number (xxxx) and Date of Birth (mm/dd/yyyy) as indicated and click Submit. You will be taken to the next sign-up page. 5. Create a [employer] ID. This will be your [employer] login ID and cannot be changed, so think of one that is secure and easy to remember. 6. Create a [employer] password. You can change your password at any time. 7. Enter your Password Reset Question and Answer. This can be used at a later time if you forget your password. 8. Enter your e-mail address. You will receive e-mail notification when new information is available in [address]. 9. Click Sign Up. You can now view and download portions of your medical record. 10. Click the Download Summary menu link to download a portable copy of your medical information. If you have questions, please visit the Frequently Asked Questions section of the [employer] website. Remember, [employer] is NOT to be used for urgent needs. For medical emergencies, dial 911. Now available from your iPhone and Android! Please provide this summary of care documentation to your next provider. Your primary care clinician is listed as Gretel Flower. If you have any questions after today's visit, please call [phone].

## 2018-04-12 ENCOUNTER — HOSPITAL ENCOUNTER (OUTPATIENT)
Dept: NON INVASIVE DIAGNOSTICS | Age: 78
Discharge: HOME | End: 2018-04-12
Attending: INTERNAL MEDICINE
Payer: MEDICARE

## 2018-04-12 DIAGNOSIS — I48.20 CHRONIC ATRIAL FIBRILLATION (HCC): Chronic | ICD-10-CM

## 2018-04-12 PROCEDURE — 93306 TTE W/DOPPLER COMPLETE: CPT

## 2018-04-12 NOTE — PROGRESS NOTES
Jack Akhtar completed echocardiogram. Report to follow. I removed the band off and placed in shred box.

## 2018-04-13 NOTE — PROGRESS NOTES
Per your last note \" He states that he may need hernia surgery. I will check a repeat echocardiogram to reassess his overall left ventricular systolic function and valvular integrity. I suspect that from cardiac standpoint, he would be able to tolerate the hernia surgery. Unfortunately, he has severe COPD and pulmonary hypertension; this may be the limiting factor.

## 2018-04-19 ENCOUNTER — TELEPHONE (OUTPATIENT)
Dept: SURGERY | Age: 78
End: 2018-04-19

## 2018-04-19 NOTE — TELEPHONE ENCOUNTER
Mr. Adalgisa Martinez called inquiring about scheduling surgery. I informed Mr. Reis Dr. Shayy Arango is recommending that he proceed with obtaining pulmonary clearance prior to surgery since he last saw his pulmonologist Dr. Arsalan Adams two years ago. I informed Mr. Reis appointment with Dr. Arsalan Adams is June 6, 2018 however I could call Dr. Arsalan Adams office to inquire if they have an earlier appointment available. I placed a conference call to Dr. Arsalan Adams office and Mr. Adalgisa Martinez was able to get an earlier appointment on May 11, 2018 at 11:30am for breathing test and May 24, 2018 at 10:30am with Dr. Arsalan Adams. I explained to Mr. Reis once he's cleared by Dr. Arsalan Adams we'll contact him to proceed with scheduling surgery.

## 2018-05-03 ENCOUNTER — OFFICE VISIT (OUTPATIENT)
Dept: PULMONOLOGY | Age: 78
End: 2018-05-03

## 2018-05-03 VITALS
HEART RATE: 86 BPM | HEIGHT: 67 IN | WEIGHT: 156 LBS | BODY MASS INDEX: 24.48 KG/M2 | RESPIRATION RATE: 24 BRPM | SYSTOLIC BLOOD PRESSURE: 120 MMHG | TEMPERATURE: 98.1 F | DIASTOLIC BLOOD PRESSURE: 70 MMHG | OXYGEN SATURATION: 100 %

## 2018-05-03 DIAGNOSIS — Z86.79 STATUS POST AAA (ABDOMINAL AORTIC ANEURYSM) REPAIR: Chronic | ICD-10-CM

## 2018-05-03 DIAGNOSIS — R09.02 HYPOXEMIA REQUIRING SUPPLEMENTAL OXYGEN: ICD-10-CM

## 2018-05-03 DIAGNOSIS — Z87.891 EX-SMOKER: ICD-10-CM

## 2018-05-03 DIAGNOSIS — Z99.81 HYPOXEMIA REQUIRING SUPPLEMENTAL OXYGEN: ICD-10-CM

## 2018-05-03 DIAGNOSIS — K40.90 LEFT INGUINAL HERNIA: ICD-10-CM

## 2018-05-03 DIAGNOSIS — I10 ESSENTIAL HYPERTENSION: Chronic | ICD-10-CM

## 2018-05-03 DIAGNOSIS — Z98.890 STATUS POST AAA (ABDOMINAL AORTIC ANEURYSM) REPAIR: Chronic | ICD-10-CM

## 2018-05-03 DIAGNOSIS — I27.20 PULMONARY HTN (HCC): ICD-10-CM

## 2018-05-03 DIAGNOSIS — J44.9 CHRONIC OBSTRUCTIVE PULMONARY DISEASE, UNSPECIFIED COPD TYPE (HCC): Primary | ICD-10-CM

## 2018-05-03 RX ORDER — ALBUTEROL SULFATE 0.83 MG/ML
2.5 SOLUTION RESPIRATORY (INHALATION)
Qty: 24 EACH | Refills: 3 | Status: SHIPPED | OUTPATIENT
Start: 2018-05-03 | End: 2019-03-21 | Stop reason: SDUPTHER

## 2018-05-03 NOTE — PROGRESS NOTES
Chief Complaint   Patient presents with    COPD    Surgical Clearance     NP referred by Dr Edward August for surg clearance     1. Have you been to the ER, urgent care clinic since your last visit? Hospitalized since your last visit? Yes When: 4/2018    2. Have you seen or consulted any other health care providers outside of the [address] [place] [address] since your last visit? Include any pap smears or colon screening. No      Patient here today for surgical clearance. Walk in hallway on 2 liters O2 with sats dropping to 86 . O2 increased to 3 liters walked in hallway with sats to 95% .

## 2018-05-03 NOTE — PROGRESS NOTES
HISTORY OF PRESENT ILLNESS  Tam Hadley is a 77 y.o. male. HPI Comments: Follow up for this pt with severe COPD on home O2. Pt was started on Anoro with some improvement in overall dyspnea however was changed to Qvar for financial reasons. Pt has complaints of dyspnea with mild exertion but no chest pain. Pt derived some benefit from Pulmonary Rehab but has not continued regular exercise regimen after completing the 13 week program. He was last seen in the hospital in 2016 and has been lost to follow up. Pt is being seen today for pulmonary management prior to inguinal hernia surgery. He is on LTOT but appears dyspneic even at rest. He admits to frequent SOB and wheezing. Recent echo also shows elevated right heart pressures, see below. Pt notes intermittent pedal edema with no orthopnea or PND.'    COPD   The history is provided by the patient. This is a chronic problem. Episode onset: years. The problem has been gradually worsening. Associated symptoms include abdominal pain and shortness of breath. Pertinent negatives include no chest pain and no headaches. The symptoms are aggravated by exertion and walking. Review of Systems   Constitutional: Positive for malaise/fatigue. Negative for chills, diaphoresis, fever and weight loss. HENT: Positive for hearing loss. Negative for congestion, ear discharge, ear pain, nosebleeds, sinus pain, sore throat and tinnitus. Eyes: Negative for blurred vision, double vision, photophobia, pain, discharge and redness. Respiratory: Positive for shortness of breath and wheezing. Negative for hemoptysis, sputum production and stridor. Cough: dry. Cardiovascular: Positive for palpitations. Negative for chest pain, orthopnea, claudication, leg swelling and PND. Gastrointestinal: Positive for abdominal pain. Negative for blood in stool, constipation, diarrhea, heartburn, melena, nausea and vomiting.    Genitourinary: Negative for dysuria, flank pain, frequency, hematuria and urgency. Musculoskeletal: Negative for back pain, falls, joint pain, myalgias and neck pain. Skin: Negative for itching and rash. Neurological: Negative for dizziness, tingling, tremors, sensory change, speech change, focal weakness, seizures, loss of consciousness, weakness and headaches. Endo/Heme/Allergies: Negative for environmental allergies and polydipsia. Does not bruise/bleed easily. Psychiatric/Behavioral: Negative for depression, hallucinations, memory loss, substance abuse and suicidal ideas. The patient is not nervous/anxious and does not have insomnia. Past Medical History:   Diagnosis Date    Aneurysm [facility])     AAA repair 2006 & 2012    Aorto-iliac duplex 02/13/2015    Tech difficult. Patent aorta bi-iliac endograft w/o leak or limb dysfunction.  Arrhythmia     a fib    Cardiac cath 11/01/2012    RCA (sm, nondom) patent. LM patent. LAD 25%. CX (dom) 45% mid. LVEDP 12 mmHg. No WMA. PA 27/12. W 10-12. CO/CI 5.2/2.6 (TD).  Cardiac echocardiogram, abnormal 02/20/2016    EF 55%. No WMA. Indeterminate diastolic fx. RVSP 60 mmHg. Severe LAE. Mild MR. Mod TR.  IVCE. Similar to study of 10/26/12.  Cardiovascular aorto-iliac duplex 02/13/2015    Patent aorta bi-iliac endovascular graft w/o leak or limb dysfunction. Sac measures 4.21 x 4.47 cm (4.4 x 4.6 cm on 1/31/14).  Cardiovascular LE peripheral arterial testing 07/29/2013    No significant LE arterial disease bilaterally. R GHADA 1. 12.  L GHADA 1. 12.  R DBI 0.83. L DBI 0.71. Exercise deferred.  Cardiovascular renal duplex 10/31/2012    No RA stenosis. Intrinsic/med disease in left kidney. Patent aortic endograft. Patent, thrombus-free renal veins bilaterally.  Carotid duplex 07/29/2013    Mild <50% bilateral ICA plaquing.       Chronic lung disease     Cigarette smoker     Congestive heart failure (CHF) (HCC)     COPD (chronic obstructive pulmonary disease) ([address]     and emphysema; likely secondary to tobacco abuse    Difficult intubation     Dyslipidemia     low HDL    Emphysema     HTN (hypertension)     Hypercholesterolemia     Increased prostate specific antigen (PSA) velocity     Long term (current) use of anticoagulants     coumadin    Osteoarthritis     Osteomyelitis (HCC)     Paroxysmal atrial fibrillation (HCC)     Peripheral vascular disease ([facility]     AAA repair 12/2007    Persistent atrial Fibrillation     Persistent atrial fibrillation ([facility]     Unspecified adverse effect of anesthesia     difficulty breathing placed in ICU Oct 2012 (AAA repair)     Current Outpatient Prescriptions on File Prior to Visit   Medication Sig Dispense Refill    digoxin (DIGITEK) 0.125 mg tablet take 1 tablet by mouth once daily 90 Tab 3    warfarin (COUMADIN) 2.5 mg tablet Take 1 Tab by mouth daily. Take 2 pills (5 mg) Mon, Tues, Wed, Thur, Sat, Sun; and 1 pill only  (2.5 mg) Fri 60 Tab 0    QVAR 80 mcg/actuation aero   0    chlorthalidone (HYGROTEN) 25 mg tablet Take 25 mg by mouth daily. 0    amLODIPine (NORVASC) 10 mg tablet Take 10 mg by mouth daily. 0    OXYGEN-AIR DELIVERY SYSTEMS 2 L/min by Does Not Apply route daily. Continuous. Company is [employer]         traMADol (ULTRAM) 50 mg tablet Take 1 Tab by mouth every six (6) hours as needed for Pain. Max Daily Amount: 200 mg. 10 Tab 0     No current facility-administered medications on file prior to visit. Allergies   Allergen Reactions    Codeine Swelling    Morphine Itching    Sulfa (Sulfonamide Antibiotics) Other (comments)     Kidney problems. Social History     Social History    Marital status:      Spouse name: N/A    Number of children: N/A    Years of education: N/A     Occupational History    Not on file.      Social History Main Topics    Smoking status: Former Smoker     Packs/day: 1.00     Years: 54.00     Types: Cigarettes     Quit date: 10/23/2012    Smokeless tobacco: Never Used    Alcohol use No      Comment: quit drinking alcohol 26 years ago, patient states stopped in \"8068\"    Drug use: No    Sexual activity: Not on file     Other Topics Concern    Not on file     Social History Narrative     Blood pressure 120/70, pulse 86, temperature 98.1 °F (36.7 °C), temperature source Oral, resp. rate 24, height 5' 7\" (1.702 m), weight 70.8 kg (156 lb), SpO2 100 %. Ambulatory oxymetry per nurse note. Physical Exam   Constitutional: He is oriented to person, place, and time. He appears well-developed. No distress. Slender    HENT:   Head: Normocephalic and atraumatic. Nose: Nose normal.   Poor dentition   Eyes: Conjunctivae are normal. Pupils are equal, round, and reactive to light. Right eye exhibits no discharge. Left eye exhibits no discharge. No scleral icterus. Neck: No JVD present. No tracheal deviation present. No thyromegaly present. Cardiovascular: Normal rate and intact distal pulses. No murmur heard. Irregular rhythm   Pulmonary/Chest: Effort normal. No stridor. No respiratory distress. Wheezes: mild bilateral wheezes  He has no rales. He exhibits no tenderness. Distant breath sounds   Abdominal: Soft. He exhibits no mass. There is no tenderness. Musculoskeletal: He exhibits no edema or tenderness. Lymphadenopathy:     He has no cervical adenopathy. Neurological: He is alert and oriented to person, place, and time. Skin: Skin is warm and dry. No rash noted. He is not diaphoretic. No erythema. Psychiatric: He has a normal mood and affect. His behavior is normal.     Spirometry: severe obstruction FEV1 48%.  Reversible component demonstrated    2018  6:26 PM - Azeem, Card Result In       [address]  [place], [phone]    Transthoracic Echocardiogram    Patient: Enmanuel Ramirez  MRN: [id number]  [address] #: [de-identified]  : 1940  Age: 77 years  Gender: Male  Height: 67 in  Weight: 161.7 lb  BSA: 1.85 m-sq  BP: 148 / 78 mmHg  Study date: 12-Apr-2018  Status: Routine  Location: [facility] DMS ACC #: [id number]    Allergies: CODEINE, MORPHINE, SULFA (SULFONAMIDE ANTIBIOTICS)    Referring_Ordering Physician: Emmie Canales. Alex Villarreal MD  Interpreting Group: [facility]  Interpreting Physician: Lee Ann Tang DO  Technologist: Henna Santiago RDMS    SUMMARY:  Left ventricle: Size was normal. Systolic function was by visual assessment. Ejection fraction was estimated to be 55  %. There was hypokinesis of the basal-mid anteroseptal wall(s). Wall   thickness was mildly increased. The study was not  technically sufficient to allow evaluation of LV diastolic function. Right ventricle: Systolic pressure was mildly increased. Estimated peak   pressure was 46 mmHg. Left atrium: The atrium was severely dilated. LA volume index was 65 ml/m-sq. Right atrium: The atrium was moderately to severely dilated. The right atrial   area at systole was 27 cm-sq. COMPARISONS:  IVC has decreased in size. Comparison was made with the report of the   previous study of 20-Feb-2016. The actual study  was not available for direct comparison. LV thickness has increased from 10   mm to 13 mm. Pulmonary artery pressure has  decreased. Right atrium has increased in size. Otherwise no significant   change. INDICATIONS: Chronic Atrial fibrillation. HISTORY: Prior history: Congestive heart failure. Pulmonary hypertension. Atrial fibrillation. Risk factors:  hypertension and a history of coronary artery disease. Chronic lung disease. PROCEDURE: This was a routine study. The study included complete 2D imaging,   M-mode, complete spectral Doppler, and  color Doppler. The heart rate was 97 bpm, at the start of the study. Systolic   blood pressure was 148 mmHg, at the start  of the study. Diastolic blood pressure was 78 mmHg, at the start of the   study.  Echocardiographic views were limited by  poor acoustic window availability due to lung interference. Image quality was   fair. LEFT VENTRICLE: Size was normal. Systolic function was by visual assessment. Ejection fraction was estimated to be 55  %. There was hypokinesis of the basal-mid anteroseptal wall(s). Wall   thickness was mildly increased. DOPPLER: The study  was not technically sufficient to allow evaluation of LV diastolic function. RIGHT VENTRICLE: The size was at the upper limits of normal. Systolic   function was normal. DOPPLER: Systolic pressure  was mildly increased. Estimated peak pressure was 46 mmHg. LEFT ATRIUM: The atrium was severely dilated. LA volume index was 65 ml/m-sq. RIGHT ATRIUM: The atrium was moderately to severely dilated. MITRAL VALVE: There was annular calcification. There was mild calcification,   with mild chordal involvement. There was  normal leaflet separation. DOPPLER: There was no evidence for stenosis. There   was mild regurgitation. AORTIC VALVE: The valve was trileaflet. Leaflets exhibited mildly increased   thickness and normal cuspal separation. DOPPLER: There was no stenosis. There was trivial regurgitation. TRICUSPID VALVE: Normal valve structure. There was normal leaflet separation.   The chordae were fibrosed. DOPPLER: There  was no evidence for tricuspid stenosis. There was moderate regurgitation. Right atrial pressure estimate: 3 mmHg. PULMONIC VALVE: Leaflets exhibited normal thickness, no calcification, and   normal cuspal separation. DOPPLER: There was  trivial regurgitation. AORTA: The root exhibited normal size. SYSTEMIC VEINS: IVC: The inferior vena cava was normal in size and course. The respirophasic change in diameter was  more than 50%. PERICARDIUM: Insignificant pericardial effusion and/or pericardial fat was   present. MEASUREMENT TABLES    2D measurements  Aorta   (Reference normals)  Root diam   34 mm   (--)  Right atrium   (Reference normals)  Area sys   27 cm-sq   (8.3-19. 5)    SYSTEM MEASUREMENT TABLES    2D  Ao Diam: 3.42 cm  IVSd: 1.31 cm  LVIDd: 4.13 cm  LVIDs: 2.95 cm  LVPWd: 1.33 cm  IVC: 1.85 cm  LAAs A2C: 32.59 cm2  LAAs A4C: 29.44 cm2  LAESV A-L A2C: 133.77 ml  LAESV A-L A4C: 108.52 ml  LAESV Index (A-L): 65.3 ml/m2  LAESV MOD A2C: 128.95 ml  LAESV MOD A4C: 102 ml  LAESV(A-L): 120.81 ml  LALs A2C: 6.74 cm  LALs A4C: 6.78 cm  LVOT Diam: 2.25 cm  RA Area: 26.98 cm2  RV Minor: 4.38 cm    CW  TR Vmax: 3.28 m/s  TR maxP.08 mmHG    MM  Tapse: 1.95 cm    PW  LVOT VTI: 12.64 cm  LVOT Vmax: 0.87 m/s  LVOT Vmean: 0.54 m/s  LVOT Env. Ti: 234.02 ms  LVOT maxPG: 3.06 mmHG  LVOT meanP.5 mmHG  LVSI Dopp: 27.12 ml/m2  LVSV Dopp: 50.18 ml    Prepared and E-signed by    Raiza Cummings DO  Signed 2018 18:26:07                ASSESSMENT and PLAN  Encounter Diagnoses   Name Primary?  Chronic obstructive pulmonary disease, unspecified COPD type ([facility] [facility] Yes    Pulmonary HTN ([facility]     Status post AAA (abdominal aortic aneurysm) repair     Essential hypertension     Left inguinal hernia     Hypoxemia requiring supplemental oxygen      Pt with worsening SOB related to progression of COPD and development of Pulmonary HTN. Will need to optimize lung function by restarting LABA/LAMA combination. Pt provided with Stiolto sample and instructions on device use. Also given information on Rx assistance. Discontinue Qvar and change to Albtuerol nebulized from [facility]. Rx written. Pulmonary HTN probably worsened by suboptimal O2 dosing. Per ambulatory oximetry will increase O2 to 3 LNC with activity but continue 2 LNC at rest ant HS. Although ARISCAT score is intermediate for post op pulmonary complications, the severity of pt's obstruction does increase the likelihood of post op respiratory failure and need for mechanical ventilation, thus stressing importance of optimizing lung function. Reviewed spirometry and Echo with pt. RTC 4 weeks.

## 2018-06-05 ENCOUNTER — TELEPHONE (OUTPATIENT)
Dept: CARDIOLOGY CLINIC | Age: 78
End: 2018-06-05

## 2018-06-05 NOTE — TELEPHONE ENCOUNTER
----- Message from [id number] Denise Barron MD sent at 5/3/2018  2:49 PM EDT -----  Skin change in his echocardiogram.  His LV function is normal.  ----- Message -----     From: Constanza Bridges RN     Sent: 4/13/2018   8:18 AM       To: [id number] Denise Barron MD    Per your last note \" He states that he may need hernia surgery. I will check a repeat echocardiogram to reassess his overall left ventricular systolic function and valvular integrity. I suspect that from cardiac standpoint, he would be able to tolerate the hernia surgery. Unfortunately, he has severe COPD and pulmonary hypertension; this may be the limiting factor.

## 2018-06-05 NOTE — TELEPHONE ENCOUNTER
Called and informed patient his echocardiogram was normal.  This has been fully explained to the patient, who indicates understanding.

## 2018-06-05 NOTE — TELEPHONE ENCOUNTER
----- Message from [id number] Denise Barron MD sent at 5/3/2018  2:49 PM EDT -----  Skin change in his echocardiogram.  His LV function is normal.  ----- Message -----     From: Belkys Nolasco RN     Sent: 4/13/2018   8:18 AM       To: [id number] Denise Barron MD    Per your last note \" He states that he may need hernia surgery. I will check a repeat echocardiogram to reassess his overall left ventricular systolic function and valvular integrity. I suspect that from cardiac standpoint, he would be able to tolerate the hernia surgery. Unfortunately, he has severe COPD and pulmonary hypertension; this may be the limiting factor.

## 2018-06-06 ENCOUNTER — OFFICE VISIT (OUTPATIENT)
Dept: PULMONOLOGY | Age: 78
End: 2018-06-06

## 2018-06-06 VITALS
WEIGHT: 154 LBS | BODY MASS INDEX: 24.17 KG/M2 | HEART RATE: 84 BPM | TEMPERATURE: 97.5 F | DIASTOLIC BLOOD PRESSURE: 70 MMHG | OXYGEN SATURATION: 99 % | RESPIRATION RATE: 20 BRPM | HEIGHT: 67 IN | SYSTOLIC BLOOD PRESSURE: 134 MMHG

## 2018-06-06 DIAGNOSIS — R09.02 HYPOXEMIA REQUIRING SUPPLEMENTAL OXYGEN: ICD-10-CM

## 2018-06-06 DIAGNOSIS — K40.21 BILATERAL RECURRENT INGUINAL HERNIA WITHOUT OBSTRUCTION OR GANGRENE: ICD-10-CM

## 2018-06-06 DIAGNOSIS — Z99.81 HYPOXEMIA REQUIRING SUPPLEMENTAL OXYGEN: ICD-10-CM

## 2018-06-06 DIAGNOSIS — I48.20 CHRONIC ATRIAL FIBRILLATION (HCC): Chronic | ICD-10-CM

## 2018-06-06 DIAGNOSIS — I27.20 PULMONARY HYPERTENSION (HCC): ICD-10-CM

## 2018-06-06 DIAGNOSIS — J44.9 COPD, SEVERE (HCC): Primary | ICD-10-CM

## 2018-06-06 NOTE — PROGRESS NOTES
HISTORY OF PRESENT ILLNESS  Pineda Jackson is a 77 y.o. male with COPD FEV1 48%. HPI Comments: Follow up for this pt with severe COPD on home O2. Pt was started on Stiolto on last visit and notes improvement in dyspnea at rest and some improvement in DUMONT. Pt still with complaints of dyspnea with moderate exertion but no chest pain. Pt derived some benefit from Pulmonary Rehab before but has not continued regular exercise regimen after completing the 13 week program. He was last admitted to the hospital in 2016. Pt is being seen today for pulmonary management prior to inguinal hernia surgery. He complains of almost constant pain from this hernia and is using opiates up to three time daily. He is on LTOT and notes improved adherence to O2 therapy. He reports less frequent SOB and wheezing after starting Stiolto. Recent echo also shows elevated right heart pressures, see below. Pt notes intermittent pedal edema with no orthopnea or PND.'    COPD   The history is provided by the patient. This is a chronic problem. Episode onset: years. The problem has been gradually worsening. Associated symptoms include abdominal pain. Pertinent negatives include no chest pain and no headaches. Shortness of breath: improving. The symptoms are aggravated by exertion and walking. The symptoms are relieved by rest. Treatments tried: Stiolto. The treatment provided significant relief. Review of Systems   Constitutional: Positive for malaise/fatigue. Negative for chills, diaphoresis, fever and weight loss. HENT: Positive for hearing loss. Negative for congestion, ear discharge, ear pain, nosebleeds, sinus pain, sore throat and tinnitus. Eyes: Negative for blurred vision, double vision, photophobia, pain, discharge and redness. Respiratory: Negative for hemoptysis, sputum production and stridor. Cough: dry. Shortness of breath: improving. Wheezing: slightly improved.     Cardiovascular: Negative for chest pain, palpitations, orthopnea, claudication, leg swelling and PND. Gastrointestinal: Positive for abdominal pain. Negative for blood in stool, constipation, diarrhea, heartburn, melena, nausea and vomiting. Genitourinary: Negative for dysuria, flank pain, frequency, hematuria and urgency. Musculoskeletal: Negative for back pain, falls, joint pain, myalgias and neck pain. Skin: Negative for itching and rash. Neurological: Negative for dizziness, tingling, tremors, sensory change, speech change, focal weakness, seizures, loss of consciousness, weakness and headaches. Endo/Heme/Allergies: Negative for environmental allergies and polydipsia. Does not bruise/bleed easily. Psychiatric/Behavioral: Negative for depression, hallucinations, memory loss, substance abuse and suicidal ideas. The patient is not nervous/anxious and does not have insomnia. Past Medical History:   Diagnosis Date    Aneurysm [facility])     AAA repair 2006 & 2012    Aorto-iliac duplex 02/13/2015    Tech difficult. Patent aorta bi-iliac endograft w/o leak or limb dysfunction.  Arrhythmia     a fib    Cardiac cath 11/01/2012    RCA (sm, nondom) patent. LM patent. LAD 25%. CX (dom) 45% mid. LVEDP 12 mmHg. No WMA. PA 27/12. W 10-12. CO/CI 5.2/2.6 (TD).  Cardiac echocardiogram, abnormal 02/20/2016    EF 55%. No WMA. Indeterminate diastolic fx. RVSP 60 mmHg. Severe LAE. Mild MR. Mod TR.  IVCE. Similar to study of 10/26/12.  Cardiovascular aorto-iliac duplex 02/13/2015    Patent aorta bi-iliac endovascular graft w/o leak or limb dysfunction. Sac measures 4.21 x 4.47 cm (4.4 x 4.6 cm on 1/31/14).  Cardiovascular LE peripheral arterial testing 07/29/2013    No significant LE arterial disease bilaterally. R GHADA 1. 12.  L GHADA 1. 12.  R DBI 0.83. L DBI 0.71. Exercise deferred.  Cardiovascular renal duplex 10/31/2012    No RA stenosis. Intrinsic/med disease in left kidney. Patent aortic endograft.   Patent, thrombus-free renal veins bilaterally.  Carotid duplex 07/29/2013    Mild <50% bilateral ICA plaquing.  Chronic lung disease     Cigarette smoker     Congestive heart failure (CHF) (HCC)     COPD (chronic obstructive pulmonary disease) (HCC)     and emphysema; likely secondary to tobacco abuse    Difficult intubation     Dyslipidemia     low HDL    Emphysema     HTN (hypertension)     Hypercholesterolemia     Increased prostate specific antigen (PSA) velocity     Long term (current) use of anticoagulants     coumadin    Osteoarthritis     Osteomyelitis (HCC)     Paroxysmal atrial fibrillation (HCC)     Peripheral vascular disease ([facility]     AAA repair 12/2007    Persistent atrial Fibrillation     Persistent atrial fibrillation ([facility]     Unspecified adverse effect of anesthesia     difficulty breathing placed in ICU Oct 2012 (AAA repair)     Current Outpatient Prescriptions on File Prior to Visit   Medication Sig Dispense Refill    tiotropium-olodaterol (STIOLTO RESPIMAT) 2.5-2.5 mcg/actuation mist Take 2 Puffs by inhalation daily. 1 Inhaler 0    tiotropium-olodaterol (STIOLTO RESPIMAT) 2.5-2.5 mcg/actuation mist Take 2 Inhalation by inhalation daily. 1 Inhaler 5    albuterol (PROVENTIL VENTOLIN) 2.5 mg /3 mL (0.083 %) nebulizer solution 3 mL by Nebulization route every four (4) hours as needed for Wheezing. 24 Each 3    digoxin (DIGITEK) 0.125 mg tablet take 1 tablet by mouth once daily 90 Tab 3    warfarin (COUMADIN) 2.5 mg tablet Take 1 Tab by mouth daily. Take 2 pills (5 mg) Mon, Tues, Wed, Thur, Sat, Sun; and 1 pill only  (2.5 mg) Fri 60 Tab 0    chlorthalidone (HYGROTEN) 25 mg tablet Take 25 mg by mouth daily. 0    amLODIPine (NORVASC) 10 mg tablet Take 10 mg by mouth daily. 0    OXYGEN-AIR DELIVERY SYSTEMS 2 L/min by Does Not Apply route daily. Continuous.  Company is [employer]         traMADol (ULTRAM) 50 mg tablet Take 1 Tab by mouth every six (6) hours as needed for Pain. Max Daily Amount: 200 mg. 10 Tab 0    QVAR 80 mcg/actuation aero   0     No current facility-administered medications on file prior to visit. Allergies   Allergen Reactions    Codeine Swelling    Morphine Itching    Sulfa (Sulfonamide Antibiotics) Other (comments)     Kidney problems. Social History     Social History    Marital status:      Spouse name: N/A    Number of children: N/A    Years of education: N/A     Occupational History    Not on file. Social History Main Topics    Smoking status: Former Smoker     Packs/day: 1.00     Years: 54.00     Types: Cigarettes     Quit date: 10/23/2012    Smokeless tobacco: Never Used    Alcohol use No      Comment: quit drinking alcohol 26 years ago, patient states stopped in \"1983\"    Drug use: No    Sexual activity: Not on file     Other Topics Concern    Not on file     Social History Narrative     Blood pressure 134/70, pulse 84, temperature 97.5 °F (36.4 °C), temperature source Oral, resp. rate 20, height 5' 7\" (1.702 m), weight 69.9 kg (154 lb), SpO2 99 %. Ambulatory oxymetry per nurse note. Physical Exam   Constitutional: He is oriented to person, place, and time. He appears well-developed. No distress. Slender    HENT:   Head: Normocephalic and atraumatic. Nose: Nose normal.   Poor dentition   Eyes: Conjunctivae are normal. Pupils are equal, round, and reactive to light. Right eye exhibits no discharge. Left eye exhibits no discharge. No scleral icterus. Neck: No JVD present. No tracheal deviation present. No thyromegaly present. Cardiovascular: Normal rate and intact distal pulses. No murmur heard. Irregular rhythm   Pulmonary/Chest: Effort normal. No stridor. No respiratory distress. He has no wheezes. He has no rales. He exhibits no tenderness. Diminished breath sounds   Abdominal: Soft. He exhibits no mass. There is no tenderness. There is no rebound.    Musculoskeletal: He exhibits no edema or tenderness. Lymphadenopathy:     He has no cervical adenopathy. Neurological: He is alert and oriented to person, place, and time. Skin: Skin is warm and dry. No rash noted. He is not diaphoretic. No erythema. Psychiatric: He has a normal mood and affect. His behavior is normal.     Spirometry: severe obstruction FEV1 48%. Reversible component demonstrated    2018  6:26 PM - Azeem, Card Result In       [facility]  [facility], [phone]    Transthoracic Echocardiogram    Patient: Sonido Schreer  MRN: [id number]  [address] #: [de-identified]  : 1940  Age: 77 years  Gender: Male  Height: 67 in  Weight: 161.7 lb  BSA: 1.85 m-sq  BP: 148 / 78 mmHg  Study date: 2018  Status: Routine  Location: [facility] DMS [address] #: [id number]    Allergies: CODEINE, MORPHINE, SULFA (SULFONAMIDE ANTIBIOTICS)    Referring_Ordering Physician: Jeane Cortes. Nalini Quan MD  Interpreting Group: [facility] AND [facility] GROUP  Interpreting Physician: Yadi Martell DO  Technologist: Carlyn Chavez RDMS    SUMMARY:  Left ventricle: Size was normal. Systolic function was by visual assessment. Ejection fraction was estimated to be 55  %. There was hypokinesis of the basal-mid anteroseptal wall(s). Wall   thickness was mildly increased. The study was not  technically sufficient to allow evaluation of LV diastolic function. Right ventricle: Systolic pressure was mildly increased. Estimated peak   pressure was 46 mmHg. Left atrium: The atrium was severely dilated. LA volume index was 65 ml/m-sq. Right atrium: The atrium was moderately to severely dilated. The right atrial   area at systole was 27 cm-sq. COMPARISONS:  IVC has decreased in size. Comparison was made with the report of the   previous study of 2016. The actual study  was not available for direct comparison. LV thickness has increased from 10   mm to 13 mm. Pulmonary artery pressure has  decreased.  Right atrium has increased in size. Otherwise no significant   change. INDICATIONS: Chronic Atrial fibrillation. HISTORY: Prior history: Congestive heart failure. Pulmonary hypertension. Atrial fibrillation. Risk factors:  hypertension and a history of coronary artery disease. Chronic lung disease. PROCEDURE: This was a routine study. The study included complete 2D imaging,   M-mode, complete spectral Doppler, and  color Doppler. The heart rate was 97 bpm, at the start of the study. Systolic   blood pressure was 148 mmHg, at the start  of the study. Diastolic blood pressure was 78 mmHg, at the start of the   study. Echocardiographic views were limited by  poor acoustic window availability due to lung interference. Image quality was   fair. LEFT VENTRICLE: Size was normal. Systolic function was by visual assessment. Ejection fraction was estimated to be 55  %. There was hypokinesis of the basal-mid anteroseptal wall(s). Wall   thickness was mildly increased. DOPPLER: The study  was not technically sufficient to allow evaluation of LV diastolic function. RIGHT VENTRICLE: The size was at the upper limits of normal. Systolic   function was normal. DOPPLER: Systolic pressure  was mildly increased. Estimated peak pressure was 46 mmHg. LEFT ATRIUM: The atrium was severely dilated. LA volume index was 65 ml/m-sq. RIGHT ATRIUM: The atrium was moderately to severely dilated. MITRAL VALVE: There was annular calcification. There was mild calcification,   with mild chordal involvement. There was  normal leaflet separation. DOPPLER: There was no evidence for stenosis. There   was mild regurgitation. AORTIC VALVE: The valve was trileaflet. Leaflets exhibited mildly increased   thickness and normal cuspal separation. DOPPLER: There was no stenosis. There was trivial regurgitation. TRICUSPID VALVE: Normal valve structure. There was normal leaflet separation.   The chordae were fibrosed.  DOPPLER: There  was no evidence for tricuspid stenosis. There was moderate regurgitation. Right atrial pressure estimate: 3 mmHg. PULMONIC VALVE: Leaflets exhibited normal thickness, no calcification, and   normal cuspal separation. DOPPLER: There was  trivial regurgitation. AORTA: The root exhibited normal size. SYSTEMIC VEINS: IVC: The inferior vena cava was normal in size and course. The respirophasic change in diameter was  more than 50%. PERICARDIUM: Insignificant pericardial effusion and/or pericardial fat was   present. MEASUREMENT TABLES    2D measurements  Aorta   (Reference normals)  Root diam   34 mm   (--)  Right atrium   (Reference normals)  Area sys   27 cm-sq   (8.3-19. 5)    SYSTEM MEASUREMENT TABLES    2D  Ao Diam: 3.42 cm  IVSd: 1.31 cm  LVIDd: 4.13 cm  LVIDs: 2.95 cm  LVPWd: 1.33 cm  IVC: 1.85 cm  LAAs A2C: 32.59 cm2  LAAs A4C: 29.44 cm2  LAESV A-L A2C: 133.77 ml  LAESV A-L A4C: 108.52 ml  LAESV Index (A-L): 65.3 ml/m2  LAESV MOD A2C: 128.95 ml  LAESV MOD A4C: 102 ml  LAESV(A-L): 120.81 ml  LALs A2C: 6.74 cm  LALs A4C: 6.78 cm  LVOT Diam: 2.25 cm  RA Area: 26.98 cm2  RV Minor: 4.38 cm    CW  TR Vmax: 3.28 m/s  TR maxP.08 mmHG    MM  Tapse: 1.95 cm    PW  LVOT VTI: 12.64 cm  LVOT Vmax: 0.87 m/s  LVOT Vmean: 0.54 m/s  LVOT Env. Ti: 234.02 ms  LVOT maxPG: 3.06 mmHG  LVOT meanP.5 mmHG  LVSI Dopp: 27.12 ml/m2  LVSV Dopp: 50.18 ml    Prepared and E-signed by    Raiza Cummings DO  Signed 2018 18:26:07                ASSESSMENT and PLAN  Encounter Diagnoses   Name Primary?  COPD, severe ([address] Yes    Pulmonary hypertension ([address]     Bilateral recurrent inguinal hernia without obstruction or gangrene     Hypoxemia requiring supplemental oxygen     Chronic atrial fibrillation (HCC)      Pt with SOB related to severe COPD and Pulmonary HTN.    Would proceed with inguinal hernia surgery given the severity of pt's symptoms, and with the understanding of pt's intermediate risk for post op Pulmonary complications. Would prefer spinal or regional anesthesia over GET as much as possible given the extent of pt's lung disease. He is on dual bronchodilator therapy and ICS, pretty much maximal therapy. Would continue supplemental O2. Recommend one preoperative dose of Solumedrol 60 mg but none post op for concerns with wound healing. Close attention to bronchial hygiene, incentive spirometry and early mobilization. Pt to continue maintenance inhalers perioperatively. RTC 2 weeks after surgery.

## 2018-06-06 NOTE — PROGRESS NOTES
Chief Complaint   Patient presents with    COPD     1. Have you been to the ER, urgent care clinic since your last visit? Hospitalized since your last visit? No    2. Have you seen or consulted any other health care providers outside of the [address] [place] [address] since your last visit? Include any pap smears or colon screening. Yes Where: [facility]  [address] [facility]. [address], [address]  [facility]  () [phone]      Simple walk test done in office today. Qualifying O2 sats:     O2 Sat at rest on 3L O2 is : 98 %, Pulse 87, SOB scale 0    Walked: 34   m on 3L O2 : 97 %, Pulse 99, SOB scale 0      68   m on 3L O2 : 97 %, Pulse 115, SOB scale 0      102 m on 3L O2 : 96 %, Pulse 115, SOB scale 5      O2 Sat at rest on 3L is: 99 %, Pulse 134, SOB scale 5      Tech comments regarding testing: Patient walked a total of 102 meters with increase shortness of breath and fatigue.       Dexter Srivastava LPN

## 2018-06-17 ENCOUNTER — HOSPITAL ENCOUNTER (EMERGENCY)
Age: 78
Discharge: HOME | End: 2018-06-17
Attending: EMERGENCY MEDICINE
Payer: MEDICARE

## 2018-06-17 ENCOUNTER — APPOINTMENT (OUTPATIENT)
Dept: GENERAL RADIOLOGY | Age: 78
End: 2018-06-17
Attending: EMERGENCY MEDICINE
Payer: MEDICARE

## 2018-06-17 VITALS
BODY MASS INDEX: 24.75 KG/M2 | OXYGEN SATURATION: 100 % | DIASTOLIC BLOOD PRESSURE: 90 MMHG | RESPIRATION RATE: 12 BRPM | HEIGHT: 66 IN | HEART RATE: 82 BPM | SYSTOLIC BLOOD PRESSURE: 166 MMHG | WEIGHT: 154 LBS | TEMPERATURE: 97 F

## 2018-06-17 DIAGNOSIS — J44.1 COPD EXACERBATION (HCC): Primary | ICD-10-CM

## 2018-06-17 DIAGNOSIS — E87.6 HYPOKALEMIA: ICD-10-CM

## 2018-06-17 LAB
ALBUMIN SERPL-MCNC: 3.4 G/DL (ref 3.4–5)
ALBUMIN/GLOB SERPL: 0.7 {RATIO} (ref 0.8–1.7)
ALP SERPL-CCNC: 82 U/L (ref 45–117)
ALT SERPL-CCNC: 12 U/L (ref 16–61)
ANION GAP SERPL CALC-SCNC: 11 MMOL/L (ref 3–18)
AST SERPL-CCNC: 15 U/L (ref 15–37)
BASOPHILS # BLD: 0 K/UL (ref 0–0.1)
BASOPHILS NFR BLD: 0 % (ref 0–2)
BILIRUB SERPL-MCNC: 0.7 MG/DL (ref 0.2–1)
BUN SERPL-MCNC: 16 MG/DL (ref 7–18)
BUN/CREAT SERPL: 11 (ref 12–20)
CALCIUM SERPL-MCNC: 9 MG/DL (ref 8.5–10.1)
CHLORIDE SERPL-SCNC: 87 MMOL/L (ref 100–108)
CK MB CFR SERPL CALC: ABNORMAL % (ref 0–4)
CK MB SERPL-MCNC: <1 NG/ML (ref 5–25)
CK SERPL-CCNC: 37 U/L (ref 39–308)
CO2 SERPL-SCNC: 31 MMOL/L (ref 21–32)
CREAT SERPL-MCNC: 1.5 MG/DL (ref 0.6–1.3)
DIFFERENTIAL METHOD BLD: ABNORMAL
EOSINOPHIL # BLD: 0 K/UL (ref 0–0.4)
EOSINOPHIL NFR BLD: 0 % (ref 0–5)
ERYTHROCYTE [DISTWIDTH] IN BLOOD BY AUTOMATED COUNT: 14.4 % (ref 11.6–14.5)
GLOBULIN SER CALC-MCNC: 5 G/DL (ref 2–4)
GLUCOSE SERPL-MCNC: 117 MG/DL (ref 74–99)
HCT VFR BLD AUTO: 26.9 % (ref 36–48)
HGB BLD-MCNC: 8.6 G/DL (ref 13–16)
LYMPHOCYTES # BLD: 1.1 K/UL (ref 0.9–3.6)
LYMPHOCYTES NFR BLD: 12 % (ref 21–52)
MCH RBC QN AUTO: 24.3 PG (ref 24–34)
MCHC RBC AUTO-ENTMCNC: 32 G/DL (ref 31–37)
MCV RBC AUTO: 76 FL (ref 74–97)
MONOCYTES # BLD: 0.7 K/UL (ref 0.05–1.2)
MONOCYTES NFR BLD: 8 % (ref 3–10)
NEUTS SEG # BLD: 6.9 K/UL (ref 1.8–8)
NEUTS SEG NFR BLD: 80 % (ref 40–73)
PLATELET # BLD AUTO: 312 K/UL (ref 135–420)
PMV BLD AUTO: 8.3 FL (ref 9.2–11.8)
POTASSIUM SERPL-SCNC: 2.9 MMOL/L (ref 3.5–5.5)
PROT SERPL-MCNC: 8.4 G/DL (ref 6.4–8.2)
RBC # BLD AUTO: 3.54 M/UL (ref 4.7–5.5)
SODIUM SERPL-SCNC: 129 MMOL/L (ref 136–145)
TROPONIN I SERPL-MCNC: <0.02 NG/ML (ref 0–0.04)
WBC # BLD AUTO: 8.7 K/UL (ref 4.6–13.2)

## 2018-06-17 PROCEDURE — 74011000250 HC RX REV CODE- 250: Performed by: EMERGENCY MEDICINE

## 2018-06-17 PROCEDURE — 77030029684 HC NEB SM VOL KT MONA -A

## 2018-06-17 PROCEDURE — 93005 ELECTROCARDIOGRAM TRACING: CPT

## 2018-06-17 PROCEDURE — 99284 EMERGENCY DEPT VISIT MOD MDM: CPT

## 2018-06-17 PROCEDURE — 80053 COMPREHEN METABOLIC PANEL: CPT | Performed by: EMERGENCY MEDICINE

## 2018-06-17 PROCEDURE — 96365 THER/PROPH/DIAG IV INF INIT: CPT

## 2018-06-17 PROCEDURE — 74011250636 HC RX REV CODE- 250/636: Performed by: EMERGENCY MEDICINE

## 2018-06-17 PROCEDURE — 82550 ASSAY OF CK (CPK): CPT | Performed by: EMERGENCY MEDICINE

## 2018-06-17 PROCEDURE — 94640 AIRWAY INHALATION TREATMENT: CPT

## 2018-06-17 PROCEDURE — 74011250637 HC RX REV CODE- 250/637: Performed by: EMERGENCY MEDICINE

## 2018-06-17 PROCEDURE — 71046 X-RAY EXAM CHEST 2 VIEWS: CPT

## 2018-06-17 PROCEDURE — 85025 COMPLETE CBC W/AUTO DIFF WBC: CPT | Performed by: EMERGENCY MEDICINE

## 2018-06-17 PROCEDURE — 96375 TX/PRO/DX INJ NEW DRUG ADDON: CPT

## 2018-06-17 RX ORDER — POTASSIUM CHLORIDE 20 MEQ/1
40 TABLET, EXTENDED RELEASE ORAL
Status: COMPLETED | OUTPATIENT
Start: 2018-06-17 | End: 2018-06-17

## 2018-06-17 RX ORDER — POTASSIUM CHLORIDE 7.45 MG/ML
10 INJECTION INTRAVENOUS ONCE
Status: COMPLETED | OUTPATIENT
Start: 2018-06-17 | End: 2018-06-17

## 2018-06-17 RX ORDER — PREDNISONE 50 MG/1
50 TABLET ORAL DAILY
Qty: 5 TAB | Refills: 0 | Status: SHIPPED | OUTPATIENT
Start: 2018-06-17 | End: 2018-06-22

## 2018-06-17 RX ORDER — IPRATROPIUM BROMIDE AND ALBUTEROL SULFATE 2.5; .5 MG/3ML; MG/3ML
3 SOLUTION RESPIRATORY (INHALATION) ONCE
Status: COMPLETED | OUTPATIENT
Start: 2018-06-17 | End: 2018-06-17

## 2018-06-17 RX ORDER — ONDANSETRON 4 MG/1
4 TABLET, FILM COATED ORAL
Qty: 12 TAB | Refills: 0 | Status: SHIPPED | OUTPATIENT
Start: 2018-06-17 | End: 2018-07-12

## 2018-06-17 RX ADMIN — POTASSIUM CHLORIDE 10 MEQ: 7.46 INJECTION, SOLUTION INTRAVENOUS at 17:02

## 2018-06-17 RX ADMIN — IPRATROPIUM BROMIDE AND ALBUTEROL SULFATE 3 ML: 2.5; .5 SOLUTION RESPIRATORY (INHALATION) at 16:23

## 2018-06-17 RX ADMIN — POTASSIUM CHLORIDE 40 MEQ: 20 TABLET, EXTENDED RELEASE ORAL at 17:02

## 2018-06-17 RX ADMIN — METHYLPREDNISOLONE SODIUM SUCCINATE 125 MG: 125 INJECTION, POWDER, FOR SOLUTION INTRAMUSCULAR; INTRAVENOUS at 16:23

## 2018-06-17 NOTE — DISCHARGE INSTRUCTIONS
Chronic Obstructive Pulmonary Disease (COPD): Care Instructions  Your Care Instructions    Chronic obstructive pulmonary disease (COPD) is a general term for a group of lung diseases, including emphysema and chronic bronchitis. People with COPD have decreased airflow in and out of the lungs, which makes it hard to breathe. The airways also can get clogged with thick mucus. Cigarette smoking is a major cause of COPD. Although there is no cure for COPD, you can slow its progress. Following your treatment plan and taking care of yourself can help you feel better and live longer. Follow-up care is a key part of your treatment and safety. Be sure to make and go to all appointments, and call your doctor if you are having problems. It's also a good idea to know your test results and keep a list of the medicines you take. How can you care for yourself at home? ?Staying healthy  ? · Do not smoke. This is the most important step you can take to prevent more damage to your lungs. If you need help quitting, talk to your doctor about stop-smoking programs and medicines. These can increase your chances of quitting for good. ? · Avoid colds and flu. Get a pneumococcal vaccine shot. If you have had one before, ask your doctor whether you need a second dose. Get the flu vaccine every fall. If you must be around people with colds or the flu, wash your hands often. ? · Avoid secondhand smoke, air pollution, and high altitudes. Also avoid cold, dry air and hot, humid air. Stay at home with your windows closed when air pollution is bad. ?Medicines and oxygen therapy  ? · Take your medicines exactly as prescribed. Call your doctor if you think you are having a problem with your medicine. ? · You may be taking medicines such as:  ¨ Bronchodilators. These help open your airways and make breathing easier. Bronchodilators are either short-acting (work for 6 to 9 hours) or long-acting (work for 24 hours).  You inhale most bronchodilators, so they start to act quickly. Always carry your quick-relief inhaler with you in case you need it while you are away from home. ¨ Corticosteroids (prednisone, budesonide). These reduce airway inflammation. They come in pill or inhaled form. You must take these medicines every day for them to work well. ? · A spacer may help you get more inhaled medicine to your lungs. Ask your doctor or pharmacist if a spacer is right for you. If it is, ask how to use it properly. ? · Do not take any vitamins, over-the-counter medicine, or herbal products without talking to your doctor first.   ? · If your doctor prescribed antibiotics, take them as directed. Do not stop taking them just because you feel better. You need to take the full course of antibiotics. ? · Oxygen therapy boosts the amount of oxygen in your blood and helps you breathe easier. Use the flow rate your doctor has recommended, and do not change it without talking to your doctor first.   Activity  ? · Get regular exercise. Walking is an easy way to get exercise. Start out slowly, and walk a little more each day. ? · Pay attention to your breathing. You are exercising too hard if you cannot talk while you are exercising. ? · Take short rest breaks when doing household chores and other activities. ? · Learn breathing methods-such as breathing through pursed lips-to help you become less short of breath. ? · If your doctor has not set you up with a pulmonary rehabilitation program, talk to him or her about whether rehab is right for you. Rehab includes exercise programs, education about your disease and how to manage it, help with diet and other changes, and emotional support. Diet  ? · Eat regular, healthy meals. Use bronchodilators about 1 hour before you eat to make it easier to eat. Eat several small meals instead of three large ones. Drink beverages at the end of the meal. Avoid foods that are hard to chew.    ? · Eat foods that contain protein so that you do not lose muscle mass. ? · Talk with your doctor if you gain too much weight or if you lose weight without trying. ?Mental health  ? · Talk to your family, friends, or a therapist about your feelings. It is normal to feel frightened, angry, hopeless, helpless, and even guilty. Talking openly about bad feelings can help you cope. If these feelings last, talk to your doctor. When should you call for help? Call 911 anytime you think you may need emergency care. For example, call if:  ? · You have severe trouble breathing. ?Call your doctor now or seek immediate medical care if:  ? · You have new or worse trouble breathing. ? · You cough up blood. ? · You have a fever. ? Watch closely for changes in your health, and be sure to contact your doctor if:  ? · You cough more deeply or more often, especially if you notice more mucus or a change in the color of your mucus. ? · You have new or worse swelling in your legs or belly. ? · You are not getting better as expected. Where can you learn more? Go to http://brittney-eliseo.info/. Amy Arroyo in the search box to learn more about \"Chronic Obstructive Pulmonary Disease (COPD): Care Instructions. \"  Current as of: May 12, 2017  Content Version: 11.4  © 5632-9317 [employer]. Care instructions adapted under license by [employer] (which disclaims liability or warranty for this information). If you have questions about a medical condition or this instruction, always ask your healthcare professional. [address] any warranty or liability for your use of this information.

## 2018-06-17 NOTE — ED PROVIDER NOTES
EMERGENCY DEPARTMENT HISTORY AND PHYSICAL EXAM    3:28 PM      Date: 6/17/2018  Patient Name: Mago Ricks    History of Presenting Illness     Chief Complaint   Patient presents with    Generalized Body Aches    Nausea    Decreased Appetite         History Provided By: Patient and Patient's Daughter    Chief Complaint: body aches  Duration: 3 Days  Timing:  Constant  Location: generalized  Quality: Aching  Severity: Moderate  Modifying Factors:   Associated Symptoms: fatigue, reduced appetite, nausea, constipation      Additional History (Context): Mago  is a 77 y.o. male with history of hypertension, COPD, CHF, A Fib who presents with with generalized body aches with associated fatigue, nausea and reduced appetite that began 3 days ago. He denies diarrhea, vomiting or urinary symptoms. Patient has chronic SOB and is on O2 24 hours per day at home. He reports constipation associated with narcotic pain medication. No other symptoms or concerns were expressed. Pt takes Coumadin. He denies any current alcohol or tobacco use. PCP: Horace Aldridge MD    Current Outpatient Prescriptions   Medication Sig Dispense Refill    tiotropium-olodaterol (STIOLTO RESPIMAT) 2.5-2.5 mcg/actuation mist Take 2 Puffs by inhalation daily. 1 Inhaler 0    tiotropium-olodaterol (STIOLTO RESPIMAT) 2.5-2.5 mcg/actuation mist Take 2 Inhalation by inhalation daily. 1 Inhaler 5    albuterol (PROVENTIL VENTOLIN) 2.5 mg /3 mL (0.083 %) nebulizer solution 3 mL by Nebulization route every four (4) hours as needed for Wheezing. 24 Each 3    traMADol (ULTRAM) 50 mg tablet Take 1 Tab by mouth every six (6) hours as needed for Pain. Max Daily Amount: 200 mg. 10 Tab 0    digoxin (DIGITEK) 0.125 mg tablet take 1 tablet by mouth once daily 90 Tab 3    warfarin (COUMADIN) 2.5 mg tablet Take 1 Tab by mouth daily.  Take 2 pills (5 mg) Mon, Tues, Wed, Thur, Sat, Sun; and 1 pill only  (2.5 mg) Fri 60 Tab 0    QVAR 80 mcg/actuation aero   0    chlorthalidone (HYGROTEN) 25 mg tablet Take 25 mg by mouth daily. 0    amLODIPine (NORVASC) 10 mg tablet Take 10 mg by mouth daily. 0    OXYGEN-AIR DELIVERY SYSTEMS 2 L/min by Does Not Apply route daily. Continuous. Company is [employer]            Past History     Past Medical History:  Past Medical History:   Diagnosis Date    Aneurysm [facility])     AAA repair 2006 & 2012    Aorto-iliac duplex 02/13/2015    Tech difficult. Patent aorta bi-iliac endograft w/o leak or limb dysfunction.  Arrhythmia     a fib    Cardiac cath 11/01/2012    RCA (sm, nondom) patent. LM patent. LAD 25%. CX (dom) 45% mid. LVEDP 12 mmHg. No WMA. PA 27/12. W 10-12. CO/CI 5.2/2.6 (TD).  Cardiac echocardiogram, abnormal 02/20/2016    EF 55%. No WMA. Indeterminate diastolic fx. RVSP 60 mmHg. Severe LAE. Mild MR. Mod TR.  IVCE. Similar to study of 10/26/12.  Cardiovascular aorto-iliac duplex 02/13/2015    Patent aorta bi-iliac endovascular graft w/o leak or limb dysfunction. Sac measures 4.21 x 4.47 cm (4.4 x 4.6 cm on 1/31/14).  Cardiovascular LE peripheral arterial testing 07/29/2013    No significant LE arterial disease bilaterally. R GHADA 1. 12.  L GHADA 1. 12.  R DBI 0.83. L DBI 0.71. Exercise deferred.  Cardiovascular renal duplex 10/31/2012    No RA stenosis. Intrinsic/med disease in left kidney. Patent aortic endograft. Patent, thrombus-free renal veins bilaterally.  Carotid duplex 07/29/2013    Mild <50% bilateral ICA plaquing.       Chronic lung disease     Cigarette smoker     Congestive heart failure (CHF) (HCC)     COPD (chronic obstructive pulmonary disease) (HCC)     and emphysema; likely secondary to tobacco abuse    Difficult intubation     Dyslipidemia     low HDL    Emphysema     HTN (hypertension)     Hypercholesterolemia     Increased prostate specific antigen (PSA) velocity     Long term (current) use of anticoagulants     coumadin    Osteoarthritis     Osteomyelitis (HCC)     Paroxysmal atrial fibrillation (HCC)     Peripheral vascular disease ([address]     AAA repair 12/2007    Persistent atrial Fibrillation     Persistent atrial fibrillation (HCC)     Unspecified adverse effect of anesthesia     difficulty breathing placed in ICU Oct 2012 (AAA repair)       Past Surgical History:  Past Surgical History:   Procedure Laterality Date    BRONCHOSCOPY DIAGNOSTIC  11/2/2012         CARDIAC CATHETERIZATION  11/1/2012         COLONOSCOPY N/A 7/26/2016    COLONOSCOPY with Polypectomies performed by Martha Hui MD at [address] HX AAA REPAIR      2006 & 2012    HX HEART CATHETERIZATION  3/4/2009    1. RCA small, nondominant; patent. 2. LMCA patent. 3. LAD is long, wrapped around apical vessel. Diffuse, 20-30% stenosis noted. 4. CCA is large, dominant vessel. Diffuse 20-30% stenosis. 5. LVEDP is 16 mmHg. 6. Overall left ventricular systolic function mildly diminshed with est. EF 45%. Mild, global hypokinesis noted. 7. No significant mitral regurgitation or aortic stenosis noted. (see report)    HX HERNIA REPAIR  2/2014    rt inguinal     HX HERNIA REPAIR  01/2016    LEFT INGUINAL HERNIA REPAIR DR. Nicolasa More ING HERNIA,5+Y/O,JESSIE Left 1-20-16    Dr. Myriam Walsh  11/1/2012            Family History:  Family History   Problem Relation Age of Onset    Heart Surgery Father      BYPASS    Stroke Father     Heart Surgery Mother      BYPASS    Coronary Artery Disease Mother     Stroke Mother        Social History:  Social History   Substance Use Topics    Smoking status: Former Smoker     Packs/day: 1.00     Years: 54.00     Types: Cigarettes     Quit date: 10/23/2012    Smokeless tobacco: Never Used    Alcohol use No      Comment: quit drinking alcohol 26 years ago, patient states stopped in \"[id number]\"       Allergies:   Allergies   Allergen Reactions    Codeine Swelling    Morphine Itching    Sulfa (Sulfonamide Antibiotics) Other (comments)     Kidney problems. Review of Systems       Review of Systems   Constitutional: Positive for appetite change (reduced) and fatigue. Negative for chills, diaphoresis and fever. HENT: Negative. Negative for congestion, rhinorrhea and sore throat. Eyes: Negative. Negative for pain, discharge and redness. Respiratory: Positive for shortness of breath. Negative for cough, chest tightness and wheezing. Cardiovascular: Negative. Negative for chest pain. Gastrointestinal: Positive for constipation and nausea. Negative for abdominal pain, diarrhea and vomiting. Genitourinary: Negative. Negative for dysuria, flank pain, frequency, hematuria and urgency. Musculoskeletal: Positive for myalgias. Negative for back pain and neck pain. Skin: Negative. Negative for rash. Neurological: Negative. Negative for syncope, weakness, numbness and headaches. Psychiatric/Behavioral: Negative. All other systems reviewed and are negative. Physical Exam     Visit Vitals    /90    Pulse 82    Temp 97 °F (36.1 °C)    Resp 12    Ht 5' 6\" (1.676 m)    Wt 69.9 kg (154 lb)    SpO2 100%    BMI 24.86 kg/m2         Physical Exam   Constitutional: He appears well-developed and well-nourished. Non-toxic appearance. He does not have a sickly appearance. He appears ill. No distress. Home O2 constant    HENT:   Head: Normocephalic and atraumatic. Mouth/Throat: Oropharynx is clear and moist. No oropharyngeal exudate. Eyes: Conjunctivae and EOM are normal. Pupils are equal, round, and reactive to light. No scleral icterus. Neck: Trachea normal and normal range of motion. Neck supple. No hepatojugular reflux and no JVD present. No tracheal deviation present. No thyromegaly present. Cardiovascular: Normal rate, regular rhythm, S1 normal, S2 normal, normal heart sounds, intact distal pulses and normal pulses. Exam reveals no gallop, no S3 and no S4.     No murmur heard.  Pulses:       Radial pulses are 2+ on the right side, and 2+ on the left side. Dorsalis pedis pulses are 2+ on the right side, and 2+ on the left side. Pulmonary/Chest: Effort normal and breath sounds normal. No accessory muscle usage. Tachypnea noted. No respiratory distress. He has no decreased breath sounds. He has no wheezes. He has no rhonchi. He has no rales. Abdominal: Soft. Normal appearance and bowel sounds are normal. He exhibits no distension and no mass. There is no hepatosplenomegaly. There is no tenderness. There is no rigidity, no rebound, no guarding, no CVA tenderness, no tenderness at McBurney's point and negative Bradley's sign. Musculoskeletal: Normal range of motion. Strength 4/5 throughout   Lymphadenopathy:        Head (right side): No submental, no submandibular, no preauricular and no occipital adenopathy present. Head (left side): No submental, no submandibular, no preauricular and no occipital adenopathy present. He has no cervical adenopathy. Right: No supraclavicular adenopathy present. Left: No supraclavicular adenopathy present. Neurological: He is alert. He has normal strength and normal reflexes. He is not disoriented. No cranial nerve deficit or sensory deficit. Coordination and gait normal. GCS eye subscore is 4. GCS verbal subscore is 5. GCS motor subscore is 6. Grossly intact    Skin: Skin is warm, dry and intact. No rash noted. He is not diaphoretic. There is pallor. Psychiatric: He has a normal mood and affect. His speech is normal and behavior is normal. Judgment and thought content normal. Cognition and memory are normal.   Nursing note and vitals reviewed.         Diagnostic Study Results     Labs -  Recent Results (from the past 12 hour(s))   CBC WITH AUTOMATED DIFF    Collection Time: 06/17/18  4:15 PM   Result Value Ref Range    WBC 8.7 4.6 - 13.2 K/uL    RBC 3.54 (L) 4.70 - 5.50 M/uL    HGB 8.6 (L) 13.0 - 16.0 g/dL HCT 26.9 (L) 36.0 - 48.0 %    MCV 76.0 74.0 - 97.0 FL    MCH 24.3 24.0 - 34.0 PG    MCHC 32.0 31.0 - 37.0 g/dL    RDW 14.4 11.6 - 14.5 %    PLATELET 978 276 - 046 K/uL    MPV 8.3 (L) 9.2 - 11.8 FL    NEUTROPHILS 80 (H) 40 - 73 %    LYMPHOCYTES 12 (L) 21 - 52 %    MONOCYTES 8 3 - 10 %    EOSINOPHILS 0 0 - 5 %    BASOPHILS 0 0 - 2 %    ABS. NEUTROPHILS 6.9 1.8 - 8.0 K/UL    ABS. LYMPHOCYTES 1.1 0.9 - 3.6 K/UL    ABS. MONOCYTES 0.7 0.05 - 1.2 K/UL    ABS. EOSINOPHILS 0.0 0.0 - 0.4 K/UL    ABS. BASOPHILS 0.0 0.0 - 0.1 K/UL    DF AUTOMATED     METABOLIC PANEL, COMPREHENSIVE    Collection Time: 06/17/18  4:15 PM   Result Value Ref Range    Sodium 129 (L) 136 - 145 mmol/L    Potassium 2.9 (LL) 3.5 - 5.5 mmol/L    Chloride 87 (L) 100 - 108 mmol/L    CO2 31 21 - 32 mmol/L    Anion gap 11 3.0 - 18 mmol/L    Glucose 117 (H) 74 - 99 mg/dL    BUN 16 7.0 - 18 MG/DL    Creatinine 1.50 (H) 0.6 - 1.3 MG/DL    BUN/Creatinine ratio 11 (L) 12 - 20      GFR est AA 55 (L) >60 ml/min/1.73m2    GFR est non-AA 45 (L) >60 ml/min/1.73m2    Calcium 9.0 8.5 - 10.1 MG/DL    Bilirubin, total 0.7 0.2 - 1.0 MG/DL    ALT (SGPT) 12 (L) 16 - 61 U/L    AST (SGOT) 15 15 - 37 U/L    Alk.  phosphatase 82 45 - 117 U/L    Protein, total 8.4 (H) 6.4 - 8.2 g/dL    Albumin 3.4 3.4 - 5.0 g/dL    Globulin 5.0 (H) 2.0 - 4.0 g/dL    A-G Ratio 0.7 (L) 0.8 - 1.7     CARDIAC PANEL,(CK, CKMB & TROPONIN)    Collection Time: 06/17/18  4:15 PM   Result Value Ref Range    CK 37 (L) 39 - 308 U/L    CK - MB <1.0 <3.6 ng/ml    CK-MB Index  0.0 - 4.0 %     CALCULATION NOT PERFORMED WHEN RESULT IS BELOW LINEAR LIMIT    Troponin-I, Qt. <0.02 0.0 - 0.045 NG/ML   EKG, 12 LEAD, INITIAL    Collection Time: 06/17/18  4:32 PM   Result Value Ref Range    Ventricular Rate 74 BPM    Atrial Rate 182 BPM    QRS Duration 110 ms    Q-T Interval 394 ms    QTC Calculation (Bezet) 437 ms    Calculated R Axis -42 degrees    Calculated T Axis 66 degrees    Diagnosis       Atrial fibrillation  Left axis deviation  Septal infarct (cited on or before 25-AUG-2017)  Abnormal ECG  When compared with ECG of 29-AUG-2017 23:41,  ST now depressed in Lateral leads         Radiologic Studies -   XR CHEST PA LAT     No acute process. As interpreted by [employer], DO         Medical Decision Making   I am the first provider for this patient. I reviewed the vital signs, available nursing notes, past medical history, past surgical history, family history and social history. Vital Signs-Reviewed the patient's vital signs. Records Reviewed: Nursing Notes and Old Medical Records (Time of Review: 3:28 PM)    ED Course: Progress Notes, Reevaluation, and Consults:    Labs essentially normal with the exception of HGB of 8.6 (chronic), sodium of 129, potassium 2.9. Chest X-Ray showed No acute process. EKG showed Atrial Fibrillation with rate of 74 bpm. With no ST elevations or depressions. 4:51 PM 6/17/2018    Provider Notes (Medical Decision Making):  MDM  Number of Diagnoses or Management Options  COPD exacerbation [facility]):   Diagnosis management comments: Shortness of breath etiologies include chronic obstructive pulmonary disease (COPD), acute asthma exacerbation, congestive heart failure, pneumonia, acute bronchitis, pulmonary embolism, upper respiratory infection, cardiac event to include acute coronary syndrome, acute myocardial infarction or a combination of the above (ex URI on top of COPD thus causing respiratory distress). Diagnosis       I have reassessed the patient. Patient is feeling better. Patient will be prescribed Zofran and Prednisone. Patient was discharged in stable condition. Patient is to return to emergency department if any new or worsening condition. Clinical Impression:   1.  COPD exacerbation ([address]        Disposition: Discharge    Follow-up Information     Follow up With Details Comments [address] Jessica Pickens MD Call in 1 day ED visit follow-up [facility]  [address] [facility]      Horace Aldridge MD Call in 2 days ED visit follow-up [address] [address]      [address] [facility] EMERGENCY DEPT Go to As needed, If symptoms worsen [address] [place] [address]  [phone]           _______________________________    Attestations:  Scribe Attestation     Hanna Stone acting as a scribe for and in the presence of Princess Fili DO      June 17, 2018 at 3:28 PM       Provider Attestation:      I personally performed the services described in the documentation, reviewed the documentation, as recorded by the scribe in my presence, and it accurately and completely records my words and actions.  June 17, 2018 at 3:28 PM - Princess Fili DO    _______________________________

## 2018-06-17 NOTE — ED TRIAGE NOTES
Patient states he is feeling really bad. He complains of nausea, heaving without vomiting and just hurts all over for the past 2 or 3 days.

## 2018-06-17 NOTE — Clinical Note
Take your prescribed medication as directed. Follow up with your pulmonologist and primary care provider. Return to the emergency room with any new or worsening conditions.

## 2018-06-17 NOTE — ED NOTES
Patient alert and oriented, no distress. Ambulatory with steady gait at departure. Accompanied home with 2 family members.

## 2018-06-19 LAB
ATRIAL RATE: 182 BPM
CALCULATED R AXIS, ECG10: -42 DEGREES
CALCULATED T AXIS, ECG11: 66 DEGREES
DIAGNOSIS, 93000: NORMAL
Q-T INTERVAL, ECG07: 394 MS
QRS DURATION, ECG06: 110 MS
QTC CALCULATION (BEZET), ECG08: 437 MS
VENTRICULAR RATE, ECG03: 74 BPM

## 2018-06-22 ENCOUNTER — OFFICE VISIT (OUTPATIENT)
Dept: SURGERY | Age: 78
End: 2018-06-22

## 2018-06-22 VITALS
SYSTOLIC BLOOD PRESSURE: 162 MMHG | HEIGHT: 66 IN | BODY MASS INDEX: 24.91 KG/M2 | HEART RATE: 129 BPM | OXYGEN SATURATION: 92 % | WEIGHT: 155 LBS | RESPIRATION RATE: 20 BRPM | DIASTOLIC BLOOD PRESSURE: 80 MMHG

## 2018-06-22 DIAGNOSIS — R06.00 DYSPNEA, UNSPECIFIED TYPE: ICD-10-CM

## 2018-06-22 DIAGNOSIS — J42 CHRONIC BRONCHITIS, UNSPECIFIED CHRONIC BRONCHITIS TYPE (HCC): ICD-10-CM

## 2018-06-22 DIAGNOSIS — K40.91 RECURRENT RIGHT INGUINAL HERNIA: Primary | ICD-10-CM

## 2018-06-22 NOTE — PROGRESS NOTES
Miguel Todd has been given the following recommendations today due to his elevated BP reading: please follow up with pcp regarding elevated bp.

## 2018-06-22 NOTE — PROGRESS NOTES
General Surgery Consult      Chuyita Irizarry  Admit date: (Not on file)    MRN: [id number]     : 1940     Age: 77 y.o. Attending Physician: Leydi Gardner MD [facility]      History of Present Illness:     Chuyita Irizarry is a 77 y.o. male  who is well-known to me. The patient had bilateral open inguinal hernia repair in the past .  He has been having a bulge and some discomfort on the right groin at the site of a recurrent right inguinal hernia. However the patient has severe pulmonary disease as well as cardiac disease. And recently he was not clear for general anesthesia but only for epidural or spinal giving his severe pulmonary exacerbation. Also the patient is stating that his right inguinal hernia is not causing symptoms as much as before and is having only mild discomfort from time to time.      Patient Active Problem List    Diagnosis Date Noted    Pulmonary hypertension ([address] 2018    Psoas abscess ([address] 2017    Abscess 2017    Discitis of lumbar region 2016    Pyogenic inflammation of bone ([address] 2016    Sepsis ([address] 2016    Intraabdominal fluid collection 2016    Abdominal pain 2016    Warfarin-induced coagulopathy ([address] 2016    Hypoxemia requiring supplemental oxygen 2015    CAD (coronary artery disease) 2015    Left inguinal hernia 2014    Right inguinal hernia 2013    RLQ abdominal pain 2013    Status post AAA (abdominal aortic aneurysm) repair 2013    AAA (abdominal aortic aneurysm, ruptured) ([address] 2013    Unspecified constipation 2013    Chronic atrial fibrillation ([address] 2012    HTN (hypertension)     Hypercholesterolemia     Congestive heart failure (CHF) ([address]     Peripheral vascular disease (HCC)     COPD (chronic obstructive pulmonary disease) ([address]      Past Medical History:   Diagnosis Date    Aneurysm ([address]     AAA repair  &     Aorto-iliac duplex 02/13/2015    Tech difficult. Patent aorta bi-iliac endograft w/o leak or limb dysfunction.  Arrhythmia     a fib    Cardiac cath 11/01/2012    RCA (sm, nondom) patent. LM patent. LAD 25%. CX (dom) 45% mid. LVEDP 12 mmHg. No WMA. PA 27/12. W 10-12. CO/CI 5.2/2.6 (TD).  Cardiac echocardiogram, abnormal 02/20/2016    EF 55%. No WMA. Indeterminate diastolic fx. RVSP 60 mmHg. Severe LAE. Mild MR. Mod TR.  IVCE. Similar to study of 10/26/12.  Cardiovascular aorto-iliac duplex 02/13/2015    Patent aorta bi-iliac endovascular graft w/o leak or limb dysfunction. Sac measures 4.21 x 4.47 cm (4.4 x 4.6 cm on 1/31/14).  Cardiovascular LE peripheral arterial testing 07/29/2013    No significant LE arterial disease bilaterally. R GHADA 1. 12.  L GHADA 1. 12.  R DBI 0.83. L DBI 0.71. Exercise deferred.  Cardiovascular renal duplex 10/31/2012    No RA stenosis. Intrinsic/med disease in left kidney. Patent aortic endograft. Patent, thrombus-free renal veins bilaterally.  Carotid duplex 07/29/2013    Mild <50% bilateral ICA plaquing.       Chronic lung disease     Cigarette smoker     Congestive heart failure (CHF) (HCC)     COPD (chronic obstructive pulmonary disease) (HCC)     and emphysema; likely secondary to tobacco abuse    Difficult intubation     Dyslipidemia     low HDL    Emphysema     HTN (hypertension)     Hypercholesterolemia     Increased prostate specific antigen (PSA) velocity     Long term (current) use of anticoagulants     coumadin    Osteoarthritis     Osteomyelitis (HCC)     Paroxysmal atrial fibrillation (HCC)     Peripheral vascular disease ([address]     AAA repair 12/2007    Persistent atrial Fibrillation     Persistent atrial fibrillation ([address]     Unspecified adverse effect of anesthesia     difficulty breathing placed in ICU Oct 2012 (AAA repair)      Past Surgical History:   Procedure Laterality Date    BRONCHOSCOPY DIAGNOSTIC 11/2/2012         CARDIAC CATHETERIZATION  11/1/2012         COLONOSCOPY N/A 7/26/2016    COLONOSCOPY with Polypectomies performed by Tommy Jacobo MD at [address] HX AAA REPAIR      2006 & 2012    HX HEART CATHETERIZATION  3/4/2009    1. RCA small, nondominant; patent. 2. LMCA patent. 3. LAD is long, wrapped around apical vessel. Diffuse, 20-30% stenosis noted. 4. CCA is large, dominant vessel. Diffuse 20-30% stenosis. 5. LVEDP is 16 mmHg. 6. Overall left ventricular systolic function mildly diminshed with est. EF 45%. Mild, global hypokinesis noted. 7. No significant mitral regurgitation or aortic stenosis noted. (see report)    HX HERNIA REPAIR  2/2014    rt inguinal     HX HERNIA REPAIR  01/2016    LEFT INGUINAL HERNIA REPAIR DR. Sho English    REPAIR ING HERNIA,5+Y/O,JESSIE Left 1-20-16    Dr. Edward Valderrama  11/1/2012           Social History   Substance Use Topics    Smoking status: Former Smoker     Packs/day: 1.00     Years: 54.00     Types: Cigarettes     Quit date: 10/23/2012    Smokeless tobacco: Never Used    Alcohol use No      Comment: quit drinking alcohol 26 years ago, patient states stopped in \"1983\"      History   Smoking Status    Former Smoker    Packs/day: 1.00    Years: 54.00    Types: Cigarettes    Quit date: 10/23/2012   Smokeless Tobacco    Never Used     Family History   Problem Relation Age of Onset    Heart Surgery Father      BYPASS    Stroke Father     Heart Surgery Mother      BYPASS    Coronary Artery Disease Mother     Stroke Mother       Current Outpatient Prescriptions   Medication Sig    predniSONE (DELTASONE) 50 mg tablet Take 1 Tab by mouth daily for 5 days.  ondansetron hcl (ZOFRAN) 4 mg tablet Take 1 Tab by mouth every eight (8) hours as needed for Nausea.  tiotropium-olodaterol (STIOLTO RESPIMAT) 2.5-2.5 mcg/actuation mist Take 2 Puffs by inhalation daily.     tiotropium-olodaterol (STIOLTO RESPIMAT) 2.5-2.5 mcg/actuation mist Take 2 Inhalation by inhalation daily.  albuterol (PROVENTIL VENTOLIN) 2.5 mg /3 mL (0.083 %) nebulizer solution 3 mL by Nebulization route every four (4) hours as needed for Wheezing.  traMADol (ULTRAM) 50 mg tablet Take 1 Tab by mouth every six (6) hours as needed for Pain. Max Daily Amount: 200 mg.  digoxin (DIGITEK) 0.125 mg tablet take 1 tablet by mouth once daily    warfarin (COUMADIN) 2.5 mg tablet Take 1 Tab by mouth daily. Take 2 pills (5 mg) Mon, Tues, Wed, Thur, Sat, Sun; and 1 pill only  (2.5 mg) Fri    QVAR 80 mcg/actuation aero     chlorthalidone (HYGROTEN) 25 mg tablet Take 25 mg by mouth daily.  amLODIPine (NORVASC) 10 mg tablet Take 10 mg by mouth daily.  OXYGEN-AIR DELIVERY SYSTEMS 2 L/min by Does Not Apply route daily. Continuous. Company is [employer]        No current facility-administered medications for this visit. Allergies   Allergen Reactions    Codeine Swelling    Morphine Itching    Sulfa (Sulfonamide Antibiotics) Other (comments)     Kidney problems.         Review of Systems:  Constitutional: positive for fatigue  Eyes: negative  Ears, Nose, Mouth, Throat, and Face: negative  Respiratory: positive for cough, sputum, wheezing, dyspnea on exertion or chronic bronchitis  Cardiovascular: negative  Gastrointestinal: positive for abdominal pain  Genitourinary:negative  Integument/Breast: negative  Hematologic/Lymphatic: negative  Musculoskeletal:negative  Neurological: negative  Behavioral/Psychiatric: negative  Endocrine: negative  Allergic/Immunologic: negative    Objective:     Visit Vitals    /80    Pulse (!) 129    Resp 20    Ht 5' 6\" (1.676 m)    Wt 70.3 kg (155 lb)    SpO2 92%    BMI 25.02 kg/m2       Physical Exam:      General:  in no apparent distress, alert, oriented times 3 and afebrile   Eyes:  conjunctivae and sclerae normal, pupils equal, round, reactive to light   Throat & Neck: no erythema or exudates noted and neck supple and symmetrical; no palpable masses   Lungs:    Use of accessory muscle for breathing. Diffuse wheezing. Heart:  Regular rate and rhythm   Abdomen:   flat, soft, nontender, nondistended, no masses or organomegaly. There is a right inguinal hernia that is nontender but none reducible   Extremities: extremities normal, atraumatic, no cyanosis or edema   Skin: Normal.       Imaging and Lab Review:     CBC:   Lab Results   Component Value Date/Time    WBC 8.7 06/17/2018 04:15 PM    RBC 3.54 (L) 06/17/2018 04:15 PM    HGB 8.6 (L) 06/17/2018 04:15 PM    HCT 26.9 (L) 06/17/2018 04:15 PM    PLATELET 034 88/00/3613 04:15 PM     BMP:   Lab Results   Component Value Date/Time    Glucose 117 (H) 06/17/2018 04:15 PM    Sodium 129 (L) 06/17/2018 04:15 PM    Potassium 2.9 (LL) 06/17/2018 04:15 PM    Chloride 87 (L) 06/17/2018 04:15 PM    CO2 31 06/17/2018 04:15 PM    BUN 16 06/17/2018 04:15 PM    Creatinine 1.50 (H) 06/17/2018 04:15 PM    Calcium 9.0 06/17/2018 04:15 PM     CMP:  Lab Results   Component Value Date/Time    Glucose 117 (H) 06/17/2018 04:15 PM    Sodium 129 (L) 06/17/2018 04:15 PM    Potassium 2.9 (LL) 06/17/2018 04:15 PM    Chloride 87 (L) 06/17/2018 04:15 PM    CO2 31 06/17/2018 04:15 PM    BUN 16 06/17/2018 04:15 PM    Creatinine 1.50 (H) 06/17/2018 04:15 PM    Calcium 9.0 06/17/2018 04:15 PM    Anion gap 11 06/17/2018 04:15 PM    BUN/Creatinine ratio 11 (L) 06/17/2018 04:15 PM    Alk. phosphatase 82 06/17/2018 04:15 PM    Protein, total 8.4 (H) 06/17/2018 04:15 PM    Albumin 3.4 06/17/2018 04:15 PM    Globulin 5.0 (H) 06/17/2018 04:15 PM    A-G Ratio 0.7 (L) 06/17/2018 04:15 PM       No results found for this or any previous visit (from the past 24 hour(s)). images and reports reviewed    Assessment:   Soraida Pandya is a 77 y.o. male is presenting with a picture of recurrent right inguinal hernia.  I Discussed the possibility of incarceration, strangulation, enlargement in size over time, and the risk of emergency surgery in the face of strangulation. I also discussed the use of prosthetic materials (mesh), including the risk of infection. Also discussed the risk of surgery including recurrence and the possible need for reoperation and removal of mesh if used, possibility of postoperative small bowel injury, obstruction or ileus, and the risks of general anesthetic. I explained to the the patient and his daughter that he is at extremely high risk of major complication including death. Giving the fact that the patient has severe pulmonary disease, and he cannot be cleared for general anesthesia, as well as the fact that he had previous right inguinal hernia repair, and that his symptoms are very minimal to none, I think that the best option is to observe the hernia and do not proceed with the surgery. The patient and his daughter agreed on this plan. Plan:     Hold on the surgery because of the patient's medical condition.   Follow up with his pulmonologist and PCP  Follow-up with me as needed    Please call me if you have any questions (cell phone: [phone])     Signed By: Monse Appiah MD     June 22, 2018

## 2018-06-27 ENCOUNTER — APPOINTMENT (OUTPATIENT)
Dept: GENERAL RADIOLOGY | Age: 78
DRG: 291 | End: 2018-06-27
Attending: OTOLARYNGOLOGY
Payer: MEDICARE

## 2018-06-27 ENCOUNTER — HOSPITAL ENCOUNTER (INPATIENT)
Age: 78
LOS: 2 days | Discharge: HOME | DRG: 291 | End: 2018-06-29
Attending: EMERGENCY MEDICINE | Admitting: FAMILY MEDICINE
Payer: MEDICARE

## 2018-06-27 DIAGNOSIS — N28.9 ACUTE RENAL INSUFFICIENCY: ICD-10-CM

## 2018-06-27 DIAGNOSIS — E87.1 HYPONATREMIA: Primary | ICD-10-CM

## 2018-06-27 DIAGNOSIS — E87.6 HYPOKALEMIA: ICD-10-CM

## 2018-06-27 PROBLEM — I50.9 CHF EXACERBATION (HCC): Status: ACTIVE | Noted: 2018-06-27

## 2018-06-27 LAB
ALBUMIN SERPL-MCNC: 2.9 G/DL (ref 3.4–5)
ALBUMIN/GLOB SERPL: 0.6 {RATIO} (ref 0.8–1.7)
ALP SERPL-CCNC: 83 U/L (ref 45–117)
ALT SERPL-CCNC: 31 U/L (ref 16–61)
ANION GAP SERPL CALC-SCNC: 7 MMOL/L (ref 3–18)
APTT PPP: 58.1 SEC (ref 23–36.4)
AST SERPL-CCNC: 24 U/L (ref 15–37)
BASOPHILS # BLD: 0 K/UL (ref 0–0.1)
BASOPHILS NFR BLD: 0 % (ref 0–2)
BILIRUB SERPL-MCNC: 1.2 MG/DL (ref 0.2–1)
BNP SERPL-MCNC: ABNORMAL PG/ML (ref 0–1800)
BUN SERPL-MCNC: 25 MG/DL (ref 7–18)
BUN/CREAT SERPL: 18 (ref 12–20)
CALCIUM SERPL-MCNC: 8.3 MG/DL (ref 8.5–10.1)
CHLORIDE SERPL-SCNC: 84 MMOL/L (ref 100–108)
CK MB CFR SERPL CALC: ABNORMAL % (ref 0–4)
CK MB SERPL-MCNC: <1 NG/ML (ref 5–25)
CK SERPL-CCNC: 22 U/L (ref 39–308)
CO2 SERPL-SCNC: 34 MMOL/L (ref 21–32)
CREAT SERPL-MCNC: 1.39 MG/DL (ref 0.6–1.3)
DIFFERENTIAL METHOD BLD: ABNORMAL
EOSINOPHIL # BLD: 0 K/UL (ref 0–0.4)
EOSINOPHIL NFR BLD: 0 % (ref 0–5)
ERYTHROCYTE [DISTWIDTH] IN BLOOD BY AUTOMATED COUNT: 14.9 % (ref 11.6–14.5)
GLOBULIN SER CALC-MCNC: 5 G/DL (ref 2–4)
GLUCOSE SERPL-MCNC: 98 MG/DL (ref 74–99)
HCT VFR BLD AUTO: 26 % (ref 36–48)
HGB BLD-MCNC: 8.3 G/DL (ref 13–16)
INR PPP: 2.2 (ref 0.8–1.2)
LYMPHOCYTES # BLD: 1.3 K/UL (ref 0.9–3.6)
LYMPHOCYTES NFR BLD: 8 % (ref 21–52)
MCH RBC QN AUTO: 23.6 PG (ref 24–34)
MCHC RBC AUTO-ENTMCNC: 31.9 G/DL (ref 31–37)
MCV RBC AUTO: 74.1 FL (ref 74–97)
MONOCYTES # BLD: 1.7 K/UL (ref 0.05–1.2)
MONOCYTES NFR BLD: 10 % (ref 3–10)
NEUTS SEG # BLD: 14.1 K/UL (ref 1.8–8)
NEUTS SEG NFR BLD: 82 % (ref 40–73)
PLATELET # BLD AUTO: 379 K/UL (ref 135–420)
PMV BLD AUTO: 8.5 FL (ref 9.2–11.8)
POTASSIUM SERPL-SCNC: 2.9 MMOL/L (ref 3.5–5.5)
PROT SERPL-MCNC: 7.9 G/DL (ref 6.4–8.2)
PROTHROMBIN TIME: 23.6 SEC (ref 11.5–15.2)
RBC # BLD AUTO: 3.51 M/UL (ref 4.7–5.5)
SODIUM SERPL-SCNC: 125 MMOL/L (ref 136–145)
TROPONIN I SERPL-MCNC: 0.02 NG/ML (ref 0–0.04)
WBC # BLD AUTO: 17.1 K/UL (ref 4.6–13.2)

## 2018-06-27 PROCEDURE — 74011250637 HC RX REV CODE- 250/637: Performed by: FAMILY MEDICINE

## 2018-06-27 PROCEDURE — 71046 X-RAY EXAM CHEST 2 VIEWS: CPT

## 2018-06-27 PROCEDURE — 85610 PROTHROMBIN TIME: CPT

## 2018-06-27 PROCEDURE — 74011250636 HC RX REV CODE- 250/636: Performed by: EMERGENCY MEDICINE

## 2018-06-27 PROCEDURE — 99285 EMERGENCY DEPT VISIT HI MDM: CPT

## 2018-06-27 PROCEDURE — 96365 THER/PROPH/DIAG IV INF INIT: CPT

## 2018-06-27 PROCEDURE — 87186 SC STD MICRODIL/AGAR DIL: CPT

## 2018-06-27 PROCEDURE — 82553 CREATINE MB FRACTION: CPT

## 2018-06-27 PROCEDURE — 87070 CULTURE OTHR SPECIMN AEROBIC: CPT

## 2018-06-27 PROCEDURE — 85730 THROMBOPLASTIN TIME PARTIAL: CPT

## 2018-06-27 PROCEDURE — 83880 ASSAY OF NATRIURETIC PEPTIDE: CPT

## 2018-06-27 PROCEDURE — 94761 N-INVAS EAR/PLS OXIMETRY MLT: CPT

## 2018-06-27 PROCEDURE — 74011250636 HC RX REV CODE- 250/636: Performed by: FAMILY MEDICINE

## 2018-06-27 PROCEDURE — 80053 COMPREHEN METABOLIC PANEL: CPT

## 2018-06-27 PROCEDURE — 65270000029 HC RM PRIVATE

## 2018-06-27 PROCEDURE — 87077 CULTURE AEROBIC IDENTIFY: CPT

## 2018-06-27 PROCEDURE — 93005 ELECTROCARDIOGRAM TRACING: CPT

## 2018-06-27 PROCEDURE — 85025 COMPLETE CBC W/AUTO DIFF WBC: CPT

## 2018-06-27 RX ORDER — DOCUSATE SODIUM 100 MG/1
100 CAPSULE, LIQUID FILLED ORAL
Status: DISCONTINUED | OUTPATIENT
Start: 2018-06-27 | End: 2018-06-29 | Stop reason: HOSPADM

## 2018-06-27 RX ORDER — CHLORTHALIDONE 25 MG/1
25 TABLET ORAL DAILY
Status: DISCONTINUED | OUTPATIENT
Start: 2018-06-28 | End: 2018-06-27

## 2018-06-27 RX ORDER — FUROSEMIDE 10 MG/ML
20 INJECTION INTRAMUSCULAR; INTRAVENOUS ONCE
Status: COMPLETED | OUTPATIENT
Start: 2018-06-27 | End: 2018-06-27

## 2018-06-27 RX ORDER — POTASSIUM CHLORIDE 1.5 G/1.77G
40 POWDER, FOR SOLUTION ORAL 2 TIMES DAILY WITH MEALS
Status: DISCONTINUED | OUTPATIENT
Start: 2018-06-28 | End: 2018-06-29 | Stop reason: HOSPADM

## 2018-06-27 RX ORDER — POLYETHYLENE GLYCOL 3350 17 G/17G
17 POWDER, FOR SOLUTION ORAL
Status: DISCONTINUED | OUTPATIENT
Start: 2018-06-27 | End: 2018-06-29 | Stop reason: HOSPADM

## 2018-06-27 RX ORDER — ASPIRIN 81 MG/1
81 TABLET ORAL DAILY
COMMUNITY
End: 2019-01-08 | Stop reason: ALTCHOICE

## 2018-06-27 RX ORDER — DIGOXIN 125 MCG
0.12 TABLET ORAL DAILY
Status: DISCONTINUED | OUTPATIENT
Start: 2018-06-28 | End: 2018-06-29 | Stop reason: HOSPADM

## 2018-06-27 RX ORDER — ASPIRIN 81 MG/1
81 TABLET ORAL DAILY
Status: DISCONTINUED | OUTPATIENT
Start: 2018-06-28 | End: 2018-06-29 | Stop reason: HOSPADM

## 2018-06-27 RX ORDER — LEVOFLOXACIN 5 MG/ML
250 INJECTION, SOLUTION INTRAVENOUS EVERY 24 HOURS
Status: DISCONTINUED | OUTPATIENT
Start: 2018-06-28 | End: 2018-06-29

## 2018-06-27 RX ORDER — WARFARIN 2.5 MG/1
2.5 TABLET ORAL
Status: COMPLETED | OUTPATIENT
Start: 2018-06-27 | End: 2018-06-27

## 2018-06-27 RX ORDER — AMLODIPINE BESYLATE 10 MG/1
10 TABLET ORAL DAILY
Status: DISCONTINUED | OUTPATIENT
Start: 2018-06-28 | End: 2018-06-29 | Stop reason: HOSPADM

## 2018-06-27 RX ORDER — PRAVASTATIN SODIUM 40 MG/1
40 TABLET ORAL
COMMUNITY
End: 2020-01-01

## 2018-06-27 RX ORDER — HYDROCODONE BITARTRATE AND ACETAMINOPHEN 5; 325 MG/1; MG/1
1 TABLET ORAL
Status: DISCONTINUED | OUTPATIENT
Start: 2018-06-27 | End: 2018-06-29 | Stop reason: HOSPADM

## 2018-06-27 RX ORDER — LEVOFLOXACIN 5 MG/ML
500 INJECTION, SOLUTION INTRAVENOUS
Status: DISCONTINUED | OUTPATIENT
Start: 2018-06-29 | End: 2018-06-27

## 2018-06-27 RX ORDER — LEVOFLOXACIN 5 MG/ML
500 INJECTION, SOLUTION INTRAVENOUS
Status: COMPLETED | OUTPATIENT
Start: 2018-06-27 | End: 2018-06-27

## 2018-06-27 RX ORDER — POTASSIUM CHLORIDE 1.5 G/1.77G
20 POWDER, FOR SOLUTION ORAL
Status: DISCONTINUED | OUTPATIENT
Start: 2018-06-27 | End: 2018-06-27

## 2018-06-27 RX ORDER — ALBUTEROL SULFATE 0.83 MG/ML
2.5 SOLUTION RESPIRATORY (INHALATION)
Status: DISCONTINUED | OUTPATIENT
Start: 2018-06-27 | End: 2018-06-29

## 2018-06-27 RX ORDER — POLYETHYLENE GLYCOL 3350 17 G/17G
17 POWDER, FOR SOLUTION ORAL
COMMUNITY
End: 2020-01-01

## 2018-06-27 RX ORDER — PRAVASTATIN SODIUM 20 MG/1
40 TABLET ORAL
Status: DISCONTINUED | OUTPATIENT
Start: 2018-06-27 | End: 2018-06-29 | Stop reason: HOSPADM

## 2018-06-27 RX ORDER — LEVOFLOXACIN 5 MG/ML
500 INJECTION, SOLUTION INTRAVENOUS EVERY 24 HOURS
Status: DISCONTINUED | OUTPATIENT
Start: 2018-06-28 | End: 2018-06-27

## 2018-06-27 RX ORDER — DOCUSATE SODIUM 100 MG/1
100 CAPSULE, LIQUID FILLED ORAL
COMMUNITY
End: 2018-08-10 | Stop reason: ALTCHOICE

## 2018-06-27 RX ADMIN — LEVOFLOXACIN 500 MG: 5 INJECTION, SOLUTION INTRAVENOUS at 21:35

## 2018-06-27 RX ADMIN — PRAVASTATIN SODIUM 40 MG: 20 TABLET ORAL at 23:25

## 2018-06-27 RX ADMIN — HYDROCODONE BITARTRATE AND ACETAMINOPHEN 1 TABLET: 5; 325 TABLET ORAL at 23:25

## 2018-06-27 RX ADMIN — WARFARIN SODIUM 2.5 MG: 2.5 TABLET ORAL at 23:25

## 2018-06-27 RX ADMIN — FUROSEMIDE 20 MG: 10 INJECTION, SOLUTION INTRAMUSCULAR; INTRAVENOUS at 23:25

## 2018-06-27 NOTE — ED PROVIDER NOTES
[employer] [facility] [address]      6:59 PM    Date: 6/27/2018  Patient Name: Harriet Cook    History of Presenting Illness     Chief Complaint   Patient presents with    Shortness of Breath    Abnormal Lab Results       History Provided By: Patient    Chief Complaint: Abnormal labs  Duration:  2 days  Timing:  Acute  Location: N/a  Quality: N/a  Severity: N/a  Modifying Factors: N/a  Associated Symptoms: leg swelling    77 y.o. male with noted past medical history who presents to the emergency department with abnormal lab results for the past 2 days. Pt went to his PCP and had labs drawn on Monday. Today he was instructed to go to the ED as his labs resulted with abnormally low Na and K levels. The pt denies HA, CP, and SOB. He has a hx of COPD and is normally on 3L O2/min at home. Pt reports that his legs have been swollen since Sunday. As the patient is without physical symptoms or complaints of pain, there is no severity of pain, location of pain, quality of pain, or modifying factors. Nursing notes regarding the HPI and triage nursing notes were reviewed. Prior medical records were reviewed.      Current Facility-Administered Medications   Medication Dose Route Frequency Provider Last Rate Last Dose    potassium chloride 10 mEq, lidocaine (PF) (XYLOCAINE) 10 mg/mL (1 %) 1 mL in 0.9% sodium chloride 100 mL IVPB   IntraVENous Q1H Iris Little MD        albuterol (PROVENTIL VENTOLIN) nebulizer solution 2.5 mg  2.5 mg Nebulization Q4H PRN Umm Guerrero MD        amLODIPine (NORVASC) tablet 10 mg  10 mg Oral DAILY Umm Guerrero MD   10 mg at 06/28/18 1913    aspirin delayed-release tablet 81 mg  81 mg Oral DAILY Umm Guerrero MD   81 mg at 06/28/18 0905    digoxin (LANOXIN) tablet 0.125 mg  0.125 mg Oral DAILY Umm Guerrero MD   0.125 mg at 06/28/18 0905    docusate sodium (COLACE) capsule 100 mg  100 mg Oral BID PRN Umm Guerrero MD       [address] [place] [address] polyethylene glycol (MIRALAX) packet 17 g  17 g Oral DAILY PRN Willie Avitia MD        pravastatin (PRAVACHOL) tablet 40 mg  40 mg Oral QHS Willie Avitia MD   40 mg at 06/27/18 2325    tiotropium-olodaterol (STIOLTO RESPIMAT) 2.5-2.5 mcg/actuation mist  (Patient Supplied)  2 Puff Inhalation DAILY Willie Avitia MD        HYDROcodone-acetaminophen (NORCO) 5-325 mg per tablet 1 Tab  1 Tab Oral BID PRN Willie Avitia MD   1 Tab at 06/28/18 1914    WARFARIN INFORMATION NOTE (COUMADIN)   Other Q24H Aiden Tyler MD        potassium chloride (KLOR-CON) packet 40 mEq  40 mEq Oral BID WITH MEALS Willie Avitia MD   40 mEq at 06/28/18 1727    levoFLOXacin (LEVAQUIN) 250 mg in D5W IVPB  250 mg IntraVENous Q24H Maxim Su MD           Past History     Past Medical History:  Past Medical History:   Diagnosis Date    Aneurysm [facility])     AAA repair 2006 & 2012    Aorto-iliac duplex 02/13/2015    Tech difficult. Patent aorta bi-iliac endograft w/o leak or limb dysfunction.  Arrhythmia     a fib    Cardiac cath 11/01/2012    RCA (sm, nondom) patent. LM patent. LAD 25%. CX (dom) 45% mid. LVEDP 12 mmHg. No WMA. PA 27/12. W 10-12. CO/CI 5.2/2.6 (TD).  Cardiac echocardiogram, abnormal 02/20/2016    EF 55%. No WMA. Indeterminate diastolic fx. RVSP 60 mmHg. Severe LAE. Mild MR. Mod TR.  IVCE. Similar to study of 10/26/12.  Cardiovascular aorto-iliac duplex 02/13/2015    Patent aorta bi-iliac endovascular graft w/o leak or limb dysfunction. Sac measures 4.21 x 4.47 cm (4.4 x 4.6 cm on 1/31/14).  Cardiovascular LE peripheral arterial testing 07/29/2013    No significant LE arterial disease bilaterally. R GHADA 1. 12.  L GHADA 1. 12.  R DBI 0.83. L DBI 0.71. Exercise deferred.  Cardiovascular renal duplex 10/31/2012    No RA stenosis. Intrinsic/med disease in left kidney. Patent aortic endograft. Patent, thrombus-free renal veins bilaterally.     Carotid duplex 07/29/2013    Mild <50% bilateral ICA plaquing.  Chronic lung disease     Cigarette smoker     Congestive heart failure (CHF) (HCC)     COPD (chronic obstructive pulmonary disease) (HCC)     and emphysema; likely secondary to tobacco abuse    Difficult intubation     Dyslipidemia     low HDL    Emphysema     HTN (hypertension)     Hypercholesterolemia     Increased prostate specific antigen (PSA) velocity     Long term (current) use of anticoagulants     coumadin    Osteoarthritis     Osteomyelitis (HCC)     Paroxysmal atrial fibrillation (HCC)     Peripheral vascular disease ([address]     AAA repair 12/2007    Persistent atrial Fibrillation     Persistent atrial fibrillation (HCC)     Unspecified adverse effect of anesthesia     difficulty breathing placed in ICU Oct 2012 (AAA repair)       Past Surgical History:  Past Surgical History:   Procedure Laterality Date    BRONCHOSCOPY DIAGNOSTIC  11/2/2012         CARDIAC CATHETERIZATION  11/1/2012         COLONOSCOPY N/A 7/26/2016    COLONOSCOPY with Polypectomies performed by Erich Crum MD at [address] [place] [address] HX AAA REPAIR      2006 & 2012    HX HEART CATHETERIZATION  3/4/2009    1. RCA small, nondominant; patent. 2. LMCA patent. 3. LAD is long, wrapped around apical vessel. Diffuse, 20-30% stenosis noted. 4. CCA is large, dominant vessel. Diffuse 20-30% stenosis. 5. LVEDP is 16 mmHg. 6. Overall left ventricular systolic function mildly diminshed with est. EF 45%. Mild, global hypokinesis noted. 7. No significant mitral regurgitation or aortic stenosis noted.  (see report)    HX HERNIA REPAIR  2/2014    rt inguinal     HX HERNIA REPAIR  01/2016    LEFT INGUINAL HERNIA REPAIR DR. Karen Cruz    REPAIR ING HERNIA,5+Y/O,JESSIE Left 1-20-16    Dr. Kevin Laguerre  11/1/2012            Family History:  Family History   Problem Relation Age of Onset    Heart Surgery Father      BYPASS    Stroke Father     Heart Surgery Mother BYPASS    Coronary Artery Disease Mother     Stroke Mother        Social History:  Social History   Substance Use Topics    Smoking status: Former Smoker     Packs/day: 1.00     Years: 54.00     Types: Cigarettes     Quit date: 10/23/2012    Smokeless tobacco: Never Used    Alcohol use No      Comment: quit drinking alcohol 26 years ago, patient states stopped in \"1878\"       Allergies: Allergies   Allergen Reactions    Codeine Swelling    Morphine Itching    Sulfa (Sulfonamide Antibiotics) Other (comments)     Kidney problems. Patient's primary care provider (as noted in EPIC):  Parish Griffin MD    Review of Systems   Constitutional: Negative for diaphoresis. HENT: Negative for congestion. Eyes: Negative for discharge. Respiratory: Negative for stridor. Cardiovascular: Positive for leg swelling (bilateral). Negative for palpitations. Gastrointestinal: Negative for diarrhea. Genitourinary: Negative for flank pain. Musculoskeletal: Negative for back pain. Neurological: Negative for weakness. Psychiatric/Behavioral: Negative for hallucinations. All other systems reviewed and are negative. Visit Vitals    /68 (BP 1 Location: Left arm, BP Patient Position: At rest)    Pulse 72    Temp 97.8 °F (36.6 °C)    Resp 18    Ht 5' 6\" (1.676 m)    Wt 72 kg (158 lb 11.2 oz)    SpO2 99%    BMI 25.61 kg/m2       Patient Vitals for the past 12 hrs:   Temp Pulse Resp BP SpO2   06/28/18 1611 97.8 °F (36.6 °C) 72 18 159/68 99 %   06/28/18 1212 97.5 °F (36.4 °C) 80 18 160/76 98 %   06/28/18 0922 97.9 °F (36.6 °C) 70 18 169/72 99 %       PHYSICAL EXAM:    ENTM:  Nose:  no rhinorrhea. Throat:  no erythema or exudate, mucous membranes moist.  NECK:  No JVD. Supple  RESPIRATORY:  Chest clear, equal breath sounds, good air movement. CARDIOVASCULAR:  Regular rate and rhythm. No murmurs, rubs, or gallops. GI:  Normal bowel sounds, abdomen soft and non-tender.   No rebound or guarding. BACK:  Non-tender. UPPER EXT:  Normal inspection. LOWER EXT:  No edema, no calf tenderness. Distal pulses intact. NEURO:  Moves all four extremities, and grossly normal motor exam.  SKIN:  No rashes;  Normal for age. PSYCH:  Alert and normal affect. Diagnostic Study Results     Abnormal lab results from this emergency department encounter:  Labs Reviewed   CBC WITH AUTOMATED DIFF - Abnormal; Notable for the following:        Result Value    WBC 17.1 (*)     RBC 3.51 (*)     HGB 8.3 (*)     HCT 26.0 (*)     MCH 23.6 (*)     RDW 14.9 (*)     MPV 8.5 (*)     NEUTROPHILS 82 (*)     LYMPHOCYTES 8 (*)     ABS. NEUTROPHILS 14.1 (*)     ABS.  MONOCYTES 1.7 (*)     All other components within normal limits   METABOLIC PANEL, COMPREHENSIVE - Abnormal; Notable for the following:     Sodium 125 (*)     Potassium 2.9 (*)     Chloride 84 (*)     CO2 34 (*)     BUN 25 (*)     Creatinine 1.39 (*)     GFR est AA 60 (*)     GFR est non-AA 49 (*)     Calcium 8.3 (*)     Bilirubin, total 1.2 (*)     Albumin 2.9 (*)     Globulin 5.0 (*)     A-G Ratio 0.6 (*)     All other components within normal limits   CARDIAC PANEL,(CK, CKMB & TROPONIN) - Abnormal; Notable for the following:     CK 22 (*)     All other components within normal limits   NT-PRO BNP - Abnormal; Notable for the following:     NT pro-BNP 38670 (*)     All other components within normal limits   PROTHROMBIN TIME + INR - Abnormal; Notable for the following:     Prothrombin time 23.6 (*)     INR 2.2 (*)     All other components within normal limits   PTT - Abnormal; Notable for the following:     aPTT 58.1 (*)     All other components within normal limits   METABOLIC PANEL, BASIC - Abnormal; Notable for the following:     Sodium 127 (*)     Potassium 3.0 (*)     Chloride 84 (*)     CO2 33 (*)     Glucose 115 (*)     BUN 24 (*)     Creatinine 1.47 (*)     GFR est AA 56 (*)     GFR est non-AA 46 (*)     Calcium 7.4 (*)     All other components within normal limits   CBC WITH AUTOMATED DIFF - Abnormal; Notable for the following:     RBC 3.02 (*)     HGB 7.1 (*)     HCT 22.8 (*)     MCH 23.5 (*)     RDW 14.9 (*)     MPV 8.7 (*)     NEUTROPHILS 81 (*)     LYMPHOCYTES 8 (*)     MONOCYTES 11 (*)     ABS. NEUTROPHILS 10.6 (*)     ABS. MONOCYTES 1.4 (*)     All other components within normal limits   IRON PROFILE - Abnormal; Notable for the following:     Iron 17 (*)     All other components within normal limits   HGB & HCT - Abnormal; Notable for the following:     HGB 7.2 (*)     HCT 23.3 (*)     All other components within normal limits   PROTHROMBIN TIME + INR - Abnormal; Notable for the following:     Prothrombin time 24.8 (*)     INR 2.3 (*)     All other components within normal limits   POTASSIUM - Abnormal; Notable for the following:     Potassium 2.7 (*)     All other components within normal limits   CULTURE, RESPIRATORY/SPUTUM/BRONCH W GRAM STAIN   RETICULOCYTE COUNT   FERRITIN   MAGNESIUM       Lab values for this patient within approximately the last 12 hours:  Recent Results (from the past 12 hour(s))   PROTHROMBIN TIME + INR    Collection Time: 06/28/18  3:50 PM   Result Value Ref Range    Prothrombin time 24.8 (H) 11.5 - 15.2 sec    INR 2.3 (H) 0.8 - 1.2     POTASSIUM    Collection Time: 06/28/18  4:50 PM   Result Value Ref Range    Potassium 2.7 (LL) 3.5 - 5.5 mmol/L       Radiologist and cardiologist interpretations if available at time of this note:  No results found.     Medication(s) ordered for patient during this emergency visit encounter:  Medications   albuterol (PROVENTIL VENTOLIN) nebulizer solution 2.5 mg (not administered)   amLODIPine (NORVASC) tablet 10 mg (10 mg Oral Given 6/28/18 0905)   aspirin delayed-release tablet 81 mg (81 mg Oral Given 6/28/18 0905)   digoxin (LANOXIN) tablet 0.125 mg (0.125 mg Oral Given 6/28/18 0905)   docusate sodium (COLACE) capsule 100 mg (not administered)   polyethylene glycol (MIRALAX) packet 17 g (not administered)   pravastatin (PRAVACHOL) tablet 40 mg (40 mg Oral Given 6/27/18 2325)   tiotropium-olodaterol (STIOLTO RESPIMAT) 2.5-2.5 mcg/actuation mist  (Patient Supplied) (not administered)   HYDROcodone-acetaminophen (NORCO) 5-325 mg per tablet 1 Tab (1 Tab Oral Given 6/28/18 1914)   WARFARIN INFORMATION NOTE (COUMADIN) ( Other Given 6/27/18 2207)   potassium chloride (KLOR-CON) packet 40 mEq (40 mEq Oral Given 6/28/18 1727)   levoFLOXacin (LEVAQUIN) 250 mg in D5W IVPB (not administered)   potassium chloride 10 mEq, lidocaine (PF) (XYLOCAINE) 10 mg/mL (1 %) 1 mL in 0.9% sodium chloride 100 mL IVPB (not administered)   levoFLOXacin (LEVAQUIN) 500 mg in D5W IVPB (0 mg IntraVENous IV Completed 6/27/18 2251)   warfarin (COUMADIN) tablet 2.5 mg (2.5 mg Oral Given 6/27/18 2325)   furosemide (LASIX) injection 20 mg (20 mg IntraVENous Given 6/27/18 2325)   warfarin (COUMADIN) tablet 5 mg (5 mg Oral Given 6/28/18 1735)       Medical Decision Making     I am the first provider for this patient. I reviewed the vital signs, available nursing notes, past medical history, past surgical history, family history and social history. Vital Signs:  Reviewed the patient's vital signs. Admit to Physician    The patient was presented to the accepting physician, Dr. Tash Alas, [address] [place] [address] physician. I subsequently spoke to the [address] [place] [address] resident who will evaluate the patient and admit with consultation with the noted accepting physician. As the emergency physician, I wrote courtesy admission orders for the admitting physician. The courtesy orders included explicit instructions for the floor nursing staff to call the admitting attending physician upon patient arrival on the floor. Coding Diagnoses     Clinical Impression:   1. Hyponatremia    2. Hypokalemia    3. Acute renal insufficiency        Disposition     Disposition:  Admit. Radha Tate M.D.   MAYANK Board Certified Emergency Physician    Provider Attestation:  If a scribe was utilized in generation of this patient record, I personally performed the services described in the documentation, reviewed the documentation, as recorded by the scribe in my presence, and it accurately records the patient's history of presenting illness, review of systems, patient physical examination, and procedures performed by me as the attending physician. Celestino Coley M.D. AB Board Certified Emergency Physician  6/27/2018. Scribe Attestation     Bryan Moreno acting as a scribe for and in the presence of Anner Leventhal, MD      June 27, 2018 at 7:00 PM       Provider Attestation:      I personally performed the services described in the documentation, reviewed the documentation, as recorded by the scribe in my presence, and it accurately and completely records my words and actions.  June 27, 2018 at 7:00 PM - Anner Leventhal, MD

## 2018-06-27 NOTE — IP AVS SNAPSHOT
Summary of Care Report The Summary of Care report has been created to help improve care coordination. Users with access to [employer] or [employer] [facility] (Web-based application) may access additional patient information including the Discharge Summary. If you are not currently a [address] user and need more information, please call the number listed below in the [place] [address] section and ask to be connected with Medical Records. Facility Information Name Address Phone [place] [address] [facility] [address] [phone] Patient Information Patient Name Sex JORDEN Nelson ([id number]) Male 1940 Discharge Information Admitting Provider Service Area Unit Vickey Roth MD / [address] / [phone] Discharge Provider Discharge Date/Time Discharge Disposition Destination (none) 2018 (Pending) AHR (none) Patient Language Language ENGLISH [13] Hospital Problems as of 2018  Reviewed: 2018 12:03 PM by Dee Shaw MD  
  
  
  
 Class Noted - Resolved Last Modified POA Active Problems * (Principal)CHF exacerbation ([address]  2018 - Present 2018 by Nathalia Gonzalez MD Unknown Entered by Cesar Medina MD  
  
Non-Hospital Problems as of 2018  Reviewed: 2018 12:03 PM by Dee Shaw MD  
  
  
  
 Class Noted - Resolved Last Modified Active Problems Hypercholesterolemia (Chronic)  Unknown - Present 2016 by Mesha Meyer MD  
  Entered by Celio Escalona Congestive heart failure (CHF) ([facility] (Chronic)  Unknown - Present 2016 by Mesha Meyer MD  
  Entered by Celio Escalona Overview Signed 2013  8:12 PM by Jose Fields MD  
   compensated Peripheral vascular disease ([facility] (Chronic)  Unknown - Present 8/4/2016 by Delvis Estevez MD  
  Entered by Fernando Perdue Overview Deleted 12/24/2013  1:36 PM by Yanet Valdovinos MD  
     
  
  COPD (chronic obstructive pulmonary disease) ([facility] (Chronic)  Unknown - Present 8/4/2016 by Delvis Estevez MD  
  Entered by Fernando Perdue HTN (hypertension) (Chronic)  Unknown - Present 8/4/2016 by Delvis Estevez MD  
  Entered by Sebastien Hernandez Overview Deleted 12/23/2013  7:41 PM by Jareth Ji MD  
     
  
  Chronic atrial fibrillation [facility]) (Chronic)  5/8/2012 - Present 8/4/2016 by Delvis Estevez MD  
  Entered by Machelle Jacobs MD  
  RLQ abdominal pain  12/23/2013 - Present 12/24/2013 Entered by Jareth Ji MD  
  Status post AAA (abdominal aortic aneurysm) repair (Chronic)  12/23/2013 - Present 2/19/2016 by Renee Chung MD  
  Entered by Jareth Ji MD  
  Overview Addendum 12/24/2013  1:36 PM by Yanet Valdovinos MD  
   EVAR 12/2007 AAA (abdominal aortic aneurysm, ruptured) ([address] (Chronic)  12/23/2013 - Present 2/19/2016 by Renee Chung MD  
  Entered by Jareth Ji MD  
  Overview Addendum 12/24/2013  1:36 PM by Yanet Valdovinos MD  
   Admitted for severe type I endoleak in Nov 2012 and aortic cuff with suprarenal fixation was placed Unspecified constipation  12/23/2013 - Present 12/24/2013   Entered by Jareth Ji MD  
  Right inguinal hernia (Chronic)  12/24/2013 - Present 2/19/2016 by Renee Chung MD  
  Entered by Yanet Valdovinos MD  
  Left inguinal hernia  2/14/2014 - Present 2/19/2016 by Renee Chung MD  
  Entered by Sabi Byrne MD  
  CAD (coronary artery disease)  12/8/2015 - Present 12/8/2015 by Machelle Jacobs MD  
  Entered by Machelle Jacobs MD  
  Hypoxemia requiring supplemental oxygen  12/28/2015 - Present 2/19/2016 by Renee Chung MD  
  Entered by Josiah Sadler MD  
 Warfarin-induced coagulopathy ([facility] [address]  1/19/2016 - Present 1/19/2016 by Jakob Adams MD  
  Entered by Jakob Adams MD  
  Intraabdominal fluid collection  2/18/2016 - Present 2/19/2016 by Barrie Hensley MD  
  Entered by Theresa Walden MD  
  Abdominal pain  2/18/2016 - Present 2/18/2016 by Barrie Hensley MD  
  Entered by Barrie Hensley MD  
  Sepsis [facility])  2/19/2016 - Present 2/19/2016 by Barrie Hensley MD  
  Entered by Barrie Hensley MD  
  Pyogenic inflammation of bone ([address]  7/1/2016 - Present 7/1/2016 by Vickey Tam Entered by Vickey Tam Psoas abscess ([address]  8/25/2017 - Present 8/26/2017 by KAREN Mendieta Entered by Thai Elmore, DO Abscess  8/25/2017 - Present 8/25/2017 by Liz Jean MD  
  Entered by Liz Jean MD  
  Discitis of lumbar region  8/4/2016 - Present 10/10/2017 by Dick Chilel Entered by Dick Chilel Pulmonary hypertension ([facility]  4/3/2018 - Present 4/3/2018 by Qi Chavez MD  
  Entered by Qi Chavez MD  
  
You are allergic to the following Allergen Reactions Codeine Swelling Morphine Itching Sulfa (Sulfonamide Antibiotics) Other (comments) Kidney problems. Current Discharge Medication List  
  
START taking these medications Dose & Instructions Dispensing Information Comments  
 levoFLOXacin 250 mg tablet Commonly known as:  [facility] Start taking on:  6/30/2018 Dose:  250 mg Take 1 Tab by mouth every twenty-four (24) hours. Quantity:  3 Tab Refills:  0  
   
 melatonin 3 mg tablet Dose:  3 mg Take 1 Tab by mouth nightly as needed. Quantity:  30 Tab Refills:  0  
   
 potassium chloride 20 mEq packet Commonly known as:  KLOR-CON Dose:  40 mEq Take 2 Packets by mouth two (2) times daily (with meals). Quantity:  28 Each Refills:  0 CONTINUE these medications which have CHANGED Dose & Instructions Dispensing Information Comments  
 warfarin 2.5 mg tablet Commonly known as:  COUMADIN What changed:  additional instructions Dose:  2.5 mg Take 1 Tab by mouth daily. Take 2 pills (5 mg) Mon, Tues, Wed, Thur, Sat, Sun; and 1 pill only (2.5 mg) Fri  
 Quantity:  60 Tab Refills:  0 CONTINUE these medications which have NOT CHANGED Dose & Instructions Dispensing Information Comments  
 albuterol 2.5 mg /3 mL (0.083 %) nebulizer solution Commonly known as:  PROVENTIL VENTOLIN Dose:  2.5 mg  
3 mL by Nebulization route every four (4) hours as needed for Wheezing. Quantity:  24 Each Refills:  3  
   
 amLODIPine 10 mg tablet Commonly known as:  Baxter Rodolfo Dose:  10 mg Take 10 mg by mouth daily. Refills:  0  
   
 aspirin delayed-release 81 mg tablet Dose:  81 mg Take 81 mg by mouth daily. Refills:  0  
   
 chlorthalidone 25 mg tablet Commonly known as:  Maegan Bugler Dose:  25 mg Take 25 mg by mouth daily. Refills:  0  
   
 COLACE 100 mg capsule Generic drug:  docusate sodium Dose:  100 mg Take 100 mg by mouth two (2) times daily as needed for Constipation. Refills:  0  
   
 digoxin 0.125 mg tablet Commonly known as:  DIGITEK  
 take 1 tablet by mouth once daily Quantity:  90 Tab Refills:  3 MIRALAX 17 gram packet Generic drug:  polyethylene glycol Dose:  17 g Take 17 g by mouth daily as needed. Refills:  0  
   
 ondansetron hcl 4 mg tablet Commonly known as:  Eliana Antoni Dose:  4 mg Take 1 Tab by mouth every eight (8) hours as needed for Nausea. Quantity:  12 Tab Refills:  0 OXYGEN-AIR DELIVERY SYSTEMS Dose:  2 L/min 2 L/min by Does Not Apply route daily. Continuous. Company is [employer] Refills:  0  
   
 pravastatin 40 mg tablet Commonly known as:  PRAVACHOL Dose:  40 mg Take 40 mg by mouth nightly.   
 Refills:  0  
   
 tiotropium-olodaterol 2.5-2.5 mcg/actuation Mist  
 Commonly known as:  Nayana Herrera Dose:  2 Inhalation Take 2 Inhalation by inhalation daily. Quantity:  1 Inhaler Refills:  5 Current Immunizations Name Date Influenza Vaccine 10/4/2017, 10/1/2016 Follow-up Information Follow up With Details Comments Contact John Tijerina MD In 7 days Office closed.  will call Monday to make appointment. [address] [facility] [address] [place] [id number] 
[phone] Harrison Browlnee MD In 1 week Office closed.  will call Monday to make appointment. [address] [facility] [address] [facility] 
[phone] Discharge Instructions DISCHARGE SUMMARY from Nurse PATIENT INSTRUCTIONS: 
 
After general anesthesia or intravenous sedation, for 24 hours or while taking prescription Narcotics: · Limit your activities · Do not drive and operate hazardous machinery · Do not make important personal or business decisions · Do  not drink alcoholic beverages · If you have not urinated within 8 hours after discharge, please contact your surgeon on call. Report the following to your surgeon: 
· Excessive pain, swelling, redness or odor of or around the surgical area · Temperature over 100.5 · Nausea and vomiting lasting longer than 4 hours or if unable to take medications · Any signs of decreased circulation or nerve impairment to extremity: change in color, persistent  numbness, tingling, coldness or increase pain · Any questions What to do at Home: 
Recommended activity: Activity as tolerated, If you experience any of the following symptoms chest pain,increased shortness of breathe, please follow up with emergency department or primary physician. *  Please give a list of your current medications to your Primary Care Provider.  
 
*  Please update this list whenever your medications are discontinued, doses are 
 changed, or new medications (including over-the-counter products) are added. *  Please carry medication information at all times in case of emergency situations. These are general instructions for a healthy lifestyle: No smoking/ No tobacco products/ Avoid exposure to second hand smoke Surgeon General's Warning:  Quitting smoking now greatly reduces serious risk to your health. Obesity, smoking, and sedentary lifestyle greatly increases your risk for illness A healthy diet, regular physical exercise & weight monitoring are important for maintaining a healthy lifestyle You may be retaining fluid if you have a history of heart failure or if you experience any of the following symptoms:  Weight gain of 3 pounds or more overnight or 5 pounds in a week, increased swelling in our hands or feet or shortness of breath while lying flat in bed. Please call your doctor as soon as you notice any of these symptoms; do not wait until your next office visit. Recognize signs and symptoms of STROKE: 
 
F-face looks uneven A-arms unable to move or move unevenly S-speech slurred or non-existent T-time-call 911 as soon as signs and symptoms begin-DO NOT go Back to bed or wait to see if you get better-TIME IS BRAIN. Warning Signs of HEART ATTACK Call 911 if you have these symptoms: 
? Chest discomfort. Most heart attacks involve discomfort in the center of the chest that lasts more than a few minutes, or that goes away and comes back. It can feel like uncomfortable pressure, squeezing, fullness, or pain. ? Discomfort in other areas of the upper body. Symptoms can include pain or discomfort in one or both arms, the back, neck, jaw, or stomach. ? Shortness of breath with or without chest discomfort. ? Other signs may include breaking out in a cold sweat, nausea, or lightheadedness. Don't wait more than five minutes to call [address]!  Fast action can save your life. Calling 911 is almost always the fastest way to get lifesaving treatment. Emergency Medical Services staff can begin treatment when they arrive  up to an hour sooner than if someone gets to the hospital by car. The discharge information has been reviewed with the patient. The patient verbalized understanding. Discharge medications reviewed with the patient and appropriate educational materials and side effects teaching were provided. ___________________________________________________________________________________________________________________________________ Heart Failure: Care Instructions Your Care Instructions Heart failure occurs when your heart does not pump as much blood as the body needs. Failure does not mean that the heart has stopped pumping but rather that it is not pumping as well as it should. Over time, this causes fluid buildup in your lungs and other parts of your body. Fluid buildup can cause shortness of breath, fatigue, swollen ankles, and other problems. By taking medicines regularly, reducing sodium (salt) in your diet, checking your weight every day, and making lifestyle changes, you can feel better and live longer. Follow-up care is a key part of your treatment and safety. Be sure to make and go to all appointments, and call your doctor if you are having problems. It's also a good idea to know your test results and keep a list of the medicines you take. How can you care for yourself at home? Medicines ? · Be safe with medicines. Take your medicines exactly as prescribed. Call your doctor if you think you are having a problem with your medicine. ? · Do not take any vitamins, over-the-counter medicine, or herbal products without talking to your doctor first. Katharine Weissdian not take ibuprofen (Advil or Motrin) and naproxen (Aleve) without talking to your doctor first. They could make your heart failure worse. ? · You may be taking some of the following medicine. ¨ Beta-blockers can slow heart rate, decrease blood pressure, and improve your condition. Taking a beta-blocker may lower your chance of needing to be hospitalized. ¨ Angiotensin-converting enzyme inhibitors (ACEIs) reduce the heart's workload, lower blood pressure, and reduce swelling. Taking an ACEI may lower your chance of needing to be hospitalized again. ¨ Angiotensin II receptor blockers (ARBs) work like ACEIs. Your doctor may prescribe them instead of ACEIs. ¨ Diuretics, also called water pills, reduce swelling. ¨ Potassium supplements replace this important mineral, which is sometimes lost with diuretics. ¨ Aspirin and other blood thinners prevent blood clots, which can cause a stroke or heart attack. ? You will get more details on the specific medicines your doctor prescribes. Diet ? · Your doctor may suggest that you limit sodium to 2,000 milligrams (mg) a day or less. That is less than 1 teaspoon of salt a day, including all the salt you eat in cooking or in packaged foods. People get most of their sodium from processed foods. Fast food and restaurant meals also tend to be very high in sodium. ? · Ask your doctor how much liquid you can drink each day. You may have to limit liquids. ?Weight ? · Weigh yourself without clothing at the same time each day. Record your weight. Call your doctor if you have a sudden weight gain, such as more than 2 to 3 pounds in a day or 5 pounds in a week. (Your doctor may suggest a different range of weight gain.) A sudden weight gain may mean that your heart failure is getting worse. ? Activity level ? · Start light exercise (if your doctor says it is okay). Even if you can only do a small amount, exercise will help you get stronger, have more energy, and manage your weight and your stress. Walking is an easy way to get exercise. Start out by walking a little more than you did before.  Bit by bit, increase the amount you walk. ? · When you exercise, watch for signs that your heart is working too hard. You are pushing yourself too hard if you cannot talk while you are exercising. If you become short of breath or dizzy or have chest pain, stop, sit down, and rest.  
? · If you feel \"wiped out\" the day after you exercise, walk slower or for a shorter distance until you can work up to a better pace. ? · Get enough rest at night. Sleeping with 1 or 2 pillows under your upper body and head may help you breathe easier. ? Lifestyle changes ? · Do not smoke. Smoking can make a heart condition worse. If you need help quitting, talk to your doctor about stop-smoking programs and medicines. These can increase your chances of quitting for good. Quitting smoking may be the most important step you can take to protect your heart. ? · Limit alcohol to 2 drinks a day for men and 1 drink a day for women. Too much alcohol can cause health problems. ? · Avoid getting sick from colds and the flu. Get a pneumococcal vaccine shot. If you have had one before, ask your doctor whether you need another dose. Get a flu shot each year. If you must be around people with colds or the flu, wash your hands often. When should you call for help? Call 911 if you have symptoms of sudden heart failure such as: 
? · You have severe trouble breathing. ? · You cough up pink, foamy mucus. ? · You have a new irregular or rapid heartbeat. ?Call your doctor now or seek immediate medical care if: 
? · You have new or increased shortness of breath. ? · You are dizzy or lightheaded, or you feel like you may faint. ? · You have sudden weight gain, such as more than 2 to 3 pounds in a day or 5 pounds in a week. (Your doctor may suggest a different range of weight gain.) ? · You have increased swelling in your legs, ankles, or feet. ? · You are suddenly so tired or weak that you cannot do your usual activities. ?Watch closely for changes in your health, and be sure to contact your doctor if you develop new symptoms. Where can you learn more? Go to http://brittney-eliseo.info/. Enter O207 in the search box to learn more about \"Heart Failure: Care Instructions. \" Current as of: September 21, 2016 Content Version: 11.4 © 6023-6337 [employer]. Care instructions adapted under license by [employer] (which disclaims liability or warranty for this information). If you have questions about a medical condition or this instruction, always ask your healthcare professional. [address] any warranty or liability for your use of this information. Hypokalemia: Care Instructions Your Care Instructions Hypokalemia (say \"xr-ev-efg-LUTHER-leonid-uh\") is a low level of potassium. The heart, muscles, kidneys, and nervous system all need potassium to work well. This problem has many different causes. Kidney problems, diet, and medicines like diuretics and laxatives can cause it. So can vomiting or diarrhea. In some cases, cancer is the cause. Your doctor may do tests to find the cause of your low potassium levels. You may need medicines to bring your potassium levels back to normal. You may also need regular blood tests to check your potassium. If you have very low potassium, you may need intravenous (IV) medicines. You also may need tests to check the electrical activity of your heart. Heart problems caused by low potassium levels can be very serious. Follow-up care is a key part of your treatment and safety. Be sure to make and go to all appointments, and call your doctor if you are having problems. It's also a good idea to know your test results and keep a list of the medicines you take. How can you care for yourself at home? · If your doctor recommends it, eat foods that have a lot of potassium. These include fresh fruits, juices, and vegetables. They also include nuts, beans, and milk. · Be safe with medicines. If your doctor prescribes medicines or potassium supplements, take them exactly as directed. Call your doctor if you have any problems with your medicines. · Get your potassium levels tested as often as your doctor tells you. When should you call for help? Call 911 anytime you think you may need emergency care. For example, call if: 
? · You feel like your heart is missing beats. Heart problems caused by low potassium can cause death. ? · You passed out (lost consciousness). ? · You have a seizure. ?Call your doctor now or seek immediate medical care if: 
? · You feel weak or unusually tired. ? · You have severe arm or leg cramps. ? · You have tingling or numbness. ? · You feel sick to your stomach, or you vomit. ? · You have belly cramps. ? · You feel bloated or constipated. ? · You have to urinate a lot. ? · You feel very thirsty most of the time. ? · You are dizzy or lightheaded, or you feel like you may faint. ? · You feel depressed, or you lose touch with reality. ? Watch closely for changes in your health, and be sure to contact your doctor if: 
? · You do not get better as expected. Where can you learn more? Go to http://brittney-eliseo.info/. Enter G358 in the search box to learn more about \"Hypokalemia: Care Instructions. \" Current as of: May 12, 2017 Content Version: 11.4 © 9252-6356 [employer]. Care instructions adapted under license by [employer] (which disclaims liability or warranty for this information). If you have questions about a medical condition or this instruction, always ask your healthcare professional. [address] any warranty or liability for your use of this information. Hyponatremia: Care Instructions Your Care Instructions Hyponatremia (say \"ye-qx-uek-TREE-leonid-uh\") means that you don't have enough sodium in your blood. It can cause nausea, vomiting, and headaches. Or you may not feel hungry. In serious cases, it can cause seizures, a coma, or even death. Hyponatremia is not a disease. It is a problem caused by something else, such as medicines or exercising for a long time in hot weather. You can get hyponatremia if you lose a lot of fluids and then you drink a lot of water or other liquids that don't have much sodium. You can also get it if you have kidney, liver, heart, or other health problems. Treatment is focused on getting your sodium levels back to normal. 
Follow-up care is a key part of your treatment and safety. Be sure to make and go to all appointments, and call your doctor if you are having problems. It's also a good idea to know your test results and keep a list of the medicines you take. How can you care for yourself at home? · If your doctor recommends it, drink fluids that have sodium. Sports drinks are a good choice. Or you can eat salty foods. · If your doctor recommends it, limit the amount of water you drink. And limit fluids that are mostly water. These include tea, coffee, and juice. · Take your medicines exactly as prescribed. Call your doctor if you have any problems with your medicine. · Get your sodium levels tested when your doctor tells you to. When should you call for help? Call 911 anytime you think you may need emergency care. For example, call if: 
? · You have a seizure. ? · You passed out (lost consciousness). ?Call your doctor now or seek immediate medical care if: 
? · You are confused or it is hard to focus. ? · You have little or no appetite. ? · You feel sick to your stomach or you vomit. ? · You have a headache. ? · You have mood changes. ? · You feel more tired than usual. ? Watch closely for changes in your health, and be sure to contact your doctor if: ? · You do not get better as expected. Where can you learn more? Go to http://brittney-eliseo.info/. Enter D882 in the search box to learn more about \"Hyponatremia: Care Instructions. \" Current as of: October 14, 2016 Content Version: 11.4 © 3547-0330 [employer]. Care instructions adapted under license by [employer] (which disclaims liability or warranty for this information). If you have questions about a medical condition or this instruction, always ask your healthcare professional. [place] [address] any warranty or liability for your use of this information. Chart Review Routing History Recipient Method Report Sent By Jeovany Boucher MD  
Fax: [phone] Phone: [phone] Fax Provider Comm Report Huseyin Bowling [[id number]] 5/10/2012  6:58 AM 05/08/2012 Remington Purcell MD  
Fax: [phone] Phone: [phone] Fax IP Auto Routed [employer], [place] [[id number]] 10/24/2012  8:56 AM 10/24/2012 Remington Purcell MD  
Fax: [phone] Phone: [phone] Fax IP Auto Routed [employer], [place] [[id number]] 10/24/2012  3:46 PM 10/24/2012 Remington Purcell MD  
Fax: [phone] Phone: [phone] Fax IP Auto Routed Trans Polo Rodriguez MD [[id number]] 10/29/2012  3:12 PM 10/29/2012 Polo Rodriguez MD  
Phone: [phone] In Basket IP Auto Routed Trans Polo Rodriguez MD [[id number]] 10/29/2012  3:12 PM 10/29/2012 Remington Purcell MD  
Fax: [phone] Phone: [phone] Fax IP Auto Routed [employer], [place] [[id number]] 11/12/2012  8:29 AM 11/12/2012 Lakshmi Boucher MD  
Fax: [phone] Phone: [phone] Fax Provider Comm Report Huseyin Bowling [[id number]] 12/14/2012  2:09 PM 12/11/2012  
 pulmonary rehab Fax: [phone] Fax [address] [place] [address] CUSTOM LAB REPORT Igor Nazario [[id number]] 4/17/2013  4:47 PM 04/17/2013 Nataliia Nazario MD  
Fax: [phone] Phone: [phone] Fax Note Review Baldo Prado, [address] 2/11/2014 11:27 AM 02/11/2014 Mirian Whiteside MD  
Fax: [phone] Phone: [phone] Fax Note Review Baldo Prado, [address] 2/19/2015 12:00 PM 02/19/2015 Madeleine Franklin MD  
Phone: [phone] In [employer] [address] [[id number]] 2/22/2016  7:46 AM 02/22/2016 Natalie Veloz MD  
Fax: [phone] Phone: [phone] Fax [employer] [address] [[id number]] 2/22/2016  7:46 AM 02/22/2016 Mirian Whiteside MD  
Fax: [phone] Phone: [phone] Fax [id number] 7/5/2016  7:11 AM 07/01/2016

## 2018-06-27 NOTE — IP AVS SNAPSHOT
[id number] [place] [address] Roby Jenna Patient: Lillian Sosa MRN: [id number] About your hospitalization You were admitted on:  June 27, 2018 You last received care in the:  [facility] You were discharged on:  June 29, 2018 Why you were hospitalized Your primary diagnosis was:  Chf Exacerbation (Hcc) Follow-up Information Follow up With Details Comments Contact Info Kevin Madrid MD In 7 days Office closed.  will call Monday to make appointment. [address] [facility] [address] [place] [address] 
[phone] Fawn Mustafa MD In 1 week Office closed.  will call Monday to make appointment. [address] [facility] [address] [facility] 
[phone] Discharge Orders Procedure Order Date Status Priority Quantity Spec Type Associated Dx CALL YOUR DOCTOR For: Other Please call your primary care physician (PCP) if you develop fever greater than 101 F, chest pain, worsening shortness of breath at rest, uncontrolled nausea/vomiting, and any other concerning signs/symptoms. 06/29/18 1334 Normal Routine 1 Comments:  Please call your primary care physician (PCP) if you develop fever greater than 101 F, chest pain, worsening shortness of breath at rest, uncontrolled nausea/vomiting, and any other concerning signs/symptoms. Questions: For:  Other ACTIVITY AFTER DISCHARGE Patient should: Resume activity as tolerated. 06/29/18 1334 Normal Routine 1 Questions: Patient should:  Resume activity as tolerated. DIET CARDIAC No options chosen 06/29/18 1334 Normal Routine 1 Questions: Additional options:  No options chosen A check juan manuel indicates which time of day the medication should be taken. My Medications START taking these medications  Instructions Each Dose to Equal  
 Morning Noon Evening Bedtime  
 levoFLOXacin 250 mg tablet Commonly known as:  Ran Bird Start taking on:  6/30/2018 Your last dose was: Your next dose is: Take 1 Tab by mouth every twenty-four (24) hours. 250 mg  
    
   
   
   
  
 melatonin 3 mg tablet Your last dose was: Your next dose is: Take 1 Tab by mouth nightly as needed. 3 mg  
    
   
   
   
  
 potassium chloride 20 mEq packet Commonly known as:  KLOR-CON Your last dose was: Your next dose is: Take 2 Packets by mouth two (2) times daily (with meals). 40 mEq CHANGE how you take these medications Instructions Each Dose to Equal  
 Morning Noon Evening Bedtime  
 warfarin 2.5 mg tablet Commonly known as:  COUMADIN What changed:  additional instructions Your last dose was: Your next dose is: Take 1 Tab by mouth daily. Take 2 pills (5 mg) Mon, Tues, Wed, Thur, Sat, Sun; and 1 pill only (2.5 mg) Fri  
 2.5 mg  
    
   
   
   
  
  
CONTINUE taking these medications Instructions Each Dose to Equal  
 Morning Noon Evening Bedtime  
 albuterol 2.5 mg /3 mL (0.083 %) nebulizer solution Commonly known as:  PROVENTIL VENTOLIN Your last dose was: Your next dose is:    
   
   
 3 mL by Nebulization route every four (4) hours as needed for Wheezing. 2.5 mg  
    
   
   
   
  
 amLODIPine 10 mg tablet Commonly known as:  Nini Acevedo Your last dose was: Your next dose is: Take 10 mg by mouth daily. 10 mg  
    
   
   
   
  
 aspirin delayed-release 81 mg tablet Your last dose was: Your next dose is: Take 81 mg by mouth daily. 81 mg  
    
   
   
   
  
 chlorthalidone 25 mg tablet Commonly known as:  Frankey Shook Your last dose was: Your next dose is: Take 25 mg by mouth daily.   
 25 mg  
    
 COLACE 100 mg capsule Generic drug:  docusate sodium Your last dose was: Your next dose is: Take 100 mg by mouth two (2) times daily as needed for Constipation. 100 mg  
    
   
   
   
  
 digoxin 0.125 mg tablet Commonly known as:  Jaiden Ezequiel Your last dose was: Your next dose is:    
   
   
 take 1 tablet by mouth once daily MIRALAX 17 gram packet Generic drug:  polyethylene glycol Your last dose was: Your next dose is: Take 17 g by mouth daily as needed. 17 g  
    
   
   
   
  
 ondansetron hcl 4 mg tablet Commonly known as:  Preston Alba Your last dose was: Your next dose is: Take 1 Tab by mouth every eight (8) hours as needed for Nausea. 4 mg OXYGEN-AIR DELIVERY SYSTEMS Your last dose was: Your next dose is:    
   
   
 2 L/min by Does Not Apply route daily. Continuous. Company is [employer] 2 L/min  
    
   
   
   
  
 pravastatin 40 mg tablet Commonly known as:  PRAVACHOL Your last dose was: Your next dose is: Take 40 mg by mouth nightly. 40 mg  
    
   
   
   
  
 tiotropium-olodaterol 2.5-2.5 mcg/actuation Mist  
Commonly known as:  Gordon Primrose Your last dose was: Your next dose is: Take 2 Inhalation by inhalation daily. 2 Inhalation Where to Get Your Medications Information on where to get these meds will be given to you by the nurse or doctor. ! Ask your nurse or doctor about these medications  
  levoFLOXacin 250 mg tablet  
 melatonin 3 mg tablet  
 potassium chloride 20 mEq packet Discharge Instructions DISCHARGE SUMMARY from Nurse PATIENT INSTRUCTIONS: 
 
 
F-face looks uneven A-arms unable to move or move unevenly S-speech slurred or non-existent T-time-call 911 as soon as signs and symptoms begin-DO NOT go Back to bed or wait to see if you get better-TIME IS BRAIN. Warning Signs of HEART ATTACK Call 911 if you have these symptoms: 
? Chest discomfort. Most heart attacks involve discomfort in the center of the chest that lasts more than a few minutes, or that goes away and comes back. It can feel like uncomfortable pressure, squeezing, fullness, or pain. ? Discomfort in other areas of the upper body. Symptoms can include pain or discomfort in one or both arms, the back, neck, jaw, or stomach. ? Shortness of breath with or without chest discomfort. ? Other signs may include breaking out in a cold sweat, nausea, or lightheadedness. Don't wait more than five minutes to call [address]! Fast action can save your life. Calling 911 is almost always the fastest way to get lifesaving treatment. Emergency Medical Services staff can begin treatment when they arrive  up to an hour sooner than if someone gets to the hospital by car. The discharge information has been reviewed with the patient. The patient verbalized understanding. Discharge medications reviewed with the patient and appropriate educational materials and side effects teaching were provided. ___________________________________________________________________________________________________________________________________ Heart Failure: Care Instructions Your Care Instructions Heart failure occurs when your heart does not pump as much blood as the body needs. Failure does not mean that the heart has stopped pumping but rather that it is not pumping as well as it should.  Over time, this causes fluid buildup in your lungs and other parts of your body. Fluid buildup can cause shortness of breath, fatigue, swollen ankles, and other problems. By taking medicines regularly, reducing sodium (salt) in your diet, checking your weight every day, and making lifestyle changes, you can feel better and live longer. Follow-up care is a key part of your treatment and safety. Be sure to make and go to all appointments, and call your doctor if you are having problems. It's also a good idea to know your test results and keep a list of the medicines you take. How can you care for yourself at home? Medicines ? · Be safe with medicines. Take your medicines exactly as prescribed. Call your doctor if you think you are having a problem with your medicine. ? · Do not take any vitamins, over-the-counter medicine, or herbal products without talking to your doctor first. Ezzaedison Zamorai not take ibuprofen (Advil or Motrin) and naproxen (Aleve) without talking to your doctor first. They could make your heart failure worse. ? · You may be taking some of the following medicine. ¨ Beta-blockers can slow heart rate, decrease blood pressure, and improve your condition. Taking a beta-blocker may lower your chance of needing to be hospitalized. ¨ Angiotensin-converting enzyme inhibitors (ACEIs) reduce the heart's workload, lower blood pressure, and reduce swelling. Taking an ACEI may lower your chance of needing to be hospitalized again. ¨ Angiotensin II receptor blockers (ARBs) work like ACEIs. Your doctor may prescribe them instead of ACEIs. ¨ Diuretics, also called water pills, reduce swelling. ¨ Potassium supplements replace this important mineral, which is sometimes lost with diuretics. ¨ Aspirin and other blood thinners prevent blood clots, which can cause a stroke or heart attack. ? You will get more details on the specific medicines your doctor prescribes. Diet ? · Your doctor may suggest that you limit sodium to 2,000 milligrams (mg) a day or less. That is less than 1 teaspoon of salt a day, including all the salt you eat in cooking or in packaged foods. People get most of their sodium from processed foods. Fast food and restaurant meals also tend to be very high in sodium. ? · Ask your doctor how much liquid you can drink each day. You may have to limit liquids. ?Weight ? · Weigh yourself without clothing at the same time each day. Record your weight. Call your doctor if you have a sudden weight gain, such as more than 2 to 3 pounds in a day or 5 pounds in a week. (Your doctor may suggest a different range of weight gain.) A sudden weight gain may mean that your heart failure is getting worse. ? Activity level ? · Start light exercise (if your doctor says it is okay). Even if you can only do a small amount, exercise will help you get stronger, have more energy, and manage your weight and your stress. Walking is an easy way to get exercise. Start out by walking a little more than you did before. Bit by bit, increase the amount you walk. ? · When you exercise, watch for signs that your heart is working too hard. You are pushing yourself too hard if you cannot talk while you are exercising. If you become short of breath or dizzy or have chest pain, stop, sit down, and rest.  
? · If you feel \"wiped out\" the day after you exercise, walk slower or for a shorter distance until you can work up to a better pace. ? · Get enough rest at night. Sleeping with 1 or 2 pillows under your upper body and head may help you breathe easier. ? Lifestyle changes ? · Do not smoke. Smoking can make a heart condition worse. If you need help quitting, talk to your doctor about stop-smoking programs and medicines. These can increase your chances of quitting for good. Quitting smoking may be the most important step you can take to protect your heart. ? · Limit alcohol to 2 drinks a day for men and 1 drink a day for women. Too much alcohol can cause health problems. ? · Avoid getting sick from colds and the flu. Get a pneumococcal vaccine shot. If you have had one before, ask your doctor whether you need another dose. Get a flu shot each year. If you must be around people with colds or the flu, wash your hands often. When should you call for help? Call 911 if you have symptoms of sudden heart failure such as: 
? · You have severe trouble breathing. ? · You cough up pink, foamy mucus. ? · You have a new irregular or rapid heartbeat. ?Call your doctor now or seek immediate medical care if: 
? · You have new or increased shortness of breath. ? · You are dizzy or lightheaded, or you feel like you may faint. ? · You have sudden weight gain, such as more than 2 to 3 pounds in a day or 5 pounds in a week. (Your doctor may suggest a different range of weight gain.) ? · You have increased swelling in your legs, ankles, or feet. ? · You are suddenly so tired or weak that you cannot do your usual activities. ? Watch closely for changes in your health, and be sure to contact your doctor if you develop new symptoms. Where can you learn more? Go to http://brittney-eliseo.info/. Enter L911 in the search box to learn more about \"Heart Failure: Care Instructions. \" Current as of: September 21, 2016 Content Version: 11.4 © 1706-5214 Snyppit. Care instructions adapted under license by NurseBuddy (which disclaims liability or warranty for this information). If you have questions about a medical condition or this instruction, always ask your healthcare professional. [place] [address] any warranty or liability for your use of this information. Hypokalemia: Care Instructions Your Care Instructions Hypokalemia (say \"lg-lg-sdu-LUTHER-leonid-uh\") is a low level of potassium.  The heart, muscles, kidneys, and nervous system all need potassium to work well. This problem has many different causes. Kidney problems, diet, and medicines like diuretics and laxatives can cause it. So can vomiting or diarrhea. In some cases, cancer is the cause. Your doctor may do tests to find the cause of your low potassium levels. You may need medicines to bring your potassium levels back to normal. You may also need regular blood tests to check your potassium. If you have very low potassium, you may need intravenous (IV) medicines. You also may need tests to check the electrical activity of your heart. Heart problems caused by low potassium levels can be very serious. Follow-up care is a key part of your treatment and safety. Be sure to make and go to all appointments, and call your doctor if you are having problems. It's also a good idea to know your test results and keep a list of the medicines you take. How can you care for yourself at home? · If your doctor recommends it, eat foods that have a lot of potassium. These include fresh fruits, juices, and vegetables. They also include nuts, beans, and milk. · Be safe with medicines. If your doctor prescribes medicines or potassium supplements, take them exactly as directed. Call your doctor if you have any problems with your medicines. · Get your potassium levels tested as often as your doctor tells you. When should you call for help? Call 911 anytime you think you may need emergency care. For example, call if: 
? · You feel like your heart is missing beats. Heart problems caused by low potassium can cause death. ? · You passed out (lost consciousness). ? · You have a seizure. ?Call your doctor now or seek immediate medical care if: 
? · You feel weak or unusually tired. ? · You have severe arm or leg cramps. ? · You have tingling or numbness. ? · You feel sick to your stomach, or you vomit. ? · You have belly cramps. ? · You feel bloated or constipated. ? · You have to urinate a lot. ? · You feel very thirsty most of the time. ? · You are dizzy or lightheaded, or you feel like you may faint. ? · You feel depressed, or you lose touch with reality. ? Watch closely for changes in your health, and be sure to contact your doctor if: 
? · You do not get better as expected. Where can you learn more? Go to http://brittney-eliseo.info/. Enter G358 in the search box to learn more about \"Hypokalemia: Care Instructions. \" Current as of: May 12, 2017 Content Version: 11.4 © 8961-8051 LearnUp. Care instructions adapted under license by Breezeplay (which disclaims liability or warranty for this information). If you have questions about a medical condition or this instruction, always ask your healthcare professional. [place] [address] any warranty or liability for your use of this information. Hyponatremia: Care Instructions Your Care Instructions Hyponatremia (say \"lj-wp-ith-TREE-leonid-uh\") means that you don't have enough sodium in your blood. It can cause nausea, vomiting, and headaches. Or you may not feel hungry. In serious cases, it can cause seizures, a coma, or even death. Hyponatremia is not a disease. It is a problem caused by something else, such as medicines or exercising for a long time in hot weather. You can get hyponatremia if you lose a lot of fluids and then you drink a lot of water or other liquids that don't have much sodium. You can also get it if you have kidney, liver, heart, or other health problems. Treatment is focused on getting your sodium levels back to normal. 
Follow-up care is a key part of your treatment and safety. Be sure to make and go to all appointments, and call your doctor if you are having problems. It's also a good idea to know your test results and keep a list of the medicines you take. How can you care for yourself at home? · If your doctor recommends it, drink fluids that have sodium. Sports drinks are a good choice. Or you can eat salty foods. · If your doctor recommends it, limit the amount of water you drink. And limit fluids that are mostly water. These include tea, coffee, and juice. · Take your medicines exactly as prescribed. Call your doctor if you have any problems with your medicine. · Get your sodium levels tested when your doctor tells you to. When should you call for help? Call 911 anytime you think you may need emergency care. For example, call if: 
? · You have a seizure. ? · You passed out (lost consciousness). ?Call your doctor now or seek immediate medical care if: 
? · You are confused or it is hard to focus. ? · You have little or no appetite. ? · You feel sick to your stomach or you vomit. ? · You have a headache. ? · You have mood changes. ? · You feel more tired than usual. ? Watch closely for changes in your health, and be sure to contact your doctor if: 
? · You do not get better as expected. Where can you learn more? Go to http://brittney-eliseo.info/. Enter K445 in the search box to learn more about \"Hyponatremia: Care Instructions. \" Current as of: October 14, 2016 Content Version: 11.4 © 0980-8466 Healthwise, Incorporated. Care instructions adapted under license by [employer] (which disclaims liability or warranty for this information). If you have questions about a medical condition or this instruction, always ask your healthcare professional. [place] [address] any warranty or liability for your use of this information. Myworldwall Announcement We are excited to announce that we are making your provider's discharge notes available to you in Myworldwall.   You will see these notes when they are completed and signed by the physician that discharged you from your recent hospital stay. If you have any questions or concerns about any information you see in [employer], please call the Health Information Department where you were seen or reach out to your Primary Care Provider for more information about your plan of care. Introducing [facility]! Aby Jose introduces [employer] patient portal. Now you can access parts of your medical record, email your doctor's office, and request medication refills online. 1. In your internet browser, go to [email or web address] 2. Click on the First Time User? Click Here link in the Sign In box. You will see the New Member Sign Up page. 3. Enter your [employer] Access Code exactly as it appears below. You will not need to use this code after youve completed the sign-up process. If you do not sign up before the expiration date, you must request a new code. · [employer] Access Code: [email or web address] Expires: 9/4/2018 11:42 AM 
 
4. Enter the last four digits of your Social Security Number (xxxx) and Date of Birth (mm/dd/yyyy) as indicated and click Submit. You will be taken to the next sign-up page. 5. Create a [employer] ID. This will be your [employer] login ID and cannot be changed, so think of one that is secure and easy to remember. 6. Create a [employer] password. You can change your password at any time. 7. Enter your Password Reset Question and Answer. This can be used at a later time if you forget your password. 8. Enter your e-mail address. You will receive e-mail notification when new information is available in [address] 9. Click Sign Up. You can now view and download portions of your medical record. 10. Click the Download Summary menu link to download a portable copy of your medical information. If you have questions, please visit the Frequently Asked Questions section of the [employer] website. Remember, [employer] is NOT to be used for urgent needs. For medical emergencies, dial 911. Now available from your iPhone and Android! Introducing Ryan Still As a [facility]eb[facility]ove patient, I wanted to make you aware of our electronic visit tool called Ryan Still. [facility]an [facility] allows you to connect within minutes with a medical provider 24 hours a day, seven days a week via a mobile device or tablet or logging into a secure website from your computer. You can access Ryan Still from anywhere in the [place]. A virtual visit might be right for you when you have a simple condition and feel like you just dont want to get out of bed, or cant get away from work for an appointment, when your regular [facility] provider is not available (evenings, weekends or holidays), or when youre out of town and need minor care. Electronic visits cost only $49 and if the Esteban Marin [facility] [employer] provider determines a prescription is needed to treat your condition, one can be electronically transmitted to a nearby pharmacy*. Please take a moment to enroll today if you have not already done so. The enrollment process is free and takes just a few minutes. To enroll, please download the [employer] todd to your tablet or phone, or visit [email or web address] to enroll on your computer. And, as an [address] [place] [address] patient with a [employer] account, the results of your visits will be scanned into your electronic medical record and your primary care provider will be able to view the scanned results. We urge you to continue to see your regular Esteban Shove provider for your ongoing medical care. And while your primary care provider may not be the one available when you seek a Ryan Still virtual visit, the peace of mind you get from getting a real diagnosis real time can be priceless. For more information on Ryan Still, view our Frequently Asked Questions (FAQs) at [email or web address]. Sincerely, 
 
Nina Craft MD 
Chief Medical Officer Maylin Jessica Reddy *:  certain medications cannot be prescribed via Ryan Still Unresulted Labs-Please follow up with your PCP about these lab tests Order Current Status CULTURE, RESPIRATORY/SPUTUM/BRONCH W GRAM STAIN Preliminary result Providers Seen During Your Hospitalization Provider Specialty Primary office phone Francisco Javier Heath MD Emergency Medicine [phone] Puja Samuel MD Family Practice [phone] Hu Edge MD [facility] [phone] Your Primary Care Physician (PCP) Primary Care Physician Office Phone Office Fax Aisha Browne [phone] You are allergic to the following Allergen Reactions Codeine Swelling Morphine Itching Sulfa (Sulfonamide Antibiotics) Other (comments) Kidney problems. Recent Documentation Height Weight BMI Smoking Status 1.676 m 70.8 kg 25.18 kg/m2 Former Smoker Emergency Contacts Name Discharge Info Relation Home Work Mobile [address] [facility] CAREGIVER [3] Daughter [21] [phone] [facility] DISCHARGE CAREGIVER [3] Daughter [21] [phone] Patient Belongings The following personal items are in your possession at time of discharge: 
  Dental Appliances: Uppers, Lowers  Visual Aid: Glasses      Home Medications: None   Jewelry: None  Clothing: Undergarments    Other Valuables: Cell Phone, Other (comment) (dentures)  Personal Items Sent to Safe: no 
 
  
  
 Please provide this summary of care documentation to your next provider. Signatures-by signing, you are acknowledging that this After Visit Summary has been reviewed with you and you have received a copy. Patient Signature:  ____________________________________________________________ Date:  ____________________________________________________________  
  
Jamaal Truong  Provider Signature: ____________________________________________________________ Date:  ____________________________________________________________

## 2018-06-27 NOTE — IP AVS SNAPSHOT
[id number] [place] [address] [place] Patient: Reba Serra MRN: [id number] A check juan manuel indicates which time of day the medication should be taken. My Medications START taking these medications Instructions Each Dose to Equal  
 Morning Noon Evening Bedtime  
 levoFLOXacin 250 mg tablet Commonly known as:  Kimmie Pih Start taking on:  6/30/2018 Your last dose was: Your next dose is: Take 1 Tab by mouth every twenty-four (24) hours. 250 mg  
    
   
   
   
  
 melatonin 3 mg tablet Your last dose was: Your next dose is: Take 1 Tab by mouth nightly as needed. 3 mg  
    
   
   
   
  
 potassium chloride 20 mEq packet Commonly known as:  KLOR-CON Your last dose was: Your next dose is: Take 2 Packets by mouth two (2) times daily (with meals). 40 mEq CHANGE how you take these medications Instructions Each Dose to Equal  
 Morning Noon Evening Bedtime  
 warfarin 2.5 mg tablet Commonly known as:  COUMADIN What changed:  additional instructions Your last dose was: Your next dose is: Take 1 Tab by mouth daily. Take 2 pills (5 mg) Mon, Tues, Wed, Thur, Sat, Sun; and 1 pill only (2.5 mg) Fri  
 2.5 mg  
    
   
   
   
  
  
CONTINUE taking these medications Instructions Each Dose to Equal  
 Morning Noon Evening Bedtime  
 albuterol 2.5 mg /3 mL (0.083 %) nebulizer solution Commonly known as:  PROVENTIL VENTOLIN Your last dose was: Your next dose is:    
   
   
 3 mL by Nebulization route every four (4) hours as needed for Wheezing. 2.5 mg  
    
   
   
   
  
 amLODIPine 10 mg tablet Commonly known as:  Louisa Pace Your last dose was: Your next dose is: Take 10 mg by mouth daily.   
 10 mg  
    
   
   
   
 aspirin delayed-release 81 mg tablet Your last dose was: Your next dose is: Take 81 mg by mouth daily. 81 mg  
    
   
   
   
  
 chlorthalidone 25 mg tablet Commonly known as:  Helena Maxim Your last dose was: Your next dose is: Take 25 mg by mouth daily. 25 mg  
    
   
   
   
  
 COLACE 100 mg capsule Generic drug:  docusate sodium Your last dose was: Your next dose is: Take 100 mg by mouth two (2) times daily as needed for Constipation. 100 mg  
    
   
   
   
  
 digoxin 0.125 mg tablet Commonly known as:  Jesus Ambrosia Your last dose was: Your next dose is:    
   
   
 take 1 tablet by mouth once daily MIRALAX 17 gram packet Generic drug:  polyethylene glycol Your last dose was: Your next dose is: Take 17 g by mouth daily as needed. 17 g  
    
   
   
   
  
 ondansetron hcl 4 mg tablet Commonly known as:  Sophie Actrichard Your last dose was: Your next dose is: Take 1 Tab by mouth every eight (8) hours as needed for Nausea. 4 mg OXYGEN-AIR DELIVERY SYSTEMS Your last dose was: Your next dose is:    
   
   
 2 L/min by Does Not Apply route daily. Continuous. Company is [employer] 2 L/min  
    
   
   
   
  
 pravastatin 40 mg tablet Commonly known as:  PRAVACHOL Your last dose was: Your next dose is: Take 40 mg by mouth nightly. 40 mg  
    
   
   
   
  
 tiotropium-olodaterol 2.5-2.5 mcg/actuation Mist  
Commonly known as:  Manisha Teran Your last dose was: Your next dose is: Take 2 Inhalation by inhalation daily. 2 Inhalation Where to Get Your Medications Information on where to get these meds will be given to you by the nurse or doctor. ! Ask your nurse or doctor about these medications  
  levoFLOXacin 250 mg tablet  
 melatonin 3 mg tablet  
 potassium chloride 20 mEq packet

## 2018-06-28 LAB
ANION GAP SERPL CALC-SCNC: 10 MMOL/L (ref 3–18)
ATRIAL RATE: 76 BPM
BASOPHILS # BLD: 0 K/UL (ref 0–0.06)
BASOPHILS NFR BLD: 0 % (ref 0–2)
BUN SERPL-MCNC: 24 MG/DL (ref 7–18)
BUN/CREAT SERPL: 16 (ref 12–20)
CALCIUM SERPL-MCNC: 7.4 MG/DL (ref 8.5–10.1)
CALCULATED R AXIS, ECG10: -15 DEGREES
CALCULATED T AXIS, ECG11: 99 DEGREES
CHLORIDE SERPL-SCNC: 84 MMOL/L (ref 100–108)
CO2 SERPL-SCNC: 33 MMOL/L (ref 21–32)
CREAT SERPL-MCNC: 1.47 MG/DL (ref 0.6–1.3)
DIAGNOSIS, 93000: NORMAL
DIFFERENTIAL METHOD BLD: ABNORMAL
EOSINOPHIL # BLD: 0 K/UL (ref 0–0.4)
EOSINOPHIL NFR BLD: 0 % (ref 0–5)
ERYTHROCYTE [DISTWIDTH] IN BLOOD BY AUTOMATED COUNT: 14.9 % (ref 11.6–14.5)
FERRITIN SERPL-MCNC: 108 NG/ML (ref 8–388)
GLUCOSE SERPL-MCNC: 115 MG/DL (ref 74–99)
HCT VFR BLD AUTO: 22.8 % (ref 36–48)
HCT VFR BLD AUTO: 23.3 % (ref 36–48)
HGB BLD-MCNC: 7.1 G/DL (ref 13–16)
HGB BLD-MCNC: 7.2 G/DL (ref 13–16)
INR PPP: 2.3 (ref 0.8–1.2)
IRON SATN MFR SERPL: 7 %
IRON SERPL-MCNC: 17 UG/DL (ref 50–175)
LYMPHOCYTES # BLD: 1.1 K/UL (ref 0.9–3.6)
LYMPHOCYTES NFR BLD: 8 % (ref 21–52)
MAGNESIUM SERPL-MCNC: 2.1 MG/DL (ref 1.6–2.6)
MCH RBC QN AUTO: 23.5 PG (ref 24–34)
MCHC RBC AUTO-ENTMCNC: 31.1 G/DL (ref 31–37)
MCV RBC AUTO: 75.5 FL (ref 74–97)
MONOCYTES # BLD: 1.4 K/UL (ref 0.05–1.2)
MONOCYTES NFR BLD: 11 % (ref 3–10)
NEUTS SEG # BLD: 10.6 K/UL (ref 1.8–8)
NEUTS SEG NFR BLD: 81 % (ref 40–73)
PLATELET # BLD AUTO: 333 K/UL (ref 135–420)
PMV BLD AUTO: 8.7 FL (ref 9.2–11.8)
POTASSIUM SERPL-SCNC: 2.7 MMOL/L (ref 3.5–5.5)
POTASSIUM SERPL-SCNC: 3 MMOL/L (ref 3.5–5.5)
PROTHROMBIN TIME: 24.8 SEC (ref 11.5–15.2)
Q-T INTERVAL, ECG07: 392 MS
QRS DURATION, ECG06: 126 MS
QTC CALCULATION (BEZET), ECG08: 441 MS
RBC # BLD AUTO: 3.02 M/UL (ref 4.7–5.5)
RETICS/RBC NFR AUTO: 2 % (ref 0.5–2.3)
SODIUM SERPL-SCNC: 127 MMOL/L (ref 136–145)
TIBC SERPL-MCNC: 254 UG/DL (ref 250–450)
VENTRICULAR RATE, ECG03: 76 BPM
WBC # BLD AUTO: 13.1 K/UL (ref 4.6–13.2)

## 2018-06-28 PROCEDURE — 82728 ASSAY OF FERRITIN: CPT

## 2018-06-28 PROCEDURE — 83540 ASSAY OF IRON: CPT

## 2018-06-28 PROCEDURE — 36415 COLL VENOUS BLD VENIPUNCTURE: CPT

## 2018-06-28 PROCEDURE — 85045 AUTOMATED RETICULOCYTE COUNT: CPT

## 2018-06-28 PROCEDURE — 85610 PROTHROMBIN TIME: CPT

## 2018-06-28 PROCEDURE — 83735 ASSAY OF MAGNESIUM: CPT

## 2018-06-28 PROCEDURE — 74011250637 HC RX REV CODE- 250/637: Performed by: STUDENT IN AN ORGANIZED HEALTH CARE EDUCATION/TRAINING PROGRAM

## 2018-06-28 PROCEDURE — 84132 ASSAY OF SERUM POTASSIUM: CPT

## 2018-06-28 PROCEDURE — 85025 COMPLETE CBC W/AUTO DIFF WBC: CPT

## 2018-06-28 PROCEDURE — 74011250637 HC RX REV CODE- 250/637: Performed by: FAMILY MEDICINE

## 2018-06-28 PROCEDURE — 74011250636 HC RX REV CODE- 250/636: Performed by: FAMILY MEDICINE

## 2018-06-28 PROCEDURE — 74011250636 HC RX REV CODE- 250/636: Performed by: STUDENT IN AN ORGANIZED HEALTH CARE EDUCATION/TRAINING PROGRAM

## 2018-06-28 PROCEDURE — 74011000250 HC RX REV CODE- 250: Performed by: STUDENT IN AN ORGANIZED HEALTH CARE EDUCATION/TRAINING PROGRAM

## 2018-06-28 PROCEDURE — 65270000029 HC RM PRIVATE

## 2018-06-28 PROCEDURE — 74011000258 HC RX REV CODE- 258: Performed by: STUDENT IN AN ORGANIZED HEALTH CARE EDUCATION/TRAINING PROGRAM

## 2018-06-28 PROCEDURE — 85018 HEMOGLOBIN: CPT

## 2018-06-28 RX ORDER — WARFARIN SODIUM 5 MG/1
5 TABLET ORAL ONCE
Status: COMPLETED | OUTPATIENT
Start: 2018-06-28 | End: 2018-06-28

## 2018-06-28 RX ORDER — LANOLIN ALCOHOL/MO/W.PET/CERES
3 CREAM (GRAM) TOPICAL
Status: DISCONTINUED | OUTPATIENT
Start: 2018-06-28 | End: 2018-06-29 | Stop reason: HOSPADM

## 2018-06-28 RX ADMIN — HYDROCODONE BITARTRATE AND ACETAMINOPHEN 1 TABLET: 5; 325 TABLET ORAL at 09:17

## 2018-06-28 RX ADMIN — LIDOCAINE HYDROCHLORIDE: 10 INJECTION, SOLUTION EPIDURAL; INFILTRATION; INTRACAUDAL; PERINEURAL at 23:30

## 2018-06-28 RX ADMIN — PRAVASTATIN SODIUM 40 MG: 20 TABLET ORAL at 21:37

## 2018-06-28 RX ADMIN — ASPIRIN 81 MG: 81 TABLET, COATED ORAL at 09:05

## 2018-06-28 RX ADMIN — HYDROCODONE BITARTRATE AND ACETAMINOPHEN 1 TABLET: 5; 325 TABLET ORAL at 19:14

## 2018-06-28 RX ADMIN — DIGOXIN 0.12 MG: 125 TABLET ORAL at 09:05

## 2018-06-28 RX ADMIN — LEVOFLOXACIN 250 MG: 5 INJECTION, SOLUTION INTRAVENOUS at 21:37

## 2018-06-28 RX ADMIN — LIDOCAINE HYDROCHLORIDE: 10 INJECTION, SOLUTION EPIDURAL; INFILTRATION; INTRACAUDAL; PERINEURAL at 22:29

## 2018-06-28 RX ADMIN — LIDOCAINE HYDROCHLORIDE: 10 INJECTION, SOLUTION EPIDURAL; INFILTRATION; INTRACAUDAL; PERINEURAL at 20:32

## 2018-06-28 RX ADMIN — AMLODIPINE BESYLATE 10 MG: 10 TABLET ORAL at 09:05

## 2018-06-28 RX ADMIN — WARFARIN SODIUM 5 MG: 5 TABLET ORAL at 17:35

## 2018-06-28 RX ADMIN — POTASSIUM CHLORIDE 40 MEQ: 1.5 POWDER, FOR SOLUTION ORAL at 09:05

## 2018-06-28 RX ADMIN — POTASSIUM CHLORIDE 40 MEQ: 1.5 POWDER, FOR SOLUTION ORAL at 17:27

## 2018-06-28 NOTE — ROUTINE PROCESS
Assumed patient care. Received bedside shift report from Rd Dorantes. Patient in bed, awake, alert and oriented x4. Complained of right groin pain from hernia, medicated at this time. Family at the bedside. Denies other needs at this time. Updated of current plan of care, agrees with plans. Call light and personal items placed in reach. 11:32 PM - Due medications given, patient is unable to sleep, requesting for sleeping med. Placed page to MD for orders. 12:07 AM - Sleeping med given. Patient resting in bed. Denies other needs at this time. Call light and personal items placed in reach. 3:00 AM - Patient wakes up with SOB needing neb treatment per PRN orders. Oxygen sats 100% with HR in the 90's.     7:29 AM -Bedside shift change report given to Rd Dorantes (oncoming nurse) by Flroentino Salgado RN (offgoing nurse). Report given with SBAR, Kardex, Intake/Output, MAR and Recent Results.

## 2018-06-28 NOTE — ROUTINE PROCESS
TRANSFER - IN REPORT:    Verbal report received from Romeo(name) on [place]  being received from ER(unit) for routine progression of care      Report consisted of patients Situation, Background, Assessment and   Recommendations(SBAR). Information from the following report(s) MAR was reviewed with the receiving nurse. Opportunity for questions and clarification was provided. Assessment completed upon patients arrival to unit and care assumed.

## 2018-06-28 NOTE — H&P
Admission History and Physical  [facility]        Patient: Evelia Santos MRN: [id number]  CSN: [id number]    YOB: 1940  Age: 77 y.o. Sex: male       DOA: 6/27/2018      HPI:      Evelia Santos is a 77 y.o. male with PMH afib, anemia, BPH, COPD, CAD, CHF, HTN, pulm HTN, HLD, spondylosis, and chronic back pain, now presenting with complaint of SOB.     Pt reluctant to give HPI but reports recent SOB and reporting to PCP, where he was informed of abnormal lab values 2 days ago and asked to come back in today where labs were again abnormal. Pt asked to come in to ED by PCP for evaluation.      Of note, pt reported to ED on 6/17 with nausea, vomiting and body aches and had lab results similar to today but was discharged without treatment.      ED Course: CBC: WBC: 17.1, Hgb: 8.3  INR: 2.2  CMP: Na: 125, K: 2.9, Cr: 1.39, T bili: 1.2  Cardiac Enzymes unremarkable  BNP: 91867  EKG: Afib, nonspecific IV block, cannot rule out old AS infarct  CXR: Pre-existing COPD in lungs. Minimal to mild pulmonary vascular congestion. Minimal interstitial pulmonary edema is suspected at this time. At left lung base, mainly in lower lingula there are new atelectatic changes, and/or pneumonic infiltrates with small pleural effusion versus pleural reaction at the CP angle and CP gutter. Mildly accentuated fibrotic or atelectatic changes at right lung base. In the parahilar areas of left lung there are some focal densities, as described. These are new findings. The findings may due to focal infiltrates.  But mass or cavitary lesion cannot be excluded.     Review of Systems  General ROS: negative for  - chills, fever  Ophthalmic ROS: negative for - blurry vision, decreased vision or loss of vision  ENT ROS: negative for - headaches, hearing change or visual changes  Hematological and Lymphatic ROS: bruising  Respiratory ROS: negative for - cough, hemoptysis, shortness of breath, orthopnea, paroxysmal dyspnea, or wheezing  Cardiovascular ROS: negative for - chest pain, dyspnea on exertion, edema, loss of consciousness, or palpitations   Gastrointestinal ROS: negative for - abdominal pain, blood in stools, change in stools, constipation, diarrhea, hematemesis, melena, nausea/vomiting or swallowing difficulty/pain  Genito-Urinary ROS: negative for - dysuria, hematuria or urinary frequency/urgency  Musculoskeletal ROS: negative for - joint pain, joint swelling or muscle pain  Neurological ROS: negative for - dizziness, headaches, numbness/tingling or weakness  Dermatological ROS: negative for - rash or skin lesion changes             Past Medical History:   Diagnosis Date    Aneurysm [facility])       AAA repair 2006 & 2012    Aorto-iliac duplex 02/13/2015     Tech difficult. Patent aorta bi-iliac endograft w/o leak or limb dysfunction.  Arrhythmia       a fib    Cardiac cath 11/01/2012     RCA (sm, nondom) patent. LM patent. LAD 25%. CX (dom) 45% mid. LVEDP 12 mmHg. No WMA. PA 27/12. W 10-12. CO/CI 5.2/2.6 (TD).  Cardiac echocardiogram, abnormal 02/20/2016     EF 55%. No WMA. Indeterminate diastolic fx. RVSP 60 mmHg. Severe LAE. Mild MR. Mod TR.  IVCE. Similar to study of 10/26/12.  Cardiovascular aorto-iliac duplex 02/13/2015     Patent aorta bi-iliac endovascular graft w/o leak or limb dysfunction. Sac measures 4.21 x 4.47 cm (4.4 x 4.6 cm on 1/31/14).  Cardiovascular LE peripheral arterial testing 07/29/2013     No significant LE arterial disease bilaterally. R GHADA 1. 12.  L GHADA 1. 12.  R DBI 0.83. L DBI 0.71. Exercise deferred.  Cardiovascular renal duplex 10/31/2012     No RA stenosis. Intrinsic/med disease in left kidney. Patent aortic endograft. Patent, thrombus-free renal veins bilaterally.  Carotid duplex 07/29/2013     Mild <50% bilateral ICA plaquing.       Chronic lung disease      Cigarette smoker      Congestive heart failure (CHF) (HCC)      COPD (chronic obstructive pulmonary disease) (HCC)       and emphysema; likely secondary to tobacco abuse    Difficult intubation      Dyslipidemia       low HDL    Emphysema      HTN (hypertension)      Hypercholesterolemia      Increased prostate specific antigen (PSA) velocity      Long term (current) use of anticoagulants       coumadin    Osteoarthritis      Osteomyelitis (HCC)      Paroxysmal atrial fibrillation (HCC)      Peripheral vascular disease ([facility] [address]       AAA repair 12/2007    Persistent atrial Fibrillation      Persistent atrial fibrillation ([address]      Unspecified adverse effect of anesthesia       difficulty breathing placed in ICU Oct 2012 (AAA repair)               Past Surgical History:   Procedure Laterality Date    BRONCHOSCOPY DIAGNOSTIC   11/2/2012           CARDIAC CATHETERIZATION   11/1/2012           COLONOSCOPY N/A 7/26/2016     COLONOSCOPY with Polypectomies performed by Chandrakant Boone MD at [address] HX AAA REPAIR         2006 & 2012    HX HEART CATHETERIZATION   3/4/2009     1. RCA small, nondominant; patent. 2. LMCA patent. 3. LAD is long, wrapped around apical vessel. Diffuse, 20-30% stenosis noted. 4. CCA is large, dominant vessel. Diffuse 20-30% stenosis. 5. LVEDP is 16 mmHg. 6. Overall left ventricular systolic function mildly diminshed with est. EF 45%. Mild, global hypokinesis noted. 7. No significant mitral regurgitation or aortic stenosis noted.  (see report)    HX HERNIA REPAIR   2/2014     rt inguinal     HX HERNIA REPAIR   01/2016     LEFT INGUINAL HERNIA REPAIR DR. Yazmin Rae    REPAIR ING HERNIA,5+Y/O,JESSIE Left 1-20-16     Dr. Ganga Perdue   11/1/2012                       Family History   Problem Relation Age of Onset    Heart Surgery Father         BYPASS    Stroke Father      Heart Surgery Mother         BYPASS    Coronary Artery Disease Mother      Stroke Mother            Social History                Social History    Marital status:        Spouse name: N/A    Number of children: N/A    Years of education: N/A             Social History Main Topics    Smoking status: Former Smoker       Packs/day: 1.00       Years: 54.00       Types: Cigarettes       Quit date: 10/23/2012    Smokeless tobacco: Never Used    Alcohol use No         Comment: quit drinking alcohol 26 years ago, patient states stopped in \"\"    Drug use: No    Sexual activity: Not on file           Other Topics Concern    Not on file      Social History Narrative                  Allergies   Allergen Reactions    Codeine Swelling    Morphine Itching    Sulfa (Sulfonamide Antibiotics) Other (comments)       Kidney problems.         Prior to Admission Medications   Prescriptions Last Dose Informant Patient Reported? Taking? OXYGEN-AIR DELIVERY SYSTEMS     Yes No   Si L/min by Does Not Apply route daily. Continuous. Company is [employer]       QVAR 80 mcg/actuation aero     Yes No   albuterol (PROVENTIL VENTOLIN) 2.5 mg /3 mL (0.083 %) nebulizer solution     No No   Sig: 3 mL by Nebulization route every four (4) hours as needed for Wheezing. amLODIPine (NORVASC) 10 mg tablet     Yes No   Sig: Take 10 mg by mouth daily. chlorthalidone (HYGROTEN) 25 mg tablet     Yes No   Sig: Take 25 mg by mouth daily. digoxin (DIGITEK) 0.125 mg tablet     No No   Sig: take 1 tablet by mouth once daily   ondansetron hcl (ZOFRAN) 4 mg tablet     No No   Sig: Take 1 Tab by mouth every eight (8) hours as needed for Nausea. tiotropium-olodaterol (STIOLTO RESPIMAT) 2.5-2.5 mcg/actuation mist     No No   Sig: Take 2 Puffs by inhalation daily. tiotropium-olodaterol (STIOLTO RESPIMAT) 2.5-2.5 mcg/actuation mist     No No   Sig: Take 2 Inhalation by inhalation daily. traMADol (ULTRAM) 50 mg tablet     No No   Sig: Take 1 Tab by mouth every six (6) hours as needed for Pain.  Max Daily Amount: 200 mg.   warfarin (COUMADIN) 2.5 mg tablet     No No   Sig: Take 1 Tab by mouth daily. Take 2 pills (5 mg) Mon, Tues, Wed, Thur, Sat, Sun; and 1 pill only  (2.5 mg) Fri       Facility-Administered Medications: None         Physical Exam:      Patient Vitals for the past 24 hrs:    Temp Pulse Resp BP SpO2   06/27/18 2030 - 74 16 158/77 100 %   06/27/18 2015 - 74 18 156/80 100 %   06/27/18 1827 - - - - 97 %   06/27/18 1624 97.3 °F (36.3 °C) (!) 103 20 161/83 97 %         Physical Exam:   General:  Alert and Responsive and in No acute distress. HEENT: Conjunctiva pink, sclera anicteric. EOMI. Bilateral painless eyelid masses without erythema or drainiage. Pharynx moist, nonerythematous. Moist mucous membranes. Thyroid not enlarged, no nodules. No cervical, supraclavicular, occipital or submandibular lymphadenopathy. No other gross abnormalities appreciated. CV:  Irregularly irregular rhythm, normal rate. No murmurs, rubs, or gallops appreciated. No visible pulsations or thrills. Marked JVD    RESP:  Unlabored breathing. Bilateral crackles at lung bases, worse on R side. Equal expansion bilaterally. ABD:  Soft, nontender, nondistended. Normoactive bowel sounds. No hepatosplenomegaly. No suprapubic tenderness. RECTAL:  No masses or hemorrhoids. Hemoccult negative. MS:  No joint deformity or instability. No atrophy. Neuro:  5/5 strength bilateral upper extremities and lower extremities. A+Ox3. Ext:  Bilateral 2+ pitting edema to knees. 2+ radial and dp pulses bilaterally. Skin:  No rashes, lesions, or ulcers. Good turgor.     IMAGING:   CXR: Pre-existing COPD in lungs. Minimal to mild pulmonary vascular congestion. Minimal interstitial pulmonary edema is suspected at this time. At left lung base, mainly in lower lingula there are new atelectatic changes, and/or pneumonic infiltrates with small pleural effusion versus pleural reaction at the CP angle and CP gutter. Mildly accentuated fibrotic or atelectatic changes at right lung base.  In the parahilar areas of left lung there are some focal densities, as described. These are new findings. The findings may due to focal infiltrates. But mass or cavitary lesion cannot be excluded.      Recent Results           Recent Results (from the past 12 hour(s))   CBC WITH AUTOMATED DIFF     Collection Time: 06/27/18  5:08 PM   Result Value Ref Range     WBC 17.1 (H) 4.6 - 13.2 K/uL     RBC 3.51 (L) 4.70 - 5.50 M/uL     HGB 8.3 (L) 13.0 - 16.0 g/dL     HCT 26.0 (L) 36.0 - 48.0 %     MCV 74.1 74.0 - 97.0 FL     MCH 23.6 (L) 24.0 - 34.0 PG     MCHC 31.9 31.0 - 37.0 g/dL     RDW 14.9 (H) 11.6 - 14.5 %     PLATELET 515 246 - 263 K/uL     MPV 8.5 (L) 9.2 - 11.8 FL     NEUTROPHILS 82 (H) 40 - 73 %     LYMPHOCYTES 8 (L) 21 - 52 %     MONOCYTES 10 3 - 10 %     EOSINOPHILS 0 0 - 5 %     BASOPHILS 0 0 - 2 %     ABS. NEUTROPHILS 14.1 (H) 1.8 - 8.0 K/UL     ABS. LYMPHOCYTES 1.3 0.9 - 3.6 K/UL     ABS. MONOCYTES 1.7 (H) 0.05 - 1.2 K/UL     ABS. EOSINOPHILS 0.0 0.0 - 0.4 K/UL     ABS. BASOPHILS 0.0 0.0 - 0.1 K/UL     DF AUTOMATED      METABOLIC PANEL, COMPREHENSIVE     Collection Time: 06/27/18  5:08 PM   Result Value Ref Range     Sodium 125 (L) 136 - 145 mmol/L     Potassium 2.9 (LL) 3.5 - 5.5 mmol/L     Chloride 84 (L) 100 - 108 mmol/L     CO2 34 (H) 21 - 32 mmol/L     Anion gap 7 3.0 - 18 mmol/L     Glucose 98 74 - 99 mg/dL     BUN 25 (H) 7.0 - 18 MG/DL     Creatinine 1.39 (H) 0.6 - 1.3 MG/DL     BUN/Creatinine ratio 18 12 - 20       GFR est AA 60 (L) >60 ml/min/1.73m2     GFR est non-AA 49 (L) >60 ml/min/1.73m2     Calcium 8.3 (L) 8.5 - 10.1 MG/DL     Bilirubin, total 1.2 (H) 0.2 - 1.0 MG/DL     ALT (SGPT) 31 16 - 61 U/L     AST (SGOT) 24 15 - 37 U/L     Alk.  phosphatase 83 45 - 117 U/L     Protein, total 7.9 6.4 - 8.2 g/dL     Albumin 2.9 (L) 3.4 - 5.0 g/dL     Globulin 5.0 (H) 2.0 - 4.0 g/dL     A-G Ratio 0.6 (L) 0.8 - 1.7     CARDIAC PANEL,(CK, CKMB & TROPONIN)     Collection Time: 06/27/18  5:08 PM   Result Value Ref Range     CK 22 (L) 39 - 308 U/L   CK - MB <1.0 <3.6 ng/ml     CK-MB Index   0.0 - 4.0 %       CALCULATION NOT PERFORMED WHEN RESULT IS BELOW LINEAR LIMIT     Troponin-I, Qt. 0.02 0.0 - 0.045 NG/ML   NT-PRO BNP     Collection Time: 06/27/18  5:08 PM   Result Value Ref Range     NT pro-BNP 33855 (H) 0 - 1800 PG/ML   PROTHROMBIN TIME + INR     Collection Time: 06/27/18  5:08 PM   Result Value Ref Range     Prothrombin time 23.6 (H) 11.5 - 15.2 sec     INR 2.2 (H) 0.8 - 1.2     PTT     Collection Time: 06/27/18  5:08 PM   Result Value Ref Range     aPTT 58.1 (H) 23.0 - 36.4 SEC   EKG, 12 LEAD, INITIAL     Collection Time: 06/27/18  5:10 PM   Result Value Ref Range     Ventricular Rate 76 BPM     Atrial Rate 76 BPM     QRS Duration 126 ms     Q-T Interval 392 ms     QTC Calculation (Bezet) 441 ms     Calculated R Axis -15 degrees     Calculated T Axis 99 degrees     Diagnosis           Atrial fibrillation  Nonspecific intraventricular block  Cannot rule out Anteroseptal infarct (cited on or before 25-AUG-2017)  Abnormal ECG  When compared with ECG of 17-JUN-2018 16:32,  Questionable change in initial forces of Anterior leads               Assessment/Plan:   77 y.o. male with PMH afib, anemia, BPH, COPD, CAD, CHF, HTN, pulm HTN, HLD, spondylosis, and chronic back pain, now admitted with shortness of breath.     SOB/COPD/CHF: Pt has extensive h/o COPD and CHF. Pt has 2/4 SIRS ( and WBC 17.1 with left shift) but SOFA score of 6. Pts BNP: [id number]. DDX: CHF exacerbation vs CAP vs. COPD exacerbation. Pt on 3L O2 via NC at home. Echo (4/2018) : EF: 55-60%, hypokinesis of the basal-mid anteroseptal wall. Pt sees Dr. Daniel Menard for pulm and Dr. Jaramillo for cardiology.  500mg Levaquin given in ED.  - Admit to tele  - Continue supplemental O2  - Continue Stiolto Respimat 2 puffs daily  - Continue albuterol q4h prn  - Sputum cx  - Lasix 20mg IV once  - IS  - Monitor VS, CBC, BMP  - Daily weights  - Consider CT chest in AM     Hyponatremia: Na: 125, 129 on 6/17, 132 on 3/26, likely pre-renal.   - FR 1500mL  - FU BMP     Hypokalemia: K: 2.9, 2.9 on   - Klor-Con 40mEq BID  - FU BMP     TYRELL: Cr. 1.39, baseline ~0.8  - Hold home chlorthalidone  - Avoid nephrotoxic meds  - No IVF for now d/t pt's hypervolemia     Anemia: Hgb: 8.3, 8.6 on , baseline~10. Hemoccult negative, no blood in stools or black stools, no recent injuries or bleeding. Reports chronic easy bruising.  - FU CBC  - Iron profile, ferritin, retic count  - Transfuse at Hgb<7  - Monitor for signs of bleeding     Afib: rate controlled,  in ED. INR: 2.2. Pt has not been taking prescribed ASA. - Cardiac monitoring  - Continue Coumadin 2.5mg WF and 5mg other days  - Continue ASA 81mg  - Continue digoxin 125mcg daily     HTN: /83 in ED  - Continue home amlodipine 10mg daily  - Hold chlorthalidone 25mg daily d/t TYRELL and hypokalemia     HLD: Lipid panel (3/2017): TC: 120, T, HDL: 31, LDL: 65. Pt has not been taking pravastatin as prescribed. - Continue home pravastatin 40mg QHS     Inguinal hernia/chronic pain: Pt has seen Dr. Elham Pham for recurring R inguinal hernia s/p bilateral open inguinal hernia repair in the past. Pt is not good surgical candidate d/t pulm and cardio comorbidities. - Continue home Norco 5-300 BID PRN  - Monitor clinically     Diet: Cardiac, FR 1500mL  DVT Prophylaxis: Warfarin  Code Status: Full  Point of Contact: Ascencion Lanza                  Daughter                   [phone]     Disposition and anticipated LOS: >2 midnights     Sherri Raygoza MD, PGY-1  [facility]  18 9:03 PM        [address] [facility]  Admission History and Physical (Senior Addendum)  Date of Admission: 2018  Medical Record Number: [id number]    Patient: Gonsalo Caicedo  YOB: 1940   Age: 77 y.o.   Sex: male   DOA: 2018    History of Present Illness  Gonsalo Caicedo is a 77 y.o. male with PMHx of chronic atrial fibrillation, CHF, CAD, severe COPD on home O2, AAA (s/p repear) and pulmonary HTN who presented from his PCP's office today due to abnormal lab results. In the ER, patient endorsed shortness of breath and was evaluated for a possible CHF exacerbation. In the ER, patient stated that he was more winded than usual and this was worsening over the past few weeks. He also mentioned that his leg swelling worsened over the past few weeks as well. He said that his potassium and sodium were low which is why he came to the ER today as instructed by his PCP. Otherwise, patient didn't have too many additional complaints. He used to be a smoker for >50 years but quit in 2012. He has a remote history of alcohol use but quit in the 1980s. Brief ROS: No fevers or chills or blurry or double vision. Endorsed dry mouth but no sore throat. No chest discomfort other than with coughing. No heart palptiations or symptoms of a racing heart. Endorses a productive, clear sputum cough for the past few weeks and he has shortness of breath with ambulation but not at rest. Endorsed some nausea but no vomiting. He has constipation but no diarrhea. No thoughts of harming self or others. No focal neurological deficits per patient. ED Course:  -PTT,PT, BNP, Cardiac Panel, CMP, CBC, CXR    Full review of systems, past medical history, surgical history, social history, allergy list and prior to admissions medications per intern's note above    Physical Examination  Patient Vitals for the past 24 hrs:   Temp Pulse Resp BP SpO2   06/27/18 2030 - 74 16 158/77 100 %   06/27/18 2015 - 74 18 156/80 100 %   06/27/18 1827 - - - - 97 %   06/27/18 1624 97.3 °F (36.3 °C) (!) 103 20 161/83 97 %     General: Laying in bed. NAD. Vitals as above  HEENT: Ocular movements intact. Has moderate JVD with a positive hepatojugular reflex. Mucous membranes moist. No cervical LAD  CV: Irregular rate and rhythm. No murmurs appreciated  Resp: On 4LNC. Rales at the bases bilaterally. No increased WOB. Can speak in full sentences  Abd: Soft abdomen. Hepatomegaly. NABS  Rectal: Deferred  MS: Strength 5/5 in the hands and feet bilaterally  Ext: 2+ pitting edema up to the knees bilaterally. Radial pulses 1+ bilaterally  Skin: LE do not appear to be erythematous  Psych: Appropriate affect. Responds to questions appropriately    Pertinent Labs  -WBC 17.1, Hb, 8.3  -Na 125, K 2.9, Cl 84, HCO3 34, BUN 25, Cr 1.39, BNP 41157    Pertinent Imaging  -CXR: Pre-existing COPD, minimal to mild pulmonary vascular congestion. Minimal interstitial pulmonary edema is suspected. Left lung base demonstrates new atelectatic changes and/or pneumonic infiltrates with small pleural effusion vs pleural reaction. Mild fibrotic or atelectatic changes at right lung base. Focal densities in the left lung parahilar areas (which are new). Mass vs cavitary lesion vs focal infiltrates. Assessment and Plan  Priscilla Prakash is a 77 y.o. male with PMHx of chronic atrial fibrillation, CHF, CAD, severe COPD on home O2, AAA (s/p repear) and pulmonary HTN who presented from his PCP's office today due to abnormal lab results. He was found to be short of breath and was admitted for CHF exacerbation. Shortness of Breath: Given history of COPD and CXR finding and leukocytosis, patient certainly may have a pneumonia. He met 2/4 SIRS criteria and has elevated Cr (demonstrating end-organ dysfunction), however, given the vascular congestion, rales on examination, hepatomegaly, JVD as well as elevated NT-proBNP, he very well may be having an exacerbation of his CHF. Low concern for PE as his lower extremities did not demonstrate pain and his HR was controlled for being in atrial fibrillation (as well as normal O2 saturation on 3L)  -For CHF Exacerbation: IV Lasix 20mg once.  Can continue this after re-evaluation in the morning but higher doses in a patient who is Lasix naive who has renal insufficiency may be too excessive for this individual  -For PNA: Will cover with Levaquin CAP (500mg) Dose for 7-14 days. ER administered 500mg IV once and we will order 250mg of Levaquin IV once daily thereafter given his CrCl <50. I do not think he is septic but will order sputum cultures. No need to order lactic acid nor blood cultures  -Consider repeat Chest CT this admission to further evaluate CXR findings (will discuss with day team)  -Telemetry  -Strict I/O  -ICS    Hypervolemic Hypotonic Hyponatremia With Elevated Bicarbonate: Serums Osm 264 on admission (hypotonic) and he currently likely is experiencing a CHF exacerbation (lending him to be hypervolemic). His hyponatremia is likely due to these cause. He concurrently appears to be experiencing contraction alkalosis with the elevated HCO3  -Will fluid restrict  -IV Furosemide 20mg once with daily reassessments of Na, Cl and HCO3  -Daily BMP    Hypokalemia: Likely secondary to diuretics, steroids (according to outpatient notes, he recently was treated with prednisone for a COPD exacerbation), increased use of B2 agnoists given his COPD  -Will replete per protocol (oral 40mEq once now)  -Recheck in the morning    Acute Kidney Injury: Baseline Cr appears to be 0.8-1.0. Admission Cr was 1.39  -Daily BMP with the understanding that treating his CHF exacerbation may worsen his renal indices   -Will hold home Chlorthalidone at this time and re-evaluate    COPD  -Continue home Stiolto (tiotropium and olodaterol) and Albuterol PRN    Atrial Fibrillation  -INR 2.2 on admission; Digoxin 0.9 on 6/25/18  -Continue home Digoxin 125mcg qD, as well as Warfarin 2.5mg (Wed and Fri) and 5mg all other days    Moderate Coronary Artery Disease and Hyperlipidemia    -Not reportidly taking home Pravastatin 40mg qD or bASA.  We can resume these while hospitalized    Hypertension  -Given TYRELL, will hold Chlorthalidone 25mg qD for a few days  -Will continue with Amlodipine 10mg qD    Low Hemoglobin  -Likely a result of anemia of chronic disease given his severe COPD.  Hemoccult is negative per Dr. Tierney Gillespie  -Will check Iron panel, ferritin and reticulocyte count    Abdominal Hernia  -Not a candidate for surgery per his General Surgeon  -Is managed by [place] 5/300mg BID PRN in the outpatient setting which we can continue  -Bowel regimen and Zofran PRN for nausea    Diet/Nutrition: Cardiac Diet  DVT Prophylaxis: Petros Church MD, PGY-2  [facility]  June 27, 2018 9:12 PM

## 2018-06-28 NOTE — PROGRESS NOTES
Problem: Falls - Risk of  Goal: *Absence of Falls  Document Jennifer Fall Risk and appropriate interventions in the flowsheet. Outcome: Progressing Towards Goal  Fall Risk Interventions:  Mobility Interventions: Assess mobility with egress test                            Problem: Pressure Injury - Risk of  Goal: *Prevention of pressure injury  Document Claude Scale and appropriate interventions in the flowsheet.    Outcome: Progressing Towards Goal  Pressure Injury Interventions:  Sensory Interventions: Assess changes in LOC         Activity Interventions: Pressure redistribution bed/mattress(bed type)         Nutrition Interventions: Document food/fluid/supplement intake

## 2018-06-28 NOTE — MANAGEMENT PLAN
Discharge/Transition Planning    Interviewed patient. Verified demographics listed on face sheet with patient; all information correct. Pt has Medicare A&B and [employer] for insurance. Patient stated their PCP is  [facility] and last appt a week ago. Patient lives in single family home with brother; states his living quarters are all on first floor. Patient's NOK is daughter. Patient mostly independent with ADLs prior to admission. Rarely drives, family mostly drives. DME prior to admission: 3 liters of o2 visa NC through [employer] Discharge plan is Home and H2H at the least.    Patient has designated __daughter______________________ to participate in his/her discharge plan and to receive any needed information. Name: Augustina Mercado  Phone number:[phone]    Care Management Interventions  PCP Verified by CM: Yes ([facility])  Last Visit to PCP: 06/21/18  Mode of Transport at Discharge: Other (see comment) (family)  Transition of Care Consult (CM Consult): Discharge Planning  Current Support Network: Own Home (brother lives with him)  Confirm Follow Up Transport: Family  Plan discussed with Pt/Family/Caregiver: Yes  Freedom of Choice Offered: Yes  Discharge Location  Discharge Placement: Home     Reason for Admission:   CHF exac                RRAT Score:                  Do you (patient/family) have any concerns for transition/discharge? Not at this time              Plan for utilizing home health:     Likely yes    Likelihood of readmission?    Moderate            Transition of Care Plan:      Likely Home with [facility] vs [address] Rob Cramer RN BSN  Outcomes Manager  Pager # [phone]

## 2018-06-28 NOTE — PROGRESS NOTES
Intern Progress Note  [facility]       Patient: Juana Luther MRN: [id number]  CSN: [id number]    YOB: 1940  Age: 77 y.o. Sex: male    DOA: 6/27/2018 LOS:  LOS: 1 day                    Subjective:     Acute events: Afebrile; hemodynamically. Hemoglobin down-trending with no active signs of bleeding. Resting comfortably this AM and easily aroused to name. Reports breathing is improved. Feels that leg swelling is also improved. Reports being thirsty, but no other issues or complaints. Review of Systems   Constitutional: Negative for chills and fever. Respiratory: Positive for shortness of breath. Negative for cough. Cardiovascular: Negative for chest pain and palpitations. Gastrointestinal: Negative for abdominal pain, blood in stool, melena, nausea and vomiting. Genitourinary: Negative for dysuria, flank pain and hematuria. Neurological: Negative for dizziness and headaches. Objective:      Patient Vitals for the past 24 hrs:   Temp Pulse Resp BP SpO2   06/28/18 0425 97.4 °F (36.3 °C) 84 18 165/82 99 %   06/27/18 2312 97.6 °F (36.4 °C) 83 18 171/63 99 %   06/27/18 2245 - 79 19 142/75 100 %   06/27/18 2215 98.7 °F (37.1 °C) 86 21 154/88 100 %   06/27/18 2130 - 77 17 158/51 100 %   06/27/18 2100 - 76 18 161/69 100 %   06/27/18 2030 - 74 16 158/77 100 %   06/27/18 2015 - 74 18 156/80 100 %   06/27/18 1827 - - - - 97 %   06/27/18 1624 97.3 °F (36.3 °C) (!) 103 20 161/83 97 %       Intake/Output Summary (Last 24 hours) at 06/28/18 0651  Last data filed at 06/28/18 0058   Gross per 24 hour   Intake                0 ml   Output              660 ml   Net             -660 ml       Physical Exam:  General:  Alert and Responsive and in No acute distress. HEENT: Conjunctiva pink, sclera anicteric. EOMI.  Bilateral painless eyelid masses without erythema or drainiage.  Pharynx moist, nonerythematous.  Moist mucous membranes.  Thyroid not enlarged, no nodules.  No cervical, supraclavicular, occipital or submandibular lymphadenopathy.  No other gross abnormalities appreciated. CV:  Irregularly irregular rhythm, normal rate. No murmurs, rubs, or gallops appreciated. No visible pulsations or thrills. Improved JVD    RESP:  Unlabored breathing. Bilateral crackles at lung bases, worse on R side. Equal expansion bilaterally.    ABD:  Soft, nontender, nondistended. Normoactive bowel sounds. No hepatosplenomegaly. No suprapubic tenderness. RECTAL:  No masses or hemorrhoids.  Hemoccult negative. MS:  No joint deformity or instability.  No atrophy. Neuro:  5/5 strength bilateral upper extremities and lower extremities.  A+Ox3. Ext:  Bilateral 1+ pitting edema to knees; 2+ pedal edema.  1+ radial and dp pulses bilaterally. Skin:  No rashes, lesions, or ulcers.  Good turgor. Lab/Data Reviewed:  Recent Results (from the past 12 hour(s))   METABOLIC PANEL, BASIC    Collection Time: 06/28/18  2:23 AM   Result Value Ref Range    Sodium 127 (L) 136 - 145 mmol/L    Potassium 3.0 (L) 3.5 - 5.5 mmol/L    Chloride 84 (L) 100 - 108 mmol/L    CO2 33 (H) 21 - 32 mmol/L    Anion gap 10 3.0 - 18 mmol/L    Glucose 115 (H) 74 - 99 mg/dL    BUN 24 (H) 7.0 - 18 MG/DL    Creatinine 1.47 (H) 0.6 - 1.3 MG/DL    BUN/Creatinine ratio 16 12 - 20      GFR est AA 56 (L) >60 ml/min/1.73m2    GFR est non-AA 46 (L) >60 ml/min/1.73m2    Calcium 7.4 (L) 8.5 - 10.1 MG/DL   CBC WITH AUTOMATED DIFF    Collection Time: 06/28/18  2:23 AM   Result Value Ref Range    WBC 13.1 4.6 - 13.2 K/uL    RBC 3.02 (L) 4.70 - 5.50 M/uL    HGB 7.1 (L) 13.0 - 16.0 g/dL    HCT 22.8 (L) 36.0 - 48.0 %    MCV 75.5 74.0 - 97.0 FL    MCH 23.5 (L) 24.0 - 34.0 PG    MCHC 31.1 31.0 - 37.0 g/dL    RDW 14.9 (H) 11.6 - 14.5 %    PLATELET 887 196 - 547 K/uL    MPV 8.7 (L) 9.2 - 11.8 FL    NEUTROPHILS 81 (H) 40 - 73 %    LYMPHOCYTES 8 (L) 21 - 52 %    MONOCYTES 11 (H) 3 - 10 %    EOSINOPHILS 0 0 - 5 %    BASOPHILS 0 0 - 2 %    ABS. NEUTROPHILS 10.6 (H) 1.8 - 8.0 K/UL    ABS. LYMPHOCYTES 1.1 0.9 - 3.6 K/UL    ABS. MONOCYTES 1.4 (H) 0.05 - 1.2 K/UL    ABS. EOSINOPHILS 0.0 0.0 - 0.4 K/UL    ABS. BASOPHILS 0.0 0.0 - 0.06 K/UL    DF AUTOMATED     IRON PROFILE    Collection Time: 06/28/18  2:23 AM   Result Value Ref Range    Iron 17 (L) 50 - 175 ug/dL    TIBC 254 250 - 450 ug/dL    Iron % saturation 7 %   RETICULOCYTE COUNT    Collection Time: 06/28/18  2:23 AM   Result Value Ref Range    Reticulocyte count 2.0 0.5 - 2.3 %   FERRITIN    Collection Time: 06/28/18  2:23 AM   Result Value Ref Range    Ferritin 108 8 - 388 NG/ML     Scheduled Medications Reviewed:  Current Facility-Administered Medications   Medication Dose Route Frequency    amLODIPine (NORVASC) tablet 10 mg  10 mg Oral DAILY    aspirin delayed-release tablet 81 mg  81 mg Oral DAILY    digoxin (LANOXIN) tablet 0.125 mg  0.125 mg Oral DAILY    pravastatin (PRAVACHOL) tablet 40 mg  40 mg Oral QHS    tiotropium-olodaterol (STIOLTO RESPIMAT) 2.5-2.5 mcg/actuation mist  (Patient Supplied)  2 Puff Inhalation DAILY    WARFARIN INFORMATION NOTE (COUMADIN)   Other Q24H    potassium chloride (KLOR-CON) packet 40 mEq  40 mEq Oral BID WITH MEALS    levoFLOXacin (LEVAQUIN) 250 mg in D5W IVPB  250 mg IntraVENous Q24H       Imaging, microbiology, and EKG/Telemetry:  No results found. Assessment/Plan   77 y. o. male with PMH afib, anemia, BPH, COPD, CAD, CHF, HTN, pulm HTN, HLD, spondylosis, and chronic back pain, now admitted with shortness of breath.      Acute on Chronic Diastolic CHF Exacerbation: Followed by Dr. Nadira Mckeon (Cardiology) as outpatient. On admission, BNP elevated at 12,922. Echo (4/2018) showed EF 55% with hypokinesis of the basal-mid anteroseptal wall. LE edema improving.  - Telemetry  - Monitor VS, CBC, BMP  - Daily weights  - Strict I/O's    SIRS 2/2 CAP: Pt has 2/4 SIRS ( and WBC 17.1 with left shift) but SOFA score of 6.  CXR showed minimal to mild pulmonary vascular congestion, minimal pulmonary edema, new atelectatic changes in the left lung base, new focal densities in the parahilar areas of the left lung.  - Continue IV levaquin 250 mg q24h  - F/u Sputum cx  - Encourage IS use  - Daily CBC  - Consider CT chest in AM    Hyponatremia: Na: 127 this AM; hypotonic hypervolemic hyponatremia 2/2 CHF Exacerbation. - FR 1500mL  - Daily BMP      Hypokalemia: K 3.0 this AM; Mag 2.1 this AM  - Klor-Con 40mEq BID  - Daily BMP, Mag  - Replete electrolytes as needed per protocol      TYRELL: Cr. 1.47 this AM, Baseline Cr ~0.8  - Hold home chlorthalidone  - Avoid nephrotoxic meds  - No IVF for now d/t pt's hypervolemia  - Daily BMP    Chronic Hypoxic Respiratory Failure 2/2 COPD: Followed by Dr. Carlos Enrique Barnard (Pulmonology) as outpatient. On home 3L NC O2 at home. - Continue supplemental O2  - Continue Stiolto Respimat 2 puffs daily  - Continue albuterol q4h prn      Anemia of Chronic Disease: Hgb 7.1>7.2 this AM. Baseline Hgb ~10. Hemoccult negative, no blood in stools or black stools, no recent injuries or bleeding. Iron Profile: Iron 17; TIBC 254; Iron % 7; Ferritin 108. - Daily CBC  - Transfuse at Hgb <7  - Monitor for signs of bleeding  - Consider GI consult      Persistent Atrial Fibrillation: rate controlled,  in ED. INR: 2.2. Pt has not been taking prescribed ASA. - Cardiac monitoring  - Continue Coumadin 2.5mg WF and 5mg other days  - Continue ASA 81mg  - Continue digoxin 125mcg daily      HTN: BP ranging 142//63 in the past 24 hours.   - Continue home amlodipine 10mg daily  - Hold chlorthalidone 25 mg daily d/t TYRELL and hypokalemia      HLD: Lipid panel (3/2017): TC: 120, T, HDL: 31, LDL: 65. Pt has not been taking pravastatin as prescribed.   - Continue home pravastatin 40 mg QHS      Inguinal hernia/chronic pain: Pt has seen Dr. Issac Martinez for recurring R inguinal hernia s/p bilateral open inguinal hernia repair in the past. Pt is not good surgical candidate d/t pulm and cardio comorbidities.   - Continue home Norco 5-300 BID PRN  - Monitor clinically      Diet: Cardiac, FR 1500mL  DVT Prophylaxis: Warfarin  Code Status: Full  Point of Contact: Nitin Schneider, Daughter, [phone]      Disposition and anticipated LOS: Haily Painter MD, PGY-1  [facility] Medicine

## 2018-06-28 NOTE — CONSULTS
[facility] - Consult Note    Date of  Admission: 6/27/2018  4:52 PM   Primary Care Physician:  Elizabeth Ludwig MD     Assessment:     Patient Active Problem List   Diagnosis Code    Hypercholesterolemia E78.00    Congestive heart failure (CHF) ([address] I50.9    Peripheral vascular disease (HCC) I73.9    COPD (chronic obstructive pulmonary disease) ([facility]) J44.9    HTN (hypertension) I10    Chronic atrial fibrillation ([facility]) I48.2    RLQ abdominal pain R10.31    Status post AAA (abdominal aortic aneurysm) repair X20.875, Z86.79    AAA (abdominal aortic aneurysm, ruptured) ([address] I71.3    Unspecified constipation K59.00    Right inguinal hernia K40.90    Left inguinal hernia K40.90    CAD (coronary artery disease) I25.10    Hypoxemia requiring supplemental oxygen R09.02, Z99.81    Warfarin-induced coagulopathy ([facility]) D68.32, T45.515A    Intraabdominal fluid collection R18.8    Abdominal pain R10.9    Sepsis ([address] A41.9    Pyogenic inflammation of bone ([address] M86.9    Psoas abscess ([address] K68.12    Abscess L02.91    Discitis of lumbar region M46.46    Pulmonary hypertension ([address] I27.20    CHF exacerbation ([facility]) I50.9     -Acute hypoxic respiratory failure. Suspect primarily due to his COPD. Less likely CHF. -Acute COPD exacerbation, on home O2 at 3L  -Acute on chronic HFpEF, EF 55% on echo 9/39/2458, diastolic function not measured d/t technically difficult study. Suspect his elevated NT-proBNP due to chronic RV pressure overload.  -moderate pulmonary HTN with PA pressures of 60mmHg in 2016 improved to 46mmHg in April 2018  -Hyponatremia, 125 on admission  -TYRELL, likely 2/2 to dehydration with poor po intake and diuretic use  -Hypoalbuminemia  -Hx of mild CAD by cath in 2012  -Anemia of chronic disease  -Persistent afib, on digoxin for rate control and warfarin for [address]. INR therapeutic  -AAA, s/p stent graft  -Osteomyelitis of lumbar spine.  -HTN.  suboptimal control  -HLD  -R inguinal hernia, medically managed    Primary cardiologist Dr Benito Ryan:     -Appears on the dry side. No need for further IV diuresis. Suspect LE edema is chronic. Would encourage PO intake. -Defer to primary team for management of acute pulmonary issues.  -Continue aspirin, coumadin, digoxin, and norvasc.   -can add hydralazine/nitrates if further BP lowering needed. History of Present Illness: This is a 77 y.o. male admitted for CHF exacerbation ([facility] Patient complains of:  Sob    Pt is a 70yo  male with PMHx of COPD, HTN, HLD, persistent afib, and anemia of chronic disease, who presented with complaints of persistent sob over past few weeks. Pt's PCP also requested he go to the ED for evaluation of abnormal lab values. Pt reports being seen in the ED around Father's Day for shortness of breath and nausea and vomiting. Pt states he was sent home but states he hasn't been feeling well since. He has been lethargic with poor appetite and poor intake. He states he's been short of breath despite using his home O2. He's compliant with medications. Denies chest pain. Reports some mild LE edema. Patient reports feeling better today. Less short of breath. No other complaints.       Cardiac risk factors:   HTN  HLD  Male gender  CAD    Review of Symptoms:  Except as stated above include:  Constitutional:  Negative for fevers or chills  Respiratory:  Positive for shortness of breath and dry cough  Cardiovascular:  Negative for chest pain, positive for LE swelling  Gastrointestinal: negative for abd pain, nausea, or vomiting  Genitourinary:  Negative for hematuria or dysuria  Musculoskeletal:  Negative for weakness or falls  Neurological:  Negative for dizziness or syncope  Dermatological:  Negative for itching or rashes  Endocrinological: Negative for heat or cold intolerance  Psychological:  Negative for anxiety or depression     Past Medical History:     Past Medical History:   Diagnosis Date    Aneurysm [facility])     AAA repair 2006 & 2012    Aorto-iliac duplex 02/13/2015    Tech difficult. Patent aorta bi-iliac endograft w/o leak or limb dysfunction.  Arrhythmia     a fib    Cardiac cath 11/01/2012    RCA (sm, nondom) patent. LM patent. LAD 25%. CX (dom) 45% mid. LVEDP 12 mmHg. No WMA. PA 27/12. W 10-12. CO/CI 5.2/2.6 (TD).  Cardiac echocardiogram, abnormal 02/20/2016    EF 55%. No WMA. Indeterminate diastolic fx. RVSP 60 mmHg. Severe LAE. Mild MR. Mod TR.  IVCE. Similar to study of 10/26/12.  Cardiovascular aorto-iliac duplex 02/13/2015    Patent aorta bi-iliac endovascular graft w/o leak or limb dysfunction. Sac measures 4.21 x 4.47 cm (4.4 x 4.6 cm on 1/31/14).  Cardiovascular LE peripheral arterial testing 07/29/2013    No significant LE arterial disease bilaterally. R GHADA 1. 12.  L GHADA 1. 12.  R DBI 0.83. L DBI 0.71. Exercise deferred.  Cardiovascular renal duplex 10/31/2012    No RA stenosis. Intrinsic/med disease in left kidney. Patent aortic endograft. Patent, thrombus-free renal veins bilaterally.  Carotid duplex 07/29/2013    Mild <50% bilateral ICA plaquing.       Chronic lung disease     Cigarette smoker     Congestive heart failure (CHF) (HCC)     COPD (chronic obstructive pulmonary disease) (HCC)     and emphysema; likely secondary to tobacco abuse    Difficult intubation     Dyslipidemia     low HDL    Emphysema     HTN (hypertension)     Hypercholesterolemia     Increased prostate specific antigen (PSA) velocity     Long term (current) use of anticoagulants     coumadin    Osteoarthritis     Osteomyelitis (HCC)     Paroxysmal atrial fibrillation (HCC)     Peripheral vascular disease ([address]     AAA repair 12/2007    Persistent atrial Fibrillation     Persistent atrial fibrillation ([address]     Unspecified adverse effect of anesthesia     difficulty breathing placed in ICU Oct 2012 (AAA repair)         Social History:     Social History Social History    Marital status:      Spouse name: N/A    Number of children: N/A    Years of education: N/A     Social History Main Topics    Smoking status: Former Smoker     Packs/day: 1.00     Years: 54.00     Types: Cigarettes     Quit date: 10/23/2012    Smokeless tobacco: Never Used    Alcohol use No      Comment: quit drinking alcohol 26 years ago, patient states stopped in \"5057\"    Drug use: No    Sexual activity: Not on file     Other Topics Concern    Not on file     Social History Narrative        Family History:     Family History   Problem Relation Age of Onset    Heart Surgery Father      BYPASS    Stroke Father     Heart Surgery Mother      BYPASS    Coronary Artery Disease Mother     Stroke Mother         Medications: Allergies   Allergen Reactions    Codeine Swelling    Morphine Itching    Sulfa (Sulfonamide Antibiotics) Other (comments)     Kidney problems.         Current Facility-Administered Medications   Medication Dose Route Frequency    albuterol (PROVENTIL VENTOLIN) nebulizer solution 2.5 mg  2.5 mg Nebulization Q4H PRN    amLODIPine (NORVASC) tablet 10 mg  10 mg Oral DAILY    aspirin delayed-release tablet 81 mg  81 mg Oral DAILY    digoxin (LANOXIN) tablet 0.125 mg  0.125 mg Oral DAILY    docusate sodium (COLACE) capsule 100 mg  100 mg Oral BID PRN    polyethylene glycol (MIRALAX) packet 17 g  17 g Oral DAILY PRN    pravastatin (PRAVACHOL) tablet 40 mg  40 mg Oral QHS    tiotropium-olodaterol (STIOLTO RESPIMAT) 2.5-2.5 mcg/actuation mist  (Patient Supplied)  2 Puff Inhalation DAILY    HYDROcodone-acetaminophen (NORCO) 5-325 mg per tablet 1 Tab  1 Tab Oral BID PRN    WARFARIN INFORMATION NOTE (COUMADIN)   Other Q24H    potassium chloride (KLOR-CON) packet 40 mEq  40 mEq Oral BID WITH MEALS    levoFLOXacin (LEVAQUIN) 250 mg in D5W IVPB  250 mg IntraVENous Q24H         Physical Exam:     Visit Vitals    /76 (BP 1 Location: Left arm, BP Patient Position: At rest)    Pulse 80    Temp 97.5 °F (36.4 °C)    Resp 18    Ht 5' 6\" (1.676 m)    Wt 72 kg (158 lb 11.2 oz)    SpO2 98%    BMI 25.61 kg/m2     BP Readings from Last 3 Encounters:   06/28/18 160/76   06/22/18 162/80   06/17/18 166/90     Pulse Readings from Last 3 Encounters:   06/28/18 80   06/22/18 (!) 129   06/17/18 82     Wt Readings from Last 3 Encounters:   06/28/18 72 kg (158 lb 11.2 oz)   06/22/18 70.3 kg (155 lb)   06/17/18 69.9 kg (154 lb)       General:  Awake, alert, oriented x 3, resting in bed  Neck:  Supple without jvd  Lungs:  Bibasilar crackles, no wheezing or rales  Heart: irreg rate and rhythm   Abdomen:  Soft, non-tender  Extremities:  Atraumatic, 2+ bilat ankle edema  Skin: warm and dry  Neuro: no focal deficits  Psych: normal mood and affect     Data Review:     Recent Labs      06/28/18   0708  06/28/18   0223  06/27/18   1708   WBC   --   13.1  17.1*   HGB  7.2*  7.1*  8.3*   HCT  23.3*  22.8*  26.0*   PLT   --   333  379     Recent Labs      06/28/18   0708  06/28/18   0223  06/27/18   1708   NA   --   127*  125*   K   --   3.0*  2.9*   CL   --   84*  84*   CO2   --   33*  34*   GLU   --   115*  98   BUN   --   24*  25*   CREA   --   1.47*  1.39*   CA   --   7.4*  8.3*   MG  2.1   --    --    ALB   --    --   2.9*   SGOT   --    --   24   ALT   --    --   31   INR   --    --   2.2*       Results for orders placed or performed during the hospital encounter of 06/27/18   EKG, 12 LEAD, INITIAL   Result Value Ref Range    Ventricular Rate 76 BPM    Atrial Rate 76 BPM    QRS Duration 126 ms    Q-T Interval 392 ms    QTC Calculation (Bezet) 441 ms    Calculated R Axis -15 degrees    Calculated T Axis 99 degrees    Diagnosis       Atrial fibrillation  Nonspecific intraventricular block  Cannot rule out Anteroseptal infarct (cited on or before 25-AUG-2017)  Abnormal ECG  When compared with ECG of 17-JUN-2018 16:32,  Questionable change in initial forces of Anterior leads  Confirmed by Mesha Beaver ([id number]) on 6/28/2018 7:41:04 AM     Results for orders placed or performed in visit on 04/03/18   AMB POC EKG ROUTINE W/ 12 LEADS, INTER & REP    Narrative    See note       All Cardiac Markers in the last 24 hours:    Lab Results   Component Value Date/Time    CPK 22 (L) 06/27/2018 05:08 PM    CKMB <1.0 06/27/2018 05:08 PM    CKND1  06/27/2018 05:08 PM     CALCULATION NOT PERFORMED WHEN RESULT IS BELOW LINEAR LIMIT    TROIQ 0.02 06/27/2018 05:08 PM       Last Lipid:    Lab Results   Component Value Date/Time    HDL Cholesterol 25 (L) 03/26/2010 10:30 AM       Signed By: DELIA Rutledge     June 28, 2018      Hailey Mattson MD

## 2018-06-28 NOTE — ROUTINE PROCESS
TRANSFER - OUT REPORT:    Verbal report given to Anabel MORTON(name) on Jessica Isaacs  being transferred to [address] [place] [address] ([facility]) for routine progression of care       Report consisted of patients Situation, Background, Assessment and   Recommendations(SBAR). Information from the following report(s) SBAR, ED Summary, Procedure Summary and MAR was reviewed with the receiving nurse. Lines:   Peripheral IV 06/27/18 Left Antecubital (Active)   Site Assessment Clean, dry, & intact 6/27/2018  5:19 PM   Phlebitis Assessment 0 6/27/2018  5:19 PM   Infiltration Assessment 0 6/27/2018  5:19 PM   Dressing Status Clean, dry, & intact 6/27/2018  5:19 PM   Dressing Type Transparent 6/27/2018  5:19 PM   Hub Color/Line Status Pink;Flushed 6/27/2018  5:19 PM        Opportunity for questions and clarification was provided.       Patient transported with:   Monitor  O2 @ 4 liters  Registered Nurse

## 2018-06-28 NOTE — ROUTINE PROCESS
Received patient from ER per stretcher in stable condition. Patient awake, alert, oriented x 4 in no distress. SOB on exertion noted upon transfer to bed. Orientation to room provided. Call light and personal items in reach. Daughter at the bedside. Primary Nurse Dana Kaplan RN and Susi Anaya RN performed a dual skin assessment on this patient No impairment noted  Claude score is 20    7:52 AM - Bedside shift change report given to Gema Russell (oncoming nurse) by Yadira Al RN (offgoing nurse). Report given with SBAR, Kardex, Intake/Output, MAR and Recent Results.

## 2018-06-28 NOTE — PROGRESS NOTES
conducted an initial consultation and Spiritual Assessment for Antoni Beyer, who is a 77 y.o.,male. Patients Primary Language is: [place]. According to the patients EMR Scientologist Affiliation is: [place]. The reason the Patient came to the hospital is:   Patient Active Problem List    Diagnosis Date Noted    CHF exacerbation ([address] 06/27/2018    Pulmonary hypertension ([address] 04/03/2018    Psoas abscess ([address] 08/25/2017    Abscess 08/25/2017    Discitis of lumbar region 08/04/2016    Pyogenic inflammation of bone ([address] 07/01/2016    Sepsis ([address] 02/19/2016    Intraabdominal fluid collection 02/18/2016    Abdominal pain 02/18/2016    Warfarin-induced coagulopathy ([address] 01/19/2016    Hypoxemia requiring supplemental oxygen 12/28/2015    CAD (coronary artery disease) 12/08/2015    Left inguinal hernia 02/14/2014    Right inguinal hernia 12/24/2013    RLQ abdominal pain 12/23/2013    Status post AAA (abdominal aortic aneurysm) repair 12/23/2013    AAA (abdominal aortic aneurysm, ruptured) ([address] 12/23/2013    Unspecified constipation 12/23/2013    Chronic atrial fibrillation ([address] 05/08/2012    HTN (hypertension)     Hypercholesterolemia     Congestive heart failure (CHF) (HCC)     Peripheral vascular disease (HCC)     COPD (chronic obstructive pulmonary disease) ([address]         The  provided the following Interventions:  Initiated a relationship of care and support. Listened empathically. Provided information about Spiritual Care Services. Chart reviewed. The following outcomes where achieved:  Patient expressed gratitude for 's visit. Assessment:  Patient does not have any Jewish/cultural needs that will affect patients preferences in health care. Plan:  Chaplains will continue to follow and will provide pastoral care on an as needed/requested basis.    recommends bedside caregivers page  on duty if patient shows signs of acute spiritual or emotional distress.     [address]  [phone]

## 2018-06-28 NOTE — DISCHARGE SUMMARY
Discharge Summary  [facility]      Patient: Etelvina Miles Age: 77 y.o. Sex: male  : 1940    MRN: [id number]      Admission Date: 2018      Discharge Date: 2018      Attending:Charanjit Goncalves MD      PCP: Ross Scott MD        ================================================================    Reason for Admission:   1.) Acute on Chronic Diastolic CHF exacerbation  2.) Community Acquired Pneumonia (CAP)    Discharge Diagnoses:   1.) Acute on Chronic Diastolic CHF Exacerbation (improved; stable)  2.) CAP (treating)  3.) TYRELL (stable)  4.) Anemia of Chronic Disease (stable)  5.) Chronic Hypoxic Respiratory Failure 2/2 COPD (stable)  6.) Persistent Atrial Fibrillation (stable)  7.) HTN/HLD (stable)  8.) Inguinal Hernia/Chronic Pain (stable)    Important notes to PCP/ follow-up studies and evaluations   1. Per Cardiology, will need BMP checked in 1 week after discharge. 2. Discharged on oral potassium 40 mEq packet 2 times daily for 2 weeks; please consider changing to a different diuretic medication. 3. Consider CT Chest given significant CXR findings and severity of COPD disease. Pending labs and studies:  1.) Respiratory Culture (18)    Operative Procedures:   None    Discharge Medications:     Current Discharge Medication List      START taking these medications    Details   potassium chloride (KLOR-CON) 20 mEq packet Take 2 Packets by mouth two (2) times daily (with meals). Qty: 28 Each, Refills: 0      melatonin 3 mg tablet Take 1 Tab by mouth nightly as needed. Qty: 30 Tab, Refills: 0      levoFLOXacin (LEVAQUIN) 250 mg tablet Take 1 Tab by mouth every twenty-four (24) hours. Qty: 3 Tab, Refills: 0         CONTINUE these medications which have NOT CHANGED    Details   docusate sodium (COLACE) 100 mg capsule Take 100 mg by mouth two (2) times daily as needed for Constipation.       polyethylene glycol (MIRALAX) 17 gram packet Take 17 g by mouth daily as needed. ondansetron hcl (ZOFRAN) 4 mg tablet Take 1 Tab by mouth every eight (8) hours as needed for Nausea. Qty: 12 Tab, Refills: 0      tiotropium-olodaterol (STIOLTO RESPIMAT) 2.5-2.5 mcg/actuation mist Take 2 Inhalation by inhalation daily. Qty: 1 Inhaler, Refills: 5      albuterol (PROVENTIL VENTOLIN) 2.5 mg /3 mL (0.083 %) nebulizer solution 3 mL by Nebulization route every four (4) hours as needed for Wheezing. Qty: 24 Each, Refills: 3      digoxin (DIGITEK) 0.125 mg tablet take 1 tablet by mouth once daily  Qty: 90 Tab, Refills: 3      warfarin (COUMADIN) 2.5 mg tablet Take 1 Tab by mouth daily. Take 2 pills (5 mg) Mon, Tues, Wed, Thur, Sat, Sun; and 1 pill only  (2.5 mg) Fri  Qty: 60 Tab, Refills: 0      chlorthalidone (HYGROTEN) 25 mg tablet Take 25 mg by mouth daily. Refills: 0      amLODIPine (NORVASC) 10 mg tablet Take 10 mg by mouth daily. Refills: 0      aspirin delayed-release 81 mg tablet Take 81 mg by mouth daily. pravastatin (PRAVACHOL) 40 mg tablet Take 40 mg by mouth nightly. OXYGEN-AIR DELIVERY SYSTEMS 2 L/min by Does Not Apply route daily. Continuous. Company is [employer]              Disposition: Home    Consultants:    1.) Cardiology Specialists: Lewis Ledezma MD    Brief Hospital Course (including pertinent history and physical findings)  Mr. Makenzie Weller is a 77 y. o. male with PMH afib, anemia, BPH, COPD, CAD, CHF, HTN, pulm HTN, HLD, spondylosis, and chronic back pain, who was admitted with SOB 2/2 Acute on Chronic Diastolic CHF Exacerbation and discharged in a stable condition after further diuresis.     Acute on Chronic Diastolic CHF Exacerbation   Patient is followed by Dr. Ewa Todd (cardiology) as outpatient. On admission, patient had moderate JVD, bilateral crackles at lung bases, and bilateral 1+ pitting edema to knees. On admission, BNP elevated at 12,922 and CXR showed mild to moderate pulmonary vascular congestion and minimal pulmonary edema.  Patient was given IV lasix 20 mg x1 in the ED with improvement in diuresis but with increase in patient's creatinine. Cardiology was consulted during this admission and felt patient did not need further IV diuresis with suspicion that the LE edema is chronic. Patient continued to have adequate UOP during this admission with improvement in his breathing and swelling.     CAP  Patient met 2/4 SIRS ( and WBC 17.1 with left shift) on admission, but had SOFA score of 6. CXR showed minimal to mild pulmonary vascular congestion, minimal pulmonary edema, new atelectatic changes in the left lung base, new focal densities in the parahilar areas of the left lung. Patient was therefore treated for community acquired pneumonia and started on IV Levaquin which was transitioned to oral Levaquin prior to discharge. Patient's lab work and vitals were monitor routinely and patient was discharged in a stable condition.      TYRELL  Patient has a baseline creatinine of 0.8 and was found to have a creatinine of 1.39 on admission. Patient was given IV lasix 20 mg x1 for CHF exacerbation with a modest bump in creatinine. Cardiology was consulted and felt that further diuresis was not needed, but PO hydration was strongly encourage. Patient was also hypokalemic on admission and required a few IV runs and several oral packs for potassium replacement with notable improvement. Patient was also hyponatremia, which is believed to be a result of patient's hypervolemic state, and was monitored closely during this admission with notable improvement.       Anemia of Chronic Disease   Patient has a baseline hemoglobin of 10 but was found to have a hemoglobin of 8.7 on admission that had decreased to as low as hemoglobin of 7.1 during this hospitalization. Patient did not report any blood in stools or black stools and was hemoccult negative on admission. Patient's iron studies where consistent with findings suggestive of anemia of chronic disease.  Patient's hemoglobin was monitored closely during this admission and remained above transfusion threshold range.      Chronic Hypoxic Respiratory Failure/COPD/Persistent Atrial Fibrillation/HTN/HLD/Inguinal Hernia/Chronic Pain  Patient's remaining chronic medical conditions remained stable during this hospitalization. Patient's home medications for these chronic medical conditions were continued during this hospitalization, unless otherwise contraindicated, and resumed at discharge. Summarized key findings and results (labs, imaging studies, ECHO, cardiac cath, endoscopies, etc):    Recent Results (from the past 24 hour(s))   METABOLIC PANEL, BASIC    Collection Time: 06/29/18  4:50 AM   Result Value Ref Range    Sodium 127 (L) 136 - 145 mmol/L    Potassium 3.5 3.5 - 5.5 mmol/L    Chloride 87 (L) 100 - 108 mmol/L    CO2 32 21 - 32 mmol/L    Anion gap 8 3.0 - 18 mmol/L    Glucose 108 (H) 74 - 99 mg/dL    BUN 18 7.0 - 18 MG/DL    Creatinine 1.44 (H) 0.6 - 1.3 MG/DL    BUN/Creatinine ratio 13 12 - 20      GFR est AA 57 (L) >60 ml/min/1.73m2    GFR est non-AA 47 (L) >60 ml/min/1.73m2    Calcium 8.2 (L) 8.5 - 10.1 MG/DL   CBC WITH AUTOMATED DIFF    Collection Time: 06/29/18  4:50 AM   Result Value Ref Range    WBC 11.6 4.6 - 13.2 K/uL    RBC 3.14 (L) 4.70 - 5.50 M/uL    HGB 7.3 (L) 13.0 - 16.0 g/dL    HCT 23.3 (L) 36.0 - 48.0 %    MCV 74.2 74.0 - 97.0 FL    MCH 23.2 (L) 24.0 - 34.0 PG    MCHC 31.3 31.0 - 37.0 g/dL    RDW 15.0 (H) 11.6 - 14.5 %    PLATELET 167 553 - 262 K/uL    MPV 8.4 (L) 9.2 - 11.8 FL    NEUTROPHILS 80 (H) 40 - 73 %    LYMPHOCYTES 9 (L) 21 - 52 %    MONOCYTES 11 (H) 3 - 10 %    EOSINOPHILS 0 0 - 5 %    BASOPHILS 0 0 - 2 %    ABS. NEUTROPHILS 9.3 (H) 1.8 - 8.0 K/UL    ABS. LYMPHOCYTES 1.0 0.9 - 3.6 K/UL    ABS. MONOCYTES 1.3 (H) 0.05 - 1.2 K/UL    ABS. EOSINOPHILS 0.0 0.0 - 0.4 K/UL    ABS.  BASOPHILS 0.0 0.0 - 0.1 K/UL    DF MANUAL      PLATELET COMMENTS ADEQUATE PLATELETS      RBC COMMENTS ANISOCYTOSIS  1+ RBC COMMENTS POLYCHROMASIA  1+        RBC COMMENTS STOMATOCYTES  1+       PROTHROMBIN TIME + INR    Collection Time: 06/29/18  4:50 AM   Result Value Ref Range    Prothrombin time 27.1 (H) 11.5 - 15.2 sec    INR 2.6 (H) 0.8 - 1.2     MAGNESIUM    Collection Time: 06/29/18  4:50 AM   Result Value Ref Range    Magnesium 2.1 1.6 - 2.6 mg/dL   DIGOXIN    Collection Time: 06/29/18  4:50 AM   Result Value Ref Range    Digoxin level 1.3 0.9 - 2.0 NG/ML     CXR 6/27/18  IMPRESSIONS:     Pre-existing COPD in lungs.     Minimal to mild pulmonary vascular congestion. Minimal interstitial pulmonary  edema is suspected at this time.     At left lung base, mainly in lower lingula there are new atelectatic changes,  and/or pneumonic infiltrates with small pleural effusion versus pleural reaction  at the CP angle and CP gutter.     Mildly accentuated fibrotic or atelectatic changes at right lung base.     In the parahilar areas of left lung there are some focal densities, as  described. These are new findings. The findings may due to focal infiltrates. But mass or cavitary lesion cannot be excluded. If the densities persist on  follow-up chest x-ray, CT scan of chest would be indicated.     All Micro Results     Procedure Component Value Units Date/Time    CULTURE, RESPIRATORY/SPUTUM/BRONCH Debora Cordial STAIN [[id number]]  (Abnormal) Collected:  06/27/18 1920    Order Status:  Completed Specimen:  Sputum from Sputum Updated:  06/29/18 1322     Special Requests: NO SPECIAL REQUESTS        GRAM STAIN >25 WBC/lpf         <10 EPI/lpf                 MODERATE GRAM POSITIVE COCCI IN PAIRS              FEW GRAM POSITIVE COCCI IN CHAINS              MODERATE GRAM POSITIVE RODS      FEW YEAST         MUCUS PRESENT        Culture result:         MODERATE GRAM NEGATIVE RODS (A)              MODERATE NORMAL RESPIRATORY RUDY              CULTURE IN PROGRESS,FURTHER UPDATES TO FOLLOW        Functional status and cognitive function:    Ambulates without assistance. Status: alert, cooperative, no distress, appears stated age    Diet: Cardiac    Code status and advanced care plan: Full  [facility] of Contact: Lanie Alvarado, Daughter, [phone]    Patient Education:  Patient was educated on the following topics prior to discharge:  1.) Heart Failure  2.) Hypokalemia  3.) Hyponatremia    Follow-up:   Follow-up Information     Follow up With Details [address], [place] Call Schedule a hospital follow-up appointment within 7 days of discharge.  [address], [place] Call Schedule a hospital follow-up appointment [address]  [phone]          ================================================================  Rhonda Rodriguez MD, PGY-1  [address] [place] [address] Family Medicine

## 2018-06-29 VITALS
OXYGEN SATURATION: 100 % | DIASTOLIC BLOOD PRESSURE: 76 MMHG | SYSTOLIC BLOOD PRESSURE: 149 MMHG | HEART RATE: 76 BPM | WEIGHT: 156 LBS | TEMPERATURE: 97.7 F | HEIGHT: 66 IN | RESPIRATION RATE: 18 BRPM | BODY MASS INDEX: 25.07 KG/M2

## 2018-06-29 LAB
ANION GAP SERPL CALC-SCNC: 8 MMOL/L (ref 3–18)
BASOPHILS # BLD: 0 K/UL (ref 0–0.1)
BASOPHILS NFR BLD: 0 % (ref 0–2)
BUN SERPL-MCNC: 18 MG/DL (ref 7–18)
BUN/CREAT SERPL: 13 (ref 12–20)
CALCIUM SERPL-MCNC: 8.2 MG/DL (ref 8.5–10.1)
CHLORIDE SERPL-SCNC: 87 MMOL/L (ref 100–108)
CO2 SERPL-SCNC: 32 MMOL/L (ref 21–32)
CREAT SERPL-MCNC: 1.44 MG/DL (ref 0.6–1.3)
DIFFERENTIAL METHOD BLD: ABNORMAL
DIGOXIN SERPL-MCNC: 1.3 NG/ML (ref 0.9–2)
EOSINOPHIL # BLD: 0 K/UL (ref 0–0.4)
EOSINOPHIL NFR BLD: 0 % (ref 0–5)
ERYTHROCYTE [DISTWIDTH] IN BLOOD BY AUTOMATED COUNT: 15 % (ref 11.6–14.5)
GLUCOSE SERPL-MCNC: 108 MG/DL (ref 74–99)
HCT VFR BLD AUTO: 23.3 % (ref 36–48)
HGB BLD-MCNC: 7.3 G/DL (ref 13–16)
INR PPP: 2.6 (ref 0.8–1.2)
LYMPHOCYTES # BLD: 1 K/UL (ref 0.9–3.6)
LYMPHOCYTES NFR BLD: 9 % (ref 21–52)
MAGNESIUM SERPL-MCNC: 2.1 MG/DL (ref 1.6–2.6)
MCH RBC QN AUTO: 23.2 PG (ref 24–34)
MCHC RBC AUTO-ENTMCNC: 31.3 G/DL (ref 31–37)
MCV RBC AUTO: 74.2 FL (ref 74–97)
MONOCYTES # BLD: 1.3 K/UL (ref 0.05–1.2)
MONOCYTES NFR BLD: 11 % (ref 3–10)
NEUTS SEG # BLD: 9.3 K/UL (ref 1.8–8)
NEUTS SEG NFR BLD: 80 % (ref 40–73)
PLATELET # BLD AUTO: 295 K/UL (ref 135–420)
PLATELET COMMENTS,PCOM: ABNORMAL
PMV BLD AUTO: 8.4 FL (ref 9.2–11.8)
POTASSIUM SERPL-SCNC: 3.5 MMOL/L (ref 3.5–5.5)
PROTHROMBIN TIME: 27.1 SEC (ref 11.5–15.2)
RBC # BLD AUTO: 3.14 M/UL (ref 4.7–5.5)
RBC MORPH BLD: ABNORMAL
SODIUM SERPL-SCNC: 127 MMOL/L (ref 136–145)
WBC # BLD AUTO: 11.6 K/UL (ref 4.6–13.2)

## 2018-06-29 PROCEDURE — 94640 AIRWAY INHALATION TREATMENT: CPT

## 2018-06-29 PROCEDURE — 74011250637 HC RX REV CODE- 250/637: Performed by: STUDENT IN AN ORGANIZED HEALTH CARE EDUCATION/TRAINING PROGRAM

## 2018-06-29 PROCEDURE — 85610 PROTHROMBIN TIME: CPT

## 2018-06-29 PROCEDURE — 36415 COLL VENOUS BLD VENIPUNCTURE: CPT

## 2018-06-29 PROCEDURE — 80162 ASSAY OF DIGOXIN TOTAL: CPT

## 2018-06-29 PROCEDURE — 74011000258 HC RX REV CODE- 258: Performed by: STUDENT IN AN ORGANIZED HEALTH CARE EDUCATION/TRAINING PROGRAM

## 2018-06-29 PROCEDURE — 97162 PT EVAL MOD COMPLEX 30 MIN: CPT

## 2018-06-29 PROCEDURE — 77010033678 HC OXYGEN DAILY

## 2018-06-29 PROCEDURE — 74011250636 HC RX REV CODE- 250/636: Performed by: STUDENT IN AN ORGANIZED HEALTH CARE EDUCATION/TRAINING PROGRAM

## 2018-06-29 PROCEDURE — 80048 BASIC METABOLIC PNL TOTAL CA: CPT

## 2018-06-29 PROCEDURE — 85025 COMPLETE CBC W/AUTO DIFF WBC: CPT

## 2018-06-29 PROCEDURE — 74011000250 HC RX REV CODE- 250: Performed by: STUDENT IN AN ORGANIZED HEALTH CARE EDUCATION/TRAINING PROGRAM

## 2018-06-29 PROCEDURE — 74011000250 HC RX REV CODE- 250: Performed by: FAMILY MEDICINE

## 2018-06-29 PROCEDURE — 74011250637 HC RX REV CODE- 250/637: Performed by: FAMILY MEDICINE

## 2018-06-29 PROCEDURE — 83735 ASSAY OF MAGNESIUM: CPT

## 2018-06-29 RX ORDER — LEVOFLOXACIN 250 MG/1
250 TABLET ORAL EVERY 24 HOURS
Status: DISCONTINUED | OUTPATIENT
Start: 2018-06-29 | End: 2018-06-29 | Stop reason: HOSPADM

## 2018-06-29 RX ORDER — LEVOFLOXACIN 250 MG/1
250 TABLET ORAL EVERY 24 HOURS
Qty: 3 TAB | Refills: 0 | Status: SHIPPED | OUTPATIENT
Start: 2018-06-30 | End: 2018-07-12

## 2018-06-29 RX ORDER — ALBUTEROL SULFATE 0.83 MG/ML
2.5 SOLUTION RESPIRATORY (INHALATION)
Status: DISCONTINUED | OUTPATIENT
Start: 2018-06-29 | End: 2018-06-29 | Stop reason: HOSPADM

## 2018-06-29 RX ORDER — WARFARIN 2.5 MG/1
2.5 TABLET ORAL EVERY EVENING
Status: COMPLETED | OUTPATIENT
Start: 2018-06-29 | End: 2018-06-29

## 2018-06-29 RX ORDER — LANOLIN ALCOHOL/MO/W.PET/CERES
3 CREAM (GRAM) TOPICAL
Qty: 30 TAB | Refills: 0 | Status: SHIPPED | OUTPATIENT
Start: 2018-06-29 | End: 2019-05-24

## 2018-06-29 RX ORDER — POTASSIUM CHLORIDE 1.5 G/1.77G
40 POWDER, FOR SOLUTION ORAL 2 TIMES DAILY WITH MEALS
Qty: 28 EACH | Refills: 0 | Status: SHIPPED | OUTPATIENT
Start: 2018-06-29 | End: 2018-07-12

## 2018-06-29 RX ORDER — LEVOFLOXACIN 250 MG/1
250 TABLET ORAL EVERY 24 HOURS
Status: DISCONTINUED | OUTPATIENT
Start: 2018-06-29 | End: 2018-06-29

## 2018-06-29 RX ADMIN — POTASSIUM CHLORIDE 40 MEQ: 1.5 POWDER, FOR SOLUTION ORAL at 16:43

## 2018-06-29 RX ADMIN — WARFARIN SODIUM 2.5 MG: 2.5 TABLET ORAL at 18:09

## 2018-06-29 RX ADMIN — MELATONIN TAB 3 MG 3 MG: 3 TAB at 00:05

## 2018-06-29 RX ADMIN — LIDOCAINE HYDROCHLORIDE: 10 INJECTION, SOLUTION EPIDURAL; INFILTRATION; INTRACAUDAL; PERINEURAL at 00:25

## 2018-06-29 RX ADMIN — HYDROCODONE BITARTRATE AND ACETAMINOPHEN 1 TABLET: 5; 325 TABLET ORAL at 07:39

## 2018-06-29 RX ADMIN — ALBUTEROL SULFATE 2.5 MG: 2.5 SOLUTION RESPIRATORY (INHALATION) at 02:52

## 2018-06-29 RX ADMIN — HYDROCODONE BITARTRATE AND ACETAMINOPHEN 1 TABLET: 5; 325 TABLET ORAL at 16:43

## 2018-06-29 RX ADMIN — LIDOCAINE HYDROCHLORIDE: 10 INJECTION, SOLUTION EPIDURAL; INFILTRATION; INTRACAUDAL; PERINEURAL at 01:26

## 2018-06-29 RX ADMIN — LEVOFLOXACIN 250 MG: 250 TABLET, FILM COATED ORAL at 18:08

## 2018-06-29 RX ADMIN — DIGOXIN 0.12 MG: 125 TABLET ORAL at 08:41

## 2018-06-29 RX ADMIN — ASPIRIN 81 MG: 81 TABLET, COATED ORAL at 08:41

## 2018-06-29 RX ADMIN — ALBUTEROL SULFATE 2.5 MG: 2.5 SOLUTION RESPIRATORY (INHALATION) at 09:34

## 2018-06-29 RX ADMIN — POTASSIUM CHLORIDE 40 MEQ: 1.5 POWDER, FOR SOLUTION ORAL at 08:41

## 2018-06-29 RX ADMIN — AMLODIPINE BESYLATE 10 MG: 10 TABLET ORAL at 08:41

## 2018-06-29 NOTE — PROGRESS NOTES
Problem: Falls - Risk of  Goal: *Absence of Falls  Document Jennifer Fall Risk and appropriate interventions in the flowsheet. Outcome: Progressing Towards Goal  Fall Risk Interventions:  Mobility Interventions: Communicate number of staff needed for ambulation/transfer, Assess mobility with egress test         Medication Interventions: Evaluate medications/consider consulting pharmacy, Teach patient to arise slowly                  Problem: Pressure Injury - Risk of  Goal: *Prevention of pressure injury  Document Claude Scale and appropriate interventions in the flowsheet.    Outcome: Progressing Towards Goal  Pressure Injury Interventions:  Sensory Interventions: Pressure redistribution bed/mattress (bed type)         Activity Interventions: Pressure redistribution bed/mattress(bed type)         Nutrition Interventions: Document food/fluid/supplement intake

## 2018-06-29 NOTE — PROGRESS NOTES
[facility]  -  Progress Note      Patient: Lillian Sosa MRN: [id number]  SSN: [id number]    YOB: 1940  Age: 77 y.o. Sex: male      Admit Date: 6/27/2018  Patient seen and examined independently. Probably euvolemic or slightly dry at this point. Would need to be seen in the office fairly soon (<1 week) after discharge. Agree with assessment below. Will sign off and be available. [address] Problem List:     Hospital Problems  Date Reviewed: 6/6/2018          Codes Class Noted POA    * (Principal)CHF exacerbation ([facility] [address] ICD-10-CM: I50.9  ICD-9-CM: 428.0  6/27/2018 Unknown            -Acute hypoxic respiratory failure. Suspect primarily due to his COPD. Less likely CHF. -Acute COPD exacerbation, on home O2 at 3L  -Acute on chronic HFpEF, EF 55% on echo 8/93/7764, diastolic function not measured d/t technically difficult study. Suspect his elevated NT-proBNP due to chronic RV pressure overload.  -moderate pulmonary HTN with PA pressures of 60mmHg in 2016 improved to 46mmHg in April 2018  -Hyponatremia, 125 on admission  -TYRELL, likely 2/2 to dehydration with poor po intake and diuretic use  -Hypoalbuminemia  -Hx of mild CAD by cath in 2012  -Anemia of chronic disease  -Persistent afib, on digoxin for rate control and warfarin for [address]. INR therapeutic  -AAA, s/p stent graft  -Osteomyelitis of lumbar spine.  -HTN. suboptimal control  -HLD  -R inguinal hernia, medically managed     Primary cardiologist Dr Kalee Zhou:     -BP stable. Continue on norvasc.  -Rates controlled. Continue on digoxin. INR 2.6. Continue coumadin.  -Good UOP. Volume stable. Renal function stable. Recommend holding chlorthalidone while inpatient given renal insult and resuming upon d/c. Would have BMP checked one week after d/c.   -Still having episodes of difficulty breathing. Improved with nebulizer treatments. Remains on NC O2. Continue work up per primary team recs. Subjective:     States he is feeling better. Feels his LE swelling has improved and is at his baseline. He did not sleep well. He felt short of breath. Improved with nebulizer treatment overnight and again this morning.       Objective:      Patient Vitals for the past 8 hrs:   Temp Pulse Resp BP SpO2   06/29/18 1132 98 °F (36.7 °C) 70 18 142/66 99 %   06/29/18 0935 - - - - 99 %   06/29/18 0801 98.4 °F (36.9 °C) 85 18 156/65 98 %         Patient Vitals for the past 96 hrs:   Weight   06/29/18 0308 70.8 kg (156 lb)   06/28/18 0425 72 kg (158 lb 11.2 oz)   06/28/18 0344 72 kg (158 lb 11.2 oz)   06/27/18 1624 74.4 kg (164 lb)         Intake/Output Summary (Last 24 hours) at 06/29/18 1248  Last data filed at 06/29/18 0640   Gross per 24 hour   Intake              340 ml   Output             1225 ml   Net             -885 ml       Physical Exam:  General:  Awake, alert, oriented x 3, sitting on side of bed eating lunch  Neck:  Supple, no jvd  Lungs:  Diminished bases, no wheezing, crackles, or rales, on NC O2 with normal effort  Heart:  irreg rate and rhythm  Abdomen:  No tenderness to palpation  Extremities:  1+ bilat LE edema, atraumatic, no cyanosis    Data Review:     Labs: Results:       Chemistry Recent Labs      06/29/18   0450  06/28/18   1650  06/28/18   0708  06/28/18   0223  06/27/18   1708   GLU  108*   --    --   115*  98   NA  127*   --    --   127*  125*   K  3.5  2.7*   --   3.0*  2.9*   CL  87*   --    --   84*  84*   CO2  32   --    --   33*  34*   BUN  18   --    --   24*  25*   CREA  1.44*   --    --   1.47*  1.39*   CA  8.2*   --    --   7.4*  8.3*   MG  2.1   --   2.1   --    --    AGAP  8   --    --   10  7   BUCR  13   --    --   16  18   AP   --    --    --    --   83   TP   --    --    --    --   7.9   ALB   --    --    --    --   2.9*   GLOB   --    --    --    --   5.0*   AGRAT   --    --    --    --   0.6*      CBC w/Diff Recent Labs      06/29/18   0450  06/28/18   0708  06/28/18   9502 06/27/18   1708   WBC  11.6   --   13.1  17.1*   RBC  3.14*   --   3.02*  3.51*   HGB  7.3*  7.2*  7.1*  8.3*   HCT  23.3*  23.3*  22.8*  26.0*   PLT  295   --   333  379   GRANS  80*   --   81*  82*   LYMPH  9*   --   8*  8*   EOS  0   --   0  0      Cardiac Enzymes No results found for: CPK, CK, CKMMB, CKMB, RCK3, CKMBT, CKNDX, CKND1, OLGA, TROPT, TROIQ, SUELLEN, TROPT, TNIPOC, BNP, BNPP   Coagulation Recent Labs      06/29/18   0450  06/28/18   1550  06/27/18   1708   PTP  27.1*  24.8*  23.6*   INR  2.6*  2.3*  2.2*   APTT   --    --   58.1*       Lipid Panel Lab Results   Component Value Date/Time    HDL Cholesterol 25 (L) 03/26/2010 10:30 AM      BNP No results found for: BNP, BNPP, XBNPT   Liver Enzymes Recent Labs      06/27/18   1708   TP  7.9   ALB  2.9*   AP  83   SGOT  24      Digoxin    Thyroid Studies No results found for: T4, T3U, TSH, TSHEXT         Signed By: DELIA Keenan     June 29, 2018

## 2018-06-29 NOTE — MANAGEMENT PLAN
Discharge/Transition Planning      Care Management following. Likely plan is Home with H2H vs CHF, vs HH is therapy recommends.        Anton Brady RN BSN  Outcomes Manager  Pager # [phone]

## 2018-06-29 NOTE — PROGRESS NOTES
CMS went to speak with the pt in regards to the [employer] from Medicare About Your Rights. \"  Pt was able to review and sign the documents provided. No family were present at bedside. Signed documents can be found in the chart for review; additional copies were left with the pt for review.

## 2018-06-29 NOTE — PROGRESS NOTES
Problem: Mobility Impaired (Adult and Pediatric)  Goal: *Acute Goals and Plan of Care (Insert Text)  Acute goals not established. Patient reports/demonstrates baseline functional mobility, acute skilled PT services not indicated at this time. physical Therapy EVALUATION and Discharge    Patient: Lillian Sosa (27 y.o. male)  Date: 6/29/2018  Primary Diagnosis: CHF exacerbation [facility])  Precautions: Fall    OBJECTIVE/ASSESSMENT AND RECOMMENDATIONS:  Based on the objective data described below, the patient presents with baseline functional mobility including bed mobility, transfers, ambulation, and general activity tolerance following admission for CHF exacerbation. Patient presented today semi-reclined in bed, alert and agreeable to PT evaluation with daughter in room. Patient transferred to sitting EOB for objective assessment, stood without assistive device with SBA for safety. Patient provided with RW, ambulated 150 ft around unit with supervision for safety while on 4L O2 via NC. Patient then completed 5 steps using single HR with SBA, became short of breath and required seated rest break, O2 sats 96% on 4L o2. Patient then returned to room, transferred to sitting EOB and left with call bell in reach, needs met, and nurse notified. Education: bed mobility, transfers, ambulation, assistive device management, stairs, activity pacing, energy conservation -- patient verbalized/demonstrated understanding  Patient currently at baseline functional mobility, acute skilled physical therapy is not indicated at this time. Discharge Recommendations: None  Further Equipment Recommendations for Discharge: N/A - patient has all necessary equipment      SUBJECTIVE:   Patient stated I want one of those bikes for my arms, but I don't know where to get it.  Patient and daughter informed of possible options on [address] for patient's convenience.     OBJECTIVE DATA SUMMARY:     Past Medical History:   Diagnosis Date    Aneurysm [facility])     AAA repair 2006 & 2012    Aorto-iliac duplex 02/13/2015    Tech difficult. Patent aorta bi-iliac endograft w/o leak or limb dysfunction.  Arrhythmia     a fib    Cardiac cath 11/01/2012    RCA (sm, nondom) patent. LM patent. LAD 25%. CX (dom) 45% mid. LVEDP 12 mmHg. No WMA. PA 27/12. W 10-12. CO/CI 5.2/2.6 (TD).  Cardiac echocardiogram, abnormal 02/20/2016    EF 55%. No WMA. Indeterminate diastolic fx. RVSP 60 mmHg. Severe LAE. Mild MR. Mod TR.  IVCE. Similar to study of 10/26/12.  Cardiovascular aorto-iliac duplex 02/13/2015    Patent aorta bi-iliac endovascular graft w/o leak or limb dysfunction. Sac measures 4.21 x 4.47 cm (4.4 x 4.6 cm on 1/31/14).  Cardiovascular LE peripheral arterial testing 07/29/2013    No significant LE arterial disease bilaterally. R GHADA 1. 12.  L GHADA 1. 12.  R DBI 0.83. L DBI 0.71. Exercise deferred.  Cardiovascular renal duplex 10/31/2012    No RA stenosis. Intrinsic/med disease in left kidney. Patent aortic endograft. Patent, thrombus-free renal veins bilaterally.  Carotid duplex 07/29/2013    Mild <50% bilateral ICA plaquing.       Chronic lung disease     Cigarette smoker     Congestive heart failure (CHF) (HCC)     COPD (chronic obstructive pulmonary disease) (HCC)     and emphysema; likely secondary to tobacco abuse    Difficult intubation     Dyslipidemia     low HDL    Emphysema     HTN (hypertension)     Hypercholesterolemia     Increased prostate specific antigen (PSA) velocity     Long term (current) use of anticoagulants     coumadin    Osteoarthritis     Osteomyelitis (HCC)     Paroxysmal atrial fibrillation (HCC)     Peripheral vascular disease ([address]     AAA repair 12/2007    Persistent atrial Fibrillation     Persistent atrial fibrillation ([address]     Unspecified adverse effect of anesthesia     difficulty breathing placed in ICU Oct 2012 (AAA repair)     Past Surgical History:   Procedure Laterality Date  BRONCHOSCOPY DIAGNOSTIC  11/2/2012         CARDIAC CATHETERIZATION  11/1/2012         COLONOSCOPY N/A 7/26/2016    COLONOSCOPY with Polypectomies performed by Antoni Rsoado MD at [address] HX AAA REPAIR      2006 & 2012    HX HEART CATHETERIZATION  3/4/2009    1. RCA small, nondominant; patent. 2. LMCA patent. 3. LAD is long, wrapped around apical vessel. Diffuse, 20-30% stenosis noted. 4. CCA is large, dominant vessel. Diffuse 20-30% stenosis. 5. LVEDP is 16 mmHg. 6. Overall left ventricular systolic function mildly diminshed with est. EF 45%. Mild, global hypokinesis noted. 7. No significant mitral regurgitation or aortic stenosis noted. (see report)    HX HERNIA REPAIR  2/2014    rt inguinal     HX HERNIA REPAIR  01/2016    LEFT INGUINAL HERNIA REPAIR DR. Corcoran Pease    REPAIR ING HERNIA,5+Y/O,JESSIE Left 1-20-16    Dr. Brownlee [facility]  11/1/2012          Barriers to Learning/Limitations: None  Compensate with: N/A  Prior Level of Function/Home Situation: Patient lives with younger brother on first floor of 2 story home. He was independent with all home mobility PTA, occasionally uses RW for ambulation and needs 1 person assistance on stairs. Home Situation  Home Environment: Private residence  # Steps to Enter: 5  Rails to Enter: No  One/Two Story Residence: Two story, live on 1st floor  Living Alone: No  Support Systems: Family member(s)  Patient Expects to be Discharged to[de-identified] Private residence  Current DME Used/Available at Home: Walker, rolling, Wheelchair, Oxygen, portable  Critical Behavior:  Neurologic State: Alert  Psychosocial  Patient Behaviors: Calm; Cooperative  Family  Behaviors: Calm  Purposeful Interaction: Yes  Pt Identified Daily Priority: Clinical issues (comment)  Caritas Process: Establish trust;Teaching/learning  Caring Interventions: Reassure  Reassure: Informing  Strength:    Strength: Generally decreased, functional (BLE)  Tone & Sensation:   Tone: Normal (BLE)  Sensation: Intact (BLE)   Range Of Motion:  AROM: Within functional limits (BLE)  Functional Mobility:  Bed Mobility:  Supine to Sit: Supervision  Sit to Supine: Supervision  Scooting: Supervision  Transfers:  Sit to Stand: Supervision  Stand to Sit: Supervision  Balance:   Sitting: Intact  Standing: Intact; With support  Ambulation/Gait Training:  Distance (ft): 150 Feet (ft)  Assistive Device: Walker, rolling  Ambulation - Level of Assistance: Supervision  Base of Support: Center of gravity altered  Speed/Sylvia: Pace decreased (<100 feet/min)  Stairs:  Number of Stairs Trained: 5  Stairs - Level of Assistance: Stand-by assistance  Rail Use: Left   Pain:  Pre session: 5/10 hernia  Post session: 5/10 hernia  Activity Tolerance:   Fair/good  Please refer to the flowsheet for vital signs taken during this treatment. After treatment:   [] Patient left in no apparent distress sitting up in chair  [x] Patient left sitting on EOB  [] Patient left in no apparent distress in bed  [] Patient declined to be OOB at this time due to  [x] Call bell left within reach  [x] Nursing notified([place])  [x] Caregiver present  [] Bed alarm activated  [] SCDs in place  COMMUNICATION/EDUCATION:   [x]         Fall prevention education was provided and the patient/caregiver indicated understanding. [x]         Patient/family have participated as able in goal setting and plan of care. []         Patient/family agree to work toward stated goals and plan of care. []         Patient understands intent and goals of therapy, but is neutral about his/her participation. []         Patient is unable to participate in goal setting and plan of care. Thank you for this referral.  Lucrecia Brooks   Time Calculation: 18 mins    Mobility  Current  CI= 1-19%   Goal  CI= 1-19%. The severity rating is based on the Level of Assistance required for Functional Mobility and ADLs.     Eval Complexity: History: MEDIUM  Complexity : 1-2 comorbidities / personal factors will impact the outcome/ POC Exam:MEDIUM Complexity : 3 Standardized tests and measures addressing body structure, function, activity limitation and / or participation in recreation  Presentation: MEDIUM Complexity : Evolving with changing characteristics  Clinical Decision Making:Medium Complexity   Overall Complexity:MEDIUM

## 2018-06-29 NOTE — DISCHARGE INSTRUCTIONS
DISCHARGE SUMMARY from Nurse    PATIENT INSTRUCTIONS:    After general anesthesia or intravenous sedation, for 24 hours or while taking prescription Narcotics:  · Limit your activities  · Do not drive and operate hazardous machinery  · Do not make important personal or business decisions  · Do  not drink alcoholic beverages  · If you have not urinated within 8 hours after discharge, please contact your surgeon on call. Report the following to your surgeon:  · Excessive pain, swelling, redness or odor of or around the surgical area  · Temperature over 100.5  · Nausea and vomiting lasting longer than 4 hours or if unable to take medications  · Any signs of decreased circulation or nerve impairment to extremity: change in color, persistent  numbness, tingling, coldness or increase pain  · Any questions    What to do at Home:  Recommended activity: Activity as tolerated,       If you experience any of the following symptoms chest pain,increased shortness of breathe, please follow up with emergency department or primary physician. *  Please give a list of your current medications to your Primary Care Provider. *  Please update this list whenever your medications are discontinued, doses are      changed, or new medications (including over-the-counter products) are added. *  Please carry medication information at all times in case of emergency situations. These are general instructions for a healthy lifestyle:    No smoking/ No tobacco products/ Avoid exposure to second hand smoke  Surgeon General's Warning:  Quitting smoking now greatly reduces serious risk to your health.     Obesity, smoking, and sedentary lifestyle greatly increases your risk for illness    A healthy diet, regular physical exercise & weight monitoring are important for maintaining a healthy lifestyle    You may be retaining fluid if you have a history of heart failure or if you experience any of the following symptoms:  Weight gain of 3 pounds or more overnight or 5 pounds in a week, increased swelling in our hands or feet or shortness of breath while lying flat in bed. Please call your doctor as soon as you notice any of these symptoms; do not wait until your next office visit. Recognize signs and symptoms of STROKE:    F-face looks uneven    A-arms unable to move or move unevenly    S-speech slurred or non-existent    T-time-call 911 as soon as signs and symptoms begin-DO NOT go       Back to bed or wait to see if you get better-TIME IS BRAIN. Warning Signs of HEART ATTACK     Call 911 if you have these symptoms:   Chest discomfort. Most heart attacks involve discomfort in the center of the chest that lasts more than a few minutes, or that goes away and comes back. It can feel like uncomfortable pressure, squeezing, fullness, or pain.  Discomfort in other areas of the upper body. Symptoms can include pain or discomfort in one or both arms, the back, neck, jaw, or stomach.  Shortness of breath with or without chest discomfort.  Other signs may include breaking out in a cold sweat, nausea, or lightheadedness. Don't wait more than five minutes to call 911 - MINUTES MATTER! Fast action can save your life. Calling 911 is almost always the fastest way to get lifesaving treatment. Emergency Medical Services staff can begin treatment when they arrive -- up to an hour sooner than if someone gets to the hospital by car. The discharge information has been reviewed with the patient. The patient verbalized understanding. Discharge medications reviewed with the patient and appropriate educational materials and side effects teaching were provided. ___________________________________________________________________________________________________________________________________     Heart Failure: Care Instructions  Your Care Instructions    Heart failure occurs when your heart does not pump as much blood as the body needs.  Failure does not mean that the heart has stopped pumping but rather that it is not pumping as well as it should. Over time, this causes fluid buildup in your lungs and other parts of your body. Fluid buildup can cause shortness of breath, fatigue, swollen ankles, and other problems. By taking medicines regularly, reducing sodium (salt) in your diet, checking your weight every day, and making lifestyle changes, you can feel better and live longer. Follow-up care is a key part of your treatment and safety. Be sure to make and go to all appointments, and call your doctor if you are having problems. It's also a good idea to know your test results and keep a list of the medicines you take. How can you care for yourself at home? Medicines  ? · Be safe with medicines. Take your medicines exactly as prescribed. Call your doctor if you think you are having a problem with your medicine. ? · Do not take any vitamins, over-the-counter medicine, or herbal products without talking to your doctor first. Funmilayo Al[place]emal not take ibuprofen (Advil or Motrin) and naproxen (Aleve) without talking to your doctor first. They could make your heart failure worse. ? · You may be taking some of the following medicine. ¨ Beta-blockers can slow heart rate, decrease blood pressure, and improve your condition. Taking a beta-blocker may lower your chance of needing to be hospitalized. ¨ Angiotensin-converting enzyme inhibitors (ACEIs) reduce the heart's workload, lower blood pressure, and reduce swelling. Taking an ACEI may lower your chance of needing to be hospitalized again. ¨ Angiotensin II receptor blockers (ARBs) work like ACEIs. Your doctor may prescribe them instead of ACEIs. ¨ Diuretics, also called water pills, reduce swelling. ¨ Potassium supplements replace this important mineral, which is sometimes lost with diuretics. ¨ Aspirin and other blood thinners prevent blood clots, which can cause a stroke or heart attack. ? You will get more details on the specific medicines your doctor prescribes. Diet  ? · Your doctor may suggest that you limit sodium to 2,000 milligrams (mg) a day or less. That is less than 1 teaspoon of salt a day, including all the salt you eat in cooking or in packaged foods. People get most of their sodium from processed foods. Fast food and restaurant meals also tend to be very high in sodium. ? · Ask your doctor how much liquid you can drink each day. You may have to limit liquids. ?Weight  ? · Weigh yourself without clothing at the same time each day. Record your weight. Call your doctor if you have a sudden weight gain, such as more than 2 to 3 pounds in a day or 5 pounds in a week. (Your doctor may suggest a different range of weight gain.) A sudden weight gain may mean that your heart failure is getting worse. ? Activity level  ? · Start light exercise (if your doctor says it is okay). Even if you can only do a small amount, exercise will help you get stronger, have more energy, and manage your weight and your stress. Walking is an easy way to get exercise. Start out by walking a little more than you did before. Bit by bit, increase the amount you walk. ? · When you exercise, watch for signs that your heart is working too hard. You are pushing yourself too hard if you cannot talk while you are exercising. If you become short of breath or dizzy or have chest pain, stop, sit down, and rest.   ? · If you feel \"wiped out\" the day after you exercise, walk slower or for a shorter distance until you can work up to a better pace. ? · Get enough rest at night. Sleeping with 1 or 2 pillows under your upper body and head may help you breathe easier. ? Lifestyle changes  ? · Do not smoke. Smoking can make a heart condition worse. If you need help quitting, talk to your doctor about stop-smoking programs and medicines. These can increase your chances of quitting for good.  Quitting smoking may be the most important step you can take to protect your heart. ? · Limit alcohol to 2 drinks a day for men and 1 drink a day for women. Too much alcohol can cause health problems. ? · Avoid getting sick from colds and the flu. Get a pneumococcal vaccine shot. If you have had one before, ask your doctor whether you need another dose. Get a flu shot each year. If you must be around people with colds or the flu, wash your hands often. When should you call for help? Call 911 if you have symptoms of sudden heart failure such as:  ? · You have severe trouble breathing. ? · You cough up pink, foamy mucus. ? · You have a new irregular or rapid heartbeat. ?Call your doctor now or seek immediate medical care if:  ? · You have new or increased shortness of breath. ? · You are dizzy or lightheaded, or you feel like you may faint. ? · You have sudden weight gain, such as more than 2 to 3 pounds in a day or 5 pounds in a week. (Your doctor may suggest a different range of weight gain.)   ? · You have increased swelling in your legs, ankles, or feet. ? · You are suddenly so tired or weak that you cannot do your usual activities. ? Watch closely for changes in your health, and be sure to contact your doctor if you develop new symptoms. Where can you learn more? Go to http://brittney-eliseo.info/. Enter K233 in the search box to learn more about \"Heart Failure: Care Instructions. \"  Current as of: September 21, 2016  Content Version: 11.4  © 4005-7767 [employer]. Care instructions adapted under license by [employer] (which disclaims liability or warranty for this information). If you have questions about a medical condition or this instruction, always ask your healthcare professional. [place] [address] any warranty or liability for your use of this information. Hypokalemia: Care Instructions  Your Care Instructions    Hypokalemia (say \"tm-xs-dkg-LUTHER-leonid-uh\") is a low level of potassium. The heart, muscles, kidneys, and nervous system all need potassium to work well. This problem has many different causes. Kidney problems, diet, and medicines like diuretics and laxatives can cause it. So can vomiting or diarrhea. In some cases, cancer is the cause. Your doctor may do tests to find the cause of your low potassium levels. You may need medicines to bring your potassium levels back to normal. You may also need regular blood tests to check your potassium. If you have very low potassium, you may need intravenous (IV) medicines. You also may need tests to check the electrical activity of your heart. Heart problems caused by low potassium levels can be very serious. Follow-up care is a key part of your treatment and safety. Be sure to make and go to all appointments, and call your doctor if you are having problems. It's also a good idea to know your test results and keep a list of the medicines you take. How can you care for yourself at home? · If your doctor recommends it, eat foods that have a lot of potassium. These include fresh fruits, juices, and vegetables. They also include nuts, beans, and milk. · Be safe with medicines. If your doctor prescribes medicines or potassium supplements, take them exactly as directed. Call your doctor if you have any problems with your medicines. · Get your potassium levels tested as often as your doctor tells you. When should you call for help? Call 911 anytime you think you may need emergency care. For example, call if:  ? · You feel like your heart is missing beats. Heart problems caused by low potassium can cause death. ? · You passed out (lost consciousness). ? · You have a seizure. ?Call your doctor now or seek immediate medical care if:  ? · You feel weak or unusually tired. ? · You have severe arm or leg cramps. ? · You have tingling or numbness. ? · You feel sick to your stomach, or you vomit. ? · You have belly cramps.    ? · You feel bloated or constipated. ? · You have to urinate a lot. ? · You feel very thirsty most of the time. ? · You are dizzy or lightheaded, or you feel like you may faint. ? · You feel depressed, or you lose touch with reality. ? Watch closely for changes in your health, and be sure to contact your doctor if:  ? · You do not get better as expected. Where can you learn more? Go to http://brittney-eliseo.info/. Enter G358 in the search box to learn more about \"Hypokalemia: Care Instructions. \"  Current as of: May 12, 2017  Content Version: 11.4  © 8654-4616 [employer]. Care instructions adapted under license by [employer] (which disclaims liability or warranty for this information). If you have questions about a medical condition or this instruction, always ask your healthcare professional. [place] [address] any warranty or liability for your use of this information. Hyponatremia: Care Instructions  Your Care Instructions    Hyponatremia (say \"el-iy-ngr-TREE-loenid-uh\") means that you don't have enough sodium in your blood. It can cause nausea, vomiting, and headaches. Or you may not feel hungry. In serious cases, it can cause seizures, a coma, or even death. Hyponatremia is not a disease. It is a problem caused by something else, such as medicines or exercising for a long time in hot weather. You can get hyponatremia if you lose a lot of fluids and then you drink a lot of water or other liquids that don't have much sodium. You can also get it if you have kidney, liver, heart, or other health problems. Treatment is focused on getting your sodium levels back to normal.  Follow-up care is a key part of your treatment and safety. Be sure to make and go to all appointments, and call your doctor if you are having problems. It's also a good idea to know your test results and keep a list of the medicines you take.   How can you care for yourself at home?  · If your doctor recommends it, drink fluids that have sodium. Sports drinks are a good choice. Or you can eat salty foods. · If your doctor recommends it, limit the amount of water you drink. And limit fluids that are mostly water. These include tea, coffee, and juice. · Take your medicines exactly as prescribed. Call your doctor if you have any problems with your medicine. · Get your sodium levels tested when your doctor tells you to. When should you call for help? Call 911 anytime you think you may need emergency care. For example, call if:  ? · You have a seizure. ? · You passed out (lost consciousness). ?Call your doctor now or seek immediate medical care if:  ? · You are confused or it is hard to focus. ? · You have little or no appetite. ? · You feel sick to your stomach or you vomit. ? · You have a headache. ? · You have mood changes. ? · You feel more tired than usual.   ? Watch closely for changes in your health, and be sure to contact your doctor if:  ? · You do not get better as expected. Where can you learn more? Go to http://brittney-eliseo.info/. Enter A859 in the search box to learn more about \"Hyponatremia: Care Instructions. \"  Current as of: October 14, 2016  Content Version: 11.4  © 2710-2781 [employer]. Care instructions adapted under license by [employer] (which disclaims liability or warranty for this information). If you have questions about a medical condition or this instruction, always ask your healthcare professional. [address] any warranty or liability for your use of this information.

## 2018-06-29 NOTE — PROGRESS NOTES
Patient discharge education complete,IV(s) and Telemetry monitor discontinued. patient verbalizes understanding of instructions,VS stable,A/Ox3,prescriptions included with discharge packet.

## 2018-06-29 NOTE — PROGRESS NOTES
Intern Progress Note  [facility]       Patient: Dottie Edgar MRN: [id number]  CSN: [id number]    YOB: 1940  Age: 77 y.o. Sex: male    DOA: 6/27/2018 LOS:  LOS: 2 days                    Subjective:     Acute events: No acute events overnight. Afebrile; hemodynamically. Hemoglobin stable; no signs of active bleeding. Resting comfortably this AM and sitting on the side of bed. Reported that his breathing was tight earlier this AM and benefited from albuterol nebulizer treatment. Reports that his legs feel at their baseline. Otherwise no other issues or complaints. Review of Systems   Constitutional: Negative for chills and fever. Respiratory: Positive for cough, shortness of breath and wheezing. Cardiovascular: Negative for chest pain and palpitations. Gastrointestinal: Negative for abdominal pain, blood in stool, melena, nausea and vomiting. Genitourinary: Negative for dysuria, flank pain and hematuria. Neurological: Negative for dizziness and headaches. Objective:      Patient Vitals for the past 24 hrs:   Temp Pulse Resp BP SpO2   06/29/18 0801 98.4 °F (36.9 °C) 85 18 156/65 98 %   06/29/18 0308 98 °F (36.7 °C) 94 24 159/81 100 %   06/28/18 2315 99.5 °F (37.5 °C) 84 18 159/68 99 %   06/28/18 1901 99 °F (37.2 °C) 60 18 160/50 97 %   06/28/18 1611 97.8 °F (36.6 °C) 72 18 159/68 99 %   06/28/18 1212 97.5 °F (36.4 °C) 80 18 160/76 98 %   06/28/18 0922 97.9 °F (36.6 °C) 70 18 169/72 99 %       Intake/Output Summary (Last 24 hours) at 06/29/18 0854  Last data filed at 06/29/18 0640   Gross per 24 hour   Intake              340 ml   Output             1225 ml   Net             -885 ml       Physical Exam:  General:  Alert and Responsive and in No acute distress. HEENT: Conjunctiva pink, sclera anicteric. EOMI.  Bilateral painless eyelid masses without erythema or drainiage.  Pharynx moist, nonerythematous.  Moist mucous membranes.  Thyroid not enlarged, no nodules.  No cervical, supraclavicular, occipital or submandibular lymphadenopathy.  No other gross abnormalities appreciated. CV:  Irregularly irregular rhythm, normal rate. No murmurs, rubs, or gallops appreciated. No visible pulsations or thrills. No JVD.     RESP:  Unlabored breathing. Very faint crackles at b/l lung bases. Equal expansion bilaterally.    ABD:  Soft, nontender, nondistended. Normoactive bowel sounds. No hepatosplenomegaly. No suprapubic tenderness. RECTAL:  No masses or hemorrhoids.  Hemoccult negative. MS:  No joint deformity or instability.  No atrophy. Neuro:  5/5 strength bilateral upper extremities and lower extremities.  A+Ox3. Ext:  Trace leg edema; 1+ pedal edema.  1+ radial and dp pulses bilaterally. Skin:  No rashes, lesions, or ulcers.  Good turgor. Lab/Data Reviewed:  Recent Results (from the past 12 hour(s))   METABOLIC PANEL, BASIC    Collection Time: 06/29/18  4:50 AM   Result Value Ref Range    Sodium 127 (L) 136 - 145 mmol/L    Potassium 3.5 3.5 - 5.5 mmol/L    Chloride 87 (L) 100 - 108 mmol/L    CO2 32 21 - 32 mmol/L    Anion gap 8 3.0 - 18 mmol/L    Glucose 108 (H) 74 - 99 mg/dL    BUN 18 7.0 - 18 MG/DL    Creatinine 1.44 (H) 0.6 - 1.3 MG/DL    BUN/Creatinine ratio 13 12 - 20      GFR est AA 57 (L) >60 ml/min/1.73m2    GFR est non-AA 47 (L) >60 ml/min/1.73m2    Calcium 8.2 (L) 8.5 - 10.1 MG/DL   CBC WITH AUTOMATED DIFF    Collection Time: 06/29/18  4:50 AM   Result Value Ref Range    WBC 11.6 4.6 - 13.2 K/uL    RBC 3.14 (L) 4.70 - 5.50 M/uL    HGB 7.3 (L) 13.0 - 16.0 g/dL    HCT 23.3 (L) 36.0 - 48.0 %    MCV 74.2 74.0 - 97.0 FL    MCH 23.2 (L) 24.0 - 34.0 PG    MCHC 31.3 31.0 - 37.0 g/dL    RDW 15.0 (H) 11.6 - 14.5 %    PLATELET 378 935 - 602 K/uL    MPV 8.4 (L) 9.2 - 11.8 FL    NEUTROPHILS 80 (H) 40 - 73 %    LYMPHOCYTES 9 (L) 21 - 52 %    MONOCYTES 11 (H) 3 - 10 %    EOSINOPHILS 0 0 - 5 %    BASOPHILS 0 0 - 2 %    ABS. NEUTROPHILS 9.3 (H) 1.8 - 8.0 K/UL    ABS. LYMPHOCYTES 1.0 0.9 - 3.6 K/UL    ABS. MONOCYTES 1.3 (H) 0.05 - 1.2 K/UL    ABS. EOSINOPHILS 0.0 0.0 - 0.4 K/UL    ABS. BASOPHILS 0.0 0.0 - 0.1 K/UL    DF MANUAL      PLATELET COMMENTS ADEQUATE PLATELETS      RBC COMMENTS ANISOCYTOSIS  1+        RBC COMMENTS POLYCHROMASIA  1+        RBC COMMENTS STOMATOCYTES  1+       PROTHROMBIN TIME + INR    Collection Time: 06/29/18  4:50 AM   Result Value Ref Range    Prothrombin time 27.1 (H) 11.5 - 15.2 sec    INR 2.6 (H) 0.8 - 1.2     MAGNESIUM    Collection Time: 06/29/18  4:50 AM   Result Value Ref Range    Magnesium 2.1 1.6 - 2.6 mg/dL   DIGOXIN    Collection Time: 06/29/18  4:50 AM   Result Value Ref Range    Digoxin level 1.3 0.9 - 2.0 NG/ML     Scheduled Medications Reviewed:  Current Facility-Administered Medications   Medication Dose Route Frequency    albuterol (PROVENTIL VENTOLIN) nebulizer solution 2.5 mg  2.5 mg Nebulization Q6HWA RT    amLODIPine (NORVASC) tablet 10 mg  10 mg Oral DAILY    aspirin delayed-release tablet 81 mg  81 mg Oral DAILY    digoxin (LANOXIN) tablet 0.125 mg  0.125 mg Oral DAILY    pravastatin (PRAVACHOL) tablet 40 mg  40 mg Oral QHS    tiotropium-olodaterol (STIOLTO RESPIMAT) 2.5-2.5 mcg/actuation mist  (Patient Supplied)  2 Puff Inhalation DAILY    WARFARIN INFORMATION NOTE (COUMADIN)   Other Q24H    potassium chloride (KLOR-CON) packet 40 mEq  40 mEq Oral BID WITH MEALS    levoFLOXacin (LEVAQUIN) 250 mg in D5W IVPB  250 mg IntraVENous Q24H       Imaging, microbiology, and EKG/Telemetry:  No results found. Assessment/Plan   77 y. o. male with PMH afib, anemia, BPH, COPD, CAD, CHF, HTN, pulm HTN, HLD, spondylosis, and chronic back pain, now admitted with shortness of breath.      Acute on Chronic Diastolic CHF Exacerbation: Followed by Dr. Chris Rice (Cardiology) as outpatient. On admission, BNP elevated at 12,922. Echo (4/2018) showed EF 55% with hypokinesis of the basal-mid anteroseptal wall.  LE edema improving.  - Cardiology following, appreciate recs  - Telemetry  - Monitor VS, CBC, BMP  - Daily weights  - Strict I/O's    SIRS 2/2 CAP: Pt has 2/4 SIRS ( and WBC 17.1 with left shift) but SOFA score of 6. CXR showed minimal to mild pulmonary vascular congestion, minimal pulmonary edema, new atelectatic changes in the left lung base, new focal densities in the parahilar areas of the left lung.  - Continue IV levaquin 250 mg q24h  - F/u Sputum cx  - Encourage IS use  - Daily CBC  - Consider CT chest if respiratory status worsens    Hyponatremia: Na: 127 this AM; hypotonic hypervolemic hyponatremia 2/2 CHF Exacerbation. - FR 1500mL  - Daily BMP      Hypokalemia (resolved): K 3.5 this AM; Mag 2.1 this AM  - Continue Klor-Con 40mEq BID  - Daily BMP, Mag  - Replete electrolytes as needed per protocol      TYRELL: Baseline Cr ~0.8. Cr. 1.44 this AM and slowly improving.  - Hold home chlorthalidone d/t TYRELL  - Avoid nephrotoxic meds  - No IVF for now d/t hypervolemia  - Daily BMP    Chronic Hypoxic Respiratory Failure 2/2 COPD: Followed by Dr. Nathaniel Liang (Pulmonology) as outpatient. On home 3L NC O2 at home. - Continue supplemental O2  - Continue Stiolto Respimat 2 puffs daily  - Continue albuterol q6hwa per RT      Anemia of Chronic Disease: Hgb 7.3 this AM and stable. Baseline Hgb ~10. Hemoccult negative, no blood in stools or black stools, no recent injuries or bleeding. Iron Profile: Iron 17; TIBC 254; Iron % 7; Ferritin 108. - Daily CBC  - Transfuse at Hgb <7  - Monitor for signs of bleeding  - Consider GI consult if bleeding occurs      Persistent Atrial Fibrillation: rate controlled.  INR: 2.3 this AM.   - Cardiac monitoring  - Continue Coumadin 2.5mg WF and 5mg other days  - Continue ASA 81 mg daily  - Continue digoxin 125 mcg daily      HTN: BP ranging 159//72 in the past 24 hours  - Continue home amlodipine 10mg daily  - Hold chlorthalidone 25 mg daily d/t TYRELL  - Consider adding nitrate or hydralazine if not controlled      HLD: Lipid panel (3/2017): TC: 120, T, HDL: 31, LDL: 65. Pt has not been taking pravastatin as prescribed.   - Continue home pravastatin 40 mg QHS      Inguinal hernia/chronic pain: Pt has seen Dr. Sonido Price for recurring R inguinal hernia s/p bilateral open inguinal hernia repair in the past. Pt is not good surgical candidate d/t pulm and cardio comorbidities.   - Continue home Norco 5-300 BID PRN  - Monitor clinically      Diet: Cardiac, FR 1500mL  DVT Prophylaxis: Warfarin  Code Status: Full  Point of Contact: Nitin Schneider, Daughter, [phone]      Disposition and anticipated LOS: Haily Painter MD, PGY-1  [facility]

## 2018-07-01 LAB
BACTERIA SPEC CULT: ABNORMAL
BACTERIA SPEC CULT: ABNORMAL
GRAM STN SPEC: ABNORMAL
SERVICE CMNT-IMP: ABNORMAL

## 2018-07-02 ENCOUNTER — PATIENT OUTREACH (OUTPATIENT)
Dept: CARDIOLOGY CLINIC | Age: 78
End: 2018-07-02

## 2018-07-02 NOTE — PROGRESS NOTES
Heart Failure Follow Up Call    NN contacted the patient by telephone to perform CHF Follow Up. Verified  and address as identifiers. Pj Aguilar reports feeling ok. Daily Weight (document daily weights in flowsheets): Will begin weighing tomorrow   Amount:  156 lbs last hospital weight      Provider Notified:   No     Zone:(Pt Reported)  green     Goals      Improve activity tolerance and quality of life. 18 Patient to be able to report improvement in activity progression on or by 7/10/18       Maintains daily weight. 18 Patient will provide teach back on daily weight and to report weight gain of 3 lbs in a day or 5 lbs in a week to physician/NN on or by 18       Understands and adheres to diet. 18 Patient to be able to teach back cardiac diet of no salt added, on or by 18. Needs addressed from pathway:    24-48 Hours- or initial contact   Review/Verification:  ? Identify care team  ? Disposition (Home, SNF, IRF LTC, VIRAL, Hospice)  ? DC Instructions, Education, and HF Zones  ? Roland Vasquez in place. [facility], Dialysis center)   ? Evaluate DME needs; arrange home equipment  ? Advanced care planning (e.g.: Palliative Care; Hospice  ? Type of monitoring  ? Labs/Diagnostics to follow  ? Follow-up apts are arranged  ? Identify transportation    Med Rec*   ? Potassium,   ? Anticoagulant   Baseline Labs*  ? BMP  ? BUN/Creat.  ? PT/INR  ? Hgb/Hct  ? WBC    DOMENICA Documentation:  ? Goals  ? Challenges/Plan  ? Weight    ? Edema  ? Symptoms    Education Disease Management:  ? Cardiac Low-sodium Diet (No added sodium; 1500mg as indicated). If Diabetic:   ?  include carb-controlled   ? Fluid restriction (if indicated)  ? Comorbidity Management  ?  Daily Weights         PCP: Dr. Barrios Bio  Cardiologist: Dr. Laura Jane  Specialist:   (1-3 days): no  Follow up appointment with PCP (within 2-3 days): 18 @ 11:00  Follow up appointment with Cardiology (2-3 weeks) 7/10/18 @ 4:20    Cardiologist consult while IP: yes     Palliative consult while IP:    No     EF: 55% on 4/12/18  Type of HF:   HFpEF     Cardiac Device present: none      Heart Failure Medications: Potassium, Anticoagulat     Disposition of patient:  Hospital 2 Home     [facility] Services/Tele Monitoring:  Home Health/H2H     Social support: family    Do you have a Scale:    yes   How often do you weigh:  patient will begin weighing daily   Does patient have an Advance Directive:  not on file     Advance Directive scanned into patients chart:  No     Patient reminded that there are physicians on call 24 hours a day / 7 days a week (M-F 5pm to 8am and from Friday 5pm until Monday 8a for the weekend) should the patient have questions or concerns. Patient reminded to call 911 if situation is emergent or patient feels the situation is emergent. Pt verbalizes understanding.

## 2018-07-06 ENCOUNTER — PATIENT OUTREACH (OUTPATIENT)
Dept: CARDIOLOGY CLINIC | Age: 78
End: 2018-07-06

## 2018-07-06 NOTE — PROGRESS NOTES
NN contacted the patient by telephone to perform CHF follow Up. Noted Priorities/Plan: To have Home Health and build up strength. Daily Weight: 164 lbs at PCP office  Zone: green   Signs/Symptoms: Edema \"going down a lot\",; SOB chronic, especially with any exertion. Goals      Improve activity tolerance and quality of life. 7/2/18 Patient to be able to report improvement in activity progression on or by 7/10/18         Maintains daily weight. 7/2/18 Patient will provide teach back on daily weight and to report weight gain of 3 lbs in a day or 5 lbs in a week to physician/NN on or by 7/31/18       Understands and adheres to diet. 7/2/18 Patient to be able to teach back cardiac diet of no salt added, on or by 8/17/18. Needs addressed from pathway:   Week 1-4   Provide Daily Disease Management  (NN initiated)  ? Daily weight (Before Breakfast-  Daily Zone Identification (symptom management; weight, edema, SOB, activity/sleep changes)-notify provider immediately as indicated  ? Cardiac Low-sodium Diet (No added sodium; 1500mg as indicated). If  ? Confirm follow-up appointments/transportation. Reschedule if needed. Additional assessment   ? Activity tolerance assessment   (eg: Vital signs; level of consciousness; dyspnea on exertion; pillow usage; recliner vs bed)   ? Energy conservation management (balance activity with rest)  ? Home assessment/modifications * as indicated   ? Home health services  ? SNF utilization  Psychosocial: Reassurance/emotional support     Education:   ? Support system identification ( eg: Caregiver, meal planning, community resources, family, friends, Religion, support group)   ? Health literacy for heart failure           Have you been to an ER/Hospital since discharge from [facility]?    No      Have you followed up with PCP/Cardiologist/Specialist? Has attended PCP on 7/5/18 and scheduled with Dr. Bindu Quiroz on 7/10/18    Transportation: family  Diet: cardiac/low sodium  Activity/ADLs: no too weak at this time. Medications Reconciled at this time: at PCP office  Home health:  Company/Completion: no  Social Support: family    Patient reports \"feeling weak, not as strong as I thought I would be\" States that at PCP visit yesterday on 7/5/18 Dr. Luis Hearn was to see about getting him Home Health or admitted to SNF. NN called [facility] to see if patient received H2H/HH. Spoke with Horacio Dominguez she states there were no orders placed. NN called PCP office and spoke with Barrie Barnett to see if HH/SNF order has been placed. Dr. Gurinder Bruno office to contact NN. NN called Mr. Reis back to inform him that  Dr. Luis Hearn is checking with the hospital. Mr. Abdoulaye Bellamy states that the hospital just called him to see about getting him into [facility] facility. They are in contact with him. NN will follow up. Patient reminded that there is a physician on call 24 hours a day / 7 days a week (M-F 5pm to 8am and from Friday 5pm until Monday 8a for the weekend) should the patient have questions or concerns. Patient reminded to call 911 if situation is emergent or patient feels the situation is emergent. Pt verbalizes understanding.

## 2018-07-09 ENCOUNTER — HOSPITAL ENCOUNTER (INPATIENT)
Age: 78
LOS: 3 days | Discharge: NURSING, REHAB OR LONG-TERM CARE | DRG: 641 | End: 2018-07-12
Attending: EMERGENCY MEDICINE | Admitting: FAMILY MEDICINE
Payer: MEDICARE

## 2018-07-09 ENCOUNTER — APPOINTMENT (OUTPATIENT)
Dept: GENERAL RADIOLOGY | Age: 78
DRG: 641 | End: 2018-07-09
Attending: EMERGENCY MEDICINE
Payer: MEDICARE

## 2018-07-09 ENCOUNTER — APPOINTMENT (OUTPATIENT)
Dept: CT IMAGING | Age: 78
DRG: 641 | End: 2018-07-09
Attending: EMERGENCY MEDICINE
Payer: MEDICARE

## 2018-07-09 DIAGNOSIS — E87.6 HYPOKALEMIA: ICD-10-CM

## 2018-07-09 DIAGNOSIS — E87.1 HYPONATREMIA: Primary | ICD-10-CM

## 2018-07-09 LAB
ALBUMIN SERPL-MCNC: 2.8 G/DL (ref 3.4–5)
ALBUMIN/GLOB SERPL: 0.6 {RATIO} (ref 0.8–1.7)
ALP SERPL-CCNC: 84 U/L (ref 45–117)
ALT SERPL-CCNC: 26 U/L (ref 16–61)
ANION GAP SERPL CALC-SCNC: 11 MMOL/L (ref 3–18)
ANION GAP SERPL CALC-SCNC: 8 MMOL/L (ref 3–18)
AST SERPL-CCNC: 34 U/L (ref 15–37)
ATRIAL RATE: 77 BPM
BASOPHILS # BLD: 0 K/UL (ref 0–0.06)
BASOPHILS NFR BLD: 0 % (ref 0–2)
BILIRUB SERPL-MCNC: 0.7 MG/DL (ref 0.2–1)
BUN SERPL-MCNC: 12 MG/DL (ref 7–18)
BUN SERPL-MCNC: 13 MG/DL (ref 7–18)
BUN/CREAT SERPL: 11 (ref 12–20)
BUN/CREAT SERPL: 11 (ref 12–20)
CALCIUM SERPL-MCNC: 8.3 MG/DL (ref 8.5–10.1)
CALCIUM SERPL-MCNC: 8.5 MG/DL (ref 8.5–10.1)
CALCULATED R AXIS, ECG10: -41 DEGREES
CALCULATED T AXIS, ECG11: 89 DEGREES
CHLORIDE SERPL-SCNC: 79 MMOL/L (ref 100–108)
CHLORIDE SERPL-SCNC: 81 MMOL/L (ref 100–108)
CK MB CFR SERPL CALC: 4.1 % (ref 0–4)
CK MB SERPL-MCNC: 1.7 NG/ML (ref 5–25)
CK SERPL-CCNC: 41 U/L (ref 39–308)
CO2 SERPL-SCNC: 30 MMOL/L (ref 21–32)
CO2 SERPL-SCNC: 32 MMOL/L (ref 21–32)
CREAT SERPL-MCNC: 1.14 MG/DL (ref 0.6–1.3)
CREAT SERPL-MCNC: 1.17 MG/DL (ref 0.6–1.3)
DIAGNOSIS, 93000: NORMAL
DIFFERENTIAL METHOD BLD: ABNORMAL
DIGOXIN SERPL-MCNC: 1.1 NG/ML (ref 0.9–2)
EOSINOPHIL # BLD: 0 K/UL (ref 0–0.4)
EOSINOPHIL NFR BLD: 0 % (ref 0–5)
ERYTHROCYTE [DISTWIDTH] IN BLOOD BY AUTOMATED COUNT: 15.1 % (ref 11.6–14.5)
GLOBULIN SER CALC-MCNC: 4.9 G/DL (ref 2–4)
GLUCOSE SERPL-MCNC: 106 MG/DL (ref 74–99)
GLUCOSE SERPL-MCNC: 98 MG/DL (ref 74–99)
HCT VFR BLD AUTO: 26.1 % (ref 36–48)
HGB BLD-MCNC: 8.2 G/DL (ref 13–16)
INR PPP: 1.7 (ref 0.8–1.2)
LIPASE SERPL-CCNC: 130 U/L (ref 73–393)
LYMPHOCYTES # BLD: 1 K/UL (ref 0.9–3.6)
LYMPHOCYTES NFR BLD: 14 % (ref 21–52)
MAGNESIUM SERPL-MCNC: 2 MG/DL (ref 1.6–2.6)
MCH RBC QN AUTO: 22.7 PG (ref 24–34)
MCHC RBC AUTO-ENTMCNC: 31.4 G/DL (ref 31–37)
MCV RBC AUTO: 72.3 FL (ref 74–97)
MONOCYTES # BLD: 0.6 K/UL (ref 0.05–1.2)
MONOCYTES NFR BLD: 8 % (ref 3–10)
NEUTS SEG # BLD: 5.5 K/UL (ref 1.8–8)
NEUTS SEG NFR BLD: 78 % (ref 40–73)
PLATELET # BLD AUTO: 375 K/UL (ref 135–420)
PMV BLD AUTO: 8.3 FL (ref 9.2–11.8)
POTASSIUM SERPL-SCNC: 3.1 MMOL/L (ref 3.5–5.5)
POTASSIUM SERPL-SCNC: 3.3 MMOL/L (ref 3.5–5.5)
PROT SERPL-MCNC: 7.7 G/DL (ref 6.4–8.2)
PROTHROMBIN TIME: 19.3 SEC (ref 11.5–15.2)
Q-T INTERVAL, ECG07: 390 MS
QRS DURATION, ECG06: 110 MS
QTC CALCULATION (BEZET), ECG08: 423 MS
RBC # BLD AUTO: 3.61 M/UL (ref 4.7–5.5)
SODIUM SERPL-SCNC: 119 MMOL/L (ref 136–145)
SODIUM SERPL-SCNC: 122 MMOL/L (ref 136–145)
TROPONIN I SERPL-MCNC: 0.02 NG/ML (ref 0–0.04)
VENTRICULAR RATE, ECG03: 71 BPM
WBC # BLD AUTO: 7.1 K/UL (ref 4.6–13.2)

## 2018-07-09 PROCEDURE — 74011250637 HC RX REV CODE- 250/637: Performed by: EMERGENCY MEDICINE

## 2018-07-09 PROCEDURE — 71045 X-RAY EXAM CHEST 1 VIEW: CPT

## 2018-07-09 PROCEDURE — 74018 RADEX ABDOMEN 1 VIEW: CPT

## 2018-07-09 PROCEDURE — 83735 ASSAY OF MAGNESIUM: CPT | Performed by: EMERGENCY MEDICINE

## 2018-07-09 PROCEDURE — 77010033678 HC OXYGEN DAILY

## 2018-07-09 PROCEDURE — 83690 ASSAY OF LIPASE: CPT | Performed by: EMERGENCY MEDICINE

## 2018-07-09 PROCEDURE — 36415 COLL VENOUS BLD VENIPUNCTURE: CPT

## 2018-07-09 PROCEDURE — 74011000250 HC RX REV CODE- 250: Performed by: STUDENT IN AN ORGANIZED HEALTH CARE EDUCATION/TRAINING PROGRAM

## 2018-07-09 PROCEDURE — 85610 PROTHROMBIN TIME: CPT | Performed by: EMERGENCY MEDICINE

## 2018-07-09 PROCEDURE — 71250 CT THORAX DX C-: CPT

## 2018-07-09 PROCEDURE — 85025 COMPLETE CBC W/AUTO DIFF WBC: CPT | Performed by: EMERGENCY MEDICINE

## 2018-07-09 PROCEDURE — 80053 COMPREHEN METABOLIC PANEL: CPT | Performed by: EMERGENCY MEDICINE

## 2018-07-09 PROCEDURE — 74011250636 HC RX REV CODE- 250/636: Performed by: EMERGENCY MEDICINE

## 2018-07-09 PROCEDURE — 99285 EMERGENCY DEPT VISIT HI MDM: CPT

## 2018-07-09 PROCEDURE — 82550 ASSAY OF CK (CPK): CPT | Performed by: EMERGENCY MEDICINE

## 2018-07-09 PROCEDURE — 94640 AIRWAY INHALATION TREATMENT: CPT

## 2018-07-09 PROCEDURE — 93005 ELECTROCARDIOGRAM TRACING: CPT

## 2018-07-09 PROCEDURE — 74011250637 HC RX REV CODE- 250/637: Performed by: STUDENT IN AN ORGANIZED HEALTH CARE EDUCATION/TRAINING PROGRAM

## 2018-07-09 PROCEDURE — 80162 ASSAY OF DIGOXIN TOTAL: CPT

## 2018-07-09 PROCEDURE — 74011250636 HC RX REV CODE- 250/636: Performed by: FAMILY MEDICINE

## 2018-07-09 PROCEDURE — 65660000000 HC RM CCU STEPDOWN

## 2018-07-09 RX ORDER — ASPIRIN 81 MG/1
81 TABLET ORAL DAILY
Status: DISCONTINUED | OUTPATIENT
Start: 2018-07-10 | End: 2018-07-12 | Stop reason: HOSPADM

## 2018-07-09 RX ORDER — WARFARIN SODIUM 5 MG/1
5 TABLET ORAL
Status: COMPLETED | OUTPATIENT
Start: 2018-07-09 | End: 2018-07-09

## 2018-07-09 RX ORDER — DIGOXIN 125 MCG
0.12 TABLET ORAL DAILY
Status: DISCONTINUED | OUTPATIENT
Start: 2018-07-10 | End: 2018-07-12 | Stop reason: HOSPADM

## 2018-07-09 RX ORDER — DOCUSATE SODIUM 100 MG/1
100 CAPSULE, LIQUID FILLED ORAL
Status: DISCONTINUED | OUTPATIENT
Start: 2018-07-09 | End: 2018-07-10 | Stop reason: SDUPTHER

## 2018-07-09 RX ORDER — POTASSIUM CHLORIDE 20 MEQ/1
40 TABLET, EXTENDED RELEASE ORAL
Status: COMPLETED | OUTPATIENT
Start: 2018-07-09 | End: 2018-07-09

## 2018-07-09 RX ORDER — IPRATROPIUM BROMIDE AND ALBUTEROL SULFATE 2.5; .5 MG/3ML; MG/3ML
3 SOLUTION RESPIRATORY (INHALATION)
Status: DISCONTINUED | OUTPATIENT
Start: 2018-07-09 | End: 2018-07-10

## 2018-07-09 RX ORDER — SODIUM CHLORIDE 9 MG/ML
75 INJECTION, SOLUTION INTRAVENOUS ONCE
Status: COMPLETED | OUTPATIENT
Start: 2018-07-09 | End: 2018-07-09

## 2018-07-09 RX ORDER — POLYETHYLENE GLYCOL 3350 17 G/17G
17 POWDER, FOR SOLUTION ORAL
Status: DISCONTINUED | OUTPATIENT
Start: 2018-07-09 | End: 2018-07-12 | Stop reason: HOSPADM

## 2018-07-09 RX ORDER — AMLODIPINE BESYLATE 10 MG/1
10 TABLET ORAL DAILY
Status: DISCONTINUED | OUTPATIENT
Start: 2018-07-10 | End: 2018-07-12 | Stop reason: HOSPADM

## 2018-07-09 RX ORDER — LISINOPRIL 20 MG/1
20 TABLET ORAL DAILY
Status: DISCONTINUED | OUTPATIENT
Start: 2018-07-10 | End: 2018-07-12 | Stop reason: HOSPADM

## 2018-07-09 RX ORDER — PRAVASTATIN SODIUM 20 MG/1
40 TABLET ORAL
Status: DISCONTINUED | OUTPATIENT
Start: 2018-07-09 | End: 2018-07-12 | Stop reason: HOSPADM

## 2018-07-09 RX ORDER — ONDANSETRON 2 MG/ML
4 INJECTION INTRAMUSCULAR; INTRAVENOUS
Status: COMPLETED | OUTPATIENT
Start: 2018-07-09 | End: 2018-07-09

## 2018-07-09 RX ORDER — ONDANSETRON 4 MG/1
8 TABLET, ORALLY DISINTEGRATING ORAL
Status: DISCONTINUED | OUTPATIENT
Start: 2018-07-09 | End: 2018-07-10

## 2018-07-09 RX ORDER — SODIUM CHLORIDE 9 MG/ML
75 INJECTION, SOLUTION INTRAVENOUS CONTINUOUS
Status: DISCONTINUED | OUTPATIENT
Start: 2018-07-09 | End: 2018-07-10

## 2018-07-09 RX ADMIN — IPRATROPIUM BROMIDE AND ALBUTEROL SULFATE 3 ML: .5; 3 SOLUTION RESPIRATORY (INHALATION) at 21:14

## 2018-07-09 RX ADMIN — WARFARIN SODIUM 5 MG: 5 TABLET ORAL at 17:12

## 2018-07-09 RX ADMIN — IPRATROPIUM BROMIDE AND ALBUTEROL SULFATE 3 ML: .5; 3 SOLUTION RESPIRATORY (INHALATION) at 15:01

## 2018-07-09 RX ADMIN — POTASSIUM CHLORIDE 40 MEQ: 20 TABLET, EXTENDED RELEASE ORAL at 15:01

## 2018-07-09 RX ADMIN — PRAVASTATIN SODIUM 40 MG: 20 TABLET ORAL at 23:38

## 2018-07-09 RX ADMIN — SODIUM CHLORIDE 75 ML/HR: 900 INJECTION, SOLUTION INTRAVENOUS at 15:00

## 2018-07-09 RX ADMIN — ONDANSETRON 4 MG: 2 INJECTION INTRAMUSCULAR; INTRAVENOUS at 15:01

## 2018-07-09 RX ADMIN — ONDANSETRON 8 MG: 4 TABLET, ORALLY DISINTEGRATING ORAL at 17:12

## 2018-07-09 RX ADMIN — SODIUM CHLORIDE 75 ML/HR: 900 INJECTION, SOLUTION INTRAVENOUS at 23:40

## 2018-07-09 NOTE — ED NOTES
TRANSFER - OUT REPORT:    Verbal report given to Adin Velázquez) on [facility]  being transferred to [address] [place] [address](unit) for routine progression of care       Report consisted of patients Situation, Background, Assessment and   Recommendations(SBAR). Information from the following report(s) SBAR, ED Summary, MAR, Recent Results and Cardiac Rhythm sinus rhythm was reviewed with the receiving nurse. Lines:   Peripheral IV 07/09/18 Right Antecubital (Active)   Site Assessment Clean, dry, & intact 7/9/2018 12:04 PM   Phlebitis Assessment 0 7/9/2018 12:04 PM   Infiltration Assessment 0 7/9/2018 12:04 PM   Dressing Status Clean, dry, & intact 7/9/2018 12:04 PM   Dressing Type Tape;Transparent 7/9/2018 12:04 PM   Hub Color/Line Status Pink;Flushed;Patent 7/9/2018 12:04 PM   Action Taken Blood drawn 7/9/2018 12:04 PM        Opportunity for questions and clarification was provided.       Patient transported with:   transport

## 2018-07-09 NOTE — ED PROVIDER NOTES
EMERGENCY DEPARTMENT HISTORY AND PHYSICAL EXAM    12:25 PM      Date: 7/9/2018  Patient Name: Kip Plasencia    History of Presenting Illness     Chief Complaint   Patient presents with    Nausea         History Provided By: Patient    Chief Complaint: Nausea  Duration:  3 days  Timing:  Constant  Location: N/a  Quality: \"feels terrible\"  Severity: 0/10  Modifying Factors: None  Associated Symptoms: insomnia, fatigue, and loss of appetite      Additional History (Context): Kip Plasencia is a 77 y.o. male with COPD, CHF, HTN, and A-Fib  who presents with constant nausea for the past 3 days. Pt reports that he \"feels terrible and can't sleep. \" He has lost his appetite because of his nausea. The pt was evaluated in the ED last week for Hypokalemia. Fever, dysuria, vomiting, and cough were denied by the pt. Per the pt, he is normally on 4L o2 at home and his breathing is \"not better than normal.\" His prior herniorrhaphy and AAA repair. The pt is on warfarin for his A-fib. No other concerns, modifying factors, or symptoms were expressed by the pt at this time. PCP: Misbah Cho MD    Current Facility-Administered Medications   Medication Dose Route Frequency Provider Last Rate Last Dose    ondansetron (ZOFRAN) injection 4 mg  4 mg IntraVENous NOW Pauleen Laughter, DO        0.9% sodium chloride infusion  75 mL/hr IntraVENous ONCE Pauleen Laughter, DO        potassium chloride (K-DUR, KLOR-CON) SR tablet 40 mEq  40 mEq Oral NOW Pauleen Laughter, DO         Current Outpatient Prescriptions   Medication Sig Dispense Refill    potassium chloride (KLOR-CON) 20 mEq packet Take 2 Packets by mouth two (2) times daily (with meals). 28 Each 0    melatonin 3 mg tablet Take 1 Tab by mouth nightly as needed. 30 Tab 0    levoFLOXacin (LEVAQUIN) 250 mg tablet Take 1 Tab by mouth every twenty-four (24) hours. 3 Tab 0    aspirin delayed-release 81 mg tablet Take 81 mg by mouth daily.       pravastatin (PRAVACHOL) 40 mg tablet Take 40 mg by mouth nightly.  docusate sodium (COLACE) 100 mg capsule Take 100 mg by mouth two (2) times daily as needed for Constipation.  polyethylene glycol (MIRALAX) 17 gram packet Take 17 g by mouth daily as needed.  ondansetron hcl (ZOFRAN) 4 mg tablet Take 1 Tab by mouth every eight (8) hours as needed for Nausea. 12 Tab 0    tiotropium-olodaterol (STIOLTO RESPIMAT) 2.5-2.5 mcg/actuation mist Take 2 Inhalation by inhalation daily. 1 Inhaler 5    albuterol (PROVENTIL VENTOLIN) 2.5 mg /3 mL (0.083 %) nebulizer solution 3 mL by Nebulization route every four (4) hours as needed for Wheezing. 24 Each 3    digoxin (DIGITEK) 0.125 mg tablet take 1 tablet by mouth once daily 90 Tab 3    warfarin (COUMADIN) 2.5 mg tablet Take 1 Tab by mouth daily. Take 2 pills (5 mg) Mon, Tues, Wed, Thur, Sat, Sun; and 1 pill only  (2.5 mg) Fri (Patient taking differently: Take 2.5 mg by mouth daily. Take 2 pills (5 mg) Mon, Tues, Thur, Sat, Sun; and 1 pill only  (2.5 mg) Wed, Fri) 60 Tab 0    chlorthalidone (HYGROTEN) 25 mg tablet Take 25 mg by mouth daily. 0    amLODIPine (NORVASC) 10 mg tablet Take 10 mg by mouth daily. 0    OXYGEN-AIR DELIVERY SYSTEMS 2 L/min by Does Not Apply route daily. Continuous. Company is [employer]            Past History     Past Medical History:  Past Medical History:   Diagnosis Date    Aneurysm [facility])     AAA repair 2006 & 2012    Aorto-iliac duplex 02/13/2015    Tech difficult. Patent aorta bi-iliac endograft w/o leak or limb dysfunction.  Arrhythmia     a fib    Cardiac cath 11/01/2012    RCA (sm, nondom) patent. LM patent. LAD 25%. CX (dom) 45% mid. LVEDP 12 mmHg. No WMA. PA 27/12. W 10-12. CO/CI 5.2/2.6 (TD).  Cardiac echocardiogram, abnormal 02/20/2016    EF 55%. No WMA. Indeterminate diastolic fx. RVSP 60 mmHg. Severe LAE. Mild MR. Mod TR.  IVCE. Similar to study of 10/26/12.     Cardiovascular aorto-iliac duplex 02/13/2015    Patent aorta bi-iliac endovascular graft w/o leak or limb dysfunction. Sac measures 4.21 x 4.47 cm (4.4 x 4.6 cm on 1/31/14).  Cardiovascular LE peripheral arterial testing 07/29/2013    No significant LE arterial disease bilaterally. R GHADA 1. 12.  L GHADA 1. 12.  R DBI 0.83. L DBI 0.71. Exercise deferred.  Cardiovascular renal duplex 10/31/2012    No RA stenosis. Intrinsic/med disease in left kidney. Patent aortic endograft. Patent, thrombus-free renal veins bilaterally.  Carotid duplex 07/29/2013    Mild <50% bilateral ICA plaquing.  Chronic lung disease     Cigarette smoker     Congestive heart failure (CHF) (HCC)     COPD (chronic obstructive pulmonary disease) (HCC)     and emphysema; likely secondary to tobacco abuse    Difficult intubation     Dyslipidemia     low HDL    Emphysema     HTN (hypertension)     Hypercholesterolemia     Increased prostate specific antigen (PSA) velocity     Long term (current) use of anticoagulants     coumadin    Osteoarthritis     Osteomyelitis (HCC)     Paroxysmal atrial fibrillation (HCC)     Peripheral vascular disease ([address]     AAA repair 12/2007    Persistent atrial Fibrillation     Persistent atrial fibrillation (HCC)     Unspecified adverse effect of anesthesia     difficulty breathing placed in ICU Oct 2012 (AAA repair)       Past Surgical History:  Past Surgical History:   Procedure Laterality Date    BRONCHOSCOPY DIAGNOSTIC  11/2/2012         CARDIAC CATHETERIZATION  11/1/2012         COLONOSCOPY N/A 7/26/2016    COLONOSCOPY with Polypectomies performed by Amanda Durham MD at [address] HX AAA REPAIR      2006 & 2012    HX HEART CATHETERIZATION  3/4/2009    1. RCA small, nondominant; patent. 2. LMCA patent. 3. LAD is long, wrapped around apical vessel. Diffuse, 20-30% stenosis noted. 4. CCA is large, dominant vessel. Diffuse 20-30% stenosis. 5. LVEDP is 16 mmHg.  6. Overall left ventricular systolic function mildly diminshed with est. EF 45%. Mild, global hypokinesis noted. 7. No significant mitral regurgitation or aortic stenosis noted. (see report)    HX HERNIA REPAIR  2/2014    rt inguinal     HX HERNIA REPAIR  01/2016    LEFT INGUINAL HERNIA REPAIR DR. Elsie García ING HERNIA,5+Y/O,JESSIE Left 1-20-16    Dr. Montse Mueller  11/1/2012            Family History:  Family History   Problem Relation Age of Onset    Heart Surgery Father      BYPASS    Stroke Father     Heart Surgery Mother      BYPASS    Coronary Artery Disease Mother     Stroke Mother        Social History:  Social History   Substance Use Topics    Smoking status: Former Smoker     Packs/day: 1.00     Years: 54.00     Types: Cigarettes     Quit date: 10/23/2012    Smokeless tobacco: Never Used    Alcohol use No      Comment: quit drinking alcohol 26 years ago, patient states stopped in \"0335\"       Allergies: Allergies   Allergen Reactions    Codeine Swelling    Morphine Itching    Sulfa (Sulfonamide Antibiotics) Other (comments)     Kidney problems. Review of Systems     Review of Systems   Constitutional: Positive for activity change (insomnia), appetite change (loss) and fatigue. Negative for diaphoresis and fever. Respiratory: Negative for cough and shortness of breath. Cardiovascular: Negative for chest pain. Gastrointestinal: Positive for nausea. Negative for abdominal pain, diarrhea and vomiting. Genitourinary: Negative for dysuria. Skin: Negative for rash. Neurological: Negative for weakness. All other systems reviewed and are negative. Physical Exam     Visit Vitals    /77 (BP 1 Location: Left arm)    Pulse 71    Temp 97.1 °F (36.2 °C)    Resp 18    Ht 5' 6\" (1.676 m)    Wt 74.4 kg (164 lb)    SpO2 93%    BMI 26.47 kg/m2     Physical Exam   Constitutional: He is oriented to person, place, and time. Appears chronically ill. HENT:   Head: Normocephalic and atraumatic. Neck: Neck supple.  No JVD present. Cardiovascular: Normal rate. A regularly irregular rhythm present. Pulmonary/Chest: Effort normal. No respiratory distress. He has decreased breath sounds. He has wheezes (expiratory). Abdominal: Soft. He exhibits no distension. There is tenderness (mild generalized). There is no rebound and no guarding. Musculoskeletal: He exhibits no edema. Neurological: He is alert and oriented to person, place, and time. Skin: Skin is warm and dry. No erythema. Psychiatric: Judgment normal.         Diagnostic Study Results     Labs -  Recent Results (from the past 12 hour(s))   EKG, 12 LEAD, INITIAL    Collection Time: 07/09/18 11:56 AM   Result Value Ref Range    Ventricular Rate 71 BPM    Atrial Rate 77 BPM    QRS Duration 110 ms    Q-T Interval 390 ms    QTC Calculation (Bezet) 423 ms    Calculated R Axis -41 degrees    Calculated T Axis 89 degrees    Diagnosis       Atrial fibrillation  Left axis deviation  Anteroseptal infarct (cited on or before 25-AUG-2017)  Abnormal ECG  When compared with ECG of 27-JUN-2018 17:10,  QRS duration has decreased  Confirmed by Jennifer Lopez ([id number]) on 7/9/2018 12:40:11 PM     CBC WITH AUTOMATED DIFF    Collection Time: 07/09/18 12:00 PM   Result Value Ref Range    WBC 7.1 4.6 - 13.2 K/uL    RBC 3.61 (L) 4.70 - 5.50 M/uL    HGB 8.2 (L) 13.0 - 16.0 g/dL    HCT 26.1 (L) 36.0 - 48.0 %    MCV 72.3 (L) 74.0 - 97.0 FL    MCH 22.7 (L) 24.0 - 34.0 PG    MCHC 31.4 31.0 - 37.0 g/dL    RDW 15.1 (H) 11.6 - 14.5 %    PLATELET 553 152 - 166 K/uL    MPV 8.3 (L) 9.2 - 11.8 FL    NEUTROPHILS 78 (H) 40 - 73 %    LYMPHOCYTES 14 (L) 21 - 52 %    MONOCYTES 8 3 - 10 %    EOSINOPHILS 0 0 - 5 %    BASOPHILS 0 0 - 2 %    ABS. NEUTROPHILS 5.5 1.8 - 8.0 K/UL    ABS. LYMPHOCYTES 1.0 0.9 - 3.6 K/UL    ABS. MONOCYTES 0.6 0.05 - 1.2 K/UL    ABS. EOSINOPHILS 0.0 0.0 - 0.4 K/UL    ABS.  BASOPHILS 0.0 0.0 - 0.06 K/UL    DF AUTOMATED     METABOLIC PANEL, COMPREHENSIVE    Collection Time: 07/09/18 12:00 PM Result Value Ref Range    Sodium 119 (LL) 136 - 145 mmol/L    Potassium 3.1 (L) 3.5 - 5.5 mmol/L    Chloride 79 (L) 100 - 108 mmol/L    CO2 32 21 - 32 mmol/L    Anion gap 8 3.0 - 18 mmol/L    Glucose 106 (H) 74 - 99 mg/dL    BUN 13 7.0 - 18 MG/DL    Creatinine 1.17 0.6 - 1.3 MG/DL    BUN/Creatinine ratio 11 (L) 12 - 20      GFR est AA >60 >60 ml/min/1.73m2    GFR est non-AA >60 >60 ml/min/1.73m2    Calcium 8.5 8.5 - 10.1 MG/DL    Bilirubin, total 0.7 0.2 - 1.0 MG/DL    ALT (SGPT) 26 16 - 61 U/L    AST (SGOT) 34 15 - 37 U/L    Alk. phosphatase 84 45 - 117 U/L    Protein, total 7.7 6.4 - 8.2 g/dL    Albumin 2.8 (L) 3.4 - 5.0 g/dL    Globulin 4.9 (H) 2.0 - 4.0 g/dL    A-G Ratio 0.6 (L) 0.8 - 1.7     PROTHROMBIN TIME + INR    Collection Time: 07/09/18 12:00 PM   Result Value Ref Range    Prothrombin time 19.3 (H) 11.5 - 15.2 sec    INR 1.7 (H) 0.8 - 1.2     MAGNESIUM    Collection Time: 07/09/18 12:00 PM   Result Value Ref Range    Magnesium 2.0 1.6 - 2.6 mg/dL   CARDIAC PANEL,(CK, CKMB & TROPONIN)    Collection Time: 07/09/18 12:00 PM   Result Value Ref Range    CK 41 39 - 308 U/L    CK - MB 1.7 <3.6 ng/ml    CK-MB Index 4.1 (H) 0.0 - 4.0 %    Troponin-I, Qt. 0.02 0.0 - 0.045 NG/ML   LIPASE    Collection Time: 07/09/18 12:00 PM   Result Value Ref Range    Lipase 130 73 - 393 U/L       Radiologic Studies -   XR CHEST PORT   Final Result      XR ABD (KUB)    (Results Pending)   CT CHEST WO CONT    (Results Pending)     Further increased interstitial lung process since 6/27/2018, new from 6/17/2018. Stable additional recently new small left pleural effusion. Consider worsening  or recurrent interstitial infection, and ongoing interstitial pneumonitis. Consider CT chest evaluation to exclude mass as discussed on prior chest film. KUB: no acute process    Medical Decision Making   I am the first provider for this patient.     I reviewed the vital signs, available nursing notes, past medical history, past surgical history, family history and social history. Vital Signs-Reviewed the patient's vital signs. Pulse Oximetry Analysis - Nonhypoxic    EKG: Interpreted by the EP. Time Interpreted: 11:56   Rate: 71   Rhythm:    Interpretation: left axis deviation, A-Fib, no ST changes. Records Reviewed: Nursing Notes and Old Medical Records (Time of Review: 12:25 PM)    ED Course: Progress Notes, Reevaluation, and Consults:    Provider Notes (Medical Decision Making): 76 y/o male presents with fatigue and nausea. Appears chronically ill, check basic labs, screen for acs with ekg and trop. abd soft, on-surgical, doubt obstruction as no vomiting. At baseline O2 status, no changed in requirement. Noted hyponatremia, will start on IVF. Also noted cxr read, prior nodule, chart review shows no prior CT so will obtain CT chest to eval for lung mass. 1:03 PM Consult: I discussed care with Dr. Jose Guadalupe Rdz, [facility]. It was a standard discussion including patient history, chief complaint, available diagnostic results, and predicted treatment course. He accepts the pt. For Hospitalized Patients:    1. Hospitalization Decision Time:  The decision to hospitalize the patient was made by Dr. Chito Lea at 1:03 PM on 7/9/2018    2. Aspirin: Aspirin was not given because the patient did not present with a stroke at the time of their Emergency Department evaluation    Diagnosis     Clinical Impression:   1. Hyponatremia    2. Hypokalemia        Disposition: Admitted    Attestations:  Scribe Attestation     Akash Jose Angel acting as a scribe for and in the presence of Alma Aldridge DO      July 09, 2018 at 12:25 PM       Provider Attestation:      I personally performed the services described in the documentation, reviewed the documentation, as recorded by the scribe in my presence, and it accurately and completely records my words and actions.  July 09, 2018 at 12:25 PM - Alma Aldridge DO _______________________________

## 2018-07-09 NOTE — Clinical Note
Status[de-identified] Inpatient [101] Type of Bed: Telemetry [19] Inpatient Hospitalization Certified Necessary for the Following Reasons: 3. Patient receiving treatment that can only be provided in an inpatient setting (further clarification in H&P documentation) Admitting Diagnosis: Hyponatremia [973520] Admitting Physician: Ras White [[id number]] Attending Physician: Ras White [[id number]] Estimated Length of Stay: 2 Midnights Discharge Plan[de-identified] Home with Office Follow-up

## 2018-07-09 NOTE — IP AVS SNAPSHOT
[address] [address] [place] Patient: Peter Gray MRN: [id number] JMX:0[birth year]30 A check juan manuel indicates which time of day the medication should be taken. My Medications START taking these medications Instructions Each Dose to Equal  
 Morning Noon Evening Bedtime  
 lisinopril 20 mg tablet Commonly known as:  Alysa Kelly Start taking on:  7/13/2018 Your last dose was: Your next dose is: Take 1 Tab by mouth daily. 20 mg CHANGE how you take these medications Instructions Each Dose to Equal  
 Morning Noon Evening Bedtime  
 warfarin 2.5 mg tablet Commonly known as:  COUMADIN What changed:  additional instructions Your last dose was: Your next dose is: Take 1 Tab by mouth daily. Take 2 pills (5 mg) Mon, Tues, Wed, Thur, Sat, Sun; and 1 pill only (2.5 mg) Fri  
 2.5 mg  
    
   
   
   
  
  
CONTINUE taking these medications Instructions Each Dose to Equal  
 Morning Noon Evening Bedtime  
 albuterol 2.5 mg /3 mL (0.083 %) nebulizer solution Commonly known as:  PROVENTIL VENTOLIN Your last dose was: Your next dose is:    
   
   
 3 mL by Nebulization route every four (4) hours as needed for Wheezing. 2.5 mg  
    
   
   
   
  
 amLODIPine 10 mg tablet Commonly known as:  Sandra Clark Your last dose was: Your next dose is: Take 10 mg by mouth daily. 10 mg  
    
   
   
   
  
 aspirin delayed-release 81 mg tablet Your last dose was: Your next dose is: Take 81 mg by mouth daily. 81 mg  
    
   
   
   
  
 COLACE 100 mg capsule Generic drug:  docusate sodium Your last dose was: Your next dose is: Take 100 mg by mouth two (2) times daily as needed for Constipation.   
 100 mg  
    
   
   
   
  
 digoxin 0.125 mg tablet Commonly known as:  Jen Montes Your last dose was: Your next dose is:    
   
   
 take 1 tablet by mouth once daily  
     
   
   
   
  
 melatonin 3 mg tablet Your last dose was: Your next dose is: Take 1 Tab by mouth nightly as needed. 3 mg MIRALAX 17 gram packet Generic drug:  polyethylene glycol Your last dose was: Your next dose is: Take 17 g by mouth daily as needed. 17 g OXYGEN-AIR DELIVERY SYSTEMS Your last dose was: Your next dose is:    
   
   
 2 L/min by Does Not Apply route daily. Continuous. Company is [employer] 2 L/min  
    
   
   
   
  
 pravastatin 40 mg tablet Commonly known as:  PRAVACHOL Your last dose was: Your next dose is: Take 40 mg by mouth nightly. 40 mg  
    
   
   
   
  
 tiotropium-olodaterol 2.5-2.5 mcg/actuation Mist  
Commonly known as:  Bernetta Meigs Your last dose was: Your next dose is: Take 2 Inhalation by inhalation daily. 2 Inhalation STOP taking these medications   
 chlorthalidone 25 mg tablet Commonly known as:  HYGROTEN  
   
  
 levoFLOXacin 250 mg tablet Commonly known as:  LEVAQUIN  
   
  
 ondansetron hcl 4 mg tablet Commonly known as:  ZOFRAN  
   
  
 potassium chloride 20 mEq packet Commonly known as:  KLOR-CON Where to Get Your Medications Information on where to get these meds will be given to you by the nurse or doctor. ! Ask your nurse or doctor about these medications  
  lisinopril 20 mg tablet

## 2018-07-10 LAB
ANION GAP SERPL CALC-SCNC: 10 MMOL/L (ref 3–18)
ANION GAP SERPL CALC-SCNC: 11 MMOL/L (ref 3–18)
ANION GAP SERPL CALC-SCNC: 11 MMOL/L (ref 3–18)
ANION GAP SERPL CALC-SCNC: 7 MMOL/L (ref 3–18)
BASOPHILS # BLD: 0 K/UL (ref 0–0.06)
BASOPHILS NFR BLD: 0 % (ref 0–2)
BUN SERPL-MCNC: 13 MG/DL (ref 7–18)
BUN SERPL-MCNC: 14 MG/DL (ref 7–18)
BUN/CREAT SERPL: 11 (ref 12–20)
BUN/CREAT SERPL: 11 (ref 12–20)
BUN/CREAT SERPL: 12 (ref 12–20)
BUN/CREAT SERPL: 12 (ref 12–20)
CALCIUM SERPL-MCNC: 7.6 MG/DL (ref 8.5–10.1)
CALCIUM SERPL-MCNC: 7.6 MG/DL (ref 8.5–10.1)
CALCIUM SERPL-MCNC: 7.9 MG/DL (ref 8.5–10.1)
CALCIUM SERPL-MCNC: 8.2 MG/DL (ref 8.5–10.1)
CHLORIDE SERPL-SCNC: 81 MMOL/L (ref 100–108)
CHLORIDE SERPL-SCNC: 81 MMOL/L (ref 100–108)
CHLORIDE SERPL-SCNC: 84 MMOL/L (ref 100–108)
CHLORIDE SERPL-SCNC: 86 MMOL/L (ref 100–108)
CO2 SERPL-SCNC: 29 MMOL/L (ref 21–32)
CO2 SERPL-SCNC: 30 MMOL/L (ref 21–32)
CO2 SERPL-SCNC: 30 MMOL/L (ref 21–32)
CO2 SERPL-SCNC: 31 MMOL/L (ref 21–32)
CREAT SERPL-MCNC: 1.14 MG/DL (ref 0.6–1.3)
CREAT SERPL-MCNC: 1.18 MG/DL (ref 0.6–1.3)
CREAT SERPL-MCNC: 1.21 MG/DL (ref 0.6–1.3)
CREAT SERPL-MCNC: 1.24 MG/DL (ref 0.6–1.3)
DIFFERENTIAL METHOD BLD: ABNORMAL
EOSINOPHIL # BLD: 0 K/UL (ref 0–0.4)
EOSINOPHIL NFR BLD: 0 % (ref 0–5)
ERYTHROCYTE [DISTWIDTH] IN BLOOD BY AUTOMATED COUNT: 15.2 % (ref 11.6–14.5)
GLUCOSE SERPL-MCNC: 100 MG/DL (ref 74–99)
GLUCOSE SERPL-MCNC: 105 MG/DL (ref 74–99)
GLUCOSE SERPL-MCNC: 115 MG/DL (ref 74–99)
GLUCOSE SERPL-MCNC: 116 MG/DL (ref 74–99)
HCT VFR BLD AUTO: 25.5 % (ref 36–48)
HGB BLD-MCNC: 7.8 G/DL (ref 13–16)
INR PPP: 2 (ref 0.8–1.2)
L PNEUMO AG UR QL IA: NEGATIVE
LYMPHOCYTES # BLD: 0.8 K/UL (ref 0.9–3.6)
LYMPHOCYTES NFR BLD: 10 % (ref 21–52)
MCH RBC QN AUTO: 22.5 PG (ref 24–34)
MCHC RBC AUTO-ENTMCNC: 30.6 G/DL (ref 31–37)
MCV RBC AUTO: 73.5 FL (ref 74–97)
MONOCYTES # BLD: 0.7 K/UL (ref 0.05–1.2)
MONOCYTES NFR BLD: 8 % (ref 3–10)
NEUTS SEG # BLD: 7.1 K/UL (ref 1.8–8)
NEUTS SEG NFR BLD: 82 % (ref 40–73)
OSMOLALITY SERPL: 257 MOSM/KG H2O (ref 280–300)
PLATELET # BLD AUTO: 391 K/UL (ref 135–420)
PMV BLD AUTO: 8.5 FL (ref 9.2–11.8)
POTASSIUM SERPL-SCNC: 3 MMOL/L (ref 3.5–5.5)
POTASSIUM SERPL-SCNC: 3.2 MMOL/L (ref 3.5–5.5)
POTASSIUM SERPL-SCNC: 3.3 MMOL/L (ref 3.5–5.5)
POTASSIUM SERPL-SCNC: 3.8 MMOL/L (ref 3.5–5.5)
PROTHROMBIN TIME: 21.6 SEC (ref 11.5–15.2)
RBC # BLD AUTO: 3.47 M/UL (ref 4.7–5.5)
S PNEUM AG UR QL: NEGATIVE
SODIUM SERPL-SCNC: 121 MMOL/L (ref 136–145)
SODIUM SERPL-SCNC: 122 MMOL/L (ref 136–145)
SODIUM SERPL-SCNC: 124 MMOL/L (ref 136–145)
SODIUM SERPL-SCNC: 124 MMOL/L (ref 136–145)
WBC # BLD AUTO: 8.7 K/UL (ref 4.6–13.2)

## 2018-07-10 PROCEDURE — 65660000000 HC RM CCU STEPDOWN

## 2018-07-10 PROCEDURE — 74011250637 HC RX REV CODE- 250/637: Performed by: STUDENT IN AN ORGANIZED HEALTH CARE EDUCATION/TRAINING PROGRAM

## 2018-07-10 PROCEDURE — 74011250636 HC RX REV CODE- 250/636: Performed by: FAMILY MEDICINE

## 2018-07-10 PROCEDURE — 74011000250 HC RX REV CODE- 250: Performed by: FAMILY MEDICINE

## 2018-07-10 PROCEDURE — 74011000250 HC RX REV CODE- 250: Performed by: STUDENT IN AN ORGANIZED HEALTH CARE EDUCATION/TRAINING PROGRAM

## 2018-07-10 PROCEDURE — 87449 NOS EACH ORGANISM AG IA: CPT | Performed by: STUDENT IN AN ORGANIZED HEALTH CARE EDUCATION/TRAINING PROGRAM

## 2018-07-10 PROCEDURE — 80048 BASIC METABOLIC PNL TOTAL CA: CPT

## 2018-07-10 PROCEDURE — 85025 COMPLETE CBC W/AUTO DIFF WBC: CPT

## 2018-07-10 PROCEDURE — 74011250637 HC RX REV CODE- 250/637: Performed by: FAMILY MEDICINE

## 2018-07-10 PROCEDURE — 85610 PROTHROMBIN TIME: CPT

## 2018-07-10 PROCEDURE — 87450 LEGIONELLA PNEUMOPHILA AG, URINE: CPT | Performed by: STUDENT IN AN ORGANIZED HEALTH CARE EDUCATION/TRAINING PROGRAM

## 2018-07-10 PROCEDURE — 83930 ASSAY OF BLOOD OSMOLALITY: CPT

## 2018-07-10 PROCEDURE — 94640 AIRWAY INHALATION TREATMENT: CPT

## 2018-07-10 PROCEDURE — 36415 COLL VENOUS BLD VENIPUNCTURE: CPT

## 2018-07-10 PROCEDURE — 83935 ASSAY OF URINE OSMOLALITY: CPT

## 2018-07-10 RX ORDER — IPRATROPIUM BROMIDE AND ALBUTEROL SULFATE 2.5; .5 MG/3ML; MG/3ML
3 SOLUTION RESPIRATORY (INHALATION)
Status: DISCONTINUED | OUTPATIENT
Start: 2018-07-10 | End: 2018-07-12 | Stop reason: HOSPADM

## 2018-07-10 RX ORDER — UREA 10 %
1 LOTION (ML) TOPICAL
Status: DISCONTINUED | OUTPATIENT
Start: 2018-07-10 | End: 2018-07-12 | Stop reason: HOSPADM

## 2018-07-10 RX ORDER — ONDANSETRON 4 MG/1
8 TABLET, ORALLY DISINTEGRATING ORAL 2 TIMES DAILY
Status: DISCONTINUED | OUTPATIENT
Start: 2018-07-10 | End: 2018-07-12 | Stop reason: HOSPADM

## 2018-07-10 RX ORDER — WARFARIN SODIUM 5 MG/1
5 TABLET ORAL ONCE
Status: COMPLETED | OUTPATIENT
Start: 2018-07-10 | End: 2018-07-10

## 2018-07-10 RX ORDER — POTASSIUM CHLORIDE 20 MEQ/1
20 TABLET, EXTENDED RELEASE ORAL
Status: COMPLETED | OUTPATIENT
Start: 2018-07-10 | End: 2018-07-10

## 2018-07-10 RX ORDER — IPRATROPIUM BROMIDE AND ALBUTEROL SULFATE 2.5; .5 MG/3ML; MG/3ML
3 SOLUTION RESPIRATORY (INHALATION)
Status: DISCONTINUED | OUTPATIENT
Start: 2018-07-10 | End: 2018-07-10

## 2018-07-10 RX ORDER — SODIUM CHLORIDE AND POTASSIUM CHLORIDE .9; .15 G/100ML; G/100ML
SOLUTION INTRAVENOUS CONTINUOUS
Status: DISCONTINUED | OUTPATIENT
Start: 2018-07-10 | End: 2018-07-11

## 2018-07-10 RX ADMIN — ONDANSETRON 8 MG: 4 TABLET, ORALLY DISINTEGRATING ORAL at 17:41

## 2018-07-10 RX ADMIN — PRAVASTATIN SODIUM 40 MG: 20 TABLET ORAL at 21:46

## 2018-07-10 RX ADMIN — Medication 1 MG: at 21:46

## 2018-07-10 RX ADMIN — POTASSIUM CHLORIDE 20 MEQ: 20 TABLET, EXTENDED RELEASE ORAL at 16:11

## 2018-07-10 RX ADMIN — POTASSIUM CHLORIDE 20 MEQ: 20 TABLET, EXTENDED RELEASE ORAL at 14:01

## 2018-07-10 RX ADMIN — POTASSIUM CHLORIDE 20 MEQ: 20 TABLET, EXTENDED RELEASE ORAL at 09:49

## 2018-07-10 RX ADMIN — AMLODIPINE BESYLATE 10 MG: 10 TABLET ORAL at 08:44

## 2018-07-10 RX ADMIN — IPRATROPIUM BROMIDE AND ALBUTEROL SULFATE 3 ML: .5; 3 SOLUTION RESPIRATORY (INHALATION) at 13:23

## 2018-07-10 RX ADMIN — DIGOXIN 0.12 MG: 125 TABLET ORAL at 08:44

## 2018-07-10 RX ADMIN — IPRATROPIUM BROMIDE AND ALBUTEROL SULFATE 3 ML: .5; 3 SOLUTION RESPIRATORY (INHALATION) at 07:24

## 2018-07-10 RX ADMIN — LISINOPRIL 20 MG: 20 TABLET ORAL at 08:44

## 2018-07-10 RX ADMIN — IPRATROPIUM BROMIDE AND ALBUTEROL SULFATE 3 ML: .5; 3 SOLUTION RESPIRATORY (INHALATION) at 20:35

## 2018-07-10 RX ADMIN — SODIUM CHLORIDE AND POTASSIUM CHLORIDE: .9; .15 SOLUTION INTRAVENOUS at 21:49

## 2018-07-10 RX ADMIN — SODIUM CHLORIDE 75 ML/HR: 900 INJECTION, SOLUTION INTRAVENOUS at 12:10

## 2018-07-10 RX ADMIN — Medication 1 MG: at 00:55

## 2018-07-10 RX ADMIN — IPRATROPIUM BROMIDE AND ALBUTEROL SULFATE 3 ML: .5; 3 SOLUTION RESPIRATORY (INHALATION) at 01:46

## 2018-07-10 RX ADMIN — POTASSIUM CHLORIDE 20 MEQ: 20 TABLET, EXTENDED RELEASE ORAL at 12:11

## 2018-07-10 RX ADMIN — WARFARIN SODIUM 5 MG: 5 TABLET ORAL at 09:49

## 2018-07-10 RX ADMIN — ASPIRIN 81 MG: 81 TABLET, COATED ORAL at 08:44

## 2018-07-10 NOTE — CDMP QUERY
'CHF' has been documented. Please specify further. Please clarify if this patient is being treated/managed for:    => Chronic diastolic CHF  => Other Explanation of clinical findings  => Unable to Determine (no explanation of clinical findings)    The medical record reflects the following:  Risk:  -- PMH CHF, HTN    Clinical Indicators:  -- H&P:   last echo April 2018 showed EF 55%     -- discharged on 6/29/18 with documented \"Acute on Chronic Diastolic CHF Exacerbation\"     Treatment:   -- receiving Norvasc, Lanoxin, Lisinopril    Please clarify and document your clinical opinion in the progress notes and discharge summary including the definitive and/or presumptive diagnosis, (suspected or probable), related to the above clinical findings. Please include clinical findings supporting your diagnosis. If you DECLINE this query or would like to communicate with [facility], please utilize the \"[employer] message box\" at the TOP of the Progress Note on the right.       Thank you,  Tiara Torres [facility], [address] [place] [address]

## 2018-07-10 NOTE — CDMP QUERY
Documented home oxygen use. Please clarify if this patient is being treated/managed for:    => Chronic hypoxic respiratory failure  => Other Explanation of clinical findings  => Unable to Determine (no explanation of clinical findings)    The medical record reflects the following:  Risk:  -- PMH COPD, emphysema    Clinical Indicators:  -- H&P: COPD Patient reports SOB over the past several weeks. He is on 4L O2 at home    Treatment:   -- H&P:  - duonebs   - continue home stiloto  - supplemental oxygen     Please clarify and document your clinical opinion in the progress notes and discharge summary including the definitive and/or presumptive diagnosis, (suspected or probable), related to the above clinical findings. Please include clinical findings supporting your diagnosis. If you DECLINE this query or would like to communicate with [facility], please utilize the \"[employer] message box\" at the TOP of the Progress Note on the right.       Thank you,  Daren Nevarez [address]

## 2018-07-10 NOTE — ROUTINE PROCESS
Bedside and Verbal shift change report given to [address] (oncoming nurse) by Don Enrique RN (offgoing nurse). Report included the following information SBAR, Kardex and MAR.

## 2018-07-10 NOTE — PROGRESS NOTES
Intern Progress Note [address] Patient: Lanny Mercedes MRN: [id number]  CSN: [id number] YOB: 1940  Age: 77 y.o. Sex: male DOA: 7/9/2018 LOS:  LOS: 1 day Subjective:  
 
Acute events: Pt was resting comfortably in bed, eating dinner with family members present. No increased work of breathing. Review of Systems: 
Patient Endorses  
--------------------------------------------------------------------------------- Constitutional: Negative for fever. Respiratory: Negative for cough or increased work of breathing. Cardiovascular: Negative for chest pain. Gastrointestinal: Negative for nausea and vomiting. Objective:  
  
Patient Vitals for the past 24 hrs: 
 Temp Pulse Resp BP SpO2  
07/10/18 1523 97.9 °F (36.6 °C) (!) 56 18 126/66 99 % 07/10/18 1324 - - - - 98 % 07/10/18 1213 97.9 °F (36.6 °C) 72 20 123/55 99 % 07/10/18 1153 97.9 °F (36.6 °C) (!) 135 20 123/55 93 % 07/10/18 0922 97.7 °F (36.5 °C) 91 20 171/75 98 % 07/10/18 0804 96.3 °F (35.7 °C) (!) 103 20 150/74 96 % 07/10/18 0726 - - - - 99 % 07/10/18 0400 96.9 °F (36.1 °C) 60 20 165/85 99 % 07/10/18 0148 - - - - 98 % 07/10/18 0000 97.7 °F (36.5 °C) 87 18 160/86 99 % 07/09/18 2116 - - - - 98 % 07/09/18 2000 97.9 °F (36.6 °C) 82 20 (!) 166/96 100 % 07/09/18 1806 - 82 - 149/75 - Intake/Output Summary (Last 24 hours) at 07/10/18 5464 Last data filed at 07/10/18 1402 Gross per 24 hour Intake              240 ml Output              750 ml Net             -510 ml Physical Exam:  
General appearance: alert, NAD Lungs: mild coarse rhonchi bilaterally, more so in the right fields Heart: rrr Abdomen: soft, +bs, no ttp Pulses: radial and pedal pulses 2+ bilaterally Ext: hands are warm to touch with moderate swelling in right hand, feet are warm to touch bilaterally with bilateral 1+ pitting edema Lab/Data Reviewed: 
  
No results found for this visit on 07/09/18 (from the past 12 hour(s)). BMP:  
Lab Results Component Value Date/Time  (L) 07/10/2018 03:20 PM  
 K 3.3 (L) 07/10/2018 03:20 PM  
 CL 84 (L) 07/10/2018 03:20 PM  
 CO2 29 07/10/2018 03:20 PM  
 AGAP 11 07/10/2018 03:20 PM  
  (H) 07/10/2018 03:20 PM  
 BUN 14 07/10/2018 03:20 PM  
 CREA 1.18 07/10/2018 03:20 PM  
 GFRAA >60 07/10/2018 03:20 PM  
 GFRNA 60 (L) 07/10/2018 03:20 PM  
  
Scheduled Medications Reviewed: 
Current Facility-Administered Medications Medication Dose Route Frequency  melatonin tablet 1 mg  1 mg Oral QHS  ondansetron (ZOFRAN ODT) tablet 8 mg  8 mg Oral BID  albuterol-ipratropium (DUO-NEB) 2.5 MG-0.5 MG/3 ML  3 mL Nebulization QID RT  
 amLODIPine (NORVASC) tablet 10 mg  10 mg Oral DAILY  aspirin delayed-release tablet 81 mg  81 mg Oral DAILY  digoxin (LANOXIN) tablet 0.125 mg  0.125 mg Oral DAILY  pravastatin (PRAVACHOL) tablet 40 mg  40 mg Oral QHS  lisinopril (PRINIVIL, ZESTRIL) tablet 20 mg  20 mg Oral DAILY  WARFARIN INFORMATION NOTE (COUMADIN)   Other Q24H  
 0.9% sodium chloride infusion  75 mL/hr IntraVENous CONTINUOUS Imaging, microbiology, and EKG/Telemetry: 
none Assessment/Plan  
 
77 y. o. male with a PMH of afib, CHF, CAD, HTN, COPD on home oxygen, pulmonary HTN now admitted with intractable nausea and vomiting with hypoatremia. Nausea/Vomitting in the setting of Hyponatremia: 
- Nausea is improving, no bouts of emesis, abdominal exam is normal 
- Na is trending up appropriately 121< 124 COPD: 
- Shortness of breath is improving, he appears better on exam this afternoon 
- Coarse lung sounds are persistent but improved from this morning. 
- Will do a respiratory check this evening.   
 
 
Koby Coronado MD 
07/10/18    5:54 PM

## 2018-07-10 NOTE — PROGRESS NOTES
Patient is not available to be assessed at this time. No family at bedside.     [address] [facility] Certified [address] [place] [address]   [phone]

## 2018-07-10 NOTE — PROGRESS NOTES
MEWS score 4 due to elevated HR  Patient in A fib, HR checked manually with reading of 72. No intervention needed at this time.   Will continue to monitor

## 2018-07-10 NOTE — PROGRESS NOTES
Intern Progress Note [address] Patient: Peter Gray MRN: [id number]  CSN: [id number] YOB: 1940  Age: 77 y.o. Sex: male DOA: 7/9/2018 LOS:  LOS: 1 day Subjective:  
 
Acute events: No overnight events, patient continues to endorse nausea with fatigue and overall feeling poorly. No better or worse since admission. Review of Systems: 
Patient Endorses  
--------------------------------------------------------------------------------- Constitutional: Negative for fever, chills and diaphoresis. Positive for fatigue. Respiratory: Negative for cough and shortness of breath. Cardiovascular: Negative for chest pain and palpitations. Gastrointestinal: Positive for nausea. No vomitting. Objective:  
  
Patient Vitals for the past 24 hrs: 
 Temp Pulse Resp BP SpO2  
07/10/18 0804 97.5 °F (36.4 °C) 63 20 164/88 98 % 07/10/18 0726 - - - - 99 % 07/10/18 0400 96.9 °F (36.1 °C) 60 20 165/85 99 % 07/10/18 0148 - - - - 98 % 07/10/18 0000 97.7 °F (36.5 °C) 87 18 160/86 99 % 07/09/18 2116 - - - - 98 % 07/09/18 2000 97.9 °F (36.6 °C) 82 20 (!) 166/96 100 % 07/09/18 1806 - 82 - 149/75 -  
07/09/18 1701 97.8 °F (36.6 °C) 87 18 167/84 100 % 07/09/18 1530 - 73 16 160/73 100 % 07/09/18 1515 - 86 19 165/69 100 % 07/09/18 1500 - 72 14 158/87 100 % 07/09/18 1330 - 74 19 (!) 149/94 100 % 07/09/18 1315 - 67 14 151/72 100 % 07/09/18 1300 - 68 15 163/77 100 % 07/09/18 1152 97.1 °F (36.2 °C) 71 18 157/77 93 % Intake/Output Summary (Last 24 hours) at 07/10/18 0438 Last data filed at 07/10/18 0400 Gross per 24 hour Intake              240 ml Output              600 ml Net             -360 ml Physical Exam:  
General appearance: awake and alert, NAD Lungs: course lung sounds bilaterally Heart: rrr Abdomen: soft, +bs, no ttp Pulses: radial pulses 2+ bilaterally Ext: hands and feet are warm to touch with mild, non-pitting edema of the feet, cap refill in the feet are <2sec Lab/Data Reviewed: CBC WITH AUTOMATED DIFF Collection Time: 07/10/18  4:14 AM  
Result Value Ref Range WBC 8.7 4.6 - 13.2 K/uL  
 RBC 3.47 (L) 4.70 - 5.50 M/uL HGB 7.8 (L) 13.0 - 16.0 g/dL HCT 25.5 (L) 36.0 - 48.0 % MCV 73.5 (L) 74.0 - 97.0 FL  
 MCH 22.5 (L) 24.0 - 34.0 PG  
 MCHC 30.6 (L) 31.0 - 37.0 g/dL  
 RDW 15.2 (H) 11.6 - 14.5 % PLATELET 582 892 - 148 K/uL MPV 8.5 (L) 9.2 - 11.8 FL  
 NEUTROPHILS 82 (H) 40 - 73 % LYMPHOCYTES 10 (L) 21 - 52 % MONOCYTES 8 3 - 10 % EOSINOPHILS 0 0 - 5 % BASOPHILS 0 0 - 2 %  
 ABS. NEUTROPHILS 7.1 1.8 - 8.0 K/UL  
 ABS. LYMPHOCYTES 0.8 (L) 0.9 - 3.6 K/UL  
 ABS. MONOCYTES 0.7 0.05 - 1.2 K/UL  
 ABS. EOSINOPHILS 0.0 0.0 - 0.4 K/UL  
 ABS. BASOPHILS 0.0 0.0 - 0.06 K/UL  
 DF AUTOMATED BMP:  
Lab Results Component Value Date/Time  (L) 07/10/2018 04:14 AM  
 K 3.2 (L) 07/10/2018 04:14 AM  
 CL 81 (L) 07/10/2018 04:14 AM  
 CO2 30 07/10/2018 04:14 AM  
 AGAP 11 07/10/2018 04:14 AM  
  (H) 07/10/2018 04:14 AM  
 BUN 13 07/10/2018 04:14 AM  
 CREA 1.21 07/10/2018 04:14 AM  
 GFRAA >60 07/10/2018 04:14 AM  
 GFRNA 58 (L) 07/10/2018 04:14 AM  
  
 
Scheduled Medications Reviewed: 
Current Facility-Administered Medications Medication Dose Route Frequency  melatonin tablet 1 mg  1 mg Oral QHS  amLODIPine (NORVASC) tablet 10 mg  10 mg Oral DAILY  aspirin delayed-release tablet 81 mg  81 mg Oral DAILY  digoxin (LANOXIN) tablet 0.125 mg  0.125 mg Oral DAILY  pravastatin (PRAVACHOL) tablet 40 mg  40 mg Oral QHS  albuterol-ipratropium (DUO-NEB) 2.5 MG-0.5 MG/3 ML  3 mL Nebulization Q6H RT  
 lisinopril (PRINIVIL, ZESTRIL) tablet 20 mg  20 mg Oral DAILY  WARFARIN INFORMATION NOTE (COUMADIN)   Other Q24H  
 0.9% sodium chloride infusion  75 mL/hr IntraVENous CONTINUOUS Imaging, microbiology, and EKG/Telemetry: 
 
CHEST CT 7/9: 
1. Mild bilateral pleural effusions with bilateral interstitial and alveolar 
lung process, in the acute setting would be compatible with pneumonia. Close CT follow-up within the next 3 months is recommended to confirm improvement of the consolidative superior segment left lower lobe region, and other locations, for further exclusion of occult neoplasm. 2. Paratracheal and subcarinal new mediastinal lymphadenopathy. 3. Emphysema. 4. Cardiomegaly. 5. Abdominal aortic graft repair. Mild thoracic aortic ectasia. 6. Hepatic cysts. Assessment/Plan  
 
77 y. o. male with a PMH of afib, CHF, CAD, HTN, COPD on home oxygen, pulmonary HTN now admitted with intractable nausea and vomiting with hypoatremia. 
   
Nausea and Vomiting in the Setting of Hyponatremia:  Patient appears to be euvolemic at this time with very minimal pitting edema.  DDx includes SIADH vs lung mass vs thiazide diuretic use.  
-serum and urine osmolarity pending 
-BMP q6h. Goal increase in sodium is 4-6 mEq in a 24 hour period, not to exceed 8.  
-Will need repeat chest CT in 3 months 
-zofran for nausea, may need to order as scheduled 
-continue normal saline at 75mL/hr 
   
COPD 
-Continue PRN oxygen. Pt reports he is on 4L continuous at home. 
-Continue home stiolto respimat (tiotropim-olodatero) 2 puffs daily 
-PRN duonebs. 
   
Atrial Fibrillation: INR subtherapeutic in the ER. 
-admit to tele 
-continue home coumadin 5mg daily with 2.5mg on Wednesday and Friday 
-continue digoxin 125mcg daily 
-daily INR 
   
CHF, HTN, HLD:  BP above goal in the ED. 
-Continue home amlodipine, pravastatin. 
-Continue to hold chlorthalidone in the setting of hyponatremia 
-Continue 20mg lisinopril. Natalya He MD  
 
 I have personally seen and evaluated this patient. I have personally reviewed the resident note. I have personally discussed the management and plan of care of this patient. I agree with the note as written.  
07/10/18    8:07 AM

## 2018-07-10 NOTE — PROGRESS NOTES
Received in bed awake and alert. In no acute distress. Vital signs stable. Presently denies discomfort.  Tele#[id number]= SR without ectopics

## 2018-07-11 LAB
ANION GAP SERPL CALC-SCNC: 8 MMOL/L (ref 3–18)
ANION GAP SERPL CALC-SCNC: 9 MMOL/L (ref 3–18)
ANION GAP SERPL CALC-SCNC: 9 MMOL/L (ref 3–18)
BASOPHILS # BLD: 0 K/UL (ref 0–0.1)
BASOPHILS NFR BLD: 0 % (ref 0–2)
BUN SERPL-MCNC: 13 MG/DL (ref 7–18)
BUN SERPL-MCNC: 14 MG/DL (ref 7–18)
BUN SERPL-MCNC: 15 MG/DL (ref 7–18)
BUN/CREAT SERPL: 10 (ref 12–20)
BUN/CREAT SERPL: 11 (ref 12–20)
BUN/CREAT SERPL: 11 (ref 12–20)
CALCIUM SERPL-MCNC: 7.7 MG/DL (ref 8.5–10.1)
CALCIUM SERPL-MCNC: 7.7 MG/DL (ref 8.5–10.1)
CALCIUM SERPL-MCNC: 8.1 MG/DL (ref 8.5–10.1)
CHLORIDE SERPL-SCNC: 88 MMOL/L (ref 100–108)
CHLORIDE SERPL-SCNC: 90 MMOL/L (ref 100–108)
CHLORIDE SERPL-SCNC: 90 MMOL/L (ref 100–108)
CO2 SERPL-SCNC: 29 MMOL/L (ref 21–32)
CREAT SERPL-MCNC: 1.27 MG/DL (ref 0.6–1.3)
CREAT SERPL-MCNC: 1.32 MG/DL (ref 0.6–1.3)
CREAT SERPL-MCNC: 1.34 MG/DL (ref 0.6–1.3)
DIFFERENTIAL METHOD BLD: ABNORMAL
EOSINOPHIL # BLD: 0 K/UL (ref 0–0.4)
EOSINOPHIL NFR BLD: 0 % (ref 0–5)
ERYTHROCYTE [DISTWIDTH] IN BLOOD BY AUTOMATED COUNT: 15.4 % (ref 11.6–14.5)
GLUCOSE SERPL-MCNC: 93 MG/DL (ref 74–99)
GLUCOSE SERPL-MCNC: 93 MG/DL (ref 74–99)
GLUCOSE SERPL-MCNC: 99 MG/DL (ref 74–99)
HCT VFR BLD AUTO: 22.9 % (ref 36–48)
HCT VFR BLD AUTO: 23.4 % (ref 36–48)
HGB BLD-MCNC: 7 G/DL (ref 13–16)
HGB BLD-MCNC: 7.1 G/DL (ref 13–16)
INR PPP: 2.3 (ref 0.8–1.2)
LYMPHOCYTES # BLD: 1.1 K/UL (ref 0.9–3.6)
LYMPHOCYTES NFR BLD: 18 % (ref 21–52)
MAGNESIUM SERPL-MCNC: 2.1 MG/DL (ref 1.6–2.6)
MCH RBC QN AUTO: 22.5 PG (ref 24–34)
MCHC RBC AUTO-ENTMCNC: 30.6 G/DL (ref 31–37)
MCV RBC AUTO: 73.6 FL (ref 74–97)
MONOCYTES # BLD: 0.7 K/UL (ref 0.05–1.2)
MONOCYTES NFR BLD: 12 % (ref 3–10)
NEUTS SEG # BLD: 4.2 K/UL (ref 1.8–8)
NEUTS SEG NFR BLD: 70 % (ref 40–73)
OSMOLALITY UR: 323 MOSM/KG H2O (ref 300–900)
PLATELET # BLD AUTO: 353 K/UL (ref 135–420)
PMV BLD AUTO: 8.7 FL (ref 9.2–11.8)
POTASSIUM SERPL-SCNC: 3.8 MMOL/L (ref 3.5–5.5)
POTASSIUM SERPL-SCNC: 3.8 MMOL/L (ref 3.5–5.5)
POTASSIUM SERPL-SCNC: 3.9 MMOL/L (ref 3.5–5.5)
PROTHROMBIN TIME: 24.2 SEC (ref 11.5–15.2)
RBC # BLD AUTO: 3.11 M/UL (ref 4.7–5.5)
SODIUM SERPL-SCNC: 126 MMOL/L (ref 136–145)
SODIUM SERPL-SCNC: 127 MMOL/L (ref 136–145)
SODIUM SERPL-SCNC: 128 MMOL/L (ref 136–145)
WBC # BLD AUTO: 6 K/UL (ref 4.6–13.2)

## 2018-07-11 PROCEDURE — 94640 AIRWAY INHALATION TREATMENT: CPT

## 2018-07-11 PROCEDURE — 74011250637 HC RX REV CODE- 250/637: Performed by: STUDENT IN AN ORGANIZED HEALTH CARE EDUCATION/TRAINING PROGRAM

## 2018-07-11 PROCEDURE — 65660000000 HC RM CCU STEPDOWN

## 2018-07-11 PROCEDURE — 74011250637 HC RX REV CODE- 250/637: Performed by: FAMILY MEDICINE

## 2018-07-11 PROCEDURE — 97162 PT EVAL MOD COMPLEX 30 MIN: CPT

## 2018-07-11 PROCEDURE — 83735 ASSAY OF MAGNESIUM: CPT | Performed by: STUDENT IN AN ORGANIZED HEALTH CARE EDUCATION/TRAINING PROGRAM

## 2018-07-11 PROCEDURE — 80048 BASIC METABOLIC PNL TOTAL CA: CPT | Performed by: STUDENT IN AN ORGANIZED HEALTH CARE EDUCATION/TRAINING PROGRAM

## 2018-07-11 PROCEDURE — 85018 HEMOGLOBIN: CPT | Performed by: STUDENT IN AN ORGANIZED HEALTH CARE EDUCATION/TRAINING PROGRAM

## 2018-07-11 PROCEDURE — 74011250636 HC RX REV CODE- 250/636: Performed by: FAMILY MEDICINE

## 2018-07-11 PROCEDURE — 74011000250 HC RX REV CODE- 250: Performed by: FAMILY MEDICINE

## 2018-07-11 PROCEDURE — 77010033678 HC OXYGEN DAILY

## 2018-07-11 PROCEDURE — 85025 COMPLETE CBC W/AUTO DIFF WBC: CPT | Performed by: STUDENT IN AN ORGANIZED HEALTH CARE EDUCATION/TRAINING PROGRAM

## 2018-07-11 PROCEDURE — 97116 GAIT TRAINING THERAPY: CPT

## 2018-07-11 PROCEDURE — 97530 THERAPEUTIC ACTIVITIES: CPT

## 2018-07-11 PROCEDURE — 36415 COLL VENOUS BLD VENIPUNCTURE: CPT | Performed by: STUDENT IN AN ORGANIZED HEALTH CARE EDUCATION/TRAINING PROGRAM

## 2018-07-11 PROCEDURE — 80048 BASIC METABOLIC PNL TOTAL CA: CPT

## 2018-07-11 PROCEDURE — 97166 OT EVAL MOD COMPLEX 45 MIN: CPT

## 2018-07-11 PROCEDURE — 85610 PROTHROMBIN TIME: CPT | Performed by: STUDENT IN AN ORGANIZED HEALTH CARE EDUCATION/TRAINING PROGRAM

## 2018-07-11 RX ORDER — IPRATROPIUM BROMIDE AND ALBUTEROL SULFATE 2.5; .5 MG/3ML; MG/3ML
3 SOLUTION RESPIRATORY (INHALATION)
Status: DISCONTINUED | OUTPATIENT
Start: 2018-07-11 | End: 2018-07-12 | Stop reason: HOSPADM

## 2018-07-11 RX ORDER — WARFARIN 2.5 MG/1
2.5 TABLET ORAL ONCE
Status: COMPLETED | OUTPATIENT
Start: 2018-07-11 | End: 2018-07-11

## 2018-07-11 RX ORDER — LORAZEPAM 2 MG/ML
0.5 INJECTION INTRAMUSCULAR ONCE
Status: COMPLETED | OUTPATIENT
Start: 2018-07-11 | End: 2018-07-11

## 2018-07-11 RX ORDER — HYDROXYZINE PAMOATE 25 MG/1
25 CAPSULE ORAL
Status: DISCONTINUED | OUTPATIENT
Start: 2018-07-11 | End: 2018-07-12 | Stop reason: HOSPADM

## 2018-07-11 RX ADMIN — PRAVASTATIN SODIUM 40 MG: 20 TABLET ORAL at 21:44

## 2018-07-11 RX ADMIN — ASPIRIN 81 MG: 81 TABLET, COATED ORAL at 08:02

## 2018-07-11 RX ADMIN — ONDANSETRON 8 MG: 4 TABLET, ORALLY DISINTEGRATING ORAL at 08:02

## 2018-07-11 RX ADMIN — ONDANSETRON 8 MG: 4 TABLET, ORALLY DISINTEGRATING ORAL at 16:55

## 2018-07-11 RX ADMIN — IPRATROPIUM BROMIDE AND ALBUTEROL SULFATE 3 ML: .5; 3 SOLUTION RESPIRATORY (INHALATION) at 20:29

## 2018-07-11 RX ADMIN — IPRATROPIUM BROMIDE AND ALBUTEROL SULFATE 3 ML: .5; 3 SOLUTION RESPIRATORY (INHALATION) at 16:01

## 2018-07-11 RX ADMIN — IPRATROPIUM BROMIDE AND ALBUTEROL SULFATE 3 ML: .5; 3 SOLUTION RESPIRATORY (INHALATION) at 11:29

## 2018-07-11 RX ADMIN — Medication 1 MG: at 21:45

## 2018-07-11 RX ADMIN — LISINOPRIL 20 MG: 20 TABLET ORAL at 08:02

## 2018-07-11 RX ADMIN — DIGOXIN 0.12 MG: 125 TABLET ORAL at 08:02

## 2018-07-11 RX ADMIN — IPRATROPIUM BROMIDE AND ALBUTEROL SULFATE 3 ML: .5; 3 SOLUTION RESPIRATORY (INHALATION) at 08:32

## 2018-07-11 RX ADMIN — WARFARIN SODIUM 2.5 MG: 2.5 TABLET ORAL at 16:55

## 2018-07-11 RX ADMIN — LORAZEPAM 0.5 MG: 2 INJECTION INTRAMUSCULAR; INTRAVENOUS at 02:19

## 2018-07-11 RX ADMIN — AMLODIPINE BESYLATE 10 MG: 10 TABLET ORAL at 08:02

## 2018-07-11 NOTE — PROGRESS NOTES
CDMP Query     Pt admitted for hyponatremia. Pt is still recovering symptomatically from his pneumonia form his last admission that has been complicated by deconditioning and his chronic hypoxic respiratory failure.     Giselle Woodall MD PGY-2  [address]

## 2018-07-11 NOTE — PROGRESS NOTES
Problem: Self Care Deficits Care Plan (Adult)  Goal: *Acute Goals and Plan of Care (Insert Text)  Occupational Therapy Goals  Initiated 7/11/2018 within 7 day(s). 1.  Patient will perform grooming with supervision/set-up standing with fww  2. Patient will perform upper body bathing with minimal assistance/CGA maintain 02 >90%. 3.  Patient will perform lower body dressing with minimal assistance/contact guard assist.  4.  Patient will perform toilet transfers with supervision/set-up. 5.  Patient will perform all aspects of toileting with supervision/set-up. 6.  Patient will participate in upper extremity therapeutic exercise/activities with supervision/set-up for 5 minutes maintain 02>90%. Occupational Therapy EVALUATION    Patient: Gonsalo Caicedo (67 y.o. male)  Date: 7/11/2018  Primary Diagnosis: Hyponatremia  Hyponatremia        Precautions:  Fall    ASSESSMENT :  Based on the objective data described below, the patient presents difficulty with self care ADLs/IADLs, bilateral UE strength limitations, and reduced endurance/activity. PLOF was modified independence with all aspects of self care ADLs. Have adult daughter prepare meals at home. The patient has the following DME: rolling walker, wheelchair, and portable oxygen. Patient has walk-in shower with no grab bars and no shower chair. Patient is a fall risk with participation with ADLs. Demonstrates good family support with family members either reside with patient or live in close proximity all in the [address] [place] [address]. Patient reside in two [place] home, have access to bedroom/bathroom on the first level. Patient is cooperative and pleasant during evaluation. Patient is motivated to restore some level of functional ability for participation in mobility and self care. Patient is independent for self feeding and setup for grooming.  To properly challenge the patient education/recommendation to participate in self care ADLs in standing with support of w. Patient will benefit from skilled intervention to address the above impairments. Patients rehabilitation potential is considered to be Good  Factors which may influence rehabilitation potential include:   []             None noted  []             Mental ability/status  []             Medical condition  []             Home/family situation and support systems  []             Safety awareness  []             Pain tolerance/management  []             Other:      PLAN :  Recommendations and Planned Interventions:  []               Self Care Training                  []        Therapeutic Activities  []               Functional Mobility Training    []        Cognitive Retraining  []               Therapeutic Exercises           []        Endurance Activities  []               Balance Training                   []        Neuromuscular Re-Education  []               Visual/Perceptual Training     []   Home Safety Training  []               Patient Education                 []        Family Training/Education  []               Other (comment):    Frequency/Duration: Patient will be followed by occupational therapy 1-2 times per day/4-7 days per week to address goals. Discharge Recommendations: Andi Gurrola  Further Equipment Recommendations for Discharge: shower chair with backrest upon approval of insurance     Barriers to Learning/Limitations: yes;  physical  Compensate with: visual, verbal, tactile, kinesthetic cues/model     PATIENT COMPLEXITY      Eval Complexity: History: MEDIUM Complexity : Expanded review of history including physical, cognitive and psychosocial  history ; Examination: MEDIUM Complexity : 3-5 performance deficits relating to physical, cognitive , or psychosocial skils that result in activity limitations and / or participation restrictions; Decision Making:MEDIUM Complexity : Patient may present with comorbidities that affect occupational performnce.  Miniml to moderate modification of tasks or assistance (eg, physical or verbal ) with assesment(s) is necessary to enable patient to complete evaluation  Assessment: Moderat Complexity     G-CODES:     Self Care  Current  CK= 40-59%   Goal  CJ= 20-39%. The severity rating is based on the Level of Assistance required for Functional Mobility and ADLs. SUBJECTIVE:   Patient stated To big a chore to cook.     OBJECTIVE DATA SUMMARY:     Past Medical History:   Diagnosis Date    Aneurysm [facility])     AAA repair 2006 & 2012    Aorto-iliac duplex 02/13/2015    Tech difficult. Patent aorta bi-iliac endograft w/o leak or limb dysfunction.  Arrhythmia     a fib    Cardiac cath 11/01/2012    RCA (sm, nondom) patent. LM patent. LAD 25%. CX (dom) 45% mid. LVEDP 12 mmHg. No WMA. PA 27/12. W 10-12. CO/CI 5.2/2.6 (TD).  Cardiac echocardiogram, abnormal 02/20/2016    EF 55%. No WMA. Indeterminate diastolic fx. RVSP 60 mmHg. Severe LAE. Mild MR. Mod TR.  IVCE. Similar to study of 10/26/12.  Cardiovascular aorto-iliac duplex 02/13/2015    Patent aorta bi-iliac endovascular graft w/o leak or limb dysfunction. Sac measures 4.21 x 4.47 cm (4.4 x 4.6 cm on 1/31/14).  Cardiovascular LE peripheral arterial testing 07/29/2013    No significant LE arterial disease bilaterally. R GHADA 1. 12.  L GHADA 1. 12.  R DBI 0.83. L DBI 0.71. Exercise deferred.  Cardiovascular renal duplex 10/31/2012    No RA stenosis. Intrinsic/med disease in left kidney. Patent aortic endograft. Patent, thrombus-free renal veins bilaterally.  Carotid duplex 07/29/2013    Mild <50% bilateral ICA plaquing.       Chronic lung disease     Cigarette smoker     Congestive heart failure (CHF) (HCC)     COPD (chronic obstructive pulmonary disease) (HCC)     and emphysema; likely secondary to tobacco abuse    Difficult intubation     Dyslipidemia     low HDL    Emphysema     HTN (hypertension)     Hypercholesterolemia     Increased prostate specific antigen (PSA) velocity     Long term (current) use of anticoagulants     coumadin    Osteoarthritis     Osteomyelitis (HCC)     Paroxysmal atrial fibrillation (HCC)     Peripheral vascular disease ([facility] [address]     AAA repair 12/2007    Persistent atrial Fibrillation     Persistent atrial fibrillation (HCC)     Unspecified adverse effect of anesthesia     difficulty breathing placed in ICU Oct 2012 (AAA repair)     Past Surgical History:   Procedure Laterality Date    BRONCHOSCOPY DIAGNOSTIC  11/2/2012         CARDIAC CATHETERIZATION  11/1/2012         COLONOSCOPY N/A 7/26/2016    COLONOSCOPY with Polypectomies performed by Kristen Aguilar MD at [address] [place] [address] HX AAA REPAIR      2006 & 2012    HX HEART CATHETERIZATION  3/4/2009    1. RCA small, nondominant; patent. 2. LMCA patent. 3. LAD is long, wrapped around apical vessel. Diffuse, 20-30% stenosis noted. 4. CCA is large, dominant vessel. Diffuse 20-30% stenosis. 5. LVEDP is 16 mmHg. 6. Overall left ventricular systolic function mildly diminshed with est. EF 45%. Mild, global hypokinesis noted. 7. No significant mitral regurgitation or aortic stenosis noted.  (see report)    HX HERNIA REPAIR  2/2014    rt inguinal     HX HERNIA REPAIR  01/2016    LEFT INGUINAL HERNIA REPAIR DR. Les Hightowerman    REPAIR ING HERNIA,5+Y/O,JESSIE Left 1-20-16    Dr. Rosi Andujar  11/1/2012          Prior Level of Function/Home Situation:   Home Situation  Home Environment: Private residence  # Steps to Enter: 5  Rails to Enter: No  One/Two Story Residence: Two story, live on 1st floor  Living Alone: No (live with brother)  Support Systems: Family member(s) (brother and daughter)  Patient Expects to be Discharged to[de-identified] Skilled nursing facility  Current DME Used/Available at Home: Oxygen, portable, Walker, rolling, Wheelchair  Tub or Shower Type: Shower  [x]  Right hand dominant   []  Left hand dominant  Cognitive/Behavioral Status:  Neurologic State: Alert  Orientation Level: Oriented X4  Cognition: Follows commands  Safety/Judgement: Fall prevention    Skin: appears intact    Edema: none noted    Vision/Perceptual:    Acuity: Within Defined Limits    Corrective Lenses: Reading glasses    Coordination:  Coordination: Generally decreased, functional (UEs)  Fine Motor Skills-Upper: Left Intact; Right Intact (UEs)    Gross Motor Skills-Upper: Left Intact; Right Intact (UEs)    Balance:  Sitting: Intact  Standing: Impaired; With support  Standing - Static: Fair  Standing - Dynamic : Fair    Strength:  Strength: Generally decreased, functional (UEs)    Tone & Sensation:  Tone: Normal  Sensation: Intact    Range of Motion:  AROM: Generally decreased, functional (UEs)     Functional Mobility and Transfers for ADLs:  Bed Mobility:  Supine to Sit: Contact guard assistance  Scooting: Contact guard assistance  Transfers:  Sit to Stand: Contact guard assistance     ADL Assessment:  Feeding: Independent (with AE)    Oral Facial Hygiene/Grooming: Setup    Cognitive Retraining  Safety/Judgement: Fall prevention    Pain:  Pt reports 0/10 pain or discomfort prior to treatment.    Pt reports 0/10 pain or discomfort post treatment. Activity Tolerance:   Fair     Please refer to the flowsheet for vital signs taken during this treatment. After treatment:   [] Patient left in no apparent distress sitting up in chair  [x] Patient left in no apparent distress in bed  [x] Call bell left within reach  [] Nursing notified  [x] Caregiver present   [] Bed alarm activated    COMMUNICATION/EDUCATION:   [] Home safety education was provided and the patient/caregiver indicated understanding. [x] Patient/family have participated as able in goal setting and plan of care. [x] Patient/family agree to work toward stated goals and plan of care. [] Patient understands intent and goals of therapy, but is neutral about his/her participation.   [] Patient is unable to participate in goal setting and plan of care.     Thank you for this referral.  Checo Lopez MS OTR/L  Time Calculation: 8 mins

## 2018-07-11 NOTE — DISCHARGE SUMMARY
Discharge Summary [facility] Patient: Shireen Garcia Age: 77 y.o. Sex: male  : 1940 MRN: [id number] DOA: 2018 Discharge Date: 18 Kai Mckenna MD     
PCP: Ivan Arana MD     
 
================================================================ Reason for Admission: Hyponatremia Hyponatremia Discharge Diagnoses:  
Nausea/Vomitting w/ Hyponatremia- Resolved Hypokalemia- Resolved COPD- baseline CHF- baseline AFib- controlled Anemia- baseline Important notes to PCP/ follow-up studies and evaluations PCP 
- Follow up Coumadin management, discharge INR 2.3 
- Repeat BMP, H&H 
-  Follow up on B/blood sugar/dietary counseling - Repeat chest CT in 3 months Pending labs and studies: 
None Operative Procedures:  
None Discharge Medications:    
Current Discharge Medication List  
  
START taking these medications Details  
lisinopril (PRINIVIL, ZESTRIL) 20 mg tablet Take 1 Tab by mouth daily. Qty: 30 Tab, Refills: 0 CONTINUE these medications which have NOT CHANGED Details  
docusate sodium (COLACE) 100 mg capsule Take 100 mg by mouth two (2) times daily as needed for Constipation. polyethylene glycol (MIRALAX) 17 gram packet Take 17 g by mouth daily as needed. albuterol (PROVENTIL VENTOLIN) 2.5 mg /3 mL (0.083 %) nebulizer solution 3 mL by Nebulization route every four (4) hours as needed for Wheezing. Qty: 24 Each, Refills: 3  
  
digoxin (DIGITEK) 0.125 mg tablet take 1 tablet by mouth once daily 
Qty: 90 Tab, Refills: 3  
  
warfarin (COUMADIN) 2.5 mg tablet Take 1 Tab by mouth daily. Take 2 pills (5 mg) Mon, Tues, Wed, Thur, Sat, Sun; and 1 pill only (2.5 mg) Fri 
Qty: 60 Tab, Refills: 0  
  
amLODIPine (NORVASC) 10 mg tablet Take 10 mg by mouth daily. Refills: 0 OXYGEN-AIR DELIVERY SYSTEMS 2 L/min by Does Not Apply route daily. Continuous. Company is [employer] melatonin 3 mg tablet Take 1 Tab by mouth nightly as needed. Qty: 30 Tab, Refills: 0  
  
aspirin delayed-release 81 mg tablet Take 81 mg by mouth daily. pravastatin (PRAVACHOL) 40 mg tablet Take 40 mg by mouth nightly. tiotropium-olodaterol (STIOLTO RESPIMAT) 2.5-2.5 mcg/actuation mist Take 2 Inhalation by inhalation daily. Qty: 1 Inhaler, Refills: 5 STOP taking these medications  
  
 chlorthalidone (HYGROTEN) 25 mg tablet Comments:  
Reason for Stopping:   
   
 potassium chloride (KLOR-CON) 20 mEq packet Comments:  
Reason for Stopping:   
   
 levoFLOXacin (LEVAQUIN) 250 mg tablet Comments:  
Reason for Stopping:   
   
 ondansetron hcl (ZOFRAN) 4 mg tablet Comments:  
Reason for Stopping:   
   
  
 
 
 
Disposition: SNF Consultants:   
None on this admission. Brief Hospital Course (including pertinent history and physical findings) 77 y. o. male with a PMH of afib, CHF, CAD, HTN, COPD on home oxygen, presented to the ER on 7/9/18 with worsening fatigue, nausea w/ vomitting and SOB, stating he has been \"feeling crappy\" for the past several weeks. In the ER, he was found to be hyponatremic to 119, hypokalemic to 3.1, and an EKG finding of Afib. A non-contrast chest CT was significant for acute pneumonia with mediastinal LAD. This patient was previously admitted from 6/27 to 6/30 for a CHF exacerbation and pneumonia. He will be discharge to a skilled nursing facility by the hospital to home program.  
 
   
Nausea and Vomiting in the Setting of Hyponatremia:  Began 75ml/hr of NS w/ 20mEq of K once admitted.  Mr. Reis began feeling significantly better on HD2, he did have persistent nausea that was controlled with Zofran, but the vomiting had stopped. His Na trended up from 119 on admission to 128 on discharge. His K was repleted as indicated and was 3.5 on discharge. By HD2, he denied nausea and vomiting and reported feeling much better.  On discharge, he appeared to be euvolemic with mild lower leg swelling, but no pitting edema. Urine osmols were 323 and serum osmls 257. Will recommend a follow up chest CT in 3 months to r/o neoplasm. COPD: Pt states he is on 4L of O2 NC at home, during the day and while sleeping. He does report some baseline shortness of breath, but has not felt that he is significantly above that during this admission. He did use a 4L NC while inpatient. Will continue his home regiment of Stiolto Respimat 2inhalations by mouth daily, and albuterol 3ml nebs q4h prn.  
 
   
Atrial Fibrillation: His INR was subtherapeutic in the ER at 1.7. On discharge, INR was 2.3. His home coumadin regimen was continued. He was admitted to telemetry, which showed controlled AFib with HR in the 70s-80s during inpatient stay. Will continue home digoxin.  
 
   
CHF, HTN, HLD:  BP was above goal in the ED, but normotensive on discharge (130s/60s). Will hold chlorthalidone on discharge and switch to lisinopril. Continued amlodipine and pravastatin. Follow up with PCM. Summarized key findings and results (labs, imaging studies, ECHO, cardiac cath, endoscopies, etc): CBC w/Diff Lab Results Component Value Date/Time WBC 6.1 07/12/2018 03:24 AM  
 Hemoglobin, POC 10.9 (L) 07/26/2016 10:50 AM  
 HGB 7.4 (L) 07/12/2018 03:24 AM  
 Hematocrit, POC 32 (L) 07/26/2016 10:50 AM  
 HCT 24.8 (L) 07/12/2018 03:24 AM  
 PLATELET 392 34/58/0237 03:24 AM  
 MCV 72.7 (L) 07/12/2018 03:24 AM  
   
 
 
Chemistry Lab Results Component Value Date/Time  Sodium 132 (L) 07/12/2018 09:40 AM  
 Potassium 3.5 07/12/2018 09:40 AM  
 Chloride 92 (L) 07/12/2018 09:40 AM  
 CO2 32 07/12/2018 09:40 AM  
 Anion gap 8 07/12/2018 09:40 AM  
 Glucose 92 07/12/2018 09:40 AM  
 BUN 13 07/12/2018 09:40 AM  
 Creatinine 1.37 (H) 07/12/2018 09:40 AM  
 BUN/Creatinine ratio 9 (L) 07/12/2018 09:40 AM  
 GFR est AA >60 07/12/2018 09:40 AM  
 GFR est non-AA 50 (L) 07/12/2018 09:40 AM  
 Calcium 8.1 (L) 07/12/2018 09:40 AM  
   
 
 
Functional status and cognitive function:   
Ambulates with: Assistance Status: alert, cooperative, no distress, appears stated age Diet: Cardiac Code status and advanced care plan: Full Power Of [employer] Group of Contact: Wesley Miranda (daughter) [phone] Patient Education:  Patient was educated on the following topics prior to discharge: CHF, COPD Follow-up:  
Follow-up Information Follow up With Details Comments Contact Info Lj Bruner MD In 1 week  [address] [place] [address] 
[phone] 
  
  
 
 
 
================================================================ Irwin Peters MD 
07/12/18    2:12 PM

## 2018-07-11 NOTE — PROGRESS NOTES
Intern Progress Note [address] Patient: Lance Morales MRN: [id number]  CSN: [id number] YOB: 1940  Age: 77 y.o. Sex: male DOA: 7/9/2018 LOS:  LOS: 2 days Subjective:  
 
Acute events: Resident was paged last night for anxiety. He was given Ativan 0.5mg and went to sleep. No complaints this morning, he denies nausea and reports his shortness of breath is at baseline. Review of Systems: 
Patient Endorses  
--------------------------------------------------------------------------------- Constitutional: Negative for fever, chills and diaphoresis. Respiratory: Negative for cough and hemoptysis. Cardiovascular: Negative for chest pain and palpitations. Gastrointestinal: Negative for nausea and vomiting. Objective:  
  
Patient Vitals for the past 24 hrs: 
 Temp Pulse Resp BP SpO2  
07/11/18 0601 97.2 °F (36.2 °C) 63 18 143/64 100 % 07/10/18 2347 97 °F (36.1 °C) 60 19 123/62 98 % 07/10/18 2125 97.5 °F (36.4 °C) 69 19 129/69 100 % 07/10/18 2035 - - - - 98 % 07/10/18 1523 97.9 °F (36.6 °C) (!) 56 18 126/66 99 % 07/10/18 1324 - - - - 98 % 07/10/18 1213 97.9 °F (36.6 °C) 72 20 123/55 99 % 07/10/18 1153 97.9 °F (36.6 °C) (!) 135 20 123/55 93 % 07/10/18 0922 97.7 °F (36.5 °C) 91 20 171/75 98 % Intake/Output Summary (Last 24 hours) at 07/11/18 0815 Last data filed at 07/11/18 6874 Gross per 24 hour Intake                0 ml Output              675 ml Net             -675 ml Physical Exam:  
General appearance: alert, NAD Lungs: mild, course rhonchi in the bilateral lower lobes Heart: rrr Abdomen: soft, +bs, no ttp Pulses: radial pulses 2+ bilaterally Ext: mild edema of the bilateral feet, feet are warm to touch Lab/Data Reviewed: CBC WITH AUTOMATED DIFF Collection Time: 07/11/18  4:08 AM  
Result Value Ref Range WBC 6.0 4.6 - 13.2 K/uL  
 RBC 3.11 (L) 4.70 - 5.50 M/uL  HGB 7.0 (L) 13.0 - 16.0 g/dL HCT 22.9 (L) 36.0 - 48.0 % MCV 73.6 (L) 74.0 - 97.0 FL  
 MCH 22.5 (L) 24.0 - 34.0 PG  
 MCHC 30.6 (L) 31.0 - 37.0 g/dL  
 RDW 15.4 (H) 11.6 - 14.5 % PLATELET 114 183 - 623 K/uL MPV 8.7 (L) 9.2 - 11.8 FL  
 NEUTROPHILS 70 40 - 73 % LYMPHOCYTES 18 (L) 21 - 52 % MONOCYTES 12 (H) 3 - 10 % EOSINOPHILS 0 0 - 5 % BASOPHILS 0 0 - 2 %  
 ABS. NEUTROPHILS 4.2 1.8 - 8.0 K/UL  
 ABS. LYMPHOCYTES 1.1 0.9 - 3.6 K/UL  
 ABS. MONOCYTES 0.7 0.05 - 1.2 K/UL  
 ABS. EOSINOPHILS 0.0 0.0 - 0.4 K/UL  
 ABS. BASOPHILS 0.0 0.0 - 0.1 K/UL  
 DF AUTOMATED BMP:  
Lab Results Component Value Date/Time  (L) 07/11/2018 04:08 AM  
 K 3.9 07/11/2018 04:08 AM  
 CL 88 (L) 07/11/2018 04:08 AM  
 CO2 29 07/11/2018 04:08 AM  
 AGAP 9 07/11/2018 04:08 AM  
 GLU 93 07/11/2018 04:08 AM  
 BUN 13 07/11/2018 04:08 AM  
 CREA 1.27 07/11/2018 04:08 AM  
 GFRAA >60 07/11/2018 04:08 AM  
 GFRNA 55 (L) 07/11/2018 04:08 AM  
  
Scheduled Medications Reviewed: 
Current Facility-Administered Medications Medication Dose Route Frequency  melatonin tablet 1 mg  1 mg Oral QHS  ondansetron (ZOFRAN ODT) tablet 8 mg  8 mg Oral BID  albuterol-ipratropium (DUO-NEB) 2.5 MG-0.5 MG/3 ML  3 mL Nebulization QID RT  
 0.9% sodium chloride with KCl 20 mEq/L infusion   IntraVENous CONTINUOUS  
 amLODIPine (NORVASC) tablet 10 mg  10 mg Oral DAILY  aspirin delayed-release tablet 81 mg  81 mg Oral DAILY  digoxin (LANOXIN) tablet 0.125 mg  0.125 mg Oral DAILY  pravastatin (PRAVACHOL) tablet 40 mg  40 mg Oral QHS  lisinopril (PRINIVIL, ZESTRIL) tablet 20 mg  20 mg Oral DAILY  WARFARIN INFORMATION NOTE (COUMADIN)   Other Q24H Imaging, microbiology, and EKG/Telemetry: 
None Assessment/Plan  
 
77 y. o. male with a PMH of afib, CHF, CAD, HTN, COPD on home oxygen, pulmonary HTN was admitted with intractable nausea and vomiting with hypoatremia.  
  
Nausea/Vomitting in the setting of Hyponatremia: 
- No nausea/vomitting at this point 
- Na is trending up appropriately 121< 124< 126 
  
COPD: 
- Shortness of breath is has significantly improved, he reports SOB is at baseline - Lung sounds are significantly improved on exam 
 
Anemia: 
- Admission Hgb 8.2, has downtrended to 7.0 today. - Will check a noon H&H to ensure he is not trending down further before discharge Marilyn iWlcox MD 
07/11/18    8:15 AM

## 2018-07-11 NOTE — PROGRESS NOTES
Problem: Mobility Impaired (Adult and Pediatric)  Goal: *Acute Goals and Plan of Care (Insert Text)  Physical Therapy Goals  Initiated 7/11/2018 and to be accomplished within 7 day(s)  1. Patient will move from supine to sit and sit to supine , scoot up and down and roll side to side in bed with supervision/set-up. 2.  Patient will transfer from bed to chair and chair to bed with supervision/set-up using the least restrictive device. 3.  Patient will perform sit to stand with supervision/set-up. 4.  Patient will ambulate with supervision/set-up for >100 feet with the least restrictive device. 5.  Patient will ascend/descend 5 stairs with 0 handrail(s) with minimal assistance/contact guard assist.    Outcome: Progressing Towards Goal  physical Therapy EVALUATION    Patient: Dottie Edgar (40 y.o. male)  Date: 7/11/2018  Primary Diagnosis: Hyponatremia  Hyponatremia        Precautions:   Fall    ASSESSMENT :  PT orders received and patient cleared by nursing to participate with therapy. Patient is a 77 y.o. male admitted to the hospital due to fatique, nausea, SOB, and trouble sleeping. Pt admitted with dx of CHF and PNA. Patient consents to PT evaluation and treatment. Pt reports he has not been OOB yet. Educated pt on OOB 3-5 times a day with nursing including to the chair and walking to/from the bathroom. Pt currently on 4L of O2 which he uses at home. Pt becomes SOB with activities requiring rest breaks but SaO2 stays above 94% throughout therapy. Pt performed supine to sit with HOB raised CGA and scooting to EOB CGA. Pt performed sit to stands from bed CGA and from toilet min A. Pt ambulated total 20 feet in room with RW min A with 1 seated rest break. Pt ended therapy sitting in recliner with all needs met and alarm donned. Patient will benefit from skilled intervention to address the above impairments and increase functional independence.   Patients rehabilitation potential is considered to be Fair  Factors which may influence rehabilitation potential include:   []         None noted  []         Mental ability/status  [x]         Medical condition  []         Home/family situation and support systems  []         Safety awareness  []         Pain tolerance/management  []         Other:      PLAN :  Recommendations and Planned Interventions:  [x]           Bed Mobility Training             [x]    Neuromuscular Re-Education  [x]           Transfer Training                   []    Orthotic/Prosthetic Training  [x]           Gait Training                          []    Modalities  [x]           Therapeutic Exercises          []    Edema Management/Control  [x]           Therapeutic Activities            [x]    Patient and Family Training/Education  []           Other (comment):    Frequency/Duration: Patient will be followed by physical therapy 1-2 times per day to address goals. Discharge Recommendations: Andi Gurrola  Further Equipment Recommendations for Discharge: N/A     SUBJECTIVE:   Patient stated Just tired.     OBJECTIVE DATA SUMMARY:     Past Medical History:   Diagnosis Date    Aneurysm [facility])     AAA repair 2006 & 2012    Aorto-iliac duplex 02/13/2015    Tech difficult. Patent aorta bi-iliac endograft w/o leak or limb dysfunction.  Arrhythmia     a fib    Cardiac cath 11/01/2012    RCA (sm, nondom) patent. LM patent. LAD 25%. CX (dom) 45% mid. LVEDP 12 mmHg. No WMA. PA 27/12. W 10-12. CO/CI 5.2/2.6 (TD).  Cardiac echocardiogram, abnormal 02/20/2016    EF 55%. No WMA. Indeterminate diastolic fx. RVSP 60 mmHg. Severe LAE. Mild MR. Mod TR.  IVCE. Similar to study of 10/26/12.  Cardiovascular aorto-iliac duplex 02/13/2015    Patent aorta bi-iliac endovascular graft w/o leak or limb dysfunction. Sac measures 4.21 x 4.47 cm (4.4 x 4.6 cm on 1/31/14).  Cardiovascular LE peripheral arterial testing 07/29/2013    No significant LE arterial disease bilaterally. R GHADA 1. 12.  L GHADA 1. 12.  R DBI 0.83. L DBI 0.71. Exercise deferred.  Cardiovascular renal duplex 10/31/2012    No RA stenosis. Intrinsic/med disease in left kidney. Patent aortic endograft. Patent, thrombus-free renal veins bilaterally.  Carotid duplex 07/29/2013    Mild <50% bilateral ICA plaquing.  Chronic lung disease     Cigarette smoker     Congestive heart failure (CHF) (HCC)     COPD (chronic obstructive pulmonary disease) (HCC)     and emphysema; likely secondary to tobacco abuse    Difficult intubation     Dyslipidemia     low HDL    Emphysema     HTN (hypertension)     Hypercholesterolemia     Increased prostate specific antigen (PSA) velocity     Long term (current) use of anticoagulants     coumadin    Osteoarthritis     Osteomyelitis (HCC)     Paroxysmal atrial fibrillation (HCC)     Peripheral vascular disease ([address]     AAA repair 12/2007    Persistent atrial Fibrillation     Persistent atrial fibrillation [facility] [address]     Unspecified adverse effect of anesthesia     difficulty breathing placed in ICU Oct 2012 (AAA repair)     Past Surgical History:   Procedure Laterality Date    BRONCHOSCOPY DIAGNOSTIC  11/2/2012         CARDIAC CATHETERIZATION  11/1/2012         COLONOSCOPY N/A 7/26/2016    COLONOSCOPY with Polypectomies performed by Anjel Fuchs MD at [address] HX AAA REPAIR      2006 & 2012    HX HEART CATHETERIZATION  3/4/2009    1. RCA small, nondominant; patent. 2. LMCA patent. 3. LAD is long, wrapped around apical vessel. Diffuse, 20-30% stenosis noted. 4. CCA is large, dominant vessel. Diffuse 20-30% stenosis. 5. LVEDP is 16 mmHg. 6. Overall left ventricular systolic function mildly diminshed with est. EF 45%. Mild, global hypokinesis noted. 7. No significant mitral regurgitation or aortic stenosis noted.  (see report)    HX HERNIA REPAIR  2/2014    rt inguinal     HX HERNIA REPAIR  01/2016    LEFT INGUINAL HERNIA REPAIR DR. Lauren Keating    REPAIR [facility] HERNIA,5+Y/O,JESSIE Left 1-20-16    Dr. Tejinder Elias  11/1/2012          Barriers to Learning/Limitations: None  Compensate with: N/A  Prior Level of Function/Home Situation: Independent with mobility including gait using a RW at times. Pt states limited ambulation and mobility at home. Home Situation  Home Environment: Private residence  # Steps to Enter: 5  Rails to Enter: No  One/Two Story Residence: Two story, live on 1st floor  Living Alone: No  Support Systems: Family member(s) (lives with brother)  Patient Expects to be Discharged to[de-identified] Skilled nursing facility  Current DME Used/Available at Home: [employer] Olszewski, rolling, Oxygen, portable, Wheelchair  Critical Behavior:  Neurologic State: Alert  Psychosocial  Patient Behaviors: Calm; Cooperative  Strength:    Strength: Generally decreased, functional (B LE)  Tone & Sensation:   Tone: Normal (B LE)  Sensation: Intact (B LE)   Range Of Motion:  AROM: Within functional limits (B LE)  Functional Mobility:  Bed Mobility:  Supine to Sit: Contact guard assistance  Scooting: Contact guard assistance  Transfers:  Sit to Stand: Contact guard assistance/min a  Stand to Sit: Contact guard assistance  Balance:   Sitting: Intact  Standing: Impaired; With support  Standing - Static: Fair  Standing - Dynamic : Fair  Ambulation/Gait Training:  Distance (ft): 20 Feet (ft) (1 seated rest break)  Assistive Device: Walker, rolling  Ambulation - Level of Assistance: Minimal assistance  Base of Support: Center of gravity altered  Speed/Sylvia: Slow  Therapeutic Exercises:   Educated on ankle pumps  Pain:  Pre: 0/10    Post: 0/10    Activity Tolerance:   Fair-  Please refer to the flowsheet for vital signs taken during this treatment.   After treatment:   [x] Patient left in no apparent distress sitting up in chair  [] Patient left sitting on EOB  [] Patient left in no apparent distress in bed  [] Patient declined to be OOB at this time due to    [x] Call bell left within reach  [x] Nursing notified(Lani)  [] Caregiver present  [x] chair alarm activated  [x] Personal items in reach       COMMUNICATION/EDUCATION:   [x]         Fall prevention education was provided and the patient/caregiver indicated understanding. [x]         Patient/family have participated as able in goal setting and plan of care. [x]         Patient/family agree to work toward stated goals and plan of care. []         Patient understands intent and goals of therapy, but is neutral about his/her participation. []         Patient is unable to participate in goal setting and plan of care. Thank you for this referral.  Yady Lav, PT, DPT   Time Calculation: 40 mins      Mobility  Current  CJ= 20-39%   Goal  CI= 1-19%. The severity rating is based on the Level of Assistance required for Functional Mobility and ADLs.     Eval Complexity: History: MEDIUM  Complexity : 1-2 comorbidities / personal factors will impact the outcome/ POC Exam:HIGH Complexity : 4+ Standardized tests and measures addressing body structure, function, activity limitation and / or participation in recreation  Presentation: MEDIUM Complexity : Evolving with changing characteristics  Clinical Decision Making:Medium Complexity   Overall Complexity:MEDIUM

## 2018-07-11 NOTE — ROUTINE PROCESS
Received bedside report from [address], patient was resting in bed, HOB elevated, bed in lowest position and oriented to call button. Ana Sanchez of [facility] contacted, pt is complaining of being anxious and unable to sleep. Dr. Katherine Sanchez came up to speak to the patient. Devota Citizen Dr Katherine Sanchez gave a verbal order for ativan 0.5mg once. [facility] using SBAR, MAR, and Kardex.

## 2018-07-12 VITALS
OXYGEN SATURATION: 97 % | HEIGHT: 66 IN | WEIGHT: 164.5 LBS | TEMPERATURE: 97.9 F | DIASTOLIC BLOOD PRESSURE: 53 MMHG | SYSTOLIC BLOOD PRESSURE: 116 MMHG | HEART RATE: 69 BPM | RESPIRATION RATE: 18 BRPM | BODY MASS INDEX: 26.44 KG/M2

## 2018-07-12 LAB
ANION GAP SERPL CALC-SCNC: 8 MMOL/L (ref 3–18)
BASOPHILS # BLD: 0 K/UL (ref 0–0.1)
BASOPHILS NFR BLD: 0 % (ref 0–2)
BUN SERPL-MCNC: 13 MG/DL (ref 7–18)
BUN/CREAT SERPL: 9 (ref 12–20)
CALCIUM SERPL-MCNC: 8.1 MG/DL (ref 8.5–10.1)
CHLORIDE SERPL-SCNC: 92 MMOL/L (ref 100–108)
CO2 SERPL-SCNC: 32 MMOL/L (ref 21–32)
CREAT SERPL-MCNC: 1.37 MG/DL (ref 0.6–1.3)
DIFFERENTIAL METHOD BLD: ABNORMAL
EOSINOPHIL # BLD: 0 K/UL (ref 0–0.4)
EOSINOPHIL NFR BLD: 0 % (ref 0–5)
ERYTHROCYTE [DISTWIDTH] IN BLOOD BY AUTOMATED COUNT: 15.3 % (ref 11.6–14.5)
GLUCOSE SERPL-MCNC: 92 MG/DL (ref 74–99)
HCT VFR BLD AUTO: 24.8 % (ref 36–48)
HGB BLD-MCNC: 7.4 G/DL (ref 13–16)
INR PPP: 2.4 (ref 0.8–1.2)
LYMPHOCYTES # BLD: 1.2 K/UL (ref 0.9–3.6)
LYMPHOCYTES NFR BLD: 20 % (ref 21–52)
MCH RBC QN AUTO: 21.7 PG (ref 24–34)
MCHC RBC AUTO-ENTMCNC: 29.8 G/DL (ref 31–37)
MCV RBC AUTO: 72.7 FL (ref 74–97)
MONOCYTES # BLD: 0.7 K/UL (ref 0.05–1.2)
MONOCYTES NFR BLD: 11 % (ref 3–10)
NEUTS SEG # BLD: 4.2 K/UL (ref 1.8–8)
NEUTS SEG NFR BLD: 69 % (ref 40–73)
PLATELET # BLD AUTO: 397 K/UL (ref 135–420)
PLATELET COMMENTS,PCOM: ABNORMAL
PMV BLD AUTO: 8.6 FL (ref 9.2–11.8)
POTASSIUM SERPL-SCNC: 3.5 MMOL/L (ref 3.5–5.5)
PROTHROMBIN TIME: 25.6 SEC (ref 11.5–15.2)
RBC # BLD AUTO: 3.41 M/UL (ref 4.7–5.5)
RBC MORPH BLD: ABNORMAL
SODIUM SERPL-SCNC: 132 MMOL/L (ref 136–145)
WBC # BLD AUTO: 6.1 K/UL (ref 4.6–13.2)

## 2018-07-12 PROCEDURE — 94640 AIRWAY INHALATION TREATMENT: CPT

## 2018-07-12 PROCEDURE — 85610 PROTHROMBIN TIME: CPT

## 2018-07-12 PROCEDURE — 36415 COLL VENOUS BLD VENIPUNCTURE: CPT

## 2018-07-12 PROCEDURE — 77010033678 HC OXYGEN DAILY

## 2018-07-12 PROCEDURE — 74011250637 HC RX REV CODE- 250/637: Performed by: FAMILY MEDICINE

## 2018-07-12 PROCEDURE — 80048 BASIC METABOLIC PNL TOTAL CA: CPT

## 2018-07-12 PROCEDURE — 85025 COMPLETE CBC W/AUTO DIFF WBC: CPT

## 2018-07-12 PROCEDURE — 97530 THERAPEUTIC ACTIVITIES: CPT

## 2018-07-12 PROCEDURE — 74011250637 HC RX REV CODE- 250/637: Performed by: STUDENT IN AN ORGANIZED HEALTH CARE EDUCATION/TRAINING PROGRAM

## 2018-07-12 PROCEDURE — 74011000250 HC RX REV CODE- 250: Performed by: FAMILY MEDICINE

## 2018-07-12 PROCEDURE — 97116 GAIT TRAINING THERAPY: CPT

## 2018-07-12 RX ORDER — HYDROCODONE BITARTRATE AND ACETAMINOPHEN 5; 325 MG/1; MG/1
1 TABLET ORAL
Status: DISCONTINUED | OUTPATIENT
Start: 2018-07-12 | End: 2018-07-12 | Stop reason: HOSPADM

## 2018-07-12 RX ORDER — LISINOPRIL 20 MG/1
20 TABLET ORAL DAILY
Qty: 30 TAB | Refills: 0 | Status: SHIPPED | OUTPATIENT
Start: 2018-07-13 | End: 2020-01-01

## 2018-07-12 RX ORDER — DOCUSATE SODIUM 100 MG/1
100 CAPSULE, LIQUID FILLED ORAL DAILY
Status: DISCONTINUED | OUTPATIENT
Start: 2018-07-12 | End: 2018-07-12 | Stop reason: HOSPADM

## 2018-07-12 RX ADMIN — HYDROXYZINE PAMOATE 25 MG: 25 CAPSULE ORAL at 01:52

## 2018-07-12 RX ADMIN — IPRATROPIUM BROMIDE AND ALBUTEROL SULFATE 3 ML: .5; 3 SOLUTION RESPIRATORY (INHALATION) at 12:14

## 2018-07-12 RX ADMIN — IPRATROPIUM BROMIDE AND ALBUTEROL SULFATE 3 ML: .5; 3 SOLUTION RESPIRATORY (INHALATION) at 08:15

## 2018-07-12 RX ADMIN — AMLODIPINE BESYLATE 10 MG: 10 TABLET ORAL at 08:58

## 2018-07-12 RX ADMIN — DOCUSATE SODIUM 100 MG: 100 CAPSULE, LIQUID FILLED ORAL at 13:07

## 2018-07-12 RX ADMIN — IPRATROPIUM BROMIDE AND ALBUTEROL SULFATE 3 ML: .5; 3 SOLUTION RESPIRATORY (INHALATION) at 15:32

## 2018-07-12 RX ADMIN — ASPIRIN 81 MG: 81 TABLET, COATED ORAL at 08:58

## 2018-07-12 RX ADMIN — ONDANSETRON 8 MG: 4 TABLET, ORALLY DISINTEGRATING ORAL at 08:58

## 2018-07-12 RX ADMIN — LISINOPRIL 20 MG: 20 TABLET ORAL at 08:58

## 2018-07-12 RX ADMIN — DIGOXIN 0.12 MG: 125 TABLET ORAL at 08:58

## 2018-07-12 RX ADMIN — HYDROCODONE BITARTRATE AND ACETAMINOPHEN 1 TABLET: 5; 325 TABLET ORAL at 13:07

## 2018-07-12 NOTE — PROGRESS NOTES
Problem: Mobility Impaired (Adult and Pediatric)  Goal: *Acute Goals and Plan of Care (Insert Text)  Physical Therapy Goals  Initiated 7/11/2018 and to be accomplished within 7 day(s)  1. Patient will move from supine to sit and sit to supine , scoot up and down and roll side to side in bed with supervision/set-up. 2.  Patient will transfer from bed to chair and chair to bed with supervision/set-up using the least restrictive device. 3.  Patient will perform sit to stand with supervision/set-up. 4.  Patient will ambulate with supervision/set-up for >100 feet with the least restrictive device. 5.  Patient will ascend/descend 5 stairs with 0 handrail(s) with minimal assistance/contact guard assist.     physical Therapy TREATMENT    Patient: Kiki Pham (26 y.o. male)  Date: 7/12/2018  Diagnosis: Hyponatremia  Hyponatremia Hyponatremia       Precautions: Fall  Chart, physical therapy assessment, plan of care and goals were reviewed. ASSESSMENT:  Pt presents in bathroom and therapist required to wait for pt to finish. Pt also seen by resident due to increased c/o abdominal pain, causing increased time delay. Pt performed bed mobility with S and increased time. Pt performed sit<->stand with S and v/c for safe descent. Pt ambulated [phone] with RW and SBA with totalA for management of port o2 at 4L. Pt performed stand step txfr bed to recliner with HHA and v/c to prevent wt bearing on bedside table. Progression toward goals:  [x]      Improving appropriately and progressing toward goals  []      Improving slowly and progressing toward goals  []      Not making progress toward goals and plan of care will be adjusted     PLAN:  Patient continues to benefit from skilled intervention to address the above impairments. Continue treatment per established plan of care.   Discharge Recommendations:  Home Health  Further Equipment Recommendations for Discharge:  rolling walker     SUBJECTIVE:   Patient stated I just need some time to recover before walking again.     OBJECTIVE DATA SUMMARY:   Critical Behavior:  Neurologic State: Alert  Orientation Level: Oriented X4  Cognition: Follows commands  Safety/Judgement: Fall prevention  Functional Mobility Training:  Bed Mobility:     Supine to Sit: Supervision     Scooting: Supervision   Transfers:  Sit to Stand: Supervision  Stand to Sit: Supervision      Balance:  Sitting: Intact  Standing: Impaired; With support  Standing - Static: Fair  Standing - Dynamic : Fair  Ambulation/Gait Training:  Distance (ft): 120 Feet (ft) (x2 trials)  Assistive Device: Walker, rolling  Ambulation - Level of Assistance: Stand-by assistance        Gait Abnormalities: Decreased step clearance        Base of Support: Center of gravity altered     Speed/Sylvia: Slow  Step Length: Right shortened;Left shortened   Stairs:   NT        Neuro Re-Education:  NT  Therapeutic Exercises:   NT  Pain:   5/10 in lower abdomin before and after tx. Activity Tolerance:   fair  Please refer to the flowsheet for vital signs taken during this treatment.   After treatment:   [x] Patient left in no apparent distress sitting up in chair  [] Patient left in no apparent distress in bed  [x] Call bell left within reach  [] Nursing notified  [] Caregiver present  [] Bed alarm activated      Mary Celis PTA   Time Calculation: 44 mins

## 2018-07-12 NOTE — PROGRESS NOTES
Problem: Falls - Risk of  Goal: *Absence of Falls  Document Jennifer Fall Risk and appropriate interventions in the flowsheet. Outcome: Progressing Towards Goal  Fall Risk Interventions:  Mobility Interventions: Patient to call before getting OOB         Medication Interventions: Evaluate medications/consider consulting pharmacy    Elimination Interventions: Patient to call for help with toileting needs             Problem: Pressure Injury - Risk of  Goal: *Prevention of pressure injury  Document Claude Scale and appropriate interventions in the flowsheet.    Outcome: Progressing Towards Goal  Pressure Injury Interventions:       Moisture Interventions: Absorbent underpads    Activity Interventions: Pressure redistribution bed/mattress(bed type)    Mobility Interventions: Pressure redistribution bed/mattress (bed type)    Nutrition Interventions: Offer support with meals,snacks and hydration    Friction and Shear Interventions: HOB 30 degrees or less

## 2018-07-12 NOTE — PROGRESS NOTES
Intern Progress Note [address] Patient: Lillian Sosa MRN: [id number]  CSN: [id number] YOB: 1940  Age: 77 y.o. Sex: male DOA: 7/9/2018 LOS:  LOS: 3 days Subjective:  
 
Acute events: No overnight events. Pt was resting well in bed this morning. Review of Systems: 
Patient Endorses  
--------------------------------------------------------------------------------- Constitutional: Negative for fever, chills and diaphoresis. Respiratory: Negative for cough and hemoptysis. Cardiovascular: Negative for chest pain and palpitations. Gastrointestinal: Negative for nausea and vomiting. Objective:  
  
Patient Vitals for the past 24 hrs: 
 Temp Pulse Resp BP SpO2  
07/12/18 0445 98.6 °F (37 °C) 62 20 136/68 100 % 07/11/18 2333 98.4 °F (36.9 °C) 78 20 140/73 100 % 07/11/18 2029 - - - - 98 % 07/11/18 1951 97 °F (36.1 °C) 81 26 130/67 98 % 07/11/18 1602 - - - - 98 % 07/11/18 1601 97.5 °F (36.4 °C) 77 18 124/58 99 % 07/11/18 1130 - - - - 98 % 07/11/18 0833 - - - - 99 % Intake/Output Summary (Last 24 hours) at 07/12/18 0700 Last data filed at 07/12/18 3087 Gross per 24 hour Intake              120 ml Output             1125 ml Net            -1005 ml Physical Exam:  
General appearance: alert, oriented Lungs: mild, rhonchi in bilateral lower lobes Heart: rrr, distant sound Abdomen: soft, +bs, no ttp Pulses: radial pulses 2+ bilaterally Ext: feet are warm to touch, cap refill <2sec, mild pedal swelling Lab/Data Reviewed: CBC WITH AUTOMATED DIFF Collection Time: 07/12/18  3:24 AM  
Result Value Ref Range WBC 6.1 4.6 - 13.2 K/uL  
 RBC 3.41 (L) 4.70 - 5.50 M/uL HGB 7.4 (L) 13.0 - 16.0 g/dL HCT 24.8 (L) 36.0 - 48.0 % MCV 72.7 (L) 74.0 - 97.0 FL  
 MCH 21.7 (L) 24.0 - 34.0 PG  
 MCHC 29.8 (L) 31.0 - 37.0 g/dL  
 RDW 15.3 (H) 11.6 - 14.5 % PLATELET 924 857 - 842 K/uL  MPV 8.6 (L) 9.2 - 11.8 FL  
 NEUTROPHILS 69 40 - 73 % LYMPHOCYTES 20 (L) 21 - 52 % MONOCYTES 11 (H) 3 - 10 % EOSINOPHILS 0 0 - 5 % BASOPHILS 0 0 - 2 %  
 ABS. NEUTROPHILS 4.2 1.8 - 8.0 K/UL  
 ABS. LYMPHOCYTES 1.2 0.9 - 3.6 K/UL  
 ABS. MONOCYTES 0.7 0.05 - 1.2 K/UL  
 ABS. EOSINOPHILS 0.0 0.0 - 0.4 K/UL  
 ABS. BASOPHILS 0.0 0.0 - 0.1 K/UL  
 DF AUTOMATED PLATELET COMMENTS ADEQUATE PLATELETS    
 RBC COMMENTS MICROCYTOSIS 2+ 
    
 RBC COMMENTS ANISOCYTOSIS 1+ 
    
 RBC COMMENTS HYPOCHROMIA 1+ BMP:  
Lab Results Component Value Date/Time  (L) 07/11/2018 09:20 PM  
 K 3.8 07/11/2018 09:20 PM  
 CL 90 (L) 07/11/2018 09:20 PM  
 CO2 29 07/11/2018 09:20 PM  
 AGAP 9 07/11/2018 09:20 PM  
 GLU 99 07/11/2018 09:20 PM  
 BUN 15 07/11/2018 09:20 PM  
 CREA 1.34 (H) 07/11/2018 09:20 PM  
 GFRAA >60 07/11/2018 09:20 PM  
 GFRNA 51 (L) 07/11/2018 09:20 PM  
  
Scheduled Medications Reviewed: 
Current Facility-Administered Medications Medication Dose Route Frequency  melatonin tablet 1 mg  1 mg Oral QHS  ondansetron (ZOFRAN ODT) tablet 8 mg  8 mg Oral BID  albuterol-ipratropium (DUO-NEB) 2.5 MG-0.5 MG/3 ML  3 mL Nebulization QID RT  
 amLODIPine (NORVASC) tablet 10 mg  10 mg Oral DAILY  aspirin delayed-release tablet 81 mg  81 mg Oral DAILY  digoxin (LANOXIN) tablet 0.125 mg  0.125 mg Oral DAILY  pravastatin (PRAVACHOL) tablet 40 mg  40 mg Oral QHS  lisinopril (PRINIVIL, ZESTRIL) tablet 20 mg  20 mg Oral DAILY  WARFARIN INFORMATION NOTE (COUMADIN)   Other Q24H Imaging, microbiology, and EKG/Telemetry: No new imaging Assessment/Plan  
 
77 y. o. male with a PMH of afib, CHF, CAD, HTN, COPD on home oxygen, pulmonary HTN was admitted with intractable nausea and vomiting with hyponatremia 3 days ago.  
   
Nausea/Vomitting in the setting of Hyponatremia: 
- No nausea/vomitting at this point 
- Na has trended up appropriately 
   
COPD: 
- Shortness of breath is has significantly improved, he reports SOB is at baseline - Lung sounds are significantly improved on exam 
  
Anemia: 
- Hgb 7.4 today.   
 
 
Sowmya Kirk MD 
07/12/18    7:00 AM

## 2018-07-12 NOTE — PROGRESS NOTES
S: Called to patient's bedside due to increased pain. He points to his right groin as the site of the pain, where he has a known right inguinal hernia that is not operable. He denies nausea/vomitting, he did have a small BM today. He states the pain has become worse when he is turning and moving around in his bed. O:   GEN: alert, oriented, NAD  ABD: soft, +bs, no ttp  : right inguinal hernia palpated on the right, reducible    A: 68yo male with afib, CHF, CAD, HTN, and COPD, admitted for hyponatremia with nausea/vomitting/SOB, called to the room for increasing pain in his known right inguinal hernia. P:   1. Began home pain regimen of Norco bid. He has a pain contract with [facility]. 2. Begin bowel regimen with Miralax and Colace. 3. Pt is okay for discharge today, if he has SNF placement.

## 2018-07-12 NOTE — DISCHARGE INSTRUCTIONS
MomentFeedharPrevently Activation    Thank you for requesting access to Application Craft. Please follow the instructions below to securely access and download your online medical record. Application Craft allows you to send messages to your doctor, view your test results, renew your prescriptions, schedule appointments, and more. How Do I Sign Up? 1. In your internet browser, go to [email or web address]  2. Click on the First Time User? Click Here link in the Sign In box. You will be redirect to the New Member Sign Up page. 3. Enter your Application Craft Access Code exactly as it appears below. You will not need to use this code after youve completed the sign-up process. If you do not sign up before the expiration date, you must request a new code. Application Craft Access Code: [email or web address]  Expires: 2018 11:42 AM (This is the date your Application Craft access code will )    4. Enter the last four digits of your Social Security Number (xxxx) and Date of Birth (mm/dd/yyyy) as indicated and click Submit. You will be taken to the next sign-up page. 5. Create a Application Craft ID. This will be your Application Craft login ID and cannot be changed, so think of one that is secure and easy to remember. 6. Create a Application Craft password. You can change your password at any time. 7. Enter your Password Reset Question and Answer. This can be used at a later time if you forget your password. 8. Enter your e-mail address. You will receive e-mail notification when new information is available in [address]. 9. Click Sign Up. You can now view and download portions of your medical record. 10. Click the Download Summary menu link to download a portable copy of your medical information. Additional Information    If you have questions, please visit the Frequently Asked Questions section of the Application Craft website at [email or web address] Remember, Application Craft is NOT to be used for urgent needs. For medical emergencies, dial 911.       MomentFeedharPrevently Activation    Thank you for requesting access to StyleQ. Please follow the instructions below to securely access and download your online medical record. StyleQ allows you to send messages to your doctor, view your test results, renew your prescriptions, schedule appointments, and more. How Do I Sign Up?    11. In your internet browser, go to [email or web address]  12. Click on the First Time User? Click Here link in the Sign In box. You will be redirect to the New Member Sign Up page. 15. Enter your StyleQ Access Code exactly as it appears below. You will not need to use this code after youve completed the sign-up process. If you do not sign up before the expiration date, you must request a new code. StyleQ Access Code: [email or web address]  Expires: 2018 11:42 AM (This is the date your StyleQ access code will )    14. Enter the last four digits of your Social Security Number (xxxx) and Date of Birth (mm/dd/yyyy) as indicated and click Submit. You will be taken to the next sign-up page. 15. Create a StyleQ ID. This will be your StyleQ login ID and cannot be changed, so think of one that is secure and easy to remember. 12. Create a StyleQ password. You can change your password at any time. 16. Enter your Password Reset Question and Answer. This can be used at a later time if you forget your password. 25. Enter your e-mail address. You will receive e-mail notification when new information is available in [address] 19. Click Sign Up. You can now view and download portions of your medical record. 20. Click the Download Summary menu link to download a portable copy of your medical information. Additional Information    If you have questions, please visit the Frequently Asked Questions section of the StyleQ website at [email or web address] Remember, StyleQ is NOT to be used for urgent needs. For medical emergencies, dial 911.     Patient armband removed and shredded     Hypokalemia: Care Instructions  Your Care Instructions    Hypokalemia (say \"jg-qa-rlp-LUTHER-leonid-uh\") is a low level of potassium. The heart, muscles, kidneys, and nervous system all need potassium to work well. This problem has many different causes. Kidney problems, diet, and medicines like diuretics and laxatives can cause it. So can vomiting or diarrhea. In some cases, cancer is the cause. Your doctor may do tests to find the cause of your low potassium levels. You may need medicines to bring your potassium levels back to normal. You may also need regular blood tests to check your potassium. If you have very low potassium, you may need intravenous (IV) medicines. You also may need tests to check the electrical activity of your heart. Heart problems caused by low potassium levels can be very serious. Follow-up care is a key part of your treatment and safety. Be sure to make and go to all appointments, and call your doctor if you are having problems. It's also a good idea to know your test results and keep a list of the medicines you take. How can you care for yourself at home? · If your doctor recommends it, eat foods that have a lot of potassium. These include fresh fruits, juices, and vegetables. They also include nuts, beans, and milk. · Be safe with medicines. If your doctor prescribes medicines or potassium supplements, take them exactly as directed. Call your doctor if you have any problems with your medicines. · Get your potassium levels tested as often as your doctor tells you. When should you call for help? Call 911 anytime you think you may need emergency care. For example, call if:    · You feel like your heart is missing beats.  Heart problems caused by low potassium can cause death.     · You passed out (lost consciousness).     · You have a seizure.    Call your doctor now or seek immediate medical care if:    · You feel weak or unusually tired.     · You have severe arm or leg cramps.     · You have tingling or numbness.     · You feel sick to your stomach, or you vomit.     · You have belly cramps.     · You feel bloated or constipated.     · You have to urinate a lot.     · You feel very thirsty most of the time.     · You are dizzy or lightheaded, or you feel like you may faint.     · You feel depressed, or you lose touch with reality.    Watch closely for changes in your health, and be sure to contact your doctor if:    · You do not get better as expected. Where can you learn more? Go to http://brittney-eliseo.info/. Enter G358 in the search box to learn more about \"Hypokalemia: Care Instructions. \"  Current as of: May 12, 2017  Content Version: 11.7  © 0433-1043 [employer]. Care instructions adapted under license by [employer] (which disclaims liability or warranty for this information). If you have questions about a medical condition or this instruction, always ask your healthcare professional. [address] any warranty or liability for your use of this information.

## 2018-07-12 NOTE — ROUTINE PROCESS
Received bedside report from Karl, patient was sleeping in bed, HOB elevated, bed in lowest position and call button within reach of patient. Gave bedside report to Russell, using SBAR, MAR, and Kardex.

## 2018-07-12 NOTE — PROGRESS NOTES
This pt was accepted by [facility], pt and his family Daughter Hans Corralson-[phone] at bedside of this pt's placement and location, address and phone number to provide to this pt's other three children and sibling. Transportation set-up for 5:00 p.m. With [employer].

## 2018-07-13 ENCOUNTER — PATIENT OUTREACH (OUTPATIENT)
Dept: CARDIOLOGY CLINIC | Age: 78
End: 2018-07-13

## 2018-07-13 NOTE — PROGRESS NOTES
7/13/18 NN contacted [facility] [phone] and spoke with Sima Escobedo. Medication reconciliation completed. NN to follow up with Mr. Inocente Mendez once discharged from [facility].

## 2018-08-01 ENCOUNTER — HOME HEALTH ADMISSION (OUTPATIENT)
Dept: HOME HEALTH SERVICES | Facility: HOME HEALTH | Age: 78
End: 2018-08-01

## 2018-08-03 ENCOUNTER — HOME HEALTH ADMISSION (OUTPATIENT)
Dept: HOME HEALTH SERVICES | Facility: HOME HEALTH | Age: 78
End: 2018-08-03
Payer: MEDICARE

## 2018-08-04 ENCOUNTER — HOME CARE VISIT (OUTPATIENT)
Dept: SCHEDULING | Facility: HOME HEALTH | Age: 78
End: 2018-08-04
Payer: MEDICARE

## 2018-08-04 VITALS
RESPIRATION RATE: 20 BRPM | SYSTOLIC BLOOD PRESSURE: 120 MMHG | DIASTOLIC BLOOD PRESSURE: 78 MMHG | HEART RATE: 88 BPM | OXYGEN SATURATION: 98 % | TEMPERATURE: 96 F

## 2018-08-04 PROCEDURE — G0299 HHS/HOSPICE OF RN EA 15 MIN: HCPCS

## 2018-08-04 PROCEDURE — 400013 HH SOC

## 2018-08-04 PROCEDURE — 3331090002 HH PPS REVENUE DEBIT

## 2018-08-04 PROCEDURE — 3331090001 HH PPS REVENUE CREDIT

## 2018-08-05 PROCEDURE — 3331090001 HH PPS REVENUE CREDIT

## 2018-08-05 PROCEDURE — 3331090002 HH PPS REVENUE DEBIT

## 2018-08-06 PROCEDURE — 3331090002 HH PPS REVENUE DEBIT

## 2018-08-06 PROCEDURE — 3331090001 HH PPS REVENUE CREDIT

## 2018-08-07 ENCOUNTER — HOME CARE VISIT (OUTPATIENT)
Dept: SCHEDULING | Facility: HOME HEALTH | Age: 78
End: 2018-08-07
Payer: MEDICARE

## 2018-08-07 ENCOUNTER — HOME CARE VISIT (OUTPATIENT)
Dept: HOME HEALTH SERVICES | Facility: HOME HEALTH | Age: 78
End: 2018-08-07
Payer: MEDICARE

## 2018-08-07 VITALS
SYSTOLIC BLOOD PRESSURE: 136 MMHG | DIASTOLIC BLOOD PRESSURE: 70 MMHG | TEMPERATURE: 98.2 F | HEART RATE: 86 BPM | OXYGEN SATURATION: 98 %

## 2018-08-07 PROCEDURE — G0151 HHCP-SERV OF PT,EA 15 MIN: HCPCS

## 2018-08-07 PROCEDURE — G0300 HHS/HOSPICE OF LPN EA 15 MIN: HCPCS

## 2018-08-07 PROCEDURE — 3331090001 HH PPS REVENUE CREDIT

## 2018-08-07 PROCEDURE — 3331090002 HH PPS REVENUE DEBIT

## 2018-08-08 ENCOUNTER — PATIENT OUTREACH (OUTPATIENT)
Dept: CARDIOLOGY CLINIC | Age: 78
End: 2018-08-08

## 2018-08-08 VITALS
SYSTOLIC BLOOD PRESSURE: 120 MMHG | HEART RATE: 106 BPM | DIASTOLIC BLOOD PRESSURE: 68 MMHG | TEMPERATURE: 97.5 F | OXYGEN SATURATION: 99 % | RESPIRATION RATE: 20 BRPM

## 2018-08-08 PROCEDURE — 3331090001 HH PPS REVENUE CREDIT

## 2018-08-08 PROCEDURE — 3331090002 HH PPS REVENUE DEBIT

## 2018-08-08 NOTE — PROGRESS NOTES
NN contacted the patient by telephone to perform CHF follow Up. Noted Priorities/Plan:  Patient to work with PT for increased activity progression. To attend follow up appt with Cardiology on 8/10/18. Daily Weight: 150 lbs (decrease)  Zone: green   Signs/Symptoms: Edema \"much better\" I put my compression socks on in the morning and wear them during the day,; SOB only on exertion. Goals      Improve activity tolerance and quality of life. 7/2/18 Patient to be able to report improvement in activity progression on or by 7/10/18   8/8/18 Patient is feeling much better since release from SNF on 8/3/18, [facility] PT is following for improvement in activity progression.  Maintains daily weight. 7/2/18 Patient will provide teach back on daily weight and to report weight gain of 3 lbs in a day or 5 lbs in a week to physician/NN on or by 7/31/18       Understands and adheres to diet. 7/2/18 Patient to be able to teach back cardiac diet of no salt added, on or by 8/17/18. Needs addressed from pathway:   Week 1-4   Provide Daily Disease Management  (NN initiated)  ? Daily weight (Before Breakfast-  Daily Zone Identification (symptom management; weight, edema, SOB, activity/sleep changes)-notify provider immediately as indicated  ? Cardiac Low-sodium Diet (No added sodium; 1500mg as indicated). If  ? Confirm follow-up appointments/transportation. Reschedule if needed. Additional assessment   ? Activity tolerance assessment   (eg: Vital signs; level of consciousness; dyspnea on exertion; pillow usage; recliner vs bed)   ? Energy conservation management (balance activity with rest)  ? Home assessment/modifications * as indicated   ? Home health services  ? SNF utilization  Psychosocial: Reassurance/emotional support     Education:   ? Support system identification ( eg: Caregiver, meal planning, community resources, family, friends, [employer], support group)   ?  Health literacy for heart failure           Have you been to an ER/Hospital since discharge from [address]? No      Have you followed up with PCP/Cardiologist/Specialist?  Was at [facility] and discharged on 8/3/18. Will follow up with Cardiology on 8/10/18 @ 11:30    Transportation: family  Diet: cardiac/low sodium  Activity/ADLs: able to perform ADL's. Medications Reconciled at this time: no will reconcile at Cardiology appt  Home health:  Company/Completion: [address] started on 8/4/18  Social Support: family    [address] (if not addressed). Patient reminded that there is a physician on call 24 hours a day / 7 days a week (M-F 5pm to 8am and from Friday 5pm until Monday 8a for the weekend) should the patient have questions or concerns. Patient reminded to call 911 if situation is emergent or patient feels the situation is emergent. Pt verbalizes understanding.

## 2018-08-09 ENCOUNTER — HOME CARE VISIT (OUTPATIENT)
Dept: SCHEDULING | Facility: HOME HEALTH | Age: 78
End: 2018-08-09
Payer: MEDICARE

## 2018-08-09 PROCEDURE — G0157 HHC PT ASSISTANT EA 15: HCPCS

## 2018-08-09 PROCEDURE — 3331090002 HH PPS REVENUE DEBIT

## 2018-08-09 PROCEDURE — 3331090001 HH PPS REVENUE CREDIT

## 2018-08-10 ENCOUNTER — HOME CARE VISIT (OUTPATIENT)
Dept: HOME HEALTH SERVICES | Facility: HOME HEALTH | Age: 78
End: 2018-08-10
Payer: MEDICARE

## 2018-08-10 ENCOUNTER — OFFICE VISIT (OUTPATIENT)
Dept: CARDIOLOGY CLINIC | Age: 78
End: 2018-08-10

## 2018-08-10 VITALS
OXYGEN SATURATION: 98 % | WEIGHT: 152 LBS | HEART RATE: 101 BPM | DIASTOLIC BLOOD PRESSURE: 80 MMHG | SYSTOLIC BLOOD PRESSURE: 138 MMHG | BODY MASS INDEX: 24.43 KG/M2 | HEIGHT: 66 IN

## 2018-08-10 VITALS
SYSTOLIC BLOOD PRESSURE: 120 MMHG | DIASTOLIC BLOOD PRESSURE: 62 MMHG | TEMPERATURE: 98.7 F | OXYGEN SATURATION: 97 % | HEART RATE: 100 BPM

## 2018-08-10 DIAGNOSIS — E78.00 HYPERCHOLESTEROLEMIA: Chronic | ICD-10-CM

## 2018-08-10 DIAGNOSIS — I10 ESSENTIAL HYPERTENSION: Chronic | ICD-10-CM

## 2018-08-10 DIAGNOSIS — D64.9 ANEMIA, UNSPECIFIED TYPE: ICD-10-CM

## 2018-08-10 DIAGNOSIS — I48.20 CHRONIC ATRIAL FIBRILLATION (HCC): Primary | Chronic | ICD-10-CM

## 2018-08-10 DIAGNOSIS — I50.9 ACUTE ON CHRONIC CONGESTIVE HEART FAILURE, UNSPECIFIED HEART FAILURE TYPE (HCC): ICD-10-CM

## 2018-08-10 DIAGNOSIS — J43.9 PULMONARY EMPHYSEMA, UNSPECIFIED EMPHYSEMA TYPE (HCC): ICD-10-CM

## 2018-08-10 PROCEDURE — 3331090001 HH PPS REVENUE CREDIT

## 2018-08-10 PROCEDURE — 3331090002 HH PPS REVENUE DEBIT

## 2018-08-10 NOTE — PROGRESS NOTES
1. Have you been to the ER, urgent care clinic since your last visit? Hospitalized since your last visit?07/09/2018 for  Hyponatremia and 06/27/2018 for CHF    2. Have you seen or consulted any other health care providers outside of the [facility] since your last visit? Include any pap smears or colon screening.  No

## 2018-08-10 NOTE — PROGRESS NOTES
Evelia Santos presents today for a post-hospital appointment. He was hospitalize from 6/27/18 through 6/20/18 for acute on chronic diastolic heart failure, CAP, and COPD exacerbation. He was given lasix 20mg IV in the ER with resultant improved diuresis but his creatinine became elevated. He did not receive anymore lasix as his lower extremity edema was thought to be chronic. He was hypokalemic on admission and his potassium was supplemented. He was also found to be anemic (anemia of chronic disease) with a Hgb of 8.7 which dropped to as low as 7.1 during his hospital stay and recovered enough and did not require transfusions. He was discharged home after he stabilized. He returned to the hospital on 7/9/18 and was discharged on 7/12/18. He complained of worsening fatigue, nausea, vomiting, and shortness of breath. Chest x-ray showed acute pneumonia and he was also found to be hyponatremic and hypokalemic. He felt better after being hydrated with IV fluids and his potassium was again repleted. His admission sodium level was 119 and improved to 132 by discharge. He states that he was just discharged home from [address] [place] [address] where he stayed for a bit for rehabilitation. He reports that he has followed up with his PCP on 8/6/18 ([address] [place] [address]) and that labs were done. He has Telehealth monitoring. He is a 77year old male with history of chronic atrial fibrillation, mild CAD, severe COPD on oxygen therapy, and history of AAA stent graft/  He also has history of dyslipidemia, hypertension, and pulmonary hypertension. He was last seen by Dr Sonia Donaldson in early April 2018. In 2016, he was treated for osteomyelitis and sepsis and was on antibiotic therapy from 3/2/16 through 6/1/16. Since being discharged home, he states that he has been feeling okay. He is using oxygen at 2L/min at rest and 3L/min with activity (as prescribed by his pulmonologist, Dr Mumtaz Golden).   He admits to chronic lower extremity edema for which he wears compression stockings (but not wearing today). He states that the edema is normally not present in the morning but increases as the day progresses. He does try to keep them elevated during the day. Denies chest pain, tightness, heaviness, and palpitations. Denies shortness of breath at rest, dyspnea on exertion, orthopnea and PND. Denies abdominal bloating. Denies lightheadedness, dizziness, and syncope. Denies lower extremity edema and claudication. Denies nausea, vomiting, diarrhea, melena, hematochezia. Denies hematuria, urgency, frequency. Denies fever, chills. PMH:  Past Medical History:   Diagnosis Date    Aneurysm [facility])     AAA repair 2006 & 2012    Aorto-iliac duplex 02/13/2015    Tech difficult. Patent aorta bi-iliac endograft w/o leak or limb dysfunction.  Arrhythmia     a fib    Cardiac cath 11/01/2012    RCA (sm, nondom) patent. LM patent. LAD 25%. CX (dom) 45% mid. LVEDP 12 mmHg. No WMA. PA 27/12. W 10-12. CO/CI 5.2/2.6 (TD).  Cardiac echocardiogram, abnormal 02/20/2016    EF 55%. No WMA. Indeterminate diastolic fx. RVSP 60 mmHg. Severe LAE. Mild MR. Mod TR.  IVCE. Similar to study of 10/26/12.  Cardiovascular aorto-iliac duplex 02/13/2015    Patent aorta bi-iliac endovascular graft w/o leak or limb dysfunction. Sac measures 4.21 x 4.47 cm (4.4 x 4.6 cm on 1/31/14).  Cardiovascular LE peripheral arterial testing 07/29/2013    No significant LE arterial disease bilaterally. R GHADA 1. 12.  L GHADA 1. 12.  R DBI 0.83. L DBI 0.71. Exercise deferred.  Cardiovascular renal duplex 10/31/2012    No RA stenosis. Intrinsic/med disease in left kidney. Patent aortic endograft. Patent, thrombus-free renal veins bilaterally.  Carotid duplex 07/29/2013    Mild <50% bilateral ICA plaquing.       Chronic lung disease     Cigarette smoker     Congestive heart failure (CHF) (HCC)     COPD [facility]chronic obstructive pulmonary disease) ([facility]     and emphysema; likely secondary to tobacco abuse    Difficult intubation     Dyslipidemia     low HDL    Emphysema     HTN (hypertension)     Hypercholesterolemia     Increased prostate specific antigen (PSA) velocity     Long term (current) use of anticoagulants     coumadin    Osteoarthritis     Osteomyelitis (HCC)     Paroxysmal atrial fibrillation (HCC)     Peripheral vascular disease ([facility]     AAA repair 12/2007    Persistent atrial Fibrillation     Persistent atrial fibrillation ([facility]     Unspecified adverse effect of anesthesia     difficulty breathing placed in ICU Oct 2012 (AAA repair)       PSH:  Past Surgical History:   Procedure Laterality Date    BRONCHOSCOPY DIAGNOSTIC  11/2/2012         CARDIAC CATHETERIZATION  11/1/2012         COLONOSCOPY N/A 7/26/2016    COLONOSCOPY with Polypectomies performed by Katheryn Bruce MD at [address] [place] [address] AAA REPAIR      2006 & 2012    HX HEART CATHETERIZATION  3/4/2009    1. RCA small, nondominant; patent. 2. LMCA patent. 3. LAD is long, wrapped around apical vessel. Diffuse, 20-30% stenosis noted. 4. CCA is large, dominant vessel. Diffuse 20-30% stenosis. 5. LVEDP is 16 mmHg. 6. Overall left ventricular systolic function mildly diminshed with est. EF 45%. Mild, global hypokinesis noted. 7. No significant mitral regurgitation or aortic stenosis noted. (see report)    HX HERNIA REPAIR  2/2014    rt inguinal     HX HERNIA REPAIR  01/2016    LEFT INGUINAL HERNIA REPAIR DR. Brown Barnard    REPAIR ING HERNIA,5+Y/O,JESSIE Left 1-20-16    Dr. Oscar Abreu  11/1/2012            MEDS:  Current Outpatient Prescriptions   Medication Sig    acetaminophen (TYLENOL ARTHRITIS PAIN) 650 mg TbER Take 1,300 mg by mouth two (2) times daily as needed (Pain).  traZODone (DESYREL) 50 mg tablet Take 50 mg by mouth nightly.  tamsulosin (FLOMAX) 0.4 mg capsule Take 0.4 mg by mouth daily.     lisinopril (PRINIVIL, ZESTRIL) 20 mg tablet Take 1 Tab by mouth daily.  melatonin 3 mg tablet Take 1 Tab by mouth nightly as needed.  aspirin delayed-release 81 mg tablet Take 81 mg by mouth daily.  pravastatin (PRAVACHOL) 40 mg tablet Take 40 mg by mouth nightly.  polyethylene glycol (MIRALAX) 17 gram packet Take 17 g by mouth daily as needed (constipation).  tiotropium-olodaterol (STIOLTO RESPIMAT) 2.5-2.5 mcg/actuation mist Take 2 Inhalation by inhalation daily.  albuterol (PROVENTIL VENTOLIN) 2.5 mg /3 mL (0.083 %) nebulizer solution 3 mL by Nebulization route every four (4) hours as needed for Wheezing.  digoxin (DIGITEK) 0.125 mg tablet take 1 tablet by mouth once daily    warfarin (COUMADIN) 2.5 mg tablet Take 1 Tab by mouth daily. Take 2 pills (5 mg) Mon, Tues, Wed, Thur, Sat, Sun; and 1 pill only  (2.5 mg) Fri (Patient taking differently: Take 2.5 mg by mouth daily. Take 2 pills (5 mg) Mon, Tues, Thur, Sat, Sun; and 1 pill only  (2.5 mg) Wed, Fri)    amLODIPine (NORVASC) 10 mg tablet Take 10 mg by mouth daily.  OXYGEN-AIR DELIVERY SYSTEMS 2 L/min by Nasal route daily. Continuous. Company is [employer]        No current facility-administered medications for this visit. Allergies and Sensitivities:  Allergies   Allergen Reactions    Codeine Swelling    Morphine Itching    Sulfa (Sulfonamide Antibiotics) Other (comments)     Kidney problems. Family History:  Family History   Problem Relation Age of Onset    Heart Surgery Father      BYPASS    Stroke Father     Heart Surgery Mother      BYPASS    Coronary Artery Disease Mother     Stroke Mother        Social History:  He  reports that he quit smoking about 5 years ago. His smoking use included Cigarettes. He has a 54.00 pack-year smoking history. He has never used smokeless tobacco.  He  reports that he does not drink alcohol.       Physical:  Visit Vitals    /80    Pulse (!) 101    Ht 5' 6\" (1.676 m)    Wt 68.9 kg (152 lb)    SpO2 98%    BMI 24.53 kg/m2       His weight is down 10 pounds since his last visit. Exam:  Neck:  Supple, no JVD, no carotid bruits  CV:  Normal S1 and  S2, no murmurs, rubs, or gallops noted. Irregularly,  Irregular rhythm  Lungs:  Breath sounds are clear to ausculation but diminished throughout, no wheezes or rales. Scattered rhonchi initially heard but cleared with coughing. Abd:  Soft, non-tender, non-distended with good bowel sounds. No hepatosplenomegaly  Extremities:  1+ lower extremity edema especially around the ankles. Data:  EKG:    Read by Leyla Arias MD - atrial fibrillation, occasional ectopic ventricular beat.  Nonspecific QRS widening and anterior fascicular block.  Old anterior infarct.  Possible nonspecific ST abnormality      LABS:  Lab Results   Component Value Date/Time    Sodium 132 (L) 07/12/2018 09:40 AM    Potassium 3.5 07/12/2018 09:40 AM    Chloride 92 (L) 07/12/2018 09:40 AM    CO2 32 07/12/2018 09:40 AM    Glucose 92 07/12/2018 09:40 AM    BUN 13 07/12/2018 09:40 AM    Creatinine 1.37 (H) 07/12/2018 09:40 AM     Lab Results   Component Value Date/Time    HDL Cholesterol 25 (L) 03/26/2010 10:30 AM     Lab Results   Component Value Date/Time    ALT (SGPT) 26 07/09/2018 12:00 PM       Impression / Plan:  1. Atrial fibrillation, rate controlled and on Coumadin (managed by PCP)  2. Essential hypertension, blood pressure stable  3. COPD, on oxygen therapy  4. CAD, mild by coronary angiography in 2012  5. AAA, s/p repair, followed by vascular surgery  6. Anemia of chronic disease, managed by PCP    Mr. Marsh Mohs was seen today for a post-hospital follow-up. He was hospitalized from June 27 through June 30, 2018,  for 3 days for acute on chronic CHF, CAP, and COPD exacerbation. After he improved and stabilized, he was discharged home. He returned to the hospital on 7/9/18 with worsening fatigue, nausea, vomiting, and shortness of breath.   Chest x-ray showed acute pneumonia and he was also found to be hyponatremic and hypokalemic. He felt better after being hydrated with IV fluids and his potassium was again repleted. His admission sodium level was 119 and improved to 132 by discharge. He stayed at [facility] for strengthening and rehab and he states he was just recently discharged home. He reports that he followed up with his PCP ([address]) on 8/6/18, and will be following up with his pulmonologist.  He reports that labs were done and they are watching his Hgb and electrolytes closely. He reports that he is supposed to follow-up with them again in 2 weeks. He offers no cardiac complaints. His blood pressure is stable. He is in atrial fibrillation with rate of 101 bpm.  He denies chest pain and his shortness of breath is at his historical baseline. He uses his oxygen continuously at 2L/min at rest and 3L/min with activity. His weight is down 10 pounds since his last appointment and he does not exhibit any signs of volume overload. He has chronic mild lower extremity edema which he states is normally not present when he first wakes up in the morning but progressively increases throughout the day. He usually wears compression stockings/socks but is not wearing them today. No changes were made to his medication regimen at this time. He is monitored by Telehealth. If he notices consistently elevated heart rates, I asked that he call the office so that we can adjust his rate controlling medications. Congestive heart failure teaching reinforced today. Advised to limit sodium intake to no more than 2000mg per day and to also limit fluid intake to 48 ounces per day (unless otherwise specified). Advised to weigh daily every morning and record weights.   Instructed to call our office if progressive weight gain is noted over a 2 to 3 day period of time, shortness of breath increases, or if abdominal bloating, nausea, fatigue, or increased lower extremity edema is noted. Patient education material re:  CHF, low sodium diet, and sodium/fluid restrictions attached to his after visit summary. Greater that 50% of a 40 minute visit was spent in discussion, counseling, and answering questions. He will follow-up with Dr. Kasandra Degroot as scheduled and as needed. Ac Ray MSN, FNP-BC    Please note:  Portions of this chart were created with Dragon medical speech to text program.  Unrecognized errors may be present.

## 2018-08-10 NOTE — PATIENT INSTRUCTIONS
Continue present medication regimen  Please call the office if you notice your heart rate staying elevated based on Telehealth monitoring or if you notice your heart racing  Follow-up with Dr. Jr Fonseca as scheduled  Weigh daily and record  Limit sodium intake to 2000mg per day  Limit fluid intake to no more than  6, eight ounce glasses of any type of fluids per day (total of 48 ounces per day)  Call if you notice sudden or progressive weight gain (3-5 pounds in 2-3 days), increasing shortness of breath, abdominal bloating, increasing lower extremity edema, inability to lie flat or on your normal number of pillows, having to sit up to catch your breath, fatigue, increased somnolence (sleeping more), poor appetite         Heart Failure: Care Instructions  Your Care Instructions    Heart failure occurs when your heart does not pump as much blood as the body needs. Failure does not mean that the heart has stopped pumping but rather that it is not pumping as well as it should. Over time, this causes fluid buildup in your lungs and other parts of your body. Fluid buildup can cause shortness of breath, fatigue, swollen ankles, and other problems. By taking medicines regularly, reducing sodium (salt) in your diet, checking your weight every day, and making lifestyle changes, you can feel better and live longer. Follow-up care is a key part of your treatment and safety. Be sure to make and go to all appointments, and call your doctor if you are having problems. It's also a good idea to know your test results and keep a list of the medicines you take. How can you care for yourself at home? Medicines    · Be safe with medicines. Take your medicines exactly as prescribed.  Call your doctor if you think you are having a problem with your medicine.     · Do not take any vitamins, over-the-counter medicine, or herbal products without talking to your doctor first. Neetaa Amel not take ibuprofen (Advil or Motrin) and naproxen (Aleve) without talking to your doctor first. They could make your heart failure worse.     · You may be taking some of the following medicine. ¨ Beta-blockers can slow heart rate, decrease blood pressure, and improve your condition. Taking a beta-blocker may lower your chance of needing to be hospitalized. ¨ Angiotensin-converting enzyme inhibitors (ACEIs) reduce the heart's workload, lower blood pressure, and reduce swelling. Taking an ACEI may lower your chance of needing to be hospitalized again. ¨ Angiotensin II receptor blockers (ARBs) work like ACEIs. Your doctor may prescribe them instead of ACEIs. ¨ Diuretics, also called water pills, reduce swelling. ¨ Potassium supplements replace this important mineral, which is sometimes lost with diuretics. ¨ Aspirin and other blood thinners prevent blood clots, which can cause a stroke or heart attack.    You will get more details on the specific medicines your doctor prescribes. Diet    · Your doctor may suggest that you limit sodium to 2,000 milligrams (mg) a day or less. That is less than 1 teaspoon of salt a day, including all the salt you eat in cooking or in packaged foods. People get most of their sodium from processed foods. Fast food and restaurant meals also tend to be very high in sodium.     · Ask your doctor how much liquid you can drink each day. You may have to limit liquids.    Weight    · Weigh yourself without clothing at the same time each day. Record your weight. Call your doctor if you have a sudden weight gain, such as more than 2 to 3 pounds in a day or 5 pounds in a week. (Your doctor may suggest a different range of weight gain.) A sudden weight gain may mean that your heart failure is getting worse.    Activity level    · Start light exercise (if your doctor says it is okay). Even if you can only do a small amount, exercise will help you get stronger, have more energy, and manage your weight and your stress. Walking is an easy way to get exercise. Start out by walking a little more than you did before. Bit by bit, increase the amount you walk.     · When you exercise, watch for signs that your heart is working too hard. You are pushing yourself too hard if you cannot talk while you are exercising. If you become short of breath or dizzy or have chest pain, stop, sit down, and rest.     · If you feel \"wiped out\" the day after you exercise, walk slower or for a shorter distance until you can work up to a better pace.     · Get enough rest at night. Sleeping with 1 or 2 pillows under your upper body and head may help you breathe easier.    Lifestyle changes    · Do not smoke. Smoking can make a heart condition worse. If you need help quitting, talk to your doctor about stop-smoking programs and medicines. These can increase your chances of quitting for good. Quitting smoking may be the most important step you can take to protect your heart.     · Limit alcohol to 2 drinks a day for men and 1 drink a day for women. Too much alcohol can cause health problems.     · Avoid getting sick from colds and the flu. Get a pneumococcal vaccine shot. If you have had one before, ask your doctor whether you need another dose. Get a flu shot each year. If you must be around people with colds or the flu, wash your hands often. When should you call for help? Call 911 if you have symptoms of sudden heart failure such as:    · You have severe trouble breathing.     · You cough up pink, foamy mucus.     · You have a new irregular or rapid heartbeat.    Call your doctor now or seek immediate medical care if:    · You have new or increased shortness of breath.     · You are dizzy or lightheaded, or you feel like you may faint.     · You have sudden weight gain, such as more than 2 to 3 pounds in a day or 5 pounds in a week.  (Your doctor may suggest a different range of weight gain.)     · You have increased swelling in your legs, ankles, or feet.     · You are suddenly so tired or weak that you cannot do your usual activities.    Watch closely for changes in your health, and be sure to contact your doctor if you develop new symptoms. Where can you learn more? Go to http://brittney-eliseo.info/. Enter Q248 in the search box to learn more about \"Heart Failure: Care Instructions. \"  Current as of: May 10, 2017  Content Version: 11.7  © 3628-3650 [employer]. Care instructions adapted under license by [employer] (which disclaims liability or warranty for this information). If you have questions about a medical condition or this instruction, always ask your healthcare professional. [place] [address] any warranty or liability for your use of this information. Limiting Sodium and Fluids With Heart Failure: Care Instructions  Your Care Instructions    Sodium causes your body to hold on to extra water. This may cause your heart failure symptoms to get worse. Limiting sodium may help you feel better and lower your risk of having to go to the hospital.  People get most of their sodium from processed foods. Fast food and restaurant meals also tend to be very high in sodium. Your doctor may suggest that you limit sodium to 2,000 milligrams (mg) a day or less. That is less than 1 teaspoon of salt a day, including all the salt you eat in cooked or packaged foods. Usually, you have to limit the amount of liquids you drink only if your heart failure is severe. Limiting sodium alone often is enough to help your body get rid of extra fluids. However, your doctor may tell you to limit your fluid intake to a set amount each day. Follow-up care is a key part of your treatment and safety. Be sure to make and go to all appointments, and call your doctor if you are having problems. It's also a good idea to know your test results and keep a list of the medicines you take. How can you care for yourself at home?   Read food labels  · Read food labels on cans and food packages. The labels tell you how much sodium is in each serving. Make sure that you look at the serving size. If you eat more than the serving size, you have eaten more sodium than is listed for one serving. · Food labels also tell you the Percent Daily Value. If the Percent Daily Value says 50%, it means that you will get at least 50% of all the sodium you need for the entire day in one serving. Choose products with low Percent Daily Values for sodium. · Be aware that sodium can come in forms other than salt, including monosodium glutamate (MSG), sodium citrate, and sodium bicarbonate (baking soda). MSG is often added to Asian food. You can sometimes ask for food without MSG or salt. Buy low-sodium foods  · Buy foods that are labeled \"unsalted\" (no salt added), \"sodium-free\" (less than 5 mg of sodium per serving), or \"low-sodium\" (less than 140 mg of sodium per serving). A food labeled \"light sodium\" has less than half of the full-sodium version of that food. Foods labeled \"reduced-sodium\" may still have too much sodium. · Buy fresh vegetables or plain, frozen vegetables. Buy low-sodium versions of canned vegetables, soups, and other canned goods. Prepare low-sodium meals  · Use less salt each day when cooking. Reducing salt in this way will help you adjust to the taste. Do not add salt after cooking. Take the salt shaker off the table. · Flavor your food with garlic, lemon juice, onion, vinegar, herbs, and spices instead of salt. Do not use soy sauce, steak sauce, onion salt, garlic salt, mustard, or ketchup on your food. · Make your own salad dressings, sauces, and ketchup without adding salt. · Use less salt (or none) when recipes call for it. You can often use half the salt a recipe calls for without losing flavor. Other dishes like rice, pasta, and grains do not need added salt. · Rinse canned vegetables. This removes some-but not all-of the salt.   · Avoid water that has a naturally high sodium content or that has been treated with water softeners, which add sodium. Call your local water company to find out the sodium content of your water supply. If you buy bottled water, read the label and choose a sodium-free brand. Avoid high-sodium foods, such as:  · Smoked, cured, salted, and canned meat, fish, and poultry. · Ham, deras, hot dogs, and luncheon meats. · Regular, hard, and processed cheese and regular peanut butter. · Crackers with salted tops. · Frozen prepared meals. · Canned and dried soups, broths, and bouillon, unless labeled sodium-free or low-sodium. · Canned vegetables, unless labeled sodium-free or low-sodium. · Salted snack foods such as chips and pretzels. · Western Lisa fries, pizza, tacos, and other fast foods. · Pickles, olives, ketchup, and other condiments, especially soy sauce, unless labeled sodium-free or low-sodium. If you cannot cook for yourself  · Have family members or friends help you, or have someone cook low-sodium meals. · Check with your local senior nutrition program to find out where meals are served and whether they offer a low-sodium option. You can often find these programs through your local health department or hospital.  · Have meals delivered to your home. Most [facility] have a Meals on SHEILA [address]. These programs provide one hot meal a day for older adults, delivered to their homes. Ask whether these meals are low-sodium. Let them know that you are on a low-sodium diet. Limiting fluid intake  · Find a method that works for you. You might simply write down how much you drink every time you do. Some people keep a container filled with the amount of fluid allowed for that day. If they drink from a source other than the container, then they pour out that amount. · Measure your regular drinking glasses to find out how much fluid each one holds. Once you know this, you will not have to measure every time.   · Besides water, milk, juices, and other drinks, some foods have a lot of fluid. Count any foods that will melt (such as ice cream or gelatin dessert) or liquid foods (such as soup) as part of your fluid intake for the day. Where can you learn more? Go to http://brittney-eliseo.info/. Enter A166 in the search box to learn more about \"Limiting Sodium and Fluids With Heart Failure: Care Instructions. \"  Current as of: December 6, 2017  Content Version: 11.7  © 8922-1520 [employer]. Care instructions adapted under license by [employer] (which disclaims liability or warranty for this information). If you have questions about a medical condition or this instruction, always ask your healthcare professional. [address] any warranty or liability for your use of this information. Low Sodium Diet (2,000 Milligram): Care Instructions  Your Care Instructions    Too much sodium causes your body to hold on to extra water. This can raise your blood pressure and force your heart and kidneys to work harder. In very serious cases, this could cause you to be put in the hospital. It might even be life-threatening. By limiting sodium, you will feel better and lower your risk of serious problems. The most common source of sodium is salt. People get most of the salt in their diet from canned, prepared, and packaged foods. Fast food and restaurant meals also are very high in sodium. Your doctor will probably limit your sodium to less than 2,000 milligrams (mg) a day. This limit counts all the sodium in prepared and packaged foods and any salt you add to your food. Follow-up care is a key part of your treatment and safety. Be sure to make and go to all appointments, and call your doctor if you are having problems. It's also a good idea to know your test results and keep a list of the medicines you take. How can you care for yourself at home? Read food labels  · Read labels on cans and food packages.  The labels tell you how much sodium is in each serving. Make sure that you look at the serving size. If you eat more than the serving size, you have eaten more sodium. · Food labels also tell you the Percent Daily Value for sodium. Choose products with low Percent Daily Values for sodium. · Be aware that sodium can come in forms other than salt, including monosodium glutamate (MSG), sodium citrate, and sodium bicarbonate (baking soda). MSG is often added to Asian food. When you eat out, you can sometimes ask for food without MSG or added salt. Buy low-sodium foods  · Buy foods that are labeled \"unsalted\" (no salt added), \"sodium-free\" (less than 5 mg of sodium per serving), or \"low-sodium\" (less than 140 mg of sodium per serving). Foods labeled \"reduced-sodium\" and \"light sodium\" may still have too much sodium. Be sure to read the label to see how much sodium you are getting. · Buy fresh vegetables, or frozen vegetables without added sauces. Buy low-sodium versions of canned vegetables, soups, and other canned goods. Prepare low-sodium meals  · Cut back on the amount of salt you use in cooking. This will help you adjust to the taste. Do not add salt after cooking. One teaspoon of salt has about 2,300 mg of sodium. · Take the salt shaker off the table. · Flavor your food with garlic, lemon juice, onion, vinegar, herbs, and spices. Do not use soy sauce, lite soy sauce, steak sauce, onion salt, garlic salt, celery salt, mustard, or ketchup on your food. · Use low-sodium salad dressings, sauces, and ketchup. Or make your own salad dressings and sauces without adding salt. · Use less salt (or none) when recipes call for it. You can often use half the salt a recipe calls for without losing flavor. Other foods such as rice, pasta, and grains do not need added salt. · Rinse canned vegetables, and cook them in fresh water. This removes some-but not all-of the salt.   · Avoid water that is naturally high in sodium or that has been treated with water softeners, which add sodium. Call your local water company to find out the sodium content of your water supply. If you buy bottled water, read the label and choose a sodium-free brand. Avoid high-sodium foods  · Avoid eating:  ¨ Smoked, cured, salted, and canned meat, fish, and poultry. ¨ Ham, deras, hot dogs, and luncheon meats. ¨ Regular, hard, and processed cheese and regular peanut butter. ¨ Crackers with salted tops, and other salted snack foods such as pretzels, chips, and salted popcorn. ¨ Frozen prepared meals, unless labeled low-sodium. ¨ Canned and dried soups, broths, and bouillon, unless labeled sodium-free or low-sodium. ¨ Canned vegetables, unless labeled sodium-free or low-sodium. ¨ Western Lisa fries, pizza, tacos, and other fast foods. ¨ Pickles, olives, ketchup, and other condiments, especially soy sauce, unless labeled sodium-free or low-sodium. Where can you learn more? Go to http://brittney-eliseo.info/. Enter U247 in the search box to learn more about \"Low Sodium Diet (2,000 Milligram): Care Instructions. \"  Current as of: May 12, 2017  Content Version: 11.7  © 5881-7291 [employer]. Care instructions adapted under license by [employer] (which disclaims liability or warranty for this information). If you have questions about a medical condition or this instruction, always ask your healthcare professional. [place] [address] any warranty or liability for your use of this information.

## 2018-08-10 NOTE — MR AVS SNAPSHOT
[address] [place] [address] [address] [place] [address] 
[phone] Patient: Tracie Harvey MRN: [id number] Visit Information Date & Time Provider Department Dept. Phone Encounter #  
 8/10/2018 11:30 AM Tommie Evans NP [facility] [address] [id number] Your Appointments 1/8/2019 11:40 AM  
Follow Up with Jewel Hernandez MD  
[facility] ([facility]) Appt Note: 9-12- month f/up [place] [id number] [phone] [address] [place] [address] [address] Upcoming Health Maintenance Date Due DTaP/Tdap/Td series (1 - Tdap) 4/7/1960 ZOSTER VACCINE AGE 60> 2/7/1999 GLAUCOMA SCREENING Q2Y 4/7/2004 Pneumococcal 65+ Low/Medium Risk (1 of 2 - PCV13) 4/7/2004 MEDICARE YEARLY EXAM 6/22/2018 Influenza Age 5 to Adult 8/1/2018 Allergies as of 8/10/2018  Review Complete On: 8/10/2018 By: Tommie Evans NP Severity Noted Reaction Type Reactions Codeine  06/14/2011    Swelling Morphine  01/03/2014    Itching Sulfa (Sulfonamide Antibiotics)  06/14/2011    Other (comments) Kidney problems. Current Immunizations  Reviewed on 10/25/2017 Name Date Influenza Vaccine 10/4/2017, 10/1/2016 Not reviewed this visit You Were Diagnosed With   
  
 Codes Comments Chronic atrial fibrillation (HCC)    -  Primary ICD-10-CM: Q40.9 ICD-9-CM: 427.31 Essential hypertension     ICD-10-CM: I10 
ICD-9-CM: 401.9 Hypercholesterolemia     ICD-10-CM: E78.00 ICD-9-CM: 272.0 Acute on chronic congestive heart failure, unspecified heart failure type ([facility] [address]     ICD-10-CM: I50.9 ICD-9-CM: 428.0 Pulmonary emphysema, unspecified emphysema type ([facility] [address]     ICD-10-CM: J43.9 ICD-9-CM: 492.8 Anemia, unspecified type     ICD-10-CM: D64.9 ICD-9-CM: 726. 9 Vitals BP Pulse Height(growth percentile) Weight(growth percentile) SpO2 BMI  
 138/80 (!) 101 5' 6\" (1.676 m) 152 lb (68.9 kg) 98% 24.53 kg/m2 Smoking Status Former Smoker Vitals History BMI and BSA Data Body Mass Index Body Surface Area 24.53 kg/m 2 1.79 m 2 Preferred Pharmacy Pharmacy Name Phone [facility] [place], [address] [facility] [phone] Your Updated Medication List  
  
   
This list is accurate as of 8/10/18 12:07 PM.  Always use your most recent med list.  
  
  
  
  
 albuterol 2.5 mg /3 mL (0.083 %) nebulizer solution Commonly known as:  PROVENTIL VENTOLIN  
3 mL by Nebulization route every four (4) hours as needed for Wheezing. amLODIPine 10 mg tablet Commonly known as:  Turk Christopher Take 10 mg by mouth daily. aspirin delayed-release 81 mg tablet Take 81 mg by mouth daily. digoxin 0.125 mg tablet Commonly known as:  DIGITEK  
take 1 tablet by mouth once daily FLOMAX 0.4 mg capsule Generic drug:  tamsulosin Take 0.4 mg by mouth daily. lisinopril 20 mg tablet Commonly known as:  Merilynn Alonso Take 1 Tab by mouth daily. melatonin 3 mg tablet Take 1 Tab by mouth nightly as needed. MIRALAX 17 gram packet Generic drug:  polyethylene glycol Take 17 g by mouth daily as needed (constipation). OXYGEN-AIR DELIVERY SYSTEMS  
2 L/min by Nasal route daily. Continuous. Company is [employer]  
  
 pravastatin 40 mg tablet Commonly known as:  PRAVACHOL Take 40 mg by mouth nightly. tiotropium-olodaterol 2.5-2.5 mcg/actuation Mist  
Commonly known as:  Chiara Andes Take 2 Inhalation by inhalation daily. traZODone 50 mg tablet Commonly known as:  Arben Lipps Take 50 mg by mouth nightly. TYLENOL ARTHRITIS PAIN 650 mg Vida Galdamez Generic drug:  acetaminophen Take 1,300 mg by mouth two (2) times daily as needed (Pain). warfarin 2.5 mg tablet Commonly known as:  COUMADIN Take 1 Tab by mouth daily. Take 2 pills (5 mg) Mon, Tues, Wed, Thur, Sat, Sun; and 1 pill only (2.5 mg) Fri We Performed the Following AMB POC EKG ROUTINE W/ 12 LEADS, INTER & REP [26065 CPT(R)] To-Do List   
 08/13/2018 10:00 AM  
  Appointment with Uriel Huntley PTA at [address] [place] [address] REG MED CTR  
  
 08/14/2018 To Be Determined Appointment with Sally lGoria RN at [address] [place] [address] MED CTR  
  
 08/15/2018 8:00 AM  
  Appointment with Uriel Huntley PTA at [address] [place] [address] SCHEDULING/INTAKE  
  
 08/17/2018 To Be Determined Appointment with Sally Gloria RN at [address] [place] [address] MED CTR  
  
 08/20/2018 To Be Determined Appointment with Uriel Huntley PTA at [address] [place] [address] MED CTR  
  
 08/21/2018 To Be Determined Appointment with Sally Gloria RN at [address] [place] [address] MED CTR  
  
 08/22/2018 To Be Determined Appointment with Uriel Huntley PTA at [address] [place] [address] MED CTR  
  
 08/24/2018 To Be Determined Appointment with Sally Gloria RN at [address] [place] [address] MED CTR  
  
 08/27/2018 To Be Determined Appointment with Saint Sloan at [address] [place], [place] [address]sb[address]  Patient Instructions Continue present medication regimen Please call the office if you notice your heart rate staying elevated based on Telehealth monitoring or if you notice your heart racing Follow-up with Dr. Brittany Levine as scheduled Weigh daily and record Limit sodium intake to 2000mg per day Limit fluid intake to no more than  6, eight ounce glasses of any type of fluids per day (total of 48 ounces per day) Call if you notice sudden or progressive weight gain (3-5 pounds in 2-3 days), increasing shortness of breath, abdominal bloating, increasing lower extremity edema, inability to lie flat or on your normal number of pillows, having to sit up to catch your breath, fatigue, increased somnolence (sleeping more), poor appetite Heart Failure: Care Instructions Your Care Instructions Heart failure occurs when your heart does not pump as much blood as the body needs. Failure does not mean that the heart has stopped pumping but rather that it is not pumping as well as it should. Over time, this causes fluid buildup in your lungs and other parts of your body. Fluid buildup can cause shortness of breath, fatigue, swollen ankles, and other problems. By taking medicines regularly, reducing sodium (salt) in your diet, checking your weight every day, and making lifestyle changes, you can feel better and live longer. Follow-up care is a key part of your treatment and safety. Be sure to make and go to all appointments, and call your doctor if you are having problems. It's also a good idea to know your test results and keep a list of the medicines you take. How can you care for yourself at home? Medicines 
  · Be safe with medicines. Take your medicines exactly as prescribed. Call your doctor if you think you are having a problem with your medicine.  
  · Do not take any vitamins, over-the-counter medicine, or herbal products without talking to your doctor first. Vernon Clement not take ibuprofen (Advil or Motrin) and naproxen (Aleve) without talking to your doctor first. They could make your heart failure worse.  
  · You may be taking some of the following medicine. ¨ Beta-blockers can slow heart rate, decrease blood pressure, and improve your condition. Taking a beta-blocker may lower your chance of needing to be hospitalized. ¨ Angiotensin-converting enzyme inhibitors (ACEIs) reduce the heart's workload, lower blood pressure, and reduce swelling. Taking an ACEI may lower your chance of needing to be hospitalized again. ¨ Angiotensin II receptor blockers (ARBs) work like ACEIs. Your doctor may prescribe them instead of ACEIs. ¨ Diuretics, also called water pills, reduce swelling. ¨ Potassium supplements replace this important mineral, which is sometimes lost with diuretics. ¨ Aspirin and other blood thinners prevent blood clots, which can cause a stroke or heart attack.  
 You will get more details on the specific medicines your doctor prescribes. Diet 
  · Your doctor may suggest that you limit sodium to 2,000 milligrams (mg) a day or less. That is less than 1 teaspoon of salt a day, including all the salt you eat in cooking or in packaged foods. People get most of their sodium from processed foods. Fast food and restaurant meals also tend to be very high in sodium.  
  · Ask your doctor how much liquid you can drink each day. You may have to limit liquids.  
 Weight 
  · Weigh yourself without clothing at the same time each day. Record your weight. Call your doctor if you have a sudden weight gain, such as more than 2 to 3 pounds in a day or 5 pounds in a week. (Your doctor may suggest a different range of weight gain.) A sudden weight gain may mean that your heart failure is getting worse.  
 Activity level 
  · Start light exercise (if your doctor says it is okay). Even if you can only do a small amount, exercise will help you get stronger, have more energy, and manage your weight and your stress. Walking is an easy way to get exercise. Start out by walking a little more than you did before. Bit by bit, increase the amount you walk.  
  · When you exercise, watch for signs that your heart is working too hard.  You are pushing yourself too hard if you cannot talk while you are exercising. If you become short of breath or dizzy or have chest pain, stop, sit down, and rest.  
  · If you feel \"wiped out\" the day after you exercise, walk slower or for a shorter distance until you can work up to a better pace.  
  · Get enough rest at night. Sleeping with 1 or 2 pillows under your upper body and head may help you breathe easier.  
 Lifestyle changes 
  · Do not smoke. Smoking can make a heart condition worse. If you need help quitting, talk to your doctor about stop-smoking programs and medicines. These can increase your chances of quitting for good. Quitting smoking may be the most important step you can take to protect your heart.  
  · Limit alcohol to 2 drinks a day for men and 1 drink a day for women. Too much alcohol can cause health problems.  
  · Avoid getting sick from colds and the flu. Get a pneumococcal vaccine shot. If you have had one before, ask your doctor whether you need another dose. Get a flu shot each year. If you must be around people with colds or the flu, wash your hands often. When should you call for help? Call 911 if you have symptoms of sudden heart failure such as: 
  · You have severe trouble breathing.  
  · You cough up pink, foamy mucus.  
  · You have a new irregular or rapid heartbeat.  
 Call your doctor now or seek immediate medical care if: 
  · You have new or increased shortness of breath.  
  · You are dizzy or lightheaded, or you feel like you may faint.  
  · You have sudden weight gain, such as more than 2 to 3 pounds in a day or 5 pounds in a week. (Your doctor may suggest a different range of weight gain.)  
  · You have increased swelling in your legs, ankles, or feet.  
  · You are suddenly so tired or weak that you cannot do your usual activities.  
 Watch closely for changes in your health, and be sure to contact your doctor if you develop new symptoms. Where can you learn more? Go to http://brittney-eliseo.info/. Enter Q930 in the search box to learn more about \"Heart Failure: Care Instructions. \" Current as of: May 10, 2017 Content Version: 11.7 © 5543-8497 Job1001. Care instructions adapted under license by [employer]oo (which disclaims liability or warranty for this information). If you have questions about a medical condition or this instruction, always ask your healthcare professional. [address] any warranty or liability for your use of this information. Limiting Sodium and Fluids With Heart Failure: Care Instructions Your Care Instructions Sodium causes your body to hold on to extra water. This may cause your heart failure symptoms to get worse. Limiting sodium may help you feel better and lower your risk of having to go to the hospital. 
People get most of their sodium from processed foods. Fast food and restaurant meals also tend to be very high in sodium. Your doctor may suggest that you limit sodium to 2,000 milligrams (mg) a day or less. That is less than 1 teaspoon of salt a day, including all the salt you eat in cooked or packaged foods. Usually, you have to limit the amount of liquids you drink only if your heart failure is severe. Limiting sodium alone often is enough to help your body get rid of extra fluids. However, your doctor may tell you to limit your fluid intake to a set amount each day. Follow-up care is a key part of your treatment and safety. Be sure to make and go to all appointments, and call your doctor if you are having problems. It's also a good idea to know your test results and keep a list of the medicines you take. How can you care for yourself at home? Read food labels · Read food labels on cans and food packages. The labels tell you how much sodium is in each serving. Make sure that you look at the serving size. If you eat more than the serving size, you have eaten more sodium than is listed for one serving. · Food labels also tell you the Percent Daily Value. If the Percent Daily Value says 50%, it means that you will get at least 50% of all the sodium you need for the entire day in one serving. Choose products with low Percent Daily Values for sodium. · Be aware that sodium can come in forms other than salt, including monosodium glutamate (MSG), sodium citrate, and sodium bicarbonate (baking soda). MSG is often added to Asian food. You can sometimes ask for food without MSG or salt. Buy low-sodium foods · Buy foods that are labeled \"unsalted\" (no salt added), \"sodium-free\" (less than 5 mg of sodium per serving), or \"low-sodium\" (less than 140 mg of sodium per serving). A food labeled \"light sodium\" has less than half of the full-sodium version of that food. Foods labeled \"reduced-sodium\" may still have too much sodium. · Buy fresh vegetables or plain, frozen vegetables. Buy low-sodium versions of canned vegetables, soups, and other canned goods. Prepare low-sodium meals · Use less salt each day when cooking. Reducing salt in this way will help you adjust to the taste. Do not add salt after cooking. Take the salt shaker off the table. · Flavor your food with garlic, lemon juice, onion, vinegar, herbs, and spices instead of salt. Do not use soy sauce, steak sauce, onion salt, garlic salt, mustard, or ketchup on your food. · Make your own salad dressings, sauces, and ketchup without adding salt. · Use less salt (or none) when recipes call for it. You can often use half the salt a recipe calls for without losing flavor. Other dishes like rice, pasta, and grains do not need added salt. · Rinse canned vegetables. This removes some-but not all-of the salt. · Avoid water that has a naturally high sodium content or that has been treated with water softeners, which add sodium. Call your local water company to find out the sodium content of your water supply.  If you buy bottled water, read the label and choose a sodium-free brand. Avoid high-sodium foods, such as: 
· Smoked, cured, salted, and canned meat, fish, and poultry. · Ham, deras, hot dogs, and luncheon meats. · Regular, hard, and processed cheese and regular peanut butter. · Crackers with salted tops. · Frozen prepared meals. · Canned and dried soups, broths, and bouillon, unless labeled sodium-free or low-sodium. · Canned vegetables, unless labeled sodium-free or low-sodium. · Salted snack foods such as chips and pretzels. · Western Lisa fries, pizza, tacos, and other fast foods. · Pickles, olives, ketchup, and other condiments, especially soy sauce, unless labeled sodium-free or low-sodium. If you cannot cook for yourself · Have family members or friends help you, or have someone cook low-sodium meals. · Check with your local senior nutrition program to find out where meals are served and whether they offer a low-sodium option. You can often find these programs through your local health department or hospital. 
· Have meals delivered to your home. Most [facility] have a Meals on [employer]. These programs provide one hot meal a day for older adults, delivered to their homes. Ask whether these meals are low-sodium. Let them know that you are on a low-sodium diet. Limiting fluid intake · Find a method that works for you. You might simply write down how much you drink every time you do. Some people keep a container filled with the amount of fluid allowed for that day. If they drink from a source other than the container, then they pour out that amount. · Measure your regular drinking glasses to find out how much fluid each one holds. Once you know this, you will not have to measure every time. · Besides water, milk, juices, and other drinks, some foods have a lot of fluid.  Count any foods that will melt (such as ice cream or gelatin dessert) or liquid foods (such as soup) as part of your fluid intake for the day. Where can you learn more? Go to http://brittney-eliseo.info/. Enter A166 in the search box to learn more about \"Limiting Sodium and Fluids With Heart Failure: Care Instructions. \" Current as of: December 6, 2017 Content Version: 11.7 © 3409-2694 [employer]Faith Wireless Technology. Care instructions adapted under license by Intrapace (which disclaims liability or warranty for this information). If you have questions about a medical condition or this instruction, always ask your healthcare professional. [address] any warranty or liability for your use of this information. Low Sodium Diet (2,000 Milligram): Care Instructions Your Care Instructions Too much sodium causes your body to hold on to extra water. This can raise your blood pressure and force your heart and kidneys to work harder. In very serious cases, this could cause you to be put in the hospital. It might even be life-threatening. By limiting sodium, you will feel better and lower your risk of serious problems. The most common source of sodium is salt. People get most of the salt in their diet from canned, prepared, and packaged foods. Fast food and restaurant meals also are very high in sodium. Your doctor will probably limit your sodium to less than 2,000 milligrams (mg) a day. This limit counts all the sodium in prepared and packaged foods and any salt you add to your food. Follow-up care is a key part of your treatment and safety. Be sure to make and go to all appointments, and call your doctor if you are having problems. It's also a good idea to know your test results and keep a list of the medicines you take. How can you care for yourself at home? Read food labels · Read labels on cans and food packages. The labels tell you how much sodium is in each serving. Make sure that you look at the serving size. If you eat more than the serving size, you have eaten more sodium. · Food labels also tell you the Percent Daily Value for sodium. Choose products with low Percent Daily Values for sodium. · Be aware that sodium can come in forms other than salt, including monosodium glutamate (MSG), sodium citrate, and sodium bicarbonate (baking soda). MSG is often added to Asian food. When you eat out, you can sometimes ask for food without MSG or added salt. Buy low-sodium foods · Buy foods that are labeled \"unsalted\" (no salt added), \"sodium-free\" (less than 5 mg of sodium per serving), or \"low-sodium\" (less than 140 mg of sodium per serving). Foods labeled \"reduced-sodium\" and \"light sodium\" may still have too much sodium. Be sure to read the label to see how much sodium you are getting. · Buy fresh vegetables, or frozen vegetables without added sauces. Buy low-sodium versions of canned vegetables, soups, and other canned goods. Prepare low-sodium meals · Cut back on the amount of salt you use in cooking. This will help you adjust to the taste. Do not add salt after cooking. One teaspoon of salt has about 2,300 mg of sodium. · Take the salt shaker off the table. · Flavor your food with garlic, lemon juice, onion, vinegar, herbs, and spices. Do not use soy sauce, lite soy sauce, steak sauce, onion salt, garlic salt, celery salt, mustard, or ketchup on your food. · Use low-sodium salad dressings, sauces, and ketchup. Or make your own salad dressings and sauces without adding salt. · Use less salt (or none) when recipes call for it. You can often use half the salt a recipe calls for without losing flavor. Other foods such as rice, pasta, and grains do not need added salt. · Rinse canned vegetables, and cook them in fresh water. This removes some-but not all-of the salt. · Avoid water that is naturally high in sodium or that has been treated with water softeners, which add sodium. Call your local water company to find out the sodium content of your water supply.  If you buy bottled water, read the label and choose a sodium-free brand. Avoid high-sodium foods · Avoid eating: ¨ Smoked, cured, salted, and canned meat, fish, and poultry. ¨ Ham, deras, hot dogs, and luncheon meats. ¨ Regular, hard, and processed cheese and regular peanut butter. ¨ Crackers with salted tops, and other salted snack foods such as pretzels, chips, and salted popcorn. ¨ Frozen prepared meals, unless labeled low-sodium. ¨ Canned and dried soups, broths, and bouillon, unless labeled sodium-free or low-sodium. ¨ Canned vegetables, unless labeled sodium-free or low-sodium. ¨ Western Lisa fries, pizza, tacos, and other fast foods. ¨ Pickles, olives, ketchup, and other condiments, especially soy sauce, unless labeled sodium-free or low-sodium. Where can you learn more? Go to http://brittney-eliseo.info/. Enter D091 in the search box to learn more about \"Low Sodium Diet (2,000 Milligram): Care Instructions. \" Current as of: May 12, 2017 Content Version: 11.7 © 2823-1631 deeplocal. Care instructions adapted under license by [employer] (which disclaims liability or warranty for this information). If you have questions about a medical condition or this instruction, always ask your healthcare professional. [place] [address] any warranty or liability for your use of this information. Introducing [facility]! Misbah Cadet introduces WiziShop patient portal. Now you can access parts of your medical record, email your doctor's office, and request medication refills online. 1. In your internet browser, go to [email or web address] 2. Click on the First Time User? Click Here link in the Sign In box. You will see the New Member Sign Up page. 3. Enter your WiziShop Access Code exactly as it appears below. You will not need to use this code after youve completed the sign-up process.  If you do not sign up before the expiration date, you must request a new code. · GenieDB Access Code: [email or web address] Expires: 9/4/2018 11:42 AM 
 
4. Enter the last four digits of your Social Security Number (xxxx) and Date of Birth (mm/dd/yyyy) as indicated and click Submit. You will be taken to the next sign-up page. 5. Create a GenieDB ID. This will be your GenieDB login ID and cannot be changed, so think of one that is secure and easy to remember. 6. Create a GenieDB password. You can change your password at any time. 7. Enter your Password Reset Question and Answer. This can be used at a later time if you forget your password. 8. Enter your e-mail address. You will receive e-mail notification when new information is available in [address]. 9. Click Sign Up. You can now view and download portions of your medical record. 10. Click the Download Summary menu link to download a portable copy of your medical information. If you have questions, please visit the Frequently Asked Questions section of the GenieDB website. Remember, GenieDB is NOT to be used for urgent needs. For medical emergencies, dial 911. Now available from your iPhone and Android! Please provide this summary of care documentation to your next provider. Your primary care clinician is listed as Clarissa Merritt. If you have any questions after today's visit, please call [phone].

## 2018-08-11 PROCEDURE — 3331090002 HH PPS REVENUE DEBIT

## 2018-08-11 PROCEDURE — 3331090001 HH PPS REVENUE CREDIT

## 2018-08-12 PROCEDURE — 3331090002 HH PPS REVENUE DEBIT

## 2018-08-12 PROCEDURE — 3331090001 HH PPS REVENUE CREDIT

## 2018-08-13 ENCOUNTER — HOME CARE VISIT (OUTPATIENT)
Dept: SCHEDULING | Facility: HOME HEALTH | Age: 78
End: 2018-08-13
Payer: MEDICARE

## 2018-08-13 VITALS
DIASTOLIC BLOOD PRESSURE: 58 MMHG | HEART RATE: 100 BPM | OXYGEN SATURATION: 98 % | TEMPERATURE: 98.2 F | SYSTOLIC BLOOD PRESSURE: 118 MMHG

## 2018-08-13 PROCEDURE — 3331090001 HH PPS REVENUE CREDIT

## 2018-08-13 PROCEDURE — 3331090002 HH PPS REVENUE DEBIT

## 2018-08-13 PROCEDURE — G0152 HHCP-SERV OF OT,EA 15 MIN: HCPCS

## 2018-08-13 PROCEDURE — G0157 HHC PT ASSISTANT EA 15: HCPCS

## 2018-08-14 ENCOUNTER — HOME CARE VISIT (OUTPATIENT)
Dept: SCHEDULING | Facility: HOME HEALTH | Age: 78
End: 2018-08-14
Payer: MEDICARE

## 2018-08-14 ENCOUNTER — HOME CARE VISIT (OUTPATIENT)
Dept: HOME HEALTH SERVICES | Facility: HOME HEALTH | Age: 78
End: 2018-08-14
Payer: MEDICARE

## 2018-08-14 ENCOUNTER — PATIENT OUTREACH (OUTPATIENT)
Dept: CARDIOLOGY CLINIC | Age: 78
End: 2018-08-14

## 2018-08-14 VITALS
RESPIRATION RATE: 20 BRPM | OXYGEN SATURATION: 98 % | DIASTOLIC BLOOD PRESSURE: 67 MMHG | SYSTOLIC BLOOD PRESSURE: 120 MMHG | HEART RATE: 94 BPM | TEMPERATURE: 97.5 F

## 2018-08-14 PROCEDURE — G0299 HHS/HOSPICE OF RN EA 15 MIN: HCPCS

## 2018-08-14 PROCEDURE — 3331090002 HH PPS REVENUE DEBIT

## 2018-08-14 PROCEDURE — 3331090001 HH PPS REVENUE CREDIT

## 2018-08-14 NOTE — PROGRESS NOTES
NN contacted the patient by telephone to perform CHF follow Up. Noted Priorities/Plan:  Patient to work with PT for increased activity progression. To attend follow up appt with PCP on 8/21/18. Daily Weight: 148 lbs (decrease)  Zone: green   Signs/Symptoms: Edema very little, SOB none at this time; orthopnea none a this time sleeps with 1 pillow    Goals      Improve activity tolerance and quality of life. 7/2/18 Patient to be able to report improvement in activity progression on or by 7/10/18   8/8/18 Patient is feeling much better since release from SNF on 8/3/18, [address] is following for improvement in activity progression.  Maintains daily weight. 7/2/18 Patient will provide teach back on daily weight and to report weight gain of 3 lbs in a day or 5 lbs in a week to physician/NN on or by 7/31/18       Understands and adheres to diet. 7/2/18 Patient to be able to teach back cardiac diet of no salt added, on or by 8/17/18. Needs addressed from pathway:   Week 1-4   Provide Daily Disease Management  (NN initiated)  ? Daily weight (Before Breakfast-  Daily Zone Identification (symptom management; weight, edema, SOB, activity/sleep changes)-notify provider immediately as indicated  ? Cardiac Low-sodium Diet (No added sodium; 1500mg as indicated). If  ? Confirm follow-up appointments/transportation. Reschedule if needed. Additional assessment   ? Activity tolerance assessment   (eg: Vital signs; level of consciousness; dyspnea on exertion; pillow usage; recliner vs bed)   ? Energy conservation management (balance activity with rest)  ? Home assessment/modifications * as indicated   ? Home health services  ? SNF utilization  Psychosocial: Reassurance/emotional support     Education:   ? Support system identification ( eg: Caregiver, meal planning, community resources, family, friends, Latter-day, support group)   ?  Health literacy for heart failure Have you been to an ER/Hospital since discharge from [facility]? No      Have you followed up with PCP/Cardiologist/Specialist?  Was at [facility] and discharged on 8/3/18. Has attended cardiology appointment on 8/10/18. Transportation: family  Diet: cardiac/low sodium  Activity/ADLs: able to perform ADL's. Medications Reconciled at this time: reconciled at Cardiology on 8/10/18  Home health:  Company/Completion: [facility] started on 8/4/18  Social Support: family    [address] (not addressed). Patient reminded that there is a physician on call 24 hours a day / 7 days a week (M-F 5pm to 8am and from Friday 5pm until Monday 8a for the weekend) should the patient have questions or concerns. Patient reminded to call 911 if situation is emergent or patient feels the situation is emergent. Pt verbalizes understanding.

## 2018-08-15 ENCOUNTER — HOME CARE VISIT (OUTPATIENT)
Dept: SCHEDULING | Facility: HOME HEALTH | Age: 78
End: 2018-08-15
Payer: MEDICARE

## 2018-08-15 PROCEDURE — 3331090002 HH PPS REVENUE DEBIT

## 2018-08-15 PROCEDURE — 3331090001 HH PPS REVENUE CREDIT

## 2018-08-15 PROCEDURE — G0157 HHC PT ASSISTANT EA 15: HCPCS

## 2018-08-16 VITALS
HEART RATE: 97 BPM | SYSTOLIC BLOOD PRESSURE: 122 MMHG | TEMPERATURE: 98.7 F | DIASTOLIC BLOOD PRESSURE: 64 MMHG | OXYGEN SATURATION: 97 %

## 2018-08-16 PROCEDURE — 3331090001 HH PPS REVENUE CREDIT

## 2018-08-16 PROCEDURE — 3331090002 HH PPS REVENUE DEBIT

## 2018-08-17 ENCOUNTER — HOME CARE VISIT (OUTPATIENT)
Dept: HOME HEALTH SERVICES | Facility: HOME HEALTH | Age: 78
End: 2018-08-17
Payer: MEDICARE

## 2018-08-17 ENCOUNTER — PATIENT OUTREACH (OUTPATIENT)
Dept: CARDIOLOGY CLINIC | Age: 78
End: 2018-08-17

## 2018-08-17 ENCOUNTER — HOME CARE VISIT (OUTPATIENT)
Dept: SCHEDULING | Facility: HOME HEALTH | Age: 78
End: 2018-08-17
Payer: MEDICARE

## 2018-08-17 LAB
INR BLD: 2.2 (ref 0.9–1.1)
PT POC: 26.2 SECONDS (ref 11.8–14.9)

## 2018-08-17 PROCEDURE — 3331090001 HH PPS REVENUE CREDIT

## 2018-08-17 PROCEDURE — G0299 HHS/HOSPICE OF RN EA 15 MIN: HCPCS

## 2018-08-17 PROCEDURE — 3331090002 HH PPS REVENUE DEBIT

## 2018-08-17 NOTE — PROGRESS NOTES
NN contacted the patient by telephone to perform CHF follow Up. Noted Priorities/Plan:  Patient to work with PT for increased activity progression. To attend follow up appt with PCP on 8/21/18. Daily Weight: 149.5 lbs (stable)  Zone: green   Signs/Symptoms: Edema very little, SOB none at this time; orthopnea none a this time sleeps with 1 pillow    Goals      Improve activity tolerance and quality of life. 7/2/18 Patient to be able to report improvement in activity progression on or by 7/10/18   8/8/18 Patient is feeling much better since release from SNF on 8/3/18, [facility] PT is following for improvement in activity progression.  Maintains daily weight. 7/2/18 Patient will provide teach back on daily weight and to report weight gain of 3 lbs in a day or 5 lbs in a week to physician/NN on or by 7/31/18       Understands and adheres to diet. 7/2/18 Patient to be able to teach back cardiac diet of no salt added, on or by 8/17/18. Needs addressed from pathway:   Week 1-4   Provide Daily Disease Management  (NN initiated)  ? Daily weight (Before Breakfast-  Daily Zone Identification (symptom management; weight, edema, SOB, activity/sleep changes)-notify provider immediately as indicated  ? Cardiac Low-sodium Diet (No added sodium; 1500mg as indicated). If  ? Confirm follow-up appointments/transportation. Reschedule if needed. Additional assessment   ? Activity tolerance assessment   (eg: Vital signs; level of consciousness; dyspnea on exertion; pillow usage; recliner vs bed)   ? Energy conservation management (balance activity with rest)  ? Home assessment/modifications * as indicated   ? Home health services  ? SNF utilization  Psychosocial: Reassurance/emotional support     Education:   ? Support system identification ( eg: Caregiver, meal planning, community resources, family, friends, Adventist, support group)   ?  Health literacy for heart failure Have you been to an ER/Hospital since discharge from [facility]? No      Have you followed up with PCP/Cardiologist/Specialist?  Was at [facility] and discharged on 8/3/18. Has attended cardiology appointment on 8/10/18. Plan to attend PCP appt on 8/21/18. Transportation: family  Diet: cardiac/low sodium  Activity/ADLs: able to perform ADL's. Medications Reconciled at this time: reconciled at Cardiology on 8/10/18  Home health:  Company/Completion: [facility] started on 8/4/18  Social Support: family    [address] (not addressed). Patient reminded that there is a physician on call 24 hours a day / 7 days a week (M-F 5pm to 8am and from Friday 5pm until Monday 8a for the weekend) should the patient have questions or concerns. Patient reminded to call 911 if situation is emergent or patient feels the situation is emergent. Pt verbalizes understanding.

## 2018-08-18 VITALS
OXYGEN SATURATION: 93 % | TEMPERATURE: 97.7 F | SYSTOLIC BLOOD PRESSURE: 122 MMHG | RESPIRATION RATE: 20 BRPM | HEART RATE: 106 BPM | DIASTOLIC BLOOD PRESSURE: 60 MMHG

## 2018-08-18 PROCEDURE — 3331090001 HH PPS REVENUE CREDIT

## 2018-08-18 PROCEDURE — 3331090002 HH PPS REVENUE DEBIT

## 2018-08-19 PROCEDURE — 3331090001 HH PPS REVENUE CREDIT

## 2018-08-19 PROCEDURE — 3331090002 HH PPS REVENUE DEBIT

## 2018-08-20 ENCOUNTER — HOSPITAL ENCOUNTER (OUTPATIENT)
Dept: CT IMAGING | Age: 78
Discharge: HOME | End: 2018-08-20
Attending: FAMILY MEDICINE
Payer: MEDICARE

## 2018-08-20 ENCOUNTER — HOME CARE VISIT (OUTPATIENT)
Dept: HOME HEALTH SERVICES | Facility: HOME HEALTH | Age: 78
End: 2018-08-20
Payer: MEDICARE

## 2018-08-20 DIAGNOSIS — E87.1 HYPONATREMIA: ICD-10-CM

## 2018-08-20 DIAGNOSIS — R06.00 DYSPNEA: ICD-10-CM

## 2018-08-20 DIAGNOSIS — J44.9 CHRONIC OBSTRUCTIVE PULMONARY DISEASE (COPD) (HCC): ICD-10-CM

## 2018-08-20 DIAGNOSIS — J90 PLEURAL EFFUSION: ICD-10-CM

## 2018-08-20 PROCEDURE — 71250 CT THORAX DX C-: CPT

## 2018-08-20 PROCEDURE — 3331090001 HH PPS REVENUE CREDIT

## 2018-08-20 PROCEDURE — 3331090002 HH PPS REVENUE DEBIT

## 2018-08-21 ENCOUNTER — HOME CARE VISIT (OUTPATIENT)
Dept: HOME HEALTH SERVICES | Facility: HOME HEALTH | Age: 78
End: 2018-08-21
Payer: MEDICARE

## 2018-08-21 ENCOUNTER — HOME CARE VISIT (OUTPATIENT)
Dept: SCHEDULING | Facility: HOME HEALTH | Age: 78
End: 2018-08-21
Payer: MEDICARE

## 2018-08-21 PROCEDURE — 3331090002 HH PPS REVENUE DEBIT

## 2018-08-21 PROCEDURE — 3331090001 HH PPS REVENUE CREDIT

## 2018-08-21 PROCEDURE — G0157 HHC PT ASSISTANT EA 15: HCPCS

## 2018-08-22 VITALS
DIASTOLIC BLOOD PRESSURE: 62 MMHG | SYSTOLIC BLOOD PRESSURE: 130 MMHG | OXYGEN SATURATION: 97 % | HEART RATE: 100 BPM | TEMPERATURE: 98.2 F

## 2018-08-22 PROCEDURE — 3331090001 HH PPS REVENUE CREDIT

## 2018-08-22 PROCEDURE — 3331090002 HH PPS REVENUE DEBIT

## 2018-08-23 ENCOUNTER — HOME CARE VISIT (OUTPATIENT)
Dept: SCHEDULING | Facility: HOME HEALTH | Age: 78
End: 2018-08-23
Payer: MEDICARE

## 2018-08-23 PROCEDURE — G0157 HHC PT ASSISTANT EA 15: HCPCS

## 2018-08-23 PROCEDURE — 3331090001 HH PPS REVENUE CREDIT

## 2018-08-23 PROCEDURE — 3331090002 HH PPS REVENUE DEBIT

## 2018-08-24 ENCOUNTER — PATIENT OUTREACH (OUTPATIENT)
Dept: CARDIOLOGY CLINIC | Age: 78
End: 2018-08-24

## 2018-08-24 ENCOUNTER — HOME CARE VISIT (OUTPATIENT)
Dept: SCHEDULING | Facility: HOME HEALTH | Age: 78
End: 2018-08-24
Payer: MEDICARE

## 2018-08-24 VITALS
SYSTOLIC BLOOD PRESSURE: 118 MMHG | DIASTOLIC BLOOD PRESSURE: 64 MMHG | TEMPERATURE: 98.1 F | OXYGEN SATURATION: 96 % | HEART RATE: 84 BPM

## 2018-08-24 PROCEDURE — 3331090001 HH PPS REVENUE CREDIT

## 2018-08-24 PROCEDURE — G0299 HHS/HOSPICE OF RN EA 15 MIN: HCPCS

## 2018-08-24 PROCEDURE — 3331090002 HH PPS REVENUE DEBIT

## 2018-08-24 NOTE — PROGRESS NOTES
NN contacted the patient by telephone to perform CHF follow Up. Noted Priorities/Plan:  Patient to work with PT for increased activity progression. Daily Weight: 150 lbs (stable)  Zone: green   Signs/Symptoms: Edema none at this time, SOB none at this time; orthopnea none a this time sleeps with 1 pillow    Goals      Improve activity tolerance and quality of life. 7/2/18 Patient to be able to report improvement in activity progression on or by 7/10/18   8/8/18 Patient is feeling much better since release from SNF on 8/3/18, [facility] PT is following for improvement in activity progression. 8/24/18 Patient is doing well, working on a project cutting some boards and painting them.  Maintains daily weight. 7/2/18 Patient will provide teach back on daily weight and to report weight gain of 3 lbs in a day or 5 lbs in a week to physician/NN on or by 7/31/18       Understands and adheres to diet. 7/2/18 Patient to be able to teach back cardiac diet of no salt added, on or by 8/17/18. Needs addressed from pathway:   Week 1-4   Provide Daily Disease Management  (NN initiated)  ? Daily weight (Before Breakfast-  Daily Zone Identification (symptom management; weight, edema, SOB, activity/sleep changes)-notify provider immediately as indicated  ? Cardiac Low-sodium Diet (No added sodium; 1500mg as indicated). If  ? Confirm follow-up appointments/transportation. Reschedule if needed. Additional assessment   ? Activity tolerance assessment   (eg: Vital signs; level of consciousness; dyspnea on exertion; pillow usage; recliner vs bed)   ? Energy conservation management (balance activity with rest)  ? Home assessment/modifications * as indicated   ? Home health services  ? SNF utilization  Psychosocial: Reassurance/emotional support     Education:   ?  Support system identification ( eg: Caregiver, meal planning, community resources, family, friends, Yarsani, support group)   ? Health literacy for heart failure           Have you been to an ER/Hospital since discharge from [facility]? No      Have you followed up with PCP/Cardiologist/Specialist?  Was at [facility] and discharged on 8/3/18. Has attended cardiology appointment on 8/10/18 and attended PCP appt on 8/21/18. Transportation: family  Diet: cardiac/low sodium  Activity/ADLs: able to perform ADL's. Medications Reconciled at this time: reconciled at Cardiology on 8/10/18  Home health:  Company/Completion: [address] started on 8/4/18  Social Support: family    [address] (not addressed). Patient reminded that there is a physician on call 24 hours a day / 7 days a week (M-F 5pm to 8am and from Friday 5pm until Monday 8a for the weekend) should the patient have questions or concerns. Patient reminded to call 911 if situation is emergent or patient feels the situation is emergent. Pt verbalizes understanding.

## 2018-08-25 PROCEDURE — 3331090002 HH PPS REVENUE DEBIT

## 2018-08-25 PROCEDURE — 3331090001 HH PPS REVENUE CREDIT

## 2018-08-26 VITALS
SYSTOLIC BLOOD PRESSURE: 100 MMHG | TEMPERATURE: 97.5 F | OXYGEN SATURATION: 99 % | DIASTOLIC BLOOD PRESSURE: 60 MMHG | RESPIRATION RATE: 20 BRPM | HEART RATE: 83 BPM

## 2018-08-26 PROCEDURE — 3331090002 HH PPS REVENUE DEBIT

## 2018-08-26 PROCEDURE — 3331090001 HH PPS REVENUE CREDIT

## 2018-08-27 ENCOUNTER — HOME CARE VISIT (OUTPATIENT)
Dept: SCHEDULING | Facility: HOME HEALTH | Age: 78
End: 2018-08-27
Payer: MEDICARE

## 2018-08-27 PROCEDURE — 3331090001 HH PPS REVENUE CREDIT

## 2018-08-27 PROCEDURE — 3331090002 HH PPS REVENUE DEBIT

## 2018-08-27 PROCEDURE — G0151 HHCP-SERV OF PT,EA 15 MIN: HCPCS

## 2018-08-28 VITALS
HEART RATE: 78 BPM | DIASTOLIC BLOOD PRESSURE: 60 MMHG | TEMPERATURE: 98.8 F | OXYGEN SATURATION: 98 % | SYSTOLIC BLOOD PRESSURE: 128 MMHG

## 2018-08-28 PROCEDURE — 3331090002 HH PPS REVENUE DEBIT

## 2018-08-28 PROCEDURE — 3331090001 HH PPS REVENUE CREDIT

## 2018-08-29 ENCOUNTER — PATIENT OUTREACH (OUTPATIENT)
Dept: CARDIOLOGY CLINIC | Age: 78
End: 2018-08-29

## 2018-08-29 PROCEDURE — 3331090002 HH PPS REVENUE DEBIT

## 2018-08-29 PROCEDURE — 3331090001 HH PPS REVENUE CREDIT

## 2018-08-29 NOTE — PROGRESS NOTES
NN contacted the patient by telephone to perform CHF follow Up. Noted Priorities/Plan: To remain healthy and stay out of the hospital. 
 
Daily Weight: 151 lbs (stable) Zone: green Signs/Symptoms: Edema a little, elevating legs, SOB none at this time; orthopnea none a this time sleeps with 1 pillow Goals  Improve activity tolerance and quality of life. 7/2/18 Patient to be able to report improvement in activity progression on or by 7/10/18  
8/8/18 Patient is feeling much better since release from SNF on 8/3/18, [address] PT is following for improvement in activity progression. 8/24/18 Patient is doing well, working on a project cutting some boards and painting them.  Maintains daily weight. 7/2/18 Patient will provide teach back on daily weight and to report weight gain of 3 lbs in a day or 5 lbs in a week to physician/NN on or by 7/31/18  Understands and adheres to diet. 7/2/18 Patient to be able to teach back cardiac diet of no salt added, on or by 8/17/18. Needs addressed from pathway:  
Week 1-4 Provide Daily Disease Management 
(NN initiated) ? Daily weight (Before Breakfast- 
Daily Zone Identification (symptom management; weight, edema, SOB, activity/sleep changes)-notify provider immediately as indicated ? Cardiac Low-sodium Diet (No added sodium; 1500mg as indicated). If 
? Confirm follow-up appointments/transportation. Reschedule if needed. Additional assessment ? Activity tolerance assessment  
(eg: Vital signs; level of consciousness; dyspnea on exertion; pillow usage; recliner vs bed) ? Energy conservation management (balance activity with rest) ? Home assessment/modifications * as indicated Psychosocial: Reassurance/emotional support Education:  
? Support system identification ( eg: Caregiver, meal planning, community resources, family, friends, Mormon, support group) ? Health literacy for heart failure Have you been to an ER/Hospital since discharge from [facility]? No   
 
Have you followed up with PCP/Cardiologist/Specialist?  Was at [facility] and discharged on 8/3/18. Has attended cardiology appointment on 8/10/18 and attended PCP appt on 8/21/18. Next PCP appointment on 9/7/18. Transportation: family Diet: cardiac/low sodium Activity/ADLs: able to perform ADL's. Medications Reconciled at this time: reconciled at Cardiology on 8/10/18 Home health:  Company/Completion: [address] started on 8/4/18 and discharged on 8/27/18. Social Support: family Advance Care Plan (not addressed). Patient reminded that there is a physician on call 24 hours a day / 7 days a week (M-F 5pm to 8am and from Friday 5pm until Monday 8a for the weekend) should the patient have questions or concerns. Patient reminded to call 911 if situation is emergent or patient feels the situation is emergent. Pt verbalizes understanding.

## 2018-09-06 ENCOUNTER — PATIENT OUTREACH (OUTPATIENT)
Dept: CARDIOLOGY CLINIC | Age: 78
End: 2018-09-06

## 2018-09-06 NOTE — PROGRESS NOTES
NN contacted the patient by telephone to perform CHF follow Up. Noted Priorities/Plan: To remain healthy and stay out of the hospital. 
 
Daily Weight: 151 lbs (stable) Zone: green Signs/Symptoms: Edema a little, elevating legs, SOB none at this time; orthopnea none a this time sleeps with 1 pillow Goals  Improve activity tolerance and quality of life. 7/2/18 Patient to be able to report improvement in activity progression on or by 7/10/18  
8/8/18 Patient is feeling much better since release from SNF on 8/3/18, [facility] PT is following for improvement in activity progression. 8/24/18 Patient is doing well, working on a project cutting some boards and painting them.  Maintains daily weight. 7/2/18 Patient will provide teach back on daily weight and to report weight gain of 3 lbs in a day or 5 lbs in a week to physician/NN on or by 7/31/18  Understands and adheres to diet. 7/2/18 Patient to be able to teach back cardiac diet of no salt added, on or by 8/17/18. Needs addressed from pathway:  
Week 1-4 Provide Daily Disease Management 
(NN initiated) ? Daily weight (Before Breakfast- 
Daily Zone Identification (symptom management; weight, edema, SOB, activity/sleep changes)-notify provider immediately as indicated ? Cardiac Low-sodium Diet (No added sodium; 1500mg as indicated). If 
? Confirm follow-up appointments/transportation. Reschedule if needed. Additional assessment ? Activity tolerance assessment  
(eg: Vital signs; level of consciousness; dyspnea on exertion; pillow usage; recliner vs bed) ? Energy conservation management (balance activity with rest) ? Home assessment/modifications * as indicated Psychosocial: Reassurance/emotional support Education:  
? Support system identification ( eg: Caregiver, meal planning, community resources, family, friends, Lutheran, support group) ? Health literacy for heart failure Have you been to an ER/Hospital since discharge from [facility]? No   
 
Have you followed up with PCP/Cardiologist/Specialist?  Was at [facility] and discharged on 8/3/18. Has attended cardiology appointment on 8/10/18 and attended PCP appt on 8/21/18. Next PCP appointment on 9/7/18. Transportation: family Diet: cardiac/low sodium Activity/ADLs: able to perform ADL's. Medications Reconciled at this time: reconciled at Cardiology on 8/10/18 Home health:  Company/Completion: [address] started on 8/4/18 and discharged on 8/27/18. Social Support: family Advance Care Plan (not addressed). Patient reminded that there is a physician on call 24 hours a day / 7 days a week (M-F 5pm to 8am and from Friday 5pm until Monday 8a for the weekend) should the patient have questions or concerns. Patient reminded to call 911 if situation is emergent or patient feels the situation is emergent. Pt verbalizes understanding.

## 2018-09-11 ENCOUNTER — PATIENT OUTREACH (OUTPATIENT)
Dept: CARDIOLOGY CLINIC | Age: 78
End: 2018-09-11
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
## 2018-09-20 ENCOUNTER — PATIENT OUTREACH (OUTPATIENT)
Dept: CARDIOLOGY CLINIC | Age: 78
End: 2018-09-20

## 2018-09-20 NOTE — PROGRESS NOTES
NN contacted the patient by telephone to perform CHF follow Up. Noted Priorities/Plan: To remain healthy and stay out of the hospital. Patient is outside walking for exercise. Daily Weight: Forgot to weigh today. Zone: green Signs/Symptoms: Edema none at this time, SOB none at this time; orthopnea none a this time sleeps with 1 pillow Goals  Improve activity tolerance and quality of life. 7/2/18 Patient to be able to report improvement in activity progression on or by 7/10/18  
8/8/18 Patient is feeling much better since release from SNF on 8/3/18, [facility] PT is following for improvement in activity progression. 8/24/18 Patient is doing well, working on a project cutting some boards and painting them. 9/20/18 Patient is out walking, states \"I'm slow and have to take breaks\"  Maintains daily weight. 7/2/18 Patient will provide teach back on daily weight and to report weight gain of 3 lbs in a day or 5 lbs in a week to physician/NN on or by 7/31/18 9/20/18 Patient states \"I forgot to weigh myself, will try to remember tomorrow\".  Understands and adheres to diet. 7/2/18 Patient to be able to teach back cardiac diet of no salt added, on or by 8/17/18. Needs addressed from pathway:  
Week 5-8 Provide Daily Disease Management 
(NN initiated) ? Daily weight (Before Breakfast- 
Daily Zone Identification (symptom management; weight, edema, SOB, activity/sleep changes)-notify provider immediately as indicated ? Cardiac Low-sodium Diet (No added sodium; 1500mg as indicated). If 
? Confirm follow-up appointments/transportation. Reschedule if needed. Additional assessment ? Activity tolerance assessment  
(eg: Vital signs; level of consciousness; dyspnea on exertion; pillow usage; recliner vs bed) ? Energy conservation management (balance activity with rest) ? Home assessment/modifications * as indicated Psychosocial: Reassurance/emotional support Education:  
? Support system identification ( eg: Caregiver, meal planning, community resources, family, friends, Worship, support group) ? Health literacy for heart failure Have you been to an ER/Hospital since discharge from [facility]? No   
 
Have you followed up with PCP/Cardiologist/Specialist?  Was at [facility] and discharged on 8/3/18. Has attended cardiology appointment on 8/10/18 and attended PCP appt on 8/21/18. Has attended PCP appointment on 9/7/18. Next appointment is 10/4/18. Transportation: family Diet: cardiac/low sodium Activity/ADLs: able to perform ADL's. Medications Reconciled at this time: reconciled at Cardiology on 8/10/18 Home health:  Company/Completion: [address] started on 8/4/18 and discharged on 8/27/18. Social Support: family Advance Care Plan (not addressed). Patient reminded that there is a physician on call 24 hours a day / 7 days a week (M-F 5pm to 8am and from Friday 5pm until Monday 8a for the weekend) should the patient have questions or concerns. Patient reminded to call 911 if situation is emergent or patient feels the situation is emergent. Pt verbalizes understanding.

## 2018-09-26 ENCOUNTER — PATIENT OUTREACH (OUTPATIENT)
Dept: CARDIOLOGY CLINIC | Age: 78
End: 2018-09-26

## 2018-09-26 NOTE — PROGRESS NOTES
NN contacted the patient by telephone to perform CHF follow Up. Noted Priorities/Plan: To remain healthy and stay out of the hospital. Patient helped his brother with the generator and is resting now on the porch. Daily Weight: 150 (stable) Zone: green Signs/Symptoms: Edema none at this time, SOB none at this time; orthopnea none a this time sleeps with 1 pillow Goals  Improve activity tolerance and quality of life. 7/2/18 Patient to be able to report improvement in activity progression on or by 7/10/18  
8/8/18 Patient is feeling much better since release from SNF on 8/3/18, [facility] PT is following for improvement in activity progression. 8/24/18 Patient is doing well, working on a project cutting some boards and painting them. 9/20/18 Patient is out walking, states \"I'm slow and have to take breaks\"  Maintains daily weight. 7/2/18 Patient will provide teach back on daily weight and to report weight gain of 3 lbs in a day or 5 lbs in a week to physician/NN on or by 7/31/18 9/20/18 Patient states \"I forgot to weigh myself, will try to remember tomorrow\".  Understands and adheres to diet. 7/2/18 Patient to be able to teach back cardiac diet of no salt added, on or by 8/17/18. Needs addressed from pathway:  
Week 5-8 Provide Daily Disease Management 
(NN initiated) ? Daily weight (Before Breakfast- 
Daily Zone Identification (symptom management; weight, edema, SOB, activity/sleep changes)-notify provider immediately as indicated ? Cardiac Low-sodium Diet (No added sodium; 1500mg as indicated). If 
? Confirm follow-up appointments/transportation. Reschedule if needed. Additional assessment ? Activity tolerance assessment  
(eg: Vital signs; level of consciousness; dyspnea on exertion; pillow usage; recliner vs bed) ? Energy conservation management (balance activity with rest) ? Home assessment/modifications * as indicated Psychosocial: Reassurance/emotional support Education:  
? Support system identification ( eg: Caregiver, meal planning, community resources, family, friends, Rastafarian, support group) ? Health literacy for heart failure Have you been to an ER/Hospital since discharge from [facility]? No   
 
Have you followed up with PCP/Cardiologist/Specialist?  Was at [facility] and discharged on 8/3/18. Has attended cardiology appointment on 8/10/18 and attended PCP appt on 8/21/18. Has attended PCP appointment on 9/7/18. Next appointment is 10/4/18. Transportation: family Diet: cardiac/low sodium Activity/ADLs: able to perform ADL's. Medications Reconciled at this time: no 
Home health:  Company/Completion: [facility] started on 8/4/18 and discharged on 8/27/18. Social Support: family Advance Care Plan (not addressed). Patient reminded that there is a physician on call 24 hours a day / 7 days a week (M-F 5pm to 8am and from Friday 5pm until Monday 8a for the weekend) should the patient have questions or concerns. Patient reminded to call 911 if situation is emergent or patient feels the situation is emergent. Pt verbalizes understanding.

## 2018-10-12 ENCOUNTER — PATIENT OUTREACH (OUTPATIENT)
Dept: FAMILY MEDICINE CLINIC | Age: 78
End: 2018-10-12

## 2018-10-12 VITALS — BODY MASS INDEX: 24.21 KG/M2 | WEIGHT: 150 LBS

## 2018-10-12 NOTE — PROGRESS NOTES
Patient outreach made to patient. He states he is doing well. He saw his physician last Friday at [facility]. His weight then was 154 lb by their scales. See highlighted assessment below. Week 9-12 Provide Daily Disease Management 
(patient/caregiver initiated) ? Daily weight (Before Breakfast- 150 lb 
? Daily Zone Identification (symptom management; weight, edema, SOB, activity/sleep changes)-notify provider immediately as indicated- pt states he has a little swelling during the day but it goes down. Advised pt to elevate his feet. He verbalizes understanding. Zone [employer] ? Cardiac Low-sodium Diet (No added sodium; 1500mg as indicated). If Diabetic:  include carbohydrate controlled - pt is eating well and watching his salt intake ? Fluid restriction (if indicated) ? Comorbidity Management ? Confirm follow-up - patient is able to sate when his next appointment is in January. Appointments/transportation. Reschedule if needed- patient states he has transportation Additional Assessments ? Fall precautions-patient is using walker and wheelchair as needed ? Activity tolerance assessment - patient states he is walking around. He does not exert himself to where he gets SOB. He is using his oxygen. He increase to 3 liters per pulmonary when walking. 
(eg: Vital signs; level of consciousness; dyspnea on exertion; pillow usage; recliner vs bed) ? Energy conservation management (balance activity w/ rest) ? Labs/diagnostics *as indicated ? Medication Therapy *as directed ? Diet/appetite assessment- see above 
? ED/Hospital utilization Psychosocial:  Reassurance and emotional support Education/Discharge Planning ? Verify DME needs; arrange home equipment- no new needs required ? Arrange discharge follow-up appointments/transportation ? Advanced care planning (e.g.: Palliative Care; Hospice) *if indicated ? H2H; Home Health  (eg: Telehealth) ? Patient/Caregiver HF education literacy ? Patient/Caregiver verifies support systems (meals/medication/transportation needs,- patient lives with his brother  
? community resources Confirm Discharge plan and/or next level of care *Plan transition to LTC or Hospice if not progressing Goals  Improve activity tolerance and quality of life. 7/2/18 Patient to be able to report improvement in activity progression on or by 7/10/18  
8/8/18 Patient is feeling much better since release from SNF on 8/3/18, RAYMUNDO DELONG [facility] PT is following for improvement in activity progression. 8/24/18 Patient is doing well, working on a project cutting some boards and painting them. 9/20/18 Patient is out walking, states \"I'm slow and have to take breaks\"  Maintains daily weight. 7/2/18 Patient will provide teach back on daily weight and to report weight gain of 3 lbs in a day or 5 lbs in a week to physician/NN on or by 7/31/18 9/20/18 Patient states \"I forgot to weigh myself, will try to remember tomorrow\".  Understands and adheres to diet. 7/2/18 Patient to be able to teach back cardiac diet of no salt added, on or by 8/17/18.

## 2018-10-22 NOTE — PROGRESS NOTES
Vs 100/70 117 99.2  Wbc 6.7  Drain scant  Cultures negative  Pain in back improved  Moving leg better  AP  Still tachycardic  Not much out of drain but abscess small to begin with  Wbc down  Pain improving    reccomend  PT  abx per ID  Follow clinical course  Repeat ct/ esr/crp at interval dr stephanie sandhu

## 2018-10-25 ENCOUNTER — PATIENT OUTREACH (OUTPATIENT)
Dept: CARDIOLOGY CLINIC | Age: 78
End: 2018-10-25

## 2018-10-25 NOTE — PROGRESS NOTES
Patient has graduated from the Disease Specific Care Management  program on 10/25/18. Patient's symptoms are stable at this time. Patient/family has the ability to self-manage. Care management goals have been completed at this time. No further nurse navigator follow up scheduled. Goals Addressed This Visit's Progress  Improve activity tolerance and quality of life. On track 7/2/18 Patient to be able to report improvement in activity progression on or by 7/10/18  
8/8/18 Patient is feeling much better since release from SNF on 8/3/18, RAYMUNDO [facility] PT is following for improvement in activity progression. 8/24/18 Patient is doing well, working on a project cutting some boards and painting them. 9/20/18 Patient is out walking, states \"I'm slow and have to take breaks\"  Maintains daily weight. On track 7/2/18 Patient will provide teach back on daily weight and to report weight gain of 3 lbs in a day or 5 lbs in a week to physician/NN on or by 7/31/18 9/20/18 Patient states \"I forgot to weigh myself, will try to remember tomorrow\".  Understands and adheres to diet. On track 7/2/18 Patient to be able to teach back cardiac diet of no salt added, on or by 8/17/18. Pt has nurse navigator's contact information for any further questions, concerns, or needs. Patients upcoming visits:   
Future Appointments Date Time Provider Doug Whittington 10/31/2018  2:00 PM DELIA Narayanan 2[facility] JEFF MERCEDES  
11/9/2018  1:30 PM Kimi Sherman MD [address]  
1/8/2019 11:40 AM Tavia Peace MD [address] [place] [address]

## 2018-10-26 NOTE — PROGRESS NOTES
Nausea and Vomiting: Care Instructions  Your Care Instructions    When you are nauseated, you may feel weak and sweaty and notice a lot of saliva in your mouth. Nausea often leads to vomiting. Most of the time you do not need to worry about nausea and vomiting, but they can be signs of other illnesses. Two common causes of nausea and vomiting are stomach flu and food poisoning. Nausea and vomiting from viral stomach flu will usually start to improve within 24 hours. Nausea and vomiting from food poisoning may last from 12 to 48 hours. The doctor has checked you carefully, but problems can develop later. If you notice any problems or new symptoms, get medical treatment right away. Follow-up care is a key part of your treatment and safety. Be sure to make and go to all appointments, and call your doctor if you are having problems. It's also a good idea to know your test results and keep a list of the medicines you take. How can you care for yourself at home? · To prevent dehydration, drink plenty of fluids, enough so that your urine is light yellow or clear like water. Choose water and other caffeine-free clear liquids until you feel better. If you have kidney, heart, or liver disease and have to limit fluids, talk with your doctor before you increase the amount of fluids you drink. · Rest in bed until you feel better. · When you are able to eat, try clear soups, mild foods, and liquids until all symptoms are gone for 12 to 48 hours. Other good choices include dry toast, crackers, cooked cereal, and gelatin dessert, such as Jell-O. When should you call for help? Call 911 anytime you think you may need emergency care. For example, call if:    · You passed out (lost consciousness).    Call your doctor now or seek immediate medical care if:    · You have symptoms of dehydration, such as:  ? Dry eyes and a dry mouth. ? Passing only a little dark urine. ?  Feeling thirstier than usual.     · You have new or Problem: Mobility Impaired (Adult and Pediatric)  Goal: *Acute Goals and Plan of Care (Insert Text)  Physical Therapy Goals  Initiated 8/26/2017 and to be accomplished within 7 day(s)  1. Patient will move from supine to sit and sit to supine in bed with independence. 2. Patient will transfer from bed to chair and chair to bed with minimal assistance/contact guard assist using the least restrictive device. 3. Patient will perform sit to stand with modified independence. 4. Patient will ambulate with minimal assistance/contact guard assist for 100 feet with the least restrictive device. 5. Patient will ascend/descend 5 stairs with bilateral handrail(s) with minimal assistance/contact guard assist with LRAD. PHYSICAL THERAPY TREATMENT     Patient: Soraida Pandya (54 y.o. male)  Date: 8/28/2017  Diagnosis: Psoas abscess ([address]  Psoas abscess ([address]  Abscess Psoas abscess ([address]       Precautions:    Chart, physical therapy assessment, plan of care and goals were reviewed. ASSESSMENT:  Pt demonstrated increased activity tolerance as evident by pt ambulating 150ft with RW on 3L O2. Pt presented with minimal pain. Pt required 1 sitting rest break at 75ft, while ambulating pt had abnormal breath sounds. Progression toward goals:  [X]      Improving appropriately and progressing toward goals  [ ]      Improving slowly and progressing toward goals  [ ]      Not making progress toward goals and plan of care will be adjusted       PLAN:  Patient continues to benefit from skilled intervention to address the above impairments. Continue treatment per established plan of care. Discharge Recommendations:  Home Health  Further Equipment Recommendations for Discharge:  N/A          SUBJECTIVE:   Patient stated I'm ready to get up.       OBJECTIVE DATA SUMMARY:   Critical Behavior:  Neurologic State: Alert  Orientation Level: Oriented X4  Cognition: Appropriate for age attention/concentration     Functional Mobility Training:  Bed Mobility:  Supine to Sit: Modified independent  Sit to Supine: Modified independent  Balance:  Standing: Impaired; With support  Standing - Static: Good  Standing - Dynamic : Fair  Ambulation/Gait Training:  Distance (ft): 150 Feet (ft)  Assistive Device: Walker, rolling  Ambulation - Level of Assistance: Contact guard assistance  Gait Description (WDL): Exceptions to WDL  Gait Abnormalities: Decreased step clearance  Base of Support:  (forward flexed posture )  Speed/Sylvia: Pace decreased (<100 feet/min)  Step Length: Left shortened;Right shortened  Swing Pattern: Left asymmetrical;Right asymmetrical  Therapeutic Exercises:   Ambulation 150 ft with 2 sit<>stand transfers to increase activity tolerance. Pain:  Pain prior to treatment: 1-2  Pain after treatment: 1-2  Pain Location 1: Back  Pain Orientation 1: Left; Lower  Activity Tolerance:   Fair - some SOB  Please refer to the flowsheet for vital signs taken during this treatment.   After treatment:   [ ] Patient left in no apparent distress sitting up in chair  [X] Patient left in no apparent distress in bed  [X] Call bell left within reach  [ ] Nursing notified  [ ] Caregiver present  [ ] Bed alarm activated      Mona Wong, PT, DPT   Time Calculation: 25 mins worsening belly pain.     · You have a new or higher fever.     · You vomit blood or what looks like coffee grounds.    Watch closely for changes in your health, and be sure to contact your doctor if:    · You have ongoing nausea and vomiting.     · Your vomiting is getting worse.     · Your vomiting lasts longer than 2 days.     · You are not getting better as expected. Where can you learn more? Go to http://mellisa-reagan.info/. Enter 25 [id number] in the search box to learn more about \"Nausea and Vomiting: Care Instructions. \"  Current as of: November 20, 2017  Content Version: 11.8  © 7020-6295 [employer]. Care instructions adapted under license by [employer] (which disclaims liability or warranty for this information). If you have questions about a medical condition or this instruction, always ask your healthcare professional. [address] any warranty or liability for your use of this information.

## 2018-10-31 ENCOUNTER — OFFICE VISIT (OUTPATIENT)
Dept: VASCULAR SURGERY | Age: 78
End: 2018-10-31

## 2018-10-31 VITALS
HEIGHT: 66 IN | WEIGHT: 150 LBS | BODY MASS INDEX: 24.11 KG/M2 | DIASTOLIC BLOOD PRESSURE: 62 MMHG | SYSTOLIC BLOOD PRESSURE: 110 MMHG | HEART RATE: 90 BPM | RESPIRATION RATE: 24 BRPM

## 2018-10-31 DIAGNOSIS — I65.23 CAROTID STENOSIS, ASYMPTOMATIC, BILATERAL: ICD-10-CM

## 2018-10-31 DIAGNOSIS — Z95.828 HISTORY OF ENDOVASCULAR STENT GRAFT FOR ABDOMINAL AORTIC ANEURYSM (AAA): Primary | ICD-10-CM

## 2018-10-31 NOTE — PROGRESS NOTES
1. Have you been to an emergency room or urgent care clinic since your last visit? No  Hospitalized since your last visit? If yes, where, when, and reason for visit? No  2. Have you seen or consulted any other health care providers outside of the [facility] since your last visit including any procedures, health maintenance items. If yes, where, when and reason for visit?

## 2018-10-31 NOTE — PROGRESS NOTES
Dilia Cos Reis    Chief Complaint   Patient presents with    Abdominal Aortic Aneurysm       History and Physical    Mr Tricia Beyer hasn't been seen in a few years  He just had a medicare wellness visit with his PCP who suggested he come for vascular follow up. He has history of EVAR in 2006 but then had an endoleak that required urgent repair in 2012. He was originally following up. Not sure where lost to follow up    Nonetheless he did have a CTA in march of this year for other issues but it did comment on the EVAR and there were no issues    Past Medical History:   Diagnosis Date    Aneurysm [facility])     AAA repair 2006 & 2012    Aorto-iliac duplex 02/13/2015    Tech difficult. Patent aorta bi-iliac endograft w/o leak or limb dysfunction.  Arrhythmia     a fib    Cardiac cath 11/01/2012    RCA (sm, nondom) patent. LM patent. LAD 25%. CX (dom) 45% mid. LVEDP 12 mmHg. No WMA. PA 27/12. W 10-12. CO/CI 5.2/2.6 (TD).  Cardiac echocardiogram, abnormal 02/20/2016    EF 55%. No WMA. Indeterminate diastolic fx. RVSP 60 mmHg. Severe LAE. Mild MR. Mod TR.  IVCE. Similar to study of 10/26/12.  Cardiovascular aorto-iliac duplex 02/13/2015    Patent aorta bi-iliac endovascular graft w/o leak or limb dysfunction. Sac measures 4.21 x 4.47 cm (4.4 x 4.6 cm on 1/31/14).  Cardiovascular LE peripheral arterial testing 07/29/2013    No significant LE arterial disease bilaterally. R GHADA 1. 12.  L GHADA 1. 12.  R DBI 0.83. L DBI 0.71. Exercise deferred.  Cardiovascular renal duplex 10/31/2012    No RA stenosis. Intrinsic/med disease in left kidney. Patent aortic endograft. Patent, thrombus-free renal veins bilaterally.  Carotid duplex 07/29/2013    Mild <50% bilateral ICA plaquing.       Chronic lung disease     Cigarette smoker     Congestive heart failure (CHF) (HCC)     COPD (chronic obstructive pulmonary disease) (HCC)     and emphysema; likely secondary to tobacco abuse    Difficult intubation     Dyslipidemia     low HDL    Emphysema     HTN (hypertension)     Hypercholesterolemia     Increased prostate specific antigen (PSA) velocity     Long term (current) use of anticoagulants     coumadin    Osteoarthritis     Osteomyelitis (HCC)     Paroxysmal atrial fibrillation (HCC)     Peripheral vascular disease ([address]     AAA repair 12/2007    Persistent atrial Fibrillation     Persistent atrial fibrillation ([address]     Unspecified adverse effect of anesthesia     difficulty breathing placed in ICU Oct 2012 (AAA repair)     Patient Active Problem List   Diagnosis Code    Hypercholesterolemia E78.00    Congestive heart failure (CHF) (HCC) I50.9    Peripheral vascular disease (HCC) I73.9    COPD (chronic obstructive pulmonary disease) (HCC) J44.9    HTN (hypertension) I10    Chronic atrial fibrillation (HCC) I48.2    RLQ abdominal pain R10.31    Status post AAA (abdominal aortic aneurysm) repair Y22.463, Z86.79    AAA (abdominal aortic aneurysm, ruptured) ([facility] I71.3    Unspecified constipation K59.00    Right inguinal hernia K40.90    Left inguinal hernia K40.90    CAD (coronary artery disease) I25.10    Hypoxemia requiring supplemental oxygen R09.02, Z99.81    Warfarin-induced coagulopathy ([address] D68.32, T45.515A    Intraabdominal fluid collection R18.8    Abdominal pain R10.9    Sepsis ([address] A41.9    Pyogenic inflammation of bone ([address] M86.9    Psoas abscess ([address] K68.12    Abscess L02.91    Discitis of lumbar region M46.46    Pulmonary hypertension ([address] I27.20    CHF exacerbation ([address] I50.9    Hyponatremia E87.1     Past Surgical History:   Procedure Laterality Date    BRONCHOSCOPY DIAGNOSTIC  11/2/2012         CARDIAC CATHETERIZATION  11/1/2012         HX AAA REPAIR      2006 & 2012    HX HEART CATHETERIZATION  3/4/2009    1. RCA small, nondominant; patent. 2. LMCA patent. 3. LAD is long, wrapped around apical vessel. Diffuse, 20-30% stenosis noted.  4. CCA is large, dominant vessel. Diffuse 20-30% stenosis. 5. LVEDP is 16 mmHg. 6. Overall left ventricular systolic function mildly diminshed with est. EF 45%. Mild, global hypokinesis noted. 7. No significant mitral regurgitation or aortic stenosis noted. (see report)    HX HERNIA REPAIR  2/2014    rt inguinal     HX HERNIA REPAIR  01/2016    LEFT INGUINAL HERNIA REPAIR DR. Ebenezer Leiva    REPAIR ING HERNIA,5+Y/O,JESSIE Left 1-20-16    Dr. Deandre Sofia  11/1/2012          Current Outpatient Medications   Medication Sig Dispense Refill    ferrous sulfate 325 mg (65 mg iron) tablet Take 325 mg by mouth Daily (before breakfast). every other day  Indications: Iron Deficiency Anemia      ascorbic acid, vitamin C, (VITAMIN C) 500 mg tablet Take 500 mg by mouth daily. every other day  Indications: Vitamin C Deficiency      acetaminophen (TYLENOL ARTHRITIS PAIN) 650 mg TbER Take 1,300 mg by mouth two (2) times daily as needed (Pain).  traZODone (DESYREL) 50 mg tablet Take 50 mg by mouth nightly.  tamsulosin (FLOMAX) 0.4 mg capsule Take 0.4 mg by mouth daily.  lisinopril (PRINIVIL, ZESTRIL) 20 mg tablet Take 1 Tab by mouth daily. 30 Tab 0    melatonin 3 mg tablet Take 1 Tab by mouth nightly as needed. 30 Tab 0    aspirin delayed-release 81 mg tablet Take 81 mg by mouth daily.  pravastatin (PRAVACHOL) 40 mg tablet Take 40 mg by mouth nightly.  polyethylene glycol (MIRALAX) 17 gram packet Take 17 g by mouth daily as needed (constipation).  tiotropium-olodaterol (STIOLTO RESPIMAT) 2.5-2.5 mcg/actuation mist Take 2 Inhalation by inhalation daily. 1 Inhaler 5    albuterol (PROVENTIL VENTOLIN) 2.5 mg /3 mL (0.083 %) nebulizer solution 3 mL by Nebulization route every four (4) hours as needed for Wheezing. 24 Each 3    digoxin (DIGITEK) 0.125 mg tablet take 1 tablet by mouth once daily 90 Tab 3    warfarin (COUMADIN) 2.5 mg tablet Take 1 Tab by mouth daily.  Take 2 pills (5 mg) Mon, Tues, Wed, Thur, Sat, Sun; and 1 pill only  (2.5 mg) Fri (Patient taking differently: Take 2.5 mg by mouth daily. Take 2 pills (5 mg) Mon, Tues, Thur, Sat, Sun; and 1 pill only  (2.5 mg) Wed, Fri) 60 Tab 0    amLODIPine (NORVASC) 10 mg tablet Take 10 mg by mouth daily. 0    OXYGEN-AIR DELIVERY SYSTEMS 2 L/min by Nasal route daily. Continuous. Company is [employer]          Allergies   Allergen Reactions    Codeine Swelling    Morphine Itching    Sulfa (Sulfonamide Antibiotics) Other (comments)     Kidney problems. Review of Systems    Review of Systems - History obtained from the patient  General ROS: negative  Psychological ROS: negative  Ophthalmic ROS: negative  Respiratory ROS: positive for - shortness of breath  Cardiovascular ROS: negative  Gastrointestinal ROS: negative  Musculoskeletal ROS: negative  Neurological ROS: negative  Dermatological ROS: negative  Vascular ROS: negative    Physical   Visit Vitals  /62 (BP 1 Location: Left arm, BP Patient Position: Sitting)   Pulse 90   Resp 24   Ht 5' 6\" (1.676 m)   Wt 150 lb (68 kg)   BMI 24.21 kg/m²     General:  Alert, cooperative, no distress. Head:  Normocephalic, without obvious abnormality, atraumatic. Eyes:    Conjunctivae/corneas clear. Pupils equal, round, reactive to light. Extraocular movements intact. Neck:         Supple,no bruits   Lungs: On oxygen via nasal cannula   Heart:  Regular rate and rhythm   Extremities: Extremities normal, atraumatic, no cyanosis or edema. Skin: Skin color, texture, turgor normal. No rashes or lesions. Vascular studies:  CTA from March 2018: 1. Distal abdominal aortic aneurysm with patent, opacified aortic-biiliac endograftin place, as before. No evidence of endoleak around the endograft. Stablethickening of walls of distal abdominal aortic aneurysm redemonstrated.     2. The celiac artery and SMA are patent with minimal stenosis. Distal branches arenormally patent.  THOMAS is not opacified.     3.Mild to moderate stenosis with 50-60% diameter reduction involving the rightrenal artery. Minimal stenoses in left renal artery    Impression/Plan:     ICD-10-CM ICD-9-CM    1. History of endovascular stent graft for abdominal aortic aneurysm (AAA) Z95.828 V43.4 DUPLEX AORTA/IVC/ILIAC/GRAFTS COMPLETE   2. Carotid stenosis, asymptomatic, bilateral I65.23 433.10 DUPLEX CAROTID BILATERAL     433.30      No orders of the defined types were placed in this encounter. No need to repeat any imaging currently  Will plan duplex one year from the CTA which will be march 2019, and will do carotid surveillance at that time as well    Follow-up Disposition:  Return in about 5 months (around 3/31/2019). DELIA Brooke    Portions of this note have been entered using voice recognition software.

## 2018-11-09 ENCOUNTER — OFFICE VISIT (OUTPATIENT)
Dept: PULMONOLOGY | Age: 78
End: 2018-11-09

## 2018-11-09 VITALS
HEART RATE: 75 BPM | HEIGHT: 66 IN | SYSTOLIC BLOOD PRESSURE: 116 MMHG | WEIGHT: 154 LBS | DIASTOLIC BLOOD PRESSURE: 62 MMHG | BODY MASS INDEX: 24.75 KG/M2 | TEMPERATURE: 97.9 F | RESPIRATION RATE: 20 BRPM | OXYGEN SATURATION: 99 %

## 2018-11-09 DIAGNOSIS — J44.9 COPD, SEVERE (HCC): Primary | ICD-10-CM

## 2018-11-09 DIAGNOSIS — R09.02 HYPOXEMIA REQUIRING SUPPLEMENTAL OXYGEN: ICD-10-CM

## 2018-11-09 DIAGNOSIS — Z99.81 HYPOXEMIA REQUIRING SUPPLEMENTAL OXYGEN: ICD-10-CM

## 2018-11-09 DIAGNOSIS — I48.20 ATRIAL FIBRILLATION, CHRONIC (HCC): ICD-10-CM

## 2018-11-09 DIAGNOSIS — R91.8 GROUND GLASS OPACITY PRESENT ON IMAGING OF LUNG: ICD-10-CM

## 2018-11-09 DIAGNOSIS — I50.22 CHRONIC SYSTOLIC CONGESTIVE HEART FAILURE (HCC): ICD-10-CM

## 2018-11-09 DIAGNOSIS — I27.20 PULMONARY HTN (HCC): ICD-10-CM

## 2018-11-09 NOTE — PROGRESS NOTES
Chief Complaint Patient presents with  COPD  
  follow up from 6/6/2018; CXR 7/9/2018, CT 8/20/2018  Other  
  hypoxemia, Pulmonary HTN 1. Have you been to the ER, urgent care clinic since your last visit? Hospitalized since your last visit? Yes When: 7/9/2018-7/12/2018 [facility] Reason for visit: Hyponatremia 2. Have you seen or consulted any other health care providers outside of the [address] [place] [address] [place] since your last visit? Include any pap smears or colon screening. Yes Where: [facility] doctor [facility] [address] [place], [address] [address] [facility]) [phone] ([address] [place] [address]) [phone] Simple walk test done in office today. Qualifying O2 sats:  
 
O2 Sat at rest on 3L O2 is : 99 %, Pulse 77, SOB scale 0 Walked: 29   m on 3L O2 : 99 %, Pulse 72, SOB scale 0 
    68   m on 3L O2 : 98 %, Pulse 80, SOB scale 2  
    102 m on 3L O2 : 98 %, Pulse 88, SOB scale 2 O2 Sat at rest on 3L O2 is : 99 %, Pulse 80, SOB scale 2 Tech comments regarding testing: Patient did simple walk and walked a total of 102 meters on 3LPM O2 via nasal cannula. VSS with some C/O SOB while walking. Dr. Alfonso Pickett notified.  
 
Sudha Knight LPN

## 2018-11-09 NOTE — PROGRESS NOTES
HISTORY OF PRESENT ILLNESS Muna Salvador is a 77 y.o. male with COPD FEV1 48%. Follow up for this pt with severe COPD on home O2. Pt was started on Stiolto on last visit and notes improvement in dyspnea at rest and some improvement in DUMONT. Pt still with complaints of dyspnea with moderate exertion but no chest pain. Pt derived some benefit from Pulmonary Rehab before but has not continued regular exercise regimen after completing the 13 week program. He was last admitted to the hospital in 2016. Pt's previously planned hernia repair was cancelled due to illness. He states that pain is better controlled. Interval history includes 2 hospital admissions for Hyponatremia and CHF exacerbation with pneumonia. He is on LTOT and notes improved adherence to O2 therapy. He reports less frequent SOB and wheezing after starting Stiolto. Recent echo also shows elevated right heart pressures, see below. Pt notes unchanged pedal edema with no orthopnea or PND.' 
 
 
COPD The history is provided by the patient. This is a chronic problem. Episode onset: years. The problem occurs daily. The problem has been gradually worsening. Associated symptoms include shortness of breath. Pertinent negatives include no chest pain, no abdominal pain and no headaches. The symptoms are aggravated by exertion and walking. The symptoms are relieved by rest and medications. Treatments tried: Stiolto. The treatment provided significant relief. Review of Systems Constitutional: Positive for malaise/fatigue. Negative for chills, diaphoresis, fever and weight loss. HENT: Positive for hearing loss. Negative for congestion, ear discharge, ear pain, nosebleeds, sinus pain, sore throat and tinnitus. Eyes: Negative for blurred vision, double vision, photophobia, pain, discharge and redness. Respiratory: Positive for shortness of breath.  Negative for hemoptysis, sputum production and stridor. Cough: dry. Wheezing: slightly improved. Cardiovascular: Negative for chest pain, palpitations, orthopnea, claudication, leg swelling and PND. Gastrointestinal: Negative for abdominal pain, blood in stool, constipation, diarrhea, heartburn, melena, nausea and vomiting. Genitourinary: Negative for dysuria, flank pain, frequency, hematuria and urgency. Musculoskeletal: Negative for back pain, falls, joint pain, myalgias and neck pain. Skin: Negative for itching and rash. Neurological: Negative for dizziness, tingling, tremors, sensory change, speech change, focal weakness, seizures, loss of consciousness, weakness and headaches. Endo/Heme/Allergies: Negative for environmental allergies and polydipsia. Does not bruise/bleed easily. Psychiatric/Behavioral: Negative for depression, hallucinations, memory loss, substance abuse and suicidal ideas. The patient is not nervous/anxious and does not have insomnia. Past Medical History:  
Diagnosis Date  Aneurysm ([address] AAA repair 2006 & 2012  Aorto-iliac duplex 02/13/2015 Tech difficult. Patent aorta bi-iliac endograft w/o leak or limb dysfunction.  Arrhythmia   
 a fib  Cardiac cath 11/01/2012 RCA (sm, nondom) patent. LM patent. LAD 25%. CX (dom) 45% mid. LVEDP 12 mmHg. No WMA. PA 27/12. W 10-12. CO/CI 5.2/2.6 (TD).  Cardiac echocardiogram, abnormal 02/20/2016 EF 55%. No WMA. Indeterminate diastolic fx. RVSP 60 mmHg. Severe LAE. Mild MR. Mod TR.  IVCE. Similar to study of 10/26/12.  Cardiovascular aorto-iliac duplex 02/13/2015 Patent aorta bi-iliac endovascular graft w/o leak or limb dysfunction. Sac measures 4.21 x 4.47 cm (4.4 x 4.6 cm on 1/31/14).  Cardiovascular LE peripheral arterial testing 07/29/2013 No significant LE arterial disease bilaterally. R GHADA 1. 12.  L GHADA 1. 12.  R DBI 0.83. L DBI 0.71. Exercise deferred.  Cardiovascular renal duplex 10/31/2012 No RA stenosis. Intrinsic/med disease in left kidney. Patent aortic endograft. Patent, thrombus-free renal veins bilaterally.  Carotid duplex 07/29/2013 Mild <50% bilateral ICA plaquing.  Chronic lung disease  Cigarette smoker  Congestive heart failure ([facility]F) ([facility]  COPD (chronic obstructive pulmonary disease) (HCC)   
 and emphysema; likely secondary to tobacco abuse  Difficult intubation  Dyslipidemia   
 low HDL  Emphysema   
 HTN (hypertension)  Hypercholesterolemia  Increased prostate specific antigen (PSA) velocity  Long term (current) use of anticoagulants   
 coumadin  Osteoarthritis  Osteomyelitis ([facility]  Paroxysmal atrial fibrillation (HCC)  Peripheral vascular disease ([facility] AAA repair 12/2007  Persistent atrial Fibrillation  Persistent atrial fibrillation (HCC)  Unspecified adverse effect of anesthesia   
 difficulty breathing placed in ICU Oct 2012 (AAA repair) Current Outpatient Medications on File Prior to Visit Medication Sig Dispense Refill  ferrous sulfate 325 mg (65 mg iron) tablet Take 325 mg by mouth Daily (before breakfast). every other day  Indications: Iron Deficiency Anemia  ascorbic acid, vitamin C, (VITAMIN C) 500 mg tablet Take 500 mg by mouth daily. every other day  Indications: Vitamin C Deficiency  acetaminophen (TYLENOL ARTHRITIS PAIN) 650 mg TbER Take 1,300 mg by mouth two (2) times daily as needed (Pain).  traZODone (DESYREL) 50 mg tablet Take 50 mg by mouth nightly.  tamsulosin (FLOMAX) 0.4 mg capsule Take 0.4 mg by mouth daily.  lisinopril (PRINIVIL, ZESTRIL) 20 mg tablet Take 1 Tab by mouth daily. 30 Tab 0  pravastatin (PRAVACHOL) 40 mg tablet Take 40 mg by mouth nightly.  polyethylene glycol (MIRALAX) 17 gram packet Take 17 g by mouth daily as needed (constipation).  tiotropium-olodaterol (STIOLTO RESPIMAT) 2.5-2.5 mcg/actuation mist Take 2 Inhalation by inhalation daily. 1 Inhaler 5  
 albuterol (PROVENTIL VENTOLIN) 2.5 mg /3 mL (0.083 %) nebulizer solution 3 mL by Nebulization route every four (4) hours as needed for Wheezing. 24 Each 3  
 digoxin (DIGITEK) 0.125 mg tablet take 1 tablet by mouth once daily 90 Tab 3  warfarin (COUMADIN) 2.5 mg tablet Take 1 Tab by mouth daily. Take 2 pills (5 mg) Mon, Tues, Wed, Thur, Sat, Sun; and 1 pill only (2.5 mg) Fri (Patient taking differently: Take 2.5 mg by mouth daily. Take 2 pills (5 mg) Mon, Tues, Thur, Sat, Sun; and 1 pill only (2.5 mg) Wed, Fri) 60 Tab 0  
 amLODIPine (NORVASC) 10 mg tablet Take 10 mg by mouth daily. 0  
 OXYGEN-AIR DELIVERY SYSTEMS 2 L/min by Nasal route daily. Continuous. Company is [employer]  melatonin 3 mg tablet Take 1 Tab by mouth nightly as needed. 30 Tab 0  
 aspirin delayed-release 81 mg tablet Take 81 mg by mouth daily. No current facility-administered medications on file prior to visit. Allergies Allergen Reactions  Codeine Swelling  Morphine Itching  Sulfa (Sulfonamide Antibiotics) Other (comments) Kidney problems. Social History Socioeconomic History  Marital status:  Spouse name: Not on file  Number of children: Not on file  Years of education: Not on file  Highest education level: Not on file Social Needs  Financial resource strain: Not on file  Food insecurity - worry: Not on file  Food insecurity - inability: Not on file  Transportation needs - medical: Not on file  Transportation needs - non-medical: Not on file Occupational History  Not on file Tobacco Use  Smoking status: Former Smoker Packs/day: 1.00 Years: 54.00 Pack years: 54.00 Types: Cigarettes Last attempt to quit: 10/23/2012 Years since quittin.0  Smokeless tobacco: Never Used Substance and Sexual Activity  Alcohol use: No  
  Alcohol/week: 0.0 oz  
  Comment: quit drinking alcohol 26 years ago, patient states stopped in \"1983\"  Drug use: No  
 Sexual activity: Not on file Other Topics Concern  Not on file Social History Narrative  Not on file Blood pressure 116/62, pulse 75, temperature 97.9 °F (36.6 °C), temperature source Oral, resp. rate 20, height 5' 6\" (1.676 m), weight 69.9 kg (154 lb), SpO2 99 %. Ambulatory oxymetry per nurse note. Physical Exam  
Constitutional: He is oriented to person, place, and time. He appears well-developed. No distress. Slender HENT:  
Head: Normocephalic and atraumatic. Nose: Nose normal.  
Poor dentition Eyes: Conjunctivae are normal. Pupils are equal, round, and reactive to light. Right eye exhibits no discharge. Left eye exhibits no discharge. No scleral icterus. Neck: No JVD present. No tracheal deviation present. No thyromegaly present. Cardiovascular: Normal rate and intact distal pulses. No murmur heard. Irregular rhythm Pulmonary/Chest: Effort normal. No stridor. No respiratory distress. He has no wheezes. He has no rales. He exhibits no tenderness. Diminished breath sounds Abdominal: Soft. He exhibits no mass. There is no tenderness. There is no rebound. Musculoskeletal: He exhibits no edema or tenderness. Lymphadenopathy:  
  He has no cervical adenopathy. Neurological: He is alert and oriented to person, place, and time. Skin: Skin is warm and dry. No rash noted. He is not diaphoretic. No erythema. Psychiatric: He has a normal mood and affect. His behavior is normal.  
 
Spirometry: severe obstruction FEV1 48%. Reversible component demonstrated 4/12/2018  6:26 PM - Azeem, Card Result In  
   
Narrative   
  [facility] 
[address] [facility], [phone] Transthoracic Echocardiogram 
 
Patient: Daniel Louise 
MRN: [id number] ACCT #: [de-identified] : 1940 Age: 77 years Gender: Male Height: 67 in 
Weight: 161.7 lb 
BSA: 1.85 m-sq BP: 148 / 78 mmHg Study date: 2018 Status: Routine Location: [facility] DMS [address] #: [id number] Allergies: CODEINE, MORPHINE, SULFA (SULFONAMIDE ANTIBIOTICS) Referring_Ordering Physician: Magui Root. Otto Gaspar MD 
Interpreting Group: [facility] Interpreting Physician: Donovan Jordan DO Technologist: Kalyn Garsia RDMS 
 
SUMMARY: 
Left ventricle: Size was normal. Systolic function was by visual assessment. Ejection fraction was estimated to be 55 
%. There was hypokinesis of the basal-mid anteroseptal wall(s). Wall  
thickness was mildly increased. The study was not 
technically sufficient to allow evaluation of LV diastolic function. Right ventricle: Systolic pressure was mildly increased. Estimated peak  
pressure was 46 mmHg. Left atrium: The atrium was severely dilated. LA volume index was 65 ml/m-sq. Right atrium: The atrium was moderately to severely dilated. The right atrial 
 area at systole was 27 cm-sq. COMPARISONS: 
IVC has decreased in size. Comparison was made with the report of the  
previous study of 2016. The actual study 
was not available for direct comparison. LV thickness has increased from 10  
mm to 13 mm. Pulmonary artery pressure has 
decreased. Right atrium has increased in size. Otherwise no significant  
change. INDICATIONS: Chronic Atrial fibrillation. HISTORY: Prior history: Congestive heart failure. Pulmonary hypertension. Atrial fibrillation. Risk factors: 
hypertension and a history of coronary artery disease. Chronic lung disease. PROCEDURE: This was a routine study. The study included complete 2D imaging,  
M-mode, complete spectral Doppler, and 
color Doppler. The heart rate was 97 bpm, at the start of the study. Systolic 
 blood pressure was 148 mmHg, at the start 
of the study.  Diastolic blood pressure was 78 mmHg, at the start of the  
 study. Echocardiographic views were limited by 
poor acoustic window availability due to lung interference. Image quality was 
 fair. LEFT VENTRICLE: Size was normal. Systolic function was by visual assessment. Ejection fraction was estimated to be 55 
%. There was hypokinesis of the basal-mid anteroseptal wall(s). Wall  
thickness was mildly increased. DOPPLER: The study 
was not technically sufficient to allow evaluation of LV diastolic function. RIGHT VENTRICLE: The size was at the upper limits of normal. Systolic  
function was normal. DOPPLER: Systolic pressure 
was mildly increased. Estimated peak pressure was 46 mmHg. LEFT ATRIUM: The atrium was severely dilated. LA volume index was 65 ml/m-sq. RIGHT ATRIUM: The atrium was moderately to severely dilated. MITRAL VALVE: There was annular calcification. There was mild calcification,  
with mild chordal involvement. There was 
normal leaflet separation. DOPPLER: There was no evidence for stenosis. There 
 was mild regurgitation. AORTIC VALVE: The valve was trileaflet. Leaflets exhibited mildly increased  
thickness and normal cuspal separation. DOPPLER: There was no stenosis. There was trivial regurgitation. TRICUSPID VALVE: Normal valve structure. There was normal leaflet separation. 
 The chordae were fibrosed. DOPPLER: There 
was no evidence for tricuspid stenosis. There was moderate regurgitation. Right atrial pressure estimate: 3 mmHg. PULMONIC VALVE: Leaflets exhibited normal thickness, no calcification, and  
normal cuspal separation. DOPPLER: There was 
trivial regurgitation. AORTA: The root exhibited normal size. SYSTEMIC VEINS: IVC: The inferior vena cava was normal in size and course. The respirophasic change in diameter was 
more than 50%. PERICARDIUM: Insignificant pericardial effusion and/or pericardial fat was  
present. MEASUREMENT TABLES 
 
2D measurements Aorta   (Reference normals) Root diam   34 mm   (--) Right atrium   (Reference normals) Area sys   27 cm-sq   (8.3-19. 5) SYSTEM MEASUREMENT TABLES 
 
2D Ao Diam: 3.42 cm IVSd: 1.31 cm LVIDd: 4.13 cm LVIDs: 2.95 cm 
LVPWd: 1.33 cm IVC: 1.85 cm LAAs A2C: 32.59 cm2 LAAs A4C: 29.44 cm2 LAESV A-L A2C: 133.77 ml 
LAESV A-L A4C: 108.52 ml 
LAESV Index (A-L): 65.3 ml/m2 LAESV MOD A2C: 128.95 ml 
LAESV MOD A4C: 102 ml LAESV(A-L): 120.81 ml LALs A2C: 6.74 cm LALs A4C: 6.78 cm 
LVOT Diam: 2.25 cm 
RA Area: 26.98 cm2 RV Minor: 4.38 cm 
 
CW 
TR Vmax: 3.28 m/s 
TR maxP.08 mmHG 
 
MM Tapse: 1.95 cm PW 
LVOT VTI: 12.64 cm 
LVOT Vmax: 0.87 m/s LVOT Vmean: 0.54 m/s LVOT Env. Ti: 234.02 ms LVOT maxPG: 3.06 mmHG LVOT meanP.5 mmHG LVSI Dopp: 27.12 ml/m2 LVSV Dopp: 50.18 ml Prepared and E-signed by Clark Adams DO Signed 2018 18:26:07 
  
   
 
  
 
ASSESSMENT and PLAN Encounter Diagnoses Name Primary?  COPD, severe ([address] Yes  Pulmonary HTN ([address]  Hypoxemia requiring supplemental oxygen  Chronic systolic congestive heart failure ([address]  Ground glass opacity present on imaging of lung  Atrial fibrillation, chronic ([address] Pt with SOB related to severe COPD and Pulmonary HTN Grp 3. He is on dual bronchodilator therapy and ICS, pretty much maximal therapy. Continue supplemental O2, encouraged better adherence. Titrated to saturation greater than 90%. Discussed NIV use for chronic respiratory failure due to COPD however pt wants to defer this for now. Schedule CT without contrast for the end of the month, will call pt with results. Further intervention pending CT results. RTC 4 months Spirometry on next visit Flu vaccine given outside

## 2018-11-23 ENCOUNTER — HOSPITAL ENCOUNTER (OUTPATIENT)
Dept: CT IMAGING | Age: 78
Discharge: HOME | End: 2018-11-23
Attending: INTERNAL MEDICINE
Payer: MEDICARE

## 2018-11-23 DIAGNOSIS — R91.8 GROUND GLASS OPACITY PRESENT ON IMAGING OF LUNG: ICD-10-CM

## 2018-11-23 PROCEDURE — 71250 CT THORAX DX C-: CPT

## 2018-12-04 NOTE — ED NOTES
Hourly rounding complete. Safety  Pt resting   [ x ]  On stretcher with side rails up and bed in locked position, call bell within reach  [  ]  Sitting in chair with casters locked, call bell within reach    Toileting  [x  ] pt denies need to use bathroom  [  ] pt assisted to bathroom  [  ] pt assisted with bedpan  [  ] pt independent to bathroom as needed    Ongoing Plan of Care  Plan of care and expected time for test and results reviewed with pt.     Pain Management / Comfort  [  ] dimmed lights  [  ] warm blanket provided  [  ] pain assessed  [ x] monitor alarms reviewed Jerking movements of extremities

## 2019-01-01 ENCOUNTER — HOSPITAL ENCOUNTER (OUTPATIENT)
Dept: LAB | Age: 79
Discharge: HOME | End: 2019-11-07
Payer: MEDICARE

## 2019-01-01 ENCOUNTER — HOSPITAL ENCOUNTER (OUTPATIENT)
Dept: INFUSION THERAPY | Age: 79
Discharge: HOME | End: 2019-11-07
Payer: MEDICARE

## 2019-01-01 ENCOUNTER — HOSPITAL ENCOUNTER (OUTPATIENT)
Dept: INFUSION THERAPY | Age: 79
Discharge: HOME | End: 2019-12-19
Payer: MEDICARE

## 2019-01-01 ENCOUNTER — HOSPITAL ENCOUNTER (OUTPATIENT)
Dept: INFUSION THERAPY | Age: 79
Discharge: HOME | End: 2019-11-15
Payer: MEDICARE

## 2019-01-01 ENCOUNTER — HOSPITAL ENCOUNTER (OUTPATIENT)
Dept: INFUSION THERAPY | Age: 79
Discharge: HOME | End: 2019-12-27
Payer: MEDICARE

## 2019-01-01 ENCOUNTER — OFFICE VISIT (OUTPATIENT)
Dept: ONCOLOGY | Age: 79
End: 2019-01-01

## 2019-01-01 ENCOUNTER — TELEPHONE (OUTPATIENT)
Dept: ONCOLOGY | Age: 79
End: 2019-01-01

## 2019-01-01 ENCOUNTER — HOSPITAL ENCOUNTER (OUTPATIENT)
Dept: INFUSION THERAPY | Age: 79
Discharge: HOME | End: 2019-11-18
Payer: MEDICARE

## 2019-01-01 ENCOUNTER — HOSPITAL ENCOUNTER (OUTPATIENT)
Dept: INFUSION THERAPY | Age: 79
Discharge: HOME | End: 2019-12-26
Payer: MEDICARE

## 2019-01-01 ENCOUNTER — OFFICE VISIT (OUTPATIENT)
Dept: PULMONOLOGY | Age: 79
End: 2019-01-01

## 2019-01-01 VITALS
TEMPERATURE: 98 F | RESPIRATION RATE: 18 BRPM | OXYGEN SATURATION: 99 % | DIASTOLIC BLOOD PRESSURE: 57 MMHG | HEART RATE: 51 BPM | SYSTOLIC BLOOD PRESSURE: 117 MMHG

## 2019-01-01 VITALS
WEIGHT: 141.6 LBS | SYSTOLIC BLOOD PRESSURE: 95 MMHG | RESPIRATION RATE: 22 BRPM | DIASTOLIC BLOOD PRESSURE: 54 MMHG | BODY MASS INDEX: 22.76 KG/M2 | HEIGHT: 66 IN | TEMPERATURE: 97.9 F | OXYGEN SATURATION: 100 % | HEART RATE: 98 BPM

## 2019-01-01 VITALS
DIASTOLIC BLOOD PRESSURE: 62 MMHG | SYSTOLIC BLOOD PRESSURE: 123 MMHG | WEIGHT: 145.2 LBS | OXYGEN SATURATION: 99 % | RESPIRATION RATE: 18 BRPM | TEMPERATURE: 96.9 F | HEART RATE: 69 BPM | HEART RATE: 55 BPM | RESPIRATION RATE: 22 BRPM | BODY MASS INDEX: 23.33 KG/M2 | DIASTOLIC BLOOD PRESSURE: 54 MMHG | SYSTOLIC BLOOD PRESSURE: 109 MMHG | TEMPERATURE: 97.7 F | HEIGHT: 66 IN | OXYGEN SATURATION: 100 %

## 2019-01-01 VITALS
HEIGHT: 66 IN | HEART RATE: 83 BPM | BODY MASS INDEX: 22.95 KG/M2 | OXYGEN SATURATION: 100 % | SYSTOLIC BLOOD PRESSURE: 119 MMHG | WEIGHT: 142.8 LBS | TEMPERATURE: 97.9 F | DIASTOLIC BLOOD PRESSURE: 56 MMHG

## 2019-01-01 DIAGNOSIS — J44.9 COPD, SEVERE (HCC): Primary | ICD-10-CM

## 2019-01-01 DIAGNOSIS — D50.0 IRON DEFICIENCY ANEMIA DUE TO CHRONIC BLOOD LOSS: ICD-10-CM

## 2019-01-01 DIAGNOSIS — D64.9 ANEMIA, UNSPECIFIED TYPE: Primary | ICD-10-CM

## 2019-01-01 DIAGNOSIS — D64.89 ANEMIA DUE TO OTHER CAUSE, NOT CLASSIFIED: Primary | ICD-10-CM

## 2019-01-01 DIAGNOSIS — Z99.81 HYPOXEMIA REQUIRING SUPPLEMENTAL OXYGEN: ICD-10-CM

## 2019-01-01 DIAGNOSIS — I27.20 PULMONARY HTN (HCC): ICD-10-CM

## 2019-01-01 DIAGNOSIS — R09.02 HYPOXEMIA REQUIRING SUPPLEMENTAL OXYGEN: ICD-10-CM

## 2019-01-01 DIAGNOSIS — D50.9 MICROCYTIC ANEMIA: ICD-10-CM

## 2019-01-01 DIAGNOSIS — D64.9 SYMPTOMATIC ANEMIA: ICD-10-CM

## 2019-01-01 LAB
ABO + RH BLD: NORMAL
ABO + RH BLD: NORMAL
ALBUMIN SERPL ELPH-MCNC: 3 G/DL (ref 2.9–4.4)
ALBUMIN SERPL-MCNC: 3.2 G/DL (ref 3.4–5)
ALBUMIN/GLOB SERPL: 0.8 {RATIO} (ref 0.8–1.7)
ALBUMIN/GLOB SERPL: 0.9 {RATIO} (ref 0.7–1.7)
ALP SERPL-CCNC: 89 U/L (ref 45–117)
ALPHA1 GLOB SERPL ELPH-MCNC: 0.3 G/DL (ref 0–0.4)
ALPHA2 GLOB SERPL ELPH-MCNC: 0.9 G/DL (ref 0.4–1)
ALT SERPL-CCNC: 18 U/L (ref 16–61)
ANION GAP SERPL CALC-SCNC: 5 MMOL/L (ref 3–18)
ANION GAP SERPL CALC-SCNC: 6 MMOL/L (ref 3–18)
ANTI-COMPLEMENT (C3B,C3D): NORMAL
AST SERPL-CCNC: 16 U/L (ref 10–38)
B-GLOBULIN SERPL ELPH-MCNC: 0.9 G/DL (ref 0.7–1.3)
BASOPHILS # BLD: 0 K/UL (ref 0–0.1)
BASOPHILS # BLD: 0 K/UL (ref 0–0.1)
BASOPHILS NFR BLD: 0 % (ref 0–2)
BASOPHILS NFR BLD: 0 % (ref 0–2)
BILIRUB SERPL-MCNC: 0.5 MG/DL (ref 0.2–1)
BLD PROD TYP BPU: NORMAL
BLOOD GROUP ANTIBODIES SERPL: NORMAL
BLOOD GROUP ANTIBODIES SERPL: NORMAL
BPU ID: NORMAL
BUN SERPL-MCNC: 21 MG/DL (ref 7–18)
BUN SERPL-MCNC: 28 MG/DL (ref 7–18)
BUN/CREAT SERPL: 13 (ref 12–20)
BUN/CREAT SERPL: 18 (ref 12–20)
CALCIUM SERPL-MCNC: 7.8 MG/DL (ref 8.5–10.1)
CALCIUM SERPL-MCNC: 8.4 MG/DL (ref 8.5–10.1)
CHLORIDE SERPL-SCNC: 102 MMOL/L (ref 100–111)
CHLORIDE SERPL-SCNC: 103 MMOL/L (ref 100–111)
CO2 SERPL-SCNC: 28 MMOL/L (ref 21–32)
CO2 SERPL-SCNC: 29 MMOL/L (ref 21–32)
CREAT SERPL-MCNC: 1.52 MG/DL (ref 0.6–1.3)
CREAT SERPL-MCNC: 1.67 MG/DL (ref 0.6–1.3)
CROSSMATCH RESULT,%XM: NORMAL
DAT IGG-SP REAG RBC QL: NORMAL
DAT POLY-SP REAG RBC QL: NORMAL
DIFFERENTIAL METHOD BLD: ABNORMAL
DIFFERENTIAL METHOD BLD: ABNORMAL
EOSINOPHIL # BLD: 0 K/UL (ref 0–0.4)
EOSINOPHIL # BLD: 0.1 K/UL (ref 0–0.4)
EOSINOPHIL NFR BLD: 0 % (ref 0–5)
EOSINOPHIL NFR BLD: 1 % (ref 0–5)
EPO SERPL-ACNC: 25.5 MIU/ML (ref 2.6–18.5)
ERYTHROCYTE [DISTWIDTH] IN BLOOD BY AUTOMATED COUNT: 15.3 % (ref 11.6–14.5)
ERYTHROCYTE [DISTWIDTH] IN BLOOD BY AUTOMATED COUNT: 17.9 % (ref 11.6–14.5)
FERRITIN SERPL-MCNC: 772 NG/ML (ref 8–388)
FOLATE SERPL-MCNC: 15.4 NG/ML (ref 3.1–17.5)
GAMMA GLOB SERPL ELPH-MCNC: 1.3 G/DL (ref 0.4–1.8)
GLOBULIN SER CALC-MCNC: 3.5 G/DL (ref 2.2–3.9)
GLOBULIN SER CALC-MCNC: 4.1 G/DL (ref 2–4)
GLUCOSE SERPL-MCNC: 100 MG/DL (ref 74–99)
GLUCOSE SERPL-MCNC: 106 MG/DL (ref 74–99)
HAPTOGLOB SERPL-MCNC: 208 MG/DL (ref 30–200)
HCT VFR BLD AUTO: 21.4 % (ref 36–48)
HCT VFR BLD AUTO: 23.6 % (ref 36–48)
HGB BLD-MCNC: 6.6 G/DL (ref 13–16)
HGB BLD-MCNC: 7.2 G/DL (ref 13–16)
IRON SATN MFR SERPL: 9 %
IRON SERPL-MCNC: 19 UG/DL (ref 50–175)
LDH SERPL L TO P-CCNC: 119 U/L (ref 81–234)
LYMPHOCYTES # BLD: 1 K/UL (ref 0.9–3.6)
LYMPHOCYTES # BLD: 1.2 K/UL (ref 0.9–3.6)
LYMPHOCYTES NFR BLD: 10 % (ref 21–52)
LYMPHOCYTES NFR BLD: 13 % (ref 21–52)
M PROTEIN SERPL ELPH-MCNC: NORMAL G/DL
MCH RBC QN AUTO: 27.8 PG (ref 24–34)
MCH RBC QN AUTO: 28 PG (ref 24–34)
MCHC RBC AUTO-ENTMCNC: 30.5 G/DL (ref 31–37)
MCHC RBC AUTO-ENTMCNC: 30.8 G/DL (ref 31–37)
MCV RBC AUTO: 90.7 FL (ref 74–97)
MCV RBC AUTO: 91.1 FL (ref 74–97)
MONOCYTES # BLD: 0.7 K/UL (ref 0.05–1.2)
MONOCYTES # BLD: 0.8 K/UL (ref 0.05–1.2)
MONOCYTES NFR BLD: 7 % (ref 3–10)
MONOCYTES NFR BLD: 9 % (ref 3–10)
NEUTS SEG # BLD: 7.1 K/UL (ref 1.8–8)
NEUTS SEG # BLD: 8.7 K/UL (ref 1.8–8)
NEUTS SEG NFR BLD: 77 % (ref 40–73)
NEUTS SEG NFR BLD: 83 % (ref 40–73)
PLATELET # BLD AUTO: 266 K/UL (ref 135–420)
PLATELET # BLD AUTO: 302 K/UL (ref 135–420)
PLATELET COMMENTS,PCOM: ABNORMAL
PLATELET COMMENTS,PCOM: ABNORMAL
PMV BLD AUTO: 9.6 FL (ref 9.2–11.8)
PMV BLD AUTO: 9.9 FL (ref 9.2–11.8)
POTASSIUM SERPL-SCNC: 4.5 MMOL/L (ref 3.5–5.5)
POTASSIUM SERPL-SCNC: 4.6 MMOL/L (ref 3.5–5.5)
PROT SERPL-MCNC: 6.5 G/DL (ref 6–8.5)
PROT SERPL-MCNC: 7.3 G/DL (ref 6.4–8.2)
RBC # BLD AUTO: 2.36 M/UL (ref 4.7–5.5)
RBC # BLD AUTO: 2.59 M/UL (ref 4.7–5.5)
RBC MORPH BLD: ABNORMAL
RETICS/RBC NFR AUTO: 2.6 % (ref 0.5–2.3)
SODIUM SERPL-SCNC: 136 MMOL/L (ref 136–145)
SODIUM SERPL-SCNC: 137 MMOL/L (ref 136–145)
SPECIMEN EXP DATE BLD: NORMAL
SPECIMEN EXP DATE BLD: NORMAL
STATUS OF UNIT,%ST: NORMAL
TIBC SERPL-MCNC: 204 UG/DL (ref 250–450)
UNIT DIVISION, %UDIV: 0
VIT B12 SERPL-MCNC: 463 PG/ML (ref 211–911)
WBC # BLD AUTO: 10.4 K/UL (ref 4.6–13.2)
WBC # BLD AUTO: 9.2 K/UL (ref 4.6–13.2)

## 2019-01-01 PROCEDURE — 74011250637 HC RX REV CODE- 250/637: Performed by: INTERNAL MEDICINE

## 2019-01-01 PROCEDURE — 83615 LACTATE (LD) (LDH) ENZYME: CPT

## 2019-01-01 PROCEDURE — 85025 COMPLETE CBC W/AUTO DIFF WBC: CPT

## 2019-01-01 PROCEDURE — 86923 COMPATIBILITY TEST ELECTRIC: CPT

## 2019-01-01 PROCEDURE — 80048 BASIC METABOLIC PNL TOTAL CA: CPT

## 2019-01-01 PROCEDURE — 86900 BLOOD TYPING SEROLOGIC ABO: CPT

## 2019-01-01 PROCEDURE — 85045 AUTOMATED RETICULOCYTE COUNT: CPT

## 2019-01-01 PROCEDURE — 77030013169 SET IV BLD ICUM -A

## 2019-01-01 PROCEDURE — P9016 RBC LEUKOCYTES REDUCED: HCPCS

## 2019-01-01 PROCEDURE — 82668 ASSAY OF ERYTHROPOIETIN: CPT

## 2019-01-01 PROCEDURE — 82728 ASSAY OF FERRITIN: CPT

## 2019-01-01 PROCEDURE — 36415 COLL VENOUS BLD VENIPUNCTURE: CPT

## 2019-01-01 PROCEDURE — 83010 ASSAY OF HAPTOGLOBIN QUANT: CPT

## 2019-01-01 PROCEDURE — 83540 ASSAY OF IRON: CPT

## 2019-01-01 PROCEDURE — 36430 TRANSFUSION BLD/BLD COMPNT: CPT

## 2019-01-01 PROCEDURE — 82746 ASSAY OF FOLIC ACID SERUM: CPT

## 2019-01-01 PROCEDURE — 86880 COOMBS TEST DIRECT: CPT

## 2019-01-01 PROCEDURE — 84155 ASSAY OF PROTEIN SERUM: CPT

## 2019-01-01 PROCEDURE — 80053 COMPREHEN METABOLIC PANEL: CPT

## 2019-01-01 RX ORDER — DIPHENHYDRAMINE HCL 25 MG
25 CAPSULE ORAL ONCE
Status: COMPLETED | OUTPATIENT
Start: 2019-01-01 | End: 2019-01-01

## 2019-01-01 RX ORDER — ACETAMINOPHEN 325 MG/1
650 TABLET ORAL ONCE
Status: COMPLETED | OUTPATIENT
Start: 2019-01-01 | End: 2019-01-01

## 2019-01-01 RX ORDER — SODIUM CHLORIDE 9 MG/ML
250 INJECTION, SOLUTION INTRAVENOUS AS NEEDED
Status: DISCONTINUED | OUTPATIENT
Start: 2019-01-01 | End: 2019-01-01 | Stop reason: HOSPADM

## 2019-01-01 RX ADMIN — DIPHENHYDRAMINE HYDROCHLORIDE 25 MG: 25 CAPSULE ORAL at 09:29

## 2019-01-01 RX ADMIN — ACETAMINOPHEN 650 MG: 325 TABLET ORAL at 09:29

## 2019-01-01 RX ADMIN — DIPHENHYDRAMINE HYDROCHLORIDE 25 MG: 25 CAPSULE ORAL at 11:23

## 2019-01-01 RX ADMIN — ACETAMINOPHEN 650 MG: 325 TABLET ORAL at 11:23

## 2019-01-08 ENCOUNTER — OFFICE VISIT (OUTPATIENT)
Dept: CARDIOLOGY CLINIC | Age: 79
End: 2019-01-08

## 2019-01-08 VITALS
HEART RATE: 91 BPM | WEIGHT: 154 LBS | HEIGHT: 66 IN | SYSTOLIC BLOOD PRESSURE: 120 MMHG | OXYGEN SATURATION: 97 % | DIASTOLIC BLOOD PRESSURE: 60 MMHG | BODY MASS INDEX: 24.75 KG/M2

## 2019-01-08 DIAGNOSIS — I25.10 CORONARY ARTERY DISEASE INVOLVING NATIVE CORONARY ARTERY OF NATIVE HEART WITHOUT ANGINA PECTORIS: Primary | ICD-10-CM

## 2019-01-08 DIAGNOSIS — I71.30 AAA (ABDOMINAL AORTIC ANEURYSM, RUPTURED): Chronic | ICD-10-CM

## 2019-01-08 DIAGNOSIS — I27.20 PULMONARY HYPERTENSION (HCC): ICD-10-CM

## 2019-01-08 DIAGNOSIS — I73.9 PERIPHERAL VASCULAR DISEASE (HCC): Chronic | ICD-10-CM

## 2019-01-08 DIAGNOSIS — I48.20 CHRONIC ATRIAL FIBRILLATION (HCC): Chronic | ICD-10-CM

## 2019-01-08 DIAGNOSIS — I50.9 ACUTE ON CHRONIC CONGESTIVE HEART FAILURE, UNSPECIFIED HEART FAILURE TYPE (HCC): ICD-10-CM

## 2019-01-08 NOTE — PROGRESS NOTES
HISTORY OF PRESENT ILLNESS  Julianna Denny is a 77 y.o. male. ASSESSMENT and PLAN    Mr. Makenzie Garcia has history of chronic atrial fibrillation, mild CAD (by coronary angiography 2012), severe COPD on oxygen therapy, as well as history of AAA stent graft. He is back on Coumadin at this point. In January of 2016, he developed severe back pains, diagnosed with lumbar spine osteomyelitis and abscess. His echocardiogram in February of 2016 showed normal ejection fraction without wall motion abnormality, severely dilated left atrium, and severe pulmonary hypertension with peak pressure of 60 mmHg. His echocardiogram in April 2018 shows EF of 55%. He has evidence of a pulmonary hypertension with RV systolic pressure 46 mmHg. In June 2018, he was admitted to the hospital.  He was admitted with respiratory compromise, over diuresis and hyponatremia. There was concern for HFpEF. However, it was likely from COPD exacerbation and RV systolic strain.  CAD:   He has history of only mild CAD by heart catheterization in 2012.  CAF: Remains in atrial fibrillation, rate controlled, on Coumadin.  BP:   Well controlled.  HR:    Rate controlled A. fib.  CHF:   There is no evidence of decompensated CHF noted.  Weight:   His weight today is 154 pounds. His baseline weight was 168 pounds. I feel that his current weight is acceptable. I did not encourage further weight loss.  Cholesterol:   Target LDL <70. Pravachol 40.  Anti-platelet:   Remains on Coumadin. If he needs hernia surgery. I suspect that from cardiac standpoint, he would be able to tolerate the hernia surgery. Unfortunately, he has severe COPD and pulmonary hypertension; this may be the limiting factor. I will plan on seeing him back in 12 months. Thank you. Encounter Diagnoses   Name Primary?     Coronary artery disease involving native coronary artery of native heart without angina pectoris Yes    Peripheral vascular disease ([facility]     Chronic atrial fibrillation ([facility]     AAA (abdominal aortic aneurysm, ruptured) ([facility]     Pulmonary hypertension (HCC)     Acute on chronic congestive heart failure, unspecified heart failure type ([facility]      current treatment plan is effective, no change in therapy  lab results and schedule of future lab studies reviewed with patient  reviewed diet, exercise and weight control  use of aspirin to prevent MI and TIA's discussed        HPI  Today, Mr. Aleida Ochoa has no complaints of chest pains. He has chronic shortness of breath and dyspnea on exertion from severe COPD and pulmonary hypertension. He has overall normal left ventricular systolic function and no significant CAD by heart catheterization several years ago. He denies any changes in his activity levels. His activity is limited by shortness of breath and dyspnea on exertion. He is on continuous oxygen support. He denies any orthopnea or PND. He denies palpitations despite the fact that he has chronic atrial fibrillation. Review of Systems   Constitutional: Positive for weight loss. Respiratory: Positive for shortness of breath. Cardiovascular: Positive for leg swelling. Negative for chest pain, palpitations, orthopnea, claudication and PND. Neurological: Positive for dizziness and weakness. All other systems reviewed and are negative. Physical Exam   Constitutional: He is oriented to person, place, and time. He appears well-developed and well-nourished. HENT:   Head: Normocephalic. Eyes: Conjunctivae are normal.   Neck: Neck supple. Carotid bruit is not present. No thyromegaly present. Cardiovascular: Normal rate. An irregularly irregular rhythm present. Pulmonary/Chest: He has decreased breath sounds. He has rhonchi. Abdominal: Bowel sounds are normal.   Musculoskeletal: He exhibits no edema. Neurological: He is alert and oriented to person, place, and time. Skin: Skin is warm and dry.    Nursing note and vitals reviewed. PCP: Debbie Leyva MD    Past Medical History:   Diagnosis Date    Aneurysm [facility])     AAA repair 2006 & 2012    Aorto-iliac duplex 02/13/2015    Tech difficult. Patent aorta bi-iliac endograft w/o leak or limb dysfunction.  Arrhythmia     a fib    Cardiac cath 11/01/2012    RCA (sm, nondom) patent. LM patent. LAD 25%. CX (dom) 45% mid. LVEDP 12 mmHg. No WMA. PA 27/12. W 10-12. CO/CI 5.2/2.6 (TD).  Cardiac echocardiogram, abnormal 02/20/2016    EF 55%. No WMA. Indeterminate diastolic fx. RVSP 60 mmHg. Severe LAE. Mild MR. Mod TR.  IVCE. Similar to study of 10/26/12.  Cardiovascular aorto-iliac duplex 02/13/2015    Patent aorta bi-iliac endovascular graft w/o leak or limb dysfunction. Sac measures 4.21 x 4.47 cm (4.4 x 4.6 cm on 1/31/14).  Cardiovascular LE peripheral arterial testing 07/29/2013    No significant LE arterial disease bilaterally. R GHADA 1. 12.  L GHADA 1. 12.  R DBI 0.83. L DBI 0.71. Exercise deferred.  Cardiovascular renal duplex 10/31/2012    No RA stenosis. Intrinsic/med disease in left kidney. Patent aortic endograft. Patent, thrombus-free renal veins bilaterally.  Carotid duplex 07/29/2013    Mild <50% bilateral ICA plaquing.       Chronic lung disease     Cigarette smoker     Congestive heart failure (CHF) (HCC)     COPD (chronic obstructive pulmonary disease) (HCC)     and emphysema; likely secondary to tobacco abuse    Difficult intubation     Dyslipidemia     low HDL    Emphysema     HTN (hypertension)     Hypercholesterolemia     Increased prostate specific antigen (PSA) velocity     Long term (current) use of anticoagulants     coumadin    Osteoarthritis     Osteomyelitis (HCC)     Paroxysmal atrial fibrillation (HCC)     Peripheral vascular disease ([address]     AAA repair 12/2007    Persistent atrial Fibrillation     Persistent atrial fibrillation ([address]     Unspecified adverse effect of anesthesia     difficulty breathing placed in ICU Oct 2012 (AAA repair)       Past Surgical History:   Procedure Laterality Date    BRONCHOSCOPY DIAGNOSTIC  11/2/2012         CARDIAC CATHETERIZATION  11/1/2012         COLONOSCOPY N/A 7/26/2016    COLONOSCOPY with Polypectomies performed by Martha Hui MD at [address] [place] [address] HX AAA REPAIR      2006 & 2012    HX HEART CATHETERIZATION  3/4/2009    1. RCA small, nondominant; patent. 2. LMCA patent. 3. LAD is long, wrapped around apical vessel. Diffuse, 20-30% stenosis noted. 4. CCA is large, dominant vessel. Diffuse 20-30% stenosis. 5. LVEDP is 16 mmHg. 6. Overall left ventricular systolic function mildly diminshed with est. EF 45%. Mild, global hypokinesis noted. 7. No significant mitral regurgitation or aortic stenosis noted. (see report)    HX HERNIA REPAIR  2/2014    rt inguinal     HX HERNIA REPAIR  01/2016    LEFT INGUINAL HERNIA REPAIR DR. Ramirez Fournier    REPAIR ING HERNIA,5+Y/O,JESSIE Left 1-20-16    Dr. Myriam Walsh  11/1/2012            Current Outpatient Medications   Medication Sig Dispense Refill    ferrous sulfate 325 mg (65 mg iron) tablet Take 325 mg by mouth Daily (before breakfast). every other day  Indications: Iron Deficiency Anemia      ascorbic acid, vitamin C, (VITAMIN C) 500 mg tablet Take 500 mg by mouth daily. every other day  Indications: Vitamin C Deficiency      acetaminophen (TYLENOL ARTHRITIS PAIN) 650 mg TbER Take 1,300 mg by mouth two (2) times daily as needed (Pain).  traZODone (DESYREL) 50 mg tablet Take 50 mg by mouth nightly.  tamsulosin (FLOMAX) 0.4 mg capsule Take 0.4 mg by mouth daily.  lisinopril (PRINIVIL, ZESTRIL) 20 mg tablet Take 1 Tab by mouth daily. 30 Tab 0    melatonin 3 mg tablet Take 1 Tab by mouth nightly as needed. 30 Tab 0    pravastatin (PRAVACHOL) 40 mg tablet Take 40 mg by mouth nightly.  polyethylene glycol (MIRALAX) 17 gram packet Take 17 g by mouth daily as needed (constipation).       tiotropium-olodaterol (STIOLTO RESPIMAT) 2.5-2.5 mcg/actuation mist Take 2 Inhalation by inhalation daily. 1 Inhaler 5    albuterol (PROVENTIL VENTOLIN) 2.5 mg /3 mL (0.083 %) nebulizer solution 3 mL by Nebulization route every four (4) hours as needed for Wheezing. 24 Each 3    digoxin (DIGITEK) 0.125 mg tablet take 1 tablet by mouth once daily 90 Tab 3    warfarin (COUMADIN) 2.5 mg tablet Take 1 Tab by mouth daily. Take 2 pills (5 mg) Mon, Tues, Wed, Thur, Sat, Sun; and 1 pill only  (2.5 mg) Fri (Patient taking differently: Take 2.5 mg by mouth daily. Take 2 pills (5 mg) Mon, Tues, Thur, Sat, Sun; and 1 pill only  (2.5 mg) Wed, Fri) 60 Tab 0    amLODIPine (NORVASC) 10 mg tablet Take 10 mg by mouth daily. 0    OXYGEN-AIR DELIVERY SYSTEMS 2 L/min by Nasal route daily. Continuous. Company is [employer]            The patient has a family history of    Social History     Tobacco Use    Smoking status: Former Smoker     Packs/day: 1.00     Years: 54.00     Pack years: 54.00     Types: Cigarettes     Last attempt to quit: 10/23/2012     Years since quittin.2    Smokeless tobacco: Never Used   Substance Use Topics    Alcohol use: No     Alcohol/week: 0.0 oz     Comment: quit drinking alcohol 26 years ago, patient states stopped in \"1327\"    Drug use: No       Lab Results   Component Value Date/Time    HDL Cholesterol 25 (L) 2010 10:30 AM        BP Readings from Last 3 Encounters:   19 120/60   18 116/62   10/31/18 110/62        Pulse Readings from Last 3 Encounters:   19 91   18 75   10/31/18 90       Wt Readings from Last 3 Encounters:   19 69.9 kg (154 lb)   18 69.9 kg (154 lb)   10/31/18 68 kg (150 lb)         EKG: unchanged from previous tracings, atrial fibrillation, rate 91, IVCD.

## 2019-01-08 NOTE — PROGRESS NOTES
Jennifer Mccracken presents today for   Chief Complaint   Patient presents with    Irregular Heart Beat     9-12 month follow up     Shortness of Breath     exertion    Dizziness     sometimes    Leg Swelling     mild       Jennifer Mccracken preferred language for health care discussion is english/other. Is someone accompanying this pt? Caregiver     Is the patient using any DME equipment during OV? Oxygen     Depression Screening:  PHQ over the last two weeks 11/9/2018   Little interest or pleasure in doing things Not at all   Feeling down, depressed, irritable, or hopeless Not at all   Total Score PHQ 2 0       Learning Assessment:  Learning Assessment 10/31/2018   PRIMARY LEARNER Patient   BARRIERS PRIMARY LEARNER -   PRIMARY LANGUAGE ENGLISH   LEARNER PREFERENCE PRIMARY LISTENING   ANSWERED BY patient   RELATIONSHIP SELF       Abuse Screening:  No flowsheet data found. Fall Risk  Fall Risk Assessment, last 12 mths 11/9/2018   Able to walk? Yes   Fall in past 12 months? No       Pt currently taking Anticoagulant therapy? Coumadin     Coordination of Care:  1. Have you been to the ER, urgent care clinic since your last visit? Hospitalized since your last visit? 7/9/18 - 7/12/18 for hyponatremia & 6/27/18 - 6/29/18 for CHF    2. Have you seen or consulted any other health care providers outside of the [address] [place] [address] since your last visit? Include any pap smears or colon screening.  No

## 2019-01-23 RX ORDER — TIOTROPIUM BROMIDE AND OLODATEROL 3.124; 2.736 UG/1; UG/1
SPRAY, METERED RESPIRATORY (INHALATION)
Qty: 1 INHALER | Refills: 5 | Status: SHIPPED | OUTPATIENT
Start: 2019-01-23 | End: 2019-07-23 | Stop reason: SDUPTHER

## 2019-03-21 ENCOUNTER — OFFICE VISIT (OUTPATIENT)
Dept: PULMONOLOGY | Age: 79
End: 2019-03-21

## 2019-03-21 VITALS
HEIGHT: 66 IN | RESPIRATION RATE: 24 BRPM | HEART RATE: 77 BPM | BODY MASS INDEX: 24.11 KG/M2 | WEIGHT: 150 LBS | DIASTOLIC BLOOD PRESSURE: 50 MMHG | TEMPERATURE: 97.8 F | OXYGEN SATURATION: 98 % | SYSTOLIC BLOOD PRESSURE: 110 MMHG

## 2019-03-21 DIAGNOSIS — J44.9 COPD, SEVERE (HCC): Primary | ICD-10-CM

## 2019-03-21 DIAGNOSIS — R91.8 GROUND GLASS OPACITY PRESENT ON IMAGING OF LUNG: ICD-10-CM

## 2019-03-21 DIAGNOSIS — I27.20 PULMONARY HTN (HCC): ICD-10-CM

## 2019-03-21 DIAGNOSIS — R59.0 MEDIASTINAL ADENOPATHY: ICD-10-CM

## 2019-03-21 RX ORDER — ALBUTEROL SULFATE 0.83 MG/ML
2.5 SOLUTION RESPIRATORY (INHALATION)
Qty: 24 EACH | Refills: 3 | Status: SHIPPED | OUTPATIENT
Start: 2019-03-21 | End: 2020-01-01 | Stop reason: SDUPTHER

## 2019-03-21 NOTE — PROGRESS NOTES
Verbal Order with read back per Jaylin Adam MD  For PFT smart panel. AMB POC PFT complete w/ bronchodilator AMB POC PFT complete w/o bronchodilator Dr. Carmelita Forte MD will co-sign the orders.

## 2019-03-21 NOTE — PROGRESS NOTES
HISTORY OF PRESENT ILLNESS Yelena Colmenares is a 77 y.o. male with COPD FEV1 48%. Follow up for this pt with severe COPD on home O2. Pt was started on Stiolto 2 visits ago and notes improvement in dyspnea at rest and some improvement in DUMONT. Pt still with complaints of dyspnea with moderate exertion but no chest pain. Pt derived some benefit from Pulmonary Rehab before but has not continued regular exercise regimen after completing the 13 week program. He was last admitted to the hospital in 2016. Pt's nebulizer is over 8years old and malfunctioning. Interval history includes 2 hospital admissions for Hyponatremia and CHF exacerbation with pneumonia. He is on LTOT and notes improved adherence to O2 therapy. He reports less frequent SOB and wheezing after starting Stiolto. Recent echo also shows elevated right heart pressures, see below. Pt notes unchanged pedal edema with no orthopnea or PND.' 
 
COPD The history is provided by the patient. This is a chronic problem. Episode onset: years. The problem occurs daily. The problem has been gradually worsening. Associated symptoms include shortness of breath. Pertinent negatives include no chest pain, no abdominal pain and no headaches. The symptoms are aggravated by exertion and walking. The symptoms are relieved by rest and medications. Treatments tried: Stiolto. The treatment provided significant relief. Other Associated symptoms include shortness of breath. Pertinent negatives include no chest pain, no abdominal pain and no headaches. Review of Systems Constitutional: Positive for malaise/fatigue. Negative for chills, diaphoresis, fever and weight loss. HENT: Positive for hearing loss. Negative for congestion, ear discharge, ear pain, nosebleeds, sinus pain, sore throat and tinnitus. Eyes: Negative for blurred vision, double vision, photophobia, pain, discharge and redness. Respiratory: Positive for shortness of breath. Negative for hemoptysis, sputum production and stridor. Cough: dry. Wheezing: resolved. Cardiovascular: Negative for chest pain, palpitations, orthopnea, claudication, leg swelling and PND. Gastrointestinal: Negative for abdominal pain, blood in stool, constipation, diarrhea, heartburn, melena, nausea and vomiting. Genitourinary: Negative for dysuria, flank pain, frequency, hematuria and urgency. Musculoskeletal: Negative for back pain, falls, joint pain, myalgias and neck pain. Skin: Negative for itching and rash. Neurological: Negative for dizziness, tingling, tremors, sensory change, speech change, focal weakness, seizures, loss of consciousness, weakness and headaches. Endo/Heme/Allergies: Negative for environmental allergies and polydipsia. Does not bruise/bleed easily. Psychiatric/Behavioral: Negative for depression, hallucinations, memory loss, substance abuse and suicidal ideas. The patient is not nervous/anxious and does not have insomnia. Past Medical History:  
Diagnosis Date  Aneurysm ([address] AAA repair 2006 & 2012  Aorto-iliac duplex 02/13/2015 Tech difficult. Patent aorta bi-iliac endograft w/o leak or limb dysfunction.  Arrhythmia   
 a fib  Cardiac cath 11/01/2012 RCA (sm, nondom) patent. LM patent. LAD 25%. CX (dom) 45% mid. LVEDP 12 mmHg. No WMA. PA 27/12. W 10-12. CO/CI 5.2/2.6 (TD).  Cardiac echocardiogram, abnormal 02/20/2016 EF 55%. No WMA. Indeterminate diastolic fx. RVSP 60 mmHg. Severe LAE. Mild MR. Mod TR.  IVCE. Similar to study of 10/26/12.  Cardiovascular aorto-iliac duplex 02/13/2015 Patent aorta bi-iliac endovascular graft w/o leak or limb dysfunction. Sac measures 4.21 x 4.47 cm (4.4 x 4.6 cm on 1/31/14).  Cardiovascular LE peripheral arterial testing 07/29/2013 No significant LE arterial disease bilaterally. R GHADA 1. 12.  L GHADA 1.12. R DBI 0.83. L DBI 0.71. Exercise deferred.  Cardiovascular renal duplex 10/31/2012 No RA stenosis. Intrinsic/med disease in left kidney. Patent aortic endograft. Patent, thrombus-free renal veins bilaterally.  Carotid duplex 07/29/2013 Mild <50% bilateral ICA plaquing.  Chronic lung disease  Cigarette smoker  Congestive heart failure ([facility]F) ([facility]  COPD (chronic obstructive pulmonary disease) (HCC)   
 and emphysema; likely secondary to tobacco abuse  Difficult intubation  Dyslipidemia   
 low HDL  Emphysema   
 HTN (hypertension)  Hypercholesterolemia  Increased prostate specific antigen (PSA) velocity  Long term (current) use of anticoagulants   
 coumadin  Osteoarthritis  Osteomyelitis ([facility]  Paroxysmal atrial fibrillation (HCC)  Peripheral vascular disease ([facility] [address] [facility] AAA repair 12/2007  Persistent atrial Fibrillation  Persistent atrial fibrillation (HCC)  Unspecified adverse effect of anesthesia   
 difficulty breathing placed in ICU Oct 2012 (AAA repair) Current Outpatient Medications on File Prior to Visit Medication Sig Dispense Refill  STIOLTO RESPIMAT 2.5-2.5 mcg/actuation inhaler inhale 2 inhalations by mouth once daily 1 Inhaler 5  
 ferrous sulfate 325 mg (65 mg iron) tablet Take 325 mg by mouth Daily (before breakfast). every other day  Indications: Iron Deficiency Anemia  ascorbic acid, vitamin C, (VITAMIN C) 500 mg tablet Take 500 mg by mouth daily. every other day  Indications: Vitamin C Deficiency  acetaminophen (TYLENOL ARTHRITIS PAIN) 650 mg TbER Take 1,300 mg by mouth two (2) times daily as needed (Pain).  traZODone (DESYREL) 50 mg tablet Take 50 mg by mouth nightly.  tamsulosin (FLOMAX) 0.4 mg capsule Take 0.4 mg by mouth daily.  lisinopril (PRINIVIL, ZESTRIL) 20 mg tablet Take 1 Tab by mouth daily.  30 Tab 0  
  melatonin 3 mg tablet Take 1 Tab by mouth nightly as needed. 30 Tab 0  pravastatin (PRAVACHOL) 40 mg tablet Take 40 mg by mouth nightly.  polyethylene glycol (MIRALAX) 17 gram packet Take 17 g by mouth daily as needed (constipation).  albuterol (PROVENTIL VENTOLIN) 2.5 mg /3 mL (0.083 %) nebulizer solution 3 mL by Nebulization route every four (4) hours as needed for Wheezing. 24 Each 3  
 digoxin (DIGITEK) 0.125 mg tablet take 1 tablet by mouth once daily 90 Tab 3  warfarin (COUMADIN) 2.5 mg tablet Take 1 Tab by mouth daily. Take 2 pills (5 mg) Mon, Tues, Wed, Thur, Sat, Sun; and 1 pill only (2.5 mg) Fri (Patient taking differently: Take 2.5 mg by mouth daily. Take 2 pills (5 mg) Mon, Tues, Thur, Sat, Sun; and 1 pill only (2.5 mg) Wed, Fri) 60 Tab 0  
 amLODIPine (NORVASC) 10 mg tablet Take 10 mg by mouth daily. 0  
 OXYGEN-AIR DELIVERY SYSTEMS 2 L/min by Nasal route daily. Continuous. Company is [employer] No current facility-administered medications on file prior to visit. Allergies Allergen Reactions  Codeine Swelling  Morphine Itching  Sulfa (Sulfonamide Antibiotics) Other (comments) Kidney problems. Social History Socioeconomic History  Marital status:  Spouse name: Not on file  Number of children: Not on file  Years of education: Not on file  Highest education level: Not on file Occupational History  Not on file Social Needs  Financial resource strain: Not on file  Food insecurity:  
  Worry: Not on file Inability: Not on file  Transportation needs:  
  Medical: Not on file Non-medical: Not on file Tobacco Use  Smoking status: Former Smoker Packs/day: 1.00 Years: 54.00 Pack years: 54.00 Types: Cigarettes Last attempt to quit: 10/23/2012 Years since quittin.4  Smokeless tobacco: Never Used Substance and Sexual Activity  Alcohol use: No  
  Alcohol/week: 0.0 oz  
 Comment: quit drinking alcohol 26 years ago, patient states stopped in \"3822\"  Drug use: No  
 Sexual activity: Not on file Lifestyle  Physical activity:  
  Days per week: Not on file Minutes per session: Not on file  Stress: Not on file Relationships  Social connections:  
  Talks on phone: Not on file Gets together: Not on file Attends Congregation service: Not on file Active member of club or organization: Not on file Attends meetings of clubs or organizations: Not on file Relationship status: Not on file  Intimate partner violence:  
  Fear of current or ex partner: Not on file Emotionally abused: Not on file Physically abused: Not on file Forced sexual activity: Not on file Other Topics Concern  Not on file Social History Narrative  Not on file Blood pressure 110/50, pulse 77, temperature 97.8 °F (36.6 °C), temperature source Oral, resp. rate 24, height 5' 6\" (1.676 m), weight 68 kg (150 lb), SpO2 98 %. Ambulatory oxymetry per nurse note. Physical Exam  
Constitutional: He is oriented to person, place, and time. He appears well-developed. No distress. Slender HENT:  
Head: Normocephalic and atraumatic. Nose: Nose normal.  
Poor dentition Eyes: Pupils are equal, round, and reactive to light. Conjunctivae are normal. Right eye exhibits no discharge. Left eye exhibits no discharge. No scleral icterus. Neck: No JVD present. No tracheal deviation present. No thyromegaly present. Cardiovascular: Normal rate and intact distal pulses. No murmur heard. Irregular rhythm Pulmonary/Chest: Effort normal. No stridor. No respiratory distress. He has no wheezes. He has no rales. He exhibits no tenderness. Diminished breath sounds Abdominal: Soft. He exhibits no mass. There is no tenderness. There is no rebound. Musculoskeletal: He exhibits no edema, tenderness or deformity. Lymphadenopathy:  
  He has no cervical adenopathy. Neurological: He is alert and oriented to person, place, and time. Coordination normal.  
Skin: Skin is warm and dry. No rash noted. He is not diaphoretic. No erythema. No pallor. Psychiatric: He has a normal mood and affect. His behavior is normal. Judgment and thought content normal.  
 
Spirometry: severe obstruction FEV1 48%. No reversible component demonstrated 2018  6:26 PM - Azeem, Card Result In  
   
[facility] 
[address], [address] 
[phone] Transthoracic Echocardiogram 
 
Patient: Maye Garcia 
MRN: [id number] ACCT #: [de-identified] : 1940 Age: 77 years Gender: Male Height: 67 in 
Weight: 161.7 lb 
BSA: 1.85 m-sq BP: 148 / 78 mmHg Study date: 2018 Status: Routine Location: [facility] DMS [address] #: [id number] Allergies: CODEINE, MORPHINE, SULFA (SULFONAMIDE ANTIBIOTICS) Referring_Ordering Physician: Merline Duval. Shazia Kim MD 
Interpreting Group: [facility] AND [facility] GROUP Interpreting Physician: Genna Ramirez DO Technologist: Malia Carey RDMS 
 
SUMMARY: 
Left ventricle: Size was normal. Systolic function was by visual assessment. Ejection fraction was estimated to be 55 
%. There was hypokinesis of the basal-mid anteroseptal wall(s). Wall  
thickness was mildly increased. The study was not 
technically sufficient to allow evaluation of LV diastolic function. Right ventricle: Systolic pressure was mildly increased. Estimated peak  
pressure was 46 mmHg. Left atrium: The atrium was severely dilated. LA volume index was 65 ml/m-sq. Right atrium: The atrium was moderately to severely dilated. The right atrial 
 area at systole was 27 cm-sq. COMPARISONS: 
IVC has decreased in size. Comparison was made with the report of the  
previous study of 2016. The actual study 
was not available for direct comparison. LV thickness has increased from 10  
mm to 13 mm. Pulmonary artery pressure has decreased. Right atrium has increased in size. Otherwise no significant  
change. INDICATIONS: Chronic Atrial fibrillation. HISTORY: Prior history: Congestive heart failure. Pulmonary hypertension. Atrial fibrillation. Risk factors: 
hypertension and a history of coronary artery disease. Chronic lung disease. PROCEDURE: This was a routine study. The study included complete 2D imaging,  
M-mode, complete spectral Doppler, and 
color Doppler. The heart rate was 97 bpm, at the start of the study. Systolic 
 blood pressure was 148 mmHg, at the start 
of the study. Diastolic blood pressure was 78 mmHg, at the start of the  
study. Echocardiographic views were limited by 
poor acoustic window availability due to lung interference. Image quality was 
 fair. LEFT VENTRICLE: Size was normal. Systolic function was by visual assessment. Ejection fraction was estimated to be 55 
%. There was hypokinesis of the basal-mid anteroseptal wall(s). Wall  
thickness was mildly increased. DOPPLER: The study 
was not technically sufficient to allow evaluation of LV diastolic function. RIGHT VENTRICLE: The size was at the upper limits of normal. Systolic  
function was normal. DOPPLER: Systolic pressure 
was mildly increased. Estimated peak pressure was 46 mmHg. LEFT ATRIUM: The atrium was severely dilated. LA volume index was 65 ml/m-sq. RIGHT ATRIUM: The atrium was moderately to severely dilated. MITRAL VALVE: There was annular calcification. There was mild calcification,  
with mild chordal involvement. There was 
normal leaflet separation. DOPPLER: There was no evidence for stenosis. There 
 was mild regurgitation. AORTIC VALVE: The valve was trileaflet. Leaflets exhibited mildly increased  
thickness and normal cuspal separation. DOPPLER: There was no stenosis. There was trivial regurgitation. TRICUSPID VALVE: Normal valve structure. There was normal leaflet separation.  The chordae were fibrosed. DOPPLER: There 
was no evidence for tricuspid stenosis. There was moderate regurgitation. Right atrial pressure estimate: 3 mmHg. PULMONIC VALVE: Leaflets exhibited normal thickness, no calcification, and  
normal cuspal separation. DOPPLER: There was 
trivial regurgitation. AORTA: The root exhibited normal size. SYSTEMIC VEINS: IVC: The inferior vena cava was normal in size and course. The respirophasic change in diameter was 
more than 50%. PERICARDIUM: Insignificant pericardial effusion and/or pericardial fat was  
present. MEASUREMENT TABLES 
 
2D measurements Aorta   (Reference normals) Root diam   34 mm   (--) Right atrium   (Reference normals) Area sys   27 cm-sq   (8.3-19. 5) SYSTEM MEASUREMENT TABLES 
 
2D Ao Diam: 3.42 cm IVSd: 1.31 cm LVIDd: 4.13 cm LVIDs: 2.95 cm 
LVPWd: 1.33 cm IVC: 1.85 cm LAAs A2C: 32.59 cm2 LAAs A4C: 29.44 cm2 LAESV A-L A2C: 133.77 ml 
LAESV A-L A4C: 108.52 ml 
LAESV Index (A-L): 65.3 ml/m2 LAESV MOD A2C: 128.95 ml 
LAESV MOD A4C: 102 ml LAESV(A-L): 120.81 ml LALs A2C: 6.74 cm LALs A4C: 6.78 cm 
LVOT Diam: 2.25 cm 
RA Area: 26.98 cm2 RV Minor: 4.38 cm 
 
CW 
TR Vmax: 3.28 m/s 
TR maxP.08 mmHG 
 
MM Tapse: 1.95 cm PW 
LVOT VTI: 12.64 cm 
LVOT Vmax: 0.87 m/s LVOT Vmean: 0.54 m/s LVOT Env. Ti: 234.02 ms LVOT maxPG: 3.06 mmHG LVOT meanP.5 mmHG LVSI Dopp: 27.12 ml/m2 LVSV Dopp: 50.18 ml Prepared and E-signed by Toribio Don DO Signed 2018 18:26:07 
  
   
 
  
CT Results (most recent): 
Results from Hospital Encounter encounter on 18 CT CHEST WO CONT Narrative EXAM: CT scan of the chest without IV contrast. 
 
CLINICAL HISTORY/INDICATION: Follow-up of bilateral alveolar densities. Jona Quijano COMPARISON: CT scan of the chest without contrast dated 2018. Jona Quijano TECHNIQUE: All CT scans at this facility are performed using dose optimization technique as appropriate to a performed exam, to include automated exposure 
control, adjustment of the mA and/or kV according to patient's size (including 
appropriate matching for site-specific examinations), or use of iterative 
reconstruction technique. Cecilia Vick FINDINGS: 
 
A CT scan performed on the patient's thorax. The current examination shows the 
appearance of multiple lymph nodes in the mediastinum the largest measures 12 mm 
on the current exam. The caliber of the thoracic aorta is within normal limits. Lung window images shows the previously mentioned groundglass densities in both 
lungs have cleared on the current exam. No pulmonary nodules, infiltrates, 
pleural effusions or pneumothoraces are seen. Cecilia Vick Impression IMPRESSION: 
 
Clearing of the previously reported groundglass densities in both lung fields. Persistent prominent lymph nodes in the mediastinum. Multiple hypodense areas in both lobes of the liver is most likely represents 
cysts or hemangiomas. Partial visualization of an intra-aortic abdominal stent. Cecilia Vick ASSESSMENT and PLAN Encounter Diagnoses Name Primary?  COPD, severe ([address] Yes  Pulmonary HTN ([address]  Ground glass opacity present on imaging of lung  Mediastinal adenopathy Pt with SOB related to severe COPD and Pulmonary HTN Grp 2/3. He is on dual bronchodilator therapy and ICS, pretty much maximal therapy. Continue supplemental O2, encouraged better adherence. Titrated to saturation greater than 90%. Discussed NIV use for chronic respiratory failure due to COPD however pt wants to defer this for now. Schedule CT without contrast for the end of the month, will call pt with results. Further intervention pending CT results. RTC 4 months Spirometry reviewed with pt and daughter

## 2019-03-21 NOTE — PROGRESS NOTES
Chief Complaint Patient presents with  COPD  
  CT done 11/23  Other Pulm HTN, Hypoxemia 1. Have you been to the ER, urgent care clinic since your last visit? Hospitalized since your last visit? No 
 
2. Have you seen or consulted any other health care providers outside of the [address] [place] [address] since your last visit? Include any pap smears or colon screening. Yes Where: Dr Debbi Arora [facility]CP [facility]

## 2019-04-16 ENCOUNTER — OFFICE VISIT (OUTPATIENT)
Dept: VASCULAR SURGERY | Age: 79
End: 2019-04-16

## 2019-04-16 VITALS
RESPIRATION RATE: 22 BRPM | WEIGHT: 150 LBS | HEART RATE: 74 BPM | BODY MASS INDEX: 24.11 KG/M2 | HEIGHT: 66 IN | DIASTOLIC BLOOD PRESSURE: 60 MMHG | SYSTOLIC BLOOD PRESSURE: 140 MMHG

## 2019-04-16 DIAGNOSIS — Z95.828 HISTORY OF ENDOVASCULAR STENT GRAFT FOR ABDOMINAL AORTIC ANEURYSM: Primary | ICD-10-CM

## 2019-04-16 DIAGNOSIS — I65.23 CAROTID ARTERY STENOSIS, ASYMPTOMATIC, BILATERAL: ICD-10-CM

## 2019-04-16 NOTE — PROGRESS NOTES
1. Have you been to the ER, urgent care clinic since your last visit? Hospitalized since your last visit? No    2. Have you seen or consulted any other health care providers outside of the [address] [place] [address] since your last visit? Include any pap smears or colon screening.  No

## 2019-04-16 NOTE — PROGRESS NOTES
[facility]    Chief Complaint   Patient presents with    Abdominal Aortic Aneurysm     folllow up       History and Physical    Mr Israel Dunbar re-established follow up last year after not having been seen for a span of a few years  He'd had a medicare wellness visit with his PCP who suggested he come for vascular follow up. He has history of EVAR in 2006 but then had an endoleak that required urgent repair in 2012. He was originally following up. Not sure where lost to follow up     Last year no issues so we stated he was fine to maintain yearly surveillance    Past Medical History:   Diagnosis Date    Aneurysm [facility])     AAA repair 2006 & 2012    Aorto-iliac duplex 02/13/2015    Tech difficult. Patent aorta bi-iliac endograft w/o leak or limb dysfunction.  Arrhythmia     a fib    Cardiac cath 11/01/2012    RCA (sm, nondom) patent. LM patent. LAD 25%. CX (dom) 45% mid. LVEDP 12 mmHg. No WMA. PA 27/12. W 10-12. CO/CI 5.2/2.6 (TD).  Cardiac echocardiogram, abnormal 02/20/2016    EF 55%. No WMA. Indeterminate diastolic fx. RVSP 60 mmHg. Severe LAE. Mild MR. Mod TR.  IVCE. Similar to study of 10/26/12.  Cardiovascular aorto-iliac duplex 02/13/2015    Patent aorta bi-iliac endovascular graft w/o leak or limb dysfunction. Sac measures 4.21 x 4.47 cm (4.4 x 4.6 cm on 1/31/14).  Cardiovascular LE peripheral arterial testing 07/29/2013    No significant LE arterial disease bilaterally. R GHADA 1. 12.  L GHADA 1. 12.  R DBI 0.83. L DBI 0.71. Exercise deferred.  Cardiovascular renal duplex 10/31/2012    No RA stenosis. Intrinsic/med disease in left kidney. Patent aortic endograft. Patent, thrombus-free renal veins bilaterally.  Carotid duplex 07/29/2013    Mild <50% bilateral ICA plaquing.       Chronic lung disease     Cigarette smoker     Congestive heart failure (CHF) (HCC)     COPD (chronic obstructive pulmonary disease) (HCC)     and emphysema; likely secondary to tobacco abuse  Difficult intubation     Dyslipidemia     low HDL    Emphysema     HTN (hypertension)     Hypercholesterolemia     Increased prostate specific antigen (PSA) velocity     Long term (current) use of anticoagulants     coumadin    Osteoarthritis     Osteomyelitis (HCC)     Paroxysmal atrial fibrillation (HCC)     Peripheral vascular disease ([address]     AAA repair 12/2007    Persistent atrial Fibrillation     Persistent atrial fibrillation ([address]     Unspecified adverse effect of anesthesia     difficulty breathing placed in ICU Oct 2012 (AAA repair)     Patient Active Problem List   Diagnosis Code    Hypercholesterolemia E78.00    Congestive heart failure (CHF) (HCC) I50.9    Peripheral vascular disease (HCC) I73.9    COPD (chronic obstructive pulmonary disease) (HCC) J44.9    HTN (hypertension) I10    Chronic atrial fibrillation (HCC) I48.2    RLQ abdominal pain R10.31    Status post AAA (abdominal aortic aneurysm) repair A25.705, Z86.79    AAA (abdominal aortic aneurysm, ruptured) ([address] I71.3    Unspecified constipation K59.00    Right inguinal hernia K40.90    Left inguinal hernia K40.90    CAD (coronary artery disease) I25.10    Hypoxemia requiring supplemental oxygen R09.02, Z99.81    Warfarin-induced coagulopathy (HCC) D68.32, T45.515A    Intraabdominal fluid collection R18.8    Abdominal pain R10.9    Sepsis ([address] A41.9    Pyogenic inflammation of bone ([address] M86.9    Psoas abscess ([address] K68.12    Abscess L02.91    Discitis of lumbar region M46.46    Pulmonary hypertension ([address] I27.20    CHF exacerbation ([address] I50.9    Hyponatremia E87.1     Past Surgical History:   Procedure Laterality Date    BRONCHOSCOPY DIAGNOSTIC  11/2/2012         CARDIAC CATHETERIZATION  11/1/2012         COLONOSCOPY N/A 7/26/2016    COLONOSCOPY with Polypectomies performed by Antoni Rosado MD at [facility] HX AAA REPAIR      2006 & 2012    HX HEART CATHETERIZATION  3/4/2009    1.  RCA small, nondominant; patent. 2. LMCA patent. 3. LAD is long, wrapped around apical vessel. Diffuse, 20-30% stenosis noted. 4. CCA is large, dominant vessel. Diffuse 20-30% stenosis. 5. LVEDP is 16 mmHg. 6. Overall left ventricular systolic function mildly diminshed with est. EF 45%. Mild, global hypokinesis noted. 7. No significant mitral regurgitation or aortic stenosis noted. (see report)    HX HERNIA REPAIR  2/2014    rt inguinal     HX HERNIA REPAIR  01/2016    LEFT INGUINAL HERNIA REPAIR DR. Luciano Bello    REPAIR ING HERNIA,5+Y/O,JSESIE Left 1-20-16    Dr. Ernestine Hankisn  11/1/2012          Current Outpatient Medications   Medication Sig Dispense Refill    albuterol (PROVENTIL VENTOLIN) 2.5 mg /3 mL (0.083 %) nebulizer solution 3 mL by Nebulization route every four (4) hours as needed for Wheezing. 24 Each 3    STIOLTO RESPIMAT 2.5-2.5 mcg/actuation inhaler inhale 2 inhalations by mouth once daily 1 Inhaler 5    ferrous sulfate 325 mg (65 mg iron) tablet Take 325 mg by mouth Daily (before breakfast). every other day  Indications: Iron Deficiency Anemia      ascorbic acid, vitamin C, (VITAMIN C) 500 mg tablet Take 500 mg by mouth daily. every other day  Indications: Vitamin C Deficiency      acetaminophen (TYLENOL ARTHRITIS PAIN) 650 mg TbER Take 1,300 mg by mouth two (2) times daily as needed (Pain).  traZODone (DESYREL) 50 mg tablet Take 50 mg by mouth nightly.  tamsulosin (FLOMAX) 0.4 mg capsule Take 0.4 mg by mouth daily.  lisinopril (PRINIVIL, ZESTRIL) 20 mg tablet Take 1 Tab by mouth daily. 30 Tab 0    melatonin 3 mg tablet Take 1 Tab by mouth nightly as needed. 30 Tab 0    pravastatin (PRAVACHOL) 40 mg tablet Take 40 mg by mouth nightly.  polyethylene glycol (MIRALAX) 17 gram packet Take 17 g by mouth daily as needed (constipation).       digoxin (DIGITEK) 0.125 mg tablet take 1 tablet by mouth once daily 90 Tab 3    warfarin (COUMADIN) 2.5 mg tablet Take 1 Tab by mouth daily. Take 2 pills (5 mg) Mon, Tues, Wed, Thur, Sat, Sun; and 1 pill only  (2.5 mg) Fri (Patient taking differently: Take 2.5 mg by mouth daily. Take 2 pills (5 mg) Mon, Tues, Thur, Sat, Sun; and 1 pill only  (2.5 mg) Wed, Fri) 60 Tab 0    amLODIPine (NORVASC) 10 mg tablet Take 10 mg by mouth daily. 0    OXYGEN-AIR DELIVERY SYSTEMS 2 L/min by Nasal route daily. Continuous. Company is [employer]          Allergies   Allergen Reactions    Codeine Swelling    Morphine Itching    Sulfa (Sulfonamide Antibiotics) Other (comments)     Kidney problems. Review of Systems    Review of Systems - History obtained from the patient  General ROS: negative  Psychological ROS: negative  Ophthalmic ROS: negative  Respiratory ROS: positive for - shortness of breath  Cardiovascular ROS: negative  Gastrointestinal ROS: negative  Musculoskeletal ROS: negative  Neurological ROS: negative  Dermatological ROS: negative  Vascular ROS: negative      Physical   Visit Vitals  /60 (BP 1 Location: Left arm, BP Patient Position: Sitting)   Pulse 74   Resp 22   Ht 5' 6\" (1.676 m)   Wt 150 lb (68 kg)   BMI 24.21 kg/m²     General:  Alert, cooperative, no distress. Head:  Normocephalic, without obvious abnormality, atraumatic. Eyes:    Conjunctivae/corneas clear. Pupils equal, round, reactive to light. Extraocular movements intact. Neck:         Supple,no bruits   Lungs: On oxygen via nasal cannula   Heart:  Regular rate and rhythm   Extremities: Extremities normal, atraumatic, no cyanosis, trace bilateral edema. Skin: Skin color, texture, turgor normal. No rashes or lesions. vascular studies:  Right Carotid     Right arm BP: 128 mmHg. The right CCA is patent. There is mild stenosis in the right ICA (<50%). The right ICA has calcific and diffuse plaque. The right ECA is patent. The right vertebral is antegrade. Left Carotid     Left arm BP: 128 mmHg. The left CCA is patent.  There is mild stenosis in the left ICA (<50%). The left ICA has calcific and diffuse plaque. The left ECA is patent. The left vertebral is antegrade. Abdominal Aorta     Limited visualization due to bowel gas. Bilateral external iliac arteries patent without significant stenosis. Multiphasic signals noted throughout. EVAR     Limited visualization due to bowel gas. Residual aneurysm sac measures 4.42 x 4.47 cm. Residual aneurysm sac is unchanged compared to previous exam. No endoleak is noted. Triphasic waveforms in the right limb and left limb. AAA sac measures 4.42 x 4.47 cm Trv as compared with CT on 03/26/18 with a measurement of 4.45 x 4.50 cm Trv. Renal     Limited visualization due to bowel gas. Proximal left renal artery patent. Right renal artery patent distally with 0.14 sec delay. Mesenteric     Limited visualization due to bowel gas. Inferior mesenteric artery is not visualized. Celiac and proximal SMA patent. Impression/Plan:     ICD-10-CM ICD-9-CM    1. History of endovascular stent graft for abdominal aortic aneurysm Z95.828 V43.4 DUPLEX AORTA/IVC/ILIAC/GRAFTS COMPLETE   2. Carotid artery stenosis, asymptomatic, bilateral I65.23 433.10 DUPLEX CAROTID BILATERAL     433.30      No orders of the defined types were placed in this encounter. Follow-up and Dispositions    · Return in about 1 year (around 4/16/2020). Will maintain yearly surveillance  He mentions his leg edema is mild and intermittent  He is wearing TUTU hose. I did give suggestion to use copper fit style as an alternate    Jeanell DELIA Turner    Portions of this note have been entered using voice recognition software.

## 2019-05-23 ENCOUNTER — HOSPITAL ENCOUNTER (OUTPATIENT)
Dept: GENERAL RADIOLOGY | Age: 79
Discharge: HOME | DRG: 811 | End: 2019-05-23
Payer: MEDICARE

## 2019-05-23 DIAGNOSIS — J44.9 CHRONIC OBSTRUCTIVE BRONCHITIS (HCC): ICD-10-CM

## 2019-05-23 DIAGNOSIS — I50.9 CONGESTIVE CARDIAC FAILURE (HCC): ICD-10-CM

## 2019-05-23 PROCEDURE — 71046 X-RAY EXAM CHEST 2 VIEWS: CPT

## 2019-05-24 ENCOUNTER — HOSPITAL ENCOUNTER (INPATIENT)
Age: 79
LOS: 5 days | Discharge: HOME | DRG: 811 | End: 2019-05-29
Attending: EMERGENCY MEDICINE | Admitting: FAMILY MEDICINE
Payer: MEDICARE

## 2019-05-24 DIAGNOSIS — E87.1 HYPONATREMIA: ICD-10-CM

## 2019-05-24 DIAGNOSIS — D64.9 SYMPTOMATIC ANEMIA: Primary | ICD-10-CM

## 2019-05-24 PROBLEM — D50.9 IRON DEFICIENCY ANEMIA: Chronic | Status: ACTIVE | Noted: 2019-05-24

## 2019-05-24 PROBLEM — G89.4 CHRONIC PAIN SYNDROME: Chronic | Status: ACTIVE | Noted: 2019-05-24

## 2019-05-24 PROBLEM — N18.30 STAGE 3 CHRONIC KIDNEY DISEASE (HCC): Chronic | Status: ACTIVE | Noted: 2019-05-24

## 2019-05-24 LAB
ANION GAP SERPL CALC-SCNC: 7 MMOL/L (ref 3–18)
BASOPHILS # BLD: 0 K/UL (ref 0–0.06)
BASOPHILS NFR BLD: 0 % (ref 0–3)
BUN SERPL-MCNC: 24 MG/DL (ref 7–18)
BUN/CREAT SERPL: 17 (ref 12–20)
CALCIUM SERPL-MCNC: 9 MG/DL (ref 8.5–10.1)
CHLORIDE SERPL-SCNC: 98 MMOL/L (ref 100–108)
CK MB CFR SERPL CALC: 3.4 % (ref 0–4)
CK MB SERPL-MCNC: 2 NG/ML (ref 5–25)
CK SERPL-CCNC: 59 U/L (ref 39–308)
CO2 SERPL-SCNC: 27 MMOL/L (ref 21–32)
CREAT SERPL-MCNC: 1.4 MG/DL (ref 0.6–1.3)
DIFFERENTIAL METHOD BLD: ABNORMAL
EOSINOPHIL # BLD: 0 K/UL (ref 0–0.4)
EOSINOPHIL NFR BLD: 0 % (ref 0–5)
ERYTHROCYTE [DISTWIDTH] IN BLOOD BY AUTOMATED COUNT: 17.3 % (ref 11.6–14.5)
GLUCOSE SERPL-MCNC: 116 MG/DL (ref 74–99)
HCT VFR BLD AUTO: 17.4 % (ref 36–48)
HCT VFR BLD AUTO: 20.6 % (ref 36–48)
HGB BLD-MCNC: 4.9 G/DL (ref 13–16)
HGB BLD-MCNC: 6 G/DL (ref 13–16)
INR PPP: 3.4 (ref 0.8–1.2)
LYMPHOCYTES # BLD: 1 K/UL (ref 0.8–3.5)
LYMPHOCYTES NFR BLD: 12 % (ref 20–51)
MCH RBC QN AUTO: 18.6 PG (ref 24–34)
MCHC RBC AUTO-ENTMCNC: 28.2 G/DL (ref 31–37)
MCV RBC AUTO: 65.9 FL (ref 74–97)
MONOCYTES # BLD: 0.4 K/UL (ref 0–1)
MONOCYTES NFR BLD: 5 % (ref 2–9)
NEUTS SEG # BLD: 6.9 K/UL (ref 1.8–8)
NEUTS SEG NFR BLD: 83 % (ref 42–75)
PLATELET # BLD AUTO: 288 K/UL (ref 135–420)
PLATELET COMMENTS,PCOM: ABNORMAL
PMV BLD AUTO: 8.2 FL (ref 9.2–11.8)
POTASSIUM SERPL-SCNC: 4.2 MMOL/L (ref 3.5–5.5)
PROTHROMBIN TIME: 34.7 SEC (ref 11.5–15.2)
RBC # BLD AUTO: 2.64 M/UL (ref 4.7–5.5)
RBC MORPH BLD: ABNORMAL
SODIUM SERPL-SCNC: 132 MMOL/L (ref 136–145)
TROPONIN I SERPL-MCNC: <0.02 NG/ML (ref 0–0.04)
WBC # BLD AUTO: 8.3 K/UL (ref 4.6–13.2)

## 2019-05-24 PROCEDURE — 36415 COLL VENOUS BLD VENIPUNCTURE: CPT

## 2019-05-24 PROCEDURE — 77010033678 HC OXYGEN DAILY

## 2019-05-24 PROCEDURE — 94640 AIRWAY INHALATION TREATMENT: CPT

## 2019-05-24 PROCEDURE — 36430 TRANSFUSION BLD/BLD COMPNT: CPT

## 2019-05-24 PROCEDURE — 85025 COMPLETE CBC W/AUTO DIFF WBC: CPT

## 2019-05-24 PROCEDURE — 74011000250 HC RX REV CODE- 250: Performed by: STUDENT IN AN ORGANIZED HEALTH CARE EDUCATION/TRAINING PROGRAM

## 2019-05-24 PROCEDURE — 85018 HEMOGLOBIN: CPT

## 2019-05-24 PROCEDURE — 65660000000 HC RM CCU STEPDOWN

## 2019-05-24 PROCEDURE — 80048 BASIC METABOLIC PNL TOTAL CA: CPT

## 2019-05-24 PROCEDURE — 86923 COMPATIBILITY TEST ELECTRIC: CPT

## 2019-05-24 PROCEDURE — 74011250637 HC RX REV CODE- 250/637: Performed by: STUDENT IN AN ORGANIZED HEALTH CARE EDUCATION/TRAINING PROGRAM

## 2019-05-24 PROCEDURE — 86900 BLOOD TYPING SEROLOGIC ABO: CPT

## 2019-05-24 PROCEDURE — 93005 ELECTROCARDIOGRAM TRACING: CPT

## 2019-05-24 PROCEDURE — P9016 RBC LEUKOCYTES REDUCED: HCPCS

## 2019-05-24 PROCEDURE — 99283 EMERGENCY DEPT VISIT LOW MDM: CPT

## 2019-05-24 PROCEDURE — 82550 ASSAY OF CK (CPK): CPT

## 2019-05-24 PROCEDURE — 85610 PROTHROMBIN TIME: CPT

## 2019-05-24 RX ORDER — IPRATROPIUM BROMIDE AND ALBUTEROL SULFATE 2.5; .5 MG/3ML; MG/3ML
3 SOLUTION RESPIRATORY (INHALATION)
Status: DISCONTINUED | OUTPATIENT
Start: 2019-05-24 | End: 2019-05-29 | Stop reason: HOSPADM

## 2019-05-24 RX ORDER — TRAZODONE HYDROCHLORIDE 50 MG/1
50 TABLET ORAL
Status: DISCONTINUED | OUTPATIENT
Start: 2019-05-24 | End: 2019-05-29 | Stop reason: HOSPADM

## 2019-05-24 RX ORDER — NALOXONE HYDROCHLORIDE 1 MG/ML
2 INJECTION INTRAMUSCULAR; INTRAVENOUS; SUBCUTANEOUS AS NEEDED
Status: DISCONTINUED | OUTPATIENT
Start: 2019-05-24 | End: 2019-05-29 | Stop reason: HOSPADM

## 2019-05-24 RX ORDER — HYDROCODONE BITARTRATE AND ACETAMINOPHEN 5; 325 MG/1; MG/1
0.5 TABLET ORAL
Status: DISCONTINUED | OUTPATIENT
Start: 2019-05-24 | End: 2019-05-27

## 2019-05-24 RX ORDER — DIGOXIN 125 MCG
0.12 TABLET ORAL DAILY
Status: DISCONTINUED | OUTPATIENT
Start: 2019-05-25 | End: 2019-05-29 | Stop reason: HOSPADM

## 2019-05-24 RX ORDER — PRAVASTATIN SODIUM 20 MG/1
40 TABLET ORAL
Status: DISCONTINUED | OUTPATIENT
Start: 2019-05-24 | End: 2019-05-29 | Stop reason: HOSPADM

## 2019-05-24 RX ORDER — POLYETHYLENE GLYCOL 3350 17 G/17G
17 POWDER, FOR SOLUTION ORAL
Status: DISCONTINUED | OUTPATIENT
Start: 2019-05-24 | End: 2019-05-29 | Stop reason: HOSPADM

## 2019-05-24 RX ORDER — AMLODIPINE BESYLATE 10 MG/1
10 TABLET ORAL DAILY
Status: DISCONTINUED | OUTPATIENT
Start: 2019-05-25 | End: 2019-05-29 | Stop reason: HOSPADM

## 2019-05-24 RX ORDER — SODIUM CHLORIDE 9 MG/ML
250 INJECTION, SOLUTION INTRAVENOUS AS NEEDED
Status: DISCONTINUED | OUTPATIENT
Start: 2019-05-24 | End: 2019-05-25 | Stop reason: ALTCHOICE

## 2019-05-24 RX ORDER — HYDROCODONE BITARTRATE AND ACETAMINOPHEN 5; 325 MG/1; MG/1
0.5 TABLET ORAL EVERY 12 HOURS
Status: DISCONTINUED | OUTPATIENT
Start: 2019-05-24 | End: 2019-05-24

## 2019-05-24 RX ORDER — WARFARIN 2.5 MG/1
2.5 TABLET ORAL DAILY
Status: DISCONTINUED | OUTPATIENT
Start: 2019-05-25 | End: 2019-05-24

## 2019-05-24 RX ORDER — ACETAMINOPHEN 325 MG/1
650 TABLET ORAL
Status: DISCONTINUED | OUTPATIENT
Start: 2019-05-24 | End: 2019-05-24

## 2019-05-24 RX ORDER — ALBUTEROL SULFATE 0.83 MG/ML
2.5 SOLUTION RESPIRATORY (INHALATION)
Status: DISCONTINUED | OUTPATIENT
Start: 2019-05-24 | End: 2019-05-29 | Stop reason: HOSPADM

## 2019-05-24 RX ORDER — PHYTONADIONE 5 MG/1
5 TABLET ORAL
Status: DISCONTINUED | OUTPATIENT
Start: 2019-05-24 | End: 2019-05-24 | Stop reason: ALTCHOICE

## 2019-05-24 RX ADMIN — HYDROCODONE BITARTRATE AND ACETAMINOPHEN 0.5 TABLET: 5; 325 TABLET ORAL at 18:18

## 2019-05-24 RX ADMIN — PRAVASTATIN SODIUM 40 MG: 20 TABLET ORAL at 21:53

## 2019-05-24 RX ADMIN — TRAZODONE HYDROCHLORIDE 50 MG: 50 TABLET ORAL at 21:53

## 2019-05-24 RX ADMIN — IPRATROPIUM BROMIDE AND ALBUTEROL SULFATE 3 ML: .5; 3 SOLUTION RESPIRATORY (INHALATION) at 23:25

## 2019-05-24 NOTE — ED PROVIDER NOTES
[de-identified] yo M with hx of Severe COPD and Chronic Anti-coagulatio presents with 1 week of increased fatiogue and shortness of breath. Pt referred from PCP due to Hb of 5.1. Pt is on warfarin. Pt notes 1 week of increased fatigue. He denies fevers, chills, Bright red blood per rectum, hematemesis, increased leg swelling, or vision changes. Past Medical History:  
Diagnosis Date  Aneurysm ([facility] [address] AAA repair 2006 & 2012  Aorto-iliac duplex 02/13/2015 Tech difficult. Patent aorta bi-iliac endograft w/o leak or limb dysfunction.  Arrhythmia   
 a fib  Cardiac cath 11/01/2012 RCA (sm, nondom) patent. LM patent. LAD 25%. CX (dom) 45% mid. LVEDP 12 mmHg. No WMA. PA 27/12. W 10-12. CO/CI 5.2/2.6 (TD).  Cardiac echocardiogram, abnormal 02/20/2016 EF 55%. No WMA. Indeterminate diastolic fx. RVSP 60 mmHg. Severe LAE. Mild MR. Mod TR.  IVCE. Similar to study of 10/26/12.  Cardiovascular aorto-iliac duplex 02/13/2015 Patent aorta bi-iliac endovascular graft w/o leak or limb dysfunction. Sac measures 4.21 x 4.47 cm (4.4 x 4.6 cm on 1/31/14).  Cardiovascular LE peripheral arterial testing 07/29/2013 No significant LE arterial disease bilaterally. R GHADA 1. 12.  L GHADA 1. 12.  R DBI 0.83. L DBI 0.71. Exercise deferred.  Cardiovascular renal duplex 10/31/2012 No RA stenosis. Intrinsic/med disease in left kidney. Patent aortic endograft. Patent, thrombus-free renal veins bilaterally.  Carotid duplex 07/29/2013 Mild <50% bilateral ICA plaquing.  Chronic lung disease  Cigarette smoker  Congestive heart failure (CHF) ([address]  COPD (chronic obstructive pulmonary disease) (HCC)   
 and emphysema; likely secondary to tobacco abuse  Difficult intubation  Dyslipidemia   
 low HDL  Emphysema   
 HTN (hypertension)  Hypercholesterolemia  Ill-defined condition   
 hernia  Increased prostate specific antigen (PSA) velocity  Long term (current) use of anticoagulants   
 coumadin  Osteoarthritis  Osteomyelitis ([facility] [address]  Paroxysmal atrial fibrillation (HCC)  Peripheral vascular disease ([facility] [address] AAA repair 12/2007  Persistent atrial Fibrillation  Persistent atrial fibrillation (HCC)  Unspecified adverse effect of anesthesia   
 difficulty breathing placed in ICU Oct 2012 (AAA repair) Past Surgical History:  
Procedure Laterality Date  BRONCHOSCOPY DIAGNOSTIC  11/2/2012  CARDIAC CATHETERIZATION  11/1/2012  COLONOSCOPY N/A 7/26/2016 COLONOSCOPY with Polypectomies performed by Katheryn Bruce MD at [address] [place] [address] HX AAA REPAIR    
 2006 & 2012  HX HEART CATHETERIZATION  3/4/2009 1. RCA small, nondominant; patent. 2. LMCA patent. 3. LAD is long, wrapped around apical vessel. Diffuse, 20-30% stenosis noted. 4. CCA is large, dominant vessel. Diffuse 20-30% stenosis. 5. LVEDP is 16 mmHg. 6. Overall left ventricular systolic function mildly diminshed with est. EF 45%. Mild, global hypokinesis noted. 7. No significant mitral regurgitation or aortic stenosis noted. (see report)  HX HERNIA REPAIR  2/2014  
 rt inguinal   
 HX HERNIA REPAIR  01/2016 LEFT INGUINAL HERNIA REPAIR DR. Brown Barnard  REPAIR ING HERNIA,5+Y/O,JESSIE Left 1-20-16 Dr. Miranda LONG  11/1/2012 Family History:  
Problem Relation Age of Onset  Heart Surgery Father BYPASS  Stroke Father  Heart Surgery Mother BYPASS  Coronary Artery Disease Mother  Stroke Mother Social History Socioeconomic History  Marital status:  Spouse name: Not on file  Number of children: Not on file  Years of education: Not on file  Highest education level: Not on file Occupational History  Not on file Social Needs  Financial resource strain: Not on file  Food insecurity:  
  Worry: Not on file Inability: Not on file  Transportation needs:  
  Medical: Not on file Non-medical: Not on file Tobacco Use  Smoking status: Former Smoker Packs/day: 1.00 Years: 54.00 Pack years: 54.00 Types: Cigarettes Last attempt to quit: 10/23/2012 Years since quittin.5  Smokeless tobacco: Never Used Substance and Sexual Activity  Alcohol use: No  
  Alcohol/week: 0.0 oz  
  Comment: quit drinking alcohol 26 years ago, patient states stopped in \"\"  Drug use: No  
 Sexual activity: Not on file Lifestyle  Physical activity:  
  Days per week: Not on file Minutes per session: Not on file  Stress: Not on file Relationships  Social connections:  
  Talks on phone: Not on file Gets together: Not on file Attends Worship service: Not on file Active member of club or organization: Not on file Attends meetings of clubs or organizations: Not on file Relationship status: Not on file  Intimate partner violence:  
  Fear of current or ex partner: Not on file Emotionally abused: Not on file Physically abused: Not on file Forced sexual activity: Not on file Other Topics Concern  Not on file Social History Narrative  Not on file ALLERGIES: Codeine; Morphine; and Sulfa (sulfonamide antibiotics) Review of Systems Eyes: Negative for pain, discharge and itching. Respiratory: Positive for shortness of breath. Negative for apnea, choking, chest tightness and wheezing. Cardiovascular: Negative for chest pain and leg swelling. Gastrointestinal: Negative for abdominal distention, abdominal pain, anal bleeding and blood in stool. Notes hemorrhoids Genitourinary: Negative for difficulty urinating, dysuria, enuresis and flank pain. Musculoskeletal: Negative for arthralgias, back pain, gait problem and joint swelling. Skin: Negative for color change, pallor, rash and wound. Neurological: Negative for dizziness, seizures, facial asymmetry, speech difficulty, light-headedness, numbness and headaches. Psychiatric/Behavioral: Negative for agitation, behavioral problems, confusion and decreased concentration. Vitals:  
 05/24/19 1317 05/24/19 1345 05/24/19 1347 BP: 133/73 148/67 Pulse: 94 85 Resp: 20 20 Temp: 97.7 °F (36.5 °C)  98.3 °F (36.8 °C) SpO2: 96% Weight: 68.9 kg (152 lb) Height: 5' 6\" (1.676 m) Physical Exam  
Constitutional: He is oriented to person, place, and time. No distress. HENT:  
Head: Normocephalic and atraumatic. Eyes: Pupils are equal, round, and reactive to light. Conjunctivae are normal.  
Neck: Normal range of motion. Neck supple. No JVD present. No tracheal deviation present. Pulmonary/Chest: Effort normal and breath sounds normal.  
Abdominal: Soft. Bowel sounds are normal. He exhibits no mass. There is no rebound and no guarding. No hernia. Mild upper gastric distention Genitourinary: Rectal exam shows guaiac negative stool. Genitourinary Comments: External hemorrhoids. No masses or gross blood on TD. FOBT NEG. Musculoskeletal: Normal range of motion. Lymphadenopathy:  
  He has no cervical adenopathy. Neurological: He is alert and oriented to person, place, and time. No sensory deficit. Skin: Skin is warm and dry. He is not diaphoretic. Psychiatric: He has a normal mood and affect. MDM Number of Diagnoses or Management Options Hyponatremia: minor Symptomatic anemia: new and requires workup Diagnosis management comments: [de-identified] yo M with a hx of severe COPD, Chronic Anti-coagulation presents with symptomatic anemia with no signs of bleeding. LAst Blood transfusion 1 year ago. 1UpRBC ordered. Peripheral smear pending. Do nto suspect COPD exacerbaiton given lungs CTA and no acute Cxs on CXR on 5/23/18. Pt admitted for need for serial transfusions and monitoring. Hyponatremia is mild on BMP. DDX: SOB 2y to Symptomatic Anemia vs. GI Bleed(NEG FOBT)vs. ACS(no chest pain, Cardiac panel pending) vs. COPD Exacerbation(VSS, afebrile, well appearing, NAD, no increased sputum) Labs reviewed: 
Na at 132,  Hb: 4.5 
 
1530: Spoke to PFM Resident, Dr. Micheal Juárez who accepted patient for admission. Amount and/or Complexity of Data Reviewed Clinical lab tests: ordered and reviewed Tests in the medicine section of CPT®: ordered and reviewed Discuss the patient with other providers: yes Procedures Loly Woodward MD PGY-2 
[facility] Resident

## 2019-05-24 NOTE — PROGRESS NOTES
1845 Patient arrived to unit. SOB with transfer from stretcher to bed (scooting, not walking). Oriented to room and unit. Bedside shift change report given to Celio Cheek (oncoming nurse) by [facility] (offgoing nurse). Report included the following information SBAR, Kardex and MAR.

## 2019-05-24 NOTE — ED NOTES
TRANSFER - OUT REPORT: 
 
Verbal report given to Donovan Farooq RN (name) on Gonsalo Billing  being transferred to  (unit) for routine progression of care Report consisted of patients Situation, Background, Assessment and  
Recommendations(SBAR). Information from the following report(s) SBAR was reviewed with the receiving nurse. Lines:  
Peripheral IV 05/24/19 Left Antecubital (Active) Site Assessment Clean, dry, & intact 5/24/2019  1:47 PM  
Phlebitis Assessment 0 5/24/2019  1:47 PM  
Dressing Status Clean, dry, & intact 5/24/2019  1:47 PM  
Dressing Type 4 X 4 5/24/2019  1:47 PM  
  
 
Opportunity for questions and clarification was provided. Patient transported with: 
 [employer]

## 2019-05-24 NOTE — H&P
[facility] 
Admission History and Physical 
[address] [facility] Patient: Nelson Miguel MRN: [id number]  CSN: [id number] YOB: 1940  Age: [de-identified] y.o. Sex: male DOA: 5/24/2019 HPI:  
 
Nelson Miguel is a [de-identified] y.o. male with PMH chronic atrial fibrillation on warfarin, BPH, COPD on 2 L NC continuously, hypertension, heart failure with preserved EF (55% April 2018), iron deficiency anemia (baseline 9.0-10.0), CKD stage III,  Chronic constipation, coronary artery disease, former smoker (54 pack years), chronic pain syndrome who is now presenting with complaint of symptomatic anemia. Patient sates he was seen by his PCP earlier this week and had labs drawn. He was contacted earlier today with results of low hemoglobin and told to go to ED. When patient presented, he was found to have a hemoglobin of 4.9. Patient states that he has been feeling progressively weaker over the last two days with total lack of energy and dizziness when he stands quickly. He denies any bright red blood from rectum, hematochezia, hematuria, hemoptysis, or any other source of active bleeding. Patient also reports increase in his oxygen usage over the past couple of days from his baseline of 2 L up to 2.5 L but has also had confounding difficulties with his concentrator. Patient wears compression stockings daily and has not noticed any increased lower extremity edema. He denies any cough with sputum, heart palpitations, chest pain. ED Course: CBC - Hgb 4.9 BMP - normal except for Na 132, Cr 1.40. INR 3.4; PTT 34.7 CXR (5/23/19): no acute cardiopulmonary process Review of Systems General ROS: negative for  - chills, fever, night sweats Psychological ROS: negative for - anxiety and depression Ophthalmic ROS: negative for changes in vision ENT ROS: negative for - negative for hearing change or visual changes or icterus Hematological and Lymphatic ROS: negative for - jaundice, +bruising Respiratory ROS: negative for - cough, hemoptysis, orthopnea, paroxysmal dyspnea, or wheezing, +shortness of breath Cardiovascular ROS: negative for - chest pain, edema, loss of consciousness, or palpitations, +dyspnea on exertion Gastrointestinal ROS: negative for - abdominal pain, blood in stools, change in stools, diarrhea, hematemesis, melena, nausea/vomiting. +constipation Genito-Urinary ROS: negative for - hematuria or urinary frequency/urgency, +hesitancy Musculoskeletal ROS: negative for - joint pain, joint swelling or muscle pain Neurological ROS: negative for - +dizziness, +weakness Dermatological ROS: negative for -skin lesion changes Past Medical History:  
Diagnosis Date  Aneurysm ([address] AAA repair 2006 & 2012  Aorto-iliac duplex 02/13/2015 Tech difficult. Patent aorta bi-iliac endograft w/o leak or limb dysfunction.  Arrhythmia   
 a fib  Cardiac cath 11/01/2012 RCA (sm, nondom) patent. LM patent. LAD 25%. CX (dom) 45% mid. LVEDP 12 mmHg. No WMA. PA 27/12. W 10-12. CO/CI 5.2/2.6 (TD).  Cardiac echocardiogram, abnormal 02/20/2016 EF 55%. No WMA. Indeterminate diastolic fx. RVSP 60 mmHg. Severe LAE. Mild MR. Mod TR.  IVCE. Similar to study of 10/26/12.  Cardiovascular aorto-iliac duplex 02/13/2015 Patent aorta bi-iliac endovascular graft w/o leak or limb dysfunction. Sac measures 4.21 x 4.47 cm (4.4 x 4.6 cm on 1/31/14).  Cardiovascular LE peripheral arterial testing 07/29/2013 No significant LE arterial disease bilaterally. R GHADA 1. 12.  L GHADA 1. 12.  R DBI 0.83. L DBI 0.71. Exercise deferred.  Cardiovascular renal duplex 10/31/2012 No RA stenosis. Intrinsic/med disease in left kidney. Patent aortic endograft. Patent, thrombus-free renal veins bilaterally.  Carotid duplex 07/29/2013 Mild <50% bilateral ICA plaquing.  Chronic lung disease  Cigarette smoker  Congestive heart failure (CHF) [address]  COPD (chronic obstructive pulmonary disease) (HCC)   
 and emphysema; likely secondary to tobacco abuse  Difficult intubation  Dyslipidemia   
 low HDL  Emphysema   
 HTN (hypertension)  Hypercholesterolemia  Ill-defined condition   
 hernia  Increased prostate specific antigen (PSA) velocity  Long term (current) use of anticoagulants   
 coumadin  Osteoarthritis  Osteomyelitis ([facility] [address]  Paroxysmal atrial fibrillation (HCC)  Peripheral vascular disease ([facility] [address] [facility] AAA repair 12/2007  Persistent atrial Fibrillation  Persistent atrial fibrillation (HCC)  Unspecified adverse effect of anesthesia   
 difficulty breathing placed in ICU Oct 2012 (AAA repair) Past Surgical History:  
Procedure Laterality Date  BRONCHOSCOPY DIAGNOSTIC  11/2/2012  CARDIAC CATHETERIZATION  11/1/2012  COLONOSCOPY N/A 7/26/2016 COLONOSCOPY with Polypectomies performed by Willian Mcintyre MD at [facility] [place] [address] HX AAA REPAIR    
 2006 & 2012  HX HEART CATHETERIZATION  3/4/2009 1. RCA small, nondominant; patent. 2. LMCA patent. 3. LAD is long, wrapped around apical vessel. Diffuse, 20-30% stenosis noted. 4. CCA is large, dominant vessel. Diffuse 20-30% stenosis. 5. LVEDP is 16 mmHg. 6. Overall left ventricular systolic function mildly diminshed with est. EF 45%. Mild, global hypokinesis noted. 7. No significant mitral regurgitation or aortic stenosis noted. (see report)  HX HERNIA REPAIR  2/2014  
 rt inguinal   
 HX HERNIA REPAIR  01/2016 LEFT INGUINAL HERNIA REPAIR DR. Kelsea Porras  REPAIR ING HERNIA,5+Y/O,JESSIE Left 1-20-16 Dr. Jorgito LONG  11/1/2012 Family History Problem Relation Age of Onset  Heart Surgery Father BYPASS  Stroke Father  Heart Surgery Mother BYPASS  Coronary Artery Disease Mother  Stroke Mother Social History Socioeconomic History  Marital status:  Spouse name: Not on file  Number of children: Not on file  Years of education: Not on file  Highest education level: Not on file Tobacco Use  Smoking status: Former Smoker Packs/day: 1.00 Years: 54.00 Pack years: 54.00 Types: Cigarettes Last attempt to quit: 10/23/2012 Years since quittin.5  Smokeless tobacco: Never Used Substance and Sexual Activity  Alcohol use: No  
  Alcohol/week: 0.0 oz  
  Comment: quit drinking alcohol 26 years ago, patient states stopped in \"\"  Drug use: No  
 
 
Allergies Allergen Reactions  Codeine Swelling  Morphine Itching  Sulfa (Sulfonamide Antibiotics) Other (comments) Kidney problems. Prior to Admission Medications Prescriptions Last Dose Informant Patient Reported? Taking? OXYGEN-AIR DELIVERY SYSTEMS   Yes No  
Si L/min by Nasal route daily. Continuous. Company is [employer] STIOLTO RESPIMAT 2.5-2.5 mcg/actuation inhaler   No No  
Sig: inhale 2 inhalations by mouth once daily  
acetaminophen (TYLENOL ARTHRITIS PAIN) 650 mg TbER   Yes No  
Sig: Take 1,300 mg by mouth two (2) times daily as needed (Pain). albuterol (PROVENTIL VENTOLIN) 2.5 mg /3 mL (0.083 %) nebulizer solution   No No  
Sig: 3 mL by Nebulization route every four (4) hours as needed for Wheezing. amLODIPine (NORVASC) 10 mg tablet   Yes No  
Sig: Take 10 mg by mouth daily. ascorbic acid, vitamin C, (VITAMIN C) 500 mg tablet   Yes No  
Sig: Take 500 mg by mouth daily. every other day  Indications: Vitamin C Deficiency digoxin (DIGITEK) 0.125 mg tablet   No No  
Sig: take 1 tablet by mouth once daily  
ferrous sulfate 325 mg (65 mg iron) tablet   Yes No  
Sig: Take 325 mg by mouth Daily (before breakfast). every other day  Indications: Iron Deficiency Anemia  
lisinopril (PRINIVIL, ZESTRIL) 20 mg tablet   No No  
Sig: Take 1 Tab by mouth daily. melatonin 3 mg tablet  Self No No  
Sig: Take 1 Tab by mouth nightly as needed. polyethylene glycol (MIRALAX) 17 gram packet   Yes No  
Sig: Take 17 g by mouth daily as needed (constipation). pravastatin (PRAVACHOL) 40 mg tablet   Yes No  
Sig: Take 40 mg by mouth nightly. tamsulosin (FLOMAX) 0.4 mg capsule   Yes No  
Sig: Take 0.4 mg by mouth daily. traZODone (DESYREL) 50 mg tablet   Yes No  
Sig: Take 50 mg by mouth nightly. warfarin (COUMADIN) 2.5 mg tablet   No No  
Sig: Take 1 Tab by mouth daily. Take 2 pills (5 mg) Mon, Tues, Wed, Thur, Sat, Sun; and 1 pill only (2.5 mg) Fri Patient taking differently: Take 2.5 mg by mouth daily. Take 2 pills (5 mg) Mon, Tues, Thur, Sat, Sun; and 1 pill only (2.5 mg) Wed, Fri Facility-Administered Medications: None Physical Exam:  
 
Patient Vitals for the past 24 hrs: 
 Temp Pulse Resp BP SpO2  
05/24/19 1347 98.3 °F (36.8 °C)      
05/24/19 1345  85 20 148/67   
05/24/19 1317 97.7 °F (36.5 °C) 94 20 133/73 96 % Physical Exam:  
General:  Alert and Responsive and in No acute distress. HEENT: Conjunctiva pale, sclera anicteric. EOMI. Pharynx moist, nonerythematous. Moist mucous membranes. No cervical, supraclavicular lymphadenopathy. No other gross abnormalities present. CV:     
RESP:  Unlabored breathing. Lungs clear to auscultation. no wheeze, rales, or rhonchi. Equal expansion bilaterally. ABD:  Soft, nontender, nondistended. MS:  No joint deformity or instability. No atrophy. Neuro:  5/5 strength bilateral upper extremities and lower extremities. A+Ox3. Ext:  Trace edema. Compression stockings on. 2+ radial and dp pulses bilaterally. Skin:  No rashes, lesions, or ulcers. Good turgor. Recent Results (from the past 12 hour(s)) CBC WITH AUTOMATED DIFF Collection Time: 05/24/19  1:40 PM  
Result Value Ref Range WBC 8.3 4.6 - 13.2 K/uL  
 RBC 2.64 (L) 4.70 - 5.50 M/uL HGB 4.9 (LL) 13.0 - 16.0 g/dL HCT 17.4 (LL) 36.0 - 48.0 % MCV 65.9 (L) 74.0 - 97.0 FL  
 MCH 18.6 (L) 24.0 - 34.0 PG  
 MCHC 28.2 (L) 31.0 - 37.0 g/dL  
 RDW 17.3 (H) 11.6 - 14.5 % PLATELET 848 146 - 407 K/uL MPV 8.2 (L) 9.2 - 11.8 FL  
 NEUTROPHILS 83 (H) 42 - 75 % LYMPHOCYTES 12 (L) 20 - 51 % MONOCYTES 5 2 - 9 % EOSINOPHILS 0 0 - 5 % BASOPHILS 0 0 - 3 %  
 ABS. NEUTROPHILS 6.9 1.8 - 8.0 K/UL  
 ABS. LYMPHOCYTES 1.0 0.8 - 3.5 K/UL  
 ABS. MONOCYTES 0.4 0 - 1.0 K/UL  
 ABS. EOSINOPHILS 0.0 0.0 - 0.4 K/UL  
 ABS. BASOPHILS 0.0 0.0 - 0.06 K/UL  
 DF MANUAL PLATELET COMMENTS ADEQUATE PLATELETS    
 RBC COMMENTS ANISOCYTOSIS 1+ 
    
 RBC COMMENTS MICROCYTOSIS 2+ 
    
 RBC COMMENTS POLYCHROMASIA FEW 
HYPOCHROMIA 1+ METABOLIC PANEL, BASIC Collection Time: 05/24/19  1:40 PM  
Result Value Ref Range Sodium 132 (L) 136 - 145 mmol/L Potassium 4.2 3.5 - 5.5 mmol/L Chloride 98 (L) 100 - 108 mmol/L  
 CO2 27 21 - 32 mmol/L Anion gap 7 3.0 - 18 mmol/L Glucose 116 (H) 74 - 99 mg/dL BUN 24 (H) 7.0 - 18 MG/DL Creatinine 1.40 (H) 0.6 - 1.3 MG/DL  
 BUN/Creatinine ratio 17 12 - 20 GFR est AA 59 (L) >60 ml/min/1.73m2 GFR est non-AA 49 (L) >60 ml/min/1.73m2 Calcium 9.0 8.5 - 10.1 MG/DL PROTHROMBIN TIME + INR Collection Time: 05/24/19  1:40 PM  
Result Value Ref Range Prothrombin time 34.7 (H) 11.5 - 15.2 sec INR 3.4 (H) 0.8 - 1.2 TYPE & SCREEN Collection Time: 05/24/19  2:00 PM  
Result Value Ref Range Crossmatch Expiration 05/27/2019 ABO/Rh(D) O POSITIVE Antibody screen NEG   
 CALLED TO: KOKO CARPIO ON 5/24/19 AT 1539 BY SERAFIN Unit number [id number] Blood component type [facility] Unit division 00 Status of unit ALLOCATED Crossmatch result Compatible Unit number [id number] Blood component type [facility] Unit division 00 Status of unit ALLOCATED Crossmatch result Compatible Assessment/Plan: [de-identified] y.o. male with PMH chronic atrial fibrillation on warfarin, BPH, COPD on 2 L NC continuously, hypertension, heart failure with preserved EF (55% April 2018), iron deficiency anemia (baseline 9.0-10.0), CKD stage III,  Chronic constipation, coronary artery disease, former smoker (54 pack years), chronic pain syndrome who is now presenting with complaint of symptomatic anemia. Symptomatic Anemia: Hemoglobin 4.9. Patient states he has profound fatigue and dizziness without obvious source of bleeding (denies rectal bleeding and hematochezia). Hemoccult negative in ED. Patient currently receiving 1 unit pRBCs. VSS. Patient with noted history of iron deficiency anemia in outpatient records. Baseline  
-admit to telemetry 
-CBC/BMP daily 
-Mag, phosp in AM 
-FU iron studies 
-FU ekg, cardiac panel  
-FU peripheral smear -PT/OT 
-H&H timed following transfusion of first unit 
-Transfuse with goal of 7.  
-H&H q6 hrs following transfusion  
-Consider heme/onc consult Chronic Atrial Fibrillation on warfarin & heart failure with preserved ejection fraction: Followed by Dr. Marcos Marcano. Last echo 4/2018 with EF 55%; hypokinesis of basal-mid anteroseptal walls. -PT/INR daily 
-Hold warfarin 
-Continue digoxin 125 mcg daily  
-consider cardiology consult Supratherapeutic INR: INR 3.4; goal 2.0-3.0.  
-Will hold warfarin as above  
-monitor INR  
-Vitamin K 5 mg PO now Hypertension: BPs upon admission 133-156/73-91. Patient prescribed lisinopril and amlodipine but lisinopril held due to increased creatinine at PCP office. 
-Continue amlodipine 10 mg Chronic hypoxic respiratory failure secondary to COPD: On 2 L nasal canula at baseline. Follows with Dr. Ricky Ibarra (last appt 3/21). -Spot check pulse ox 
-Titrate supplemental O2 with goals 88-92% 
-Continue Stiolto (pharm to find alternative). -Duo-neb q6 hr per RT Mild Coronary Artery Disease - Patient with cardiac caths in 2009 & 2012 showed no significant stenosis and no intervention necessary.  
-Continue pravastatin 40 mg daily CKD stage III: baseline Cr 1.0-1.2. Admission 1.4.  
-monitor Cr; start fluids if necessary Chronic pain syndrome: history of low back pain and painful inguinal hernia (without incarceration) 
-continue norco 5-325 mg (.5 tablet every 12 hours) -Narcan prn for respiratory suppression Hyponatremia - Na 132 on BMP 
-Encourage oral intake 
-monitor with BMP in AM  
 
Diet: cardiac DVT Prophylaxis: SCDs Code Status: FULL Point of Contact: Charlene Dozier (Relationship: daughter) [phone] Disposition and anticipated LOS: 2 midnights. Jyoti Gilmore 35 Family Medicine PGY-1 
05/24/19 4:03 PM 
 
 
 
  
Senior Resident History and Physical 
[facility] 
  
HPI:  
  
Reba Serra is a [de-identified] y.o. male with a PMH of chronic atrial fibrillation, CAD, HFpEF, HTN, HLD, COPD (on home 2 L O2), pHTN, CKD3, iron deficiency anemia, BPH, chronic pain syndrome, and chronic constipation now admitted with complaint of fatigue. 
  
Patient reports for the past week he has felt extremely tired to the point where he doesn't even have the energy to move short distances in his house. Patient reports over the past few days, he has increased his O2 to 2.5 L because he has felt so tired and winded when ambulating. Patient was seen in his PCP office yesterday and had lab work done. Patient received a call from his PCP office today stating that his \"hemoglobin was 5.1\" and he needs to be evaluated at the [facility] ED. Patient has chronic atrial fibrillation and is on warfarin. Patient endorsed intermittent dizziness when either sitting up or laying down over the past week. Patient denies any syncope, LOC, or falls. Patient denies any headaches, vision changes, chest pain, palpitations, abdominal pain, n/v/c/d, or urinary symptoms. Patient denies any hematochezia, melena, hematemesis, or hematuria. 
  
ED Course: 
Labs: CBC, BMP, PT/INR, Type&Screen Imaging/EKG/Radiology: none Medications: none Drips/Infusions: Transfusion 1 unit pRBC 
  
HPI, ROS, PMH, PSH, Family Hx, Social Hx, home medications, and allergies as above in intern H&P. 
  
Physical Exam:  
  
Visit Vitals /67 Pulse 85 Temp 98.3 °F (36.8 °C) Resp 20 Ht 5' 6\" (1.676 m) Wt 68.9 kg (152 lb) SpO2 96% BMI 24.53 kg/m²  
  
  
General:  WD, WN, NAD HEENT:  NCAT. Conjunctival pallor, sclera anicteric. PERRL. EOMI. Pharynx moist, nonerythematous. Moist mucous membranes. Thyroid not enlarged, no nodules. No cervical, supraclavicular, or submandibular lymphadenopathy. CV:  RRR, no mumurs. PMI not displaced. RESP:  Unlabored breathing. CTAB, no wheeze, rales, or rhonchi. ABD:  Soft, nontender, nondistended. Normoactive bowel sounds. No hepatosplenomegaly. No suprapubic tenderness. No CVA tenderness. MS:  No joint deformity or instability. No atrophy. Neuro:  5/5 strength bilateral upper extremities and lower extremities. CN II-XII intact. Sensation grossly intact. A+Ox3. Ext:  Trace bilateral edema. 2+ radial and dp pulses bilaterally. Skin:  No rashes, lesions, or ulcers. Good turgor. RECTAL:  Hemoccult negative, per ED resident note 
  
Recent Results Recent Results (from the past 24 hour(s)) CBC WITH AUTOMATED DIFF  
  Collection Time: 05/24/19  1:40 PM  
Result Value Ref Range  
  WBC 8.3 4.6 - 13.2 K/uL  
  RBC 2.64 (L) 4.70 - 5.50 M/uL  
  HGB 4.9 (LL) 13.0 - 16.0 g/dL  
  HCT 17.4 (LL) 36.0 - 48.0 %  
  MCV 65.9 (L) 74.0 - 97.0 FL  
  MCH 18.6 (L) 24.0 - 34.0 PG  
  MCHC 28.2 (L) 31.0 - 37.0 g/dL  
  RDW 17.3 (H) 11.6 - 14.5 %  
  PLATELET 902 602 - 243 K/uL  
  MPV 8.2 (L) 9.2 - 11.8 FL  
  NEUTROPHILS 83 (H) 42 - 75 %  
  LYMPHOCYTES 12 (L) 20 - 51 %  
  MONOCYTES 5 2 - 9 %  
  EOSINOPHILS 0 0 - 5 %   BASOPHILS 0 0 - 3 %  
  ABS. NEUTROPHILS 6.9 1.8 - 8.0 K/UL  
  ABS. LYMPHOCYTES 1.0 0.8 - 3.5 K/UL  
  ABS. MONOCYTES 0.4 0 - 1.0 K/UL  
  ABS. EOSINOPHILS 0.0 0.0 - 0.4 K/UL  
  ABS. BASOPHILS 0.0 0.0 - 0.06 K/UL  
  DF MANUAL    
  PLATELET COMMENTS ADEQUATE PLATELETS    
  RBC COMMENTS ANISOCYTOSIS 1+ 
     
  RBC COMMENTS MICROCYTOSIS 2+ 
     
  RBC COMMENTS POLYCHROMASIA FEW 
HYPOCHROMIA 1+ 
     
METABOLIC PANEL, BASIC  
  Collection Time: 05/24/19  1:40 PM  
Result Value Ref Range  
  Sodium 132 (L) 136 - 145 mmol/L  
  Potassium 4.2 3.5 - 5.5 mmol/L  
  Chloride 98 (L) 100 - 108 mmol/L  
  CO2 27 21 - 32 mmol/L  
  Anion gap 7 3.0 - 18 mmol/L  
  Glucose 116 (H) 74 - 99 mg/dL  
  BUN 24 (H) 7.0 - 18 MG/DL  
  Creatinine 1.40 (H) 0.6 - 1.3 MG/DL  
  BUN/Creatinine ratio 17 12 - 20    
  GFR est AA 59 (L) >60 ml/min/1.73m2  
  GFR est non-AA 49 (L) >60 ml/min/1.73m2  
  Calcium 9.0 8.5 - 10.1 MG/DL PROTHROMBIN TIME + INR  
  Collection Time: 05/24/19  1:40 PM  
Result Value Ref Range  
  Prothrombin time 34.7 (H) 11.5 - 15.2 sec  
  INR 3.4 (H) 0.8 - 1.2 TYPE & SCREEN  
  Collection Time: 05/24/19  2:00 PM  
Result Value Ref Range  
  Crossmatch Expiration 05/27/2019    
  ABO/Rh(D) O POSITIVE    
  Antibody screen NEG    
  CALLED TO: SISSY ER ON 5/24/19 AT 1539 BY SERAFIN    
  Unit number [id number]    
  Blood component type [facility]    
  Unit division 00    
  Status of unit ISSUED    
  Crossmatch result Compatible    
  Unit number [id number]    
  Blood component type [facility]    
  Unit division 00    
  Status of unit ALLOCATED    
  Crossmatch result Compatible    
  
  
  
Assessment/Plan:  
[de-identified] y.o. male with a PMH of chronic atrial fibrillation, CAD, HFpEF, HTN, HLD, COPD (on home 2 L O2), pHTN, CKD3, iron deficiency anemia, BPH, chronic pain syndrome, and chronic constipation, now admitted with acute symptomatic anemia. 
  
 Acute Symptomatic Anemia: H/o chronic iron deficiency anemia. Was taking PO iron supplementation + vitamin C but constipation became an issue. Baseline Hgb 9-10; admission hemoglobin 4.9. CBC otherwise unremarkable. Hemoccult negative, per ED resident note. Denies any hemoptysis, hematemesis, hematuria, hematochezia, or melena. Type & Screen completed in [facility] ED. Blood transfusion started in [facility] ED of 1 unit pRBC. - Admit to telemetry - Cardiac Monitoring 
- Continue transfusing 1 unit pRBC until Hgb >7 
- Trend H/H q6h (including post-transfusion H/H) - Daily CBC 
- F/u peripheral smear, iron profile, ferritin - F/U EKG, Cardiac Panel - Transfuse if Hgb <7 - Monitor VS per unit routine - Monitor I/O's per unit routine - Consider Heme/Onc consultation if appropriate 
  
Supratherapeutic INR: H/o chronic atrial fibrillation; gaol INR 2-3. Admission INR 3.4. Admission Hgb 4.9. No signs of active bleeding. Denies any blood in stool or urine. Warfarin dosinmg MTThSatSun; 2.5 mg WF. 
- Hold Warfarin while supratherapeutic 
- Daily PT/INR; dose Warfarin daily when appropriate 
- Daily CBC 
- Transfuse if Hgb <7 
- Consider IV Vitamin K if appropriate 
  
Chronic Atrial Fibrillation: [facility] (Dr. Jere Kevin) as outpatient. Currently rate-controlled atrial fibrillation. 
- Continue home Digitek 125 mcg daily 
- Hold home Warfarin while INR supratherapeutic 
- Daily PT/INR 
- Monitor VS per unit routine - Telemetry - F/u EKG 
- Consider Cardiology consult when appropriate 
  
HTN, HLD, CAD, HFpEF: Last Echo (2018): EF 55%; hypokinesis of the basal-mid anteroseptal wall. Admission BP ranging in the 120s-150s/60s-90s. - Continue home Amlodipine 10 mg daily 
- Continue home Pravastatin 40 mg daily - Monitor VS per unit routine - Monitor I/O's per unit routine - Consider Cardiology consult when appropriate 
  
Chronic Hypoxic Respiratory Failure 2/2 COPD: Followed by [facility] (Dr. Eliazar Hernández) as outpatient. On home supplemental O2 @ 2 L/min. Due to worsening fatigue, increased supplemental O2 to 2.5 L/min. 
- Continue home supplemental O2 @ 2 L/min; titrate to maintain O2 sats between 88-92% 
- Continue home Stiolto Respimat 2.5-2.5 mcg inhaler 2 puffs daily - Duo-neb q6h per RT 
- Pulse oximetry, per unit routine - Monitor VS per unit routine - Consider Pulmonology consult when appropriate 
  
CKD3: Baseline Cr 1-1.2; admission Cr 1.4 
- Avoid nephrotoxic agents 
- Renally dose medications - Daily BMP, Mag, Phos 
  
Chronic Pain Syndrome: H/o chronic low back pain. H/o chronic knee osteoarthritis. H/o chronic inguinal hernia w/o incarceration. 
- Continue home Norco 5-325 mg 1/2 pill BID 
- Consider Voltaren 1% Gel for isolated joint pain 
  
Chronic Constipation: stable - Continue home Miralax daily PRN 
- High fiber diet 
  
  
*Please see intern note above for additional chronic disease mgmt. 
  
  
Brittany Frazire MD, PGY-2 [employer] [address] [facility]

## 2019-05-24 NOTE — H&P
Admission History and Physical 
[address] [facility] Patient: Leena Sethi MRN: [id number]  [facility]: [id number] YOB: 1940  Age: [de-identified] y.o. Sex: male DOA: 5/24/2019 HPI:  
 
Leena Sethi is a [de-identified] y.o. male with PMH chronic atrial fibrillation, anemia, BPH, COPD (FEV1 48%, home O2 2-3 L), pulmonary hypertension, former smoker (47 pack years), CAD, heart failure with preserved ejection fraction, hypertension, CKD stage III, iron deficiency anemia, chronic pain syndrome, chronic constipation who is now presenting with acute, symptomatic anemia. States that he is dyspneic at rest and with minimal exertion, and then his home O2 requirement has increased. ED Course: Remained stable during his emergency department stay. Review of Systems - General ROS: negative for  - chills, fever, night sweats, weight gain and weight loss Psychological ROS: negative for - anxiety and depression Ophthalmic ROS: negative for - blurry vision, decreased vision or loss of vision ENT ROS: negative for - headaches, hearing change or visual changes Hematological and Lymphatic ROS: negative for - bruising, jaundice Respiratory ROS: negative for - cough, hemoptysis, shortness of breath, orthopnea, paroxysmal dyspnea, or wheezing Cardiovascular ROS: negative for - chest pain, dyspnea on exertion, edema, loss of consciousness, or palpitations Gastrointestinal ROS: negative for - abdominal pain, blood in stools, change in stools, constipation, diarrhea, hematemesis, melena, nausea/vomiting or swallowing difficulty/pain Genito-Urinary ROS: negative for - dysuria, hematuria or urinary frequency/urgency Musculoskeletal ROS: negative for - joint pain, joint swelling or muscle pain Neurological ROS: negative for - dizziness, headaches, numbness/tingling or weakness Dermatological ROS: negative for - rash or skin lesion changes Past Medical History:  
Diagnosis Date  Aneurysm ([facility] [address] AAA repair 2006 & 2012  Aorto-iliac duplex 02/13/2015 Tech difficult. Patent aorta bi-iliac endograft w/o leak or limb dysfunction.  Arrhythmia   
 a fib  Cardiac cath 11/01/2012 RCA (sm, nondom) patent. LM patent. LAD 25%. CX (dom) 45% mid. LVEDP 12 mmHg. No WMA. PA 27/12. W 10-12. CO/CI 5.2/2.6 (TD).  Cardiac echocardiogram, abnormal 02/20/2016 EF 55%. No WMA. Indeterminate diastolic fx. RVSP 60 mmHg. Severe LAE. Mild MR. Mod TR.  IVCE. Similar to study of 10/26/12.  Cardiovascular aorto-iliac duplex 02/13/2015 Patent aorta bi-iliac endovascular graft w/o leak or limb dysfunction. Sac measures 4.21 x 4.47 cm (4.4 x 4.6 cm on 1/31/14).  Cardiovascular LE peripheral arterial testing 07/29/2013 No significant LE arterial disease bilaterally. R GHADA 1. 12.  L GHADA 1. 12.  R DBI 0.83. L DBI 0.71. Exercise deferred.  Cardiovascular renal duplex 10/31/2012 No RA stenosis. Intrinsic/med disease in left kidney. Patent aortic endograft. Patent, thrombus-free renal veins bilaterally.  Carotid duplex 07/29/2013 Mild <50% bilateral ICA plaquing.  Chronic lung disease  Cigarette smoker  Congestive heart failure (CHF) ([address]  COPD (chronic obstructive pulmonary disease) (HCC)   
 and emphysema; likely secondary to tobacco abuse  Difficult intubation  Dyslipidemia   
 low HDL  Emphysema   
 HTN (hypertension)  Hypercholesterolemia  Ill-defined condition   
 hernia  Increased prostate specific antigen (PSA) velocity  Long term (current) use of anticoagulants   
 coumadin  Osteoarthritis  Osteomyelitis ([address]  Paroxysmal atrial fibrillation (HCC)  Peripheral vascular disease ([facility] [address] AAA repair 12/2007  Persistent atrial Fibrillation  Persistent atrial fibrillation (HCC)  Unspecified adverse effect of anesthesia   
 difficulty breathing placed in ICU Oct 2012 (AAA repair) Past Surgical History:  
Procedure Laterality Date  BRONCHOSCOPY DIAGNOSTIC  2012  CARDIAC CATHETERIZATION  2012  COLONOSCOPY N/A 2016 COLONOSCOPY with Polypectomies performed by Lisbeth Cummings MD at [facility] [place] [address] HX AAA REPAIR    
  &   HX HEART CATHETERIZATION  3/4/2009 1. RCA small, nondominant; patent. 2. LMCA patent. 3. LAD is long, wrapped around apical vessel. Diffuse, 20-30% stenosis noted. 4. CCA is large, dominant vessel. Diffuse 20-30% stenosis. 5. LVEDP is 16 mmHg. 6. Overall left ventricular systolic function mildly diminshed with est. EF 45%. Mild, global hypokinesis noted. 7. No significant mitral regurgitation or aortic stenosis noted. (see report)  HX HERNIA REPAIR  2014  
 rt inguinal   
 HX HERNIA REPAIR  2016 LEFT INGUINAL HERNIA REPAIR DR. Rachel Silva  REPAIR ING HERNIA,5+Y/O,JESSIE Left 16 Dr. Gloria   YURIY LONG  2012 Family History Problem Relation Age of Onset  Heart Surgery Father BYPASS  Stroke Father  Heart Surgery Mother BYPASS  Coronary Artery Disease Mother  Stroke Mother Social History Socioeconomic History  Marital status:  Spouse name: Not on file  Number of children: Not on file  Years of education: Not on file  Highest education level: Not on file Tobacco Use  Smoking status: Former Smoker Packs/day: 1.00 Years: 54.00 Pack years: 54.00 Types: Cigarettes Last attempt to quit: 10/23/2012 Years since quittin.5  Smokeless tobacco: Never Used Substance and Sexual Activity  Alcohol use: No  
  Alcohol/week: 0.0 oz  
  Comment: quit drinking alcohol 26 years ago, patient states stopped in \"\"  Drug use: No  
 
 
Allergies Allergen Reactions  Codeine Swelling  Morphine Itching  Sulfa (Sulfonamide Antibiotics) Other (comments) Kidney problems. Prior to Admission Medications Prescriptions Last Dose Informant Patient Reported? Taking? OXYGEN-AIR DELIVERY SYSTEMS   Yes No  
Si L/min by Nasal route daily. Continuous. Company is [employer] STIOLTO RESPIMAT 2.5-2.5 mcg/actuation inhaler   No No  
Sig: inhale 2 inhalations by mouth once daily  
acetaminophen (TYLENOL ARTHRITIS PAIN) 650 mg TbER   Yes No  
Sig: Take 1,300 mg by mouth two (2) times daily as needed (Pain). albuterol (PROVENTIL VENTOLIN) 2.5 mg /3 mL (0.083 %) nebulizer solution   No No  
Sig: 3 mL by Nebulization route every four (4) hours as needed for Wheezing. amLODIPine (NORVASC) 10 mg tablet   Yes No  
Sig: Take 10 mg by mouth daily. ascorbic acid, vitamin C, (VITAMIN C) 500 mg tablet   Yes No  
Sig: Take 500 mg by mouth daily. every other day  Indications: Vitamin C Deficiency digoxin (DIGITEK) 0.125 mg tablet   No No  
Sig: take 1 tablet by mouth once daily  
ferrous sulfate 325 mg (65 mg iron) tablet   Yes No  
Sig: Take 325 mg by mouth Daily (before breakfast). every other day  Indications: Iron Deficiency Anemia  
lisinopril (PRINIVIL, ZESTRIL) 20 mg tablet   No No  
Sig: Take 1 Tab by mouth daily. melatonin 3 mg tablet  Self No No  
Sig: Take 1 Tab by mouth nightly as needed. polyethylene glycol (MIRALAX) 17 gram packet   Yes No  
Sig: Take 17 g by mouth daily as needed (constipation). pravastatin (PRAVACHOL) 40 mg tablet   Yes No  
Sig: Take 40 mg by mouth nightly. tamsulosin (FLOMAX) 0.4 mg capsule   Yes No  
Sig: Take 0.4 mg by mouth daily. traZODone (DESYREL) 50 mg tablet   Yes No  
Sig: Take 50 mg by mouth nightly. warfarin (COUMADIN) 2.5 mg tablet   No No  
Sig: Take 1 Tab by mouth daily. Take 2 pills (5 mg) Mon, Tues, Wed, Thur, Sat, Sun; and 1 pill only (2.5 mg) Fri Patient taking differently: Take 2.5 mg by mouth daily. Take 2 pills (5 mg) Mon, Tues, Thur, Sat, Sun; and 1 pill only (2.5 mg) Wed, Fri  
  
 Facility-Administered Medications: None Physical Exam:  
 
Patient Vitals for the past 24 hrs: 
 Temp Pulse Resp BP SpO2  
05/24/19 1347 98.3 °F (36.8 °C)      
05/24/19 1345  85 20 148/67   
05/24/19 1317 97.7 °F (36.5 °C) 94 20 133/73 96 % Physical Exam:  
General:  Alert and Responsive and in No acute distress. HEENT: Conjunctiva pink, sclera anicteric. EOMI. Pharynx moist, nonerythematous. Moist mucous membranes. Thyroid not enlarged, no nodules. No cervical, supraclavicular, occipital or submandibular lymphadenopathy. No other gross abnormalities present. CV:  RRR, no murmurs. No visible pulsations or thrills. RESP:  Unlabored breathing. Lungs clear to auscultation. no wheeze, rales, or rhonchi. Equal expansion bilaterally. ABD:  Soft, nontender, nondistended. No hepatosplenomegaly. No suprapubic tenderness. No CVA tenderness. RECTAL:  No masses or hemorrhoids. Hemoccult negative. MS:  No joint deformity or instability. No atrophy. Neuro:  5/5 strength bilateral upper extremities and lower extremities. A+Ox3. Ext:  No edema. 2+ radial and dp pulses bilaterally. Skin:  No rashes, lesions, or ulcers. Good turgor. IMAGING:  
 
Recent Results (from the past 12 hour(s)) CBC WITH AUTOMATED DIFF Collection Time: 05/24/19  1:40 PM  
Result Value Ref Range WBC 8.3 4.6 - 13.2 K/uL  
 RBC 2.64 (L) 4.70 - 5.50 M/uL HGB 4.9 (LL) 13.0 - 16.0 g/dL HCT 17.4 (LL) 36.0 - 48.0 % MCV 65.9 (L) 74.0 - 97.0 FL  
 MCH 18.6 (L) 24.0 - 34.0 PG  
 MCHC 28.2 (L) 31.0 - 37.0 g/dL  
 RDW 17.3 (H) 11.6 - 14.5 % PLATELET 524 251 - 204 K/uL MPV 8.2 (L) 9.2 - 11.8 FL  
 NEUTROPHILS 83 (H) 42 - 75 % LYMPHOCYTES 12 (L) 20 - 51 % MONOCYTES 5 2 - 9 % EOSINOPHILS 0 0 - 5 % BASOPHILS 0 0 - 3 %  
 ABS. NEUTROPHILS 6.9 1.8 - 8.0 K/UL  
 ABS. LYMPHOCYTES 1.0 0.8 - 3.5 K/UL  
 ABS. MONOCYTES 0.4 0 - 1.0 K/UL  
 ABS. EOSINOPHILS 0.0 0.0 - 0.4 K/UL ABS. BASOPHILS 0.0 0.0 - 0.06 K/UL  
 DF MANUAL PLATELET COMMENTS ADEQUATE PLATELETS    
 RBC COMMENTS ANISOCYTOSIS 1+ 
    
 RBC COMMENTS MICROCYTOSIS 2+ 
    
 RBC COMMENTS POLYCHROMASIA FEW 
HYPOCHROMIA 1+ METABOLIC PANEL, BASIC Collection Time: 05/24/19  1:40 PM  
Result Value Ref Range Sodium 132 (L) 136 - 145 mmol/L Potassium 4.2 3.5 - 5.5 mmol/L Chloride 98 (L) 100 - 108 mmol/L  
 CO2 27 21 - 32 mmol/L Anion gap 7 3.0 - 18 mmol/L Glucose 116 (H) 74 - 99 mg/dL BUN 24 (H) 7.0 - 18 MG/DL Creatinine 1.40 (H) 0.6 - 1.3 MG/DL  
 BUN/Creatinine ratio 17 12 - 20 GFR est AA 59 (L) >60 ml/min/1.73m2 GFR est non-AA 49 (L) >60 ml/min/1.73m2 Calcium 9.0 8.5 - 10.1 MG/DL PROTHROMBIN TIME + INR Collection Time: 05/24/19  1:40 PM  
Result Value Ref Range Prothrombin time 34.7 (H) 11.5 - 15.2 sec INR 3.4 (H) 0.8 - 1.2 TYPE & SCREEN Collection Time: 05/24/19  2:00 PM  
Result Value Ref Range Crossmatch Expiration 05/27/2019 ABO/Rh(D) O POSITIVE Antibody screen NEG   
 CALLED TO: [facility] ON 5/24/19 AT 1539 BY SERAFIN Unit number [id number] Blood component type [facility] Unit division 00 Status of unit ALLOCATED Crossmatch result Compatible Unit number [id number] Blood component type [facility] Unit division 00 Status of unit ALLOCATED Crossmatch result Compatible Assessment/Plan:  
[de-identified] y.o. male with dyspnea and symptomatic anemia. No obvious source of blood loss such as GI, this could be an issue with red blood cell production. Given the patient will likely need 2 or 3 units total transfusion, and the also needs a diagnostic evaluation for the etiology of his anemia, the most appropriate disposition is admission to the hospital. 
 
Disposition: Admit MD Renzo Bang PGY-1 
05/24/19 3:09 PM 
 
 
 
 
 I personally saw and examined the patient. I have reviewed and agree with the residents findings, including all diagnostic interpretations, and plans as written. I was present during the key portions of separately billed procedures.  
Rashida Moreno MD

## 2019-05-25 LAB
ANION GAP SERPL CALC-SCNC: 9 MMOL/L (ref 3–18)
ATRIAL RATE: 107 BPM
BASOPHILS # BLD: 0 K/UL (ref 0–0.1)
BASOPHILS NFR BLD: 0 % (ref 0–2)
BUN SERPL-MCNC: 22 MG/DL (ref 7–18)
BUN/CREAT SERPL: 17 (ref 12–20)
CALCIUM SERPL-MCNC: 8.6 MG/DL (ref 8.5–10.1)
CALCULATED R AXIS, ECG10: -41 DEGREES
CALCULATED T AXIS, ECG11: 86 DEGREES
CHLORIDE SERPL-SCNC: 98 MMOL/L (ref 100–108)
CO2 SERPL-SCNC: 25 MMOL/L (ref 21–32)
CREAT SERPL-MCNC: 1.31 MG/DL (ref 0.6–1.3)
DIAGNOSIS, 93000: NORMAL
DIFFERENTIAL METHOD BLD: ABNORMAL
EOSINOPHIL # BLD: 0 K/UL (ref 0–0.4)
EOSINOPHIL NFR BLD: 0 % (ref 0–5)
ERYTHROCYTE [DISTWIDTH] IN BLOOD BY AUTOMATED COUNT: 20 % (ref 11.6–14.5)
ERYTHROCYTE [DISTWIDTH] IN BLOOD BY AUTOMATED COUNT: 20.6 % (ref 11.6–14.5)
FERRITIN SERPL-MCNC: 19 NG/ML (ref 8–388)
GLUCOSE SERPL-MCNC: 105 MG/DL (ref 74–99)
HCT VFR BLD AUTO: 23 % (ref 36–48)
HCT VFR BLD AUTO: 23.8 % (ref 36–48)
HGB BLD-MCNC: 7 G/DL (ref 13–16)
HGB BLD-MCNC: 7 G/DL (ref 13–16)
INR PPP: 3.3 (ref 0.8–1.2)
INR PPP: 3.4 (ref 0.8–1.2)
IRON SATN MFR SERPL: 7 %
IRON SERPL-MCNC: 29 UG/DL (ref 50–175)
LYMPHOCYTES # BLD: 0.9 K/UL (ref 0.9–3.6)
LYMPHOCYTES NFR BLD: 10 % (ref 21–52)
MAGNESIUM SERPL-MCNC: 2.3 MG/DL (ref 1.6–2.6)
MCH RBC QN AUTO: 20.8 PG (ref 24–34)
MCH RBC QN AUTO: 21.5 PG (ref 24–34)
MCHC RBC AUTO-ENTMCNC: 29.4 G/DL (ref 31–37)
MCHC RBC AUTO-ENTMCNC: 30.4 G/DL (ref 31–37)
MCV RBC AUTO: 70.8 FL (ref 74–97)
MCV RBC AUTO: 70.8 FL (ref 74–97)
MONOCYTES # BLD: 0.7 K/UL (ref 0.05–1.2)
MONOCYTES NFR BLD: 7 % (ref 3–10)
NEUTS SEG # BLD: 7.8 K/UL (ref 1.8–8)
NEUTS SEG NFR BLD: 83 % (ref 40–73)
PHOSPHATE SERPL-MCNC: 2.9 MG/DL (ref 2.5–4.9)
PLATELET # BLD AUTO: 252 K/UL (ref 135–420)
PLATELET # BLD AUTO: 265 K/UL (ref 135–420)
PLATELET COMMENTS,PCOM: ABNORMAL
PMV BLD AUTO: 8.2 FL (ref 9.2–11.8)
PMV BLD AUTO: 8.3 FL (ref 9.2–11.8)
POTASSIUM SERPL-SCNC: 3.8 MMOL/L (ref 3.5–5.5)
PROTHROMBIN TIME: 33.8 SEC (ref 11.5–15.2)
PROTHROMBIN TIME: 35.2 SEC (ref 11.5–15.2)
Q-T INTERVAL, ECG07: 348 MS
QRS DURATION, ECG06: 86 MS
QTC CALCULATION (BEZET), ECG08: 425 MS
RBC # BLD AUTO: 3.25 M/UL (ref 4.7–5.5)
RBC # BLD AUTO: 3.36 M/UL (ref 4.7–5.5)
RBC MORPH BLD: ABNORMAL
SODIUM SERPL-SCNC: 132 MMOL/L (ref 136–145)
TIBC SERPL-MCNC: 429 UG/DL (ref 250–450)
VENTRICULAR RATE, ECG03: 90 BPM
WBC # BLD AUTO: 7.7 K/UL (ref 4.6–13.2)
WBC # BLD AUTO: 9.4 K/UL (ref 4.6–13.2)

## 2019-05-25 PROCEDURE — 84100 ASSAY OF PHOSPHORUS: CPT

## 2019-05-25 PROCEDURE — 74011000250 HC RX REV CODE- 250: Performed by: INTERNAL MEDICINE

## 2019-05-25 PROCEDURE — 74011250636 HC RX REV CODE- 250/636: Performed by: STUDENT IN AN ORGANIZED HEALTH CARE EDUCATION/TRAINING PROGRAM

## 2019-05-25 PROCEDURE — 36415 COLL VENOUS BLD VENIPUNCTURE: CPT

## 2019-05-25 PROCEDURE — 85025 COMPLETE CBC W/AUTO DIFF WBC: CPT

## 2019-05-25 PROCEDURE — 65660000000 HC RM CCU STEPDOWN

## 2019-05-25 PROCEDURE — 74011000250 HC RX REV CODE- 250: Performed by: STUDENT IN AN ORGANIZED HEALTH CARE EDUCATION/TRAINING PROGRAM

## 2019-05-25 PROCEDURE — 82728 ASSAY OF FERRITIN: CPT

## 2019-05-25 PROCEDURE — 74011250637 HC RX REV CODE- 250/637: Performed by: STUDENT IN AN ORGANIZED HEALTH CARE EDUCATION/TRAINING PROGRAM

## 2019-05-25 PROCEDURE — 94640 AIRWAY INHALATION TREATMENT: CPT

## 2019-05-25 PROCEDURE — 85610 PROTHROMBIN TIME: CPT

## 2019-05-25 PROCEDURE — 74011250637 HC RX REV CODE- 250/637: Performed by: FAMILY MEDICINE

## 2019-05-25 PROCEDURE — 80048 BASIC METABOLIC PNL TOTAL CA: CPT

## 2019-05-25 PROCEDURE — 30233N1 TRANSFUSION OF NONAUTOLOGOUS RED BLOOD CELLS INTO PERIPHERAL VEIN, PERCUTANEOUS APPROACH: ICD-10-PCS | Performed by: FAMILY MEDICINE

## 2019-05-25 PROCEDURE — 85027 COMPLETE CBC AUTOMATED: CPT

## 2019-05-25 PROCEDURE — 77010033678 HC OXYGEN DAILY

## 2019-05-25 PROCEDURE — 83540 ASSAY OF IRON: CPT

## 2019-05-25 PROCEDURE — 83735 ASSAY OF MAGNESIUM: CPT

## 2019-05-25 RX ORDER — PHYTONADIONE 5 MG/1
5 TABLET ORAL
Status: DISCONTINUED | OUTPATIENT
Start: 2019-05-25 | End: 2019-05-25 | Stop reason: CLARIF

## 2019-05-25 RX ORDER — ONDANSETRON 2 MG/ML
4 INJECTION INTRAMUSCULAR; INTRAVENOUS ONCE
Status: COMPLETED | OUTPATIENT
Start: 2019-05-25 | End: 2019-05-25

## 2019-05-25 RX ADMIN — AMLODIPINE BESYLATE 10 MG: 10 TABLET ORAL at 10:56

## 2019-05-25 RX ADMIN — PRAVASTATIN SODIUM 40 MG: 20 TABLET ORAL at 21:53

## 2019-05-25 RX ADMIN — IPRATROPIUM BROMIDE AND ALBUTEROL SULFATE 3 ML: .5; 3 SOLUTION RESPIRATORY (INHALATION) at 02:12

## 2019-05-25 RX ADMIN — IPRATROPIUM BROMIDE AND ALBUTEROL SULFATE 3 ML: .5; 3 SOLUTION RESPIRATORY (INHALATION) at 21:19

## 2019-05-25 RX ADMIN — HYDROCODONE BITARTRATE AND ACETAMINOPHEN 0.5 TABLET: 5; 325 TABLET ORAL at 01:07

## 2019-05-25 RX ADMIN — PHYTONADIONE 5 MG: 10 INJECTION, EMULSION INTRAMUSCULAR; INTRAVENOUS; SUBCUTANEOUS at 14:35

## 2019-05-25 RX ADMIN — TRAZODONE HYDROCHLORIDE 50 MG: 50 TABLET ORAL at 21:54

## 2019-05-25 RX ADMIN — IPRATROPIUM BROMIDE AND ALBUTEROL SULFATE 3 ML: .5; 3 SOLUTION RESPIRATORY (INHALATION) at 14:50

## 2019-05-25 RX ADMIN — DIGOXIN 0.12 MG: 125 TABLET ORAL at 10:57

## 2019-05-25 RX ADMIN — ONDANSETRON 4 MG: 2 INJECTION INTRAMUSCULAR; INTRAVENOUS at 05:55

## 2019-05-25 RX ADMIN — IPRATROPIUM BROMIDE AND ALBUTEROL SULFATE 3 ML: .5; 3 SOLUTION RESPIRATORY (INHALATION) at 09:15

## 2019-05-25 RX ADMIN — HYDROCODONE BITARTRATE AND ACETAMINOPHEN 0.5 TABLET: 5; 325 TABLET ORAL at 16:49

## 2019-05-25 RX ADMIN — POLYETHYLENE GLYCOL 3350, SODIUM SULFATE ANHYDROUS, SODIUM BICARBONATE, SODIUM CHLORIDE, POTASSIUM CHLORIDE 2000 ML: 236; 22.74; 6.74; 5.86; 2.97 POWDER, FOR SOLUTION ORAL at 21:54

## 2019-05-25 NOTE — PROGRESS NOTES
Intern Progress Note [address] Patient: Shireen Garcia MRN: [id number]  CSN: [id number] YOB: 1940  Age: [de-identified] y.o. Sex: male DOA: 5/24/2019 LOS:  LOS: 1 day Subjective:  
 
Acute events: Patient c/o nausea. Reports that he keep swallowing mucus and it makes him nauseous. Denies dizziness, SOB, palpitations or chest pain. Review of Systems Constitutional: Negative for chills and fever. Respiratory: Negative for shortness of breath and wheezing. Cardiovascular: Negative for chest pain and palpitations. Gastrointestinal: Positive for nausea. Negative for vomiting. Objective:  
  
Patient Vitals for the past 24 hrs: 
 Temp Pulse Resp BP SpO2  
05/25/19 0311 97.4 °F (36.3 °C) (!) 112 20 155/71 100 % 05/25/19 0214  91 16  98 % 05/25/19 0030 98.1 °F (36.7 °C) 98 16 (!) 165/95 97 % 05/25/19 0018 97.8 °F (36.6 °C) 90 20 146/78 99 % 05/24/19 2336     96 % 05/24/19 2248 98 °F (36.7 °C) 88 18 160/82 98 % 05/24/19 2155 97.5 °F (36.4 °C) 94 18 160/85   
05/24/19 2141 98.2 °F (36.8 °C) 90 18 151/78 98 % 05/24/19 2055 98.2 °F (36.8 °C) 88 18 158/86 98 % 05/24/19 1945 97.5 °F (36.4 °C) 98 18 (!) 153/94 97 % 05/24/19 1819 98.4 °F (36.9 °C)      
05/24/19 1806  90 19  100 % 05/24/19 1800  96 23 (!) 157/93 100 % 05/24/19 1745  92 18 141/75 100 % 05/24/19 1730  85 17 148/77 100 % 05/24/19 1700  86 19 (!) 151/96 100 % 05/24/19 1657 98.6 °F (37 °C)      
05/24/19 1645  83 14 154/80 100 % 05/24/19 1630  94 20 (!) 156/91 100 % 05/24/19 1627 97.8 °F (36.6 °C)      
05/24/19 1615  79 18 127/81 100 % 05/24/19 1347 98.3 °F (36.8 °C)      
05/24/19 1345  85 20 148/67   
05/24/19 1317 97.7 °F (36.5 °C) 94 20 133/73 96 % Intake/Output Summary (Last 24 hours) at 5/25/2019 0510 Last data filed at 5/25/2019 0030 Gross per 24 hour Intake 832.9 ml Output 100 ml Net 732.9 ml  
 
 
 Physical Exam:  
General:  Alert and Responsive and in No acute distress. HEENT: Conjunctiva pink, sclera anicteric. EOMI. Pharynx moist, nonerythematous. Moist mucous membranes. CV:  RRR, no murmurs. No visible pulsations or thrills. RESP:  Unlabored breathing. Lungs clear to auscultation. no wheeze, rales, or rhonchi. Equal expansion bilaterally. ABD:  Soft, nontender, nondistended. Ext:  Trace edema. 2+ radial and dp pulses bilaterally. Skin:  No rashes, lesions, or ulcers. Good turgor. Lab/Data Reviewed: 
Recent Results (from the past 12 hour(s)) HGB & HCT Collection Time: 05/24/19  7:28 PM  
Result Value Ref Range HGB 6.0 (L) 13.0 - 16.0 g/dL HCT 20.6 (L) 36.0 - 48.0 % METABOLIC PANEL, BASIC Collection Time: 05/25/19  3:50 AM  
Result Value Ref Range Sodium 132 (L) 136 - 145 mmol/L Potassium 3.8 3.5 - 5.5 mmol/L Chloride 98 (L) 100 - 108 mmol/L  
 CO2 25 21 - 32 mmol/L Anion gap 9 3.0 - 18 mmol/L Glucose 105 (H) 74 - 99 mg/dL BUN 22 (H) 7.0 - 18 MG/DL Creatinine 1.31 (H) 0.6 - 1.3 MG/DL  
 BUN/Creatinine ratio 17 12 - 20 GFR est AA >60 >60 ml/min/1.73m2 GFR est non-AA 53 (L) >60 ml/min/1.73m2 Calcium 8.6 8.5 - 10.1 MG/DL  
IRON PROFILE Collection Time: 05/25/19  3:50 AM  
Result Value Ref Range Iron 29 (L) 50 - 175 ug/dL TIBC 429 250 - 450 ug/dL Iron % saturation 7 % MAGNESIUM Collection Time: 05/25/19  3:50 AM  
Result Value Ref Range Magnesium 2.3 1.6 - 2.6 mg/dL PHOSPHORUS Collection Time: 05/25/19  3:50 AM  
Result Value Ref Range Phosphorus 2.9 2.5 - 4.9 MG/DL PROTHROMBIN TIME + INR Collection Time: 05/25/19  3:50 AM  
Result Value Ref Range Prothrombin time 33.8 (H) 11.5 - 15.2 sec INR 3.3 (H) 0.8 - 1.2 FERRITIN Collection Time: 05/25/19  3:50 AM  
Result Value Ref Range Ferritin 19 8 - 388 NG/ML Scheduled Medications Reviewed: 
Current Facility-Administered Medications Medication Dose Route Frequency  ondansetron (ZOFRAN) injection 4 mg  4 mg IntraVENous ONCE  
 amLODIPine (NORVASC) tablet 10 mg  10 mg Oral DAILY  digoxin (LANOXIN) tablet 0.125 mg  0.125 mg Oral DAILY  pravastatin (PRAVACHOL) tablet 40 mg  40 mg Oral QHS  albuterol-ipratropium (DUO-NEB) 2.5 MG-0.5 MG/3 ML  3 mL Nebulization Q6H RT  
 traZODone (DESYREL) tablet 50 mg  50 mg Oral QHS  WARFARIN (COUMADIN) - Physician to Dose   Other Rx Dosing/Monitoring Imaging, microbiology, and EKG/Telemetry: No results found. Assessment/Plan  
[de-identified] y.o. male with PMH chronic atrial fibrillation on warfarin, BPH, COPD on 2 L NC continuously, hypertension, heart failure with preserved EF (55% April 2018), iron deficiency anemia (baseline 9.0-10.0), CKD stage III,  Chronic constipation, coronary artery disease, former smoker (54 pack years), chronic pain syndrome who is now presenting with complaint of symptomatic anemia.  
  
Symptomatic Anemia: Hemoglobin 4.9>1u pRBC >6>1u pRBC. Improved fatigue and dizziness. No obvious source of bleeding (denies rectal bleeding and hematochezia). Hemoccult negative in ED. Patient received 2 unit pRBCs. VSS. Patient with noted history of iron deficiency anemia in outpatient records.  
-telemetry 
-CBC/BMP daily 
-Fe 29, Ferritin 19, TIBC/iron saturation wnl 
-FU ekg, cardiac panel  
-FU peripheral smear -PT/OT 
-H&H timed following transfusions -Transfuse with goal of 7.  
-H&H q6 hrs following transfusion  
-Consider heme/onc consult 
  
Chronic Atrial Fibrillation on warfarin & heart failure with preserved ejection fraction: Followed by Dr. Arina Sepulveda. Last echo 4/2018 with EF 55%; hypokinesis of basal-mid anteroseptal walls.   
-PT/INR daily 
-Hold warfarin 
-Continue digoxin 125 mcg daily  
-consider cardiology consult  
  
Supratherapeutic INR: INR 3.4>3.3; goal 2.0-3.0.  
-Will hold warfarin as above  
-monitor INR  
  
 Hypertension: BPs upon admission 133-156/73-91. Patient prescribed lisinopril and amlodipine but lisinopril held due to increased creatinine at PCP office. 
-Continue amlodipine 10 mg  
  
Chronic hypoxic respiratory failure secondary to COPD: On 2 L nasal canula at baseline. Follows with Dr. Senthil Krishnamurthy (last appt 3/21). -Spot check pulse ox 
-Titrate supplemental O2 with goals 88-92% 
-Continue Stiolto (pharm to find alternative). -Duo-neb q6 hr per RT   
  
Mild Coronary Artery Disease - Patient with cardiac caths in 2009 & 2012 showed no significant stenosis and no intervention necessary.  
-Continue pravastatin 40 mg daily 
  
CKD stage III: baseline Cr 1.0-1.2. Admission 1.4.  
-monitor Cr; start fluids if necessary  
  
Chronic pain syndrome: history of low back pain and painful inguinal hernia (without incarceration) 
-continue norco 5-325 mg (.5 tablet every 12 hours) -Narcan prn for respiratory suppression  
  
Hyponatremia - Na 132 on BMP 
-Encourage oral intake 
-monitor with BMP  
  
Diet: cardiac DVT Prophylaxis: SCDs Code Status: FULL Point of Contact: Carademi Kerr (Relationship: daughter) [phone] 
  
Disposition and anticipated LOS: 2 midnights Emma Obrien MD  
[id number] [facility] PGY-1 
05/25/19 5:10 AM 
Pager: [phone]

## 2019-05-25 NOTE — CONSULTS
[email or web address] 
[phone] GASTROENTEROLOGY CONSULT Impression: 1. Severe anemia-appears to have baseline anemia with Hgb of around 9. Appears multifactorial-CKD, Anemia of chronic disease. ? Component of GI bleeding. Hemoccult negative, no overt blood loss 2. COPD with A fib with anemia-need for anticoagulation. INR 3.3 today. 3. History of colon polyps-TA resected in 2014, 2016. Recall colonoscopy was recommended for 2019. Plan: 1. EGD/Colonoscopy in am, as long as INR is <1.7 Chief Complaint: Anemia HPI: 
Jenna Escalante is a [de-identified] y.o. male who I am being asked to see in consultation for an opinion regarding above. He was noted to have severe anemia with Hgb of 4.9 in the setting of coumadin use for A fib. He has chronic multifactorial anemia with a baseline Hgb of around 9 or so. Now admitted with a severe precipitous drop in counts with Hgb of 4.9. Improved with transfusion. Denies any overt GI blood loss. Hemoccult neg in ER> Last Colonoscopy was in 2016-diverticulosis and TA. 3 year recall was recommended. PMH:  
Past Medical History:  
Diagnosis Date  Aneurysm ([address] AAA repair 2006 & 2012  Aorto-iliac duplex 02/13/2015 Tech difficult. Patent aorta bi-iliac endograft w/o leak or limb dysfunction.  Arrhythmia   
 a fib  Cardiac cath 11/01/2012 RCA (sm, nondom) patent. LM patent. LAD 25%. CX (dom) 45% mid. LVEDP 12 mmHg. No WMA. PA 27/12. W 10-12. CO/CI 5.2/2.6 (TD).  Cardiac echocardiogram, abnormal 02/20/2016 EF 55%. No WMA. Indeterminate diastolic fx. RVSP 60 mmHg. Severe LAE. Mild MR. Mod TR.  IVCE. Similar to study of 10/26/12.  Cardiovascular aorto-iliac duplex 02/13/2015 Patent aorta bi-iliac endovascular graft w/o leak or limb dysfunction. Sac measures 4.21 x 4.47 cm (4.4 x 4.6 cm on 1/31/14).  Cardiovascular LE peripheral arterial testing 07/29/2013 No significant LE arterial disease bilaterally. R GHADA 1. 12.  L GHADA 1. 12.  R DBI 0.83. L DBI 0.71. Exercise deferred.  Cardiovascular renal duplex 10/31/2012 No RA stenosis. Intrinsic/med disease in left kidney. Patent aortic endograft. Patent, thrombus-free renal veins bilaterally.  Carotid duplex 07/29/2013 Mild <50% bilateral ICA plaquing.  Chronic lung disease  Cigarette smoker  Congestive heart failure (CHF) ([address]  COPD (chronic obstructive pulmonary disease) (HCC)   
 and emphysema; likely secondary to tobacco abuse  Difficult intubation  Dyslipidemia   
 low HDL  Emphysema   
 HTN (hypertension)  Hypercholesterolemia  Ill-defined condition   
 hernia  Increased prostate specific antigen (PSA) velocity  Long term (current) use of anticoagulants   
 coumadin  Osteoarthritis  Osteomyelitis ([address]  Paroxysmal atrial fibrillation (HCC)  Peripheral vascular disease ([address] AAA repair 12/2007  Persistent atrial Fibrillation  Persistent atrial fibrillation (HCC)  Unspecified adverse effect of anesthesia   
 difficulty breathing placed in ICU Oct 2012 (AAA repair) PSH:  
Past Surgical History:  
Procedure Laterality Date  BRONCHOSCOPY DIAGNOSTIC  11/2/2012  CARDIAC CATHETERIZATION  11/1/2012  COLONOSCOPY N/A 7/26/2016 COLONOSCOPY with Polypectomies performed by Eloy Gomez MD at [address] HX AAA REPAIR    
 2006 & 2012  HX HEART CATHETERIZATION  3/4/2009 1. RCA small, nondominant; patent. 2. LMCA patent. 3. LAD is long, wrapped around apical vessel. Diffuse, 20-30% stenosis noted. 4. CCA is large, dominant vessel. Diffuse 20-30% stenosis. 5. LVEDP is 16 mmHg. 6. Overall left ventricular systolic function mildly diminshed with est. EF 45%. Mild, global hypokinesis noted. 7. No significant mitral regurgitation or aortic stenosis noted. (see report)  HX HERNIA REPAIR  2/2014 rt inguinal   
 HX HERNIA REPAIR  2016 LEFT INGUINAL HERNIA REPAIR DR. Brown Barnard  REPAIR ING HERNIA,5+Y/O,JESSIE Left 16 Dr. Miranda LONG  2012 Social HX:  
Social History Socioeconomic History  Marital status:  Spouse name: Not on file  Number of children: Not on file  Years of education: Not on file  Highest education level: Not on file Occupational History  Not on file Social Needs  Financial resource strain: Not on file  Food insecurity:  
  Worry: Not on file Inability: Not on file  Transportation needs:  
  Medical: Not on file Non-medical: Not on file Tobacco Use  Smoking status: Former Smoker Packs/day: 1.00 Years: 54.00 Pack years: 54.00 Types: Cigarettes Last attempt to quit: 10/23/2012 Years since quittin.5  Smokeless tobacco: Never Used Substance and Sexual Activity  Alcohol use: No  
  Alcohol/week: 0.0 oz  
  Comment: quit drinking alcohol 26 years ago, patient states stopped in \"\"  Drug use: No  
 Sexual activity: Not on file Lifestyle  Physical activity:  
  Days per week: Not on file Minutes per session: Not on file  Stress: Not on file Relationships  Social connections:  
  Talks on phone: Not on file Gets together: Not on file Attends Rastafarian service: Not on file Active member of club or organization: Not on file Attends meetings of clubs or organizations: Not on file Relationship status: Not on file  Intimate partner violence:  
  Fear of current or ex partner: Not on file Emotionally abused: Not on file Physically abused: Not on file Forced sexual activity: Not on file Other Topics Concern  Not on file Social History Narrative  Not on file FHX:  
Family History Problem Relation Age of Onset  Heart Surgery Father BYPASS  Stroke Father  Heart Surgery Mother BYPASS  Coronary Artery Disease Mother  Stroke Mother Allergy: Allergies Allergen Reactions  Codeine Swelling  Morphine Itching  Sulfa (Sulfonamide Antibiotics) Other (comments) Kidney problems. Patient Active Problem List  
Diagnosis Code  Hypercholesterolemia E78.00  Congestive heart failure (CHF) ([facility]) I50.9  Peripheral vascular disease ([facility]) I73.9  
 COPD (chronic obstructive pulmonary disease) ([facility]) J44.9  
 HTN (hypertension) I10  
 Chronic atrial fibrillation ([facility]) I48.2  RLQ abdominal pain R10.31  Status post AAA (abdominal aortic aneurysm) repair Z98.890, Z86.79  
 AAA (abdominal aortic aneurysm, ruptured) ([facility] I71.3  Chronic constipation K59.09  
 Right inguinal hernia K40.90  Left inguinal hernia K40.90  CAD (coronary artery disease) I25.10  Hypoxemia requiring supplemental oxygen R09.02, Z99.81  Warfarin-induced coagulopathy ([facility] D68.32, T45.515A  Intraabdominal fluid collection R18.8  Abdominal pain R10.9  Sepsis ([facility] A41.9  Pyogenic inflammation of bone ([facility]) M86.9  Psoas abscess ([facility]) K68.12  
 Abscess L02.91  
 Discitis of lumbar region M46.46  
 Pulmonary hypertension ([facility]) I27.20  
 CHF exacerbation ([facility]) I50.9  Hyponatremia E87.1  Symptomatic anemia D64.9  Chronic pain syndrome G89.4  Stage 3 chronic kidney disease ([facility]) N18.3  Iron deficiency anemia D50.9 Home Medications:  
 
Medications Prior to Admission Medication Sig  
 albuterol (PROVENTIL VENTOLIN) 2.5 mg /3 mL (0.083 %) nebulizer solution 3 mL by Nebulization route every four (4) hours as needed for Wheezing.  STIOLTO RESPIMAT 2.5-2.5 mcg/actuation inhaler inhale 2 inhalations by mouth once daily  traZODone (DESYREL) 50 mg tablet Take 50 mg by mouth nightly.  pravastatin (PRAVACHOL) 40 mg tablet Take 40 mg by mouth nightly.   
 polyethylene glycol (MIRALAX) 17 gram packet Take 17 g by mouth daily as needed (constipation).  digoxin (DIGITEK) 0.125 mg tablet take 1 tablet by mouth once daily  warfarin (COUMADIN) 2.5 mg tablet Take 1 Tab by mouth daily. Take 2 pills (5 mg) Mon, Tues, Wed, Thur, Sat, Sun; and 1 pill only (2.5 mg) Fri (Patient taking differently: Take 2.5 mg by mouth daily. Take 2 pills (5 mg) Mon, Tues, Thur, Sat, Sun; and 1 pill only (2.5 mg) Wed, Fri)  amLODIPine (NORVASC) 10 mg tablet Take 10 mg by mouth daily.  OXYGEN-AIR DELIVERY SYSTEMS 2 L/min by Nasal route daily. Continuous. Company is [employer]  lisinopril (PRINIVIL, ZESTRIL) 20 mg tablet Take 1 Tab by mouth daily. Review of Systems:  
 
Constitutional: No fevers, chills, weight loss, fatigue. Skin: No rashes, pruritis, jaundice, ulcerations, erythema. HENT: No headaches, nosebleeds, sinus pressure, rhinorrhea, sore throat. Eyes: No visual changes, blurred vision, eye pain, photophobia, jaundice. Cardiovascular: No chest pain, heart palpitations. Respiratory: No cough, SOB, wheezing, chest discomfort, orthopnea. Gastrointestinal:   
Genitourinary: No dysuria, bleeding, discharge, pyuria. Musculoskeletal: No weakness, arthralgias, wasting. Endo: No sweats. Heme: No bruising, easy bleeding. Allergies: As noted. Neurological: Cranial nerves intact. Alert and oriented. Gait not assessed. Psychiatric:  No anxiety, depression, hallucinations. Visit Vitals /66 (BP 1 Location: Left arm, BP Patient Position: At rest) Pulse 98 Temp 97.5 °F (36.4 °C) Resp 20 Ht 5' 6\" (1.676 m) Wt 66.7 kg (147 lb 1.6 oz) SpO2 95% BMI 23.74 kg/m² Physical Assessment:  
 
constitutional: appearance: well developed, well nourished, normal habitus, no deformities, in no acute distress. skin: inspection: no rashes, ulcers, icterus or other lesions; no clubbing or telangiectasias. palpation: no induration or subcutaneos nodules. eyes: inspection: normal conjunctivae and lids; no jaundice pupils: normal 
ENMT: mouth: normal oral mucosa,lips and gums; good dentition. oropharynx: normal tongue, hard and soft palate; posterior pharynx without erithema, exudate or lesions. neck: thyroid: normal size, consistency and position; no masses or tenderness. respiratory: effort: normal chest excursion; no intercostal retraction or accessory muscle use. cardiovascular: abdominal aorta: normal size and position; no bruits. palpation: PMI of normal size and position; normal rhythm; no thrill or murmurs. abdominal: abdomen: normal consistency; no tenderness or masses. hernias: no hernias appreciated. liver: normal size and consistency. spleen: not palpable. rectal: hemoccult/guaiac: not performed. musculoskeletal: digits and nails: no clubbing, cyanosis, petechiae or other inflammatory conditions. gait: normal gait and station head and neck: normal range of motion; no pain, crepitation or contracture. spine/ribs/pelvis: normal range of motion; no pain, deformity or contracture. neurologic: cranial nerves: II-XII normal.  
psychiatric: judgement/insight: within normal limits. memory: within normal limits for recent and remote events. mood and affect: no evidence of depression, anxiety or agitation. orientation: oriented to time, space and person. Basic Metabolic Profile Recent Labs  
  05/25/19 
0350 * K 3.8 CL 98* CO2 25 BUN 22* * CA 8.6 MG 2.3 PHOS 2.9  
  
  
CBC w/Diff Recent Labs  
  05/25/19 
0350 WBC 9.4  
RBC 3.25* HGB 7.0*  
HCT 23.0*  
MCV 70.8*  
MCH 21.5*  
MCHC 30.4* RDW 20.6*  Recent Labs  
  05/25/19 
0350 GRANS 83* LYMPH 10* EOS 0 Hepatic Function No results for input(s): ALB, TP, TBILI, GPT, SGOT, AP, AML, LPSE in the last 72 hours. No lab exists for component: DBILI Coags Recent Labs  
  05/25/19 
0350 05/24/19 
1340 PTP 33.8* 34.7*  
 INR 3.3* 3.4* Michael Bishop MD.  
[facility] 1948, [address] [facility] Cell: [phone] [email or web address]

## 2019-05-25 NOTE — ROUTINE PROCESS
Call light on, phlebotomist room, [facility] this nurse, patient c/o difficulty breathing. Pulse ox -0-91% 3l/min nasal prong. Appears anxious, Lung sounds, diminished to ? Absent on auscultation. Increase O2 5l/min for short period, then weaned to  4l/min humidified, nasal prong SPO2 97-98%. Consulted RT, plan: f/u schedule neb treatment, consider chest physiotherapy. S/p 1 unit PRBCs mirtha well without blood transfusion reaction.

## 2019-05-25 NOTE — PROGRESS NOTES
Received bedside report from Billy Sebastian, patient was resting in bed with daughter at bedside, HOB elevated, bed in lowest position and oriented to call button. Problem: Falls - Risk of 
Goal: *Absence of Falls Description Document Bridgett Thakkar Fall Risk and appropriate interventions in the flowsheet. Outcome: Progressing Towards Goal 
 
 
Gave bedside report to ENEDINA CARSON RN, patient was resting in bed, receiving a unit of blood and watching television.

## 2019-05-25 NOTE — PROGRESS NOTES
Bedside and Verbal shift change report given to Claudell Dye, RN (oncoming nurse) by Otis Puente RN (offgoing nurse). Report included the following information SBAR, Kardex, ED Summary, Procedure Summary, Intake/Output, MAR, Recent Results, Med Rec Status and Cardiac Rhythm afib .

## 2019-05-25 NOTE — ROUTINE PROCESS
Bedside and Verbal shift change report given to [address]. Rupa Choe  (oncoming nurse) by Alysha Swartz RN  (offgoing nurse). Report included the following information SBAR, Kardex, ED Summary, Procedure Summary, Intake/Output, MAR, Recent Results, Med Rec Status, Cardiac Rhythm Afib and Quality Measures. Assumed care of patient, initial assessment completed.

## 2019-05-26 ENCOUNTER — ANESTHESIA (OUTPATIENT)
Dept: ENDOSCOPY | Age: 79
DRG: 811 | End: 2019-05-26
Payer: MEDICARE

## 2019-05-26 ENCOUNTER — ANESTHESIA EVENT (OUTPATIENT)
Dept: ENDOSCOPY | Age: 79
DRG: 811 | End: 2019-05-26
Payer: MEDICARE

## 2019-05-26 LAB
ANION GAP SERPL CALC-SCNC: 8 MMOL/L (ref 3–18)
BASOPHILS # BLD: 0 K/UL (ref 0–0.1)
BASOPHILS NFR BLD: 0 % (ref 0–2)
BUN SERPL-MCNC: 21 MG/DL (ref 7–18)
BUN/CREAT SERPL: 16 (ref 12–20)
CALCIUM SERPL-MCNC: 8.8 MG/DL (ref 8.5–10.1)
CHLORIDE SERPL-SCNC: 98 MMOL/L (ref 100–108)
CO2 SERPL-SCNC: 25 MMOL/L (ref 21–32)
CREAT SERPL-MCNC: 1.3 MG/DL (ref 0.6–1.3)
DIFFERENTIAL METHOD BLD: ABNORMAL
EOSINOPHIL # BLD: 0.1 K/UL (ref 0–0.4)
EOSINOPHIL NFR BLD: 1 % (ref 0–5)
ERYTHROCYTE [DISTWIDTH] IN BLOOD BY AUTOMATED COUNT: 20.2 % (ref 11.6–14.5)
ERYTHROCYTE [DISTWIDTH] IN BLOOD BY AUTOMATED COUNT: 20.2 % (ref 11.6–14.5)
ERYTHROCYTE [DISTWIDTH] IN BLOOD BY AUTOMATED COUNT: 20.5 % (ref 11.6–14.5)
GLUCOSE SERPL-MCNC: 94 MG/DL (ref 74–99)
HCT VFR BLD AUTO: 22.9 % (ref 36–48)
HCT VFR BLD AUTO: 23.2 % (ref 36–48)
HCT VFR BLD AUTO: 24.2 % (ref 36–48)
HGB BLD-MCNC: 6.7 G/DL (ref 13–16)
HGB BLD-MCNC: 7 G/DL (ref 13–16)
HGB BLD-MCNC: 7.4 G/DL (ref 13–16)
INR PPP: 2.5 (ref 0.8–1.2)
INR PPP: 2.6 (ref 0.8–1.2)
LYMPHOCYTES # BLD: 0.8 K/UL (ref 0.9–3.6)
LYMPHOCYTES # BLD: 1 K/UL (ref 0.9–3.6)
LYMPHOCYTES # BLD: 1.1 K/UL (ref 0.9–3.6)
LYMPHOCYTES NFR BLD: 10 % (ref 21–52)
LYMPHOCYTES NFR BLD: 13 % (ref 21–52)
LYMPHOCYTES NFR BLD: 15 % (ref 21–52)
MCH RBC QN AUTO: 20.6 PG (ref 24–34)
MCH RBC QN AUTO: 21.7 PG (ref 24–34)
MCH RBC QN AUTO: 22 PG (ref 24–34)
MCHC RBC AUTO-ENTMCNC: 28.9 G/DL (ref 31–37)
MCHC RBC AUTO-ENTMCNC: 30.6 G/DL (ref 31–37)
MCHC RBC AUTO-ENTMCNC: 30.6 G/DL (ref 31–37)
MCV RBC AUTO: 71.1 FL (ref 74–97)
MCV RBC AUTO: 71.4 FL (ref 74–97)
MCV RBC AUTO: 71.8 FL (ref 74–97)
MONOCYTES # BLD: 0.5 K/UL (ref 0.05–1.2)
MONOCYTES # BLD: 0.6 K/UL (ref 0.05–1.2)
MONOCYTES # BLD: 0.8 K/UL (ref 0.05–1.2)
MONOCYTES NFR BLD: 8 % (ref 3–10)
MONOCYTES NFR BLD: 8 % (ref 3–10)
MONOCYTES NFR BLD: 9 % (ref 3–10)
NEUTS SEG # BLD: 4.9 K/UL (ref 1.8–8)
NEUTS SEG # BLD: 6.3 K/UL (ref 1.8–8)
NEUTS SEG # BLD: 6.6 K/UL (ref 1.8–8)
NEUTS SEG NFR BLD: 76 % (ref 40–73)
NEUTS SEG NFR BLD: 77 % (ref 40–73)
NEUTS SEG NFR BLD: 81 % (ref 40–73)
PLATELET # BLD AUTO: 274 K/UL (ref 135–420)
PLATELET # BLD AUTO: 289 K/UL (ref 135–420)
PLATELET # BLD AUTO: 312 K/UL (ref 135–420)
PLATELET COMMENTS,PCOM: ABNORMAL
PLATELET COMMENTS,PCOM: ABNORMAL
PMV BLD AUTO: 8.3 FL (ref 9.2–11.8)
PMV BLD AUTO: 8.5 FL (ref 9.2–11.8)
PMV BLD AUTO: 8.9 FL (ref 9.2–11.8)
POTASSIUM SERPL-SCNC: 4.2 MMOL/L (ref 3.5–5.5)
PROTHROMBIN TIME: 27.4 SEC (ref 11.5–15.2)
PROTHROMBIN TIME: 28.2 SEC (ref 11.5–15.2)
RBC # BLD AUTO: 3.22 M/UL (ref 4.7–5.5)
RBC # BLD AUTO: 3.25 M/UL (ref 4.7–5.5)
RBC # BLD AUTO: 3.37 M/UL (ref 4.7–5.5)
RBC MORPH BLD: ABNORMAL
SODIUM SERPL-SCNC: 131 MMOL/L (ref 136–145)
WBC # BLD AUTO: 6.5 K/UL (ref 4.6–13.2)
WBC # BLD AUTO: 7.8 K/UL (ref 4.6–13.2)
WBC # BLD AUTO: 8.6 K/UL (ref 4.6–13.2)

## 2019-05-26 PROCEDURE — 0DJ08ZZ INSPECTION OF UPPER INTESTINAL TRACT, VIA NATURAL OR ARTIFICIAL OPENING ENDOSCOPIC: ICD-10-PCS | Performed by: INTERNAL MEDICINE

## 2019-05-26 PROCEDURE — 85025 COMPLETE CBC W/AUTO DIFF WBC: CPT

## 2019-05-26 PROCEDURE — 77010033678 HC OXYGEN DAILY

## 2019-05-26 PROCEDURE — 74011000250 HC RX REV CODE- 250: Performed by: STUDENT IN AN ORGANIZED HEALTH CARE EDUCATION/TRAINING PROGRAM

## 2019-05-26 PROCEDURE — 94640 AIRWAY INHALATION TREATMENT: CPT

## 2019-05-26 PROCEDURE — 80048 BASIC METABOLIC PNL TOTAL CA: CPT

## 2019-05-26 PROCEDURE — 36415 COLL VENOUS BLD VENIPUNCTURE: CPT

## 2019-05-26 PROCEDURE — 65660000000 HC RM CCU STEPDOWN

## 2019-05-26 PROCEDURE — 94761 N-INVAS EAR/PLS OXIMETRY MLT: CPT

## 2019-05-26 PROCEDURE — 36430 TRANSFUSION BLD/BLD COMPNT: CPT

## 2019-05-26 PROCEDURE — 85610 PROTHROMBIN TIME: CPT

## 2019-05-26 PROCEDURE — 74011250637 HC RX REV CODE- 250/637: Performed by: STUDENT IN AN ORGANIZED HEALTH CARE EDUCATION/TRAINING PROGRAM

## 2019-05-26 PROCEDURE — P9016 RBC LEUKOCYTES REDUCED: HCPCS

## 2019-05-26 PROCEDURE — 0DBK8ZX EXCISION OF ASCENDING COLON, VIA NATURAL OR ARTIFICIAL OPENING ENDOSCOPIC, DIAGNOSTIC: ICD-10-PCS | Performed by: INTERNAL MEDICINE

## 2019-05-26 RX ORDER — SODIUM CHLORIDE 9 MG/ML
250 INJECTION, SOLUTION INTRAVENOUS AS NEEDED
Status: DISCONTINUED | OUTPATIENT
Start: 2019-05-26 | End: 2019-05-28 | Stop reason: ALTCHOICE

## 2019-05-26 RX ADMIN — HYDROCODONE BITARTRATE AND ACETAMINOPHEN 0.5 TABLET: 5; 325 TABLET ORAL at 21:56

## 2019-05-26 RX ADMIN — HYDROCODONE BITARTRATE AND ACETAMINOPHEN 0.5 TABLET: 5; 325 TABLET ORAL at 10:38

## 2019-05-26 RX ADMIN — IPRATROPIUM BROMIDE AND ALBUTEROL SULFATE 3 ML: .5; 3 SOLUTION RESPIRATORY (INHALATION) at 15:28

## 2019-05-26 RX ADMIN — PHYTONADIONE 5 MG: 10 INJECTION, EMULSION INTRAMUSCULAR; INTRAVENOUS; SUBCUTANEOUS at 11:08

## 2019-05-26 RX ADMIN — IPRATROPIUM BROMIDE AND ALBUTEROL SULFATE 3 ML: .5; 3 SOLUTION RESPIRATORY (INHALATION) at 07:55

## 2019-05-26 RX ADMIN — DIGOXIN 0.12 MG: 125 TABLET ORAL at 10:37

## 2019-05-26 RX ADMIN — AMLODIPINE BESYLATE 10 MG: 10 TABLET ORAL at 10:37

## 2019-05-26 RX ADMIN — IPRATROPIUM BROMIDE AND ALBUTEROL SULFATE 3 ML: .5; 3 SOLUTION RESPIRATORY (INHALATION) at 20:58

## 2019-05-26 RX ADMIN — POLYETHYLENE GLYCOL 3350, SODIUM SULFATE ANHYDROUS, SODIUM BICARBONATE, SODIUM CHLORIDE, POTASSIUM CHLORIDE 2000 ML: 236; 22.74; 6.74; 5.86; 2.97 POWDER, FOR SOLUTION ORAL at 03:30

## 2019-05-26 NOTE — PROGRESS NOTES
[email or web address] 
[phone] Gastroenterology follow up-Progress note 1. Severe anemia-appears to have baseline anemia with Hgb of around 9. Appears multifactorial-CKD, Anemia of chronic disease. ? Component of GI bleeding. Hemoccult negative, no overt blood loss-could not do scopes today due to high INR, and incomplete prep 2. COPD with A fib with anemia-need for anticoagulation. INR 3.3 today. 3. History of colon polyps-TA resected in 2014, 2016. Recall colonoscopy was recommended for 2019 Plan: 1. Procedures canceled today. 2. Recheck INR In am-if it stays low, then will prep tomorrow for scope on Tuesday-no overt bleeding noted Chief Complaint: f/u anemia. Subjective:  No bleeding seen ROS: Denies any fevers, chills, rash. Eyes: conjunctiva normal, EOM normal  
Neck: ROM normal, supple and trachea normal  
Cardiovascular: heart normal, intact distal pulses, normal rate and regular rhythm Pulmonary/Chest Wall: breath sounds normal and effort normal  
Abdominal: appearance normal, bowel sounds normal and soft, non-acute, non-tender Patient Active Problem List  
Diagnosis Code  Hypercholesterolemia E78.00  Congestive heart failure (CHF) ([facility]) I50.9  Peripheral vascular disease (HCC) I73.9  
 COPD (chronic obstructive pulmonary disease) ([facility]) J44.9  
 HTN (hypertension) I10  
 Chronic atrial fibrillation (HCC) I48.2  RLQ abdominal pain R10.31  Status post AAA (abdominal aortic aneurysm) repair Z98.890, Z86.79  
 AAA (abdominal aortic aneurysm, ruptured) ([facility] I71.3  Chronic constipation K59.09  
 Right inguinal hernia K40.90  Left inguinal hernia K40.90  CAD (coronary artery disease) I25.10  Hypoxemia requiring supplemental oxygen R09.02, Z99.81  Warfarin-induced coagulopathy ([address] D68.32, T45.515A  Intraabdominal fluid collection R18.8  Abdominal pain R10.9  Sepsis ([address] A41.9  Pyogenic inflammation of bone ([facility]) M86.9  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  
 Abscess L02.91  
 Discitis of lumbar region M46.46  
 Pulmonary hypertension (HCC) I27.20  
 CHF exacerbation (HCC) I50.9  Hyponatremia E87.1  Symptomatic anemia D64.9  Chronic pain syndrome G89.4  Stage 3 chronic kidney disease (HCC) N18.3  Iron deficiency anemia D50.9 Visit Vitals /82 (BP 1 Location: Left arm, BP Patient Position: At rest) Pulse (!) 110 Temp 97.7 °F (36.5 °C) Resp 18 Ht 5' 6\" (1.676 m) Wt 67.9 kg (149 lb 9.6 oz) SpO2 93% BMI 24.15 kg/m² Intake/Output Summary (Last 24 hours) at 5/26/2019 0855 Last data filed at 5/26/2019 7324 Gross per 24 hour Intake 480 ml Output 350 ml Net 130 ml CBC w/Diff Lab Results Component Value Date/Time WBC 8.6 05/26/2019 01:00 AM  
 RBC 3.37 (L) 05/26/2019 01:00 AM  
 HGB 7.4 (L) 05/26/2019 01:00 AM  
 HCT 24.2 (L) 05/26/2019 01:00 AM  
 MCV 71.8 (L) 05/26/2019 01:00 AM  
 MCH 22.0 (L) 05/26/2019 01:00 AM  
 MCHC 30.6 (L) 05/26/2019 01:00 AM  
 RDW 20.2 (H) 05/26/2019 01:00 AM  
  05/26/2019 01:00 AM  
 Lab Results Component Value Date/Time GRANS 77 (H) 05/26/2019 01:00 AM  
 LYMPH 13 (L) 05/26/2019 01:00 AM  
 EOS 1 05/26/2019 01:00 AM  
 BANDS 5 08/27/2017 12:20 PM  
 BASOS 0 05/26/2019 01:00 AM  
  
Basic Metabolic Profile Recent Labs  
  05/26/19 
0100 05/25/19 
0350 * 132* K 4.2 3.8 CL 98* 98* CO2 25 25 BUN 21* 22*  
CA 8.8 8.6 MG  --  2.3 PHOS  --  2.9 Hepatic Function Lab Results Component Value Date/Time ALB 2.8 (L) 07/09/2018 12:00 PM  
 TP 7.7 07/09/2018 12:00 PM  
 AP 84 07/09/2018 12:00 PM  
 Lab Results Component Value Date/Time SGOT 34 07/09/2018 12:00 PM  
  
 
 
Coags Recent Labs  
  05/26/19 
0635 05/26/19 
0100 PTP 27.4* 28.2* INR 2.5* 2.6* Janae Guerra MD 
 
[facility]. [email or web address] Phone: [phone] Pager: [phone]

## 2019-05-26 NOTE — PROGRESS NOTES
Intern Progress Note [address] Patient: Peter Gray MRN: [id number]  CSN: [id number] YOB: 1940  Age: [de-identified] y.o. Sex: male DOA: 5/24/2019 LOS:  LOS: 2 days Subjective: No acute events. Patient resting comfortably. Denies any dark stools or overt signs of bleeding. Review of Systems Constitutional: Negative for chills and fever. Respiratory: Negative for cough, shortness of breath and wheezing. Cardiovascular: Negative for chest pain and palpitations. Gastrointestinal: Positive for nausea. Negative for vomiting. Objective:  
  
Patient Vitals for the past 24 hrs: 
 Temp Pulse Resp BP SpO2  
05/26/19 0755     93 % 05/26/19 0725 97.7 °F (36.5 °C) (!) 110 18 170/82 97 % 05/26/19 0309 97.4 °F (36.3 °C) (!) 120 18 146/63 94 % 05/26/19 0036 97.5 °F (36.4 °C) (!) 108 18 148/74 95 % 05/25/19 1910 98.2 °F (36.8 °C) 94 18 150/73 95 % 05/25/19 1600     96 % 05/25/19 1513 98 °F (36.7 °C) 100 18 140/71 96 % 05/25/19 1450     98 % 05/25/19 1200     95 % 05/25/19 1102 97.5 °F (36.4 °C) 98 20  95 % Intake/Output Summary (Last 24 hours) at 5/26/2019 3241 Last data filed at 5/26/2019 1394 Gross per 24 hour Intake 240 ml Output 350 ml Net -110 ml Physical Exam:  
General:  Alert and Responsive and in No acute distress. HEENT: Conjunctiva pink, sclera anicteric. Pharynx moist, nonerythematous. Moist mucous membranes. CV:  RRR, no murmurs. No visible pulsations or thrills. RESP:  Unlabored breathing. Lungs clear to auscultation. no wheeze, rales, or rhonchi. Equal expansion bilaterally. ABD:  Soft, nontender, nondistended. Ext:  Trace edema. 2+ radial and dp pulses bilaterally. Skin:  No rashes, lesions, or ulcers. Good turgor. Lab/Data Reviewed: 
Recent Results (from the past 12 hour(s)) METABOLIC PANEL, BASIC  Collection Time: 05/26/19  1:00 AM  
 Result Value Ref Range Sodium 131 (L) 136 - 145 mmol/L Potassium 4.2 3.5 - 5.5 mmol/L Chloride 98 (L) 100 - 108 mmol/L  
 CO2 25 21 - 32 mmol/L Anion gap 8 3.0 - 18 mmol/L Glucose 94 74 - 99 mg/dL BUN 21 (H) 7.0 - 18 MG/DL Creatinine 1.30 0.6 - 1.3 MG/DL  
 BUN/Creatinine ratio 16 12 - 20 GFR est AA >60 >60 ml/min/1.73m2 GFR est non-AA 53 (L) >60 ml/min/1.73m2 Calcium 8.8 8.5 - 10.1 MG/DL  
CBC WITH AUTOMATED DIFF Collection Time: 05/26/19  1:00 AM  
Result Value Ref Range WBC 8.6 4.6 - 13.2 K/uL  
 RBC 3.37 (L) 4.70 - 5.50 M/uL HGB 7.4 (L) 13.0 - 16.0 g/dL HCT 24.2 (L) 36.0 - 48.0 % MCV 71.8 (L) 74.0 - 97.0 FL  
 MCH 22.0 (L) 24.0 - 34.0 PG  
 MCHC 30.6 (L) 31.0 - 37.0 g/dL RDW 20.2 (H) 11.6 - 14.5 % PLATELET 176 902 - 984 K/uL MPV 8.3 (L) 9.2 - 11.8 FL  
 NEUTROPHILS 77 (H) 40 - 73 % LYMPHOCYTES 13 (L) 21 - 52 % MONOCYTES 9 3 - 10 % EOSINOPHILS 1 0 - 5 % BASOPHILS 0 0 - 2 %  
 ABS. NEUTROPHILS 6.6 1.8 - 8.0 K/UL  
 ABS. LYMPHOCYTES 1.1 0.9 - 3.6 K/UL  
 ABS. MONOCYTES 0.8 0.05 - 1.2 K/UL  
 ABS. EOSINOPHILS 0.1 0.0 - 0.4 K/UL  
 ABS. BASOPHILS 0.0 0.0 - 0.1 K/UL  
 DF SMEAR SCANNED    
 PLATELET COMMENTS ADEQUATE PLATELETS    
 RBC COMMENTS ANISOCYTOSIS 2+ 
    
 RBC COMMENTS MICROCYTOSIS 1+ 
    
 RBC COMMENTS POLYCHROMASIA FEW 
HYPOCHROMIA 1+ 
    
 RBC COMMENTS OVALOCYTES 
FEW PROTHROMBIN TIME + INR Collection Time: 05/26/19  1:00 AM  
Result Value Ref Range Prothrombin time 28.2 (H) 11.5 - 15.2 sec INR 2.6 (H) 0.8 - 1.2 PROTHROMBIN TIME + INR Collection Time: 05/26/19  6:35 AM  
Result Value Ref Range Prothrombin time 27.4 (H) 11.5 - 15.2 sec INR 2.5 (H) 0.8 - 1.2 Scheduled Medications Reviewed: 
Current Facility-Administered Medications Medication Dose Route Frequency  amLODIPine (NORVASC) tablet 10 mg  10 mg Oral DAILY  digoxin (LANOXIN) tablet 0.125 mg  0.125 mg Oral DAILY  pravastatin (PRAVACHOL) tablet 40 mg  40 mg Oral QHS  albuterol-ipratropium (DUO-NEB) 2.5 MG-0.5 MG/3 ML  3 mL Nebulization Q6H RT  
 traZODone (DESYREL) tablet 50 mg  50 mg Oral QHS  WARFARIN (COUMADIN) - Physician to Dose   Other Rx Dosing/Monitoring Imaging, microbiology, and EKG/Telemetry: No results found. Assessment/Plan  
[de-identified] y.o. male with PMH chronic atrial fibrillation on warfarin, BPH, COPD on 2 L NC continuously, hypertension, heart failure with preserved EF (55% April 2018), iron deficiency anemia (baseline 9.0-10.0), CKD stage III,  Chronic constipation, coronary artery disease, former smoker (54 pack years), chronic pain syndrome who is now presenting with complaint of symptomatic anemia.  
  
Symptomatic Anemia: Hemoglobin 4.9>1u pRBC >6>1u pRBC>7.4. Improved fatigue and dizziness. No obvious source of bleeding (denies rectal bleeding and hematochezia). Hemoccult negative in ED. Patient received 2 unit pRBCs. VSS. Patient with noted history of iron deficiency anemia in outpatient records.  
-telemetry 
-CBC/BMP daily 
-Fe 29, Ferritin 19, TIBC/iron saturation wnl 
-PT/OT 
-H&H timed following transfusions -Transfuse with goal of 7.  
-H&H q6 hrs following transfusion  
-Consider heme/onc consult 
-GI consulted: will scope patient after bowel regimen and INR under 2.0. (possibly Tuesday).  
  
Chronic Atrial Fibrillation on warfarin & heart failure with preserved ejection fraction: Followed by Dr. Tabatha Dc. Last echo 4/2018 with EF 55%; hypokinesis of basal-mid anteroseptal walls. -PT/INR daily 
-Hold warfarin 
-Continue digoxin 125 mcg daily  
-consider cardiology consult  
  
Supratherapeutic INR: INR 3.4>3.3; goal 2.0-3.0.  
-Will hold warfarin as above  
- vitamin K 5 mg today - Repeat INR pending 14:00.  
  
Hypertension: BPs upon admission 133-156/73-91. Patient prescribed lisinopril and amlodipine but lisinopril held due to increased creatinine at PCP office. -Continue amlodipine 10 mg  
  
Chronic hypoxic respiratory failure secondary to COPD: On 2 L nasal canula at baseline. Follows with Dr. Gen Bauer (last appt 3/21). -Spot check pulse ox 
-Titrate supplemental O2 with goals 88-92% 
-Continue Stiolto (pharm to find alternative). -Duo-neb q6 hr per RT   
  
Mild Coronary Artery Disease - Patient with cardiac caths in 2009 & 2012 showed no significant stenosis and no intervention necessary.  
-Continue pravastatin 40 mg daily 
  
CKD stage III: baseline Cr 1.0-1.2. Admission 1.4.  
-monitor Cr; start fluids if necessary  
  
Chronic pain syndrome: history of low back pain and painful inguinal hernia (without incarceration) 
-continue norco 5-325 mg (.5 tablet every 12 hours) -Narcan prn for respiratory suppression  
  
Hyponatremia - Na 131 on BMP 
-Encourage oral intake 
-monitor with BMP  
  
Diet: cardiac DVT Prophylaxis: SCDs Code Status: FULL Point of Contact: Joyce Siddiqui (Relationship: daughter) [phone] 
  
Disposition and anticipated LOS: 2 midnights DO FAITH Melendez [facility] Medicine PGY-1 
05/26/19 5:10 AM 
Pager: [phone]

## 2019-05-26 NOTE — PROGRESS NOTES
Prep not complete, INR too high-procedure canceled. Will re-evaluate tomorrow and decide re: timing of scopes. No overt bleeding noted. Ok for clear liquids. Tommy Okeefe MD 
[facility]. 
[email or web address] Phone: [phone] Pager: [phone] Cell: [phone]. [email or web address]

## 2019-05-26 NOTE — ROUTINE PROCESS
Report received from [facility]. Patient is alert, in bed, no distress noted. Call bell in reach. 2210  Patient given bowel prep solution to drink. Patient instructed not to get OOB  On his own and to use his call bell. Continue to monitor. 0100  Patient assisted to [facility] then back to bed for loose stool. Skin care given. Call bell in reach. 0345 Patient states he is too tired to drink any more prep. Will try again . 8385  Patient has not finished 2nd bottle of prep. Stool not clear. INR earlier 2.6. Dr Jw Liu made aware. Per md no procedure this am and patient can have clear liquids. 0730  Bedside, Verbal and Written shift change report given to [facility] (oncoming nurse) by Moon Mendoza RN (offgoing nurse). Report included the following information SBAR, Kardex, MAR and Accordion.

## 2019-05-27 LAB
ABO + RH BLD: NORMAL
ANION GAP SERPL CALC-SCNC: 7 MMOL/L (ref 3–18)
BASOPHILS # BLD: 0 K/UL (ref 0–0.1)
BASOPHILS NFR BLD: 0 % (ref 0–2)
BLD PROD TYP BPU: NORMAL
BLOOD GROUP ANTIBODIES SERPL: NORMAL
BPU ID: NORMAL
BUN SERPL-MCNC: 15 MG/DL (ref 7–18)
BUN/CREAT SERPL: 12 (ref 12–20)
CALCIUM SERPL-MCNC: 8.5 MG/DL (ref 8.5–10.1)
CALLED TO:,BCALL1: NORMAL
CALLED TO:,BCALL2: NORMAL
CHLORIDE SERPL-SCNC: 98 MMOL/L (ref 100–108)
CO2 SERPL-SCNC: 26 MMOL/L (ref 21–32)
CREAT SERPL-MCNC: 1.23 MG/DL (ref 0.6–1.3)
CROSSMATCH RESULT,%XM: NORMAL
DIFFERENTIAL METHOD BLD: ABNORMAL
EOSINOPHIL # BLD: 0.1 K/UL (ref 0–0.4)
EOSINOPHIL NFR BLD: 1 % (ref 0–5)
ERYTHROCYTE [DISTWIDTH] IN BLOOD BY AUTOMATED COUNT: 21 % (ref 11.6–14.5)
ERYTHROCYTE [DISTWIDTH] IN BLOOD BY AUTOMATED COUNT: 21 % (ref 11.6–14.5)
ERYTHROCYTE [DISTWIDTH] IN BLOOD BY AUTOMATED COUNT: 21.5 % (ref 11.6–14.5)
GLUCOSE SERPL-MCNC: 85 MG/DL (ref 74–99)
HCT VFR BLD AUTO: 26.4 % (ref 36–48)
HCT VFR BLD AUTO: 26.5 % (ref 36–48)
HCT VFR BLD AUTO: 28 % (ref 36–48)
HGB BLD-MCNC: 8 G/DL (ref 13–16)
HGB BLD-MCNC: 8.1 G/DL (ref 13–16)
HGB BLD-MCNC: 8.6 G/DL (ref 13–16)
INR PPP: 1.7 (ref 0.8–1.2)
LYMPHOCYTES # BLD: 0.7 K/UL (ref 0.9–3.6)
LYMPHOCYTES # BLD: 0.8 K/UL (ref 0.9–3.6)
LYMPHOCYTES # BLD: 1 K/UL (ref 0.9–3.6)
LYMPHOCYTES NFR BLD: 12 % (ref 21–52)
LYMPHOCYTES NFR BLD: 12 % (ref 21–52)
LYMPHOCYTES NFR BLD: 13 % (ref 21–52)
MCH RBC QN AUTO: 22.3 PG (ref 24–34)
MCH RBC QN AUTO: 22.4 PG (ref 24–34)
MCH RBC QN AUTO: 22.5 PG (ref 24–34)
MCHC RBC AUTO-ENTMCNC: 30.3 G/DL (ref 31–37)
MCHC RBC AUTO-ENTMCNC: 30.6 G/DL (ref 31–37)
MCHC RBC AUTO-ENTMCNC: 30.7 G/DL (ref 31–37)
MCV RBC AUTO: 73.1 FL (ref 74–97)
MCV RBC AUTO: 73.4 FL (ref 74–97)
MCV RBC AUTO: 73.5 FL (ref 74–97)
MONOCYTES # BLD: 0.6 K/UL (ref 0.05–1.2)
MONOCYTES # BLD: 0.6 K/UL (ref 0.05–1.2)
MONOCYTES # BLD: 0.7 K/UL (ref 0.05–1.2)
MONOCYTES NFR BLD: 10 % (ref 3–10)
MONOCYTES NFR BLD: 8 % (ref 3–10)
MONOCYTES NFR BLD: 9 % (ref 3–10)
NEUTS SEG # BLD: 4.8 K/UL (ref 1.8–8)
NEUTS SEG # BLD: 4.8 K/UL (ref 1.8–8)
NEUTS SEG # BLD: 6.6 K/UL (ref 1.8–8)
NEUTS SEG NFR BLD: 76 % (ref 40–73)
NEUTS SEG NFR BLD: 78 % (ref 40–73)
NEUTS SEG NFR BLD: 79 % (ref 40–73)
PLATELET # BLD AUTO: 250 K/UL (ref 135–420)
PLATELET # BLD AUTO: 254 K/UL (ref 135–420)
PLATELET # BLD AUTO: 270 K/UL (ref 135–420)
PLATELET COMMENTS,PCOM: ABNORMAL
PLATELET COMMENTS,PCOM: ABNORMAL
PMV BLD AUTO: 8.3 FL (ref 9.2–11.8)
PMV BLD AUTO: 8.3 FL (ref 9.2–11.8)
PMV BLD AUTO: 8.7 FL (ref 9.2–11.8)
POTASSIUM SERPL-SCNC: 3.7 MMOL/L (ref 3.5–5.5)
PROTHROMBIN TIME: 20.2 SEC (ref 11.5–15.2)
RBC # BLD AUTO: 3.59 M/UL (ref 4.7–5.5)
RBC # BLD AUTO: 3.61 M/UL (ref 4.7–5.5)
RBC # BLD AUTO: 3.83 M/UL (ref 4.7–5.5)
RBC MORPH BLD: ABNORMAL
SODIUM SERPL-SCNC: 131 MMOL/L (ref 136–145)
SODIUM UR-SCNC: 13 MMOL/L (ref 20–110)
SPECIMEN EXP DATE BLD: NORMAL
STATUS OF UNIT,%ST: NORMAL
UNIT DIVISION, %UDIV: 0
WBC # BLD AUTO: 6.2 K/UL (ref 4.6–13.2)
WBC # BLD AUTO: 6.3 K/UL (ref 4.6–13.2)
WBC # BLD AUTO: 8.4 K/UL (ref 4.6–13.2)

## 2019-05-27 PROCEDURE — 74011000250 HC RX REV CODE- 250: Performed by: INTERNAL MEDICINE

## 2019-05-27 PROCEDURE — 74011250637 HC RX REV CODE- 250/637: Performed by: STUDENT IN AN ORGANIZED HEALTH CARE EDUCATION/TRAINING PROGRAM

## 2019-05-27 PROCEDURE — 83935 ASSAY OF URINE OSMOLALITY: CPT

## 2019-05-27 PROCEDURE — 36415 COLL VENOUS BLD VENIPUNCTURE: CPT

## 2019-05-27 PROCEDURE — 65660000000 HC RM CCU STEPDOWN

## 2019-05-27 PROCEDURE — 74011000250 HC RX REV CODE- 250: Performed by: STUDENT IN AN ORGANIZED HEALTH CARE EDUCATION/TRAINING PROGRAM

## 2019-05-27 PROCEDURE — 94640 AIRWAY INHALATION TREATMENT: CPT

## 2019-05-27 PROCEDURE — 84300 ASSAY OF URINE SODIUM: CPT

## 2019-05-27 PROCEDURE — 85610 PROTHROMBIN TIME: CPT

## 2019-05-27 PROCEDURE — 85025 COMPLETE CBC W/AUTO DIFF WBC: CPT

## 2019-05-27 PROCEDURE — 80048 BASIC METABOLIC PNL TOTAL CA: CPT

## 2019-05-27 RX ORDER — ONDANSETRON 2 MG/ML
4 INJECTION INTRAMUSCULAR; INTRAVENOUS
Status: DISCONTINUED | OUTPATIENT
Start: 2019-05-27 | End: 2019-05-29 | Stop reason: HOSPADM

## 2019-05-27 RX ORDER — HYDROCODONE BITARTRATE AND ACETAMINOPHEN 5; 325 MG/1; MG/1
0.5 TABLET ORAL
Status: DISCONTINUED | OUTPATIENT
Start: 2019-05-27 | End: 2019-05-29

## 2019-05-27 RX ORDER — HYDROCODONE BITARTRATE AND ACETAMINOPHEN 5; 325 MG/1; MG/1
0.5 TABLET ORAL
COMMUNITY
End: 2020-01-01 | Stop reason: SDUPTHER

## 2019-05-27 RX ORDER — POLYETHYLENE GLYCOL 3350, SODIUM SULFATE, SODIUM CHLORIDE, POTASSIUM CHLORIDE, SODIUM ASCORBATE, AND ASCORBIC ACID 7.5-2.691G
2 KIT ORAL ONCE
Status: COMPLETED | OUTPATIENT
Start: 2019-05-27 | End: 2019-05-27

## 2019-05-27 RX ORDER — HYDROCODONE BITARTRATE AND ACETAMINOPHEN 5; 325 MG/1; MG/1
0.5 TABLET ORAL
Status: DISCONTINUED | OUTPATIENT
Start: 2019-05-27 | End: 2019-05-27

## 2019-05-27 RX ADMIN — PRAVASTATIN SODIUM 40 MG: 20 TABLET ORAL at 00:03

## 2019-05-27 RX ADMIN — IPRATROPIUM BROMIDE AND ALBUTEROL SULFATE 3 ML: .5; 3 SOLUTION RESPIRATORY (INHALATION) at 20:48

## 2019-05-27 RX ADMIN — HYDROCODONE BITARTRATE AND ACETAMINOPHEN 0.5 TABLET: 5; 325 TABLET ORAL at 19:45

## 2019-05-27 RX ADMIN — HYDROCODONE BITARTRATE AND ACETAMINOPHEN 0.5 TABLET: 5; 325 TABLET ORAL at 09:17

## 2019-05-27 RX ADMIN — PRAVASTATIN SODIUM 40 MG: 20 TABLET ORAL at 22:27

## 2019-05-27 RX ADMIN — POLYETHYLENE GLYCOL 3350, SODIUM SULFATE, SODIUM CHLORIDE, POTASSIUM CHLORIDE, ASCORBIC ACID, SODIUM ASCORBATE 2 L: KIT at 19:46

## 2019-05-27 RX ADMIN — DIGOXIN 0.12 MG: 125 TABLET ORAL at 09:17

## 2019-05-27 RX ADMIN — POLYETHYLENE GLYCOL-3350 AND ELECTROLYTES 2000 ML: 236; 6.74; 5.86; 2.97; 22.74 POWDER, FOR SOLUTION ORAL at 17:20

## 2019-05-27 RX ADMIN — TRAZODONE HYDROCHLORIDE 50 MG: 50 TABLET ORAL at 00:03

## 2019-05-27 RX ADMIN — IPRATROPIUM BROMIDE AND ALBUTEROL SULFATE 3 ML: .5; 3 SOLUTION RESPIRATORY (INHALATION) at 01:40

## 2019-05-27 RX ADMIN — AMLODIPINE BESYLATE 10 MG: 10 TABLET ORAL at 09:17

## 2019-05-27 RX ADMIN — IPRATROPIUM BROMIDE AND ALBUTEROL SULFATE 3 ML: .5; 3 SOLUTION RESPIRATORY (INHALATION) at 10:28

## 2019-05-27 RX ADMIN — TRAZODONE HYDROCHLORIDE 50 MG: 50 TABLET ORAL at 22:27

## 2019-05-27 NOTE — PROGRESS NOTES
NUTRITION Nursing Referral: [facility] 
  
RECOMMENDATIONS / PLAN:  
 
- Monitor readiness for diet advancement. - Update food preferences. - Continue RD inpatient monitoring and evaluation. NUTRITION INTERVENTIONS & DIAGNOSIS:  
 
[x] Meals/snacks: modify composition Nutrition Diagnosis: Inadequate energy intake related to insufficient calories provided by diet as evidenced by pt restricted to clear liquids in preparation for EGD/colonscopy. ASSESSMENT:  
 
Restricted to liquid diet in preparation for EGD/colonoscopy. Good intake to clears. Fair appetite/meal intake PTA. Reports some weight loss however stable hx per chart review. Vitamin K consistent diet education provided today. Average po intake adequate to meet patients estimated nutritional needs:   [] Yes     [x] No   [] Unable to determine at this time Diet: DIET CLEAR LIQUID Food Allergies: NKFA Current Appetite:   [x] Good     [] Fair     [] Poor     [] Other: 
Appetite/meal intake prior to admission:   [] Good     [x] Fair     [] Poor     [x] Other: 2 meals/day; occasional nutritional supplement Feeding Limitations:  [] Swallowing difficulty    [x] Chewing difficulty: dentures    [] Other: 
Current Meal Intake:  
Patient Vitals for the past 100 hrs: 
 % Diet Eaten 05/25/19 1519 100 % 05/25/19 0930 25 % BM: 5/26- watery Skin Integrity: WDL Edema:   [] No     [x] Yes Pertinent Medications: Reviewed: prn miralax, coumadin Recent Labs  
  05/27/19 
0520 05/26/19 
0100 05/25/19 
0350 * 131* 132* K 3.7 4.2 3.8 CL 98* 98* 98* CO2 26 25 25 GLU 85 94 105* BUN 15 21* 22* CREA 1.23 1.30 1.31* CA 8.5 8.8 8.6 MG  --   --  2.3 PHOS  --   --  2.9 Intake/Output Summary (Last 24 hours) at 5/27/2019 1046 Last data filed at 5/27/2019 1025 Gross per 24 hour Intake 310 ml Output 1000 ml Net -690 ml Anthropometrics: 
Ht Readings from Last 1 Encounters:  
05/24/19 5' 6\" (1.676 m) Last 3 Recorded Weights in this Encounter 05/25/19 4512 05/26/19 4818 05/27/19 0403 Weight: 66.7 kg (147 lb 1.6 oz) 67.9 kg (149 lb 9.6 oz) 68.2 kg (150 lb 4.8 oz) Body mass index is 24.26 kg/m². Weight History: Pt reports fluctuations in weight hx with weight loss x 2 years ago then recent weight gain up to 164 lbs about 3 months ago. Per chart stable weight hx x 7 months PTA. Weight Metrics 5/27/2019 4/16/2019 3/21/2019 1/8/2019 11/9/2018 10/31/2018 10/12/2018 Weight 150 lb 4.8 oz 150 lb 150 lb 154 lb 154 lb 150 lb 150 lb BMI 24.26 kg/m2 24.21 kg/m2 24.21 kg/m2 24.86 kg/m2 24.86 kg/m2 24.21 kg/m2 24.21 kg/m2 Admitting Diagnosis: Symptomatic anemia [D64.9] Symptomatic anemia [D64.9] Pertinent PMHx: AAA repair, CHF, COPD, dyslipidemia, HTN, hypercholesterolemia, CKD stage 3, chronic constipation, anemia Education Needs:        [] None identified  [] Identified - Not appropriate at this time  [x]  Identified and addressed - refer to education log (vitamin K consistent diet) Learning Limitations:   [x] None identified  [] Identified Cultural, Tenriism & ethnic food preferences:  [x] None identified    [] Identified and addressed ESTIMATED NUTRITION NEEDS:  
 
Calories: 0355-2526 kcal (MSJx1.2-1.4) based on  [x] Actual BW: 68 kg      [] IBW Protein: 54-68 gm (0.8-1 gm/kg) based on  [x] Actual BW      [] IBW Fluid: 1 mL/kcal 
  
MONITORING & EVALUATION:  
 
Nutrition Goal(s): 1. Po intake of meals will meet >75% of patient estimated nutritional needs within the next 7 days. Outcome:  [] Met/Ongoing    [] Progressing towards goal    [] Not progressing towards goal    [x] New/Initial goal  
 
Monitoring:   [x] Food and beverage intake   [x] Diet order   [x] Nutrition-focused physical findings   [x] Treatment/therapy   [] Weight   [] Enteral nutrition intake Previous Recommendations (for follow-up assessments only):     [] Implemented       []   Not Implemented (RD to address)      [] No Longer Appropriate     [] No Recommendation Made Discharge Planning: cardiac diet [x] Participated in care planning, discharge planning, & interdisciplinary rounds as appropriate Brittney Jules, RYANN Pager: [phone]

## 2019-05-27 NOTE — PROGRESS NOTES
[email or web address] 
[phone] Gastroenterology follow up-Progress note 1. Severe anemia-appears to have baseline anemia with Hgb of around 9. Appears multifactorial-CKD, Anemia of chronic disease. ? Component of GI bleeding. Hemoccult negative, no overt blood loss-Will plan for scopes tomorrow with Dr. Tim Alfaro. 2. COPD with A fib with anemia-need for anticoagulation. INR 3.3 today. 3. History of colon polyps-TA resected in 2014, 2016. Recall colonoscopy was recommended for 2019 Plan: 1. EGD/Colonoscopy in am.  
 
Chief Complaint: f/u anemia. Subjective:  No bleeding seen ROS: Denies any fevers, chills, rash. Eyes: conjunctiva normal, EOM normal  
Neck: ROM normal, supple and trachea normal  
Cardiovascular: heart normal, intact distal pulses, normal rate and regular rhythm Pulmonary/Chest Wall: breath sounds normal and effort normal  
Abdominal: appearance normal, bowel sounds normal and soft, non-acute, non-tender Patient Active Problem List  
Diagnosis Code  Hypercholesterolemia E78.00  Congestive heart failure (CHF) ([facility]) I50.9  Peripheral vascular disease (HCC) I73.9  
 COPD (chronic obstructive pulmonary disease) ([facility]) J44.9  
 HTN (hypertension) I10  
 Chronic atrial fibrillation ([facility]) I48.2  RLQ abdominal pain R10.31  Status post AAA (abdominal aortic aneurysm) repair Z98.890, Z86.79  
 AAA (abdominal aortic aneurysm, ruptured) ([facility] [address] I71.3  Chronic constipation K59.09  
 Right inguinal hernia K40.90  Left inguinal hernia K40.90  CAD (coronary artery disease) I25.10  Hypoxemia requiring supplemental oxygen R09.02, Z99.81  Warfarin-induced coagulopathy ([address] D68.32, T45.515A  Intraabdominal fluid collection R18.8  Abdominal pain R10.9  Sepsis ([address] A41.9  Pyogenic inflammation of bone (HCC) M86.9  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  
 Abscess L02.91  
 Discitis of lumbar region M46.46  
 Pulmonary hypertension (HCC) I27.20  CHF exacerbation (HCC) I50.9  Hyponatremia E87.1  Symptomatic anemia D64.9  Chronic pain syndrome G89.4  Stage 3 chronic kidney disease (HCC) N18.3  Iron deficiency anemia D50.9 Visit Vitals /72 (BP 1 Location: Right arm, BP Patient Position: At rest) Pulse 95 Temp 96.7 °F (35.9 °C) Resp 16 Ht 5' 6\" (1.676 m) Wt 68.2 kg (150 lb 4.8 oz) SpO2 98% BMI 24.26 kg/m² Intake/Output Summary (Last 24 hours) at 5/27/2019 0251 Last data filed at 5/27/2019 9017 Gross per 24 hour Intake 310 ml Output 950 ml Net -640 ml CBC w/Diff Lab Results Component Value Date/Time WBC 6.3 05/27/2019 05:20 AM  
 RBC 3.61 (L) 05/27/2019 05:20 AM  
 HGB 8.1 (L) 05/27/2019 05:20 AM  
 HCT 26.5 (L) 05/27/2019 05:20 AM  
 MCV 73.4 (L) 05/27/2019 05:20 AM  
 MCH 22.4 (L) 05/27/2019 05:20 AM  
 MCHC 30.6 (L) 05/27/2019 05:20 AM  
 RDW 21.5 (H) 05/27/2019 05:20 AM  
  05/27/2019 05:20 AM  
 Lab Results Component Value Date/Time GRANS 76 (H) 05/27/2019 05:20 AM  
 LYMPH 13 (L) 05/27/2019 05:20 AM  
 EOS 1 05/27/2019 05:20 AM  
 BANDS 5 08/27/2017 12:20 PM  
 BASOS 0 05/27/2019 05:20 AM  
  
Basic Metabolic Profile Recent Labs  
  05/27/19 
0520  05/25/19 
0350 *   < > 132* K 3.7   < > 3.8 CL 98*   < > 98* CO2 26   < > 25 BUN 15   < > 22* CA 8.5   < > 8.6 MG  --   --  2.3 PHOS  --   --  2.9  
 < > = values in this interval not displayed. Hepatic Function Lab Results Component Value Date/Time ALB 2.8 (L) 07/09/2018 12:00 PM  
 TP 7.7 07/09/2018 12:00 PM  
 AP 84 07/09/2018 12:00 PM  
 Lab Results Component Value Date/Time SGOT 34 07/09/2018 12:00 PM  
  
 
 
Coags Recent Labs  
  05/27/19 
0520 05/26/19 
4369 PTP 20.2* 27.4* INR 1.7* 2.5* Alexander Tate MD 
 
[facility]. [email or web address] Phone: [phone] Pager: [phone]

## 2019-05-27 NOTE — PROGRESS NOTES
Patient was eating breakfast and didn't want anything at this time.  conducted an initial consultation and Spiritual Assessment for Christal Mojica, who is a [de-identified] y.o.,male. Patients Primary Language is: [place]. According to the patients EMR Hoahaoism Affiliation is: [place]. The reason the Patient came to the hospital is:  
Patient Active Problem List  
 Diagnosis Date Noted  Symptomatic anemia 05/24/2019  Chronic pain syndrome 05/24/2019  Stage 3 chronic kidney disease ([address] 05/24/2019  Iron deficiency anemia 05/24/2019  Hyponatremia 07/09/2018  CHF exacerbation ([address] 06/27/2018  Pulmonary hypertension ([address] 04/03/2018  Psoas abscess ([address] 08/25/2017  Abscess 08/25/2017  Discitis of lumbar region 08/04/2016  Pyogenic inflammation of bone ([address] 07/01/2016  Sepsis ([address] 02/19/2016  Intraabdominal fluid collection 02/18/2016  Abdominal pain 02/18/2016  Warfarin-induced coagulopathy ([address] 01/19/2016  Hypoxemia requiring supplemental oxygen 12/28/2015  CAD (coronary artery disease) 12/08/2015  Left inguinal hernia 02/14/2014  Right inguinal hernia 12/24/2013  RLQ abdominal pain 12/23/2013  Status post AAA (abdominal aortic aneurysm) repair 12/23/2013  AAA (abdominal aortic aneurysm, ruptured) ([address] 12/23/2013  Chronic constipation 12/23/2013  Chronic atrial fibrillation ([address] 05/08/2012  
 HTN (hypertension)  Hypercholesterolemia  Congestive heart failure (CHF) ([address]  Peripheral vascular disease ([address]  COPD (chronic obstructive pulmonary disease) (HCC) The  provided the following Interventions: 
Initiated a relationship of care and support. Explored issues of priscilla, belief, spirituality and Judaism/ritual needs while hospitalized. Listened empathically. Provided chaplaincy education. Provided information about Spiritual Care Services. Offered prayer and assurance of continued prayers on patient's behalf. Chart reviewed. The following outcomes where achieved: 
Patient shared limited information about both their medical narrative and spiritual journey/beliefs.  confirmed Patient's Amish Affiliation. Patient processed feeling about current hospitalization. Patient expressed gratitude for 's visit. Assessment: 
Patient does not have any Amish/cultural needs that will affect patients preferences in health care. There are no spiritual or Amish issues which require intervention at this time. Plan: 
Chaplains will continue to follow and will provide pastoral care on an as needed/requested basis.  recommends bedside caregivers page  on duty if patient shows signs of acute spiritual or emotional distress. Chaplain Resident Triny Doyle [facility]  
[phone]

## 2019-05-27 NOTE — PROGRESS NOTES
Received report on pt.from off going RN. Resting quietly in bed on rounds. Denies c/o pain or SOB at this time. No acute distress noted. Call bell at side. Bed alarm on. Will cont to monitor. 1700 pt requesting pain med more frequently. Dr Ric Padilla notified. Will assess. 1800 pt upset about colyte prep ordered. Requesting MD be called and something else be ordered. Dr Simon Double notified and new order placed for movieprep 1925 Bedside and Verbal shift change report given to [address] (oncoming nurse) by Bryn Allen, RN (offgoing nurse). Report given with George SERNA and MAR.

## 2019-05-27 NOTE — ROUTINE PROCESS
Report received from [facility]. Patient is alert, in bed, no distress noted. Call bell in reach. [phone]  Patient is alert, unit PRBC's infusing well. No distress noted. V/S stable. 0345  Unit of blood completed, tolerated well. V/s stable. O2 via nc in place. No distress noted. Call bell in reach. 7730  Bedside, Verbal and Written shift change report given to  Ayaka Yang RN (oncoming nurse) by Ema Ervin (offgoing nurse). Report included the following information SBAR, Kardex, MAR and Accordion.

## 2019-05-27 NOTE — PROGRESS NOTES
[place] [facility] [place] Bhakti Rhodes MRN: [id number]  CSN: [id number] YOB: 1940  Age: [de-identified] y.o. Sex: male DOA: 5/24/2019 LOS:  LOS: 3 days Subjective: No acute events overnight. Patient received unit of blood overnight. Patient without acute concerns this morning. Denies active bleeding. Review of Systems Respiratory: Positive for wheezing. Negative for cough, hemoptysis and shortness of breath. Gastrointestinal: Negative for abdominal pain, nausea and vomiting. Genitourinary: Negative for flank pain and hematuria. Neurological: Negative for dizziness. Objective:  
  
Patient Vitals for the past 24 hrs: 
 Temp Pulse Resp BP SpO2  
05/27/19 0732 96.7 °F (35.9 °C) 95 16 140/72 98 % 05/27/19 0304 98.1 °F (36.7 °C) 86 16 157/79 96 % 05/27/19 0225 97.9 °F (36.6 °C) 86 16 165/84 98 % 05/27/19 0142     96 % 05/27/19 0125 97.6 °F (36.4 °C) 80 16 141/76 95 % 05/27/19 0004 97.8 °F (36.6 °C) 92 18 151/86 97 % 05/26/19 2309 98.2 °F (36.8 °C) 90 20 148/79 95 % 05/26/19 2100     97 % 05/26/19 1930 98.1 °F (36.7 °C) 60 20 146/63 97 % 05/26/19 1600     96 % 05/26/19 1528     96 % 05/26/19 1450 98 °F (36.7 °C) 88 20 150/78 96 % 05/26/19 1200     97 % 05/26/19 1100 97.9 °F (36.6 °C) 88 18 151/77 97 % Intake/Output Summary (Last 24 hours) at 5/27/2019 8808 Last data filed at 5/27/2019 3482 Gross per 24 hour Intake 310 ml Output 950 ml Net -640 ml Physical Exam:  
General:  Resting comfortably; in no acute distress. HEENT: Conjunctiva pink, sclera anicteric. Pharynx moist, nonerythematous. Moist mucous membranes. CV:  RRR, no murmurs. No visible pulsations or thrills. RESP:  Unlabored breathing. Lungs clear to auscultation. +diffuse wheezing. Equal expansion bilaterally. ABD:  Soft, nontender, nondistended. Ext:  Trace edema. 2+ radial and dp pulses bilaterally. Skin:  No rashes, lesions, or ulcers. Good turgor. Lab/Data Reviewed: 
Recent Results (from the past 12 hour(s)) METABOLIC PANEL, BASIC Collection Time: 05/27/19  5:20 AM  
Result Value Ref Range Sodium 131 (L) 136 - 145 mmol/L Potassium 3.7 3.5 - 5.5 mmol/L Chloride 98 (L) 100 - 108 mmol/L  
 CO2 26 21 - 32 mmol/L Anion gap 7 3.0 - 18 mmol/L Glucose 85 74 - 99 mg/dL BUN 15 7.0 - 18 MG/DL Creatinine 1.23 0.6 - 1.3 MG/DL  
 BUN/Creatinine ratio 12 12 - 20 GFR est AA >60 >60 ml/min/1.73m2 GFR est non-AA 57 (L) >60 ml/min/1.73m2 Calcium 8.5 8.5 - 10.1 MG/DL PROTHROMBIN TIME + INR Collection Time: 05/27/19  5:20 AM  
Result Value Ref Range Prothrombin time 20.2 (H) 11.5 - 15.2 sec INR 1.7 (H) 0.8 - 1.2    
CBC WITH AUTOMATED DIFF Collection Time: 05/27/19  5:20 AM  
Result Value Ref Range WBC 6.3 4.6 - 13.2 K/uL  
 RBC 3.61 (L) 4.70 - 5.50 M/uL HGB 8.1 (L) 13.0 - 16.0 g/dL HCT 26.5 (L) 36.0 - 48.0 % MCV 73.4 (L) 74.0 - 97.0 FL  
 MCH 22.4 (L) 24.0 - 34.0 PG  
 MCHC 30.6 (L) 31.0 - 37.0 g/dL RDW 21.5 (H) 11.6 - 14.5 % PLATELET 446 791 - 006 K/uL MPV 8.3 (L) 9.2 - 11.8 FL  
 NEUTROPHILS 76 (H) 40 - 73 % LYMPHOCYTES 13 (L) 21 - 52 % MONOCYTES 10 3 - 10 % EOSINOPHILS 1 0 - 5 % BASOPHILS 0 0 - 2 %  
 ABS. NEUTROPHILS 4.8 1.8 - 8.0 K/UL  
 ABS. LYMPHOCYTES 0.8 (L) 0.9 - 3.6 K/UL  
 ABS. MONOCYTES 0.6 0.05 - 1.2 K/UL  
 ABS. EOSINOPHILS 0.1 0.0 - 0.4 K/UL  
 ABS. BASOPHILS 0.0 0.0 - 0.1 K/UL  
 DF AUTOMATED Scheduled Medications Reviewed: 
Current Facility-Administered Medications Medication Dose Route Frequency  amLODIPine (NORVASC) tablet 10 mg  10 mg Oral DAILY  digoxin (LANOXIN) tablet 0.125 mg  0.125 mg Oral DAILY  pravastatin (PRAVACHOL) tablet 40 mg  40 mg Oral QHS  albuterol-ipratropium (DUO-NEB) 2.5 MG-0.5 MG/3 ML  3 mL Nebulization Q6H RT  
  traZODone (DESYREL) tablet 50 mg  50 mg Oral QHS  WARFARIN (COUMADIN) - Physician to Dose   Other Rx Dosing/Monitoring Imaging, microbiology, and EKG/Telemetry: No results found. Assessment/Plan  
[de-identified] y.o. male with PMH chronic atrial fibrillation on warfarin, BPH, COPD on 2 L NC continuously, hypertension, heart failure with preserved EF (55% April 2018), iron deficiency anemia (baseline 9.0-10.0), CKD stage III,  Chronic constipation, coronary artery disease, former smoker (54 pack years), chronic pain syndrome who is now presenting with complaint of symptomatic anemia.  
  
Symptomatic Anemia: Hemoglobin 8.1 now s/p 3 units pRBCs. Improved fatigue and dizziness. No obvious source of bleeding (denies rectal bleeding and hematochezia). Hemoccult negative in ED. VSS. Patient with noted history of iron deficiency anemia in outpatient records. Fe 29, Ferritin 19, TIBC/iron saturation WNL. -telemetry 
-CBC/BMP daily 
-PT/OT 
-H&H timed following transfusions -Transfuse with goal of 7.  
-H&H q6 hrs following transfusion  
-Consider heme/onc consult 
-GI consulted: Possible bowel prep tonight with scopes tomorrow (INR 1.7)  
  
Chronic Atrial Fibrillation on warfarin & heart failure with preserved ejection fraction: Followed by Dr. Timmy Landa. Last echo 4/2018 with EF 55%; hypokinesis of basal-mid anteroseptal walls. -PT/INR daily 
-Hold warfarin 
-Continue digoxin 125 mcg daily  
-consider cardiology consult  
  
Supratherapeutic INR, resolved: INR 1.7; goal 2.0-3.0.  
-Will hold warfarin for possible scopes Hypertension: BPs upon admission 133-156/73-91. Patient prescribed lisinopril and amlodipine but lisinopril held due to increased creatinine at PCP office. 
-Continue amlodipine 10 mg  
-Holding lisinopril  
  
Chronic hypoxic respiratory failure secondary to COPD: On 2 L nasal canula at baseline. Follows with Dr. Eliazar Hernández (last appt 3/21). -Spot check pulse ox -Titrate supplemental O2 with goals 88-92% 
-Continue Stiolto (pharm to find alternative). -Duo-neb q6 hr per RT   
  
Mild Coronary Artery Disease - Patient with cardiac caths in 2009 & 2012 showed no significant stenosis and no intervention necessary.  
-Continue pravastatin 40 mg daily 
  
CKD stage III: baseline Cr 1.0-1.2. Admission 1.4.  
-monitor Cr; start fluids if necessary  
  
Chronic pain syndrome: history of low back pain and painful inguinal hernia (without incarceration) 
-continue norco 5-325 mg (.5 tablet every 12 hours) -Narcan prn for respiratory suppression  
  
Hyponatremia - Na 131 on BMP. Hyponatremia likely secondary to COPD and lisinopril usage.  
-Encourage oral intake 
-monitor with BMP  
-continue holding lisinopril. 
-Urine sodium, urine osmolality pending  
  
Diet: cardiac DVT Prophylaxis: SCDs Code Status: FULL Point of Contact: Shirley Harvey (Relationship: daughter) [phone] 
  
Disposition and anticipated LOS: 2 midnights DO FAITH [facility] PGY-1 
05/27/19  
9:30 AM 
Pager: [phone]

## 2019-05-28 LAB
ANION GAP SERPL CALC-SCNC: 7 MMOL/L (ref 3–18)
BASOPHILS # BLD: 0 K/UL (ref 0–0.1)
BASOPHILS # BLD: 0 K/UL (ref 0–0.1)
BASOPHILS NFR BLD: 0 % (ref 0–2)
BASOPHILS NFR BLD: 0 % (ref 0–2)
BUN SERPL-MCNC: 13 MG/DL (ref 7–18)
BUN/CREAT SERPL: 11 (ref 12–20)
CALCIUM SERPL-MCNC: 8.5 MG/DL (ref 8.5–10.1)
CHLORIDE SERPL-SCNC: 100 MMOL/L (ref 100–108)
CO2 SERPL-SCNC: 25 MMOL/L (ref 21–32)
CREAT SERPL-MCNC: 1.17 MG/DL (ref 0.6–1.3)
DIFFERENTIAL METHOD BLD: ABNORMAL
DIFFERENTIAL METHOD BLD: ABNORMAL
EOSINOPHIL # BLD: 0.1 K/UL (ref 0–0.4)
EOSINOPHIL # BLD: 0.1 K/UL (ref 0–0.4)
EOSINOPHIL NFR BLD: 1 % (ref 0–5)
EOSINOPHIL NFR BLD: 1 % (ref 0–5)
ERYTHROCYTE [DISTWIDTH] IN BLOOD BY AUTOMATED COUNT: 21.2 % (ref 11.6–14.5)
ERYTHROCYTE [DISTWIDTH] IN BLOOD BY AUTOMATED COUNT: 21.2 % (ref 11.6–14.5)
ERYTHROCYTE [DISTWIDTH] IN BLOOD BY AUTOMATED COUNT: 21.5 % (ref 11.6–14.5)
GLUCOSE SERPL-MCNC: 97 MG/DL (ref 74–99)
HCT VFR BLD AUTO: 25 % (ref 36–48)
HCT VFR BLD AUTO: 25.9 % (ref 36–48)
HCT VFR BLD AUTO: 26.5 % (ref 36–48)
HGB BLD-MCNC: 7.8 G/DL (ref 13–16)
HGB BLD-MCNC: 8.1 G/DL (ref 13–16)
HGB BLD-MCNC: 8.1 G/DL (ref 13–16)
INR PPP: 1.6 (ref 0.8–1.2)
LYMPHOCYTES # BLD: 0.8 K/UL (ref 0.9–3.6)
LYMPHOCYTES # BLD: 1 K/UL (ref 0.9–3.6)
LYMPHOCYTES NFR BLD: 11 % (ref 21–52)
LYMPHOCYTES NFR BLD: 13 % (ref 21–52)
MCH RBC QN AUTO: 22.3 PG (ref 24–34)
MCH RBC QN AUTO: 22.7 PG (ref 24–34)
MCH RBC QN AUTO: 22.8 PG (ref 24–34)
MCHC RBC AUTO-ENTMCNC: 30.6 G/DL (ref 31–37)
MCHC RBC AUTO-ENTMCNC: 31.2 G/DL (ref 31–37)
MCHC RBC AUTO-ENTMCNC: 31.3 G/DL (ref 31–37)
MCV RBC AUTO: 72.9 FL (ref 74–97)
MCV RBC AUTO: 73 FL (ref 74–97)
MCV RBC AUTO: 73 FL (ref 74–97)
MONOCYTES # BLD: 0.5 K/UL (ref 0.05–1.2)
MONOCYTES # BLD: 0.7 K/UL (ref 0.05–1.2)
MONOCYTES NFR BLD: 6 % (ref 3–10)
MONOCYTES NFR BLD: 9 % (ref 3–10)
NEUTS SEG # BLD: 5.9 K/UL (ref 1.8–8)
NEUTS SEG # BLD: 6.4 K/UL (ref 1.8–8)
NEUTS SEG NFR BLD: 79 % (ref 40–73)
NEUTS SEG NFR BLD: 80 % (ref 40–73)
OSMOLALITY UR: 312 MOSM/KG H2O (ref 50–1400)
OSMOLALITY UR: 386 MOSM/KG H2O (ref 50–1400)
PLATELET # BLD AUTO: 244 K/UL (ref 135–420)
PLATELET # BLD AUTO: 248 K/UL (ref 135–420)
PLATELET # BLD AUTO: 264 K/UL (ref 135–420)
PLATELET COMMENTS,PCOM: ABNORMAL
PMV BLD AUTO: 8.2 FL (ref 9.2–11.8)
PMV BLD AUTO: 8.5 FL (ref 9.2–11.8)
PMV BLD AUTO: 8.8 FL (ref 9.2–11.8)
POTASSIUM SERPL-SCNC: 3.6 MMOL/L (ref 3.5–5.5)
PROTHROMBIN TIME: 19 SEC (ref 11.5–15.2)
RBC # BLD AUTO: 3.43 M/UL (ref 4.7–5.5)
RBC # BLD AUTO: 3.55 M/UL (ref 4.7–5.5)
RBC # BLD AUTO: 3.63 M/UL (ref 4.7–5.5)
RBC MORPH BLD: ABNORMAL
SODIUM SERPL-SCNC: 132 MMOL/L (ref 136–145)
WBC # BLD AUTO: 7.4 K/UL (ref 4.6–13.2)
WBC # BLD AUTO: 7.6 K/UL (ref 4.6–13.2)
WBC # BLD AUTO: 8 K/UL (ref 4.6–13.2)

## 2019-05-28 PROCEDURE — 77030013992 HC SNR POLYP ENDOSC BSC -B: Performed by: INTERNAL MEDICINE

## 2019-05-28 PROCEDURE — 94640 AIRWAY INHALATION TREATMENT: CPT

## 2019-05-28 PROCEDURE — 74011250637 HC RX REV CODE- 250/637: Performed by: STUDENT IN AN ORGANIZED HEALTH CARE EDUCATION/TRAINING PROGRAM

## 2019-05-28 PROCEDURE — 85027 COMPLETE CBC AUTOMATED: CPT

## 2019-05-28 PROCEDURE — 88305 TISSUE EXAM BY PATHOLOGIST: CPT

## 2019-05-28 PROCEDURE — 85610 PROTHROMBIN TIME: CPT

## 2019-05-28 PROCEDURE — 74011000250 HC RX REV CODE- 250: Performed by: STUDENT IN AN ORGANIZED HEALTH CARE EDUCATION/TRAINING PROGRAM

## 2019-05-28 PROCEDURE — 85025 COMPLETE CBC W/AUTO DIFF WBC: CPT

## 2019-05-28 PROCEDURE — 65660000000 HC RM CCU STEPDOWN

## 2019-05-28 PROCEDURE — 74011250636 HC RX REV CODE- 250/636: Performed by: ANESTHESIOLOGY

## 2019-05-28 PROCEDURE — 76060000031 HC ANESTHESIA FIRST 0.5 HR: Performed by: INTERNAL MEDICINE

## 2019-05-28 PROCEDURE — 77030018846 HC SOL IRR STRL H20 ICUM -A: Performed by: INTERNAL MEDICINE

## 2019-05-28 PROCEDURE — 74011250636 HC RX REV CODE- 250/636

## 2019-05-28 PROCEDURE — 36415 COLL VENOUS BLD VENIPUNCTURE: CPT

## 2019-05-28 PROCEDURE — 77010033678 HC OXYGEN DAILY

## 2019-05-28 PROCEDURE — 83935 ASSAY OF URINE OSMOLALITY: CPT

## 2019-05-28 PROCEDURE — 77030008565 HC TBNG SUC IRR ERBE -B: Performed by: INTERNAL MEDICINE

## 2019-05-28 PROCEDURE — 77030019988 HC FCPS ENDOSC DISP BSC -B: Performed by: INTERNAL MEDICINE

## 2019-05-28 PROCEDURE — 76040000019: Performed by: INTERNAL MEDICINE

## 2019-05-28 PROCEDURE — 80048 BASIC METABOLIC PNL TOTAL CA: CPT

## 2019-05-28 RX ORDER — LIDOCAINE HYDROCHLORIDE 20 MG/ML
INJECTION, SOLUTION EPIDURAL; INFILTRATION; INTRACAUDAL; PERINEURAL AS NEEDED
Status: DISCONTINUED | OUTPATIENT
Start: 2019-05-28 | End: 2019-05-28 | Stop reason: HOSPADM

## 2019-05-28 RX ORDER — SODIUM CHLORIDE 0.9 % (FLUSH) 0.9 %
5-40 SYRINGE (ML) INJECTION EVERY 8 HOURS
Status: DISCONTINUED | OUTPATIENT
Start: 2019-05-28 | End: 2019-05-28 | Stop reason: HOSPADM

## 2019-05-28 RX ORDER — SODIUM CHLORIDE 0.9 % (FLUSH) 0.9 %
5-40 SYRINGE (ML) INJECTION AS NEEDED
Status: DISCONTINUED | OUTPATIENT
Start: 2019-05-28 | End: 2019-05-28 | Stop reason: HOSPADM

## 2019-05-28 RX ORDER — FENTANYL CITRATE 50 UG/ML
50 INJECTION, SOLUTION INTRAMUSCULAR; INTRAVENOUS AS NEEDED
Status: DISCONTINUED | OUTPATIENT
Start: 2019-05-28 | End: 2019-05-28 | Stop reason: HOSPADM

## 2019-05-28 RX ORDER — PROPOFOL 10 MG/ML
INJECTION, EMULSION INTRAVENOUS AS NEEDED
Status: DISCONTINUED | OUTPATIENT
Start: 2019-05-28 | End: 2019-05-28 | Stop reason: HOSPADM

## 2019-05-28 RX ORDER — DEXTROSE 50 % IN WATER (D50W) INTRAVENOUS SYRINGE
25-50 AS NEEDED
Status: DISCONTINUED | OUTPATIENT
Start: 2019-05-28 | End: 2019-05-28 | Stop reason: HOSPADM

## 2019-05-28 RX ORDER — MAGNESIUM SULFATE 100 %
4 CRYSTALS MISCELLANEOUS AS NEEDED
Status: DISCONTINUED | OUTPATIENT
Start: 2019-05-28 | End: 2019-05-28 | Stop reason: HOSPADM

## 2019-05-28 RX ORDER — ONDANSETRON 2 MG/ML
4 INJECTION INTRAMUSCULAR; INTRAVENOUS ONCE
Status: DISCONTINUED | OUTPATIENT
Start: 2019-05-28 | End: 2019-05-28 | Stop reason: HOSPADM

## 2019-05-28 RX ORDER — SODIUM CHLORIDE, SODIUM LACTATE, POTASSIUM CHLORIDE, CALCIUM CHLORIDE 600; 310; 30; 20 MG/100ML; MG/100ML; MG/100ML; MG/100ML
125 INJECTION, SOLUTION INTRAVENOUS CONTINUOUS
Status: DISCONTINUED | OUTPATIENT
Start: 2019-05-28 | End: 2019-05-28 | Stop reason: HOSPADM

## 2019-05-28 RX ORDER — SODIUM CHLORIDE, SODIUM LACTATE, POTASSIUM CHLORIDE, CALCIUM CHLORIDE 600; 310; 30; 20 MG/100ML; MG/100ML; MG/100ML; MG/100ML
75 INJECTION, SOLUTION INTRAVENOUS CONTINUOUS
Status: DISCONTINUED | OUTPATIENT
Start: 2019-05-28 | End: 2019-05-28 | Stop reason: HOSPADM

## 2019-05-28 RX ADMIN — SODIUM CHLORIDE, SODIUM LACTATE, POTASSIUM CHLORIDE, AND CALCIUM CHLORIDE 125 ML/HR: 600; 310; 30; 20 INJECTION, SOLUTION INTRAVENOUS at 12:07

## 2019-05-28 RX ADMIN — TRAZODONE HYDROCHLORIDE 50 MG: 50 TABLET ORAL at 21:09

## 2019-05-28 RX ADMIN — IPRATROPIUM BROMIDE AND ALBUTEROL SULFATE 3 ML: .5; 3 SOLUTION RESPIRATORY (INHALATION) at 19:40

## 2019-05-28 RX ADMIN — PROPOFOL 30 MG: 10 INJECTION, EMULSION INTRAVENOUS at 13:45

## 2019-05-28 RX ADMIN — PRAVASTATIN SODIUM 40 MG: 20 TABLET ORAL at 21:09

## 2019-05-28 RX ADMIN — LIDOCAINE HYDROCHLORIDE 40 MG: 20 INJECTION, SOLUTION EPIDURAL; INFILTRATION; INTRACAUDAL; PERINEURAL at 13:42

## 2019-05-28 RX ADMIN — IPRATROPIUM BROMIDE AND ALBUTEROL SULFATE 3 ML: .5; 3 SOLUTION RESPIRATORY (INHALATION) at 09:15

## 2019-05-28 RX ADMIN — PROPOFOL 40 MG: 10 INJECTION, EMULSION INTRAVENOUS at 13:48

## 2019-05-28 RX ADMIN — AMLODIPINE BESYLATE 10 MG: 10 TABLET ORAL at 15:45

## 2019-05-28 RX ADMIN — IPRATROPIUM BROMIDE AND ALBUTEROL SULFATE 3 ML: .5; 3 SOLUTION RESPIRATORY (INHALATION) at 02:02

## 2019-05-28 RX ADMIN — HYDROCODONE BITARTRATE AND ACETAMINOPHEN 0.5 TABLET: 5; 325 TABLET ORAL at 15:44

## 2019-05-28 RX ADMIN — ALBUTEROL SULFATE 2.5 MG: 2.5 SOLUTION RESPIRATORY (INHALATION) at 21:47

## 2019-05-28 RX ADMIN — DIGOXIN 0.12 MG: 125 TABLET ORAL at 15:44

## 2019-05-28 RX ADMIN — PROPOFOL 60 MG: 10 INJECTION, EMULSION INTRAVENOUS at 13:42

## 2019-05-28 RX ADMIN — PROPOFOL 40 MG: 10 INJECTION, EMULSION INTRAVENOUS at 13:52

## 2019-05-28 RX ADMIN — Medication 10 ML: at 14:11

## 2019-05-28 NOTE — PROGRESS NOTES
[email or web address] 
[phone] Gastroenterology follow up-Progress note 1. Severe anemia-appears to have baseline anemia with Hgb of around 9. Appears multifactorial-CKD, Anemia of chronic disease. ? Component of GI bleeding. Hemoccult negative, no overt blood loss-Will plan for scopes today with Dr. Monie Deshpande. 2. COPD with A fib with anemia-need for anticoagulation. INR 1.6  today. 3. History of colon polyps-TA resected in 2014, 2016. Recall colonoscopy was recommended for 2019 Plan: 1. EGD/Colonoscopy this AM 
 
Chief Complaint: f/u anemia. Subjective:  No bleeding seen, reports generalized abdominal pain, no nausea/vomiting ROS: Denies any fevers, chills, rash. Eyes: conjunctiva normal, EOM normal  
Neck: ROM normal, supple and trachea normal  
Cardiovascular: heart normal, intact distal pulses, normal rate and regular rhythm Pulmonary/Chest Wall: breath sounds normal and effort normal  
Abdominal: appearance normal, bowel sounds normal and soft, non-acute, non-tender Patient Active Problem List  
Diagnosis Code  Hypercholesterolemia E78.00  Congestive heart failure (CHF) ([facility]) I50.9  Peripheral vascular disease ([facility]) I73.9  
 COPD (chronic obstructive pulmonary disease) ([facility]) J44.9  
 HTN (hypertension) I10  
 Chronic atrial fibrillation ([facility]) I48.2  RLQ abdominal pain R10.31  Status post AAA (abdominal aortic aneurysm) repair Z98.890, Z86.79  
 AAA (abdominal aortic aneurysm, ruptured) ([facility] I71.3  Chronic constipation K59.09  
 Right inguinal hernia K40.90  Left inguinal hernia K40.90  CAD (coronary artery disease) I25.10  Hypoxemia requiring supplemental oxygen R09.02, Z99.81  Warfarin-induced coagulopathy ([address] D68.32, T45.515A  Intraabdominal fluid collection R18.8  Abdominal pain R10.9  Sepsis ([address] A41.9  Pyogenic inflammation of bone (HCC) M86.9  Psoas abscess ([facility]) K68.12  
 Abscess L02.91  
  Discitis of lumbar region M46.46  
 Pulmonary hypertension (HCC) I27.20  
 CHF exacerbation (HCC) I50.9  Hyponatremia E87.1  Symptomatic anemia D64.9  Chronic pain syndrome G89.4  Stage 3 chronic kidney disease (HCC) N18.3  Iron deficiency anemia D50.9 Visit Vitals /71 (BP 1 Location: Left arm, BP Patient Position: At rest) Pulse (!) 101 Comment: Reported to [employer] Temp 97.3 °F (36.3 °C) Resp 16 Ht 5' 6\" (1.676 m) Wt 68.6 kg (151 lb 3.8 oz) SpO2 98% BMI 24.41 kg/m² Intake/Output Summary (Last 24 hours) at 5/28/2019 9077 Last data filed at 5/28/2019 5400 Gross per 24 hour Intake 3440 ml Output 1150 ml Net 2290 ml CBC w/Diff Lab Results Component Value Date/Time WBC 7.6 05/28/2019 04:03 AM  
 RBC 3.43 (L) 05/28/2019 04:03 AM  
 HGB 7.8 (L) 05/28/2019 04:03 AM  
 HCT 25.0 (L) 05/28/2019 04:03 AM  
 MCV 72.9 (L) 05/28/2019 04:03 AM  
 MCH 22.7 (L) 05/28/2019 04:03 AM  
 MCHC 31.2 05/28/2019 04:03 AM  
 RDW 21.2 (H) 05/28/2019 04:03 AM  
  05/28/2019 04:03 AM  
 Lab Results Component Value Date/Time GRANS 79 (H) 05/27/2019 09:29 PM  
 LYMPH 12 (L) 05/27/2019 09:29 PM  
 EOS 1 05/27/2019 09:29 PM  
 BANDS 5 08/27/2017 12:20 PM  
 BASOS 0 05/27/2019 09:29 PM  
  
Basic Metabolic Profile Recent Labs  
  05/28/19 
0403 * K 3.6  CO2 25 BUN 13  
CA 8.5 Hepatic Function Lab Results Component Value Date/Time ALB 2.8 (L) 07/09/2018 12:00 PM  
 TP 7.7 07/09/2018 12:00 PM  
 AP 84 07/09/2018 12:00 PM  
 Lab Results Component Value Date/Time SGOT 34 07/09/2018 12:00 PM  
  
 
 
Coags Recent Labs  
  05/28/19 
0403 05/27/19 
5415 PTP 19.0* 20.2* INR 1.6* 1.7* Carmen Tate NP [facility]. [email or web address] Phone: [phone] Pager: [phone]

## 2019-05-28 NOTE — ANESTHESIA PREPROCEDURE EVALUATION
Anesthetic History No history of anesthetic complications Review of Systems / Medical History Patient summary reviewed and pertinent labs reviewed Pulmonary COPD: moderate Shortness of breath Neuro/Psych Cardiovascular Hypertension: well controlled Dysrhythmias : atrial fibrillation CAD and PAD 
 
 
  
GI/Hepatic/Renal 
Within defined limits Endo/Other Blood dyscrasia, arthritis and anemia Other Findings Comments: Home O2 Physical Exam 
 
Airway Mallampati: II 
TM Distance: 4 - 6 cm Neck ROM: normal range of motion Mouth opening: Normal 
 
 Cardiovascular Regular rate and rhythm,  S1 and S2 normal,  no murmur, click, rub, or gallop Dental 
 
Dentition: Full upper dentures, Full lower dentures and Edentulous Pulmonary Breath sounds clear to auscultation Abdominal 
GI exam deferred Other Findings Anesthetic Plan ASA: 4 Anesthesia type: MAC Anesthetic plan and risks discussed with: Patient

## 2019-05-28 NOTE — ROUTINE PROCESS
Bedside shift change report given to Osei Ennis (oncoming nurse) by Tania Tripp RN (offgoing nurse). Report included the following information SBAR, Kardex, Intake/Output, Recent Results and Cardiac Rhythm A. Fib.

## 2019-05-28 NOTE — PERIOP NOTES
TRANSFER - OUT REPORT: 
 
Verbal report given to Carol RN(name) on Bhakti Rhodes  being transferred to [address] [place] [address](unit) for routine progression of care Report consisted of patients Situation, Background, Assessment and  
Recommendations(SBAR). Information from the following report(s) SBAR, Kardex, OR Summary, Procedure Summary, Intake/Output and MAR was reviewed with the receiving nurse. Lines:  
Peripheral IV 05/26/19 Anterior;Left;Proximal Forearm (Active) Site Assessment Clean, dry, & intact 5/27/2019  8:28 PM  
Phlebitis Assessment 0 5/27/2019  8:28 PM  
Infiltration Assessment 0 5/27/2019  8:28 PM  
Dressing Status Clean, dry, & intact 5/27/2019  8:28 PM  
Dressing Type Transparent 5/27/2019  8:28 PM  
Hub Color/Line Status Pink 5/27/2019  8:28 PM  
  
 
Opportunity for questions and clarification was provided. Patient transported with: 
 O2 @ 3 liters Registered Nurse

## 2019-05-28 NOTE — ANESTHESIA POSTPROCEDURE EVALUATION
Procedure(s): ESOPHAGOGASTRODUODENOSCOPY (EGD) COLONOSCOPY with polypectomy. MAC Anesthesia Post Evaluation Multimodal analgesia: multimodal analgesia used between 6 hours prior to anesthesia start to PACU discharge Patient location during evaluation: PACU Patient participation: complete - patient participated Level of consciousness: awake Pain management: adequate Airway patency: patent Anesthetic complications: no 
Cardiovascular status: acceptable Respiratory status: acceptable Hydration status: acceptable Post anesthesia nausea and vomiting:  controlled Vitals Value Taken Time /62 5/28/2019  2:26 PM  
Temp 36.9 °C (98.4 °F) 5/28/2019  2:08 PM  
Pulse 90 5/28/2019  2:27 PM  
Resp 20 5/28/2019  2:27 PM  
SpO2 97 % 5/28/2019  2:27 PM

## 2019-05-28 NOTE — PROGRESS NOTES
Brief Progress Note Subjective: Patient seen in PACU following EGD and colonoscopy. Doing well with no complaints. No nausea/vomiting. Objective: 
Visit Vitals /79 (BP 1 Location: Left arm, BP Patient Position: At rest) Pulse 97 Temp 96.6 °F (35.9 °C) Resp 20 Ht 5' 6\" (1.676 m) Wt 68.6 kg (151 lb 3.8 oz) SpO2 99% BMI 24.41 kg/m² Cardio: RRR, no murmurs, rubs, gallops. Resp: lungs clear to ascultation bilaterally; no wheezing GI: normoactive bowel sounds; no tenderness A&P: 
[de-identified]year old patient initially admitted with hemoglobin of 4.9, no s/p EGD and colonoscopy. -Will restart regular diet 
-Discussed with patient about possibility of not restarting warfarin due to bleeding risk. Patient agreeable, will touch base tomorrow. 
-Resume plan as per morning note.

## 2019-05-28 NOTE — PROGRESS NOTES
Intern Progress Note [address] Patient: Areli Ortega MRN: [id number]  CSN: [id number] YOB: 1940  Age: [de-identified] y.o. Sex: male DOA: 5/24/2019 LOS:  LOS: 4 days Subjective:  
 
Patient upset about colonoscopy preparation overnight; new order was placed for MoviPrep. Patient continued NPO overnight; colonoscopy scheduled for later today. Review of Systems Constitutional: Negative for malaise/fatigue. Respiratory: Negative for cough, shortness of breath and wheezing. Cardiovascular: Negative for chest pain and palpitations. Gastrointestinal: Positive for diarrhea (following bowel prep). Negative for abdominal pain, blood in stool, melena and vomiting. Genitourinary: Negative for hematuria. Objective:  
  
Patient Vitals for the past 24 hrs: 
 Temp Pulse Resp BP SpO2  
05/28/19 0705 97.3 °F (36.3 °C) (!) 101 16 154/71 98 % 05/28/19 0518 98.1 °F (36.7 °C) 98 16 146/70 98 % 05/28/19 0202     97 % 05/27/19 2330 97.4 °F (36.3 °C) 97 16 162/75 98 % 05/27/19 2048     94 % 05/27/19 2027 97.7 °F (36.5 °C) 88 16 159/77 96 % 05/27/19 1616 97.5 °F (36.4 °C) 81 16 149/70 99 % 05/27/19 1104 96.7 °F (35.9 °C) 89 16 142/69 92 % 05/27/19 1028     97 % Intake/Output Summary (Last 24 hours) at 5/28/2019 2225 Last data filed at 5/28/2019 5029 Gross per 24 hour Intake 4040 ml Output 1250 ml Net 2790 ml Physical Exam:  
General:  Groggy, in no acute distress. HEENT: Conjunctiva pink, sclera anicteric. Pharynx moist, nonerythematous. Moist mucous membranes. CV:  RRR, no murmurs. No visible pulsations or thrills. RESP:  Unlabored breathing. Lungs clear to auscultation. Equal expansion bilaterally. ABD:  Soft, nontender, nondistended. Ext:  No edema. Skin:  No rashes, lesions, or ulcers. Good turgor. Lab/Data Reviewed: 
Recent Results (from the past 12 hour(s)) CBC WITH AUTOMATED DIFF  
 Collection Time: 05/27/19  9:29 PM  
Result Value Ref Range WBC 8.4 4.6 - 13.2 K/uL  
 RBC 3.83 (L) 4.70 - 5.50 M/uL HGB 8.6 (L) 13.0 - 16.0 g/dL HCT 28.0 (L) 36.0 - 48.0 % MCV 73.1 (L) 74.0 - 97.0 FL  
 MCH 22.5 (L) 24.0 - 34.0 PG  
 MCHC 30.7 (L) 31.0 - 37.0 g/dL RDW 21.0 (H) 11.6 - 14.5 % PLATELET 189 231 - 730 K/uL MPV 8.3 (L) 9.2 - 11.8 FL  
 NEUTROPHILS 79 (H) 40 - 73 % LYMPHOCYTES 12 (L) 21 - 52 % MONOCYTES 8 3 - 10 % EOSINOPHILS 1 0 - 5 % BASOPHILS 0 0 - 2 %  
 ABS. NEUTROPHILS 6.6 1.8 - 8.0 K/UL  
 ABS. LYMPHOCYTES 1.0 0.9 - 3.6 K/UL  
 ABS. MONOCYTES 0.7 0.05 - 1.2 K/UL  
 ABS. EOSINOPHILS 0.1 0.0 - 0.4 K/UL  
 ABS. BASOPHILS 0.0 0.0 - 0.1 K/UL  
 DF AUTOMATED PLATELET COMMENTS ADEQUATE PLATELETS    
 RBC COMMENTS ANISOCYTOSIS 2+ 
    
 RBC COMMENTS POIKILOCYTOSIS 1+ 
    
 RBC COMMENTS MICROCYTOSIS 1+ 
    
 RBC COMMENTS HYPOCHROMIA 1+ 
    
 RBC COMMENTS POLYCHROMASIA 1+ 
    
 RBC COMMENTS OVALOCYTES 
FEW METABOLIC PANEL, BASIC Collection Time: 05/28/19  4:03 AM  
Result Value Ref Range Sodium 132 (L) 136 - 145 mmol/L Potassium 3.6 3.5 - 5.5 mmol/L Chloride 100 100 - 108 mmol/L  
 CO2 25 21 - 32 mmol/L Anion gap 7 3.0 - 18 mmol/L Glucose 97 74 - 99 mg/dL BUN 13 7.0 - 18 MG/DL Creatinine 1.17 0.6 - 1.3 MG/DL  
 BUN/Creatinine ratio 11 (L) 12 - 20 GFR est AA >60 >60 ml/min/1.73m2 GFR est non-AA 60 (L) >60 ml/min/1.73m2 Calcium 8.5 8.5 - 10.1 MG/DL PROTHROMBIN TIME + INR Collection Time: 05/28/19  4:03 AM  
Result Value Ref Range Prothrombin time 19.0 (H) 11.5 - 15.2 sec INR 1.6 (H) 0.8 - 1.2    
CBC W/O DIFF Collection Time: 05/28/19  4:03 AM  
Result Value Ref Range WBC 7.6 4.6 - 13.2 K/uL  
 RBC 3.43 (L) 4.70 - 5.50 M/uL HGB 7.8 (L) 13.0 - 16.0 g/dL HCT 25.0 (L) 36.0 - 48.0 % MCV 72.9 (L) 74.0 - 97.0 FL  
 MCH 22.7 (L) 24.0 - 34.0 PG  
 MCHC 31.2 31.0 - 37.0 g/dL RDW 21.2 (H) 11.6 - 14.5 % PLATELET 873 691 - 520 K/uL MPV 8.5 (L) 9.2 - 11.8 FL Scheduled Medications Reviewed: 
Current Facility-Administered Medications Medication Dose Route Frequency  HYDROcodone-acetaminophen (NORCO) 5-325 mg per tablet 0.5 Tab  0.5 Tab Oral 2 times per day  amLODIPine (NORVASC) tablet 10 mg  10 mg Oral DAILY  digoxin (LANOXIN) tablet 0.125 mg  0.125 mg Oral DAILY  pravastatin (PRAVACHOL) tablet 40 mg  40 mg Oral QHS  albuterol-ipratropium (DUO-NEB) 2.5 MG-0.5 MG/3 ML  3 mL Nebulization Q6H RT  
 traZODone (DESYREL) tablet 50 mg  50 mg Oral QHS  WARFARIN (COUMADIN) - Physician to Dose   Other Rx Dosing/Monitoring Imaging, microbiology, and EKG/Telemetry: No results found. Assessment/Plan  
[de-identified] y.o. male with PMH chronic atrial fibrillation on warfarin, BPH, COPD on 2 L NC continuously, hypertension, heart failure with preserved EF (55% April 2018), iron deficiency anemia (baseline 9.0-10.0), CKD stage III,  Chronic constipation, coronary artery disease, former smoker (54 pack years), chronic pain syndrome who is now presenting with complaint of symptomatic anemia.  
  
Symptomatic Anemia: Hemoglobin 7.8 now s/p 3 units pRBCs. Improved fatigue and dizziness. No obvious source of bleeding (denies rectal bleeding and hematochezia). Hemoccult negative in ED. VSS. Patient with noted history of iron deficiency anemia in outpatient records. Fe 29, Ferritin 19, TIBC/iron saturation WNL. -telemetry 
-CBC/BMP daily 
-PT/OT 
-Transfuse with goal of 7. -GI consulted: Patient completed bowel prep with MoviPrep overnight; INR 1.6; Will undergo scopes later today.  
  
Chronic Atrial Fibrillation on warfarin & heart failure with preserved ejection fraction: Followed by Dr. Maximiliano Mcneill. Last echo 4/2018 with EF 55%; hypokinesis of basal-mid anteroseptal walls. -PT/INR daily 
-Will not be continued on warfarin at discharge due to bleeds.  
-Continue digoxin 125 mcg daily -consider cardiology consult  
  
Supratherapeutic INR, resolved: INR 1.6; goal 2.0-3.0.  
-Will hold warfarin for possible scopes Hypertension: BPs upon admission 133-156/73-91. Patient prescribed lisinopril and amlodipine but lisinopril held due to increased creatinine at PCP office. 
-Continue amlodipine 10 mg  
-Holding lisinopril  
  
Chronic hypoxic respiratory failure secondary to COPD: On 2 L nasal canula at baseline. Follows with Dr. Magaly Woods (last appt 3/21). -Spot check pulse ox 
-Titrate supplemental O2 with goals 88-92% 
-Continue Stiolto (pharm to find alternative). -Duo-neb q6 hr per RT   
  
Mild Coronary Artery Disease - Patient with cardiac caths in 2009 & 2012 showed no significant stenosis and no intervention necessary.  
-Continue pravastatin 40 mg daily 
  
CKD stage III: baseline Cr 1.0-1.2. Admission 1.4.  
-monitor Cr; start fluids if necessary  
  
Chronic pain syndrome: history of low back pain and painful inguinal hernia (without incarceration) 
-continue norco 5-325 mg (.5 tablet every 12 hours) -Narcan prn for respiratory suppression  
  
Hyponatremia - Na 132 on BMP. Hyponatremia likely secondary to COPD and lisinopril usage. Urine sodium 13,  
-Encourage oral intake 
-monitor with BMP  
-continue holding lisinopril. -urine osmolality pending  
  
Diet: cardiac DVT Prophylaxis: SCDs Code Status: FULL Point of Contact: Charlene Dozier (Relationship: daughter) [phone] 
  
Disposition and anticipated LOS: 2 midnights DO [facility] 
05/28/19  
7:47 AM 
 
Pager: [phone]

## 2019-05-28 NOTE — H&P
History and Physical reviewed; I have examined the patient and there are no pertinent changes. Harlene Primrose, MD, MD  
1:34 PM 5/28/2019 [facility], [address] [place] [address] 
[email or web address]

## 2019-05-28 NOTE — ROUTINE PROCESS
Received bedside report from [facility], patient was resting in bed with daughter at bedside, [facility] elevated, bed in lowest position and oriented to call button. Patient asked for pain medication. Patient was started on bowel prep early during the evening, patient was able to complete both liters. The patient's bowel movement upon completion was very dark but all liquid. Gave bedside report to Ever Heaton RN, patient was resting in bed with call button within reach of patient.

## 2019-05-29 VITALS
OXYGEN SATURATION: 98 % | HEIGHT: 66 IN | WEIGHT: 151.4 LBS | RESPIRATION RATE: 16 BRPM | DIASTOLIC BLOOD PRESSURE: 70 MMHG | HEART RATE: 84 BPM | TEMPERATURE: 98.2 F | BODY MASS INDEX: 24.33 KG/M2 | SYSTOLIC BLOOD PRESSURE: 153 MMHG

## 2019-05-29 LAB
ANION GAP SERPL CALC-SCNC: 9 MMOL/L (ref 3–18)
BASOPHILS # BLD: 0 K/UL (ref 0–0.1)
BASOPHILS # BLD: 0 K/UL (ref 0–0.1)
BASOPHILS NFR BLD: 0 % (ref 0–2)
BASOPHILS NFR BLD: 0 % (ref 0–2)
BUN SERPL-MCNC: 13 MG/DL (ref 7–18)
BUN/CREAT SERPL: 10 (ref 12–20)
CALCIUM SERPL-MCNC: 8.5 MG/DL (ref 8.5–10.1)
CHLORIDE SERPL-SCNC: 100 MMOL/L (ref 100–108)
CO2 SERPL-SCNC: 24 MMOL/L (ref 21–32)
CREAT SERPL-MCNC: 1.32 MG/DL (ref 0.6–1.3)
DIFFERENTIAL METHOD BLD: ABNORMAL
DIFFERENTIAL METHOD BLD: ABNORMAL
EOSINOPHIL # BLD: 0 K/UL (ref 0–0.4)
EOSINOPHIL # BLD: 0 K/UL (ref 0–0.4)
EOSINOPHIL NFR BLD: 0 % (ref 0–5)
EOSINOPHIL NFR BLD: 1 % (ref 0–5)
ERYTHROCYTE [DISTWIDTH] IN BLOOD BY AUTOMATED COUNT: 21.6 % (ref 11.6–14.5)
ERYTHROCYTE [DISTWIDTH] IN BLOOD BY AUTOMATED COUNT: 21.7 % (ref 11.6–14.5)
GLUCOSE SERPL-MCNC: 85 MG/DL (ref 74–99)
HCT VFR BLD AUTO: 25.2 % (ref 36–48)
HCT VFR BLD AUTO: 26 % (ref 36–48)
HGB BLD-MCNC: 7.6 G/DL (ref 13–16)
HGB BLD-MCNC: 8.1 G/DL (ref 13–16)
INR PPP: 1.6 (ref 0.8–1.2)
LYMPHOCYTES # BLD: 0.7 K/UL (ref 0.9–3.6)
LYMPHOCYTES # BLD: 0.9 K/UL (ref 0.9–3.6)
LYMPHOCYTES NFR BLD: 12 % (ref 21–52)
LYMPHOCYTES NFR BLD: 8 % (ref 21–52)
MCH RBC QN AUTO: 22.1 PG (ref 24–34)
MCH RBC QN AUTO: 22.8 PG (ref 24–34)
MCHC RBC AUTO-ENTMCNC: 30.2 G/DL (ref 31–37)
MCHC RBC AUTO-ENTMCNC: 31.2 G/DL (ref 31–37)
MCV RBC AUTO: 73 FL (ref 74–97)
MCV RBC AUTO: 73.3 FL (ref 74–97)
MONOCYTES # BLD: 0.6 K/UL (ref 0.05–1.2)
MONOCYTES # BLD: 0.7 K/UL (ref 0.05–1.2)
MONOCYTES NFR BLD: 7 % (ref 3–10)
MONOCYTES NFR BLD: 9 % (ref 3–10)
NEUTS SEG # BLD: 6 K/UL (ref 1.8–8)
NEUTS SEG # BLD: 6.8 K/UL (ref 1.8–8)
NEUTS SEG NFR BLD: 78 % (ref 40–73)
NEUTS SEG NFR BLD: 85 % (ref 40–73)
PERIPHERAL SMEAR,PSM: NORMAL
PLATELET # BLD AUTO: 247 K/UL (ref 135–420)
PLATELET # BLD AUTO: 250 K/UL (ref 135–420)
PMV BLD AUTO: 8.2 FL (ref 9.2–11.8)
PMV BLD AUTO: 8.6 FL (ref 9.2–11.8)
POTASSIUM SERPL-SCNC: 3.9 MMOL/L (ref 3.5–5.5)
PROTHROMBIN TIME: 18.8 SEC (ref 11.5–15.2)
RBC # BLD AUTO: 3.44 M/UL (ref 4.7–5.5)
RBC # BLD AUTO: 3.56 M/UL (ref 4.7–5.5)
SODIUM SERPL-SCNC: 133 MMOL/L (ref 136–145)
WBC # BLD AUTO: 7.6 K/UL (ref 4.6–13.2)
WBC # BLD AUTO: 8.1 K/UL (ref 4.6–13.2)

## 2019-05-29 PROCEDURE — 85610 PROTHROMBIN TIME: CPT

## 2019-05-29 PROCEDURE — 74011000250 HC RX REV CODE- 250: Performed by: STUDENT IN AN ORGANIZED HEALTH CARE EDUCATION/TRAINING PROGRAM

## 2019-05-29 PROCEDURE — 74011250637 HC RX REV CODE- 250/637: Performed by: STUDENT IN AN ORGANIZED HEALTH CARE EDUCATION/TRAINING PROGRAM

## 2019-05-29 PROCEDURE — 94640 AIRWAY INHALATION TREATMENT: CPT

## 2019-05-29 PROCEDURE — 36415 COLL VENOUS BLD VENIPUNCTURE: CPT

## 2019-05-29 PROCEDURE — 74011000258 HC RX REV CODE- 258: Performed by: STUDENT IN AN ORGANIZED HEALTH CARE EDUCATION/TRAINING PROGRAM

## 2019-05-29 PROCEDURE — 80048 BASIC METABOLIC PNL TOTAL CA: CPT

## 2019-05-29 PROCEDURE — 74011250636 HC RX REV CODE- 250/636: Performed by: STUDENT IN AN ORGANIZED HEALTH CARE EDUCATION/TRAINING PROGRAM

## 2019-05-29 PROCEDURE — 85025 COMPLETE CBC W/AUTO DIFF WBC: CPT

## 2019-05-29 RX ORDER — LANOLIN ALCOHOL/MO/W.PET/CERES
325 CREAM (GRAM) TOPICAL 2 TIMES DAILY WITH MEALS
Qty: 60 TAB | Refills: 0 | Status: SHIPPED | OUTPATIENT
Start: 2019-05-29 | End: 2019-06-28

## 2019-05-29 RX ORDER — GUAIFENESIN 100 MG/5ML
81 LIQUID (ML) ORAL DAILY
Qty: 30 TAB | Refills: 0 | Status: SHIPPED | OUTPATIENT
Start: 2019-05-29 | End: 2019-05-29 | Stop reason: SDUPTHER

## 2019-05-29 RX ORDER — DOCUSATE SODIUM 100 MG/1
100 CAPSULE, LIQUID FILLED ORAL 2 TIMES DAILY
Qty: 180 CAP | Refills: 0 | Status: SHIPPED | OUTPATIENT
Start: 2019-05-29 | End: 2019-06-02

## 2019-05-29 RX ORDER — LANOLIN ALCOHOL/MO/W.PET/CERES
325 CREAM (GRAM) TOPICAL 2 TIMES DAILY WITH MEALS
Qty: 60 TAB | Refills: 0 | Status: SHIPPED | OUTPATIENT
Start: 2019-05-29 | End: 2019-05-29 | Stop reason: SDUPTHER

## 2019-05-29 RX ORDER — DOCUSATE SODIUM 100 MG/1
100 CAPSULE, LIQUID FILLED ORAL 2 TIMES DAILY
Qty: 60 CAP | Refills: 2 | Status: SHIPPED | OUTPATIENT
Start: 2019-05-29 | End: 2019-05-29 | Stop reason: SDUPTHER

## 2019-05-29 RX ORDER — GUAIFENESIN 100 MG/5ML
81 LIQUID (ML) ORAL DAILY
Qty: 30 TAB | Refills: 1 | Status: SHIPPED | OUTPATIENT
Start: 2019-05-29 | End: 2019-06-02

## 2019-05-29 RX ORDER — HYDROCODONE BITARTRATE AND ACETAMINOPHEN 5; 325 MG/1; MG/1
0.5 TABLET ORAL
Status: DISCONTINUED | OUTPATIENT
Start: 2019-05-29 | End: 2019-05-29 | Stop reason: HOSPADM

## 2019-05-29 RX ORDER — OMEPRAZOLE 20 MG/1
20 CAPSULE, DELAYED RELEASE ORAL DAILY
Qty: 30 CAP | Refills: 1 | Status: SHIPPED | OUTPATIENT
Start: 2019-05-29 | End: 2019-06-06

## 2019-05-29 RX ADMIN — IPRATROPIUM BROMIDE AND ALBUTEROL SULFATE 3 ML: .5; 3 SOLUTION RESPIRATORY (INHALATION) at 00:38

## 2019-05-29 RX ADMIN — HYDROCODONE BITARTRATE AND ACETAMINOPHEN 0.5 TABLET: 5; 325 TABLET ORAL at 08:51

## 2019-05-29 RX ADMIN — IRON SUCROSE 200 MG: 20 INJECTION, SOLUTION INTRAVENOUS at 11:50

## 2019-05-29 RX ADMIN — AMLODIPINE BESYLATE 10 MG: 10 TABLET ORAL at 08:51

## 2019-05-29 RX ADMIN — HYDROCODONE BITARTRATE AND ACETAMINOPHEN 0.5 TABLET: 5; 325 TABLET ORAL at 01:59

## 2019-05-29 RX ADMIN — DIGOXIN 0.12 MG: 125 TABLET ORAL at 08:51

## 2019-05-29 NOTE — PROGRESS NOTES
[email or web address] 
[phone] Gastroenterology follow up-Progress note 1. Anemia-7.6/25.2 today, iron sat 7%/iron 29 with normal ferritin on 5/25, appears to have baseline anemia with Hgb of around 9. Appears multifactorial-CKD, Anemia of chronic disease. Hemoccult negative, no overt blood loss-EGD/[place] 5/28 no source of bleeding, diverticulosis and single polyp 2. COPD with A fib with anemia-need for anticoagulation. INR 1.6  today. Plan: 1. Recommend IV iron 2. Can resume anticoagulation with inherent risks of bleeding 3. Will sign off-Thank you for this consultation and the opportunity to participate in the care of this patient. Please do not hesitate to call with any questions or concerns, or should event occur that may necessitate additional GI evaluation. Chief Complaint: f/u anemia. Subjective:  No bleeding seen, reports generalized abdominal pain, no nausea/vomiting ROS: Denies any fevers, chills, rash. Eyes: conjunctiva normal, EOM normal  
Neck: ROM normal, supple and trachea normal  
Cardiovascular: heart normal, intact distal pulses, normal rate and regular rhythm Pulmonary/Chest Wall: breath sounds normal and effort normal  
Abdominal: appearance normal, bowel sounds normal and soft, non-acute, non-tender Patient Active Problem List  
Diagnosis Code  Hypercholesterolemia E78.00  Congestive heart failure (CHF) (HCC) I50.9  Peripheral vascular disease (HCC) I73.9  
 COPD (chronic obstructive pulmonary disease) (HCC) J44.9  
 HTN (hypertension) I10  
 Chronic atrial fibrillation (HCC) I48.2  RLQ abdominal pain R10.31  Status post AAA (abdominal aortic aneurysm) repair Z98.890, Z86.79  
 AAA (abdominal aortic aneurysm, ruptured) ([facility] I71.3  Chronic constipation K59.09  
 Right inguinal hernia K40.90  Left inguinal hernia K40.90  CAD (coronary artery disease) I25.10  Hypoxemia requiring supplemental oxygen R09.02, Z99.81  
  Warfarin-induced coagulopathy ([facility] [address] D68.32, T45.515A  Intraabdominal fluid collection R18.8  Abdominal pain R10.9  Sepsis ([facility] [address] A41.9  Pyogenic inflammation of bone (HCC) M86.9  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  
 Abscess L02.91  
 Discitis of lumbar region M46.46  
 Pulmonary hypertension (HCC) I27.20  
 CHF exacerbation (HCC) I50.9  Hyponatremia E87.1  Symptomatic anemia D64.9  Chronic pain syndrome G89.4  Stage 3 chronic kidney disease (HCC) N18.3  Iron deficiency anemia D50.9 Visit Vitals /82 (BP 1 Location: Left arm, BP Patient Position: At rest) Pulse 86 Temp 97.7 °F (36.5 °C) Resp 20 Ht 5' 6\" (1.676 m) Wt 68.7 kg (151 lb 6.4 oz) SpO2 96% BMI 24.44 kg/m² Intake/Output Summary (Last 24 hours) at 5/29/2019 8529 Last data filed at 5/29/2019 5014 Gross per 24 hour Intake 100 ml Output 950 ml Net -850 ml CBC w/Diff Lab Results Component Value Date/Time WBC 7.6 05/29/2019 04:53 AM  
 RBC 3.44 (L) 05/29/2019 04:53 AM  
 HGB 7.6 (L) 05/29/2019 04:53 AM  
 HCT 25.2 (L) 05/29/2019 04:53 AM  
 MCV 73.3 (L) 05/29/2019 04:53 AM  
 MCH 22.1 (L) 05/29/2019 04:53 AM  
 MCHC 30.2 (L) 05/29/2019 04:53 AM  
 RDW 21.7 (H) 05/29/2019 04:53 AM  
  05/29/2019 04:53 AM  
 Lab Results Component Value Date/Time GRANS 78 (H) 05/29/2019 04:53 AM  
 LYMPH 12 (L) 05/29/2019 04:53 AM  
 EOS 1 05/29/2019 04:53 AM  
 BANDS 5 08/27/2017 12:20 PM  
 BASOS 0 05/29/2019 04:53 AM  
  
Basic Metabolic Profile Recent Labs  
  05/29/19 
0453 * K 3.9  CO2 24 BUN 13  
CA 8.5 Hepatic Function Lab Results Component Value Date/Time ALB 2.8 (L) 07/09/2018 12:00 PM  
 TP 7.7 07/09/2018 12:00 PM  
 AP 84 07/09/2018 12:00 PM  
 Lab Results Component Value Date/Time SGOT 34 07/09/2018 12:00 PM  
  
 
 
Coags Recent Labs  
  05/29/19 
0453 05/28/19 
0403 PTP 18.8* 19.0* INR 1.6* 1.6*  
   
 
 
 
 
 Jerald Rivers NP [facility]. [email or web address] Phone: [phone] Pager: [phone]

## 2019-05-29 NOTE — PROGRESS NOTES
Intern Progress Note [address] Patient: Pj Aguilar MRN: [id number]  CSN: [id number] YOB: 1940  Age: [de-identified] y.o. Sex: male DOA: 5/24/2019 LOS:  LOS: 5 days Subjective: No acute events overnight. Patient doing well. Spoke again about not continuing warfarin once discharged due to risk of bleeding; patient amiable. Review of Systems Respiratory: Negative for shortness of breath and wheezing. Cardiovascular: Negative for chest pain and palpitations. Gastrointestinal: Negative for abdominal pain, blood in stool, constipation, heartburn and melena. Genitourinary: Negative for hematuria. Objective:  
  
Patient Vitals for the past 24 hrs: 
 Temp Pulse Resp BP SpO2  
05/29/19 0258 97.7 °F (36.5 °C) 86 20 154/82 96 % 05/29/19 0040     99 % 05/28/19 2308 97.5 °F (36.4 °C) (!) 119 20 164/86 94 % 05/28/19 2147     96 % 05/28/19 1943     96 % 05/28/19 1912 97.4 °F (36.3 °C) 84 18 156/76 96 % 05/28/19 1637 97 °F (36.1 °C) 92 18 160/82 98 % 05/28/19 1518 96.6 °F (35.9 °C) 97 20 153/79 99 % 05/28/19 1427  90 20  97 % 05/28/19 1426  92 22 138/62 98 % 05/28/19 1417  79 19  99 % 05/28/19 1416  83 19 133/68 99 % 05/28/19 1408 98.4 °F (36.9 °C) 80 16 130/64 96 % 05/28/19 1246 98.2 °F (36.8 °C) 90 20 150/78 98 % 05/28/19 1022 97.7 °F (36.5 °C) 97 18 145/70 99 % 05/28/19 0705 97.3 °F (36.3 °C) (!) 101 16 154/71 98 % Intake/Output Summary (Last 24 hours) at 5/29/2019 1713 Last data filed at 5/29/2019 3075 Gross per 24 hour Intake 100 ml Output 950 ml Net -850 ml Physical Exam:  
General:  Groggy, in no acute distress. HEENT: Conjunctiva pink, sclera anicteric. Pharynx moist, nonerythematous. Moist mucous membranes. CV:  RRR, no murmurs. No visible pulsations or thrills. RESP:  Unlabored breathing. Lungs clear to auscultation. Equal expansion bilaterally. ABD:  Soft, nontender, nondistended. Ext:  No edema. Skin:  No rashes, lesions, or ulcers. Good turgor. Lab/Data Reviewed: 
Recent Results (from the past 12 hour(s)) CBC WITH AUTOMATED DIFF Collection Time: 05/28/19 10:05 PM  
Result Value Ref Range WBC 7.4 4.6 - 13.2 K/uL  
 RBC 3.63 (L) 4.70 - 5.50 M/uL HGB 8.1 (L) 13.0 - 16.0 g/dL HCT 26.5 (L) 36.0 - 48.0 % MCV 73.0 (L) 74.0 - 97.0 FL  
 MCH 22.3 (L) 24.0 - 34.0 PG  
 MCHC 30.6 (L) 31.0 - 37.0 g/dL RDW 21.5 (H) 11.6 - 14.5 % PLATELET 833 811 - 535 K/uL MPV 8.2 (L) 9.2 - 11.8 FL  
 NEUTROPHILS 79 (H) 40 - 73 % LYMPHOCYTES 11 (L) 21 - 52 % MONOCYTES 9 3 - 10 % EOSINOPHILS 1 0 - 5 % BASOPHILS 0 0 - 2 %  
 ABS. NEUTROPHILS 5.9 1.8 - 8.0 K/UL  
 ABS. LYMPHOCYTES 0.8 (L) 0.9 - 3.6 K/UL  
 ABS. MONOCYTES 0.7 0.05 - 1.2 K/UL  
 ABS. EOSINOPHILS 0.1 0.0 - 0.4 K/UL  
 ABS. BASOPHILS 0.0 0.0 - 0.1 K/UL  
 DF AUTOMATED METABOLIC PANEL, BASIC Collection Time: 05/29/19  4:53 AM  
Result Value Ref Range Sodium 133 (L) 136 - 145 mmol/L Potassium 3.9 3.5 - 5.5 mmol/L Chloride 100 100 - 108 mmol/L  
 CO2 24 21 - 32 mmol/L Anion gap 9 3.0 - 18 mmol/L Glucose 85 74 - 99 mg/dL BUN 13 7.0 - 18 MG/DL Creatinine 1.32 (H) 0.6 - 1.3 MG/DL  
 BUN/Creatinine ratio 10 (L) 12 - 20 GFR est AA >60 >60 ml/min/1.73m2 GFR est non-AA 52 (L) >60 ml/min/1.73m2 Calcium 8.5 8.5 - 10.1 MG/DL PROTHROMBIN TIME + INR Collection Time: 05/29/19  4:53 AM  
Result Value Ref Range Prothrombin time 18.8 (H) 11.5 - 15.2 sec INR 1.6 (H) 0.8 - 1.2    
CBC WITH AUTOMATED DIFF Collection Time: 05/29/19  4:53 AM  
Result Value Ref Range WBC 7.6 4.6 - 13.2 K/uL  
 RBC 3.44 (L) 4.70 - 5.50 M/uL HGB 7.6 (L) 13.0 - 16.0 g/dL HCT 25.2 (L) 36.0 - 48.0 % MCV 73.3 (L) 74.0 - 97.0 FL  
 MCH 22.1 (L) 24.0 - 34.0 PG  
 MCHC 30.2 (L) 31.0 - 37.0 g/dL RDW 21.7 (H) 11.6 - 14.5 % PLATELET 593 332 - 752 K/uL MPV 8.2 (L) 9.2 - 11.8 FL  
 NEUTROPHILS 78 (H) 40 - 73 % LYMPHOCYTES 12 (L) 21 - 52 % MONOCYTES 9 3 - 10 % EOSINOPHILS 1 0 - 5 % BASOPHILS 0 0 - 2 %  
 ABS. NEUTROPHILS 6.0 1.8 - 8.0 K/UL  
 ABS. LYMPHOCYTES 0.9 0.9 - 3.6 K/UL  
 ABS. MONOCYTES 0.7 0.05 - 1.2 K/UL  
 ABS. EOSINOPHILS 0.0 0.0 - 0.4 K/UL  
 ABS. BASOPHILS 0.0 0.0 - 0.1 K/UL  
 DF AUTOMATED Scheduled Medications Reviewed: 
Current Facility-Administered Medications Medication Dose Route Frequency  HYDROcodone-acetaminophen (NORCO) 5-325 mg per tablet 0.5 Tab  0.5 Tab Oral 2 times per day  amLODIPine (NORVASC) tablet 10 mg  10 mg Oral DAILY  digoxin (LANOXIN) tablet 0.125 mg  0.125 mg Oral DAILY  pravastatin (PRAVACHOL) tablet 40 mg  40 mg Oral QHS  albuterol-ipratropium (DUO-NEB) 2.5 MG-0.5 MG/3 ML  3 mL Nebulization Q6H RT  
 traZODone (DESYREL) tablet 50 mg  50 mg Oral QHS  WARFARIN (COUMADIN) - Physician to Dose   Other Rx Dosing/Monitoring Imaging, microbiology, and EKG/Telemetry: No results found. Assessment/Plan  
[de-identified] y.o. male with PMH chronic atrial fibrillation on warfarin, BPH, COPD on 2 L NC continuously, hypertension, heart failure with preserved EF (55% April 2018), iron deficiency anemia (baseline 9.0-10.0), CKD stage III,  Chronic constipation, coronary artery disease, former smoker (54 pack years), chronic pain syndrome who is now presenting with complaint of symptomatic anemia.  
  
Symptomatic Anemia: Hemoglobin 7.6 now s/p 3 units pRBCs. 1 day s/p EGD and colonoscopy which found diverticulous, 1 cm polyp in ascending colon (removed), hiatal hernia, and gastritis.  
-telemetry 
-CBC/BMP daily 
-PT/OT 
-Transfuse with goal of 7.  
-discharge without anticoagulation 
-start PPI  
  
Chronic Atrial Fibrillation on warfarin & heart failure with preserved ejection fraction: Followed by Dr. Wen Flannery. Last echo 4/2018 with EF 55%; hypokinesis of basal-mid anteroseptal walls. -PT/INR daily 
-Will not be continued on warfarin at discharge due to bleeds.  
-Continue digoxin 125 mcg daily  
  
Supratherapeutic INR, resolved: INR 1.6; goal 2.0-3.0.  
-Will hold warfarin for possible scopes Hypertension: BPs upon admission 133-156/73-91. Patient prescribed lisinopril and amlodipine but lisinopril held due to increased creatinine at PCP office. 
-Continue amlodipine 10 mg  
-Holding lisinopril  
  
Chronic hypoxic respiratory failure secondary to COPD: On 2 L nasal canula at baseline. Follows with Dr. Alex Deshpande (last appt 3/21). -Spot check pulse ox 
-Titrate supplemental O2 with goals 88-92% 
-Continue Stiolto (pharm to find alternative). -Duo-neb q6 hr per RT   
  
Mild Coronary Artery Disease - Patient with cardiac caths in 2009 & 2012 showed no significant stenosis and no intervention necessary.  
-Continue pravastatin 40 mg daily 
  
CKD stage III: baseline Cr 1.0-1.2. Admission 1.4.  
-monitor Cr; start fluids if necessary  
  
Chronic pain syndrome: history of low back pain and painful inguinal hernia (without incarceration) 
-continue norco 5-325 mg (.5 tablet every 12 hours) -Narcan prn for respiratory suppression  
  
Hyponatremia - Na 133 on BMP. Hyponatremia likely secondary to COPD and lisinopril usage. Urine sodium 13, urine osmolality 386.  
-Encourage oral intake 
-monitor with BMP  
-continue holding lisinopril. 
  
Diet: cardiac DVT Prophylaxis: SCDs Code Status: FULL Point of Contact: Kavita Aiken (Relationship: daughter) [phone] 
  
Disposition and anticipated LOS: 2 midnights DO FAITH [facility]jica [facility] Medicine PGY-1 
05/29/19  
7:47 AM 
 
Pager: [phone]

## 2019-05-29 NOTE — DISCHARGE SUMMARY
Discharge Summary Renzo Newby Patient: Alberta Late Age: [de-identified] y.o. Sex: male  : 1940 MRN: [id number] DOA: 2019 Discharge Date: 19 Attending:Elena Abraham MD     
PCP: Brandi Alatorre DO     
 
================================================================ Reason for Admission:  
Symptomatic anemia [D64.9] Discharge Diagnoses:  
Symptomatic Anemia, stable Chronic atrial fibrillation on chronic anticoagulation, stable Heart Failure with Preserved ejection fraction, stable Supratherapeutic INR, resolved Hypertension, treated Chronic hypoxic respiratory failure secondary to COPD, stable Mild coronary artery disease, stable CKD stage III, stable Chronic pain syndrome, stable Hyponatremia, improved Important notes to PCP/ follow-up studies and evaluations  
-Patient's warfarin stopped at discharge due to recurrent GI bleed. 3rd hospitalization requiring transfusion. Pending labs and studies: 
Pathology report for biopsied colon polyp Operative Procedures:  
EGD & colonoscopy: 19; Dr. Etta Reddy (GI) Discharge Medications:  
Discharge Medication List as of 2019  1:01 PM  
  
START taking these medications Details  
omeprazole (PRILOSEC) 20 mg capsule Take 1 Cap by mouth daily. Indications: gastritis, Print, Disp-30 Cap, R-1 Aspirin 81 mg chewable tablet  Take 1 tab by mouth daily. Normal, Disp-30 Cap, R-1 Ferrous Sulfate 325 mg (65 mg iron)  Take 1 Cap by mouth two (2) times a day for 90 days. Indications: anemia Print, Disp-30 Cap, R-1  
  
CONTINUE these medications which have NOT CHANGED Details HYDROcodone-acetaminophen (NORCO) 5-325 mg per tablet Take 0.5 Tabs by mouth two (2) times daily as needed for Pain., Historical Med  
  
albuterol (PROVENTIL VENTOLIN) 2.5 mg /3 mL (0.083 %) nebulizer solution 3 mL by Nebulization route every four (4) hours as needed for Wheezing., Normal, Disp-24 Each, R-3 STIOLTO RESPIMAT 2.5-2.5 mcg/actuation inhaler inhale 2 inhalations by mouth once daily, Normal, Disp-1 Inhaler, R-5  
  
traZODone (DESYREL) 50 mg tablet Take 50 mg by mouth nightly., Historical Med  
  
pravastatin (PRAVACHOL) 40 mg tablet Take 40 mg by mouth nightly., Historical Med  
  
polyethylene glycol (MIRALAX) 17 gram packet Take 17 g by mouth daily as needed (constipation). , Historical Med  
  
digoxin (DIGITEK) 0.125 mg tablet take 1 tablet by mouth once daily, Normal, Disp-90 Tab, R-3  
  
amLODIPine (NORVASC) 10 mg tablet Take 10 mg by mouth daily. , Historical Med, R-0  
  
OXYGEN-AIR DELIVERY SYSTEMS 2 L/min by Nasal route daily. Continuous. Company is [employer] 
 , Historical Med  
  
lisinopril (PRINIVIL, ZESTRIL) 20 mg tablet Take 1 Tab by mouth daily. , Print, Disp-30 Tab, R-0  
  
  
STOP taking these medications  
  
 warfarin (COUMADIN) 2.5 mg tablet Comments:  
Reason for Stopping:   
   
  
 
Disposition: Home Consultants:   
Gastroenterology - Dr. Harlene Primrose, MD. Brief Hospital Course (including pertinent history and physical findings) Bhakti Rhodes is a [de-identified] y.o. male with PMH chronic atrial fibrillation on warfarin, BPH, COPD on 2 L NC continuously, hypertension, heart failure with preserved EF (55% April 2018), iron deficiency anemia (baseline 9.0-10.0), CKD stage III,  Chronic constipation, coronary artery disease, former smoker (54 pack years), and chronic pain syndrome who presented to [facility] ED following a resulted outpatient CBC with a hemoglobin of 4.9. Symptomatic Anemia; Chronic atrial fibrillation on chronic anticoagulation; Supratherapeutic INR Upon admission, patient had hemoglobin of 4.9. He denied rectal bleeding, hematochezia, dark stools. Patient on warfarin due to atrial fibrillation and had INR of 3.4 at presentation.  He required transfusion of 2 units pRBC. CBC showed microcytic anemia most likely iron deficient in nature as evidenced by iron studies. Gastroenterology (Dr. Melissa Strickland) was consulted and patient completed EGD and colonoscopy on 5/29/19 with findings of gastritis, hiatal hernia, colon polyp, diverticulosis, and hemorrhoids. Patient with HAS-BLED score of 8.9%; CHADS-VASC score of 7.2%. As this was patient's third admission requiring transfusion due to bleed, patient made informed decision to discontinue his warfarin and to start aspirin 81 mg. Patient received 200 mg of IV iron sucrose before discharge. He is prescribed oral iron supplementation following discharge with instructions to take colace or Miralax for bowel regimen. Heart Failure with Preserved ejection fraction, Mild coronary artery disease Patient followed by Dr. Savannah Milian outpatient. Last echo (4/2018) showed EF 55% with hypokinesis of basal-mid anteroseptal walls. Digoxin 125 mcg daily was continued. Warfarin held during admission and discontinued upon discharge. Patient continued on pravastatin 40 mg daily. Chronic hypoxic respiratory failure secondary to COPD On 2 L nasal canula at baseline. He is followed by Dr. Mendez Cortez (last appt 3/21/19). Patient continued on home medications and satted at goal of 88-92% on 2 L.  
 
CKD stage III Baseline creatinine of 1.0-1.2. Admitted with creatinine of 1.4. Discharged with creatinine of 1.32. Chronic pain syndrome Continued on Norco 5-325 mg; 0.5 tablet every 12 hours. Patient's pain managed well on this regimen. Hyponatremia Patient presented with sodium of 132 and discharged with sodium of 133. Patient's chronic lung disease and use of ace inhibitor is likely contributory. Urine osmolality 386, random urine sodium of 13. Hypertension Blood pressures 130-164/70-86 in last 24 hours before discharge.  Patient's lisinopril had been held as he informed inpatient team PCP had stopped the medication due to lab finding. He was continued on his amlodipine 10 mg. Summarized key findings and results (labs, imaging studies, ECHO, cardiac cath, endoscopies, etc): EGD (5/29/19): esophagus - hiatal hernia. Stomach - mild erosive gastritis few flecks of coffee grounds. Duodenum/jejunum - normal  
Colonoscopy (5/29/19): rectum - internal hemorrhoids. Sigmoid - diverticulosis. Descending colon - diverticulosis. Transverse colon - normal. Ascending colon - 1 cm polyp (biopsied), cecum - normal.  
 
Iron panel: iron 29, tibc 429, 7% sat, ferritin 19. CBC w/Diff Lab Results Component Value Date/Time WBC 8.1 05/29/2019 10:25 AM  
 Hemoglobin, POC 10.9 (L) 07/26/2016 10:50 AM  
 HGB 8.1 (L) 05/29/2019 10:25 AM  
 Hematocrit, POC 32 (L) 07/26/2016 10:50 AM  
 HCT 26.0 (L) 05/29/2019 10:25 AM  
 PLATELET 338 64/14/1445 10:25 AM  
 MCV 73.0 (L) 05/29/2019 10:25 AM  
   
 
 
Chemistry Lab Results Component Value Date/Time Sodium 133 (L) 05/29/2019 04:53 AM  
 Potassium 3.9 05/29/2019 04:53 AM  
 Chloride 100 05/29/2019 04:53 AM  
 CO2 24 05/29/2019 04:53 AM  
 Anion gap 9 05/29/2019 04:53 AM  
 Glucose 85 05/29/2019 04:53 AM  
 BUN 13 05/29/2019 04:53 AM  
 Creatinine 1.32 (H) 05/29/2019 04:53 AM  
 BUN/Creatinine ratio 10 (L) 05/29/2019 04:53 AM  
 GFR est AA >60 05/29/2019 04:53 AM  
 GFR est non-AA 52 (L) 05/29/2019 04:53 AM  
 Calcium 8.5 05/29/2019 04:53 AM  
   
 
 
Functional status and cognitive function:   
Ambulates with: walker Status: alert, cooperative, no distress, appears stated age Diet:General Diet Code status and advanced care plan: Full [employer] Group of Contact: Nitin Schneider (Relationship: daughter) [phone] Patient Education:  Patient was educated on the following topics prior to discharge: taking their medications correctly; stopping anticoagulation, gastritis packet Follow-up:  
Follow-up Information Follow up With Specialties Details Why Contact Info Derian Menard DO  Go on 5/31/2019 Hospital FU appt Friday May 31 @ 2:30 [address] [facility] [address] 
[phone] 
  
  
 
 
 
================================================================ Jyoti Hernandez 35 Family Medicine PGY-1 
05/29/19 12:27 PM

## 2019-05-29 NOTE — PROGRESS NOTES
NUTRITION Nursing Referral: [facility] 
  
RECOMMENDATIONS / PLAN:  
 
- Add supplements: Ensure Enlive, once daily. - Continue RD inpatient monitoring and evaluation. NUTRITION INTERVENTIONS & DIAGNOSIS:  
 
[x] Meals/snacks: modify composition 
[x] Medical food supplement therapy: initiate Nutrition Diagnosis: Predicted inadequate energy intake related to appetite and food preferences as evidenced by pt with variable meal intake. ASSESSMENT:  
 
5/29: Tolerating regular diet. No source of bleeding but diverticulosis noted per EGD/colonscopy, encouraged intake of high fiber diet. Good intake per chart however directly observed 25% of breakfast consumed today. 5/27: Restricted to liquid diet in preparation for EGD/colonoscopy. Good intake to clears. Fair appetite/meal intake PTA. Reports some weight loss however stable hx per chart review. Vitamin K consistent diet education provided today. Average po intake adequate to meet patients estimated nutritional needs:   [] Yes     [] No   [x] Unable to determine at this time Diet: DIET REGULAR Food Allergies: NKFA Current Appetite:   [] Good     [x] Fair     [] Poor     [] Other: 
Appetite/meal intake prior to admission:   [] Good     [x] Fair     [] Poor     [x] Other: 2 meals/day; occasional nutritional supplement Feeding Limitations:  [] Swallowing difficulty    [x] Chewing difficulty: dentures    [] Other: 
Current Meal Intake:  
Patient Vitals for the past 100 hrs: 
 % Diet Eaten 05/27/19 1810 100 % 05/27/19 1300 100 % 05/27/19 0830 100 % 05/25/19 1519 100 % 05/25/19 0930 25 % BM: 5/28- watery Skin Integrity: WDL Edema:   [] No     [x] Yes Pertinent Medications: Reviewed: venofer, prn miralax, coumadin Recent Labs  
  05/29/19 
0453 05/28/19 
0403 05/27/19 
5178 * 132* 131*  
K 3.9 3.6 3.7  100 98* CO2 24 25 26 GLU 85 97 85 BUN 13 13 15 CREA 1.32* 1.17 1.23  
CA 8.5 8.5 8.5 Intake/Output Summary (Last 24 hours) at 5/29/2019 1047 Last data filed at 5/29/2019 9842 Gross per 24 hour Intake 100 ml Output 800 ml Net -700 ml Anthropometrics: 
Ht Readings from Last 1 Encounters:  
05/24/19 5' 6\" (1.676 m) Last 3 Recorded Weights in this Encounter 05/27/19 0403 05/28/19 0535 05/29/19 0422 Weight: 68.2 kg (150 lb 4.8 oz) 68.6 kg (151 lb 3.8 oz) 68.7 kg (151 lb 6.4 oz) Body mass index is 24.44 kg/m². Weight History: Pt reports fluctuations in weight hx with weight loss x 2 years ago then recent weight gain up to 164 lbs about 3 months ago. Per chart stable weight hx x 7 months PTA. Weight Metrics 5/29/2019 4/16/2019 3/21/2019 1/8/2019 11/9/2018 10/31/2018 10/12/2018 Weight 151 lb 6.4 oz 150 lb 150 lb 154 lb 154 lb 150 lb 150 lb BMI 24.44 kg/m2 24.21 kg/m2 24.21 kg/m2 24.86 kg/m2 24.86 kg/m2 24.21 kg/m2 24.21 kg/m2 Admitting Diagnosis: Symptomatic anemia [D64.9] Symptomatic anemia [D64.9] Pertinent PMHx: AAA repair, CHF, COPD, dyslipidemia, HTN, hypercholesterolemia, CKD stage 3, chronic constipation, anemia Education Needs:        [] None identified  [] Identified - Not appropriate at this time  [x]  Identified and addressed - refer to education log (vitamin K consistent diet) Learning Limitations:   [x] None identified  [] Identified Cultural, Yazidism & ethnic food preferences:  [x] None identified    [] Identified and addressed ESTIMATED NUTRITION NEEDS:  
 
Calories: 3403-3637 kcal (MSJx1.2-1.4) based on  [x] Actual BW: 68 kg      [] IBW Protein: 54-68 gm (0.8-1 gm/kg) based on  [x] Actual BW      [] IBW Fluid: 1 mL/kcal 
  
MONITORING & EVALUATION:  
 
Nutrition Goal(s): 1. Po intake of meals will meet >75% of patient estimated nutritional needs within the next 7 days.   Outcome:  [] Met/Ongoing    [x] Progressing towards goal    [] Not progressing towards goal    [] New/Initial goal  
 
 Monitoring:   [x] Food and beverage intake   [x] Diet order   [x] Nutrition-focused physical findings   [x] Treatment/therapy   [] Weight   [] Enteral nutrition intake Previous Recommendations (for follow-up assessments only):     [x]   Implemented       []   Not Implemented (RD to address)      [] No Longer Appropriate     [] No Recommendation Made Discharge Planning: cardiac diet [x] Participated in care planning, discharge planning, & interdisciplinary rounds as appropriate Zeus De La Paz RD Pager: [phone]

## 2019-05-29 NOTE — DISCHARGE INSTRUCTIONS
Gastritis: Care Instructions  Your Care Instructions    Gastritis is a sore and upset stomach. It happens when something irritates the stomach lining. Many things can cause it. These include an infection such as the flu or something you ate or drank. Medicines or a sore on the lining of the stomach (ulcer) also can cause it. Your belly may bloat and ache. You may belch, vomit, and feel sick to your stomach. You should be able to relieve the problem by taking medicine. And it may help to change your diet. If gastritis lasts, your doctor may prescribe medicine. Follow-up care is a key part of your treatment and safety. Be sure to make and go to all appointments, and call your doctor if you are having problems. It's also a good idea to know your test results and keep a list of the medicines you take. How can you care for yourself at home? · If your doctor prescribed antibiotics, take them as directed. Do not stop taking them just because you feel better. You need to take the full course of antibiotics. · Be safe with medicines. If your doctor prescribed medicine to decrease stomach acid, take it as directed. Call your doctor if you think you are having a problem with your medicine. · Do not take any other medicine, including over-the-counter pain relievers, without talking to your doctor first.  · If your doctor recommends over-the-counter medicine to reduce stomach acid, such as Pepcid AC, Prilosec, Tagamet HB, or Zantac 75, follow the directions on the label. · Drink plenty of fluids (enough so that your urine is light yellow or clear like water) to prevent dehydration. Choose water and other caffeine-free clear liquids. If you have kidney, heart, or liver disease and have to limit fluids, talk with your doctor before you increase the amount of fluids you drink. · Limit how much alcohol you drink. · Avoid coffee, tea, cola drinks, chocolate, and other foods with caffeine. They increase stomach acid.   When should you call for help? Call 911 anytime you think you may need emergency care. For example, call if:    · You vomit blood or what looks like coffee grounds.     · You pass maroon or very bloody stools.    Call your doctor now or seek immediate medical care if:    · You start breathing fast and have not produced urine in the last 8 hours.     · You cannot keep fluids down.    Watch closely for changes in your health, and be sure to contact your doctor if:    · You do not get better as expected. Where can you learn more? Go to http://brittney-eliseo.info/. Enter [id number] in the search box to learn more about \"Gastritis: Care Instructions. \"  Current as of: March 27, 2018  Content Version: 11.9  © 8088-4041 [employer]. Care instructions adapted under license by [employer] (which disclaims liability or warranty for this information).  If you have questions about a medical condition or this instruction, always ask your healthcare professional. [address] any warranty or liability for your use of this information.

## 2019-05-29 NOTE — PROGRESS NOTES
Discharge instructions given, signature obtained and placed in chart. Pt resting quietly in room awaiting transportation home.

## 2019-05-29 NOTE — ROUTINE PROCESS
Bedside shift change report given to ABHIJIT Garrett (oncoming nurse) by Flo Araiza RN (offgoing nurse). Report included the following information SBAR, Kardex, Intake/Output, Recent Results and Cardiac Rhythm A. Fib.

## 2019-05-29 NOTE — PROGRESS NOTES
D/c noted for today, pt will d/c home, pt's daughter to transport. Pt provided with senior services resources and home health brochure should pt feel he is in need of support when he gets home. No d/c concerns. Daughter to bring pt's portable oxygen at . Reason for Admission:   Symptomatic anemia [D64.9] Symptomatic anemia [D64.9] RRAT Score:     28 Resources/supports as identified by patient/family:   Daughter lives nearby, helps with transportation. Top Challenges facing patient (as identified by patient/family and CM): Finances/Medication cost?     n/a Transportation      n/a Support system or lack thereof?   n/a Living arrangements?        n/a Self-care/ADLs/Cognition?   n/a Current Advanced Directive/Advance Care Plan:   no 
                       
Plan for utilizing home health:    Not at this time, pt has used [facility] in the past and stated he does not feel as if he is in need of services at this time, provided with brochure in case pt changes mind. Likelihood of readmission:   HIGH Transition of Care Plan:               
 
 
Initial assessment completed with patient. Cognitive status of patient: oriented to time, place, person and situation. Face sheet information confirmed:  yes. The patient designates daughter to participate in his discharge plan and to receive any needed information. This patient lives in a single family home with brother. Patient is able to navigate steps as needed. Prior to hospitalization, patient was considered to be independent with ADLs/IADLS : yes . Patient has a current ACP document on file: no The daughter will be available to transport patient home upon discharge. The patient already has oxygen, walker, medical equipment available in the home. Patient is not currently active with home health. Patient has not stayed in a skilled nursing facility or rehab. This patient is on dialysis :no 
 
Currently, the discharge plan is Home. The patient states that he can obtain his medications from the pharmacy, and take his medications as directed. Patient's current insurance is Medicare and [employer]. Care Management Interventions PCP Verified by CM: Yes 
Palliative Care Criteria Met (RRAT>21 & CHF Dx)?: No 
Mode of Transport at Discharge: Self Transition of Care Consult (CM Consult): Discharge Planning Current Support Network: Own Home, Family Lives Nearby(disabled brother lives with pt. Daughter lives local) Confirm Follow Up Transport: Family Plan discussed with Pt/Family/Caregiver: Yes Discharge Location Discharge Placement: Home 
 
 
 
GRETA Leggett Case Management [phone]

## 2019-06-02 ENCOUNTER — HOSPITAL ENCOUNTER (INPATIENT)
Age: 79
LOS: 4 days | Discharge: NURSING, REHAB OR LONG-TERM CARE | DRG: 871 | End: 2019-06-06
Attending: EMERGENCY MEDICINE | Admitting: FAMILY MEDICINE
Payer: MEDICARE

## 2019-06-02 ENCOUNTER — APPOINTMENT (OUTPATIENT)
Dept: CT IMAGING | Age: 79
DRG: 871 | End: 2019-06-02
Attending: EMERGENCY MEDICINE
Payer: MEDICARE

## 2019-06-02 ENCOUNTER — APPOINTMENT (OUTPATIENT)
Dept: GENERAL RADIOLOGY | Age: 79
DRG: 871 | End: 2019-06-02
Attending: PHYSICIAN ASSISTANT
Payer: MEDICARE

## 2019-06-02 DIAGNOSIS — Z86.79 STATUS POST AAA (ABDOMINAL AORTIC ANEURYSM) REPAIR: Chronic | ICD-10-CM

## 2019-06-02 DIAGNOSIS — D64.9 SYMPTOMATIC ANEMIA: Primary | ICD-10-CM

## 2019-06-02 DIAGNOSIS — Z98.890 STATUS POST AAA (ABDOMINAL AORTIC ANEURYSM) REPAIR: Chronic | ICD-10-CM

## 2019-06-02 DIAGNOSIS — D72.829 LEUKOCYTOSIS, UNSPECIFIED TYPE: ICD-10-CM

## 2019-06-02 DIAGNOSIS — A41.9 SEPSIS, DUE TO UNSPECIFIED ORGANISM: ICD-10-CM

## 2019-06-02 DIAGNOSIS — E87.1 HYPONATREMIA: ICD-10-CM

## 2019-06-02 LAB
ANION GAP SERPL CALC-SCNC: 7 MMOL/L (ref 3–18)
APPEARANCE UR: CLEAR
ATRIAL RATE: 75 BPM
BACTERIA URNS QL MICRO: NEGATIVE /HPF
BASOPHILS # BLD: 0 K/UL (ref 0–0.1)
BASOPHILS NFR BLD: 0 % (ref 0–2)
BILIRUB UR QL: NEGATIVE
BUN SERPL-MCNC: 20 MG/DL (ref 7–18)
BUN/CREAT SERPL: 15 (ref 12–20)
CALCIUM SERPL-MCNC: 8.7 MG/DL (ref 8.5–10.1)
CALCULATED R AXIS, ECG10: -59 DEGREES
CALCULATED T AXIS, ECG11: 75 DEGREES
CHLORIDE SERPL-SCNC: 95 MMOL/L (ref 100–108)
CO2 SERPL-SCNC: 25 MMOL/L (ref 21–32)
COLOR UR: YELLOW
CREAT SERPL-MCNC: 1.31 MG/DL (ref 0.6–1.3)
DIAGNOSIS, 93000: NORMAL
DIFFERENTIAL METHOD BLD: ABNORMAL
EOSINOPHIL # BLD: 0 K/UL (ref 0–0.4)
EOSINOPHIL NFR BLD: 0 % (ref 0–5)
EPITH CASTS URNS QL MICRO: ABNORMAL /LPF (ref 0–5)
ERYTHROCYTE [DISTWIDTH] IN BLOOD BY AUTOMATED COUNT: 22.1 % (ref 11.6–14.5)
GLUCOSE SERPL-MCNC: 106 MG/DL (ref 74–99)
GLUCOSE UR STRIP.AUTO-MCNC: NEGATIVE MG/DL
HCT VFR BLD AUTO: 24.2 % (ref 36–48)
HEMOCCULT STL QL: NEGATIVE
HGB BLD-MCNC: 7.7 G/DL (ref 13–16)
HGB UR QL STRIP: ABNORMAL
INR PPP: 1.3 (ref 0.8–1.2)
KETONES UR QL STRIP.AUTO: NEGATIVE MG/DL
LACTATE BLD-SCNC: 0.66 MMOL/L (ref 0.4–2)
LEUKOCYTE ESTERASE UR QL STRIP.AUTO: NEGATIVE
LYMPHOCYTES # BLD: 0.7 K/UL (ref 0.9–3.6)
LYMPHOCYTES NFR BLD: 5 % (ref 21–52)
MCH RBC QN AUTO: 23.1 PG (ref 24–34)
MCHC RBC AUTO-ENTMCNC: 31.8 G/DL (ref 31–37)
MCV RBC AUTO: 72.7 FL (ref 74–97)
MONOCYTES # BLD: 0.9 K/UL (ref 0.05–1.2)
MONOCYTES NFR BLD: 7 % (ref 3–10)
MUCOUS THREADS URNS QL MICRO: ABNORMAL /LPF
NEUTS SEG # BLD: 11.8 K/UL (ref 1.8–8)
NEUTS SEG NFR BLD: 88 % (ref 40–73)
NITRITE UR QL STRIP.AUTO: NEGATIVE
PH UR STRIP: 5.5 [PH] (ref 5–8)
PLATELET # BLD AUTO: 266 K/UL (ref 135–420)
PLATELET COMMENTS,PCOM: ABNORMAL
PMV BLD AUTO: 8.5 FL (ref 9.2–11.8)
POTASSIUM SERPL-SCNC: 5 MMOL/L (ref 3.5–5.5)
PROT UR STRIP-MCNC: >1000 MG/DL
PROTHROMBIN TIME: 15.7 SEC (ref 11.5–15.2)
Q-T INTERVAL, ECG07: 336 MS
QRS DURATION, ECG06: 92 MS
QTC CALCULATION (BEZET), ECG08: 402 MS
RBC # BLD AUTO: 3.33 M/UL (ref 4.7–5.5)
RBC #/AREA URNS HPF: ABNORMAL /HPF (ref 0–5)
RBC MORPH BLD: ABNORMAL
RBC MORPH BLD: ABNORMAL
SODIUM SERPL-SCNC: 127 MMOL/L (ref 136–145)
SP GR UR REFRACTOMETRY: >1.03 (ref 1–1.03)
TROPONIN I SERPL-MCNC: <0.02 NG/ML (ref 0–0.04)
UROBILINOGEN UR QL STRIP.AUTO: 1 EU/DL (ref 0.2–1)
VENTRICULAR RATE, ECG03: 86 BPM
WBC # BLD AUTO: 13.4 K/UL (ref 4.6–13.2)
WBC URNS QL MICRO: ABNORMAL /HPF (ref 0–4)

## 2019-06-02 PROCEDURE — 74011636320 HC RX REV CODE- 636/320: Performed by: EMERGENCY MEDICINE

## 2019-06-02 PROCEDURE — 94640 AIRWAY INHALATION TREATMENT: CPT

## 2019-06-02 PROCEDURE — 87040 BLOOD CULTURE FOR BACTERIA: CPT

## 2019-06-02 PROCEDURE — 84300 ASSAY OF URINE SODIUM: CPT

## 2019-06-02 PROCEDURE — 74011250637 HC RX REV CODE- 250/637: Performed by: STUDENT IN AN ORGANIZED HEALTH CARE EDUCATION/TRAINING PROGRAM

## 2019-06-02 PROCEDURE — 83880 ASSAY OF NATRIURETIC PEPTIDE: CPT

## 2019-06-02 PROCEDURE — 74011000250 HC RX REV CODE- 250: Performed by: EMERGENCY MEDICINE

## 2019-06-02 PROCEDURE — 86923 COMPATIBILITY TEST ELECTRIC: CPT

## 2019-06-02 PROCEDURE — 86900 BLOOD TYPING SEROLOGIC ABO: CPT

## 2019-06-02 PROCEDURE — 77030013033 HC MSK BPAP/CPAP MMKA -B

## 2019-06-02 PROCEDURE — 83930 ASSAY OF BLOOD OSMOLALITY: CPT

## 2019-06-02 PROCEDURE — 77030029684 HC NEB SM VOL KT MONA -A

## 2019-06-02 PROCEDURE — 65660000004 HC RM CVT STEPDOWN

## 2019-06-02 PROCEDURE — 82272 OCCULT BLD FECES 1-3 TESTS: CPT

## 2019-06-02 PROCEDURE — 84484 ASSAY OF TROPONIN QUANT: CPT

## 2019-06-02 PROCEDURE — 85610 PROTHROMBIN TIME: CPT

## 2019-06-02 PROCEDURE — 74011000258 HC RX REV CODE- 258: Performed by: EMERGENCY MEDICINE

## 2019-06-02 PROCEDURE — 80048 BASIC METABOLIC PNL TOTAL CA: CPT

## 2019-06-02 PROCEDURE — 85025 COMPLETE CBC W/AUTO DIFF WBC: CPT

## 2019-06-02 PROCEDURE — 93005 ELECTROCARDIOGRAM TRACING: CPT

## 2019-06-02 PROCEDURE — 99285 EMERGENCY DEPT VISIT HI MDM: CPT

## 2019-06-02 PROCEDURE — 74011000250 HC RX REV CODE- 250: Performed by: STUDENT IN AN ORGANIZED HEALTH CARE EDUCATION/TRAINING PROGRAM

## 2019-06-02 PROCEDURE — 74011250636 HC RX REV CODE- 250/636: Performed by: EMERGENCY MEDICINE

## 2019-06-02 PROCEDURE — 71275 CT ANGIOGRAPHY CHEST: CPT

## 2019-06-02 PROCEDURE — 83935 ASSAY OF URINE OSMOLALITY: CPT

## 2019-06-02 PROCEDURE — 65660000000 HC RM CCU STEPDOWN

## 2019-06-02 PROCEDURE — 94761 N-INVAS EAR/PLS OXIMETRY MLT: CPT

## 2019-06-02 PROCEDURE — 83605 ASSAY OF LACTIC ACID: CPT

## 2019-06-02 PROCEDURE — 74011250636 HC RX REV CODE- 250/636: Performed by: STUDENT IN AN ORGANIZED HEALTH CARE EDUCATION/TRAINING PROGRAM

## 2019-06-02 PROCEDURE — 81001 URINALYSIS AUTO W/SCOPE: CPT

## 2019-06-02 PROCEDURE — 71046 X-RAY EXAM CHEST 2 VIEWS: CPT

## 2019-06-02 RX ORDER — IPRATROPIUM BROMIDE AND ALBUTEROL SULFATE 2.5; .5 MG/3ML; MG/3ML
3 SOLUTION RESPIRATORY (INHALATION)
Status: DISCONTINUED | OUTPATIENT
Start: 2019-06-02 | End: 2019-06-06 | Stop reason: HOSPADM

## 2019-06-02 RX ORDER — SODIUM CHLORIDE 0.9 % (FLUSH) 0.9 %
5-10 SYRINGE (ML) INJECTION AS NEEDED
Status: DISCONTINUED | OUTPATIENT
Start: 2019-06-02 | End: 2019-06-06 | Stop reason: HOSPADM

## 2019-06-02 RX ORDER — IPRATROPIUM BROMIDE AND ALBUTEROL SULFATE 2.5; .5 MG/3ML; MG/3ML
3 SOLUTION RESPIRATORY (INHALATION)
Status: COMPLETED | OUTPATIENT
Start: 2019-06-02 | End: 2019-06-02

## 2019-06-02 RX ORDER — ALBUTEROL SULFATE 0.83 MG/ML
2.5 SOLUTION RESPIRATORY (INHALATION)
Status: DISCONTINUED | OUTPATIENT
Start: 2019-06-02 | End: 2019-06-02 | Stop reason: SDUPTHER

## 2019-06-02 RX ORDER — GUAIFENESIN 100 MG/5ML
81 LIQUID (ML) ORAL DAILY
COMMUNITY
End: 2019-06-06

## 2019-06-02 RX ORDER — SODIUM CHLORIDE 9 MG/ML
100 INJECTION, SOLUTION INTRAVENOUS ONCE
Status: COMPLETED | OUTPATIENT
Start: 2019-06-02 | End: 2019-06-03

## 2019-06-02 RX ORDER — FUROSEMIDE 10 MG/ML
20 INJECTION INTRAMUSCULAR; INTRAVENOUS
Status: COMPLETED | OUTPATIENT
Start: 2019-06-02 | End: 2019-06-03

## 2019-06-02 RX ORDER — FUROSEMIDE 10 MG/ML
20 INJECTION INTRAMUSCULAR; INTRAVENOUS DAILY
Status: DISCONTINUED | OUTPATIENT
Start: 2019-06-02 | End: 2019-06-05

## 2019-06-02 RX ORDER — LISINOPRIL 20 MG/1
20 TABLET ORAL DAILY
Status: DISCONTINUED | OUTPATIENT
Start: 2019-06-03 | End: 2019-06-06 | Stop reason: HOSPADM

## 2019-06-02 RX ORDER — LANOLIN ALCOHOL/MO/W.PET/CERES
325 CREAM (GRAM) TOPICAL 2 TIMES DAILY WITH MEALS
Status: DISCONTINUED | OUTPATIENT
Start: 2019-06-03 | End: 2019-06-06 | Stop reason: HOSPADM

## 2019-06-02 RX ORDER — LEVOFLOXACIN 5 MG/ML
750 INJECTION, SOLUTION INTRAVENOUS
Status: DISCONTINUED | OUTPATIENT
Start: 2019-06-02 | End: 2019-06-04

## 2019-06-02 RX ORDER — ONDANSETRON 2 MG/ML
4 INJECTION INTRAMUSCULAR; INTRAVENOUS
Status: COMPLETED | OUTPATIENT
Start: 2019-06-02 | End: 2019-06-02

## 2019-06-02 RX ORDER — DIGOXIN 125 MCG
0.12 TABLET ORAL DAILY
Status: DISCONTINUED | OUTPATIENT
Start: 2019-06-03 | End: 2019-06-06 | Stop reason: HOSPADM

## 2019-06-02 RX ORDER — AMLODIPINE BESYLATE 10 MG/1
10 TABLET ORAL DAILY
Status: DISCONTINUED | OUTPATIENT
Start: 2019-06-03 | End: 2019-06-06 | Stop reason: HOSPADM

## 2019-06-02 RX ORDER — GUAIFENESIN 100 MG/5ML
81 LIQUID (ML) ORAL DAILY
Status: DISCONTINUED | OUTPATIENT
Start: 2019-06-03 | End: 2019-06-06 | Stop reason: HOSPADM

## 2019-06-02 RX ORDER — VANCOMYCIN/0.9 % SOD CHLORIDE 1 G/100 ML
1000 PLASTIC BAG, INJECTION (ML) INTRAVENOUS ONCE
Status: DISCONTINUED | OUTPATIENT
Start: 2019-06-02 | End: 2019-06-02 | Stop reason: DRUGHIGH

## 2019-06-02 RX ORDER — PANTOPRAZOLE SODIUM 40 MG/1
40 TABLET, DELAYED RELEASE ORAL
Status: DISCONTINUED | OUTPATIENT
Start: 2019-06-03 | End: 2019-06-06 | Stop reason: HOSPADM

## 2019-06-02 RX ORDER — POLYETHYLENE GLYCOL 3350 17 G/17G
17 POWDER, FOR SOLUTION ORAL
Status: DISCONTINUED | OUTPATIENT
Start: 2019-06-02 | End: 2019-06-06 | Stop reason: HOSPADM

## 2019-06-02 RX ORDER — PRAVASTATIN SODIUM 20 MG/1
40 TABLET ORAL
Status: DISCONTINUED | OUTPATIENT
Start: 2019-06-02 | End: 2019-06-06 | Stop reason: HOSPADM

## 2019-06-02 RX ORDER — TRAZODONE HYDROCHLORIDE 50 MG/1
50 TABLET ORAL
Status: DISCONTINUED | OUTPATIENT
Start: 2019-06-02 | End: 2019-06-06 | Stop reason: HOSPADM

## 2019-06-02 RX ORDER — ENOXAPARIN SODIUM 100 MG/ML
40 INJECTION SUBCUTANEOUS EVERY 24 HOURS
Status: DISCONTINUED | OUTPATIENT
Start: 2019-06-02 | End: 2019-06-02

## 2019-06-02 RX ORDER — LEVOFLOXACIN 5 MG/ML
750 INJECTION, SOLUTION INTRAVENOUS EVERY 24 HOURS
Status: DISCONTINUED | OUTPATIENT
Start: 2019-06-02 | End: 2019-06-02

## 2019-06-02 RX ORDER — HYDROCODONE BITARTRATE AND ACETAMINOPHEN 5; 325 MG/1; MG/1
0.5 TABLET ORAL
Status: DISCONTINUED | OUTPATIENT
Start: 2019-06-02 | End: 2019-06-03

## 2019-06-02 RX ORDER — IPRATROPIUM BROMIDE AND ALBUTEROL SULFATE 2.5; .5 MG/3ML; MG/3ML
3 SOLUTION RESPIRATORY (INHALATION)
Status: DISCONTINUED | OUTPATIENT
Start: 2019-06-03 | End: 2019-06-06 | Stop reason: HOSPADM

## 2019-06-02 RX ORDER — SODIUM CHLORIDE 9 MG/ML
250 INJECTION, SOLUTION INTRAVENOUS AS NEEDED
Status: DISCONTINUED | OUTPATIENT
Start: 2019-06-02 | End: 2019-06-03

## 2019-06-02 RX ORDER — VANCOMYCIN/0.9 % SOD CHLORIDE 1.5G/250ML
1500 PLASTIC BAG, INJECTION (ML) INTRAVENOUS ONCE
Status: COMPLETED | OUTPATIENT
Start: 2019-06-02 | End: 2019-06-03

## 2019-06-02 RX ADMIN — LEVOFLOXACIN 750 MG: 5 INJECTION, SOLUTION INTRAVENOUS at 22:10

## 2019-06-02 RX ADMIN — SODIUM CHLORIDE 1000 ML: 900 INJECTION, SOLUTION INTRAVENOUS at 22:15

## 2019-06-02 RX ADMIN — SODIUM CHLORIDE 61 ML: 9 INJECTION, SOLUTION INTRAVENOUS at 23:20

## 2019-06-02 RX ADMIN — SODIUM CHLORIDE 100 ML/HR: 900 INJECTION, SOLUTION INTRAVENOUS at 23:15

## 2019-06-02 RX ADMIN — ONDANSETRON 4 MG: 2 INJECTION INTRAMUSCULAR; INTRAVENOUS at 21:30

## 2019-06-02 RX ADMIN — TRAZODONE HYDROCHLORIDE 50 MG: 50 TABLET ORAL at 23:53

## 2019-06-02 RX ADMIN — VANCOMYCIN HYDROCHLORIDE 1500 MG: 10 INJECTION, POWDER, LYOPHILIZED, FOR SOLUTION INTRAVENOUS at 23:47

## 2019-06-02 RX ADMIN — FUROSEMIDE 20 MG: 10 INJECTION, SOLUTION INTRAMUSCULAR; INTRAVENOUS at 23:48

## 2019-06-02 RX ADMIN — HYDROCODONE BITARTRATE AND ACETAMINOPHEN 0.5 TABLET: 5; 325 TABLET ORAL at 23:54

## 2019-06-02 RX ADMIN — PRAVASTATIN SODIUM 40 MG: 20 TABLET ORAL at 23:53

## 2019-06-02 RX ADMIN — PIPERACILLIN, TAZOBACTAM 4.5 G: 4; .5 INJECTION, POWDER, LYOPHILIZED, FOR SOLUTION INTRAVENOUS at 21:45

## 2019-06-02 RX ADMIN — IOPAMIDOL 72 ML: 755 INJECTION, SOLUTION INTRAVENOUS at 18:31

## 2019-06-02 RX ADMIN — SODIUM CHLORIDE 1000 ML: 900 INJECTION, SOLUTION INTRAVENOUS at 21:50

## 2019-06-02 RX ADMIN — IPRATROPIUM BROMIDE AND ALBUTEROL SULFATE 3 ML: .5; 3 SOLUTION RESPIRATORY (INHALATION) at 23:47

## 2019-06-02 RX ADMIN — IPRATROPIUM BROMIDE AND ALBUTEROL SULFATE 3 ML: .5; 3 SOLUTION RESPIRATORY (INHALATION) at 17:43

## 2019-06-02 NOTE — ED NOTES
Received pt c/o SOB worsening since yesterday. Pt states he was here last week for a blood transfusion and received 3 units.

## 2019-06-02 NOTE — ED TRIAGE NOTES
Pt was discharged from here on Wednesday for anemia, received 3 units of blood and ordered to stop blood thinners. Pt arrives today with c/o increasing sob and coughing up blood, quarter size myra red.  Denies any chest pain

## 2019-06-03 ENCOUNTER — APPOINTMENT (OUTPATIENT)
Dept: GENERAL RADIOLOGY | Age: 79
DRG: 871 | End: 2019-06-03
Attending: STUDENT IN AN ORGANIZED HEALTH CARE EDUCATION/TRAINING PROGRAM
Payer: MEDICARE

## 2019-06-03 ENCOUNTER — APPOINTMENT (OUTPATIENT)
Dept: NON INVASIVE DIAGNOSTICS | Age: 79
DRG: 871 | End: 2019-06-03
Attending: STUDENT IN AN ORGANIZED HEALTH CARE EDUCATION/TRAINING PROGRAM
Payer: MEDICARE

## 2019-06-03 ENCOUNTER — APPOINTMENT (OUTPATIENT)
Dept: CT IMAGING | Age: 79
DRG: 871 | End: 2019-06-03
Attending: PHYSICIAN ASSISTANT
Payer: MEDICARE

## 2019-06-03 LAB
ALBUMIN SERPL-MCNC: 3 G/DL (ref 3.4–5)
ALBUMIN/GLOB SERPL: 0.8 {RATIO} (ref 0.8–1.7)
ALP SERPL-CCNC: 85 U/L (ref 45–117)
ALT SERPL-CCNC: 18 U/L (ref 16–61)
ANION GAP SERPL CALC-SCNC: 9 MMOL/L (ref 3–18)
ARTERIAL PATENCY WRIST A: YES
AST SERPL-CCNC: 20 U/L (ref 15–37)
BASE DEFICIT BLD-SCNC: 4 MMOL/L
BASOPHILS # BLD: 0 K/UL (ref 0–0.1)
BASOPHILS NFR BLD: 0 % (ref 0–2)
BDY SITE: ABNORMAL
BILIRUB SERPL-MCNC: 0.8 MG/DL (ref 0.2–1)
BNP SERPL-MCNC: 7673 PG/ML (ref 0–1800)
BUN SERPL-MCNC: 25 MG/DL (ref 7–18)
BUN/CREAT SERPL: 19 (ref 12–20)
CALCIUM SERPL-MCNC: 8.1 MG/DL (ref 8.5–10.1)
CHLORIDE SERPL-SCNC: 96 MMOL/L (ref 100–108)
CO2 SERPL-SCNC: 24 MMOL/L (ref 21–32)
CREAT SERPL-MCNC: 1.35 MG/DL (ref 0.6–1.3)
DIFFERENTIAL METHOD BLD: ABNORMAL
EOSINOPHIL # BLD: 0 K/UL (ref 0–0.4)
EOSINOPHIL NFR BLD: 0 % (ref 0–5)
ERYTHROCYTE [DISTWIDTH] IN BLOOD BY AUTOMATED COUNT: 22.5 % (ref 11.6–14.5)
GAS FLOW.O2 O2 DELIVERY SYS: ABNORMAL L/MIN
GLOBULIN SER CALC-MCNC: 3.9 G/DL (ref 2–4)
GLUCOSE SERPL-MCNC: 87 MG/DL (ref 74–99)
HCO3 BLD-SCNC: 20.8 MMOL/L (ref 22–26)
HCT VFR BLD AUTO: 22.1 % (ref 36–48)
HCT VFR BLD AUTO: 22.7 % (ref 36–48)
HGB BLD-MCNC: 6.7 G/DL (ref 13–16)
HGB BLD-MCNC: 7.2 G/DL (ref 13–16)
LACTATE SERPL-SCNC: 1.1 MMOL/L (ref 0.4–2)
LYMPHOCYTES # BLD: 0.7 K/UL (ref 0.9–3.6)
LYMPHOCYTES NFR BLD: 7 % (ref 21–52)
MCH RBC QN AUTO: 22.2 PG (ref 24–34)
MCHC RBC AUTO-ENTMCNC: 30.3 G/DL (ref 31–37)
MCV RBC AUTO: 73.2 FL (ref 74–97)
MONOCYTES # BLD: 0.6 K/UL (ref 0.05–1.2)
MONOCYTES NFR BLD: 6 % (ref 3–10)
NEUTS SEG # BLD: 9 K/UL (ref 1.8–8)
NEUTS SEG NFR BLD: 87 % (ref 40–73)
O2/TOTAL GAS SETTING VFR VENT: 35 %
OSMOLALITY SERPL: 272 MOSM/KG H2O (ref 280–301)
OSMOLALITY UR: 431 MOSM/KG H2O (ref 50–1400)
PCO2 BLD: 37.2 MMHG (ref 35–45)
PEEP RESPIRATORY: 6 CMH2O
PH BLD: 7.36 [PH] (ref 7.35–7.45)
PIP ISTAT,IPIP: 12
PLATELET # BLD AUTO: 252 K/UL (ref 135–420)
PMV BLD AUTO: 8.5 FL (ref 9.2–11.8)
PO2 BLD: 95 MMHG (ref 80–100)
POTASSIUM SERPL-SCNC: 4.4 MMOL/L (ref 3.5–5.5)
PROT SERPL-MCNC: 6.9 G/DL (ref 6.4–8.2)
RBC # BLD AUTO: 3.02 M/UL (ref 4.7–5.5)
SAO2 % BLD: 97 % (ref 92–97)
SERVICE CMNT-IMP: ABNORMAL
SODIUM SERPL-SCNC: 129 MMOL/L (ref 136–145)
SODIUM UR-SCNC: 8 MMOL/L (ref 20–110)
SPECIMEN TYPE: ABNORMAL
TOTAL RESP. RATE, ITRR: 20
WBC # BLD AUTO: 10.3 K/UL (ref 4.6–13.2)

## 2019-06-03 PROCEDURE — 93306 TTE W/DOPPLER COMPLETE: CPT

## 2019-06-03 PROCEDURE — 36600 WITHDRAWAL OF ARTERIAL BLOOD: CPT

## 2019-06-03 PROCEDURE — 85018 HEMOGLOBIN: CPT

## 2019-06-03 PROCEDURE — 77010033678 HC OXYGEN DAILY: Performed by: FAMILY MEDICINE

## 2019-06-03 PROCEDURE — 94640 AIRWAY INHALATION TREATMENT: CPT

## 2019-06-03 PROCEDURE — 74011250636 HC RX REV CODE- 250/636: Performed by: STUDENT IN AN ORGANIZED HEALTH CARE EDUCATION/TRAINING PROGRAM

## 2019-06-03 PROCEDURE — 77030020186 HC BOOT HL PROTCT SAGE -B

## 2019-06-03 PROCEDURE — 80053 COMPREHEN METABOLIC PANEL: CPT

## 2019-06-03 PROCEDURE — 85025 COMPLETE CBC W/AUTO DIFF WBC: CPT

## 2019-06-03 PROCEDURE — 74011000258 HC RX REV CODE- 258: Performed by: EMERGENCY MEDICINE

## 2019-06-03 PROCEDURE — 74174 CTA ABD&PLVS W/CONTRAST: CPT

## 2019-06-03 PROCEDURE — 36415 COLL VENOUS BLD VENIPUNCTURE: CPT

## 2019-06-03 PROCEDURE — 82803 BLOOD GASES ANY COMBINATION: CPT

## 2019-06-03 PROCEDURE — 74011250637 HC RX REV CODE- 250/637: Performed by: STUDENT IN AN ORGANIZED HEALTH CARE EDUCATION/TRAINING PROGRAM

## 2019-06-03 PROCEDURE — 77030037878 HC DRSG MEPILEX >48IN BORD MOLN -B

## 2019-06-03 PROCEDURE — 74011636320 HC RX REV CODE- 636/320: Performed by: PHYSICIAN ASSISTANT

## 2019-06-03 PROCEDURE — 36430 TRANSFUSION BLD/BLD COMPNT: CPT

## 2019-06-03 PROCEDURE — 71045 X-RAY EXAM CHEST 1 VIEW: CPT

## 2019-06-03 PROCEDURE — 74011000258 HC RX REV CODE- 258: Performed by: STUDENT IN AN ORGANIZED HEALTH CARE EDUCATION/TRAINING PROGRAM

## 2019-06-03 PROCEDURE — P9016 RBC LEUKOCYTES REDUCED: HCPCS

## 2019-06-03 PROCEDURE — 65270000029 HC RM PRIVATE

## 2019-06-03 PROCEDURE — 74011000250 HC RX REV CODE- 250: Performed by: STUDENT IN AN ORGANIZED HEALTH CARE EDUCATION/TRAINING PROGRAM

## 2019-06-03 PROCEDURE — 77030037870 HC GLD SHT PREVALON SAGE -B

## 2019-06-03 PROCEDURE — 74011250636 HC RX REV CODE- 250/636: Performed by: EMERGENCY MEDICINE

## 2019-06-03 PROCEDURE — 94660 CPAP INITIATION&MGMT: CPT

## 2019-06-03 PROCEDURE — 83605 ASSAY OF LACTIC ACID: CPT

## 2019-06-03 RX ORDER — HYDROCODONE BITARTRATE AND ACETAMINOPHEN 5; 325 MG/1; MG/1
0.5 TABLET ORAL 3 TIMES DAILY
Status: DISCONTINUED | OUTPATIENT
Start: 2019-06-03 | End: 2019-06-06 | Stop reason: HOSPADM

## 2019-06-03 RX ORDER — SODIUM CHLORIDE 9 MG/ML
250 INJECTION, SOLUTION INTRAVENOUS AS NEEDED
Status: DISCONTINUED | OUTPATIENT
Start: 2019-06-03 | End: 2019-06-04 | Stop reason: SDUPTHER

## 2019-06-03 RX ORDER — VANCOMYCIN/0.9 % SOD CHLORIDE 1 G/100 ML
1000 PLASTIC BAG, INJECTION (ML) INTRAVENOUS EVERY 24 HOURS
Status: DISCONTINUED | OUTPATIENT
Start: 2019-06-03 | End: 2019-06-04

## 2019-06-03 RX ORDER — FUROSEMIDE 10 MG/ML
40 INJECTION INTRAMUSCULAR; INTRAVENOUS ONCE
Status: COMPLETED | OUTPATIENT
Start: 2019-06-03 | End: 2019-06-03

## 2019-06-03 RX ADMIN — HYDROCODONE BITARTRATE AND ACETAMINOPHEN 0.5 TABLET: 5; 325 TABLET ORAL at 21:32

## 2019-06-03 RX ADMIN — VANCOMYCIN HYDROCHLORIDE 1000 MG: 10 INJECTION, POWDER, LYOPHILIZED, FOR SOLUTION INTRAVENOUS at 23:39

## 2019-06-03 RX ADMIN — LISINOPRIL 20 MG: 20 TABLET ORAL at 08:33

## 2019-06-03 RX ADMIN — FERROUS SULFATE TAB 325 MG (65 MG ELEMENTAL FE) 325 MG: 325 (65 FE) TAB at 17:20

## 2019-06-03 RX ADMIN — DIGOXIN 0.12 MG: 125 TABLET ORAL at 08:33

## 2019-06-03 RX ADMIN — IOPAMIDOL 80 ML: 755 INJECTION, SOLUTION INTRAVENOUS at 19:25

## 2019-06-03 RX ADMIN — PANTOPRAZOLE SODIUM 40 MG: 40 TABLET, DELAYED RELEASE ORAL at 08:33

## 2019-06-03 RX ADMIN — PRAVASTATIN SODIUM 40 MG: 20 TABLET ORAL at 21:30

## 2019-06-03 RX ADMIN — IPRATROPIUM BROMIDE AND ALBUTEROL SULFATE 3 ML: .5; 3 SOLUTION RESPIRATORY (INHALATION) at 14:00

## 2019-06-03 RX ADMIN — FERROUS SULFATE TAB 325 MG (65 MG ELEMENTAL FE) 325 MG: 325 (65 FE) TAB at 08:33

## 2019-06-03 RX ADMIN — PIPERACILLIN, TAZOBACTAM 4.5 G: 4; .5 INJECTION, POWDER, LYOPHILIZED, FOR SOLUTION INTRAVENOUS at 14:26

## 2019-06-03 RX ADMIN — FUROSEMIDE 20 MG: 10 INJECTION, SOLUTION INTRAMUSCULAR; INTRAVENOUS at 00:04

## 2019-06-03 RX ADMIN — FUROSEMIDE 40 MG: 10 INJECTION, SOLUTION INTRAMUSCULAR; INTRAVENOUS at 17:20

## 2019-06-03 RX ADMIN — TRAZODONE HYDROCHLORIDE 50 MG: 50 TABLET ORAL at 21:30

## 2019-06-03 RX ADMIN — HYDROCODONE BITARTRATE AND ACETAMINOPHEN 0.5 TABLET: 5; 325 TABLET ORAL at 17:20

## 2019-06-03 RX ADMIN — AMLODIPINE BESYLATE 10 MG: 10 TABLET ORAL at 08:33

## 2019-06-03 RX ADMIN — IPRATROPIUM BROMIDE AND ALBUTEROL SULFATE 3 ML: .5; 3 SOLUTION RESPIRATORY (INHALATION) at 21:43

## 2019-06-03 RX ADMIN — HYDROCODONE BITARTRATE AND ACETAMINOPHEN 0.5 TABLET: 5; 325 TABLET ORAL at 11:44

## 2019-06-03 RX ADMIN — PIPERACILLIN, TAZOBACTAM 4.5 G: 4; .5 INJECTION, POWDER, LYOPHILIZED, FOR SOLUTION INTRAVENOUS at 08:31

## 2019-06-03 RX ADMIN — PIPERACILLIN, TAZOBACTAM 4.5 G: 4; .5 INJECTION, POWDER, LYOPHILIZED, FOR SOLUTION INTRAVENOUS at 04:19

## 2019-06-03 RX ADMIN — PIPERACILLIN, TAZOBACTAM 4.5 G: 4; .5 INJECTION, POWDER, LYOPHILIZED, FOR SOLUTION INTRAVENOUS at 21:40

## 2019-06-03 NOTE — ED PROVIDER NOTES
[facility]  Emergency Department Treatment Report    Patient: Christal Mojica Age: [de-identified] y.o. Sex: male    YOB: 1940 Admit Date: 6/2/2019 PCP: Aime Gould DO   MRN: [id number]  CSN: [id number]     Room: [place] [address] Time Dictated: 8:20 PM      I hereby certify this patient for admission based upon medical necessity as    noted below:    Chief Complaint   Chief Complaint   Patient presents with    Shortness of Breath    Hemoptysis       History of Present Illness   [de-identified] y.o. male with past medical history as below presents with shortness of breath and hemoptysis. Patient states that he was recently discharged from this hospital for symptomatic anemia. He underwent upper and lower endoscopy at that time with no evidence of active bleeding. He denies blood per rectum. However, he states that he has had some hemoptysis. No hematemesis. He states that he has been getting increasingly short of breath for the past 2 days. It is worse with exertion. Mildly relieved with rest.  He denies fever, chills, chest pain, abdominal pain, nausea, vomiting, diarrhea, or other associated symptoms. Review of Systems   Constitutional:  No fever, chills  Eyes: No visual complaints. ENT: No sore throat, runny nose  Heme/Lymph: No easy bruising or lymph node swelling  Respiratory: Positive for hematemesis, cough, dyspnea  Cardiovascular: No chest pain, palpitations  Gastrointestinal: No vomiting, diarrhea  Genitourinary: No dysuria, frequency  Musculoskeletal: No joint pain, swelling  Integumentary: No rashes or other skin lesions  Neurological: No headaches, sensory or motor symptoms. Denies complaints in all other systems    Past Medical/Surgical History     Past Medical History:   Diagnosis Date    Aneurysm [facility])     AAA repair 2006 & 2012    Aorto-iliac duplex 02/13/2015    Tech difficult. Patent aorta bi-iliac endograft w/o leak or limb dysfunction.       Arrhythmia     a fib    Cardiac cath 11/01/2012    RCA (sm, nondom) patent. LM patent. LAD 25%. CX (dom) 45% mid. LVEDP 12 mmHg. No WMA. PA 27/12. W 10-12. CO/CI 5.2/2.6 (TD).  Cardiac echocardiogram, abnormal 02/20/2016    EF 55%. No WMA. Indeterminate diastolic fx. RVSP 60 mmHg. Severe LAE. Mild MR. Mod TR.  IVCE. Similar to study of 10/26/12.  Cardiovascular aorto-iliac duplex 02/13/2015    Patent aorta bi-iliac endovascular graft w/o leak or limb dysfunction. Sac measures 4.21 x 4.47 cm (4.4 x 4.6 cm on 1/31/14).  Cardiovascular LE peripheral arterial testing 07/29/2013    No significant LE arterial disease bilaterally. R GHADA 1. 12.  L GHADA 1. 12.  R DBI 0.83. L DBI 0.71. Exercise deferred.  Cardiovascular renal duplex 10/31/2012    No RA stenosis. Intrinsic/med disease in left kidney. Patent aortic endograft. Patent, thrombus-free renal veins bilaterally.  Carotid duplex 07/29/2013    Mild <50% bilateral ICA plaquing.       Chronic lung disease     Cigarette smoker     Congestive heart failure (CHF) (HCC)     COPD (chronic obstructive pulmonary disease) (HCC)     and emphysema; likely secondary to tobacco abuse    Difficult intubation     Dyslipidemia     low HDL    Emphysema     HTN (hypertension)     Hypercholesterolemia     Ill-defined condition     hernia    Increased prostate specific antigen (PSA) velocity     Long term (current) use of anticoagulants     coumadin    Osteoarthritis     Osteomyelitis (HCC)     Paroxysmal atrial fibrillation ([address]     Peripheral vascular disease ([address]     AAA repair 12/2007    Persistent atrial Fibrillation     Persistent atrial fibrillation ([address]     Unspecified adverse effect of anesthesia     difficulty breathing placed in ICU Oct 2012 (AAA repair)     Past Surgical History:   Procedure Laterality Date    BRONCHOSCOPY DIAGNOSTIC  11/2/2012         CARDIAC CATHETERIZATION  11/1/2012         COLONOSCOPY N/A 7/26/2016    COLONOSCOPY with Polypectomies performed by Deandre Denny MD at  [address] COLONOSCOPY N/A 2019    COLONOSCOPY with polypectomy performed by Cosmo Choudhary MD at [address] HX AAA REPAIR       &     HX HEART CATHETERIZATION  3/4/2009    1. RCA small, nondominant; patent. 2. LMCA patent. 3. LAD is long, wrapped around apical vessel. Diffuse, 20-30% stenosis noted. 4. CCA is large, dominant vessel. Diffuse 20-30% stenosis. 5. LVEDP is 16 mmHg. 6. Overall left ventricular systolic function mildly diminshed with est. EF 45%. Mild, global hypokinesis noted. 7. No significant mitral regurgitation or aortic stenosis noted.  (see report)    HX HERNIA REPAIR  2014    rt inguinal     HX HERNIA REPAIR  2016    LEFT INGUINAL HERNIA REPAIR DR. Morgan Toribio ING HERNIA,5+Y/O,JESSIE Left 16    Dr. Creig Simmonds  2012            Social History     Social History     Socioeconomic History    Marital status:      Spouse name: Not on file    Number of children: Not on file    Years of education: Not on file    Highest education level: Not on file   Tobacco Use    Smoking status: Former Smoker     Packs/day: 1.00     Years: 54.00     Pack years: 54.00     Types: Cigarettes     Last attempt to quit: 10/23/2012     Years since quittin.6    Smokeless tobacco: Never Used   Substance and Sexual Activity    Alcohol use: No     Alcohol/week: 0.0 oz     Comment: quit drinking alcohol 26 years ago, patient states stopped in \"6662\"    Drug use: No       Family History     Family History   Problem Relation Age of Onset    Heart Surgery Father         BYPASS    Stroke Father     Heart Surgery Mother         BYPASS    Coronary Artery Disease Mother     Stroke Mother        Current Medications     Current Facility-Administered Medications   Medication Dose Route Frequency Provider Last Rate Last Dose    0.9% sodium chloride infusion 250 mL  250 mL IntraVENous PRN Tierney Mathews MD        sodium chloride (NS) flush 5-10 mL  5-10 mL IntraVENous PRN Suresh Jules MD        sodium chloride 0.9 % bolus infusion 1,000 mL  1,000 mL IntraVENous ONCE Suresh Jules MD        Followed by   Brea Mann sodium chloride 0.9 % bolus infusion 1,000 mL  1,000 mL IntraVENous ONCE Suresh Jules MD        Followed by   Brea Mann sodium chloride 0.9 % bolus infusion 61 mL  61 mL IntraVENous ONCE Suresh Jules MD        piperacillin-tazobactam (ZOSYN) 4.5 g in 0.9% sodium chloride (MBP/ADV) 100 mL MBP  4.5 g IntraVENous Q6H Suresh Jules MD        levoFLOXacin (LEVAQUIN) 750 mg in D5W IVPB  750 mg IntraVENous Q48H Suresh Jules MD        vancomycin (VANCOCIN) 1500 mg in  ml infusion  1,500 mg IntraVENous ONCE Suresh Jules MD        VANCOMYCIN INFORMATION NOTE   Other Rx Dosing/Monitoring Suresh Jules MD        0.9% sodium chloride infusion  100 mL/hr IntraVENous ONCE Suresh Jules MD        enoxaparin (LOVENOX) injection 40 mg  40 mg SubCUTAneous Q24H Peter Salcedo MD        ondansetron [facility]) injection 4 mg  4 mg IntraVENous NOW Suresh Jules MD         Current Outpatient Medications   Medication Sig Dispense Refill    omeprazole (PRILOSEC) 20 mg capsule Take 1 Cap by mouth daily. Indications: gastritis 30 Cap 1    aspirin 81 mg chewable tablet Take 1 Tab by mouth daily. 30 Tab 1    docusate sodium (COLACE) 100 mg capsule Take 1 Cap by mouth two (2) times a day for 90 days. 180 Cap 0    ferrous sulfate 325 mg (65 mg iron) tablet Take 1 Tab by mouth two (2) times daily (with meals) for 30 days. 60 Tab 0    HYDROcodone-acetaminophen (NORCO) 5-325 mg per tablet Take 0.5 Tabs by mouth two (2) times daily as needed for Pain.  albuterol (PROVENTIL VENTOLIN) 2.5 mg /3 mL (0.083 %) nebulizer solution 3 mL by Nebulization route every four (4) hours as needed for Wheezing.  24 Each 3    STIOLTO RESPIMAT 2.5-2.5 mcg/actuation inhaler inhale 2 inhalations by mouth once daily 1 Inhaler 5    traZODone (DESYREL) 50 mg tablet Take 50 mg by mouth nightly.  lisinopril (PRINIVIL, ZESTRIL) 20 mg tablet Take 1 Tab by mouth daily. 30 Tab 0    pravastatin (PRAVACHOL) 40 mg tablet Take 40 mg by mouth nightly.  polyethylene glycol (MIRALAX) 17 gram packet Take 17 g by mouth daily as needed (constipation).  digoxin (DIGITEK) 0.125 mg tablet take 1 tablet by mouth once daily 90 Tab 3    amLODIPine (NORVASC) 10 mg tablet Take 10 mg by mouth daily. 0    docusate sodium (COLACE) 100 mg capsule Take 1 Cap by mouth two (2) times a day for 90 days. 42 Cap 0    docusate sodium (COLACE) 100 mg capsule Take 1 Cap by mouth two (2) times a day for 30 days. 60 Cap 0    docusate sodium (COLACE) 100 mg capsule Take 1 Cap by mouth two (2) times daily as needed for Constipation for 14 days. 30 Cap 0    OXYGEN-AIR DELIVERY SYSTEMS 2 L/min by Nasal route daily. Continuous. Company is [employer]          Allergies     Allergies   Allergen Reactions    Codeine Swelling    Morphine Itching    Sulfa (Sulfonamide Antibiotics) Other (comments)     Kidney problems. Physical Exam     ED Triage Vitals   Enc Vitals Group      BP 06/02/19 1552 147/69      Pulse (Heart Rate) 06/02/19 1552 88      Resp Rate 06/02/19 1552 22      Temp 06/02/19 1552 97.6 °F (36.4 °C)      Temp src --       O2 Sat (%) 06/02/19 1552 100 %      Weight 06/02/19 1941 151 lb 7.3 oz      Height --       Head Circumference --       Peak Flow --       Pain Score --       Pain Loc --       Pain Edu? --       Excl. in [address]? --      Constituational: Patient is afebrile, vital signs reviewed, patient is uncomfortable in mild distress. Head: Atraumatic, normocephalic  Eyes: Normal inspection. No conjunctival injection or discharge. ENT:  No facial bruises, normal external ear and nose inspection. Neck:  Supple, non tender. normal inspection.     Cardiovascular:  regular rate and rhythm, heart sounds normal without murmurs. Radial pulses 2+ and equal bilaterally  Respiratory: Mild respiratory distress, breath sounds normal.  No rales or wheezing  Abdomen: soft, nontender, nondistended, no rebound or guarding, normoactive bowel sounds  Rectal: No gross blood, normal brown stool  Musculoskeletal:  normal ROM, non-tender, no pedal edema. Calves soft, symmetric, and non-tender with no palpable cords. Skin: color normal, no rash, warm, dry . Neuro: awake & alert oriented ×3, no motor/sensory deficit. Upper extremity and lower extremity strength and sensation are grossly normal, intact, and symmetric. Psych: mood/affect normal.    Impression and Management Plan   Labs and imaging studies will be utilized to narrow the differential diagnosis and evaluate the potential causes of the patients chief complaint, and final disposition will be based on the results of their workup as well as their response to symptomatic treatment.     Diagnostic Studies   Lab:   Recent Results (from the past 24 hour(s))   EKG, 12 LEAD, INITIAL    Collection Time: 06/02/19  4:08 PM   Result Value Ref Range    Ventricular Rate 86 BPM    Atrial Rate 75 BPM    QRS Duration 92 ms    Q-T Interval 336 ms    QTC Calculation (Bezet) 402 ms    Calculated R Axis -59 degrees    Calculated T Axis 75 degrees    Diagnosis       Atrial fibrillation  Left axis deviation  Septal infarct (cited on or before 25-AUG-2017)  Abnormal ECG  When compared with ECG of 24-MAY-2019 16:55,  No significant change was found  Confirmed by Sheldon Bloch ([id number]) on 6/2/2019 4:50:08 PM     CBC WITH AUTOMATED DIFF    Collection Time: 06/02/19  4:18 PM   Result Value Ref Range    WBC 13.4 (H) 4.6 - 13.2 K/uL    RBC 3.33 (L) 4.70 - 5.50 M/uL    HGB 7.7 (L) 13.0 - 16.0 g/dL    HCT 24.2 (L) 36.0 - 48.0 %    MCV 72.7 (L) 74.0 - 97.0 FL    MCH 23.1 (L) 24.0 - 34.0 PG    MCHC 31.8 31.0 - 37.0 g/dL    RDW 22.1 (H) 11.6 - 14.5 %    PLATELET 000 495 - 555 K/uL    MPV 8.5 (L) 9.2 - 11.8 FL    NEUTROPHILS 88 (H) 40 - 73 %    LYMPHOCYTES 5 (L) 21 - 52 %    MONOCYTES 7 3 - 10 %    EOSINOPHILS 0 0 - 5 %    BASOPHILS 0 0 - 2 %    ABS. NEUTROPHILS 11.8 (H) 1.8 - 8.0 K/UL    ABS. LYMPHOCYTES 0.7 (L) 0.9 - 3.6 K/UL    ABS. MONOCYTES 0.9 0.05 - 1.2 K/UL    ABS. EOSINOPHILS 0.0 0.0 - 0.4 K/UL    ABS.  BASOPHILS 0.0 0.0 - 0.1 K/UL    DF AUTOMATED      PLATELET COMMENTS ADEQUATE PLATELETS      RBC COMMENTS ANISOCYTOSIS  2+        RBC COMMENTS HYPOCHROMIA  2+       METABOLIC PANEL, BASIC    Collection Time: 06/02/19  4:18 PM   Result Value Ref Range    Sodium 127 (L) 136 - 145 mmol/L    Potassium 5.0 3.5 - 5.5 mmol/L    Chloride 95 (L) 100 - 108 mmol/L    CO2 25 21 - 32 mmol/L    Anion gap 7 3.0 - 18 mmol/L    Glucose 106 (H) 74 - 99 mg/dL    BUN 20 (H) 7.0 - 18 MG/DL    Creatinine 1.31 (H) 0.6 - 1.3 MG/DL    BUN/Creatinine ratio 15 12 - 20      GFR est AA >60 >60 ml/min/1.73m2    GFR est non-AA 53 (L) >60 ml/min/1.73m2    Calcium 8.7 8.5 - 10.1 MG/DL   TYPE & SCREEN    Collection Time: 06/02/19  4:18 PM   Result Value Ref Range    Crossmatch Expiration 06/05/2019     ABO/Rh(D) O POSITIVE     Antibody screen NEG     CALLED TO: SHIRIN CLARKE, AT 1744 ON [id number] BY CLB     Unit number [id number]     Blood component type [facility]     Unit division 00     Status of unit ALLOCATED     Crossmatch result Compatible     Unit number [id number]     Blood component type [facility]     Unit division 00     Status of unit ALLOCATED     Crossmatch result Compatible    TROPONIN I    Collection Time: 06/02/19  4:18 PM   Result Value Ref Range    Troponin-I, QT <0.02 0.0 - 0.045 NG/ML   PROTHROMBIN TIME + INR    Collection Time: 06/02/19  4:18 PM   Result Value Ref Range    Prothrombin time 15.7 (H) 11.5 - 15.2 sec    INR 1.3 (H) 0.8 - 1.2     POC LACTIC ACID    Collection Time: 06/02/19  9:12 PM   Result Value Ref Range    Lactic Acid (POC) 0.66 0.40 - 2.00 mmol/L       Imaging:    CTA CHEST W OR W WO CONT   Final Result IMPRESSION[de-identified]      1.  Negative for acute PE.       2. Bilateral pleural effusions. 3. Altered configuration of abdominal aorta with aneurysmal outpouching at the   proximal aspect of the graft. Most likely communication from the true lumen   laterally into this aneurysmal outpouching, rather than communication through   the stent graft. This is only minimally imaged at the margin of the study. Consider subsequent dedicated imaging with CTA abdomen. 4. Hepatic cysts. XR CHEST PA LAT   Final Result   IMPRESSION:   1. Small bilateral pleural effusions are present. 2.  Hyperinflated lungs suggestive of COPD. Xr Chest Pa Lat    Result Date: 6/2/2019  EXAM: Chest Radiographs INDICATION: Shortness of breath TECHNIQUE: PA and lateral views of the chest COMPARISON: 5/23/2019, 7/9/2018, 6/27/2018 and 6/17/2018 FINDINGS: No pneumothorax identified. Hyperinflated lungs consistent with COPD as seen previously. No acute infiltrate appreciated. Small bilateral pleural effusions right greater than left are noted. The cardiac silhouette is unremarkable. The thoracic aorta is mildly tortuous. The pulmonary vascularity is unremarkable. Degenerative changes of the shoulders and spine. IMPRESSION: 1. Small bilateral pleural effusions are present. 2.  Hyperinflated lungs suggestive of COPD. Cta Chest W Or W Wo Cont    Result Date: 6/2/2019  EXAM: CTA CHEST W OR W WO CONT INDICATIONS: hemoptysis , increasing dyspnea; underwent recent transfusions, recently discontinued blood thinners; TECHNIQUE: Utilizing pulmonary embolus protocol, thin section axial images were obtained from the thoracic inlet into the upper abdomen after the uneventful administration of IV contrast. Coronal and sagittal maximum intensity projection (MIP) post-processed images were generated to better define pulmonary artery anatomy and enhance sensitivity for detection of pulmonary emboli.  Contrast used: cc CT scans at this facility are performed using dose optimization technique as appropriate with performed exam, to include automated exposure control, adjustment of mA and/or kV according to patient's size (including appropriate matching for site-specific examinations), or use of iterative reconstruction technique. COMPARISON: Prior CT most recently November 2018 FINDINGS: Adequate quality opacification. Pulmonary arteries: Negative for acute PE. The main pulmonary artery is of normal caliber. The left main pulmonary artery is slightly patulous, unchanged. Mediastinum: Again demonstrated is some mediastinal adenopathy best appreciated at the precarinal and paratracheal regions. Normal caliber aorta with moderate atherosclerosis. The distal thoracic aorta is tortuous. There is coronary atherosclerosis. Trace pericardial fluid. The esophagus is unremarkable. Lungs/pleura: There are small bilateral simple density pleural effusions, right more than left. Moderate centrilobular emphysematous findings. No central endobronchial lesions. Chest soft tissues: Unremarkable. Abdomen: Multiple hepatic cysts are again demonstrated. Prior placement of endovascular stent graft of the aorta. There is quite ectatic morphology involving the abdominal aorta at the upper aspect of the stent. Aneurysmal caliber up to more than 4 cm diameter. There is contrast communication from lumen into this aneurysmal outpouching along the right lateral aorta. This is only partially imaged at the inferior aspect of the study. No evidence of periaortic hemorrhage. Bones: No acute bony findings. Slight sigmoid scoliosis of thoracic spine. Multilevel spondylosis. Os acromiale configuration. IMPRESSION[de-identified] 1.  Negative for acute PE. 2. Bilateral pleural effusions. 3. Altered configuration of abdominal aorta with aneurysmal outpouching at the proximal aspect of the graft.  Most likely communication from the true lumen laterally into this aneurysmal outpouching, rather than communication through the stent graft. This is only minimally imaged at the margin of the study. Consider subsequent dedicated imaging with CTA abdomen. 4. Hepatic cysts. EKG:  Read and interpreted by me, atrial fibrillation without any acute ischemic changes    MOST RECENT VITALS   Visit Vitals  /75   Pulse (!) 101   Temp 97.8 °F (36.6 °C)   Resp 22   Wt 68.7 kg (151 lb 7.3 oz)   SpO2 98%   BMI 24.45 kg/m²       Medical Decision Making   Patient seen and examined. Uncomfortable appearing patient in mild restaurant distress presents with shortness of breath the patient states is been worsening for the past 2 days as described above. On exam no focal lung findings. Patient states he is also had some hemoptysis. He has a history of COPD. Duo nebs given with moderate improvement. CTA chest obtained to evaluate for pulmonary embolus given patient's hemoptysis. CTA chest negative for acute PE. However, did notice some irregularity just proximal to patient's AAA stent. Discussed this finding with Dr. Aminata Lemus, vascular surgery, who recommends hydration overnight, and likely imaging of patient's abdominal aortic aneurysm and stent in the morning. Patient does not appear to be actively rupturing. His abdomen is soft and nontender. His hemoglobin is 7.7, slightly down from 8.1 upon discharge earlier this week. No indication for emergent transfusion at this time. Patient with a heart rate in the 90s in atrial fibrillation. Patient also has a leukocytosis with an elevated respiratory rate concerning for sepsis. Lactate normal.  Patient maintaining his blood pressure. Do not suspect septic shock. Suspect potential respiratory etiology, pneumonia of patient's sepsis. Due to recent hospitalization, concern for acquired pneumonia. Vanco Zosyn and Levaquin started in the emergency department. Hyponatremia noted, patient has a chronic history of this. Fluids given.   Discussed case with GI who will see patient during this admission. Discussed the patient with hospitalist service who will readmit patient for further management. Plan for admission discussed with patient and family understood. All questions answered. Stable for transfer to floor. Final Diagnosis       ICD-10-CM ICD-9-CM   1. Symptomatic anemia D64.9 285.9   2. Sepsis, due to unspecified organism ([facility] [address] A41.9 038.9     995.91   3. Hyponatremia E87.1 276.1   4. Leukocytosis, unspecified type D72.829 288.60   5. Status post AAA (abdominal aortic aneurysm) repair Z98.890 V45.89    Z86.79      Disposition   Admit    Link Bagley MD  June 2, 2019      My signature above authenticates this document and my orders, the final    diagnosis (es), discharge prescription (s), and instructions in the Epic    record. If you have any questions please contact [phone].     Nursing notes have been reviewed by the physician/ advanced practice    Clinician. Dragon medical dictation software was used for portions of this report. Unintended voice recognition errors may occur.

## 2019-06-03 NOTE — PROGRESS NOTES
OT order received and chart reviewed. Pt not seen for skilled OT as pt currently has low HGB of 6.7 g/dL. Will f/u when pt is medically appropriate to be seen and as pt's schedule allows.       Thank you for this referral.  Bennett Aguilar MS, OTR/L

## 2019-06-03 NOTE — PROGRESS NOTES
NUTRITION    Nutrition Screen      RECOMMENDATIONS / PLAN:     - Add supplements: Ensure Enlive BID (to replace beverage with meal). - Change to soft solid diet with chopped meats.   - Continue RD inpatient monitoring and evaluation. NUTRITION INTERVENTIONS & DIAGNOSIS:     [x] Meals/snacks: modify composition  [x] Medical food supplement therapy: initiate  [x] Collaboration and referral of nutrition care: referred by RN for poor meal intake     Nutrition Diagnosis: Inadequate oral intake related to decreased appetite, SOB as evidenced by pt consuming 50% or less of meals, for the past month PTA. ASSESSMENT:     Pt with decreased appetite and poor meal intake for the past month. Agreeable to supplements. Discussed importance of fluid restriction and low sodium diet at home as well as small frequent meals to promote meal intake when SOB. Average po intake adequate to meet patients estimated nutritional needs:   [] Yes     [x] No   [] Unable to determine at this time    Diet: DIET CARDIAC Regular; FR 800ML      Food Allergies: NKFA  Current Appetite:   [] Good     [] Fair     [x] Poor     [] Other:  Appetite/meal intake prior to admission:   [] Good     [] Fair     [x] Poor x month    [] Other:  Feeding Limitations:  [] Swallowing difficulty    [x] Chewing difficulty: poor dentition    [] Other:  Current Meal Intake: No data found.     BM: 6/2  Skin Integrity: WDL  Edema:   [] No     [x] Yes   Pertinent Medications: Reviewed    Recent Labs     06/03/19  0556 06/02/19  1618   * 127*   K 4.4 5.0   CL 96* 95*   CO2 24 25   GLU 87 106*   BUN 25* 20*   CREA 1.35* 1.31*   CA 8.1* 8.7   ALB 3.0*  --    SGOT 20  --    ALT 18  --        Intake/Output Summary (Last 24 hours) at 6/3/2019 1340  Last data filed at 6/3/2019 1006  Gross per 24 hour   Intake 100 ml   Output 1200 ml   Net -1100 ml       Anthropometrics:  Ht Readings from Last 1 Encounters:   06/02/19 5' 6\" (1.676 m)     Last 3 Recorded Weights in this Encounter    06/02/19 1941   Weight: 68.7 kg (151 lb 7.3 oz)     Body mass index is 24.45 kg/m². Weight History: patient reports usual weight of 152 lb and weight gain/fluctuations 2/2 fluid accumulation     Weight Metrics 6/2/2019 5/29/2019 4/16/2019 3/21/2019 1/8/2019 11/9/2018 10/31/2018   Weight 151 lb 7.3 oz 151 lb 6.4 oz 150 lb 150 lb 154 lb 154 lb 150 lb   BMI 24.45 kg/m2 24.44 kg/m2 24.21 kg/m2 24.21 kg/m2 24.86 kg/m2 24.86 kg/m2 24.21 kg/m2        Admitting Diagnosis: Sepsis ([facility] [address] [A41.9]  Pertinent PMHx: atrial fibrillation, CAD, HF, HTN, HLD, COPD, CKD stage III, iron deficiency anemia, chronic pain, chronic constipation     Education Needs:        [x] None identified  [] Identified - Not appropriate at this time  []  Identified and addressed - refer to education log  Learning Limitations:   [x] None identified  [] Identified    Cultural, Holiness & ethnic food preferences:  [x] None identified    [] Identified and addressed     ESTIMATED NUTRITION NEEDS:     Calories: 6946-3968 kcal (MSJx1.2-1.3) based on  [x] Actual BW 69 kg     [] IBW   Protein: 55-69 gm (0.8-1 gm/kg) based on  [x] Actual BW      [] IBW   Fluid: 1 mL/kcal     MONITORING & EVALUATION:     Nutrition Goal(s):   1. Po intake of meals will meet >75% of patient estimated nutritional needs within the next 7 days.   Outcome:  [] Met/Ongoing    [] Progressing towards goal    [] Not progressing towards goal    [x] New/Initial goal     Monitoring:   [x] Food and beverage intake   [x] Diet order   [x] Nutrition-focused physical findings   [x] Treatment/therapy   [] Weight   [] Enteral nutrition intake        Previous Recommendations (for follow-up assessments only):     []   Implemented       []   Not Implemented (RD to address)      [] No Longer Appropriate     [] No Recommendation Made     Discharge Planning: cardiac diet   [x] Participated in care planning, discharge planning, & interdisciplinary rounds as appropriate      Juan Vernon Obinna Escalante, [address], [address] [place]    Pager: [phone]

## 2019-06-03 NOTE — PROGRESS NOTES
Pt out of room. Admitted for sepsis, etiology unknown. He has history of AAA s/p EVAR in 2006 and then urgent repair of endoleak in 2012. Upon admission CTA scan of the chest showed -  \"Altered configuration of abdominal aorta with aneurysmal outpouching at the proximal aspect of the graft. Most likely communication from the true lumen laterally into this aneurysmal outpouching, rather than communication through the stent graft\". Will need dedicated CTA scan of the abd/pelvis to further assess. Orders placed. Will f/u results.    Full consult to follow

## 2019-06-03 NOTE — ROUTINE PROCESS
0730 Pt received after bedside shift report from Layla WILLIS RN. Pt in bed with BIPAP on. No apparent distress noted. VSS. Bed locked and in low position. Call bell in reach. Family at bedside. 0945 PFM informed of patient's complaint of pain    1140 PFM at bedside. States will change frequency of pain medicine and add lasix to give post transfusion    1200 Pt up in bed eating lunch. Denies need at this time. Call bell in reach. 1855 Pt off unit for procedure    1945 Pt back from procedure. Request to use urinal.      Bedside shift change report given to Carol Cruz RN (oncoming nurse) by Serenity Alonso RN (offgoing nurse). Report included the following information SBAR, MAR, KARDEX AND RECENT RESULTS.

## 2019-06-03 NOTE — PROGRESS NOTES
LA and  BC x ordered stat 1945. Call placed to Shankar Turner RN to have labs drawn.  Verbalized understanding

## 2019-06-03 NOTE — CDMP QUERY
Pt admitted with Sepsis and has continuous O2 2L/nc at home documented. Please further specify type and acuity of Respiratory Failure in the medical record. ? Chronic respiratory failure   o With hypoxia   o With hypercapnia  ? Other, please specify  ?  Clinically unable to determine    The medical record reflects the following:     Risk Factors:79 yom hx COPD,CHF dx Sepsis      Treatment: home O2 continuous 2L/nc   has   dx poss pna   Thank you   Ronna MORTON  ext [phone]

## 2019-06-03 NOTE — CONSULTS
[email or web address]  [phone]    GASTROENTEROLOGY CONSULT      Impression:   1. Symptomatic anemia  - 1 unit PRBC transfusing  2. History of longterm anticoagulant therapy  3. Recent GI bleed s/sp EGD/colonoscopy with polypectomy 5/28/19: Hiatal hernia noted. Stomach: mild erosive gastritis few flecks of coffee grounds. 1 cm colon polyp, diverticulosis, internal hemorrhoids. 4. On analgesic (Norco) for hernia pain. Plan:     1. No endoscopic evaluation at this time  2. Continue medical management with IV iron and supplement b12 if deficient. 3. Consider NM bleeding scan if unexpected response with blood transfusion. 4. Will need longterm PPI therapy  5. Continue MirLax as prescribed. Capsule endoscopy when he can come to our office and schedule one. Chief Complaint: symptomatic anemia      HPI:  Maria A Winn is a [de-identified] y.o. male who I am being asked to see in consultation for an opinion regarding above. He had EGD and colonoscopy 5/28/2019 for GI bleed evaluation. Today, reports doing a little better since receiving PRBCs. Tolerating oral intake without nausea/vomiting. No rectal bleeding. Last BM yesterday. PMH:   Past Medical History:   Diagnosis Date    Aneurysm [facility])     AAA repair 2006 & 2012    Aorto-iliac duplex 02/13/2015    Tech difficult. Patent aorta bi-iliac endograft w/o leak or limb dysfunction.  Arrhythmia     a fib    Cardiac cath 11/01/2012    RCA (sm, nondom) patent. LM patent. LAD 25%. CX (dom) 45% mid. LVEDP 12 mmHg. No WMA. PA 27/12. W 10-12. CO/CI 5.2/2.6 (TD).  Cardiac echocardiogram, abnormal 02/20/2016    EF 55%. No WMA. Indeterminate diastolic fx. RVSP 60 mmHg. Severe LAE. Mild MR. Mod TR.  IVCE. Similar to study of 10/26/12.  Cardiovascular aorto-iliac duplex 02/13/2015    Patent aorta bi-iliac endovascular graft w/o leak or limb dysfunction. Sac measures 4.21 x 4.47 cm (4.4 x 4.6 cm on 1/31/14).     Cardiovascular LE peripheral arterial testing 07/29/2013    No significant LE arterial disease bilaterally. R GHADA 1. 12.  L GHADA 1. 12.  R DBI 0.83. L DBI 0.71. Exercise deferred.  Cardiovascular renal duplex 10/31/2012    No RA stenosis. Intrinsic/med disease in left kidney. Patent aortic endograft. Patent, thrombus-free renal veins bilaterally.  Carotid duplex 07/29/2013    Mild <50% bilateral ICA plaquing.  Chronic lung disease     Cigarette smoker     Congestive heart failure (CHF) (HCC)     COPD (chronic obstructive pulmonary disease) (HCC)     and emphysema; likely secondary to tobacco abuse    Difficult intubation     Dyslipidemia     low HDL    Emphysema     HTN (hypertension)     Hypercholesterolemia     Ill-defined condition     hernia    Increased prostate specific antigen (PSA) velocity     Long term (current) use of anticoagulants     coumadin    Osteoarthritis     Osteomyelitis (HCC)     Paroxysmal atrial fibrillation (HCC)     Peripheral vascular disease ([address]     AAA repair 12/2007    Persistent atrial Fibrillation     Persistent atrial fibrillation ([facility] [address]     Unspecified adverse effect of anesthesia     difficulty breathing placed in ICU Oct 2012 (AAA repair)       PSH:   Past Surgical History:   Procedure Laterality Date    BRONCHOSCOPY DIAGNOSTIC  11/2/2012         CARDIAC CATHETERIZATION  11/1/2012         COLONOSCOPY N/A 7/26/2016    COLONOSCOPY with Polypectomies performed by Primitivo Medina MD at [address] [place] [address] COLONOSCOPY N/A 5/28/2019    COLONOSCOPY with polypectomy performed by Katherin Mittal MD at [address] [place] [address] HX AAA REPAIR      2006 & 2012    HX HEART CATHETERIZATION  3/4/2009    1. RCA small, nondominant; patent. 2. LMCA patent. 3. LAD is long, wrapped around apical vessel. Diffuse, 20-30% stenosis noted. 4. CCA is large, dominant vessel. Diffuse 20-30% stenosis. 5. LVEDP is 16 mmHg. 6. Overall left ventricular systolic function mildly diminshed with est. EF 45%.  Mild, global hypokinesis noted. 7. No significant mitral regurgitation or aortic stenosis noted.  (see report)    HX HERNIA REPAIR  2014    rt inguinal     HX HERNIA REPAIR  2016    LEFT INGUINAL HERNIA REPAIR DR. Dorina Kanner IN[facility] HERNIA,5+Y/O,JESSIE Left 16    Dr. Edna Chapa  2012            Social HX:   Social History     Socioeconomic History    Marital status:      Spouse name: Not on file    Number of children: Not on file    Years of education: Not on file    Highest education level: Not on file   Occupational History    Not on file   Social Needs    Financial resource strain: Not on file    Food insecurity:     Worry: Not on file     Inability: Not on file    Transportation needs:     Medical: Not on file     Non-medical: Not on file   Tobacco Use    Smoking status: Former Smoker     Packs/day: 1.00     Years: 54.00     Pack years: 54.00     Types: Cigarettes     Last attempt to quit: 10/23/2012     Years since quittin.6    Smokeless tobacco: Never Used   Substance and Sexual Activity    Alcohol use: No     Alcohol/week: 0.0 oz     Comment: quit drinking alcohol 26 years ago, patient states stopped in \"5060\"    Drug use: No    Sexual activity: Not on file   Lifestyle    Physical activity:     Days per week: Not on file     Minutes per session: Not on file    Stress: Not on file   Relationships    Social connections:     Talks on phone: Not on file     Gets together: Not on file     Attends Anabaptism service: Not on file     Active member of club or organization: Not on file     Attends meetings of clubs or organizations: Not on file     Relationship status: Not on file    Intimate partner violence:     Fear of current or ex partner: Not on file     Emotionally abused: Not on file     Physically abused: Not on file     Forced sexual activity: Not on file   Other Topics Concern    Not on file   Social History Narrative    Not on file       FHX:   Family History Problem Relation Age of Onset    Heart Surgery Father         BYPASS    Stroke Father     Heart Surgery Mother         BYPASS    Coronary Artery Disease Mother     Stroke Mother        Allergy:   Allergies   Allergen Reactions    Codeine Swelling    Morphine Itching    Sulfa (Sulfonamide Antibiotics) Other (comments)     Kidney problems. Patient Active Problem List   Diagnosis Code    Hypercholesterolemia E78.00    Congestive heart failure (CHF) ([facility]) I50.9    Peripheral vascular disease ([facility]) I73.9    COPD (chronic obstructive pulmonary disease) ([facility]) J44.9    HTN (hypertension) I10    Chronic atrial fibrillation ([facility]) I48.2    RLQ abdominal pain R10.31    Status post AAA (abdominal aortic aneurysm) repair L13.563, Z86.79    AAA (abdominal aortic aneurysm, ruptured) ([facility] [address] I71.3    Chronic constipation K59.09    Right inguinal hernia K40.90    Left inguinal hernia K40.90    CAD (coronary artery disease) I25.10    Hypoxemia requiring supplemental oxygen R09.02, Z99.81    Warfarin-induced coagulopathy ([facility]) D68.32, T45.515A    Intraabdominal fluid collection R18.8    Abdominal pain R10.9    Sepsis ([address] A41.9    Pyogenic inflammation of bone ([address] M86.9    Psoas abscess ([facility] [address] K68.12    Abscess L02.91    Discitis of lumbar region M46.46    Pulmonary hypertension ([facility]) I27.20    CHF exacerbation ([facility]) I50.9    Hyponatremia E87.1    Symptomatic anemia D64.9    Chronic pain syndrome G89.4    Stage 3 chronic kidney disease ([facility]) N18.3    Iron deficiency anemia D50.9       Home Medications:     Medications Prior to Admission   Medication Sig    aspirin 81 mg chewable tablet Take 81 mg by mouth daily.  omeprazole (PRILOSEC) 20 mg capsule Take 1 Cap by mouth daily. Indications: gastritis    ferrous sulfate 325 mg (65 mg iron) tablet Take 1 Tab by mouth two (2) times daily (with meals) for 30 days.     HYDROcodone-acetaminophen (NORCO) 5-325 mg per tablet Take 0.5 Tabs by mouth two (2) times daily as needed for Pain.  albuterol (PROVENTIL VENTOLIN) 2.5 mg /3 mL (0.083 %) nebulizer solution 3 mL by Nebulization route every four (4) hours as needed for Wheezing.  STIOLTO RESPIMAT 2.5-2.5 mcg/actuation inhaler inhale 2 inhalations by mouth once daily    traZODone (DESYREL) 50 mg tablet Take 50 mg by mouth nightly.  lisinopril (PRINIVIL, ZESTRIL) 20 mg tablet Take 1 Tab by mouth daily.  pravastatin (PRAVACHOL) 40 mg tablet Take 40 mg by mouth nightly.  polyethylene glycol (MIRALAX) 17 gram packet Take 17 g by mouth daily as needed (constipation).  digoxin (DIGITEK) 0.125 mg tablet take 1 tablet by mouth once daily    amLODIPine (NORVASC) 10 mg tablet Take 10 mg by mouth daily.  OXYGEN-AIR DELIVERY SYSTEMS 2 L/min by Nasal route daily. Continuous. Company is [employer]          Review of Systems:     Constitutional: No fevers, chills, weight loss   Skin: No rashes, pruritis, jaundice, ulcerations, erythema. HENT: No headaches, nosebleeds   Eyes: No visual changes, blurred vision, eye pain, photophobia, jaundice. Cardiovascular: No chest pain, heart palpitations. Respiratory: No cough, chest discomfort   Gastrointestinal: No rectal bleeding, abdominal pain   Genitourinary: No dysuria, bleeding, discharge, pyuria. Musculoskeletal: No weakness, arthralgias, wasting. Endo: No sweats. Heme: (+) bruising, easy bleeding. Allergies: As noted. Neurological: Alert and oriented. Gait not assessed. Psychiatric:  No anxiety, depression, hallucinations.           Visit Vitals  /69   Pulse 97   Temp 98.5 °F (36.9 °C)   Resp 17   Wt 68.7 kg (151 lb 7.3 oz)   SpO2 100%   BMI 24.45 kg/m²       Physical Assessment:      GENERAL ASSESSMENT: self-feeding dessert cup; appears tired; mild conversational dyspnea, but no acute distress; wearing O2 nasal cannula  SKIN: (+) bruising; pallor  HEAD: not examined  EYES: normal eyes    EARS: normal  NOSE: normal external appearance and nares patent  MOUTH: normal mouth   NECK: normal  CHEST: normal air exchange, respiratory effort normal with no retractions; bilateral wheezing; productive coughing  HEART: irregularly irregular  ABDOMEN: protuberant; non-tender       Basic Metabolic Profile   Recent Labs     06/03/19  0556   *   K 4.4   CL 96*   CO2 24   BUN 25*   GLU 87   CA 8.1*         CBC w/Diff    Recent Labs     06/03/19  0556   WBC 10.3   RBC 3.02*   HGB 6.7*   HCT 22.1*   MCV 73.2*   MCH 22.2*   MCHC 30.3*   RDW 22.5*       Recent Labs     06/03/19  0556   GRANS 87*   LYMPH 7*   EOS 0        Hepatic Function   Recent Labs     06/03/19  0556   ALB 3.0*   TP 6.9   TBILI 0.8   SGOT 20   AP 85        Coags   Recent Labs     06/02/19  1618   PTP 15.7*   INR 1.3*           DELIA Neff. [facility] [address]  Cell: [phone]  [email or web address]

## 2019-06-03 NOTE — PROGRESS NOTES
PT orders received and chart was reviewed. Holding evaluation as Hgb is 6.7 and patient is pending blood transfusion. Will continue to follow.   Tosin De La Fuente, PT

## 2019-06-03 NOTE — PROGRESS NOTES
Reason for Admission:   Sepsis ([facility] [address] [A41.9]               RRAT Score:    28             Resources/supports as identified by patient/family:   Patient has family supports. Top Challenges facing patient (as identified by patient/family and CM): Finances/Medication cost?    [employer]    Family can transport, if he is able to get into a car. Support system or lack thereof? Patient has family supports. Living arrangements? His younger brother lives with him. He reports that his brother is \"challenged\" and cannot help much at home. His daughter's live close by. Self-care/ADLs/Cognition? Alert and oriented. Current Advanced Directive/Advance Care Plan:   no                          Plan for utilizing home health:   Patient has no home health orders in place at this time. If he returns home, he would benefit from having home health services. This writer will monitor for potential home health needs and orders. Likelihood of readmission:   HIGH    Transition of Care Plan: It is uncertain what patient's discharge plan is at the moment. He may return home with home health or may need a short term SNF stay. This writer will continue to closely monitor for discharge planning. Initial assessment completed with patient and his daughter Corby Fitzpatrick). Cognitive status of patient: Alert and oriented. Face sheet information confirmed:  no. The patient designates his daughters Corby Fitzpatrick and Abdulkadir Castellanos) to participate in his discharge plan and to receive any needed information. This patient lives in a house with his younger brother, who is challenged and cannot assist or help much. Patient is not currently able to navigate steps as needed. Prior to hospitalization, patient was considered to be independent with ADLs/IADLS : yes .      Patient has a current ACP document on file: no     Patient's family will be available to transport patient  upon discharge, if he is able to get into a car. The patient already has a walker, a wheelchair, Home O2 ([employer]) and a nebulizer; available in the home. Patient is not currently active with home health. Patient has stayed in a skilled nursing facility or rehab. Was  stay within last 60 days : no. Currently, the discharge plan is uncertain at this time. Patient is extremely weak at this time and it is not sure what PT and OT will recommend. PT and OT will need to evaluate patient and give their recommendations. Patient's family is prepared to transport, if he is able to get in to the car. Patient's daughter is (Geena Arnold, BS#[phone]). Patient's other daughter is (Rebajohn Portillo, AT#[phone]). Patient's PCP is Barby Mayorga. Patient is insured through [employer] and he also has a [employer]. The patient states that he can obtain his medications from the pharmacy, and take his medications as directed. This writer will continue to closely monitor for discharge planning to ensure a safe discharge home from [facility]. Care Management Interventions  PCP Verified by CM: Cecelia Grullon)  Palliative Care Criteria Met (RRAT>21 & CHF Dx)?: No  Mode of Transport at Discharge: Other (see comment)(Family available to transport if he can get in a vehicle)  Transition of Care Consult (CM Consult): Discharge Planning  Current Support Network: Own Home, Family Lives Nearby(His younger brother lives with him. Brother is challenged. )  Confirm Follow Up Transport: Family  Plan discussed with Pt/Family/Caregiver: Yes  Discharge Location  Discharge Placement: Unable to determine at this time        Julito Garcia MS  Care Manager  Pager#: [phone]

## 2019-06-03 NOTE — H&P
Admission History and Physical  [facility]      Patient: Lance Morales MRN: [id number]  CSN: [id number]    YOB: 1940  Age: [de-identified] y.o. Sex: male      DOA: 6/2/2019       HPI:     Lance Morales is a [de-identified] y.o. male with PMH of chronic atrial fibrillation, CAD, HFpEF, HTN, HLD, COPD (on home 2 L O2), pHTN, CKD3, iron deficiency anemia, BPH, chronic pain syndrome, and chronic constipation, now presenting with complaint of spitting up blood. Patient states that he began coughing up blood around 2pm yesterday with no precipitating events. He states that it was only a small amount but it was bright red and he \"felt like crap\". Associated symptoms included weakness and blurry vision. Patient denied worsening SOB, CP, headache, N/V. Patient also endorsed pain in supraumbilical region that started 1 month ago. States that he has an hernia in groin, which is painful, but this pain is different. Patient also endorsed dark, foul-smelling urine recently. Of note, patient was recently discharged after being treated for Symptomatic Anemia. He states that he has progressively felt worse since last hospitalization. ED Course:    Labs: CBC, CMP, BNP, INR, UA   Images: CXR, CTA-chest, EKG   Medications: Vancomycin, Zosyn, Levaquin, and Zofran   Fluids: 2L NS   Consults: Vascular Surgery for CTA showing outpouching of Aortic Aneurysm s/p Repair    Review of Systems   Constitutional: Negative for chills, fever, malaise/fatigue and weight loss. Eyes: Positive for blurred vision. Negative for pain, discharge and redness. Respiratory: Positive for cough, hemoptysis and shortness of breath. Negative for sputum production and wheezing. Cardiovascular: Positive for chest pain and leg swelling. Negative for palpitations, orthopnea and claudication. Gastrointestinal: Positive for abdominal pain and constipation.  Negative for blood in stool, diarrhea, heartburn, melena, nausea and vomiting. Genitourinary: Negative for dysuria, flank pain, frequency, hematuria and urgency. Dark, foul-smelling urine   Musculoskeletal: Positive for back pain and joint pain. Negative for falls and neck pain. Neurological: Positive for weakness. Negative for dizziness, tingling, focal weakness and headaches. Past Medical History:   Diagnosis Date    Aneurysm [facility])     AAA repair 2006 & 2012    Aorto-iliac duplex 02/13/2015    Tech difficult. Patent aorta bi-iliac endograft w/o leak or limb dysfunction.  Arrhythmia     a fib    Cardiac cath 11/01/2012    RCA (sm, nondom) patent. LM patent. LAD 25%. CX (dom) 45% mid. LVEDP 12 mmHg. No WMA. PA 27/12. W 10-12. CO/CI 5.2/2.6 (TD).  Cardiac echocardiogram, abnormal 02/20/2016    EF 55%. No WMA. Indeterminate diastolic fx. RVSP 60 mmHg. Severe LAE. Mild MR. Mod TR.  IVCE. Similar to study of 10/26/12.  Cardiovascular aorto-iliac duplex 02/13/2015    Patent aorta bi-iliac endovascular graft w/o leak or limb dysfunction. Sac measures 4.21 x 4.47 cm (4.4 x 4.6 cm on 1/31/14).  Cardiovascular LE peripheral arterial testing 07/29/2013    No significant LE arterial disease bilaterally. R GHADA 1. 12.  L GHADA 1. 12.  R DBI 0.83. L DBI 0.71. Exercise deferred.  Cardiovascular renal duplex 10/31/2012    No RA stenosis. Intrinsic/med disease in left kidney. Patent aortic endograft. Patent, thrombus-free renal veins bilaterally.  Carotid duplex 07/29/2013    Mild <50% bilateral ICA plaquing.       Chronic lung disease     Cigarette smoker     Congestive heart failure (CHF) (HCC)     COPD (chronic obstructive pulmonary disease) (HCC)     and emphysema; likely secondary to tobacco abuse    Difficult intubation     Dyslipidemia     low HDL    Emphysema     HTN (hypertension)     Hypercholesterolemia     Ill-defined condition     hernia    Increased prostate specific antigen (PSA) velocity     Long term (current) use of anticoagulants     coumadin    Osteoarthritis     Osteomyelitis (HCC)     Paroxysmal atrial fibrillation (HCC)     Peripheral vascular disease ([facility] [address]     AAA repair 12/2007    Persistent atrial Fibrillation     Persistent atrial fibrillation (HCC)     Unspecified adverse effect of anesthesia     difficulty breathing placed in ICU Oct 2012 (AAA repair)       Past Surgical History:   Procedure Laterality Date    BRONCHOSCOPY DIAGNOSTIC  11/2/2012         CARDIAC CATHETERIZATION  11/1/2012         COLONOSCOPY N/A 7/26/2016    COLONOSCOPY with Polypectomies performed by Deandre Denny MD at [address] COLONOSCOPY N/A 5/28/2019    COLONOSCOPY with polypectomy performed by Cosmo Choudhary MD at [address] HX AAA REPAIR      2006 & 2012    HX HEART CATHETERIZATION  3/4/2009    1. RCA small, nondominant; patent. 2. LMCA patent. 3. LAD is long, wrapped around apical vessel. Diffuse, 20-30% stenosis noted. 4. CCA is large, dominant vessel. Diffuse 20-30% stenosis. 5. LVEDP is 16 mmHg. 6. Overall left ventricular systolic function mildly diminshed with est. EF 45%. Mild, global hypokinesis noted. 7. No significant mitral regurgitation or aortic stenosis noted.  (see report)    HX HERNIA REPAIR  2/2014    rt inguinal     HX HERNIA REPAIR  01/2016    LEFT INGUINAL HERNIA REPAIR DR. Emili Mae HERNIA,5+Y/O,JESSIE Left 1-20-16    Dr. Creig Simmonds  11/1/2012            Family History   Problem Relation Age of Onset    Heart Surgery Father         BYPASS    Stroke Father     Heart Surgery Mother         BYPASS    Coronary Artery Disease Mother     Stroke Mother        Social History     Socioeconomic History    Marital status:      Spouse name: Not on file    Number of children: Not on file    Years of education: Not on file    Highest education level: Not on file   Tobacco Use    Smoking status: Former Smoker     Packs/day: 1.00     Years: 54.00     Pack years: 54.00     Types: Cigarettes     Last attempt to quit: 10/23/2012     Years since quittin.6    Smokeless tobacco: Never Used   Substance and Sexual Activity    Alcohol use: No     Alcohol/week: 0.0 oz     Comment: quit drinking alcohol 26 years ago, patient states stopped in \"0803\"    Drug use: No       Allergies   Allergen Reactions    Codeine Swelling    Morphine Itching    Sulfa (Sulfonamide Antibiotics) Other (comments)     Kidney problems. Prior to Admission Medications   Prescriptions Last Dose Informant Patient Reported? Taking? HYDROcodone-acetaminophen (NORCO) 5-325 mg per tablet   Yes No   Sig: Take 0.5 Tabs by mouth two (2) times daily as needed for Pain. OXYGEN-AIR DELIVERY SYSTEMS   Yes No   Si L/min by Nasal route daily. Continuous. Company is [employer]      STIOLTO RESPIMAT 2.5-2.5 mcg/actuation inhaler   No No   Sig: inhale 2 inhalations by mouth once daily   albuterol (PROVENTIL VENTOLIN) 2.5 mg /3 mL (0.083 %) nebulizer solution   No No   Sig: 3 mL by Nebulization route every four (4) hours as needed for Wheezing. amLODIPine (NORVASC) 10 mg tablet   Yes No   Sig: Take 10 mg by mouth daily. aspirin 81 mg chewable tablet   No No   Sig: Take 1 Tab by mouth daily. digoxin (DIGITEK) 0.125 mg tablet   No No   Sig: take 1 tablet by mouth once daily   docusate sodium (COLACE) 100 mg capsule   No No   Sig: Take 1 Cap by mouth two (2) times daily as needed for Constipation for 14 days. docusate sodium (COLACE) 100 mg capsule   No No   Sig: Take 1 Cap by mouth two (2) times a day for 30 days. docusate sodium (COLACE) 100 mg capsule   No No   Sig: Take 1 Cap by mouth two (2) times a day for 90 days. docusate sodium (COLACE) 100 mg capsule   No No   Sig: Take 1 Cap by mouth two (2) times a day for 90 days. ferrous sulfate 325 mg (65 mg iron) tablet   No No   Sig: Take 1 Tab by mouth two (2) times daily (with meals) for 30 days.    lisinopril (PRINIVIL, ZESTRIL) 20 mg tablet   No No   Sig: Take 1 Tab by mouth daily. omeprazole (PRILOSEC) 20 mg capsule   No No   Sig: Take 1 Cap by mouth daily. Indications: gastritis   polyethylene glycol (MIRALAX) 17 gram packet   Yes No   Sig: Take 17 g by mouth daily as needed (constipation). pravastatin (PRAVACHOL) 40 mg tablet   Yes No   Sig: Take 40 mg by mouth nightly. traZODone (DESYREL) 50 mg tablet   Yes No   Sig: Take 50 mg by mouth nightly. Facility-Administered Medications: None       Physical Exam:     Patient Vitals for the past 24 hrs:   Temp Pulse Resp BP SpO2   06/02/19 2045  (!) 101  155/75 98 %   06/02/19 2030  94  167/64 100 %   06/02/19 2015  91  155/67 100 %   06/02/19 2000  83  164/77 100 %   06/02/19 1945  84  165/72 100 %   06/02/19 1930  85  146/69 100 %   06/02/19 1915  89  163/63 100 %   06/02/19 1900  82  154/69 93 %   06/02/19 1845    154/69 99 %   06/02/19 1744     100 %   06/02/19 1552 97.6 °F (36.4 °C) 88 22 147/69 100 %       Physical Exam   Constitutional: He is oriented to person, place, and time and well-developed, well-nourished, and in no distress. Eyes:   Constricted pupils   Cardiovascular: Normal rate, regular rhythm, normal heart sounds and intact distal pulses. Exam reveals no gallop and no friction rub. No murmur heard. 1+ pitting edema in LE up to calves bilaterally, edematous UE L>R to hands   Pulmonary/Chest: Effort normal. No respiratory distress. He has no wheezes. He exhibits no tenderness. Coarse breath sounds   Abdominal: Soft. Mild distension, TTP diffusely with no guarding or rebound tenderness   Neurological: He is alert and oriented to person, place, and time. Skin: Skin is warm and dry. No erythema. Psychiatric: Memory and affect normal.         IMAGING:   Xr Chest Pa Lat    Result Date: 6/2/2019  IMPRESSION: 1. Small bilateral pleural effusions are present. 2.  Hyperinflated lungs suggestive of COPD.     Cta Chest W Or W Wo Cont    Result Date: 6/2/2019  IMPRESSION[de-identified] 1.  Negative for acute PE. 2. Bilateral pleural effusions. 3. Altered configuration of abdominal aorta with aneurysmal outpouching at the proximal aspect of the graft. Most likely communication from the true lumen laterally into this aneurysmal outpouching, rather than communication through the stent graft. This is only minimally imaged at the margin of the study. Consider subsequent dedicated imaging with CTA abdomen. 4. Hepatic cysts. Recent Results (from the past 12 hour(s))   EKG, 12 LEAD, INITIAL    Collection Time: 06/02/19  4:08 PM   Result Value Ref Range    Ventricular Rate 86 BPM    Atrial Rate 75 BPM    QRS Duration 92 ms    Q-T Interval 336 ms    QTC Calculation (Bezet) 402 ms    Calculated R Axis -59 degrees    Calculated T Axis 75 degrees    Diagnosis       Atrial fibrillation  Left axis deviation  Septal infarct (cited on or before 25-AUG-2017)  Abnormal ECG  When compared with ECG of 24-MAY-2019 16:55,  No significant change was found  Confirmed by Ari Valdovinos ([id number]) on 6/2/2019 4:50:08 PM     CBC WITH AUTOMATED DIFF    Collection Time: 06/02/19  4:18 PM   Result Value Ref Range    WBC 13.4 (H) 4.6 - 13.2 K/uL    RBC 3.33 (L) 4.70 - 5.50 M/uL    HGB 7.7 (L) 13.0 - 16.0 g/dL    HCT 24.2 (L) 36.0 - 48.0 %    MCV 72.7 (L) 74.0 - 97.0 FL    MCH 23.1 (L) 24.0 - 34.0 PG    MCHC 31.8 31.0 - 37.0 g/dL    RDW 22.1 (H) 11.6 - 14.5 %    PLATELET 181 842 - 816 K/uL    MPV 8.5 (L) 9.2 - 11.8 FL    NEUTROPHILS 88 (H) 40 - 73 %    LYMPHOCYTES 5 (L) 21 - 52 %    MONOCYTES 7 3 - 10 %    EOSINOPHILS 0 0 - 5 %    BASOPHILS 0 0 - 2 %    ABS. NEUTROPHILS 11.8 (H) 1.8 - 8.0 K/UL    ABS. LYMPHOCYTES 0.7 (L) 0.9 - 3.6 K/UL    ABS. MONOCYTES 0.9 0.05 - 1.2 K/UL    ABS. EOSINOPHILS 0.0 0.0 - 0.4 K/UL    ABS.  BASOPHILS 0.0 0.0 - 0.1 K/UL    DF AUTOMATED      PLATELET COMMENTS ADEQUATE PLATELETS      RBC COMMENTS ANISOCYTOSIS  2+        RBC COMMENTS HYPOCHROMIA  2+       METABOLIC PANEL, BASIC    Collection Time: 06/02/19  4:18 PM   Result Value Ref Range    Sodium 127 (L) 136 - 145 mmol/L    Potassium 5.0 3.5 - 5.5 mmol/L    Chloride 95 (L) 100 - 108 mmol/L    CO2 25 21 - 32 mmol/L    Anion gap 7 3.0 - 18 mmol/L    Glucose 106 (H) 74 - 99 mg/dL    BUN 20 (H) 7.0 - 18 MG/DL    Creatinine 1.31 (H) 0.6 - 1.3 MG/DL    BUN/Creatinine ratio 15 12 - 20      GFR est AA >60 >60 ml/min/1.73m2    GFR est non-AA 53 (L) >60 ml/min/1.73m2    Calcium 8.7 8.5 - 10.1 MG/DL   TYPE & SCREEN    Collection Time: 06/02/19  4:18 PM   Result Value Ref Range    Crossmatch Expiration 06/05/2019     ABO/Rh(D) O POSITIVE     Antibody screen NEG     CALLED TO: SHIRIN CLARKE, AT 1741 ON [id number] BY CLB     Unit number [id number]     Blood component type  LR     Unit division 00     Status of unit ALLOCATED     Crossmatch result Compatible     Unit number [id number]     Blood component type  LR     Unit division 00     Status of unit ALLOCATED     Crossmatch result Compatible    TROPONIN I    Collection Time: 06/02/19  4:18 PM   Result Value Ref Range    Troponin-I, QT <0.02 0.0 - 0.045 NG/ML   PROTHROMBIN TIME + INR    Collection Time: 06/02/19  4:18 PM   Result Value Ref Range    Prothrombin time 15.7 (H) 11.5 - 15.2 sec    INR 1.3 (H) 0.8 - 1.2           Assessment/Plan:   [de-identified] y.o. male with PMH of chronic atrial fibrillation, CAD, HFpEF, HTN, HLD, COPD (on home 2 L O2), pHTN, CKD3, iron deficiency anemia, BPH, chronic pain syndrome, and chronic constipation, now admitted with sepsis. Sepsis 2/2 UTI vs Cellulitis vs PNA: Patient has 3/4 SIRS Criteria (Leukocytosis of 13.4, tachypnea in mid-20's, and  tachycardia in mid-90's). No lactic acidosis. Afebrile. Recently treated with Cephalexin for cellulitis of left upper arm. Sepsis protocol started in ED. CXR showed no infectious process. UA showed no UTI.     - Admit to telemetry with cardiac monitoring   - Continue Zosyn, Vancomycin, and Levaquin   - F/u Blood Cultures   - Daily CBC   - PT/OT/CM Consults    Hemoptysis pulmonary edema vs S. Pneumonia vs Esophageal Trauma: PE ruled out with CTA-chest. CXR showed no consolidation. No recurrence since being in ED.    - Monitor closely   - Consider Pulmonology Consult in AM    Hyponatremia possibly from fluid overload vs Chronic Lung Disease: On admission, Na or 127.   - Daily BMP   - Check FeNA    - Fluid Restriction   - Start Lasix 20mg IV    Abdominal Aortic Aneurysm: CTA-chest showed outpouching of previously repaired aneurysm. Vascular consulted in ED. Patient with abdominal pain but not worsening.    - F/U Vascular Recs     Chronic Atrial Fibrillation: Followed outpatient by Dr. Darion Eduardo at [facility]. Currently rate-controlled atrial fibrillation. Warfarin was stopped on last admission given supratherapeutic INR, and recurrent GI bleed that required three hospitalizations for transfusions.    - Continue home Digoxin 125 mcg daily    HTN, HLD, CAD, HFpEF: Last Echo (4/2018): EF 55%; hypokinesis of the basal-mid anteroseptal wall. BNP of 7673. Admission BP ranging in the 140s-170s/60s-90s. - Continue home Amlodipine 10 mg daily   - Continue home Pravastatin 40 mg daily     COPD on home 2L 02: Followed by [facility] (Dr. Miguel Gomez) as outpatient. - Continue home supplemental O2 @ 2 L/min; titrate to maintain O2 sats between 88-92%   - Continue home Stiolto Respimat 2.5-2.5 mcg inhaler 2 puffs daily   - Duo-neb q6h per RT     CKD3: Baseline Cr 1-1.2; admission Cr 1.31.   - Renally dose medications    - Avoid nephrotoxic agents     Chronic Pain Syndrome: H/o chronic low back pain OA, and inguinal hernia w/o Incarceration  - Continue home Norco 5-325 mg 1/2 pill BID  - Consider Voltaren 1% Gel for isolated joint pain     Chronic Constipation: Stable.   - Continue home Miralax daily PRN  - High fiber diet    Diet: Cardiac  DVT Prophylaxis: SCDs  Code Status: FULL  Point of Contact: [facility] (Relationship: Daughter) [phone]     Disposition and anticipated LOS: >2 midnights    Lamar Flannery MD, PGY-1  [address] [facility]  06/02/19 8:59 PM      Admission History and Physical  [facility]        Patient: Leda Alvarez MRN: [id number]  CSN: [id number]    YOB: 1940  Age: [de-identified] y.o. Sex: male       DOA: 6/2/2019       HPI:      Leda Alvarez is a [de-identified] y.o. male with PMH significant for HFrEF, COPD, CKD III, chronic pain, atrial fibrillation GIB, HTN, repaired AAA now presenting with complaint of hemoptysis.     Patient recently discharged from [facility] for GIB, reports since that time has felt 'crappy', with malaise without fevers or chills. Reports feeling progressively worse since discharge, and after having an episode of hemoptysis today, decided to come to the ED. Reports coughing up red blood, but not able to quantify how much. Has not had this issue before. Has been stable on 2L of home O2. Does also report increase in pedal swelling as well as B/L arm swelling in the past month. Was treated with cephalexin starting 06/1/2019 for possible cellulitis of R arm. Reports this arm was red and swollen, but is improving.      Also reports progressive RLQ abdominal pain + distension for the past month. Increase in chronic inguinal hernia pain + back pain. Of note, was recently admitted for GIB. Suspect this contributed to these GI sx. Reports foul smelling, dark urine.     2330: Called to bedside by nursing for increased SOB. Patient in tripod position with increased WOB. SpO2 97% on 3L, HR in the 110's. Patient reports increased dyspnea, received duoneb without relief just prior to examiner arrival. Diffuse wheezing + rhonchi on exam. With diffuse edema, h/o CHF as well as over 2 L of fluids given while in the ED, will treat for pulmonary edema, as pulmonary edema may cause bronchospasm. Ordered IV lasix 40mg to be given now.  Repeat CXR, ABG, bipap. D/w nursing supervisor- will transfer to stepdown, d/w RT- though patient has SpO2 >95%, believe bipap will benefit pt d/t increased WOB. Patient much more comfortable after bipap applied. Will assess patient again later tonight. Tachycardia likely 2/2 duoneb.      ROS per intern note above           Past Medical History:   Diagnosis Date    Aneurysm [facility])       AAA repair 2006 & 2012    Aorto-iliac duplex 02/13/2015     Tech difficult. Patent aorta bi-iliac endograft w/o leak or limb dysfunction.  Arrhythmia       a fib    Cardiac cath 11/01/2012     RCA (sm, nondom) patent. LM patent. LAD 25%. CX (dom) 45% mid. LVEDP 12 mmHg. No WMA. PA 27/12. W 10-12. CO/CI 5.2/2.6 (TD).  Cardiac echocardiogram, abnormal 02/20/2016     EF 55%. No WMA. Indeterminate diastolic fx. RVSP 60 mmHg. Severe LAE. Mild MR. Mod TR.  IVCE. Similar to study of 10/26/12.  Cardiovascular aorto-iliac duplex 02/13/2015     Patent aorta bi-iliac endovascular graft w/o leak or limb dysfunction. Sac measures 4.21 x 4.47 cm (4.4 x 4.6 cm on 1/31/14).  Cardiovascular LE peripheral arterial testing 07/29/2013     No significant LE arterial disease bilaterally. R GHADA 1. 12.  L GHADA 1. 12.  R DBI 0.83. L DBI 0.71. Exercise deferred.  Cardiovascular renal duplex 10/31/2012     No RA stenosis. Intrinsic/med disease in left kidney. Patent aortic endograft. Patent, thrombus-free renal veins bilaterally.  Carotid duplex 07/29/2013     Mild <50% bilateral ICA plaquing.       Chronic lung disease      Cigarette smoker      Congestive heart failure (CHF) (HCC)      COPD (chronic obstructive pulmonary disease) (HCC)       and emphysema; likely secondary to tobacco abuse    Difficult intubation      Dyslipidemia       low HDL    Emphysema      HTN (hypertension)      Hypercholesterolemia      Ill-defined condition       hernia    Increased prostate specific antigen (PSA) velocity      Long term (current) use of anticoagulants       coumadin    Osteoarthritis      Osteomyelitis (HCC)      Paroxysmal atrial fibrillation (HCC)      Peripheral vascular disease ([facility] [address]       AAA repair 12/2007    Persistent atrial Fibrillation      Persistent atrial fibrillation (HCC)      Unspecified adverse effect of anesthesia       difficulty breathing placed in ICU Oct 2012 (AAA repair)               Past Surgical History:   Procedure Laterality Date    BRONCHOSCOPY DIAGNOSTIC   11/2/2012          CARDIAC CATHETERIZATION   11/1/2012          COLONOSCOPY N/A 7/26/2016     COLONOSCOPY with Polypectomies performed by Terry Squires MD at [address] COLONOSCOPY N/A 5/28/2019     COLONOSCOPY with polypectomy performed by Corby Espinoza MD at [address] HX AAA REPAIR         2006 & 2012    HX HEART CATHETERIZATION   3/4/2009     1. RCA small, nondominant; patent. 2. LMCA patent. 3. LAD is long, wrapped around apical vessel. Diffuse, 20-30% stenosis noted. 4. CCA is large, dominant vessel. Diffuse 20-30% stenosis. 5. LVEDP is 16 mmHg. 6. Overall left ventricular systolic function mildly diminshed with est. EF 45%. Mild, global hypokinesis noted. 7. No significant mitral regurgitation or aortic stenosis noted.  (see report)    HX HERNIA REPAIR   2/2014     rt inguinal     HX HERNIA REPAIR   01/2016     LEFT INGUINAL HERNIA REPAIR DR. Aby Tobin HERNIA,5+Y/O,JESSIE Left 1-20-16     Dr. Karmen Valadez   11/1/2012                     Family History   Problem Relation Age of Onset    Heart Surgery Father           BYPASS    Stroke Father      Heart Surgery Mother           BYPASS    Coronary Artery Disease Mother      Stroke Mother           Social History               Socioeconomic History    Marital status:        Spouse name: Not on file    Number of children: Not on file    Years of education: Not on file    Highest education level: Not on file   Tobacco Use    Smoking status: Former Smoker       Packs/day: 1.00       Years: 54.00       Pack years: 54.00       Types: Cigarettes       Last attempt to quit: 10/23/2012       Years since quittin.6    Smokeless tobacco: Never Used   Substance and Sexual Activity    Alcohol use: No       Alcohol/week: 0.0 oz       Comment: quit drinking alcohol 26 years ago, patient states stopped in \"1468\"    Drug use: No                  Allergies   Allergen Reactions    Codeine Swelling    Morphine Itching    Sulfa (Sulfonamide Antibiotics) Other (comments)       Kidney problems.         Prior to Admission Medications   Prescriptions Last Dose Informant Patient Reported? Taking? HYDROcodone-acetaminophen (NORCO) 5-325 mg per tablet Unknown at Unknown time   Yes No   Sig: Take 0.5 Tabs by mouth two (2) times daily as needed for Pain. OXYGEN-AIR DELIVERY SYSTEMS Unknown at Unknown time   Yes No   Si L/min by Nasal route daily. Continuous. [employer] is [employer]      STIOLTO RESPIMAT 2.5-2.5 mcg/actuation inhaler Unknown at Unknown time   No No   Sig: inhale 2 inhalations by mouth once daily   albuterol (PROVENTIL VENTOLIN) 2.5 mg /3 mL (0.083 %) nebulizer solution Unknown at Unknown time   No No   Sig: 3 mL by Nebulization route every four (4) hours as needed for Wheezing. amLODIPine (NORVASC) 10 mg tablet Unknown at Unknown time   Yes No   Sig: Take 10 mg by mouth daily. digoxin (DIGITEK) 0.125 mg tablet Unknown at Unknown time   No No   Sig: take 1 tablet by mouth once daily   ferrous sulfate 325 mg (65 mg iron) tablet Unknown at Unknown time   No No   Sig: Take 1 Tab by mouth two (2) times daily (with meals) for 30 days. lisinopril (PRINIVIL, ZESTRIL) 20 mg tablet Unknown at Unknown time   No No   Sig: Take 1 Tab by mouth daily. omeprazole (PRILOSEC) 20 mg capsule Unknown at Unknown time   No No   Sig: Take 1 Cap by mouth daily.  Indications: gastritis   polyethylene glycol (MIRALAX) 17 gram packet Unknown at Unknown time   Yes No   Sig: Take 17 g by mouth daily as needed (constipation). pravastatin (PRAVACHOL) 40 mg tablet Unknown at Unknown time   Yes No   Sig: Take 40 mg by mouth nightly. traZODone (DESYREL) 50 mg tablet Unknown at Unknown time   Yes No   Sig: Take 50 mg by mouth nightly. Facility-Administered Medications: None         Physical Exam:      Patient Vitals for the past 24 hrs:    Temp Pulse Resp BP SpO2   06/02/19 2200  94  149/78 99 %   06/02/19 2155  94  174/73 99 %   06/02/19 2100  98  (!) 170/96 100 %   06/02/19 2045 97.8 °F (36.6 °C) (!) 101  155/75 98 %   06/02/19 2030  94  167/64 100 %   06/02/19 2015  91  155/67 100 %   06/02/19 2000  83  164/77 100 %   06/02/19 1945  84  165/72 100 %   06/02/19 1930  85  146/69 100 %   06/02/19 1915  89  163/63 100 %   06/02/19 1900  82  154/69 93 %   06/02/19 1845    154/69 99 %   06/02/19 1744     100 %   06/02/19 1552 97.6 °F (36.4 °C) 88 22 147/69 100 %      Physical Exam   Constitutional: He appears well-developed and well-nourished. No distress. HENT:   Head: Normocephalic and atraumatic. Cardiovascular: Normal rate and regular rhythm. Exam reveals no gallop and no friction rub. No murmur heard. Pulmonary/Chest: Effort normal. No respiratory distress. He has no wheezes. He has no rales. Abdominal: Soft. He exhibits distension. TTP RLQ   Musculoskeletal:   2+ pedal edema + 1+ UE edema   Neurological: He is alert. Skin: Skin is warm and dry. No rash noted. He is not diaphoretic. No erythema. No pallor.            IMAGING:   Xr Chest Pa Lat     Result Date: 6/2/2019  IMPRESSION: 1. Small bilateral pleural effusions are present. 2.  Hyperinflated lungs suggestive of COPD.     Cta Chest W Or W Wo Cont     Result Date: 6/2/2019  IMPRESSION[de-identified] 1.  Negative for acute PE. 2. Bilateral pleural effusions. 3. Altered configuration of abdominal aorta with aneurysmal outpouching at the proximal aspect of the graft.  Most likely communication from the true lumen laterally into this aneurysmal outpouching, rather than communication through the stent graft. This is only minimally imaged at the margin of the study. Consider subsequent dedicated imaging with CTA abdomen. 4. Hepatic cysts.            Recent Results          Recent Results (from the past 12 hour(s))   EKG, 12 LEAD, INITIAL     Collection Time: 06/02/19  4:08 PM   Result Value Ref Range     Ventricular Rate 86 BPM     Atrial Rate 75 BPM     QRS Duration 92 ms     Q-T Interval 336 ms     QTC Calculation (Bezet) 402 ms     Calculated R Axis -59 degrees     Calculated T Axis 75 degrees     Diagnosis           Atrial fibrillation  Left axis deviation  Septal infarct (cited on or before 25-AUG-2017)  Abnormal ECG  When compared with ECG of 24-MAY-2019 16:55,  No significant change was found  Confirmed by Kenyetta Noguera ([id number]) on 6/2/2019 4:50:08 PM      CBC WITH AUTOMATED DIFF     Collection Time: 06/02/19  4:18 PM   Result Value Ref Range     WBC 13.4 (H) 4.6 - 13.2 K/uL     RBC 3.33 (L) 4.70 - 5.50 M/uL     HGB 7.7 (L) 13.0 - 16.0 g/dL     HCT 24.2 (L) 36.0 - 48.0 %     MCV 72.7 (L) 74.0 - 97.0 FL     MCH 23.1 (L) 24.0 - 34.0 PG     MCHC 31.8 31.0 - 37.0 g/dL     RDW 22.1 (H) 11.6 - 14.5 %     PLATELET 909 030 - 314 K/uL     MPV 8.5 (L) 9.2 - 11.8 FL     NEUTROPHILS 88 (H) 40 - 73 %     LYMPHOCYTES 5 (L) 21 - 52 %     MONOCYTES 7 3 - 10 %     EOSINOPHILS 0 0 - 5 %     BASOPHILS 0 0 - 2 %     ABS. NEUTROPHILS 11.8 (H) 1.8 - 8.0 K/UL     ABS. LYMPHOCYTES 0.7 (L) 0.9 - 3.6 K/UL     ABS. MONOCYTES 0.9 0.05 - 1.2 K/UL     ABS. EOSINOPHILS 0.0 0.0 - 0.4 K/UL     ABS.  BASOPHILS 0.0 0.0 - 0.1 K/UL     DF AUTOMATED       PLATELET COMMENTS ADEQUATE PLATELETS       RBC COMMENTS ANISOCYTOSIS  2+          RBC COMMENTS HYPOCHROMIA  2+        METABOLIC PANEL, BASIC     Collection Time: 06/02/19  4:18 PM   Result Value Ref Range     Sodium 127 (L) 136 - 145 mmol/L     Potassium 5.0 3.5 - 5.5 mmol/L   Chloride 95 (L) 100 - 108 mmol/L     CO2 25 21 - 32 mmol/L     Anion gap 7 3.0 - 18 mmol/L     Glucose 106 (H) 74 - 99 mg/dL     BUN 20 (H) 7.0 - 18 MG/DL     Creatinine 1.31 (H) 0.6 - 1.3 MG/DL     BUN/Creatinine ratio 15 12 - 20       GFR est AA >60 >60 ml/min/1.73m2     GFR est non-AA 53 (L) >60 ml/min/1.73m2     Calcium 8.7 8.5 - 10.1 MG/DL   TYPE & SCREEN     Collection Time: 06/02/19  4:18 PM   Result Value Ref Range     Crossmatch Expiration 06/05/2019       ABO/Rh(D) Jarad Lehi POSITIVE       Antibody screen NEG       CALLED TO: SHIRIN CLARKE, AT 1741 ON [id number] BY CLB       Unit number [id number]       Blood component type  LR       Unit division 00       Status of unit ALLOCATED       Crossmatch result Compatible       Unit number [id number]       Blood component type  LR       Unit division 00       Status of unit ALLOCATED       Crossmatch result Compatible     TROPONIN I     Collection Time: 06/02/19  4:18 PM   Result Value Ref Range     Troponin-I, QT <0.02 0.0 - 0.045 NG/ML   PROTHROMBIN TIME + INR     Collection Time: 06/02/19  4:18 PM   Result Value Ref Range     Prothrombin time 15.7 (H) 11.5 - 15.2 sec     INR 1.3 (H) 0.8 - 1.2     POC LACTIC ACID     Collection Time: 06/02/19  9:12 PM   Result Value Ref Range     Lactic Acid (POC) 0.66 0.40 - 2.00 mmol/L   OCCULT BLOOD, STOOL     Collection Time: 06/02/19 10:36 PM   Result Value Ref Range     Occult blood, stool NEGATIVE  NEG                 Assessment/Plan:   [de-identified] y.o. male with PMH significant for HFrEF, COPD, CKD III, chronic pain, atrial fibrillation GIB, HTN, repaired AAA, now admitted with sepsis.     Sepsis 2/2 skin infection vs UTI vs PNA CTA + CXR with no suggestion for acute process. UA with mucous and blood but no LE, bacteria or WBC to suggest infection. Reports improvement of arm swelling + erythema with abx. Lactic acid 0.66. Leukocytosis 13.4, left shift to 88. On admission, not hypotensive, but tachypnea to the mid 20's.    - Admit to stepdown   - FU UC, BC  - S/p 2L NS bolus in ED  - Vanc, zosyn, levaquin started in ED will continue for now  - Will monitor fluid status closely as vancomycin is administered with a significant amount of fluids   - Daily CBC, BMP      COPD, Acute on Chronic Respiratory Failure COPD on 2L of home O2, now on 3L d/t increase in SOB. Diffuse wheezing + rhonchi on exam, pulmonary edema may cause bronchospasm- with peripheral edema + fluid boluses as above will treat for pulmonary edema.   - Admit to stepdown   - ABG  - Repeat CXR  - Bipap PRN  - PRN duoneb Q4H PRN   - Will monitor very closely      H/o HFrEF, atrial fibrillation last EF 55% 4/2018, L atrium severely dilated at that time as well. Diffuse edema on admission. EKG no change from prior. Troponin <0.02.   - Strict I&O  - Fluid restriction to 800mL QD  - Lasix 40 IV now  - Lasix 20 IV daily  - Duoneb Q4H PRN  - BNP  - Tachycardia with increasing SOB likely 2/2 duoneb, stable BP.   - Will consider IV metoprolol for sustained HR > 120   - Cardiac monitoring  - Daily CMP and CBC   - Consider repeat echo      Hyponatremia h/o hyponatremia, now down to 127 on admission. Likely multifactorial with chronic lung disease + CHF as above. - Serum osm   - Urine Na and osm (these will likely be less help as patient has received lasix + fluids)  - Fluid restrict as above      Abdominal aorta aneurysm noted at proximal aspect of graft. VSS. Do not suspect acute dissection. Vascular consulted in ED. - Will have to wait for washout from CTA chest to order CTA abdomen     Of note, recent admission for symptomatic anemia, anticoagulation for atrial fibrillation held following this admission. Hgb roughly stable 8.1>7.7. iFOBT negative on admission, GI AGUAYO unremarkable last admission.  Will monitor closely.      Remainder of chronic conditions per ED note      Liz Jean MD  PGY-2 [facility]  06/02/19 10:53 PM

## 2019-06-03 NOTE — ROUTINE PROCESS
Bedside and Verbal shift change report given to Rosy Byrnes RN (oncoming nurse) by Parish Ann RN   (offgoing nurse). Report included the following information SBAR, Kardex, MAR, Recent Results and Cardiac Rhythm Afib uncontrolled. Kalyn Fang

## 2019-06-03 NOTE — PROGRESS NOTES
Pt set up on Bipap due to high work of breathing. Pt was placed on and seems to be doing better. Breath sounds reveal crackles and scattered rhonchi.   Sats were 100% before and remain 100%

## 2019-06-04 LAB
ALBUMIN SERPL-MCNC: 2.5 G/DL (ref 3.4–5)
ALBUMIN/GLOB SERPL: 0.7 {RATIO} (ref 0.8–1.7)
ALP SERPL-CCNC: 70 U/L (ref 45–117)
ALT SERPL-CCNC: 17 U/L (ref 16–61)
ANION GAP SERPL CALC-SCNC: 7 MMOL/L (ref 3–18)
AST SERPL-CCNC: 16 U/L (ref 15–37)
BASOPHILS # BLD: 0 K/UL (ref 0–0.1)
BASOPHILS NFR BLD: 0 % (ref 0–2)
BILIRUB SERPL-MCNC: 0.7 MG/DL (ref 0.2–1)
BUN SERPL-MCNC: 25 MG/DL (ref 7–18)
BUN/CREAT SERPL: 15 (ref 12–20)
CALCIUM SERPL-MCNC: 7.9 MG/DL (ref 8.5–10.1)
CHLORIDE SERPL-SCNC: 96 MMOL/L (ref 100–108)
CO2 SERPL-SCNC: 26 MMOL/L (ref 21–32)
CREAT SERPL-MCNC: 1.66 MG/DL (ref 0.6–1.3)
DIFFERENTIAL METHOD BLD: ABNORMAL
ECHO AO ROOT DIAM: 3.32 CM
ECHO AV REGURGITANT PHT: 446.9 CM
ECHO IVC SNIFF: 1.74 CM
ECHO LA AREA 4C: 26 CM2
ECHO LA VOL 2C: 109.37 ML (ref 18–58)
ECHO LA VOL 4C: 85.69 ML (ref 18–58)
ECHO LA VOL BP: 112.92 ML (ref 18–58)
ECHO LA VOL/BSA BIPLANE: 63.62 ML/M2 (ref 16–28)
ECHO LA VOLUME INDEX A2C: 61.62 ML/M2 (ref 16–28)
ECHO LA VOLUME INDEX A4C: 48.28 ML/M2 (ref 16–28)
ECHO LV E' LATERAL VELOCITY: 16.17 CM/S
ECHO LV E' SEPTAL VELOCITY: 4.88 CM/S
ECHO LV EDV A2C: 131.1 ML
ECHO LV EDV A4C: 79.2 ML
ECHO LV EDV BP: 112.1 ML (ref 67–155)
ECHO LV EDV INDEX A4C: 44.6 ML/M2
ECHO LV EDV INDEX BP: 63.2 ML/M2
ECHO LV EDV NDEX A2C: 73.9 ML/M2
ECHO LV EJECTION FRACTION A2C: 59 %
ECHO LV EJECTION FRACTION A4C: 51 %
ECHO LV EJECTION FRACTION BIPLANE: 58.5 % (ref 55–100)
ECHO LV ESV A2C: 53.5 ML
ECHO LV ESV A4C: 38.7 ML
ECHO LV ESV BP: 46.6 ML (ref 22–58)
ECHO LV ESV INDEX A2C: 30.1 ML/M2
ECHO LV ESV INDEX A4C: 21.8 ML/M2
ECHO LV ESV INDEX BP: 26.3 ML/M2
ECHO LV INTERNAL DIMENSION DIASTOLIC: 5.75 CM (ref 4.2–5.9)
ECHO LV INTERNAL DIMENSION SYSTOLIC: 4.5 CM
ECHO LV IVSD: 0.75 CM (ref 0.6–1)
ECHO LV MASS 2D: 191.5 G (ref 88–224)
ECHO LV MASS INDEX 2D: 107.9 G/M2 (ref 49–115)
ECHO LV POSTERIOR WALL DIASTOLIC: 0.79 CM (ref 0.6–1)
ECHO LVOT DIAM: 2.02 CM
ECHO LVOT PEAK GRADIENT: 5 MMHG
ECHO LVOT PEAK VELOCITY: 112.11 CM/S
ECHO LVOT VTI: 20.69 CM
ECHO MV A VELOCITY: 92.9 CM/S
ECHO MV E DECELERATION TIME (DT): 207.9 MS
ECHO MV E VELOCITY: 46.46 CM/S
ECHO MV E/A RATIO: 0.5
ECHO MV E/E' LATERAL: 2.87
ECHO MV E/E' RATIO (AVERAGED): 6.2
ECHO MV E/E' SEPTAL: 9.52
ECHO PULMONARY ARTERY SYSTOLIC PRESSURE (PASP): 51 MMHG
ECHO RV TAPSE: 1.41 CM (ref 1.5–2)
ECHO TV REGURGITANT MAX VELOCITY: 325.81 CM/S
ECHO TV REGURGITANT PEAK GRADIENT: 42.5 MMHG
EOSINOPHIL # BLD: 0.1 K/UL (ref 0–0.4)
EOSINOPHIL NFR BLD: 1 % (ref 0–5)
ERYTHROCYTE [DISTWIDTH] IN BLOOD BY AUTOMATED COUNT: 21.8 % (ref 11.6–14.5)
GLOBULIN SER CALC-MCNC: 3.6 G/DL (ref 2–4)
GLUCOSE SERPL-MCNC: 93 MG/DL (ref 74–99)
HCT VFR BLD AUTO: 21.6 % (ref 36–48)
HCT VFR BLD AUTO: 23.4 % (ref 36–48)
HCT VFR BLD AUTO: 27.4 % (ref 36–48)
HGB BLD-MCNC: 6.8 G/DL (ref 13–16)
HGB BLD-MCNC: 7.2 G/DL (ref 13–16)
HGB BLD-MCNC: 8.7 G/DL (ref 13–16)
LYMPHOCYTES # BLD: 0.8 K/UL (ref 0.9–3.6)
LYMPHOCYTES NFR BLD: 11 % (ref 21–52)
MCH RBC QN AUTO: 23 PG (ref 24–34)
MCHC RBC AUTO-ENTMCNC: 30.8 G/DL (ref 31–37)
MCV RBC AUTO: 74.8 FL (ref 74–97)
MONOCYTES # BLD: 0.9 K/UL (ref 0.05–1.2)
MONOCYTES NFR BLD: 13 % (ref 3–10)
NEUTS SEG # BLD: 5.3 K/UL (ref 1.8–8)
NEUTS SEG NFR BLD: 75 % (ref 40–73)
PISA AR MAX VEL: 392.3 CM/S
PLATELET # BLD AUTO: 200 K/UL (ref 135–420)
PLATELET COMMENTS,PCOM: ABNORMAL
PMV BLD AUTO: 8.8 FL (ref 9.2–11.8)
POTASSIUM SERPL-SCNC: 3.8 MMOL/L (ref 3.5–5.5)
PROT SERPL-MCNC: 6.1 G/DL (ref 6.4–8.2)
RBC # BLD AUTO: 3.13 M/UL (ref 4.7–5.5)
RBC MORPH BLD: ABNORMAL
RBC MORPH BLD: ABNORMAL
SODIUM SERPL-SCNC: 129 MMOL/L (ref 136–145)
WBC # BLD AUTO: 7.1 K/UL (ref 4.6–13.2)

## 2019-06-04 PROCEDURE — 77010033678 HC OXYGEN DAILY

## 2019-06-04 PROCEDURE — 97116 GAIT TRAINING THERAPY: CPT

## 2019-06-04 PROCEDURE — 30233N1 TRANSFUSION OF NONAUTOLOGOUS RED BLOOD CELLS INTO PERIPHERAL VEIN, PERCUTANEOUS APPROACH: ICD-10-PCS | Performed by: FAMILY MEDICINE

## 2019-06-04 PROCEDURE — 36415 COLL VENOUS BLD VENIPUNCTURE: CPT

## 2019-06-04 PROCEDURE — 94762 N-INVAS EAR/PLS OXIMTRY CONT: CPT

## 2019-06-04 PROCEDURE — 74011000258 HC RX REV CODE- 258: Performed by: STUDENT IN AN ORGANIZED HEALTH CARE EDUCATION/TRAINING PROGRAM

## 2019-06-04 PROCEDURE — 36430 TRANSFUSION BLD/BLD COMPNT: CPT

## 2019-06-04 PROCEDURE — 65270000029 HC RM PRIVATE

## 2019-06-04 PROCEDURE — 74011250636 HC RX REV CODE- 250/636: Performed by: STUDENT IN AN ORGANIZED HEALTH CARE EDUCATION/TRAINING PROGRAM

## 2019-06-04 PROCEDURE — 74011000250 HC RX REV CODE- 250: Performed by: STUDENT IN AN ORGANIZED HEALTH CARE EDUCATION/TRAINING PROGRAM

## 2019-06-04 PROCEDURE — 74011250636 HC RX REV CODE- 250/636: Performed by: FAMILY MEDICINE

## 2019-06-04 PROCEDURE — 80053 COMPREHEN METABOLIC PANEL: CPT

## 2019-06-04 PROCEDURE — P9016 RBC LEUKOCYTES REDUCED: HCPCS

## 2019-06-04 PROCEDURE — 74011250637 HC RX REV CODE- 250/637: Performed by: STUDENT IN AN ORGANIZED HEALTH CARE EDUCATION/TRAINING PROGRAM

## 2019-06-04 PROCEDURE — 94640 AIRWAY INHALATION TREATMENT: CPT

## 2019-06-04 PROCEDURE — 85018 HEMOGLOBIN: CPT

## 2019-06-04 PROCEDURE — 85025 COMPLETE CBC W/AUTO DIFF WBC: CPT

## 2019-06-04 PROCEDURE — 74011000258 HC RX REV CODE- 258: Performed by: FAMILY MEDICINE

## 2019-06-04 PROCEDURE — 97162 PT EVAL MOD COMPLEX 30 MIN: CPT

## 2019-06-04 RX ORDER — DOCUSATE SODIUM 100 MG/1
100 CAPSULE, LIQUID FILLED ORAL DAILY
Status: DISCONTINUED | OUTPATIENT
Start: 2019-06-04 | End: 2019-06-06 | Stop reason: HOSPADM

## 2019-06-04 RX ORDER — TAMSULOSIN HYDROCHLORIDE 0.4 MG/1
0.4 CAPSULE ORAL DAILY
COMMUNITY
End: 2020-01-01

## 2019-06-04 RX ORDER — CEPHALEXIN 250 MG/1
500 CAPSULE ORAL EVERY 6 HOURS
Status: DISCONTINUED | OUTPATIENT
Start: 2019-06-04 | End: 2019-06-06 | Stop reason: HOSPADM

## 2019-06-04 RX ORDER — SODIUM CHLORIDE 9 MG/ML
250 INJECTION, SOLUTION INTRAVENOUS AS NEEDED
Status: DISCONTINUED | OUTPATIENT
Start: 2019-06-04 | End: 2019-06-05

## 2019-06-04 RX ADMIN — HYDROCODONE BITARTRATE AND ACETAMINOPHEN 0.5 TABLET: 5; 325 TABLET ORAL at 08:23

## 2019-06-04 RX ADMIN — HYDROCODONE BITARTRATE AND ACETAMINOPHEN 0.5 TABLET: 5; 325 TABLET ORAL at 21:16

## 2019-06-04 RX ADMIN — FUROSEMIDE 20 MG: 10 INJECTION, SOLUTION INTRAMUSCULAR; INTRAVENOUS at 08:23

## 2019-06-04 RX ADMIN — IPRATROPIUM BROMIDE AND ALBUTEROL SULFATE 3 ML: .5; 3 SOLUTION RESPIRATORY (INHALATION) at 14:33

## 2019-06-04 RX ADMIN — FERROUS SULFATE TAB 325 MG (65 MG ELEMENTAL FE) 325 MG: 325 (65 FE) TAB at 16:38

## 2019-06-04 RX ADMIN — IPRATROPIUM BROMIDE AND ALBUTEROL SULFATE 3 ML: .5; 3 SOLUTION RESPIRATORY (INHALATION) at 21:25

## 2019-06-04 RX ADMIN — PANTOPRAZOLE SODIUM 40 MG: 40 TABLET, DELAYED RELEASE ORAL at 08:23

## 2019-06-04 RX ADMIN — CEPHALEXIN 500 MG: 250 CAPSULE ORAL at 16:38

## 2019-06-04 RX ADMIN — IPRATROPIUM BROMIDE AND ALBUTEROL SULFATE 3 ML: .5; 3 SOLUTION RESPIRATORY (INHALATION) at 07:54

## 2019-06-04 RX ADMIN — TRAZODONE HYDROCHLORIDE 50 MG: 50 TABLET ORAL at 21:15

## 2019-06-04 RX ADMIN — DIGOXIN 0.12 MG: 125 TABLET ORAL at 08:23

## 2019-06-04 RX ADMIN — PRAVASTATIN SODIUM 40 MG: 20 TABLET ORAL at 21:15

## 2019-06-04 RX ADMIN — FERROUS SULFATE TAB 325 MG (65 MG ELEMENTAL FE) 325 MG: 325 (65 FE) TAB at 08:23

## 2019-06-04 RX ADMIN — AMLODIPINE BESYLATE 10 MG: 10 TABLET ORAL at 08:23

## 2019-06-04 RX ADMIN — CEPHALEXIN 500 MG: 250 CAPSULE ORAL at 12:00

## 2019-06-04 RX ADMIN — PIPERACILLIN SODIUM AND TAZOBACTAM SODIUM 3.38 G: 3; .375 INJECTION, POWDER, LYOPHILIZED, FOR SOLUTION INTRAVENOUS at 11:38

## 2019-06-04 RX ADMIN — PIPERACILLIN, TAZOBACTAM 4.5 G: 4; .5 INJECTION, POWDER, LYOPHILIZED, FOR SOLUTION INTRAVENOUS at 04:07

## 2019-06-04 RX ADMIN — HYDROCODONE BITARTRATE AND ACETAMINOPHEN 0.5 TABLET: 5; 325 TABLET ORAL at 16:38

## 2019-06-04 NOTE — PROGRESS NOTES
conducted an initial consultation and Spiritual Assessment for Debby Ribeiro, who is a [de-identified] y.o.,male. Patients Primary Language is: [place]. According to the patients EMR [employer] Affiliation is: [place]. 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    Abscess 08/25/2017    Discitis of lumbar region 08/04/2016    Pyogenic inflammation of bone ([address] 07/01/2016    Sepsis ([address] 02/19/2016    Intraabdominal fluid collection 02/18/2016    Abdominal pain 02/18/2016    Warfarin-induced coagulopathy ([address] 01/19/2016    Hypoxemia requiring supplemental oxygen 12/28/2015    CAD (coronary artery disease) 12/08/2015    Left inguinal hernia 02/14/2014    Right inguinal hernia 12/24/2013    RLQ abdominal pain 12/23/2013    Status post AAA (abdominal aortic aneurysm) repair 12/23/2013    AAA (abdominal aortic aneurysm, ruptured) ([address] 12/23/2013    Chronic constipation 12/23/2013    Chronic atrial fibrillation ([address] 05/08/2012    HTN (hypertension)     Hypercholesterolemia     Congestive heart failure (CHF) (HCC)     Peripheral vascular disease (HCC)     COPD (chronic obstructive pulmonary disease) ([address]         The  provided the following Interventions:  Initiated a relationship of care and support. Listened empathically. Provided information about Spiritual Care Services. Chart reviewed. The following outcomes where achieved:  Patient expressed gratitude for 's visit. Assessment:  Patient is naturally concern about his endo leak and his possible options.  He is hopeful that this can be resolved. He indicated that he had a lot to think about. Patient does not have any Gnosticist/cultural needs that will affect patients preferences in health care. Plan:  Chaplains will continue to follow and will provide pastoral care on an as needed/requested basis.  recommends bedside caregivers page  on duty if patient shows signs of acute spiritual or emotional distress.     [phone]  [phone])

## 2019-06-04 NOTE — PROGRESS NOTES
Vascular Surgery Progress Note    Admit Date: 2019  POD * No surgery found *    Procedure:  * No surgery found *      Subjective:     Patient has no new complaints. Objective:     Blood pressure 118/64, pulse 72, temperature 98.2 °F (36.8 °C), resp. rate 18, height 5' 6\" (1.676 m), weight 151 lb (68.5 kg), SpO2 99 %.     Temp (24hrs), Av.1 °F (36.7 °C), Min:97.6 °F (36.4 °C), Max:98.8 °F (37.1 °C)      Physical Exam:  GENERAL: alert, cooperative, no distress, appears stated age, LUNG:  clear to auscultation bilaterally, HEART:  regular rate and rhythm, S1, S2 normal, no murmur, click, rub or gallop, ABDOMEN:  no change and NEURO:  negative findings: alert, oriented x3  cranial nerves II-XII intact    Labs: Results:       Chemistry Recent Labs     19  0610 19  0556 19  1618   GLU 93 87 106*   * 129* 127*   K 3.8 4.4 5.0   CL 96* 96* 95*   CO2 26 24 25   BUN 25* 25* 20*   CREA 1.66* 1.35* 1.31*   CA 7.9* 8.1* 8.7   AGAP 7 9 7   BUCR 15 19 15   AP 70 85  --    TP 6.1* 6.9  --    ALB 2.5* 3.0*  --    GLOB 3.6 3.9  --    AGRAT 0.7* 0.8  --       CBC w/Diff Recent Labs     19  0610 19  0004 19  1844 19  0556 19  1618   WBC 7.1  --   --  10.3 13.4*   RBC 3.13*  --   --  3.02* 3.33*   HGB 7.2* 6.8* 7.2* 6.7* 7.7*   HCT 23.4* 21.6* 22.7* 22.1* 24.2*     --   --  252 266   GRANS PENDING  --   --  87* 88*   LYMPH PENDING  --   --  7* 5*   EOS PENDING  --   --  0 0      Microbiology Recent Labs     19  2106 19   CULT NO GROWTH 2 DAYS NO GROWTH 2 DAYS      Coagulation Recent Labs     19  1618   PTP 15.7*   INR 1.3*         Data Review: images and reports reviewed    Assessment:     Principal Problem:    Congestive heart failure (CHF) ([facility]) ()      Overview: compensated    Active Problems:    Sepsis ([facility] (2016)        Plan/Recommendations/Medical Decision Making:     Continue present treatment   --Reviewed CTA images showing worsening aneurysmal disease within the mesenteric aorta all the way to the base of the superior mesenteric artery. 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 We would attempt to see if we can get into the right renal artery although if this is highly unlikely. This approach would be extraordinarily difficult given his type III aortic arch  --I do long discussion with him talking about the fact that he would be high risk for need for dialysis or having worsening kidney problems as well as a simple fact that without being able to give him antiplatelet medication he would be at high risk of death as we would need to give him some kind of antiplatelet to be able to keep his stents open whether he got a fenestrated or snorkel procedure. Although it is way more important with a snorkel procedure. --I did let him know that he is currently not dying and not symptomatic so we do have time to make this decision and we could also wait a few months pending what his other doctors feel would be prudent to then restart antiplatelet medication.   If this is within the next 3 or 6 months I think is perfectly reasonable to hold off on repair at this time and then attempt at that time with dual antiplatelet therapy.   --If he can never again get dual antiplatelet therapy one option would be to attempt a procedure and give him the therapy for 3 months only and hope for the best.  --Patient will think about surgery  --Please call with any further questions or concerns

## 2019-06-04 NOTE — PROGRESS NOTES
Problem: Mobility Impaired (Adult and Pediatric)  Goal: *Acute Goals and Plan of Care (Insert Text)  Description  Physical Therapy Goals  Initiated 6/4/2019 and to be accomplished within 7 day(s)  1. Patient will move from supine to sit and sit to supine  in bed with independence. 2.  Patient will transfer from bed to chair and chair to bed with modified independence using the least restrictive device. 3.  Patient will perform sit to stand with modified independence. 4.  Patient will ambulate with modified independence for 150 feet with the least restrictive device. 5.  Patient will ascend/descend 5 stairs with 1 handrail(s) with supervision/set-up. To prepare pt for home mobility. PLOF:  Pt lives with his brother in a 2 story home with 1st floor set up, 5 steps to enter, 1 railing. Pt ambulated with supplemental O2 and a RW prior to this hospitalization   Outcome: Progressing Towards Goal  PHYSICAL THERAPY EVALUATION    Patient: Priscilla Prakash ([de-identified] y.o. male)  Date: 6/4/2019  Primary Diagnosis: Sepsis ([facility] [address] [A41.9]        Precautions:        PLOF: See goals section above    ASSESSMENT :  Based on the objective data described below, the patient presents with decreased functional activity tolerance following admission for sepsis. Pt was cleared by nursing to work with therapy. Pt was received semi-reclined in bed, agreeable to participate in PT .  Pt's daughter was present too. Pt performed supine-sit with CGA, c/o pain at site of hernia and sit-stand with CGA. Pt displayed intact sitting balance and good static/fair dynamic standing balance. Pt ambulated 5 feet to [facility] with Rw, no loss of balance. Pt returned to sitting on EOB and stated he preferred to sit on EOB for awhile rather than going back to bed. He did not wish to sit in recliner. Pt stood with CGA and ambulated x 20 feet to other side of bed so he could talk with his daughter.   No LOB but pt with moderate shortness of breath on 3 L/min O2. O2 sats remained > 93% throughout session. At conclusion of session, pt was left resting on EOB , needs met, call bell in reach, nurse notified, daughter in room with pt. Patient will benefit from skilled intervention to address the above impairments. Patient's rehabilitation potential is considered to be Good  Factors which may influence rehabilitation potential include:   ? None noted  ? Mental ability/status  ? Medical condition  ? Home/family situation and support systems  ? Safety awareness  ? Pain tolerance/management  ? Other:      PLAN :  Recommendations and Planned Interventions:   ?           Bed Mobility Training             ? Neuromuscular Re-Education  ? Transfer Training                   ? Orthotic/Prosthetic Training  ? Gait Training                          ? Modalities  ? Therapeutic Exercises           ? Edema Management/Control  ? Therapeutic Activities            ? Family Training/Education  ? Patient Education  ? Other (comment):    Frequency/Duration: Patient will be followed by physical therapy 1-2 times per day to address goals. Discharge Recommendations: Home Health  Further Equipment Recommendations for Discharge: N/A     SUBJECTIVE:   Patient stated ? My hernia hurts all the time, I just deal with it.?    OBJECTIVE DATA SUMMARY:     Past Medical History:   Diagnosis Date    Aneurysm ([address]     AAA repair 2006 & 2012    Aorto-iliac duplex 02/13/2015    Tech difficult. Patent aorta bi-iliac endograft w/o leak or limb dysfunction. Arrhythmia     a fib    Cardiac cath 11/01/2012    RCA (sm, nondom) patent. LM patent. LAD 25%. CX (dom) 45% mid. LVEDP 12 mmHg. No WMA. PA 27/12. W 10-12. CO/CI 5.2/2.6 (TD). Cardiac echocardiogram, abnormal 02/20/2016    EF 55%. No WMA. Indeterminate diastolic fx. RVSP 60 mmHg. Severe LAE. Mild MR. Mod TR.  IVCE. Similar to study of 10/26/12. Cardiovascular aorto-iliac duplex 02/13/2015    Patent aorta bi-iliac endovascular graft w/o leak or limb dysfunction. Sac measures 4.21 x 4.47 cm (4.4 x 4.6 cm on 1/31/14). Cardiovascular LE peripheral arterial testing 07/29/2013    No significant LE arterial disease bilaterally. R GHADA 1. 12.  L GHADA 1. 12.  R DBI 0.83. L DBI 0.71. Exercise deferred. Cardiovascular renal duplex 10/31/2012    No RA stenosis. Intrinsic/med disease in left kidney. Patent aortic endograft. Patent, thrombus-free renal veins bilaterally. Carotid duplex 07/29/2013    Mild <50% bilateral ICA plaquing. Chronic lung disease     Cigarette smoker     Congestive heart failure (CHF) (HCC)     COPD (chronic obstructive pulmonary disease) (HCC)     and emphysema; likely secondary to tobacco abuse    Difficult intubation     Dyslipidemia     low HDL    Emphysema     HTN (hypertension)     Hypercholesterolemia     Ill-defined condition     hernia    Increased prostate specific antigen (PSA) velocity     Long term (current) use of anticoagulants     coumadin    Osteoarthritis     Osteomyelitis (HCC)     Paroxysmal atrial fibrillation ([address]     Peripheral vascular disease ([address]     AAA repair 12/2007    Persistent atrial Fibrillation     Persistent atrial fibrillation (HCC)     Unspecified adverse effect of anesthesia     difficulty breathing placed in ICU Oct 2012 (AAA repair)     Past Surgical History:   Procedure Laterality Date    BRONCHOSCOPY DIAGNOSTIC  11/2/2012         CARDIAC CATHETERIZATION  11/1/2012         COLONOSCOPY N/A 7/26/2016    COLONOSCOPY with Polypectomies performed by Chandrakant Boone MD at [facility] ENDOSCOPY    COLONOSCOPY N/A 5/28/2019    COLONOSCOPY with polypectomy performed by Ceferino Reyes MD at [address] [place] [address] AAA REPAIR      2006 & 2012    HX HEART CATHETERIZATION  3/4/2009    1. RCA small, nondominant; patent. 2. LMCA patent.  3. LAD is long, wrapped around apical vessel. Diffuse, 20-30% stenosis noted. 4. CCA is large, dominant vessel. Diffuse 20-30% stenosis. 5. LVEDP is 16 mmHg. 6. Overall left ventricular systolic function mildly diminshed with est. EF 45%. Mild, global hypokinesis noted. 7. No significant mitral regurgitation or aortic stenosis noted. (see report)    HX HERNIA REPAIR  2/2014    rt inguinal     HX HERNIA REPAIR  01/2016    LEFT INGUINAL HERNIA REPAIR DR. Elijah Borjas ING HERNIA,5+Y/O,JESSIE Left 1-20-16    Dr. Fabián Escamilla  11/1/2012          Barriers to Learning/Limitations: None  Compensate with: N/A  Home Situation:  Home Situation  Home Environment: Private residence  # Steps to Enter: 5  Rails to Enter: Yes  Hand Rails : Right  One/Two Story Residence: Two story, live on 1st floor  # of Interior Steps: 23  Living Alone: No  Support Systems: Family member(s)  Patient Expects to be Discharged to[de-identified] Private residence  Current DME Used/Available at Home: Oxygen, portable, Walker, rolling  Critical Behavior:  Neurologic State: Alert  Orientation Level: Oriented X4  Cognition: Appropriate decision making; Appropriate for age attention/concentration; Appropriate safety awareness; Follows commands     Psychosocial  Purposeful Interaction: Yes  Pt Identified Daily Priority: Clinical issues (comment)  Caritas Process: Establish trust;Teaching/learning; Attend basic human needs  Caring Interventions: Reassure; Therapeutic modalities  Reassure: Therapeutic listening; Acceptance; Informing  Therapeutic Modalities: Humor; Intentional therapeutic touch; Deep breathing  Skin Condition/Temp: Dry;Warm     Skin Integrity: Excoriation(groin)  Skin Integumentary  Skin Color: Ecchymosis (comment)  Skin Condition/Temp: Dry;Warm  Skin Integrity: Excoriation(groin)     Strength:    Strength: Generally decreased, functional    Tone & Sensation:   Tone: Normal    Sensation: Intact    Range Of Motion:  AROM: Within functional limits    Functional Mobility:  Bed Mobility:    Supine to Sit: Stand-by assistance    Transfers:  Sit to Stand: Contact guard assistance  Stand to Sit: Contact guard assistance    Balance:   Sitting: Intact; With support  Standing: Impaired; Without support  Standing - Static: Good  Standing - Dynamic : Fair  Ambulation/Gait Training:  Distance (ft): 20 Feet (ft)  Assistive Device: Walker, rolling  Ambulation - Level of Assistance: Contact guard assistance  Gait Description (WDL): Exceptions to WDL  Gait Abnormalities: Decreased step clearance      Pain:  Pain level pre-treatment: 4/10 (hernia)  Pain level post-treatment: 4/10 (hernia)  Pain Intervention(s) : Medication (see MAR); Rest, Ice, Repositioning  Response to intervention: Nurse notified, See doc flow    Activity Tolerance:   Fair  Please refer to the flowsheet for vital signs taken during this treatment. After treatment:   ?         Patient left in no apparent distress sitting up in chair  ? Patient left in no apparent distress on edge of bed  ? Call bell left within reach  ? Nursing notified  ? Caregiver present  ? Bed alarm activated  ? SCDs applied    COMMUNICATION/EDUCATION:   ?         Role of Physical Therapy in the acute care setting. ?         Fall prevention education was provided and the patient/caregiver indicated understanding. ? Patient/family have participated as able in goal setting and plan of care. ?         Patient/family agree to work toward stated goals and plan of care. ?         Patient understands intent and goals of therapy, but is neutral about his/her participation. ? Patient is unable to participate in goal setting/plan of care: ongoing with therapy staff. ?         Other:     Thank you for this referral.  Pj Ballard, PT   Time Calculation: 23 mins      Eval Complexity: History: MEDIUM  Complexity : 1-2 comorbidities / personal factors will impact the outcome/ POC Exam:MEDIUM Complexity : 3 Standardized tests and measures addressing body structure, function, activity limitation and / or participation in recreation  Presentation: MEDIUM Complexity : Evolving with changing characteristics  Clinical Decision Making:Medium Complexity    Overall Complexity:MEDIUM

## 2019-06-04 NOTE — PROGRESS NOTES
Orders received, chart reviewed. Pt not seen this morning for PT evaluation due to sleeping very soundly. Will re-attempt this afternoon as schedule allows.     Norman Higginbotham, PT, DPT

## 2019-06-04 NOTE — PROGRESS NOTES
[email or web address]  [phone]    Gastroenterology follow up-Progress note    Impression:  1. Symptomatic anemia   - stable H/H (7.2/23. 4)  - (2) units PRBCs transfused as of 6/3/2019  2. Abnormal CTA findings  - the separation of the stent graft from the arterial wall appears to be gradually increasing. 2. History of longterm anticoagulant therapy  3. Recent GI bleed s/sp EGD/colonoscopy with polypectomy 5/28/19: Hiatal hernia noted. Stomach: mild erosive gastritis few flecks of coffee grounds. 1 cm colon polyp, diverticulosis, internal hemorrhoids. 4. On analgesic (Norco) for hernia pain. Plan:  1. Continue medical management. Vascular team also following. 2. Will need longterm PPI therapy  3. Continue MirLax as prescribed. Will sign off-Thank you for this consultation and the opportunity to participate in the care of this patient. Please do not hesitate to call with any questions or concerns, or should event occur that may necessitate additional GI evaluation. Subjective:  Non-specific abdominal pain. Tolerating oral intake without difficulty or emesis. Told me of his \"leaking stent\". ROS: Denies any fevers, chills, rash.      Eyes: conjunctiva normal, EOM normal   Neck: normal   Cardiovascular: Irregularly irregular   Pulmonary/Chest Wall: breath sounds normal   Abdominal: protuberant, bowel sounds normal and soft, non-acute, mildly tender     Patient Active Problem List   Diagnosis Code    Hypercholesterolemia E78.00    Congestive heart failure (CHF) ([facility]) I50.9    Peripheral vascular disease ([facility]) I73.9    COPD (chronic obstructive pulmonary disease) ([facility]) J44.9    HTN (hypertension) I10    Chronic atrial fibrillation ([facility]) I48.2    RLQ abdominal pain R10.31    Status post AAA (abdominal aortic aneurysm) repair E99.251, Z86.79    AAA (abdominal aortic aneurysm, ruptured) ([facility]) I71.3    Chronic constipation K59.09    Right inguinal hernia K40.90    Left inguinal hernia K40.90    CAD (coronary artery disease) I25.10    Hypoxemia requiring supplemental oxygen R09.02, Z99.81    Warfarin-induced coagulopathy (HCC) D68.32, T45.515A    Intraabdominal fluid collection R18.8    Abdominal pain R10.9    Sepsis ([address] A41.9    Pyogenic inflammation of bone ([address] M86.9    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    Abscess L02.91    Discitis of lumbar region M46.46    Pulmonary hypertension (HCC) I27.20    CHF exacerbation (HCC) I50.9    Hyponatremia E87.1    Symptomatic anemia D64.9    Chronic pain syndrome G89.4    Stage 3 chronic kidney disease (HCC) N18.3    Iron deficiency anemia D50.9         Visit Vitals  /70 (BP 1 Location: Right arm, BP Patient Position: At rest)   Pulse 61   Temp 98.4 °F (36.9 °C)   Resp 18   Ht 5' 6\" (1.676 m)   Wt 68.5 kg (151 lb)   SpO2 99%   BMI 24.37 kg/m²           Intake/Output Summary (Last 24 hours) at 6/4/2019 0811  Last data filed at 6/4/2019 0704  Gross per 24 hour   Intake 1605.8 ml   Output 2450 ml   Net -844.2 ml       CBC w/Diff    Lab Results   Component Value Date/Time    WBC 7.1 06/04/2019 06:10 AM    RBC 3.13 (L) 06/04/2019 06:10 AM    HGB 7.2 (L) 06/04/2019 06:10 AM    HCT 23.4 (L) 06/04/2019 06:10 AM    MCV 74.8 06/04/2019 06:10 AM    MCH 23.0 (L) 06/04/2019 06:10 AM    MCHC 30.8 (L) 06/04/2019 06:10 AM    RDW 21.8 (H) 06/04/2019 06:10 AM     06/04/2019 06:10 AM    Lab Results   Component Value Date/Time    GRANS PENDING 06/04/2019 06:10 AM    LYMPH PENDING 06/04/2019 06:10 AM    EOS PENDING 06/04/2019 06:10 AM    BANDS 5 08/27/2017 12:20 PM    BASOS PENDING 06/04/2019 06:10 AM      Basic Metabolic Profile   Recent Labs     06/04/19  0610   *   K 3.8   CL 96*   CO2 26   BUN 25*   CA 7.9*        Hepatic Function    Lab Results   Component Value Date/Time    ALB 2.5 (L) 06/04/2019 06:10 AM    TP 6.1 (L) 06/04/2019 06:10 AM    AP 70 06/04/2019 06:10 AM    Lab Results   Component Value Date/Time    SGOT 16 06/04/2019 06:10 AM          Najma   Recent Labs     06/02/19  1618   PTP 15.7*   INR 1.3*               DELIA Terrell    [facility]. [email or web address]  Phone: [phone]  Pager: [phone]

## 2019-06-04 NOTE — PROGRESS NOTES
Problem: Falls - Risk of  Goal: *Absence of Falls  Description  Document Kalyn Joe Fall Risk and appropriate interventions in the flowsheet. Outcome: Progressing Towards Goal     Problem: Pressure Injury - Risk of  Goal: *Prevention of pressure injury  Description  Document Claude Scale and appropriate interventions in the flowsheet.   Outcome: Progressing Towards Goal

## 2019-06-04 NOTE — PROGRESS NOTES
Intern Progress Note  [facility]       Patient: Christal Mojica MRN: [id number]  CSN: [id number]    YOB: 1940  Age: [de-identified] y.o. Sex: male    DOA: 6/2/2019 LOS:  LOS: 2 days   PCP: Yecenia Sears DO                 Subjective:     Acute events: Required RBCs overnight 2/2 Hb. Otherwise no overnight events. No complaints. Brief ROS: Denies: CP, new SOB, HA, Vision Changes, LE Pain    Objective:      Patient Vitals for the past 24 hrs:   Temp Pulse Resp BP SpO2   06/04/19 0800 98.4 °F (36.9 °C) 70 16 119/75 97 %   06/04/19 0704 98.4 °F (36.9 °C) 61 18 115/70 99 %   06/04/19 0642 98.8 °F (37.1 °C) 63 18 126/57 99 %   06/04/19 0407 97.9 °F (36.6 °C) (!) 57 15 106/72 98 %   06/04/19 0051 98.3 °F (36.8 °C) 63 17 123/59 98 %   06/03/19 2144     95 %   06/03/19 2109 97.9 °F (36.6 °C) 65 18 114/60 99 %   06/03/19 1800  62 15 (!) 123/95    06/03/19 1729 97.6 °F (36.4 °C) 60 23 121/65 100 %   06/03/19 1522    140/71    06/03/19 1500  65 17 122/67 97 %   06/03/19 1430  (!) 56 16 115/59    06/03/19 1400  (!) 51 16 110/56    06/03/19 1330  (!) 51 15 107/57    06/03/19 1300  74 16 (!) 126/93 95 %   06/03/19 1230  75 20 127/61    06/03/19 1200 97.7 °F (36.5 °C) 84 25 140/71 98 %   06/03/19 1130  71 17 131/64    06/03/19 1115  85 23 136/73 90 %   06/03/19 1100  (!) 53 18 119/66    06/03/19 1045  63 14 123/60    06/03/19 1030  70 18 132/63 99 %   06/03/19 1012 98.3 °F (36.8 °C) (!) 8 18  100 %   06/03/19 1000  94 25 145/65        Physical Exam:   General: alert and in no apparent distress  Cardiovascular: RRR w/o MRGs. No JVD  Respiratory: CTAB w/o rales, rhonchi, wheezes, no increased work of breathing  Abdomen: Soft, +BS, non-TTP, Non-Distended  Extremities: 2+ edema in lower extremities bilaterally, 2+ radial pulses intact bilaterally  Neuro: Cranial nerves grossly intact, grossly moving upper and lower extremities  Skin: No rashes, lesions, or ulcers.   Good angelic. Lab/Data Reviewed: All lab results for the last 24 hours reviewed.      Recent Results (from the past 24 hour(s))   ECHO ADULT COMPLETE    Collection Time: 06/03/19  4:01 PM   Result Value Ref Range    LVIDd 5.75 4.2 - 5.9 cm    LVPWd 0.79 0.6 - 1.0 cm    LVIDs 4.50 cm    IVSd 0.75 0.6 - 1.0 cm    LV ED Vol A2C 131.1 mL    LV ES Vol A4C 38.7 mL    LV ES Vol BP 46.6 22 - 58 mL    LVOT d 2.02 cm    LVOT Peak Velocity 112.11 cm/s    LVOT Peak Gradient 5.0 mmHg    LVOT VTI 20.69 cm    MV A Gigi 92.90 cm/s    MV E Gigi 46.46 cm/s    MV E/A 0.50     BP EF 58.5 55 - 100 %    LV Ejection Fraction MOD 4C 51 %    LV Ejection Fraction MOD 2C 59 %    Inferior Vena Cava Diameter Sniffing 1.74 cm    LA Vol 4C 85.69 (A) 18 - 58 mL    LA Vol 2C 109.37 (A) 18 - 58 mL    LA Area 4C 26.0 cm2    LV Mass .5 88 - 224 g    LV Mass AL Index 107.9 49 - 115 g/m2    E/E' lateral 2.87     LV ES Vol A2C 53.5 mL    LVES Vol Index BP 26.3 mL/m2    LV ED Vol A4C 79.2 mL    LVED Vol Index BP 63.2 mL/m2    Mitral Valve E Wave Deceleration Time 207.9 ms    Triscuspid Valve Regurgitation Peak Gradient 42.5 mmHg    Aortic Regurgitant Pressure Half-time 446.9 cm    LV ED Vol .1 67 - 155 ml    TR Max Velocity 325.81 cm/s    PASP 51.0 mmHg    LA Vol Index 61.62 16 - 28 ml/m2    LA Vol Index 48.28 16 - 28 ml/m2    LVED Vol Index A4C 44.6 mL/m2    LVED Vol Index A2C 73.9 mL/m2    LVES Vol Index A4C 21.8 mL/m2    LVES Vol Index A2C 30.1 mL/m2    AR Max Gigi 392.30 cm/s    LA Volume 112.92 18 - 58 mL    LV E' Lateral Velocity 16.17 cm/s    LV E' Septal Velocity 4.88 cm/s    Tapse 1.41 (A) 1.5 - 2.0 cm    Ao Root D 3.32 cm    E/E' septal 9.52     E/E' ratio (averaged) 6.20     LA Vol Index 63.62 16 - 28 ml/m2   HGB & HCT    Collection Time: 06/03/19  6:44 PM   Result Value Ref Range    HGB 7.2 (L) 13.0 - 16.0 g/dL    HCT 22.7 (L) 36.0 - 48.0 %   HGB & HCT    Collection Time: 06/04/19 12:04 AM   Result Value Ref Range    HGB 6.8 (L) 13.0 - 16.0 g/dL    HCT 21.6 (L) 36.0 - 48.0 %   CBC WITH AUTOMATED DIFF    Collection Time: 06/04/19  6:10 AM   Result Value Ref Range    WBC 7.1 4.6 - 13.2 K/uL    RBC 3.13 (L) 4.70 - 5.50 M/uL    HGB 7.2 (L) 13.0 - 16.0 g/dL    HCT 23.4 (L) 36.0 - 48.0 %    MCV 74.8 74.0 - 97.0 FL    MCH 23.0 (L) 24.0 - 34.0 PG    MCHC 30.8 (L) 31.0 - 37.0 g/dL    RDW 21.8 (H) 11.6 - 14.5 %    PLATELET 441 764 - 971 K/uL    MPV 8.8 (L) 9.2 - 11.8 FL    NEUTROPHILS PENDING %    LYMPHOCYTES PENDING %    MONOCYTES PENDING %    EOSINOPHILS PENDING %    BASOPHILS PENDING %    ABS. NEUTROPHILS PENDING K/UL    ABS. LYMPHOCYTES PENDING K/UL    ABS. MONOCYTES PENDING K/UL    ABS. EOSINOPHILS PENDING K/UL    ABS. BASOPHILS PENDING K/UL    DF PENDING    METABOLIC PANEL, COMPREHENSIVE    Collection Time: 06/04/19  6:10 AM   Result Value Ref Range    Sodium 129 (L) 136 - 145 mmol/L    Potassium 3.8 3.5 - 5.5 mmol/L    Chloride 96 (L) 100 - 108 mmol/L    CO2 26 21 - 32 mmol/L    Anion gap 7 3.0 - 18 mmol/L    Glucose 93 74 - 99 mg/dL    BUN 25 (H) 7.0 - 18 MG/DL    Creatinine 1.66 (H) 0.6 - 1.3 MG/DL    BUN/Creatinine ratio 15 12 - 20      GFR est AA 49 (L) >60 ml/min/1.73m2    GFR est non-AA 40 (L) >60 ml/min/1.73m2    Calcium 7.9 (L) 8.5 - 10.1 MG/DL    Bilirubin, total 0.7 0.2 - 1.0 MG/DL    ALT (SGPT) 17 16 - 61 U/L    AST (SGOT) 16 15 - 37 U/L    Alk.  phosphatase 70 45 - 117 U/L    Protein, total 6.1 (L) 6.4 - 8.2 g/dL    Albumin 2.5 (L) 3.4 - 5.0 g/dL    Globulin 3.6 2.0 - 4.0 g/dL    A-G Ratio 0.7 (L) 0.8 - 1.7         Scheduled Medications Reviewed:  Current Facility-Administered Medications   Medication Dose Route Frequency    piperacillin-tazobactam (ZOSYN) 3.375 g in 0.9% sodium chloride (MBP/ADV) 100 mL MBP  3.375 g IntraVENous Q6H    vancomycin (VANCOCIN) 1000 mg in  ml infusion  1,000 mg IntraVENous Q24H    HYDROcodone-acetaminophen (NORCO) 5-325 mg per tablet 0.5 Tab  0.5 Tab Oral TID    levoFLOXacin (LEVAQUIN) 750 mg in D5W IVPB  750 mg IntraVENous Q48H    amLODIPine (NORVASC) tablet 10 mg  10 mg Oral DAILY    digoxin (LANOXIN) tablet 0.125 mg  0.125 mg Oral DAILY    ferrous sulfate tablet 325 mg  325 mg Oral BID WITH MEALS    lisinopril (PRINIVIL, ZESTRIL) tablet 20 mg  20 mg Oral DAILY    pantoprazole (PROTONIX) tablet 40 mg  40 mg Oral ACB    pravastatin (PRAVACHOL) tablet 40 mg  40 mg Oral QHS    albuterol-ipratropium (DUO-NEB) 2.5 MG-0.5 MG/3 ML  3 mL Nebulization Q6H RT    traZODone (DESYREL) tablet 50 mg  50 mg Oral QHS    [Held by provider] aspirin chewable tablet 81 mg  81 mg Oral DAILY    furosemide (LASIX) injection 20 mg  20 mg IntraVENous DAILY         Imaging, EKG/Telemetry:  Cta Abd Pelv W Wo Cont    Result Date: 6/4/2019  IMPRESSION: 1. Developing saccular aneurysmal dilation along the right anterior to posterolateral aspect of the mid aorta, centered at the superior portion of the stent graft and beginning just below the level of the SMA. Based on relatively rapid development of this finding since the 03/26/18 CTA, underlying mycotic process cannot be excluded. 2.  Gradually increasing right common iliac aneurysm resulting in increasing separation of the stent from the arterial wall. 3.  Although the size of the main infrarenal abdominal aortic aneurysmal segment appears to be stable to minimally decreased, the involved segment of the aorta demonstrates abnormal pattern of poorly circumscribed margins with slight periaortic fat stranding. This may suggest presence of inflammatory changes. 4.  Right renal artery occlusion, resulting in asymmetric decrease in the size of the right kidney compared to the left. Approximately 2.8 cm segment of the proximal right renal artery is occluded. 5.  Varying degrees of the visceral arterial narrowing as described. 6.  Bilateral pleural effusion with associated atelectatic changes.          Imaging:  [x]I have personally reviewed the patients radiographs  []Radiographs reviewed with radiologist   []No change from prior, tubes and lines in adequate position  []Improved   []Worsening    Assessment/Plan     [de-identified] y.o. male with PMH of chronic atrial fibrillation, CAD, HFpEF, HTN, HLD, COPD (on home 2 L O2), pHTN, CKD3, iron deficiency anemia, BPH, chronic pain syndrome, and chronic constipation, now admitted with sepsis.     CHF: admitted for 3/4 SIRS Criteria (Leukocytosis of 13.4, tachypnea in mid-20's, and  tachycardia in mid-90's), however likely cellulitis and CHF independent. No lactic acidosis. Afebrile. Recently treated with Cephalexin for cellulitis of left upper arm. Unlikely truly septic, infection with CHF. Sepsis protocol started in ED. CXR showed no infectious process. UA showed no UTI. blood cx NG.  -discontinue Zosyn, Vancomycin, and Levaquin  -to Keflex today @ 48hr neg cx  -Daily CBC     Cellulitis: resolving, R arm. Not septic. No swelling/erythema/tenderness on exam  -d/c IV ABX  -ceflex    Hemoptysis pulmonary edema vs S. Pneumonia vs Esophageal Trauma: PE ruled out with CTA-chest. CXR showed no consolidation. No recurrence since being in ED.   -Monitor closely  -Consider Pulmonology Consult in AM  -daily BMP     Hyponatremia - possibly from fluid overload vs Chronic Lung Disease: On admission, Na or 127>129. Improving  - Daily BMP  - Fluid Restriction  - Start Lasix 20mg IV     Abdominal Aortic Aneurysm: CTA-chest showed outpouching of previously repaired aneurysm. Vascular consulted in ED. Patient with abdominal pain but not worsening. Vascular discussing stenting.  - F/U Vascular Recs     Chronic Atrial Fibrillation: Followed outpatient by Dr. Maria De Jesus [facility] at [facility]. Currently rate-controlled atrial fibrillation.  Warfarin was stopped on last admission given supratherapeutic INR, and recurrent GI bleed that required three hospitalizations for transfusions.  - Continue home Digoxin 125 mcg daily     HTN, HLD, CAD, HFpEF: Last Echo (4/2018): EF 55%; hypokinesis of the basal-mid anteroseptal wall. Repeat unchanged 6/3/19. BNP of 7673. Admission BP ranging in the 140s-170s/60s-90s. - Continue home Amlodipine 10 mg daily  - Continue home Pravastatin 40 mg daily  - lasix as above     COPD on home 2L 02: Followed by [facility] (Dr. Mendez Cortez) as outpatient. - Continue home supplemental O2 @ 2 L/min; titrate to maintain O2 sats between 88-92%  - Continue home Stiolto Respimat 2.5-2.5 mcg inhaler 2 puffs daily  - Duo-neb q6h per RT     CKD3: Baseline Cr 1-1.2; admission Cr 1.31>1.6. Cr trending up, likely 2/2 IV contrast, vancomycin, possible cardiorenal.    -hold additional lasix today  -avoid nephrotoxics. -Renally dose medications         Chronic Pain Syndrome: H/o chronic low back pain OA, and inguinal hernia w/o Incarceration  - Continue home Norco 5-325 mg TID  - Consider Voltaren 1% Gel for isolated joint pain     Chronic Constipation: Stable.   - Continue home Miralax daily PRN  - High fiber diet  -colace     Diet: Cardiac  DVT Prophylaxis: SCDs  Code Status: FULL  Point of Contact: Marlen Cardona (Relationship: [id number]) [phone]     Disposition and anticipated LOS: >2 midnights      Selvin Stout DO, PGY I  [facility]  06/04/19  9:43 AM

## 2019-06-05 LAB
ABO + RH BLD: NORMAL
ALBUMIN SERPL-MCNC: 2.5 G/DL (ref 3.4–5)
ALBUMIN/GLOB SERPL: 0.7 {RATIO} (ref 0.8–1.7)
ALP SERPL-CCNC: 70 U/L (ref 45–117)
ALT SERPL-CCNC: 16 U/L (ref 16–61)
ANION GAP SERPL CALC-SCNC: 7 MMOL/L (ref 3–18)
AST SERPL-CCNC: 16 U/L (ref 15–37)
BASOPHILS # BLD: 0 K/UL (ref 0–0.1)
BASOPHILS NFR BLD: 0 % (ref 0–2)
BILIRUB SERPL-MCNC: 0.7 MG/DL (ref 0.2–1)
BLD PROD TYP BPU: NORMAL
BLD PROD TYP BPU: NORMAL
BLOOD GROUP ANTIBODIES SERPL: NORMAL
BPU ID: NORMAL
BPU ID: NORMAL
BUN SERPL-MCNC: 27 MG/DL (ref 7–18)
BUN/CREAT SERPL: 18 (ref 12–20)
CALCIUM SERPL-MCNC: 8.1 MG/DL (ref 8.5–10.1)
CALLED TO:,BCALL1: NORMAL
CALLED TO:,BCALL2: NORMAL
CALLED TO:,BCALL3: NORMAL
CHLORIDE SERPL-SCNC: 97 MMOL/L (ref 100–108)
CO2 SERPL-SCNC: 27 MMOL/L (ref 21–32)
CREAT SERPL-MCNC: 1.52 MG/DL (ref 0.6–1.3)
CROSSMATCH RESULT,%XM: NORMAL
CROSSMATCH RESULT,%XM: NORMAL
DIFFERENTIAL METHOD BLD: ABNORMAL
EOSINOPHIL # BLD: 0.2 K/UL (ref 0–0.4)
EOSINOPHIL NFR BLD: 2 % (ref 0–5)
ERYTHROCYTE [DISTWIDTH] IN BLOOD BY AUTOMATED COUNT: 21.4 % (ref 11.6–14.5)
GLOBULIN SER CALC-MCNC: 3.6 G/DL (ref 2–4)
GLUCOSE SERPL-MCNC: 80 MG/DL (ref 74–99)
HCT VFR BLD AUTO: 26.7 % (ref 36–48)
HGB BLD-MCNC: 8.5 G/DL (ref 13–16)
LYMPHOCYTES # BLD: 1.1 K/UL (ref 0.9–3.6)
LYMPHOCYTES NFR BLD: 14 % (ref 21–52)
MCH RBC QN AUTO: 24.1 PG (ref 24–34)
MCHC RBC AUTO-ENTMCNC: 31.8 G/DL (ref 31–37)
MCV RBC AUTO: 75.6 FL (ref 74–97)
MONOCYTES # BLD: 1 K/UL (ref 0.05–1.2)
MONOCYTES NFR BLD: 12 % (ref 3–10)
NEUTS SEG # BLD: 5.8 K/UL (ref 1.8–8)
NEUTS SEG NFR BLD: 72 % (ref 40–73)
PLATELET # BLD AUTO: 226 K/UL (ref 135–420)
PLATELET COMMENTS,PCOM: ABNORMAL
PMV BLD AUTO: 8.9 FL (ref 9.2–11.8)
POTASSIUM SERPL-SCNC: 3.8 MMOL/L (ref 3.5–5.5)
PROT SERPL-MCNC: 6.1 G/DL (ref 6.4–8.2)
RBC # BLD AUTO: 3.53 M/UL (ref 4.7–5.5)
RBC MORPH BLD: ABNORMAL
RBC MORPH BLD: ABNORMAL
SODIUM SERPL-SCNC: 131 MMOL/L (ref 136–145)
SPECIMEN EXP DATE BLD: NORMAL
STATUS OF UNIT,%ST: NORMAL
STATUS OF UNIT,%ST: NORMAL
UNIT DIVISION, %UDIV: 0
UNIT DIVISION, %UDIV: 0
WBC # BLD AUTO: 8.1 K/UL (ref 4.6–13.2)

## 2019-06-05 PROCEDURE — 77010033678 HC OXYGEN DAILY

## 2019-06-05 PROCEDURE — 80053 COMPREHEN METABOLIC PANEL: CPT

## 2019-06-05 PROCEDURE — 97116 GAIT TRAINING THERAPY: CPT

## 2019-06-05 PROCEDURE — 85025 COMPLETE CBC W/AUTO DIFF WBC: CPT

## 2019-06-05 PROCEDURE — 74011000250 HC RX REV CODE- 250: Performed by: STUDENT IN AN ORGANIZED HEALTH CARE EDUCATION/TRAINING PROGRAM

## 2019-06-05 PROCEDURE — 74011250637 HC RX REV CODE- 250/637: Performed by: STUDENT IN AN ORGANIZED HEALTH CARE EDUCATION/TRAINING PROGRAM

## 2019-06-05 PROCEDURE — 97165 OT EVAL LOW COMPLEX 30 MIN: CPT

## 2019-06-05 PROCEDURE — 94762 N-INVAS EAR/PLS OXIMTRY CONT: CPT

## 2019-06-05 PROCEDURE — 94640 AIRWAY INHALATION TREATMENT: CPT

## 2019-06-05 PROCEDURE — 97530 THERAPEUTIC ACTIVITIES: CPT

## 2019-06-05 PROCEDURE — 77030010545

## 2019-06-05 PROCEDURE — 65270000029 HC RM PRIVATE

## 2019-06-05 PROCEDURE — 74011250636 HC RX REV CODE- 250/636: Performed by: STUDENT IN AN ORGANIZED HEALTH CARE EDUCATION/TRAINING PROGRAM

## 2019-06-05 PROCEDURE — 36415 COLL VENOUS BLD VENIPUNCTURE: CPT

## 2019-06-05 PROCEDURE — 97166 OT EVAL MOD COMPLEX 45 MIN: CPT

## 2019-06-05 RX ORDER — CEPHALEXIN 500 MG/1
500 CAPSULE ORAL EVERY 6 HOURS
Qty: 10 CAP | Refills: 0 | Status: SHIPPED | OUTPATIENT
Start: 2019-06-05 | End: 2019-06-06 | Stop reason: SDUPTHER

## 2019-06-05 RX ORDER — FUROSEMIDE 20 MG/1
20 TABLET ORAL DAILY
Qty: 30 TAB | Refills: 0 | Status: SHIPPED | OUTPATIENT
Start: 2019-06-05 | End: 2019-06-06 | Stop reason: SDUPTHER

## 2019-06-05 RX ORDER — FUROSEMIDE 20 MG/1
20 TABLET ORAL DAILY
Status: DISCONTINUED | OUTPATIENT
Start: 2019-06-06 | End: 2019-06-06 | Stop reason: HOSPADM

## 2019-06-05 RX ORDER — DOCUSATE SODIUM 100 MG/1
100 CAPSULE, LIQUID FILLED ORAL DAILY
Qty: 30 CAP | Refills: 0 | Status: SHIPPED | OUTPATIENT
Start: 2019-06-06 | End: 2019-06-06 | Stop reason: SDUPTHER

## 2019-06-05 RX ADMIN — HYDROCODONE BITARTRATE AND ACETAMINOPHEN 0.5 TABLET: 5; 325 TABLET ORAL at 21:20

## 2019-06-05 RX ADMIN — IPRATROPIUM BROMIDE AND ALBUTEROL SULFATE 3 ML: .5; 3 SOLUTION RESPIRATORY (INHALATION) at 08:33

## 2019-06-05 RX ADMIN — DOCUSATE SODIUM 100 MG: 100 CAPSULE, LIQUID FILLED ORAL at 08:38

## 2019-06-05 RX ADMIN — FERROUS SULFATE TAB 325 MG (65 MG ELEMENTAL FE) 325 MG: 325 (65 FE) TAB at 08:38

## 2019-06-05 RX ADMIN — AMLODIPINE BESYLATE 10 MG: 10 TABLET ORAL at 08:39

## 2019-06-05 RX ADMIN — CEPHALEXIN 500 MG: 250 CAPSULE ORAL at 15:02

## 2019-06-05 RX ADMIN — DIGOXIN 0.12 MG: 125 TABLET ORAL at 08:45

## 2019-06-05 RX ADMIN — IPRATROPIUM BROMIDE AND ALBUTEROL SULFATE 3 ML: .5; 3 SOLUTION RESPIRATORY (INHALATION) at 14:55

## 2019-06-05 RX ADMIN — CEPHALEXIN 500 MG: 250 CAPSULE ORAL at 21:20

## 2019-06-05 RX ADMIN — PANTOPRAZOLE SODIUM 40 MG: 40 TABLET, DELAYED RELEASE ORAL at 06:56

## 2019-06-05 RX ADMIN — IPRATROPIUM BROMIDE AND ALBUTEROL SULFATE 3 ML: .5; 3 SOLUTION RESPIRATORY (INHALATION) at 19:33

## 2019-06-05 RX ADMIN — HYDROCODONE BITARTRATE AND ACETAMINOPHEN 0.5 TABLET: 5; 325 TABLET ORAL at 08:38

## 2019-06-05 RX ADMIN — FUROSEMIDE 20 MG: 10 INJECTION, SOLUTION INTRAMUSCULAR; INTRAVENOUS at 08:42

## 2019-06-05 RX ADMIN — CEPHALEXIN 500 MG: 250 CAPSULE ORAL at 01:21

## 2019-06-05 RX ADMIN — HYDROCODONE BITARTRATE AND ACETAMINOPHEN 0.5 TABLET: 5; 325 TABLET ORAL at 16:20

## 2019-06-05 RX ADMIN — FERROUS SULFATE TAB 325 MG (65 MG ELEMENTAL FE) 325 MG: 325 (65 FE) TAB at 16:20

## 2019-06-05 RX ADMIN — CEPHALEXIN 500 MG: 250 CAPSULE ORAL at 06:56

## 2019-06-05 RX ADMIN — PRAVASTATIN SODIUM 40 MG: 20 TABLET ORAL at 21:19

## 2019-06-05 RX ADMIN — TRAZODONE HYDROCHLORIDE 50 MG: 50 TABLET ORAL at 21:20

## 2019-06-05 NOTE — CDMP QUERY
Patient admitted with CHF.  Noted documentation of \"acute on chronic respiratory failure\" in H+P     Please indicate one of the following:      Acute on Chronic Respiratory Failure  (please provide the clinical indicators to support diagnosis)   Acute Respiratory Failure Ruled Out after study   -       Chronic Respiratory Failure on continuous O2 at home   Other explanation    Unable to determine     The medical record reflects the following:     Risk Factors: Hx copd,chf home o2     Clinical Indicators: sob,     Treatment: O2 3 l, and bipap  at times    [facility]) Acute Respiratory Failure Clinical Indicators:    - Respirations <12 or >25   - Air hunger/gasping  - Use of accessory muscles of respiration/increased work of breathing  - Sternal or intercostal retractions  - Stridor   - Inability to speak in full sentences   - Cyanosis   - Pulse ox <90% RA or <95% on O2   - pH <7.35 or >7.45   - pO2 < 60 mm Hg (or 10mm below COPD patient's baseline)   - pCO2 >50mm Hg (or 10mm above COPD patient's baseline)    Thank you   Ronna MORTON  CDI    ext [phone]

## 2019-06-05 NOTE — PROGRESS NOTES
Bedside and Verbal shift change report given to Anaid Nolasco RN (oncoming nurse) by Susan Valdovinos RN (offgoing nurse). Report included the following information SBAR, Kardex and MAR.

## 2019-06-05 NOTE — PROGRESS NOTES
Problem: Mobility Impaired (Adult and Pediatric)  Goal: *Acute Goals and Plan of Care (Insert Text)  Description  Physical Therapy Goals  Initiated 6/4/2019 and to be accomplished within 7 day(s)  1. Patient will move from supine to sit and sit to supine  in bed with independence. 2.  Patient will transfer from bed to chair and chair to bed with modified independence using the least restrictive device. 3.  Patient will perform sit to stand with modified independence. 4.  Patient will ambulate with modified independence for 150 feet with the least restrictive device. 5.  Patient will ascend/descend 5 stairs with 1 handrail(s) with supervision/set-up. To prepare pt for home mobility. PLOF:  Pt lives with his brother in a 2 story home with 1st floor set up, 5 steps to enter, 1 railing. Pt ambulated with supplemental O2 and a RW prior to this hospitalization   Outcome: Progressing Towards Goal  PHYSICAL THERAPY TREATMENT    Patient: Deisy Yanes (45 y.o. male)  Date: 6/5/2019  Diagnosis: Sepsis ([facility] [address] [A41.9] Congestive heart failure (CHF) ([facility] [address]       Precautions:    PLOF:  See goals section above    ASSESSMENT:  Pt was cleared by nursing to work with therapy and pt was received semi-reclined in bed, agreeable to work with PT . Pt was educated on benefits of spending time out of the bed. Pt performed SLR in supine position. He transferred supine-sit with SBA and displayed good sitting balance on edge of bed. Pt was able to perform sit-stand transfer with CGA, no loss of balance, but with increased shortness of breath. Pt reported pain in hernia site but acknowledges he is not due to receive pain medication until 5:00. Pt ambulated 5 feet with no AD from bed-chair, no loss of balance and performed stand-sit transfer with CGA. At conclusion of session, pt was left resting comfortably in bed, needs met, call bell in reach.   Instructed pt to have nursing assist him back to bed when he was ready but to try to stay up for > 1 hour. Pt acknowledged. Nursing notified of pt being out of bed and pt's daughter was in room with pt. Progression toward goals:   ?      Improving appropriately and progressing toward goals  ? Improving slowly and progressing toward goals  ? Not making progress toward goals and plan of care will be adjusted     PLAN:  Patient continues to benefit from skilled intervention to address the above impairments. Continue treatment per established plan of care. Discharge Recommendations:  Andi Gurrola  Further Equipment Recommendations for Discharge:  N/A     SUBJECTIVE:   Patient stated ? Getting into the chair isn't going to fix my leaky aneurysm or my hernia. ?    OBJECTIVE DATA SUMMARY:   Critical Behavior:  Neurologic State: Alert  Orientation Level: Oriented X4  Cognition: Follows commands  Safety/Judgement: Fall prevention  Functional Mobility Training:  Bed Mobility:  Supine to Sit: Supervision  Sit to Supine: Supervision  Scooting: Supervision    Transfers:  Sit to Stand: Contact guard assistance  Stand to Sit: Contact guard assistance    Balance:  Sitting: Intact  Standing: Impaired  Standing - Static: Good  Standing - Dynamic : Fair   Range Of Motion:   AROM: Within functional limits     Ambulation/Gait Training:  Distance (ft): 5 Feet (ft)  Ambulation - Level of Assistance: Contact guard assistance, no AD      Pain:  Pain level pre-treatment: 3/10 (hernia)  Pain level post-treatment: 3/10 (hernia)  Pain Intervention(s): Medication (see MAR); Rest, Ice, Repositioning   Response to intervention: Nurse notified, See doc flow    Activity Tolerance:   Fair  Please refer to the flowsheet for vital signs taken during this treatment. After treatment:   ? Patient left in no apparent distress sitting up in chair  ? Patient left in no apparent distress in bed  ? Call bell left within reach  ? Nursing notified  ? Caregiver present  ? Bed alarm activated  ?  SCDs applied      COMMUNICATION/EDUCATION:   ?         Role of Physical Therapy in the acute care setting. ?         Fall prevention education was provided and the patient/caregiver indicated understanding. ? Patient/family have participated as able in working toward goals and plan of care. ?         Patient/family agree to work toward stated goals and plan of care. ?         Patient understands intent and goals of therapy, but is neutral about his/her participation. ? Patient is unable to participate in stated goals/plan of care: ongoing with therapy staff.   ?         Other:        Noemy Dejesus, PT   Time Calculation: 23 mins

## 2019-06-05 NOTE — PROGRESS NOTES
Intern Progress Note  [facility]       Patient: Priscilla Prakash MRN: [id number]  CSN: [id number]    YOB: 1940  Age: [de-identified] y.o. Sex: male    DOA: 6/2/2019 LOS:  LOS: 3 days   PCP: Bandar Carranza DO                 Subjective:     Acute events: No overnight events. No complaints. Feeling somewhat better. Brief ROS: Denies: CP, new SOB, HA, Vision Changes, LE Pain    Objective:      Patient Vitals for the past 24 hrs:   Temp Pulse Resp BP SpO2   06/05/19 0838  69  142/71    06/05/19 0833     96 %   06/05/19 0701 97.9 °F (36.6 °C) 70 20 142/72 97 %   06/05/19 0527 98.2 °F (36.8 °C) 68 18 126/60 98 %   06/05/19 0115 98.5 °F (36.9 °C) 66 22 119/60 97 %   06/04/19 2125     100 %   06/04/19 2112 98.5 °F (36.9 °C) 64 19 134/64 97 %   06/04/19 1631 98.2 °F (36.8 °C) 76 21 120/59 93 %   06/04/19 1108 98.2 °F (36.8 °C) 72 18 118/64 99 %       Physical Exam:   General: alert and in no apparent distress  Cardiovascular: RRR w/o MRGs. No JVD  Respiratory: CTAB w/o rales, rhonchi, wheezes, no increased work of breathing  Abdomen: Soft, +BS, non-TTP, Non-Distended  Extremities: 2+ edema in lower extremities bilaterally, 2+ radial pulses intact bilaterally  Neuro: Cranial nerves grossly intact, grossly moving upper and lower extremities  Skin: No rashes, lesions, or ulcers. Good turgor. Lab/Data Reviewed: All lab results for the last 24 hours reviewed.      Recent Results (from the past 24 hour(s))   HGB & HCT    Collection Time: 06/04/19  1:10 PM   Result Value Ref Range    HGB 8.7 (L) 13.0 - 16.0 g/dL    HCT 27.4 (L) 36.0 - 48.0 %   CBC WITH AUTOMATED DIFF    Collection Time: 06/05/19  6:00 AM   Result Value Ref Range    WBC 8.1 4.6 - 13.2 K/uL    RBC 3.53 (L) 4.70 - 5.50 M/uL    HGB 8.5 (L) 13.0 - 16.0 g/dL    HCT 26.7 (L) 36.0 - 48.0 %    MCV 75.6 74.0 - 97.0 FL    MCH 24.1 24.0 - 34.0 PG    MCHC 31.8 31.0 - 37.0 g/dL    RDW 21.4 (H) 11.6 - 14.5 %    PLATELET 894 269 - 408 K/uL    MPV 8.9 (L) 9.2 - 11.8 FL    NEUTROPHILS PENDING %    LYMPHOCYTES PENDING %    MONOCYTES PENDING %    EOSINOPHILS PENDING %    BASOPHILS PENDING %    ABS. NEUTROPHILS PENDING K/UL    ABS. LYMPHOCYTES PENDING K/UL    ABS. MONOCYTES PENDING K/UL    ABS. EOSINOPHILS PENDING K/UL    ABS. BASOPHILS PENDING K/UL    DF PENDING    METABOLIC PANEL, COMPREHENSIVE    Collection Time: 06/05/19  6:00 AM   Result Value Ref Range    Sodium 131 (L) 136 - 145 mmol/L    Potassium 3.8 3.5 - 5.5 mmol/L    Chloride 97 (L) 100 - 108 mmol/L    CO2 27 21 - 32 mmol/L    Anion gap 7 3.0 - 18 mmol/L    Glucose 80 74 - 99 mg/dL    BUN 27 (H) 7.0 - 18 MG/DL    Creatinine 1.52 (H) 0.6 - 1.3 MG/DL    BUN/Creatinine ratio 18 12 - 20      GFR est AA 54 (L) >60 ml/min/1.73m2    GFR est non-AA 44 (L) >60 ml/min/1.73m2    Calcium 8.1 (L) 8.5 - 10.1 MG/DL    Bilirubin, total 0.7 0.2 - 1.0 MG/DL    ALT (SGPT) 16 16 - 61 U/L    AST (SGOT) 16 15 - 37 U/L    Alk.  phosphatase 70 45 - 117 U/L    Protein, total 6.1 (L) 6.4 - 8.2 g/dL    Albumin 2.5 (L) 3.4 - 5.0 g/dL    Globulin 3.6 2.0 - 4.0 g/dL    A-G Ratio 0.7 (L) 0.8 - 1.7       Scheduled Medications Reviewed:  Current Facility-Administered Medications   Medication Dose Route Frequency    cephALEXin (KEFLEX) capsule 500 mg  500 mg Oral Q6H    docusate sodium (COLACE) capsule 100 mg  100 mg Oral DAILY    HYDROcodone-acetaminophen (NORCO) 5-325 mg per tablet 0.5 Tab  0.5 Tab Oral TID    amLODIPine (NORVASC) tablet 10 mg  10 mg Oral DAILY    digoxin (LANOXIN) tablet 0.125 mg  0.125 mg Oral DAILY    ferrous sulfate tablet 325 mg  325 mg Oral BID WITH MEALS    lisinopril (PRINIVIL, ZESTRIL) tablet 20 mg  20 mg Oral DAILY    pantoprazole (PROTONIX) tablet 40 mg  40 mg Oral ACB    pravastatin (PRAVACHOL) tablet 40 mg  40 mg Oral QHS    albuterol-ipratropium (DUO-NEB) 2.5 MG-0.5 MG/3 ML  3 mL Nebulization Q6H RT    traZODone (DESYREL) tablet 50 mg  50 mg Oral QHS    [Held by provider] aspirin chewable tablet 81 mg  81 mg Oral DAILY    furosemide (LASIX) injection 20 mg  20 mg IntraVENous DAILY     Imaging, EKG/Telemetry:  No results found. Imaging:  [x]I have personally reviewed the patients radiographs  []Radiographs reviewed with radiologist   []No change from prior, tubes and lines in adequate position  []Improved   []Worsening    Assessment/Plan     [de-identified] y.o. male with PMH of chronic atrial fibrillation, CAD, HFpEF, HTN, HLD, COPD (on home 2 L O2), pHTN, CKD3, iron deficiency anemia, BPH, chronic pain syndrome, and chronic constipation, now admitted with sepsis.     CHF: admitted for 3/4 SIRS Criteria (Leukocytosis of 13.4, tachypnea in mid-20's, and  tachycardia in mid-90's), however likely cellulitis and CHF independent. No lactic acidosis. Afebrile. Recently treated with Cephalexin for cellulitis of left upper arm. Unlikely truly septic, infection with CHF. Sepsis protocol started in ED. CXR showed no infectious process. UA showed no UTI. blood cx NG. Cr improving, unlikely cardiorenal given improvement w/o diuresis, likely 2/2 contrast.  -continue Keflex through 6/7  -Daily CBC  -d/c pending improved diuresis  - continue Lasix 20IV qd  - Lasix 20IV in afternoon, consider PM dosing also  - consider d/c on Lasix 20 PO    Cellulitis: resolving, R arm. Not septic. No swelling/erythema/tenderness on exam  -d/c IV ABX  -ceflex    Hemoptysis - pulmonary edema vs S. Pneumonia vs Esophageal Trauma: isolated, PE ruled out with CTA-chest. CXR showed no consolidation. No recurrence since being in ED. Hb stable 8.5.  -Monitor closely  -daily BMP     Hyponatremia - possibly from fluid overload vs Chronic Lung Disease: On admission, Na or 127>131. Improving  - Daily BMP  - Fluid Restriction     Abdominal Aortic Aneurysm: CTA-chest showed outpouching of previously repaired aneurysm. Vascular consulted in ED. Patient with abdominal pain but not worsening.    Vascular recs stenting at [facility] vs [facility].  - F/U Vascular Recs  - vascular outpt     Chronic Atrial Fibrillation: Followed outpatient by Dr. Roseanne Bustos at [facility]. Currently rate-controlled atrial fibrillation. Warfarin was stopped on last admission given supratherapeutic INR, and recurrent GI bleed that required three hospitalizations for transfusions.  - Continue home Digoxin 125 mcg daily     HTN, HLD, CAD, HFpEF: Last Echo (4/2018): EF 55%; hypokinesis of the basal-mid anteroseptal wall. Repeat unchanged 6/3/19. BNP of 7673. Admission BP ranging in the 140s-170s/60s-90s. - Continue home Amlodipine 10 mg daily  - Continue home Pravastatin 40 mg daily  - lasix as above    COPD on home 2L 02: Followed by [facility] (Dr. Maryan Abreu) as outpatient. - Continue home supplemental O2 @ 2 L/min; titrate to maintain O2 sats between 88-92%  - Continue home Stiolto Respimat 2.5-2.5 mcg inhaler 2 puffs daily  - Duo-neb q6h per RT     CKD3: Baseline Cr 1-1.2; admission Cr 1.31>1.6>1.3. Cr trending back down, was likely 2/2 IV contrast, vancomycin. Unlikely cardiorenal.   -hold additional lasix today  -avoid nephrotoxics  -Renally dose medications     Chronic Pain Syndrome: H/o chronic low back pain OA, and inguinal hernia w/o Incarceration  - Continue home Norco 5-325 mg TID  - Consider Voltaren 1% Gel for isolated joint pain     Chronic Constipation: Stable.   - Continue home Miralax daily PRN  - High fiber diet  - colace     Diet: Cardiac  DVT Prophylaxis: SCDs  Code Status: FULL  Point of Contact: Marlen Cardona (Relationship: [id number]) [phone]     Disposition and anticipated LOS: >2 midnights      Barby Mayorga DO, PGY I  [facility]  06/05/19  9:43 AM

## 2019-06-05 NOTE — PROGRESS NOTES
Problem: Self Care Deficits Care Plan (Adult)  Goal: *Acute Goals and Plan of Care (Insert Text)  Description  Occupational Therapy Goals  Initiated 6/5/2019 within 7 day(s). 1.  Patient will perform toileting with supervision/set-up   2. Patient will perform toilet transfer with supervision/set-up. 3.  Patient will perform a functional activity in standing with supervision for 3-5 minutes. 4.  Patient will participate in upper extremity therapeutic exercise/activities with supervision/set-up for 8 minutes. 5.  Patient will utilize energy conservation techniques during functional activities with min verbal cues. Prior Level of Function: Pt reports he lives with his brother who is \"challenged\" in a [facility]. He was (I) with basic self-care/ADLs and IADLs, including cooking, cleaning, and medication management PTA. Outcome: Progressing Towards Goal   OCCUPATIONAL THERAPY EVALUATION    Patient: Christal Mojica (35 y.o. male)  Date: 6/5/2019  Primary Diagnosis: Sepsis ([address] [A41.9]        Precautions: Falls       ASSESSMENT :  Pt cleared to participate in OT evaluation by RN. Upon entering room, pt supine with HOB elevated, alert, and agreeable to therapy session. Based on the objective data described below, the patient presents with increased pain, decreased strength, decreased endurance, decreased functional balance, and decreased functional mobility, affecting his performance in basic self-care/ADL tasks. Pt requires SB assist for bed mobility to participate in self-care. Pt presents he is able to don B socks at a supervision level. Pt requires CGA for sit<>stand with use of a RW in order to participate in toileting/toilet transfers, however pt pleasantly declined at this time requesting to return to bed. Pt educated on deep breathing exercises as SpO2 would decrease to 82-83%.  Also educated pt on the role of OT, evaluation process, energy conservation, and goals for therapy with pt demonstrating good understanding. The pt will benefit from further OT services, in order to maximize his ADL performance and decrease the risk for complications associated with decreased functional activity. At the end of the session, pt returned to bed, call-bell in reach, with all needs met. Patient will benefit from skilled intervention to address the above impairments. Patient's rehabilitation potential is considered to be Good  Factors which may influence rehabilitation potential include:  ? None noted  ? Mental ability/status  ? Medical condition  ? Home/family situation and support systems  ? Safety awareness  ? Pain tolerance/management  ? Other:      PLAN :  Recommendations and Planned Interventions:   ?               Self Care Training                  ? Therapeutic Activities  ? Functional Mobility Training   ? Cognitive Retraining  ? Therapeutic Exercises           ? Endurance Activities  ? Balance Training                    ? Neuromuscular Re-Education  ? Visual/Perceptual Training     ? Home Safety Training  ? Patient Education                   ? Family Training/Education  ? Other (comment):    Frequency/Duration: Patient will be followed by occupational therapy 1-2 times per day/4-7 days per week to address goals. Discharge Recommendations: Home Health  Further Equipment Recommendations for Discharge: TBD     SUBJECTIVE:   Patient stated ? my brother is challenged? OBJECTIVE DATA SUMMARY:     Past Medical History:   Diagnosis Date    Aneurysm ([address]     AAA repair 2006 & 2012    Aorto-iliac duplex 02/13/2015    Tech difficult. Patent aorta bi-iliac endograft w/o leak or limb dysfunction. Arrhythmia     a fib    Cardiac cath 11/01/2012    RCA (sm, nondom) patent. LM patent. LAD 25%. CX (dom) 45% mid.   LVEDP 12 mmHg.  No WMA. PA 27/12. W 10-12. CO/CI 5.2/2.6 (TD). Cardiac echocardiogram, abnormal 02/20/2016    EF 55%. No WMA. Indeterminate diastolic fx. RVSP 60 mmHg. Severe LAE. Mild MR. Mod TR.  IVCE. Similar to study of 10/26/12. Cardiovascular aorto-iliac duplex 02/13/2015    Patent aorta bi-iliac endovascular graft w/o leak or limb dysfunction. Sac measures 4.21 x 4.47 cm (4.4 x 4.6 cm on 1/31/14). Cardiovascular LE peripheral arterial testing 07/29/2013    No significant LE arterial disease bilaterally. R GHADA 1. 12.  L GHADA 1. 12.  R DBI 0.83. L DBI 0.71. Exercise deferred. Cardiovascular renal duplex 10/31/2012    No RA stenosis. Intrinsic/med disease in left kidney. Patent aortic endograft. Patent, thrombus-free renal veins bilaterally. Carotid duplex 07/29/2013    Mild <50% bilateral ICA plaquing.       Chronic lung disease     Cigarette smoker     Congestive heart failure (CHF) (HCC)     COPD (chronic obstructive pulmonary disease) (HCC)     and emphysema; likely secondary to tobacco abuse    Difficult intubation     Dyslipidemia     low HDL    Emphysema     HTN (hypertension)     Hypercholesterolemia     Ill-defined condition     hernia    Increased prostate specific antigen (PSA) velocity     Long term (current) use of anticoagulants     coumadin    Osteoarthritis     Osteomyelitis (HCC)     Paroxysmal atrial fibrillation ([address]     Peripheral vascular disease ([address]     AAA repair 12/2007    Persistent atrial Fibrillation     Persistent atrial fibrillation (HCC)     Unspecified adverse effect of anesthesia     difficulty breathing placed in ICU Oct 2012 (AAA repair)     Past Surgical History:   Procedure Laterality Date    BRONCHOSCOPY DIAGNOSTIC  11/2/2012         CARDIAC CATHETERIZATION  11/1/2012         COLONOSCOPY N/A 7/26/2016    COLONOSCOPY with Polypectomies performed by Chandrakant Boone MD at [facility] ENDOSCOPY    COLONOSCOPY N/A 5/28/2019    COLONOSCOPY with polypectomy performed by Herberth Cheek MD at [facility] AAA REPAIR      2006 & 2012    HX HEART CATHETERIZATION  3/4/2009    1. RCA small, nondominant; patent. 2. LMCA patent. 3. LAD is long, wrapped around apical vessel. Diffuse, 20-30% stenosis noted. 4. CCA is large, dominant vessel. Diffuse 20-30% stenosis. 5. LVEDP is 16 mmHg. 6. Overall left ventricular systolic function mildly diminshed with est. EF 45%. Mild, global hypokinesis noted. 7. No significant mitral regurgitation or aortic stenosis noted. (see report)    HX HERNIA REPAIR  2/2014    rt inguinal     HX HERNIA REPAIR  01/2016    LEFT INGUINAL HERNIA REPAIR DR. Gongora Brighton ING HERNIA,5+Y/O,JESSIE Left 1-20-16    Dr. Stanford Nunn  11/1/2012          Barriers to Learning/Limitations: None  Compensate with: visual, verbal, tactile, kinesthetic cues/model    Home Situation:   Home Situation  Home Environment: Private residence  # Steps to Enter: 5  Rails to Enter: Yes  Hand Rails : Right  One/Two Story Residence: Two story, live on 1st floor  # of Interior Steps: 23  Living Alone: No  Support Systems: Family member(s)  Patient Expects to be Discharged to[de-identified] Private residence  Current DME Used/Available at Home: Oxygen, portable, Walker, rolling; Shower chair  Tub or Shower Type: Shower  ? Right hand dominant   ? Left hand dominant    Cognitive/Behavioral Status:  Neurologic State: Alert  Orientation Level: Oriented X4  Cognition: Follows commands  Safety/Judgement: Fall prevention    Skin: Visible skin appeared intact  Edema: None noted    Coordination: BUE  Coordination: Within functional limits  Fine Motor Skills-Upper: Left Intact; Right Intact    Gross Motor Skills-Upper: Left Intact; Right Intact    Balance:  Sitting: Intact  Standing: Impaired  Standing - Static: Good  Standing - Dynamic : Fair    Strength: BUE  Strength: Generally decreased, functional    Tone & Sensation: BUE  Tone: Normal  Sensation: Intact      Range of Motion: BUE  AROM: Within functional limits      Functional Mobility and Transfers for ADLs:  Bed Mobility:  Supine to Sit: Supervision  Sit to Supine: Supervision  Scooting: Supervision  Transfers:  Sit to Stand: Contact guard assistance  Stand to Sit: Contact guard assistance      ADL Assessment:   Feeding: Setup    Oral Facial Hygiene/Grooming: Setup    Bathing: Contact guard assistance    Upper Body Dressing: Setup;Supervision    Lower Body Dressing: Setup;Supervision    Toileting: Contact guard assistance      ADL Intervention:  LB Dressing: Setup; Supervision    Cognitive Retraining  Safety/Judgement: Fall prevention    Pain:  Pain level pre-treatment: 6/10 (Hernia)   Pain level post-treatment: 6/10   Pain Intervention(s): Medication (see MAR); Rest, Ice, Repositioning  Response to intervention: Nurse notified, See doc flow    Activity Tolerance:   Fair    Please refer to the flowsheet for vital signs taken during this treatment. After treatment:   ? Patient left in no apparent distress sitting up in chair  ? Patient left in no apparent distress in bed  ? Call bell left within reach  ? Nursing notified  ? Caregiver present  ? Bed alarm activated    COMMUNICATION/EDUCATION:   ? Role of Occupational Therapy in the acute care setting  ? Home safety education was provided and the patient/caregiver indicated understanding. ? Patient/family have participated as able in goal setting and plan of care. ? Patient/family agree to work toward stated goals and plan of care. ? Patient understands intent and goals of therapy, but is neutral about his/her participation. ? Patient is unable to participate in goal setting and plan of care. Thank you for this referral.  Ashley Arriaga, OT  Time Calculation: 18 mins    Eval Complexity: History: MEDIUM Complexity : Expanded review of history including physical, cognitive and psychosocial  history ;    Examination: LOW Complexity : 1-3 performance deficits relating to physical, cognitive , or psychosocial skils that result in activity limitations and / or participation restrictions ; Decision Making:MEDIUM Complexity : Patient may present with comorbidities that affect occupational performnce.  Miniml to moderate modification of tasks or assistance (eg, physical or verbal ) with assesment(s) is necessary to enable patient to complete evaluation No

## 2019-06-05 NOTE — PROGRESS NOTES
Pt considering surgical options discussed with Dr Jessica Coppola yesterday regarding complex aneurysm repair    Dr Jessica Coppola has reviewed options/recommendations with PFM team, who will help facilitate transition of care, as [facility]    Will sign off, available as needed

## 2019-06-05 NOTE — DISCHARGE SUMMARY
Discharge Summary  [facility] [facility]      Patient: Priscilla Prakash Age: [de-identified] y.o. Sex: male  : 1940    MRN: [id number]      DOA: 2019      Discharge Date: 19      Attending:Baumgarten, Hipolito Rumpf, MD      PCP: Bandar Carranza DO        ================================================================    Reason for Admission: Sepsis [facility]) [A41.9]    Discharge Diagnoses:   AAA  CHF Exacerbation  COPD  Cellulitis    Important notes to PCP/ follow-up studies and evaluations   -FU with Vascular for AAA TEVAR repair at [address], Jadademi Garcia [facility] 1485 need for Lasix  -consider FU CBC on d/c    Pending labs and studies:  None    Operative Procedures:   None    Discharge Medications:     Current Discharge Medication List      START taking these medications    Details   ascorbic acid, vitamin C, (VITAMIN C) 250 mg tablet Take 1 Tab by mouth two (2) times a day. Qty: 30 Tab, Refills: 0      furosemide (LASIX) 20 mg tablet Take 1 Tab by mouth daily for 7 days. Qty: 7 Tab, Refills: 0      cephALEXin (KEFLEX) 500 mg capsule Take 1 Cap by mouth every six (6) hours for 1 day. Qty: 6 Cap, Refills: 0      docusate sodium (COLACE) 100 mg capsule Take 1 Cap by mouth daily for 30 days. Qty: 30 Cap, Refills: 0      pantoprazole (PROTONIX) 40 mg tablet Take 1 Tab by mouth Daily (before breakfast). Qty: 30 Tab, Refills: 0         CONTINUE these medications which have NOT CHANGED    Details   tamsulosin (FLOMAX) 0.4 mg capsule Take 0.4 mg by mouth daily. ferrous sulfate 325 mg (65 mg iron) tablet Take 1 Tab by mouth two (2) times daily (with meals) for 30 days. Qty: 60 Tab, Refills: 0      HYDROcodone-acetaminophen (NORCO) 5-325 mg per tablet Take 0.5 Tabs by mouth two (2) times daily as needed for Pain. albuterol (PROVENTIL VENTOLIN) 2.5 mg /3 mL (0.083 %) nebulizer solution 3 mL by Nebulization route every four (4) hours as needed for Wheezing.   Qty: 24 Each, Refills: [address] [facility] 2.5-2.5 mcg/actuation inhaler inhale 2 inhalations by mouth once daily  Qty: 1 Inhaler, Refills: 5      traZODone (DESYREL) 50 mg tablet Take 50 mg by mouth nightly. lisinopril (PRINIVIL, ZESTRIL) 20 mg tablet Take 1 Tab by mouth daily. Qty: 30 Tab, Refills: 0      pravastatin (PRAVACHOL) 40 mg tablet Take 40 mg by mouth nightly. digoxin (DIGITEK) 0.125 mg tablet take 1 tablet by mouth once daily  Qty: 90 Tab, Refills: 3      amLODIPine (NORVASC) 10 mg tablet Take 10 mg by mouth daily. Refills: 0      polyethylene glycol (MIRALAX) 17 gram packet Take 17 g by mouth daily as needed (constipation). OXYGEN-A[employer] DELIV[employer] SYSTEM[employer] 2 L/min by Nasal route daily. Continuous. Company is [employer]            STOP taking these medications       omeprazole (PRILOSEC) 20 mg capsule Comments:   Reason for Stopping: Increased dose        aspirin 81 mg chewable tablet Comments:   Reason for Stopping: Per vascular 2/2/ AAA            Disposition: Home    Consultants:    Vascular [address]. Course (including pertinent history and physical findings)  Came in for an isolated episode of hemoptysis and lethargy after a recent hospitalization for symptomatic anemia a few days prior. He met Sepsis criteria in the ED and was treated for Sepsis however after our exam was determined to be in CHF exacerbation with a recent resolving cellulitis. CHF  Was treated in ED for Sepsis 2/2 L arm cellulitis which had been treated outpatient and was improving, however was given 2.5L NS bolus and 500ml Vancomycin along with Zosyn and Levaquin. On admission, his fluid was safely diuresed. Diuresis was slowed day 2 2/2 increase in Cr which was likely 2/2 Vanc and the amount of CT contrast he received with his CTA. The next day his diuresis was restarted, converted to Lasix 20 PO with clinical improvement. Prior to discharge he had trace LE pitting edema, significantly improved from baseline.   ECHO was performed, results below. He was continued on Lasix for 7 days, continuation to be re-evaluated by PCP. AAA  Was found to have a type I endoleak in his previous AA graft. Vascular was consulted and recommended CTA which showed extensive worsening aorto-iliac disease with recommendations for repair at a tertiary facility such as [facility] or [facility]. The recommendations were to have this evaluated promptly, but not urgently. He will follow up with vascular for further eval.    Hemoptysis/Anemia  He initially presented for hemoptysis which was resolved prior to admission. He had recently been discharged for symptomatic anemia. He required a transfusion on admission, however his Hb was likely dilutional 2/2 the 3L NS he had received a few hrs earlier. We did increase his Protonix to 40 daily. Cellulitis  He had developed cellulitis in his L forearm after his previous admission. He saw PCP and was started on Keflex with significant improvement prior to ED admission. ED started him on Vanc, Zosyn, Levaquin and essie blood cx which were negative. IV abx were d/c'ed the next day and he was continued on Keflex.     Summarized key findings and results (labs, imaging studies, ECHO, cardiac cath, endoscopies, etc):    CBC w/Diff    Lab Results   Component Value Date/Time    WBC 8.1 06/05/2019 06:00 AM    Hemoglobin, POC 10.9 (L) 07/26/2016 10:50 AM    HGB 8.5 (L) 06/05/2019 06:00 AM    Hematocrit, POC 32 (L) 07/26/2016 10:50 AM    HCT 26.7 (L) 06/05/2019 06:00 AM    PLATELET 330 79/32/9780 06:00 AM    MCV 75.6 06/05/2019 06:00 AM         Chemistry    Lab Results   Component Value Date/Time    Sodium 131 (L) 06/05/2019 06:00 AM    Potassium 3.8 06/05/2019 06:00 AM    Chloride 97 (L) 06/05/2019 06:00 AM    CO2 27 06/05/2019 06:00 AM    Anion gap 7 06/05/2019 06:00 AM    Glucose 80 06/05/2019 06:00 AM    BUN 27 (H) 06/05/2019 06:00 AM    Creatinine 1.52 (H) 06/05/2019 06:00 AM    BUN/Creatinine ratio 18 06/05/2019 06:00 AM    GFR est AA 54 (L) 06/05/2019 06:00 AM    GFR est non-AA 44 (L) 06/05/2019 06:00 AM    Calcium 8.1 (L) 06/05/2019 06:00 AM         ECHO Results:  · Left Ventricle: Estimated left ventricular ejection fraction is 56 - 60%. Abnormal left ventricular septal motion. Interventricular septal \"bounce\". · Tricuspid Valve: Non-specific thickening of the tricuspid valve. Moderate tricuspid valve regurgitation is present. · Pulmonary Artery: Moderate pulmonary hypertension. Pulmonary arterial systolic pressure is 23.1 mmHg. · Pulmonic Valve: Mild pulmonic valve regurgitation is present. · Left Atrium: Severely dilated left atrium. · Right Atrium: Moderately dilated right atrium.     Functional status and cognitive function:    Ambulates with: Walker  Status: alert, cooperative, no distress, appears stated age    Diet:General Diet    Code status and advanced care plan: Full  [facility] of Contact:  [address] [employer]    Patient Education:  Patient was educated on the following topics prior to discharge: AAA    Follow-up:   Follow-up Information     Follow up With Specialties Details Why Contact Info    Abelardo Stock DO  In 2 days for hospital follow up for CHF [id number]      Wicho Connelly MD Vascular Surgery In 1 week for hospital follow up for AAA [place] [address]           ================================================================  Julio Perkins DO, PGY I  [facility] P[facility]  06/05/19  12:33 PM

## 2019-06-06 VITALS
HEIGHT: 66 IN | DIASTOLIC BLOOD PRESSURE: 75 MMHG | SYSTOLIC BLOOD PRESSURE: 144 MMHG | TEMPERATURE: 98.1 F | WEIGHT: 161.3 LBS | BODY MASS INDEX: 25.92 KG/M2 | HEART RATE: 68 BPM | OXYGEN SATURATION: 100 % | RESPIRATION RATE: 18 BRPM

## 2019-06-06 LAB
ALBUMIN SERPL-MCNC: 2.6 G/DL (ref 3.4–5)
ALBUMIN/GLOB SERPL: 0.7 {RATIO} (ref 0.8–1.7)
ALP SERPL-CCNC: 86 U/L (ref 45–117)
ALT SERPL-CCNC: 16 U/L (ref 16–61)
ANION GAP SERPL CALC-SCNC: 0 MMOL/L (ref 3–18)
AST SERPL-CCNC: 19 U/L (ref 15–37)
BASOPHILS # BLD: 0 K/UL (ref 0–0.1)
BASOPHILS NFR BLD: 0 % (ref 0–2)
BILIRUB SERPL-MCNC: 0.6 MG/DL (ref 0.2–1)
BUN SERPL-MCNC: 29 MG/DL (ref 7–18)
BUN/CREAT SERPL: 20 (ref 12–20)
CALCIUM SERPL-MCNC: 8.4 MG/DL (ref 8.5–10.1)
CHLORIDE SERPL-SCNC: 102 MMOL/L (ref 100–108)
CO2 SERPL-SCNC: 29 MMOL/L (ref 21–32)
CREAT SERPL-MCNC: 1.46 MG/DL (ref 0.6–1.3)
DIFFERENTIAL METHOD BLD: ABNORMAL
EOSINOPHIL # BLD: 0.2 K/UL (ref 0–0.4)
EOSINOPHIL NFR BLD: 2 % (ref 0–5)
ERYTHROCYTE [DISTWIDTH] IN BLOOD BY AUTOMATED COUNT: 21.9 % (ref 11.6–14.5)
GLOBULIN SER CALC-MCNC: 3.5 G/DL (ref 2–4)
GLUCOSE SERPL-MCNC: 82 MG/DL (ref 74–99)
HCT VFR BLD AUTO: 25.8 % (ref 36–48)
HGB BLD-MCNC: 8.2 G/DL (ref 13–16)
LYMPHOCYTES # BLD: 1.3 K/UL (ref 0.9–3.6)
LYMPHOCYTES NFR BLD: 17 % (ref 21–52)
MCH RBC QN AUTO: 24.3 PG (ref 24–34)
MCHC RBC AUTO-ENTMCNC: 31.8 G/DL (ref 31–37)
MCV RBC AUTO: 76.6 FL (ref 74–97)
MONOCYTES # BLD: 0.8 K/UL (ref 0.05–1.2)
MONOCYTES NFR BLD: 11 % (ref 3–10)
NEUTS SEG # BLD: 5.4 K/UL (ref 1.8–8)
NEUTS SEG NFR BLD: 70 % (ref 40–73)
PLATELET # BLD AUTO: 253 K/UL (ref 135–420)
PLATELET COMMENTS,PCOM: ABNORMAL
PMV BLD AUTO: 8.6 FL (ref 9.2–11.8)
POTASSIUM SERPL-SCNC: 4.1 MMOL/L (ref 3.5–5.5)
PROT SERPL-MCNC: 6.1 G/DL (ref 6.4–8.2)
RBC # BLD AUTO: 3.37 M/UL (ref 4.7–5.5)
RBC MORPH BLD: ABNORMAL
SODIUM SERPL-SCNC: 131 MMOL/L (ref 136–145)
WBC # BLD AUTO: 7.7 K/UL (ref 4.6–13.2)

## 2019-06-06 PROCEDURE — 97116 GAIT TRAINING THERAPY: CPT

## 2019-06-06 PROCEDURE — 74011250637 HC RX REV CODE- 250/637: Performed by: STUDENT IN AN ORGANIZED HEALTH CARE EDUCATION/TRAINING PROGRAM

## 2019-06-06 PROCEDURE — 97530 THERAPEUTIC ACTIVITIES: CPT

## 2019-06-06 PROCEDURE — 80053 COMPREHEN METABOLIC PANEL: CPT

## 2019-06-06 PROCEDURE — 36415 COLL VENOUS BLD VENIPUNCTURE: CPT

## 2019-06-06 PROCEDURE — 85025 COMPLETE CBC W/AUTO DIFF WBC: CPT

## 2019-06-06 PROCEDURE — 94640 AIRWAY INHALATION TREATMENT: CPT

## 2019-06-06 PROCEDURE — 77010033678 HC OXYGEN DAILY: Performed by: FAMILY MEDICINE

## 2019-06-06 PROCEDURE — 74011000250 HC RX REV CODE- 250: Performed by: STUDENT IN AN ORGANIZED HEALTH CARE EDUCATION/TRAINING PROGRAM

## 2019-06-06 RX ORDER — CEPHALEXIN 500 MG/1
500 CAPSULE ORAL EVERY 6 HOURS
Qty: 6 CAP | Refills: 0 | Status: SHIPPED | OUTPATIENT
Start: 2019-06-06 | End: 2019-06-07

## 2019-06-06 RX ORDER — DOCUSATE SODIUM 100 MG/1
100 CAPSULE, LIQUID FILLED ORAL DAILY
Qty: 30 CAP | Refills: 0 | Status: SHIPPED | OUTPATIENT
Start: 2019-06-06 | End: 2019-07-06

## 2019-06-06 RX ORDER — FUROSEMIDE 20 MG/1
20 TABLET ORAL DAILY
Qty: 7 TAB | Refills: 0 | Status: SHIPPED | OUTPATIENT
Start: 2019-06-06 | End: 2019-06-13

## 2019-06-06 RX ORDER — PANTOPRAZOLE SODIUM 40 MG/1
40 TABLET, DELAYED RELEASE ORAL
Qty: 30 TAB | Refills: 0 | Status: SHIPPED | OUTPATIENT
Start: 2019-06-07 | End: 2020-01-01

## 2019-06-06 RX ORDER — ASCORBIC ACID 250 MG
250 TABLET ORAL 2 TIMES DAILY
Qty: 30 TAB | Refills: 0 | Status: SHIPPED | OUTPATIENT
Start: 2019-06-06 | End: 2020-01-01

## 2019-06-06 RX ORDER — ASCORBIC ACID 250 MG
250 TABLET ORAL 2 TIMES DAILY
Status: DISCONTINUED | OUTPATIENT
Start: 2019-06-06 | End: 2019-06-06 | Stop reason: HOSPADM

## 2019-06-06 RX ADMIN — DOCUSATE SODIUM 100 MG: 100 CAPSULE, LIQUID FILLED ORAL at 08:44

## 2019-06-06 RX ADMIN — ASCORBIC ACID TAB 250 MG 250 MG: 250 TAB at 08:43

## 2019-06-06 RX ADMIN — FUROSEMIDE 20 MG: 20 TABLET ORAL at 08:44

## 2019-06-06 RX ADMIN — AMLODIPINE BESYLATE 10 MG: 10 TABLET ORAL at 08:44

## 2019-06-06 RX ADMIN — IPRATROPIUM BROMIDE AND ALBUTEROL SULFATE 3 ML: .5; 3 SOLUTION RESPIRATORY (INHALATION) at 08:23

## 2019-06-06 RX ADMIN — POLYETHYLENE GLYCOL 3350 17 G: 17 POWDER, FOR SOLUTION ORAL at 08:57

## 2019-06-06 RX ADMIN — HYDROCODONE BITARTRATE AND ACETAMINOPHEN 0.5 TABLET: 5; 325 TABLET ORAL at 16:31

## 2019-06-06 RX ADMIN — CEPHALEXIN 500 MG: 250 CAPSULE ORAL at 00:51

## 2019-06-06 RX ADMIN — CEPHALEXIN 500 MG: 250 CAPSULE ORAL at 12:43

## 2019-06-06 RX ADMIN — ASCORBIC ACID TAB 250 MG 250 MG: 250 TAB at 16:32

## 2019-06-06 RX ADMIN — LISINOPRIL 20 MG: 20 TABLET ORAL at 08:44

## 2019-06-06 RX ADMIN — HYDROCODONE BITARTRATE AND ACETAMINOPHEN 0.5 TABLET: 5; 325 TABLET ORAL at 08:44

## 2019-06-06 RX ADMIN — PANTOPRAZOLE SODIUM 40 MG: 40 TABLET, DELAYED RELEASE ORAL at 08:43

## 2019-06-06 RX ADMIN — IPRATROPIUM BROMIDE AND ALBUTEROL SULFATE 3 ML: .5; 3 SOLUTION RESPIRATORY (INHALATION) at 15:29

## 2019-06-06 RX ADMIN — FERROUS SULFATE TAB 325 MG (65 MG ELEMENTAL FE) 325 MG: 325 (65 FE) TAB at 16:31

## 2019-06-06 RX ADMIN — FERROUS SULFATE TAB 325 MG (65 MG ELEMENTAL FE) 325 MG: 325 (65 FE) TAB at 08:43

## 2019-06-06 RX ADMIN — DIGOXIN 0.12 MG: 125 TABLET ORAL at 08:43

## 2019-06-06 RX ADMIN — CEPHALEXIN 500 MG: 250 CAPSULE ORAL at 06:16

## 2019-06-06 NOTE — ROUTINE PROCESS
TRANSFER - IN REPORT: 
 
Verbal report received from Charles Parry RN (name) on Shaniqua Person  being received from [facility] (unit) for routine progression of care Report consisted of patients Situation, Background, Assessment and  
Recommendations(SBAR). Information from the following report(s) SBAR, Kardex, Intake/Output, MAR, Recent Results and Cardiac Rhythm Afib  was reviewed with the receiving nurse. Opportunity for questions and clarification was provided. Assessment completed upon patients arrival to unit and care assumed.

## 2019-06-06 NOTE — PROGRESS NOTES
DISCHARGE PLANNING: 
Pt recommended for Skilled Nursing Placement. Pt and his family reports that this pt lives alone and normally does well independently at with family support. Pt and family provided FOC family provided a verbal request with this pt's agreement for [place] [facility] [place], pt placed in the [place] acceptance. Pt's family notified of acceptance and placement today. Ule Care Manager

## 2019-06-06 NOTE — PROGRESS NOTES
Problem: Falls - Risk of 
Goal: *Absence of Falls Description Document Edgar Brunner Fall Risk and appropriate interventions in the flowsheet. Outcome: Progressing Towards Goal 
  
Problem: Patient Education: Go to Patient Education Activity Goal: Patient/Family Education Outcome: Progressing Towards Goal 
  
Problem: Pressure Injury - Risk of 
Goal: *Prevention of pressure injury Description Document Claude Scale and appropriate interventions in the flowsheet. Outcome: Progressing Towards Goal 
  
Problem: Patient Education: Go to Patient Education Activity Goal: Patient/Family Education Outcome: Progressing Towards Goal 
  
Problem: Nutrition Deficit Goal: *Optimize nutritional status Outcome: Progressing Towards Goal 
  
Problem: Patient Education: Go to Patient Education Activity Goal: Patient/Family Education Outcome: Progressing Towards Goal 
  
Problem: Patient Education: Go to Patient Education Activity Goal: Patient/Family Education Outcome: Progressing Towards Goal 
  
Problem: Pain Goal: *Control of Pain Outcome: Progressing Towards Goal 
Goal: *PALLIATIVE CARE:  Alleviation of Pain Outcome: Progressing Towards Goal 
  
Problem: Heart Failure: Day 1 Goal: Activity/Safety Outcome: Progressing Towards Goal 
  
Problem: Heart Failure: Day 5 Goal: Off Pathway (Use only if patient is Off Pathway) Outcome: Progressing Towards Goal 
Goal: Activity/Safety Outcome: Progressing Towards Goal 
Goal: Diagnostic Test/Procedures Outcome: Progressing Towards Goal 
Goal: Nutrition/Diet Outcome: Progressing Towards Goal 
Goal: Discharge Planning Outcome: Progressing Towards Goal 
Goal: Medications Outcome: Progressing Towards Goal 
Goal: Respiratory Outcome: Progressing Towards Goal 
Goal: Treatments/Interventions/Procedures Outcome: Progressing Towards Goal 
Goal: Psychosocial 
Outcome: Progressing Towards Goal 
  
Problem: Heart Failure: Discharge Outcomes Goal: *Demonstrates ability to perform prescribed activity without shortness of breath or discomfort Outcome: Progressing Towards Goal 
Goal: *Left ventricular function assessment completed prior to or during stay, or planned for post-discharge Outcome: Progressing Towards Goal 
Goal: *ACEI prescribed if LVEF less than 40% and no contraindications or ARB prescribed Outcome: Progressing Towards Goal 
Goal: *Verbalizes understanding and describes prescribed diet Outcome: Progressing Towards Goal 
Goal: *Verbalizes understanding/describes prescribed medications Outcome: Progressing Towards Goal 
Goal: *Describes available resources and support systems Description 
(eg: Home Health, Palliative Care, Advanced Medical Directive) Outcome: Progressing Towards Goal 
Goal: *Describes smoking cessation resources Outcome: Progressing Towards Goal 
Goal: *Understands and describes signs and symptoms to report to providers(Stroke Metric) Outcome: Progressing Towards Goal 
Goal: *Describes/verbalizes understanding of follow-up/return appt Description 
(eg: to physicians, diabetes treatment coordinator, and other resources Outcome: Progressing Towards Goal 
Goal: *Describes importance of continuing daily weights and changes to report to physician Outcome: Progressing Towards Goal

## 2019-06-06 NOTE — PROGRESS NOTES
Intern Progress Note [address] Patient: Kip Plasencia MRN: [id number]  CSN: [id number] YOB: 1940  Age: [de-identified] y.o. Sex: male DOA: 6/2/2019 LOS:  LOS: 4 days   PCP: Daniela Manzanares DO Subjective:  
 
Acute events: No overnight events. No complaints. Feeling better, swelling almost gone. Wanting placement instead of going home. Brief ROS: Denies: CP, new SOB, HA, Vision Changes, LE Pain Objective:  
  
Patient Vitals for the past 24 hrs: 
 Temp Pulse Resp BP SpO2  
06/06/19 0823     97 % 06/06/19 0600 97.1 °F (36.2 °C) 71 18 138/69 97 % 06/06/19 0200 97.6 °F (36.4 °C) 67 16 128/66 96 % 06/05/19 2200 98.3 °F (36.8 °C) 73 16 136/82 98 % 06/05/19 2033 98.6 °F (37 °C) 73 19 126/63 97 % 06/05/19 1934     96 % 06/05/19 1540 97.6 °F (36.4 °C) 69 20 126/64 93 % 06/05/19 1455     96 % 06/05/19 1059 97.8 °F (36.6 °C) 74 18 125/64 99 % Physical Exam:  
General: alert and in no apparent distress Cardiovascular: RRR w/o MRGs. No JVD Respiratory: CTAB w/o rales, rhonchi, wheezes, no increased work of breathing Abdomen: Soft, +BS, non-TTP, Non-Distended Extremities: trace pitting edema in lower extremities bilaterally, 2+ radial pulses intact bilaterally. L arm no erythema, no warmth, no tenderness Neuro: Cranial nerves grossly intact, grossly moving upper and lower extremities Skin: No rashes, lesions, or ulcers. Good turgor. Lab/Data Reviewed: All lab results for the last 24 hours reviewed. Recent Results (from the past 24 hour(s)) CBC WITH AUTOMATED DIFF Collection Time: 06/06/19  2:55 AM  
Result Value Ref Range WBC 7.7 4.6 - 13.2 K/uL  
 RBC 3.37 (L) 4.70 - 5.50 M/uL HGB 8.2 (L) 13.0 - 16.0 g/dL HCT 25.8 (L) 36.0 - 48.0 % MCV 76.6 74.0 - 97.0 FL  
 MCH 24.3 24.0 - 34.0 PG  
 MCHC 31.8 31.0 - 37.0 g/dL RDW 21.9 (H) 11.6 - 14.5 % PLATELET 995 166 - 289 K/uL  MPV 8.6 (L) 9.2 - 11.8 FL  
 NEUTROPHILS 70 40 - 73 % LYMPHOCYTES 17 (L) 21 - 52 % MONOCYTES 11 (H) 3 - 10 % EOSINOPHILS 2 0 - 5 % BASOPHILS 0 0 - 2 %  
 ABS. NEUTROPHILS 5.4 1.8 - 8.0 K/UL  
 ABS. LYMPHOCYTES 1.3 0.9 - 3.6 K/UL  
 ABS. MONOCYTES 0.8 0.05 - 1.2 K/UL  
 ABS. EOSINOPHILS 0.2 0.0 - 0.4 K/UL  
 ABS. BASOPHILS 0.0 0.0 - 0.1 K/UL  
 DF SMEAR SCANNED    
 PLATELET COMMENTS ADEQUATE PLATELETS    
 RBC COMMENTS ANISOCYTOSIS 2+ 
    
 RBC COMMENTS POLYCHROMASIA 1+ 
    
 RBC COMMENTS HYPOCHROMIA 1+ METABOLIC PANEL, COMPREHENSIVE Collection Time: 06/06/19  2:55 AM  
Result Value Ref Range Sodium 131 (L) 136 - 145 mmol/L Potassium 4.1 3.5 - 5.5 mmol/L Chloride 102 100 - 108 mmol/L  
 CO2 29 21 - 32 mmol/L Anion gap 0 (L) 3.0 - 18 mmol/L Glucose 82 74 - 99 mg/dL BUN 29 (H) 7.0 - 18 MG/DL Creatinine 1.46 (H) 0.6 - 1.3 MG/DL  
 BUN/Creatinine ratio 20 12 - 20 GFR est AA 56 (L) >60 ml/min/1.73m2 GFR est non-AA 46 (L) >60 ml/min/1.73m2 Calcium 8.4 (L) 8.5 - 10.1 MG/DL Bilirubin, total 0.6 0.2 - 1.0 MG/DL  
 ALT (SGPT) 16 16 - 61 U/L  
 AST (SGOT) 19 15 - 37 U/L Alk. phosphatase 86 45 - 117 U/L Protein, total 6.1 (L) 6.4 - 8.2 g/dL Albumin 2.6 (L) 3.4 - 5.0 g/dL Globulin 3.5 2.0 - 4.0 g/dL A-G Ratio 0.7 (L) 0.8 - 1.7 Scheduled Medications Reviewed: 
Current Facility-Administered Medications Medication Dose Route Frequency  ascorbic acid (vitamin C) (VITAMIN C) tablet 250 mg  250 mg Oral BID  furosemide (LASIX) tablet 20 mg  20 mg Oral DAILY  cephALEXin (KEFLEX) capsule 500 mg  500 mg Oral Q6H  
 docusate sodium (COLACE) capsule 100 mg  100 mg Oral DAILY  HYDROcodone-acetaminophen (NORCO) 5-325 mg per tablet 0.5 Tab  0.5 Tab Oral TID  amLODIPine (NORVASC) tablet 10 mg  10 mg Oral DAILY  digoxin (LANOXIN) tablet 0.125 mg  0.125 mg Oral DAILY  ferrous sulfate tablet 325 mg  325 mg Oral BID WITH MEALS  
  lisinopril (PRINIVIL, ZESTRIL) tablet 20 mg  20 mg Oral DAILY  pantoprazole (PROTONIX) tablet 40 mg  40 mg Oral ACB  pravastatin (PRAVACHOL) tablet 40 mg  40 mg Oral QHS  albuterol-ipratropium (DUO-NEB) 2.5 MG-0.5 MG/3 ML  3 mL Nebulization Q6H RT  
 traZODone (DESYREL) tablet 50 mg  50 mg Oral QHS  [Held by provider] aspirin chewable tablet 81 mg  81 mg Oral DAILY Imaging, EKG/Telemetry: No results found. Imaging: 
[x]I have personally reviewed the patients radiographs []Radiographs reviewed with radiologist 
 []No change from prior, tubes and lines in adequate position []Improved   []Worsening Assessment/Plan  
 
[de-identified] y.o. male with PMH of chronic atrial fibrillation, CAD, HFpEF, HTN, HLD, COPD (on home 2 L O2), pHTN, CKD3, iron deficiency anemia, BPH, chronic pain syndrome, and chronic constipation, now admitted with sepsis. 
  
CHF: admitted for 3/4 SIRS Criteria (Leukocytosis of 13.4, tachypnea in mid-20's, and  tachycardia in mid-90's), however likely cellulitis and CHF independent. No lactic acidosis. Afebrile. Recently treated with Cephalexin for cellulitis of left upper arm. Unlikely truly septic, infection with CHF. Sepsis protocol started in ED. CXR showed no infectious process. UA showed no UTI. blood cx NG. Cr improving. Diuresis improving clinically, trace pitting edema on lasix 20 PO this AM. 
- Daily CBC 
- continue lasix 20 PO daily - Lasix 20IV in afternoon, consider PM dosing also - d/c lasix 20 PO until FU appt 
- d/c pending placement Cellulitis: resolving, R arm. Not septic. No swelling/erythema/tenderness on exam 
- continue Keflex through 6/7 Hemoptysis - pulmonary edema vs S. Pneumonia vs Esophageal Trauma: isolated, PE ruled out with CTA-chest. CXR showed no consolidation. No recurrence since being in ED. Hb stable 8.5. 
-Monitor closely 
-daily BMP 
  
Hyponatremia - possibly from fluid overload vs Chronic Lung Disease:  On admission, Na or 127>131. Improving 
- Daily BMP 
- Fluid Restriction 
  
Abdominal Aortic Aneurysm: CTA-chest showed outpouching of previously repaired aneurysm. Vascular consulted in ED. Patient with abdominal pain but not worsening. Vascular recs stenting at [facility] vs [facility]. 
- F/U Vascular Recs 
- vascular outpt 
  
Chronic Atrial Fibrillation: Followed outpatient by Dr. Zelda Mckinney at [facility]. Currently rate-controlled atrial fibrillation. Warfarin was stopped on last admission given supratherapeutic INR, and recurrent GI bleed that required three hospitalizations for transfusions. 
- Continue home Digoxin 125 mcg daily 
  
HTN, HLD, CAD, HFpEF: Last Echo (4/2018): EF 55%; hypokinesis of the basal-mid anteroseptal wall. Repeat unchanged 6/3/19. BNP of 7673. Admission BP ranging in the 140s-170s/60s-90s. - Continue home Amlodipine 10 mg daily 
- Continue home Pravastatin 40 mg daily 
- lasix as above COPD on home 2L 02: Followed by [facility] (Dr. Cl Luu) as outpatient. - Continue home supplemental O2 @ 2 L/min; titrate to maintain O2 sats between 88-92% 
- Continue home Stiolto Respimat 2.5-2.5 mcg inhaler 2 puffs daily - Duo-neb q6h per RT 
  
CKD3: Baseline Cr 1-1.2;  Cr trending back down 1.4, was likely 2/2 IV contrast, vancomycin. Unlikely cardiorenal.  
-avoid nephrotoxics -Renally dose medications 
  
Chronic Pain Syndrome: H/o chronic low back pain OA, and inguinal hernia w/o Incarceration 
- Continue home Norco 5-325 mg TID 
- Consider Voltaren 1% Gel for isolated joint pain 
  
Chronic Constipation: Stable. On home narcotics. Not interested in suppository - Continue home Miralax daily PRN 
- High fiber diet 
- colace 
  
Diet: Cardiac DVT Prophylaxis: SCDs Code Status: FULL Point of Contact: Marlen Cardona (Relationship: [id number]) [phone] 
  
Disposition and anticipated LOS: >2 midnights Alok Anguiano DO, PGY I 
[facility] P[facility] 
06/06/19 
9:43 AM

## 2019-06-06 NOTE — PROGRESS NOTES
Problem: Mobility Impaired (Adult and Pediatric) Goal: *Acute Goals and Plan of Care (Insert Text) Description Physical Therapy Goals Initiated 6/4/2019 and to be accomplished within 7 day(s) 1. Patient will move from supine to sit and sit to supine  in bed with independence. 2.  Patient will transfer from bed to chair and chair to bed with modified independence using the least restrictive device. 3.  Patient will perform sit to stand with modified independence. 4.  Patient will ambulate with modified independence for 150 feet with the least restrictive device. 5.  Patient will ascend/descend 5 stairs with 1 handrail(s) with supervision/set-up. To prepare pt for home mobility. PLOF:  Pt lives with his brother in a 2 story home with 1st floor set up, 5 steps to enter, 1 railing. Pt ambulated with supplemental O2 and a RW prior to this hospitalization Outcome: Progressing Towards Goal 
 PHYSICAL THERAPY TREATMENT Patient: Nelson Miguel ([de-identified] y.o. male) Date: 6/6/2019 Diagnosis: Sepsis ([facility] [address] [A41.9] Congestive heart failure (CHF) ([facility] [address] Precautions:   
ASSESSMENT: 
Pt found seated in chair willing to participate w/ therapy. Tx performed w/ 2L of SPO2 donned. Pt able to maintain SPO2 levels of 97-98% t/o. Pt able to improve in mobility and endurance this visit w/ sit <> stands and gait training. Pt cont to req CGA to stand from mult surfaces, however displayed good carry over w/ sequencing and alignment. Pt amb for a total of 20' this visit voicing no need for sitting rest break, however did take 1 standing rest break prior to returning to chair. Pt displaying increase in RR post gait training, however recovered well. Pt provided w/ recliner , transferred to recliner via HHA displaying increased instability compared to gait training w/ RW, and safely left in recliner w/ family at side. Pt would benefit from OOB to chair for all meals and t/o day as appropriate. Progression toward goals: good ? Improving appropriately and progressing toward goals ? Improving slowly and progressing toward goals ? Not making progress toward goals and plan of care will be adjusted PLAN: 
Patient continues to benefit from skilled intervention to address the above impairments. Continue treatment per established plan of care. Discharge Recommendations:  Andi Gurrola Further Equipment Recommendations for Discharge:  rolling walker SUBJECTIVE:  
Patient stated ? I feel better today. ? OBJECTIVE DATA SUMMARY:  
Critical Behavior: 
Neurologic State: Alert Orientation Level: Oriented X4 Cognition: Appropriate decision making, Appropriate for age attention/concentration, Appropriate safety awareness, Follows commands Safety/Judgement: Fall prevention Functional Mobility Training: 
Transfers: 
Sit to Stand: Contact guard assistance Stand to Sit: Contact guard assistance Balance: 
Sitting: Intact Standing: Impaired; With support; Without support Standing - Static: Good Standing - Dynamic : Fair(+) Ambulation/Gait Training: 
Distance (ft): 20 Feet (ft) Assistive Device: Walker, rolling(none) Ambulation - Level of Assistance: Contact guard assistance Gait Abnormalities: Decreased step clearance Base of Support: Center of gravity altered Speed/Sylvia: Pace decreased (<100 feet/min) Step Length: Right shortened;Left shortened Therapeutic Exercises:  
 
 
EXERCISE Sets Reps Active Active Assist  
Passive Self ROM Comments Ankle Pumps 1 10  ? ? ? ? Pain: 
Pain level pre-treatment: 0/10 Pain level post-treatment: 0/10 Activity Tolerance:  
Fair + Please refer to the flowsheet for vital signs taken during this treatment. After treatment:  
? Patient left in no apparent distress sitting up in chair ? Patient left in no apparent distress in bed 
? Call bell left within reach ? Nursing notified ? Caregiver present ? Bed alarm activated ? SCDs applied COMMUNICATION/EDUCATION:  
?         Role of Physical Therapy in the acute care setting. ?         Fall prevention education was provided and the patient/caregiver indicated understanding. ? Patient/family have participated as able in working toward goals and plan of care. ?         Patient/family agree to work toward stated goals and plan of care. ?         Patient understands intent and goals of therapy, but is neutral about his/her participation. ? Patient is unable to participate in stated goals/plan of care: ongoing with therapy staff. ?         Other: 
 
   
Alex Roldan, JAYDEN Time Calculation: 31 mins

## 2019-06-06 NOTE — ROUTINE PROCESS
Verbal/bedside report given from [facility], [address] [place] [address]. Patient resting quietly no signs or symptoms of distress

## 2019-06-06 NOTE — PROGRESS NOTES
Problem: Falls - Risk of 
Goal: *Absence of Falls Description Document Hudson Leone Fall Risk and appropriate interventions in the flowsheet. Outcome: Progressing Towards Goal 
  
Problem: Pressure Injury - Risk of 
Goal: *Prevention of pressure injury Description Document Claude Scale and appropriate interventions in the flowsheet.  
Outcome: Progressing Towards Goal

## 2019-06-06 NOTE — PROGRESS NOTES
I have reviewed discharge instructions with the patient and caregiver. The patient and caregiver verbalized understanding. Patient transferred to [place], [place]. Report given to [facility], LPN.

## 2019-06-06 NOTE — PROGRESS NOTES
2200: Patient placed in room 465. Nurse at bedside. Cardiac monitor on, NC @2L. Patient is laying comfortably in bed with no concerns at this time. TRANSFER - OUT REPORT: 
 
Verbal report given to Helena Horne on Clearance Jaylan  being transferred to  for routine progression of care Report consisted of patients Situation, Background, Assessment and  
Recommendations(SBAR). Information from the following report(s) SBAR, Kardex, ED Summary, Procedure Summary, Intake/Output, MAR, Recent Results, Med Rec Status, Cardiac Rhythm controlled a. fib, Alarm Parameters  and Quality Measures was reviewed with the receiving nurse. Lines:  
Peripheral IV 06/02/19 Right Antecubital (Active) Site Assessment Clean, dry, & intact 6/5/2019  4:00 PM  
Phlebitis Assessment 0 6/5/2019  4:00 PM  
Infiltration Assessment 0 6/5/2019  4:00 PM  
Dressing Status Clean, dry, & intact 6/5/2019  4:00 PM  
Dressing Type Tape;Transparent 6/5/2019  4:00 PM  
Hub Color/Line Status Pink 6/5/2019  4:00 PM  
Action Taken Open ports on tubing capped 6/5/2019 12:00 PM  
Alcohol Cap Used Yes 6/4/2019  8:00 PM  
  
 
Opportunity for questions and clarification was provided. Patient transported with: 
 Monitor O2 @ 3 liters Registered Nurse

## 2019-06-06 NOTE — PROGRESS NOTES
NUTRITION Nutrition Screen RECOMMENDATIONS / PLAN:  
 
- Modify supplements: change Ensure Enlive to [employer] BID (to replace beverage with meal). - Continue RD inpatient monitoring and evaluation. NUTRITION INTERVENTIONS & DIAGNOSIS:  
 
[x] Meals/snacks: modify composition 
[x] Medical food supplement therapy: modify [x] Collaboration and referral of nutrition care: pt discussed with RN 
 
Nutrition Diagnosis: Inadequate oral intake related to decreased appetite, SOB as evidenced by pt consuming 50% or less of meals, for the past month PTA. ASSESSMENT:  
 
6/6: Per RN, pt with poor consumption of Ensure Enlive supplement, especially with fluid restriction. Pt asleep at time of visit, then on phone at second visit. Viewed lunch tray earlier today; pt consumed 75% of meal. Spoke with patient's sister; pt has many unopened Ensure supplement bottles; discussed changing to [employer]. Pt likely to consume since he does eat/like ice cream  
 
6/3: Pt with decreased appetite and poor meal intake for the past month. Agreeable to supplements. Discussed importance of fluid restriction and low sodium diet at home as well as small frequent meals to promote meal intake when SOB. Average po intake adequate to meet patients estimated nutritional needs:   [] Yes     [x] No   [] Unable to determine at this time Diet: DIET CARDIAC Soft Solids; FR 800ML 
DIET NUTRITIONAL SUPPLEMENTS Breakfast, Dinner; Noelle Bellis Food Allergies: NKFA Current Appetite:   [] Good     [] Fair     [x] Poor     [] Other: 
Appetite/meal intake prior to admission:   [] Good     [] Fair     [x] Poor x month    [] Other: 
Feeding Limitations:  [] Swallowing difficulty    [x] Chewing difficulty: poor dentition    [] Other: 
Current Meal Intake:  
Patient Vitals for the past 100 hrs: 
 % Diet Eaten 06/05/19 1817 100 % 06/05/19 1239 100 % 06/05/19 0916 95 % 06/04/19 1328 50 % 06/04/19 1000 80 % 06/03/19 1851 0 % 06/03/19 1700 0 % 06/03/19 1200 50 % 06/03/19 0030 75 % BM: 6/2 Skin Integrity: WDL Edema:   [] No     [x] Yes Pertinent Medications: Reviewed Recent Labs  
  06/06/19 
0255 06/05/19 
0600 06/04/19 
0610 * 131* 129*  
K 4.1 3.8 3.8  97* 96* CO2 29 27 26 GLU 82 80 93 BUN 29* 27* 25* CREA 1.46* 1.52* 1.66* CA 8.4* 8.1* 7.9* ALB 2.6* 2.5* 2.5* SGOT 19 16 16 ALT 16 16 17 Intake/Output Summary (Last 24 hours) at 6/6/2019 1451 Last data filed at 6/6/2019 1428 Gross per 24 hour Intake 440 ml Output 1290 ml Net -850 ml Anthropometrics: 
Ht Readings from Last 1 Encounters:  
06/03/19 5' 6\" (1.676 m) Last 3 Recorded Weights in this Encounter 06/02/19 1941 06/03/19 1522 06/05/19 1548 Weight: 68.7 kg (151 lb 7.3 oz) 68.5 kg (151 lb) 73.2 kg (161 lb 4.8 oz) Body mass index is 26.03 kg/m². Weight History: patient reports usual weight of 152 lb and weight gain/fluctuations 2/2 fluid accumulation Weight Metrics 6/5/2019 5/29/2019 4/16/2019 3/21/2019 1/8/2019 11/9/2018 10/31/2018 Weight 161 lb 4.8 oz 151 lb 6.4 oz 150 lb 150 lb 154 lb 154 lb 150 lb BMI 26.03 kg/m2 24.44 kg/m2 24.21 kg/m2 24.21 kg/m2 24.86 kg/m2 24.86 kg/m2 24.21 kg/m2 Admitting Diagnosis: Sepsis ([address] [A41.9] Pertinent PMHx: atrial fibrillation, CAD, HF, HTN, HLD, COPD, CKD stage III, iron deficiency anemia, chronic pain, chronic constipation Education Needs:        [x] None identified  [] Identified - Not appropriate at this time  []  Identified and addressed - refer to education log Learning Limitations:   [x] None identified  [] Identified Cultural, Presybeterian & ethnic food preferences:  [x] None identified    [] Identified and addressed ESTIMATED NUTRITION NEEDS:  
 
Calories: 9685-7268 kcal (MSJx1.2-1.3) based on  [x] Actual BW 69 kg     [] IBW Protein: 55-69 gm (0.8-1 gm/kg) based on  [x] Actual BW      [] IBW Fluid: 1 mL/kcal 
  
 MONITORING & EVALUATION:  
 
Nutrition Goal(s): 1. Po intake of meals will meet >75% of patient estimated nutritional needs within the next 7 days. Outcome:  [] Met/Ongoing    [] Progressing towards goal    [] Not progressing towards goal    [x] New/Initial goal  
 
Monitoring:   [x] Food and beverage intake   [x] Diet order   [x] Nutrition-focused physical findings   [x] Treatment/therapy   [] Weight   [] Enteral nutrition intake Previous Recommendations (for follow-up assessments only):     []   Implemented       []   Not Implemented (RD to address)      [] No Longer Appropriate     [] No Recommendation Made Discharge Planning: cardiac diet [x] Participated in care planning, discharge planning, & interdisciplinary rounds as appropriate Lorene Eng RD, [facility] Pager: [phone]

## 2019-06-06 NOTE — ROUTINE PROCESS
2200 Received patient from [facility]. No distress noted. Assumed care of patient from off going nurse. Patient resting in bed. No distress noted. Call bell within reach, siderails up x 3, bed in lowest position, and patient instructed to use call bell for assistance. Assessment performed. Will continue to monitor. 4335 Bedside and Verbal shift change report given to [address] Moraima Washington (oncoming nurse) by Surendra Cespedes RN(offgoing nurse). Report included the following information Kardex, Intake/Output, MAR, Recent Results and Cardiac Rhythm Afib HR 60s tele box#[id number].

## 2019-06-08 LAB
BACTERIA SPEC CULT: NORMAL
BACTERIA SPEC CULT: NORMAL
SERVICE CMNT-IMP: NORMAL
SERVICE CMNT-IMP: NORMAL

## 2019-06-10 NOTE — PROGRESS NOTES
Renzo Newby RESPONSE TO [facility] INQUIRY Patient: Leda Alvarez MRN: [id number]  CSN: [id number] YOB: 1940  Age: [de-identified] y.o. Sex: male INQUIRY Patient admitted with CHF. Noted documentation of \"acute on chronic respiratory failure\" in H+P Please indicate one of the following:  Acute on Chronic Respiratory Failure - (please provide the clinical indicators to support diagnosis)  Acute Respiratory Failure Ruled Out after study - Chronic Respiratory Failure on continuous O2 at home  Other explanation  Unable to determine The medical record reflects the following: Risk Factors: Hx copd,chf home o2 Clinical Indicators: sob, Treatment: O2 3 l, and bipap at times [facility]) Acute Respiratory Failure Clinical Indicators: - Respirations <12 or >25 - Air hunger/gasping - Use of accessory muscles of respiration/increased work of breathing - Sternal or intercostal retractions - Stridor - Inability to speak in full sentences - Cyanosis - Pulse ox <90% RA or <95% on O2 - pH <7.35 or >7.45 - pO2 < 60 mm Hg (or 10mm below COPD patient's baseline) - pCO2 >50mm Hg (or 10mm above COPD patient's baseline) RESPONSE Acute on Chronic Respiratory Failure - (please provide the clinical indicators to support diagnosis) -Hx copd,chf home o2 Clinical Indicators: sob, Treatment: O2 3 l (2L at , and bipap at times Alfie Smith DO , PGY-1  
[facility] Medicine Intern Pager: [phone] Sav[place] 10, 2019, 7:04 AM

## 2019-06-11 ENCOUNTER — TELEPHONE (OUTPATIENT)
Dept: VASCULAR SURGERY | Age: 79
End: 2019-06-11

## 2019-06-11 NOTE — TELEPHONE ENCOUNTER
Patient's daughter Aniceto Mckeon) called concerning father. She stated that [facility] informed that patient would probably have to go to a [facility]. In the note from [facility] on 6/5/19. She was wondering when and where patient is suppose to go. I informed her that I would send a message and someone would contact her.  Patient was discharged last thursday

## 2019-06-12 ENCOUNTER — PATIENT OUTREACH (OUTPATIENT)
Dept: CASE MANAGEMENT | Age: 79
End: 2019-06-12

## 2019-06-12 NOTE — PROGRESS NOTES
Community Care Team Documentation for Patient in [facility] Patient discharged from  [facility] to [facility], [address], on 6/6/19. Hospital Discharge diagnosis: 1. AAA 2. CHF Exacerbation 3. COPD 4. [facility] Attending Provider: Anticipated discharge date from SNF:  TBD 
 
PCP : Yecenia Sears DO 
 
Nurse Navigator: Autumn Adams RN Community Care Team rounds completed, updates provided by facility. Brief Summary of Care: Yasir, AYLA, weight stable, taking Trazadone . Fluid restriction. Ambulating 100 feet. Dispo plan:  Home w/ brother, [facility], Anticipated d/c 6/21/19 RRAT:  Medium Risk   
      
 20 Total Score 4 IP Visits Last 12 Months (1-3=4, 4=9, >4=11) 16 Charlson Comorbidity Score (Age + Comorbid Conditions) Criteria that do not apply:  
 Has Seen PCP in Last 6 Months (Yes=3, No=0) . Living with Significant Other. Assisted Living. LTAC. SNF. or  
Rehab Patient Length of Stay (>5 days = 3) Pt. Coverage (Medicare=5 , Medicaid, or Self-Pay=4) Past Medical History:  
Diagnosis Date  Aneurysm ([address] AAA repair 2006 & 2012  Aorto-iliac duplex 02/13/2015 Tech difficult. Patent aorta bi-iliac endograft w/o leak or limb dysfunction.  Arrhythmia   
 a fib  Cardiac cath 11/01/2012 RCA (sm, nondom) patent. LM patent. LAD 25%. CX (dom) 45% mid. LVEDP 12 mmHg. No WMA. PA 27/12. W 10-12. CO/CI 5.2/2.6 (TD).  Cardiac echocardiogram, abnormal 02/20/2016 EF 55%. No WMA. Indeterminate diastolic fx. RVSP 60 mmHg. Severe LAE. Mild MR. Mod TR.  IVCE. Similar to study of 10/26/12.  Cardiovascular aorto-iliac duplex 02/13/2015 Patent aorta bi-iliac endovascular graft w/o leak or limb dysfunction. Sac measures 4.21 x 4.47 cm (4.4 x 4.6 cm on 1/31/14).  Cardiovascular LE peripheral arterial testing 07/29/2013 No significant LE arterial disease bilaterally. R GHADA 1. 12.  L GHADA 1. 12.  R DBI 0.83. L DBI 0.71. Exercise deferred.  Cardiovascular renal duplex 10/31/2012 No RA stenosis. Intrinsic/med disease in left kidney. Patent aortic endograft. Patent, thrombus-free renal veins bilaterally.  Carotid duplex 07/29/2013 Mild <50% bilateral ICA plaquing.  Chronic lung disease  Cigarette smoker  Congestive heart failure (CHF) ([address]  COPD (chronic obstructive pulmonary disease) (HCC)   
 and emphysema; likely secondary to tobacco abuse  Difficult intubation  Dyslipidemia   
 low HDL  Emphysema   
 HTN (hypertension)  Hypercholesterolemia  Ill-defined condition   
 hernia  Increased prostate specific antigen (PSA) velocity  Long term (current) use of anticoagulants   
 coumadin  Osteoarthritis  Osteomyelitis ([address]  Paroxysmal atrial fibrillation (HCC)  Peripheral vascular disease ([address] AAA repair 12/2007  Persistent atrial Fibrillation  Persistent atrial fibrillation (HCC)  Unspecified adverse effect of anesthesia   
 difficulty breathing placed in ICU Oct 2012 (AAA repair) Active Ambulatory Problems Diagnosis Date Noted  Hypercholesterolemia  Congestive heart failure (CHF) ([address]  Peripheral vascular disease ([address]  COPD (chronic obstructive pulmonary disease) (HCC)  HTN (hypertension)  Chronic atrial fibrillation ([address] 05/08/2012  RLQ abdominal pain 12/23/2013  Status post AAA (abdominal aortic aneurysm) repair 12/23/2013  AAA (abdominal aortic aneurysm, ruptured) ([address] 12/23/2013  Chronic constipation 12/23/2013  Right inguinal hernia 12/24/2013  Left inguinal hernia 02/14/2014  CAD (coronary artery disease) 12/08/2015  Hypoxemia requiring supplemental oxygen 12/28/2015  Warfarin-induced coagulopathy ([address] 01/19/2016  Intraabdominal fluid collection 02/18/2016  Abdominal pain 02/18/2016  Sepsis ([address] 02/19/2016  Pyogenic inflammation of bone ([address] 07/01/2016  Psoas abscess ([address] 08/25/2017  Abscess 08/25/2017  Discitis of lumbar region 08/04/2016  Pulmonary hypertension ([address] 04/03/2018  CHF exacerbation ([address] 06/27/2018  Hyponatremia 07/09/2018  Symptomatic anemia 05/24/2019  Chronic pain syndrome 05/24/2019  Stage 3 chronic kidney disease ([address] 05/24/2019  Iron deficiency anemia 05/24/2019  Endoleak of aortic graft ([facility] 06/04/2019 Resolved Ambulatory Problems Diagnosis Date Noted  Paroxysmal atrial fibrillation (HCC) Past Medical History:  
Diagnosis Date  Aneurysm ([facility]  Aorto-iliac duplex 02/13/2015  Arrhythmia  Cardiac cath 11/01/2012  Cardiac echocardiogram, abnormal 02/20/2016  Cardiovascular aorto-iliac duplex 02/13/2015  Cardiovascular LE peripheral arterial testing 07/29/2013  Cardiovascular renal duplex 10/31/2012  Carotid duplex 07/29/2013  Chronic lung disease  Cigarette smoker  Congestive heart failure (CHF) ([address]  COPD (chronic obstructive pulmonary disease) (HCC)  Difficult intubation  Dyslipidemia  Emphysema   
 HTN (hypertension)  Hypercholesterolemia  Ill-defined condition  Increased prostate specific antigen (PSA) velocity  Long term (current) use of anticoagulants  Osteoarthritis  Osteomyelitis ([address]  Paroxysmal atrial fibrillation (HCC)  Peripheral vascular disease ([address]  Persistent atrial Fibrillation  Persistent atrial fibrillation (HCC)  Unspecified adverse effect of anesthesia

## 2019-06-18 ENCOUNTER — PATIENT OUTREACH (OUTPATIENT)
Dept: CASE MANAGEMENT | Age: 79
End: 2019-06-18

## 2019-06-18 NOTE — TELEPHONE ENCOUNTER
Daughter called back and father will be getting out on Friday from SNF, advised would go ahead and send everything so they can get something working. Daughter asked if call back could be arranged with office number for her. Advised would let  know. Called and advised Jett Medina of the above  , verbalized understanding and to call daughter back.

## 2019-06-18 NOTE — PROGRESS NOTES
Community Care Team Documentation for Patient in [facility] to [facility], [address], on 6/6/19. Hospital Discharge diagnosis: 1. AAA 2. CHF Exacerbation 3. COPD 4. [facility] SNF Attending Provider:  Yazmin Nogueira Anticipated discharge date from SNF: Patient will be Discharged from [facility] on 6/21/19 PCP : Lashanda Rodriguez DO 
 
Nurse Navigator: Nila Fountain RN Community Care Team rounds completed, updates provided by facility. Brief Summary of Care: 
 
FC, weight stable, taking Trazadone . Fluid restriction. Ambulating 200 feet with supervision only. H&H done. Dispo plan:  Home w/ brother, [facility], Patient to be discharged on  6/21/19 RRAT:  Medium Risk   
      
 20 Total Score 4 IP Visits Last 12 Months (1-3=4, 4=9, >4=11) 16 Charlson Comorbidity Score (Age + Comorbid Conditions) Criteria that do not apply:  
 Has Seen PCP in Last 6 Months (Yes=3, No=0) . Living with Significant Other. Assisted Living. LTAC. SNF. or  
Rehab Patient Length of Stay (>5 days = 3) Pt. Coverage (Medicare=5 , Medicaid, or Self-Pay=4) Past Medical History:  
Diagnosis Date  Aneurysm ([address] AAA repair 2006 & 2012  Aorto-iliac duplex 02/13/2015 Tech difficult. Patent aorta bi-iliac endograft w/o leak or limb dysfunction.  Arrhythmia   
 a fib  Cardiac cath 11/01/2012 RCA (sm, nondom) patent. LM patent. LAD 25%. CX (dom) 45% mid. LVEDP 12 mmHg. No WMA. PA 27/12. W 10-12. CO/CI 5.2/2.6 (TD).  Cardiac echocardiogram, abnormal 02/20/2016 EF 55%. No WMA. Indeterminate diastolic fx. RVSP 60 mmHg. Severe LAE. Mild MR. Mod TR.  IVCE. Similar to study of 10/26/12.  Cardiovascular aorto-iliac duplex 02/13/2015 Patent aorta bi-iliac endovascular graft w/o leak or limb dysfunction. Sac measures 4.21 x 4.47 cm (4.4 x 4.6 cm on 1/31/14).  Cardiovascular LE peripheral arterial testing 07/29/2013 No significant LE arterial disease bilaterally. R GHADA 1. 12.  L GHADA 1. 12.  R DBI 0.83. L DBI 0.71. Exercise deferred.  Cardiovascular renal duplex 10/31/2012 No RA stenosis. Intrinsic/med disease in left kidney. Patent aortic endograft. Patent, thrombus-free renal veins bilaterally.  Carotid duplex 07/29/2013 Mild <50% bilateral ICA plaquing.  Chronic lung disease  Cigarette smoker  Congestive heart failure (CHF) ([address]  COPD (chronic obstructive pulmonary disease) (HCC)   
 and emphysema; likely secondary to tobacco abuse  Difficult intubation  Dyslipidemia   
 low HDL  Emphysema   
 HTN (hypertension)  Hypercholesterolemia  Ill-defined condition   
 hernia  Increased prostate specific antigen (PSA) velocity  Long term (current) use of anticoagulants   
 coumadin  Osteoarthritis  Osteomyelitis ([address]  Paroxysmal atrial fibrillation (HCC)  Peripheral vascular disease ([address] AAA repair 12/2007  Persistent atrial Fibrillation  Persistent atrial fibrillation (HCC)  Unspecified adverse effect of anesthesia   
 difficulty breathing placed in ICU Oct 2012 (AAA repair) Active Ambulatory Problems Diagnosis Date Noted  Hypercholesterolemia  Congestive heart failure (CHF) ([address]  Peripheral vascular disease ([address]  COPD (chronic obstructive pulmonary disease) (HCC)  HTN (hypertension)  Chronic atrial fibrillation ([address] 05/08/2012  RLQ abdominal pain 12/23/2013  Status post AAA (abdominal aortic aneurysm) repair 12/23/2013  AAA (abdominal aortic aneurysm, ruptured) ([address] 12/23/2013  Chronic constipation 12/23/2013  Right inguinal hernia 12/24/2013  Left inguinal hernia 02/14/2014  CAD (coronary artery disease) 12/08/2015  Hypoxemia requiring supplemental oxygen 12/28/2015  Warfarin-induced coagulopathy ([address] 01/19/2016  Intraabdominal fluid collection 02/18/2016  Abdominal pain 02/18/2016  Sepsis ([address] 02/19/2016  Pyogenic inflammation of bone ([address] 07/01/2016  Psoas abscess ([address] 08/25/2017  Abscess 08/25/2017  Discitis of lumbar region 08/04/2016  Pulmonary hypertension ([address] 04/03/2018  CHF exacerbation ([address] 06/27/2018  Hyponatremia 07/09/2018  Symptomatic anemia 05/24/2019  Chronic pain syndrome 05/24/2019  Stage 3 chronic kidney disease ([address] 05/24/2019  Iron deficiency anemia 05/24/2019  Endoleak of aortic graft ([address] 06/04/2019 Resolved Ambulatory Problems Diagnosis Date Noted  Paroxysmal atrial fibrillation (HCC) Past Medical History:  
Diagnosis Date  Aneurysm ([facility]  Aorto-iliac duplex 02/13/2015  Arrhythmia  Cardiac cath 11/01/2012  Cardiac echocardiogram, abnormal 02/20/2016  Cardiovascular aorto-iliac duplex 02/13/2015  Cardiovascular LE peripheral arterial testing 07/29/2013  Cardiovascular renal duplex 10/31/2012  Carotid duplex 07/29/2013  Chronic lung disease  Cigarette smoker  Congestive heart failure (CHF) ([address]  COPD (chronic obstructive pulmonary disease) (HCC)  Difficult intubation  Dyslipidemia  Emphysema   
 HTN (hypertension)  Hypercholesterolemia  Ill-defined condition  Increased prostate specific antigen (PSA) velocity  Long term (current) use of anticoagulants  Osteoarthritis  Osteomyelitis ([address]  Paroxysmal atrial fibrillation (HCC)  Peripheral vascular disease ([address]  Persistent atrial Fibrillation  Persistent atrial fibrillation (HCC)  Unspecified adverse effect of anesthesia

## 2019-06-19 ENCOUNTER — TELEPHONE (OUTPATIENT)
Dept: CARDIOLOGY CLINIC | Age: 79
End: 2019-06-19

## 2019-06-19 NOTE — TELEPHONE ENCOUNTER
Per Dr. Tammy Mathews, pt to be referred Dr. Valentino Manning to AAA repair. Due to patient having complicated reconstruction.

## 2019-06-19 NOTE — TELEPHONE ENCOUNTER
----- Message from Sinan Vallejo sent at 6/18/2019  4:22 PM EDT -----  Lisbet Ireland,  I have printed everything to see to Dr. Eloy Antunez but I think I will need a referral. I thought there was one already in [facility] , but when I looked I did not see one. Could you let me know if I need one? I will fax everything over after this.     Thanks,  Lakia Colin

## 2019-06-20 ENCOUNTER — HOME HEALTH ADMISSION (OUTPATIENT)
Dept: HOME HEALTH SERVICES | Facility: HOME HEALTH | Age: 79
End: 2019-06-20
Payer: MEDICARE

## 2019-06-23 ENCOUNTER — HOME CARE VISIT (OUTPATIENT)
Dept: SCHEDULING | Facility: HOME HEALTH | Age: 79
End: 2019-06-23
Payer: MEDICARE

## 2019-06-23 VITALS
OXYGEN SATURATION: 98 % | HEART RATE: 78 BPM | RESPIRATION RATE: 20 BRPM | HEIGHT: 66 IN | TEMPERATURE: 97.4 F | WEIGHT: 145 LBS | DIASTOLIC BLOOD PRESSURE: 60 MMHG | BODY MASS INDEX: 23.3 KG/M2 | SYSTOLIC BLOOD PRESSURE: 110 MMHG

## 2019-06-23 PROCEDURE — G0299 HHS/HOSPICE OF RN EA 15 MIN: HCPCS

## 2019-06-23 PROCEDURE — 400013 HH SOC

## 2019-06-23 PROCEDURE — 3331090001 HH PPS REVENUE CREDIT

## 2019-06-23 PROCEDURE — 3331090002 HH PPS REVENUE DEBIT

## 2019-06-24 ENCOUNTER — HOME CARE VISIT (OUTPATIENT)
Dept: SCHEDULING | Facility: HOME HEALTH | Age: 79
End: 2019-06-24
Payer: MEDICARE

## 2019-06-24 ENCOUNTER — HOME CARE VISIT (OUTPATIENT)
Dept: HOME HEALTH SERVICES | Facility: HOME HEALTH | Age: 79
End: 2019-06-24
Payer: MEDICARE

## 2019-06-24 PROCEDURE — 3331090001 HH PPS REVENUE CREDIT

## 2019-06-24 PROCEDURE — G0151 HHCP-SERV OF PT,EA 15 MIN: HCPCS

## 2019-06-24 PROCEDURE — 3331090002 HH PPS REVENUE DEBIT

## 2019-06-25 ENCOUNTER — HOME CARE VISIT (OUTPATIENT)
Dept: HOME HEALTH SERVICES | Facility: HOME HEALTH | Age: 79
End: 2019-06-25
Payer: MEDICARE

## 2019-06-25 ENCOUNTER — HOME CARE VISIT (OUTPATIENT)
Dept: SCHEDULING | Facility: HOME HEALTH | Age: 79
End: 2019-06-25
Payer: MEDICARE

## 2019-06-25 VITALS
DIASTOLIC BLOOD PRESSURE: 71 MMHG | WEIGHT: 141 LBS | BODY MASS INDEX: 22.76 KG/M2 | HEART RATE: 96 BPM | SYSTOLIC BLOOD PRESSURE: 136 MMHG | OXYGEN SATURATION: 96 %

## 2019-06-25 VITALS
DIASTOLIC BLOOD PRESSURE: 61 MMHG | HEART RATE: 86 BPM | TEMPERATURE: 99.1 F | SYSTOLIC BLOOD PRESSURE: 144 MMHG | OXYGEN SATURATION: 97 %

## 2019-06-25 PROCEDURE — 3331090001 HH PPS REVENUE CREDIT

## 2019-06-25 PROCEDURE — G0155 HHCP-SVS OF CSW,EA 15 MIN: HCPCS

## 2019-06-25 PROCEDURE — G0495 RN CARE TRAIN/EDU IN HH: HCPCS

## 2019-06-25 PROCEDURE — G0152 HHCP-SERV OF OT,EA 15 MIN: HCPCS

## 2019-06-25 PROCEDURE — G0300 HHS/HOSPICE OF LPN EA 15 MIN: HCPCS

## 2019-06-25 PROCEDURE — 3331090002 HH PPS REVENUE DEBIT

## 2019-06-26 ENCOUNTER — HOME CARE VISIT (OUTPATIENT)
Dept: SCHEDULING | Facility: HOME HEALTH | Age: 79
End: 2019-06-26
Payer: MEDICARE

## 2019-06-26 PROCEDURE — G0157 HHC PT ASSISTANT EA 15: HCPCS

## 2019-06-26 PROCEDURE — 3331090001 HH PPS REVENUE CREDIT

## 2019-06-26 PROCEDURE — 3331090002 HH PPS REVENUE DEBIT

## 2019-06-27 ENCOUNTER — HOME CARE VISIT (OUTPATIENT)
Dept: SCHEDULING | Facility: HOME HEALTH | Age: 79
End: 2019-06-27
Payer: MEDICARE

## 2019-06-27 PROCEDURE — G0300 HHS/HOSPICE OF LPN EA 15 MIN: HCPCS

## 2019-06-27 PROCEDURE — 3331090002 HH PPS REVENUE DEBIT

## 2019-06-27 PROCEDURE — 3331090001 HH PPS REVENUE CREDIT

## 2019-06-28 ENCOUNTER — HOME CARE VISIT (OUTPATIENT)
Dept: SCHEDULING | Facility: HOME HEALTH | Age: 79
End: 2019-06-28
Payer: MEDICARE

## 2019-06-28 VITALS
TEMPERATURE: 98.7 F | RESPIRATION RATE: 16 BRPM | DIASTOLIC BLOOD PRESSURE: 78 MMHG | SYSTOLIC BLOOD PRESSURE: 132 MMHG | OXYGEN SATURATION: 98 % | HEART RATE: 87 BPM

## 2019-06-28 PROCEDURE — 3331090001 HH PPS REVENUE CREDIT

## 2019-06-28 PROCEDURE — G0158 HHC OT ASSISTANT EA 15: HCPCS

## 2019-06-28 PROCEDURE — G0157 HHC PT ASSISTANT EA 15: HCPCS

## 2019-06-28 PROCEDURE — 3331090002 HH PPS REVENUE DEBIT

## 2019-06-29 PROCEDURE — 3331090001 HH PPS REVENUE CREDIT

## 2019-06-29 PROCEDURE — 3331090002 HH PPS REVENUE DEBIT

## 2019-06-30 VITALS
OXYGEN SATURATION: 98 % | RESPIRATION RATE: 20 BRPM | TEMPERATURE: 95.1 F | HEART RATE: 66 BPM | SYSTOLIC BLOOD PRESSURE: 121 MMHG | DIASTOLIC BLOOD PRESSURE: 66 MMHG

## 2019-06-30 PROCEDURE — 3331090001 HH PPS REVENUE CREDIT

## 2019-06-30 PROCEDURE — 3331090002 HH PPS REVENUE DEBIT

## 2019-07-01 ENCOUNTER — HOME CARE VISIT (OUTPATIENT)
Dept: SCHEDULING | Facility: HOME HEALTH | Age: 79
End: 2019-07-01
Payer: MEDICARE

## 2019-07-01 VITALS
HEART RATE: 98 BPM | RESPIRATION RATE: 18 BRPM | HEART RATE: 77 BPM | DIASTOLIC BLOOD PRESSURE: 60 MMHG | SYSTOLIC BLOOD PRESSURE: 116 MMHG | OXYGEN SATURATION: 98 % | TEMPERATURE: 98 F | DIASTOLIC BLOOD PRESSURE: 66 MMHG | RESPIRATION RATE: 18 BRPM | TEMPERATURE: 98 F | SYSTOLIC BLOOD PRESSURE: 128 MMHG | OXYGEN SATURATION: 99 %

## 2019-07-01 PROCEDURE — G0157 HHC PT ASSISTANT EA 15: HCPCS

## 2019-07-01 PROCEDURE — 3331090001 HH PPS REVENUE CREDIT

## 2019-07-01 PROCEDURE — 3331090002 HH PPS REVENUE DEBIT

## 2019-07-02 ENCOUNTER — HOME CARE VISIT (OUTPATIENT)
Dept: SCHEDULING | Facility: HOME HEALTH | Age: 79
End: 2019-07-02
Payer: MEDICARE

## 2019-07-02 VITALS
DIASTOLIC BLOOD PRESSURE: 60 MMHG | SYSTOLIC BLOOD PRESSURE: 116 MMHG | HEART RATE: 68 BPM | TEMPERATURE: 98.5 F | RESPIRATION RATE: 18 BRPM | OXYGEN SATURATION: 99 %

## 2019-07-02 PROCEDURE — G0158 HHC OT ASSISTANT EA 15: HCPCS

## 2019-07-02 PROCEDURE — 3331090002 HH PPS REVENUE DEBIT

## 2019-07-02 PROCEDURE — 3331090001 HH PPS REVENUE CREDIT

## 2019-07-02 PROCEDURE — G0299 HHS/HOSPICE OF RN EA 15 MIN: HCPCS

## 2019-07-03 ENCOUNTER — HOME CARE VISIT (OUTPATIENT)
Dept: SCHEDULING | Facility: HOME HEALTH | Age: 79
End: 2019-07-03
Payer: MEDICARE

## 2019-07-03 VITALS
DIASTOLIC BLOOD PRESSURE: 64 MMHG | TEMPERATURE: 98.1 F | RESPIRATION RATE: 18 BRPM | BODY MASS INDEX: 22.52 KG/M2 | WEIGHT: 139.5 LBS | HEART RATE: 80 BPM | SYSTOLIC BLOOD PRESSURE: 110 MMHG | OXYGEN SATURATION: 97 %

## 2019-07-03 PROCEDURE — G0157 HHC PT ASSISTANT EA 15: HCPCS

## 2019-07-03 PROCEDURE — 3331090001 HH PPS REVENUE CREDIT

## 2019-07-03 PROCEDURE — 3331090002 HH PPS REVENUE DEBIT

## 2019-07-04 PROCEDURE — 3331090001 HH PPS REVENUE CREDIT

## 2019-07-04 PROCEDURE — 3331090002 HH PPS REVENUE DEBIT

## 2019-07-05 ENCOUNTER — HOME CARE VISIT (OUTPATIENT)
Dept: SCHEDULING | Facility: HOME HEALTH | Age: 79
End: 2019-07-05
Payer: MEDICARE

## 2019-07-05 PROCEDURE — G0152 HHCP-SERV OF OT,EA 15 MIN: HCPCS

## 2019-07-05 PROCEDURE — 3331090001 HH PPS REVENUE CREDIT

## 2019-07-05 PROCEDURE — 3331090002 HH PPS REVENUE DEBIT

## 2019-07-06 PROCEDURE — 3331090001 HH PPS REVENUE CREDIT

## 2019-07-06 PROCEDURE — 3331090002 HH PPS REVENUE DEBIT

## 2019-07-07 VITALS
SYSTOLIC BLOOD PRESSURE: 120 MMHG | RESPIRATION RATE: 16 BRPM | DIASTOLIC BLOOD PRESSURE: 84 MMHG | HEART RATE: 86 BPM | TEMPERATURE: 97.6 F | OXYGEN SATURATION: 96 %

## 2019-07-07 PROCEDURE — 3331090002 HH PPS REVENUE DEBIT

## 2019-07-07 PROCEDURE — 3331090001 HH PPS REVENUE CREDIT

## 2019-07-08 PROCEDURE — 3331090002 HH PPS REVENUE DEBIT

## 2019-07-08 PROCEDURE — 3331090001 HH PPS REVENUE CREDIT

## 2019-07-09 ENCOUNTER — HOME CARE VISIT (OUTPATIENT)
Dept: SCHEDULING | Facility: HOME HEALTH | Age: 79
End: 2019-07-09
Payer: MEDICARE

## 2019-07-09 VITALS
SYSTOLIC BLOOD PRESSURE: 118 MMHG | WEIGHT: 141 LBS | DIASTOLIC BLOOD PRESSURE: 62 MMHG | OXYGEN SATURATION: 99 % | BODY MASS INDEX: 22.76 KG/M2 | TEMPERATURE: 98.1 F | RESPIRATION RATE: 18 BRPM | HEART RATE: 86 BPM

## 2019-07-09 PROCEDURE — 3331090002 HH PPS REVENUE DEBIT

## 2019-07-09 PROCEDURE — 3331090001 HH PPS REVENUE CREDIT

## 2019-07-09 PROCEDURE — G0299 HHS/HOSPICE OF RN EA 15 MIN: HCPCS

## 2019-07-09 PROCEDURE — G0157 HHC PT ASSISTANT EA 15: HCPCS

## 2019-07-10 PROCEDURE — 3331090002 HH PPS REVENUE DEBIT

## 2019-07-10 PROCEDURE — 3331090001 HH PPS REVENUE CREDIT

## 2019-07-11 ENCOUNTER — HOME CARE VISIT (OUTPATIENT)
Dept: SCHEDULING | Facility: HOME HEALTH | Age: 79
End: 2019-07-11
Payer: MEDICARE

## 2019-07-11 ENCOUNTER — PATIENT OUTREACH (OUTPATIENT)
Dept: CARDIOLOGY CLINIC | Age: 79
End: 2019-07-11

## 2019-07-11 VITALS
OXYGEN SATURATION: 99 % | TEMPERATURE: 99.6 F | HEART RATE: 88 BPM | SYSTOLIC BLOOD PRESSURE: 118 MMHG | DIASTOLIC BLOOD PRESSURE: 70 MMHG

## 2019-07-11 PROCEDURE — 3331090002 HH PPS REVENUE DEBIT

## 2019-07-11 PROCEDURE — G0153 HHCP-SVS OF S/L PATH,EA 15MN: HCPCS

## 2019-07-11 PROCEDURE — 3331090001 HH PPS REVENUE CREDIT

## 2019-07-11 PROCEDURE — G0157 HHC PT ASSISTANT EA 15: HCPCS

## 2019-07-12 PROCEDURE — 3331090002 HH PPS REVENUE DEBIT

## 2019-07-12 PROCEDURE — 3331090001 HH PPS REVENUE CREDIT

## 2019-07-13 PROCEDURE — 3331090001 HH PPS REVENUE CREDIT

## 2019-07-13 PROCEDURE — 3331090002 HH PPS REVENUE DEBIT

## 2019-07-14 PROCEDURE — 3331090002 HH PPS REVENUE DEBIT

## 2019-07-14 PROCEDURE — 3331090001 HH PPS REVENUE CREDIT

## 2019-07-15 VITALS
HEART RATE: 76 BPM | RESPIRATION RATE: 18 BRPM | OXYGEN SATURATION: 98 % | DIASTOLIC BLOOD PRESSURE: 58 MMHG | TEMPERATURE: 98.4 F | SYSTOLIC BLOOD PRESSURE: 102 MMHG

## 2019-07-15 VITALS
DIASTOLIC BLOOD PRESSURE: 64 MMHG | OXYGEN SATURATION: 98 % | SYSTOLIC BLOOD PRESSURE: 104 MMHG | HEART RATE: 104 BPM | TEMPERATURE: 97.8 F | RESPIRATION RATE: 18 BRPM

## 2019-07-15 PROCEDURE — 3331090002 HH PPS REVENUE DEBIT

## 2019-07-15 PROCEDURE — 3331090001 HH PPS REVENUE CREDIT

## 2019-07-16 ENCOUNTER — HOME CARE VISIT (OUTPATIENT)
Dept: SCHEDULING | Facility: HOME HEALTH | Age: 79
End: 2019-07-16
Payer: MEDICARE

## 2019-07-16 ENCOUNTER — PATIENT OUTREACH (OUTPATIENT)
Dept: CARDIOLOGY CLINIC | Age: 79
End: 2019-07-16

## 2019-07-16 VITALS — TEMPERATURE: 98.5 F | DIASTOLIC BLOOD PRESSURE: 49 MMHG | SYSTOLIC BLOOD PRESSURE: 106 MMHG | OXYGEN SATURATION: 98 %

## 2019-07-16 VITALS
TEMPERATURE: 98.3 F | HEART RATE: 60 BPM | OXYGEN SATURATION: 98 % | SYSTOLIC BLOOD PRESSURE: 120 MMHG | DIASTOLIC BLOOD PRESSURE: 64 MMHG | RESPIRATION RATE: 18 BRPM

## 2019-07-16 PROCEDURE — G0151 HHCP-SERV OF PT,EA 15 MIN: HCPCS

## 2019-07-16 PROCEDURE — 3331090001 HH PPS REVENUE CREDIT

## 2019-07-16 PROCEDURE — G0299 HHS/HOSPICE OF RN EA 15 MIN: HCPCS

## 2019-07-16 PROCEDURE — 3331090002 HH PPS REVENUE DEBIT

## 2019-07-17 PROCEDURE — 3331090002 HH PPS REVENUE DEBIT

## 2019-07-17 PROCEDURE — 3331090001 HH PPS REVENUE CREDIT

## 2019-07-18 PROCEDURE — 3331090001 HH PPS REVENUE CREDIT

## 2019-07-18 PROCEDURE — 3331090002 HH PPS REVENUE DEBIT

## 2019-07-19 PROCEDURE — 3331090001 HH PPS REVENUE CREDIT

## 2019-07-19 PROCEDURE — 3331090002 HH PPS REVENUE DEBIT

## 2019-07-20 PROCEDURE — 3331090001 HH PPS REVENUE CREDIT

## 2019-07-20 PROCEDURE — 3331090002 HH PPS REVENUE DEBIT

## 2019-07-21 PROCEDURE — 3331090001 HH PPS REVENUE CREDIT

## 2019-07-21 PROCEDURE — 3331090002 HH PPS REVENUE DEBIT

## 2019-07-22 PROCEDURE — 3331090001 HH PPS REVENUE CREDIT

## 2019-07-22 PROCEDURE — 3331090002 HH PPS REVENUE DEBIT

## 2019-07-23 ENCOUNTER — PATIENT OUTREACH (OUTPATIENT)
Dept: CARDIOLOGY CLINIC | Age: 79
End: 2019-07-23

## 2019-07-23 PROCEDURE — 3331090002 HH PPS REVENUE DEBIT

## 2019-07-23 PROCEDURE — 3331090001 HH PPS REVENUE CREDIT

## 2019-07-23 NOTE — LETTER
7/23/2019 9:59 AM 
 
Mr. Chito Palmer 
[address] [facility] [id number] To Whom It May Concern: 
 
   I am a Heart Failure Nurse Navigator working with your cardiologist.  Part of my job is to assist you transitioning from hospital to home by following up with patients who have been in the hospital to see how they are feeling, answer any questions they may have about their visit and review upcoming follow-up appointments to see your primary care doctor as well as specialists. I have been unable to reach you by telephone and wanted to make sure that you scheduled a follow-up appointment to come in and talk to your cardiologist about your recent visit to the hospital. If you have any questions or concerns, please feel free to call me at the number listed above. Thank you for allowing [facility] to participate in your care. Sincerely, Radha Sun RN

## 2019-07-24 PROCEDURE — 3331090001 HH PPS REVENUE CREDIT

## 2019-07-24 PROCEDURE — 3331090002 HH PPS REVENUE DEBIT

## 2019-07-24 RX ORDER — TIOTROPIUM BROMIDE AND OLODATEROL 3.124; 2.736 UG/1; UG/1
SPRAY, METERED RESPIRATORY (INHALATION)
Qty: 1 INHALER | Refills: 5 | Status: ON HOLD | OUTPATIENT
Start: 2019-07-24 | End: 2020-01-01 | Stop reason: SDUPTHER

## 2019-07-25 PROCEDURE — 3331090001 HH PPS REVENUE CREDIT

## 2019-07-25 PROCEDURE — 3331090002 HH PPS REVENUE DEBIT

## 2019-07-26 ENCOUNTER — HOME CARE VISIT (OUTPATIENT)
Dept: SCHEDULING | Facility: HOME HEALTH | Age: 79
End: 2019-07-26
Payer: MEDICARE

## 2019-07-26 PROCEDURE — G0299 HHS/HOSPICE OF RN EA 15 MIN: HCPCS

## 2019-07-26 PROCEDURE — 3331090001 HH PPS REVENUE CREDIT

## 2019-07-26 PROCEDURE — 3331090002 HH PPS REVENUE DEBIT

## 2019-07-27 PROCEDURE — 3331090002 HH PPS REVENUE DEBIT

## 2019-07-27 PROCEDURE — 3331090001 HH PPS REVENUE CREDIT

## 2019-07-28 PROCEDURE — 3331090001 HH PPS REVENUE CREDIT

## 2019-07-28 PROCEDURE — 3331090002 HH PPS REVENUE DEBIT

## 2019-07-29 PROCEDURE — 3331090002 HH PPS REVENUE DEBIT

## 2019-07-29 PROCEDURE — 3331090001 HH PPS REVENUE CREDIT

## 2019-07-30 VITALS
HEART RATE: 88 BPM | RESPIRATION RATE: 18 BRPM | SYSTOLIC BLOOD PRESSURE: 126 MMHG | DIASTOLIC BLOOD PRESSURE: 80 MMHG | OXYGEN SATURATION: 97 % | TEMPERATURE: 97.9 F

## 2019-07-30 PROCEDURE — 3331090002 HH PPS REVENUE DEBIT

## 2019-07-30 PROCEDURE — 3331090001 HH PPS REVENUE CREDIT

## 2019-07-31 ENCOUNTER — HOME CARE VISIT (OUTPATIENT)
Dept: SCHEDULING | Facility: HOME HEALTH | Age: 79
End: 2019-07-31
Payer: MEDICARE

## 2019-07-31 PROCEDURE — G0299 HHS/HOSPICE OF RN EA 15 MIN: HCPCS

## 2019-07-31 PROCEDURE — 3331090002 HH PPS REVENUE DEBIT

## 2019-07-31 PROCEDURE — 3331090001 HH PPS REVENUE CREDIT

## 2019-08-01 PROCEDURE — 3331090002 HH PPS REVENUE DEBIT

## 2019-08-01 PROCEDURE — 3331090001 HH PPS REVENUE CREDIT

## 2019-08-02 VITALS
TEMPERATURE: 98.1 F | DIASTOLIC BLOOD PRESSURE: 70 MMHG | RESPIRATION RATE: 18 BRPM | HEART RATE: 84 BPM | OXYGEN SATURATION: 97 % | SYSTOLIC BLOOD PRESSURE: 122 MMHG

## 2019-08-02 PROCEDURE — 3331090002 HH PPS REVENUE DEBIT

## 2019-08-02 PROCEDURE — 3331090001 HH PPS REVENUE CREDIT

## 2019-08-03 PROCEDURE — 3331090002 HH PPS REVENUE DEBIT

## 2019-08-03 PROCEDURE — 3331090001 HH PPS REVENUE CREDIT

## 2019-08-04 PROCEDURE — 3331090001 HH PPS REVENUE CREDIT

## 2019-08-04 PROCEDURE — 3331090002 HH PPS REVENUE DEBIT

## 2019-08-13 ENCOUNTER — OFFICE VISIT (OUTPATIENT)
Dept: CARDIOLOGY CLINIC | Age: 79
End: 2019-08-13

## 2019-08-13 VITALS
WEIGHT: 147 LBS | SYSTOLIC BLOOD PRESSURE: 120 MMHG | HEART RATE: 78 BPM | OXYGEN SATURATION: 95 % | BODY MASS INDEX: 23.63 KG/M2 | HEIGHT: 66 IN | DIASTOLIC BLOOD PRESSURE: 66 MMHG

## 2019-08-13 DIAGNOSIS — I71.30 AAA (ABDOMINAL AORTIC ANEURYSM, RUPTURED): ICD-10-CM

## 2019-08-13 DIAGNOSIS — I50.9 ACUTE ON CHRONIC CONGESTIVE HEART FAILURE, UNSPECIFIED HEART FAILURE TYPE (HCC): ICD-10-CM

## 2019-08-13 DIAGNOSIS — I25.10 CORONARY ARTERY DISEASE INVOLVING NATIVE CORONARY ARTERY OF NATIVE HEART WITHOUT ANGINA PECTORIS: ICD-10-CM

## 2019-08-13 DIAGNOSIS — I48.20 CHRONIC ATRIAL FIBRILLATION (HCC): Primary | ICD-10-CM

## 2019-08-13 DIAGNOSIS — I73.9 PERIPHERAL VASCULAR DISEASE (HCC): ICD-10-CM

## 2019-08-13 NOTE — PROGRESS NOTES
Noellenatalia Kaplan presents today for   Chief Complaint   Patient presents with    Coronary Artery Disease     7 month follow up        Laura Kaplan preferred language for health care discussion is english/other. Is someone accompanying this pt? Daughter     Is the patient using any DME equipment during [address]? oxygen     Depression Screening:  3 most recent PHQ Screens 4/16/2019   Little interest or pleasure in doing things Not at all   Feeling down, depressed, irritable, or hopeless Not at all   Total Score PHQ 2 0       Learning Assessment:  Learning Assessment 10/31/2018   PRIMARY LEARNER Patient   BARRIERS PRIMARY LEARNER -   PRIMARY LANGUAGE ENGLISH   LEARNER PREFERENCE PRIMARY LISTENING   ANSWERED BY patient   RELATIONSHIP SELF       Abuse Screening:  No flowsheet data found. Fall Risk  Fall Risk Assessment, last 12 mths 4/16/2019   Able to walk? Yes   Fall in past 12 months? No       Pt currently taking Anticoagulant therapy? no    Coordination of Care:  1. Have you been to the ER, urgent care clinic since your last visit? Hospitalized since your last visit? 6/2 - 6/6 for sepsis; 5/24 - 5/29 for anemia     2. Have you seen or consulted any other health care providers outside of the [address] [place] [address] since your last visit? Include any pap smears or colon screening.  no

## 2019-08-13 NOTE — PROGRESS NOTES
HISTORY OF PRESENT ILLNESS  Janelle Velarde is a [de-identified] y.o. male. ASSESSMENT and PLAN    Mr. Eliazar Plunkett has history of chronic atrial fibrillation, mild CAD (by coronary angiography 2012), severe COPD on oxygen therapy, as well as history of AAA stent graft. He is back on Coumadin at this point. In January of 2016, he developed severe back pains, diagnosed with lumbar spine osteomyelitis and abscess. His echocardiogram in February of 2016 showed normal ejection fraction without wall motion abnormality, severely dilated left atrium, and severe pulmonary hypertension with peak pressure of 60 mmHg. His echocardiogram in April 2018 shows EF of 55%. He has evidence of a pulmonary hypertension with RV systolic pressure 46 mmHg. In June 2018, he was admitted to the hospital.  He was admitted with respiratory compromise, over diuresis and hyponatremia. There was concern for HFpEF. However, it was likely from COPD exacerbation and RV systolic strain. · CAD:    He has only mild CAD by heart catheterization in 2012. · CAF: Remains in atrial fibrillation, rate controlled. Because of significant GI bleed with hemoglobin down to 4.9 in early part of 2019, he has been taken off of aspirin and Coumadin. I would resume Coumadin or Plavix if it is acceptable by GI service. · BP:   Well controlled. · HR:    Stable. · CHF:    There is no evidence of decompensated CHF noted. · Weight:    His weight is down 147 pounds. His baseline weight is 168 pounds. He was accompanied by his granddaughter. I encouraged him not to lose any further weight and try to gain about 10 pounds. · Cholesterol:   Target LDL <90. Pravachol 40. · Anti-platelet:    Because of significant GI bleed, he was taken off of aspirin and Coumadin. I discussed with him at length about implications of not being on Coumadin, or aspirin with his chronic atrial fibrillation. All questions were answered.   I advised him to resume Coumadin, aspirin, or Plavix when it is acceptable by the GI service. He was also told that his AAA stent graft has a leak. He was sent down to [facility] for second opinion; he was told that if he has open surgery, he may require hemodialysis and may not be able to come off the ventilator. Therefore, he declined that option. I will see him back in 6 months. Thank you. Encounter Diagnoses   Name Primary?  Chronic atrial fibrillation (HCC) Yes    Peripheral vascular disease ([facility]     AAA (abdominal aortic aneurysm, ruptured) ([facility]     Acute on chronic congestive heart failure, unspecified heart failure type ([facility]     Coronary artery disease involving native coronary artery of native heart without angina pectoris      current treatment plan is effective, no change in therapy  lab results and schedule of future lab studies reviewed with patient  reviewed diet, exercise and weight control  cardiovascular risk and specific lipid/LDL goals reviewed  use of aspirin to prevent MI and TIA's discussed         HPI  Today, Mr. Jay Duffy has no complaints of chest pains or increased shortness of breath. He has severe COPD and is on oxygen support. He has not noted any dark stools since his hospital discharge. Because of abdominal discomfort when he eats a lot, he has not been able to eat significantly. He has been losing weight. His weight is now down down to 147 pounds from baseline weight of 168 pounds. Review of Systems   Constitutional: Positive for weight loss. Respiratory: Positive for shortness of breath. Cardiovascular: Negative for chest pain, palpitations, orthopnea, claudication, leg swelling and PND. Gastrointestinal: Positive for abdominal pain, blood in stool and diarrhea. Neurological: Positive for dizziness. All other systems reviewed and are negative. Physical Exam   Constitutional: He is oriented to person, place, and time. He appears well-developed. HENT:   Head: Normocephalic.    Eyes: Conjunctivae are normal.   Neck: Neck supple. No JVD present. Carotid bruit is not present. No thyromegaly present. Cardiovascular: Normal rate. An irregularly irregular rhythm present. Pulmonary/Chest: Breath sounds normal.   Abdominal: Bowel sounds are normal.   Musculoskeletal: He exhibits no edema. Neurological: He is alert and oriented to person, place, and time. Skin: Skin is warm and dry. Nursing note and vitals reviewed. PCP: Isauro Forte DO    Past Medical History:   Diagnosis Date    Aneurysm [facility])     AAA repair 2006 & 2012    Aorto-iliac duplex 02/13/2015    Tech difficult. Patent aorta bi-iliac endograft w/o leak or limb dysfunction.  Arrhythmia     a fib    Cardiac cath 11/01/2012    RCA (sm, nondom) patent. LM patent. LAD 25%. CX (dom) 45% mid. LVEDP 12 mmHg. No WMA. PA 27/12. W 10-12. CO/CI 5.2/2.6 (TD).  Cardiac echocardiogram, abnormal 02/20/2016    EF 55%. No WMA. Indeterminate diastolic fx. RVSP 60 mmHg. Severe LAE. Mild MR. Mod TR.  IVCE. Similar to study of 10/26/12.  Cardiovascular aorto-iliac duplex 02/13/2015    Patent aorta bi-iliac endovascular graft w/o leak or limb dysfunction. Sac measures 4.21 x 4.47 cm (4.4 x 4.6 cm on 1/31/14).  Cardiovascular LE peripheral arterial testing 07/29/2013    No significant LE arterial disease bilaterally. R GHADA 1. 12.  L GHADA 1. 12.  R DBI 0.83. L DBI 0.71. Exercise deferred.  Cardiovascular renal duplex 10/31/2012    No RA stenosis. Intrinsic/med disease in left kidney. Patent aortic endograft. Patent, thrombus-free renal veins bilaterally.  Carotid duplex 07/29/2013    Mild <50% bilateral ICA plaquing.       Chronic lung disease     Cigarette smoker     Congestive heart failure (CHF) (HCC)     COPD (chronic obstructive pulmonary disease) (HCC)     and emphysema; likely secondary to tobacco abuse    Difficult intubation     Dyslipidemia     low HDL    Emphysema     HTN (hypertension)  Hypercholesterolemia     Ill-defined condition     hernia    Increased prostate specific antigen (PSA) velocity     Long term (current) use of anticoagulants     coumadin    Osteoarthritis     Osteomyelitis (HCC)     Paroxysmal atrial fibrillation (HCC)     Peripheral vascular disease (HCC)     AAA repair 12/2007    Persistent atrial Fibrillation     Persistent atrial fibrillation ([address]     Unspecified adverse effect of anesthesia     difficulty breathing placed in ICU Oct 2012 (AAA repair)       Past Surgical History:   Procedure Laterality Date    BRONCHOSCOPY DIAGNOSTIC  11/2/2012         CARDIAC CATHETERIZATION  11/1/2012         COLONOSCOPY N/A 7/26/2016    COLONOSCOPY with Polypectomies performed by Ranjana Bob MD at [address] COLONOSCOPY N/A 5/28/2019    COLONOSCOPY with polypectomy performed by Lulu Diaz MD at [address] HX AAA REPAIR      2006 & 2012    HX HEART CATHETERIZATION  3/4/2009    1. RCA small, nondominant; patent. 2. LMCA patent. 3. LAD is long, wrapped around apical vessel. Diffuse, 20-30% stenosis noted. 4. CCA is large, dominant vessel. Diffuse 20-30% stenosis. 5. LVEDP is 16 mmHg. 6. Overall left ventricular systolic function mildly diminshed with est. EF 45%. Mild, global hypokinesis noted. 7. No significant mitral regurgitation or aortic stenosis noted. (see report)    HX HERNIA REPAIR  2/2014    rt inguinal     HX HERNIA REPAIR  01/2016    LEFT INGUINAL HERNIA REPAIR DR. Krystyna Recinos    REPAIR ING HERNIA,5+Y/O,JESSIE Left 1-20-16    Dr. Wilmer Soto  11/1/2012            Current Outpatient Medications   Medication Sig Dispense Refill    docusate sodium 100 mg tab Take 100 mg by mouth as needed for Other (constipation).  STIOLTO RESPIMAT 2.5-2.5 mcg/actuation inhaler inhale 2 inhalations by mouth once daily 1 Inhaler 5    ferrous sulfate 325 mg (65 mg iron) tablet Take 325 mg by mouth every other day.  Indications: anemia from inadequate iron      ascorbic acid, vitamin C, (VITAMIN C) 250 mg tablet Take 1 Tab by mouth two (2) times a day. 30 Tab 0    pantoprazole (PROTONIX) 40 mg tablet Take 1 Tab by mouth Daily (before breakfast). 30 Tab 0    tamsulosin (FLOMAX) 0.4 mg capsule Take 0.4 mg by mouth daily.  HYDROcodone-acetaminophen (NORCO) 5-325 mg per tablet Take 0.5 Tabs by mouth two (2) times daily as needed for Pain.  albuterol (PROVENTIL VENTOLIN) 2.5 mg /3 mL (0.083 %) nebulizer solution 3 mL by Nebulization route every four (4) hours as needed for Wheezing. 24 Each 3    traZODone (DESYREL) 50 mg tablet Take 50 mg by mouth nightly.  lisinopril (PRINIVIL, ZESTRIL) 20 mg tablet Take 1 Tab by mouth daily. 30 Tab 0    pravastatin (PRAVACHOL) 40 mg tablet Take 40 mg by mouth nightly.  polyethylene glycol (MIRALAX) 17 gram packet Take 17 g by mouth daily as needed (constipation).  digoxin (DIGITEK) 0.125 mg tablet take 1 tablet by mouth once daily 90 Tab 3    amLODIPine (NORVASC) 10 mg tablet Take 10 mg by mouth daily. 0    OXYGEN-AIR DELIVERY SYSTEMS 2 L/min by Nasal route daily. Continuous.  Company is [employer]            The patient has a family history of    Social History     Tobacco Use    Smoking status: Former Smoker     Packs/day: 1.00     Years: 54.00     Pack years: 54.00     Types: Cigarettes     Last attempt to quit: 10/23/2012     Years since quittin.8    Smokeless tobacco: Never Used   Substance Use Topics    Alcohol use: No     Alcohol/week: 0.0 standard drinks     Comment: quit drinking alcohol 26 years ago, patient states stopped in \"6303\"    Drug use: No       Lab Results   Component Value Date/Time    HDL Cholesterol 25 (L) 2010 10:30 AM        BP Readings from Last 3 Encounters:   19 120/66   19 122/70   19 126/80        Pulse Readings from Last 3 Encounters:   19 78   19 84   19 88       Wt Readings from Last 3 Encounters:   19 66.7 kg (147 lb)   07/09/19 64 kg (141 lb)   07/02/19 63.3 kg (139 lb 8 oz)         EKG: unchanged from previous tracings, nonspecific ST and T waves changes, atrial fibrillation, rate 78, Q waves in V1 and V2 with late precordial R wave transition.

## 2019-08-26 ENCOUNTER — TELEPHONE (OUTPATIENT)
Dept: VASCULAR SURGERY | Age: 79
End: 2019-08-26

## 2019-08-26 ENCOUNTER — DOCUMENTATION ONLY (OUTPATIENT)
Dept: VASCULAR SURGERY | Age: 79
End: 2019-08-26

## 2019-08-26 DIAGNOSIS — I71.30 AAA (ABDOMINAL AORTIC ANEURYSM, RUPTURED): Chronic | ICD-10-CM

## 2019-08-26 DIAGNOSIS — I73.9 PERIPHERAL VASCULAR DISEASE (HCC): Primary | Chronic | ICD-10-CM

## 2019-08-26 NOTE — PROGRESS NOTES
Patient was referred to our office by Chandrika Ribeiro for AAA. Called their office and spoke to Blair forest and informed him that patient is already being followed for this and patient is scheduled for a year follow up with ultrasounds.  Monet roche stated he would inform the nurse practionerl

## 2019-08-26 NOTE — TELEPHONE ENCOUNTER
Daughter called and states that patient saw Dr. Madison Balbuena and that he put her father at high risk for surgery and that he would either not come off the vent or not survive surgery at all, after discussion with family patient declined to have anything done surgically , advised would let Dr. Reynaldo Tijerina know . Daughter did state that Dr. Madison Balbuena recommended that he be followed with 6 mo CTA vs US, advised would send message to Dr. Reynaldo Tijerina and if he is ok with that would place order and have someone to call her to schedule. She verbalized understanding. Message sent to Dr. Reynaldo Tijerina through [address] [place] [address].

## 2019-08-26 NOTE — TELEPHONE ENCOUNTER
----- Message from Elda Farmer MD sent at 8/26/2019  2:07 PM EDT -----  cta in 6 months is good  Chest abd pelvis  ----- Message -----  From: Emma Walton RN  Sent: 8/26/2019  10:10 AM EDT  To: Elda Farmer MD    Daughter called back after they saw Keisha Nazario down in [address] [place] [address], and Dr. Keisha Nazario felt that the patient was at high risk for any surgery , states that he would either be stuck on a vent or not survive the surgery at all, so the patient has declined surgical intervention. But the daughter did say that Dr. Keisha Nazario recommended follow with 6 mo CTA. Advised I would talk with you and then get back, he is currently on the schedule for US in 04/2020 . Let me know if you are ok with that or just move up ultrasound ?  Thanks

## 2019-08-28 ENCOUNTER — HOSPITAL ENCOUNTER (OUTPATIENT)
Dept: INFUSION THERAPY | Age: 79
Discharge: HOME | End: 2019-08-28
Payer: MEDICARE

## 2019-08-28 VITALS
OXYGEN SATURATION: 100 % | SYSTOLIC BLOOD PRESSURE: 116 MMHG | HEART RATE: 65 BPM | RESPIRATION RATE: 22 BRPM | TEMPERATURE: 98 F | DIASTOLIC BLOOD PRESSURE: 61 MMHG

## 2019-08-28 PROCEDURE — 96374 THER/PROPH/DIAG INJ IV PUSH: CPT

## 2019-08-28 PROCEDURE — 74011250636 HC RX REV CODE- 250/636: Performed by: INTERNAL MEDICINE

## 2019-08-28 RX ORDER — SODIUM CHLORIDE 0.9 % (FLUSH) 0.9 %
10-40 SYRINGE (ML) INJECTION AS NEEDED
Status: DISCONTINUED | OUTPATIENT
Start: 2019-08-28 | End: 2019-09-01 | Stop reason: HOSPADM

## 2019-08-28 RX ADMIN — Medication 10 ML: at 14:06

## 2019-08-28 RX ADMIN — Medication 10 ML: at 14:41

## 2019-08-28 RX ADMIN — FERRIC CARBOXYMALTOSE INJECTION 750 MG: 50 INJECTION, SOLUTION INTRAVENOUS at 14:06

## 2019-08-28 NOTE — PROGRESS NOTES
[facility] Progress Note    Date: 2019    Name: Ronit Meeks    MRN: [id number]         : 1940    Injectafer 1 of 2    Mr. Reis to [facility], ambulatory, at [phone]. Pt was assessed and education was provided. Mr. Kelle Hernandes vitals were reviewed and patient was observed for 5 minutes prior to treatment. Visit Vitals  /60 (BP 1 Location: Left arm, BP Patient Position: Sitting)   Pulse 67   Temp 97.9 °F (36.6 °C)   Resp 22   SpO2 100%       22 g PIV placed in left AC x 1 attempt. PIV flushed easily and had brisk blood return. Injectafer 750mg mg was initiated via slow IV Push over 8 minutes. VS stable and pt denied complaints of itching, lip/tongue/facial swelling, SOB, CP or other complaints. Mr. Teresa Dillard tolerated the infusion, and had no complaints. VS remained stable. Patient stayed for 30 minutes post injection for observation period. PIV flushed with NS 10 ml and removed. No bleeding or hematoma noted at site. Demetrio and coban applied. Reviewed discharge instructions with patient, including expected side effects (abdominal cramping, nausea, changes in color of urine or feces) and signs of allergic reaction requiring medical attention (itching/hives/rashes, SOB, chest pain, lip/tongue/facial swelling). Patient given printed copy to take home. Patient verbalized understanding of discharge instructions. Patient armband removed and shredded. Mr. Teresa Dillard was discharged from [place] [address] in stable condition at [phone]. He is to return in one week on 2019 at 1400.     Lyndsey De Jesus RN  2019  [phone]

## 2019-09-04 ENCOUNTER — HOSPITAL ENCOUNTER (OUTPATIENT)
Dept: INFUSION THERAPY | Age: 79
Discharge: HOME | End: 2019-09-04
Payer: MEDICARE

## 2019-09-04 VITALS
OXYGEN SATURATION: 99 % | HEIGHT: 66 IN | SYSTOLIC BLOOD PRESSURE: 116 MMHG | DIASTOLIC BLOOD PRESSURE: 65 MMHG | TEMPERATURE: 97.8 F | WEIGHT: 143 LBS | HEART RATE: 71 BPM | RESPIRATION RATE: 20 BRPM | BODY MASS INDEX: 22.98 KG/M2

## 2019-09-04 PROCEDURE — 96374 THER/PROPH/DIAG INJ IV PUSH: CPT

## 2019-09-04 PROCEDURE — 74011250636 HC RX REV CODE- 250/636: Performed by: INTERNAL MEDICINE

## 2019-09-04 RX ORDER — SODIUM CHLORIDE 0.9 % (FLUSH) 0.9 %
10-40 SYRINGE (ML) INJECTION AS NEEDED
Status: DISCONTINUED | OUTPATIENT
Start: 2019-09-04 | End: 2019-09-08 | Stop reason: HOSPADM

## 2019-09-04 RX ADMIN — Medication 20 ML: at 15:20

## 2019-09-04 RX ADMIN — Medication 20 ML: at 14:50

## 2019-09-04 RX ADMIN — FERRIC CARBOXYMALTOSE INJECTION 750 MG: 50 INJECTION, SOLUTION INTRAVENOUS at 15:10

## 2019-09-04 NOTE — PROGRESS NOTES
[address] [facility] Progress Note    Date: 2019    Name: Matt Rodriguez    MRN: [id number]         : 1940      Mr. Reis arrived in the [facility] today at [phone], in stable condition, here for Dose # 2 of 2, IV Injectafer. He was assessed and education was provided. Mr. Aamir Quarles vitals were reviewed. Visit Vitals  /57 (BP 1 Location: Left arm, BP Patient Position: Sitting)   Pulse 67   Temp 98.1 °F (36.7 °C)   Resp 24   Ht 5' 6\" (1.676 m)   Wt 64.9 kg (143 lb)   SpO2 99%   BMI 23.08 kg/m²           PIV # 24 G was established in his left AC at 1450, without incident. Injectafer (Ferric Carboxymaltose) 750 mg IV, was administered over approximately 10 minutes, per order, and without incident. (He did complain of 1 episode of mild dizziness during the [facility], but he stated that the dizziness quickly subsided.)     After the completion of the IV Injectafer, the PIV was flushed well with NS and then was clamped, and Mr. Reis was monitored for 30 minutes, per order, and also without incident. After completion of the observation period, the PIV was removed and gauze/bandaid/coban was applied. Mr. Barrie Coelho tolerated well, and had no complaints. Mr. Barrie Coelho was discharged from [place] [address] in stable condition at 1550. Kayla Cortés He was instructed to follow up with his referring physician, Dr. Carlitos Cabrera, and he verbalized understanding. However, he has no further [facility] appointments scheduled at this time.      Criss Andersen RN  2019  2:43 PM

## 2019-10-08 ENCOUNTER — HOSPITAL ENCOUNTER (OUTPATIENT)
Dept: INFUSION THERAPY | Age: 79
Discharge: HOME | End: 2019-10-08
Payer: MEDICARE

## 2019-10-08 VITALS
RESPIRATION RATE: 20 BRPM | HEART RATE: 60 BPM | TEMPERATURE: 97.5 F | DIASTOLIC BLOOD PRESSURE: 68 MMHG | SYSTOLIC BLOOD PRESSURE: 121 MMHG | OXYGEN SATURATION: 99 %

## 2019-10-08 PROCEDURE — 96374 THER/PROPH/DIAG INJ IV PUSH: CPT

## 2019-10-08 PROCEDURE — 74011250636 HC RX REV CODE- 250/636: Performed by: PHYSICIAN ASSISTANT

## 2019-10-08 RX ORDER — SODIUM CHLORIDE 0.9 % (FLUSH) 0.9 %
10-40 SYRINGE (ML) INJECTION AS NEEDED
Status: DISCONTINUED | OUTPATIENT
Start: 2019-10-08 | End: 2019-10-12 | Stop reason: HOSPADM

## 2019-10-08 RX ADMIN — FERRIC CARBOXYMALTOSE INJECTION 750 MG: 50 INJECTION, SOLUTION INTRAVENOUS at 13:20

## 2019-10-08 RX ADMIN — Medication 10 ML: at 13:31

## 2019-10-08 RX ADMIN — Medication 10 ML: at 13:12

## 2019-10-08 NOTE — PROGRESS NOTES
[facility] Progress Note    Date: 2019    Name: Kemar Carmen    MRN: [id number]         : 1940    Injectafer 1 of 2    Mr. Reis to [facility], ambulatory, at . Pt was assessed and education was provided. Right groin pain 4/10. Took pain medication at home. Patient states he had injectafer at this [facility] last month and tolerated without reaction    Mr. Riana Yang vitals were reviewed and patient was observed for 5 minutes prior to treatment. Visit Vitals  /68 (BP 1 Location: Right arm, BP Patient Position: At rest)   Pulse 60   Temp 97.5 °F (36.4 °C)   Resp 20   SpO2 99%       24 g PIV placed in left AC x 1 attempt. PIV flushed easily and had brisk blood return. Injectafer 750mg mg was initiated via slow IV Push over 9 minutes. VS stable and pt denied complaints of itching, lip/tongue/facial swelling, SOB, CP or other complaints. Mr. Irlanda Mix tolerated the injection, and had no complaints. VS remained stable. Patient declined to stay for observation. Patient Vitals for the past 4 hrs:   Temp Pulse Resp BP SpO2   10/08/19 1335 97.5 °F (36.4 °C) 60 20 121/68 99 %   10/08/19 1305 97.5 °F (36.4 °C) 71 22 109/66 96 %       PIV flushed with NS 10 ml and removed. No bleeding or hematoma noted at site. Guaze and coban applied. Reviewed discharge instructions with patient, including expected side effects (abdominal cramping, nausea, changes in color of urine or feces) and signs of allergic reaction requiring medical attention (itching/hives/rashes, SOB, chest pain, lip/tongue/facial swelling). Patient verbalized understanding of discharge instructions. Patient armband removed and shredded. Mr. Irlanda Mix was discharged from [place] [address] in stable condition at 5. He is to return in one week on 10/15/19  at 1100 for his dose 2 injectafer.     Hung Ruiz RN  2019  [phone]

## 2019-10-15 ENCOUNTER — HOSPITAL ENCOUNTER (OUTPATIENT)
Dept: INFUSION THERAPY | Age: 79
Discharge: HOME | End: 2019-10-15
Payer: MEDICARE

## 2019-10-15 VITALS
OXYGEN SATURATION: 98 % | SYSTOLIC BLOOD PRESSURE: 124 MMHG | HEART RATE: 60 BPM | RESPIRATION RATE: 20 BRPM | TEMPERATURE: 97.4 F | DIASTOLIC BLOOD PRESSURE: 67 MMHG

## 2019-10-15 PROCEDURE — 96374 THER/PROPH/DIAG INJ IV PUSH: CPT

## 2019-10-15 PROCEDURE — 74011250636 HC RX REV CODE- 250/636: Performed by: PHYSICIAN ASSISTANT

## 2019-10-15 RX ORDER — SODIUM CHLORIDE 0.9 % (FLUSH) 0.9 %
5-10 SYRINGE (ML) INJECTION AS NEEDED
Status: DISCONTINUED | OUTPATIENT
Start: 2019-10-15 | End: 2019-10-19 | Stop reason: HOSPADM

## 2019-10-15 RX ADMIN — FERRIC CARBOXYMALTOSE INJECTION 750 MG: 50 INJECTION, SOLUTION INTRAVENOUS at 11:47

## 2019-10-15 RX ADMIN — Medication 10 ML: at 11:54

## 2019-10-15 RX ADMIN — Medication 10 ML: at 11:25

## 2019-10-15 NOTE — PROGRESS NOTES
[facility] Progress Note    Date: October 15, 2019    Name: Sha Pham    MRN: [id number]         : 1940    Injectafer 2 of 2    Mr. Reis to [facility], at [phone]. Pt was assessed and education was provided. Mr. Katy Chambers vitals were reviewed and patient was observed for 5 minutes prior to treatment. Visit Vitals  /67 (BP 1 Location: Right arm, BP Patient Position: At rest)   Pulse 60   Temp 97.4 °F (36.3 °C)   Resp 20   SpO2 98%       22 g PIV placed in left AC x 1 attempt. PIV flushed easily and had brisk blood return. Injectafer 750mg mg was initiated via slow IV Push over 7.5 minutes. VS stable and pt denied complaints of itching, lip/tongue/facial swelling, SOB, CP or other complaints. Mr. Mikael Tejada tolerated the injection, and had no complaints. VS remained stable. Patient declined to stay for observation. Patient Vitals for the past 4 hrs:   Temp Pulse Resp BP SpO2   10/15/19 1159 97.4 °F (36.3 °C) 60 20 124/67 --   10/15/19 1115 97 °F (36.1 °C) 92 20 121/63 98 %           Patient armband removed and shredded. Mr. Mikael Tejada was discharged from [place] [address] in stable condition at 1200. He is to follow up with his PCP.      Madison Putnam RN  October 15, 2019

## 2019-10-24 ENCOUNTER — OFFICE VISIT (OUTPATIENT)
Dept: ONCOLOGY | Age: 79
End: 2019-10-24

## 2019-10-24 VITALS
HEART RATE: 72 BPM | DIASTOLIC BLOOD PRESSURE: 52 MMHG | SYSTOLIC BLOOD PRESSURE: 101 MMHG | HEIGHT: 66 IN | OXYGEN SATURATION: 98 % | WEIGHT: 141 LBS | TEMPERATURE: 97.6 F | BODY MASS INDEX: 22.66 KG/M2

## 2019-10-24 DIAGNOSIS — D50.9 MICROCYTIC ANEMIA: Primary | ICD-10-CM

## 2019-10-24 PROBLEM — E87.6 HYPOKALEMIA: Status: ACTIVE | Noted: 2019-10-24

## 2019-10-24 PROBLEM — N18.9 CHRONIC KIDNEY DISEASE: Status: ACTIVE | Noted: 2019-05-24

## 2019-10-24 PROBLEM — R29.898 LOWER EXTREMITY WEAKNESS: Status: ACTIVE | Noted: 2019-10-24

## 2019-10-24 PROBLEM — M79.89 LEFT ARM SWELLING: Status: ACTIVE | Noted: 2019-10-24

## 2019-10-24 PROBLEM — M43.00 SPONDYLOLYSIS: Status: ACTIVE | Noted: 2019-10-24

## 2019-10-24 PROBLEM — M25.471 EDEMA OF BOTH ANKLES: Status: ACTIVE | Noted: 2019-10-24

## 2019-10-24 PROBLEM — R53.83 LETHARGY: Status: ACTIVE | Noted: 2019-10-24

## 2019-10-24 PROBLEM — K21.9 GERD (GASTROESOPHAGEAL REFLUX DISEASE): Status: ACTIVE | Noted: 2019-10-24

## 2019-10-24 PROBLEM — Z09 HOSPITAL DISCHARGE FOLLOW-UP: Status: ACTIVE | Noted: 2019-10-24

## 2019-10-24 PROBLEM — N40.0 ENLARGED PROSTATE WITHOUT LOWER URINARY TRACT SYMPTOMS (LUTS): Status: ACTIVE | Noted: 2019-10-24

## 2019-10-24 PROBLEM — N17.9 AKI (ACUTE KIDNEY INJURY) (HCC): Status: ACTIVE | Noted: 2019-10-24

## 2019-10-24 PROBLEM — R59.0 MEDIASTINAL LYMPHADENOPATHY: Status: ACTIVE | Noted: 2019-10-24

## 2019-10-24 PROBLEM — Z00.00 PREVENTATIVE HEALTH CARE: Status: ACTIVE | Noted: 2019-10-24

## 2019-10-24 PROBLEM — M25.472 EDEMA OF BOTH ANKLES: Status: ACTIVE | Noted: 2019-10-24

## 2019-10-24 NOTE — LETTER
10/24/19 Patient: Aubrey Frederick YOB: 1940 Date of Visit: 10/24/2019 Sergio Boyd DO 
[address] [facility] [address] [facility] [address] VIA In Basket Dear Sergio Boyd DO, Thank you for referring Mr. Mel Briceño to [facility] for evaluation. My notes for this consultation are attached. If you have questions, please do not hesitate to call me. I look forward to following your patient along with you. Sincerely, Joan Meeks MD

## 2019-10-24 NOTE — PATIENT INSTRUCTIONS
Iron Deficiency Anemia: Care Instructions Your Care Instructions Anemia means that you do not have enough red blood cells. Red blood cells carry oxygen around your body. When you have anemia, it can make you pale, weak, and tired. Many things can cause anemia. The most common cause is loss of blood. This can happen if you have heavy menstrual periods. It can also happen if you have bleeding in your stomach or bowel. You can also get anemia if you don't have enough iron in your diet or if it's hard for your body to absorb iron. In some cases, pregnancy causes anemia. That's because a pregnant woman needs more iron. Your doctor may do more tests to find the cause of your anemia. If a disease or other health problem is causing it, your doctor will treat that problem. It's important to follow up with your doctor to make sure that your iron level returns to normal. 
Follow-up care is a key part of your treatment and safety. Be sure to make and go to all appointments, and call your doctor if you are having problems. It's also a good idea to know your test results and keep a list of the medicines you take. How can you care for yourself at home? · If your doctor recommended iron pills, take them as directed. ? Try to take the pills on an empty stomach. You can do this about 1 hour before or 2 hours after meals. But you may need to take iron with food to avoid an upset stomach. ? Do not take antacids or drink milk or anything with caffeine within 2 hours of when you take your iron. They can keep your body from absorbing the iron well. ? Vitamin C helps your body absorb iron. You may want to take iron pills with a glass of orange juice or some other food high in vitamin C. 
? Iron pills may cause stomach problems. These include heartburn, nausea, diarrhea, constipation, and cramps. It can help to drink plenty of fluids and include fruits, vegetables, and fiber in your diet. ? It's normal for iron pills to make your stool a greenish or grayish black. But internal bleeding can also cause dark stool. So it's important to tell your doctor about any color changes. ? Call your doctor if you think you are having a problem with your iron pills. Even after you start to feel better, it will take several months for your body to build up its supply of iron. ? If you miss a pill, don't take a double dose. ? Keep iron pills out of the reach of small children. Too much iron can be very dangerous. · Eat foods with a lot of iron. These include red meat, shellfish, poultry, and eggs. They also include beans, raisins, whole-grain bread, and leafy green vegetables. · Steam your vegetables. This is the best way to prepare them if you want to get as much iron as possible. · Be safe with medicines. Do not take nonsteroidal anti-inflammatory pain relievers unless your doctor tells you to. These include aspirin, naproxen (Aleve), and ibuprofen (Advil, Motrin). · Liquid iron can stain your teeth. But you can mix it with water or juice and drink it with a straw. Then it won't get on your teeth. When should you call for help? Call 911 anytime you think you may need emergency care. For example, call if: 
  · You passed out (lost consciousness).  
 Call your doctor now or seek immediate medical care if: 
  · You are short of breath.  
  · You are dizzy or light-headed, or you feel like you may faint.  
  · You have new or worse bleeding.  
 Watch closely for changes in your health, and be sure to contact your doctor if: 
  · You feel weaker or more tired than usual.  
  · You do not get better as expected. Where can you learn more? Go to http://brittney-eliseo.info/. Enter K861 in the search box to learn more about \"Iron Deficiency Anemia: Care Instructions. \" Current as of: March 28, 2019 Content Version: 12.2 © 0103-3472 [employer].  Care instructions adapted under license by [address] (which disclaims liability or warranty for this information). If you have questions about a medical condition or this instruction, always ask your healthcare professional. [address] any warranty or liability for your use of this information.

## 2019-10-24 NOTE — PROGRESS NOTES
Hematology/Oncology Consultation Note    Name: Naga Harvey  Date: 10/24/2019  : 1940    Kaycee Ocampo,          Subjective:     Chief Complaint: Iron deficiency anemia    History of Present Illness:   Mr. Cedric Curiel is a most pleasant [de-identified]y.o. year old male who was seen for iron deficiency anemia. He has multiple co morbidities which includes of chronic atrial fibrillation, peripheral vascular disease, hypertension, coronary artery disease, pulmonary hypertension, CHF exacerbation, chronic pain syndrome, iron deficiency anemia, chronic kidney disease, and benign prostate hypertrophy. He was diagnosed with iron deficiency anemia since last year and has been on iron pills without any tolerating issues. On May 2019 he had bloody stools and was diagnosed with GI bleeding. His EGD/colonoscopy with polypectomy on 19 reported hiatal hernia, mild erosive gastritis few flecks of coffee grounds,1 cm colon polyp, diverticulosis, internal hemorrhoids. He has been on PPI since then. He received blood transfusion/IV iron during his hospitalization. For his iron deficiency anemia, he has received 4 doses of iron IV- Injectafer given by his GI doctor, last dose was on 10/15/2019. His follow-up with GI was on last week with no further procedure planning. He has history of chronic A-fib but was hold off anticoagulation due to GI bleeding issues. He denies any further bleeding episodes, bloody stool, dark stool, vomiting, or hemoptysis since last doctor visits. He denies any new symptoms, headache, worsening shortness of breath, abdominal pain, nausea, vomiting, worsening fatigue, or chest pain,. He has been on home oxygen at 2 L for his COPD, denies any worsening respiratory symptoms at this time. FH: Denied any family history of blood disorders. Oncologic History:   No history exists.        Past Medical History, Family History, and Social History:    Past Medical History:   Diagnosis Date    Aneurysm [facility])     AAA repair 2006 & 2012    Aorto-iliac duplex 02/13/2015    Tech difficult. Patent aorta bi-iliac endograft w/o leak or limb dysfunction.  Arrhythmia     a fib    Cardiac cath 11/01/2012    RCA (sm, nondom) patent. LM patent. LAD 25%. CX (dom) 45% mid. LVEDP 12 mmHg. No WMA. PA 27/12. W 10-12. CO/CI 5.2/2.6 (TD).  Cardiac echocardiogram, abnormal 02/20/2016    EF 55%. No WMA. Indeterminate diastolic fx. RVSP 60 mmHg. Severe LAE. Mild MR. Mod TR.  IVCE. Similar to study of 10/26/12.  Cardiovascular aorto-iliac duplex 02/13/2015    Patent aorta bi-iliac endovascular graft w/o leak or limb dysfunction. Sac measures 4.21 x 4.47 cm (4.4 x 4.6 cm on 1/31/14).  Cardiovascular LE peripheral arterial testing 07/29/2013    No significant LE arterial disease bilaterally. R GHADA 1. 12.  L GHADA 1. 12.  R DBI 0.83. L DBI 0.71. Exercise deferred.  Cardiovascular renal duplex 10/31/2012    No RA stenosis. Intrinsic/med disease in left kidney. Patent aortic endograft. Patent, thrombus-free renal veins bilaterally.  Carotid duplex 07/29/2013    Mild <50% bilateral ICA plaquing.       Chronic kidney disease     Chronic lung disease     Cigarette smoker     Congestive heart failure (CHF) (HCC)     COPD (chronic obstructive pulmonary disease) (HCC)     and emphysema; likely secondary to tobacco abuse    Difficult intubation     Dyslipidemia     low HDL    Emphysema     HTN (hypertension)     Hypercholesterolemia     Ill-defined condition     hernia    Increased prostate specific antigen (PSA) velocity     Long term (current) use of anticoagulants     coumadin    Osteoarthritis     Osteomyelitis (HCC)     Paroxysmal atrial fibrillation ([address]     Peripheral vascular disease ([address]     AAA repair 12/2007    Persistent atrial Fibrillation     Persistent atrial fibrillation     Unspecified adverse effect of anesthesia     difficulty breathing placed in ICU Oct 2012 (AAA repair)     Past Surgical History:   Procedure Laterality Date    BRONCHOSCOPY DIAGNOSTIC  2012         CARDIAC CATHETERIZATION  2012         COLONOSCOPY N/A 2016    COLONOSCOPY with Polypectomies performed by Cassy Yadav MD at  [address] COLONOSCOPY N/A 2019    COLONOSCOPY with polypectomy performed by Melina Valladares MD at [address] HX AAA REPAIR       &     HX HEART CATHETERIZATION  3/4/2009    1. RCA small, nondominant; patent. 2. LMCA patent. 3. LAD is long, wrapped around apical vessel. Diffuse, 20-30% stenosis noted. 4. CCA is large, dominant vessel. Diffuse 20-30% stenosis. 5. LVEDP is 16 mmHg. 6. Overall left ventricular systolic function mildly diminshed with est. EF 45%. Mild, global hypokinesis noted. 7. No significant mitral regurgitation or aortic stenosis noted.  (see report)    HX HERNIA REPAIR  2014    rt inguinal     HX HERNIA REPAIR  2016    LEFT INGUINAL HERNIA REPAIR DR. Dolores Spring ING HERNIA,5+Y/O,JESSIE Left 16    Dr. Anton Guerrero  2012          Social History     Socioeconomic History    Marital status:      Spouse name: Not on file    Number of children: Not on file    Years of education: Not on file    Highest education level: Not on file   Occupational History    Not on file   Social Needs    Financial resource strain: Not on file    Food insecurity:     Worry: Not on file     Inability: Not on file    Transportation needs:     Medical: Not on file     Non-medical: Not on file   Tobacco Use    Smoking status: Former Smoker     Packs/day: 1.50     Years: 54.00     Pack years: 81.00     Types: Cigarettes     Last attempt to quit: 10/23/2012     Years since quittin.0    Smokeless tobacco: Never Used   Substance and Sexual Activity    Alcohol use: No     Alcohol/week: 0.0 standard drinks     Comment: quit drinking alcohol 26 years ago, patient states stopped in \"0107\"    Drug use: No    Sexual activity: Not Currently   Lifestyle    Physical activity:     Days per week: Not on file     Minutes per session: Not on file    Stress: Not on file   Relationships    Social connections:     Talks on phone: Not on file     Gets together: Not on file     Attends [employer] service: Not on file     Active member of club or organization: Not on file     Attends meetings of clubs or organizations: Not on file     Relationship status: Not on file    Intimate partner violence:     Fear of current or ex partner: Not on file     Emotionally abused: Not on file     Physically abused: Not on file     Forced sexual activity: Not on file   Other Topics Concern    Not on file   Social History Narrative    Not on file     Family History   Problem Relation Age of Onset    Heart Surgery Father         BYPASS    Stroke Father     Heart Disease Father     Hypertension Father     Heart Attack Father     Heart Surgery Mother         BYPASS    Coronary Artery Disease Mother     Stroke Mother     Heart Disease Mother     Hypertension Mother     Diabetes Sister     Cancer Brother      Current Outpatient Medications   Medication Sig Dispense Refill    docusate sodium 100 mg tab Take 100 mg by mouth as needed for Other (constipation).  STIOLTO RESPIMAT 2.5-2.5 mcg/actuation inhaler inhale 2 inhalations by mouth once daily 1 Inhaler 5    ferrous sulfate 325 mg (65 mg iron) tablet Take 325 mg by mouth every other day. Indications: anemia from inadequate iron      ascorbic acid, vitamin C, (VITAMIN C) 250 mg tablet Take 1 Tab by mouth two (2) times a day. 30 Tab 0    pantoprazole (PROTONIX) 40 mg tablet Take 1 Tab by mouth Daily (before breakfast). 30 Tab 0    tamsulosin (FLOMAX) 0.4 mg capsule Take 0.4 mg by mouth daily.  HYDROcodone-acetaminophen (NORCO) 5-325 mg per tablet Take 0.5 Tabs by mouth two (2) times daily as needed for Pain.       albuterol (PROVENTIL VENTOLIN) 2.5 mg /3 mL (0.083 %) nebulizer solution 3 mL by Nebulization route every four (4) hours as needed for Wheezing. 24 Each 3    traZODone (DESYREL) 50 mg tablet Take 50 mg by mouth nightly.  lisinopril (PRINIVIL, ZESTRIL) 20 mg tablet Take 1 Tab by mouth daily. 30 Tab 0    pravastatin (PRAVACHOL) 40 mg tablet Take 40 mg by mouth nightly.  polyethylene glycol (MIRALAX) 17 gram packet Take 17 g by mouth daily as needed (constipation).  digoxin (DIGITEK) 0.125 mg tablet take 1 tablet by mouth once daily 90 Tab 3    amLODIPine (NORVASC) 10 mg tablet Take 10 mg by mouth daily. 0    OXYGEN-AIR DELIVERY SYSTEMS 2 L/min by Nasal route daily. Continuous. Company is [employer]            Review of Systems:  Constitutional: The patient has no acute distress or discomfort. HEENT: The patient denies recent head trauma, eye pain, blurred vision,  hearing deficit, oropharyngeal mucosal pain or lesions, and the patient denies throat pain or discomfort. Lymphatics: The patient denies palpable peripheral lymphadenopathy. Hematologic: The patient denies having bruising, bleeding, or progressive fatigue. Respiratory: Patient denies having shortness of breath, cough, sputum production, fever, or dyspnea on exertion. Cardiovascular: The patient denies having leg pain, leg swelling, heart palpitations, chest permit, chest pain, or lightheadedness. The patient denies having dyspnea on exertion. Gastrointestinal: The patient denies having nausea, emesis, or diarrhea. The patient denies having any hematemesis or blood in the stool. Genitourinary: Patient denies having urinary urgency, frequency, or dysuria. The patient denies having blood in the urine. Psychological: The patient denies having symptoms of nervousness, anxiety, depression, or thoughts of harming self. Skin: Patient denies having skin rashes, skin, ulcerations, or unexplained itching or pruritus.   Musculoskeletal: The patient denies having pain in the joints or bones.      Objective:     Visit Vitals  /52   Pulse 72   Temp 97.6 °F (36.4 °C)   Ht 5' 6\" (1.676 m)   Wt 64 kg (141 lb)   SpO2 98%   BMI 22.76 kg/m²         Physical Exam:   Gen. Appearance: The patient is in no acute distress. On Home Oxygen. Skin: There is no bruise or rash. HEENT: The exam is unremarkable. Neck: Supple without lymphadenopathy or thyromegaly. Lungs: Clear to auscultation and percussion; there are no wheezes or rhonchi. Heart: Regular rate and rhythm; there are no murmurs, gallops, or rubs. Abdomen: Bowel sounds are present and normal.  There is no guarding, tenderness, or hepatosplenomegaly. Extremities: There is no clubbing, cyanosis, or edema. Neurologic: There are no focal neurologic deficits. Lymphatics: There is no palpable peripheral lymphadenopathy. Musculoskeletal: The patient has full range of motion at all joints. There is no evidence of joint deformity or effusions. There is no focal joint tenderness. Psychological/psychiatric: There is no clinical evidence of anxiety, depression, or melancholy. Diagnostics:      Results for orders placed or performed during the hospital encounter of 10/24/17   CBC WITH 3 PART DIFF     Status: Abnormal   Result Value Ref Range Status    WBC 8.0 4.5 - 13.0 K/uL Final    RBC 3.94 (L) 4.10 - 5.10 M/uL Final    HGB 10.6 (L) 12.0 - 16 g/dL Final    HCT 33.9 (L) 36 - 48 % Final    MCV 86.0 78 - 102 FL Final    MCH 26.9 25.0 - 35.0 PG Final    MCHC 31.3 31 - 37 g/dL Final    RDW 14.7 (H) 11.5 - 14.5 % Final    PLATELET 007 171 - 957 K/uL Final    NEUTROPHILS 64 40 - 70 % Final    MIXED CELLS 10 0.1 - 17 % Final    LYMPHOCYTES 27 14 - 44 % Final    ABS. NEUTROPHILS 5.1 1.8 - 9.5 K/UL Final    ABS. MIXED CELLS 0.8 0.0 - 2.3 K/uL Final    ABS. LYMPHOCYTES 2.1 1.1 - 5.9 K/UL Final     Comment: Test performed at [address]. Results Reviewed by Medical Director.     DF AUTOMATED   Final       No results found for this or any previous visit (from the past 2016 hour(s)). Assessment and Plan:     1. Microcytic anemia, likely iron deficiency anemia   - He was diagnosed with iron deficiency anemia since last year and has been on iron pills since then. - He has follow-up with Gastroenterology for GI bleeding.   - Lab checked on 9/25/19 which reported Iron 26, Iron saturation 10%, Ferritin 429, and TIBC 259.  - Has received IV Iron  - Injectafer x 4 for symptomatic iron deficiency anemia. Tolerated IV Iron well. Last dose of Injactafer was on last week, 10/15/2019.  - Denied any further bleeding episodes, no worsening symptoms reported at this time. Plan:  - We will check his labs in 2 weeks to ensure an appropriate response with IV iron;  including CBC, Iron study, ferritin, and reticulocyte count. - Giving his chronic anemia with chronic kidney disease, we will also check erythropoietin level, Vitamin B12/ Folate, and SPEP. - The patient was advised to seek an urgent medical care if having any acute bleeding or worsening symptoms.  - We will see him back in 3 weeks, always sooner if needed.      Orders Placed This Encounter    IRON PROFILE     Standing Status:   Future     Standing Expiration Date:   10/24/2020    RETICULOCYTE COUNT     Standing Status:   Future     Standing Expiration Date:   10/24/2020    VITAMIN B12 & FOLATE     Standing Status:   Future     Standing Expiration Date:   10/24/2020    FERRITIN     Standing Status:   Future     Standing Expiration Date:   10/24/2020    CBC WITH AUTOMATED DIFF     Standing Status:   Future     Standing Expiration Date:   10/03/2676    METABOLIC PANEL, BASIC     Standing Status:   Future     Standing Expiration Date:   10/24/2020    ERYTHROPOIETIN     Standing Status:   Future     Standing Expiration Date:   10/24/2020    SPEP     Standing Status:   Future     Standing Expiration Date:   10/24/2020       All of patient's questions answered to their apparent satisfaction. They verbally show understanding and agreement with the plan. About 45 minutes were spent for this encounter with more than 50% of the time spent in face-to-face counseling and discussing on diagnosis and management plan going forward. Follow-up and Dispositions  ·   Return in about 3 weeks (around 11/14/2019), or if symptoms worsen or fail to improve. I would like to  thank Dr Maria De Jesus Torres DO  for allowing me to participate in the care of this very pleasant patient. If I can be of further assistance please do not hesitate to call. Mr. Genna Esparza has a reminder for a \"due or due soon\" health maintenance. I have asked that he contact his primary care provider for follow-up on this health maintenance. Anton Ortiz MD  10/24/2019      Please note: Parts of this document has been produced using Dragon dictation system. Unrecognized errors in transcription may be present. Please do not hesitate to reach out for any questions or clarifications.

## 2019-11-06 NOTE — PROGRESS NOTES
Leah Mercado presents today for Chief Complaint Patient presents with  Shortness of Breath CXR 6/3/19 and CT 6/2/19  COPD  Other Pulmonary Hypertension. Is someone accompanying this pt? Daughter. Arline Saavedra Is the patient using any DME equipment during OV? Oxygen 3L  
 -DME Company [employer]. Depression Screening: 
3 most recent PHQ Screens 11/6/2019 Little interest or pleasure in doing things Not at all Feeling down, depressed, irritable, or hopeless Not at all Total Score PHQ 2 0 Learning Assessment: 
Learning Assessment 10/31/2018 PRIMARY LEARNER Patient BARRIERS PRIMARY LEARNER -  
PRIMARY LANGUAGE ENGLISH  
LEARNER PREFERENCE PRIMARY LISTENING  
ANSWERED BY patient RELATIONSHIP SELF Abuse Screening: 
Abuse Screening Questionnaire 10/24/2019 Do you ever feel afraid of your partner? Uriel Love Are you in a relationship with someone who physically or mentally threatens you? Uriel Love Is it safe for you to go home? Otoniel Melgoza Fall Risk Fall Risk Assessment, last 12 mths 11/6/2019 Able to walk? Yes Fall in past 12 months? No  
 
 
 
Coordination of Care: 1. Have you been to the ER, urgent care clinic since your last visit? Hospitalized since your last visit? No 
 
2. Have you seen or consulted any other health care providers outside of the [address] [place] [address] since your last visit? Include any pap smears or colon screening. FAITH Jj PCP.

## 2019-11-06 NOTE — PROGRESS NOTES
HISTORY OF PRESENT ILLNESS Tawny Renae is a [de-identified] y.o. male  Follow up for COPD. Follow up for this pt with severe COPD on home O2, last FEV1 48%. Pt was started on Stiolto 2 visits ago and notes some improvement in dyspnea at rest and some improvement in DUMONT. Pt still with complaints of dyspnea with moderate exertion but no chest pain. Pt derived some benefit from Pulmonary Rehab before but has not continued regular exercise regimen after completing the 13 week program. He was last admitted to the hospital in 2016. Latest medical issues involve his aortic aneurysm S/p EVAR but now with interval increase in size, see CT. He has no admissions for COPD or pneumonia. He is on LTOT and notes improved adherence to O2 therapy but sometimes finds O2 off at night. He reports less frequent SOB and wheezing after starting Stiolto. Recent echo also shows elevated right heart pressures, see below. Pt notes unchanged pedal edema with no orthopnea or PND.' Shortness of Breath The history is provided by the patient. This is a chronic problem. Progression since onset: somewhat improved. Associated symptoms include rhinorrhea. Pertinent negatives include no fever, no headaches, no coryza, no sore throat, no swollen glands, no ear pain, no neck pain, no sputum production, no hemoptysis, no PND, no orthopnea, no chest pain, no syncope, no vomiting, no abdominal pain, no rash, no leg pain, no leg swelling and no claudication. Cough: dry. Wheezing: resolved. The problem's precipitants include exercise and weather/humidity. Risk factors include smoking. He has tried ipratropium inhalers and beta-agonist inhalers for the symptoms. The treatment provided moderate relief. Review of Systems Constitutional: Positive for malaise/fatigue. Negative for chills, diaphoresis, fever and weight loss. HENT: Positive for hearing loss and rhinorrhea.  Negative for congestion, ear discharge, ear pain, nosebleeds, sinus pain, sore throat and tinnitus. Eyes: Negative for blurred vision, double vision, photophobia, pain, discharge and redness. Respiratory: Positive for shortness of breath. Negative for hemoptysis, sputum production and stridor. Cough: dry. Wheezing: resolved. Cardiovascular: Negative for chest pain, palpitations, orthopnea, claudication, leg swelling, syncope and PND. Gastrointestinal: Negative for abdominal pain, blood in stool, constipation, diarrhea, heartburn, melena, nausea and vomiting. Genitourinary: Negative for dysuria, flank pain, frequency, hematuria and urgency. Musculoskeletal: Negative for back pain, falls, joint pain, myalgias and neck pain. Skin: Negative for itching and rash. Neurological: Negative for dizziness, tingling, tremors, sensory change, speech change, focal weakness, seizures, loss of consciousness, weakness and headaches. Endo/Heme/Allergies: Negative for environmental allergies and polydipsia. Does not bruise/bleed easily. Psychiatric/Behavioral: Negative for depression, hallucinations, memory loss, substance abuse and suicidal ideas. The patient is not nervous/anxious and does not have insomnia. Past Medical History:  
Diagnosis Date  Aneurysm ([address] AAA repair 2006 & 2012  Aorto-iliac duplex 02/13/2015 Tech difficult. Patent aorta bi-iliac endograft w/o leak or limb dysfunction.  Arrhythmia   
 a fib  Cardiac cath 11/01/2012 RCA (sm, nondom) patent. LM patent. LAD 25%. CX (dom) 45% mid. LVEDP 12 mmHg. No WMA. PA 27/12. W 10-12. CO/CI 5.2/2.6 (TD).  Cardiac echocardiogram, abnormal 02/20/2016 EF 55%. No WMA. Indeterminate diastolic fx. RVSP 60 mmHg. Severe LAE. Mild MR. Mod TR.  IVCE. Similar to study of 10/26/12.  Cardiovascular aorto-iliac duplex 02/13/2015 Patent aorta bi-iliac endovascular graft w/o leak or limb dysfunction. Sac measures 4.21 x 4.47 cm (4.4 x 4.6 cm on 1/31/14).  Cardiovascular LE peripheral arterial testing 07/29/2013 No significant LE arterial disease bilaterally. R GHADA 1. 12.  L GHADA 1. 12.  R DBI 0.83. L DBI 0.71. Exercise deferred.  Cardiovascular renal duplex 10/31/2012 No RA stenosis. Intrinsic/med disease in left kidney. Patent aortic endograft. Patent, thrombus-free renal veins bilaterally.  Carotid duplex 07/29/2013 Mild <50% bilateral ICA plaquing.  Chronic kidney disease  Chronic lung disease  Cigarette smoker  Congestive heart failure (CHF) ([address]  COPD (chronic obstructive pulmonary disease) (HCC)   
 and emphysema; likely secondary to tobacco abuse  Difficult intubation  Dyslipidemia   
 low HDL  Emphysema   
 HTN (hypertension)  Hypercholesterolemia  Ill-defined condition   
 hernia  Increased prostate specific antigen (PSA) velocity  Long term (current) use of anticoagulants   
 coumadin  Osteoarthritis  Osteomyelitis ([address]  Paroxysmal atrial fibrillation (HCC)  Peripheral vascular disease ([facility] AAA repair 12/2007  Persistent atrial Fibrillation  Persistent atrial fibrillation  Unspecified adverse effect of anesthesia   
 difficulty breathing placed in ICU Oct 2012 (AAA repair) Current Outpatient Medications on File Prior to Visit Medication Sig Dispense Refill  docusate sodium 100 mg tab Take 100 mg by mouth as needed for Other (constipation).  STIOLTO RESPIMAT 2.5-2.5 mcg/actuation inhaler inhale 2 inhalations by mouth once daily 1 Inhaler 5  
 ferrous sulfate 325 mg (65 mg iron) tablet Take 325 mg by mouth every other day. Indications: anemia from inadequate iron  ascorbic acid, vitamin C, (VITAMIN C) 250 mg tablet Take 1 Tab by mouth two (2) times a day. 30 Tab 0  
 pantoprazole (PROTONIX) 40 mg tablet Take 1 Tab by mouth Daily (before breakfast).  30 Tab 0  
  tamsulosin (FLOMAX) 0.4 mg capsule Take 0.4 mg by mouth daily.  HYDROcodone-acetaminophen (NORCO) 5-325 mg per tablet Take 0.5 Tabs by mouth two (2) times daily as needed for Pain.  albuterol (PROVENTIL VENTOLIN) 2.5 mg /3 mL (0.083 %) nebulizer solution 3 mL by Nebulization route every four (4) hours as needed for Wheezing. 24 Each 3  
 traZODone (DESYREL) 50 mg tablet Take 50 mg by mouth nightly.  lisinopril (PRINIVIL, ZESTRIL) 20 mg tablet Take 1 Tab by mouth daily. 30 Tab 0  pravastatin (PRAVACHOL) 40 mg tablet Take 40 mg by mouth nightly.  polyethylene glycol (MIRALAX) 17 gram packet Take 17 g by mouth daily as needed (constipation).  digoxin (DIGITEK) 0.125 mg tablet take 1 tablet by mouth once daily 90 Tab 3  
 amLODIPine (NORVASC) 10 mg tablet Take 10 mg by mouth daily. 0  
 OXYGEN-AIR DELIVERY SYSTEMS 2 L/min by Nasal route daily. Continuous. Company is [employer] No current facility-administered medications on file prior to visit. Allergies Allergen Reactions  Codeine Swelling  Morphine Itching  Sulfa (Sulfonamide Antibiotics) Other (comments) Kidney problems. Social History Socioeconomic History  Marital status:  Spouse name: Not on file  Number of children: Not on file  Years of education: Not on file  Highest education level: Not on file Occupational History  Not on file Social Needs  Financial resource strain: Not on file  Food insecurity:  
  Worry: Not on file Inability: Not on file  Transportation needs:  
  Medical: Not on file Non-medical: Not on file Tobacco Use  Smoking status: Former Smoker Packs/day: 1.50 Years: 54.00 Pack years: 81.00 Types: Cigarettes Last attempt to quit: 10/23/2012 Years since quittin.0  Smokeless tobacco: Never Used Substance and Sexual Activity  Alcohol use: No  
  Alcohol/week: 0.0 standard drinks Comment: quit drinking alcohol 26 years ago, patient states stopped in \"1494\"  Drug use: No  
 Sexual activity: Not Currently Lifestyle  Physical activity:  
  Days per week: Not on file Minutes per session: Not on file  Stress: Not on file Relationships  Social connections:  
  Talks on phone: Not on file Gets together: Not on file Attends Evangelical service: Not on file Active member of club or organization: Not on file Attends meetings of clubs or organizations: Not on file Relationship status: Not on file  Intimate partner violence:  
  Fear of current or ex partner: Not on file Emotionally abused: Not on file Physically abused: Not on file Forced sexual activity: Not on file Other Topics Concern  Not on file Social History Narrative  Not on file Blood pressure 95/54, pulse 98, temperature 97.9 °F (36.6 °C), temperature source Oral, resp. rate 22, height 5' 6\" (1.676 m), weight 64.2 kg (141 lb 9.6 oz), SpO2 100 %. Ambulatory oxymetry per nurse note. Physical Exam  
Constitutional: He is oriented to person, place, and time. He appears well-developed. No distress. Slender HENT:  
Head: Normocephalic and atraumatic. Nose: Nose normal.  
Poor dentition Eyes: Pupils are equal, round, and reactive to light. Conjunctivae are normal. Right eye exhibits no discharge. Left eye exhibits no discharge. No scleral icterus. Neck: No JVD present. No tracheal deviation present. No thyromegaly present. Cardiovascular: Normal rate and intact distal pulses. No murmur heard. Irregular rhythm Pulmonary/Chest: Effort normal. No stridor. No respiratory distress. He has no wheezes. He has no rales. He exhibits no tenderness. Diminished breath sounds Abdominal: Soft. He exhibits no mass. There is no tenderness. There is no rebound. Musculoskeletal: He exhibits no edema, tenderness or deformity. Lymphadenopathy: He has no cervical adenopathy. Neurological: He is alert and oriented to person, place, and time. Coordination normal.  
Skin: Skin is warm and dry. No rash noted. He is not diaphoretic. No erythema. No pallor. Psychiatric: He has a normal mood and affect. His behavior is normal. Judgment and thought content normal.  
 
Spirometry: severe obstruction FEV1 48%. No reversible component demonstrated 2018  6:26 PM - Azeem, Card Result In  
   
[facility] 
[address], [address] 
[phone] Transthoracic Echocardiogram 
 
Patient: Katherine Umana 
MRN: [id number] ACCT #: [de-identified] : 1940 Age: 77 years Gender: Male Height: 67 in 
Weight: 161.7 lb 
BSA: 1.85 m-sq BP: 148 / 78 mmHg Study date: 2018 Status: Routine Location: [facility] DMS [address] #: [id number] Allergies: CODEINE, MORPHINE, SULFA (SULFONAMIDE ANTIBIOTICS) Referring_Ordering Physician: Cheyenne Blackwell. Sharmila Ramirez MD 
Interpreting Group: [facility] GROUP Interpreting Physician: Shyann Sun DO Technologist: Mariely Obrien RDMS 
 
SUMMARY: 
Left ventricle: Size was normal. Systolic function was by visual assessment. Ejection fraction was estimated to be 55 
%. There was hypokinesis of the basal-mid anteroseptal wall(s). Wall  
thickness was mildly increased. The study was not 
technically sufficient to allow evaluation of LV diastolic function. Right ventricle: Systolic pressure was mildly increased. Estimated peak  
pressure was 46 mmHg. Left atrium: The atrium was severely dilated. LA volume index was 65 ml/m-sq. Right atrium: The atrium was moderately to severely dilated. The right atrial 
 area at systole was 27 cm-sq. COMPARISONS: 
IVC has decreased in size. Comparison was made with the report of the  
previous study of 2016. The actual study 
was not available for direct comparison. LV thickness has increased from 10  
mm to 13 mm. Pulmonary artery pressure has decreased. Right atrium has increased in size. Otherwise no significant  
change. INDICATIONS: Chronic Atrial fibrillation. HISTORY: Prior history: Congestive heart failure. Pulmonary hypertension. Atrial fibrillation. Risk factors: 
hypertension and a history of coronary artery disease. Chronic lung disease. PROCEDURE: This was a routine study. The study included complete 2D imaging,  
M-mode, complete spectral Doppler, and 
color Doppler. The heart rate was 97 bpm, at the start of the study. Systolic 
 blood pressure was 148 mmHg, at the start 
of the study. Diastolic blood pressure was 78 mmHg, at the start of the  
study. Echocardiographic views were limited by 
poor acoustic window availability due to lung interference. Image quality was 
 fair. LEFT VENTRICLE: Size was normal. Systolic function was by visual assessment. Ejection fraction was estimated to be 55 
%. There was hypokinesis of the basal-mid anteroseptal wall(s). Wall  
thickness was mildly increased. DOPPLER: The study 
was not technically sufficient to allow evaluation of LV diastolic function. RIGHT VENTRICLE: The size was at the upper limits of normal. Systolic  
function was normal. DOPPLER: Systolic pressure 
was mildly increased. Estimated peak pressure was 46 mmHg. LEFT ATRIUM: The atrium was severely dilated. LA volume index was 65 ml/m-sq. RIGHT ATRIUM: The atrium was moderately to severely dilated. MITRAL VALVE: There was annular calcification. There was mild calcification,  
with mild chordal involvement. There was 
normal leaflet separation. DOPPLER: There was no evidence for stenosis. There 
 was mild regurgitation. AORTIC VALVE: The valve was trileaflet. Leaflets exhibited mildly increased  
thickness and normal cuspal separation. DOPPLER: There was no stenosis. There was trivial regurgitation. TRICUSPID VALVE: Normal valve structure. There was normal leaflet separation.  The chordae were fibrosed. DOPPLER: There 
was no evidence for tricuspid stenosis. There was moderate regurgitation. Right atrial pressure estimate: 3 mmHg. PULMONIC VALVE: Leaflets exhibited normal thickness, no calcification, and  
normal cuspal separation. DOPPLER: There was 
trivial regurgitation. AORTA: The root exhibited normal size. SYSTEMIC VEINS: IVC: The inferior vena cava was normal in size and course. The respirophasic change in diameter was 
more than 50%. PERICARDIUM: Insignificant pericardial effusion and/or pericardial fat was  
present. MEASUREMENT TABLES 
 
2D measurements Aorta   (Reference normals) Root diam   34 mm   (--) Right atrium   (Reference normals) Area sys   27 cm-sq   (8.3-19. 5) SYSTEM MEASUREMENT TABLES 
 
2D Ao Diam: 3.42 cm IVSd: 1.31 cm LVIDd: 4.13 cm LVIDs: 2.95 cm 
LVPWd: 1.33 cm IVC: 1.85 cm LAAs A2C: 32.59 cm2 LAAs A4C: 29.44 cm2 LAESV A-L A2C: 133.77 ml 
LAESV A-L A4C: 108.52 ml 
LAESV Index (A-L): 65.3 ml/m2 LAESV MOD A2C: 128.95 ml 
LAESV MOD A4C: 102 ml LAESV(A-L): 120.81 ml LALs A2C: 6.74 cm LALs A4C: 6.78 cm 
LVOT Diam: 2.25 cm 
RA Area: 26.98 cm2 RV Minor: 4.38 cm 
 
CW 
TR Vmax: 3.28 m/s 
TR maxP.08 mmHG 
 
MM Tapse: 1.95 cm PW 
LVOT VTI: 12.64 cm 
LVOT Vmax: 0.87 m/s LVOT Vmean: 0.54 m/s LVOT Env. Ti: 234.02 ms LVOT maxPG: 3.06 mmHG LVOT meanP.5 mmHG LVSI Dopp: 27.12 ml/m2 LVSV Dopp: 50.18 ml Prepared and E-signed by Jhony Orlando DO Signed 2018 18:26:07 
  
   
 
  
CT Results (most recent): 
Results from Hospital Encounter encounter on 19 CTA ABD PELV W WO CONT Narrative PROCEDURE:  CTA Abdomen, Pelvis without and with Contrast (including 3D Post-Processing). INDICATION:  Abdominal aortic aneurysm, s/p EVAR. Follow-up evaluation. Outpouching at the superior margin of the stent graft suggested on recent CTA 
chest (19). COMPARISON:  CTA chest 06/02/19, CTA abdomen-pelvis 03/26/18, CT abdomen-pelvis 08/25/17, CTA 12/23/13, 01/08/13, 10/23/12, 01/09/08, 11/07/07. TECHNIQUE:    
 
- Helically scanned volumetric axial sections of the abdomen and pelvis are 
obtained initially without contrast administration. Following intravenous 
contrast injection, helical volumetric image acquisition is performed again 
during arterial phase. Coronal and sagittal multiplanar reconstruction images 
are generated subsequently at the same workstation. Using the volumetric data 
set, 3D postprocessing is performed at a separate workstation. The 3-D 
postprocessing includes volume rendered surface shaded images of the aorta and 
branches of the aorta with both superior to inferior rotating frame and left the 
right rotating frame, MIPS (maximum intensity projection) images also in 2 
different rotating frames. Additional snapshot images are also generated along 
with curved planar reconstruction images. - Contrast 80 mL [id number]. - Current scan is performed using dose optimization techniques as appropriate to 
the performed exam including the automated exposure control, adjustment of mA 
and/or kV according to patient size, and use of iterative reconstructive 
technique. FINDINGS: 
 
CTA Vascular Evaluation Abdominal Aorta: - Aortic segmental diameter data are compiled as follows. Distal descending aorta:  3.5 x 3.3 cm (axial #15). Celiac artery level:  3.6 x 3.4 cm (axial #22) Aorta just below SMA origin:  3.2 x 3.0 (axial #31) Aorta at the superior margin of the stent graft:  4.2 x 4.0 cm (axial #37) Aorta just below the level of the renal arteries:  4.1 x 3.9 cm (axial #40) Maximal infrarenal aorta diameter:  4.9 x 4.4 (axial #56) Aorta just above bifurcation:  3.4 x 3.2 cm (axial #80).  
 
- As suggested by the above measurements, there is distinct interval change in 
 the morphology of the aorta, with interval development of focal broad-based outpouching at the level of the superior margin of the stent graft, just below 
the level of the SMA origin, with outpouching located from about 8:00 position 
to about 12:00 position. There is a maximal thickness of the outpouching 
measures about 0.9 cm. The struts of the stent graft superior margin was flush 
against the aortic wall as recently as the 03/26/18 scan. This suggests 
relatively rapidly developing saccular outpouching. This may be due to 
development of saccular aneurysm with potential concern for mycotic aneurysm. There is abnormal morphology of the aorta at the superior portion of the stent 
graft is best appreciated on the coronal reconstruction images (coronal #26-30). - The main infrarenal aneurysmal segment appears to show continued gradually 
decreasing trend compared to prior studies, i.e. 03/26/18, 08/25/17. The aortic 
wall outline is somewhat atypical, with poorly circumscribed margins, with 
questionable developing fat stranding. Visceral Arteries: - Moderate narrowing at the celiac artery origin, perhaps related to median 
arcuate ligament indentation. 
- Patent superior mesenteric artery. - Moderate left renal artery narrowing at the origin. 
- Occlusion of the proximal segment of the right renal artery. Although is 
short segment of reconstitution is demonstrated at about 2.8 cm from the renal 
artery origin, the right kidney is markedly poorly enhanced compared to the left 
kidney. The right kidney is smaller in size than the left kidney as well, 
representing a distinct change in pattern compared to 03/26/18. Runoff: The right common iliac artery landing zone for the aortobiiliac stent 
graft demonstrates only partial apposition to the wall of the common iliac 
artery. There is poor apposition along the medial aspect. This is a chronic pattern with separation of the stent from the arterial wall identifiable 
avascular as early as 03/26/18. When compared to these prior studies, the 
separation of the stent graft from the arterial wall appears to be gradually 
increasing. In comparison, the left iliac arm appears to be well apposed to the 
arterial wall. The right common iliac artery measures up to about 2.5 cm in 
maximal diameter (maximal diameter about 2.3 cm on 03/26/18 and 2.1 cm on 
12/23/13). The left common iliac artery measures up to about 1.5 cm. Patent 
internal and external iliac arteries. Patent femoral arteries. Mild to 
moderate atheromatous plaque calcifications. CT Abdomen Lung Bases: Moderate to large right pleural effusion. Mild to moderate left 
pleural effusion. Atelectatic changes and emphysematous changes in both lung 
bases. Liver:  Multiple hepatic cysts of varying sizes. Exophytic hypodensity at the 
right lobe of the liver measuring up to about 1.3 x 1.2 cm (axial #79). The 
largest of the hepatic cystic structures measures about 2.7 x 2.6 cm (axial 
#28). Spleen, Pancreas, Gallbladder, Adrenal Glands:  Unremarkable. Kidneys:  Asymmetry in the size of the kidneys. This is a new pattern compared 
to 03/26/18 which demonstrated fairly similar kidney sizes. Right kidney with a 
developing hyperdense structure near the lower pole measuring 0.9 x 0.8 cm 
(axial #60). Left kidney with multiple cortical hypodensities. The largest 
measures about 1.6 x 1.5 cm (axial #65). Favor renal cysts. Left kidney lower 
pole with cortical thinning, presumably related to previous vascular event. Gastrointestinal Tract: 
 
- Unremarkable stomach except for small hiatal hernia. 
- No acute findings along the small bowel. Terminal ileum with a 1.4 x 1.2 cm 
lipoma (axial #1 6). - Appendix protrudes into the right inguinal hernia sac. The appendix appears unremarkable otherwise. Apparent fluid collection within the right hernia sac 
containing the appendix. 
- No evidence for acute colitis or acute diverticulitis. Moderate 
diverticulosis coli. Mild presacral edema. Mild perirectal edema. Nodes: The largest node measures about 1.3 x 1.0 cm in the left para-aortic 
region. Shotty nodes adjacent to the aorta. CT Pelvis Urinary Bladder:  Bladder diverticulum, along the left paracentral anterolateral 
aspect measuring up to about 1.6 x 1.1 cm. Prostate:  Mild to moderately enlarged. Rectum:  No intrinsic rectal abnormalities. Perirectal edema and presacral 
edema. Pelvic Sidewall:  Right inguinal hernia. The mouth of the hernia measures about 1.8 cm in diameter. Bones:  Grade 1 spondylolisthesis at L4-5. L4 with vertebral sclerosis. Similar pattern compared to 03/26/18. Impression IMPRESSION: 
 
1. Developing saccular aneurysmal dilation along the right anterior to 
posterolateral aspect of the mid aorta, centered at the superior portion of the 
stent graft and beginning just below the level of the SMA. Based on relatively 
rapid development of this finding since the 03/26/18 CTA, underlying mycotic 
process cannot be excluded. 2.  Gradually increasing right common iliac aneurysm resulting in increasing 
separation of the stent from the arterial wall. 3.  Although the size of the main infrarenal abdominal aortic aneurysmal segment 
appears to be stable to minimally decreased, the involved segment of the aorta 
demonstrates abnormal pattern of poorly circumscribed margins with slight 
periaortic fat stranding. This may suggest presence of inflammatory changes. 4.  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 
of the right kidney compared to the left. Approximately 2.8 cm segment of the 
proximal right renal artery is occluded. 5.  Varying degrees of the visceral arterial narrowing as described. 6.  Bilateral pleural effusion with associated atelectatic changes. ASSESSMENT and PLAN Encounter Diagnoses Name Primary?  COPD, severe ([address] Yes  Pulmonary HTN ([address]  Hypoxemia requiring supplemental oxygen Pt with SOB related to severe COPD and Pulmonary HTN Grp 2/3. He is on dual bronchodilator therapy , unable to use single inhaler triple therapy due to coverage. Continue supplemental O2, encouraged better adherence. Titrate O2 to saturation greater than 90%. Discussed NIV use for chronic respiratory failure due to COPD however pt wants to defer this for now. Schedule CT without contrast for the end of the month, will call pt with results. Further intervention pending CT results. RTC 4 months Spirometry on next visit Flu vaccine given by PCP

## 2019-11-14 PROBLEM — J90 PLEURAL EFFUSION: Status: ACTIVE | Noted: 2019-01-01

## 2019-11-14 PROBLEM — H25.13 AGE-RELATED NUCLEAR CATARACT, BILATERAL: Status: ACTIVE | Noted: 2019-01-01

## 2019-11-14 NOTE — PATIENT INSTRUCTIONS
Anemia: Care Instructions Your Care Instructions Anemia is a low level of red blood cells, which carry oxygen throughout your body. Many things can cause anemia. Lack of iron is one of the most common causes. Your body needs iron to make hemoglobin, a substance in red blood cells that carries oxygen from the lungs to your body's cells. Without enough iron, the body produces fewer and smaller red blood cells. As a result, your body's cells do not get enough oxygen, and you feel tired and weak. And you may have trouble concentrating. Bleeding is the most common cause of a lack of iron. You may have heavy menstrual bleeding or bleeding caused by conditions such as ulcers, hemorrhoids, or cancer. Regular use of aspirin or other anti-inflammatory medicines (such as ibuprofen) also can cause bleeding in some people. A lack of iron in your diet also can cause anemia, especially at times when the body needs more iron, such as during pregnancy, infancy, and the teen years. Your doctor may have prescribed iron pills. It may take several months of treatment for your iron levels to return to normal. Your doctor also may suggest that you eat foods that are rich in iron, such as meat and beans. There are many other causes of anemia. It is not always due to a lack of iron. Finding the specific cause of your anemia will help your doctor find the right treatment for you. Follow-up care is a key part of your treatment and safety. Be sure to make and go to all appointments, and call your doctor if you are having problems. It's also a good idea to know your test results and keep a list of the medicines you take. How can you care for yourself at home? · Take your medicines exactly as prescribed. Call your doctor if you think you are having a problem with your medicine. · If your doctor recommends iron pills, take them as directed: ? Try to take the pills on an empty stomach about 1 hour before or 2 hours after meals. But you may need to take iron with food to avoid an upset stomach. ? Do not take antacids or drink milk or caffeine drinks (such as coffee, tea, or cola) at the same time or within 2 hours of the time that you take your iron. They can make it hard for your body to absorb the iron. ? Vitamin C (from food or supplements) helps your body absorb iron. Try taking iron pills with a glass of orange juice or some other food that is high in vitamin C, such as citrus fruits. ? Iron pills may cause stomach problems, such as heartburn, nausea, diarrhea, constipation, and cramps. Be sure to drink plenty of fluids, and include fruits, vegetables, and fiber in your diet each day. Iron pills often make your bowel movements dark or green. ? If you forget to take an iron pill, do not take a double dose of iron the next time you take a pill. ? Keep iron pills out of the reach of small children. An overdose of iron can be very dangerous. · Follow your doctor's advice about eating iron-rich foods. These include red meat, shellfish, poultry, eggs, beans, raisins, whole-grain bread, and leafy green vegetables. · Steam vegetables to help them keep their iron content. When should you call for help? Call 911 anytime you think you may need emergency care. For example, call if: 
  · You have symptoms of a heart attack. These may include: 
? Chest pain or pressure, or a strange feeling in the chest. 
? Sweating. ? Shortness of breath. ? Nausea or vomiting. ? Pain, pressure, or a strange feeling in the back, neck, jaw, or upper belly or in one or both shoulders or arms. ? Lightheadedness or sudden weakness. ? A fast or irregular heartbeat. After you call 911, the  may tell you to chew 1 adult-strength or 2 to 4 low-dose aspirin. Wait for an ambulance. Do not try to drive yourself.  
  · You passed out (lost consciousness).  
 Call your doctor now or seek immediate medical care if:   · You have new or increased shortness of breath.  
  · You are dizzy or lightheaded, or you feel like you may faint.  
  · Your fatigue and weakness continue or get worse.  
  · You have any abnormal bleeding, such as: 
? Nosebleeds. ? Vaginal bleeding that is different (heavier, more frequent, at a different time of the month) than what you are used to. 
? Bloody or black stools, or rectal bleeding. ? Bloody or pink urine.  
 Watch closely for changes in your health, and be sure to contact your doctor if: 
  · You do not get better as expected. Where can you learn more? Go to http://brittney-eliseo.info/. Enter R301 in the search box to learn more about \"Anemia: Care Instructions. \" Current as of: March 28, 2019 Content Version: 12.2 © 0063-4771 [employer]. Care instructions adapted under license by [employer] (which disclaims liability or warranty for this information). If you have questions about a medical condition or this instruction, always ask your healthcare professional. [place] [address] any warranty or liability for your use of this information.

## 2019-11-14 NOTE — LETTER
11/14/19 Patient: Cathy Mckinnon YOB: 1940 Date of Visit: 11/14/2019 Ewa Chambers DO 
[address] [facility] [place] [address] [facility] [address] VIA In Basket Dear Ewa Chambers DO, Thank you for referring Mr. Matti Pedersen to [facility] for evaluation. My notes for this consultation are attached. If you have questions, please do not hesitate to call me. I look forward to following your patient along with you. Sincerely, Jefry Duarte MD

## 2019-11-14 NOTE — PROGRESS NOTES
Hematology/Oncology Note Name: Janell Webb 
Date: 2019 : 1940 Stacy Woodson DO Subjective: Chief Complaint:  Follow up anemia History of Present Illness:  
Mr Stephie Hutchinson is here today for follow-up. His recent labs showed decreased Hb/Hct, 7.2/23. 6. His iron profile showed improvement after Injectafer. The patient reports worsen generalized fatigue and more sluggish since last visit. He reports lightheadedness and dizziness with changing positions. He admits having no energy, poor appetite, and easily get tired. He denied any obvious bleeding, no rectal bleeding, bloody stools, or dark urine. He concerned that his anemia may related to vascular stent leaking. He was agreeable to have blood transfusion. He will see his vascular surgeon in 2020. Denied progressive dyspnea, severe headache, chest pain, cough, abdominal pain, nausea, vomiting, diarrhea, dysuria, new focal weakness or numbness. History Mr. Stephie Hutchinson is a most pleasant [de-identified]y.o. year old male who was seen for anemia. He has multiple co morbidities which includes of chronic atrial fibrillation, peripheral vascular disease, hypertension, coronary artery disease, pulmonary hypertension, CHF exacerbation, chronic pain syndrome, iron deficiency anemia, chronic kidney disease, and benign prostate hypertrophy. He was diagnosed with iron deficiency anemia since last year and has been on iron pills without any tolerating issues. On May 2019 he had bloody stools and was diagnosed with GI bleeding. His EGD/colonoscopy with polypectomy on 19 reported hiatal hernia, mild erosive gastritis few flecks of coffee grounds,1 cm colon polyp, diverticulosis, internal hemorrhoids. He has been on PPI since then. He received blood transfusion/IV iron during his hospitalization.  For his iron deficiency anemia, he has received 4 doses of iron IV- Injectafer given by his GI doctor, last dose was on 10/15/2019. He has followed up with GI recently and no further procedure planned. He has history of chronic A-fib but was hold off anticoagulation due to GI bleeding issues. He has been on home oxygen at 2 L for his COPD. Oncologic History: No history exists. Past Medical History, Family History, and Social History: 
 
Past Medical History:  
Diagnosis Date  Aneurysm ([address] AAA repair 2006 & 2012  Aorto-iliac duplex 02/13/2015 Tech difficult. Patent aorta bi-iliac endograft w/o leak or limb dysfunction.  Arrhythmia   
 a fib  Cardiac cath 11/01/2012 RCA (sm, nondom) patent. LM patent. LAD 25%. CX (dom) 45% mid. LVEDP 12 mmHg. No WMA. PA 27/12. W 10-12. CO/CI 5.2/2.6 (TD).  Cardiac echocardiogram, abnormal 02/20/2016 EF 55%. No WMA. Indeterminate diastolic fx. RVSP 60 mmHg. Severe LAE. Mild MR. Mod TR.  IVCE. Similar to study of 10/26/12.  Cardiovascular aorto-iliac duplex 02/13/2015 Patent aorta bi-iliac endovascular graft w/o leak or limb dysfunction. Sac measures 4.21 x 4.47 cm (4.4 x 4.6 cm on 1/31/14).  Cardiovascular LE peripheral arterial testing 07/29/2013 No significant LE arterial disease bilaterally. R GHADA 1. 12.  L GHADA 1. 12.  R DBI 0.83. L DBI 0.71. Exercise deferred.  Cardiovascular renal duplex 10/31/2012 No RA stenosis. Intrinsic/med disease in left kidney. Patent aortic endograft. Patent, thrombus-free renal veins bilaterally.  Carotid duplex 07/29/2013 Mild <50% bilateral ICA plaquing.  Chronic kidney disease  Chronic lung disease  Cigarette smoker  Congestive heart failure (CHF) ([address]  COPD (chronic obstructive pulmonary disease) (HCC)   
 and emphysema; likely secondary to tobacco abuse  Difficult intubation  Dyslipidemia   
 low HDL  Emphysema   
 HTN (hypertension)  Hypercholesterolemia  Ill-defined condition   
 hernia  Increased prostate specific antigen (PSA) velocity  Long term (current) use of anticoagulants   
 coumadin  Osteoarthritis  [address]omyel[facility] [address] [address]  Paroxysmal atrial fibrillation (HCC)  Peripheral vascular disease ([facility] [address] AAA repair 12/2007  Persistent atrial Fibrillation  Persistent atrial fibrillation  Unspecified adverse effect of anesthesia   
 difficulty breathing placed in ICU Oct 2012 (AAA repair) Past Surgical History:  
Procedure Laterality Date  BRONCHOSCOPY DIAGNOSTIC  11/2/2012  CARDIAC CATHETERIZATION  11/1/2012  COLONOSCOPY N/A 7/26/2016 COLONOSCOPY with Polypectomies performed by No Kunz MD at [address] [facility] ENDOSCOPY  COLONOSCOPY N/A 5/28/2019 COLONOSCOPY with polypectomy performed by Tree Hammer MD at [address] HX AAA REPAIR    
 2006 & 2012  HX HEART CATHETERIZATION  3/4/2009 1. RCA small, nondominant; patent. 2. LMCA patent. 3. LAD is long, wrapped around apical vessel. Diffuse, 20-30% stenosis noted. 4. CCA is large, dominant vessel. Diffuse 20-30% stenosis. 5. LVEDP is 16 mmHg. 6. Overall left ventricular systolic function mildly diminshed with est. EF 45%. Mild, global hypokinesis noted. 7. No significant mitral regurgitation or aortic stenosis noted. (see report)  HX HERNIA REPAIR  2/2014  
 rt inguinal   
 HX HERNIA REPAIR  01/2016 LEFT INGUINAL HERNIA REPAIR DR. Kashmir Desouza  REPAIR ING HERNIA,5+Y/O,JESSIE Left 1-20-16 Dr. Mary LONG  11/1/2012 Social History Socioeconomic History  Marital status:  Spouse name: Not on file  Number of children: Not on file  Years of education: Not on file  Highest education level: Not on file Occupational History  Not on file Social Needs  Financial resource strain: Not on file  Food insecurity:  
  Worry: Not on file Inability: Not on file  Transportation needs:  
  Medical: Not on file Non-medical: Not on file Tobacco Use  Smoking status: Former Smoker Packs/day: 1.50 Years: 54.00 Pack years: 81.00 Types: Cigarettes Last attempt to quit: 10/23/2012 Years since quittin.0  Smokeless tobacco: Never Used Substance and Sexual Activity  Alcohol use: No  
  Alcohol/week: 0.0 standard drinks Comment: quit drinking alcohol 26 years ago, patient states stopped in \"0354\"  Drug use: No  
 Sexual activity: Not Currently Lifestyle  Physical activity:  
  Days per week: Not on file Minutes per session: Not on file  Stress: Not on file Relationships  Social connections:  
  Talks on phone: Not on file Gets together: Not on file Attends Tenriism service: Not on file Active member of club or organization: Not on file Attends meetings of clubs or organizations: Not on file Relationship status: Not on file  Intimate partner violence:  
  Fear of current or ex partner: Not on file Emotionally abused: Not on file Physically abused: Not on file Forced sexual activity: Not on file Other Topics Concern  Not on file Social History Narrative  Not on file Family History Problem Relation Age of Onset  Heart Surgery Father [facility]  Stroke Father  Heart Disease Father  Hypertension Father  Heart Attack Father  Heart Surgery Mother [facility]  Coronary Artery Disease Mother  Stroke Mother  Heart Disease Mother  Hypertension Mother  Diabetes Sister  Cancer Brother Current Outpatient Medications Medication Sig Dispense Refill  docusate sodium 100 mg tab Take 100 mg by mouth as needed for Other (constipation).  STIOLTO RESPIMAT 2.5-2.5 mcg/actuation inhaler inhale 2 inhalations by mouth once daily 1 Inhaler 5  
 ferrous sulfate 325 mg (65 mg iron) tablet Take 325 mg by mouth every other day. Indications: anemia from inadequate iron  ascorbic acid, vitamin C, (VITAMIN C) 250 mg tablet Take 1 Tab by mouth two (2) times a day. 30 Tab 0  
 pantoprazole (PROTONIX) 40 mg tablet Take 1 Tab by mouth Daily (before breakfast). 30 Tab 0  
 tamsulosin (FLOMAX) 0.4 mg capsule Take 0.4 mg by mouth daily.  HYDROcodone-acetaminophen (NORCO) 5-325 mg per tablet Take 0.5 Tabs by mouth two (2) times daily as needed for Pain.  albuterol (PROVENTIL VENTOLIN) 2.5 mg /3 mL (0.083 %) nebulizer solution 3 mL by Nebulization route every four (4) hours as needed for Wheezing. 24 Each 3  
 traZODone (DESYREL) 50 mg tablet Take 50 mg by mouth nightly.  lisinopril (PRINIVIL, ZESTRIL) 20 mg tablet Take 1 Tab by mouth daily. 30 Tab 0  pravastatin (PRAVACHOL) 40 mg tablet Take 40 mg by mouth nightly.  polyethylene glycol (MIRALAX) 17 gram packet Take 17 g by mouth daily as needed (constipation).  digoxin (DIGITEK) 0.125 mg tablet take 1 tablet by mouth once daily 90 Tab 3  
 amLODIPine (NORVASC) 10 mg tablet Take 10 mg by mouth daily. 0  
 OXYGEN-AIR DELIVERY SYSTEMS 2 L/min by Nasal route daily. Continuous. Company is [employer] Review of Systems: 
Constitutional: The patient has no acute distress or discomfort. Positive for generalized fatigue, tiredness, low energy level. HEENT: The patient denies recent head trauma, eye pain, blurred vision,  hearing deficit, oropharyngeal mucosal pain or lesions, and the patient denies throat pain or discomfort. Lymphatics: The patient denies palpable peripheral lymphadenopathy. Hematologic: The patient denies having bruising, bleeding. Positive progressive fatigue. Respiratory: Patient denies having shortness of breath, cough, sputum production, fever. Cardiovascular: The patient denies having leg pain, leg swelling, heart palpitations, chest permit, or chest pain. Positive for lightheadedness and dizziness when changing position.   The patient denies having progressive dyspnea on exertion. Gastrointestinal: The patient denies having nausea, emesis, or diarrhea. The patient denies having any hematemesis or blood in the stool. Genitourinary: Patient denies having urinary urgency, frequency, or dysuria. The patient denies having blood in the urine. Psychological: The patient denies having symptoms of nervousness, anxiety, depression, or thoughts of harming self. Skin: Patient denies having skin rashes, skin, ulcerations, or unexplained itching or pruritus. Musculoskeletal: The patient denies having pain in the joints or bones. Objective:  
 
Visit Vitals /56 Pulse 83 Temp 97.9 °F (36.6 °C) Ht 5' 6\" (1.676 m) Wt 64.8 kg (142 lb 12.8 oz) SpO2 100% BMI 23.05 kg/m² Physical Exam:  
Gen. Appearance: The patient is in no acute distress. On Home Oxygen. Looks tired. Pallor (+) Skin: There is no bruise or rash. HEENT: The exam is unremarkable. Neck: Supple without lymphadenopathy or thyromegaly. Lungs: Clear to auscultation and percussion; there are no wheezes or rhonchi. Heart: Regular rate and rhythm; there are no murmurs, gallops, or rubs. Abdomen: Bowel sounds are present and normal.  There is no guarding, tenderness, or hepatosplenomegaly. Extremities: There is no clubbing, cyanosis, or edema. Neurologic: There are no focal neurologic deficits. Lymphatics: There is no palpable peripheral lymphadenopathy. Musculoskeletal: The patient has full range of motion at all joints. There is no evidence of joint deformity or effusions. There is no focal joint tenderness. Psychological/psychiatric: There is no clinical evidence of anxiety, depression, or melancholy. Diagnostics: 
   
Results for orders placed or performed during the hospital encounter of 10/24/17 CBC WITH 3 PART DIFF     Status: Abnormal  
Result Value Ref Range Status  WBC 8.0 4.5 - 13.0 K/uL Final  
 RBC 3.94 (L) 4.10 - 5.10 M/uL Final  
 HGB 10.6 (L) 12.0 - 16 g/dL Final  
 HCT 33.9 (L) 36 - 48 % Final  
 MCV 86.0 78 - 102 FL Final  
 MCH 26.9 25.0 - 35.0 PG Final  
 MCHC 31.3 31 - 37 g/dL Final  
 RDW 14.7 (H) 11.5 - 14.5 % Final  
 PLATELET 600 510 - 463 K/uL Final  
 NEUTROPHILS 64 40 - 70 % Final  
 MIXED CELLS 10 0.1 - 17 % Final  
 LYMPHOCYTES 27 14 - 44 % Final  
 ABS. NEUTROPHILS 5.1 1.8 - 9.5 K/UL Final  
 ABS. MIXED CELLS 0.8 0.0 - 2.3 K/uL Final  
 ABS. LYMPHOCYTES 2.1 1.1 - 5.9 K/UL Final  
  Comment: Test performed at [place] [address] Location. Results Reviewed by Medical Director. DF AUTOMATED   Final  
 
 
Recent Results (from the past 2016 hour(s)) RETICULOCYTE COUNT Collection Time: 11/07/19  2:50 PM  
Result Value Ref Range Reticulocyte count 2.6 (H) 0.5 - 2.3 % CBC WITH AUTOMATED DIFF Collection Time: 11/07/19  2:50 PM  
Result Value Ref Range WBC 10.4 4.6 - 13.2 K/uL  
 RBC 2.59 (L) 4.70 - 5.50 M/uL HGB 7.2 (L) 13.0 - 16.0 g/dL HCT 23.6 (L) 36.0 - 48.0 % MCV 91.1 74.0 - 97.0 FL  
 MCH 27.8 24.0 - 34.0 PG  
 MCHC 30.5 (L) 31.0 - 37.0 g/dL  
 RDW 17.9 (H) 11.6 - 14.5 % PLATELET 009 646 - 210 K/uL MPV 9.6 9.2 - 11.8 FL  
 NEUTROPHILS 83 (H) 40 - 73 % LYMPHOCYTES 10 (L) 21 - 52 % MONOCYTES 7 3 - 10 % EOSINOPHILS 0 0 - 5 % BASOPHILS 0 0 - 2 %  
 ABS. NEUTROPHILS 8.7 (H) 1.8 - 8.0 K/UL  
 ABS. LYMPHOCYTES 1.0 0.9 - 3.6 K/UL  
 ABS. MONOCYTES 0.7 0.05 - 1.2 K/UL  
 ABS. EOSINOPHILS 0.0 0.0 - 0.4 K/UL  
 ABS. BASOPHILS 0.0 0.0 - 0.1 K/UL  
 DF AUTOMATED PLATELET COMMENTS ADEQUATE PLATELETS    
 RBC COMMENTS MICROCYTOSIS 
FEW 
SCHISTOCYTES 
FEW METABOLIC PANEL, BASIC Collection Time: 11/07/19  2:50 PM  
Result Value Ref Range Sodium 136 136 - 145 mmol/L Potassium 4.5 3.5 - 5.5 mmol/L Chloride 103 100 - 111 mmol/L  
 CO2 28 21 - 32 mmol/L Anion gap 5 3.0 - 18 mmol/L Glucose 106 (H) 74 - 99 mg/dL  BUN 28 (H) 7.0 - 18 MG/DL  
 Creatinine 1.52 (H) 0.6 - 1.3 MG/DL  
 BUN/Creatinine ratio 18 12 - 20 GFR est AA 54 (L) >60 ml/min/1.73m2 GFR est non-AA 44 (L) >60 ml/min/1.73m2 Calcium 7.8 (L) 8.5 - 10.1 MG/DL  
ERYTHROPOIETIN Collection Time: 11/07/19  2:50 PM  
Result Value Ref Range Erythropoietin 25.5 (H) 2.6 - 18.5 mIU/mL PROTEIN ELECTROPHORESIS Collection Time: 11/07/19  2:50 PM  
Result Value Ref Range Protein, total 6.5 6.0 - 8.5 g/dL Albumin 3.0 2.9 - 4.4 g/dL Alpha-1-globulin 0.3 0.0 - 0.4 g/dL ALPHA-2 GLOBULIN 0.9 0.4 - 1.0 g/dL Beta globulin 0.9 0.7 - 1.3 g/dL Gamma globulin 1.3 0.4 - 1.8 g/dL M-Navi Not Observed Not Observed g/dL Globulin, total 3.5 2.2 - 3.9 g/dL A/G ratio 0.9 0.7 - 1.7 VITAMIN B12 & FOLATE Collection Time: 11/07/19  2:50 PM  
Result Value Ref Range Vitamin B12 463 211 - 911 pg/mL Folate 15.4 3.10 - 17.50 ng/mL FERRITIN Collection Time: 11/07/19  2:50 PM  
Result Value Ref Range Ferritin 772 (H) 8 - 388 NG/ML  
IRON PROFILE Collection Time: 11/07/19  2:50 PM  
Result Value Ref Range Iron 19 (L) 50 - 175 ug/dL TIBC 204 (L) 250 - 450 ug/dL Iron % saturation 9 % Assessment and Plan: 1. Symptomatic Anemia 
- He has hx iron deficiency anemia diagnosed last year and has been on iron pills. - He has follow-up with Gastroenterology for GI bleeding.  
- Lab checked on 9/25/19 which reported Iron 26, Iron saturation 10%, Ferritin 429, and TIBC 259. Has received IV Iron  - Injectafer x 4 for symptomatic iron deficiency anemia. Last dose of Injactafer was on 10/15/2019. 
- No further obvious bleeding noticed. - Repeat labs 11/7/19 showed improving Iron profile, Ferritin 772. MCV improved to 91. No further iron IV planned at this time. - Vitamin B12/ Folate, and SPEP reviewed WNL. - Given schistocytes noted on smear, reticulocytosis, and elevated erythropoietin, will check if any hemolytic components contributing to his chronic anemia. Plan: - He will receive 1 unit PRBC transfusion for symptomatic anemia with decreased H/H, 7.2/23. 6. We will arrange for his blood transfusion early next week. - Will send for hemolytic panel: LDH, Haptoglobin, and Emiliano tests. - We will see him back in 3-4 weeks for follow-up and lab check. Always sooner if required. Brandt Angry - The patient was advised to seek an urgent medical care if having any acute bleeding or worsening symptoms. Orders Placed This Encounter  LD Standing Status:   Future Standing Expiration Date:   11/14/2020  
 HAPTOGLOBIN Standing Status:   Future Standing Expiration Date:   11/14/2020  DIRECT & INDIRECT EMILIANO ([facility] Only) Standing Status:   Future Standing Expiration Date:   11/14/2020 All of patient's questions answered to their apparent satisfaction. They verbally show understanding and agreement with the plan. About 30 minutes were spent for this encounter with more than 50% of the time spent in face-to-face counseling and discussing on diagnosis and management plan going forward. Follow-up and Dispositions  · Return in about 1 month (around 12/14/2019). Sydnee Yang MD 
11/14/2019 Parts of this document has been produced using Dragon dictation system. Unrecognized errors in transcription may be present. Please do not hesitate to reach out for any questions or clarifications.

## 2019-11-18 NOTE — PROGRESS NOTES
[address] [facility] Progress Note    Date: 2019    Name: Jerod Campoverde    MRN: [id number]         : 1940     1 unit PRBC      Mr. Alexis Mayo arrived to [facility] at 80 accompanied by his daughter. Mr. Alexis Mayo was assessed and education was provided. Discussed risks and benefits of blood transfusion with patient, including risk of transfusion reaction and disease transmission. Patient verbalized understanding and signed consent placed on chart. Mr. Mercy Hitchcock vitals were reviewed. Visit Vitals  /54 (BP 1 Location: Left arm, BP Patient Position: Sitting)   Pulse (!) 55   Temp 97.7 °F (36.5 °C)   Resp 18   SpO2 99%       22g IV inserted in patient's Left antecubital x1 attempt. Positive for blood return and flushes without difficulty. Normal saline initiated at [facility]. Pre-medications (Tylenol 650 mg and Benadryl 25 mg) were administered as ordered. PRBCs initiated @ 75 ml/hr at 1200. Fifteen minutes into infusion, VS stable and pt denied c/o SOB, itching/hives, lip/tongue/facial swelling, CP or other complaints. Infusion rate increased to 155 ml/hr. Fifteen minutes later, VS stable and pt denied complaints. Rate remained at 155 mL/hr for the remainder of the transfusion. Unit finished @ [phone]. VS stable and no transfusion reaction suspected. Patient Vitals for the past 12 hrs:   Temp Pulse Resp BP SpO2   19 1433 97.7 °F (36.5 °C) (!) 55 18 109/54 99 %   19 1400 98.4 °F (36.9 °C) (!) 58 18 114/64 100 %   19 1300 98 °F (36.7 °C) 69 18 101/56 100 %   19 1230 98.3 °F (36.8 °C) 63 18 105/57 100 %   19 1215 98.6 °F (37 °C) (!) 57 18 102/52 100 %   19 1152 98.5 °F (36.9 °C) 60 18 105/61 100 %   19 1101 97.5 °F (36.4 °C) 81 18 124/54 100 %       Mr. Reis tolerated infusion without complaints. IV removed. No irritation, bleeding, or hematoma noted at site. Gauze and coban applied to site. Patient declined to stay for observation today.     Discharge instructions reviewed with pt. Pt instructed to report SOB, CP, elevated temp, back pain, or other symptoms of transfusion reaction to MD or ED. Pt verbalized understanding. Patient armband removed and shredded    Mr. Reis was discharged from [place] [id number] in stable condition at 1435. He has no further appointments with [facility] [place] at this time.      Caty Villarreal RN  November 18, 2019

## 2019-11-23 PROBLEM — Z00.00 PREVENTATIVE HEALTH CARE: Status: RESOLVED | Noted: 2019-10-24 | Resolved: 2019-01-01

## 2019-11-27 NOTE — TELEPHONE ENCOUNTER
11/13/19: CALLED PT, MAILBOX FULL [phone]. OTHER PH BUSY. / 10/23/19: CALLED PT, [phone] CONTINUOUS BUSY. / IRON DEFICIENCY ANEMIA/DR SENIOR/RECORDS RCVD AND SCANNED TO CC. [facility]. WILL SAVE RECORDS BUT I SEE PT IS A DR GALDAMEZ PATIENT. NO NEED TO SEE DR Gretta Kennedy.

## 2019-12-19 NOTE — LETTER
12/19/19 Patient: Kemar Carmen YOB: 1940 Date of Visit: 12/19/2019 Jacob Yang DO 
[address] [facility] [address] [facility] [address] VIA In Basket Dear Jacob Yang DO, Thank you for referring Mr. Kimani García to [facility] for evaluation. My notes for this consultation are attached. If you have questions, please do not hesitate to call me. I look forward to following your patient along with you. Sincerely, Jerrod Martin MD

## 2019-12-19 NOTE — PATIENT INSTRUCTIONS
Iron Deficiency Anemia: Care Instructions  Your Care Instructions    Anemia means that you do not have enough red blood cells. Red blood cells carry oxygen around your body. When you have anemia, it can make you pale, weak, and tired. Many things can cause anemia. The most common cause is loss of blood. This can happen if you have heavy menstrual periods. It can also happen if you have bleeding in your stomach or bowel. You can also get anemia if you don't have enough iron in your diet or if it's hard for your body to absorb iron. In some cases, pregnancy causes anemia. That's because a pregnant woman needs more iron. Your doctor may do more tests to find the cause of your anemia. If a disease or other health problem is causing it, your doctor will treat that problem. It's important to follow up with your doctor to make sure that your iron level returns to normal.  Follow-up care is a key part of your treatment and safety. Be sure to make and go to all appointments, and call your doctor if you are having problems. It's also a good idea to know your test results and keep a list of the medicines you take. How can you care for yourself at home? · If your doctor recommended iron pills, take them as directed. ? Try to take the pills on an empty stomach. You can do this about 1 hour before or 2 hours after meals. But you may need to take iron with food to avoid an upset stomach. ? Do not take antacids or drink milk or anything with caffeine within 2 hours of when you take your iron. They can keep your body from absorbing the iron well. ? Vitamin C helps your body absorb iron. You may want to take iron pills with a glass of orange juice or some other food high in vitamin C.  ? Iron pills may cause stomach problems. These include heartburn, nausea, diarrhea, constipation, and cramps. It can help to drink plenty of fluids and include fruits, vegetables, and fiber in your diet.   ? It's normal for iron pills to make your stool a greenish or grayish black. But internal bleeding can also cause dark stool. So it's important to tell your doctor about any color changes. ? Call your doctor if you think you are having a problem with your iron pills. Even after you start to feel better, it will take several months for your body to build up its supply of iron. ? If you miss a pill, don't take a double dose. ? Keep iron pills out of the reach of small children. Too much iron can be very dangerous. · Eat foods with a lot of iron. These include red meat, shellfish, poultry, and eggs. They also include beans, raisins, whole-grain bread, and leafy green vegetables. · Steam your vegetables. This is the best way to prepare them if you want to get as much iron as possible. · Be safe with medicines. Do not take nonsteroidal anti-inflammatory pain relievers unless your doctor tells you to. These include aspirin, naproxen (Aleve), and ibuprofen (Advil, Motrin). · Liquid iron can stain your teeth. But you can mix it with water or juice and drink it with a straw. Then it won't get on your teeth. When should you call for help? Call 911 anytime you think you may need emergency care. For example, call if:    · You passed out (lost consciousness).    Call your doctor now or seek immediate medical care if:    · You are short of breath.     · You are dizzy or light-headed, or you feel like you may faint.     · You have new or worse bleeding.    Watch closely for changes in your health, and be sure to contact your doctor if:    · You feel weaker or more tired than usual.     · You do not get better as expected. Where can you learn more? Go to http://brittney-eliseo.info/. Enter Y881 in the search box to learn more about \"Iron Deficiency Anemia: Care Instructions. \"  Current as of: March 28, 2019  Content Version: 12.2  © 7100-5381 [employer].  Care instructions adapted under license by [employer] (which disclaims liability or warranty for this information). If you have questions about a medical condition or this instruction, always ask your healthcare professional. [address] any warranty or liability for your use of this information.

## 2019-12-19 NOTE — PROGRESS NOTES
Hematology/Oncology Note    Name: Uriel Free  Date: 2019  : 1940    Judith Moreno DO         Subjective:     Chief Complaint:  Follow up anemia    History of Present Illness:   Mr David Bains is here today for follow-up. He reported he felt better right after received blood transfusion with less fatigue. He noticed having fatigue, intermittent dizziness, and low energy again last few weeks. He denies any obvious bleeding, melena, bloody stools, hematochezia, nausea, vomiting, or abdominal pain. He noticed on and off dark stools for last few weeks. He is still on iron pills daily without tolerating issues. His EGD and Colonoscopy on 19 showed hiatal hernia, gastritis, ascending colon polyp; diverticulosis; and hemorroids. Denied progressive dyspnea, severe headache, chest pain, cough, abdominal pain, nausea, vomiting, diarrhea, dysuria, new focal weakness or numbness. History  Mr. David Bains is a most pleasant [de-identified]y.o. year old male who was seen for anemia. He has multiple co morbidities which includes of chronic atrial fibrillation, peripheral vascular disease, hypertension, coronary artery disease, pulmonary hypertension, CHF exacerbation, chronic pain syndrome, iron deficiency anemia, chronic kidney disease, and benign prostate hypertrophy. He was diagnosed with iron deficiency anemia since last year and has been on iron pills without any tolerating issues. On May 2019 he had bloody stools and was diagnosed with GI bleeding. His EGD/colonoscopy with polypectomy on 19 reported hiatal hernia, mild erosive gastritis few flecks of coffee grounds,1 cm colon polyp, diverticulosis, internal hemorrhoids. He has been on PPI since then. He received blood transfusion/IV iron during his hospitalization. For his iron deficiency anemia, he has received 4 doses of iron IV- Injectafer given by his GI doctor, last dose was on 10/15/2019.  He has followed up with GI recently and no further procedure planned. He has history of chronic A-fib but was hold off anticoagulation due to GI bleeding issues. He has been on home oxygen at 2-3 L for his COPD. Oncologic History:   No history exists. Past Medical History, Family History, and Social History:    Past Medical History:   Diagnosis Date    Aneurysm [facility])     AAA repair 2006 & 2012    Aorto-iliac duplex 02/13/2015    Tech difficult. Patent aorta bi-iliac endograft w/o leak or limb dysfunction.  Arrhythmia     a fib    Cardiac cath 11/01/2012    RCA (sm, nondom) patent. LM patent. LAD 25%. CX (dom) 45% mid. LVEDP 12 mmHg. No WMA. PA 27/12. W 10-12. CO/CI 5.2/2.6 (TD).  Cardiac echocardiogram, abnormal 02/20/2016    EF 55%. No WMA. Indeterminate diastolic fx. RVSP 60 mmHg. Severe LAE. Mild MR. Mod TR.  IVCE. Similar to study of 10/26/12.  Cardiovascular aorto-iliac duplex 02/13/2015    Patent aorta bi-iliac endovascular graft w/o leak or limb dysfunction. Sac measures 4.21 x 4.47 cm (4.4 x 4.6 cm on 1/31/14).  Cardiovascular LE peripheral arterial testing 07/29/2013    No significant LE arterial disease bilaterally. R GHADA 1. 12.  L GHADA 1. 12.  R DBI 0.83. L DBI 0.71. Exercise deferred.  Cardiovascular renal duplex 10/31/2012    No RA stenosis. Intrinsic/med disease in left kidney. Patent aortic endograft. Patent, thrombus-free renal veins bilaterally.  Carotid duplex 07/29/2013    Mild <50% bilateral ICA plaquing.       Chronic kidney disease     Chronic lung disease     Cigarette smoker     Congestive heart failure (CHF) (HCC)     COPD (chronic obstructive pulmonary disease) (HCC)     and emphysema; likely secondary to tobacco abuse    Difficult intubation     Dyslipidemia     low HDL    Emphysema     HTN (hypertension)     Hypercholesterolemia     Ill-defined condition     hernia    Increased prostate specific antigen (PSA) velocity     Long term (current) use of anticoagulants     coumadin    Osteoarthritis     Osteomyelitis (HCC)     Paroxysmal atrial fibrillation (HCC)     Peripheral vascular disease ([facility] [address]     AAA repair 12/2007    Persistent atrial Fibrillation     Persistent atrial fibrillation     Unspecified adverse effect of anesthesia     difficulty breathing placed in ICU Oct 2012 (AAA repair)     Past Surgical History:   Procedure Laterality Date    BRONCHOSCOPY DIAGNOSTIC  11/2/2012         CARDIAC CATHETERIZATION  11/1/2012         COLONOSCOPY N/A 7/26/2016    COLONOSCOPY with Polypectomies performed by Luna Walker MD at [address] COLONOSCOPY N/A 5/28/2019    COLONOSCOPY with polypectomy performed by Berlin Simmonds, MD at [address] HX AAA REPAIR      2006 & 2012    HX HEART CATHETERIZATION  3/4/2009    1. RCA small, nondominant; patent. 2. LMCA patent. 3. LAD is long, wrapped around apical vessel. Diffuse, 20-30% stenosis noted. 4. CCA is large, dominant vessel. Diffuse 20-30% stenosis. 5. LVEDP is 16 mmHg. 6. Overall left ventricular systolic function mildly diminshed with est. EF 45%. Mild, global hypokinesis noted. 7. No significant mitral regurgitation or aortic stenosis noted.  (see report)    HX HERNIA REPAIR  2/2014    rt inguinal     HX HERNIA REPAIR  01/2016    LEFT INGUINAL HERNIA REPAIR DR. Quirino De Guzman ING HERNIA,5+Y/O,JESSIE Left 1-20-16    Dr. Felicia Clarke  11/1/2012          Social History     Socioeconomic History    Marital status:      Spouse name: Not on file    Number of children: Not on file    Years of education: Not on file    Highest education level: Not on file   Occupational History    Not on file   Social Needs    Financial resource strain: Not on file    Food insecurity:     Worry: Not on file     Inability: Not on file    Transportation needs:     Medical: Not on file     Non-medical: Not on file   Tobacco Use    Smoking status: Former Smoker     Packs/day: 1.50     Years: 54.00     Pack years: 81.00 Types: Cigarettes     Last attempt to quit: 10/23/2012     Years since quittin.1    Smokeless tobacco: Never Used   Substance and Sexual Activity    Alcohol use: No     Alcohol/week: 0.0 standard drinks     Comment: quit drinking alcohol 26 years ago, patient states stopped in \"5714\"    Drug use: No    Sexual activity: Not Currently   Lifestyle    Physical activity:     Days per week: Not on file     Minutes per session: Not on file    Stress: Not on file   Relationships    Social connections:     Talks on phone: Not on file     Gets together: Not on file     Attends Jainism service: Not on file     Active member of club or organization: Not on file     Attends meetings of clubs or organizations: Not on file     Relationship status: Not on file    Intimate partner violence:     Fear of current or ex partner: Not on file     Emotionally abused: Not on file     Physically abused: Not on file     Forced sexual activity: Not on file   Other Topics Concern    Not on file   Social History Narrative    Not on file     Family History   Problem Relation Age of Onset    Heart Surgery Father         BYPASS    Stroke Father     Heart Disease Father     Hypertension Father     Heart Attack Father     Heart Surgery Mother         BYPASS    Coronary Artery Disease Mother     Stroke Mother     Heart Disease Mother     Hypertension Mother     Diabetes Sister     Cancer Brother      Current Outpatient Medications   Medication Sig Dispense Refill    docusate sodium 100 mg tab Take 100 mg by mouth as needed for Other (constipation).  STIOLTO RESPIMAT 2.5-2.5 mcg/actuation inhaler inhale 2 inhalations by mouth once daily 1 Inhaler 5    ferrous sulfate 325 mg (65 mg iron) tablet Take 325 mg by mouth every other day. Indications: anemia from inadequate iron      ascorbic acid, vitamin C, (VITAMIN C) 250 mg tablet Take 1 Tab by mouth two (2) times a day.  30 Tab 0    pantoprazole (PROTONIX) 40 mg tablet Take 1 Tab by mouth Daily (before breakfast). 30 Tab 0    tamsulosin (FLOMAX) 0.4 mg capsule Take 0.4 mg by mouth daily.  HYDROcodone-acetaminophen (NORCO) 5-325 mg per tablet Take 0.5 Tabs by mouth two (2) times daily as needed for Pain.  albuterol (PROVENTIL VENTOLIN) 2.5 mg /3 mL (0.083 %) nebulizer solution 3 mL by Nebulization route every four (4) hours as needed for Wheezing. 24 Each 3    traZODone (DESYREL) 50 mg tablet Take 50 mg by mouth nightly.  lisinopril (PRINIVIL, ZESTRIL) 20 mg tablet Take 1 Tab by mouth daily. 30 Tab 0    pravastatin (PRAVACHOL) 40 mg tablet Take 40 mg by mouth nightly.  polyethylene glycol (MIRALAX) 17 gram packet Take 17 g by mouth daily as needed (constipation).  digoxin (DIGITEK) 0.125 mg tablet take 1 tablet by mouth once daily 90 Tab 3    amLODIPine (NORVASC) 10 mg tablet Take 10 mg by mouth daily. 0    OXYGEN-AIR DELIVERY SYSTEMS 2 L/min by Nasal route daily. Continuous. Company is [employer]            Review of Systems:  Constitutional: The patient has no acute distress or discomfort. Positive for generalized fatigue, tiredness, low energy level. HEENT: The patient denies recent head trauma, eye pain, blurred vision,  hearing deficit, oropharyngeal mucosal pain or lesions, and the patient denies throat pain or discomfort. Lymphatics: The patient denies palpable peripheral lymphadenopathy. Hematologic: The patient denies having bruising, bleeding. Positive fatigue. Respiratory: Patient denies having shortness of breath, cough, sputum production, fever. Cardiovascular: The patient denies having leg pain, leg swelling, heart palpitations, chest permit, or chest pain. Positive for intermittent dizziness when changing position. The patient denies having progressive dyspnea on exertion. Gastrointestinal: The patient denies having nausea, emesis, or diarrhea. The patient denies having any hematemesis or blood in the stool.   Genitourinary: Patient denies having urinary urgency, frequency, or dysuria. The patient denies having blood in the urine. Psychological: The patient denies having symptoms of nervousness, anxiety, depression, or thoughts of harming self. Skin: Patient denies having skin rashes, skin, ulcerations, or unexplained itching or pruritus. Musculoskeletal: The patient denies having pain in the joints or bones. Objective:     Visit Vitals  /62 (BP 1 Location: Right arm, BP Patient Position: Sitting)   Pulse 69   Temp 96.9 °F (36.1 °C) (Oral)   Resp 22   Ht 5' 6\" (1.676 m)   Wt 65.9 kg (145 lb 3.2 oz)   SpO2 100% Comment: NC 2L   BMI 23.44 kg/m²         Physical Exam:   Gen. Appearance: The patient is in no acute distress. On Home Oxygen. Skin: There is no bruise or rash. HEENT: The exam is unremarkable. Neck: Supple without lymphadenopathy or thyromegaly. Lungs: Clear to auscultation and percussion; there are no wheezes or rhonchi. Heart: Regular rate and rhythm; there are no murmurs, gallops, or rubs. Abdomen: Bowel sounds are present and normal.  There is no guarding, tenderness, or hepatosplenomegaly. Extremities: There is no clubbing, cyanosis, or edema. Neurologic: There are no focal neurologic deficits. Lymphatics: There is no palpable peripheral lymphadenopathy. Musculoskeletal: The patient has full range of motion at all joints. There is no evidence of joint deformity or effusions. There is no focal joint tenderness. Psychological/psychiatric: There is no clinical evidence of anxiety, depression, or melancholy.       Diagnostics:      Results for orders placed or performed during the hospital encounter of 10/24/17   CBC WITH 3 PART DIFF     Status: Abnormal   Result Value Ref Range Status    WBC 8.0 4.5 - 13.0 K/uL Final    RBC 3.94 (L) 4.10 - 5.10 M/uL Final    HGB 10.6 (L) 12.0 - 16 g/dL Final    HCT 33.9 (L) 36 - 48 % Final    MCV 86.0 78 - 102 FL Final    MCH 26.9 25.0 - 35.0 PG Final    MCHC 31.3 31 - 37 g/dL Final    RDW 14.7 (H) 11.5 - 14.5 % Final    PLATELET 176 325 - 085 K/uL Final    NEUTROPHILS 64 40 - 70 % Final    MIXED CELLS 10 0.1 - 17 % Final    LYMPHOCYTES 27 14 - 44 % Final    ABS. NEUTROPHILS 5.1 1.8 - 9.5 K/UL Final    ABS. MIXED CELLS 0.8 0.0 - 2.3 K/uL Final    ABS. LYMPHOCYTES 2.1 1.1 - 5.9 K/UL Final     Comment: Test performed at [place] [id number] Location. Results Reviewed by Medical Director. DF AUTOMATED   Final       Recent Results (from the past 2016 hour(s))   RETICULOCYTE COUNT    Collection Time: 11/07/19  2:50 PM   Result Value Ref Range    Reticulocyte count 2.6 (H) 0.5 - 2.3 %   CBC WITH AUTOMATED DIFF    Collection Time: 11/07/19  2:50 PM   Result Value Ref Range    WBC 10.4 4.6 - 13.2 K/uL    RBC 2.59 (L) 4.70 - 5.50 M/uL    HGB 7.2 (L) 13.0 - 16.0 g/dL    HCT 23.6 (L) 36.0 - 48.0 %    MCV 91.1 74.0 - 97.0 FL    MCH 27.8 24.0 - 34.0 PG    MCHC 30.5 (L) 31.0 - 37.0 g/dL    RDW 17.9 (H) 11.6 - 14.5 %    PLATELET 480 833 - 683 K/uL    MPV 9.6 9.2 - 11.8 FL    NEUTROPHILS 83 (H) 40 - 73 %    LYMPHOCYTES 10 (L) 21 - 52 %    MONOCYTES 7 3 - 10 %    EOSINOPHILS 0 0 - 5 %    BASOPHILS 0 0 - 2 %    ABS. NEUTROPHILS 8.7 (H) 1.8 - 8.0 K/UL    ABS. LYMPHOCYTES 1.0 0.9 - 3.6 K/UL    ABS. MONOCYTES 0.7 0.05 - 1.2 K/UL    ABS. EOSINOPHILS 0.0 0.0 - 0.4 K/UL    ABS.  BASOPHILS 0.0 0.0 - 0.1 K/UL    DF AUTOMATED      PLATELET COMMENTS ADEQUATE PLATELETS      RBC COMMENTS MICROCYTOSIS  FEW  SCHISTOCYTES  FEW       METABOLIC PANEL, BASIC    Collection Time: 11/07/19  2:50 PM   Result Value Ref Range    Sodium 136 136 - 145 mmol/L    Potassium 4.5 3.5 - 5.5 mmol/L    Chloride 103 100 - 111 mmol/L    CO2 28 21 - 32 mmol/L    Anion gap 5 3.0 - 18 mmol/L    Glucose 106 (H) 74 - 99 mg/dL    BUN 28 (H) 7.0 - 18 MG/DL    Creatinine 1.52 (H) 0.6 - 1.3 MG/DL    BUN/Creatinine ratio 18 12 - 20      GFR est AA 54 (L) >60 ml/min/1.73m2    GFR est non-AA 44 (L) >60 ml/min/1.73m2    Calcium 7.8 (L) 8.5 - 10.1 MG/DL   ERYTHROPOIETIN    Collection Time: 11/07/19  2:50 PM   Result Value Ref Range    Erythropoietin 25.5 (H) 2.6 - 18.5 mIU/mL   PROTEIN ELECTROPHORESIS    Collection Time: 11/07/19  2:50 PM   Result Value Ref Range    Protein, total 6.5 6.0 - 8.5 g/dL    Albumin 3.0 2.9 - 4.4 g/dL    Alpha-1-globulin 0.3 0.0 - 0.4 g/dL    ALPHA-2 GLOBULIN 0.9 0.4 - 1.0 g/dL    Beta globulin 0.9 0.7 - 1.3 g/dL    Gamma globulin 1.3 0.4 - 1.8 g/dL    M-Navi Not Observed Not Observed g/dL    Globulin, total 3.5 2.2 - 3.9 g/dL    A/G ratio 0.9 0.7 - 1.7     VITAMIN B12 & FOLATE    Collection Time: 11/07/19  2:50 PM   Result Value Ref Range    Vitamin B12 463 211 - 911 pg/mL    Folate 15.4 3.10 - 17.50 ng/mL   FERRITIN    Collection Time: 11/07/19  2:50 PM   Result Value Ref Range    Ferritin 772 (H) 8 - 388 NG/ML   IRON PROFILE    Collection Time: 11/07/19  2:50 PM   Result Value Ref Range    Iron 19 (L) 50 - 175 ug/dL    TIBC 204 (L) 250 - 450 ug/dL    Iron % saturation 9 %   HAPTOGLOBIN    Collection Time: 11/15/19  2:40 PM   Result Value Ref Range    Haptoglobin 208 (H) 30 - 200 mg/dL   LD    Collection Time: 11/15/19  2:40 PM   Result Value Ref Range     81 - 234 U/L   TYPE + CROSSMATCH    Collection Time: 11/15/19  2:45 PM   Result Value Ref Range    Crossmatch Expiration 11/18/2019     ABO/Rh(D) O POSITIVE     Antibody screen NEG     Unit number [id number]     Blood component type RC LR,2     Unit division 00     Status of unit TRANSFUSED     Crossmatch result Compatible    DIRECT EMILIANO    Collection Time: 11/15/19  2:45 PM   Result Value Ref Range    KYLAH Poly NEG     KYLAH IgG NEG     KYLAH C3b/C3d NEG          Assessment and Plan:     1. Chronic Normocytic Anemia  - He has hx iron deficiency anemia diagnosed last year and has been on iron pills. - He has follow-up with Gastroenterology for GI bleeding.  EGD/Colonoscopy on 5/28/19 showed hiatal hernia, gastritis, ascending colon polyp; diverticulosis; and hemorroids.  - Lab checked on 9/25/19 which reported Iron 26, Iron saturation 10%, Ferritin 429, and TIBC 259. Has received IV Iron  - Injectafer x 4 for symptomatic iron deficiency anemia. Last dose of Injactafer was on 10/15/2019.  - 11/7/19 labs showed improving Iron profile, Ferritin 772. MCV improved to 91. No further iron IV planned at that time. - Vitamin B12/ Folate, and SPEP reviewed WNL. Schistocytes noted on smear but low reticulocyte production index - RPI (inadequate response to correct the anemia) and negative for hemolytic panels (, Hapto 208, KYLAH neg). - His chronic anemia likely multifactorial from iron deficiency anemia and anemia of chronic kidney disease. Status post 1 unit PRBC transfusion at last visit with improving fatigue. Recently reported fatigue and tiredness again with couple dark stools noted. Plan:  - We will check his labs today. If H/H is persistent low will consider supportive transfusion.  - Continue iron pills. Will monitor iron study with ferritin if any further IV Iron needed. - Recommended the patient to have close follow up with Gastroenterology. - The patient was advised to seek a prompt medical care if any obvious bleeding or worsening symptoms or any concerns. - We will see him back in 4 weeks for follow-up. Always sooner if required. .      Orders Placed This Encounter    CBC WITH AUTOMATED DIFF     Standing Status:   Future     Standing Expiration Date:   63/48/9505    METABOLIC PANEL, COMPREHENSIVE     Standing Status:   Future     Standing Expiration Date:   12/19/2020       All of patient's questions answered to their apparent satisfaction. They verbally show understanding and agreement with the plan. About 31 minutes were spent for this encounter with more than 50% of the time spent in face-to-face counseling and discussing on diagnosis and management plan going forward.     Follow-up and Dispositions  ·   Return in about 4 weeks (around 1/16/2020), or if symptoms worsen or fail to improve. Rafael Saldaña MD  12/19/2019      Parts of this document has been produced using Dragon dictation system. Unrecognized errors in transcription may be present. Please do not hesitate to reach out for any questions or clarifications.

## 2019-12-27 NOTE — PROGRESS NOTES
[facility] Progress Note    Date: 2019    Name: Bunnie Essex    MRN: [id number]         : 1940     2 unit PRBCs      Mr. Henna Whitmore arrived to [facility] at [phone] accompanied by his daughter. Mr. Henna Whitmore was assessed and education was provided. Discussed risks and benefits of blood transfusion with patient, including risk of transfusion reaction and disease transmission. Patient verbalized understanding and signed consent placed on chart. Mr. Sixto Donald vitals were reviewed. Visit Vitals  /57 (BP 1 Location: Left arm, BP Patient Position: Sitting)   Pulse (!) 51   Temp 98 °F (36.7 °C)   Resp 18   SpO2 99%       22g IV inserted in patient's Left antecubital x2 attempt. Positive for blood return and flushes without difficulty. Normal saline initiated at [facility]. Pre-medications (Tylenol 650 mg and Benadryl 25 mg) were administered as ordered. PRBCs initiated at 1005 @ 75 ml/hr at . Fifteen minutes into infusion, VS stable and pt denied c/o SOB, itching/hives, lip/tongue/facial swelling, CP or other complaints. Infusion rate increased to 155 ml/hr. Fifteen minutes later, VS stable and pt denied complaints. Rate remained at 155 mL/hr for the remainder of the transfusion. Unit finished @ [phone]. VS stable and no transfusion reaction suspected. PRBCs initiated at 1240 @ 75 ml/hr at . Fifteen minutes into infusion, VS stable and pt denied c/o SOB, itching/hives, lip/tongue/facial swelling, CP or other complaints. Infusion rate increased to 155 ml/hr. Fifteen minutes later, VS stable and pt denied complaints. Rate remained at 155 mL/hr for the remainder of the transfusion. Unit finished @ 1503. VS stable and no transfusion reaction suspected.     Patient Vitals for the past 12 hrs:   Temp Pulse Resp BP SpO2   19 1503 98 °F (36.7 °C) (!) 51 18 117/57 99 %   19 1440 98 °F (36.7 °C) (!) 56 18 109/58 97 %   19 1340 98.2 °F (36.8 °C) (!) 56 18 107/52 100 %   19 1310 97.2 °F (36.2 °C) (!) 58 18 109/56 100 %   12/27/19 1255 97.6 °F (36.4 °C) (!) 56 18 118/63 99 %   12/27/19 1228 98.2 °F (36.8 °C) 61 18 102/56 97 %   12/27/19 1205 98.2 °F (36.8 °C) (!) 48 18 99/56 97 %   12/27/19 1105 98.6 °F (37 °C) (!) 48 18 107/60 99 %   12/27/19 1035 98.3 °F (36.8 °C) (!) 50 18 95/47 98 %   12/27/19 1020 98.3 °F (36.8 °C) (!) 57 18 105/62 98 %   12/27/19 0957 98 °F (36.7 °C) (!) 50 18 106/49 100 %   12/27/19 0907 97.6 °F (36.4 °C) 73 18 139/62 99 %       Mr. Reis tolerated infusion without complaints. IV removed. No irritation, bleeding, or hematoma noted at site. Gauze and coban applied to site. Patient declined to stay for observation today. Discharge instructions reviewed with pt. Pt instructed to report SOB, CP, elevated temp, back pain, or other symptoms of transfusion reaction to MD or ED. Pt verbalized understanding. Patient armband removed and shredded    Mr. Mikale Tejada was discharged from [place] [address] in stable condition at 1505. He has no further appointments with [facility] at this time.      Nico Arce RN  December 27, 2019

## 2020-01-01 ENCOUNTER — HOME CARE VISIT (OUTPATIENT)
Dept: HOSPICE | Facility: HOSPICE | Age: 80
End: 2020-01-01
Payer: MEDICARE

## 2020-01-01 ENCOUNTER — HOSPITAL ENCOUNTER (OUTPATIENT)
Dept: CT IMAGING | Age: 80
Discharge: HOME | End: 2020-02-17
Attending: SURGERY
Payer: MEDICARE

## 2020-01-01 ENCOUNTER — HOME HEALTH ADMISSION (OUTPATIENT)
Dept: HOME HEALTH SERVICES | Facility: HOME HEALTH | Age: 80
End: 2020-01-01
Payer: MEDICARE

## 2020-01-01 ENCOUNTER — OFFICE VISIT (OUTPATIENT)
Dept: ONCOLOGY | Age: 80
End: 2020-01-01

## 2020-01-01 ENCOUNTER — HOME CARE VISIT (OUTPATIENT)
Dept: SCHEDULING | Facility: HOME HEALTH | Age: 80
End: 2020-01-01
Payer: MEDICARE

## 2020-01-01 ENCOUNTER — HOSPITAL ENCOUNTER (INPATIENT)
Age: 80
LOS: 5 days | Discharge: HOME HEALTH | DRG: 193 | End: 2020-01-31
Attending: EMERGENCY MEDICINE | Admitting: FAMILY MEDICINE
Payer: MEDICARE

## 2020-01-01 ENCOUNTER — HOSPITAL ENCOUNTER (OUTPATIENT)
Dept: INFUSION THERAPY | Age: 80
Discharge: HOME | End: 2020-05-20
Payer: MEDICARE

## 2020-01-01 ENCOUNTER — APPOINTMENT (OUTPATIENT)
Dept: GENERAL RADIOLOGY | Age: 80
DRG: 871 | End: 2020-01-01
Attending: STUDENT IN AN ORGANIZED HEALTH CARE EDUCATION/TRAINING PROGRAM
Payer: MEDICARE

## 2020-01-01 ENCOUNTER — APPOINTMENT (OUTPATIENT)
Dept: GENERAL RADIOLOGY | Age: 80
DRG: 871 | End: 2020-01-01
Attending: PHYSICIAN ASSISTANT
Payer: MEDICARE

## 2020-01-01 ENCOUNTER — HOSPITAL ENCOUNTER (OUTPATIENT)
Dept: LAB | Age: 80
Discharge: HOME | End: 2020-05-07
Payer: MEDICARE

## 2020-01-01 ENCOUNTER — HOSPITAL ENCOUNTER (OUTPATIENT)
Dept: INFUSION THERAPY | Age: 80
End: 2020-01-01
Payer: MEDICARE

## 2020-01-01 ENCOUNTER — OFFICE VISIT (OUTPATIENT)
Dept: CARDIOLOGY CLINIC | Age: 80
End: 2020-01-01

## 2020-01-01 ENCOUNTER — HOSPICE ADMISSION (OUTPATIENT)
Dept: HOSPICE | Facility: HOSPICE | Age: 80
End: 2020-01-01
Payer: MEDICARE

## 2020-01-01 ENCOUNTER — ANESTHESIA EVENT (OUTPATIENT)
Dept: ENDOSCOPY | Age: 80
DRG: 871 | End: 2020-01-01
Payer: MEDICARE

## 2020-01-01 ENCOUNTER — HOSPITAL ENCOUNTER (OUTPATIENT)
Dept: INFUSION THERAPY | Age: 80
Discharge: HOME | End: 2020-05-13
Payer: MEDICARE

## 2020-01-01 ENCOUNTER — OFFICE VISIT (OUTPATIENT)
Dept: PULMONOLOGY | Age: 80
End: 2020-01-01

## 2020-01-01 ENCOUNTER — APPOINTMENT (OUTPATIENT)
Dept: CT IMAGING | Age: 80
DRG: 871 | End: 2020-01-01
Attending: EMERGENCY MEDICINE
Payer: MEDICARE

## 2020-01-01 ENCOUNTER — APPOINTMENT (OUTPATIENT)
Dept: MRI IMAGING | Age: 80
DRG: 871 | End: 2020-01-01
Attending: INTERNAL MEDICINE
Payer: MEDICARE

## 2020-01-01 ENCOUNTER — APPOINTMENT (OUTPATIENT)
Dept: GENERAL RADIOLOGY | Age: 80
DRG: 193 | End: 2020-01-01
Attending: FAMILY MEDICINE
Payer: MEDICARE

## 2020-01-01 ENCOUNTER — HOSPITAL ENCOUNTER (OUTPATIENT)
Dept: ONCOLOGY | Age: 80
Discharge: HOME | End: 2020-08-07

## 2020-01-01 ENCOUNTER — ANESTHESIA (OUTPATIENT)
Dept: ENDOSCOPY | Age: 80
DRG: 871 | End: 2020-01-01
Payer: MEDICARE

## 2020-01-01 ENCOUNTER — HOSPITAL ENCOUNTER (OUTPATIENT)
Dept: LAB | Age: 80
Discharge: HOME | End: 2020-08-07
Payer: MEDICARE

## 2020-01-01 ENCOUNTER — HOSPITAL ENCOUNTER (OUTPATIENT)
Dept: INFUSION THERAPY | Age: 80
Discharge: HOME | End: 2020-01-20
Payer: MEDICARE

## 2020-01-01 ENCOUNTER — HOSPITAL ENCOUNTER (OUTPATIENT)
Dept: INFUSION THERAPY | Age: 80
Discharge: HOME | End: 2020-01-21
Payer: MEDICARE

## 2020-01-01 ENCOUNTER — APPOINTMENT (OUTPATIENT)
Dept: MRI IMAGING | Age: 80
DRG: 871 | End: 2020-01-01
Attending: STUDENT IN AN ORGANIZED HEALTH CARE EDUCATION/TRAINING PROGRAM
Payer: MEDICARE

## 2020-01-01 ENCOUNTER — HOSPITAL ENCOUNTER (OUTPATIENT)
Dept: INFUSION THERAPY | Age: 80
Discharge: HOME | End: 2020-03-05
Payer: MEDICARE

## 2020-01-01 ENCOUNTER — APPOINTMENT (OUTPATIENT)
Dept: GENERAL RADIOLOGY | Age: 80
DRG: 193 | End: 2020-01-01
Attending: PHYSICIAN ASSISTANT
Payer: MEDICARE

## 2020-01-01 ENCOUNTER — HOME CARE VISIT (OUTPATIENT)
Dept: HOME HEALTH SERVICES | Facility: HOME HEALTH | Age: 80
End: 2020-01-01
Payer: MEDICARE

## 2020-01-01 ENCOUNTER — TELEPHONE (OUTPATIENT)
Dept: ONCOLOGY | Age: 80
End: 2020-01-01

## 2020-01-01 ENCOUNTER — HOSPITAL ENCOUNTER (OUTPATIENT)
Dept: INFUSION THERAPY | Age: 80
Discharge: HOME | End: 2020-01-16
Payer: MEDICARE

## 2020-01-01 ENCOUNTER — CLINICAL SUPPORT (OUTPATIENT)
Dept: PULMONOLOGY | Age: 80
End: 2020-01-01

## 2020-01-01 ENCOUNTER — OFFICE VISIT (OUTPATIENT)
Dept: VASCULAR SURGERY | Age: 80
End: 2020-01-01

## 2020-01-01 ENCOUNTER — HOSPITAL ENCOUNTER (OUTPATIENT)
Dept: LAB | Age: 80
Discharge: HOME | End: 2020-06-04
Payer: MEDICARE

## 2020-01-01 ENCOUNTER — APPOINTMENT (OUTPATIENT)
Dept: NON INVASIVE DIAGNOSTICS | Age: 80
DRG: 871 | End: 2020-01-01
Attending: STUDENT IN AN ORGANIZED HEALTH CARE EDUCATION/TRAINING PROGRAM
Payer: MEDICARE

## 2020-01-01 ENCOUNTER — HOSPITAL ENCOUNTER (OUTPATIENT)
Dept: GENERAL RADIOLOGY | Age: 80
Discharge: HOME | End: 2020-07-24
Payer: MEDICARE

## 2020-01-01 ENCOUNTER — HOSPITAL ENCOUNTER (OUTPATIENT)
Dept: INFUSION THERAPY | Age: 80
Discharge: HOME | End: 2020-02-11
Payer: MEDICARE

## 2020-01-01 ENCOUNTER — HOSPITAL ENCOUNTER (INPATIENT)
Age: 80
LOS: 10 days | Discharge: HOSPICE | DRG: 871 | End: 2020-09-03
Attending: EMERGENCY MEDICINE | Admitting: FAMILY MEDICINE
Payer: MEDICARE

## 2020-01-01 ENCOUNTER — HOSPITAL ENCOUNTER (OUTPATIENT)
Dept: INFUSION THERAPY | Age: 80
Discharge: HOME | End: 2020-05-12
Payer: MEDICARE

## 2020-01-01 VITALS
DIASTOLIC BLOOD PRESSURE: 60 MMHG | SYSTOLIC BLOOD PRESSURE: 120 MMHG | OXYGEN SATURATION: 99 % | HEART RATE: 59 BPM | TEMPERATURE: 98.6 F

## 2020-01-01 VITALS
DIASTOLIC BLOOD PRESSURE: 70 MMHG | SYSTOLIC BLOOD PRESSURE: 100 MMHG | DIASTOLIC BLOOD PRESSURE: 81 MMHG | SYSTOLIC BLOOD PRESSURE: 114 MMHG | HEART RATE: 114 BPM | HEART RATE: 122 BPM | TEMPERATURE: 96.3 F | OXYGEN SATURATION: 74 % | OXYGEN SATURATION: 99 % | RESPIRATION RATE: 18 BRPM | RESPIRATION RATE: 24 BRPM | TEMPERATURE: 98.1 F

## 2020-01-01 VITALS
SYSTOLIC BLOOD PRESSURE: 118 MMHG | HEART RATE: 74 BPM | DIASTOLIC BLOOD PRESSURE: 66 MMHG | WEIGHT: 132.6 LBS | RESPIRATION RATE: 18 BRPM | OXYGEN SATURATION: 96 % | HEIGHT: 66 IN | BODY MASS INDEX: 21.31 KG/M2 | TEMPERATURE: 97.4 F

## 2020-01-01 VITALS — TEMPERATURE: 98.5 F | OXYGEN SATURATION: 96 % | SYSTOLIC BLOOD PRESSURE: 117 MMHG | DIASTOLIC BLOOD PRESSURE: 56 MMHG

## 2020-01-01 VITALS
TEMPERATURE: 97.9 F | HEART RATE: 65 BPM | DIASTOLIC BLOOD PRESSURE: 73 MMHG | OXYGEN SATURATION: 99 % | SYSTOLIC BLOOD PRESSURE: 128 MMHG

## 2020-01-01 VITALS
TEMPERATURE: 98.2 F | WEIGHT: 145.94 LBS | BODY MASS INDEX: 23.46 KG/M2 | SYSTOLIC BLOOD PRESSURE: 117 MMHG | HEIGHT: 66 IN | OXYGEN SATURATION: 99 % | RESPIRATION RATE: 11 BRPM | HEART RATE: 72 BPM | DIASTOLIC BLOOD PRESSURE: 62 MMHG

## 2020-01-01 VITALS
DIASTOLIC BLOOD PRESSURE: 60 MMHG | BODY MASS INDEX: 21.38 KG/M2 | SYSTOLIC BLOOD PRESSURE: 120 MMHG | OXYGEN SATURATION: 97 % | HEIGHT: 66 IN | WEIGHT: 133 LBS | HEART RATE: 73 BPM | RESPIRATION RATE: 24 BRPM | TEMPERATURE: 97.5 F

## 2020-01-01 VITALS
RESPIRATION RATE: 28 BRPM | OXYGEN SATURATION: 98 % | DIASTOLIC BLOOD PRESSURE: 72 MMHG | SYSTOLIC BLOOD PRESSURE: 128 MMHG | TEMPERATURE: 97.8 F | HEART RATE: 78 BPM

## 2020-01-01 VITALS
DIASTOLIC BLOOD PRESSURE: 57 MMHG | SYSTOLIC BLOOD PRESSURE: 105 MMHG | TEMPERATURE: 97.9 F | HEART RATE: 71 BPM | OXYGEN SATURATION: 98 %

## 2020-01-01 VITALS
SYSTOLIC BLOOD PRESSURE: 116 MMHG | TEMPERATURE: 97.5 F | RESPIRATION RATE: 20 BRPM | DIASTOLIC BLOOD PRESSURE: 64 MMHG | HEART RATE: 72 BPM | OXYGEN SATURATION: 99 %

## 2020-01-01 VITALS
OXYGEN SATURATION: 97 % | TEMPERATURE: 96.8 F | HEART RATE: 126 BPM | SYSTOLIC BLOOD PRESSURE: 139 MMHG | RESPIRATION RATE: 16 BRPM | DIASTOLIC BLOOD PRESSURE: 72 MMHG

## 2020-01-01 VITALS
HEART RATE: 67 BPM | SYSTOLIC BLOOD PRESSURE: 136 MMHG | SYSTOLIC BLOOD PRESSURE: 118 MMHG | OXYGEN SATURATION: 100 % | HEART RATE: 80 BPM | TEMPERATURE: 98.2 F | DIASTOLIC BLOOD PRESSURE: 60 MMHG | DIASTOLIC BLOOD PRESSURE: 68 MMHG | OXYGEN SATURATION: 99 %

## 2020-01-01 VITALS — TEMPERATURE: 97.7 F | OXYGEN SATURATION: 100 % | HEART RATE: 78 BPM

## 2020-01-01 VITALS
BODY MASS INDEX: 22.76 KG/M2 | HEART RATE: 60 BPM | SYSTOLIC BLOOD PRESSURE: 109 MMHG | TEMPERATURE: 96.9 F | WEIGHT: 141.6 LBS | OXYGEN SATURATION: 98 % | HEIGHT: 66 IN | DIASTOLIC BLOOD PRESSURE: 53 MMHG | RESPIRATION RATE: 18 BRPM

## 2020-01-01 VITALS
RESPIRATION RATE: 16 BRPM | OXYGEN SATURATION: 99 % | BODY MASS INDEX: 21.76 KG/M2 | HEART RATE: 83 BPM | TEMPERATURE: 97.5 F | WEIGHT: 134.8 LBS | DIASTOLIC BLOOD PRESSURE: 53 MMHG | SYSTOLIC BLOOD PRESSURE: 149 MMHG

## 2020-01-01 VITALS
SYSTOLIC BLOOD PRESSURE: 86 MMHG | OXYGEN SATURATION: 86 % | DIASTOLIC BLOOD PRESSURE: 58 MMHG | HEART RATE: 68 BPM | RESPIRATION RATE: 10 BRPM | TEMPERATURE: 97.8 F

## 2020-01-01 VITALS
RESPIRATION RATE: 18 BRPM | DIASTOLIC BLOOD PRESSURE: 73 MMHG | TEMPERATURE: 97 F | RESPIRATION RATE: 18 BRPM | HEART RATE: 63 BPM | SYSTOLIC BLOOD PRESSURE: 134 MMHG | OXYGEN SATURATION: 96 % | DIASTOLIC BLOOD PRESSURE: 74 MMHG | HEART RATE: 94 BPM | OXYGEN SATURATION: 100 % | SYSTOLIC BLOOD PRESSURE: 136 MMHG | TEMPERATURE: 97.2 F

## 2020-01-01 VITALS
SYSTOLIC BLOOD PRESSURE: 130 MMHG | OXYGEN SATURATION: 93 % | TEMPERATURE: 98.4 F | HEART RATE: 87 BPM | DIASTOLIC BLOOD PRESSURE: 80 MMHG | RESPIRATION RATE: 18 BRPM

## 2020-01-01 VITALS
RESPIRATION RATE: 20 BRPM | HEART RATE: 65 BPM | TEMPERATURE: 97.7 F | DIASTOLIC BLOOD PRESSURE: 69 MMHG | OXYGEN SATURATION: 100 % | SYSTOLIC BLOOD PRESSURE: 123 MMHG

## 2020-01-01 VITALS
OXYGEN SATURATION: 100 % | HEART RATE: 65 BPM | DIASTOLIC BLOOD PRESSURE: 63 MMHG | TEMPERATURE: 97.3 F | SYSTOLIC BLOOD PRESSURE: 138 MMHG | RESPIRATION RATE: 18 BRPM

## 2020-01-01 VITALS
RESPIRATION RATE: 20 BRPM | WEIGHT: 132 LBS | DIASTOLIC BLOOD PRESSURE: 56 MMHG | BODY MASS INDEX: 21.21 KG/M2 | OXYGEN SATURATION: 98 % | HEART RATE: 71 BPM | SYSTOLIC BLOOD PRESSURE: 112 MMHG | HEIGHT: 66 IN

## 2020-01-01 VITALS
TEMPERATURE: 98.2 F | RESPIRATION RATE: 22 BRPM | DIASTOLIC BLOOD PRESSURE: 80 MMHG | OXYGEN SATURATION: 79 % | SYSTOLIC BLOOD PRESSURE: 100 MMHG | HEART RATE: 60 BPM

## 2020-01-01 VITALS
HEART RATE: 80 BPM | OXYGEN SATURATION: 97 % | RESPIRATION RATE: 18 BRPM | SYSTOLIC BLOOD PRESSURE: 134 MMHG | DIASTOLIC BLOOD PRESSURE: 64 MMHG | TEMPERATURE: 97.7 F

## 2020-01-01 VITALS
TEMPERATURE: 96.4 F | RESPIRATION RATE: 20 BRPM | OXYGEN SATURATION: 100 % | DIASTOLIC BLOOD PRESSURE: 72 MMHG | HEART RATE: 90 BPM | SYSTOLIC BLOOD PRESSURE: 122 MMHG

## 2020-01-01 VITALS
OXYGEN SATURATION: 95 % | DIASTOLIC BLOOD PRESSURE: 72 MMHG | SYSTOLIC BLOOD PRESSURE: 122 MMHG | HEART RATE: 73 BPM | TEMPERATURE: 98.7 F

## 2020-01-01 VITALS — TEMPERATURE: 98 F | DIASTOLIC BLOOD PRESSURE: 77 MMHG | SYSTOLIC BLOOD PRESSURE: 128 MMHG | HEART RATE: 58 BPM

## 2020-01-01 VITALS
HEART RATE: 66 BPM | TEMPERATURE: 99 F | OXYGEN SATURATION: 97 % | DIASTOLIC BLOOD PRESSURE: 55 MMHG | SYSTOLIC BLOOD PRESSURE: 113 MMHG | RESPIRATION RATE: 18 BRPM

## 2020-01-01 VITALS
HEART RATE: 79 BPM | BODY MASS INDEX: 21.05 KG/M2 | HEIGHT: 66 IN | WEIGHT: 131 LBS | OXYGEN SATURATION: 98 % | DIASTOLIC BLOOD PRESSURE: 56 MMHG | SYSTOLIC BLOOD PRESSURE: 110 MMHG

## 2020-01-01 VITALS
TEMPERATURE: 98.2 F | HEART RATE: 120 BPM | OXYGEN SATURATION: 99 % | DIASTOLIC BLOOD PRESSURE: 76 MMHG | SYSTOLIC BLOOD PRESSURE: 119 MMHG | TEMPERATURE: 97.3 F | RESPIRATION RATE: 18 BRPM | HEART RATE: 120 BPM | RESPIRATION RATE: 16 BRPM | OXYGEN SATURATION: 99 %

## 2020-01-01 VITALS
TEMPERATURE: 98.2 F | OXYGEN SATURATION: 96 % | RESPIRATION RATE: 18 BRPM | SYSTOLIC BLOOD PRESSURE: 133 MMHG | DIASTOLIC BLOOD PRESSURE: 76 MMHG | HEART RATE: 138 BPM

## 2020-01-01 VITALS
SYSTOLIC BLOOD PRESSURE: 122 MMHG | TEMPERATURE: 98.2 F | OXYGEN SATURATION: 96 % | DIASTOLIC BLOOD PRESSURE: 60 MMHG | HEART RATE: 70 BPM

## 2020-01-01 VITALS
DIASTOLIC BLOOD PRESSURE: 70 MMHG | RESPIRATION RATE: 20 BRPM | TEMPERATURE: 97.5 F | SYSTOLIC BLOOD PRESSURE: 129 MMHG | HEART RATE: 91 BPM | OXYGEN SATURATION: 100 %

## 2020-01-01 VITALS
HEART RATE: 89 BPM | RESPIRATION RATE: 20 BRPM | SYSTOLIC BLOOD PRESSURE: 90 MMHG | DIASTOLIC BLOOD PRESSURE: 70 MMHG | OXYGEN SATURATION: 97 % | TEMPERATURE: 96.8 F

## 2020-01-01 VITALS
SYSTOLIC BLOOD PRESSURE: 122 MMHG | BODY MASS INDEX: 23.3 KG/M2 | DIASTOLIC BLOOD PRESSURE: 60 MMHG | HEART RATE: 88 BPM | WEIGHT: 145 LBS | TEMPERATURE: 97.3 F | RESPIRATION RATE: 18 BRPM | HEIGHT: 66 IN | OXYGEN SATURATION: 98 %

## 2020-01-01 VITALS — TEMPERATURE: 97.9 F | RESPIRATION RATE: 22 BRPM | HEART RATE: 130 BPM | OXYGEN SATURATION: 94 %

## 2020-01-01 VITALS
TEMPERATURE: 97.9 F | RESPIRATION RATE: 22 BRPM | DIASTOLIC BLOOD PRESSURE: 70 MMHG | SYSTOLIC BLOOD PRESSURE: 86 MMHG | HEART RATE: 142 BPM | OXYGEN SATURATION: 89 %

## 2020-01-01 VITALS
DIASTOLIC BLOOD PRESSURE: 62 MMHG | RESPIRATION RATE: 18 BRPM | HEIGHT: 66 IN | HEART RATE: 67 BPM | BODY MASS INDEX: 21.53 KG/M2 | SYSTOLIC BLOOD PRESSURE: 127 MMHG | OXYGEN SATURATION: 98 % | WEIGHT: 134 LBS | TEMPERATURE: 96.9 F

## 2020-01-01 VITALS
RESPIRATION RATE: 16 BRPM | SYSTOLIC BLOOD PRESSURE: 110 MMHG | HEART RATE: 66 BPM | BODY MASS INDEX: 19.69 KG/M2 | TEMPERATURE: 98 F | WEIGHT: 122 LBS | OXYGEN SATURATION: 100 % | DIASTOLIC BLOOD PRESSURE: 65 MMHG

## 2020-01-01 VITALS
HEART RATE: 133 BPM | DIASTOLIC BLOOD PRESSURE: 73 MMHG | SYSTOLIC BLOOD PRESSURE: 126 MMHG | TEMPERATURE: 97.5 F | RESPIRATION RATE: 16 BRPM | OXYGEN SATURATION: 98 %

## 2020-01-01 VITALS
TEMPERATURE: 98 F | HEART RATE: 80 BPM | OXYGEN SATURATION: 98 % | DIASTOLIC BLOOD PRESSURE: 69 MMHG | SYSTOLIC BLOOD PRESSURE: 120 MMHG

## 2020-01-01 VITALS
DIASTOLIC BLOOD PRESSURE: 57 MMHG | SYSTOLIC BLOOD PRESSURE: 116 MMHG | OXYGEN SATURATION: 97 % | BODY MASS INDEX: 22.34 KG/M2 | WEIGHT: 139 LBS | TEMPERATURE: 97.7 F | HEART RATE: 84 BPM | HEIGHT: 66 IN

## 2020-01-01 VITALS
OXYGEN SATURATION: 94 % | DIASTOLIC BLOOD PRESSURE: 64 MMHG | SYSTOLIC BLOOD PRESSURE: 149 MMHG | HEART RATE: 66 BPM | TEMPERATURE: 98.7 F

## 2020-01-01 VITALS
OXYGEN SATURATION: 98 % | TEMPERATURE: 97.9 F | RESPIRATION RATE: 20 BRPM | DIASTOLIC BLOOD PRESSURE: 68 MMHG | HEART RATE: 60 BPM | SYSTOLIC BLOOD PRESSURE: 117 MMHG

## 2020-01-01 VITALS
RESPIRATION RATE: 16 BRPM | HEART RATE: 80 BPM | OXYGEN SATURATION: 98 % | TEMPERATURE: 97.8 F | DIASTOLIC BLOOD PRESSURE: 72 MMHG | SYSTOLIC BLOOD PRESSURE: 120 MMHG

## 2020-01-01 VITALS
DIASTOLIC BLOOD PRESSURE: 70 MMHG | HEART RATE: 68 BPM | RESPIRATION RATE: 22 BRPM | WEIGHT: 134 LBS | TEMPERATURE: 96.9 F | SYSTOLIC BLOOD PRESSURE: 130 MMHG | OXYGEN SATURATION: 99 % | BODY MASS INDEX: 21.53 KG/M2 | HEIGHT: 66 IN

## 2020-01-01 VITALS — OXYGEN SATURATION: 100 % | HEART RATE: 96 BPM

## 2020-01-01 VITALS — RESPIRATION RATE: 16 BRPM | TEMPERATURE: 97.7 F | HEART RATE: 90 BPM | OXYGEN SATURATION: 89 %

## 2020-01-01 DIAGNOSIS — I71.30 AAA (ABDOMINAL AORTIC ANEURYSM, RUPTURED): Primary | ICD-10-CM

## 2020-01-01 DIAGNOSIS — D64.9 ANEMIA, UNSPECIFIED TYPE: ICD-10-CM

## 2020-01-01 DIAGNOSIS — A41.9 SEPSIS, DUE TO UNSPECIFIED ORGANISM, UNSPECIFIED WHETHER ACUTE ORGAN DYSFUNCTION PRESENT (HCC): ICD-10-CM

## 2020-01-01 DIAGNOSIS — I50.9 CONGESTIVE HEART FAILURE, UNSPECIFIED HF CHRONICITY, UNSPECIFIED HEART FAILURE TYPE (HCC): ICD-10-CM

## 2020-01-01 DIAGNOSIS — D50.0 IRON DEFICIENCY ANEMIA DUE TO CHRONIC BLOOD LOSS: Primary | ICD-10-CM

## 2020-01-01 DIAGNOSIS — M54.9 DORSALGIA: ICD-10-CM

## 2020-01-01 DIAGNOSIS — Z87.891 HISTORY OF TOBACCO USE: ICD-10-CM

## 2020-01-01 DIAGNOSIS — J44.9 CHRONIC OBSTRUCTIVE PULMONARY DISEASE, UNSPECIFIED COPD TYPE (HCC): Primary | ICD-10-CM

## 2020-01-01 DIAGNOSIS — D64.89 ANEMIA DUE TO OTHER CAUSE, NOT CLASSIFIED: ICD-10-CM

## 2020-01-01 DIAGNOSIS — I71.30 LEAKING ABDOMINAL AORTIC ANEURYSM (AAA): ICD-10-CM

## 2020-01-01 DIAGNOSIS — K92.2 GASTROINTESTINAL HEMORRHAGE, UNSPECIFIED GASTROINTESTINAL HEMORRHAGE TYPE: Primary | ICD-10-CM

## 2020-01-01 DIAGNOSIS — J18.9 COMMUNITY ACQUIRED PNEUMONIA, UNSPECIFIED LATERALITY: Primary | ICD-10-CM

## 2020-01-01 DIAGNOSIS — Z71.89 GOALS OF CARE, COUNSELING/DISCUSSION: ICD-10-CM

## 2020-01-01 DIAGNOSIS — I48.20 CHRONIC ATRIAL FIBRILLATION (HCC): Primary | ICD-10-CM

## 2020-01-01 DIAGNOSIS — I73.9 PERIPHERAL VASCULAR DISEASE (HCC): Chronic | ICD-10-CM

## 2020-01-01 DIAGNOSIS — D64.9 ANEMIA: ICD-10-CM

## 2020-01-01 DIAGNOSIS — R53.81 DEBILITY: ICD-10-CM

## 2020-01-01 DIAGNOSIS — E61.1 IRON DEFICIENCY: ICD-10-CM

## 2020-01-01 DIAGNOSIS — D50.0 IRON DEFICIENCY ANEMIA DUE TO CHRONIC BLOOD LOSS: ICD-10-CM

## 2020-01-01 DIAGNOSIS — D50.9 MICROCYTIC ANEMIA: ICD-10-CM

## 2020-01-01 DIAGNOSIS — I71.30 AAA (ABDOMINAL AORTIC ANEURYSM, RUPTURED): Chronic | ICD-10-CM

## 2020-01-01 DIAGNOSIS — J96.10 CHRONIC RESPIRATORY FAILURE, UNSPECIFIED WHETHER WITH HYPOXIA OR HYPERCAPNIA (HCC): ICD-10-CM

## 2020-01-01 DIAGNOSIS — D64.89 ANEMIA DUE TO OTHER CAUSE, NOT CLASSIFIED: Primary | ICD-10-CM

## 2020-01-01 LAB
ABO + RH BLD: NORMAL
ACTIN IGG SERPL-ACNC: 23 UNITS (ref 0–19)
ALBUMIN SERPL-MCNC: 1.9 G/DL (ref 3.4–5)
ALBUMIN SERPL-MCNC: 2 G/DL (ref 3.4–5)
ALBUMIN SERPL-MCNC: 2 G/DL (ref 3.4–5)
ALBUMIN SERPL-MCNC: 2.1 G/DL (ref 3.4–5)
ALBUMIN SERPL-MCNC: 2.2 G/DL (ref 3.4–5)
ALBUMIN SERPL-MCNC: 2.4 G/DL (ref 3.4–5)
ALBUMIN SERPL-MCNC: 2.4 G/DL (ref 3.4–5)
ALBUMIN SERPL-MCNC: 2.6 G/DL (ref 3.4–5)
ALBUMIN SERPL-MCNC: 2.9 G/DL (ref 3.4–5)
ALBUMIN SERPL-MCNC: 3 G/DL (ref 3.4–5)
ALBUMIN/GLOB SERPL: 0.5 {RATIO} (ref 0.8–1.7)
ALBUMIN/GLOB SERPL: 0.6 {RATIO} (ref 0.8–1.7)
ALBUMIN/GLOB SERPL: 0.7 {RATIO} (ref 0.8–1.7)
ALBUMIN/GLOB SERPL: 0.8 {RATIO} (ref 0.8–1.7)
ALP SERPL-CCNC: 106 U/L (ref 45–117)
ALP SERPL-CCNC: 106 U/L (ref 45–117)
ALP SERPL-CCNC: 120 U/L (ref 45–117)
ALP SERPL-CCNC: 128 U/L (ref 45–117)
ALP SERPL-CCNC: 158 U/L (ref 45–117)
ALP SERPL-CCNC: 176 U/L (ref 45–117)
ALP SERPL-CCNC: 181 U/L (ref 45–117)
ALP SERPL-CCNC: 183 U/L (ref 45–117)
ALP SERPL-CCNC: 186 U/L (ref 45–117)
ALP SERPL-CCNC: 77 U/L (ref 45–117)
ALP SERPL-CCNC: 91 U/L (ref 45–117)
ALP SERPL-CCNC: 96 U/L (ref 45–117)
ALT SERPL-CCNC: 12 U/L (ref 16–61)
ALT SERPL-CCNC: 13 U/L (ref 16–61)
ALT SERPL-CCNC: 16 U/L (ref 16–61)
ALT SERPL-CCNC: 18 U/L (ref 16–61)
ALT SERPL-CCNC: 20 U/L (ref 16–61)
ALT SERPL-CCNC: 21 U/L (ref 16–61)
ALT SERPL-CCNC: 23 U/L (ref 16–61)
ALT SERPL-CCNC: 28 U/L (ref 16–61)
ALT SERPL-CCNC: 39 U/L (ref 16–61)
ALT SERPL-CCNC: 48 U/L (ref 16–61)
ALT SERPL-CCNC: 57 U/L (ref 16–61)
ALT SERPL-CCNC: 64 U/L (ref 16–61)
AMPHET UR QL SCN: NEGATIVE
ANA TITR SER IF: NEGATIVE {TITER}
ANION GAP SERPL CALC-SCNC: 10 MMOL/L (ref 3–18)
ANION GAP SERPL CALC-SCNC: 12 MMOL/L (ref 3–18)
ANION GAP SERPL CALC-SCNC: 14 MMOL/L (ref 3–18)
ANION GAP SERPL CALC-SCNC: 16 MMOL/L (ref 3–18)
ANION GAP SERPL CALC-SCNC: 3 MMOL/L (ref 3–18)
ANION GAP SERPL CALC-SCNC: 3 MMOL/L (ref 3–18)
ANION GAP SERPL CALC-SCNC: 4 MMOL/L (ref 3–18)
ANION GAP SERPL CALC-SCNC: 5 MMOL/L (ref 3–18)
ANION GAP SERPL CALC-SCNC: 6 MMOL/L (ref 3–18)
ANION GAP SERPL CALC-SCNC: 7 MMOL/L (ref 3–18)
ANION GAP SERPL CALC-SCNC: 8 MMOL/L (ref 3–18)
ANION GAP SERPL CALC-SCNC: 8 MMOL/L (ref 3–18)
ANION GAP SERPL CALC-SCNC: 9 MMOL/L (ref 3–18)
APPEARANCE UR: CLEAR
APTT PPP: 34 SEC (ref 23–36.4)
AST SERPL-CCNC: 14 U/L (ref 10–38)
AST SERPL-CCNC: 16 U/L (ref 10–38)
AST SERPL-CCNC: 166 U/L (ref 10–38)
AST SERPL-CCNC: 17 U/L (ref 10–38)
AST SERPL-CCNC: 18 U/L (ref 10–38)
AST SERPL-CCNC: 183 U/L (ref 10–38)
AST SERPL-CCNC: 20 U/L (ref 10–38)
AST SERPL-CCNC: 20 U/L (ref 10–38)
AST SERPL-CCNC: 27 U/L (ref 10–38)
AST SERPL-CCNC: 35 U/L (ref 10–38)
AST SERPL-CCNC: 47 U/L (ref 10–38)
AST SERPL-CCNC: 76 U/L (ref 10–38)
ATRIAL RATE: 105 BPM
ATRIAL RATE: 288 BPM
BACTERIA ISLT: ABNORMAL
BACTERIA SPEC CULT: ABNORMAL
BACTERIA SPEC CULT: NORMAL
BACTERIA URNS QL MICRO: NEGATIVE /HPF
BARBITURATES UR QL SCN: NEGATIVE
BASO+EOS+MONOS # BLD AUTO: 0.9 K/UL (ref 0–2.3)
BASO+EOS+MONOS # BLD AUTO: 1.1 K/UL (ref 0–2.3)
BASO+EOS+MONOS NFR BLD AUTO: 7 % (ref 0.1–17)
BASO+EOS+MONOS NFR BLD AUTO: 9 % (ref 0.1–17)
BASOPHILS # BLD: 0 K/UL (ref 0–0.06)
BASOPHILS # BLD: 0 K/UL (ref 0–0.1)
BASOPHILS NFR BLD: 0 % (ref 0–2)
BASOPHILS NFR BLD: 0 % (ref 0–3)
BENZODIAZ UR QL: NEGATIVE
BILIRUB DIRECT SERPL-MCNC: 0.3 MG/DL (ref 0–0.2)
BILIRUB SERPL-MCNC: 0.4 MG/DL (ref 0.2–1)
BILIRUB SERPL-MCNC: 0.4 MG/DL (ref 0.2–1)
BILIRUB SERPL-MCNC: 0.5 MG/DL (ref 0.2–1)
BILIRUB SERPL-MCNC: 0.6 MG/DL (ref 0.2–1)
BILIRUB SERPL-MCNC: 0.6 MG/DL (ref 0.2–1)
BILIRUB SERPL-MCNC: 0.7 MG/DL (ref 0.2–1)
BILIRUB SERPL-MCNC: 0.7 MG/DL (ref 0.2–1)
BILIRUB SERPL-MCNC: 1 MG/DL (ref 0.2–1)
BILIRUB UR QL: NEGATIVE
BLD PROD TYP BPU: NORMAL
BLOOD GROUP ANTIBODIES SERPL: NORMAL
BNP SERPL-MCNC: 7243 PG/ML (ref 0–1800)
BNP SERPL-MCNC: 7771 PG/ML (ref 0–1800)
BPU ID: NORMAL
BUN SERPL-MCNC: 10 MG/DL (ref 7–18)
BUN SERPL-MCNC: 16 MG/DL (ref 7–18)
BUN SERPL-MCNC: 18 MG/DL (ref 7–18)
BUN SERPL-MCNC: 19 MG/DL (ref 7–18)
BUN SERPL-MCNC: 20 MG/DL (ref 7–18)
BUN SERPL-MCNC: 24 MG/DL (ref 7–18)
BUN SERPL-MCNC: 25 MG/DL (ref 7–18)
BUN SERPL-MCNC: 26 MG/DL (ref 7–18)
BUN SERPL-MCNC: 28 MG/DL (ref 7–18)
BUN SERPL-MCNC: 31 MG/DL (ref 7–18)
BUN SERPL-MCNC: 37 MG/DL (ref 7–18)
BUN SERPL-MCNC: 48 MG/DL (ref 7–18)
BUN SERPL-MCNC: 49 MG/DL (ref 7–18)
BUN SERPL-MCNC: 8 MG/DL (ref 7–18)
BUN/CREAT SERPL: 12 (ref 12–20)
BUN/CREAT SERPL: 13 (ref 12–20)
BUN/CREAT SERPL: 13 (ref 12–20)
BUN/CREAT SERPL: 16 (ref 12–20)
BUN/CREAT SERPL: 16 (ref 12–20)
BUN/CREAT SERPL: 17 (ref 12–20)
BUN/CREAT SERPL: 18 (ref 12–20)
BUN/CREAT SERPL: 19 (ref 12–20)
BUN/CREAT SERPL: 21 (ref 12–20)
BUN/CREAT SERPL: 23 (ref 12–20)
BUN/CREAT SERPL: 28 (ref 12–20)
BUN/CREAT SERPL: 31 (ref 12–20)
BUN/CREAT SERPL: 32 (ref 12–20)
BUN/CREAT SERPL: 41 (ref 12–20)
BUN/CREAT SERPL: 41 (ref 12–20)
CA-I SERPL-SCNC: 1.21 MMOL/L (ref 1.12–1.32)
CALCIUM SERPL-MCNC: 7.5 MG/DL (ref 8.5–10.1)
CALCIUM SERPL-MCNC: 7.7 MG/DL (ref 8.5–10.1)
CALCIUM SERPL-MCNC: 7.9 MG/DL (ref 8.5–10.1)
CALCIUM SERPL-MCNC: 8.1 MG/DL (ref 8.5–10.1)
CALCIUM SERPL-MCNC: 8.2 MG/DL (ref 8.5–10.1)
CALCIUM SERPL-MCNC: 8.3 MG/DL (ref 8.5–10.1)
CALCIUM SERPL-MCNC: 8.3 MG/DL (ref 8.5–10.1)
CALCIUM SERPL-MCNC: 8.4 MG/DL (ref 8.5–10.1)
CALCIUM SERPL-MCNC: 8.5 MG/DL (ref 8.5–10.1)
CALCIUM SERPL-MCNC: 8.5 MG/DL (ref 8.5–10.1)
CALCIUM SERPL-MCNC: 8.6 MG/DL (ref 8.5–10.1)
CALCULATED R AXIS, ECG10: -42 DEGREES
CALCULATED R AXIS, ECG10: -62 DEGREES
CALCULATED T AXIS, ECG11: 70 DEGREES
CALCULATED T AXIS, ECG11: 81 DEGREES
CALLED TO:,BCALL1: NORMAL
CALLED TO:,BCALL1: NORMAL
CANNABINOIDS UR QL SCN: NEGATIVE
CHLORIDE SERPL-SCNC: 100 MMOL/L (ref 100–111)
CHLORIDE SERPL-SCNC: 102 MMOL/L (ref 100–111)
CHLORIDE SERPL-SCNC: 104 MMOL/L (ref 100–111)
CHLORIDE SERPL-SCNC: 105 MMOL/L (ref 100–111)
CHLORIDE SERPL-SCNC: 105 MMOL/L (ref 100–111)
CHLORIDE SERPL-SCNC: 106 MMOL/L (ref 100–111)
CHLORIDE SERPL-SCNC: 106 MMOL/L (ref 100–111)
CHLORIDE SERPL-SCNC: 108 MMOL/L (ref 100–111)
CHLORIDE SERPL-SCNC: 111 MMOL/L (ref 100–111)
CHLORIDE SERPL-SCNC: 112 MMOL/L (ref 100–111)
CHLORIDE SERPL-SCNC: 113 MMOL/L (ref 100–111)
CHLORIDE SERPL-SCNC: 97 MMOL/L (ref 100–111)
CHLORIDE SERPL-SCNC: 97 MMOL/L (ref 100–111)
CHLORIDE SERPL-SCNC: 98 MMOL/L (ref 100–111)
CK MB CFR SERPL CALC: 2.8 % (ref 0–4)
CK MB CFR SERPL CALC: ABNORMAL % (ref 0–4)
CK MB SERPL-MCNC: 1.1 NG/ML (ref 5–25)
CK MB SERPL-MCNC: <1 NG/ML (ref 5–25)
CK SERPL-CCNC: 35 U/L (ref 39–308)
CK SERPL-CCNC: 39 U/L (ref 39–308)
CO2 SERPL-SCNC: 18 MMOL/L (ref 21–32)
CO2 SERPL-SCNC: 19 MMOL/L (ref 21–32)
CO2 SERPL-SCNC: 19 MMOL/L (ref 21–32)
CO2 SERPL-SCNC: 20 MMOL/L (ref 21–32)
CO2 SERPL-SCNC: 26 MMOL/L (ref 21–32)
CO2 SERPL-SCNC: 27 MMOL/L (ref 21–32)
CO2 SERPL-SCNC: 28 MMOL/L (ref 21–32)
CO2 SERPL-SCNC: 30 MMOL/L (ref 21–32)
COCAINE UR QL SCN: NEGATIVE
COLOR UR: YELLOW
COVID-19 RAPID TEST, COVR: NOT DETECTED
CREAT SERPL-MCNC: 0.62 MG/DL (ref 0.6–1.3)
CREAT SERPL-MCNC: 0.64 MG/DL (ref 0.6–1.3)
CREAT SERPL-MCNC: 0.64 MG/DL (ref 0.6–1.3)
CREAT SERPL-MCNC: 0.68 MG/DL (ref 0.6–1.3)
CREAT SERPL-MCNC: 0.69 MG/DL (ref 0.6–1.3)
CREAT SERPL-MCNC: 0.9 MG/DL (ref 0.6–1.3)
CREAT SERPL-MCNC: 0.9 MG/DL (ref 0.6–1.3)
CREAT SERPL-MCNC: 0.97 MG/DL (ref 0.6–1.3)
CREAT SERPL-MCNC: 1.06 MG/DL (ref 0.6–1.3)
CREAT SERPL-MCNC: 1.19 MG/DL (ref 0.6–1.3)
CREAT SERPL-MCNC: 1.3 MG/DL (ref 0.6–1.3)
CREAT SERPL-MCNC: 1.36 MG/DL (ref 0.6–1.3)
CREAT SERPL-MCNC: 1.41 MG/DL (ref 0.6–1.3)
CREAT SERPL-MCNC: 1.41 MG/DL (ref 0.6–1.3)
CREAT SERPL-MCNC: 1.46 MG/DL (ref 0.6–1.3)
CREAT SERPL-MCNC: 1.48 MG/DL (ref 0.6–1.3)
CREAT SERPL-MCNC: 1.53 MG/DL (ref 0.6–1.3)
CREAT SERPL-MCNC: 1.56 MG/DL (ref 0.6–1.3)
CREAT SERPL-MCNC: 1.67 MG/DL (ref 0.6–1.3)
CREAT UR-MCNC: 1 MG/DL (ref 0.6–1.3)
CROSSMATCH RESULT,%XM: NORMAL
CRP SERPL-MCNC: 3.8 MG/DL (ref 0–0.3)
DIAGNOSIS, 93000: NORMAL
DIAGNOSIS, 93000: NORMAL
DIFFERENTIAL METHOD BLD: ABNORMAL
DIGOXIN SERPL-MCNC: 0.8 NG/ML (ref 0.9–2)
DIGOXIN SERPL-MCNC: 1 NG/ML (ref 0.9–2)
DIGOXIN SERPL-MCNC: 1 NG/ML (ref 0.9–2)
DIGOXIN SERPL-MCNC: 1.1 NG/ML (ref 0.9–2)
DIGOXIN SERPL-MCNC: 1.2 NG/ML (ref 0.9–2)
ECHO AO ROOT DIAM: 3.25 CM
ECHO LA AREA 4C: 17.7 CM2
ECHO LA VOL 4C: 53.62 ML (ref 18–58)
ECHO LA VOLUME INDEX A4C: 31.68 ML/M2 (ref 16–28)
ECHO LV INTERNAL DIMENSION DIASTOLIC: 4.55 CM (ref 4.2–5.9)
ECHO LV INTERNAL DIMENSION SYSTOLIC: 3.12 CM
ECHO LV IVSD: 0.88 CM (ref 0.6–1)
ECHO LV MASS 2D: 137.3 G (ref 88–224)
ECHO LV MASS INDEX 2D: 81.1 G/M2 (ref 49–115)
ECHO LV POSTERIOR WALL DIASTOLIC: 0.94 CM (ref 0.6–1)
ECHO LVOT CARDIAC OUTPUT: 5.58 LITER/MINUTE
ECHO LVOT DIAM: 2.15 CM
ECHO LVOT PEAK GRADIENT: 1.83 MMHG
ECHO LVOT PEAK VELOCITY: 67.59 CM/S
ECHO LVOT SV: 39.8 ML
ECHO LVOT VTI: 10.99 CM
ECHO MV A VELOCITY: 58.65 CM/S
ECHO MV E DECELERATION TIME (DT): 0.12 S
ECHO MV E VELOCITY: 89.42 CM/S
ECHO MV E/A RATIO: 1.52
ECHO RV TAPSE: 1.91 CM (ref 1.5–2)
ECHO TV REGURGITANT MAX VELOCITY: 332.83 CM/S
ECHO TV REGURGITANT PEAK GRADIENT: 44.31 MMHG
EOSINOPHIL # BLD: 0 K/UL (ref 0–0.4)
EOSINOPHIL # BLD: 0.1 K/UL (ref 0–0.4)
EOSINOPHIL NFR BLD: 0 % (ref 0–5)
EOSINOPHIL NFR BLD: 1 % (ref 0–5)
EOSINOPHIL NFR BLD: 1 % (ref 0–5)
EOSINOPHIL NFR BLD: 2 % (ref 0–5)
EOSINOPHIL NFR BLD: 2 % (ref 0–5)
EPITH CASTS URNS QL MICRO: NORMAL /LPF (ref 0–5)
ERYTHROCYTE [DISTWIDTH] IN BLOOD BY AUTOMATED COUNT: 13.5 % (ref 11.5–14.5)
ERYTHROCYTE [DISTWIDTH] IN BLOOD BY AUTOMATED COUNT: 14 % (ref 11.6–14.5)
ERYTHROCYTE [DISTWIDTH] IN BLOOD BY AUTOMATED COUNT: 14.3 % (ref 11.6–14.5)
ERYTHROCYTE [DISTWIDTH] IN BLOOD BY AUTOMATED COUNT: 15.3 % (ref 11.6–14.5)
ERYTHROCYTE [DISTWIDTH] IN BLOOD BY AUTOMATED COUNT: 15.4 % (ref 11.6–14.5)
ERYTHROCYTE [DISTWIDTH] IN BLOOD BY AUTOMATED COUNT: 15.5 % (ref 11.6–14.5)
ERYTHROCYTE [DISTWIDTH] IN BLOOD BY AUTOMATED COUNT: 16 % (ref 11.6–14.5)
ERYTHROCYTE [DISTWIDTH] IN BLOOD BY AUTOMATED COUNT: 16.1 % (ref 11.5–14.5)
ERYTHROCYTE [DISTWIDTH] IN BLOOD BY AUTOMATED COUNT: 16.3 % (ref 11.6–14.5)
ERYTHROCYTE [DISTWIDTH] IN BLOOD BY AUTOMATED COUNT: 16.4 % (ref 11.6–14.5)
ERYTHROCYTE [DISTWIDTH] IN BLOOD BY AUTOMATED COUNT: 16.4 % (ref 11.6–14.5)
ERYTHROCYTE [DISTWIDTH] IN BLOOD BY AUTOMATED COUNT: 16.9 % (ref 11.6–14.5)
ERYTHROCYTE [DISTWIDTH] IN BLOOD BY AUTOMATED COUNT: 17.5 % (ref 11.6–14.5)
ERYTHROCYTE [DISTWIDTH] IN BLOOD BY AUTOMATED COUNT: 18.3 % (ref 11.6–14.5)
ERYTHROCYTE [DISTWIDTH] IN BLOOD BY AUTOMATED COUNT: 18.6 % (ref 11.6–14.5)
ERYTHROCYTE [DISTWIDTH] IN BLOOD BY AUTOMATED COUNT: 18.7 % (ref 11.6–14.5)
ERYTHROCYTE [DISTWIDTH] IN BLOOD BY AUTOMATED COUNT: 18.9 % (ref 11.6–14.5)
ERYTHROCYTE [DISTWIDTH] IN BLOOD BY AUTOMATED COUNT: 19.1 % (ref 11.6–14.5)
ERYTHROCYTE [DISTWIDTH] IN BLOOD BY AUTOMATED COUNT: 19.6 % (ref 11.6–14.5)
ERYTHROCYTE [DISTWIDTH] IN BLOOD BY AUTOMATED COUNT: 19.7 % (ref 11.6–14.5)
ERYTHROCYTE [DISTWIDTH] IN BLOOD BY AUTOMATED COUNT: 19.8 % (ref 11.6–14.5)
ERYTHROCYTE [DISTWIDTH] IN BLOOD BY AUTOMATED COUNT: 20.6 % (ref 11.6–14.5)
ERYTHROCYTE [SEDIMENTATION RATE] IN BLOOD: 67 MM/HR (ref 0–20)
FERRITIN SERPL-MCNC: 1231 NG/ML (ref 8–388)
FERRITIN SERPL-MCNC: 345 NG/ML (ref 8–388)
FERRITIN SERPL-MCNC: 357 NG/ML (ref 8–388)
FERRITIN SERPL-MCNC: 5764 NG/ML (ref 8–388)
FERRITIN SERPL-MCNC: 842 NG/ML (ref 8–388)
GLOBULIN SER CALC-MCNC: 3.6 G/DL (ref 2–4)
GLOBULIN SER CALC-MCNC: 3.7 G/DL (ref 2–4)
GLOBULIN SER CALC-MCNC: 3.8 G/DL (ref 2–4)
GLOBULIN SER CALC-MCNC: 3.9 G/DL (ref 2–4)
GLOBULIN SER CALC-MCNC: 4 G/DL (ref 2–4)
GLOBULIN SER CALC-MCNC: 4 G/DL (ref 2–4)
GLOBULIN SER CALC-MCNC: 4.1 G/DL (ref 2–4)
GLOBULIN SER CALC-MCNC: 4.1 G/DL (ref 2–4)
GLOBULIN SER CALC-MCNC: 4.2 G/DL (ref 2–4)
GLOBULIN SER CALC-MCNC: 4.3 G/DL (ref 2–4)
GLUCOSE BLD STRIP.AUTO-MCNC: 128 MG/DL (ref 70–110)
GLUCOSE BLD STRIP.AUTO-MCNC: 158 MG/DL (ref 70–110)
GLUCOSE BLD STRIP.AUTO-MCNC: >600 MG/DL (ref 70–110)
GLUCOSE SERPL-MCNC: 107 MG/DL (ref 74–99)
GLUCOSE SERPL-MCNC: 111 MG/DL (ref 74–99)
GLUCOSE SERPL-MCNC: 111 MG/DL (ref 74–99)
GLUCOSE SERPL-MCNC: 115 MG/DL (ref 74–99)
GLUCOSE SERPL-MCNC: 117 MG/DL (ref 74–99)
GLUCOSE SERPL-MCNC: 124 MG/DL (ref 74–99)
GLUCOSE SERPL-MCNC: 126 MG/DL (ref 74–99)
GLUCOSE SERPL-MCNC: 129 MG/DL (ref 74–99)
GLUCOSE SERPL-MCNC: 164 MG/DL (ref 74–99)
GLUCOSE SERPL-MCNC: 170 MG/DL (ref 74–99)
GLUCOSE SERPL-MCNC: 182 MG/DL (ref 74–99)
GLUCOSE SERPL-MCNC: 79 MG/DL (ref 74–99)
GLUCOSE SERPL-MCNC: 84 MG/DL (ref 74–99)
GLUCOSE SERPL-MCNC: 85 MG/DL (ref 74–99)
GLUCOSE SERPL-MCNC: 88 MG/DL (ref 74–99)
GLUCOSE SERPL-MCNC: 93 MG/DL (ref 74–99)
GLUCOSE SERPL-MCNC: 93 MG/DL (ref 74–99)
GLUCOSE SERPL-MCNC: 95 MG/DL (ref 74–99)
GLUCOSE SERPL-MCNC: 95 MG/DL (ref 74–99)
GLUCOSE UR STRIP.AUTO-MCNC: NEGATIVE MG/DL
GRAM STN SPEC: ABNORMAL
HAV IGM SER QL: NEGATIVE
HBV CORE IGM SER QL: NEGATIVE
HBV SURFACE AG SER QL: <0.1 INDEX
HBV SURFACE AG SER QL: NEGATIVE
HCT VFR BLD AUTO: 18.5 % (ref 36–48)
HCT VFR BLD AUTO: 20.6 % (ref 36–48)
HCT VFR BLD AUTO: 22.4 % (ref 36–48)
HCT VFR BLD AUTO: 22.8 % (ref 36–48)
HCT VFR BLD AUTO: 22.9 % (ref 36–48)
HCT VFR BLD AUTO: 23.2 % (ref 36–48)
HCT VFR BLD AUTO: 23.4 % (ref 36–48)
HCT VFR BLD AUTO: 23.4 % (ref 36–48)
HCT VFR BLD AUTO: 24.2 % (ref 36–48)
HCT VFR BLD AUTO: 24.4 % (ref 36–48)
HCT VFR BLD AUTO: 24.6 % (ref 36–48)
HCT VFR BLD AUTO: 25 % (ref 36–48)
HCT VFR BLD AUTO: 25.2 % (ref 36–48)
HCT VFR BLD AUTO: 25.2 % (ref 36–48)
HCT VFR BLD AUTO: 25.5 % (ref 36–48)
HCT VFR BLD AUTO: 25.6 % (ref 36–48)
HCT VFR BLD AUTO: 25.8 % (ref 36–48)
HCT VFR BLD AUTO: 25.9 % (ref 36–48)
HCT VFR BLD AUTO: 26.2 % (ref 36–48)
HCT VFR BLD AUTO: 26.5 % (ref 36–48)
HCT VFR BLD AUTO: 26.5 % (ref 36–48)
HCT VFR BLD AUTO: 26.6 % (ref 36–48)
HCT VFR BLD AUTO: 27 % (ref 36–48)
HCT VFR BLD AUTO: 27 % (ref 36–48)
HCT VFR BLD AUTO: 27.5 % (ref 36–48)
HCT VFR BLD AUTO: 27.6 % (ref 36–48)
HCT VFR BLD AUTO: 27.7 % (ref 36–48)
HCT VFR BLD AUTO: 27.8 % (ref 36–48)
HCT VFR BLD AUTO: 27.8 % (ref 36–48)
HCT VFR BLD AUTO: 28.1 % (ref 36–48)
HCT VFR BLD AUTO: 30.5 % (ref 36–48)
HCV AB SER IA-ACNC: 0.06 INDEX
HCV AB SERPL QL IA: NEGATIVE
HCV COMMENT,HCGAC: NORMAL
HDSCOM,HDSCOM: ABNORMAL
HGB BLD-MCNC: 6 G/DL (ref 13–16)
HGB BLD-MCNC: 6.4 G/DL (ref 13–16)
HGB BLD-MCNC: 7.2 G/DL (ref 13–16)
HGB BLD-MCNC: 7.2 G/DL (ref 13–16)
HGB BLD-MCNC: 7.3 G/DL (ref 12–16)
HGB BLD-MCNC: 7.3 G/DL (ref 13–16)
HGB BLD-MCNC: 7.4 G/DL (ref 13–16)
HGB BLD-MCNC: 7.5 G/DL (ref 13–16)
HGB BLD-MCNC: 7.6 G/DL (ref 13–16)
HGB BLD-MCNC: 7.6 G/DL (ref 13–16)
HGB BLD-MCNC: 7.8 G/DL (ref 13–16)
HGB BLD-MCNC: 7.9 G/DL (ref 13–16)
HGB BLD-MCNC: 8 G/DL (ref 13–16)
HGB BLD-MCNC: 8 G/DL (ref 13–16)
HGB BLD-MCNC: 8.2 G/DL (ref 12–16)
HGB BLD-MCNC: 8.2 G/DL (ref 13–16)
HGB BLD-MCNC: 8.2 G/DL (ref 13–16)
HGB BLD-MCNC: 8.4 G/DL (ref 13–16)
HGB BLD-MCNC: 8.5 G/DL (ref 13–16)
HGB BLD-MCNC: 8.6 G/DL (ref 13–16)
HGB BLD-MCNC: 8.7 G/DL (ref 13–16)
HGB BLD-MCNC: 8.8 G/DL (ref 13–16)
HGB BLD-MCNC: 8.8 G/DL (ref 13–16)
HGB BLD-MCNC: 8.9 G/DL (ref 13–16)
HGB BLD-MCNC: 9 G/DL (ref 13–16)
HGB BLD-MCNC: 9.3 G/DL (ref 13–16)
HGB UR QL STRIP: NEGATIVE
HYALINE CASTS URNS QL MICRO: NORMAL /LPF (ref 0–2)
INR PPP: 1.1 (ref 0.8–1.2)
INR PPP: 1.7 (ref 0.8–1.2)
IRON SATN MFR SERPL: 11 % (ref 20–50)
IRON SATN MFR SERPL: 15 % (ref 20–50)
IRON SATN MFR SERPL: 5 % (ref 20–50)
IRON SATN MFR SERPL: 65 % (ref 20–50)
IRON SERPL-MCNC: 106 UG/DL (ref 50–175)
IRON SERPL-MCNC: 11 UG/DL (ref 50–175)
IRON SERPL-MCNC: 16 UG/DL (ref 50–175)
IRON SERPL-MCNC: 43 UG/DL (ref 50–175)
KETONES UR QL STRIP.AUTO: NEGATIVE MG/DL
L PNEUMO AG UR QL IA: NEGATIVE
LACTATE BLD-SCNC: 1.35 MMOL/L (ref 0.4–2)
LACTATE BLD-SCNC: 4.33 MMOL/L (ref 0.4–2)
LACTATE BLD-SCNC: 7.14 MMOL/L (ref 0.4–2)
LACTATE BLD-SCNC: <0.3 MMOL/L (ref 0.4–2)
LEUKOCYTE ESTERASE UR QL STRIP.AUTO: NEGATIVE
LIPASE SERPL-CCNC: 57 U/L (ref 73–393)
LVOT MG: 0.89 MMHG
LYMPHOCYTES # BLD: 0.3 K/UL (ref 0.8–3.5)
LYMPHOCYTES # BLD: 0.3 K/UL (ref 0.9–3.6)
LYMPHOCYTES # BLD: 0.4 K/UL (ref 0.8–3.5)
LYMPHOCYTES # BLD: 0.4 K/UL (ref 0.9–3.6)
LYMPHOCYTES # BLD: 0.7 K/UL (ref 0.9–3.6)
LYMPHOCYTES # BLD: 0.8 K/UL (ref 0.8–3.5)
LYMPHOCYTES # BLD: 0.8 K/UL (ref 0.9–3.6)
LYMPHOCYTES # BLD: 0.8 K/UL (ref 1.1–5.9)
LYMPHOCYTES # BLD: 0.9 K/UL (ref 0.9–3.6)
LYMPHOCYTES # BLD: 1.1 K/UL (ref 0.9–3.6)
LYMPHOCYTES # BLD: 1.1 K/UL (ref 1.1–5.9)
LYMPHOCYTES # BLD: 1.2 K/UL (ref 0.8–3.5)
LYMPHOCYTES # BLD: 1.3 K/UL (ref 0.9–3.6)
LYMPHOCYTES NFR BLD: 10 % (ref 21–52)
LYMPHOCYTES NFR BLD: 10 % (ref 21–52)
LYMPHOCYTES NFR BLD: 11 % (ref 14–44)
LYMPHOCYTES NFR BLD: 13 % (ref 21–52)
LYMPHOCYTES NFR BLD: 13 % (ref 21–52)
LYMPHOCYTES NFR BLD: 15 % (ref 21–52)
LYMPHOCYTES NFR BLD: 2 % (ref 20–51)
LYMPHOCYTES NFR BLD: 24 % (ref 20–51)
LYMPHOCYTES NFR BLD: 4 % (ref 20–51)
LYMPHOCYTES NFR BLD: 4 % (ref 21–52)
LYMPHOCYTES NFR BLD: 5 % (ref 14–44)
LYMPHOCYTES NFR BLD: 5 % (ref 21–52)
LYMPHOCYTES NFR BLD: 6 % (ref 20–51)
LYMPHOCYTES NFR BLD: 6 % (ref 21–52)
LYMPHOCYTES NFR BLD: 7 % (ref 21–52)
LYMPHOCYTES NFR BLD: 7 % (ref 21–52)
LYMPHOCYTES NFR BLD: 9 % (ref 21–52)
LYMPHOCYTES NFR BLD: 9 % (ref 21–52)
MAGNESIUM SERPL-MCNC: 1.6 MG/DL (ref 1.6–2.6)
MAGNESIUM SERPL-MCNC: 1.6 MG/DL (ref 1.6–2.6)
MAGNESIUM SERPL-MCNC: 1.7 MG/DL (ref 1.6–2.6)
MAGNESIUM SERPL-MCNC: 1.7 MG/DL (ref 1.6–2.6)
MAGNESIUM SERPL-MCNC: 1.9 MG/DL (ref 1.6–2.6)
MAGNESIUM SERPL-MCNC: 1.9 MG/DL (ref 1.6–2.6)
MAGNESIUM SERPL-MCNC: 2 MG/DL (ref 1.6–2.6)
MAGNESIUM SERPL-MCNC: 2.1 MG/DL (ref 1.6–2.6)
MAGNESIUM SERPL-MCNC: 2.2 MG/DL (ref 1.6–2.6)
MCH RBC QN AUTO: 27.3 PG (ref 25–35)
MCH RBC QN AUTO: 27.5 PG (ref 24–34)
MCH RBC QN AUTO: 27.5 PG (ref 24–34)
MCH RBC QN AUTO: 27.6 PG (ref 24–34)
MCH RBC QN AUTO: 27.7 PG (ref 24–34)
MCH RBC QN AUTO: 27.7 PG (ref 24–34)
MCH RBC QN AUTO: 27.8 PG (ref 24–34)
MCH RBC QN AUTO: 28.1 PG (ref 24–34)
MCH RBC QN AUTO: 28.1 PG (ref 24–34)
MCH RBC QN AUTO: 28.2 PG (ref 24–34)
MCH RBC QN AUTO: 28.6 PG (ref 24–34)
MCH RBC QN AUTO: 28.6 PG (ref 25–35)
MCH RBC QN AUTO: 29.7 PG (ref 24–34)
MCH RBC QN AUTO: 29.8 PG (ref 24–34)
MCH RBC QN AUTO: 29.9 PG (ref 24–34)
MCH RBC QN AUTO: 30 PG (ref 24–34)
MCH RBC QN AUTO: 30.3 PG (ref 24–34)
MCH RBC QN AUTO: 30.4 PG (ref 24–34)
MCH RBC QN AUTO: 30.5 PG (ref 24–34)
MCH RBC QN AUTO: 30.6 PG (ref 24–34)
MCHC RBC AUTO-ENTMCNC: 30.4 G/DL (ref 31–37)
MCHC RBC AUTO-ENTMCNC: 30.5 G/DL (ref 31–37)
MCHC RBC AUTO-ENTMCNC: 31.1 G/DL (ref 31–37)
MCHC RBC AUTO-ENTMCNC: 31.4 G/DL (ref 31–37)
MCHC RBC AUTO-ENTMCNC: 31.6 G/DL (ref 31–37)
MCHC RBC AUTO-ENTMCNC: 31.6 G/DL (ref 31–37)
MCHC RBC AUTO-ENTMCNC: 31.7 G/DL (ref 31–37)
MCHC RBC AUTO-ENTMCNC: 31.7 G/DL (ref 31–37)
MCHC RBC AUTO-ENTMCNC: 31.9 G/DL (ref 31–37)
MCHC RBC AUTO-ENTMCNC: 32 G/DL (ref 31–37)
MCHC RBC AUTO-ENTMCNC: 32.1 G/DL (ref 31–37)
MCHC RBC AUTO-ENTMCNC: 32.2 G/DL (ref 31–37)
MCHC RBC AUTO-ENTMCNC: 32.4 G/DL (ref 31–37)
MCHC RBC AUTO-ENTMCNC: 32.4 G/DL (ref 31–37)
MCHC RBC AUTO-ENTMCNC: 32.6 G/DL (ref 31–37)
MCHC RBC AUTO-ENTMCNC: 32.8 G/DL (ref 31–37)
MCHC RBC AUTO-ENTMCNC: 33.7 G/DL (ref 31–37)
MCV RBC AUTO: 85.7 FL (ref 74–97)
MCV RBC AUTO: 85.9 FL (ref 74–97)
MCV RBC AUTO: 86.5 FL (ref 74–97)
MCV RBC AUTO: 87.2 FL (ref 74–97)
MCV RBC AUTO: 87.3 FL (ref 74–97)
MCV RBC AUTO: 87.6 FL (ref 74–97)
MCV RBC AUTO: 88 FL (ref 74–97)
MCV RBC AUTO: 88 FL (ref 74–97)
MCV RBC AUTO: 89 FL (ref 74–97)
MCV RBC AUTO: 89.8 FL (ref 78–102)
MCV RBC AUTO: 90 FL (ref 78–102)
MCV RBC AUTO: 91.6 FL (ref 74–97)
MCV RBC AUTO: 92 FL (ref 74–97)
MCV RBC AUTO: 92.1 FL (ref 74–97)
MCV RBC AUTO: 92.5 FL (ref 74–97)
MCV RBC AUTO: 93.1 FL (ref 74–97)
MCV RBC AUTO: 93.4 FL (ref 74–97)
MCV RBC AUTO: 93.5 FL (ref 74–97)
MCV RBC AUTO: 94.6 FL (ref 74–97)
MCV RBC AUTO: 94.9 FL (ref 74–97)
MCV RBC AUTO: 94.9 FL (ref 74–97)
MCV RBC AUTO: 95 FL (ref 74–97)
METHADONE UR QL: NEGATIVE
MITOCHONDRIA M2 IGG SER-ACNC: <20 UNITS (ref 0–20)
MONOCYTES # BLD: 0 K/UL (ref 0.05–1.2)
MONOCYTES # BLD: 0 K/UL (ref 0–1)
MONOCYTES # BLD: 0.2 K/UL (ref 0.05–1.2)
MONOCYTES # BLD: 0.2 K/UL (ref 0.05–1.2)
MONOCYTES # BLD: 0.2 K/UL (ref 0–1)
MONOCYTES # BLD: 0.4 K/UL (ref 0.05–1.2)
MONOCYTES # BLD: 0.5 K/UL (ref 0.05–1.2)
MONOCYTES # BLD: 0.6 K/UL (ref 0.05–1.2)
MONOCYTES # BLD: 0.6 K/UL (ref 0–1)
MONOCYTES # BLD: 0.7 K/UL (ref 0.05–1.2)
MONOCYTES # BLD: 0.8 K/UL (ref 0.05–1.2)
MONOCYTES # BLD: 0.8 K/UL (ref 0–1)
MONOCYTES # BLD: 0.9 K/UL (ref 0.05–1.2)
MONOCYTES # BLD: 1.4 K/UL (ref 0.05–1.2)
MONOCYTES NFR BLD: 1 % (ref 2–9)
MONOCYTES NFR BLD: 1 % (ref 2–9)
MONOCYTES NFR BLD: 1 % (ref 3–10)
MONOCYTES NFR BLD: 10 % (ref 3–10)
MONOCYTES NFR BLD: 2 % (ref 3–10)
MONOCYTES NFR BLD: 3 % (ref 2–9)
MONOCYTES NFR BLD: 3 % (ref 3–10)
MONOCYTES NFR BLD: 4 % (ref 2–9)
MONOCYTES NFR BLD: 5 % (ref 3–10)
MONOCYTES NFR BLD: 5 % (ref 3–10)
MONOCYTES NFR BLD: 6 % (ref 3–10)
MONOCYTES NFR BLD: 7 % (ref 3–10)
MONOCYTES NFR BLD: 7 % (ref 3–10)
MONOCYTES NFR BLD: 8 % (ref 3–10)
MONOCYTES NFR BLD: 9 % (ref 3–10)
NEUTS BAND NFR BLD MANUAL: 1 % (ref 0–5)
NEUTS BAND NFR BLD MANUAL: 2 % (ref 0–5)
NEUTS SEG # BLD: 1.1 K/UL (ref 1.8–8)
NEUTS SEG # BLD: 1.9 K/UL (ref 1.8–8)
NEUTS SEG # BLD: 10.1 K/UL (ref 1.8–8)
NEUTS SEG # BLD: 12.7 K/UL (ref 1.8–8)
NEUTS SEG # BLD: 13.7 K/UL (ref 1.8–9.5)
NEUTS SEG # BLD: 16.2 K/UL (ref 1.8–8)
NEUTS SEG # BLD: 17.3 K/UL (ref 1.8–8)
NEUTS SEG # BLD: 17.9 K/UL (ref 1.8–8)
NEUTS SEG # BLD: 18.2 K/UL (ref 1.8–8)
NEUTS SEG # BLD: 6.2 K/UL (ref 1.8–8)
NEUTS SEG # BLD: 6.3 K/UL (ref 1.8–8)
NEUTS SEG # BLD: 6.5 K/UL (ref 1.8–8)
NEUTS SEG # BLD: 6.7 K/UL (ref 1.8–8)
NEUTS SEG # BLD: 7.3 K/UL (ref 1.8–8)
NEUTS SEG # BLD: 7.4 K/UL (ref 1.8–8)
NEUTS SEG # BLD: 7.8 K/UL (ref 1.8–9.5)
NEUTS SEG # BLD: 8.3 K/UL (ref 1.8–8)
NEUTS SEG # BLD: 8.9 K/UL (ref 1.8–8)
NEUTS SEG # BLD: 9 K/UL (ref 1.8–8)
NEUTS SEG NFR BLD: 74 % (ref 40–73)
NEUTS SEG NFR BLD: 75 % (ref 42–75)
NEUTS SEG NFR BLD: 78 % (ref 40–73)
NEUTS SEG NFR BLD: 80 % (ref 40–70)
NEUTS SEG NFR BLD: 82 % (ref 40–73)
NEUTS SEG NFR BLD: 86 % (ref 40–73)
NEUTS SEG NFR BLD: 87 % (ref 40–73)
NEUTS SEG NFR BLD: 88 % (ref 40–70)
NEUTS SEG NFR BLD: 88 % (ref 40–73)
NEUTS SEG NFR BLD: 88 % (ref 40–73)
NEUTS SEG NFR BLD: 89 % (ref 40–73)
NEUTS SEG NFR BLD: 90 % (ref 40–73)
NEUTS SEG NFR BLD: 91 % (ref 42–75)
NEUTS SEG NFR BLD: 93 % (ref 40–73)
NEUTS SEG NFR BLD: 93 % (ref 40–73)
NEUTS SEG NFR BLD: 93 % (ref 42–75)
NEUTS SEG NFR BLD: 93 % (ref 42–75)
NITRITE UR QL STRIP.AUTO: NEGATIVE
OPIATES UR QL: POSITIVE
OTHER ANTIBIOTIC SUSC ISLT: ABNORMAL
OTHER ANTIBIOTIC SUSC ISLT: NORMAL
PCP UR QL: NEGATIVE
PERIPHERAL SMEAR,PSM: NORMAL
PERIPHERAL SMEAR,PSM: NORMAL
PH UR STRIP: 5 [PH] (ref 5–8)
PHOSPHATE SERPL-MCNC: 1.8 MG/DL (ref 2.5–4.9)
PHOSPHATE SERPL-MCNC: 1.9 MG/DL (ref 2.5–4.9)
PHOSPHATE SERPL-MCNC: 2.1 MG/DL (ref 2.5–4.9)
PHOSPHATE SERPL-MCNC: 2.3 MG/DL (ref 2.5–4.9)
PHOSPHATE SERPL-MCNC: 2.3 MG/DL (ref 2.5–4.9)
PHOSPHATE SERPL-MCNC: 2.4 MG/DL (ref 2.5–4.9)
PHOSPHATE SERPL-MCNC: 2.5 MG/DL (ref 2.5–4.9)
PHOSPHATE SERPL-MCNC: 2.9 MG/DL (ref 2.5–4.9)
PHOSPHATE SERPL-MCNC: 3.8 MG/DL (ref 2.5–4.9)
PLATELET # BLD AUTO: 100 K/UL (ref 135–420)
PLATELET # BLD AUTO: 108 K/UL (ref 135–420)
PLATELET # BLD AUTO: 131 K/UL (ref 135–420)
PLATELET # BLD AUTO: 144 K/UL (ref 135–420)
PLATELET # BLD AUTO: 163 K/UL (ref 135–420)
PLATELET # BLD AUTO: 164 K/UL (ref 135–420)
PLATELET # BLD AUTO: 169 K/UL (ref 135–420)
PLATELET # BLD AUTO: 178 K/UL (ref 135–420)
PLATELET # BLD AUTO: 182 K/UL (ref 135–420)
PLATELET # BLD AUTO: 182 K/UL (ref 135–420)
PLATELET # BLD AUTO: 186 K/UL (ref 135–420)
PLATELET # BLD AUTO: 189 K/UL (ref 135–420)
PLATELET # BLD AUTO: 200 K/UL (ref 135–420)
PLATELET # BLD AUTO: 227 K/UL (ref 135–420)
PLATELET # BLD AUTO: 275 K/UL (ref 135–420)
PLATELET # BLD AUTO: 279 K/UL (ref 140–440)
PLATELET # BLD AUTO: 317 K/UL (ref 135–420)
PLATELET # BLD AUTO: 321 K/UL (ref 135–420)
PLATELET # BLD AUTO: 376 K/UL (ref 135–420)
PLATELET # BLD AUTO: 429 K/UL (ref 140–440)
PLATELET # BLD AUTO: 90 K/UL (ref 135–420)
PLATELET # BLD AUTO: 95 K/UL (ref 135–420)
PLATELET COMMENTS,PCOM: ABNORMAL
PLEASE NOTE, 734348: NORMAL
PMV BLD AUTO: 10 FL (ref 9.2–11.8)
PMV BLD AUTO: 10.1 FL (ref 9.2–11.8)
PMV BLD AUTO: 10.7 FL (ref 9.2–11.8)
PMV BLD AUTO: 8.8 FL (ref 9.2–11.8)
PMV BLD AUTO: 9 FL (ref 9.2–11.8)
PMV BLD AUTO: 9.1 FL (ref 9.2–11.8)
PMV BLD AUTO: 9.1 FL (ref 9.2–11.8)
PMV BLD AUTO: 9.2 FL (ref 9.2–11.8)
PMV BLD AUTO: 9.3 FL (ref 9.2–11.8)
PMV BLD AUTO: 9.4 FL (ref 9.2–11.8)
PMV BLD AUTO: 9.4 FL (ref 9.2–11.8)
PMV BLD AUTO: 9.5 FL (ref 9.2–11.8)
PMV BLD AUTO: 9.5 FL (ref 9.2–11.8)
PMV BLD AUTO: 9.6 FL (ref 9.2–11.8)
PMV BLD AUTO: 9.7 FL (ref 9.2–11.8)
POTASSIUM SERPL-SCNC: 3.4 MMOL/L (ref 3.5–5.5)
POTASSIUM SERPL-SCNC: 3.4 MMOL/L (ref 3.5–5.5)
POTASSIUM SERPL-SCNC: 3.5 MMOL/L (ref 3.5–5.5)
POTASSIUM SERPL-SCNC: 3.6 MMOL/L (ref 3.5–5.5)
POTASSIUM SERPL-SCNC: 3.7 MMOL/L (ref 3.5–5.5)
POTASSIUM SERPL-SCNC: 3.7 MMOL/L (ref 3.5–5.5)
POTASSIUM SERPL-SCNC: 3.8 MMOL/L (ref 3.5–5.5)
POTASSIUM SERPL-SCNC: 3.9 MMOL/L (ref 3.5–5.5)
POTASSIUM SERPL-SCNC: 4 MMOL/L (ref 3.5–5.5)
POTASSIUM SERPL-SCNC: 4 MMOL/L (ref 3.5–5.5)
POTASSIUM SERPL-SCNC: 4.1 MMOL/L (ref 3.5–5.5)
POTASSIUM SERPL-SCNC: 4.8 MMOL/L (ref 3.5–5.5)
POTASSIUM SERPL-SCNC: 4.9 MMOL/L (ref 3.5–5.5)
PROT SERPL-MCNC: 5.5 G/DL (ref 6.4–8.2)
PROT SERPL-MCNC: 5.9 G/DL (ref 6.4–8.2)
PROT SERPL-MCNC: 5.9 G/DL (ref 6.4–8.2)
PROT SERPL-MCNC: 6.1 G/DL (ref 6.4–8.2)
PROT SERPL-MCNC: 6.2 G/DL (ref 6.4–8.2)
PROT SERPL-MCNC: 6.4 G/DL (ref 6.4–8.2)
PROT SERPL-MCNC: 6.4 G/DL (ref 6.4–8.2)
PROT SERPL-MCNC: 6.5 G/DL (ref 6.4–8.2)
PROT SERPL-MCNC: 6.7 G/DL (ref 6.4–8.2)
PROT SERPL-MCNC: 6.7 G/DL (ref 6.4–8.2)
PROT SERPL-MCNC: 6.9 G/DL (ref 6.4–8.2)
PROT SERPL-MCNC: 7 G/DL (ref 6.4–8.2)
PROT UR STRIP-MCNC: ABNORMAL MG/DL
PROTHROMBIN TIME: 13.9 SEC (ref 11.5–15.2)
PROTHROMBIN TIME: 19.3 SEC (ref 11.5–15.2)
Q-T INTERVAL, ECG07: 334 MS
Q-T INTERVAL, ECG07: 350 MS
QRS DURATION, ECG06: 84 MS
QRS DURATION, ECG06: 86 MS
QTC CALCULATION (BEZET), ECG08: 380 MS
QTC CALCULATION (BEZET), ECG08: 426 MS
RBC # BLD AUTO: 2.02 M/UL (ref 4.7–5.5)
RBC # BLD AUTO: 2.24 M/UL (ref 4.7–5.5)
RBC # BLD AUTO: 2.36 M/UL (ref 4.7–5.5)
RBC # BLD AUTO: 2.48 M/UL (ref 4.7–5.5)
RBC # BLD AUTO: 2.55 M/UL (ref 4.1–5.1)
RBC # BLD AUTO: 2.55 M/UL (ref 4.7–5.5)
RBC # BLD AUTO: 2.67 M/UL (ref 4.7–5.5)
RBC # BLD AUTO: 2.68 M/UL (ref 4.7–5.5)
RBC # BLD AUTO: 2.74 M/UL (ref 4.7–5.5)
RBC # BLD AUTO: 2.77 M/UL (ref 4.7–5.5)
RBC # BLD AUTO: 2.8 M/UL (ref 4.7–5.5)
RBC # BLD AUTO: 2.8 M/UL (ref 4.7–5.5)
RBC # BLD AUTO: 2.82 M/UL (ref 4.7–5.5)
RBC # BLD AUTO: 2.87 M/UL (ref 4.7–5.5)
RBC # BLD AUTO: 2.89 M/UL (ref 4.7–5.5)
RBC # BLD AUTO: 2.89 M/UL (ref 4.7–5.5)
RBC # BLD AUTO: 2.9 M/UL (ref 4.7–5.5)
RBC # BLD AUTO: 2.91 M/UL (ref 4.7–5.5)
RBC # BLD AUTO: 3 M/UL (ref 4.1–5.1)
RBC # BLD AUTO: 3.16 M/UL (ref 4.7–5.5)
RBC # BLD AUTO: 3.16 M/UL (ref 4.7–5.5)
RBC # BLD AUTO: 3.31 M/UL (ref 4.7–5.5)
RBC #/AREA URNS HPF: NEGATIVE /HPF (ref 0–5)
RBC MORPH BLD: ABNORMAL
S PNEUM AG UR QL: NEGATIVE
SERVICE CMNT-IMP: ABNORMAL
SERVICE CMNT-IMP: NORMAL
SODIUM SERPL-SCNC: 131 MMOL/L (ref 136–145)
SODIUM SERPL-SCNC: 132 MMOL/L (ref 136–145)
SODIUM SERPL-SCNC: 133 MMOL/L (ref 136–145)
SODIUM SERPL-SCNC: 133 MMOL/L (ref 136–145)
SODIUM SERPL-SCNC: 134 MMOL/L (ref 136–145)
SODIUM SERPL-SCNC: 134 MMOL/L (ref 136–145)
SODIUM SERPL-SCNC: 138 MMOL/L (ref 136–145)
SODIUM SERPL-SCNC: 139 MMOL/L (ref 136–145)
SODIUM SERPL-SCNC: 140 MMOL/L (ref 136–145)
SODIUM SERPL-SCNC: 140 MMOL/L (ref 136–145)
SODIUM SERPL-SCNC: 141 MMOL/L (ref 136–145)
SODIUM SERPL-SCNC: 141 MMOL/L (ref 136–145)
SODIUM SERPL-SCNC: 143 MMOL/L (ref 136–145)
SODIUM SERPL-SCNC: 144 MMOL/L (ref 136–145)
SODIUM SERPL-SCNC: 145 MMOL/L (ref 136–145)
SOURCE, COVRS: NORMAL
SOURCE, RSRC70: ABNORMAL
SP GR UR REFRACTOMETRY: >1.03 (ref 1–1.03)
SP1: NORMAL
SP2: NORMAL
SP3: NORMAL
SPECIMEN EXP DATE BLD: NORMAL
SPECIMEN SOURCE: NORMAL
SPECIMEN TYPE, XMCV1T: NORMAL
STATUS OF UNIT,%ST: NORMAL
TIBC SERPL-MCNC: 146 UG/DL (ref 250–450)
TIBC SERPL-MCNC: 164 UG/DL (ref 250–450)
TIBC SERPL-MCNC: 229 UG/DL (ref 250–450)
TIBC SERPL-MCNC: 283 UG/DL (ref 250–450)
TROPONIN I SERPL-MCNC: <0.02 NG/ML (ref 0–0.04)
TROPONIN I SERPL-MCNC: <0.02 NG/ML (ref 0–0.04)
UNIT DIVISION, %UDIV: 0
UROBILINOGEN UR QL STRIP.AUTO: 1 EU/DL (ref 0.2–1)
VENTRICULAR RATE, ECG03: 71 BPM
VENTRICULAR RATE, ECG03: 98 BPM
WBC # BLD AUTO: 0.9 K/UL (ref 4.6–13.2)
WBC # BLD AUTO: 1.4 K/UL (ref 4.6–13.2)
WBC # BLD AUTO: 10.5 K/UL (ref 4.6–13.2)
WBC # BLD AUTO: 10.6 K/UL (ref 4.6–13.2)
WBC # BLD AUTO: 11.6 K/UL (ref 4.6–13.2)
WBC # BLD AUTO: 14.3 K/UL (ref 4.6–13.2)
WBC # BLD AUTO: 15.6 K/UL (ref 4.5–13)
WBC # BLD AUTO: 18.5 K/UL (ref 4.6–13.2)
WBC # BLD AUTO: 18.9 K/UL (ref 4.6–13.2)
WBC # BLD AUTO: 19.2 K/UL (ref 4.6–13.2)
WBC # BLD AUTO: 19.3 K/UL (ref 4.6–13.2)
WBC # BLD AUTO: 2.2 K/UL (ref 4.6–13.2)
WBC # BLD AUTO: 7.3 K/UL (ref 4.6–13.2)
WBC # BLD AUTO: 7.5 K/UL (ref 4.6–13.2)
WBC # BLD AUTO: 8 K/UL (ref 4.6–13.2)
WBC # BLD AUTO: 8.1 K/UL (ref 4.6–13.2)
WBC # BLD AUTO: 8.1 K/UL (ref 4.6–13.2)
WBC # BLD AUTO: 8.3 K/UL (ref 4.6–13.2)
WBC # BLD AUTO: 8.3 K/UL (ref 4.6–13.2)
WBC # BLD AUTO: 8.8 K/UL (ref 4.6–13.2)
WBC # BLD AUTO: 9 K/UL (ref 4.6–13.2)
WBC # BLD AUTO: 9.6 K/UL (ref 4.6–13.2)
WBC # BLD AUTO: 9.8 K/UL (ref 4.5–13)
WBC URNS QL MICRO: NORMAL /HPF (ref 0–4)

## 2020-01-01 PROCEDURE — 74011000250 HC RX REV CODE- 250: Performed by: FAMILY MEDICINE

## 2020-01-01 PROCEDURE — 0651 HSPC ROUTINE HOME CARE

## 2020-01-01 PROCEDURE — 36430 TRANSFUSION BLD/BLD COMPNT: CPT

## 2020-01-01 PROCEDURE — 71275 CT ANGIOGRAPHY CHEST: CPT

## 2020-01-01 PROCEDURE — 80307 DRUG TEST PRSMV CHEM ANLYZR: CPT

## 2020-01-01 PROCEDURE — 80162 ASSAY OF DIGOXIN TOTAL: CPT

## 2020-01-01 PROCEDURE — 72110 X-RAY EXAM L-2 SPINE 4/>VWS: CPT

## 2020-01-01 PROCEDURE — P9016 RBC LEUKOCYTES REDUCED: HCPCS

## 2020-01-01 PROCEDURE — 94640 AIRWAY INHALATION TREATMENT: CPT

## 2020-01-01 PROCEDURE — 74011250637 HC RX REV CODE- 250/637: Performed by: STUDENT IN AN ORGANIZED HEALTH CARE EDUCATION/TRAINING PROGRAM

## 2020-01-01 PROCEDURE — T4541 LARGE DISPOSABLE UNDERPAD: HCPCS

## 2020-01-01 PROCEDURE — 74011250636 HC RX REV CODE- 250/636: Performed by: INTERNAL MEDICINE

## 2020-01-01 PROCEDURE — 85018 HEMOGLOBIN: CPT

## 2020-01-01 PROCEDURE — 74011000250 HC RX REV CODE- 250: Performed by: STUDENT IN AN ORGANIZED HEALTH CARE EDUCATION/TRAINING PROGRAM

## 2020-01-01 PROCEDURE — G0156 HHCP-SVS OF AIDE,EA 15 MIN: HCPCS

## 2020-01-01 PROCEDURE — 82330 ASSAY OF CALCIUM: CPT

## 2020-01-01 PROCEDURE — 85652 RBC SED RATE AUTOMATED: CPT

## 2020-01-01 PROCEDURE — 74011250636 HC RX REV CODE- 250/636: Performed by: NURSE PRACTITIONER

## 2020-01-01 PROCEDURE — 74011636637 HC RX REV CODE- 636/637: Performed by: INTERNAL MEDICINE

## 2020-01-01 PROCEDURE — 94762 N-INVAS EAR/PLS OXIMTRY CONT: CPT

## 2020-01-01 PROCEDURE — HHS10554 SHAMPOO/BODY WASH 8 OZ ALOE VESTA

## 2020-01-01 PROCEDURE — 87040 BLOOD CULTURE FOR BACTERIA: CPT

## 2020-01-01 PROCEDURE — 3331090001 HH PPS REVENUE CREDIT

## 2020-01-01 PROCEDURE — 74011250637 HC RX REV CODE- 250/637: Performed by: PHYSICIAN ASSISTANT

## 2020-01-01 PROCEDURE — 87186 SC STD MICRODIL/AGAR DIL: CPT

## 2020-01-01 PROCEDURE — 36415 COLL VENOUS BLD VENIPUNCTURE: CPT

## 2020-01-01 PROCEDURE — 74011250636 HC RX REV CODE- 250/636: Performed by: STUDENT IN AN ORGANIZED HEALTH CARE EDUCATION/TRAINING PROGRAM

## 2020-01-01 PROCEDURE — 3331090002 HH PPS REVENUE DEBIT

## 2020-01-01 PROCEDURE — 82728 ASSAY OF FERRITIN: CPT

## 2020-01-01 PROCEDURE — 74011000258 HC RX REV CODE- 258: Performed by: STUDENT IN AN ORGANIZED HEALTH CARE EDUCATION/TRAINING PROGRAM

## 2020-01-01 PROCEDURE — G0299 HHS/HOSPICE OF RN EA 15 MIN: HCPCS

## 2020-01-01 PROCEDURE — 65270000029 HC RM PRIVATE

## 2020-01-01 PROCEDURE — 96365 THER/PROPH/DIAG IV INF INIT: CPT

## 2020-01-01 PROCEDURE — 74011250637 HC RX REV CODE- 250/637: Performed by: FAMILY MEDICINE

## 2020-01-01 PROCEDURE — 74011250637 HC RX REV CODE- 250/637: Performed by: INTERNAL MEDICINE

## 2020-01-01 PROCEDURE — HOSPICE MEDICATION HC HH HOSPICE MEDICATION

## 2020-01-01 PROCEDURE — 97161 PT EVAL LOW COMPLEX 20 MIN: CPT

## 2020-01-01 PROCEDURE — 74011000250 HC RX REV CODE- 250: Performed by: INTERNAL MEDICINE

## 2020-01-01 PROCEDURE — 84100 ASSAY OF PHOSPHORUS: CPT

## 2020-01-01 PROCEDURE — 85025 COMPLETE CBC W/AUTO DIFF WBC: CPT

## 2020-01-01 PROCEDURE — 86923 COMPATIBILITY TEST ELECTRIC: CPT

## 2020-01-01 PROCEDURE — G0300 HHS/HOSPICE OF LPN EA 15 MIN: HCPCS

## 2020-01-01 PROCEDURE — 71045 X-RAY EXAM CHEST 1 VIEW: CPT

## 2020-01-01 PROCEDURE — 83605 ASSAY OF LACTIC ACID: CPT

## 2020-01-01 PROCEDURE — 92526 ORAL FUNCTION THERAPY: CPT

## 2020-01-01 PROCEDURE — 74011250636 HC RX REV CODE- 250/636: Performed by: NURSE ANESTHETIST, CERTIFIED REGISTERED

## 2020-01-01 PROCEDURE — 96374 THER/PROPH/DIAG INJ IV PUSH: CPT

## 2020-01-01 PROCEDURE — 83880 ASSAY OF NATRIURETIC PEPTIDE: CPT

## 2020-01-01 PROCEDURE — 99283 EMERGENCY DEPT VISIT LOW MDM: CPT

## 2020-01-01 PROCEDURE — T4523 ADULT SIZE BRIEF/DIAPER LG: HCPCS

## 2020-01-01 PROCEDURE — C9113 INJ PANTOPRAZOLE SODIUM, VIA: HCPCS | Performed by: EMERGENCY MEDICINE

## 2020-01-01 PROCEDURE — 83735 ASSAY OF MAGNESIUM: CPT

## 2020-01-01 PROCEDURE — 74174 CTA ABD&PLVS W/CONTRAST: CPT

## 2020-01-01 PROCEDURE — 74011250636 HC RX REV CODE- 250/636: Performed by: ANESTHESIOLOGY

## 2020-01-01 PROCEDURE — 74011636320 HC RX REV CODE- 636/320: Performed by: SURGERY

## 2020-01-01 PROCEDURE — 80053 COMPREHEN METABOLIC PANEL: CPT

## 2020-01-01 PROCEDURE — 96367 TX/PROPH/DG ADDL SEQ IV INF: CPT

## 2020-01-01 PROCEDURE — 97530 THERAPEUTIC ACTIVITIES: CPT

## 2020-01-01 PROCEDURE — 77030013169 SET IV BLD ICUM -A

## 2020-01-01 PROCEDURE — 74011250637 HC RX REV CODE- 250/637: Performed by: NURSE PRACTITIONER

## 2020-01-01 PROCEDURE — 74011000636 HC RX REV CODE- 636: Performed by: EMERGENCY MEDICINE

## 2020-01-01 PROCEDURE — 74011000250 HC RX REV CODE- 250: Performed by: NURSE ANESTHETIST, CERTIFIED REGISTERED

## 2020-01-01 PROCEDURE — 400013 HH SOC

## 2020-01-01 PROCEDURE — 80076 HEPATIC FUNCTION PANEL: CPT

## 2020-01-01 PROCEDURE — 85610 PROTHROMBIN TIME: CPT

## 2020-01-01 PROCEDURE — C9113 INJ PANTOPRAZOLE SODIUM, VIA: HCPCS | Performed by: INTERNAL MEDICINE

## 2020-01-01 PROCEDURE — 74230 X-RAY XM SWLNG FUNCJ C+: CPT

## 2020-01-01 PROCEDURE — 74011000258 HC RX REV CODE- 258: Performed by: EMERGENCY MEDICINE

## 2020-01-01 PROCEDURE — 87635 SARS-COV-2 COVID-19 AMP PRB: CPT

## 2020-01-01 PROCEDURE — 96376 TX/PRO/DX INJ SAME DRUG ADON: CPT

## 2020-01-01 PROCEDURE — 77010033678 HC OXYGEN DAILY

## 2020-01-01 PROCEDURE — 83540 ASSAY OF IRON: CPT

## 2020-01-01 PROCEDURE — P9059 PLASMA, FRZ BETWEEN 8-24HOUR: HCPCS

## 2020-01-01 PROCEDURE — G0151 HHCP-SERV OF PT,EA 15 MIN: HCPCS

## 2020-01-01 PROCEDURE — G0152 HHCP-SERV OF OT,EA 15 MIN: HCPCS

## 2020-01-01 PROCEDURE — 30233N1 TRANSFUSION OF NONAUTOLOGOUS RED BLOOD CELLS INTO PERIPHERAL VEIN, PERCUTANEOUS APPROACH: ICD-10-PCS | Performed by: FAMILY MEDICINE

## 2020-01-01 PROCEDURE — 85730 THROMBOPLASTIN TIME PARTIAL: CPT

## 2020-01-01 PROCEDURE — 87450 LEGIONELLA PNEUMOPHILA AG, URINE: CPT

## 2020-01-01 PROCEDURE — 0DJ08ZZ INSPECTION OF UPPER INTESTINAL TRACT, VIA NATURAL OR ARTIFICIAL OPENING ENDOSCOPIC: ICD-10-PCS | Performed by: INTERNAL MEDICINE

## 2020-01-01 PROCEDURE — 80048 BASIC METABOLIC PNL TOTAL CA: CPT

## 2020-01-01 PROCEDURE — A6250 SKIN SEAL PROTECT MOISTURIZR: HCPCS

## 2020-01-01 PROCEDURE — 65660000004 HC RM CVT STEPDOWN

## 2020-01-01 PROCEDURE — 97166 OT EVAL MOD COMPLEX 45 MIN: CPT

## 2020-01-01 PROCEDURE — 77010033678 HC OXYGEN DAILY: Performed by: FAMILY MEDICINE

## 2020-01-01 PROCEDURE — 76450000000

## 2020-01-01 PROCEDURE — 85048 AUTOMATED LEUKOCYTE COUNT: CPT

## 2020-01-01 PROCEDURE — A4649 SURGICAL SUPPLIES: HCPCS

## 2020-01-01 PROCEDURE — 80074 ACUTE HEPATITIS PANEL: CPT

## 2020-01-01 PROCEDURE — 74011250636 HC RX REV CODE- 250/636: Performed by: FAMILY MEDICINE

## 2020-01-01 PROCEDURE — 94761 N-INVAS EAR/PLS OXIMETRY MLT: CPT

## 2020-01-01 PROCEDURE — 97116 GAIT TRAINING THERAPY: CPT

## 2020-01-01 PROCEDURE — 3336500001 HSPC ELECTION

## 2020-01-01 PROCEDURE — 85014 HEMATOCRIT: CPT

## 2020-01-01 PROCEDURE — G0157 HHC PT ASSISTANT EA 15: HCPCS

## 2020-01-01 PROCEDURE — 92610 EVALUATE SWALLOWING FUNCTION: CPT

## 2020-01-01 PROCEDURE — 82962 GLUCOSE BLOOD TEST: CPT

## 2020-01-01 PROCEDURE — 86900 BLOOD TYPING SEROLOGIC ABO: CPT

## 2020-01-01 PROCEDURE — 96361 HYDRATE IV INFUSION ADD-ON: CPT

## 2020-01-01 PROCEDURE — 87185 SC STD ENZYME DETCJ PER NZM: CPT

## 2020-01-01 PROCEDURE — 82550 ASSAY OF CK (CPK): CPT

## 2020-01-01 PROCEDURE — 87077 CULTURE AEROBIC IDENTIFY: CPT

## 2020-01-01 PROCEDURE — 74011250636 HC RX REV CODE- 250/636: Performed by: EMERGENCY MEDICINE

## 2020-01-01 PROCEDURE — 93005 ELECTROCARDIOGRAM TRACING: CPT

## 2020-01-01 PROCEDURE — 87070 CULTURE OTHR SPECIMN AEROBIC: CPT

## 2020-01-01 PROCEDURE — 83516 IMMUNOASSAY NONANTIBODY: CPT

## 2020-01-01 PROCEDURE — 99285 EMERGENCY DEPT VISIT HI MDM: CPT

## 2020-01-01 PROCEDURE — G0155 HHCP-SVS OF CSW,EA 15 MIN: HCPCS

## 2020-01-01 PROCEDURE — A4927 NON-STERILE GLOVES: HCPCS

## 2020-01-01 PROCEDURE — 96375 TX/PRO/DX INJ NEW DRUG ADDON: CPT

## 2020-01-01 PROCEDURE — A9577 INJ MULTIHANCE: HCPCS | Performed by: FAMILY MEDICINE

## 2020-01-01 PROCEDURE — 74011000250 HC RX REV CODE- 250: Performed by: EMERGENCY MEDICINE

## 2020-01-01 PROCEDURE — 85027 COMPLETE CBC AUTOMATED: CPT

## 2020-01-01 PROCEDURE — 97535 SELF CARE MNGMENT TRAINING: CPT

## 2020-01-01 PROCEDURE — A7003 NEBULIZER ADMINISTRATION SET: HCPCS

## 2020-01-01 PROCEDURE — 86038 ANTINUCLEAR ANTIBODIES: CPT

## 2020-01-01 PROCEDURE — 77030008565 HC TBNG SUC IRR ERBE -B: Performed by: INTERNAL MEDICINE

## 2020-01-01 PROCEDURE — HHS10103 EAR FOAM WRAP

## 2020-01-01 PROCEDURE — 87449 NOS EACH ORGANISM AG IA: CPT

## 2020-01-01 PROCEDURE — 30233K1 TRANSFUSION OF NONAUTOLOGOUS FROZEN PLASMA INTO PERIPHERAL VEIN, PERCUTANEOUS APPROACH: ICD-10-PCS | Performed by: FAMILY MEDICINE

## 2020-01-01 PROCEDURE — 74011000255 HC RX REV CODE- 255: Performed by: FAMILY MEDICINE

## 2020-01-01 PROCEDURE — 92611 MOTION FLUOROSCOPY/SWALLOW: CPT

## 2020-01-01 PROCEDURE — 81001 URINALYSIS AUTO W/SCOPE: CPT

## 2020-01-01 PROCEDURE — 72149 MRI LUMBAR SPINE W/DYE: CPT

## 2020-01-01 PROCEDURE — 83690 ASSAY OF LIPASE: CPT

## 2020-01-01 PROCEDURE — 97110 THERAPEUTIC EXERCISES: CPT

## 2020-01-01 PROCEDURE — 99284 EMERGENCY DEPT VISIT MOD MDM: CPT

## 2020-01-01 PROCEDURE — C9113 INJ PANTOPRAZOLE SODIUM, VIA: HCPCS | Performed by: STUDENT IN AN ORGANIZED HEALTH CARE EDUCATION/TRAINING PROGRAM

## 2020-01-01 PROCEDURE — 74011000258 HC RX REV CODE- 258: Performed by: ANESTHESIOLOGY

## 2020-01-01 PROCEDURE — 86140 C-REACTIVE PROTEIN: CPT

## 2020-01-01 PROCEDURE — 76060000031 HC ANESTHESIA FIRST 0.5 HR: Performed by: INTERNAL MEDICINE

## 2020-01-01 PROCEDURE — 77030021566 MRA ABD W WO CONT

## 2020-01-01 PROCEDURE — 74011000250 HC RX REV CODE- 250: Performed by: ANESTHESIOLOGY

## 2020-01-01 PROCEDURE — 82565 ASSAY OF CREATININE: CPT

## 2020-01-01 PROCEDURE — 87086 URINE CULTURE/COLONY COUNT: CPT

## 2020-01-01 PROCEDURE — 76040000019: Performed by: INTERNAL MEDICINE

## 2020-01-01 PROCEDURE — 93306 TTE W/DOPPLER COMPLETE: CPT

## 2020-01-01 PROCEDURE — 3331090004 HSPC SERVICE INTENSITY ADD-ON

## 2020-01-01 PROCEDURE — A7015 AEROSOL MASK USED W NEBULIZE: HCPCS

## 2020-01-01 RX ORDER — ACETAMINOPHEN 325 MG/1
650 TABLET ORAL AS NEEDED
Status: CANCELLED
Start: 2020-01-01

## 2020-01-01 RX ORDER — DIPHENHYDRAMINE HCL 25 MG
25 CAPSULE ORAL
Status: DISCONTINUED | OUTPATIENT
Start: 2020-01-01 | End: 2020-01-01 | Stop reason: HOSPADM

## 2020-01-01 RX ORDER — BUDESONIDE 0.5 MG/2ML
500 INHALANT ORAL
Status: DISCONTINUED | OUTPATIENT
Start: 2020-01-01 | End: 2020-01-01 | Stop reason: HOSPADM

## 2020-01-01 RX ORDER — LIDOCAINE HYDROCHLORIDE 20 MG/ML
INJECTION, SOLUTION EPIDURAL; INFILTRATION; INTRACAUDAL; PERINEURAL AS NEEDED
Status: DISCONTINUED | OUTPATIENT
Start: 2020-01-01 | End: 2020-01-01 | Stop reason: HOSPADM

## 2020-01-01 RX ORDER — EPINEPHRINE 1 MG/ML
0.3 INJECTION, SOLUTION, CONCENTRATE INTRAVENOUS AS NEEDED
Status: CANCELLED | OUTPATIENT
Start: 2020-01-01

## 2020-01-01 RX ORDER — DULOXETIN HYDROCHLORIDE 20 MG/1
30 CAPSULE, DELAYED RELEASE ORAL DAILY
COMMUNITY
End: 2020-01-01

## 2020-01-01 RX ORDER — HEPARIN 100 UNIT/ML
300-500 SYRINGE INTRAVENOUS AS NEEDED
Status: CANCELLED
Start: 2020-01-01

## 2020-01-01 RX ORDER — HYDROMORPHONE HYDROCHLORIDE 1 MG/ML
0.2 INJECTION, SOLUTION INTRAMUSCULAR; INTRAVENOUS; SUBCUTANEOUS
Status: DISCONTINUED | OUTPATIENT
Start: 2020-01-01 | End: 2020-01-01

## 2020-01-01 RX ORDER — HYDROCODONE BITARTRATE AND ACETAMINOPHEN 5; 325 MG/1; MG/1
1 TABLET ORAL
Status: DISCONTINUED | OUTPATIENT
Start: 2020-01-01 | End: 2020-01-01 | Stop reason: HOSPADM

## 2020-01-01 RX ORDER — ONDANSETRON 2 MG/ML
8 INJECTION INTRAMUSCULAR; INTRAVENOUS AS NEEDED
Status: CANCELLED | OUTPATIENT
Start: 2020-01-01

## 2020-01-01 RX ORDER — HYDROCODONE BITARTRATE AND ACETAMINOPHEN 5; 325 MG/1; MG/1
1 TABLET ORAL
Status: DISCONTINUED | OUTPATIENT
Start: 2020-01-01 | End: 2020-01-01

## 2020-01-01 RX ORDER — SODIUM CHLORIDE 9 MG/ML
25 INJECTION, SOLUTION INTRAVENOUS CONTINUOUS
Status: DISCONTINUED | OUTPATIENT
Start: 2020-01-01 | End: 2020-01-01 | Stop reason: HOSPADM

## 2020-01-01 RX ORDER — SODIUM CHLORIDE 9 MG/ML
10 INJECTION INTRAMUSCULAR; INTRAVENOUS; SUBCUTANEOUS AS NEEDED
Status: CANCELLED | OUTPATIENT
Start: 2020-01-01

## 2020-01-01 RX ORDER — POLYETHYLENE GLYCOL 3350 17 G/17G
17 POWDER, FOR SOLUTION ORAL DAILY
COMMUNITY
End: 2020-01-01

## 2020-01-01 RX ORDER — TAMSULOSIN HYDROCHLORIDE 0.4 MG/1
0.4 CAPSULE ORAL DAILY
Status: DISCONTINUED | OUTPATIENT
Start: 2020-01-01 | End: 2020-01-01 | Stop reason: HOSPADM

## 2020-01-01 RX ORDER — PROPOFOL 10 MG/ML
INJECTION, EMULSION INTRAVENOUS AS NEEDED
Status: DISCONTINUED | OUTPATIENT
Start: 2020-01-01 | End: 2020-01-01 | Stop reason: HOSPADM

## 2020-01-01 RX ORDER — SODIUM CHLORIDE 9 MG/ML
110 INJECTION, SOLUTION INTRAVENOUS CONTINUOUS
Status: DISPENSED | OUTPATIENT
Start: 2020-01-01 | End: 2020-01-01

## 2020-01-01 RX ORDER — OXYCODONE HYDROCHLORIDE 5 MG/1
5 TABLET ORAL
Status: DISCONTINUED | OUTPATIENT
Start: 2020-01-01 | End: 2020-01-01

## 2020-01-01 RX ORDER — SODIUM CHLORIDE 9 MG/ML
25 INJECTION, SOLUTION INTRAVENOUS CONTINUOUS
Status: CANCELLED | OUTPATIENT
Start: 2020-01-01

## 2020-01-01 RX ORDER — CEPHALEXIN 500 MG/1
TABLET ORAL
COMMUNITY
Start: 2020-01-01 | End: 2020-01-01

## 2020-01-01 RX ORDER — POLYETHYLENE GLYCOL 3350 17 G/17G
17 POWDER, FOR SOLUTION ORAL DAILY
Status: DISCONTINUED | OUTPATIENT
Start: 2020-01-01 | End: 2020-01-01 | Stop reason: HOSPADM

## 2020-01-01 RX ORDER — SODIUM CHLORIDE 9 MG/ML
250 INJECTION, SOLUTION INTRAVENOUS AS NEEDED
Status: DISCONTINUED | OUTPATIENT
Start: 2020-01-01 | End: 2020-01-01

## 2020-01-01 RX ORDER — SODIUM CHLORIDE 0.9 % (FLUSH) 0.9 %
10 SYRINGE (ML) INJECTION AS NEEDED
Status: CANCELLED
Start: 2020-01-01

## 2020-01-01 RX ORDER — SCOLOPAMINE TRANSDERMAL SYSTEM 1 MG/1
1 PATCH, EXTENDED RELEASE TRANSDERMAL
Status: DISCONTINUED | OUTPATIENT
Start: 2020-01-01 | End: 2020-01-01 | Stop reason: HOSPADM

## 2020-01-01 RX ORDER — DEXTROSE, SODIUM CHLORIDE, AND POTASSIUM CHLORIDE 5; .45; .075 G/100ML; G/100ML; G/100ML
90 INJECTION INTRAVENOUS CONTINUOUS
Status: DISCONTINUED | OUTPATIENT
Start: 2020-01-01 | End: 2020-01-01

## 2020-01-01 RX ORDER — HYDROCORTISONE SODIUM SUCCINATE 100 MG/2ML
100 INJECTION, POWDER, FOR SOLUTION INTRAMUSCULAR; INTRAVENOUS AS NEEDED
Status: CANCELLED | OUTPATIENT
Start: 2020-01-01

## 2020-01-01 RX ORDER — PREDNISONE 20 MG/1
40 TABLET ORAL
Status: DISCONTINUED | OUTPATIENT
Start: 2020-01-01 | End: 2020-01-01 | Stop reason: HOSPADM

## 2020-01-01 RX ORDER — IPRATROPIUM BROMIDE AND ALBUTEROL SULFATE 2.5; .5 MG/3ML; MG/3ML
3 SOLUTION RESPIRATORY (INHALATION)
Status: DISCONTINUED | OUTPATIENT
Start: 2020-01-01 | End: 2020-01-01 | Stop reason: HOSPADM

## 2020-01-01 RX ORDER — LORAZEPAM 2 MG/ML
0.5 CONCENTRATE ORAL
Status: DISCONTINUED | OUTPATIENT
Start: 2020-01-01 | End: 2020-01-01 | Stop reason: HOSPADM

## 2020-01-01 RX ORDER — INSULIN LISPRO 100 [IU]/ML
INJECTION, SOLUTION INTRAVENOUS; SUBCUTANEOUS ONCE
Status: DISCONTINUED | OUTPATIENT
Start: 2020-01-01 | End: 2020-01-01 | Stop reason: HOSPADM

## 2020-01-01 RX ORDER — DIPHENHYDRAMINE HYDROCHLORIDE 50 MG/ML
50 INJECTION, SOLUTION INTRAMUSCULAR; INTRAVENOUS AS NEEDED
Status: CANCELLED
Start: 2020-01-01

## 2020-01-01 RX ORDER — TRAZODONE HYDROCHLORIDE 50 MG/1
50 TABLET ORAL
Status: DISCONTINUED | OUTPATIENT
Start: 2020-01-01 | End: 2020-01-01

## 2020-01-01 RX ORDER — ONDANSETRON 4 MG/1
4 TABLET, ORALLY DISINTEGRATING ORAL
Status: DISCONTINUED | OUTPATIENT
Start: 2020-01-01 | End: 2020-01-01 | Stop reason: HOSPADM

## 2020-01-01 RX ORDER — CIPROFLOXACIN 500 MG/1
TABLET ORAL
COMMUNITY
Start: 2020-01-01 | End: 2020-01-01

## 2020-01-01 RX ORDER — ALBUTEROL SULFATE 0.83 MG/ML
2.5 SOLUTION RESPIRATORY (INHALATION) AS NEEDED
Status: CANCELLED
Start: 2020-01-01

## 2020-01-01 RX ORDER — PREDNISONE 20 MG/1
20 TABLET ORAL DAILY
Qty: 5 TAB | Refills: 0 | Status: SHIPPED | OUTPATIENT
Start: 2020-01-01 | End: 2020-01-01

## 2020-01-01 RX ORDER — ALBUTEROL SULFATE 0.83 MG/ML
SOLUTION RESPIRATORY (INHALATION)
Qty: 50 NEBULE | Refills: 5 | Status: SHIPPED | OUTPATIENT
Start: 2020-01-01 | End: 2020-01-01

## 2020-01-01 RX ORDER — POLYETHYLENE GLYCOL 3350 17 G/17G
17 POWDER, FOR SOLUTION ORAL
Status: DISCONTINUED | OUTPATIENT
Start: 2020-01-01 | End: 2020-01-01 | Stop reason: HOSPADM

## 2020-01-01 RX ORDER — SODIUM CHLORIDE 9 MG/ML
25 INJECTION, SOLUTION INTRAVENOUS CONTINUOUS
Status: DISPENSED | OUTPATIENT
Start: 2020-01-01 | End: 2020-01-01

## 2020-01-01 RX ORDER — ACETAMINOPHEN 325 MG/1
650 TABLET ORAL 3 TIMES DAILY
Status: DISCONTINUED | OUTPATIENT
Start: 2020-01-01 | End: 2020-01-01 | Stop reason: HOSPADM

## 2020-01-01 RX ORDER — LIDOCAINE 50 MG/G
PATCH TOPICAL
COMMUNITY
Start: 2020-01-01 | End: 2020-01-01

## 2020-01-01 RX ORDER — FENTANYL 12.5 UG/1
1 PATCH TRANSDERMAL
Qty: 1 PATCH | Refills: 0 | OUTPATIENT
Start: 2020-01-01 | End: 2020-01-01

## 2020-01-01 RX ORDER — POTASSIUM CHLORIDE 7.45 MG/ML
10 INJECTION INTRAVENOUS ONCE
Status: DISCONTINUED | OUTPATIENT
Start: 2020-01-01 | End: 2020-01-01

## 2020-01-01 RX ORDER — POTASSIUM CHLORIDE 20 MEQ/1
40 TABLET, EXTENDED RELEASE ORAL
Status: COMPLETED | OUTPATIENT
Start: 2020-01-01 | End: 2020-01-01

## 2020-01-01 RX ORDER — SODIUM CHLORIDE 9 MG/ML
250 INJECTION, SOLUTION INTRAVENOUS AS NEEDED
Status: DISCONTINUED | OUTPATIENT
Start: 2020-01-01 | End: 2020-01-01 | Stop reason: HOSPADM

## 2020-01-01 RX ORDER — MULTIVITAMIN/IRON/FOLIC ACID 18MG-0.4MG
1 TABLET ORAL DAILY
Status: DISCONTINUED | OUTPATIENT
Start: 2020-01-01 | End: 2020-01-01

## 2020-01-01 RX ORDER — THERA TABS 400 MCG
1 TAB ORAL DAILY
Status: DISCONTINUED | OUTPATIENT
Start: 2020-01-01 | End: 2020-01-01

## 2020-01-01 RX ORDER — DULOXETIN HYDROCHLORIDE 20 MG/1
20 CAPSULE, DELAYED RELEASE ORAL DAILY
Status: DISCONTINUED | OUTPATIENT
Start: 2020-01-01 | End: 2020-01-01 | Stop reason: HOSPADM

## 2020-01-01 RX ORDER — ONDANSETRON 2 MG/ML
4 INJECTION INTRAMUSCULAR; INTRAVENOUS
Status: DISCONTINUED | OUTPATIENT
Start: 2020-01-01 | End: 2020-01-01

## 2020-01-01 RX ORDER — TIOTROPIUM BROMIDE AND OLODATEROL 3.124; 2.736 UG/1; UG/1
SPRAY, METERED RESPIRATORY (INHALATION)
Qty: 1 INHALER | Refills: 5 | Status: SHIPPED | OUTPATIENT
Start: 2020-01-01

## 2020-01-01 RX ORDER — POLYETHYLENE GLYCOL 3350 17 G/17G
17 POWDER, FOR SOLUTION ORAL ONCE
Status: COMPLETED | OUTPATIENT
Start: 2020-01-01 | End: 2020-01-01

## 2020-01-01 RX ORDER — DIGOXIN 125 MCG
0.12 TABLET ORAL DAILY
Status: DISCONTINUED | OUTPATIENT
Start: 2020-01-01 | End: 2020-01-01 | Stop reason: HOSPADM

## 2020-01-01 RX ORDER — HYDROCODONE BITARTRATE AND ACETAMINOPHEN 5; 325 MG/1; MG/1
1 TABLET ORAL ONCE
Status: COMPLETED | OUTPATIENT
Start: 2020-01-01 | End: 2020-01-01

## 2020-01-01 RX ORDER — PREDNISONE 20 MG/1
40 TABLET ORAL DAILY
Qty: 1 TAB | Refills: 0 | Status: SHIPPED | OUTPATIENT
Start: 2020-01-01 | End: 2020-01-01

## 2020-01-01 RX ORDER — ACETAMINOPHEN 325 MG/1
650 TABLET ORAL ONCE
Status: COMPLETED | OUTPATIENT
Start: 2020-01-01 | End: 2020-01-01

## 2020-01-01 RX ORDER — LISINOPRIL 20 MG/1
20 TABLET ORAL DAILY
Status: DISCONTINUED | OUTPATIENT
Start: 2020-01-01 | End: 2020-01-01

## 2020-01-01 RX ORDER — TRAZODONE HYDROCHLORIDE 50 MG/1
50 TABLET ORAL
Status: DISCONTINUED | OUTPATIENT
Start: 2020-01-01 | End: 2020-01-01 | Stop reason: HOSPADM

## 2020-01-01 RX ORDER — DIPHENHYDRAMINE HYDROCHLORIDE 50 MG/ML
25 INJECTION, SOLUTION INTRAMUSCULAR; INTRAVENOUS AS NEEDED
Status: CANCELLED
Start: 2020-01-01

## 2020-01-01 RX ORDER — SODIUM CHLORIDE 0.9 % (FLUSH) 0.9 %
10 SYRINGE (ML) INJECTION AS NEEDED
Status: DISCONTINUED | OUTPATIENT
Start: 2020-01-01 | End: 2020-01-01 | Stop reason: HOSPADM

## 2020-01-01 RX ORDER — AMLODIPINE BESYLATE 10 MG/1
10 TABLET ORAL DAILY
Status: DISCONTINUED | OUTPATIENT
Start: 2020-01-01 | End: 2020-01-01 | Stop reason: HOSPADM

## 2020-01-01 RX ORDER — LANOLIN ALCOHOL/MO/W.PET/CERES
325 CREAM (GRAM) TOPICAL EVERY OTHER DAY
Status: DISCONTINUED | OUTPATIENT
Start: 2020-01-01 | End: 2020-01-01

## 2020-01-01 RX ORDER — FACIAL-BODY WIPES
10 EACH TOPICAL DAILY PRN
Status: DISCONTINUED | OUTPATIENT
Start: 2020-01-01 | End: 2020-01-01 | Stop reason: HOSPADM

## 2020-01-01 RX ORDER — LISINOPRIL 20 MG/1
20 TABLET ORAL DAILY
Status: DISCONTINUED | OUTPATIENT
Start: 2020-01-01 | End: 2020-01-01 | Stop reason: HOSPADM

## 2020-01-01 RX ORDER — LIDOCAINE 4 G/100G
1 PATCH TOPICAL EVERY 24 HOURS
Status: DISCONTINUED | OUTPATIENT
Start: 2020-01-01 | End: 2020-01-01 | Stop reason: HOSPADM

## 2020-01-01 RX ORDER — IPRATROPIUM BROMIDE AND ALBUTEROL SULFATE 2.5; .5 MG/3ML; MG/3ML
3 SOLUTION RESPIRATORY (INHALATION)
Status: DISCONTINUED | OUTPATIENT
Start: 2020-01-01 | End: 2020-01-01

## 2020-01-01 RX ORDER — SODIUM CHLORIDE 9 MG/ML
90 INJECTION, SOLUTION INTRAVENOUS CONTINUOUS
Status: DISCONTINUED | OUTPATIENT
Start: 2020-01-01 | End: 2020-01-01

## 2020-01-01 RX ORDER — POTASSIUM CHLORIDE 7.45 MG/ML
10 INJECTION INTRAVENOUS
Status: COMPLETED | OUTPATIENT
Start: 2020-01-01 | End: 2020-01-01

## 2020-01-01 RX ORDER — SODIUM CHLORIDE 9 MG/ML
10 INJECTION INTRAMUSCULAR; INTRAVENOUS; SUBCUTANEOUS AS NEEDED
Status: DISCONTINUED | OUTPATIENT
Start: 2020-01-01 | End: 2020-01-01 | Stop reason: HOSPADM

## 2020-01-01 RX ORDER — ALBUTEROL SULFATE 0.83 MG/ML
2.5 SOLUTION RESPIRATORY (INHALATION)
Qty: 24 EACH | Refills: 3 | Status: SHIPPED | OUTPATIENT
Start: 2020-01-01 | End: 2020-01-01 | Stop reason: SDUPTHER

## 2020-01-01 RX ORDER — SODIUM CHLORIDE 0.9 % (FLUSH) 0.9 %
10-40 SYRINGE (ML) INJECTION AS NEEDED
Status: DISCONTINUED | OUTPATIENT
Start: 2020-01-01 | End: 2020-01-01 | Stop reason: HOSPADM

## 2020-01-01 RX ORDER — ALBUTEROL SULFATE 0.83 MG/ML
2.5 SOLUTION RESPIRATORY (INHALATION)
Status: DISCONTINUED | OUTPATIENT
Start: 2020-01-01 | End: 2020-01-01

## 2020-01-01 RX ORDER — METRONIDAZOLE 500 MG/100ML
500 INJECTION, SOLUTION INTRAVENOUS
Status: COMPLETED | OUTPATIENT
Start: 2020-01-01 | End: 2020-01-01

## 2020-01-01 RX ORDER — DICLOFENAC SODIUM 10 MG/G
GEL TOPICAL
COMMUNITY
Start: 2020-01-01 | End: 2020-01-01

## 2020-01-01 RX ORDER — ASCORBIC ACID 500 MG
500 TABLET ORAL DAILY
COMMUNITY
End: 2020-01-01

## 2020-01-01 RX ORDER — CEFPODOXIME PROXETIL 200 MG/1
200 TABLET, FILM COATED ORAL 2 TIMES DAILY
Qty: 2 TAB | Refills: 0 | Status: SHIPPED | OUTPATIENT
Start: 2020-01-01 | End: 2020-01-01

## 2020-01-01 RX ORDER — ASCORBIC ACID 250 MG
500 TABLET ORAL DAILY
Status: DISCONTINUED | OUTPATIENT
Start: 2020-01-01 | End: 2020-01-01

## 2020-01-01 RX ORDER — SODIUM CHLORIDE 9 MG/ML
250 INJECTION, SOLUTION INTRAVENOUS AS NEEDED
Status: DISCONTINUED | OUTPATIENT
Start: 2020-01-01 | End: 2020-01-01 | Stop reason: ALTCHOICE

## 2020-01-01 RX ORDER — HYDROMORPHONE HYDROCHLORIDE 1 MG/ML
0.2 INJECTION, SOLUTION INTRAMUSCULAR; INTRAVENOUS; SUBCUTANEOUS
Status: DISCONTINUED | OUTPATIENT
Start: 2020-01-01 | End: 2020-01-01 | Stop reason: CLARIF

## 2020-01-01 RX ORDER — PRAVASTATIN SODIUM 20 MG/1
40 TABLET ORAL
Status: DISCONTINUED | OUTPATIENT
Start: 2020-01-01 | End: 2020-01-01

## 2020-01-01 RX ORDER — PANTOPRAZOLE SODIUM 40 MG/1
40 TABLET, DELAYED RELEASE ORAL
Status: DISCONTINUED | OUTPATIENT
Start: 2020-01-01 | End: 2020-01-01 | Stop reason: HOSPADM

## 2020-01-01 RX ORDER — DIPHENHYDRAMINE HCL 25 MG
25 CAPSULE ORAL ONCE
Status: COMPLETED | OUTPATIENT
Start: 2020-01-01 | End: 2020-01-01

## 2020-01-01 RX ORDER — ONDANSETRON 2 MG/ML
4 INJECTION INTRAMUSCULAR; INTRAVENOUS ONCE
Status: COMPLETED | OUTPATIENT
Start: 2020-01-01 | End: 2020-01-01

## 2020-01-01 RX ORDER — SODIUM CHLORIDE, SODIUM LACTATE, POTASSIUM CHLORIDE, CALCIUM CHLORIDE 600; 310; 30; 20 MG/100ML; MG/100ML; MG/100ML; MG/100ML
75 INJECTION, SOLUTION INTRAVENOUS CONTINUOUS
Status: DISCONTINUED | OUTPATIENT
Start: 2020-01-01 | End: 2020-01-01 | Stop reason: HOSPADM

## 2020-01-01 RX ORDER — OMEPRAZOLE 10 MG/1
10 CAPSULE, DELAYED RELEASE ORAL DAILY
COMMUNITY
End: 2020-01-01

## 2020-01-01 RX ORDER — HYDROMORPHONE HYDROCHLORIDE 2 MG/1
4 TABLET ORAL
Status: DISCONTINUED | OUTPATIENT
Start: 2020-01-01 | End: 2020-01-01 | Stop reason: HOSPADM

## 2020-01-01 RX ORDER — FENTANYL 12.5 UG/1
1 PATCH TRANSDERMAL
Status: DISCONTINUED | OUTPATIENT
Start: 2020-01-01 | End: 2020-01-01 | Stop reason: HOSPADM

## 2020-01-01 RX ORDER — FUROSEMIDE 10 MG/ML
40 INJECTION INTRAMUSCULAR; INTRAVENOUS
Status: COMPLETED | OUTPATIENT
Start: 2020-01-01 | End: 2020-01-01

## 2020-01-01 RX ORDER — SODIUM CHLORIDE 0.9 % (FLUSH) 0.9 %
10 SYRINGE (ML) INJECTION AS NEEDED
Status: DISPENSED | OUTPATIENT
Start: 2020-01-01 | End: 2020-01-01

## 2020-01-01 RX ORDER — SUCRALFATE 1 G/1
1 TABLET ORAL
Status: DISCONTINUED | OUTPATIENT
Start: 2020-01-01 | End: 2020-01-01 | Stop reason: HOSPADM

## 2020-01-01 RX ORDER — SUCRALFATE 1 G/1
1 TABLET ORAL 4 TIMES DAILY
COMMUNITY
Start: 2020-01-01 | End: 2020-01-01

## 2020-01-01 RX ORDER — HYDROCODONE BITARTRATE AND ACETAMINOPHEN 5; 325 MG/1; MG/1
0.5 TABLET ORAL
Status: DISCONTINUED | OUTPATIENT
Start: 2020-01-01 | End: 2020-01-01

## 2020-01-01 RX ORDER — OXYMETAZOLINE HCL 0.05 %
2 SPRAY, NON-AEROSOL (ML) NASAL
Status: DISCONTINUED | OUTPATIENT
Start: 2020-01-01 | End: 2020-01-01 | Stop reason: HOSPADM

## 2020-01-01 RX ORDER — FAMOTIDINE 20 MG/1
20 TABLET, FILM COATED ORAL ONCE
Status: DISCONTINUED | OUTPATIENT
Start: 2020-01-01 | End: 2020-01-01 | Stop reason: HOSPADM

## 2020-01-01 RX ORDER — SODIUM CHLORIDE 0.9 % (FLUSH) 0.9 %
5-10 SYRINGE (ML) INJECTION AS NEEDED
Status: DISCONTINUED | OUTPATIENT
Start: 2020-01-01 | End: 2020-01-01

## 2020-01-01 RX ORDER — FENTANYL CITRATE 50 UG/ML
50 INJECTION, SOLUTION INTRAMUSCULAR; INTRAVENOUS
Status: COMPLETED | OUTPATIENT
Start: 2020-01-01 | End: 2020-01-01

## 2020-01-01 RX ORDER — DEXTROSE MONOHYDRATE AND SODIUM CHLORIDE 5; .45 G/100ML; G/100ML
INJECTION, SOLUTION INTRAVENOUS
Status: DISCONTINUED | OUTPATIENT
Start: 2020-01-01 | End: 2020-01-01 | Stop reason: HOSPADM

## 2020-01-01 RX ORDER — HYDROMORPHONE HYDROCHLORIDE 2 MG/ML
INJECTION, SOLUTION INTRAMUSCULAR; INTRAVENOUS; SUBCUTANEOUS
Status: CANCELLED | OUTPATIENT
Start: 2020-01-01

## 2020-01-01 RX ORDER — SODIUM CHLORIDE 9 MG/ML
500 INJECTION, SOLUTION INTRAVENOUS ONCE
Status: DISPENSED | OUTPATIENT
Start: 2020-01-01 | End: 2020-01-01

## 2020-01-01 RX ORDER — METRONIDAZOLE 500 MG/100ML
500 INJECTION, SOLUTION INTRAVENOUS EVERY 12 HOURS
Status: DISCONTINUED | OUTPATIENT
Start: 2020-01-01 | End: 2020-01-01

## 2020-01-01 RX ORDER — FUROSEMIDE 10 MG/ML
40 INJECTION INTRAMUSCULAR; INTRAVENOUS
Status: DISCONTINUED | OUTPATIENT
Start: 2020-01-01 | End: 2020-01-01

## 2020-01-01 RX ORDER — PRAVASTATIN SODIUM 20 MG/1
40 TABLET ORAL
Status: DISCONTINUED | OUTPATIENT
Start: 2020-01-01 | End: 2020-01-01 | Stop reason: HOSPADM

## 2020-01-01 RX ORDER — METRONIDAZOLE 500 MG/100ML
500 INJECTION, SOLUTION INTRAVENOUS EVERY 8 HOURS
Status: DISCONTINUED | OUTPATIENT
Start: 2020-01-01 | End: 2020-01-01

## 2020-01-01 RX ORDER — HYDROMORPHONE HYDROCHLORIDE 1 MG/ML
1 INJECTION, SOLUTION INTRAMUSCULAR; INTRAVENOUS; SUBCUTANEOUS
Status: DISCONTINUED | OUTPATIENT
Start: 2020-01-01 | End: 2020-01-01

## 2020-01-01 RX ORDER — ONDANSETRON 4 MG/1
4 TABLET, ORALLY DISINTEGRATING ORAL
Status: DISCONTINUED | OUTPATIENT
Start: 2020-01-01 | End: 2020-01-01

## 2020-01-01 RX ADMIN — HYDROMORPHONE HYDROCHLORIDE 0.2 MG: 1 INJECTION, SOLUTION INTRAMUSCULAR; INTRAVENOUS; SUBCUTANEOUS at 11:39

## 2020-01-01 RX ADMIN — LISINOPRIL 20 MG: 20 TABLET ORAL at 08:07

## 2020-01-01 RX ADMIN — SODIUM CHLORIDE 250 ML: 9 INJECTION, SOLUTION INTRAVENOUS at 12:47

## 2020-01-01 RX ADMIN — SUCRALFATE 1 G: 1 TABLET ORAL at 12:45

## 2020-01-01 RX ADMIN — SODIUM PHOSPHATE, MONOBASIC, MONOHYDRATE: 276; 142 INJECTION, SOLUTION INTRAVENOUS at 13:36

## 2020-01-01 RX ADMIN — HYDROCODONE BITARTRATE AND ACETAMINOPHEN 1 TABLET: 5; 325 TABLET ORAL at 07:57

## 2020-01-01 RX ADMIN — BUDESONIDE 500 MCG: 0.5 INHALANT RESPIRATORY (INHALATION) at 08:27

## 2020-01-01 RX ADMIN — SODIUM CHLORIDE 40 MG: 9 INJECTION, SOLUTION INTRAMUSCULAR; INTRAVENOUS; SUBCUTANEOUS at 14:39

## 2020-01-01 RX ADMIN — HYDROCODONE BITARTRATE AND ACETAMINOPHEN 1 TABLET: 5; 325 TABLET ORAL at 13:06

## 2020-01-01 RX ADMIN — FENTANYL CITRATE 50 MCG: 50 INJECTION, SOLUTION INTRAMUSCULAR; INTRAVENOUS at 22:42

## 2020-01-01 RX ADMIN — TAMSULOSIN HYDROCHLORIDE 0.4 MG: 0.4 CAPSULE ORAL at 09:48

## 2020-01-01 RX ADMIN — IPRATROPIUM BROMIDE AND ALBUTEROL SULFATE 3 ML: .5; 3 SOLUTION RESPIRATORY (INHALATION) at 04:39

## 2020-01-01 RX ADMIN — FERRIC CARBOXYMALTOSE INJECTION 750 MG: 50 INJECTION, SOLUTION INTRAVENOUS at 15:24

## 2020-01-01 RX ADMIN — IPRATROPIUM BROMIDE AND ALBUTEROL SULFATE 3 ML: .5; 3 SOLUTION RESPIRATORY (INHALATION) at 08:35

## 2020-01-01 RX ADMIN — HYDROMORPHONE HYDROCHLORIDE 1 MG: 1 INJECTION, SOLUTION INTRAMUSCULAR; INTRAVENOUS; SUBCUTANEOUS at 10:35

## 2020-01-01 RX ADMIN — SODIUM PHOSPHATE, MONOBASIC, MONOHYDRATE: 276; 142 INJECTION, SOLUTION INTRAVENOUS at 15:55

## 2020-01-01 RX ADMIN — HYDROMORPHONE HYDROCHLORIDE 4 MG: 2 TABLET ORAL at 21:17

## 2020-01-01 RX ADMIN — HYDROMORPHONE HYDROCHLORIDE 0.2 MG: 1 INJECTION, SOLUTION INTRAMUSCULAR; INTRAVENOUS; SUBCUTANEOUS at 17:57

## 2020-01-01 RX ADMIN — TRAZODONE HYDROCHLORIDE 50 MG: 50 TABLET ORAL at 22:32

## 2020-01-01 RX ADMIN — IPRATROPIUM BROMIDE AND ALBUTEROL SULFATE 3 ML: .5; 3 SOLUTION RESPIRATORY (INHALATION) at 04:34

## 2020-01-01 RX ADMIN — FERRIC CARBOXYMALTOSE INJECTION 750 MG: 50 INJECTION, SOLUTION INTRAVENOUS at 13:40

## 2020-01-01 RX ADMIN — Medication 500 MG: at 08:04

## 2020-01-01 RX ADMIN — LISINOPRIL 20 MG: 20 TABLET ORAL at 08:25

## 2020-01-01 RX ADMIN — PIPERACILLIN SODIUM AND TAZOBACTAM SODIUM 3.38 G: 3; .375 INJECTION, POWDER, LYOPHILIZED, FOR SOLUTION INTRAVENOUS at 11:28

## 2020-01-01 RX ADMIN — SUCRALFATE 1 G: 1 TABLET ORAL at 16:04

## 2020-01-01 RX ADMIN — HYDROMORPHONE HYDROCHLORIDE 1 MG: 1 INJECTION, SOLUTION INTRAMUSCULAR; INTRAVENOUS; SUBCUTANEOUS at 18:25

## 2020-01-01 RX ADMIN — PIPERACILLIN SODIUM AND TAZOBACTAM SODIUM 3.38 G: 3; .375 INJECTION, POWDER, LYOPHILIZED, FOR SOLUTION INTRAVENOUS at 17:57

## 2020-01-01 RX ADMIN — LISINOPRIL 20 MG: 20 TABLET ORAL at 08:05

## 2020-01-01 RX ADMIN — IPRATROPIUM BROMIDE AND ALBUTEROL SULFATE 3 ML: .5; 3 SOLUTION RESPIRATORY (INHALATION) at 15:04

## 2020-01-01 RX ADMIN — SUCRALFATE 1 G: 1 TABLET ORAL at 12:04

## 2020-01-01 RX ADMIN — HYDROMORPHONE HYDROCHLORIDE 0.2 MG: 1 INJECTION, SOLUTION INTRAMUSCULAR; INTRAVENOUS; SUBCUTANEOUS at 10:19

## 2020-01-01 RX ADMIN — METHYLPREDNISOLONE SODIUM SUCCINATE 60 MG: 125 INJECTION, POWDER, FOR SOLUTION INTRAMUSCULAR; INTRAVENOUS at 17:05

## 2020-01-01 RX ADMIN — DEXTROSE MONOHYDRATE AND SODIUM CHLORIDE: 5; .45 INJECTION, SOLUTION INTRAVENOUS at 14:18

## 2020-01-01 RX ADMIN — PIPERACILLIN SODIUM AND TAZOBACTAM SODIUM 3.38 G: 3; .375 INJECTION, POWDER, LYOPHILIZED, FOR SOLUTION INTRAVENOUS at 16:55

## 2020-01-01 RX ADMIN — OXYCODONE 5 MG: 5 TABLET ORAL at 21:15

## 2020-01-01 RX ADMIN — DIGOXIN 0.12 MG: 125 TABLET ORAL at 11:16

## 2020-01-01 RX ADMIN — BUDESONIDE 500 MCG: 0.5 INHALANT RESPIRATORY (INHALATION) at 07:55

## 2020-01-01 RX ADMIN — SUCRALFATE 1 G: 1 TABLET ORAL at 17:56

## 2020-01-01 RX ADMIN — DIPHENHYDRAMINE HYDROCHLORIDE 25 MG: 25 CAPSULE ORAL at 09:43

## 2020-01-01 RX ADMIN — FUROSEMIDE 40 MG: 10 INJECTION, SOLUTION INTRAMUSCULAR; INTRAVENOUS at 11:45

## 2020-01-01 RX ADMIN — HYDROCODONE BITARTRATE AND ACETAMINOPHEN 0.5 TABLET: 5; 325 TABLET ORAL at 15:49

## 2020-01-01 RX ADMIN — Medication 10 ML: at 15:21

## 2020-01-01 RX ADMIN — SODIUM CHLORIDE 1000 ML: 900 INJECTION, SOLUTION INTRAVENOUS at 17:37

## 2020-01-01 RX ADMIN — PIPERACILLIN SODIUM AND TAZOBACTAM SODIUM 3.38 G: 3; .375 INJECTION, POWDER, LYOPHILIZED, FOR SOLUTION INTRAVENOUS at 03:15

## 2020-01-01 RX ADMIN — DEXTROSE MONOHYDRATE, SODIUM CHLORIDE, AND POTASSIUM CHLORIDE 90 ML/HR: 50; 4.5; .745 INJECTION, SOLUTION INTRAVENOUS at 23:57

## 2020-01-01 RX ADMIN — SUCRALFATE 1 G: 1 TABLET ORAL at 08:45

## 2020-01-01 RX ADMIN — PIPERACILLIN SODIUM AND TAZOBACTAM SODIUM 3.38 G: 3; .375 INJECTION, POWDER, LYOPHILIZED, FOR SOLUTION INTRAVENOUS at 07:17

## 2020-01-01 RX ADMIN — SUCRALFATE 1 G: 1 TABLET ORAL at 09:50

## 2020-01-01 RX ADMIN — TRAZODONE HYDROCHLORIDE 50 MG: 50 TABLET ORAL at 21:15

## 2020-01-01 RX ADMIN — SUCRALFATE 1 G: 1 TABLET ORAL at 22:01

## 2020-01-01 RX ADMIN — PIPERACILLIN AND TAZOBACTAM 3.38 G: 3; .375 INJECTION, POWDER, LYOPHILIZED, FOR SOLUTION INTRAVENOUS at 09:04

## 2020-01-01 RX ADMIN — PIPERACILLIN SODIUM AND TAZOBACTAM SODIUM 3.38 G: 3; .375 INJECTION, POWDER, LYOPHILIZED, FOR SOLUTION INTRAVENOUS at 05:39

## 2020-01-01 RX ADMIN — SODIUM CHLORIDE 250 ML: 9 INJECTION, SOLUTION INTRAVENOUS at 15:24

## 2020-01-01 RX ADMIN — Medication 20 ML: at 14:05

## 2020-01-01 RX ADMIN — ACETAMINOPHEN 650 MG: 325 TABLET ORAL at 16:04

## 2020-01-01 RX ADMIN — ACETAMINOPHEN 650 MG: 325 TABLET ORAL at 21:46

## 2020-01-01 RX ADMIN — PIPERACILLIN AND TAZOBACTAM 3.38 G: 3; .375 INJECTION, POWDER, LYOPHILIZED, FOR SOLUTION INTRAVENOUS at 09:19

## 2020-01-01 RX ADMIN — IPRATROPIUM BROMIDE AND ALBUTEROL SULFATE 3 ML: .5; 3 SOLUTION RESPIRATORY (INHALATION) at 07:55

## 2020-01-01 RX ADMIN — SODIUM CHLORIDE: 900 INJECTION, SOLUTION INTRAVENOUS at 12:30

## 2020-01-01 RX ADMIN — ONDANSETRON 4 MG: 2 INJECTION INTRAMUSCULAR; INTRAVENOUS at 23:55

## 2020-01-01 RX ADMIN — HYDROMORPHONE HYDROCHLORIDE 1 MG: 1 INJECTION, SOLUTION INTRAMUSCULAR; INTRAVENOUS; SUBCUTANEOUS at 21:45

## 2020-01-01 RX ADMIN — SUCRALFATE 1 G: 1 TABLET ORAL at 21:53

## 2020-01-01 RX ADMIN — SODIUM CHLORIDE 40 MG: 9 INJECTION, SOLUTION INTRAMUSCULAR; INTRAVENOUS; SUBCUTANEOUS at 14:57

## 2020-01-01 RX ADMIN — Medication 1 TABLET: at 09:50

## 2020-01-01 RX ADMIN — DEXTROSE MONOHYDRATE, SODIUM CHLORIDE, AND POTASSIUM CHLORIDE 90 ML/HR: 50; 4.5; .745 INJECTION, SOLUTION INTRAVENOUS at 13:12

## 2020-01-01 RX ADMIN — DEXTROSE MONOHYDRATE, SODIUM CHLORIDE, AND POTASSIUM CHLORIDE 90 ML/HR: 50; 4.5; .745 INJECTION, SOLUTION INTRAVENOUS at 08:21

## 2020-01-01 RX ADMIN — METRONIDAZOLE 500 MG: 500 INJECTION, SOLUTION INTRAVENOUS at 05:03

## 2020-01-01 RX ADMIN — AMLODIPINE BESYLATE 10 MG: 10 TABLET ORAL at 08:06

## 2020-01-01 RX ADMIN — HYDROMORPHONE HYDROCHLORIDE 0.2 MG: 1 INJECTION, SOLUTION INTRAMUSCULAR; INTRAVENOUS; SUBCUTANEOUS at 05:34

## 2020-01-01 RX ADMIN — ACETAMINOPHEN 650 MG: 325 TABLET ORAL at 18:18

## 2020-01-01 RX ADMIN — PIPERACILLIN AND TAZOBACTAM 3.38 G: 3; .375 INJECTION, POWDER, LYOPHILIZED, FOR SOLUTION INTRAVENOUS at 02:23

## 2020-01-01 RX ADMIN — PIPERACILLIN SODIUM AND TAZOBACTAM SODIUM 3.38 G: 3; .375 INJECTION, POWDER, LYOPHILIZED, FOR SOLUTION INTRAVENOUS at 10:44

## 2020-01-01 RX ADMIN — SODIUM CHLORIDE 500 ML: 900 INJECTION, SOLUTION INTRAVENOUS at 22:42

## 2020-01-01 RX ADMIN — SODIUM CHLORIDE 8 MG/HR: 900 INJECTION INTRAVENOUS at 22:20

## 2020-01-01 RX ADMIN — OXYCODONE 5 MG: 5 TABLET ORAL at 10:04

## 2020-01-01 RX ADMIN — PIPERACILLIN SODIUM AND TAZOBACTAM SODIUM 3.38 G: 3; .375 INJECTION, POWDER, LYOPHILIZED, FOR SOLUTION INTRAVENOUS at 12:45

## 2020-01-01 RX ADMIN — HYDROMORPHONE HYDROCHLORIDE 0.2 MG: 1 INJECTION, SOLUTION INTRAMUSCULAR; INTRAVENOUS; SUBCUTANEOUS at 05:20

## 2020-01-01 RX ADMIN — SUCRALFATE 1 G: 1 TABLET ORAL at 18:20

## 2020-01-01 RX ADMIN — PIPERACILLIN SODIUM AND TAZOBACTAM SODIUM 3.38 G: 3; .375 INJECTION, POWDER, LYOPHILIZED, FOR SOLUTION INTRAVENOUS at 12:00

## 2020-01-01 RX ADMIN — SUCRALFATE 1 G: 1 TABLET ORAL at 11:59

## 2020-01-01 RX ADMIN — SUCRALFATE 1 G: 1 TABLET ORAL at 21:46

## 2020-01-01 RX ADMIN — SODIUM CHLORIDE 8 MG/HR: 900 INJECTION INTRAVENOUS at 11:01

## 2020-01-01 RX ADMIN — SODIUM CHLORIDE 8 MG/HR: 900 INJECTION INTRAVENOUS at 21:58

## 2020-01-01 RX ADMIN — SUCRALFATE 1 G: 1 TABLET ORAL at 07:30

## 2020-01-01 RX ADMIN — PRAVASTATIN SODIUM 40 MG: 20 TABLET ORAL at 21:21

## 2020-01-01 RX ADMIN — IOPAMIDOL 100 ML: 755 INJECTION, SOLUTION INTRAVENOUS at 11:25

## 2020-01-01 RX ADMIN — TAMSULOSIN HYDROCHLORIDE 0.4 MG: 0.4 CAPSULE ORAL at 09:41

## 2020-01-01 RX ADMIN — FERROUS SULFATE TAB 325 MG (65 MG ELEMENTAL FE) 325 MG: 325 (65 FE) TAB at 05:37

## 2020-01-01 RX ADMIN — DEXTROSE MONOHYDRATE, SODIUM CHLORIDE, AND POTASSIUM CHLORIDE 90 ML/HR: 50; 4.5; .745 INJECTION, SOLUTION INTRAVENOUS at 00:40

## 2020-01-01 RX ADMIN — DULOXETINE HYDROCHLORIDE 20 MG: 20 CAPSULE, DELAYED RELEASE ORAL at 09:50

## 2020-01-01 RX ADMIN — PIPERACILLIN SODIUM AND TAZOBACTAM SODIUM 3.38 G: 3; .375 INJECTION, POWDER, LYOPHILIZED, FOR SOLUTION INTRAVENOUS at 00:00

## 2020-01-01 RX ADMIN — OXYCODONE 5 MG: 5 TABLET ORAL at 01:43

## 2020-01-01 RX ADMIN — DULOXETINE HYDROCHLORIDE 20 MG: 20 CAPSULE, DELAYED RELEASE ORAL at 08:05

## 2020-01-01 RX ADMIN — HYDROMORPHONE HYDROCHLORIDE 0.2 MG: 1 INJECTION, SOLUTION INTRAMUSCULAR; INTRAVENOUS; SUBCUTANEOUS at 21:59

## 2020-01-01 RX ADMIN — ACETAMINOPHEN 650 MG: 325 TABLET ORAL at 09:41

## 2020-01-01 RX ADMIN — DULOXETINE HYDROCHLORIDE 20 MG: 20 CAPSULE, DELAYED RELEASE ORAL at 08:25

## 2020-01-01 RX ADMIN — HYDROMORPHONE HYDROCHLORIDE 0.2 MG: 1 INJECTION, SOLUTION INTRAMUSCULAR; INTRAVENOUS; SUBCUTANEOUS at 16:56

## 2020-01-01 RX ADMIN — HYDROMORPHONE HYDROCHLORIDE 0.2 MG: 1 INJECTION, SOLUTION INTRAMUSCULAR; INTRAVENOUS; SUBCUTANEOUS at 22:14

## 2020-01-01 RX ADMIN — TRAZODONE HYDROCHLORIDE 50 MG: 50 TABLET ORAL at 21:58

## 2020-01-01 RX ADMIN — SODIUM CHLORIDE 90 ML/HR: 900 INJECTION, SOLUTION INTRAVENOUS at 05:02

## 2020-01-01 RX ADMIN — PIPERACILLIN AND TAZOBACTAM 3.38 G: 3; .375 INJECTION, POWDER, LYOPHILIZED, FOR SOLUTION INTRAVENOUS at 22:32

## 2020-01-01 RX ADMIN — BARIUM SULFATE 30 ML: 400 PASTE ORAL at 12:15

## 2020-01-01 RX ADMIN — THERA TABS 1 TABLET: TAB at 09:42

## 2020-01-01 RX ADMIN — HYDROMORPHONE HYDROCHLORIDE 0.2 MG: 1 INJECTION, SOLUTION INTRAMUSCULAR; INTRAVENOUS; SUBCUTANEOUS at 07:16

## 2020-01-01 RX ADMIN — HYDROMORPHONE HYDROCHLORIDE 0.2 MG: 1 INJECTION, SOLUTION INTRAMUSCULAR; INTRAVENOUS; SUBCUTANEOUS at 05:02

## 2020-01-01 RX ADMIN — POLYETHYLENE GLYCOL 3350 17 G: 17 POWDER, FOR SOLUTION ORAL at 08:04

## 2020-01-01 RX ADMIN — AMLODIPINE BESYLATE 10 MG: 10 TABLET ORAL at 08:07

## 2020-01-01 RX ADMIN — POTASSIUM PHOSPHATE, MONOBASIC AND POTASSIUM PHOSPHATE, DIBASIC: 224; 236 INJECTION, SOLUTION INTRAVENOUS at 11:00

## 2020-01-01 RX ADMIN — PIPERACILLIN SODIUM AND TAZOBACTAM SODIUM 3.38 G: 3; .375 INJECTION, POWDER, LYOPHILIZED, FOR SOLUTION INTRAVENOUS at 11:37

## 2020-01-01 RX ADMIN — PRAVASTATIN SODIUM 40 MG: 20 TABLET ORAL at 21:53

## 2020-01-01 RX ADMIN — HYDROMORPHONE HYDROCHLORIDE 0.2 MG: 1 INJECTION, SOLUTION INTRAMUSCULAR; INTRAVENOUS; SUBCUTANEOUS at 03:49

## 2020-01-01 RX ADMIN — BUDESONIDE 500 MCG: 0.5 INHALANT RESPIRATORY (INHALATION) at 19:18

## 2020-01-01 RX ADMIN — PREDNISONE 40 MG: 20 TABLET ORAL at 09:04

## 2020-01-01 RX ADMIN — SUCRALFATE 1 G: 1 TABLET ORAL at 08:43

## 2020-01-01 RX ADMIN — TRAZODONE HYDROCHLORIDE 50 MG: 50 TABLET ORAL at 20:51

## 2020-01-01 RX ADMIN — PIPERACILLIN SODIUM AND TAZOBACTAM SODIUM 3.38 G: 3; .375 INJECTION, POWDER, LYOPHILIZED, FOR SOLUTION INTRAVENOUS at 17:56

## 2020-01-01 RX ADMIN — LISINOPRIL 20 MG: 20 TABLET ORAL at 09:41

## 2020-01-01 RX ADMIN — HYDROMORPHONE HYDROCHLORIDE 0.2 MG: 1 INJECTION, SOLUTION INTRAMUSCULAR; INTRAVENOUS; SUBCUTANEOUS at 08:45

## 2020-01-01 RX ADMIN — HYDROMORPHONE HYDROCHLORIDE 1 MG: 1 INJECTION, SOLUTION INTRAMUSCULAR; INTRAVENOUS; SUBCUTANEOUS at 01:25

## 2020-01-01 RX ADMIN — DULOXETINE HYDROCHLORIDE 20 MG: 20 CAPSULE, DELAYED RELEASE ORAL at 09:41

## 2020-01-01 RX ADMIN — SODIUM CHLORIDE 10 ML: 9 INJECTION INTRAMUSCULAR; INTRAVENOUS; SUBCUTANEOUS at 14:42

## 2020-01-01 RX ADMIN — HYDROMORPHONE HYDROCHLORIDE 1 MG: 1 INJECTION, SOLUTION INTRAMUSCULAR; INTRAVENOUS; SUBCUTANEOUS at 05:47

## 2020-01-01 RX ADMIN — PIPERACILLIN AND TAZOBACTAM 3.38 G: 3; .375 INJECTION, POWDER, LYOPHILIZED, FOR SOLUTION INTRAVENOUS at 02:24

## 2020-01-01 RX ADMIN — AZITHROMYCIN MONOHYDRATE 500 MG: 500 INJECTION, POWDER, LYOPHILIZED, FOR SOLUTION INTRAVENOUS at 12:35

## 2020-01-01 RX ADMIN — TAMSULOSIN HYDROCHLORIDE 0.4 MG: 0.4 CAPSULE ORAL at 08:37

## 2020-01-01 RX ADMIN — SUCRALFATE 1 G: 1 TABLET ORAL at 17:57

## 2020-01-01 RX ADMIN — DULOXETINE HYDROCHLORIDE 20 MG: 20 CAPSULE, DELAYED RELEASE ORAL at 11:16

## 2020-01-01 RX ADMIN — DIGOXIN 0.12 MG: 125 TABLET ORAL at 09:49

## 2020-01-01 RX ADMIN — PRAVASTATIN SODIUM 40 MG: 20 TABLET ORAL at 21:00

## 2020-01-01 RX ADMIN — AZITHROMYCIN MONOHYDRATE 500 MG: 500 INJECTION, POWDER, LYOPHILIZED, FOR SOLUTION INTRAVENOUS at 11:47

## 2020-01-01 RX ADMIN — HYDROMORPHONE HYDROCHLORIDE 0.2 MG: 1 INJECTION, SOLUTION INTRAMUSCULAR; INTRAVENOUS; SUBCUTANEOUS at 12:43

## 2020-01-01 RX ADMIN — PRAVASTATIN SODIUM 40 MG: 20 TABLET ORAL at 21:14

## 2020-01-01 RX ADMIN — SUCRALFATE 1 G: 1 TABLET ORAL at 11:16

## 2020-01-01 RX ADMIN — IPRATROPIUM BROMIDE AND ALBUTEROL SULFATE 3 ML: .5; 3 SOLUTION RESPIRATORY (INHALATION) at 19:18

## 2020-01-01 RX ADMIN — PIPERACILLIN SODIUM AND TAZOBACTAM SODIUM 3.38 G: 3; .375 INJECTION, POWDER, LYOPHILIZED, FOR SOLUTION INTRAVENOUS at 03:50

## 2020-01-01 RX ADMIN — BARIUM SULFATE 60 G: 960 POWDER, FOR SUSPENSION ORAL at 12:15

## 2020-01-01 RX ADMIN — CEFTRIAXONE SODIUM 2 G: 2 INJECTION, POWDER, FOR SOLUTION INTRAMUSCULAR; INTRAVENOUS at 18:34

## 2020-01-01 RX ADMIN — AMLODIPINE BESYLATE 10 MG: 10 TABLET ORAL at 08:25

## 2020-01-01 RX ADMIN — METRONIDAZOLE 500 MG: 500 INJECTION, SOLUTION INTRAVENOUS at 06:00

## 2020-01-01 RX ADMIN — SODIUM CHLORIDE 40 MG: 9 INJECTION, SOLUTION INTRAMUSCULAR; INTRAVENOUS; SUBCUTANEOUS at 18:20

## 2020-01-01 RX ADMIN — PIPERACILLIN AND TAZOBACTAM 3.38 G: 3; .375 INJECTION, POWDER, LYOPHILIZED, FOR SOLUTION INTRAVENOUS at 09:43

## 2020-01-01 RX ADMIN — POLYETHYLENE GLYCOL 3350 17 G: 17 POWDER, FOR SOLUTION ORAL at 08:07

## 2020-01-01 RX ADMIN — CEFTRIAXONE SODIUM 1 G: 1 INJECTION, POWDER, FOR SOLUTION INTRAMUSCULAR; INTRAVENOUS at 12:34

## 2020-01-01 RX ADMIN — IPRATROPIUM BROMIDE AND ALBUTEROL SULFATE 3 ML: .5; 3 SOLUTION RESPIRATORY (INHALATION) at 09:41

## 2020-01-01 RX ADMIN — SODIUM CHLORIDE 40 MG: 9 INJECTION, SOLUTION INTRAMUSCULAR; INTRAVENOUS; SUBCUTANEOUS at 16:55

## 2020-01-01 RX ADMIN — Medication 1 TABLET: at 18:18

## 2020-01-01 RX ADMIN — BUDESONIDE 500 MCG: 0.5 INHALANT RESPIRATORY (INHALATION) at 12:29

## 2020-01-01 RX ADMIN — POTASSIUM CHLORIDE 10 MEQ: 10 INJECTION, SOLUTION INTRAVENOUS at 14:39

## 2020-01-01 RX ADMIN — Medication 10 ML: at 14:15

## 2020-01-01 RX ADMIN — HYDROMORPHONE HYDROCHLORIDE 0.2 MG: 1 INJECTION, SOLUTION INTRAMUSCULAR; INTRAVENOUS; SUBCUTANEOUS at 01:39

## 2020-01-01 RX ADMIN — ACETAMINOPHEN 650 MG: 325 TABLET ORAL at 11:16

## 2020-01-01 RX ADMIN — PIPERACILLIN SODIUM AND TAZOBACTAM SODIUM 3.38 G: 3; .375 INJECTION, POWDER, LYOPHILIZED, FOR SOLUTION INTRAVENOUS at 06:14

## 2020-01-01 RX ADMIN — GADOBENATE DIMEGLUMINE 14 ML: 529 INJECTION, SOLUTION INTRAVENOUS at 19:35

## 2020-01-01 RX ADMIN — TRAZODONE HYDROCHLORIDE 50 MG: 50 TABLET ORAL at 21:00

## 2020-01-01 RX ADMIN — PANTOPRAZOLE SODIUM 40 MG: 40 TABLET, DELAYED RELEASE ORAL at 09:18

## 2020-01-01 RX ADMIN — PROPOFOL 70 MG: 10 INJECTION, EMULSION INTRAVENOUS at 14:16

## 2020-01-01 RX ADMIN — HYDROMORPHONE HYDROCHLORIDE 0.2 MG: 1 INJECTION, SOLUTION INTRAMUSCULAR; INTRAVENOUS; SUBCUTANEOUS at 10:43

## 2020-01-01 RX ADMIN — IPRATROPIUM BROMIDE AND ALBUTEROL SULFATE 3 ML: .5; 3 SOLUTION RESPIRATORY (INHALATION) at 08:22

## 2020-01-01 RX ADMIN — TAMSULOSIN HYDROCHLORIDE 0.4 MG: 0.4 CAPSULE ORAL at 08:25

## 2020-01-01 RX ADMIN — TRAZODONE HYDROCHLORIDE 50 MG: 50 TABLET ORAL at 21:46

## 2020-01-01 RX ADMIN — SUCRALFATE 1 G: 1 TABLET ORAL at 21:14

## 2020-01-01 RX ADMIN — DIGOXIN 0.12 MG: 125 TABLET ORAL at 09:43

## 2020-01-01 RX ADMIN — SODIUM CHLORIDE 8 MG/HR: 900 INJECTION INTRAVENOUS at 08:27

## 2020-01-01 RX ADMIN — HYDROMORPHONE HYDROCHLORIDE 0.2 MG: 1 INJECTION, SOLUTION INTRAMUSCULAR; INTRAVENOUS; SUBCUTANEOUS at 18:32

## 2020-01-01 RX ADMIN — HYDROMORPHONE HYDROCHLORIDE 0.2 MG: 1 INJECTION, SOLUTION INTRAMUSCULAR; INTRAVENOUS; SUBCUTANEOUS at 20:46

## 2020-01-01 RX ADMIN — PRAVASTATIN SODIUM 40 MG: 20 TABLET ORAL at 21:38

## 2020-01-01 RX ADMIN — IPRATROPIUM BROMIDE AND ALBUTEROL SULFATE 3 ML: .5; 3 SOLUTION RESPIRATORY (INHALATION) at 20:58

## 2020-01-01 RX ADMIN — LISINOPRIL 20 MG: 20 TABLET ORAL at 09:51

## 2020-01-01 RX ADMIN — IPRATROPIUM BROMIDE AND ALBUTEROL SULFATE 3 ML: .5; 3 SOLUTION RESPIRATORY (INHALATION) at 23:27

## 2020-01-01 RX ADMIN — HYDROMORPHONE HYDROCHLORIDE 0.2 MG: 1 INJECTION, SOLUTION INTRAMUSCULAR; INTRAVENOUS; SUBCUTANEOUS at 08:03

## 2020-01-01 RX ADMIN — SUCRALFATE 1 G: 1 TABLET ORAL at 08:25

## 2020-01-01 RX ADMIN — SODIUM CHLORIDE 8 MG/HR: 900 INJECTION INTRAVENOUS at 10:12

## 2020-01-01 RX ADMIN — WATER 2 G: 1 INJECTION INTRAMUSCULAR; INTRAVENOUS; SUBCUTANEOUS at 18:33

## 2020-01-01 RX ADMIN — HYDROCODONE BITARTRATE AND ACETAMINOPHEN 1 TABLET: 5; 325 TABLET ORAL at 23:26

## 2020-01-01 RX ADMIN — HYDROMORPHONE HYDROCHLORIDE 0.2 MG: 1 INJECTION, SOLUTION INTRAMUSCULAR; INTRAVENOUS; SUBCUTANEOUS at 09:42

## 2020-01-01 RX ADMIN — PIPERACILLIN AND TAZOBACTAM 3.38 G: 3; .375 INJECTION, POWDER, LYOPHILIZED, FOR SOLUTION INTRAVENOUS at 21:19

## 2020-01-01 RX ADMIN — SODIUM CHLORIDE 90 ML/HR: 900 INJECTION, SOLUTION INTRAVENOUS at 05:04

## 2020-01-01 RX ADMIN — IPRATROPIUM BROMIDE AND ALBUTEROL SULFATE 3 ML: .5; 3 SOLUTION RESPIRATORY (INHALATION) at 01:59

## 2020-01-01 RX ADMIN — Medication 10 ML: at 15:51

## 2020-01-01 RX ADMIN — IPRATROPIUM BROMIDE AND ALBUTEROL SULFATE 3 ML: .5; 3 SOLUTION RESPIRATORY (INHALATION) at 00:45

## 2020-01-01 RX ADMIN — Medication 1 TABLET: at 09:41

## 2020-01-01 RX ADMIN — HYDROMORPHONE HYDROCHLORIDE 4 MG: 2 TABLET ORAL at 11:28

## 2020-01-01 RX ADMIN — DIGOXIN 0.12 MG: 125 TABLET ORAL at 09:04

## 2020-01-01 RX ADMIN — PRAVASTATIN SODIUM 40 MG: 20 TABLET ORAL at 22:53

## 2020-01-01 RX ADMIN — DEXTROSE MONOHYDRATE, SODIUM CHLORIDE, AND POTASSIUM CHLORIDE 90 ML/HR: 50; 4.5; .745 INJECTION, SOLUTION INTRAVENOUS at 16:54

## 2020-01-01 RX ADMIN — HYDROMORPHONE HYDROCHLORIDE 0.2 MG: 1 INJECTION, SOLUTION INTRAMUSCULAR; INTRAVENOUS; SUBCUTANEOUS at 22:24

## 2020-01-01 RX ADMIN — IPRATROPIUM BROMIDE AND ALBUTEROL SULFATE 3 ML: .5; 3 SOLUTION RESPIRATORY (INHALATION) at 03:13

## 2020-01-01 RX ADMIN — PANTOPRAZOLE SODIUM 40 MG: 40 TABLET, DELAYED RELEASE ORAL at 09:04

## 2020-01-01 RX ADMIN — SUCRALFATE 1 G: 1 TABLET ORAL at 12:00

## 2020-01-01 RX ADMIN — CEFTRIAXONE SODIUM 2 G: 2 INJECTION, POWDER, FOR SOLUTION INTRAMUSCULAR; INTRAVENOUS at 18:13

## 2020-01-01 RX ADMIN — POTASSIUM CHLORIDE 10 MEQ: 10 INJECTION, SOLUTION INTRAVENOUS at 12:11

## 2020-01-01 RX ADMIN — IPRATROPIUM BROMIDE AND ALBUTEROL SULFATE 3 ML: .5; 3 SOLUTION RESPIRATORY (INHALATION) at 00:00

## 2020-01-01 RX ADMIN — ACETAMINOPHEN 650 MG: 325 TABLET ORAL at 17:57

## 2020-01-01 RX ADMIN — ACETAMINOPHEN 650 MG: 325 TABLET ORAL at 09:43

## 2020-01-01 RX ADMIN — POTASSIUM CHLORIDE 10 MEQ: 10 INJECTION, SOLUTION INTRAVENOUS at 14:40

## 2020-01-01 RX ADMIN — HYDROCODONE BITARTRATE AND ACETAMINOPHEN 1 TABLET: 5; 325 TABLET ORAL at 09:25

## 2020-01-01 RX ADMIN — IPRATROPIUM BROMIDE AND ALBUTEROL SULFATE 3 ML: .5; 3 SOLUTION RESPIRATORY (INHALATION) at 23:46

## 2020-01-01 RX ADMIN — AMLODIPINE BESYLATE 10 MG: 10 TABLET ORAL at 10:26

## 2020-01-01 RX ADMIN — HYDROMORPHONE HYDROCHLORIDE 0.2 MG: 1 INJECTION, SOLUTION INTRAMUSCULAR; INTRAVENOUS; SUBCUTANEOUS at 18:33

## 2020-01-01 RX ADMIN — AMLODIPINE BESYLATE 10 MG: 10 TABLET ORAL at 09:41

## 2020-01-01 RX ADMIN — ACETAMINOPHEN 650 MG: 325 TABLET ORAL at 21:14

## 2020-01-01 RX ADMIN — Medication 500 MG: at 09:47

## 2020-01-01 RX ADMIN — THERA TABS 1 TABLET: TAB at 18:18

## 2020-01-01 RX ADMIN — Medication 500 MG: at 09:41

## 2020-01-01 RX ADMIN — PANTOPRAZOLE SODIUM 40 MG: 40 TABLET, DELAYED RELEASE ORAL at 09:44

## 2020-01-01 RX ADMIN — HYDROMORPHONE HYDROCHLORIDE 0.2 MG: 1 INJECTION, SOLUTION INTRAMUSCULAR; INTRAVENOUS; SUBCUTANEOUS at 02:26

## 2020-01-01 RX ADMIN — ACETAMINOPHEN 650 MG: 325 TABLET ORAL at 11:38

## 2020-01-01 RX ADMIN — TAMSULOSIN HYDROCHLORIDE 0.4 MG: 0.4 CAPSULE ORAL at 08:07

## 2020-01-01 RX ADMIN — AMLODIPINE BESYLATE 10 MG: 10 TABLET ORAL at 11:16

## 2020-01-01 RX ADMIN — SODIUM CHLORIDE 40 MG: 9 INJECTION, SOLUTION INTRAMUSCULAR; INTRAVENOUS; SUBCUTANEOUS at 17:56

## 2020-01-01 RX ADMIN — TRAZODONE HYDROCHLORIDE 50 MG: 50 TABLET ORAL at 22:27

## 2020-01-01 RX ADMIN — DIGOXIN 0.12 MG: 125 TABLET ORAL at 09:41

## 2020-01-01 RX ADMIN — PIPERACILLIN AND TAZOBACTAM 3.38 G: 3; .375 INJECTION, POWDER, LYOPHILIZED, FOR SOLUTION INTRAVENOUS at 10:26

## 2020-01-01 RX ADMIN — SODIUM CHLORIDE 8 MG/HR: 900 INJECTION INTRAVENOUS at 13:33

## 2020-01-01 RX ADMIN — HYDROMORPHONE HYDROCHLORIDE 0.2 MG: 1 INJECTION, SOLUTION INTRAMUSCULAR; INTRAVENOUS; SUBCUTANEOUS at 01:30

## 2020-01-01 RX ADMIN — PRAVASTATIN SODIUM 40 MG: 20 TABLET ORAL at 21:57

## 2020-01-01 RX ADMIN — SODIUM CHLORIDE 8 MG/HR: 900 INJECTION INTRAVENOUS at 15:31

## 2020-01-01 RX ADMIN — METHYLPREDNISOLONE SODIUM SUCCINATE 60 MG: 125 INJECTION, POWDER, FOR SOLUTION INTRAMUSCULAR; INTRAVENOUS at 08:22

## 2020-01-01 RX ADMIN — PREDNISONE 40 MG: 20 TABLET ORAL at 10:26

## 2020-01-01 RX ADMIN — DIGOXIN 0.12 MG: 125 TABLET ORAL at 09:18

## 2020-01-01 RX ADMIN — PIPERACILLIN AND TAZOBACTAM 3.38 G: 3; .375 INJECTION, POWDER, LYOPHILIZED, FOR SOLUTION INTRAVENOUS at 02:40

## 2020-01-01 RX ADMIN — LISINOPRIL 20 MG: 20 TABLET ORAL at 11:16

## 2020-01-01 RX ADMIN — ACETAMINOPHEN 650 MG: 325 TABLET ORAL at 18:19

## 2020-01-01 RX ADMIN — OXYCODONE 5 MG: 5 TABLET ORAL at 18:18

## 2020-01-01 RX ADMIN — DEXTROSE MONOHYDRATE, SODIUM CHLORIDE, AND POTASSIUM CHLORIDE 90 ML/HR: 50; 4.5; .745 INJECTION, SOLUTION INTRAVENOUS at 00:05

## 2020-01-01 RX ADMIN — SUCRALFATE 1 G: 1 TABLET ORAL at 08:07

## 2020-01-01 RX ADMIN — PIPERACILLIN SODIUM AND TAZOBACTAM SODIUM 3.38 G: 3; .375 INJECTION, POWDER, LYOPHILIZED, FOR SOLUTION INTRAVENOUS at 21:53

## 2020-01-01 RX ADMIN — PIPERACILLIN SODIUM AND TAZOBACTAM SODIUM 3.38 G: 3; .375 INJECTION, POWDER, LYOPHILIZED, FOR SOLUTION INTRAVENOUS at 18:18

## 2020-01-01 RX ADMIN — PRAVASTATIN SODIUM 40 MG: 20 TABLET ORAL at 22:32

## 2020-01-01 RX ADMIN — IPRATROPIUM BROMIDE AND ALBUTEROL SULFATE 3 ML: .5; 3 SOLUTION RESPIRATORY (INHALATION) at 14:17

## 2020-01-01 RX ADMIN — HYDROMORPHONE HYDROCHLORIDE 0.2 MG: 1 INJECTION, SOLUTION INTRAMUSCULAR; INTRAVENOUS; SUBCUTANEOUS at 13:25

## 2020-01-01 RX ADMIN — PIPERACILLIN AND TAZOBACTAM 3.38 G: 3; .375 INJECTION, POWDER, LYOPHILIZED, FOR SOLUTION INTRAVENOUS at 16:08

## 2020-01-01 RX ADMIN — AZITHROMYCIN MONOHYDRATE 500 MG: 500 INJECTION, POWDER, LYOPHILIZED, FOR SOLUTION INTRAVENOUS at 11:53

## 2020-01-01 RX ADMIN — TRAZODONE HYDROCHLORIDE 50 MG: 50 TABLET ORAL at 21:17

## 2020-01-01 RX ADMIN — HYDROMORPHONE HYDROCHLORIDE 1 MG: 1 INJECTION, SOLUTION INTRAMUSCULAR; INTRAVENOUS; SUBCUTANEOUS at 13:59

## 2020-01-01 RX ADMIN — FERRIC CARBOXYMALTOSE INJECTION 750 MG: 50 INJECTION, SOLUTION INTRAVENOUS at 11:38

## 2020-01-01 RX ADMIN — PIPERACILLIN SODIUM AND TAZOBACTAM SODIUM 3.38 G: 3; .375 INJECTION, POWDER, LYOPHILIZED, FOR SOLUTION INTRAVENOUS at 21:46

## 2020-01-01 RX ADMIN — IPRATROPIUM BROMIDE AND ALBUTEROL SULFATE 3 ML: .5; 3 SOLUTION RESPIRATORY (INHALATION) at 08:27

## 2020-01-01 RX ADMIN — Medication 10 ML: at 13:30

## 2020-01-01 RX ADMIN — DULOXETINE HYDROCHLORIDE 20 MG: 20 CAPSULE, DELAYED RELEASE ORAL at 08:07

## 2020-01-01 RX ADMIN — AMLODIPINE BESYLATE 10 MG: 10 TABLET ORAL at 09:18

## 2020-01-01 RX ADMIN — LIDOCAINE HYDROCHLORIDE 100 MG: 20 INJECTION, SOLUTION EPIDURAL; INFILTRATION; INTRACAUDAL; PERINEURAL at 14:17

## 2020-01-01 RX ADMIN — AMLODIPINE BESYLATE 10 MG: 10 TABLET ORAL at 09:04

## 2020-01-01 RX ADMIN — Medication 10 ML: at 09:34

## 2020-01-01 RX ADMIN — PIPERACILLIN SODIUM AND TAZOBACTAM SODIUM 3.38 G: 3; .375 INJECTION, POWDER, LYOPHILIZED, FOR SOLUTION INTRAVENOUS at 13:36

## 2020-01-01 RX ADMIN — SUCRALFATE 1 G: 1 TABLET ORAL at 11:30

## 2020-01-01 RX ADMIN — ACETAMINOPHEN 650 MG: 325 TABLET ORAL at 09:48

## 2020-01-01 RX ADMIN — SODIUM CHLORIDE 20 MMOL: 900 INJECTION, SOLUTION INTRAVENOUS at 10:42

## 2020-01-01 RX ADMIN — PIPERACILLIN SODIUM AND TAZOBACTAM SODIUM 3.38 G: 3; .375 INJECTION, POWDER, LYOPHILIZED, FOR SOLUTION INTRAVENOUS at 08:14

## 2020-01-01 RX ADMIN — BUDESONIDE 500 MCG: 0.5 INHALANT RESPIRATORY (INHALATION) at 19:25

## 2020-01-01 RX ADMIN — SODIUM CHLORIDE 1000 ML: 900 INJECTION, SOLUTION INTRAVENOUS at 19:25

## 2020-01-01 RX ADMIN — POLYETHYLENE GLYCOL 3350 17 G: 17 POWDER, FOR SOLUTION ORAL at 10:39

## 2020-01-01 RX ADMIN — SODIUM CHLORIDE 25 ML/HR: 9 INJECTION, SOLUTION INTRAVENOUS at 14:17

## 2020-01-01 RX ADMIN — FAMOTIDINE 20 MG: 10 INJECTION, SOLUTION INTRAVENOUS at 12:23

## 2020-01-01 RX ADMIN — DEXTROSE MONOHYDRATE, SODIUM CHLORIDE, AND POTASSIUM CHLORIDE 90 ML/HR: 50; 4.5; .745 INJECTION, SOLUTION INTRAVENOUS at 01:25

## 2020-01-01 RX ADMIN — BUDESONIDE 500 MCG: 0.5 INHALANT RESPIRATORY (INHALATION) at 20:59

## 2020-01-01 RX ADMIN — DEXTROSE MONOHYDRATE, SODIUM CHLORIDE, AND POTASSIUM CHLORIDE 90 ML/HR: 50; 4.5; .745 INJECTION, SOLUTION INTRAVENOUS at 12:24

## 2020-01-01 RX ADMIN — METHYLPREDNISOLONE SODIUM SUCCINATE 60 MG: 125 INJECTION, POWDER, FOR SOLUTION INTRAMUSCULAR; INTRAVENOUS at 09:45

## 2020-01-01 RX ADMIN — PRAVASTATIN SODIUM 40 MG: 20 TABLET ORAL at 23:26

## 2020-01-01 RX ADMIN — DEXTROSE MONOHYDRATE, SODIUM CHLORIDE, AND POTASSIUM CHLORIDE 90 ML/HR: 50; 4.5; .745 INJECTION, SOLUTION INTRAVENOUS at 11:15

## 2020-01-01 RX ADMIN — PIPERACILLIN SODIUM AND TAZOBACTAM SODIUM 3.38 G: 3; .375 INJECTION, POWDER, LYOPHILIZED, FOR SOLUTION INTRAVENOUS at 05:20

## 2020-01-01 RX ADMIN — BARIUM SULFATE 700 MG: 700 TABLET ORAL at 12:15

## 2020-01-01 RX ADMIN — METRONIDAZOLE 500 MG: 500 INJECTION, SOLUTION INTRAVENOUS at 18:21

## 2020-01-01 RX ADMIN — SODIUM CHLORIDE 40 MG: 9 INJECTION, SOLUTION INTRAMUSCULAR; INTRAVENOUS; SUBCUTANEOUS at 18:18

## 2020-01-01 RX ADMIN — DIGOXIN 0.12 MG: 125 TABLET ORAL at 10:26

## 2020-01-01 RX ADMIN — ACETAMINOPHEN 650 MG: 325 TABLET ORAL at 22:12

## 2020-01-01 RX ADMIN — PIPERACILLIN AND TAZOBACTAM 3.38 G: 3; .375 INJECTION, POWDER, LYOPHILIZED, FOR SOLUTION INTRAVENOUS at 20:54

## 2020-01-01 RX ADMIN — PIPERACILLIN AND TAZOBACTAM 3.38 G: 3; .375 INJECTION, POWDER, LYOPHILIZED, FOR SOLUTION INTRAVENOUS at 11:50

## 2020-01-01 RX ADMIN — AMLODIPINE BESYLATE 10 MG: 10 TABLET ORAL at 09:50

## 2020-01-01 RX ADMIN — HYDROMORPHONE HYDROCHLORIDE 0.2 MG: 1 INJECTION, SOLUTION INTRAMUSCULAR; INTRAVENOUS; SUBCUTANEOUS at 00:49

## 2020-01-01 RX ADMIN — THERA TABS 1 TABLET: TAB at 09:50

## 2020-01-01 RX ADMIN — SODIUM CHLORIDE 8 MG/HR: 900 INJECTION INTRAVENOUS at 01:00

## 2020-01-01 RX ADMIN — SUCRALFATE 1 G: 1 TABLET ORAL at 09:41

## 2020-01-01 RX ADMIN — PRAVASTATIN SODIUM 40 MG: 20 TABLET ORAL at 22:27

## 2020-01-01 RX ADMIN — PRAVASTATIN SODIUM 40 MG: 20 TABLET ORAL at 20:47

## 2020-01-01 RX ADMIN — PIPERACILLIN SODIUM AND TAZOBACTAM SODIUM 3.38 G: 3; .375 INJECTION, POWDER, LYOPHILIZED, FOR SOLUTION INTRAVENOUS at 17:50

## 2020-01-01 RX ADMIN — DIGOXIN 0.12 MG: 125 TABLET ORAL at 08:06

## 2020-01-01 RX ADMIN — SODIUM CHLORIDE 8 MG/HR: 900 INJECTION INTRAVENOUS at 21:53

## 2020-01-01 RX ADMIN — SUCRALFATE 1 G: 1 TABLET ORAL at 16:06

## 2020-01-01 RX ADMIN — BUDESONIDE 500 MCG: 0.5 INHALANT RESPIRATORY (INHALATION) at 08:35

## 2020-01-01 RX ADMIN — PIPERACILLIN AND TAZOBACTAM 3.38 G: 3; .375 INJECTION, POWDER, LYOPHILIZED, FOR SOLUTION INTRAVENOUS at 21:18

## 2020-01-01 RX ADMIN — DEXTROSE MONOHYDRATE, SODIUM CHLORIDE, AND POTASSIUM CHLORIDE 90 ML/HR: 50; 4.5; .745 INJECTION, SOLUTION INTRAVENOUS at 23:25

## 2020-01-01 RX ADMIN — SODIUM CHLORIDE 40 MG: 9 INJECTION, SOLUTION INTRAMUSCULAR; INTRAVENOUS; SUBCUTANEOUS at 16:04

## 2020-01-01 RX ADMIN — SODIUM CHLORIDE 250 ML: 9 INJECTION, SOLUTION INTRAVENOUS at 10:07

## 2020-01-01 RX ADMIN — FERROUS SULFATE TAB 325 MG (65 MG ELEMENTAL FE) 325 MG: 325 (65 FE) TAB at 06:14

## 2020-01-01 RX ADMIN — HYDROMORPHONE HYDROCHLORIDE 4 MG: 2 TABLET ORAL at 07:12

## 2020-01-01 RX ADMIN — IPRATROPIUM BROMIDE AND ALBUTEROL SULFATE 3 ML: .5; 3 SOLUTION RESPIRATORY (INHALATION) at 12:31

## 2020-01-01 RX ADMIN — SUCRALFATE 1 G: 1 TABLET ORAL at 22:27

## 2020-01-01 RX ADMIN — TAMSULOSIN HYDROCHLORIDE 0.4 MG: 0.4 CAPSULE ORAL at 11:17

## 2020-01-01 RX ADMIN — IPRATROPIUM BROMIDE AND ALBUTEROL SULFATE 3 ML: .5; 3 SOLUTION RESPIRATORY (INHALATION) at 16:12

## 2020-01-01 RX ADMIN — DIPHENHYDRAMINE HYDROCHLORIDE 25 MG: 25 CAPSULE ORAL at 11:38

## 2020-01-01 RX ADMIN — HYDROCODONE BITARTRATE AND ACETAMINOPHEN 1 TABLET: 5; 325 TABLET ORAL at 16:28

## 2020-01-01 RX ADMIN — METRONIDAZOLE 500 MG: 500 INJECTION, SOLUTION INTRAVENOUS at 19:00

## 2020-01-01 RX ADMIN — PIPERACILLIN AND TAZOBACTAM 3.38 G: 3; .375 INJECTION, POWDER, LYOPHILIZED, FOR SOLUTION INTRAVENOUS at 15:20

## 2020-01-01 RX ADMIN — Medication 500 MG: at 08:07

## 2020-01-01 RX ADMIN — SUCRALFATE 1 G: 1 TABLET ORAL at 22:12

## 2020-01-01 RX ADMIN — OXYCODONE 5 MG: 5 TABLET ORAL at 06:14

## 2020-01-01 RX ADMIN — POLYETHYLENE GLYCOL 3350 17 G: 17 POWDER, FOR SOLUTION ORAL at 15:47

## 2020-01-01 RX ADMIN — ACETAMINOPHEN 650 MG: 325 TABLET ORAL at 08:24

## 2020-01-01 RX ADMIN — IOPAMIDOL 80 ML: 755 INJECTION, SOLUTION INTRAVENOUS at 20:46

## 2020-01-01 RX ADMIN — PANTOPRAZOLE SODIUM 40 MG: 40 TABLET, DELAYED RELEASE ORAL at 10:26

## 2020-01-01 RX ADMIN — SUCRALFATE 1 G: 1 TABLET ORAL at 21:38

## 2020-01-01 RX ADMIN — SODIUM CHLORIDE 80 MG: 9 INJECTION INTRAMUSCULAR; INTRAVENOUS; SUBCUTANEOUS at 19:00

## 2020-01-01 RX ADMIN — DEXTROSE MONOHYDRATE, SODIUM CHLORIDE, AND POTASSIUM CHLORIDE 90 ML/HR: 50; 4.5; .745 INJECTION, SOLUTION INTRAVENOUS at 12:45

## 2020-01-01 RX ADMIN — DIGOXIN 0.12 MG: 125 TABLET ORAL at 18:13

## 2020-01-01 RX ADMIN — METRONIDAZOLE 500 MG: 500 INJECTION, SOLUTION INTRAVENOUS at 06:35

## 2020-01-01 RX ADMIN — PRAVASTATIN SODIUM 40 MG: 20 TABLET ORAL at 21:18

## 2020-01-01 RX ADMIN — SUCRALFATE 1 G: 1 TABLET ORAL at 17:51

## 2020-01-01 RX ADMIN — METRONIDAZOLE 500 MG: 500 INJECTION, SOLUTION INTRAVENOUS at 18:13

## 2020-01-01 RX ADMIN — AZITHROMYCIN MONOHYDRATE 500 MG: 500 INJECTION, POWDER, LYOPHILIZED, FOR SOLUTION INTRAVENOUS at 13:00

## 2020-01-01 RX ADMIN — HYDROMORPHONE HYDROCHLORIDE 0.2 MG: 1 INJECTION, SOLUTION INTRAMUSCULAR; INTRAVENOUS; SUBCUTANEOUS at 22:26

## 2020-01-01 RX ADMIN — IPRATROPIUM BROMIDE AND ALBUTEROL SULFATE 3 ML: .5; 3 SOLUTION RESPIRATORY (INHALATION) at 19:25

## 2020-01-01 RX ADMIN — DIGOXIN 0.12 MG: 125 TABLET ORAL at 08:07

## 2020-01-01 RX ADMIN — AZITHROMYCIN MONOHYDRATE 500 MG: 500 INJECTION, POWDER, LYOPHILIZED, FOR SOLUTION INTRAVENOUS at 15:55

## 2020-01-01 RX ADMIN — IPRATROPIUM BROMIDE AND ALBUTEROL SULFATE 3 ML: .5; 3 SOLUTION RESPIRATORY (INHALATION) at 11:53

## 2020-01-01 RX ADMIN — DIGOXIN 0.12 MG: 125 TABLET ORAL at 08:25

## 2020-01-01 RX ADMIN — SODIUM CHLORIDE 250 ML: 900 INJECTION, SOLUTION INTRAVENOUS at 13:57

## 2020-01-01 RX ADMIN — IPRATROPIUM BROMIDE AND ALBUTEROL SULFATE 3 ML: .5; 3 SOLUTION RESPIRATORY (INHALATION) at 17:05

## 2020-01-01 RX ADMIN — POTASSIUM CHLORIDE 40 MEQ: 20 TABLET, EXTENDED RELEASE ORAL at 08:22

## 2020-01-01 RX ADMIN — IPRATROPIUM BROMIDE AND ALBUTEROL SULFATE 3 ML: .5; 3 SOLUTION RESPIRATORY (INHALATION) at 22:47

## 2020-01-01 RX ADMIN — Medication 5 ML: at 11:21

## 2020-01-01 RX ADMIN — PIPERACILLIN SODIUM AND TAZOBACTAM SODIUM 3.38 G: 3; .375 INJECTION, POWDER, LYOPHILIZED, FOR SOLUTION INTRAVENOUS at 00:40

## 2020-01-01 RX ADMIN — SUCRALFATE 1 G: 1 TABLET ORAL at 16:55

## 2020-01-01 RX ADMIN — TRAZODONE HYDROCHLORIDE 50 MG: 50 TABLET ORAL at 21:18

## 2020-01-01 RX ADMIN — IPRATROPIUM BROMIDE AND ALBUTEROL SULFATE 3 ML: .5; 3 SOLUTION RESPIRATORY (INHALATION) at 03:05

## 2020-01-01 RX ADMIN — PIPERACILLIN SODIUM AND TAZOBACTAM SODIUM 3.38 G: 3; .375 INJECTION, POWDER, LYOPHILIZED, FOR SOLUTION INTRAVENOUS at 00:14

## 2020-01-01 RX ADMIN — PREDNISONE 40 MG: 20 TABLET ORAL at 09:18

## 2020-01-01 RX ADMIN — PIPERACILLIN AND TAZOBACTAM 3.38 G: 3; .375 INJECTION, POWDER, LYOPHILIZED, FOR SOLUTION INTRAVENOUS at 02:58

## 2020-01-01 RX ADMIN — OXYCODONE 5 MG: 5 TABLET ORAL at 14:36

## 2020-01-01 RX ADMIN — SODIUM CHLORIDE 8 MG/HR: 900 INJECTION INTRAVENOUS at 05:02

## 2020-01-01 RX ADMIN — SUCRALFATE 1 G: 1 TABLET ORAL at 13:36

## 2020-01-01 RX ADMIN — PIPERACILLIN AND TAZOBACTAM 3.38 G: 3; .375 INJECTION, POWDER, LYOPHILIZED, FOR SOLUTION INTRAVENOUS at 14:23

## 2020-01-01 RX ADMIN — FERRIC CARBOXYMALTOSE INJECTION 750 MG: 50 INJECTION, SOLUTION INTRAVENOUS at 14:17

## 2020-01-01 RX ADMIN — PANTOPRAZOLE SODIUM 40 MG: 40 TABLET, DELAYED RELEASE ORAL at 08:22

## 2020-01-01 RX ADMIN — PIPERACILLIN SODIUM AND TAZOBACTAM SODIUM 3.38 G: 3; .375 INJECTION, POWDER, LYOPHILIZED, FOR SOLUTION INTRAVENOUS at 21:58

## 2020-01-16 PROBLEM — D50.0 IRON DEFICIENCY ANEMIA DUE TO CHRONIC BLOOD LOSS: Status: ACTIVE | Noted: 2020-01-01

## 2020-01-16 NOTE — LETTER
1/16/20 Patient: Evelia Santos YOB: 1940 Date of Visit: 1/16/2020 Anh Wesley DO 
[address] [facility] [facility] [address] [facility] [address] VIA In Basket Dear nAh Wesley DO, Thank you for referring Mr. Boaz Melendez to [facility] for evaluation. My notes for this consultation are attached. If you have questions, please do not hesitate to call me. I look forward to following your patient along with you. Sincerely, Swathi Sanchez MD

## 2020-01-16 NOTE — PROGRESS NOTES
Hematology/Oncology Note    Name: aLnny Mercedes  Date: 2020  : 1940    Inderjit Ahuja DO         Subjective:     Chief Complaint:  Follow up anemia    History of Present Illness:   Mr Thomas Keith is here today for follow-up. He reported he felt better right after received blood transfusion but then returned to chronic fatigue. He denies any obvious bleeding, melena, bloody stools, hematochezia, nausea, vomiting, or abdominal pain. He noticed persistent dark stools for last few weeks. He denied progressive dyspnea, severe headache, chest pain, cough, abdominal pain, nausea, vomiting, diarrhea, dysuria, new focal weakness or numbness. His EGD and Colonoscopy on 19 showed hiatal hernia, gastritis, ascending colon polyp; diverticulosis; and hemorroids. History  Mr. Thomas Keith is a most pleasant [de-identified]y.o. year old male who was seen for anemia. He has multiple co morbidities which includes of chronic atrial fibrillation, peripheral vascular disease, hypertension, coronary artery disease, pulmonary hypertension, CHF exacerbation, chronic pain syndrome, iron deficiency anemia, chronic kidney disease, and benign prostate hypertrophy. He was diagnosed with iron deficiency anemia since last year and has been on iron pills without any tolerating issues. On May 2019 he had bloody stools and was diagnosed with GI bleeding. His EGD/colonoscopy with polypectomy on 19 reported hiatal hernia, mild erosive gastritis few flecks of coffee grounds,1 cm colon polyp, diverticulosis, internal hemorrhoids. He has been on PPI since then. He received blood transfusion/IV iron during his hospitalization. For his iron deficiency anemia, he has received 4 doses of iron IV- Injectafer given by his GI doctor, last dose was on 10/15/2019. He has followed up with GI recently and no further procedure planned. He has history of chronic A-fib but was hold off anticoagulation due to GI bleeding issues.  He has been on home oxygen at 2-3 L for his COPD. Oncologic History:   No history exists. Past Medical History, Family History, and Social History:    Past Medical History:   Diagnosis Date    Aneurysm [facility])     AAA repair 2006 & 2012    Aorto-iliac duplex 02/13/2015    Tech difficult. Patent aorta bi-iliac endograft w/o leak or limb dysfunction.  Arrhythmia     a fib    Cardiac cath 11/01/2012    RCA (sm, nondom) patent. LM patent. LAD 25%. CX (dom) 45% mid. LVEDP 12 mmHg. No WMA. PA 27/12. W 10-12. CO/CI 5.2/2.6 (TD).  Cardiac echocardiogram, abnormal 02/20/2016    EF 55%. No WMA. Indeterminate diastolic fx. RVSP 60 mmHg. Severe LAE. Mild MR. Mod TR.  IVCE. Similar to study of 10/26/12.  Cardiovascular aorto-iliac duplex 02/13/2015    Patent aorta bi-iliac endovascular graft w/o leak or limb dysfunction. Sac measures 4.21 x 4.47 cm (4.4 x 4.6 cm on 1/31/14).  Cardiovascular LE peripheral arterial testing 07/29/2013    No significant LE arterial disease bilaterally. R HGADA 1. 12.  L GHADA 1. 12.  R DBI 0.83. L DBI 0.71. Exercise deferred.  Cardiovascular renal duplex 10/31/2012    No RA stenosis. Intrinsic/med disease in left kidney. Patent aortic endograft. Patent, thrombus-free renal veins bilaterally.  Carotid duplex 07/29/2013    Mild <50% bilateral ICA plaquing.       Chronic kidney disease     Chronic lung disease     Cigarette smoker     Congestive heart failure (CHF) (HCC)     COPD (chronic obstructive pulmonary disease) (HCC)     and emphysema; likely secondary to tobacco abuse    Difficult intubation     Dyslipidemia     low HDL    Emphysema     HTN (hypertension)     Hypercholesterolemia     Ill-defined condition     hernia    Increased prostate specific antigen (PSA) velocity     Long term (current) use of anticoagulants     coumadin    Osteoarthritis     Osteomyelitis (HCC)     Paroxysmal atrial fibrillation (HCC)     Peripheral vascular disease ([facility]     AAA repair 2007    Persistent atrial Fibrillation     Persistent atrial fibrillation     Unspecified adverse effect of anesthesia     difficulty breathing placed in ICU Oct 2012 (AAA repair)     Past Surgical History:   Procedure Laterality Date    BRONCHOSCOPY DIAGNOSTIC  2012         CARDIAC CATHETERIZATION  2012         COLONOSCOPY N/A 2016    COLONOSCOPY with Polypectomies performed by Shaniqua Wood MD at [address] COLONOSCOPY N/A 2019    COLONOSCOPY with polypectomy performed by Stevenson Liang MD at [address] AAA REPAIR       &     HX HEART CATHETERIZATION  3/4/2009    1. RCA small, nondominant; patent. 2. LMCA patent. 3. LAD is long, wrapped around apical vessel. Diffuse, 20-30% stenosis noted. 4. CCA is large, dominant vessel. Diffuse 20-30% stenosis. 5. LVEDP is 16 mmHg. 6. Overall left ventricular systolic function mildly diminshed with est. EF 45%. Mild, global hypokinesis noted. 7. No significant mitral regurgitation or aortic stenosis noted.  (see report)    HX HERNIA REPAIR  2014    rt inguinal     HX HERNIA REPAIR  2016    LEFT INGUINAL HERNIA REPAIR DR. Frederick Martin ING HERNIA,5+Y/O,JESSIE Left 16    Dr. Dwight Donohue  2012          Social History     Socioeconomic History    Marital status:      Spouse name: Not on file    Number of children: Not on file    Years of education: Not on file    Highest education level: Not on file   Occupational History    Not on file   Social Needs    Financial resource strain: Not on file    Food insecurity:     Worry: Not on file     Inability: Not on file    Transportation needs:     Medical: Not on file     Non-medical: Not on file   Tobacco Use    Smoking status: Former Smoker     Packs/day: 1.50     Years: 54.00     Pack years: 81.00     Types: Cigarettes     Last attempt to quit: 10/23/2012     Years since quittin.2    Smokeless tobacco: Never Used   Substance and Sexual Activity    Alcohol use: No     Alcohol/week: 0.0 standard drinks     Comment: quit drinking alcohol 26 years ago, patient states stopped in \"1161\"    Drug use: No    Sexual activity: Not Currently   Lifestyle    Physical activity:     Days per week: Not on file     Minutes per session: Not on file    Stress: Not on file   Relationships    Social connections:     Talks on phone: Not on file     Gets together: Not on file     Attends Sabianism service: Not on file     Active member of club or organization: Not on file     Attends meetings of clubs or organizations: Not on file     Relationship status: Not on file    Intimate partner violence:     Fear of current or ex partner: Not on file     Emotionally abused: Not on file     Physically abused: Not on file     Forced sexual activity: Not on file   Other Topics Concern    Not on file   Social History Narrative    Not on file     Family History   Problem Relation Age of Onset    Heart Surgery Father         BYPASS    Stroke Father     Heart Disease Father     Hypertension Father     Heart Attack Father     Heart Surgery Mother         BYPASS    Coronary Artery Disease Mother     Stroke Mother     Heart Disease Mother     Hypertension Mother     Diabetes Sister     Cancer Brother      Current Outpatient Medications   Medication Sig Dispense Refill    docusate sodium 100 mg tab Take 100 mg by mouth as needed for Other (constipation).  STIOLTO RESPIMAT 2.5-2.5 mcg/actuation inhaler inhale 2 inhalations by mouth once daily 1 Inhaler 5    ferrous sulfate 325 mg (65 mg iron) tablet Take 325 mg by mouth every other day. Indications: anemia from inadequate iron      ascorbic acid, vitamin C, (VITAMIN C) 250 mg tablet Take 1 Tab by mouth two (2) times a day. 30 Tab 0    pantoprazole (PROTONIX) 40 mg tablet Take 1 Tab by mouth Daily (before breakfast). 30 Tab 0    tamsulosin (FLOMAX) 0.4 mg capsule Take 0.4 mg by mouth daily.       HYDROcodone-acetaminophen (NORCO) 5-325 mg per tablet Take 0.5 Tabs by mouth two (2) times daily as needed for Pain.  albuterol (PROVENTIL VENTOLIN) 2.5 mg /3 mL (0.083 %) nebulizer solution 3 mL by Nebulization route every four (4) hours as needed for Wheezing. 24 Each 3    traZODone (DESYREL) 50 mg tablet Take 50 mg by mouth nightly.  lisinopril (PRINIVIL, ZESTRIL) 20 mg tablet Take 1 Tab by mouth daily. 30 Tab 0    pravastatin (PRAVACHOL) 40 mg tablet Take 40 mg by mouth nightly.  polyethylene glycol (MIRALAX) 17 gram packet Take 17 g by mouth daily as needed (constipation).  digoxin (DIGITEK) 0.125 mg tablet take 1 tablet by mouth once daily 90 Tab 3    amLODIPine (NORVASC) 10 mg tablet Take 10 mg by mouth daily. 0    OXYGEN-AIR DELIVERY SYSTEMS 2 L/min by Nasal route daily. Continuous. Company is [employer]            Review of Systems:  Constitutional: The patient has no acute distress or discomfort. Positive for generalized fatigue, tiredness, low energy level. HEENT: The patient denies recent head trauma, eye pain, blurred vision,  hearing deficit, oropharyngeal mucosal pain or lesions, and the patient denies throat pain or discomfort. Lymphatics: The patient denies palpable peripheral lymphadenopathy. Hematologic: The patient denies having bruising, bleeding. Positive fatigue. Respiratory: Patient denies having shortness of breath, cough, sputum production, fever. Cardiovascular: The patient denies having leg pain, leg swelling, heart palpitations, chest permit, or chest pain. Positive for intermittent dizziness. The patient denies having progressive dyspnea on exertion. Gastrointestinal: The patient denies having nausea, emesis, or diarrhea. The patient denies having any hematemesis or blood in the stool. Genitourinary: Patient denies having urinary urgency, frequency, or dysuria. The patient denies having blood in the urine.   Psychological: The patient denies having symptoms of nervousness, anxiety, depression, or thoughts of harming self. Skin: Patient denies having skin rashes, skin, ulcerations, or unexplained itching or pruritus. Musculoskeletal: The patient denies having pain in the joints or bones. Objective:     Visit Vitals  /53 (BP 1 Location: Left arm, BP Patient Position: Sitting)   Pulse 60   Temp 96.9 °F (36.1 °C) (Oral)   Resp 18   Ht 5' 6\" (1.676 m)   Wt 64.2 kg (141 lb 9.6 oz)   SpO2 98%   BMI 22.85 kg/m²         Physical Exam:   Gen. Appearance: The patient is in no acute distress. On Home Oxygen. Skin: There is no bruise or rash. HEENT: The exam is unremarkable. Neck: Supple without lymphadenopathy or thyromegaly. Lungs: Clear to auscultation and percussion; there are no wheezes or rhonchi. Heart: Regular rate and rhythm; there are no murmurs, gallops, or rubs. Abdomen: Bowel sounds are present and normal.  There is no guarding, tenderness, or hepatosplenomegaly. Extremities: There is no clubbing, cyanosis, or edema. Neurologic: There are no focal neurologic deficits. Lymphatics: There is no palpable peripheral lymphadenopathy. Musculoskeletal: The patient has full range of motion at all joints. There is no evidence of joint deformity or effusions. There is no focal joint tenderness. Psychological/psychiatric: There is no clinical evidence of anxiety, depression, or melancholy.       Diagnostics:      Results for orders placed or performed during the hospital encounter of 01/16/20   CBC WITH 3 PART DIFF     Status: Abnormal   Result Value Ref Range Status    WBC 9.8 4.5 - 13.0 K/uL Final    RBC 2.55 (L) 4.10 - 5.10 M/uL Final    HGB 7.3 (L) 12.0 - 16 g/dL Final    HCT 22.9 (L) 36 - 48 % Final    MCV 89.8 78 - 102 FL Final    MCH 28.6 25.0 - 35.0 PG Final    MCHC 31.9 31 - 37 g/dL Final    RDW 13.5 11.5 - 14.5 % Final    PLATELET 414 995 - 720 K/uL Final    NEUTROPHILS 80 (H) 40 - 70 % Final    MIXED CELLS 9 0.1 - 17 % Final    LYMPHOCYTES 11 (L) 14 - 44 % Final    ABS. NEUTROPHILS 7.8 1.8 - 9.5 K/UL Final    ABS. MIXED CELLS 0.9 0.0 - 2.3 K/uL Final    ABS. LYMPHOCYTES 1.1 1.1 - 5.9 K/UL Final     Comment: Test performed at [address] [place] [address] or Outpatient Infusion Center Location. Reviewed by Medical Director. DF AUTOMATED   Final       Recent Results (from the past 2016 hour(s))   RETICULOCYTE COUNT    Collection Time: 11/07/19  2:50 PM   Result Value Ref Range    Reticulocyte count 2.6 (H) 0.5 - 2.3 %   CBC WITH AUTOMATED DIFF    Collection Time: 11/07/19  2:50 PM   Result Value Ref Range    WBC 10.4 4.6 - 13.2 K/uL    RBC 2.59 (L) 4.70 - 5.50 M/uL    HGB 7.2 (L) 13.0 - 16.0 g/dL    HCT 23.6 (L) 36.0 - 48.0 %    MCV 91.1 74.0 - 97.0 FL    MCH 27.8 24.0 - 34.0 PG    MCHC 30.5 (L) 31.0 - 37.0 g/dL    RDW 17.9 (H) 11.6 - 14.5 %    PLATELET 674 968 - 744 K/uL    MPV 9.6 9.2 - 11.8 FL    NEUTROPHILS 83 (H) 40 - 73 %    LYMPHOCYTES 10 (L) 21 - 52 %    MONOCYTES 7 3 - 10 %    EOSINOPHILS 0 0 - 5 %    BASOPHILS 0 0 - 2 %    ABS. NEUTROPHILS 8.7 (H) 1.8 - 8.0 K/UL    ABS. LYMPHOCYTES 1.0 0.9 - 3.6 K/UL    ABS. MONOCYTES 0.7 0.05 - 1.2 K/UL    ABS. EOSINOPHILS 0.0 0.0 - 0.4 K/UL    ABS.  BASOPHILS 0.0 0.0 - 0.1 K/UL    DF AUTOMATED      PLATELET COMMENTS ADEQUATE PLATELETS      RBC COMMENTS MICROCYTOSIS  FEW  SCHISTOCYTES  FEW       METABOLIC PANEL, BASIC    Collection Time: 11/07/19  2:50 PM   Result Value Ref Range    Sodium 136 136 - 145 mmol/L    Potassium 4.5 3.5 - 5.5 mmol/L    Chloride 103 100 - 111 mmol/L    CO2 28 21 - 32 mmol/L    Anion gap 5 3.0 - 18 mmol/L    Glucose 106 (H) 74 - 99 mg/dL    BUN 28 (H) 7.0 - 18 MG/DL    Creatinine 1.52 (H) 0.6 - 1.3 MG/DL    BUN/Creatinine ratio 18 12 - 20      GFR est AA 54 (L) >60 ml/min/1.73m2    GFR est non-AA 44 (L) >60 ml/min/1.73m2    Calcium 7.8 (L) 8.5 - 10.1 MG/DL   ERYTHROPOIETIN    Collection Time: 11/07/19  2:50 PM   Result Value Ref Range Erythropoietin 25.5 (H) 2.6 - 18.5 mIU/mL   PROTEIN ELECTROPHORESIS    Collection Time: 11/07/19  2:50 PM   Result Value Ref Range    Protein, total 6.5 6.0 - 8.5 g/dL    Albumin 3.0 2.9 - 4.4 g/dL    Alpha-1-globulin 0.3 0.0 - 0.4 g/dL    ALPHA-2 GLOBULIN 0.9 0.4 - 1.0 g/dL    Beta globulin 0.9 0.7 - 1.3 g/dL    Gamma globulin 1.3 0.4 - 1.8 g/dL    M-Navi Not Observed Not Observed g/dL    Globulin, total 3.5 2.2 - 3.9 g/dL    A/G ratio 0.9 0.7 - 1.7     VITAMIN B12 & FOLATE    Collection Time: 11/07/19  2:50 PM   Result Value Ref Range    Vitamin B12 463 211 - 911 pg/mL    Folate 15.4 3.10 - 17.50 ng/mL   FERRITIN    Collection Time: 11/07/19  2:50 PM   Result Value Ref Range    Ferritin 772 (H) 8 - 388 NG/ML   IRON PROFILE    Collection Time: 11/07/19  2:50 PM   Result Value Ref Range    Iron 19 (L) 50 - 175 ug/dL    TIBC 204 (L) 250 - 450 ug/dL    Iron % saturation 9 %   HAPTOGLOBIN    Collection Time: 11/15/19  2:40 PM   Result Value Ref Range    Haptoglobin 208 (H) 30 - 200 mg/dL   LD    Collection Time: 11/15/19  2:40 PM   Result Value Ref Range     81 - 234 U/L   TYPE + CROSSMATCH    Collection Time: 11/15/19  2:45 PM   Result Value Ref Range    Crossmatch Expiration 11/18/2019     ABO/Rh(D) O POSITIVE     Antibody screen NEG     Unit number [id number]     Blood component type RC LR,2     Unit division 00     Status of unit TRANSFUSED     Crossmatch result Compatible    DIRECT EMILIANO    Collection Time: 11/15/19  2:45 PM   Result Value Ref Range    KYLAH Poly NEG     KYLAH IgG NEG     KYLAH R5G/X1N NEG    METABOLIC PANEL, COMPREHENSIVE    Collection Time: 12/19/19  3:15 PM   Result Value Ref Range    Sodium 137 136 - 145 mmol/L    Potassium 4.6 3.5 - 5.5 mmol/L    Chloride 102 100 - 111 mmol/L    CO2 29 21 - 32 mmol/L    Anion gap 6 3.0 - 18 mmol/L    Glucose 100 (H) 74 - 99 mg/dL    BUN 21 (H) 7.0 - 18 MG/DL    Creatinine 1.67 (H) 0.6 - 1.3 MG/DL    BUN/Creatinine ratio 13 12 - 20      GFR est AA 48 (L) >60 ml/min/1.73m2    GFR est non-AA 40 (L) >60 ml/min/1.73m2    Calcium 8.4 (L) 8.5 - 10.1 MG/DL    Bilirubin, total 0.5 0.2 - 1.0 MG/DL    ALT (SGPT) 18 16 - 61 U/L    AST (SGOT) 16 10 - 38 U/L    Alk. phosphatase 89 45 - 117 U/L    Protein, total 7.3 6.4 - 8.2 g/dL    Albumin 3.2 (L) 3.4 - 5.0 g/dL    Globulin 4.1 (H) 2.0 - 4.0 g/dL    A-G Ratio 0.8 0.8 - 1.7     CBC WITH AUTOMATED DIFF    Collection Time: 12/26/19  2:10 PM   Result Value Ref Range    WBC 9.2 4.6 - 13.2 K/uL    RBC 2.36 (L) 4.70 - 5.50 M/uL    HGB 6.6 (L) 13.0 - 16.0 g/dL    HCT 21.4 (L) 36.0 - 48.0 %    MCV 90.7 74.0 - 97.0 FL    MCH 28.0 24.0 - 34.0 PG    MCHC 30.8 (L) 31.0 - 37.0 g/dL    RDW 15.3 (H) 11.6 - 14.5 %    PLATELET 929 496 - 058 K/uL    MPV 9.9 9.2 - 11.8 FL    NEUTROPHILS 77 (H) 40 - 73 %    LYMPHOCYTES 13 (L) 21 - 52 %    MONOCYTES 9 3 - 10 %    EOSINOPHILS 1 0 - 5 %    BASOPHILS 0 0 - 2 %    ABS. NEUTROPHILS 7.1 1.8 - 8.0 K/UL    ABS. LYMPHOCYTES 1.2 0.9 - 3.6 K/UL    ABS. MONOCYTES 0.8 0.05 - 1.2 K/UL    ABS. EOSINOPHILS 0.1 0.0 - 0.4 K/UL    ABS.  BASOPHILS 0.0 0.0 - 0.1 K/UL    DF AUTOMATED      PLATELET COMMENTS ADEQUATE PLATELETS      RBC COMMENTS ANISOCYTOSIS  1+        RBC COMMENTS OVALOCYTES  PRESENT       TYPE + CROSSMATCH    Collection Time: 12/26/19  2:45 PM   Result Value Ref Range    Crossmatch Expiration 12/29/2019     ABO/Rh(D) O POSITIVE     Antibody screen NEG     Unit number [id number]     Blood component type [facility]     Unit division 00     Status of unit TRANSFUSED     Crossmatch result Compatible     Unit number [id number]     Blood component type [facility]     Unit division 00     Status of unit TRANSFUSED     Crossmatch result Compatible    CBC WITH 3 PART DIFF    Collection Time: 01/16/20  3:00 PM   Result Value Ref Range    WBC 9.8 4.5 - 13.0 K/uL    RBC 2.55 (L) 4.10 - 5.10 M/uL    HGB 7.3 (L) 12.0 - 16 g/dL    HCT 22.9 (L) 36 - 48 %    MCV 89.8 78 - 102 FL    MCH 28.6 25.0 - 35.0 PG    MCHC 31.9 31 - 37 g/dL    RDW 13.5 11.5 - 14.5 %    PLATELET 308 167 - 071 K/uL    NEUTROPHILS 80 (H) 40 - 70 %    MIXED CELLS 9 0.1 - 17 %    LYMPHOCYTES 11 (L) 14 - 44 %    ABS. NEUTROPHILS 7.8 1.8 - 9.5 K/UL    ABS. MIXED CELLS 0.9 0.0 - 2.3 K/uL    ABS. LYMPHOCYTES 1.1 1.1 - 5.9 K/UL    DF AUTOMATED           Assessment and Plan:     1. Chronic Normocytic Anemia  - He has hx iron deficiency anemia diagnosed last year and has been on iron pills. - He has follow-up with Gastroenterology for GI bleeding. EGD/Colonoscopy on 5/28/19 showed hiatal hernia, gastritis, ascending colon polyp; diverticulosis; and hemorroids.  - Lab checked on 9/25/19 which reported Iron 26, Iron saturation 10%, Ferritin 429, and TIBC 259. Has received IV Iron  - Injectafer x 4 for symptomatic iron deficiency anemia. Last dose of Injactafer was on 10/15/2019.  - 11/7/19 labs showed Ferritin 772. MCV improved to 91. Vitamin B12/ Folate, and SPEP reviewed WNL. Schistocytes noted on smear but low reticulocyte production index - RPI (inadequate response to correct the anemia) and negative for hemolytic panels (, Hapto 208, KYLAH neg). - His chronic anemia likely multifactorial from iron deficiency anemia and anemia of chronic kidney disease. Status post PRBC transfusion at last visit with improving fatigue.   - The patient reported persistent fatigue and tiredness with dark stools noted. I advised the patient should arrange a close follow-up with Gastroenterology for further evaluation of GI bleeding.  - I also explained to him if worsen anemia and no obvious bleeding source reported, he may need to get bone marrow biopsied for further evaluation. Plan:  - Stat CBC today showed H/H 7.3/22. 9. We will arrange supportive blood transfusion: 1 unit of PRBC. - We will set up IV Injectafer x 2, one week apart. - The patient stated he will call today for appointment with Gastroenterology.   - The patient was advised to seek a prompt medical care if any obvious bleeding or worsening symptoms or any concerns. - We will see him back in 4 weeks for follow-up. Always sooner if required. .      Orders Placed This Encounter    C REACTIVE PROTEIN, QT     Standing Status:   Future     Standing Expiration Date:   1/16/2021    SED RATE (ESR)     Standing Status:   Future     Standing Expiration Date:   1/16/2021    FERRITIN     Standing Status:   Future     Standing Expiration Date:   1/16/2021       All of patient's questions answered to their apparent satisfaction. They verbally show understanding and agreement with the plan. About 26 minutes were spent for this encounter with more than 50% of the time spent in face-to-face counseling and discussing on diagnosis and management plan going forward. Follow-up and Dispositions  ·   Return in about 4 weeks (around 2/13/2020), or if symptoms worsen or fail to improve. Daniel Lehman MD  1/16/2020      Parts of this document has been produced using Dragon dictation system. Unrecognized errors in transcription may be present. Please do not hesitate to reach out for any questions or clarifications.

## 2020-01-21 NOTE — PROGRESS NOTES
conducted an initial consultation and Spiritual Assessment for Uriel Goss, who is a [de-identified] y.o.,male. Patient's Primary Language is: [place]. According to the patient's EMR Buddhist Affiliation is: [place].      The reason the Patient came to the hospital is:   Patient Active Problem List    Diagnosis Date Noted    Iron deficiency anemia due to chronic blood loss 01/16/2020    Age-related nuclear cataract, bilateral 11/14/2019    Pleural effusion 11/14/2019    TYRELL (acute kidney injury) ([address] 10/24/2019    Edema of both ankles 10/24/2019    Enlarged prostate without lower urinary tract symptoms (luts) 10/24/2019    GERD (gastroesophageal reflux disease) 10/24/2019   [facility] discharge follow-up 10/24/2019    Hypokalemia 10/24/2019    Left arm swelling 10/24/2019    Lethargy 10/24/2019    Lower extremity weakness 10/24/2019    Mediastinal lymphadenopathy 10/24/2019    Spondylolysis 10/24/2019    Endoleak of aortic graft ([address] 06/04/2019    Symptomatic anemia 05/24/2019    Chronic pain syndrome 05/24/2019    Chronic kidney disease 05/24/2019    Iron deficiency anemia 05/24/2019    Hyponatremia 07/09/2018    CHF exacerbation ([address] 06/27/2018    Pulmonary hypertension ([address] 04/03/2018    Psoas abscess ([address] 08/25/2017    Abscess 08/25/2017    Discitis of lumbar region 08/04/2016    Vertebral osteomyelitis ([address] 07/01/2016    Sepsis ([address] 02/19/2016    Intraabdominal fluid collection 02/18/2016    Chronic back pain 02/18/2016    Warfarin-induced coagulopathy ([address] 01/19/2016    Hypoxemia requiring supplemental oxygen 12/28/2015    CAD (coronary artery disease) 12/08/2015    Left inguinal hernia 02/14/2014    Right inguinal hernia 12/24/2013    RLQ abdominal pain 12/23/2013    Status post AAA (abdominal aortic aneurysm) repair 12/23/2013    Aneurysm of abdominal aorta (HCC) 12/23/2013    Chronic constipation 12/23/2013    Chronic atrial fibrillation 05/08/2012    HTN (hypertension)     Hypercholesterolemia     Congestive heart failure (CHF) (HCC)     Peripheral vascular disease (HCC)     COPD [facility]ch[facility] obstructive [facility] disease) [facility]         The  provided the following Interventions:  Initiated a relationship of care and support. Explored issues of priscilla, belief, spirituality and Yazidi/ritual needs while hospitalized. Listened empathically. Provided chaplaincy education. Provided information about Spiritual Care Services. Offered prayer and assurance of continued prayers on patient's behalf. Chart reviewed. The following outcomes where achieved:  Patient shared limited information about both their medical narrative and spiritual journey/beliefs.  confirmed Patient's Yarsani Affiliation. Patient processed feeling about current hospitalization. Patient expressed gratitude for 's visit. Assessment:  Patient does not have any Yazidi/cultural needs that will affect patient's preferences in health care. There are no spiritual or Yazidi issues which require intervention at this time. Plan:  Chaplains will continue to follow and will provide pastoral care on an as needed/requested basis.  recommends bedside caregivers page  on duty if patient shows signs of acute spiritual or emotional distress.     [address] [facility]   [phone]

## 2020-01-21 NOTE — PROGRESS NOTES
[facility] Progress Note    Date: 2020    Name: Dorn Oppenheim    MRN: [id number]         : 1940     1 unit PRBC/ Injectafer      Mr. Denis Bach arrived to [facility] at [phone] accompanied by his daughter. Mr. Denis Bach was assessed and education was provided. Discussed risks and benefits of blood transfusion with patient, including risk of transfusion reaction and disease transmission. Patient verbalized understanding and signed consent placed on chart. Patient also states he has received injectafer in the past and tolerated it well. Mr. Giana Ricks vitals were reviewed. Visit Vitals  /55 (BP 1 Location: Left arm, BP Patient Position: Sitting)   Pulse 66   Temp 99 °F (37.2 °C)   Resp 18   SpO2 97%       22g IV inserted in patient's Left antecubital x1 attempt. Positive for blood return and flushes without difficulty. Pre-medications (Tylenol 650 mg and Benadryl 25 mg) were administered as ordered. Injectafer 750 mg infused over 20 minutes as ordered and without complications. NS initiated at [facility]. PRBCs initiated @ 75 ml/hr at 1215. Fifteen minutes into infusion, VS stable and pt denied c/o SOB, itching/hives, lip/tongue/facial swelling, CP or other complaints. Infusion rate increased to 155 ml/hr. Fifteen minutes later, VS stable and pt denied complaints. Rate remained at 155 mL/hr for the remainder of the transfusion. Unit finished @ 1441. VS stable and no transfusion reaction suspected. Patient Vitals for the past 12 hrs:   Temp Pulse Resp BP SpO2   20 1441 99 °F (37.2 °C) 66 18 113/55 97 %   20 1415 98.3 °F (36.8 °C) (!) 59 18 105/60 97 %   20 1315 98.3 °F (36.8 °C) 61 18 115/59 96 %   20 1245 98.5 °F (36.9 °C) (!) 59 18 109/55 96 %   20 1230 98.1 °F (36.7 °C) (!) 59 18 104/51 96 %   20 1213 98.9 °F (37.2 °C) (!) 58 18 106/57 96 %   20 1126 97.8 °F (36.6 °C) 71 18 106/58 95 %       Mr. Reis tolerated infusion without complaints. IV removed. No irritation, bleeding, or hematoma noted at site. Gauze and coban applied to site. Patient declined to stay for observation today. Discharge instructions reviewed with pt. Pt instructed to report SOB, CP, elevated temp, back pain, or other symptoms of transfusion reaction to MD or ED. Pt verbalized understanding. Patient armband removed and shredded    Mr. Henna Whitmore was discharged from [place] [address] in stable condition at 1450. He is to return on 2/28/20 for his next injectafer appointment.      Nohemi Tiwari RN  January 21, 2020

## 2020-01-26 PROBLEM — J15.9 COMMUNITY ACQUIRED BACTERIAL PNEUMONIA: Status: ACTIVE | Noted: 2020-01-01

## 2020-01-26 PROBLEM — N39.0 UTI (URINARY TRACT INFECTION): Status: ACTIVE | Noted: 2020-01-01

## 2020-01-26 PROBLEM — J18.9 COMMUNITY ACQUIRED PNEUMONIA: Status: ACTIVE | Noted: 2020-01-01

## 2020-01-26 NOTE — ED PROVIDER NOTES
EMERGENCY DEPARTMENT HISTORY AND PHYSICAL EXAM 
This was created with voice recognition software and transcription errors may be present. 9:53 AM 
Date: 1/26/2020 Patient Name: Yenifer Medina History of Presenting Illness Chief Complaint: 
 
History Provided By:  
 
HPI: Yenifer Medina is a [de-identified] y.o. male with a past medical history of aneurysm AAA repair cardiac cath heart failure COPD difficult intubation dyslipidemia emphysema hypertension high cholesterol was on Coumadin but that was stopped secondary to a bleeding ulcer persistent A. fib who presents with shortness of breath is been going on for the past few days no associated leg swelling no significant cough no wheeze. Does state he has been receiving frequent transfusions lately he was hypoxic on his baseline 2 L to about 80% PCP: Claudio Pollock DO Past History Past Medical History: 
Past Medical History:  
Diagnosis Date  Aneurysm ([address] AAA repair 2006 & 2012  Aorto-iliac duplex 02/13/2015 Tech difficult. Patent aorta bi-iliac endograft w/o leak or limb dysfunction.  Arrhythmia   
 a fib  Cardiac cath 11/01/2012 RCA (sm, nondom) patent. LM patent. LAD 25%. CX (dom) 45% mid. LVEDP 12 mmHg. No WMA. PA 27/12. W 10-12. CO/CI 5.2/2.6 (TD).  Cardiac echocardiogram, abnormal 02/20/2016 EF 55%. No WMA. Indeterminate diastolic fx. RVSP 60 mmHg. Severe LAE. Mild MR. Mod TR.  IVCE. Similar to study of 10/26/12.  Cardiovascular aorto-iliac duplex 02/13/2015 Patent aorta bi-iliac endovascular graft w/o leak or limb dysfunction. Sac measures 4.21 x 4.47 cm (4.4 x 4.6 cm on 1/31/14).  Cardiovascular LE peripheral arterial testing 07/29/2013 No significant LE arterial disease bilaterally. R GHADA 1. 12.  L GHADA 1. 12.  R DBI 0.83. L DBI 0.71. Exercise deferred.  Cardiovascular renal duplex 10/31/2012 No RA stenosis. Intrinsic/med disease in left kidney.   Patent aortic endograft. Patent, thrombus-free renal veins bilaterally.  Carotid duplex 07/29/2013 Mild <50% bilateral ICA plaquing.  Chronic kidney disease  Chronic lung disease  Cigarette smoker  Congestive heart failure (CHF) ([address]  COPD (chronic obstructive pulmonary disease) (HCC)   
 and emphysema; likely secondary to tobacco abuse  Difficult intubation  Dyslipidemia   
 low HDL  Emphysema   
 HTN (hypertension)  Hypercholesterolemia  Ill-defined condition   
 hernia  Increased prostate specific antigen (PSA) velocity  Long term (current) use of anticoagulants   
 coumadin  Osteoarthritis  [address]omyel[address] [address]  Paroxysmal atrial fibrillation (HCC)  Peripheral vascular disease ([facility] [address] [facility] AAA repair 12/2007  Persistent atrial Fibrillation  Persistent atrial fibrillation  Unspecified adverse effect of anesthesia   
 difficulty breathing placed in ICU Oct 2012 (AAA repair) Past Surgical History: 
Past Surgical History:  
Procedure Laterality Date  BRONCHOSCOPY DIAGNOSTIC  11/2/2012  CARDIAC CATHETERIZATION  11/1/2012  COLONOSCOPY N/A 7/26/2016 COLONOSCOPY with Polypectomies performed by Kristen Aguilar MD at [facility]OSCOPY  COLONOSCOPY N/A 5/28/2019 COLONOSCOPY with polypectomy performed by Jeff Pettit MD at [facility] HX AAA REPAIR    
 2006 & 2012  HX HEART CATHETERIZATION  3/4/2009 1. RCA small, nondominant; patent. 2. LMCA patent. 3. LAD is long, wrapped around apical vessel. Diffuse, 20-30% stenosis noted. 4. CCA is large, dominant vessel. Diffuse 20-30% stenosis. 5. LVEDP is 16 mmHg. 6. Overall left ventricular systolic function mildly diminshed with est. EF 45%. Mild, global hypokinesis noted. 7. No significant mitral regurgitation or aortic stenosis noted. (see report)  HX HERNIA REPAIR  2/2014  
 rt inguinal   
 HX HERNIA REPAIR  01/2016 LEFT INGUINAL HERNIA REPAIR DR. Les Gabriel  REPAIR ING HERNIA,5+Y/O,JESSIE Left 16 Dr. Long LONG  2012 Family History: 
Family History Problem Relation Age of Onset  Heart Surgery Father BYPASS  Stroke Father  Heart Disease Father  Hypertension Father  Heart Attack Father  Heart Surgery Mother BYPASS  Coronary Artery Disease Mother  Stroke Mother  Heart Disease Mother  Hypertension Mother  Diabetes Sister  Cancer Brother Social History: 
Social History Tobacco Use  Smoking status: Former Smoker Packs/day: 1.50 Years: 54.00 Pack years: 81.00 Types: Cigarettes Last attempt to quit: 10/23/2012 Years since quittin.2  Smokeless tobacco: Never Used Substance Use Topics  Alcohol use: No  
  Alcohol/week: 0.0 standard drinks Comment: quit drinking alcohol 26 years ago, patient states stopped in \"8583\"  Drug use: No  
 
 
Allergies: Allergies Allergen Reactions  Codeine Swelling  Morphine Itching  Sulfa (Sulfonamide Antibiotics) Other (comments) Kidney problems. Review of Systems Review of Systems Respiratory: Positive for shortness of breath. Negative for cough and wheezing. Cardiovascular: Negative for chest pain. Gastrointestinal: Negative for abdominal pain and nausea. All other systems reviewed and are negative. 10 point review of systems otherwise negative unless noted in HPI. Physical Exam  
 
 
Physical Exam 
Constitutional:   
   Appearance: He is well-developed. HENT:  
   Head: Normocephalic and atraumatic. Eyes:  
   Pupils: Pupils are equal, round, and reactive to light. Neck: Musculoskeletal: Normal range of motion and neck supple. Cardiovascular:  
   Rate and Rhythm: Normal rate and regular rhythm. Heart sounds: Normal heart sounds. No murmur. No friction rub. Pulmonary:  
   Effort: Pulmonary effort is normal. No respiratory distress. Breath sounds: Wheezing present. Comments: Some mild wheezes expiratory bilaterally posteriorly Abdominal:  
   General: There is no distension. Palpations: Abdomen is soft. Tenderness: There is no tenderness. There is no guarding or rebound. Musculoskeletal: Normal range of motion. Skin: 
   General: Skin is warm and dry. Neurological:  
   Mental Status: He is alert and oriented to person, place, and time. Psychiatric:     
   Behavior: Behavior normal.     
   Thought Content: Thought content normal.  
 
 
 
Diagnostic Study Results Vital Signs EKG: EKG shows A. fib with a rate of 98 with a normal axis and normal intervals there is no ST elevation or depression and no hypertrophy. This is computer read as left axis which certainly possible there is a lot of baseline wander in II likely borderline if not Labs: Chemistry noted mild elevation in BNP 7000, H&H is 8.8 and 27.  189 platelets shift no bands Imaging: IMPRESSION: 
  
Right lower lobe infiltrate. Finding suggestive of pneumonia. Probable small 
effusion. Follow-up chest radiograph after treatment recommended to assess for 
resolution. Medical Decision Making ED Course: Progress Notes, Reevaluation, and Consults: 
 
 
Provider Notes (Medical Decision Making): [de-identified]year-old gentleman history of A. fib comes in with shortness of breath no chest pain no fevers or chills. We will check basic blood work x-ray EKG and reassess his Coumadin was stopped for bleeding ulcer. Will consider further evaluation with CT if no clear etiology is found Patient with elevated BNP concern of pneumonia on x-ray. He has increasing shortness of breath and cough will start antibiotics and give some Lasix I think that fluids could worsen his significantly if there is a component of heart failure. He is afebrile but has a left shift x-ray concerning for pneumonia which could be fluid or actual infection. Will treat for both. Patient given Zosyn. Levaquin held for history of AAA Will give ceftriaxone and azithro. cxr reviewed. ? PNA vs aspiration. Will add zosyn; held lasix. Diagnosis Clinical Impression: No diagnosis found. Disposition: 
 
Patient's Medications Start Taking No medications on file Continue Taking ALBUTEROL (PROVENTIL VENTOLIN) 2.5 MG /3 ML (0.083 %) NEBULIZER SOLUTION    3 mL by Nebulization route every four (4) hours as needed for Wheezing. AMLODIPINE (NORVASC) 10 MG TABLET    Take 10 mg by mouth daily. ASCORBIC ACID, VITAMIN C, (VITAMIN C) 250 MG TABLET    Take 1 Tab by mouth two (2) times a day. DIGOXIN (DIGITEK) 0.125 MG TABLET    take 1 tablet by mouth once daily DOCUSATE SODIUM 100 MG TAB    Take 100 mg by mouth as needed for Other (constipation). FERROUS SULFATE 325 MG (65 MG IRON) TABLET    Take 325 mg by mouth every other day. Indications: anemia from inadequate iron HYDROCODONE-ACETAMINOPHEN (NORCO) 5-325 MG PER TABLET    Take 0.5 Tabs by mouth two (2) times daily as needed for Pain. LISINOPRIL (PRINIVIL, ZESTRIL) 20 MG TABLET    Take 1 Tab by mouth daily. OXYGEN-AIR DELIVERY SYSTEMS    2 L/min by Nasal route daily. Continuous. Company is [employer] PANTOPRAZOLE (PROTONIX) 40 MG TABLET    Take 1 Tab by mouth Daily (before breakfast). POLYETHYLENE GLYCOL (MIRALAX) 17 GRAM PACKET    Take 17 g by mouth daily as needed (constipation). PRAVASTATIN (PRAVACHOL) 40 MG TABLET    Take 40 mg by mouth nightly. STIOLTO RESPIMAT 2.5-2.5 MCG/ACTUATION INHALER    inhale 2 inhalations by mouth once daily TAMSULOSIN (FLOMAX) 0.4 MG CAPSULE    Take 0.4 mg by mouth daily. TRAZODONE (DESYREL) 50 MG TABLET    Take 50 mg by mouth nightly. These Medications have changed No medications on file Stop Taking No medications on file

## 2020-01-26 NOTE — H&P
Admission History and Physical 
[facility] Patient: Jessica Isaacs MRN: [id number]  CSN: [id number] YOB: 1940  Age: [de-identified] y.o. Sex: male DOA: 1/26/2020 HPI:  
 
[de-identified] man PMH of AAA s/p repair, HF, COPD, HTN/HLD, afib on no AC due to gastric ulcer presenting with cough and DUMONT. Pt endorsing 3 day h/o progressive DUMONT. Pt has mild productive cough (small volume white sputum), no fever, chills, or chest pain. Pt has h/o of COPD on 2L home 02. Has used his home albuterol neb with minimal and short term relief. This morning could not walk to bathroom due to due severe DUMONT. Called EMS. EMS reported hypoxia to mid 80s. ED Course:  
Vitals: Temp 96.9, 124/75, , 91 on 2L, RR12 
CBC WCC 7.3, Hb 8.8,  
BMP: Na 134, Cr 1.53, Bun 19 BNP: 7000 Trops:  <0.02 x 1 Pro-BNP - 7,771 INR - 1.4 CXR: RLL infiltrate EKG: Afib, rate 98, LA deviation, anteroseptal infarct (seen on previous EKG) Meds: IV 40mg lasix, Rocephin x1, azithro, zosyn. Review of Systems -  
General ROS: negative for chills, fever Psychological ROS: negative for anxiety and depression Ophthalmic ROS: negative for blurry vision, decreased vision or loss of vision ENT ROS: negative for headaches, visual changes Hematological and Lymphatic ROS: negative for bruising, jaundice Respiratory ROS: + cough, +shortness of breath, -orthopnea, -paroxysmal dyspnea, or +wheezing Cardiovascular ROS: negative for chest pain, +dyspnea on exertion,- edema or -palpitations Gastrointestinal ROS: negative for abdominal pain, blood in stools, change in stools, constipation, diarrhea, hematemesis, melena, nausea/vomiting or swallowing difficulty/pain Genito-Urinary ROS: + dysuria (recent UTI dgx on cipro), hematuria or urinary frequency/urgency Musculoskeletal ROS: negative for joint pain, joint swelling or muscle pain Neurological ROS: negative for dizziness, headaches, numbness/tingling or weakness Dermatological ROS: negative for rash or skin lesion changes Past Medical History:  
Diagnosis Date  Aneurysm ([facility] [place] [address] AAA repair 2006 & 2012  Aorto-iliac duplex 02/13/2015 Tech difficult. Patent aorta bi-iliac endograft w/o leak or limb dysfunction.  Arrhythmia   
 a fib  Cardiac cath 11/01/2012 RCA (sm, nondom) patent. LM patent. LAD 25%. CX (dom) 45% mid. LVEDP 12 mmHg. No WMA. PA 27/12. W 10-12. CO/CI 5.2/2.6 (TD).  Cardiac echocardiogram, abnormal 02/20/2016 EF 55%. No WMA. Indeterminate diastolic fx. RVSP 60 mmHg. Severe LAE. Mild MR. Mod TR.  IVCE. Similar to study of 10/26/12.  Cardiovascular aorto-iliac duplex 02/13/2015 Patent aorta bi-iliac endovascular graft w/o leak or limb dysfunction. Sac measures 4.21 x 4.47 cm (4.4 x 4.6 cm on 1/31/14).  Cardiovascular LE peripheral arterial testing 07/29/2013 No significant LE arterial disease bilaterally. R GHADA 1. 12.  L GHADA 1. 12.  R DBI 0.83. L DBI 0.71. Exercise deferred.  Cardiovascular renal duplex 10/31/2012 No RA stenosis. Intrinsic/med disease in left kidney. Patent aortic endograft. Patent, thrombus-free renal veins bilaterally.  Carotid duplex 07/29/2013 Mild <50% bilateral ICA plaquing.  Chronic kidney disease  Chronic lung disease  Cigarette smoker  Congestive heart failure (CHF) ([facility] [place] [address]  COPD (chronic obstructive pulmonary disease) (HCC)   
 and emphysema; likely secondary to tobacco abuse  Difficult intubation  Dyslipidemia   
 low HDL  Emphysema   
 HTN (hypertension)  Hypercholesterolemia  Ill-defined condition   
 hernia  Increased prostate specific antigen (PSA) velocity  Long term (current) use of anticoagulants   
 coumadin  Osteoarthritis  Osteomyelitis ([facility] [place] [address]  Paroxysmal atrial fibrillation (HCC)  Peripheral vascular disease ([facility] [address] AAA repair 12/2007  Persistent atrial Fibrillation  Persistent atrial fibrillation  Unspecified adverse effect of anesthesia   
 difficulty breathing placed in ICU Oct 2012 (AAA repair) Past Surgical History:  
Procedure Laterality Date  BRONCHOSCOPY DIAGNOSTIC  11/2/2012  CARDIAC CATHETERIZATION  11/1/2012  COLONOSCOPY N/A 7/26/2016 COLONOSCOPY with Polypectomies performed by Deandre Denny MD at [facility] ENDOSCOPY  COLONOSCOPY N/A 5/28/2019 COLONOSCOPY with polypectomy performed [facility] Cosmo [facility] HX AAA REPAIR    
 2006 & 2012  HX HEART CATHETERIZATION  3/4/2009 1. RCA small, nondominant; patent. 2. LMCA patent. 3. LAD is long, wrapped around apical vessel. Diffuse, 20-30% stenosis noted. 4. CCA is large, dominant vessel. Diffuse 20-30% stenosis. 5. LVEDP is 16 mmHg. 6. Overall left ventricular systolic function mildly diminshed with est. EF 45%. Mild, global hypokinesis noted. 7. No significant mitral regurgitation or aortic stenosis noted. (see report)  HX HERNIA REPAIR  2/2014  
 rt inguinal   
 HX HERNIA REPAIR  01/2016 LEFT INGUINAL HERNIA REPAIR DR. Paul Desouza  REPAIR ING HERNIA,5+Y/O,JESSIE Left 1-20-16 Dr. Shaylee LONG  11/1/2012 Family History Problem Relation Age of Onset  Heart Surgery Father BYPASS  Stroke Father  Heart Disease Father  Hypertension Father  Heart Attack Father  Heart Surgery Mother BYPASS  Coronary Artery Disease Mother  Stroke Mother  Heart Disease Mother  Hypertension Mother  Diabetes Sister  Cancer Brother Social History Socioeconomic History  Marital status:  Spouse name: Not on file  Number of children: Not on file  Years of education: Not on file  Highest education level: Not on file Tobacco Use  Smoking status: Former Smoker Packs/day: 1.50 Years: 54.00 Pack years: 81.00 Types: Cigarettes Last attempt to quit: 10/23/2012 Years since quittin.2  Smokeless tobacco: Never Used Substance and Sexual Activity  Alcohol use: No  
  Alcohol/week: 0.0 standard drinks Comment: quit drinking alcohol 26 years ago, patient states stopped in \"9954\"  Drug use: No  
 Sexual activity: Not Currently Allergies Allergen Reactions  Codeine Swelling  Morphine Itching  Sulfa (Sulfonamide Antibiotics) Other (comments) Kidney problems. Prior to Admission Medications Prescriptions Last Dose Informant Patient Reported? Taking? HYDROcodone-acetaminophen (NORCO) 5-325 mg per tablet Unknown at Unknown time  Yes No  
Sig: Take 0.5 Tabs by mouth two (2) times daily as needed for Pain. OXYGEN-AIR DELIVERY SYSTEMS 2020 at Unknown time  Yes Yes Si L/min by Nasal route daily. Continuous. Company is [employer] STIOLTO RESPIMAT 2.5-2.5 mcg/actuation inhaler 2020 at Unknown time  No Yes Sig: inhale 2 inhalations by mouth once daily  
albuterol (PROVENTIL VENTOLIN) 2.5 mg /3 mL (0.083 %) nebulizer solution 2020 at Unknown time  No Yes Sig: 3 mL by Nebulization route every four (4) hours as needed for Wheezing. amLODIPine (NORVASC) 10 mg tablet 2020 at Unknown time  Yes Yes Sig: Take 10 mg by mouth daily. ascorbic acid, vitamin C, (VITAMIN C) 250 mg tablet Unknown at Unknown time  No No  
Sig: Take 1 Tab by mouth two (2) times a day. digoxin (DIGITEK) 0.125 mg tablet 2020 at Unknown time  No Yes Sig: take 1 tablet by mouth once daily  
docusate sodium 100 mg tab Unknown at Unknown time  Yes No  
Sig: Take 100 mg by mouth as needed for Other (constipation). ferrous sulfate 325 mg (65 mg iron) tablet Not Taking at Unknown time  Yes No  
Sig: Take 325 mg by mouth every other day. Indications: anemia from inadequate iron lisinopril (PRINIVIL, ZESTRIL) 20 mg tablet 1/25/2020 at Unknown time  No Yes Sig: Take 1 Tab by mouth daily. pantoprazole (PROTONIX) 40 mg tablet 1/25/2020 at Unknown time  No Yes Sig: Take 1 Tab by mouth Daily (before breakfast). polyethylene glycol (MIRALAX) 17 gram packet Unknown at Unknown time  Yes No  
Sig: Take 17 g by mouth daily as needed (constipation). pravastatin (PRAVACHOL) 40 mg tablet 1/25/2020 at Unknown time  Yes Yes Sig: Take 40 mg by mouth nightly. traZODone (DESYREL) 50 mg tablet Unknown at Unknown time  Yes No  
Sig: Take 50 mg by mouth nightly. Facility-Administered Medications: None Physical Exam:  
 
Patient Vitals for the past 24 hrs: 
 Temp Pulse Resp BP SpO2  
01/26/20 1515  75 18 132/62 98 % 01/26/20 1245  80 21 125/65 94 % 01/26/20 1230  80 22 114/78 97 % 01/26/20 0939 96.9 °F (36.1 °C) 100 12 124/75 91 % Physical Exam:  
General:  Alert and Responsive and in No acute distress. HEENT: Conjunctiva pink, sclera anicteric. PERRLA, EOMI, Dry mucous membranes. CV:  RRR, no murmurs. No visible pulsations or thrills. RESP:  Unlabored breathing. Dec aeration RL base, + exp wheeze ABD:  Soft, nontender, nondistended. No hepatosplenomegaly. No suprapubic tenderness. MS:  No joint deformity or instability. No atrophy. Neuro:  5/5 strength bilateral upper extremities and lower extremities. A+Ox3. Ext:  No edema. 2+ radial and dp pulses bilaterally. Skin:  No rashes, lesions, or ulcers. Poor turgor. IMAGING:  
Xr Chest [facility] Result Date: 1/26/2020 IMPRESSION: Right lower lobe infiltrate. Finding suggestive of pneumonia. Probable small effusion. Follow-up chest radiograph after treatment recommended to assess for resolution. Recent Results (from the past 12 hour(s)) EKG, 12 LEAD, INITIAL Collection Time: 01/26/20  9:46 AM  
Result Value Ref Range Ventricular Rate 98 BPM  
 Atrial Rate 105 BPM  
 QRS Duration 84 ms Q-T Interval 334 ms QTC Calculation (Bezet) 426 ms Calculated R Axis -62 degrees Calculated T Axis 70 degrees Diagnosis Atrial fibrillation with premature ventricular or aberrantly conducted  
complexes Left axis deviation Anteroseptal infarct (cited on or before 25-AUG-2017) Abnormal ECG When compared with ECG of 02-JUN-2019 16:08, No significant change was found Confirmed by Raquel Call ([id number]) on 1/26/2020 2:24:56 PM 
  
CBC WITH AUTOMATED DIFF Collection Time: 01/26/20 10:03 AM  
Result Value Ref Range WBC 7.3 4.6 - 13.2 K/uL  
 RBC 3.16 (L) 4.70 - 5.50 M/uL HGB 8.8 (L) 13.0 - 16.0 g/dL HCT 27.8 (L) 36.0 - 48.0 % MCV 88.0 74.0 - 97.0 FL  
 MCH 27.8 24.0 - 34.0 PG  
 MCHC 31.7 31.0 - 37.0 g/dL  
 RDW 15.4 (H) 11.6 - 14.5 % PLATELET 302 870 - 367 K/uL MPV 9.0 (L) 9.2 - 11.8 FL  
 NEUTROPHILS 86 (H) 40 - 73 % LYMPHOCYTES 9 (L) 21 - 52 % MONOCYTES 5 3 - 10 % EOSINOPHILS 0 0 - 5 % BASOPHILS 0 0 - 2 %  
 ABS. NEUTROPHILS 6.3 1.8 - 8.0 K/UL  
 ABS. LYMPHOCYTES 0.7 (L) 0.9 - 3.6 K/UL  
 ABS. MONOCYTES 0.4 0.05 - 1.2 K/UL  
 ABS. EOSINOPHILS 0.0 0.0 - 0.4 K/UL  
 ABS. BASOPHILS 0.0 0.0 - 0.1 K/UL  
 DF AUTOMATED METABOLIC PANEL, BASIC Collection Time: 01/26/20 10:03 AM  
Result Value Ref Range Sodium 134 (L) 136 - 145 mmol/L Potassium 4.0 3.5 - 5.5 mmol/L Chloride 100 100 - 111 mmol/L  
 CO2 27 21 - 32 mmol/L Anion gap 7 3.0 - 18 mmol/L Glucose 129 (H) 74 - 99 mg/dL BUN 19 (H) 7.0 - 18 MG/DL Creatinine 1.53 (H) 0.6 - 1.3 MG/DL  
 BUN/Creatinine ratio 12 12 - 20 GFR est AA 53 (L) >60 ml/min/1.73m2 GFR est non-AA 44 (L) >60 ml/min/1.73m2 Calcium 8.5 8.5 - 10.1 MG/DL  
CARDIAC PANEL,(CK, CKMB & TROPONIN) Collection Time: 01/26/20 10:03 AM  
Result Value Ref Range CK 39 39 - 308 U/L  
 CK - MB 1.1 <3.6 ng/ml CK-MB Index 2.8 0.0 - 4.0 %  Troponin-I, QT <0.02 0.0 - 0.045 NG/ML  
NT-PRO BNP  
 Collection Time: 01/26/20 10:03 AM  
Result Value Ref Range NT pro-BNP 7,771 (H) 0 - 1,800 PG/ML  
PROTHROMBIN TIME + INR Collection Time: 01/26/20 10:03 AM  
Result Value Ref Range Prothrombin time 13.9 11.5 - 15.2 sec INR 1.1 0.8 - 1.2 POC LACTIC ACID Collection Time: 01/26/20 11:39 AM  
Result Value Ref Range Lactic Acid (POC) 1.35 0.40 - 2.00 mmol/L Assessment/Plan:  
 
[de-identified] man PMH of AAA s/p repair, HF, COPD, HTN/HLD, afib on no AC due to gastric ulcer presenting with cough and DUMONT 
  
Hypoxia and DUMONT 2/2 COPD exast vs PNA vs acute on chronic HF Hypoxia to mid 80s reported by EMS. Sating 91% on 2L in ED. Pt hs cough, and DUMONT. Mild productive cough and diffuse exp wheeze through. No CP, trops neg. Pt has a history of COPD on 2L 02 at baseline. CXR shows RLL infiltrate, suggesting CAP vs aspiration. Pt breathing comfortably on 3L NC. 
  
Plan: - IV zosyn and azithro (CAP + pseudomonas + anaerobe coverage) 
- duo-neb q4 hours - IV methylpred 60mg qD 
- NPO till bedside swallow than cardiac diet 
 -suppl O2 titrate to >90% 
-IP consult to PT/OT/CM 
-500ml NS bolus COPD Pt followed by Pulmonology (Dr. Maryan Abreu). Last FEV1 48%,On 2L home 02 at baseline. On albuterol neb and ipratropium inhaler. 
-Hold home inhalers.  
-For tx as above section 
  
Diastolic HF/CAD/HTN/HLD - Followed by cardiology (Dr. Titi Stein) . No CP. Trops neg. BNP 7000. ECHO (6/3):  EF 56 - 60%. Abnormal left ventricular septal motion. Interventricular septal \"bounce\". On amlodipine 10mg qD, lisinopril 10qD, digoxin 0.125mg. BP in ED SBP 120s. -hold home HTN meds for now  
-stat digoxin level 
-monitor digoxin on azirtho, dec dose as needed. Afib not on [facility] Chronic afib was on warfain until recently. Had bleeding gastric ulcer and warfarin held. 
-hold warfarin 
-SCDs 
-continue home protonix  
 
AAA s/p EVAR - now w/ interval increase in size on cT.  CT abdo (6/3/2019): Developing saccular aneurysmal dilation along the right anterior to posterolateral aspect of the mid aorta, centered at the superior portion of the stent graft and beginning just below the level of the SMA. Based on relatively rapid development of this finding since the 03/26/18 CTA. Followed by Dr. Amy Hicks office (vascular). Per PT not planning any surgery for thus. Iron Def Anemia - H/o bleeding ulcer May/2019. Ferritin 357 (1/20). Gets IV Iron infusions, last 1/21. Followed by Hematology as OP. Hb 8.8 on admission.  
-Monitor H/H w/ daily BMP 
  
Insomnia On trazodone 50mg at home 
-continue home meds CKD3 - Baseline Cr 1.32-1. 50. Cr 1.53 on admission. -monitor daily BMP 
 
UTI - recent UTI diagnosis by PCP. On Cipro. 
-coverage w/ zosyn Daughter had mPOA. No document not in system.  
  
Diet: NPO till bedside swallow, then cardiac diet DVT Prophylaxis: SCDs Code Status: Full Point of Contact: Dollie Aschoff (Relationship: Daughter) [phone] 
  
Disposition: Fatoumata Valdivia MD  
[address] [facility] 01/26/20 2:48 PM 
   
  
  
  
 
 
 
 
Kalyan Lomeli MD, PGY-1 [employer] [facility] 1/26/2020

## 2020-01-27 NOTE — CDMP QUERY
Patient admitted with hypoxia Pt , noted to have   decreasing  WBC  levels 7.3  to   1.4   to   today   0.9 If known , please clarify   cause in progress notes. .  
The medical record reflects the following: 
 
  Risk Factors:    COPD;   pneumonia ; multifactorial anemia Clinical Indicators:   WBC  levels 7.3  to   1.4   to   today   0.9 Treatment: CBC drawn daily Thank you,     Jason Mckinley RN   CCDS

## 2020-01-27 NOTE — PROGRESS NOTES
MBS completed with recs of reg solid diet and thin liquids, meds as tolerated. Full report to follow. Thank you for this referral. 
 
Isabel Alvarez M.S. CCC-SLP/L Speech-Language Pathologist

## 2020-01-27 NOTE — PROGRESS NOTES
Speech Pathology:  
 
Bedside swallow eval completed with recs of reg solid diet and thin liquids, meds as tolerated. Rec MBS to further assess oropharyngeal swallow fxn and r/o aspiration. Full report to follow. Thank you for this referral. 
 
Duncan Hatchet, M.S. CCC-SLP/L Speech-Language Pathologist

## 2020-01-27 NOTE — CDMP QUERY
Pt admitted with  hypoxia. . Pt noted to have Home 02 at 2L    NC. Maryan John If possible, please document in the progress notes and d/c summary if you are evaluating and / or treating any of the following:  Chronic respiratory failure 
 o With hypoxia 
 o With hypercapnia  Other, please specify  Clinically unable to determine The medical record reflects the following: 
 
 
   Risk Factors: COPD Clinical Indicators    :H&P  indicates \" Pt has h/o of COPD on 2L home 02 \" Treatment:  02 continues  here in hospital. 
 
Thank you,    Nyla Lee RN   CCDS  x [phone]

## 2020-01-27 NOTE — PROGRESS NOTES
*ATTENTION:  This note has been created by a medical student for educational purposes only. Please do not refer to the content of this note for clinical decision-making, billing, or other purposes. Please see attending physicians note to obtain clinical information on this patient. * Intern Progress Note [address] Patient: Yulia Petersen MRN: [id number]  CSN: [id number] YOB: 1940  Age: [de-identified] y.o. Sex: male DOA: 1/26/2020 LOS:  LOS: 1 day Subjective:  
 
Acute events: YULIET overnight. Swallow study passed per Speech note Pt was resting comfortably in bed about to eat breakfast. States he does not feel SOB and no current complaints. Asked about when he would be moved upstairs to a room. Review of Systems Constitutional: Negative for chills, diaphoresis and malaise/fatigue. HENT: Positive for hearing loss. Respiratory: Positive for cough. Negative for shortness of breath. Cardiovascular: Negative for chest pain, palpitations and leg swelling. Gastrointestinal: Negative for abdominal pain, nausea and vomiting. Genitourinary: Negative for dysuria. Musculoskeletal: Negative for joint pain and myalgias. Neurological: Negative for headaches. Objective:  
  
Patient Vitals for the past 24 hrs: 
 Temp Pulse Resp BP SpO2  
01/27/20 0800  82 18 114/61 96 % 01/27/20 0700  86 18 115/59 98 % 01/27/20 0600  90 21 119/56 98 % 01/27/20 0400  89 18 118/55 96 % 01/27/20 0300  86 25 116/52 97 % 01/27/20 0200  85 16 114/55 97 % 01/27/20 0100  96 17 111/55 96 % 01/26/20 2300  85 23 118/60 98 % 01/26/20 2200  76 21 124/58 94 % 01/26/20 2100  79 15 127/55 97 % 01/26/20 2030  (!) 101 21 108/59 96 % 01/26/20 2000  78 21 120/64 97 % 01/26/20 1930  (!) 108 23 135/72 97 % 01/26/20 1700  74 26 125/56 96 % 01/26/20 1515  75 18 132/62 98 % 01/26/20 1245  80 21 125/65 94 % 01/26/20 1230  80 22 114/78 97 % 01/26/20 0939 96.9 °F (36.1 °C) 100 12 124/75 91 % Intake/Output Summary (Last 24 hours) at 1/27/2020 1614 Last data filed at 1/27/2020 9108 Gross per 24 hour Intake  Output 800 ml Net -800 ml Physical Exam: 
General:  Alert and Responsive and in No acute distress. HEENT: Conjunctiva pink, EOMI. Moist mucous membranes. No lymphadenopathy. No other gross abnormalities present. CV:  RRR, no murmurs. No visible pulsations or thrills. RESP:  Unlabored breathing when not talking. Appeared SOB by the end of conversation. Significant expiratory wheezes throughout with diminished breath sounds R lower lobe. ABD:  Soft, nontender, nondistended. MS:  No joint deformity or instability. No atrophy. Neuro: A+Ox3. Ext:  No edema. Skin:  No rashes, lesions, or ulcers. Good turgor. Lab/Data Reviewed: CBC WITH AUTOMATED DIFF Collection Time: 01/27/20  5:08 AM  
Result Value Ref Range WBC 1.4 (L) 4.6 - 13.2 K/uL  
 RBC 2.91 (L) 4.70 - 5.50 M/uL HGB 8.0 (L) 13.0 - 16.0 g/dL HCT 25.0 (L) 36.0 - 48.0 % MCV 85.9 74.0 - 97.0 FL  
 MCH 27.5 24.0 - 34.0 PG  
 MCHC 32.0 31.0 - 37.0 g/dL  
 RDW 15.3 (H) 11.6 - 14.5 % PLATELET 394 632 - 316 K/uL MPV 8.8 (L) 9.2 - 11.8 FL  
 NEUTROPHILS 75 42 - 75 % LYMPHOCYTES 24 20 - 51 % MONOCYTES 1 (L) 2 - 9 % EOSINOPHILS 0 0 - 5 % BASOPHILS 0 0 - 3 %  
 ABS. NEUTROPHILS 1.1 (L) 1.8 - 8.0 K/UL  
 ABS. LYMPHOCYTES 0.3 (L) 0.8 - 3.5 K/UL  
 ABS. MONOCYTES 0.0 0 - 1.0 K/UL  
 ABS. EOSINOPHILS 0.0 0.0 - 0.4 K/UL  
 ABS. BASOPHILS 0.0 0.0 - 0.06 K/UL  
 DF MANUAL PLATELET COMMENTS ADEQUATE PLATELETS    
 RBC COMMENTS ANISOCYTOSIS 1+ Scheduled Medications Reviewed: 
Current Facility-Administered Medications Medication Dose Route Frequency  [Held by provider] amLODIPine (NORVASC) tablet 10 mg  10 mg Oral DAILY  pantoprazole (PROTONIX) tablet 40 mg  40 mg Oral ACB  pravastatin (PRAVACHOL) tablet 40 mg  40 mg Oral QHS  azithromycin (ZITHROMAX) 500 mg in  mL  500 mg IntraVENous Q24H  piperacillin-tazobactam (ZOSYN) 3.375 g in 0.9% sodium chloride (MBP/ADV) 100 mL MBP  3.375 g IntraVENous Q6H  
 albuterol-ipratropium (DUO-NEB) 2.5 MG-0.5 MG/3 ML  3 mL Nebulization Q4H RT  
 methylPREDNISolone (PF) (Solu-MEDROL) injection 60 mg  60 mg IntraVENous DAILY Imaging, microbiology, and EKG/Telemetry: CXR Results  (Last 48 hours) 01/26/20 1018  XR CHEST PORT Final result Impression:  IMPRESSION:  
   
Right lower lobe infiltrate. Finding suggestive of pneumonia. Probable small  
effusion. Follow-up chest radiograph after treatment recommended to assess for  
resolution. Narrative:  Portable Chest  
   
CPT CODE: 84130 HISTORY: Dyspnea. FINDINGS:   
   
Compared with 6/3/2019 Heart size and mediastinal contour stable. Increased airspace disease at the  
right base. Right costophrenic angle blunting. Remainder the lungs are without  
acute consolidation. No pneumothorax. Assessment/Plan  
 
[de-identified] man PMH of AAA s/p repair, HF, COPD, HTN/HLD, afib on no AC due to gastric ulcer presenting with cough and DUMONT. 
  
Hypoxia and DUMONT 2/2 COPD exast +/- RLL PNA Hypoxia to mid 80s reported by EMS.  Sating 91% on 2L in ED. Pt hs cough, and DUMONT. Mild productive cough and diffuse exp wheeze through. No CP, trops neg. Pt has a history of COPD on 2L 02 at baseline. CXR shows RLL infiltrate, suggesting CAP vs aspiration. Pt breathing continues to improve, breathing comfortably on 3L NC. Still w/ exp wheeze. Passed bedside swallow and placed on cardiac diet. 
  
Plan: - IV zosyn and azithro (CAP + pseudomonas + anaerobe coverage) - FU Bcx (negative thus far) 
- duo-neb q4 hours - IV methylpred 60mg qD 
 -suppl O2 titrate to >90% 
-IP consult to PT/OT/CM  
  
COPD 
 Pt followed by Pulmonology (Dr. Lisbeth Ceron 48%,On 2L home 02 at baseline. On albuterol neb and ipratropium inhaler. 
-Hold home inhalers.  
-For tx as above section 
  
Diastolic HF/CAD/HTN/HLD - Followed by cardiology (Dr. Arvin Tan) . No CP. Trops neg. BNP 7000. ECHO (6/3):  EF 56 - 60%. Abnormal left ventricular septal motion. Interventricular septal \"bounce\". 
 On amlodipine 10mg qD, lisinopril 10qD, digoxin 0.125mg.  BP in ED SBP 120s. Digoxin (0.8 on admission).    
-hold home HTN meds for now  
-monitor digoxin on azirtho, dec dose as needed.  
  
Afib not on [facility] Chronic afib was on warfain until recently. Had bleeding gastric ulcer and warfarin held. 
-hold warfarin 
-SCDs 
-continue home protonix  
  
AAA s/p EVAR - now w/ interval increase in size on cT. CT abdo (6/3/2019): Developing saccular aneurysmal dilation along the right anterior to posterolateral aspect of the mid aorta, centered at the superior portion of the stent graft and beginning just below the level of the SMA.  Based on relatively rapid development of this finding since the 03/26/18 CTA. Followed by Dr. Lui Saunders office (vascular). Per PT not planning any surgery for thus. 
  
Anemia 2/2 Multifactorial Iron Def and Anemia of Chronic Kidney Disease- H/o bleeding ulcer May/2019. Ferritin 357 (1/20). Gets IV Iron infusions, last 1/21. Followed by Hematology  as OP (Dr. Holly Ham). Hb 8.8 on admission.  
-Monitor H/H w/ daily BMP 
  
Insomnia On trazodone 50mg at home 
-continue home meds 
  
CKD3 - Baseline Cr 1.32-1. 50. Cr 1.53 on admission. -monitor daily BMP 
  
UTI - recent UTI diagnosis by PCP. On Cipro.  
-coverage w/ zosyn 
  
Daughter had mPOA. No document not in system.  
  
Diet: NPO till bedside swallow, then cardiac diet DVT Prophylaxis: SCDs Code Status: Full Point of Contact: Mi Cardona (Relationship: ROCIO) [phone] 
  
Disposition: 2 midnights 
  
Kailyn Hartman [address] 01/27/20 9:27 AM

## 2020-01-27 NOTE — PROGRESS NOTES
Problem: Dysphagia (Adult) Goal: *Acute Goals and Plan of Care (Insert Text) Description Patient will: 1. Tolerate PO trials with 0 s/s overt distress in 4/5 trials 2. Utilize compensatory swallow strategies/maneuvers (decrease bite/sip, size/rate, alt. liq/sol) with min cues in 4/5 trials 3. Complete an objective swallow study (i.e., MBSS) to assess swallow integrity, r/o aspiration, and determine of safest LRD, min A - met 1/27/2020 Rec:    
Reg solid with thin liquids Aspiration precautions HOB >45 during po intake, remain >30 for 30-45 minutes after po Small bites/sips; alternate liquid/solid with slow feeding rate Oral care TID Meds per pt preference 1/27/2020 1009 by Meli Castro Outcome: Progressing Towards Goal 
 
SPEECH PATHOLOGY MODIFIED BARIUM SWALLOW STUDY & TREATMENT Patient: Juana Luther ([de-identified] y.o. male) Date: 1/27/2020 Primary Diagnosis: Community acquired pneumonia [J18.9] Community acquired bacterial pneumonia [J15.9] Precautions: aspiration ASSESSMENT : 
Based on the objective data described below, the patient presents with oropharyngeal swallow fxn essentially [place]. Pt tolerated reg solid, puree, thin liquids +/- straw consecutive swallows, and 13 mm Ba pill with thin liquid wash without aspiration/penetration events. Bolus manipulation, tongue base retraction, laryngeal elevation/excursion, and overall pharyngeal motility/sensation were functional/timely with positive oropharyngeal clearance. Pt did require frequent breaks with PO secondary to shortness of breath. Rec reg solid diet with thin liquids, aspiration precautions, oral care TID, and meds as tolerated. TREATMENT : 
Treatment provided post diagnostic testing including oropharyngeal anatomy/physiology, MBS results, diet recommendations and compensatory strategies/positioning (frequent breaks with PO as needed).   Pt able to verbalize understanding. Will continue to follow x 1-2 more visits to ensure safety of PO. Patient will benefit from skilled intervention to address the above impairments. Patient's rehabilitation potential is considered to be Good Factors which may influence rehabilitation potential include:  
[x]              Medical condition PLAN : 
Recommendations and Planned Interventions: see above Frequency/Duration: Patient will be followed by speech-language pathology x 1-2 more visits to address goals. Discharge Recommendations: None SUBJECTIVE:  
Patient stated I'm glad we did this and it turned out alright. OBJECTIVE:  
 
Past Medical History:  
Diagnosis Date Aneurysm ([facility] [address] AAA repair 2006 & 2012 Aorto-iliac duplex 02/13/2015 Tech difficult. Patent aorta bi-iliac endograft w/o leak or limb dysfunction. Arrhythmia   
 a fib Cardiac cath 11/01/2012 RCA (sm, nondom) patent. LM patent. LAD 25%. CX (dom) 45% mid. LVEDP 12 mmHg. No WMA. PA 27/12. W 10-12. CO/CI 5.2/2.6 (TD). Cardiac echocardiogram, abnormal 02/20/2016 EF 55%. No WMA. Indeterminate diastolic fx. RVSP 60 mmHg. Severe LAE. Mild MR. Mod TR.  IVCE. Similar to study of 10/26/12. Cardiovascular aorto-iliac duplex 02/13/2015 Patent aorta bi-iliac endovascular graft w/o leak or limb dysfunction. Sac measures 4.21 x 4.47 cm (4.4 x 4.6 cm on 1/31/14). Cardiovascular LE peripheral arterial testing 07/29/2013 No significant LE arterial disease bilaterally. R GHADA 1. 12.  L GHADA 1. 12.  R DBI 0.83. L DBI 0.71. Exercise deferred. Cardiovascular renal duplex 10/31/2012 No RA stenosis. Intrinsic/med disease in left kidney. Patent aortic endograft. Patent, thrombus-free renal veins bilaterally. Carotid duplex 07/29/2013 Mild <50% bilateral ICA plaquing. Chronic kidney disease Chronic lung disease Cigarette smoker Congestive heart failure (CHF) ([facility] [address] COPD (chronic obstructive pulmonary disease) (HCC)   
 and emphysema; likely secondary to tobacco abuse Difficult intubation Dyslipidemia   
 low HDL Emphysema HTN (hypertension) Hypercholesterolemia Ill-defined condition   
 hernia Increased prostate specific antigen (PSA) velocity Long term (current) use of anticoagulants   
 coumadin Osteoarthritis Osteomyelitis ([facility] [address] Paroxysmal atrial fibrillation (HCC) Peripheral vascular disease ([facility] [address] AAA repair 12/2007 Persistent atrial Fibrillation Persistent atrial fibrillation Unspecified adverse effect of anesthesia   
 difficulty breathing placed in ICU Oct 2012 (AAA repair) Past Surgical History:  
Procedure Laterality Date BRONCHOSCOPY DIAGNOSTIC  11/2/2012 CARDIAC CATHETERIZATION  11/1/2012 COLONOSCOPY N/A 7/26/2016 COLONOSCOPY with Polypectomies performed by Anjel Fuchs MD at [address] N/A 5/28/2019 COLONOSCOPY with polypectomy performed by Herberth Cheek MD at [address] AAA REPAIR    
 2006 & 2012 HX HEART CATHETERIZATION  3/4/2009 1. RCA small, nondominant; patent. 2. LMCA patent. 3. LAD is long, wrapped around apical vessel. Diffuse, 20-30% stenosis noted. 4. CCA is large, dominant vessel. Diffuse 20-30% stenosis. 5. LVEDP is 16 mmHg. 6. Overall left ventricular systolic function mildly diminshed with est. EF 45%. Mild, global hypokinesis noted. 7. No significant mitral regurgitation or aortic stenosis noted. (see report) HX HERNIA REPAIR  2/2014  
 rt inguinal   
 HX HERNIA REPAIR  01/2016 LEFT INGUINAL HERNIA REPAIR DR. Devorah Pat ING HERNIA,5+Y/O,JESSIE Left 1-20-16 Dr. Mcfarland  YURIY LONG  11/1/2012 Diet prior to admission: reg solid with thin Current Diet:  reg solid with thin Radiologist:   
Film Views: Lateral;Fluoro Patient Position: 90 in fluoro chair Trial 1:  
 Consistency Presented: Thin liquid; Solid;Puree;Pill/Tablet How Presented: Self-fed/presented;Cup/sip;Spoon;Straw;Successive swallows Bolus Acceptance: No impairment Bolus Formation/Control: No impairment:    
Propulsion: No impairment Oral Residue: None Initiation of Swallow: No impairment Timing: No impairment Penetration: None Aspiration/Timing: No evidence of aspiration Pharyngeal Clearance: No residue Attempted Modifications: Small sips and bites(slow rate of intake when short of breath) Effective Modifications: Small sips and bites(slow rate of intake when short of breath) Cues for Modifications: Minimal  
   
Decreased Tongue Base Retraction?: No 
Laryngeal Elevation: WFL (within functional limits) Aspiration/Penetration Score: 1 (No penetration or aspiration-Contrast does not enter the airway) Pharyngeal Symmetry: Not assessed Pharyngeal-Esophageal Segment: No impairment Pharyngeal Dysfunction: None Oral Phase Severity: No impairment Pharyngeal Phase Severity: N/A 
 
8-point Penetration-Aspiration Scale: Score 1 PAIN: 
Pt reports 0/10 pain or discomfort prior to MBS. Pt reports 0/10 pain or discomfort post MBS. COMMUNICATION/EDUCATION:  
[x]  Patient educated regarding MBS results and diet recommendations. [x]  Patient/family have participated as able in goal setting and plan of care. Thank you for this referral. 
 
Enedina Benjamin M.S. CCC-SLP/L Speech-Language Pathologist

## 2020-01-27 NOTE — PROGRESS NOTES
Patient was sleeping at the time of visit. Attempt was made but patient was not arousable. Patient is not available to be assessed at this time. [facility] [address], [address] [facility]  
[phone]

## 2020-01-27 NOTE — CDMP QUERY
Pt admitted with hypoxia     and has    Diastolic  HF -  EF 56 - 19% documented. Please further specify type and acuity of CHF in the medical record. Acute on Chronic Diastolic CHF Acute Diastolic CHF Chronic Diastolic CHF Other, please specify Clinically unable to determine The medical record reflects the following: 
 
  Risk Factors: Diastolic HF/CAD/HTN/HLD - Clinical Indicators: (6/3):  EF 56 - 60%.;   BNP 7771;   
pulm consult says- HFpEF - doesn't seem to be volume up although he did have leg swelling and orthopnea. Treatment: On amlodipine 10mg qD, lisinopril 10qD, digoxin 0.125mg Thank you,    Gregory Norris RN   CCDS  x [phone]

## 2020-01-27 NOTE — PROGRESS NOTES
Problem: Dysphagia (Adult) Goal: *Acute Goals and Plan of Care (Insert Text) Description Patient will: 1. Tolerate PO trials with 0 s/s overt distress in 4/5 trials 2. Utilize compensatory swallow strategies/maneuvers (decrease bite/sip, size/rate, alt. liq/sol) with min cues in 4/5 trials 3. Perform oral-motor/laryngeal exercises to increase oropharyngeal swallow function with min cues 4. Complete an objective swallow study (i.e., MBSS) to assess swallow integrity, r/o aspiration, and determine of safest LRD, min A Rec:    
Reg solid with thin liquids Aspiration precautions HOB >45 during po intake, remain >30 for 30-45 minutes after po Small bites/sips; alternate liquid/solid with slow feeding rate Oral care TID Meds per pt preference MBS to further assess oropharyngeal swallow fxn Outcome: Progressing Towards Goal 
 
SPEECH LANGUAGE PATHOLOGY BEDSIDE SWALLOW EVALUATION Patient: Shawnee Padilla ([de-identified] y.o. male) Date: 1/27/2020 Primary Diagnosis: Community acquired pneumonia [J18.9] Community acquired bacterial pneumonia [J15.9] Precautions: aspiration PLOF: As per H&P 
 
ASSESSMENT : 
Based on the objective data described below, the patient presents with mild pharyngeal dysphagia. Pt AAOx4 morgan. He reported that he completed a MBS in 2012 which, per chart review, was essentially normal. Pt tolerated reg solids and thin liquids via straw consecutive swallows with strong cough x 1/8 trials. Laryngeal elevation appeared functional/timely to palpation. Given current medical diagnosis and bedside presentation: rec MBS to further assess oropharyngeal swallow fxn and r/o aspiration. Also rec reg solid diet with thin liquids, aspiration precautions, oral care TID, and meds as tolerated. ST will continue to follow. Patient will benefit from skilled intervention to address the above impairments. Patient's rehabilitation potential is considered to be Good Factors which may influence rehabilitation potential include:  
[x]            None noted PLAN : 
Recommendations and Planned Interventions: See above Frequency/Duration: Patient will be followed by speech-language pathology 1-2 times per day/3-5 days per week to address goals. Discharge Recommendations: To Be Determined SUBJECTIVE:  
Patient stated Jhoanada Purchase had one of those tests done in 2012. OBJECTIVE:  
 
Past Medical History:  
Diagnosis Date Aneurysm ([facility] [address] AAA repair 2006 & 2012 Aorto-iliac duplex 02/13/2015 Tech difficult. Patent aorta bi-iliac endograft w/o leak or limb dysfunction. Arrhythmia   
 a fib Cardiac cath 11/01/2012 RCA (sm, nondom) patent. LM patent. LAD 25%. CX (dom) 45% mid. LVEDP 12 mmHg. No WMA. PA 27/12. W 10-12. CO/CI 5.2/2.6 (TD). Cardiac echocardiogram, abnormal 02/20/2016 EF 55%. No WMA. Indeterminate diastolic fx. RVSP 60 mmHg. Severe LAE. Mild MR. Mod TR.  IVCE. Similar to study of 10/26/12. Cardiovascular aorto-iliac duplex 02/13/2015 Patent aorta bi-iliac endovascular graft w/o leak or limb dysfunction. Sac measures 4.21 x 4.47 cm (4.4 x 4.6 cm on 1/31/14). Cardiovascular LE peripheral arterial testing 07/29/2013 No significant LE arterial disease bilaterally. R GHADA 1. 12.  L GHADA 1. 12.  R DBI 0.83. L DBI 0.71. Exercise deferred. Cardiovascular renal duplex 10/31/2012 No RA stenosis. Intrinsic/med disease in left kidney. Patent aortic endograft. Patent, thrombus-free renal veins bilaterally. Carotid duplex 07/29/2013 Mild <50% bilateral ICA plaquing. Chronic kidney disease Chronic lung disease Cigarette smoker Congestive heart failure (CHF) ([facility] [address] COPD (chronic obstructive pulmonary disease) (HCC)   
 and emphysema; likely secondary to tobacco abuse Difficult intubation Dyslipidemia   
 low HDL Emphysema HTN (hypertension) Hypercholesterolemia Ill-defined condition   
 hernia Increased prostate specific antigen (PSA) velocity Long term (current) use of anticoagulants   
 coumadin Osteoarthritis Osteomyelitis ([facility] [address] Paroxysmal atrial fibrillation (HCC) Peripheral vascular disease ([facility] [address] AAA repair 12/2007 Persistent atrial Fibrillation Persistent atrial fibrillation Unspecified adverse effect of anesthesia   
 difficulty breathing placed in ICU Oct 2012 (AAA repair) Past Surgical History:  
Procedure Laterality Date BRONCHOSCOPY DIAGNOSTIC  11/2/2012 CARDIAC CATHETERIZATION  11/1/2012 COLONOSCOPY N/A 7/26/2016 COLONOSCOPY with Polypectomies performed by Kristen Aguilar MD at [address] N/A 5/28/2019 COLONOSCOPY with polypectomy performed by Jeff Pettit MD at [address] AAA REPAIR    
 2006 & 2012 HX HEART CATHETERIZATION  3/4/2009 1. RCA small, nondominant; patent. 2. LMCA patent. 3. LAD is long, wrapped around apical vessel. Diffuse, 20-30% stenosis noted. 4. CCA is large, dominant vessel. Diffuse 20-30% stenosis. 5. LVEDP is 16 mmHg. 6. Overall left ventricular systolic function mildly diminshed with est. EF 45%. Mild, global hypokinesis noted. 7. No significant mitral regurgitation or aortic stenosis noted. (see report) HX HERNIA REPAIR  2/2014  
 rt inguinal   
 HX HERNIA REPAIR  01/2016 LEFT INGUINAL HERNIA REPAIR DR. Ben Wadsworth ING HERNIA,5+Y/O,JESSIE Left 1-20-16 Dr. Nick LONG  11/1/2012 Diet prior to admission: reg solid with thin Current Diet:  reg solid with thin, MBS Cognitive and Communication Status: 
Neurologic State: Alert Orientation Level: Oriented X4 Cognition: Follows commands Oral Assessment: 
Oral Assessment Labial: No impairment Dentition: Edentulous Oral Hygiene: adequate Lingual: No impairment Velum: No impairment Mandible: No impairment P.O. Trials: 
Patient Position: 60 at [facility] Vocal quality prior to P.O.: No impairment Consistency Presented: Thin liquid; Solid How Presented: Self-fed/presented;Cup/sip;Straw;Successive swallows Bolus Acceptance: No impairment Bolus Formation/Control: No impairment Propulsion: No impairment Oral Residue: None Initiation of Swallow: No impairment Laryngeal Elevation: Functional 
Aspiration Signs/Symptoms: Infiltrate on chest xray(strong cough x 1/8) Pharyngeal Phase Characteristics: Suspected pharyngeal residue Effective Modifications: Small sips and bites; Alternate liquids/solids Cues for Modifications: Minimal-moderate Oral Phase Severity: No impairment Pharyngeal Phase Severity : Mild PAIN: 
Start of Eval: 0 End of Eval: 0 After treatment:  
[]            Patient left in no apparent distress sitting up in chair 
[x]            Patient left in no apparent distress in bed 
[x]            Call bell left within reach [x]            Nursing notified []            Family present 
[]            Caregiver present 
[]            Bed alarm activated COMMUNICATION/EDUCATION:  
[x]            Aspiration precautions; swallow safety; compensatory techniques. [x]            Patient/family have participated as able in goal setting and plan of care. Thank you for this referral. 
 
Leighann Worthy M.S. CCC-SLP/L Speech-Language Pathologist

## 2020-01-27 NOTE — PROGRESS NOTES
Intern Progress Note [address] Patient: Jenna Escalante MRN: [id number]  CSN: [id number] YOB: 1940  Age: [de-identified] y.o. Sex: male DOA: 1/26/2020 LOS:  LOS: 1 day Subjective:  
 
Acute events: none Pt awake, alert and clam this AM. Breathing comfortably on 3L NC. Pt says his breathing is 90% back to his baseline this morning. No SOB at rest, some DUMONT, no CP. Still has ongoing dysuria (dignosed with UTI recently on abx prior to admission). ROS General - well Cardio - No CP, no Palpitations Resp - No SOB, +cough, +wheeze, +DUMONT Abdo-  No pain, N/V/D/C 
 - + dysuria Objective:  
  
Patient Vitals for the past 24 hrs: 
 Temp Pulse Resp BP SpO2  
01/27/20 0800  82 18 114/61 96 % 01/27/20 0700  86 18 115/59 98 % 01/27/20 0600  90 21 119/56 98 % 01/27/20 0400  89 18 118/55 96 % 01/27/20 0300  86 25 116/52 97 % 01/27/20 0200  85 16 114/55 97 % 01/27/20 0100  96 17 111/55 96 % 01/26/20 2300  85 23 118/60 98 % 01/26/20 2200  76 21 124/58 94 % 01/26/20 2100  79 15 127/55 97 % 01/26/20 2030  (!) 101 21 108/59 96 % 01/26/20 2000  78 21 120/64 97 % 01/26/20 1930  (!) 108 23 135/72 97 % 01/26/20 1700  74 26 125/56 96 % 01/26/20 1515  75 18 132/62 98 % 01/26/20 1245  80 21 125/65 94 % 01/26/20 1230  80 22 114/78 97 % 01/26/20 0939 96.9 °F (36.1 °C) 100 12 124/75 91 % Intake/Output Summary (Last 24 hours) at 1/27/2020 7432 Last data filed at 1/27/2020 3889 Gross per 24 hour Intake  Output 800 ml Net -800 ml Physical Exam: 
General: Thin male, NAD,  Alert and oriented X 3 CV: Irreg irreg with Gr II/VI sys murmurs Lungs: Rales RLL and expiratory wheezes all over Abd: + BS, soft, non-tender, no masses, no rebound or guarding. Motor: Normal and equal strength upper and lower ext bilat. Extremities  No calf tenderness or swelling. No edema. Warm feet.  
 
 
Lab/Data Reviewed: 
BMP:  
 Lab Results Component Value Date/Time  (L) 01/27/2020 05:08 AM  
 K 3.4 (L) 01/27/2020 05:08 AM  
 CL 97 (L) 01/27/2020 05:08 AM  
 CO2 26 01/27/2020 05:08 AM  
 AGAP 9 01/27/2020 05:08 AM  
  (H) 01/27/2020 05:08 AM  
 BUN 20 (H) 01/27/2020 05:08 AM  
 CREA 1.67 (H) 01/27/2020 05:08 AM  
 GFRAA 48 (L) 01/27/2020 05:08 AM  
 GFRNA 40 (L) 01/27/2020 05:08 AM  
 
CBC:  
Lab Results Component Value Date/Time WBC 0.9 (LL) 01/27/2020 06:45 AM  
 HGB 8.0 (L) 01/27/2020 05:08 AM  
 HCT 25.0 (L) 01/27/2020 05:08 AM  
  01/27/2020 05:08 AM  
  
 
Scheduled Medications Reviewed: 
Current Facility-Administered Medications Medication Dose Route Frequency  [Held by provider] amLODIPine (NORVASC) tablet 10 mg  10 mg Oral DAILY  pantoprazole (PROTONIX) tablet 40 mg  40 mg Oral ACB  pravastatin (PRAVACHOL) tablet 40 mg  40 mg Oral QHS  azithromycin (ZITHROMAX) 500 mg in  mL  500 mg IntraVENous Q24H  piperacillin-tazobactam (ZOSYN) 3.375 g in 0.9% sodium chloride (MBP/ADV) 100 mL MBP  3.375 g IntraVENous Q6H  
 albuterol-ipratropium (DUO-NEB) 2.5 MG-0.5 MG/3 ML  3 mL Nebulization Q4H RT  
 methylPREDNISolone (PF) (Solu-MEDROL) injection 60 mg  60 mg IntraVENous DAILY  0.9% sodium chloride infusion 500 mL  500 mL IntraVENous ONCE Assessment/Plan  
 
[de-identified] man PMH of AAA s/p repair, HF, COPD, HTN/HLD, afib on no AC due to gastric ulcer presenting with cough and DUMONT. 
  
Hypoxia and DUMONT 2/2 COPD exast +/- RLL PNA Hypoxia to mid 80s reported by EMS.  Sating 91% on 2L in ED. Pt hs cough, and DUMONT. Mild productive cough and diffuse exp wheeze through. No CP, trops neg. Pt has a history of COPD on 2L 02 at baseline. CXR shows RLL infiltrate, suggesting CAP vs aspiration. Pt breathing continues to improve, breathing comfortably on 3L NC. Still w/ exp wheeze. Passed bedside swallow and placed on cardiac diet. 
  
Plan: - IV zosyn and azithro (CAP + pseudomonas + anaerobe coverage) - FU Bcx (negative thus far) 
- duo-neb q4 hours - IV methylpred 60mg qD 
 -suppl O2 titrate to >90% 
-IP consult to PT/OT/CM  
  
COPD Pt followed by Pulmonology (Dr. Suzette Grigsby). Last FEV1 48%,On 2L home 02 at baseline. On albuterol neb and ipratropium inhaler. 
-Hold home inhalers.  
-For tx as above section 
  
Diastolic HF/CAD/HTN/HLD - Followed by cardiology (Dr. Danii Quinones) . No CP. Trops neg. BNP 7000. ECHO (6/3):  EF 56 - 60%. Abnormal left ventricular septal motion. Interventricular septal \"bounce\". On amlodipine 10mg qD, lisinopril 10qD, digoxin 0.125mg. BP in ED SBP 120s. Digoxin (0.8 on admission). -hold home HTN meds for now  
-monitor digoxin on azirtho, dec dose as needed.  
-continue digoxin 
  
Afib not on [facility] Chronic afib was on warfain until recently. Had bleeding gastric ulcer and warfarin held. 
-hold warfarin 
-SCDs 
-continue home protonix  
  
AAA s/p EVAR - now w/ interval increase in size on cT. CT abdo (6/3/2019): Developing saccular aneurysmal dilation along the right anterior to posterolateral aspect of the mid aorta, centered at the superior portion of the stent graft and beginning just below the level of the SMA.  Based on relatively rapid development of this finding since the 03/26/18 CTA. Followed by Dr. Quiroz Prior office (vascular). Per PT not planning any surgery for thus. 
  
Anemia 2/2 Multifactorial Iron Def and Anemia of Chronic Kidney Disease- H/o bleeding ulcer May/2019. Ferritin 357 (1/20). Gets IV Iron infusions, last 1/21. Followed by Hematology  as OP (Dr. Gloria Metz). Hb 8.8 on admission.  
-Monitor H/H w/ daily BMP 
  
Insomnia On trazodone 50mg at home 
-continue home meds 
  
CKD3 - Baseline Cr 1.32-1. 50. Cr 1.53 on admission. -monitor daily BMP 
  
UTI - recent UTI diagnosis by PCP. On Cipro. Still endorsing dysuria.  
-coverage w/ zosyn HypoNa, hypoK, HypoCl - 2/2 ? poor PO intake. -Monitor with daily BMP 
 -replete K per protocol Leukopenia - new onset this AM, admission WCC 7.8. Down to 1.4 on repeat 0.9. 2/2? Drug rxn. No hx in past of this? Raised crp and ESR done recently. -CBC w/ diff 
-peripheral swear 
-CMP 
-heme-onc consult Daughter had mPOA. No document not in system.  
  
Diet: NPO till bedside swallow, then cardiac diet DVT Prophylaxis: SCDs Code Status: Full Point of Contact: Mi Cardona (Relationship: [id number]) [phone] 
  
Disposition: [address] [place] [address], MD  
[address] Medicine Intern 01/27/20 8:47 AM

## 2020-01-28 NOTE — CONSULTS
[facility]. Pulmonary, Critical Care, and Sleep Medicine Initial Patient Consult Name: Kiki Pham MRN: [id number] : 1940 Hospital: [address] [place] [address] Date: 2020 IMPRESSION:  
· Acute hypoxic respiratory failure - Now on NC 
· COPD exacerbation - as evidenced from increased dyspnea and wheezing. The trigger is likely a pneumonia as the CXR shows a pretty impressive right lower lobe infiltrate. · Pneumonia - as evidenced by leukopenia with a left shift and a CXR findings. Most likely bacterial 
· Leukopenia - probably sepsis related. Counts have already recovered. Peripheral smear pending · HFpEF - doesn't seem to be volume up although he did have leg swelling and orthopnea. · Afib not on [facility] · AAA s/p EVAR 
· Anemia 2/2 Iron deficiency and CKD · CKD 3 - baseline creatinine · UTI - recent diagnosis Patient Active Problem List  
Diagnosis Code  Hypercholesterolemia E78.00  Congestive heart failure (CHF) ([facility]) I50.9  Peripheral vascular disease ([facility]) I73.9  
 COPD (chronic obstructive pulmonary disease) ([facility]) J44.9  
 HTN (hypertension) I10  
 Chronic atrial fibrillation I48.20  RLQ abdominal pain R10.31  Status post AAA (abdominal aortic aneurysm) repair V30.962, Z86.79  
 Aneurysm of abdominal aorta ([facility]) I71.4  Chronic constipation K59.09  
 Right inguinal hernia K40.90  Left inguinal hernia K40.90  CAD (coronary artery disease) I25.10  Hypoxemia requiring supplemental oxygen R09.02, Z99.81  Warfarin-induced coagulopathy ([address] D68.32, T45.515A  Intraabdominal fluid collection R18.8  Chronic back pain M54.9, G89.29  
 Sepsis ([address] A41.9  Vertebral osteomyelitis ([address] M46.20  Psoas abscess ([facility]) K68.12  
 Abscess L02.91  
 Discitis of lumbar region M46.46  
 Pulmonary hypertension ([facility]) I27.20  
 CHF exacerbation ([facility]) I50.9  Hyponatremia E87.1  Symptomatic anemia D64.9  Chronic pain syndrome G89.4  Chronic kidney disease N18.9  Iron deficiency anemia D50.9  Endoleak of aortic graft ([address] T82.330A  TYRELL (acute kidney injury) ([address] N17.9  Edema of both ankles M25.471, M25.472  Enlarged prostate without lower urinary tract symptoms (luts) N40.0  GERD (gastroesophageal reflux disease) K21.9 [facility] discharge follow-up Z09  Hypokalemia E87.6  Left arm swelling M79.89  Lethargy R53.83  Lower extremity weakness R29.898  Mediastinal lymphadenopathy R59.0  Spondylolysis M43.00  Age-related nuclear cataract, bilateral H25.13  
 Pleural effusion J90  
 Iron deficiency anemia due to chronic blood loss D50.0  Community acquired pneumonia J18.9  Community acquired bacterial pneumonia J15.9  
 UTI (urinary tract infection) N39.0  
  
RECOMMENDATIONS:  
· Oxygen - Titrate to 92% and above. Avoid hyperoxia · COPD - taper steroids to 40mg of prednisone and complete a 5 day course. Continue duo-neb QiD. Start pulmicort nebs. · Chronic COPD - not a candidate for chronic zithromax or dailyresp at the moment. Will continue to reassess. May benefit from BiV - can be explored as outpatient with Dr. Dipti Linares. · Pneumonia - Continue Zosyn and Azithromycin. Will treat for a maximum of 5 days. Follow respiratory cultures - if remain negative, can probably switch him to oral regimen of cefpodoxime with PO zithromax. Check legionella urine antigen and strep pneumonia antigen. · Afrin vs flonase for stuffed nose. · Follow peripheral smear and digoxin levels. · Restart home anti-HTN 
· Out patient testing- PFT, 6 min walk,  
· OT, PT, OOB and ambulate · Will Follow · DVT, PUD prophylaxis Subjective: This patient has been seen and evaluated at the request of Dr. Dwayne Hoffman for COPD. Patient is a [de-identified] y.o. male w h/o COPD (FEV1 48%) on 2L HOT, CKD, pHTN group 2/3, dCHF, AAA s/p repair, Afib not on AC, admitted for dyspnea. He says his dyspnea has been getting worse for the past 1 week and then it became worse on Sunday with wheezing. He had trouble exhaling and was having more wheezing. He denied any URI symptoms and didn't have worsening cough or phlegm but his nose has been stuffy which he attributes to home oxygen. He denies any fever, chills, chest pain, hemoptysis or other symptoms. He can't identify a trigger. He did have sick contacts - his grand kids were sick. Moreover, they brought a cat in the house but he doesn't think he has allergies to it. He has been compliant with his medications. He quit smoking in 2012 and doesn't use any illicit drugs and doesn't drink alcohol. . He has a small history of exposure to asbestos. He denies any allergies. He does feel constipated and complained of some dysuria by denied any hematuria. His legs were also swollen. He takes Stioloto for his COPD and has been using albuterol once a day. He gets COPD attacks once a year. He is not interested in using BiV for his COPD. He did get his flu shot. No history of MI, DVT, PE. Past Medical History:  
Diagnosis Date  Aneurysm ([facility] AAA repair 2006 & 2012  Aorto-iliac duplex 02/13/2015 Tech difficult. Patent aorta bi-iliac endograft w/o leak or limb dysfunction.  Arrhythmia   
 a fib  Cardiac cath 11/01/2012 RCA (sm, nondom) patent. LM patent. LAD 25%. CX (dom) 45% mid. LVEDP 12 mmHg. No WMA. PA 27/12. W 10-12. CO/CI 5.2/2.6 (TD).  Cardiac echocardiogram, abnormal 02/20/2016 EF 55%. No WMA. Indeterminate diastolic fx. RVSP 60 mmHg. Severe LAE. Mild MR. Mod TR.  IVCE. Similar to study of 10/26/12.  Cardiovascular aorto-iliac duplex 02/13/2015 Patent aorta bi-iliac endovascular graft w/o leak or limb dysfunction. Sac measures 4.21 x 4.47 cm (4.4 x 4.6 cm on 1/31/14).  Cardiovascular LE peripheral arterial testing 07/29/2013 No significant LE arterial disease bilaterally. R GHADA 1. 12.  L GHADA 1. 12.  R DBI 0.83. L DBI 0.71. Exercise deferred.  Cardiovascular renal duplex 10/31/2012 No RA stenosis. Intrinsic/med disease in left kidney. Patent aortic endograft. Patent, thrombus-free renal veins bilaterally.  Carotid duplex 07/29/2013 Mild <50% bilateral ICA plaquing.  Chronic kidney disease  Chronic lung disease  Cigarette smoker  Congestive heart failure (CHF) ([address]  COPD (chronic obstructive pulmonary disease) ([facility])   
 and emphysema; likely secondary to tobacco abuse  Difficult intubation  Dyslipidemia   
 low HDL  Emphysema   
 HTN (hypertension)  Hypercholesterolemia  Ill-defined condition   
 hernia  Increased prostate specific antigen (PSA) velocity  Long term (current) use of anticoagulants   
 coumadin  Osteoarthritis  Osteomyelitis ([address]  Paroxysmal atrial fibrillation ([facility])  Peripheral vascular disease ([facility] AAA repair 12/2007  Persistent atrial Fibrillation  Persistent atrial fibrillation  Unspecified adverse effect of anesthesia   
 difficulty breathing placed in ICU Oct 2012 (AAA repair) Past Surgical History:  
Procedure Laterality Date  BRONCHOSCOPY DIAGNOSTIC  11/2/2012  CARDIAC CATHETERIZATION  11/1/2012  COLONOSCOPY N/A 7/26/2016 COLONOSCOPY with Polypectomies performed by Shaniqua Wood MD at [facility] ENDOSCOPY  COLONOSCOPY N/A 5/28/2019 COLONOSCOPY with polypectomy performed by Stevenson Liang MD at [facility] AAA REPAIR    
 2006 & 2012   HEART CATHETERIZATION  3/4/2009 1. RCA small, nondominant; patent. 2. LMCA patent. 3. LAD is long, wrapped around apical vessel. Diffuse, 20-30% stenosis noted. 4. CCA is large, dominant vessel. Diffuse 20-30% stenosis. 5. LVEDP is 16 mmHg.  6. Overall left ventricular systolic function mildly diminshed with est. EF 45%. Mild, global hypokinesis noted. 7. No significant mitral regurgitation or aortic stenosis noted. (see report)  HX HERNIA REPAIR  2014  
 rt inguinal   
 HX HERNIA REPAIR  2016 LEFT INGUINAL HERNIA REPAIR DR. Rachel Silva  REPAIR ING HERNIA,5+Y/O,JESSIE Left 16 Dr. Gloria   YURIY LONG  2012 Prior to Admission medications Medication Sig Start Date End Date Taking? Authorizing Provider STIOLTO RESPIMAT 2.5-2.5 mcg/actuation inhaler inhale 2 inhalations by mouth once daily 19  Yes Juanjose LIRA MD  
pantoprazole (PROTONIX) 40 mg tablet Take 1 Tab by mouth Daily (before breakfast). 19  Yes Arianna Wright, DO  
albuterol (PROVENTIL VENTOLIN) 2.5 mg /3 mL (0.083 %) nebulizer solution 3 mL by Nebulization route every four (4) hours as needed for Wheezing. 3/21/19  Yes Shiva Epps MD  
lisinopril (PRINIVIL, ZESTRIL) 20 mg tablet Take 1 Tab by mouth daily. 18  Yes Josefa Wallace DO  
pravastatin (PRAVACHOL) 40 mg tablet Take 40 mg by mouth nightly. Yes Provider, Historical  
digoxin (DIGITEK) 0.125 mg tablet take 1 tablet by mouth once daily 17  Yes Nanci Britt NP  
amLODIPine (NORVASC) 10 mg tablet Take 10 mg by mouth daily. 17  Yes Provider, Historical  
OXYGEN-AIR DELIVERY SYSTEMS 2 L/min by Nasal route daily. Continuous. Company is [employer] Yes Provider, Historical  
docusate sodium 100 mg tab Take 100 mg by mouth as needed for Other (constipation). Provider, Historical  
ferrous sulfate 325 mg (65 mg iron) tablet Take 325 mg by mouth every other day. Indications: anemia from inadequate iron    Provider, Historical  
ascorbic acid, vitamin C, (VITAMIN C) 250 mg tablet Take 1 Tab by mouth two (2) times a day. 19   Arianna Wright,   
HYDROcodone-acetaminophen [facility]) 5-325 mg per tablet Take 0.5 Tabs by mouth two (2) times daily as needed for Pain.     Provider, Historical  
 traZODone (DESYREL) 50 mg tablet Take 50 mg by mouth nightly. Provider, Historical  
polyethylene glycol (MIRALAX) 17 gram packet Take 17 g by mouth daily as needed (constipation). Provider, Historical  
 
Allergies Allergen Reactions  Codeine Swelling  Morphine Itching  Sulfa (Sulfonamide Antibiotics) Other (comments) Kidney problems. Social History Tobacco Use  Smoking status: Former Smoker Packs/day: 1.50 Years: 54.00 Pack years: 81.00 Types: Cigarettes Last attempt to quit: 10/23/2012 Years since quittin.2  Smokeless tobacco: Never Used Substance Use Topics  Alcohol use: No  
  Alcohol/week: 0.0 standard drinks Comment: quit drinking alcohol 26 years ago, patient states stopped in \"1689\" Family History Problem Relation Age of Onset  Heart Surgery Father BYPASS  Stroke Father  Heart Disease Father  Hypertension Father  Heart Attack Father  Heart Surgery Mother BYPASS  Coronary Artery Disease Mother  Stroke Mother  Heart Disease Mother  Hypertension Mother  Diabetes Sister  Cancer Brother Current Facility-Administered Medications Medication Dose Route Frequency  traZODone (DESYREL) tablet 50 mg  50 mg Oral QHS  digoxin (LANOXIN) tablet 0.125 mg  0.125 mg Oral DAILY  [Held by provider] amLODIPine (NORVASC) tablet 10 mg  10 mg Oral DAILY  pantoprazole (PROTONIX) tablet 40 mg  40 mg Oral ACB  pravastatin (PRAVACHOL) tablet 40 mg  40 mg Oral QHS  azithromycin (ZITHROMAX) 500 mg in  mL  500 mg IntraVENous Q24H  piperacillin-tazobactam (ZOSYN) 3.375 g in 0.9% sodium chloride (MBP/ADV) 100 mL MBP  3.375 g IntraVENous Q6H  
 albuterol-ipratropium (DUO-NEB) 2.5 MG-0.5 MG/3 ML  3 mL Nebulization Q4H RT  
 methylPREDNISolone (PF) (Solu-MEDROL) injection 60 mg  60 mg IntraVENous DAILY Review of Systems: 
Pertinent items are noted in HPI. Review of Systems Constitutional: Positive for malaise/fatigue. Negative for chills and fever. HENT: Positive for congestion. Negative for nosebleeds, sinus pain and sore throat. Respiratory: Positive for shortness of breath. Negative for cough, hemoptysis and sputum production. Cardiovascular: Positive for leg swelling. Negative for chest pain, palpitations, orthopnea, claudication and PND. Gastrointestinal: Positive for constipation. Negative for abdominal pain, blood in stool, diarrhea, heartburn, nausea and vomiting. Genitourinary: Positive for dysuria. Negative for flank pain, frequency, hematuria and urgency. Musculoskeletal: Negative for back pain, myalgias and neck pain. Skin: Negative for rash. Neurological: Negative for dizziness and headaches. Psychiatric/Behavioral: Negative for depression and substance abuse. The patient is not nervous/anxious. Objective:  
Vital Signs:   
Visit Vitals /65 (BP 1 Location: Left arm, BP Patient Position: At rest) Pulse 89 Temp 97.2 °F (36.2 °C) Resp 21 Wt 59.7 kg (131 lb 11.2 oz) SpO2 98% BMI 21.26 kg/m² O2 Device: Nasal cannula O2 Flow Rate (L/min): 2 l/min Temp (24hrs), Av.6 °F (36.4 °C), Min:97.2 °F (36.2 °C), Max:98 °F (36.7 °C) Intake/Output:  
Last shift:       07 - 1900 In: 26 [P.O.:220] Out: 125 [Urine:125] Last 3 shifts: 1901 -  0700 In: -  
Out: 9738 [[id number]] Intake/Output Summary (Last 24 hours) at 2020 1119 Last data filed at 2020 1048 Gross per 24 hour Intake 220 ml Output 575 ml Net -355 ml Physical Exam: Pursed lip breathing; Audible wheezing; saturating 98% on 2L; laying comfortable in bed. General:  Alert, cooperative, no distress, appears stated age. Head:  Normocephalic, without obvious abnormality, atraumatic. Eyes:  Conjunctivae/corneas clear. ANicteric Nose: Nares normal. Mucosa normal. No drainage or sinus tenderness. Throat: Lips, mucosa dry. NO thrush;   
Neck: Supple, symmetrical, trachea midline, no adenopathy, thyroid: no enlargment/tenderness/nodules, no carotid bruit and no JVD. No crepitus Back:   Symmetric, no curvature, no spine tenderness or flank pain  
Lungs:   Bilateral auscultation diffuse expiratory wheezing; some basilar crackles Chest wall:  No tenderness or deformity. NO CREPITUS Heart:  Regular rate and rhythm, S1, S2 normal, no murmur, click, rub or gallop. Abdomen:   Soft, non-tender. Bowel sounds normal. No masses,  No organomegaly. No paradox Extremities: normal, atraumatic, no cyanosis or edema. Pulses: 1-2+ and symmetric all extremities. Skin: Skin color, texture, turgor normal. No rashes or lesions Lymph nodes: Cervical, supraclavicular, and axillary nodes normal.  
Neurologic: Grossly nonfocal  
    
 
Data review:  
Labs: 
Recent Results (from the past 24 hour(s)) CBC WITH AUTOMATED DIFF Collection Time: 01/27/20 12:45 PM  
Result Value Ref Range WBC 2.2 (L) 4.6 - 13.2 K/uL  
 RBC 2.87 (L) 4.70 - 5.50 M/uL HGB 7.9 (L) 13.0 - 16.0 g/dL HCT 24.6 (L) 36.0 - 48.0 % MCV 85.7 74.0 - 97.0 FL  
 MCH 27.5 24.0 - 34.0 PG  
 MCHC 32.1 31.0 - 37.0 g/dL  
 RDW 15.5 (H) 11.6 - 14.5 % PLATELET 738 934 - 668 K/uL MPV 9.2 9.2 - 11.8 FL  
 NEUTROPHILS 86 (H) 40 - 73 % LYMPHOCYTES 13 (L) 21 - 52 % MONOCYTES 1 (L) 3 - 10 % EOSINOPHILS 0 0 - 5 % BASOPHILS 0 0 - 2 %  
 ABS. NEUTROPHILS 1.9 1.8 - 8.0 K/UL  
 ABS. LYMPHOCYTES 0.3 (L) 0.9 - 3.6 K/UL  
 ABS. MONOCYTES 0.0 (L) 0.05 - 1.2 K/UL  
 ABS. EOSINOPHILS 0.0 0.0 - 0.4 K/UL  
 ABS. BASOPHILS 0.0 0.0 - 0.1 K/UL  
 DF AUTOMATED METABOLIC PANEL, COMPREHENSIVE Collection Time: 01/27/20 12:45 PM  
Result Value Ref Range Sodium 134 (L) 136 - 145 mmol/L Potassium 3.9 3.5 - 5.5 mmol/L  Chloride 100 100 - 111 mmol/L  
 CO2 26 21 - 32 mmol/L  
 Anion gap 8 3.0 - 18 mmol/L Glucose 182 (H) 74 - 99 mg/dL BUN 24 (H) 7.0 - 18 MG/DL Creatinine 1.48 (H) 0.6 - 1.3 MG/DL  
 BUN/Creatinine ratio 16 12 - 20 GFR est AA 55 (L) >60 ml/min/1.73m2 GFR est non-AA 46 (L) >60 ml/min/1.73m2 Calcium 8.3 (L) 8.5 - 10.1 MG/DL Bilirubin, total 0.7 0.2 - 1.0 MG/DL  
 ALT (SGPT) 21 16 - 61 U/L  
 AST (SGOT) 35 10 - 38 U/L Alk. phosphatase 77 45 - 117 U/L Protein, total 7.0 6.4 - 8.2 g/dL Albumin 2.9 (L) 3.4 - 5.0 g/dL Globulin 4.1 (H) 2.0 - 4.0 g/dL A-G Ratio 0.7 (L) 0.8 - 1.7    
CBC WITH AUTOMATED DIFF Collection Time: 01/28/20  5:28 AM  
Result Value Ref Range WBC 9.6 4.6 - 13.2 K/uL  
 RBC 2.82 (L) 4.70 - 5.50 M/uL HGB 7.8 (L) 13.0 - 16.0 g/dL HCT 24.4 (L) 36.0 - 48.0 % MCV 86.5 74.0 - 97.0 FL  
 MCH 27.7 24.0 - 34.0 PG  
 MCHC 32.0 31.0 - 37.0 g/dL  
 RDW 16.0 (H) 11.6 - 14.5 % PLATELET 067 155 - 069 K/uL MPV 9.3 9.2 - 11.8 FL  
 NEUTROPHILS 93 (H) 40 - 73 % LYMPHOCYTES 4 (L) 21 - 52 % MONOCYTES 3 3 - 10 % EOSINOPHILS 0 0 - 5 % BASOPHILS 0 0 - 2 %  
 ABS. NEUTROPHILS 8.9 (H) 1.8 - 8.0 K/UL  
 ABS. LYMPHOCYTES 0.4 (L) 0.9 - 3.6 K/UL  
 ABS. MONOCYTES 0.2 0.05 - 1.2 K/UL  
 ABS. EOSINOPHILS 0.0 0.0 - 0.4 K/UL  
 ABS. BASOPHILS 0.0 0.0 - 0.1 K/UL  
 DF AUTOMATED METABOLIC PANEL, BASIC Collection Time: 01/28/20  5:28 AM  
Result Value Ref Range Sodium 132 (L) 136 - 145 mmol/L Potassium 3.7 3.5 - 5.5 mmol/L Chloride 100 100 - 111 mmol/L  
 CO2 26 21 - 32 mmol/L Anion gap 6 3.0 - 18 mmol/L Glucose 126 (H) 74 - 99 mg/dL BUN 25 (H) 7.0 - 18 MG/DL Creatinine 1.41 (H) 0.6 - 1.3 MG/DL  
 BUN/Creatinine ratio 18 12 - 20 GFR est AA 59 (L) >60 ml/min/1.73m2 GFR est non-AA 48 (L) >60 ml/min/1.73m2 Calcium 8.2 (L) 8.5 - 10.1 MG/DL  
DIGOXIN Collection Time: 01/28/20  5:28 AM  
Result Value Ref Range Digoxin level 1.0 0.9 - 2.0 NG/ML  
 
PFT Results  (Last 48 hours) None Echo Results  (Last 48 hours) None Imaging: 
I have personally reviewed the patients radiographs and have reviewed the reports: CXR Results  (Last 48 hours) None CT Results  (Last 48 hours) None High complexity decision making was performed during the evaluation of this patient at high risk for decompensation with multiple organ involvement 
  
Above mentioned total time spent on reviewing the case/medical record/data/notes/EMR/patient examination/documentation/coordinating care with nurse/consultants, exclusive of procedures with complex decision making performed and > 50% time spent in face to face evaluation. Pepe Garcia MD 
Pulmonary & Critical Care Fellow PGY5

## 2020-01-28 NOTE — PROGRESS NOTES
Bedside shift change report given to PRAVEEN Murray (oncoming nurse) by Sonido Benavides RN (offgoing nurse). Report included the following information SBAR, Kardex and MAR.

## 2020-01-28 NOTE — MED STUDENT NOTES
*ATTENTION:  This note has been created by a medical student for educational purposes only. Please do not refer to the content of this note for clinical decision-making, billing, or other purposes. Please see attending physicians note to obtain clinical information on this patient. * Hospital course Mr Phong Stevens is an [de-identified] man with PMHx of AAA s/p repair, HF, COPD, HTN/HLD, afib on no AC due to gastric ulcer presenting with cough and DUMONT admitted for CAP. 
  
Hypoxia and DUMONT 2/2 COPD exast +/- RLL PNA · Pt presented w/ hypoxia to mid 80s reported by EMS.  Sating 91% on 2L in ED. Pt had mild productive cough and DUMONT. Diffuse exp wheeze throughout on exam. No CP, trops neg. Pt has a history of COPD on 2L 02 at baseline. · Pt followed by Pulmonology (Dr. Cristian Monte 48%,On 2L home 02 at baseline. On albuterol neb and ipratropium inhaler at home. · CXR 1/26 shows RLL infiltrate, suggesting CAP vs aspiration. · Managed in hospital with IV zosyn and azithro, duo-neb q4, and IV methylpred 60mg qD and suppl O2 titrate to >90%. · Discharged on cefpodoxime 200mg PO q12 and prednisone 40mg daily, both for one day, albuterol and ipratropium duonebs, pulmicort 500mcg/2ml nebs. Diastolic HF/CAD/HTN/HLD - 
· Followed by cardiology (Dr. Louisa Menard) . No CP. Trops neg. BNP 7000. ECHO (6/3):  EF 56 - 60%. Abnormal left ventricular septal motion. Interventricular septal \"bounce\". · On amlodipine 10mg qD, lisinopril 10qD, digoxin 0.125mg.  BP in ED SBP 120s. Digoxin 0.8 on admission up to 1.0 today. · Managed in the hospital with digoxin (with levels monitored), amlodipine, and cardiac monitoring. · Discharged on amlodipine 10mg qD, lisinopril 10qD, digoxin 0.125mg. 
  
Afib not on AC d/t recent gastric bleed · Chronic afib was on warfain until recently. Had bleeding gastric ulcer and warfarin held. Hb stable thus far. · Warfarin held in the hospital and home Protonix continued. · Discharged on Protonix with direction to F/U with GI for Gi bleed r/o 
  
AAA s/p EVAR -  
· Known AAA s/p EVAR shown to have interval increase in size on CT. CT abdo (6/3/2019): Developing saccular aneurysmal dilation along the right anterior to posterolateral aspect of the mid aorta, centered at the superior portion of the stent graft and beginning just below the level of the SMA.  Based on relatively rapid development of this finding since the 03/26/18 CTA. Followed by Dr. Lanie Rhodes office (vascular). · Per PT not planning any surgery for thus. · No intervention while admitted.  
  
Anemia 2/2 Multifactorial Iron Def and Anemia of Chronic Kidney Disease-  
· H/o bleeding ulcer May/2019. Ferritin 357 (1/20). Gets IV Iron infusions, last 1/21. Followed by Hematology  as OP (Dr. Shruthi Muse). · Monitored in patient with daily CBCs, Hb between 7.4 and 8.8 during this admission, no transfusions needed 
  
Insomnia · On trazodone 50mg at home · Managed in hospital and discharged on same.  
  
CKD3   
· Baseline Cr 1.32-1. 50. Cr 1.53 on admission. · No intervention required during admission, Cr at 1.30 on discharge 
  
UTI   
· On admission being treated with Cipro for recent UTI diagnosis by PCP but still with dysuria. · Cipro d/c and covered with zosyn while in patient. · Resolved by discharge.  
  
HypoNa, hypoK, HypoCl - likely 2/2 poor PO intake. · Pt with multiple electrolyte imbalances on admission, replete as necessary · Persistent hyponatremia resolved by discharge 
  
Leukopenia (Resolved) · [facility] admission 7.8, down to 1.4 (1/27) and on repeat 0.9, likely 2/2 Drug rxn. Pt received rocephin, zosyn and athro in ED. No hx in past of this per pt or on chart review. Raised crp and ESR done recently. CMP unremarkable. Peripheral smear unremarkable. [facility] up to 7.8 on 1/28 and remained stable for remainder of admission.  Dr. Shruthi Muse consulted via telephone and requests patient have close f/u after discharge. 
 
  
 Moderate Protein Calorie Malnutrition -  
· On admission, pt was thin w/ low alb (2.9) on admission, along with electrolyte imbalances suggesting malnutrition. Pt reports he has very little appetite and eats small portions infrequently. · Nutrition consulted during admission, added food preferences to meals and once daily Ensure Enlive.   
· Recommend nutritional f/u with PCP

## 2020-01-28 NOTE — PROGRESS NOTES
Intern Progress Note [address] Patient: Harriet Cook MRN: [id number]  CSN: [id number] YOB: 1940  Age: [de-identified] y.o. Sex: male DOA: 1/26/2020 LOS:  LOS: 2 days Subjective:  
 
Acute events: none Pt moved to [address] [place] [address] early last evening. Pt awake, alert and clam this AM. Breathing comfortably on 2L NC. Pt says his breathing worsened overnight, feels it is more difficult to get breath in. Pt reports duo-neb tx helps for >1hr at a time. No SOB at rest, no CP but sig DUMONT. Still has ongoing dysuria (dignosed with UTI recently on abx prior to admission). ROS General - well Cardio - No CP, no Palpitations Resp - No SOB, +cough, +wheeze, +DUMONT Abdo-  No pain, N/V/D/C 
 - + dysuria Objective:  
  
Patient Vitals for the past 24 hrs: 
 Temp Pulse Resp BP SpO2  
01/28/20 0942     98 % 01/28/20 0759 97.2 °F (36.2 °C) 89 21 126/65 98 % 01/28/20 0434     95 % 01/28/20 0335 98 °F (36.7 °C) 99 20 127/64 97 % 01/28/20 0045     100 % 01/28/20 0015 98 °F (36.7 °C) 91 20 126/70 98 % 01/27/20 2248     98 % 01/27/20 2016 97.5 °F (36.4 °C) 91 20 135/56 99 % 01/27/20 1930     99 % 01/27/20 1815 97.2 °F (36.2 °C) 96 20 132/62 99 % 01/27/20 1645  91 19 124/58 98 % Intake/Output Summary (Last 24 hours) at 1/28/2020 9718 Last data filed at 1/28/2020 6664 Gross per 24 hour Intake  Output 450 ml Net -450 ml Physical Exam: 
General: Thin male, NAD,  Alert and oriented X 3 CV: Irreg irreg with Gr II/VI sys murmurs Lungs: dec aeration throughout, expiratory wheezes all over Abd: + BS, soft, non-tender, no masses, no rebound or guarding. Motor: Normal and equal strength upper and lower ext bilat. Extremities  No calf tenderness or swelling. No edema. Warm feet. Lab/Data Reviewed: 
BMP:  
Lab Results Component Value Date/Time   (L) 01/28/2020 05:28 AM  
 K 3.7 01/28/2020 05:28 AM  
  01/28/2020 05:28 AM  
 CO2 26 01/28/2020 05:28 AM  
 AGAP 6 01/28/2020 05:28 AM  
  (H) 01/28/2020 05:28 AM  
 BUN 25 (H) 01/28/2020 05:28 AM  
 CREA 1.41 (H) 01/28/2020 05:28 AM  
 GFRAA 59 (L) 01/28/2020 05:28 AM  
 GFRNA 48 (L) 01/28/2020 05:28 AM  
 
CBC:  
Lab Results Component Value Date/Time WBC 9.6 01/28/2020 05:28 AM  
 HGB 7.8 (L) 01/28/2020 05:28 AM  
 HCT 24.4 (L) 01/28/2020 05:28 AM  
  01/28/2020 05:28 AM  
  
 
Scheduled Medications Reviewed: 
Current Facility-Administered Medications Medication Dose Route Frequency  traZODone (DESYREL) tablet 50 mg  50 mg Oral QHS  digoxin (LANOXIN) tablet 0.125 mg  0.125 mg Oral DAILY  [Held by provider] amLODIPine (NORVASC) tablet 10 mg  10 mg Oral DAILY  pantoprazole (PROTONIX) tablet 40 mg  40 mg Oral ACB  pravastatin (PRAVACHOL) tablet 40 mg  40 mg Oral QHS  azithromycin (ZITHROMAX) 500 mg in  mL  500 mg IntraVENous Q24H  piperacillin-tazobactam (ZOSYN) 3.375 g in 0.9% sodium chloride (MBP/ADV) 100 mL MBP  3.375 g IntraVENous Q6H  
 albuterol-ipratropium (DUO-NEB) 2.5 MG-0.5 MG/3 ML  3 mL Nebulization Q4H RT  
 methylPREDNISolone (PF) (Solu-MEDROL) injection 60 mg  60 mg IntraVENous DAILY Assessment/Plan  
 
[de-identified] man PMH of AAA s/p repair, HF, COPD, HTN/HLD, afib on no AC due to gastric ulcer presenting with cough and DUMONT. 
  
Hypoxia and DUMONT 2/2 COPD exast +/- RLL PNA Hypoxia to mid 80s reported by EMS.  Sating 91% on 2L in ED. Pt hs cough, and DUMONT. Mild productive cough and diffuse exp wheeze through. No CP, trops neg. Pt has a history of COPD on 2L 02 at baseline. CXR shows RLL infiltrate, suggesting CAP vs aspiration. Pt breathing continues to improve, breathing comfortably on 3L NC. Still w/ exp wheeze. Passed bedside swallow and placed on cardiac diet.  Pt reporting increased difficulty breathing this Am, no increased WOB, sating fine on home 2L NC. Pulm consulted Linda Eduardo), will see this AM.  
  
Plan: - IV zosyn and azithro (CAP + pseudomonas + anaerobe coverage) - FU Bcx (negative thus far) 
- duo-neb q4 hours - IV methylpred 60mg qD 
 -suppl O2 titrate to >90% 
  
COPD Pt followed by Pulmonology (Dr. Mehul Gavin). Last FEV1 48%,On 2L home 02 at baseline. On albuterol neb and ipratropium inhaler. 
-Hold home inhalers.  
-For tx as above section 
  
Diastolic HF/CAD/HTN/HLD - Followed by cardiology (Dr. Chelsey Pendleton) . No CP. Trops neg. BNP 7000. ECHO (6/3):  EF 56 - 60%. Abnormal left ventricular septal motion. Interventricular septal \"bounce\". On amlodipine 10mg qD, lisinopril 10qD, digoxin 0.125mg. BP in ED SBP 120s. Digoxin 0.8 on admission up to 1.0 today.    
-hold home HTN meds for now  
-continue digoxin, monitor daily levels while on azithro 
-cardiac monitoring 
  
Afib not on [facility] Chronic afib was on warfain until recently. Had bleeding gastric ulcer and warfarin held. Hb stable thus far. 
-hold warfarin 
-SCDs 
-continue home protonix  
  
AAA s/p EVAR - now w/ interval increase in size on cT. CT abdo (6/3/2019): Developing saccular aneurysmal dilation along the right anterior to posterolateral aspect of the mid aorta, centered at the superior portion of the stent graft and beginning just below the level of the SMA.  Based on relatively rapid development of this finding since the 03/26/18 CTA. Followed by Dr. Frantz Mendoza office (vascular). Per PT not planning any surgery for thus. 
  
Anemia 2/2 Multifactorial Iron Def and Anemia of Chronic Kidney Disease- H/o bleeding ulcer May/2019. Ferritin 357 (1/20). Gets IV Iron infusions, last 1/21. Followed by Hematology  as OP (Dr. Megan Roldan). Hb stable in mid 8s on this admission thus far. Pt was scheduled for IV iron infusion today. Will consult heme today. -Monitor H/H w/ daily BMP 
  
Insomnia On trazodone 50mg at home 
-continue home meds 
  
CKD3 - Baseline Cr 1.32-1. 50. Cr 1.53 on admission. -monitor daily BMP 
  
UTI - recent UTI diagnosis by PCP. On Cipro. Still endorsing dysuria.  
-coverage w/ zosyn HypoNa, hypoK, HypoCl - 2/2 ? poor PO intake. -Monitor with daily BMP 
 -replete K per protocol Leukopenia (? Resolved) - [facility] admission 7.8, down to 1.4 (1/27) and on repeat 0.9. 2/2? Drug rxn. Pt received rocephin, zosyn and athro in ED. No hx in past of this per pt or on chart review. Raised crp and ESR done recently. CMP unremarkable. Peripheral smear pending. [facility] up to 7.8 today. - FU peripheral swear 
-consider heme-onc consult Nutrition - Pt thin, low alb 2.9 on admission suggesting malnutrition. Pt not eating. Will consult nutrition for recommendations. Dispo: FU PT/OT/CM for dispo recs Daughter had mPOA. No document not in system.  
  
Diet:cardiac diet DVT Prophylaxis: SCDs Code Status: Full Point of Contact: Mi Cardona (Relationship: [id number]) [phone] 
  
Disposition: [address] [place] [address], MD  
[address] Medicine Intern 01/28/20 8:47 AM

## 2020-01-28 NOTE — ROUTINE PROCESS
Bedside and verbal report received from [address] [place] [address] (offgoing nurse). Report included the following information; SBAR, MAR, LABS, Intake/output, Kardex, and summary of care. Patient resting quietly with daughter at bedside. No noted sob or distress. Will continue to monitor.

## 2020-01-28 NOTE — ROUTINE PROCESS
Bedside and Verbal shift change report given to Seun Castillo RN (oncoming nurse) by Navjot Laguerre RN (offgoing nurse). Report included the following information SBAR, Kardex, Intake/Output, MAR, Recent Results and Med Rec Status. Patient alert in bed. repositions in bed. Call bell and phone in reach.

## 2020-01-28 NOTE — PROGRESS NOTES
Problem: Mobility Impaired (Adult and Pediatric) Goal: *Acute Goals and Plan of Care (Insert Text) Description Physical Therapy Goals Initiated 1/28/2020 and to be accomplished within 7 day(s) 1. Patient will move from supine to sit and sit to supine  in bed with modified independence. 2.  Patient will transfer from bed to chair and chair to bed with modified independence using the least restrictive device. 3.  Patient will perform sit to stand with modified independence. 4.  Patient will ambulate with modified independence for 150 feet with the least restrictive device. 5.  Patient will ascend/descend 4 stairs with unilateral handrail(s) with minimal assistance/contact guard assist. 
 
PLOF: Pt reports he was independent with ADL'S shanta ambulating without AD. His daughters would come over to assist with cooking. Outcome: Progressing Towards Goal 
 PHYSICAL THERAPY EVALUATION Patient: Nelson Miguel ([de-identified] y.o. male) Date: 1/28/2020 Primary Diagnosis: Community acquired pneumonia [J18.9] Community acquired bacterial pneumonia [J15.9] Precautions: falls ASSESSMENT : 
Pt lying in bed with family at bedside. He is agreeable to PT evaluation/treatment. Based on the objective data described below, the patient presents with decrease strength, impaired balance, DUMONT/SOB, and decreased safety with functional mobility. Pt is on 2L O2 via nasal canula. He is able to complete bed transfers and ambulate using RW with CGA. Pt with DUMONT, only able to walk about 8 ft before needing to sit for rest secondary to SOB. Educated pt on role of PT, goals, energy conservation, RW management, and pursed lip breathing. Encouraged pt to spend time OOB in chair and to use call bell to walk to restroom with supervision to increase mobility. Pt needs 2-3 minute rest break and able to resume walking for another 8 ft and 12 ft, with one more seated rest break in between sets.  Pt returned to bed with all needs met. At end of session his O2 sats are 97-98% at rest.  
Patient will benefit from skilled intervention to address the above impairments. Patient's rehabilitation potential is considered to be Good Factors which may influence rehabilitation potential include:  
[]         None noted 
[]         Mental ability/status [x]         Medical condition 
[x]         Home/family situation and support systems 
[x]         Safety awareness 
[]         Pain tolerance/management 
[]         Other: PLAN : 
Recommendations and Planned Interventions:  
[x]           Bed Mobility Training             [x]    Neuromuscular Re-Education 
[x]           Transfer Training                   []    Orthotic/Prosthetic Training 
[x]           Gait Training                          []    Modalities [x]           Therapeutic Exercises           []    Edema Management/Control 
[x]           Therapeutic Activities            [x]    Family Training/Education 
[x]           Patient Education 
[]           Other (comment): Frequency/Duration: Patient will be followed by physical therapy 1-2 times per day/4-7 days per week to address goals. Discharge Recommendations: Rehab vs home health with family assistance Further Equipment Recommendations for Discharge: shower chair and rolling walker SUBJECTIVE:  
Patient stated I have a tank at home.  OBJECTIVE DATA SUMMARY:  
 
Past Medical History:  
Diagnosis Date  Aneurysm ([address] AAA repair 2006 & 2012  Aorto-iliac duplex 02/13/2015 Tech difficult. Patent aorta bi-iliac endograft w/o leak or limb dysfunction.  Arrhythmia   
 a fib  Cardiac cath 11/01/2012 RCA (sm, nondom) patent. LM patent. LAD 25%. CX (dom) 45% mid. LVEDP 12 mmHg. No WMA. PA 27/12. W 10-12. CO/CI 5.2/2.6 (TD).  Cardiac echocardiogram, abnormal 02/20/2016 EF 55%. No WMA. Indeterminate diastolic fx. RVSP 60 mmHg. Severe LAE. Mild MR. Mod TR.  IVCE. Similar to study of 10/26/12.  Cardiovascular aorto-iliac duplex 02/13/2015 Patent aorta bi-iliac endovascular graft w/o leak or limb dysfunction. Sac measures 4.21 x 4.47 cm (4.4 x 4.6 cm on 1/31/14).  Cardiovascular LE peripheral arterial testing 07/29/2013 No significant LE arterial disease bilaterally. R GHADA 1. 12.  L GHADA 1. 12.  R DBI 0.83. L DBI 0.71. Exercise deferred.  Cardiovascular renal duplex 10/31/2012 No RA stenosis. Intrinsic/med disease in left kidney. Patent aortic endograft. Patent, thrombus-free renal veins bilaterally.  Carotid duplex 07/29/2013 Mild <50% bilateral ICA plaquing.  Chronic kidney disease  Chronic lung disease  Cigarette smoker  Congestive heart failure (CHF) ([address]  COPD (chronic obstructive pulmonary disease) (HCC)   
 and emphysema; likely secondary to tobacco abuse  Difficult intubation  Dyslipidemia   
 low HDL  Emphysema   
 HTN (hypertension)  Hypercholesterolemia  Ill-defined condition   
 hernia  Increased prostate specific antigen (PSA) velocity  Long term (current) use of anticoagulants   
 coumadin  Osteoarthritis  Osteomyelitis ([address]  Paroxysmal atrial fibrillation (HCC)  Peripheral vascular disease ([address] AAA repair 12/2007  Persistent atrial Fibrillation  Persistent atrial fibrillation  Unspecified adverse effect of anesthesia   
 difficulty breathing placed in ICU Oct 2012 (AAA repair) Past Surgical History:  
Procedure Laterality Date  BRONCHOSCOPY DIAGNOSTIC  11/2/2012  CARDIAC CATHETERIZATION  11/1/2012  COLONOSCOPY N/A 7/26/2016 COLONOSCOPY with Polypectomies performed by Lisbeth Cummings MD at [facility]COPY  COLONOSCOPY N/A 5/28/2019 COLONOSCOPY with polypectomy performed by Tr Cornelius MD at [address] HX AAA REPAIR    
 2006 & 2012  HX HEART CATHETERIZATION  3/4/2009 1. RCA small, nondominant; patent. 2. LMCA patent. 3. LAD is long, wrapped around apical vessel. Diffuse, 20-30% stenosis noted. 4. CCA is large, dominant vessel. Diffuse 20-30% stenosis. 5. LVEDP is 16 mmHg. 6. Overall left ventricular systolic function mildly diminshed with est. EF 45%. Mild, global hypokinesis noted. 7. No significant mitral regurgitation or aortic stenosis noted. (see report)  HX HERNIA REPAIR  2/2014  
 rt inguinal   
 HX HERNIA REPAIR  01/2016 LEFT INGUINAL HERNIA REPAIR DR. Ana Cristina Calhoun  REPAIR ING HERNIA,5+Y/O,JESSIE Left 1-20-16 Dr. Ankiat LONG  11/1/2012 Barriers to Learning/Limitations: yes;  sensory deficits-vision/hearing/speech Compensate with: Visual Cues, Verbal Cues, and Tactile Cues Home Situation: 
Home Situation Home Environment: Private residence # Steps to Enter: 5 One/Two Story Residence: One story Living Alone: No 
Support Systems: None Patient Expects to be Discharged to[de-identified] Private residence Current DME Used/Available at Home: None Strength:   
Strength: Generally decreased, functional 
 
Tone & Sensation:  
Tone: Normal 
  
Sensation: Intact Range Of Motion: 
AROM: Within functional limits Functional Mobility: 
Bed Mobility: 
 
Supine to Sit: Contact guard assistance Sit to Supine: Contact guard assistance Transfers: 
Sit to Stand: Contact guard assistance Stand to Sit: Contact guard assistance Balance:  
Sitting: Intact; With support Standing: Impaired; With support Standing - Static: Fair Standing - Dynamic : Fair Ambulation/Gait Training: 
Distance (ft): 8 Feet (ft)(+8+12) Assistive Device: Walker, rolling Ambulation - Level of Assistance: Contact guard assistance Gait Description (WDL): Exceptions to [facility] Step Length: Left shortened;Right shortened Therapeutic Exercises:  
Educated pt on LE there-ex to improve circulation and healing. Pain: 
Pt denies pain Activity Tolerance:  
Good Please refer to the flowsheet for vital signs taken during this treatment. After treatment:  
[]         Patient left in no apparent distress sitting up in chair 
[x]         Patient left in no apparent distress in bed 
[x]         Call bell left within reach [x]         Nursing notified 
[]         Caregiver present 
[]         Bed alarm activated 
[]         SCDs applied COMMUNICATION/EDUCATION:  
[x]         Role of Physical Therapy in the acute care setting. [x]         Fall prevention education was provided and the patient/caregiver indicated understanding. [x]         Patient/family have participated as able in goal setting and plan of care. [x]         Patient/family agree to work toward stated goals and plan of care. []         Patient understands intent and goals of therapy, but is neutral about his/her participation. []         Patient is unable to participate in goal setting/plan of care: ongoing with therapy staff. 
[]         Other: Thank you for this referral. 
Nanda Camarena, PT Time Calculation: 25 mins Eval Complexity: History: MEDIUM  Complexity : 1-2 comorbidities / personal factors will impact the outcome/ POC Exam:LOW Complexity : 1-2 Standardized tests and measures addressing body structure, function, activity limitation and / or participation in recreation  Presentation: MEDIUM Complexity : Evolving with changing characteristics  Clinical Decision Making:Medium Complexity    Overall Complexity:MEDIUM

## 2020-01-28 NOTE — PROGRESS NOTES
Problem: Falls - Risk of 
Goal: *Absence of Falls Description Document Dipti Mcgraw Fall Risk and appropriate interventions in the flowsheet. Note: Fall Risk Interventions: 
Mobility Interventions: Patient to call before getting OOB Medication Interventions: Evaluate medications/consider consulting pharmacy

## 2020-01-29 PROBLEM — H25.13 AGE-RELATED NUCLEAR CATARACT, BILATERAL: Chronic | Status: ACTIVE | Noted: 2019-01-01

## 2020-01-29 PROBLEM — D50.0 IRON DEFICIENCY ANEMIA DUE TO CHRONIC BLOOD LOSS: Chronic | Status: ACTIVE | Noted: 2020-01-01

## 2020-01-29 PROBLEM — M43.00 SPONDYLOLYSIS: Chronic | Status: ACTIVE | Noted: 2019-10-24

## 2020-01-29 PROBLEM — R29.898 LOWER EXTREMITY WEAKNESS: Chronic | Status: ACTIVE | Noted: 2019-10-24

## 2020-01-29 PROBLEM — K21.9 GERD (GASTROESOPHAGEAL REFLUX DISEASE): Chronic | Status: ACTIVE | Noted: 2019-10-24

## 2020-01-29 PROBLEM — N40.0 ENLARGED PROSTATE WITHOUT LOWER URINARY TRACT SYMPTOMS (LUTS): Chronic | Status: ACTIVE | Noted: 2019-10-24

## 2020-01-29 NOTE — PROGRESS NOTES
NUTRITION Nutrition Consult: General Nutrition Management & Supplements RECOMMENDATIONS / PLAN:  
 
- Add food preferences - Add nutrition supplement: Ensure Enlive once daily 
- Continue RD inpatient monitoring and evaluation. NUTRITION INTERVENTIONS & DIAGNOSIS:  
 
- Meals/snacks: modified composition - Medical food supplement therapy: initiate Nutrition Diagnosis: Chronic disease or condition related malnutrition related to early satiety with COPD as evidenced by recent weight loss (14.1% in th last year, 8.9% in the last month) and mild subcutaneous fat loss in multiple body regions. Patient meets criteria for Moderate Protein Calorie Malnutrition as evidenced by:  
ASPEN Malnutrition Criteria Acute Illness, Chronic Illness, or Social/Enviornmental: Chronic illness Body Fat Loss: Mild(orbital; moderate in upper arm) Muscle Mass Loss: Mild(temporal, hand, and clavicle regions) ASPEN Malnutrition Score - Chronic Illness: 2 Chronic Illness - Malnutrition Diagnosis: Moderate malnutrition. ASSESSMENT:h   
 
Admitted for pneumonia with history of COPD and HF. Pt mildly SOB during conversation. Pt reports inability to eat large amounts of food PTA, with a normal meal pattern of a nutritional shake for breakfast an several small meals/snacks throughout the day, with his daughter preparing dinner. Fair intake since admission, tolerating diet. Limited NFPE conducted. Discussed nutrition supplement for breakfast to coordinate with pt at-home routine, pt agreeable to Ensure. Miralax started for constipation. Nutritional intake adequate to meet patients estimated nutritional needs:  No 
 
Diet: DIET CARDIAC Regular Food Allergies: NKFA Current Appetite: Fair Appetite/meal intake prior to admission: Fair, Pt reports small meals, snacks, and one daily supplement shake ([place] Instant Breakfast or similar) Feeding Limitations:  [] Swallowing difficulty    [x] Chewing difficulty: edentulous with dentures, pt reports mild fit issues Current Meal Intake:  
Patient Vitals for the past 100 hrs: 
 % Diet Eaten 01/29/20 1350 50 % 01/29/20 1014 75 % 01/28/20 1715 60 % 01/28/20 1516 75 % 01/28/20 1048 50 % BM: 1/25 Skin Integrity: No pressure injuries noted Edema:   [x] No     [] Yes Pertinent Medications: pantoprazole, miralax, prednisone, pravastatin Recent Labs  
  01/29/20 
0524 01/28/20 
0528 01/27/20 
1245 * 132* 134* K 3.7 3.7 3.9  100 100 CO2 27 26 26 * 126* 182* BUN 25* 25* 24* CREA 1.41* 1.41* 1.48* CA 8.2* 8.2* 8.3* ALB  --   --  2.9*  
SGOT  --   --  35 ALT  --   --  21 Intake/Output Summary (Last 24 hours) at 1/29/2020 1558 Last data filed at 1/29/2020 1350 Gross per 24 hour Intake 1060 ml Output 1000 ml Net 60 ml Anthropometrics: 
Ht Readings from Last 1 Encounters:  
01/16/20 5' 6\" (1.676 m) Last 3 Recorded Weights in this Encounter 01/26/20 2936 01/28/20 8985 01/29/20 8796 Weight: 64 kg (141 lb) 59.7 kg (131 lb 11.2 oz) 60 kg (132 lb 4.8 oz) [facility] Body mass index is 21.35 kg/m². Weight History: Pt reports  lbs x 1 year PTA with gradual unintentional loss. Per chart:  -13 lbs (8.9%) in last month, diuresis noted at admission; -22 lbs (14.1%) in the last year Weight Metrics 1/29/2020 1/26/2020 1/16/2020 12/19/2019 11/14/2019 11/6/2019 10/24/2019 Weight 132 lb 4.8 oz - 141 lb 9.6 oz 145 lb 3.2 oz 142 lb 12.8 oz 141 lb 9.6 oz 141 lb BMI - 21.35 kg/m2 22.85 kg/m2 23.44 kg/m2 23.05 kg/m2 22.85 kg/m2 22.76 kg/m2 Admitting Diagnosis: Community acquired pneumonia [J18.9] Community acquired bacterial pneumonia [J15.9] Pertinent PMHx: Bleeding gastric ulcer, AAA repair, CKD, dyslipidemia, HTN, COPD, Heart Failure 
,  
Education Needs:        [x] None identified  [] Identified - Not appropriate at this time  []  Identified and addressed - refer to education log Learning Limitations:   [x] None identified  [] Identified Cultural, [employer]alism & ethnic food preferences:  [x] None identified    [] Identified and addressed ESTIMATED NUTRITION NEEDS:  
 
Calories: 6593-8050 kcal (MSJx1.2-1.3) based on  [x] Actual BW: 60 kg     [] IBW Protein:  48-72 gm (0.8-1.2 gm/kg) based on  [x] Actual BW: 60 kg      [] IBW Fluid: 1 mL/kcal 
  
MONITORING & EVALUATION:  
 
Nutrition Goal(s):  
- PO nutrition intake will meet >75% of patient estimated nutritional needs within the next 7 days. Outcome: New/Initial goal  
 
Monitoring:   [x] Food and nutrient intake   [x] Food and nutrient administration  [x] Comparative standards   [x] Nutrition-focused physical findings   [x] Anthropometric Measurements   [x] Treatment/therapy   [x] Biochemical data, medical tests, and procedures Previous Recommendations (for follow-up assessments only):  Not Applicable Discharge Planning: No nutritional discharge needs at this time. Participated in care planning, discharge planning, & interdisciplinary rounds as appropriate. Roberto Oakes, Dietetic Intern Pager: [phone]

## 2020-01-29 NOTE — PROGRESS NOTES
Received report on pt.from off going RN. Resting quietly in bed on rounds. Denies c/o pain or SOB at this time. No acute distress noted. Call bell at side. Will cont to monitor for any changes in status. 1940 Bedside and Verbal shift change report given to Anabel MORTON (oncoming nurse) by Corey Jeans, RN (offgoing nurse). Report given with George SERNA and MAR.

## 2020-01-29 NOTE — PROGRESS NOTES
[employer] [facility] Pulmonary, Critical Care, and Sleep Medicine Name: Jenna Escalante MRN: [id number] : 1940 Hospital: [address] [place] [address] Date: 2020 IMPRESSION:  
· Acute hypoxic respiratory failure - Better but still short of breath; saturating 96% at 3L. · COPD exacerbation - improved today but still wheezing. · Pneumonia - seems to be getting better. Remains afebrile and no leukocytosis. Legionella/strep PNA are negative. · Leukopenia - resolved. Peripheral smear pending · HFpEF - slightly volume up with elevated JVP · Afib not on [facility] · AAA s/p EVAR 
· Anemia 2/2 Iron deficiency and CKD · CKD 3 - baseline creatinine · UTI - recent diagnosis PLAN:  
· Oxygen - Titrate to 92% and above. Avoid hyperoxia · COPD - Continue prednisone for a total of 5 day course. Continue duo-neb QiD and pulmicort nebs. · Chronic COPD - not a candidate for chronic zithromax or dailyresp at the moment. Will continue to reassess. May benefit from BiV - can be explored as outpatient with Dr. Katelyn Anne. · Pneumonia - Continue Zosyn and Azithromycin. Will treat for a maximum of 5 days. Follow respiratory cultures - if remain negative, can probably switch him to oral regimen of cefpodoxime with PO zithromax. · HFpEF - Stable. Can check TTE and give one time dose of Lasix today if his respiratory status worsens. · Afrin vs flonase for stuffed nose. · Follow peripheral smear · Out patient testing- PFT, 6 min walk,  
· OT, PT, OOB and ambulate · Will Follow · DVT, PUD prophylaxis Subjective/Interval History: In no acute distress. Still slightly short of breath. Feels his wheezing is better today and dyspnea is improving. ROS:Pertinent items are noted in HPI. Objective:  
Vital Signs:   
Visit Vitals /78 (BP 1 Location: Left arm, BP Patient Position: At rest) Pulse 98 Temp 97.2 °F (36.2 °C) Resp 21 Wt 60 kg (132 lb 4.8 oz) SpO2 100% BMI 21.35 kg/m² O2 Device: Nasal cannula O2 Flow Rate (L/min): 2 l/min Temp (24hrs), Av.3 °F (36.3 °C), Min:97.1 °F (36.2 °C), Max:97.7 °F (36.5 °C) Intake/Output:  
Last shift:      No intake/output data recorded. Last 3 shifts:  1901 -  0700 In: 900 [P.O.:900] Out: 1475 [SOCTN:4617] Intake/Output Summary (Last 24 hours) at 2020 0940 Last data filed at 2020 5454 Gross per 24 hour Intake 900 ml Output 1025 ml Net -125 ml Physical Exam:  Saturating 96% on 2L General: in no apparent distress HEENT: Normal 
 Neck: No abnormally enlarged lymph nodes. Slightly elevated JVP Chest: normal 
 Lungs: end expiratory wheeze Heart: Irregular rate/rhythm Abdomen: abdomen is soft without significant tenderness, masses, organomegaly or guarding Extremity: negative Neuro: alert Skin: Skin color, texture, turgor normal. No rashes or lesions DATA: 
Labs: 
Recent Labs  
  20 
0524 20 
0528 20 
1245 WBC 8.8 9.6 2.2* HGB 8.0* 7.8* 7.9*  
HCT 25.2* 24.4* 24.6*  
 182 164 Recent Labs  
  20 
0524 20 
0528 20 
1245  20 
1003 * 132* 134*   < > 134* K 3.7 3.7 3.9   < > 4.0  
 100 100   < > 100 CO2 27 26 26   < > 27 * 126* 182*   < > 129* BUN 25* 25* 24*   < > 19* CREA 1.41* 1.41* 1.48*   < > 1.53* CA 8.2* 8.2* 8.3*   < > 8.5 ALB  --   --  2.9*  --   --   
SGOT  --   --  35  --   --   
ALT  --   --  21  --   --   
INR  --   --   --   --  1.1  
 < > = values in this interval not displayed. Imaging: 
[x]I have personally reviewed the patients radiographs []Radiographs reviewed with radiologist 
 []No change from prior, tubes and lines in adequate position []Improved   []Worsening High complexity decision making was performed during the evaluation of this patient at high risk for decompensation with multiple organ involvement 
  
 Above mentioned total time spent on reviewing the case/medical record/data/notes/EMR/patient examination/documentation/coordinating care with nurse/consultants, exclusive of procedures with complex decision making performed and > 50% time spent in face to face evaluation. Wyatt Jarquin MD 
Pulmonary & Critical Care Fellow PGY5

## 2020-01-29 NOTE — PROGRESS NOTES
Intern Progress Note [address] [facility] Patient: Magali Hoover MRN: [id number]  CSN: [id number] YOB: 1940  Age: [de-identified] y.o. Sex: male DOA: 1/26/2020 LOS:  LOS: 3 days Subjective:  
 
Acute events: none Pt awake, alert and clam this AM. Breathing comfortably on 2L NC. Pt says his breathing has improved since yesterdau Pt reports duo-neb tx helps for >1hr at a time. No SOB at rest, no CP but remains DUMONT. Nasal dryness has improved with saline nasal spray. No longer complaining of dysuria (dignosed with UTI recently on abx prior to admission). Pt hasnt has BM since admission, willing to try miralax today. ROS General - well Cardio - No CP, no Palpitations Resp - No SOB, +cough, +wheeze, +DUMONT Abdo-  No pain, N/V/D/C 
 - No dysuria Objective:  
  
Patient Vitals for the past 24 hrs: 
 Temp Pulse Resp BP SpO2  
01/29/20 0312 97.5 °F (36.4 °C) 65 20 130/61 100 % 01/29/20 0003 97.3 °F (36.3 °C) 99 20 133/67 98 % 01/28/20 2018 97.7 °F (36.5 °C) 66 20 136/68 100 % 01/28/20 1918     97 % 01/28/20 1614     99 % 01/28/20 1600 97.1 °F (36.2 °C) 100 21 139/62 100 % 01/28/20 1231     99 % 01/28/20 1122 97.1 °F (36.2 °C) 100 20 136/62 99 % 01/28/20 0942     98 % 01/28/20 0759 97.2 °F (36.2 °C) 89 21 126/65 98 % Intake/Output Summary (Last 24 hours) at 1/29/2020 8099 Last data filed at 1/29/2020 7163 Gross per 24 hour Intake 900 ml Output 1025 ml Net -125 ml Physical Exam: 
General: Thin male, NAD,  Alert and oriented X 3 CV: Irreg irreg with Gr II/VI sys murmurs Lungs: dec aeration throughout, diffuse expiratory wheezes all over Abd: + BS, mildly distended, non-tender, no masses, no rebound or guarding. Motor: Normal and equal strength upper and lower ext bilat. Extremities  No calf tenderness or swelling. No edema. Warm feet. Lab/Data Reviewed: 
BMP:  
Lab Results Component Value Date/Time  (L) 01/29/2020 05:24 AM  
 K 3.7 01/29/2020 05:24 AM  
  01/29/2020 05:24 AM  
 CO2 27 01/29/2020 05:24 AM  
 AGAP 4 01/29/2020 05:24 AM  
  (H) 01/29/2020 05:24 AM  
 BUN 25 (H) 01/29/2020 05:24 AM  
 CREA 1.41 (H) 01/29/2020 05:24 AM  
 GFRAA 59 (L) 01/29/2020 05:24 AM  
 GFRNA 48 (L) 01/29/2020 05:24 AM  
 
CBC:  
Lab Results Component Value Date/Time WBC 8.8 01/29/2020 05:24 AM  
 HGB 8.0 (L) 01/29/2020 05:24 AM  
 HCT 25.2 (L) 01/29/2020 05:24 AM  
  01/29/2020 05:24 AM  
  
 
Scheduled Medications Reviewed: 
Current Facility-Administered Medications Medication Dose Route Frequency  traZODone (DESYREL) tablet 50 mg  50 mg Oral QHS  predniSONE (DELTASONE) tablet 40 mg  40 mg Oral DAILY WITH BREAKFAST  budesonide (PULMICORT) 500 mcg/2 ml nebulizer suspension  500 mcg Nebulization BID RT  
 digoxin (LANOXIN) tablet 0.125 mg  0.125 mg Oral DAILY  amLODIPine (NORVASC) tablet 10 mg  10 mg Oral DAILY  pantoprazole (PROTONIX) tablet 40 mg  40 mg Oral ACB  pravastatin (PRAVACHOL) tablet 40 mg  40 mg Oral QHS  azithromycin (ZITHROMAX) 500 mg in  mL  500 mg IntraVENous Q24H  piperacillin-tazobactam (ZOSYN) 3.375 g in 0.9% sodium chloride (MBP/ADV) 100 mL MBP  3.375 g IntraVENous Q6H  
 albuterol-ipratropium (DUO-NEB) 2.5 MG-0.5 MG/3 ML  3 mL Nebulization Q4H RT  
 
 
Assessment/Plan  
 
[de-identified] man PMH of AAA s/p repair, HF, COPD, HTN/HLD, afib on no AC due to gastric ulcer presenting with cough and DUMONT. 
  
Acute Hypoxic Resp failure 2/2 COPD exast + RLL PNA Hypoxia to mid 80s reported by EMS.  Sating 91% on 2L in ED. Pt hs cough, and DUMONT. Mild productive cough and diffuse exp wheeze through. No CP, trops neg. Pt has a history of COPD on 2L 02 at baseline. CXR shows RLL infiltrate, suggesting CAP vs aspiration. Being treated w/ IV zosyn and azithro.  Pt breathing continues to improve, breathing comfortably on 2L NC. Still w/ exp wheeze. Passed bedside swallow and placed on cardiac diet. Pt reporting increased difficulty breathing this Am, no increased WOB, sating fine on home 2L NC. Pulm consulted Poppy Bryan) 1/28. Legionellae and strep pneumo neg. Resp cultures ordered but not done yet.  
  
Plan: - IV zosyn and azithro (CAP + pseudomonas + anaerobe coverage) -> convert to PO if cultures neg (cefpodoxime and azithro) - FU Bcx (negative thus far) 
- duo-neb q4 hours - IV methylpred 60mg qD converted to PO prednisone 40mg, 5 day course then taper (day 2/5 today) - Pulmicort neb 
- saline nasal spray for dry nose 
 -suppl O2 titrate to >92% 
  
COPD Pt followed by Pulmonology (Dr. Sixto Argueta). Last FEV1 48%,On 2L home 02 at baseline. On albuterol neb and ipratropium inhaler. 
-Hold home inhalers.  
-For tx as above section 
  
Diastolic HF/CAD/HTN/HLD - Followed by cardiology (Dr. Maria Aleman) . No CP. Trops neg. BNP 7000. ECHO (6/3):  EF 56 - 60%. Abnormal left ventricular septal motion. Interventricular septal \"bounce\". Pt has no orthopnea or PE. Unlikely that HF is contributing to resp status at current time. On amlodipine 10mg qD, lisinopril 10qD, digoxin 0.125mg. BP in ED SBP 120s. Digoxin level remains at 1.    
-hold home HTN meds for now  
-continue digoxin, monitor daily levels while on azithro 
-cardiac monitoring 
  
Afib not on [facility] Chronic afib was on warfain until recently. Had bleeding gastric ulcer and warfarin held. Hb stable thus far. 
-hold warfarin 
-SCDs 
-continue home protonix  
  
AAA s/p EVAR - now w/ interval increase in size on cT. CT abdo (6/3/2019): Developing saccular aneurysmal dilation along the right anterior to posterolateral aspect of the mid aorta, centered at the superior portion of the stent graft and beginning just below the level of the SMA.  Based on relatively rapid development of this finding since the 03/26/18 CTA. Followed by Dr. Esposito Fu office (vascular). Per PT not planning any surgery for thus. 
  
Anemia 2/2 Multifactorial Iron Def and Anemia of Chronic Kidney Disease- H/o bleeding ulcer May/2019. Ferritin 357 (1/20). Gets IV Iron infusions, last 1/21. Followed by Hematology  as OP (Dr. Jose Castro). Hb stable in mid 8s on this admission thus far. Pt was scheduled for IV iron infusion today. Will consult heme today to determine if PT need IV iron infusion here or can it be done following this admission. 
-Monitor H/H w/ daily BMP Chronic pain lower legs. On narco at home. Pain contract w/ PFM. -continue narco 5-325mg PO  BID as needed Constipation - has no had BM since Friday. Abdomen tight this AM.  
-Miralax daily Insomnia On trazodone 50mg at home 
-continue home meds 
  
CKD3 - Baseline Cr 1.32-1. 50. Cr 1.53 on admission. -monitor daily BMP 
  
UTI - recent UTI diagnosis by PCP. On Cipro. Still endorsing dysuria.  
-coverage w/ zosyn HypoNa, hypoK, HypoCl - 2/2 ? poor PO intake. -Monitor with daily BMP 
 -replete K per protocol Leukopenia (? Resolved) - [facility] admission 7.8, down to 1.4 (1/27) and on repeat 0.9. 2/2? Drug rxn. Pt received rocephin, zosyn and athro in ED. No hx in past of this per pt or on chart review. Raised crp and ESR done recently. CMP unremarkable. Peripheral smear unremarkable. [facility] up to 9.6 from 7.8 yesterday.  
-consider heme-onc consult Nutrition - Pt thin, low alb 2.9 on admission suggesting malnutrition. Pt not eating. Will consult nutrition for recommendations. Dispo: FU PT/OT - Home w/ HH or inpatient rehab Daughter had mPOA. No document not in system.  
  
Diet:cardiac diet DVT Prophylaxis: SCDs Code Status: Full Point of Contact: Mi Cardona (Relationship: [facility]) [phone] 
  
Disposition: [address] [place] [address] Road, MD  
PBRAEDEN [address] Medicine Intern 01/29/20 8:47 AM

## 2020-01-29 NOTE — PROGRESS NOTES
Bedside shift change report given to Musa Kitchen RN (oncoming nurse) by Betty Larson RN (offgoing nurse). Report included the following information SBAR, Kardex and MAR.

## 2020-01-29 NOTE — PROGRESS NOTES
Problem: Self Care Deficits Care Plan (Adult) Goal: *Acute Goals and Plan of Care (Insert Text) Description Occupational Therapy Goals Initiated 1/29/2020 within 7 day(s). 1.  Patient will perform grooming tasks while standing for 5 mins with < 2 seated rest breaks with modified independence. 2.  Patient will perform lower body dressing with independence and no more than 1 rest break. 3.  Patient will perform functional task in standing for 8 minutes with independence & minimal verbal cues for activity pacing in prep for ADLs. 4.  Patient will perform toilet transfers with independence. 5.  Patient will perform all aspects of toileting with independence. 6.  Patient will participate in upper extremity therapeutic exercise/activities with modified independence for 8 minutes to maintain/increase BUE strength for functional transfers & ADLs. 7.  Patient will utilize energy conservation techniques during functional activities with minimal verbal cues. PLOF: Pt was independent with ADLs. 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 Has good family support. Outcome: Progressing Towards Goal 
 
OCCUPATIONAL THERAPY EVALUATION Patient: Lillian Sosa ([de-identified] y.o. male) Date: 1/29/2020 Primary Diagnosis: Community acquired pneumonia [J18.9] Community acquired bacterial pneumonia [J15.9] Precautions:  Fall(Oxygen) PLOF: Pt reports independence with ADLs & functional mobility. ASSESSMENT : 
Based on the objective data described below, the patient presents with supervision for bed mobility, SBA/CGA for functional transfers & supervision/mod I for ADLs secondary to pnuemonia. Pt with no c/o pre/post session. O2 98% on 2L; HR elevated to 115-120 bpm during bathroom mobility; HR decreases to 90s bpm with rest breaks. Supervision for bed mobility; intact sitting balance. AROM/strength of BUEs WFL.  SBA for toilet transfer, mod I for toileting task, supervision for hand hygiene while standing at sink. Pt requiring multiple seated/standing rest breaks due to SOB on exertion. Pt left sitting at EOB with all needs met. Pt may benefit from short SNF stay to maximize activity tolerance, endurance & ADL participation. Patient will benefit from skilled intervention to address the above impairments. Patient's rehabilitation potential is considered to be Fair Factors which may influence rehabilitation potential include:  
[]             None noted []             Mental ability/status [x]             Medical condition []             Home/family situation and support systems []             Safety awareness []             Pain tolerance/management 
[]             Other: PLAN : 
Recommendations and Planned Interventions:  
[x]               Self Care Training                  [x]      Therapeutic Activities [x]               Functional Mobility Training   []      Cognitive Retraining 
[x]               Therapeutic Exercises           [x]      Endurance Activities [x]               Balance Training                    []      Neuromuscular Re-Education []               Visual/Perceptual Training     [x]      Home Safety Training 
[x]               Patient Education                   [x]      Family Training/Education []               Other (comment): Frequency/Duration: Patient will be followed by occupational therapy 3-5 times a week to address goals. Discharge Recommendations: Pt may benefit from short SNF stay Further Equipment Recommendations for Discharge: anticipate none SUBJECTIVE:  
Patient stated I'm one of 10 children.  OBJECTIVE DATA SUMMARY:  
 
Past Medical History:  
Diagnosis Date Aneurysm ([address] AAA repair 2006 & 2012 Aorto-iliac duplex 02/13/2015 Tech difficult. Patent aorta bi-iliac endograft w/o leak or limb dysfunction. Arrhythmia   
 a fib Cardiac cath 11/01/2012 RCA (sm, nondom) patent. LM patent. LAD 25%. CX (dom) 45% mid. LVEDP 12 mmHg. No WMA. PA 27/12. W 10-12. CO/CI 5.2/2.6 (TD). Cardiac echocardiogram, abnormal 02/20/2016 EF 55%. No WMA. Indeterminate diastolic fx. RVSP 60 mmHg. Severe LAE. Mild MR. Mod TR.  IVCE. Similar to study of 10/26/12. Cardiovascular aorto-iliac duplex 02/13/2015 Patent aorta bi-iliac endovascular graft w/o leak or limb dysfunction. Sac measures 4.21 x 4.47 cm (4.4 x 4.6 cm on 1/31/14). Cardiovascular LE peripheral arterial testing 07/29/2013 No significant LE arterial disease bilaterally. R GHADA 1. 12.  L GHADA 1. 12.  R DBI 0.83. L DBI 0.71. Exercise deferred. Cardiovascular renal duplex 10/31/2012 No RA stenosis. Intrinsic/med disease in left kidney. Patent aortic endograft. Patent, thrombus-free renal veins bilaterally. Carotid duplex 07/29/2013 Mild <50% bilateral ICA plaquing. Chronic kidney disease Chronic lung disease Cigarette smoker Congestive heart failure (CHF) ([address] COPD (chronic obstructive pulmonary disease) (HCC)   
 and emphysema; likely secondary to tobacco abuse Difficult intubation Dyslipidemia   
 low HDL Emphysema HTN (hypertension) Hypercholesterolemia Ill-defined condition   
 hernia Increased prostate specific antigen (PSA) velocity Long term (current) use of anticoagulants   
 coumadin Osteoarthritis Osteomyelitis ([address] Paroxysmal atrial fibrillation (HCC) Peripheral vascular disease ([address] AAA repair 12/2007 Persistent atrial Fibrillation Persistent atrial fibrillation Unspecified adverse effect of anesthesia   
 difficulty breathing placed in ICU Oct 2012 (AAA repair) Past Surgical History:  
Procedure Laterality Date BRONCHOSCOPY DIAGNOSTIC  11/2/2012 CARDIAC CATHETERIZATION  11/1/2012 COLONOSCOPY N/A 7/26/2016 COLONOSCOPY with Polypectomies performed by Deandre Denny MD at [address] N/A 5/28/2019 COLONOSCOPY with polypectomy performed by Cosmo Choudhary MD at [address] AAA REPAIR    
 2006 & 2012 HX HEART CATHETERIZATION  3/4/2009 1. RCA small, nondominant; patent. 2. LMCA patent. 3. LAD is long, wrapped around apical vessel. Diffuse, 20-30% stenosis noted. 4. CCA is large, dominant vessel. Diffuse 20-30% stenosis. 5. LVEDP is 16 mmHg. 6. Overall left ventricular systolic function mildly diminshed with est. EF 45%. Mild, global hypokinesis noted. 7. No significant mitral regurgitation or aortic stenosis noted. (see report) HX HERNIA REPAIR  2/2014  
 rt inguinal   
 HX HERNIA REPAIR  01/2016 LEFT INGUINAL HERNIA REPAIR DR. Marivel Hawkins ING HERNIA,5+Y/O,JESSIE Left 1-20-16 Dr. Shaylee LONG  11/1/2012 Barriers to Learning/Limitations: None Compensate with: visual, verbal, tactile, kinesthetic cues/model Home Situation:  
Home Situation Home Environment: Private residence # Steps to Enter: 5 One/Two Story Residence: One story Living Alone: No 
Support Systems: Family member(s) Patient Expects to be Discharged to[de-identified] Private residence Current DME Used/Available at Home: Oxygen, portable, Shower chair, Commode, bedside, Wheelchair, Walker, rolling Tub or Shower Type: Shower(has shower chair) [x]  Right hand dominant   []  Left hand dominant Cognitive/Behavioral Status: 
Neurologic State: Alert Orientation Level: Oriented X4 Cognition: Appropriate decision making; Appropriate for age attention/concentration; Appropriate safety awareness Safety/Judgement: Awareness of environment; Fall prevention; Insight into deficits Skin: Intact (BUEs) Edema: None noted (BUEs) Vision/Perceptual:   
Acuity: Within Defined Limits Coordination: BUE Coordination: Within functional limits Fine Motor Skills-Upper: Right Intact; Left Intact Gross Motor Skills-Upper: Right Intact; Left Intact Balance: 
Sitting: Intact Standing: Intact; Without support Strength: BUE Strength: Generally decreased, functional 
 
Tone & Sensation: BUE Tone: Normal 
Sensation: Intact Range of Motion: BUE 
AROM: Within functional limits Functional Mobility and Transfers for ADLs: 
Bed Mobility: 
Supine to Sit: Supervision Sit to Supine: (left sitting at EOB) Transfers: 
Sit to Stand: Contact guard assistance Stand to Sit: Stand-by assistance Toilet Transfer : Stand-by assistance Bathroom Mobility: Contact guard assistance ADL Assessment:  
Feeding: Independent Oral Facial Hygiene/Grooming: Modified Independent Bathing: Supervision; Additional time Upper Body Dressing: Independent Lower Body Dressing: Modified independent Toileting: Modified independent ADL Intervention: 
Grooming Grooming Assistance: Supervision Washing Hands: Supervision(while standing) Toileting Toileting Assistance: Modified independent Bladder Hygiene: Modified independent Clothing Management: Modified independent SBA for toilet transfer for safety; mod I to clothing management & hygiene while seated on toilet. Supervision for hand hygiene while standing at sink. Cognitive Retraining Safety/Judgement: Awareness of environment; Fall prevention; Insight into deficits Pain: 
Pain level pre-treatment: 0/10 Pain level post-treatment: 0/10 Activity Tolerance:  Fair- 
Please refer to the flowsheet for vital signs taken during this treatment. After treatment:  
[] Patient left in no apparent distress sitting up in chair 
[x] Patient left in no apparent distress at EOB [x] Call bell left within reach [x] Nursing notified 
[] Caregiver present 
[] Bed alarm activated COMMUNICATION/EDUCATION:  
[x] Role of Occupational Therapy in the acute care setting 
[x] Home safety education was provided and the patient/caregiver indicated understanding. [x] Patient/family have participated as able in goal setting and plan of care. [] Patient/family agree to work toward stated goals and plan of care. [] Patient understands intent and goals of therapy, but is neutral about his/her participation. [] Patient is unable to participate in goal setting and plan of care. Thank you for this referral. 
Amador Hammond MS  OTR/L Time Calculation: 23 mins Eval Complexity: History: MEDIUM Complexity : Expanded review of history including physical, cognitive and psychosocial  history ; Examination: MEDIUM Complexity : 3-5 performance deficits relating to physical, cognitive , or psychosocial skils that result in activity limitations and / or participation restrictions; Decision Making:MEDIUM Complexity : Patient may present with comorbidities that affect occupational performnce. Miniml to moderate modification of tasks or assistance (eg, physical or verbal ) with assesment(s) is necessary to enable patient to complete evaluation

## 2020-01-29 NOTE — PROGRESS NOTES
*ATTENTION:  This note has been created by a medical student for educational purposes only. Please do not refer to the content of this note for clinical decision-making, billing, or other purposes. Please see attending physicians note to obtain clinical information on this patient. * Intern Progress Note [address] Patient: Leena Sethi MRN: [id number]  CSN: [id number] YOB: 1940  Age: [de-identified] y.o. Sex: male DOA: 1/26/2020 LOS:  LOS: 3 days Subjective:  
 
Acute events: YULIET overnight Pt awake and alert this morning. Breathing more comfortably this morning as long as he's not active. Pt reports improvement in dysuria. Still no BM, states stomach feels \"tight. \" Slept better last night with scheduled trazadone, though he was awakened multiple times for meds/ vitals/ etc. 
 
Review of Systems Constitutional: Negative for chills, diaphoresis and malaise/fatigue. HENT: Positive for hearing loss. Negative for congestion and sinus pain. Respiratory: Positive for shortness of breath. Negative for cough and hemoptysis. Cardiovascular: Negative for chest pain, palpitations and leg swelling. Gastrointestinal: Positive for constipation. Negative for nausea and vomiting. Genitourinary: Negative for dysuria and hematuria. Neurological: Negative for dizziness and headaches. Psychiatric/Behavioral: The patient does not have insomnia. Objective:  
  
Patient Vitals for the past 24 hrs: 
 Temp Pulse Resp BP SpO2  
01/29/20 0831 97.2 °F (36.2 °C) 98 21 150/78 100 % 01/29/20 0828     97 % 01/29/20 0312 97.5 °F (36.4 °C) 65 20 130/61 100 % 01/29/20 0003 97.3 °F (36.3 °C) 99 20 133/67 98 % 01/28/20 2018 97.7 °F (36.5 °C) 66 20 136/68 100 % 01/28/20 1918     97 % 01/28/20 1614     99 % 01/28/20 1600 97.1 °F (36.2 °C) 100 21 139/62 100 % 01/28/20 1231     99 % 01/28/20 1122 97.1 °F (36.2 °C) 100 20 136/62 99 % 01/28/20 0942     98 % Intake/Output Summary (Last 24 hours) at 1/29/2020 5989 Last data filed at 1/29/2020 2921 Gross per 24 hour Intake 900 ml Output 1025 ml Net -125 ml Physical Exam: 
General:  Alert and Responsive and in No acute distress. HEENT: EOMI. Moist mucous membranes. No  lymphadenopathy. No other gross abnormalities present. CV:  RRR, no murmurs. No visible pulsations or thrills. RESP:  Unlabored breathing. Continues to have diffuse wheezing with rhonchi on right in all lung fields. He was examined just before his scheduled duo neb tx. ABD:  Soft, nontender, slight distention. No hepatosplenomegaly. No suprapubic tenderness. No CVA tenderness. MS:  No joint deformity or instability. Neuro:  5/5 strength bilateral upper extremities and lower extremities. A+Ox3. Ext:  No edema. 2+ radial and dp pulses bilaterally. Skin:  No rashes, lesions, or ulcers. Good turgor. Lines:  
Drains: Airway: 2 L nasal canula Lab/Data Reviewed: 
CMP:  
Lab Results Component Value Date/Time  (L) 01/29/2020 05:24 AM  
 K 3.7 01/29/2020 05:24 AM  
  01/29/2020 05:24 AM  
 CO2 27 01/29/2020 05:24 AM  
 AGAP 4 01/29/2020 05:24 AM  
  (H) 01/29/2020 05:24 AM  
 BUN 25 (H) 01/29/2020 05:24 AM  
 CREA 1.41 (H) 01/29/2020 05:24 AM  
 GFRAA 59 (L) 01/29/2020 05:24 AM  
 GFRNA 48 (L) 01/29/2020 05:24 AM  
 CA 8.2 (L) 01/29/2020 05:24 AM  
 
CBC:  
Lab Results Component Value Date/Time WBC 8.8 01/29/2020 05:24 AM  
 HGB 8.0 (L) 01/29/2020 05:24 AM  
 HCT 25.2 (L) 01/29/2020 05:24 AM  
  01/29/2020 05:24 AM  
  
 
Scheduled Medications Reviewed: 
Current Facility-Administered Medications Medication Dose Route Frequency  traZODone (DESYREL) tablet 50 mg  50 mg Oral QHS  predniSONE (DELTASONE) tablet 40 mg  40 mg Oral DAILY WITH BREAKFAST  budesonide (PULMICORT) 500 mcg/2 ml nebulizer suspension  500 mcg Nebulization BID RT  
 digoxin (LANOXIN) tablet 0.125 mg  0.125 mg Oral DAILY  amLODIPine (NORVASC) tablet 10 mg  10 mg Oral DAILY  pantoprazole (PROTONIX) tablet 40 mg  40 mg Oral ACB  pravastatin (PRAVACHOL) tablet 40 mg  40 mg Oral QHS  azithromycin (ZITHROMAX) 500 mg in  mL  500 mg IntraVENous Q24H  piperacillin-tazobactam (ZOSYN) 3.375 g in 0.9% sodium chloride (MBP/ADV) 100 mL MBP  3.375 g IntraVENous Q6H  
 albuterol-ipratropium (DUO-NEB) 2.5 MG-0.5 MG/3 ML  3 mL Nebulization Q4H RT Imaging, microbiology, and EKG/Telemetry: No new Assessment/Plan  
 
[de-identified] man PMH of AAA s/p repair, HF, COPD, HTN/HLD, afib on no AC due to gastric ulcer presenting with cough and DUMONT. 
  
Hypoxia and DUMONT 2/2 COPD exast +/- RLL PNA Hypoxia to mid 80s reported by EMS.  Sating 91% on 2L in ED. Pt hs cough, and DUMONT. Mild productive cough and diffuse exp wheeze through. No CP, trops neg. Pt has a history of COPD on 2L 02 at baseline. CXR shows RLL infiltrate, suggesting CAP vs aspiration. Pt breathing continues to improve, breathing comfortably on 3L NC. Still w/ exp wheeze. Passed bedside swallow and placed on cardiac diet. Pt reporting increased difficulty breathing this Am, no increased WOB, sating fine on home 2L NC. Pulm consulted Skip Wolff) 1/28. Legionellae and strep pneumo neg. Resp cultures ordered but not done yet.  
  
Plan: - IV zosyn and azithro (CAP + pseudomonas + anaerobe coverage) -> convert to PO if cultures neg (cefpodoxime and azithro) - FU Bcx (negative thus far) 
- duo-neb q4 hours - IV methylpred 60mg qD converted to PO prednisone 40mg, 5 day course then taper (day 2/5 today) - Pulmicort neb 
- saline nasal spray for dry nose 
 -suppl O2 titrate to >92% 
  
COPD Pt followed by Pulmonology (Dr. Danyelle Laguna 48%,On 2L home 02 at baseline. On albuterol neb and ipratropium inhaler. -Hold home inhalers.  
-For tx as above section 
  
Diastolic HF/CAD/HTN/HLD - Followed by cardiology (Dr. Amairani Zhu) . No CP. Trops neg. BNP 7000. ECHO (6/3):  EF 56 - 60%. Abnormal left ventricular septal motion. Interventricular septal \"bounce\". 
 On amlodipine 10mg qD, lisinopril 10qD, digoxin 0.125mg.  BP in ED SBP 120s. Digoxin level remains at 1.    
-hold home HTN meds for now  
-continue digoxin, monitor daily levels while on azithro 
-cardiac monitoring 
  
Afib not on [facility] Chronic afib was on warfain until recently. Had bleeding gastric ulcer and warfarin held. Hb stable thus far. 
-hold warfarin 
-SCDs 
-continue home protonix  
  
AAA s/p EVAR - now w/ interval increase in size on cT. CT abdo (6/3/2019): Developing saccular aneurysmal dilation along the right anterior to posterolateral aspect of the mid aorta, centered at the superior portion of the stent graft and beginning just below the level of the SMA.  Based on relatively rapid development of this finding since the 03/26/18 CTA. Followed by Dr. Lanie Rhodes office (vascular). Per PT not planning any surgery for thus. 
  
Anemia 2/2 Multifactorial Iron Def and Anemia of Chronic Kidney Disease- H/o bleeding ulcer May/2019. Ferritin 357 (1/20). Gets IV Iron infusions, last 1/21. Followed by Hematology  as OP (Dr. Shruthi Muse). Hb stable in mid 8s on this admission thus far. Pt was scheduled for IV iron infusion today. Will consult heme today. -Monitor H/H w/ daily BMP 
  
Chronic pain lower legs. On narco at home. Pain contract w/ PFM. -continue narco 5-325mg PO  BID as needed 
  
Constipation - has no had BM since Friday. Abdomen tight this AM.  
-Miralax daily  
  
Insomnia On trazodone 50mg at home 
-continue home meds 
  
CKD3 - Baseline Cr 1.32-1. 50. Cr 1.53 on admission. -monitor daily BMP 
  
UTI - recent UTI diagnosis by PCP. On Cipro. Still endorsing dysuria.  
-coverage w/ zosyn 
  
HypoNa, hypoK, HypoCl - 2/2 ? poor PO intake. -Monitor with daily BMP 
 -replete K per protocol  
  
Leukopenia (? Resolved) - [facility] admission 7.8, down to 1.4 (1/27) and on repeat 0.9. 2/2? Drug rxn. Pt received rocephin, zosyn and athro in ED. No hx in past of this per pt or on chart review. Raised crp and ESR done recently. CMP unremarkable. Peripheral smear pending. [facility] up to 7.8 today. - FU peripheral swear 
-consider heme-onc consult 
  
Nutrition - Pt thin, low alb 2.9 on admission suggesting malnutrition. Pt not eating. Will consult nutrition for recommendations.  
  
Dispo: FU PT/OT - Home w/ HH or inpatient rehab 
  
Daughter had mPOA. No document not in system.  
  
Diet:cardiac diet DVT Prophylaxis: SCDs Code Status: Full Point of Contact: Mi Cardona (Relationship: [id number]) [phone] 
  
Disposition: 2 midnights Alyssia Newby Intern 01/29/20 8:56 AM

## 2020-01-29 NOTE — PROGRESS NOTES
Problem: Dysphagia (Adult) Goal: *Acute Goals and Plan of Care (Insert Text) Description Patient will: 1. Tolerate PO trials with 0 s/s overt distress in 4/5 trials-met 2. Utilize compensatory swallow strategies/maneuvers (decrease bite/sip, size/rate, alt. liq/sol) with min cues in 4/5 trials-met 3. Complete an objective swallow study (i.e., MBSS) to assess swallow integrity, r/o aspiration, and determine of safest LRD, min A - met 1/27/2020 Rec:    
Reg solid with thin liquids Aspiration precautions HOB >45 during po intake, remain >30 for 30-45 minutes after po Small bites/sips; alternate liquid/solid with slow feeding rate (rest breaks as needed during meals) Oral care TID Meds per pt preference Outcome: Resolved/Met SPEECH LANGUAGE PATHOLOGY DYSPHAGIA TREATMENT & DISCHARGE Patient: Lillian Sosa ([de-identified] y.o. male) Date: 1/29/2020 Diagnosis: Community acquired pneumonia [J18.9] Community acquired bacterial pneumonia [J15.9] Precautions: Aspiration, Fall(Oxygen) PLOF: As per H&P  
 
ASSESSMENT: 
Pt seen for follow up dysphagia tx. Able to recall results of MBS and reporting \"everything checked out ok\". Pt able to demo adequate pacing during therapeutic snack of regular/thin liquids. No overt distress noted and pt with positive oral clearance. Re-educated with aspiration s/sx and to alert MD/RN if symptoms arise. Verbalized understanding. Will sign off. Please re-consult as indicated. Progression toward goals: 
Goals met PLAN: 
Recommendations and Planned Interventions: 
Maximum therapeutic gains met; safest, least restrictive diet achieved. D/C ST intervention at this time. Discharge Recommendations:  None SUBJECTIVE:  
Patient stated Everything checked out ok. OBJECTIVE:  
Cognitive and Communication Status: 
Neurologic State: Alert Orientation Level: Oriented X4 Cognition: Appropriate decision making, Appropriate for age attention/concentration, Appropriate safety awareness Perception: Appears intact Perseveration: No perseveration noted Safety/Judgement: Awareness of environment, Fall prevention, Insight into deficits Dysphagia Treatment: 
Oral Assessment: 
Oral Assessment Labial: No impairment Dentition: Edentulous Oral Hygiene: adequate Lingual: No impairment Velum: No impairment Mandible: No impairment P.O. Trials: 
 Patient Position: 60 at [facility] Vocal quality prior to P.O.: No impairment Consistency Presented: Thin liquid, Solid How Presented: Self-fed/presented, Cup/sip, Straw, Successive swallows Bolus Acceptance: No impairment Bolus Formation/Control: No impairment Propulsion: No impairment Oral Residue: None Initiation of Swallow: No impairment Laryngeal Elevation: Functional 
 Aspiration Signs/Symptoms:  None Pharyngeal Phase Characteristics: Suspected pharyngeal residue Effective Modifications: Small sips and bites, Alternate liquids/solids Cues for Modifications: Minimal-moderate Oral Phase Severity: No impairment Pharyngeal Phase Severity : Mild PAIN: 
Start of Tx: 0 End of Tx: 0 After treatment:  
[]              Patient left in no apparent distress sitting up in chair 
[x]              Patient left in no apparent distress in bed 
[x]              Call bell left within reach [x]              Nursing notified 
[]              Caregiver present 
[]              Bed alarm activated COMMUNICATION/EDUCATION:  
[x] Aspiration precautions; swallow safety; compensatory techniques [x]        Patient/family able to participate in training and education  
[]  Patient unable to participate in education; education ongoing with staff 
[] Patient understands goals/intent of therapy; neutral about participation Thank you for this referral, Juarez Dietrich M.S., CCC-SLP Speech-Language Pathologist

## 2020-01-29 NOTE — PROGRESS NOTES
Brief Progress Note - [facility] Query Question 1: 
Pt admitted with hypoxia and has Diastolic  HF -  EF 56 - 25% documented. 
  
 Please further specify type and acuity of CHF in the medical record.  
  
            Acute on Chronic Diastolic CHF Acute Diastolic CHF Chronic Diastolic CHF Other, please specify Clinically unable to determine 
  The medical record reflects the following: 
  
  Risk Factors: Diastolic HF/CAD/HTN/HLD - 
  
  Clinical Indicators: (6/3):  EF 56 - 60%.;   BNP 7771;   
pulm consult says- HFpEF - doesn't seem to be volume up although he did have leg swelling and orthopnea.  
  
  Treatment: On amlodipine 10mg qD, lisinopril 10qD, digoxin 0.125mg 
  
 
Answer:  
 
Pt has a hx of chronic diastolic HF, not being treated for this currently on this admission.  
 
   
  
 
 
[facility] Query Question 2 Version 1 of 1 Author: Nilam Jane Specialty: Gloria Monzon Author Type: [facility] Filed: 01/27/20 [id number] Date of Service: 01/27/20 3217 Status: Signed : Nilam Jane ([facility]) Pt admitted with  hypoxia. . Pt noted to have Home 02 at 2L    NC. 
  
 . If possible, please document in the progress notes and d/c summary if you are evaluating and / or treating any of the following: 
  
           Chronic respiratory failure 
            o          With hypoxia 
            o          With hypercapnia            Other, please specify            Clinically unable to determine 
  
             
Answer: 
Pt admitted with Acute on chronic resp failure w/ hypoxia.

## 2020-01-29 NOTE — PROGRESS NOTES
Reason for Admission:  Community acquired pneumonia [J18.9] Community acquired bacterial pneumonia [J15.9] RRAT Score:    22 Plan for utilizing home health:    yes Likelihood of Readmission:   Moderate Do you (patient/family) have any concerns for transition/discharge?  no 
 
Transition of Care Plan:    
 
Initial assessment completed with patient. Cognitive status of patient: oriented to time, place, person and situation. Face sheet information confirmed:  yes. The patient designates Raciel Shayne [phone] to participate in his discharge plan and to receive any needed information. This patient lives in a single family home with patient and brother. Patient is able to navigate steps as needed. Prior to hospitalization, patient was considered to be independent with ADLs/IADLS : yes . Patient has a current ACP document on file: no The patient and daughter will be available to transport patient home upon discharge. The patient already has Walker, W/C, BSC, Oxygen tanks and concentrator from [employer], and nebulizer,  medical equipment available in the home. Patient is not currently active with home health. Patient has stayed in a skilled nursing facility or rehab. Was  stay within last 60 days : no. This patient is on dialysis :no 
 
List of available Home Health agencies were provided and reviewed with the patient prior to discharge. Freedom of choice signed: yes, for [facility] . Currently, the discharge plan is Home with [address] [place] [address] Jacob Pratt. Also [place] [facility] in [facility] The patient states that he can obtain his medications from the pharmacy, and take his medications as directed. Patient's current insurance is Medicare and [employer] Care Management Interventions PCP Verified by CM: Yes Mode of Transport at Discharge: Self Physical Therapy Consult: Yes Occupational Therapy Consult: Yes Speech Therapy Consult: Yes Current Support Network: Lives with Caregiver Confirm Follow Up Transport: Family The Plan for Transition of Care is Related to the Following Treatment Goals : Home health vs SNF The Patient and/or Patient Representative was Provided with a Choice of Provider and Agrees with the Discharge Plan?: Yes Freedom of Choice List was Provided with Basic Dialogue that Supports the Patient's Individualized Plan of Care/Goals, Treatment Preferences and Shares the Quality Data Associated with the Providers?: Yes Discharge Location Discharge Placement: Home with home health([facility] SN[facility]) Collins Valencia RN BSN Care Manager [phone]

## 2020-01-29 NOTE — TELEPHONE ENCOUNTER
Catracho Edwards with [address] is calling for Dr. Bianca Steve & would like for you to call about patient he is @ [facility]. Please call the residents room @ [address]  Kirsten Acosta [facility] @ [phone] or Elisa's cell# [phone]

## 2020-01-30 NOTE — DISCHARGE SUMMARY
[address] Discharge Summary Patient: Brii Child MRN: [id number]  CSN: [id number] YOB: 1940  Age: [de-identified] y.o. Sex: male Admission Date: 1/26/2020 Discharge Date: 1/31/2020 Attending: Dionicio Dozier MD PCP: Brandi Alatorre DO  
 
=================================================================== Reason for Admission: Community acquired pneumonia [J18.9] Community acquired bacterial pneumonia [J15.9] Discharge Diagnoses:  
Acute hypoxic respiratory failure Community acquired pnuemonia COPD exasterbation HTN Chronic pain syndrome Diastolic Heart Failure CAD Anemia Insomnia CKD3 UTI HypoNa, hypoK, HypoCl Leukopenia Important notes to PCP/ follow-up studies and evaluations Pt missed OP appointment for IV iron infusion. Pt to f/u with Dr. Vernon Mojica (heme-onc) for transfusion. Hb dropping during this admission, 7.4 on day of discharge. Pt needs GI follow-up. Pending labs and studies: 
None Operative Procedures:  
None Discharge Meds Current Discharge Medication List  
  
START taking these medications Details  
predniSONE (DELTASONE) 20 mg tablet Take 20 mg by mouth daily for 5 days. Qty: 5 Tab, Refills: 0 CONTINUE these medications which have NOT CHANGED Details  
pantoprazole (PROTONIX) 40 mg tablet Take 1 Tab by mouth Daily (before breakfast). Qty: 30 Tab, Refills: 0 HYDROcodone-acetaminophen (NORCO) 5-325 mg per tablet Take 0.5 Tabs by mouth two (2) times daily as needed for Pain. albuterol (PROVENTIL VENTOLIN) 2.5 mg /3 mL (0.083 %) nebulizer solution 3 mL by Nebulization route every four (4) hours as needed for Wheezing. Qty: 24 Each, Refills: 3  
  
lisinopril (PRINIVIL, ZESTRIL) 20 mg tablet Take 1 Tab by mouth daily. Qty: 30 Tab, Refills: 0  
  
pravastatin (PRAVACHOL) 40 mg tablet Take 40 mg by mouth nightly. digoxin (DIGITEK) 0.125 mg tablet take 1 tablet by mouth once daily Qty: 90 Tab, Refills: 3  
  
amLODIPine (NORVASC) 10 mg tablet Take 10 mg by mouth daily. Refills: 0 OXYGEN-AIR DELIVERY SYSTEMS 2 L/min by Nasal route daily. Continuous. Company is [employer] 
   
  
tiotropium-olodaterol (STIOLTO RESPIMAT) 2.5-2.5 mcg/actuation inhaler inhale 2 inhalations by mouth once daily 
Qty: 1 Inhaler, Refills: 5  
  
docusate sodium 100 mg tab Take 100 mg by mouth as needed for Other (constipation). ferrous sulfate 325 mg (65 mg iron) tablet Take 325 mg by mouth every other day. Indications: anemia from inadequate iron  
  
ascorbic acid, vitamin C, (VITAMIN C) 250 mg tablet Take 1 Tab by mouth two (2) times a day. Qty: 30 Tab, Refills: 0  
  
traZODone (DESYREL) 50 mg tablet Take 50 mg by mouth nightly. polyethylene glycol (MIRALAX) 17 gram packet Take 17 g by mouth daily as needed (constipation). Disposition: Home with Home Health Consultants:   
Pulmonology Brief Hospital Course (including pertinent history and physical findings) Hypoxia and DUMONT 2/2 COPD exast and RLL PNA · Pt presented w/ hypoxia to mid 80s reported by EMS.  Sating 91% on 2L in ED. Pt had mild productive cough and DUMONT. Diffuse exp wheeze throughout on exam. No CP, trops neg. Pt has a history of COPD on 2L 02 at baseline. A febrile, no leukocytosis. · Pt followed by Pulmonology (Dr. Jean Baptiste Grace 48%,On 2L home 02 at baseline. On albuterol neb and ipratropium inhaler at home. · CXR 1/26 shows RLL infiltrate, suggesting CAP vs aspiration. · Managed in hospital with IV zosyn and azithro, duo-neb q4, and IV methylpred 60mg qD transitioned to PO prednisone, completed 5 day course of each prior to D/c and suppl O2 titrate to >90%. · Sputum cultures: gram+ cocci in pairs and chains, yeast pseudohyphae, mod candida albicans, mod sec yeast, few gram neg rods · On day of discharge Pt's breathing has improved, sating well on home 2L NC, no wheeze on ausculation. Cleared by Pulm for d/c, with f/u in weeks. ·  Patient underwent walk trial prior to leaving hospital, Sats never dropped below 94% on home [facility] 
  
Diastolic HF/CAD/HTN/HLD · Followed by cardiology (Dr. Candelario Adjutant) . No CP. Trops neg. BNP 7000. ECHO (6/3):  EF 56 - 60%. Abnormal left ventricular septal motion. Interventricular septal \"bounce\". · On amlodipine 10mg qD, lisinopril 10qD, digoxin 0.125mg.  BP in ED SBP 120s. Digoxin 0.8 on admission up to 1.0 today. No orthopnea or PE on this admission, do not suspect HF contributing to resp status on this admission. · Managed in the hospital with digoxin (with levels monitored), amlodipine, and cardiac monitoring. · Discharged on amlodipine 10mg qD, lisinopril 10qD, digoxin 0.125mg. 
  
Afib not on AC d/t recent gastric bleed · Chronic afib was on warfain until recently. Had bleeding gastric ulcer and warfarin held. Hb 8.0->7.4 on this admission. · Warfarin held in the hospital and home Protonix continued. · Discharged on Protonix with direction to F/U with GI for Gi bleed r/o 
  
AAA s/p EVAR -  
· Known AAA s/p EVAR shown to have interval increase in size on CT. CT abdo (6/3/2019): Developing saccular aneurysmal dilation along the right anterior to posterolateral aspect of the mid aorta, centered at the superior portion of the stent graft and beginning just below the level of the SMA.  Based on relatively rapid development of this finding since the 03/26/18 CTA. Followed by Dr. Belynda Schaumann office (vascular). · Per PT not planning any surgery for thus. · No intervention while admitted.  
  
Anemia 2/2 Multifactorial Iron Def and Anemia of Chronic Kidney Disease-  
· H/o bleeding ulcer May/2019. Ferritin 357 (1/20). Gets IV Iron infusions, last 1/21. Followed by Hematology  as OP (Dr. Johan Arrington). Missed IV iron transfusion 1/28.  Discussed w/ Dr. Johan Arrington. Pt to follow-up with his office within a few days of discharge. Pt was also to arrange GI follow-up for continued Hb drops. · Monitored in patient with daily CBCs, Hb between 7.4 and 8.8 during this admission, no transfusions needed 
  
Insomnia · On trazodone 50mg at home, Pt reports medication working well for him. · Managed in hospital and discharged on same.  
  
CKD3   
· Baseline Cr 1.32-1. 50. Cr 1.53 on admission. · No intervention required during admission, Cr at 1.36 on discharge 
  
UTI   
· On admission being treated with Cipro for recent UTI diagnosis by PCP but still with dysuria. · Cipro d/c, susceptibilities reviewed. Likely covered by zosyn while in patient. · Resolved by discharge.  
  
HypoNa, hypoK, HypoCl - likely 2/2 poor PO intake (Resolved) · Pt with multiple electrolyte imbalances on admission, repleted as necessary · Persistent hyponatremia resolved by discharge 
  
Leukopenia (Resolved)  
WCC admission 7.8, down to 1.4 (1/27) and on repeat 0.9, likely 2/2 infection. No hx in past of this per pt or on chart review. Raised crp and ESR done recently. CMP unremarkable. Peripheral smear unremarkable. [address] up to 7.8 on 1/28 and remained stable for remainder of admission. Dr. Johan Arrington consulted via telephone, no intervention needed as counts recovered.  
  
Moderate Protein Calorie Malnutrition -  
· On admission, pt was thin w/ low alb (2.9) on admission, along with electrolyte imbalances suggesting malnutrition. Pt reports he has very little appetite and eats small portions infrequently. · Nutrition consulted during admission, added food preferences to meals and once daily Ensure Enlive. · Recommend nutritional f/u with PCP 
   
  
 
 
 
CURRENT ADMISSION IMAGING RESULTS Xr Swallow [facility] Video Result Date: 1/27/2020 IMPRESSION: No myra aspiration or penetration on any of the tested consistencies. Please see speech pathologist report for additional details and recommendations. Xr Chest [facility] Result Date: 1/26/2020 IMPRESSION: Right lower lobe infiltrate. Finding suggestive of pneumonia. Probable small effusion. Follow-up chest radiograph after treatment recommended to assess for resolution. Cardiology Procedures/Testing: MODALITY RESULTS  
EKG Results for orders placed or performed during the hospital encounter of 01/26/20 EKG, 12 LEAD, INITIAL Result Value Ref Range Ventricular Rate 98 BPM  
 Atrial Rate 105 BPM  
 QRS Duration 84 ms Q-T Interval 334 ms QTC Calculation (Bezet) 426 ms Calculated R Axis -62 degrees Calculated T Axis 70 degrees Diagnosis Atrial fibrillation with premature ventricular or aberrantly conducted  
complexes Left axis deviation Anteroseptal infarct (cited on or before 25-AUG-2017) Abnormal ECG When compared with ECG of 02-JUN-2019 16:08, No significant change was found Confirmed by Komal Fischer ([id number]) on 1/26/2020 2:24:56 PM 
  
Results for orders placed or performed in visit on 08/10/18 AMB POC EKG ROUTINE W/ 12 LEADS, INTER & REP Narrative Read by Colette Richmond MD - atrial fibrillation, occasional ectopic ventricular beat. Nonspecific QRS widening and anterior fascicular block. Old anterior infarct. Possible nonspecific ST abnormality. ECHO 06/02/19 ECHO ADULT COMPLETE 06/04/2019 6/4/2019 Narrative · Left Ventricle: Estimated left ventricular ejection fraction is 56 -  
60%. Abnormal left ventricular septal motion. Interventricular septal  
\"bounce\". · Tricuspid Valve: Non-specific thickening of the tricuspid valve. Moderate tricuspid valve regurgitation is present. · Pulmonary Artery: Moderate pulmonary hypertension. Pulmonary arterial  
systolic pressure is 70.7 mmHg. · Pulmonic Valve: Mild pulmonic valve regurgitation is present. · Left Atrium: Severely dilated left atrium. · Right Atrium: Moderately dilated right atrium.  
   
  Signed by: Belia Thompson MD  
  
 Nuclear Medicine Results from [facility] [place] on 08/03/16 [address] BODY Impression IMPRESSION: 
 
Mildly heterogeneous but matched sulfur colloid and white cell activity at the 
L3-L4 level corresponding with area of abnormality on MRI. The scintigraphic 
findings are nonspecific and may be reflective of the signal changes in this 
area. -There is no discordant white cell localization which may be more suggestive of 
acute spine infection however indium white blood cell scan sensitivity may be 
decreased for vertebral osteomyelitis/discitis compared to other sites in the 
body. -If there is high clinical concern for evaluating infection in the spine region 
consider bone gallium scan correlation. Alternatively continued MRI follow-up 
for stability or other change would also be an additional imaging option to 
consider. Very subtle white blood cell localization associated with the abdominal aorta. The degree of radiotracer localization is very mild and nonspecific. Given the 
CT appearance of chronic stranding and wall thickening, white cell activity 
likely reflects this inflammatory change and may benefit from continued CT 
follow-up for stability. This examination could serve as a baseline for follow 
up for the aorta, as warranted clinically. NM INFLAM PROC WH BODY Impression IMPRESSION: 
 
Mildly heterogeneous but matched sulfur colloid and white cell activity at the 
L3-L4 level corresponding with area of abnormality on MRI. The scintigraphic 
findings are nonspecific and may be reflective of the signal changes in this 
area. -There is no discordant white cell localization which may be more suggestive of 
acute spine infection however indium white blood cell scan sensitivity may be 
decreased for vertebral osteomyelitis/discitis compared to other sites in the 
body. -If there is high clinical concern for evaluating infection in the spine region consider bone gallium scan correlation. Alternatively continued MRI follow-up 
for stability or other change would also be an additional imaging option to 
consider. Very subtle white blood cell localization associated with the abdominal aorta. The degree of radiotracer localization is very mild and nonspecific. Given the 
CT appearance of chronic stranding and wall thickening, white cell activity 
likely reflects this inflammatory change and may benefit from continued CT 
follow-up for stability. This examination could serve as a baseline for follow 
up for the aorta, as warranted clinically. IR Results from Hospital Encounter encounter on 08/25/17 IR PICC INSERT WO PORT OVER 5 YEARS Impression IMPRESSION:  
 
Successful placement dual lumen PICC catheter. PICC line catheter can be used 
immediately. Results from [facility] [place] [place] Encounter encounter on 02/18/16 IR PICC INSERT WO PORT OVER 5 YEARS Impression IMPRESSION:  
 
Successful placement dual lumen PICC catheter. PICC line catheter can be used 
immediately. CATH Special Testing/Procedures: MODALITY RESULTS MICRO All Micro Results Procedure Component Value Units Date/Time CULTURE, RESPIRATORY/SPUTUM/BRONCH Murlean Abad [[id number]] Collected:  01/30/20 0235 Order Status:  Completed Specimen:  Sputum Updated:  01/30/20 0168 Special Requests: NO SPECIAL REQUESTS     
  GRAM STAIN 10-25 WBC/lpf     
   10-25 EPI/lpf MUCUS PRESENT     
      
  FEW GRAM POSITIVE COCCI IN PAIRS IN CHAINS  
     
      
  FEW YEAST WITH PSEUDOHYPHAE Culture result: PENDING  
 CULTURE, BLOOD [[id number]] Collected:  01/26/20 1142 Order Status:  Completed Specimen:  Blood Updated:  01/30/20 7746 Special Requests: --     
  NO SPECIAL REQUESTS 
RIGHT Antecubital 
  
  Culture result: NO GROWTH 4 DAYS     
 CULTURE, BLOOD [[id number]] Collected:  01/26/20 1153 Order Status:  Completed Specimen:  Blood Updated:  01/30/20 1542 Special Requests: --     
  LEFT Antecubital 
  
  Culture result: NO GROWTH 4 DAYS     
 STREP PNEUMO AG, URINE [[id number]] Collected:  01/27/20 0445 Order Status:  Completed Specimen:  Urine, random Updated:  01/28/20 1730 Strep pneumo Ag, urine NEGATIVE      
 LEGIONELLA PNEUMOPHILA AG, URINE [[id number]] Collected:  01/27/20 0445 Order Status:  Completed Specimen:  Urine, random Updated:  01/28/20 1729 Legionella Ag, urine NEGATIVE      
  
  
ABG Lab Results Component Value Date/Time pH (POC) 7.355 06/03/2019 01:55 AM  
 pCO2 (POC) 37.2 06/03/2019 01:55 AM  
 pO2 (POC) 95 06/03/2019 01:55 AM  
 HCO3 (POC) 20.8 (L) 06/03/2019 01:55 AM  
 FIO2 (POC) 35 06/03/2019 01:55 AM  
  
UA Results for orders placed or performed in visit on 04/07/14 AMB POC URINALYSIS DIP STICK AUTO W/O MICRO     Status: None Result Value Ref Range Status Color (UA POC) Yellow  Final  
 Clarity (UA POC) Clear  Final  
 Glucose (UA POC) Negative Negative Final  
 Bilirubin (UA POC) Negative Negative Final  
 Ketones (UA POC) Negative Negative Final  
 Specific gravity (UA POC) 1.010 1.001 - 1.035 Final  
 Blood (UA POC) Trace Negative Final  
 pH (UA POC) 7.0 4.6 - 8.0 Final  
 Protein (UA POC) Trace Negative mg/dL Final  
 Urobilinogen (UA POC) 0.2 mg/dL 0.2 - 1 Final  
 Nitrites (UA POC) Negative Negative Final  
 Leukocyte esterase (UA POC) Negative Negative Final  
  
ENDO [unfilled] [unfilled] PATH Laboratory Results: 
LABORATORY RESULTS  
HEMATOLOGY Lab Results Component Value Date/Time WBC 8.0 01/30/2020 01:48 AM  
 Hemoglobin, POC 10.9 (L) 07/26/2016 10:50 AM  
 HGB 7.4 (L) 01/30/2020 01:48 AM  
 Hematocrit, POC 32 (L) 07/26/2016 10:50 AM  
 HCT 23.4 (L) 01/30/2020 01:48 AM  
 PLATELET 324 49/23/6725 01:48 AM  
 MCV 87.3 01/30/2020 01:48 AM  
   
CHEMISTRIES Lab Results Component Value Date/Time Sodium 139 01/30/2020 01:48 AM  
 Potassium 3.8 01/30/2020 01:48 AM  
 Chloride 105 01/30/2020 01:48 AM  
 CO2 28 01/30/2020 01:48 AM  
 Anion gap 6 01/30/2020 01:48 AM  
 Glucose 111 (H) 01/30/2020 01:48 AM  
 BUN 25 (H) 01/30/2020 01:48 AM  
 Creatinine 1.30 01/30/2020 01:48 AM  
 BUN/Creatinine ratio 19 01/30/2020 01:48 AM  
 GFR est AA >60 01/30/2020 01:48 AM  
 GFR est non-AA 53 (L) 01/30/2020 01:48 AM  
 Calcium 8.1 (L) 01/30/2020 01:48 AM  
  
HEPATIC FUNCTION Lab Results Component Value Date/Time Albumin 2.9 (L) 01/27/2020 12:45 PM  
 Bilirubin, direct 0.2 08/29/2017 03:01 AM  
 Bilirubin, total 0.7 01/27/2020 12:45 PM  
 Protein, total 7.0 01/27/2020 12:45 PM  
 Globulin 4.1 (H) 01/27/2020 12:45 PM  
 A-G Ratio 0.7 (L) 01/27/2020 12:45 PM  
 AST (SGOT) 35 01/27/2020 12:45 PM  
 ALT (SGPT) 21 01/27/2020 12:45 PM  
 Alk. phosphatase 77 01/27/2020 12:45 PM  
   
LACTIC ACID Lab Results Component Value Date/Time Lactic acid 1.1 06/03/2019 05:56 AM  
 Lactic acid 1.0 02/19/2016 06:08 AM  
 Lactic acid 0.7 02/18/2016 11:35 PM  
  
CARDIAC PANEL Lab Results Component Value Date/Time CK 39 01/26/2020 10:03 AM  
 CK - MB 1.1 01/26/2020 10:03 AM  
 CK-MB Index 2.8 01/26/2020 10:03 AM  
 Troponin-I, QT <0.02 01/26/2020 10:03 AM  
  
NT-proBNP Lab Results Component Value Date/Time NT pro-BNP 7,771 (H) 01/26/2020 10:03 AM  
 NT pro-BNP 7,673 (H) 06/02/2019 04:18 PM  
 NT pro-BNP 12,922 (H) 06/27/2018 05:08 PM  
 NT pro-BNP 2426 (H) 01/19/2013 08:00 PM  
  
THYROID No results found for: TSH, TSHEXT, TSH2, TSH3, TSHP, TSHELE, TT3, T3U, T3UP, FRT3, FT3, T3T, FT4, FT4P, T4, T4P, FT4T, TT7, R4ODDAOZS, TSHEXT  
LIPID PANEL Lab Results Component Value Date/Time HDL Cholesterol 25 (L) 03/26/2010 10:30 AM  
   
 
RISK CALCULATORS: 
SCORE RESULT  
ASCVD The ASCVD Risk score (Ananya Abdi et al., 2013) failed to calculate for the following reasons: 
  ASCVD risk score not calculated [id number] HAS-BLED READMISSION RISK SCORE High Risk   
      
 25 Total Score 4 IP Visits Last 12 Months (1-3=4, 4=9, >4=11) 5 Pt. Coverage (Medicare=5 , Medicaid, or Self-Pay=4) 16 Charlson Comorbidity Score (Age + Comorbid Conditions) Criteria that do not apply:  
 Has Seen PCP in Last 6 Months (Yes=3, No=0) . Living with Significant Other. Assisted Living. LTAC. SNF. or  
Rehab Patient Length of Stay (>5 days = 3) Functional status and cognitive function:   
Ambulates with: independantly Status: alert, cooperative, no distress, appears stated age Condition: STABLE Disposition: Home w/ HH Diet: Cardiac w/ supplements Code status and advanced care plan: Full [employer] [facility] [employer] (Relationship: OATYWDDC) [phone] Patient Education:  Patient was educated on the following topics prior to discharge:COPD and preventing lung infections Follow-up:  
Follow-up Information Follow up With Specialties Details Why Contact Info Lashanda Rodriguez DO  In 2 days for hospital follow up [address] [facility] [address] [facility] [address] 
[phone] Vernon Butcher MD Pulmonary Disease, Urgent Care, Internal Medicine In 4 weeks for hospital follow up [address] [facility] [address] [facility] [address] [phone] Norbert Iyer MD Hematology and Oncology  1-3 days [address] [address] [address] [phone] Leticia Pelayo MD, PGY-1  
[address] Pager: [phone] January 30, 2020, 9:29 AM

## 2020-01-30 NOTE — TELEPHONE ENCOUNTER
PT'S DAUGHTER VICK([phone]). [id number].

## 2020-01-30 NOTE — PROGRESS NOTES
Intern Progress Note 120 [facility] Patient: Magali Hoover MRN: [id number]  CSN: [id number] YOB: 1940  Age: [de-identified] y.o. Sex: male DOA: 1/26/2020 LOS:  LOS: 4 days Subjective:  
 
Acute events: none Pt awake, alert and clam this AM. Breathing comfortably on 2L NC. Pt says his breathing continues to improve. No SOB at rest, no CP and less DUMONT. Pt has had 3x BM o/n, since addition of miralax. ROS General - well Cardio - No CP, no Palpitations Resp - No SOB, +cough, -wheeze, +DUMONT Abdo-  No pain, N/V/D/C 
 - No dysuria Objective:  
  
Patient Vitals for the past 24 hrs: 
 Temp Pulse Resp BP SpO2  
01/30/20 0727 97.5 °F (36.4 °C) 94 20 135/71 95 % 01/30/20 0324 97.4 °F (36.3 °C) 83 20 120/66 98 % 01/30/20 0034 97.1 °F (36.2 °C) 91 20 119/58 98 % 01/29/20 2017 98.1 °F (36.7 °C) 89 21 129/69 98 % 01/29/20 1557 97.7 °F (36.5 °C) 98 20 128/67 99 % 01/29/20 1153 97.6 °F (36.4 °C) 89 20 125/64 98 % 01/29/20 0831 97.2 °F (36.2 °C) 98 21 150/78 100 % 01/29/20 0828     97 % Intake/Output Summary (Last 24 hours) at 1/30/2020 6734 Last data filed at 1/30/2020 0430 Gross per 24 hour Intake 700 ml Output 850 ml Net -150 ml Physical Exam: 
General: Thin male, NAD,  Alert and oriented X 3 CV: Irreg irreg with Gr II/VI sys murmurs Lungs: dec aeration throughout, diffuse expiratory wheezes all over Abd: + BS, mildly distended, non-tender, no masses, no rebound or guarding. Motor: Normal and equal strength upper and lower ext bilat. Extremities  No calf tenderness or swelling. No edema. Warm feet. Lab/Data Reviewed: 
BMP:  
Lab Results Component Value Date/Time   01/30/2020 01:48 AM  
 K 3.8 01/30/2020 01:48 AM  
  01/30/2020 01:48 AM  
 CO2 28 01/30/2020 01:48 AM  
 AGAP 6 01/30/2020 01:48 AM  
  (H) 01/30/2020 01:48 AM  
 BUN 25 (H) 01/30/2020 01:48 AM  
 CREA 1.30 01/30/2020 01:48 AM  
 GFRAA >60 01/30/2020 01:48 AM  
 GFRNA 53 (L) 01/30/2020 01:48 AM  
 
CBC:  
Lab Results Component Value Date/Time WBC 8.0 01/30/2020 01:48 AM  
 HGB 7.4 (L) 01/30/2020 01:48 AM  
 HCT 23.4 (L) 01/30/2020 01:48 AM  
  01/30/2020 01:48 AM  
  
 
Scheduled Medications Reviewed: 
Current Facility-Administered Medications Medication Dose Route Frequency  albuterol-ipratropium (DUO-NEB) 2.5 MG-0.5 MG/3 ML  3 mL Nebulization Q6H RT  
 traZODone (DESYREL) tablet 50 mg  50 mg Oral QHS  predniSONE (DELTASONE) tablet 40 mg  40 mg Oral DAILY WITH BREAKFAST  budesonide (PULMICORT) 500 mcg/2 ml nebulizer suspension  500 mcg Nebulization BID RT  
 digoxin (LANOXIN) tablet 0.125 mg  0.125 mg Oral DAILY  amLODIPine (NORVASC) tablet 10 mg  10 mg Oral DAILY  pantoprazole (PROTONIX) tablet 40 mg  40 mg Oral ACB  pravastatin (PRAVACHOL) tablet 40 mg  40 mg Oral QHS  azithromycin (ZITHROMAX) 500 mg in  mL  500 mg IntraVENous Q24H  piperacillin-tazobactam (ZOSYN) 3.375 g in 0.9% sodium chloride (MBP/ADV) 100 mL MBP  3.375 g IntraVENous Q6H Assessment/Plan  
 
[de-identified] man PMH of AAA s/p repair, HF, COPD, HTN/HLD, afib on no AC due to gastric ulcer presenting with cough and DUMONT. 
  
Acute Hypoxic Resp failure 2/2 COPD exast + RLL PNA Hypoxia to mid 80s reported by EMS.  Sating 91% on 2L in ED. Pt hs cough, and DUMONT. Mild productive cough and diffuse exp wheeze through. No CP, trops neg. Pt has a history of COPD on 2L 02 at baseline. CXR shows RLL infiltrate, suggesting CAP vs aspiration. Being treated w/ IV zosyn and azithro. Pt breathing continues to improve, breathing comfortably on 2L NC. Still w/ exp wheeze. Passed bedside swallow and placed on cardiac diet. Pt reporting increased difficulty breathing this Am, no increased WOB, sating fine on home 2L NC. Pulm consulted Price Angelucci) 1/28. Legionellae and strep pneumo neg. BCx neg. Resp cultures pending.  Resp status continues to improve,, duo-neb spaced to q6hr o/n. Consider d/c today. Cleared by Pulmonology, discharge recs are to continue home meds. Close FU with Dr. Mrayan Abreu.  
  
Plan: - IV zosyn and azithro (CAP + pseudomonas + anaerobe coverage) -> convert to PO if cultures neg (cefpodoxime and azithro), day 4/5 
- duo-neb q6 hours - PO prednisone 40mg, 5 day course then taper (day 4/5 today) - Pulmicort neb 
- afrin spray for dry nose 
 -suppl O2 titrate to >92% 
  
COPD Pt followed by Pulmonology (Dr. Maryan Abreu). Last FEV1 48%,On 2L home 02 at baseline. On albuterol neb and ipratropium inhaler. 
-Hold home inhalers.  
-For tx as above section 
  
Diastolic HF/CAD/HTN/HLD - Followed by cardiology (Dr. Titi Stein) . No CP. Trops neg. BNP 7000. ECHO (6/3):  EF 56 - 60%. Abnormal left ventricular septal motion. Interventricular septal \"bounce\". Pt has no orthopnea or PE. Unlikely that HF is contributing to resp status at current time. On amlodipine 10mg qD, lisinopril 10qD, digoxin 0.125mg. BP in ED SBP 120s. Digoxin level remains therapeutic.    
-hold home HTN meds for now  
-continue digoxin, monitor daily levels while on azithro 
-cardiac monitoring 
  
Afib not on [facility] Chronic afib was on warfain until recently. Had bleeding gastric ulcer and warfarin held. Hb stable thus far. 
-hold warfarin 
-SCDs 
-continue home protonix  
  
AAA s/p EVAR - now w/ interval increase in size on cT. CT abdo (6/3/2019): Developing saccular aneurysmal dilation along the right anterior to posterolateral aspect of the mid aorta, centered at the superior portion of the stent graft and beginning just below the level of the SMA.  Based on relatively rapid development of this finding since the 03/26/18 CTA. Followed by Dr. Marisel Messer office (vascular).  Per PT not planning any surgery for thus. 
  
Anemia 2/2 Multifactorial Iron Def and Anemia of Chronic Kidney Disease- H/o bleeding ulcer May/2019. Ferritin 357 (1/20). Gets IV Iron infusions, last 1/21. Followed by Hematology  as OP (Dr. Pam Solorio). Hb stable in mid 8s on this admission thus far. Pt was scheduled for IV iron infusion today. Will consult heme today to determine if PT need IV iron infusion here or can it be done following this admission. 
-Monitor H/H w/ daily BMP Chronic pain lower legs. On narco at home. Pain contract w/ PFM. -continue narco 5-325mg PO  BID as needed Constipation - has no had BM since Friday. Abdomen tight this AM.  
-Miralax daily Insomnia On trazodone 50mg at home 
-continue home meds 
  
CKD3 - Baseline Cr 1.32-1. 50. Cr 1.53 on admission. -monitor daily BMP 
  
UTI - recent UTI diagnosis by PCP. On Cipro. Dysuria resolved. -coverage w/ zosyn HypoNa, hypoK, HypoCl - 2/2 ? poor PO intake. -Monitor with daily BMP 
 -replete K per protocol Leukopenia (Resolved) - [address] admission 7.8, down to 1.4 (1/27) and on repeat 0.9. 2/2? Drug rxn. Pt received rocephin, zosyn and athro in ED. No hx in past of this per pt or on chart review. Raised crp and ESR done recently. CMP unremarkable. Peripheral smear unremarkable. [address] up to 9.6 from 7.8 yesterday.  
-consider heme-onc consult Moderate Protein Calorie Malnutrition - Pt thin, low alb 2.9 on admission suggesting malnutrition. Nutrition consulted. - Add food preferences - Add nutrition supplement: Ensure Enlive once daily 
- Continue RD inpatient monitoring and evaluation Dispo: FU PT/OT - Home w/ HH today Daughter had mPOA. No document not in system.  
  
Diet:cardiac diet DVT Prophylaxis: SCDs Code Status: Full Point of Contact: Mi Cardona (Relationship: [id number]) [phone] 
 
   
  
  
  
 
 
[address] [place] [address] Road, MD  
[address] Medicine Intern 01/30/20 8:47 AM

## 2020-01-30 NOTE — PROGRESS NOTES
Received report on pt.from off going RN. Resting quietly in bed on rounds. Denies c/o pain or SOB at this time. No acute distress noted. Call bell at side. Will cont to monitor for any changes in status. 1930 Bedside and Verbal shift change report given to [address] [place] [address] (oncoming nurse) by Anibal Rico RN (offgoing nurse). Report given with KAREEM, George and MAR.

## 2020-01-30 NOTE — PROGRESS NOTES
Problem: Mobility Impaired (Adult and Pediatric) Goal: *Acute Goals and Plan of Care (Insert Text) Description Physical Therapy Goals Initiated 1/28/2020 and to be accomplished within 7 day(s) 1. Patient will move from supine to sit and sit to supine  in bed with modified independence. 2.  Patient will transfer from bed to chair and chair to bed with modified independence using the least restrictive device. 3.  Patient will perform sit to stand with modified independence. 4.  Patient will ambulate with modified independence for 150 feet with the least restrictive device. 5.  Patient will ascend/descend 4 stairs with unilateral handrail(s) with minimal assistance/contact guard assist. 
 
PLOF: Pt reports he was independent with ADL'S shanta ambulating without AD. His daughters would come over to assist with cooking. Outcome: Progressing Towards Goal 
  
PHYSICAL THERAPY TREATMENT Patient: Evelia Santos ([de-identified] y.o. male) Date: 1/30/2020 Diagnosis: Community acquired pneumonia [J18.9] Community acquired bacterial pneumonia [J15.9] COPD (chronic obstructive pulmonary disease) ([facility] [address] Precautions: Fall(Oxygen) PLOF: see above ASSESSMENT: 
Pt cleared for PT treatment session per chart review. Pt received in supine and agreeable to treatment session after encouragement. Pt's vitals WNL on 3L O2 and remained donned during session. Pt required Supv and additional time for bed mobility to come to sitting EOB, pt uses increase HOB height to achieve transfer despite max encouragement to mimic home environment as much as possible. Pt SBA for transfers and ambulation of 40 ft with RW. Pt did not require rest break during ambulation this session. Pt returned to sitting and made comfortable with feet elevated. Pt left sitting up in chair and all needs met/within reach. RN notified of position. Progression toward goals:  
[x]      Improving appropriately and progressing toward goals []      Improving slowly and progressing toward goals 
[]      Not making progress toward goals and plan of care will be adjusted PLAN: 
Patient continues to benefit from skilled intervention to address the above impairments. Continue treatment per established plan of care. Discharge Recommendations:  Home Health with Supervision initially Further Equipment Recommendations for Discharge:  rolling walker SUBJECTIVE:  
Patient stated Do you have a walker? Lul Shane OBJECTIVE DATA SUMMARY:  
Critical Behavior: 
Neurologic State: Alert Orientation Level: Oriented X4 Cognition: Follows commands Safety/Judgement: Awareness of environment, Fall prevention, Insight into deficits Functional Mobility Training: 
Bed Mobility: 
Supine to Sit: Supervision; Additional time Transfers: 
Sit to Stand: Stand-by assistance Stand to Sit: Stand-by assistance(cues for hand placement) Balance: 
Sitting: Intact Standing: Impaired; With support Standing - Static: Good Standing - Dynamic : Fair Ambulation/Gait Training: 
Distance (ft): 40 Feet (ft) Assistive Device: Walker, rolling Ambulation - Level of Assistance: Stand-by assistance Pain: 
Pain level pre-treatment: 3/10, low back Pain level post-treatment: 3/10 Activity Tolerance:  
Fair Please refer to the flowsheet for vital signs taken during this treatment. After treatment:  
[x] Patient left in no apparent distress sitting up in chair 
[] Patient left in no apparent distress in bed 
[x] Call bell left within reach [x] Nursing notified 
[] Caregiver present 
[] Bed alarm activated 
[] SCDs applied COMMUNICATION/EDUCATION:  
[x]         Role of Physical Therapy in the acute care setting. [x]         Fall prevention education was provided and the patient/caregiver indicated understanding. [x]         Patient/family have participated as able in working toward goals and plan of care. [x]         Patient/family agree to work toward stated goals and plan of care. []         Patient understands intent and goals of therapy, but is neutral about his/her participation. []         Patient is unable to participate in stated goals/plan of care: ongoing with therapy staff. 
[]         Other: 
 
   
Annette Simmonds, PTA Time Calculation: 24 mins

## 2020-01-30 NOTE — PROGRESS NOTES
Bedside shift change report given to Florentina Boxer, RN (oncoming nurse) by Payton Toledo RN (offgoing nurse). Report included the following information SBAR, Kardex and MAR.

## 2020-01-30 NOTE — PROGRESS NOTES
[facility] Pulmonary, Critical Care, and Sleep Medicine Name: Priscilla Prakash MRN: [id number] : 1940 Hospital: [facility] Date: 2020 IMPRESSION:  
· Acute hypoxic respiratory failure - improved. saturating 96% at 3L. · COPD exacerbation - improved  but still wheezing. · Pneumonia - seems to be getting better. Remains afebrile and no leukocytosis. Legionella/strep PNA are negative. · HFpEF - slightly volume up with elevated JVP · [facility] · AAA s/p EVAR 
· Anemia 2/2 Iron deficiency and CKD · CKD 3 - baseline creatinine · UTI - recent diagnosis PLAN:  
· Oxygen - Titrate to 92% and above. Avoid hyperoxia · COPD - Continue prednisone for a total of 5 day course. Continue duo-neb QiD and pulmicort nebs. · Chronic COPD - not a candidate for chronic zithromax or dailyresp at the moment. Will continue to reassess. May benefit from BiV - can be explored as outpatient with Dr. Jeanmarie La. · Pneumonia - Continue Zosyn and Azithromycin. Will treat for a maximum of 5 days. Follow respiratory cultures - if remain negative, can probably switch him to oral regimen of cefpodoxime with PO zithromax. · HFpEF - Stable. Can check TTE and give one time dose of Lasix today if his respiratory status worsens. · Afrin vs flonase for stuffed nose. · Follow peripheral smear · Out patient testing- PFT, 6 min walk,  
· OT, PT, OOB and ambulate · Will Follow · DVT, PUD prophylaxis Subjective/Interval History:  
20 In no acute distress. Overall much improved- slept better last night Still slightly short of breath. Feels his wheezing is better today and dyspnea is improving. Asking to go home ROS:Pertinent items are noted in HPI. Objective:  
Vital Signs:   
Visit Vitals /56 (BP 1 Location: Left arm, BP Patient Position: At rest) Pulse 79 Temp 97.5 °F (36.4 °C) Resp 20 Wt 62.6 kg (138 lb) SpO2 95% BMI 22.27 kg/m² O2 Device: Nasal cannula O2 Flow Rate (L/min): 2 l/min Temp (24hrs), Av.6 °F (36.4 °C), Min:97.1 °F (36.2 °C), Max:98.1 °F (36.7 °C) Intake/Output:  
Last shift:      No intake/output data recorded. Last 3 shifts:  1901 -  0700 In: 700 [P.O.:700] Out: 1350 [[id number]:[id number]] Intake/Output Summary (Last 24 hours) at 2020 1109 Last data filed at 2020 0430 Gross per 24 hour Intake 320 ml Output 750 ml Net -430 ml Physical Exam:   
 General: in no apparent distress HEENT: Normal 
 Neck: No abnormally enlarged lymph nodes. Slightly elevated JVP Chest: normal 
 Lungs: end expiratory wheeze Heart: Irregular rate/rhythm Abdomen: abdomen is soft without significant tenderness, masses, organomegaly or guarding Extremity: negative Neuro: alert Skin: Skin color, texture, turgor normal. No rashes or lesions DATA: 
Labs: 
Recent Labs  
  20 
0148 20 
0548 20 
7707 WBC 8.0 8.8 9.6 HGB 7.4* 8.0* 7.8* HCT 23.4* 25.2* 24.4*  
 186 182 Recent Labs  
  20 
0148 20 
0524 20 
0528 20 
1245  133* 132* 134* K 3.8 3.7 3.7 3.9  102 100 100 CO2 28 27 26 26 * 117* 126* 182* BUN 25* 25* 25* 24* CREA 1.30 1.41* 1.41* 1.48* CA 8.1* 8.2* 8.2* 8.3* ALB  --   --   --  2.9*  
SGOT  --   --   --  35 ALT  --   --   --  21 Imaging: 
[x]I have personally reviewed the patients radiographs XR Results (most recent): 
Results from SUZETTE HER Encounter encounter on 20 XR SWALLOW FUNC VIDEO Narrative EXAM:  Modified Barium Swallow (Video Barium Swallow). CLINICAL INDICATION/HISTORY:  
 
- Right lower lobe infiltrate. - Dysphagia. - Bedside exam with moderate oropharyngeal dysphagia with strong cough following 
consecutive swallows of thin liquid via straw.   Full MBS felt to be needed by 
the bedside exam. 
 - Assessment for aspiration. Evaluation for optimal diet placement. COMPARISON:  None. TECHNIQUE:   
 
- This study was performed in conjunction with speech pathologist with the 
patient in lateral upright position.   
- Tested consistencies: Thin (via cup Western Lisa triple), pudding, solid as well as 
barium tablet challenge. - Fluoroscopy duration: 18 seconds. - Exposures:  6 cinefluoroscopy loops. FINDINGS: 
 
- No definite penetration or aspiration was noted on any of the tested boluses. - Vallecular and piriform residuals were seen with all tested consistencies. Impression IMPRESSION: 
 
No myra aspiration or penetration on any of the tested consistencies. Please see speech pathologist report for additional details and recommendations. High complexity decision making was performed during the evaluation of this patient at high risk for decompensation with multiple organ involvement 
  
Above mentioned total time spent on reviewing the case/medical record/data/notes/EMR/patient examination/documentation/coordinating care with nurse/consultants, exclusive of procedures with complex decision making performed and > 50% time spent in face to face evaluation.  
 
 
Keke Dave MD

## 2020-01-31 NOTE — PROGRESS NOTES
Discharge order noted for today. Pt has been accepted to [facility] agency. Met with patient and daughter and are agreeable to the transition plan today. Transport has been arranged through daughter. Patient's discharge summary and home health  orders have been forwarded to [facility] home health  agency via [address] Updated bedside RN, Chayo Tirado,  to the transition plan. Discharge information has been documented on the AVS. Tad Dias RN BSN Care Manager [phone]

## 2020-01-31 NOTE — ROUTINE PROCESS
Bedside and Verbal shift change report given to Ravinder Terrell RN (oncoming nurse) by Parth Cortez RN 
 (offgoing nurse). Report included the following information SBAR, Kardex, Intake/Output and MAR.

## 2020-01-31 NOTE — PROGRESS NOTES
[facility] Pulmonary, Critical Care, and Sleep Medicine Name: Evelia Santos MRN: [id number] : 1940 Hospital: [address] [place] [address] Date: 2020 IMPRESSION:  
· Acute hypoxic respiratory failure - improved. saturating 96% at 3L. · COPD exacerbation - improved  but still wheezing. · Pneumonia - seems to be getting better. Remains afebrile and no leukocytosis. Legionella/strep PNA are negative. · HFpEF - slightly volume up with elevated JVP · Afib not on [facility] · AAA s/p EVAR 
· Anemia 2/2 Iron deficiency and CKD · CKD 3 - baseline creatinine · UTI - recent diagnosis PLAN:  
· Oxygen - Titrate to 92% and above. Avoid hyperoxia · COPD - Continue prednisone for a total of 5 day course. Continue duo-neb QiD and pulmicort nebs. · Chronic COPD - not a candidate for chronic zithromax or dailyresp at the moment. Will continue to reassess. May benefit from BiV - can be explored as outpatient with Dr. Arsalan Adams. · Pneumonia - Continue Zosyn and Azithromycin. Will treat for a maximum of 5 days. Follow respiratory cultures - if remain negative, can probably switch him to oral regimen of cefpodoxime with PO zithromax. · HFpEF - Stable. Can check TTE and give one time dose of Lasix today if his respiratory status worsens. · Afrin vs flonase for stuffed nose. · Follow peripheral smear · Out patient testing- PFT, 6 min walk,  
· OT, PT, OOB and ambulate · Will Follow · DVT, PUD prophylaxis Subjective/Interval History:  
20 In no acute distress. Overall much improved- slept better last night Still slightly short of breath. Feels his wheezing is better today and dyspnea is improving. Asking to go home ROS:Pertinent items are noted in HPI. Objective:  
Vital Signs:   
Visit Vitals /53 (BP 1 Location: Left arm, BP Patient Position: Sitting) Pulse 83 Temp 97.5 °F (36.4 °C) Resp 16 Wt 61.1 kg (134 lb 12.8 oz) SpO2 99% BMI 21.76 kg/m² O2 Device: Nasal cannula O2 Flow Rate (L/min): 2 l/min Temp (24hrs), Av.8 °F (36.6 °C), Min:97.5 °F (36.4 °C), Max:98.1 °F (36.7 °C) Intake/Output:  
Last shift:      701 - 1900 In: -  
Out: 100 [Urine:100] Last 3 shifts: 1901 - 700 In: 550 [I.V.:550] Out: 1150 [GNUIR:5560] Intake/Output Summary (Last 24 hours) at 2020 1313 Last data filed at 2020 1104 Gross per 24 hour Intake 550 ml Output 900 ml Net -350 ml Physical Exam:   
 General: in no apparent distress HEENT: Normal 
 Neck: No abnormally enlarged lymph nodes. Slightly elevated JVP Chest: normal 
 Lungs: end expiratory wheeze Heart: Irregular rate/rhythm Abdomen: abdomen is soft without significant tenderness, masses, organomegaly or guarding Extremity: negative Neuro: alert Skin: Skin color, texture, turgor normal. No rashes or lesions DATA: 
Labs: 
Recent Labs  
  20 
0136 20 
0148 20 
5635 WBC 7.5 8.0 8.8 HGB 7.5* 7.4* 8.0*  
HCT 23.4* 23.4* 25.2*  
 182 186 Recent Labs  
  20 
0136 20 
0148 20 
7718  139 133* K 4.0 3.8 3.7  105 102 CO2 27 28 27 GLU 95 111* 117* BUN 28* 25* 25* CREA 1.36* 1.30 1.41* CA 8.1* 8.1* 8.2* Imaging: 
[x]I have personally reviewed the patients radiographs XR Results (most recent): 
Results from SUZETTE US NATALEE [facility] Encounter encounter on 20 XR SWALLOW FUNC VIDEO Narrative EXAM:  Modified Barium Swallow (Video Barium Swallow). CLINICAL INDICATION/HISTORY:  
 
- Right lower lobe infiltrate. - Dysphagia. - Bedside exam with moderate oropharyngeal dysphagia with strong cough following 
consecutive swallows of thin liquid via straw. Full MBS felt to be needed by 
the bedside exam. 
- Assessment for aspiration. Evaluation for optimal diet placement. COMPARISON:  None.  
 
TECHNIQUE:   
 
 - This study was performed in conjunction with speech pathologist with the 
patient in lateral upright position.   
- Tested consistencies: Thin (via cup Western Lisa triple), pudding, solid as well as 
barium tablet challenge. - Fluoroscopy duration: 18 seconds. - Exposures:  6 cinefluoroscopy loops. FINDINGS: 
 
- No definite penetration or aspiration was noted on any of the tested boluses. - Vallecular and piriform residuals were seen with all tested consistencies. Impression IMPRESSION: 
 
No myra aspiration or penetration on any of the tested consistencies. Please see speech pathologist report for additional details and recommendations. High complexity decision making was performed during the evaluation of this patient at high risk for decompensation with multiple organ involvement 
  
Above mentioned total time spent on reviewing the case/medical record/data/notes/EMR/patient examination/documentation/coordinating care with nurse/consultants, exclusive of procedures with complex decision making performed and > 50% time spent in face to face evaluation.  
 
 
Cristal Costa MD

## 2020-01-31 NOTE — HOME CARE
Rec HC Order - d/c noted for today [facility] will follow for SN/PT/OT - telehealth - D Darwin MOTRON

## 2020-01-31 NOTE — DISCHARGE INSTRUCTIONS
Patient Education        Pneumonia: Care Instructions  Your Care Instructions    Pneumonia is an infection of the lungs. Most cases are caused by infections from bacteria or viruses. Pneumonia may be mild or very severe. If it is caused by bacteria, you will be treated with antibiotics. It may take a few weeks to a few months to recover fully from pneumonia, depending on how sick you were and whether your overall health is good. Follow-up care is a key part of your treatment and safety. Be sure to make and go to all appointments, and call your doctor if you are having problems. It's also a good idea to know your test results and keep a list of the medicines you take. How can you care for yourself at home? · Take your antibiotics exactly as directed. Do not stop taking the medicine just because you are feeling better. You need to take the full course of antibiotics. · Take your medicines exactly as prescribed. Call your doctor if you think you are having a problem with your medicine. · Get plenty of rest and sleep. You may feel weak and tired for a while, but your energy level will improve with time. · To prevent dehydration, drink plenty of fluids, enough so that your urine is light yellow or clear like water. Choose water and other caffeine-free clear liquids until you feel better. If you have kidney, heart, or liver disease and have to limit fluids, talk with your doctor before you increase the amount of fluids you drink. · Take care of your cough so you can rest. A cough that brings up mucus from your lungs is common with pneumonia. It is one way your body gets rid of the infection. But if coughing keeps you from resting or causes severe fatigue and chest-wall pain, talk to your doctor. He or she may suggest that you take a medicine to reduce the cough. · Use a vaporizer or humidifier to add moisture to your bedroom. Follow the directions for cleaning the machine.   · Do not smoke or allow others to smoke around you. Smoke will make your cough last longer. If you need help quitting, talk to your doctor about stop-smoking programs and medicines. These can increase your chances of quitting for good. · Take an over-the-counter pain medicine, such as acetaminophen (Tylenol), ibuprofen (Advil, Motrin), or naproxen (Aleve). Read and follow all instructions on the label. · Do not take two or more pain medicines at the same time unless the doctor told you to. Many pain medicines have acetaminophen, which is Tylenol. Too much acetaminophen (Tylenol) can be harmful. · If you were given a spirometer to measure how well your lungs are working, use it as instructed. This can help your doctor tell how your recovery is going. · To prevent pneumonia in the future, talk to your doctor about getting a flu vaccine (once a year) and a pneumococcal vaccine (one time only for most people). When should you call for help? Call 911 anytime you think you may need emergency care. For example, call if:    · You have severe trouble breathing.    Call your doctor now or seek immediate medical care if:    · You cough up dark brown or bloody mucus (sputum).     · You have new or worse trouble breathing.     · You are dizzy or lightheaded, or you feel like you may faint.    Watch closely for changes in your health, and be sure to contact your doctor if:    · You have a new or higher fever.     · You are coughing more deeply or more often.     · You are not getting better after 2 days (48 hours).     · You do not get better as expected. Where can you learn more? Go to http://brittney-eliseo.info/. Enter [id number] in the search box to learn more about \"Pneumonia: Care Instructions. \"  Current as of: June 9, 2019  Content Version: 12.2  © 5983-4595 [employer]. Care instructions adapted under license by [employer] (which disclaims liability or warranty for this information).  If you have questions about a medical condition or this instruction, always ask your healthcare professional. [place] [address] any warranty or liability for your use of this information.

## 2020-01-31 NOTE — PROGRESS NOTES
Intern Progress Note [address] Patient: Brii Child MRN: [id number]  CSN: [id number] YOB: 1940  Age: [de-identified] y.o. Sex: male DOA: 1/26/2020 LOS:  LOS: 5 days Subjective:  
 
Acute events: none Pt sleeping but easily aroused. Breathing comfortably on 2L NC. Pt says his breathing continues to improve. No SOB at rest, no CP and less DUMONT. Pt states that he didn't sleep at all overnight. ROS General - well Cardio - No CP, no Palpitations Resp - No SOB, +cough, +wheeze, +DUMONT Abdo-  No pain, N/V/D/C 
 - No dysuria Objective:  
  
Patient Vitals for the past 24 hrs: 
 Temp Pulse Resp BP SpO2  
01/31/20 0427 98.1 °F (36.7 °C) 87 18 133/62 98 % 01/30/20 2317 97.8 °F (36.6 °C) 80 20 147/75 99 % 01/30/20 1938 97.6 °F (36.4 °C) 78 20 134/65 99 % 01/30/20 1536 97.9 °F (36.6 °C) 93 20 121/51 98 % 01/30/20 1046 97.5 °F (36.4 °C) 79 20 122/56  Intake/Output Summary (Last 24 hours) at 1/31/2020 2814 Last data filed at 1/31/2020 3399 Gross per 24 hour Intake 550 ml Output 500 ml Net 50 ml Physical Exam: 
General: Thin male, NAD,  Alert and oriented X 3 CV: Irreg irreg with Gr II/VI sys murmurs Lungs: better aeration throughout, diffuse expiratory wheezes, crackles Rt LL Abd: + BS, mildly distended, non-tender, no masses, no rebound or guarding. Motor: Normal and equal strength upper and lower ext bilat. Extremities  No calf tenderness or swelling. No edema. Warm feet. Lab/Data Reviewed: 
BMP:  
Lab Results Component Value Date/Time  01/31/2020 01:36 AM  
 K 4.0 01/31/2020 01:36 AM  
  01/31/2020 01:36 AM  
 CO2 27 01/31/2020 01:36 AM  
 AGAP 6 01/31/2020 01:36 AM  
 GLU 95 01/31/2020 01:36 AM  
 BUN 28 (H) 01/31/2020 01:36 AM  
 CREA 1.36 (H) 01/31/2020 01:36 AM  
 GFRAA >60 01/31/2020 01:36 AM  
 GFRNA 50 (L) 01/31/2020 01:36 AM  
 
CBC:  
Lab Results Component Value Date/Time WBC 7.5 01/31/2020 01:36 AM  
 HGB 7.5 (L) 01/31/2020 01:36 AM  
 HCT 23.4 (L) 01/31/2020 01:36 AM  
  01/31/2020 01:36 AM  
  
 
Scheduled Medications Reviewed: 
Current Facility-Administered Medications Medication Dose Route Frequency  albuterol-ipratropium (DUO-NEB) 2.5 MG-0.5 MG/3 ML  3 mL Nebulization Q6H RT  
 traZODone (DESYREL) tablet 50 mg  50 mg Oral QHS  predniSONE (DELTASONE) tablet 40 mg  40 mg Oral DAILY WITH BREAKFAST  budesonide (PULMICORT) 500 mcg/2 ml nebulizer suspension  500 mcg Nebulization BID RT  
 digoxin (LANOXIN) tablet 0.125 mg  0.125 mg Oral DAILY  amLODIPine (NORVASC) tablet 10 mg  10 mg Oral DAILY  pantoprazole (PROTONIX) tablet 40 mg  40 mg Oral ACB  pravastatin (PRAVACHOL) tablet 40 mg  40 mg Oral QHS  azithromycin (ZITHROMAX) 500 mg in  mL  500 mg IntraVENous Q24H  piperacillin-tazobactam (ZOSYN) 3.375 g in 0.9% sodium chloride (MBP/ADV) 100 mL MBP  3.375 g IntraVENous Q6H Assessment/Plan  
 
[de-identified] man PMH of AAA s/p repair, HF, COPD, HTN/HLD, afib on no AC due to gastric ulcer presenting with cough and DUMONT. 
  
Acute Hypoxic Resp failure 2/2 COPD exast + RLL PNA Hypoxia to mid 80s reported by EMS.  Sating 91% on 2L in ED. Pt hs cough, and DUMONT. Mild productive cough and diffuse exp wheeze through. No CP, trops neg. Pt has a history of COPD on 2L 02 at baseline. CXR shows RLL infiltrate, suggesting CAP vs aspiration. Being treated w/ IV zosyn and azithro. Pt breathing continues to improve, breathing comfortably on 2L NC. Still w/ exp wheeze. Passed bedside swallow and placed on cardiac diet. Pt reporting increased difficulty breathing this Am, no increased WOB, sating fine on home 2L NC. Pulm consulted Price Angelucci) 1/28. Legionellae and strep pneumo neg. BCx neg. Resp cultures pending. Resp status continues to improve,, duo-neb spaced to q6hr o/n. Consider d/c today. Cleared by Pulmonology, discharge recs are to continue home meds. Close FU with Dr. Wellington Arzate.  
  
Plan: - IV zosyn and azithro (CAP + pseudomonas + anaerobe coverage) day 5/5 today 
- duo-neb q6 hours - PO prednisone 40mg, 5 day course (day 5/5 today) - Pulmicort neb 
- afrin spray for dry nose 
 -suppl O2 titrate to >92% 
  
COPD Pt followed by Pulmonology (Dr. Wellington Arzate). Last FEV1 48%,On 2L home 02 at baseline. On albuterol neb and ipratropium inhaler. 
-Hold home inhalers.  
-For tx as above section 
  
Diastolic HF/CAD/HTN/HLD - Followed by cardiology (Dr. Aimee Lin) . No CP. Trops neg. BNP 7000. ECHO (6/3):  EF 56 - 60%. Abnormal left ventricular septal motion. Interventricular septal \"bounce\". Pt has no orthopnea or PE. Unlikely that HF is contributing to resp status at current time. On amlodipine 10mg qD, lisinopril 10qD, digoxin 0.125mg. BP in ED SBP 120s. Digoxin level remains therapeutic.    
-hold home HTN meds for now  
-continue digoxin, monitor daily levels while on azithro 
-cardiac monitoring 
  
Afib not on [facility] Chronic afib was on warfain until recently. Had bleeding gastric ulcer and warfarin held. Hb stable thus far. 
-hold warfarin 
-SCDs 
-continue home protonix  
  
AAA s/p EVAR - now w/ interval increase in size on cT. CT abdo (6/3/2019): Developing saccular aneurysmal dilation along the right anterior to posterolateral aspect of the mid aorta, centered at the superior portion of the stent graft and beginning just below the level of the SMA.  Based on relatively rapid development of this finding since the 03/26/18 CTA. Followed by Dr. Chencho Vera office (vascular). Per PT not planning any surgery for thus. 
  
Anemia 2/2 Multifactorial Iron Def and Anemia of Chronic Kidney Disease- H/o bleeding ulcer May/2019. Ferritin 357 (1/20). Gets IV Iron infusions, last 1/21. Followed by Hematology  as OP (Dr. Juvenal Plunkett). Hb stable in mid 8s on this admission thus far. Pt was scheduled for IV iron infusion today.  Will consult heme today to determine if PT need IV iron infusion here or can it be done following this admission. 
-Monitor H/H w/ daily BMP Chronic pain lower legs. On narco at home. Pain contract w/ PFM. -continue narco 5-325mg PO  BID as needed Constipation - has resolved w/ miralax. -Miralax daily prn Insomnia On trazodone 50mg at home 
-continue home meds 
  
CKD3 - Baseline Cr 1.32-1. 50. Cr 1.53 on admission. -monitor daily BMP 
  
UTI - recent UTI diagnosis by PCP. On Cipro. Dysuria resolved. -coverage w/ zosyn HypoNa, hypoK, HypoCl - 2/2 ? poor PO intake. -Monitor with daily BMP 
 -replete K per protocol Leukopenia (Resolved) - [facility] admission 7.8, down to 1.4 (1/27) and on repeat 0.9. 2/2? Drug rxn. Pt received rocephin, zosyn and athro in ED. No hx in past of this per pt or on chart review. Raised crp and ESR done recently. CMP unremarkable. Peripheral smear unremarkable. [facility] up to 9.6 from 7.8 yesterday.  
-consider heme-onc consult Moderate Protein Calorie Malnutrition - Pt thin, low alb 2.9 on admission suggesting malnutrition. Nutrition consulted. - Add food preferences - Add nutrition supplement: Ensure Enlive once daily 
- Continue RD inpatient monitoring and evaluation Dispo: FU PT/OT - Home w/ HH today Daughter had mPOA. No document not in system.  
  
Diet:cardiac diet DVT Prophylaxis: SCDs Code Status: Full Point of Contact: Mi Cardona (Relationship: CRVBSIXH) [phone] [address], [place]  
[address] Medicine Intern 01/31/20 8:47 AM

## 2020-01-31 NOTE — PROGRESS NOTES
Pt states he had \"a breathing episode\" when he went to the restroom and states he is not comfortable going home at this time. Daughter agrees. PFM notified, they are taking with the pt. Pt is asymptomatic, on 2L NC, resting in bed, will continue to monitor.

## 2020-01-31 NOTE — PROGRESS NOTES
NUTRITION Nutrition Consult: General Nutrition Management & Supplements RECOMMENDATIONS / PLAN:  
 
- Continue nutritional supplements post-discharge. - Continue RD inpatient monitoring and evaluation. NUTRITION INTERVENTIONS & DIAGNOSIS:  
 
- Meals/snacks: modified composition - Medical food supplement therapy: Ensure Enlive, once daily Nutrition Diagnosis: Chronic disease or condition related malnutrition related to early satiety with COPD as evidenced by recent weight loss (14.1% in th last year, 8.9% in the last month) and mild subcutaneous fat loss in multiple body regions. Patient meets criteria for Moderate Protein Calorie Malnutrition as evidenced by:  
ASPEN Malnutrition Criteria Acute Illness, Chronic Illness, or Social/Enviornmental: Chronic illness Body Fat Loss: Mild(orbital; moderate in upper arm) Muscle Mass Loss: Mild(temporal, hand, and clavicle regions) ASPEN Malnutrition Score - Chronic Illness: 2 Chronic Illness - Malnutrition Diagnosis: Moderate malnutrition. ASSESSMENT:h   
 
1/31: Fair intake, did not receive supplement this am, discussed with kitchen. Noted plan for discharge today, discussed continuation of supplements post-discharge. 1/29: Admitted for pneumonia with history of COPD and HF. Pt mildly SOB during conversation. Pt reports inability to eat large amounts of food PTA, with a normal meal pattern of a nutritional shake for breakfast an several small meals/snacks throughout the day, with his daughter preparing dinner. Fair intake since admission, tolerating diet. Limited NFPE conducted. Discussed nutrition supplement for breakfast to coordinate with pt at-home routine, pt agreeable to Ensure. Miralax started for constipation. Nutritional intake adequate to meet patients estimated nutritional needs:  No 
 
Diet: DIET NUTRITIONAL SUPPLEMENTS Breakfast; ENSURE ENLIVE 
DIET CARDIAC Regular Food Allergies: NKFA Current Appetite: Fair Appetite/meal intake prior to admission: Fair, Pt reports small meals, snacks, and one daily supplement shake ([place] Instant Breakfast or similar) Feeding Limitations:  [] Swallowing difficulty    [x] Chewing difficulty: edentulous with dentures, pt reports mild fit issues Current Meal Intake:  
Patient Vitals for the past 100 hrs: 
 % Diet Eaten 01/31/20 1334 25 % 01/29/20 1350 50 % 01/29/20 1014 75 % 01/28/20 1715 60 % 01/28/20 1516 75 % 01/28/20 1048 50 % BM: 1/30 Skin Integrity: No pressure injuries noted Edema:   [x] No     [] Yes Pertinent Medications: pantoprazole, miralax, prednisone, pravastatin Recent Labs  
  01/31/20 
0136 01/30/20 
0148 01/29/20 
8210  139 133* K 4.0 3.8 3.7  105 102 CO2 27 28 27 GLU 95 111* 117* BUN 28* 25* 25* CREA 1.36* 1.30 1.41* CA 8.1* 8.1* 8.2* Intake/Output Summary (Last 24 hours) at 1/31/2020 1434 Last data filed at 1/31/2020 1334 Gross per 24 hour Intake 790 ml Output 900 ml Net -110 ml Anthropometrics: 
Ht Readings from Last 1 Encounters:  
01/16/20 5' 6\" (1.676 m) Last 3 Recorded Weights in this Encounter 01/29/20 2753 01/30/20 5750 01/31/20 0725 Weight: 60 kg (132 lb 4.8 oz) 62.6 kg (138 lb) 61.1 kg (134 lb 12.8 oz) Davida Avalos Body mass index is 21.76 kg/m². Weight History: Pt reports  lbs x 1 year PTA with gradual unintentional loss. Per chart:  -13 lbs (8.9%) in last month, diuresis noted at admission; -22 lbs (14.1%) in the last year Weight Metrics 1/31/2020 1/26/2020 1/16/2020 12/19/2019 11/14/2019 11/6/2019 10/24/2019 Weight 134 lb 12.8 oz - 141 lb 9.6 oz 145 lb 3.2 oz 142 lb 12.8 oz 141 lb 9.6 oz 141 lb BMI - 21.76 kg/m2 22.85 kg/m2 23.44 kg/m2 23.05 kg/m2 22.85 kg/m2 22.76 kg/m2 Admitting Diagnosis: Community acquired pneumonia [J18.9] Community acquired bacterial pneumonia [J15.9] Pertinent PMHx: Bleeding gastric ulcer, AAA repair, CKD, dyslipidemia, HTN, COPD, Heart Failure 
,  
Education Needs:        [x] None identified  [] Identified - Not appropriate at this time  []  Identified and addressed - refer to education log Learning Limitations:   [x] None identified  [] Identified Cultural, Baptism & ethnic food preferences:  [x] None identified    [] Identified and addressed ESTIMATED NUTRITION NEEDS:  
 
Calories: 3850-6494 kcal (MSJx1.2-1.3) based on  [x] Actual BW: 60 kg     [] IBW Protein:  48-72 gm (0.8-1.2 gm/kg) based on  [x] Actual BW: 60 kg      [] IBW Fluid: 1 mL/kcal 
  
MONITORING & EVALUATION:  
 
Nutrition Goal(s):  
- PO nutrition intake will meet >75% of patient estimated nutritional needs within the next 7 days. Outcome: Progressing towards goal  
 
Monitoring:   [x] Food and nutrient intake   [x] Food and nutrient administration  [x] Comparative standards   [x] Nutrition-focused physical findings   [x] Anthropometric Measurements   [x] Treatment/therapy   [x] Biochemical data, medical tests, and procedures Previous Recommendations (for follow-up assessments only): Implemented Discharge Planning: cardiac diet + Ensure Enlive or comparable supplement 1-2x per day as needed Participated in care planning, discharge planning, & interdisciplinary rounds as appropriate. Vanessa Sequeira RD Pager: [phone]

## 2020-02-04 NOTE — TELEPHONE ENCOUNTER
PT'S DAUGHTER TATYANA([phone]). PT NEEDS REFILLL FOR HIS ALBUTEROL SOLUTION SENT TO [facility]--[id number].

## 2020-02-06 NOTE — PROGRESS NOTES
Hematology/Oncology Note Name: Antoni Beyer 
Date: 2020 : 1940 Uri Lockett DO Subjective: Chief Complaint:  Follow up anemia, recent hospitalization History of Present Illness:  
Mr Tatyana Mcmillan is here today for follow-up after discharged. He was admitted from 2020 to 2020 for respiratory distress in the setting of pneumonia and COPD exacerbation. He already completed steroids and antibiotics course. He reported feeling better today. Since stopped taking iron pills, he did not notice further dark stool. He missed the second dose of Injectafer d/t recent hospitalization. He has chronic fatigue but denies any obvious bleeding, melena, bloody stools, hematochezia, nausea, vomiting, or abdominal pain. He denied worsen yspnea, severe headache, chest pain, cough, abdominal pain, nausea, vomiting, diarrhea, dysuria, new focal weakness or numbness. His EGD and Colonoscopy on 19 showed hiatal hernia, gastritis, ascending colon polyp; diverticulosis; and hemorroids. History Mr. Tatyana Mcmillan is a most pleasant [de-identified]y.o. year old male who was seen for anemia. He has multiple co morbidities which includes of chronic atrial fibrillation, peripheral vascular disease, hypertension, coronary artery disease, pulmonary hypertension, CHF exacerbation, chronic pain syndrome, iron deficiency anemia, chronic kidney disease, and benign prostate hypertrophy. He was diagnosed with iron deficiency anemia since last year and has been on iron pills without any tolerating issues. On May 2019 he had bloody stools and was diagnosed with GI bleeding. His EGD/colonoscopy with polypectomy on 19 reported hiatal hernia, mild erosive gastritis few flecks of coffee grounds,1 cm colon polyp, diverticulosis, internal hemorrhoids. He has been on PPI since then. He received blood transfusion/IV iron during his hospitalization.  For his iron deficiency anemia, he has received 4 doses of iron IV- Injectafer given by his GI doctor, last dose was on 10/15/2019. He has followed up with GI recently and no further procedure planned. He has history of chronic A-fib but was hold off anticoagulation due to GI bleeding issues. He has been on home oxygen at 2-3 L for his COPD. Oncologic History: No history exists. Past Medical History, Family History, and Social History: 
 
Past Medical History:  
Diagnosis Date  Aneurysm ([facility] [address] AAA repair 2006 & 2012  Aorto-iliac duplex 02/13/2015 Tech difficult. Patent aorta bi-iliac endograft w/o leak or limb dysfunction.  Arrhythmia   
 a fib  Cardiac cath 11/01/2012 RCA (sm, nondom) patent. LM patent. LAD 25%. CX (dom) 45% mid. LVEDP 12 mmHg. No WMA. PA 27/12. W 10-12. CO/CI 5.2/2.6 (TD).  Cardiac echocardiogram, abnormal 02/20/2016 EF 55%. No WMA. Indeterminate diastolic fx. RVSP 60 mmHg. Severe LAE. Mild MR. Mod TR.  IVCE. Similar to study of 10/26/12.  Cardiovascular aorto-iliac duplex 02/13/2015 Patent aorta bi-iliac endovascular graft w/o leak or limb dysfunction. Sac measures 4.21 x 4.47 cm (4.4 x 4.6 cm on 1/31/14).  Cardiovascular LE peripheral arterial testing 07/29/2013 No significant LE arterial disease bilaterally. R GHADA 1. 12.  L GHADA 1. 12.  R DBI 0.83. L DBI 0.71. Exercise deferred.  Cardiovascular renal duplex 10/31/2012 No RA stenosis. Intrinsic/med disease in left kidney. Patent aortic endograft. Patent, thrombus-free renal veins bilaterally.  Carotid duplex 07/29/2013 Mild <50% bilateral ICA plaquing.  Chronic kidney disease  Chronic lung disease  Cigarette smoker  Congestive heart failure (CHF) ([facility] [address]  COPD (chronic obstructive pulmonary disease) (HCC)   
 and emphysema; likely secondary to tobacco abuse  Difficult intubation  Dyslipidemia   
 low HDL  Emphysema   
 HTN (hypertension)  Hypercholesterolemia  Ill-defined condition   
 hernia  Increased prostate specific antigen (PSA) velocity  Long term (current) use of anticoagulants   
 coumadin  Osteoarthritis  Osteomyelitis ([facility] [address]  Paroxysmal atrial fibrillation (HCC)  Peripheral vascular disease ([facility] [address] AAA repair 12/2007  Persistent atrial Fibrillation  Persistent atrial fibrillation  Unspecified adverse effect of anesthesia   
 difficulty breathing placed in ICU Oct 2012 (AAA repair) Past Surgical History:  
Procedure Laterality Date  BRONCHOSCOPY DIAGNOSTIC  11/2/2012  CARDIAC CATHETERIZATION  11/1/2012  COLONOSCOPY N/A 7/26/2016 COLONOSCOPY with Polypectomies performed by Kristen Aguilar MD at [facility] ENDOSCOPY  COLONOSCOPY N/A 5/28/2019 COLONOSCOPY with polypectomy performed by Jeff Pettit MD at [address] HX AAA REPAIR    
 2006 & 2012  HX HEART CATHETERIZATION  3/4/2009 1. RCA small, nondominant; patent. 2. LMCA patent. 3. LAD is long, wrapped around apical vessel. Diffuse, 20-30% stenosis noted. 4. CCA is large, dominant vessel. Diffuse 20-30% stenosis. 5. LVEDP is 16 mmHg. 6. Overall left ventricular systolic function mildly diminshed with est. EF 45%. Mild, global hypokinesis noted. 7. No significant mitral regurgitation or aortic stenosis noted. (see report)  HX HERNIA REPAIR  2/2014  
 rt inguinal   
 HX HERNIA REPAIR  01/2016 LEFT INGUINAL HERNIA REPAIR DR. Les Gabriel  REPAIR ING HERNIA,5+Y/O,JESSIE Left 1-20-16 Dr. Nick LONG  11/1/2012 Social History Socioeconomic History  Marital status:  Spouse name: Not on file  Number of children: Not on file  Years of education: Not on file  Highest education level: Not on file Occupational History  Not on file Social Needs  Financial resource strain: Not on file  Food insecurity:  
  Worry: Not on file Inability: Not on file  Transportation needs:  
  Medical: Not on file Non-medical: Not on file Tobacco Use  Smoking status: Former Smoker Packs/day: 1.50 Years: 54.00 Pack years: 81.00 Types: Cigarettes Last attempt to quit: 10/23/2012 Years since quittin.2  Smokeless tobacco: Never Used Substance and Sexual Activity  Alcohol use: No  
  Alcohol/week: 0.0 standard drinks Comment: quit drinking alcohol 26 years ago, patient states stopped in \"[id number]\"  Drug use: No  
 Sexual activity: Not Currently Lifestyle  Physical activity:  
  Days per week: Not on file Minutes per session: Not on file  Stress: Not on file Relationships  Social connections:  
  Talks on phone: Not on file Gets together: Not on file Attends Rastafarian service: Not on file Active member of club or organization: Not on file Attends meetings of clubs or organizations: Not on file Relationship status: Not on file  Intimate partner violence:  
  Fear of current or ex partner: Not on file Emotionally abused: Not on file Physically abused: Not on file Forced sexual activity: Not on file Other Topics Concern  Not on file Social History Narrative  Not on file Family History Problem Relation Age of Onset  Heart Surgery Father [facility]  Stroke Father  Heart Disease Father  Hypertension Father  Heart Attack Father  Heart Surgery Mother [facility]  Coronary Artery Disease Mother  Stroke Mother  Heart Disease Mother  Hypertension Mother  Diabetes Sister  Cancer Brother Current Outpatient Medications Medication Sig Dispense Refill  albuterol (PROVENTIL VENTOLIN) 2.5 mg /3 mL (0.083 %) nebu 3 mL by Nebulization route every four (4) hours as needed for Wheezing. 24 Each 3  
 tamsulosin (FLOMAX) 0.4 mg capsule Take 0.4 mg by mouth daily.  multivitamin, tx-iron-ca-min (THERA-M W/ IRON) 9 mg iron-400 mcg tab tablet Take 1 Tab by mouth daily.  aspirin delayed-release 81 mg tablet Take 81 mg by mouth daily.  sodium chloride (OCEAN NASAL) 0.65 % nasal squeeze bottle 2 Sprays by Both Nostrils route daily as needed for Congestion.  tiotropium-olodaterol (STIOLTO RESPIMAT) 2.5-2.5 mcg/actuation inhaler inhale 2 inhalations by mouth once daily 1 Inhaler 5  
 docusate sodium 100 mg tab Take 100 mg by mouth as needed for Other (constipation).  ferrous sulfate 325 mg (65 mg iron) tablet Take 325 mg by mouth every other day. Indications: anemia from inadequate iron  ascorbic acid, vitamin C, (VITAMIN C) 250 mg tablet Take 1 Tab by mouth two (2) times a day. 30 Tab 0  
 pantoprazole (PROTONIX) 40 mg tablet Take 1 Tab by mouth Daily (before breakfast). 30 Tab 0  
 HYDROcodone-acetaminophen (NORCO) 5-325 mg per tablet Take 0.5 Tabs by mouth two (2) times daily as needed for Pain.  traZODone (DESYREL) 50 mg tablet Take 50 mg by mouth nightly.  lisinopril (PRINIVIL, ZESTRIL) 20 mg tablet Take 1 Tab by mouth daily. 30 Tab 0  pravastatin (PRAVACHOL) 40 mg tablet Take 40 mg by mouth nightly.  polyethylene glycol (MIRALAX) 17 gram packet Take 17 g by mouth daily as needed (constipation).  digoxin (DIGITEK) 0.125 mg tablet take 1 tablet by mouth once daily 90 Tab 3  
 amLODIPine (NORVASC) 10 mg tablet Take 10 mg by mouth daily. 0  
 OXYGEN-AIR DELIVERY SYSTEMS 2 L/min by Nasal route daily. Continuous. Company is [employer] Review of Systems: 
Constitutional: The patient has no acute distress or discomfort. Positive for chronic fatigue. HEENT: The patient denies recent head trauma, eye pain, blurred vision,  hearing deficit, oropharyngeal mucosal pain or lesions, and the patient denies throat pain or discomfort. Lymphatics: The patient denies palpable peripheral lymphadenopathy. Hematologic: The patient denies having bruising, bleeding. Respiratory: Patient denies having shortness of breath, cough, sputum production, fever. Cardiovascular: The patient denies having leg pain, leg swelling, heart palpitations, chest permit, or chest pain. The patient denies having progressive dyspnea on exertion. Gastrointestinal: The patient denies having nausea, emesis, or diarrhea. The patient denies having any hematemesis or blood in the stool. Genitourinary: Patient denies having urinary urgency, frequency, or dysuria. The patient denies having blood in the urine. Psychological: The patient denies having symptoms of nervousness, anxiety, depression, or thoughts of harming self. Skin: Patient denies having skin rashes, skin, ulcerations, or unexplained itching or pruritus. Musculoskeletal: The patient denies having pain in the joints or bones. Objective:  
 
Visit Vitals /70 (BP 1 Location: Left arm, BP Patient Position: Sitting) Pulse 68 Temp 96.9 °F (36.1 °C) (Oral) Resp 22 Ht 5' 6\" (1.676 m) Wt 60.8 kg (134 lb) SpO2 99% BMI 21.63 kg/m² Physical Exam:  
Gen. Appearance: The patient is in no acute distress. On Home Oxygen 2L. Patient looks brighter today. Skin: There is no bruise or rash. HEENT: The exam is unremarkable. Neck: Supple without lymphadenopathy or thyromegaly. Lungs: Clear to auscultation and percussion; there are no wheezes or rhonchi. Heart: Regular rate and rhythm; there are no murmurs, gallops, or rubs. Abdomen: Bowel sounds are present and normal.  There is no guarding, tenderness, or hepatosplenomegaly. Extremities: There is no clubbing, cyanosis, or edema. Neurologic: There are no focal neurologic deficits. Lymphatics: There is no palpable peripheral lymphadenopathy. Musculoskeletal: The patient has full range of motion at all joints. There is no evidence of joint deformity or effusions.   There is no focal joint tenderness. Psychological/psychiatric: There is no clinical evidence of anxiety, depression, or melancholy. Diagnostics: 
   
Results for orders placed or performed during the hospital encounter of 01/16/20 CBC WITH 3 PART DIFF     Status: Abnormal  
Result Value Ref Range Status WBC 9.8 4.5 - 13.0 K/uL Final  
 RBC 2.55 (L) 4.10 - 5.10 M/uL Final  
 HGB 7.3 (L) 12.0 - 16 g/dL Final  
 HCT 22.9 (L) 36 - 48 % Final  
 MCV 89.8 78 - 102 FL Final  
 MCH 28.6 25.0 - 35.0 PG Final  
 MCHC 31.9 31 - 37 g/dL Final  
 RDW 13.5 11.5 - 14.5 % Final  
 PLATELET 971 122 - 031 K/uL Final  
 NEUTROPHILS 80 (H) 40 - 70 % Final  
 MIXED CELLS 9 0.1 - 17 % Final  
 LYMPHOCYTES 11 (L) 14 - 44 % Final  
 ABS. NEUTROPHILS 7.8 1.8 - 9.5 K/UL Final  
 ABS. MIXED CELLS 0.9 0.0 - 2.3 K/uL Final  
 ABS. LYMPHOCYTES 1.1 1.1 - 5.9 K/UL Final  
  Comment: Test performed at [address] [place] [address] or Outpatient Infusion Center Location. Reviewed by Medical Director. DF AUTOMATED   Final  
 
 
Recent Results (from the past 2016 hour(s)) HAPTOGLOBIN Collection Time: 11/15/19  2:40 PM  
Result Value Ref Range Haptoglobin 208 (H) 30 - 200 mg/dL LD Collection Time: 11/15/19  2:40 PM  
Result Value Ref Range  81 - 234 U/L  
TYPE + CROSSMATCH Collection Time: 11/15/19  2:45 PM  
Result Value Ref Range Crossmatch Expiration 11/18/2019 ABO/Rh(D) O POSITIVE Antibody screen NEG Unit number [id number] Blood component type RC LR,2 Unit division 00 Status of unit TRANSFUSED Crossmatch result Compatible DIRECT EMILIANO Collection Time: 11/15/19  2:45 PM  
Result Value Ref Range [facility] Poly NEG   
 [facility] IgG NEG   
 [facility] C3b/C3d NEG METABOLIC PANEL, COMPREHENSIVE Collection Time: 12/19/19  3:15 PM  
Result Value Ref Range Sodium 137 136 - 145 mmol/L Potassium 4.6 3.5 - 5.5 mmol/L  Chloride 102 100 - 111 mmol/L  
 CO2 29 21 - 32 mmol/L  
 Anion gap 6 3.0 - 18 mmol/L Glucose 100 (H) 74 - 99 mg/dL BUN 21 (H) 7.0 - 18 MG/DL Creatinine 1.67 (H) 0.6 - 1.3 MG/DL  
 BUN/Creatinine ratio 13 12 - 20 GFR est AA 48 (L) >60 ml/min/1.73m2 GFR est non-AA 40 (L) >60 ml/min/1.73m2 Calcium 8.4 (L) 8.5 - 10.1 MG/DL Bilirubin, total 0.5 0.2 - 1.0 MG/DL  
 ALT (SGPT) 18 16 - 61 U/L  
 AST (SGOT) 16 10 - 38 U/L Alk. phosphatase 89 45 - 117 U/L Protein, total 7.3 6.4 - 8.2 g/dL Albumin 3.2 (L) 3.4 - 5.0 g/dL Globulin 4.1 (H) 2.0 - 4.0 g/dL A-G Ratio 0.8 0.8 - 1.7    
CBC WITH AUTOMATED DIFF Collection Time: 12/26/19  2:10 PM  
Result Value Ref Range WBC 9.2 4.6 - 13.2 K/uL  
 RBC 2.36 (L) 4.70 - 5.50 M/uL HGB 6.6 (L) 13.0 - 16.0 g/dL HCT 21.4 (L) 36.0 - 48.0 % MCV 90.7 74.0 - 97.0 FL  
 MCH 28.0 24.0 - 34.0 PG  
 MCHC 30.8 (L) 31.0 - 37.0 g/dL  
 RDW 15.3 (H) 11.6 - 14.5 % PLATELET 650 811 - 398 K/uL MPV 9.9 9.2 - 11.8 FL  
 NEUTROPHILS 77 (H) 40 - 73 % LYMPHOCYTES 13 (L) 21 - 52 % MONOCYTES 9 3 - 10 % EOSINOPHILS 1 0 - 5 % BASOPHILS 0 0 - 2 %  
 ABS. NEUTROPHILS 7.1 1.8 - 8.0 K/UL  
 ABS. LYMPHOCYTES 1.2 0.9 - 3.6 K/UL  
 ABS. MONOCYTES 0.8 0.05 - 1.2 K/UL  
 ABS. EOSINOPHILS 0.1 0.0 - 0.4 K/UL  
 ABS. BASOPHILS 0.0 0.0 - 0.1 K/UL  
 DF AUTOMATED PLATELET COMMENTS ADEQUATE PLATELETS    
 RBC COMMENTS ANISOCYTOSIS 1+ 
    
 RBC COMMENTS OVALOCYTES PRESENT 
    
TYPE + CROSSMATCH Collection Time: 12/26/19  2:45 PM  
Result Value Ref Range Crossmatch Expiration 12/29/2019 ABO/Rh(D) O POSITIVE Antibody screen NEG Unit number [id number] Blood component type [facility] Unit division 00 Status of unit TRANSFUSED Crossmatch result Compatible Unit number [id number] Blood component type [facility] Unit division 00 Status of unit TRANSFUSED Crossmatch result Compatible CBC WITH 3 PART DIFF  Collection Time: 01/16/20  3:00 PM  
 Result Value Ref Range WBC 9.8 4.5 - 13.0 K/uL  
 RBC 2.55 (L) 4.10 - 5.10 M/uL HGB 7.3 (L) 12.0 - 16 g/dL HCT 22.9 (L) 36 - 48 % MCV 89.8 78 - 102 FL  
 MCH 28.6 25.0 - 35.0 PG  
 MCHC 31.9 31 - 37 g/dL  
 RDW 13.5 11.5 - 14.5 % PLATELET 308 839 - 327 K/uL NEUTROPHILS 80 (H) 40 - 70 % MIXED CELLS 9 0.1 - 17 % LYMPHOCYTES 11 (L) 14 - 44 % ABS. NEUTROPHILS 7.8 1.8 - 9.5 K/UL  
 ABS. MIXED CELLS 0.9 0.0 - 2.3 K/uL  
 ABS. LYMPHOCYTES 1.1 1.1 - 5.9 K/UL  
 DF AUTOMATED    
TYPE + CROSSMATCH Collection Time: 01/20/20  2:15 PM  
Result Value Ref Range Crossmatch Expiration 01/23/2020 ABO/Rh(D) O POSITIVE Antibody screen NEG Unit number [id number] Blood component type [facility] Unit division 00 Status of unit TRANSFUSED Crossmatch result Compatible C REACTIVE PROTEIN, QT Collection Time: 01/20/20  2:20 PM  
Result Value Ref Range C-Reactive protein 3.8 (H) 0 - 0.3 mg/dL SED RATE (ESR) Collection Time: 01/20/20  2:20 PM  
Result Value Ref Range Sed rate, automated 67 (H) 0 - 20 mm/hr FERRITIN Collection Time: 01/20/20  2:20 PM  
Result Value Ref Range Ferritin 357 8 - 388 NG/ML  
EKG, 12 LEAD, INITIAL Collection Time: 01/26/20  9:46 AM  
Result Value Ref Range Ventricular Rate 98 BPM  
 Atrial Rate 105 BPM  
 QRS Duration 84 ms Q-T Interval 334 ms QTC Calculation (Bezet) 426 ms Calculated R Axis -62 degrees Calculated T Axis 70 degrees Diagnosis Atrial fibrillation with premature ventricular or aberrantly conducted  
complexes Left axis deviation Anteroseptal infarct (cited on or before 25-AUG-2017) Abnormal ECG When compared with ECG of 02-JUN-2019 16:08, No significant change was found Confirmed by Sharonda Dubois ([id number]) on 1/26/2020 2:24:56 PM 
  
CBC WITH AUTOMATED DIFF Collection Time: 01/26/20 10:03 AM  
Result Value Ref Range WBC 7.3 4.6 - 13.2 K/uL  
 RBC 3.16 (L) 4.70 - 5.50 M/uL HGB 8.8 (L) 13.0 - 16.0 g/dL HCT 27.8 (L) 36.0 - 48.0 % MCV 88.0 74.0 - 97.0 FL  
 MCH 27.8 24.0 - 34.0 PG  
 MCHC 31.7 31.0 - 37.0 g/dL  
 RDW 15.4 (H) 11.6 - 14.5 % PLATELET 476 554 - 632 K/uL MPV 9.0 (L) 9.2 - 11.8 FL  
 NEUTROPHILS 86 (H) 40 - 73 % LYMPHOCYTES 9 (L) 21 - 52 % MONOCYTES 5 3 - 10 % EOSINOPHILS 0 0 - 5 % BASOPHILS 0 0 - 2 %  
 ABS. NEUTROPHILS 6.3 1.8 - 8.0 K/UL  
 ABS. LYMPHOCYTES 0.7 (L) 0.9 - 3.6 K/UL  
 ABS. MONOCYTES 0.4 0.05 - 1.2 K/UL  
 ABS. EOSINOPHILS 0.0 0.0 - 0.4 K/UL  
 ABS. BASOPHILS 0.0 0.0 - 0.1 K/UL  
 DF AUTOMATED METABOLIC PANEL, BASIC Collection Time: 01/26/20 10:03 AM  
Result Value Ref Range Sodium 134 (L) 136 - 145 mmol/L Potassium 4.0 3.5 - 5.5 mmol/L Chloride 100 100 - 111 mmol/L  
 CO2 27 21 - 32 mmol/L Anion gap 7 3.0 - 18 mmol/L Glucose 129 (H) 74 - 99 mg/dL BUN 19 (H) 7.0 - 18 MG/DL Creatinine 1.53 (H) 0.6 - 1.3 MG/DL  
 BUN/Creatinine ratio 12 12 - 20 GFR est AA 53 (L) >60 ml/min/1.73m2 GFR est non-AA 44 (L) >60 ml/min/1.73m2 Calcium 8.5 8.5 - 10.1 MG/DL  
CARDIAC PANEL,(CK, CKMB & TROPONIN) Collection Time: 01/26/20 10:03 AM  
Result Value Ref Range CK 39 39 - 308 U/L  
 CK - MB 1.1 <3.6 ng/ml CK-MB Index 2.8 0.0 - 4.0 % Troponin-I, QT <0.02 0.0 - 0.045 NG/ML  
NT-PRO BNP Collection Time: 01/26/20 10:03 AM  
Result Value Ref Range NT pro-BNP 7,771 (H) 0 - 1,800 PG/ML  
PROTHROMBIN TIME + INR Collection Time: 01/26/20 10:03 AM  
Result Value Ref Range Prothrombin time 13.9 11.5 - 15.2 sec INR 1.1 0.8 - 1.2 DIGOXIN Collection Time: 01/26/20 10:03 AM  
Result Value Ref Range Digoxin level 0.8 (L) 0.9 - 2.0 NG/ML  
POC LACTIC ACID Collection Time: 01/26/20 11:39 AM  
Result Value Ref Range Lactic Acid (POC) 1.35 0.40 - 2.00 mmol/L  
CULTURE, BLOOD Collection Time: 01/26/20 11:42 AM  
Result Value Ref Range Special Requests: NO SPECIAL REQUESTS 
RIGHT Antecubital 
    
 Culture result: NO GROWTH 6 DAYS    
CULTURE, BLOOD Collection Time: 01/26/20 11:53 AM  
Result Value Ref Range Special Requests: LEFT Antecubital 
    
 Culture result: NO GROWTH 6 DAYS    
DRUG SCREEN, URINE Collection Time: 01/27/20  4:45 AM  
Result Value Ref Range BENZODIAZEPINES NEGATIVE  NEG    
 BARBITURATES NEGATIVE  NEG    
 THC (TH-CANNABINOL) NEGATIVE  NEG    
 OPIATES POSITIVE (A) NEG    
 PCP(PHENCYCLIDINE) NEGATIVE  NEG    
 COCAINE NEGATIVE  NEG    
 AMPHETAMINES NEGATIVE  NEG METHADONE NEGATIVE  NEG HDSCOM (NOTE) LEGIONELLA PNEUMOPHILA AG, URINE Collection Time: 01/27/20  4:45 AM  
Result Value Ref Range Legionella Ag, urine NEGATIVE  NEG    
STREP PNEUMO AG, URINE Collection Time: 01/27/20  4:45 AM  
Result Value Ref Range Strep pneumo Ag, urine NEGATIVE  NEG    
CBC WITH AUTOMATED DIFF Collection Time: 01/27/20  5:08 AM  
Result Value Ref Range WBC 1.4 (L) 4.6 - 13.2 K/uL  
 RBC 2.91 (L) 4.70 - 5.50 M/uL HGB 8.0 (L) 13.0 - 16.0 g/dL HCT 25.0 (L) 36.0 - 48.0 % MCV 85.9 74.0 - 97.0 FL  
 MCH 27.5 24.0 - 34.0 PG  
 MCHC 32.0 31.0 - 37.0 g/dL  
 RDW 15.3 (H) 11.6 - 14.5 % PLATELET 827 774 - 576 K/uL MPV 8.8 (L) 9.2 - 11.8 FL  
 NEUTROPHILS 75 42 - 75 % LYMPHOCYTES 24 20 - 51 % MONOCYTES 1 (L) 2 - 9 % EOSINOPHILS 0 0 - 5 % BASOPHILS 0 0 - 3 %  
 ABS. NEUTROPHILS 1.1 (L) 1.8 - 8.0 K/UL  
 ABS. LYMPHOCYTES 0.3 (L) 0.8 - 3.5 K/UL  
 ABS. MONOCYTES 0.0 0 - 1.0 K/UL  
 ABS. EOSINOPHILS 0.0 0.0 - 0.4 K/UL  
 ABS. BASOPHILS 0.0 0.0 - 0.06 K/UL  
 DF MANUAL PLATELET COMMENTS ADEQUATE PLATELETS    
 RBC COMMENTS ANISOCYTOSIS 
1+ METABOLIC PANEL, BASIC Collection Time: 01/27/20  5:08 AM  
Result Value Ref Range Sodium 132 (L) 136 - 145 mmol/L Potassium 3.4 (L) 3.5 - 5.5 mmol/L Chloride 97 (L) 100 - 111 mmol/L  
 CO2 26 21 - 32 mmol/L Anion gap 9 3.0 - 18 mmol/L Glucose 170 (H) 74 - 99 mg/dL BUN 20 (H) 7.0 - 18 MG/DL Creatinine 1.67 (H) 0.6 - 1.3 MG/DL  
 BUN/Creatinine ratio 12 12 - 20 GFR est AA 48 (L) >60 ml/min/1.73m2 GFR est non-AA 40 (L) >60 ml/min/1.73m2 Calcium 8.2 (L) 8.5 - 10.1 MG/DL  
WBC COUNT Collection Time: 01/27/20  6:45 AM  
Result Value Ref Range WBC 0.9 (LL) 4.6 - 13.2 K/uL CBC WITH AUTOMATED DIFF Collection Time: 01/27/20 12:45 PM  
Result Value Ref Range WBC 2.2 (L) 4.6 - 13.2 K/uL  
 RBC 2.87 (L) 4.70 - 5.50 M/uL HGB 7.9 (L) 13.0 - 16.0 g/dL HCT 24.6 (L) 36.0 - 48.0 % MCV 85.7 74.0 - 97.0 FL  
 MCH 27.5 24.0 - 34.0 PG  
 MCHC 32.1 31.0 - 37.0 g/dL  
 RDW 15.5 (H) 11.6 - 14.5 % PLATELET 550 953 - 000 K/uL MPV 9.2 9.2 - 11.8 FL  
 NEUTROPHILS 86 (H) 40 - 73 % LYMPHOCYTES 13 (L) 21 - 52 % MONOCYTES 1 (L) 3 - 10 % EOSINOPHILS 0 0 - 5 % BASOPHILS 0 0 - 2 %  
 ABS. NEUTROPHILS 1.9 1.8 - 8.0 K/UL  
 ABS. LYMPHOCYTES 0.3 (L) 0.9 - 3.6 K/UL  
 ABS. MONOCYTES 0.0 (L) 0.05 - 1.2 K/UL  
 ABS. EOSINOPHILS 0.0 0.0 - 0.4 K/UL  
 ABS. BASOPHILS 0.0 0.0 - 0.1 K/UL  
 DF AUTOMATED PERIPHERAL SMEAR Collection Time: 01/27/20 12:45 PM  
Result Value Ref Range PERIPHERAL SMEAR     
  PLEASE SEE PATHOLOGIST'S REVIEW IN THE [facility] SYSTEM, ACCESSION NUMBER:  
METABOLIC PANEL, COMPREHENSIVE Collection Time: 01/27/20 12:45 PM  
Result Value Ref Range Sodium 134 (L) 136 - 145 mmol/L Potassium 3.9 3.5 - 5.5 mmol/L Chloride 100 100 - 111 mmol/L  
 CO2 26 21 - 32 mmol/L Anion gap 8 3.0 - 18 mmol/L Glucose 182 (H) 74 - 99 mg/dL BUN 24 (H) 7.0 - 18 MG/DL Creatinine 1.48 (H) 0.6 - 1.3 MG/DL  
 BUN/Creatinine ratio 16 12 - 20 GFR est AA 55 (L) >60 ml/min/1.73m2 GFR est non-AA 46 (L) >60 ml/min/1.73m2 Calcium 8.3 (L) 8.5 - 10.1 MG/DL Bilirubin, total 0.7 0.2 - 1.0 MG/DL  
 ALT (SGPT) 21 16 - 61 U/L  
 AST (SGOT) 35 10 - 38 U/L Alk. phosphatase 77 45 - 117 U/L Protein, total 7.0 6.4 - 8.2 g/dL Albumin 2.9 (L) 3.4 - 5.0 g/dL Globulin 4.1 (H) 2.0 - 4.0 g/dL A-G Ratio 0.7 (L) 0.8 - 1.7    
CBC WITH AUTOMATED DIFF Collection Time: 01/28/20  5:28 AM  
Result Value Ref Range WBC 9.6 4.6 - 13.2 K/uL  
 RBC 2.82 (L) 4.70 - 5.50 M/uL HGB 7.8 (L) 13.0 - 16.0 g/dL HCT 24.4 (L) 36.0 - 48.0 % MCV 86.5 74.0 - 97.0 FL  
 MCH 27.7 24.0 - 34.0 PG  
 MCHC 32.0 31.0 - 37.0 g/dL  
 RDW 16.0 (H) 11.6 - 14.5 % PLATELET 133 068 - 033 K/uL MPV 9.3 9.2 - 11.8 FL  
 NEUTROPHILS 93 (H) 40 - 73 % LYMPHOCYTES 4 (L) 21 - 52 % MONOCYTES 3 3 - 10 % EOSINOPHILS 0 0 - 5 % BASOPHILS 0 0 - 2 %  
 ABS. NEUTROPHILS 8.9 (H) 1.8 - 8.0 K/UL  
 ABS. LYMPHOCYTES 0.4 (L) 0.9 - 3.6 K/UL  
 ABS. MONOCYTES 0.2 0.05 - 1.2 K/UL  
 ABS. EOSINOPHILS 0.0 0.0 - 0.4 K/UL  
 ABS. BASOPHILS 0.0 0.0 - 0.1 K/UL  
 DF AUTOMATED METABOLIC PANEL, BASIC Collection Time: 01/28/20  5:28 AM  
Result Value Ref Range Sodium 132 (L) 136 - 145 mmol/L Potassium 3.7 3.5 - 5.5 mmol/L Chloride 100 100 - 111 mmol/L  
 CO2 26 21 - 32 mmol/L Anion gap 6 3.0 - 18 mmol/L Glucose 126 (H) 74 - 99 mg/dL BUN 25 (H) 7.0 - 18 MG/DL Creatinine 1.41 (H) 0.6 - 1.3 MG/DL  
 BUN/Creatinine ratio 18 12 - 20 GFR est AA 59 (L) >60 ml/min/1.73m2 GFR est non-AA 48 (L) >60 ml/min/1.73m2 Calcium 8.2 (L) 8.5 - 10.1 MG/DL  
DIGOXIN Collection Time: 01/28/20  5:28 AM  
Result Value Ref Range Digoxin level 1.0 0.9 - 2.0 NG/ML  
CBC WITH AUTOMATED DIFF Collection Time: 01/29/20  5:24 AM  
Result Value Ref Range WBC 8.8 4.6 - 13.2 K/uL  
 RBC 2.89 (L) 4.70 - 5.50 M/uL HGB 8.0 (L) 13.0 - 16.0 g/dL HCT 25.2 (L) 36.0 - 48.0 % MCV 87.2 74.0 - 97.0 FL  
 MCH 27.7 24.0 - 34.0 PG  
 MCHC 31.7 31.0 - 37.0 g/dL  
 RDW 16.4 (H) 11.6 - 14.5 % PLATELET 398 769 - 309 K/uL MPV 9.2 9.2 - 11.8 FL  
 NEUTROPHILS 93 (H) 40 - 73 % LYMPHOCYTES 5 (L) 21 - 52 % MONOCYTES 2 (L) 3 - 10 % EOSINOPHILS 0 0 - 5 % BASOPHILS 0 0 - 2 %  
 ABS. NEUTROPHILS 8.3 (H) 1.8 - 8.0 K/UL  
 ABS. LYMPHOCYTES 0.4 (L) 0.9 - 3.6 K/UL  
 ABS. MONOCYTES 0.2 0.05 - 1.2 K/UL  
 ABS. EOSINOPHILS 0.0 0.0 - 0.4 K/UL  
 ABS. BASOPHILS 0.0 0.0 - 0.1 K/UL  
 DF AUTOMATED METABOLIC PANEL, BASIC Collection Time: 01/29/20  5:24 AM  
Result Value Ref Range Sodium 133 (L) 136 - 145 mmol/L Potassium 3.7 3.5 - 5.5 mmol/L Chloride 102 100 - 111 mmol/L  
 CO2 27 21 - 32 mmol/L Anion gap 4 3.0 - 18 mmol/L Glucose 117 (H) 74 - 99 mg/dL BUN 25 (H) 7.0 - 18 MG/DL Creatinine 1.41 (H) 0.6 - 1.3 MG/DL  
 BUN/Creatinine ratio 18 12 - 20 GFR est AA 59 (L) >60 ml/min/1.73m2 GFR est non-AA 48 (L) >60 ml/min/1.73m2 Calcium 8.2 (L) 8.5 - 10.1 MG/DL  
DIGOXIN Collection Time: 01/29/20  5:24 AM  
Result Value Ref Range Digoxin level 1.0 0.9 - 2.0 NG/ML  
CBC WITH AUTOMATED DIFF Collection Time: 01/30/20  1:48 AM  
Result Value Ref Range WBC 8.0 4.6 - 13.2 K/uL  
 RBC 2.68 (L) 4.70 - 5.50 M/uL HGB 7.4 (L) 13.0 - 16.0 g/dL HCT 23.4 (L) 36.0 - 48.0 % MCV 87.3 74.0 - 97.0 FL  
 MCH 27.6 24.0 - 34.0 PG  
 MCHC 31.6 31.0 - 37.0 g/dL  
 RDW 16.3 (H) 11.6 - 14.5 % PLATELET 510 504 - 542 K/uL MPV 9.1 (L) 9.2 - 11.8 FL  
 NEUTROPHILS 90 (H) 40 - 73 % LYMPHOCYTES 5 (L) 21 - 52 % MONOCYTES 5 3 - 10 % EOSINOPHILS 0 0 - 5 % BASOPHILS 0 0 - 2 %  
 ABS. NEUTROPHILS 7.3 1.8 - 8.0 K/UL  
 ABS. LYMPHOCYTES 0.4 (L) 0.9 - 3.6 K/UL  
 ABS. MONOCYTES 0.4 0.05 - 1.2 K/UL  
 ABS. EOSINOPHILS 0.0 0.0 - 0.4 K/UL  
 ABS. BASOPHILS 0.0 0.0 - 0.1 K/UL  
 DF AUTOMATED METABOLIC PANEL, BASIC Collection Time: 01/30/20  1:48 AM  
Result Value Ref Range Sodium 139 136 - 145 mmol/L Potassium 3.8 3.5 - 5.5 mmol/L Chloride 105 100 - 111 mmol/L  
 CO2 28 21 - 32 mmol/L Anion gap 6 3.0 - 18 mmol/L Glucose 111 (H) 74 - 99 mg/dL BUN 25 (H) 7.0 - 18 MG/DL  Creatinine 1.30 0.6 - 1.3 MG/DL  
 BUN/Creatinine ratio 19 12 - 20 GFR est AA >60 >60 ml/min/1.73m2 GFR est non-AA 53 (L) >60 ml/min/1.73m2 Calcium 8.1 (L) 8.5 - 10.1 MG/DL  
DIGOXIN Collection Time: 01/30/20  1:48 AM  
Result Value Ref Range Digoxin level 1.1 0.9 - 2.0 NG/ML  
CULTURE, RESPIRATORY/SPUTUM/BRONCH W GRAM STAIN Collection Time: 01/30/20  2:35 AM  
Result Value Ref Range Special Requests: NO SPECIAL REQUESTS    
 GRAM STAIN 10-25 WBC/lpf GRAM STAIN 10-25 EPI/lpf GRAM STAIN MUCUS PRESENT    
 GRAM STAIN FEW GRAM POSITIVE COCCI IN PAIRS IN CHAINS    
 GRAM STAIN FEW YEAST WITH PSEUDOHYPHAE Culture result: MODERATE CANDIDA ALBICANS (A) Culture result: MODERATE CANDIDA TROPICALIS (A) Culture result: FEW MYROIDES SPECIES (A) Culture result: FEW NORMAL RESPIRATORY RUDY Susceptibility Myroides species - COLIN Cefazolin ($) >=64 Resistant ug/mL Cefepime ($$) >=64 Resistant ug/mL Ceftazidime ($) >=64 Resistant ug/mL Ceftriaxone ($) >=64 Resistant ug/mL Ciprofloxacin ($) <=0.25 Susceptible ug/mL Gentamicin ($) <=1 Susceptible ug/mL Imipenem <=0.25 Susceptible ug/mL Levofloxacin ($) 0.25 Susceptible ug/mL Piperacillin/Tazobac ($) >=128 Resistant ug/mL Tobramycin ($) <=1 Susceptible ug/mL Trimeth-Sulfamethoxa <=20 Susceptible ug/mL CBC WITH AUTOMATED DIFF Collection Time: 01/31/20  1:36 AM  
Result Value Ref Range WBC 7.5 4.6 - 13.2 K/uL  
 RBC 2.67 (L) 4.70 - 5.50 M/uL HGB 7.5 (L) 13.0 - 16.0 g/dL HCT 23.4 (L) 36.0 - 48.0 % MCV 87.6 74.0 - 97.0 FL  
 MCH 28.1 24.0 - 34.0 PG  
 MCHC 32.1 31.0 - 37.0 g/dL  
 RDW 16.4 (H) 11.6 - 14.5 % PLATELET 153 711 - 484 K/uL MPV 9.3 9.2 - 11.8 FL  
 NEUTROPHILS 88 (H) 40 - 73 % LYMPHOCYTES 6 (L) 21 - 52 % MONOCYTES 6 3 - 10 % EOSINOPHILS 0 0 - 5 % BASOPHILS 0 0 - 2 %  
 ABS. NEUTROPHILS 6.7 1.8 - 8.0 K/UL  
 ABS. LYMPHOCYTES 0.4 (L) 0.9 - 3.6 K/UL ABS. MONOCYTES 0.4 0.05 - 1.2 K/UL  
 ABS. EOSINOPHILS 0.0 0.0 - 0.4 K/UL  
 ABS. BASOPHILS 0.0 0.0 - 0.1 K/UL  
 DF AUTOMATED METABOLIC PANEL, BASIC Collection Time: 01/31/20  1:36 AM  
Result Value Ref Range Sodium 138 136 - 145 mmol/L Potassium 4.0 3.5 - 5.5 mmol/L Chloride 105 100 - 111 mmol/L  
 CO2 27 21 - 32 mmol/L Anion gap 6 3.0 - 18 mmol/L Glucose 95 74 - 99 mg/dL BUN 28 (H) 7.0 - 18 MG/DL Creatinine 1.36 (H) 0.6 - 1.3 MG/DL  
 BUN/Creatinine ratio 21 (H) 12 - 20 GFR est AA >60 >60 ml/min/1.73m2 GFR est non-AA 50 (L) >60 ml/min/1.73m2 Calcium 8.1 (L) 8.5 - 10.1 MG/DL  
DIGOXIN Collection Time: 01/31/20  1:36 AM  
Result Value Ref Range Digoxin level 1.2 0.9 - 2.0 NG/ML Assessment and Plan: 1. Chronic Normocytic Anemia 
-- He has hx iron deficiency anemia diagnosed last year and has been on iron pills. -- He has follow-up with Gastroenterology for GI bleeding. EGD/Colonoscopy on 5/28/19 showed hiatal hernia, gastritis, ascending colon polyp; diverticulosis; and hemorroids. -- 9/25/19 labs reported Iron 26, Iron saturation 10%, Ferritin 429, and TIBC 259. Has received IV Iron  - Injectafer x 4 for symptomatic iron deficiency anemia. Last dose of Injactafer was on 10/15/2019. 
-- 11/7/19 labs showed Ferritin 772. MCV improved to 91. Vitamin B12/ Folate, and SPEP reviewed WNL. Schistocytes noted on smear but low reticulocyte production index - RPI (inadequate response to correct the anemia) and negative for hemolytic panels (, Hapto 208, KYLAH neg). -- His chronic anemia likely multifactorial from iron deficiency anemia and anemia of chronic kidney disease. Planned to give Injectafer x 2 at last visit. -- He was admitted recently for CAP/COPD exacerbation. He missed 2nd dose of Injectafer d/t hospitalization. Today the patient reported feeling better, no further bleeding or dark stools noted. Recent CBC 7.5/23. 4. - I also explained to him if worsen anemia and no obvious bleeding source reported, he probably need to get bone marrow biopsy for further evaluation. Plan: 
-- We will arrange his 2nd dose of Injectafer -- He will reschedule f/u with Gastroenterology d/t recent admission. -- The patient was advised to seek a prompt medical care if any obvious bleeding or worsening symptoms or any concerns. -- We will see him back in 4 weeks for follow-up. Always sooner if required. Candance Huger All of patient's questions answered to their apparent satisfaction. They verbally show understanding and agreement with the plan. About 27 minutes were spent for this encounter with more than 50% of the time spent in face-to-face counseling and discussing on diagnosis and management plan going forward. Jose Guadalupe Nickerson MD 
2/6/2020 Parts of this document has been produced using Dragon dictation system. Unrecognized errors in transcription may be present. Please do not hesitate to reach out for any questions or clarifications.

## 2020-02-06 NOTE — CDMP QUERY
Pt admitted with Pneumonia and COPD Exacerbation. Pt noted on DC summary noted CXR RLL infiltrate suggesting CAP vs aspiration and listing sputum culture with gram negative organism. Sputum culture with Cefazolin, Cefepime, Ceftazidime, Ceftriaxone, Piperacillin/Tazobac resistance. If possible, please document in the progress notes and d/c summary if you are evaluating and / or treating any of the following:         Gram negative pneumonia with (or without) multi drug resistance possibly due to Aspiration Pneumonia         Gram negative pneumonia with (or without) multi drug resistance         Bacterial pneumonia with (or without) multi drug resistance possibly due to Aspiration Pneumonia         Bacterial pneumonia with (or without) multi drug resistance         Viral pneumonia         Hypostatic pneumonia         Other (please specify)         Unknown The medical record reflects the following: 
 
Risk Factors: [de-identified] yo with moderate malnutrition, hx COPD on home O2 and home nebulizer Clinical Indicators: DS:  CXR 1/26 shows RLL infiltrate, suggesting CAP vs aspiration.  Sputum cultures: gram+ cocci in pairs and chains, yeast pseudohyphae, mod candida albicans, mod sec yeast, few gram neg rods Sputum culture with Cefazolin, Cefepime, Ceftazidime, Ceftriaxone, Piperacillin/Tazobac resistance Treatment: MBS, aspiration precautions, SLP, Zithromax, Zosyn, Thank you,    Bella Gale   RN   CCDS   x [phone]

## 2020-02-11 NOTE — PROGRESS NOTES
[facility] Progress Note    Date: 2020    Name: Diamond Jules    MRN: [id number]         : 1940      Injectafer      Mr. Malcolm Mistry arrived to [facility] at [phone] accompanied by his daughter. Pain to right groin 4/10. Took medication today. Mr. Malcolm Mistry was assessed and education was provided. Patient states he has received injectafer in the past and tolerated it well. Mr. Alize Hernandez vitals were reviewed. Visit Vitals  /64 (BP 1 Location: Left arm, BP Patient Position: At rest)   Pulse 80   Temp 97.7 °F (36.5 °C)   Resp 18   SpO2 97%       24g IV inserted in patient's Left antecubital x1 attempt. Positive for blood return and flushes without difficulty. Injectafer 750 mg infused over 20 minutes in 250 ml N.S as ordered. Patient Vitals for the past 12 hrs:   Temp Pulse Resp BP SpO2   20 1442 97.7 °F (36.5 °C) 80 18 134/64 97 %   20 1410 98.5 °F (36.9 °C) 93 18 148/86 97 %       Mr. Malcolm Mistry tolerated infusion without complaints. IV removed. No irritation, bleeding, or hematoma noted at site. Gauze and coban applied to site. Patient declined to stay for observation today. Discharge instructions reviewed with pt. Pt instructed to report SOB, CP, elevated temp, back pain, or other symptoms of transfusion reaction to MD or ED. Pt verbalized understanding. Patient armband removed and shredded    Mr. Malcolm Mistry was discharged from [place] [address] in stable condition at [phone]. He has no further appointment with [facility].      Dior Menchaca RN  2020

## 2020-02-12 NOTE — CDMP QUERY
The patient is documented to have pneumonia with leukopenia (WBC 1.4k) and arterial hypoxemia (PaO2/FIO2 ratio of 258). Leukopenia developed within 24 hours of admission and attributed to possible sepsis in the progress notes (1/28 and 1/29). The patient was treated with IV antibiotics and monitoring. Can the patient's infection be further specified as: 
 
· Sepsis due to pneumonia · Signs and symptoms of localized infection only · Other explanation · Cannot provide additional  
 
THank you,    Cristal Jay RN  CCDS  x [phone]

## 2020-02-12 NOTE — PROGRESS NOTES
CDMP query Clarify CAP vs aspiration PNA, sputum culture only showed a few GNR which were not multidrug resistant. Lower suspicion for sepsis, leukopenia may have indicated early sepsis vs localized infection, leukopenia quickly resolved, Ddx 2/2 drug reaction.  
 
 
Joshua Noe MD

## 2020-02-20 NOTE — PROGRESS NOTES
Verbal Order with read back per Dr. Erma Faith MD  For PFT smart panel. AMB POC PFT complete w/ bronchodilator  Gas Dilute/ wash out lung vol w/wo distrib  Vol &  Diffusing capacity    Dr. Erma Faith MD will co-sign the orders.

## 2020-02-20 NOTE — PROGRESS NOTES
Six Minute Walk Test (6MWT) recording form    Christal Mojica       [id number]                                    1940 male  [id number]    [x]  Medical history checked  [x]  Medical clearance provided for the patient to participate in exercise testing      Contraindications to 6MWT:  [] Resting heart rate > 120 beats / min after 10 minutes rest (relative contraindication)  [] Systolic blood pressure > 180 mm Hg +/- diastolic blood pressure > 100 mm Hg (relative contraindication)  [] Resting SpO2 < 85% on room air or on prescribed level of supplemental oxygen  [] Physical disability preventing safe performance  [] No contraindications identified             6MWT        2/20/2020  9:24 AM       Supplemental Oxygen  Mobility Aid - None       Time Mins BP SP02 HR RR Distance Walked SOB/Rests/Comments   Rest 120/60 98 73 24  Room Air                                           SOB - 5     1  96 112  68 meters Room Air                                           SOB - 6     2  94 127  68 meters Room Air                                           SOB - 7     3  84 135  34 meters Room Air                                           SOB - 8     4  94 137  34 meters [email or web address] Pulse dose           SOB- 8     5           6           Recovery 1 120/70 94 87 28  Room Air                                           SOB - 8       Recovery 2 120/60 97 79 24  Room Air                                           SOB - 5       Total distance:   204 meters             Symptom recovery:   1.5 minutes                   HR recovery:  1 minute                                       Limiting factor:   Patient 02 dependant                                           Was test terminated: [] No   [x] Yes  If yes, when?                      4th minute of walk due to severe fatigue and shortness of breath, Pt. Refused to go any further. 6MWT Termination Criteria:  [] Chest pain or angina-like symptons  [] Heart rate > Predicted HR max.   [] Evolving mental confusioin, light-headedness or incoordination  [x] Physical or verbal severe fatigue [] Intolerable dyspnea, unrelieved by rest  [] Persistent SP02 < 85% (Note pending clinical presentation)  [] Abnormal gait pattern (leg cramps, staggering, ataxia)  [] Other clinically warranted reason     INTERPRETATION:                Comparision 6 min walk distance:      RECOMMENDATION:              Arnulfo Reis, RT

## 2020-02-24 NOTE — PROGRESS NOTES
Damianreid Woodspriscila presents today for   Chief Complaint   Patient presents with   [facility] Follow Up    COPD       Is someone accompanying this pt? Yes Daughter     Is the patient using any DME equipment during [address]? Oxygen    -[employer]    Depression Screening:  3 most recent PHQ Screens 11/6/2019   Little interest or pleasure in doing things Not at all   Feeling down, depressed, irritable, or hopeless Not at all   Total Score PHQ 2 0       Learning Assessment:  Learning Assessment 10/31/2018   PRIMARY LEARNER Patient   BARRIERS PRIMARY LEARNER -   PRIMARY LANGUAGE ENGLISH   LEARNER PREFERENCE PRIMARY LISTENING   ANSWERED BY patient   RELATIONSHIP SELF       Abuse Screening:  Abuse Screening Questionnaire 10/24/2019   Do you ever feel afraid of your partner? N   Are you in a relationship with someone who physically or mentally threatens you? N   Is it safe for you to go home? Y       Fall Risk  Fall Risk Assessment, last 12 mths 2/6/2020   Able to walk? Yes   Fall in past 12 months? No         Coordination of Care:  1. Have you been to the ER, urgent care clinic since your last visit? Hospitalized since your last visit? Yes; Name: [facility] 1/20 SOB     2. Have you seen or consulted any other health care providers outside of the [address] [place] [address] since your last visit? Include any pap smears or colon screening.  No

## 2020-02-24 NOTE — PROGRESS NOTES
[facility]  Pulmonary, Critical Care, and Sleep Medicine      Pulmonary Office Progress Notes    Name: Christal Mojica     : 1940     Date: 2020        Subjective:     2020          HPI    Christal Mojica  is a [de-identified] y.o. male with PMH of COPD who presents for hospital follow up visit. He was hospitalized at [address] [place] [address] on 20 for acute on chronic respiratory failure secondary to CAP. Today he appears comfortable, in no distress and reports that his symptoms of cough and SOB have improved since his hospital discharge. He continues to have SOB with activity which is alleviated with rest and oxygen. He denies cough, wheezing, chest pain or hemoptysis. No fever, chills or orthopnea; no leg / calf pain or swelling and no decreased appetite or weight loss. No reports of nasal congestion / drainage or sinus pressure /pain. His last PFTs were on 20 and demonstrated moderate obstructive / restrictive defect with reduced diffusion capacity. He is a former smoker with an 81 pack year history and quit in . Past Medical History:   Diagnosis Date    Aneurysm [facility])     AAA repair  &     Aorto-iliac duplex 2015    Tech difficult. Patent aorta bi-iliac endograft w/o leak or limb dysfunction.  Arrhythmia     a fib    Cardiac cath 2012    RCA (sm, nondom) patent. LM patent. LAD 25%. CX (dom) 45% mid. LVEDP 12 mmHg. No WMA. PA 27/12. W 10-12. CO/CI 5.2/2.6 (TD).  Cardiac echocardiogram, abnormal 2016    EF 55%. No WMA. Indeterminate diastolic fx. RVSP 60 mmHg. Severe LAE. Mild MR. Mod TR.  IVCE. Similar to study of 10/26/12.  Cardiovascular aorto-iliac duplex 2015    Patent aorta bi-iliac endovascular graft w/o leak or limb dysfunction. Sac measures 4.21 x 4.47 cm (4.4 x 4.6 cm on 14).     Cardiovascular LE peripheral arterial testing 2013    No significant LE arterial disease bilaterally. R GHADA 1. 12.  L GHADA 1. 12.  R DBI 0.83. L DBI 0.71. Exercise deferred.  Cardiovascular renal duplex 10/31/2012    No RA stenosis. Intrinsic/med disease in left kidney. Patent aortic endograft. Patent, thrombus-free renal veins bilaterally.  Carotid duplex 07/29/2013    Mild <50% bilateral ICA plaquing.  Chronic kidney disease     Chronic lung disease     Cigarette smoker     Congestive heart failure (CHF) (HCC)     COPD (chronic obstructive pulmonary disease) (HCC)     and emphysema; likely secondary to tobacco abuse    Difficult intubation     Dyslipidemia     low HDL    Emphysema     HTN (hypertension)     Hypercholesterolemia     Ill-defined condition     hernia    Increased prostate specific antigen (PSA) velocity     Long term (current) use of anticoagulants     coumadin    Osteoarthritis     Osteomyelitis (HCC)     Paroxysmal atrial fibrillation ([address]     Peripheral vascular disease [facility]     AAA repair 12/2007    Persistent atrial Fibrillation     Persistent atrial fibrillation     Unspecified adverse effect of anesthesia     difficulty breathing placed in ICU Oct 2012 (AAA repair)       Allergies   Allergen Reactions    Codeine Swelling    Morphine Itching    Sulfa (Sulfonamide Antibiotics) Other (comments)     Kidney problems. Current Outpatient Medications   Medication Sig Dispense Refill    albuterol (PROVENTIL VENTOLIN) 2.5 mg /3 mL (0.083 %) nebu inhale contents of 1 vial in nebulizer every 4 hours if needed for wheezing 50 Nebule 5    tamsulosin (FLOMAX) 0.4 mg capsule Take 0.4 mg by mouth daily.  multivitamin, tx-iron-ca-min (THERA-M W/ IRON) 9 mg iron-400 mcg tab tablet Take 1 Tab by mouth daily.  aspirin delayed-release 81 mg tablet Take 81 mg by mouth daily.  sodium chloride (OCEAN NASAL) 0.65 % nasal squeeze bottle 2 Sprays by Both Nostrils route daily as needed for Congestion.       tiotropium-olodaterol (STIOLTO RESPIMAT) 2.5-2.5 mcg/actuation inhaler inhale 2 inhalations by mouth once daily 1 Inhaler 5    docusate sodium 100 mg tab Take 100 mg by mouth daily as needed for Other (constipation).  ferrous sulfate 325 mg (65 mg iron) tablet Take 325 mg by mouth every other day. Indications: anemia from inadequate iron      ascorbic acid, vitamin C, (VITAMIN C) 250 mg tablet Take 1 Tab by mouth two (2) times a day. 30 Tab 0    pantoprazole (PROTONIX) 40 mg tablet Take 1 Tab by mouth Daily (before breakfast). 30 Tab 0    HYDROcodone-acetaminophen (NORCO) 5-325 mg per tablet Take 0.5 Tabs by mouth two (2) times daily as needed for Pain.  traZODone (DESYREL) 50 mg tablet Take 50 mg by mouth nightly.  lisinopril (PRINIVIL, ZESTRIL) 20 mg tablet Take 1 Tab by mouth daily. 30 Tab 0    pravastatin (PRAVACHOL) 40 mg tablet Take 40 mg by mouth nightly.  polyethylene glycol (MIRALAX) 17 gram packet Take 17 g by mouth daily as needed (constipation).  digoxin (DIGITEK) 0.125 mg tablet take 1 tablet by mouth once daily 90 Tab 3    amLODIPine (NORVASC) 10 mg tablet Take 10 mg by mouth daily. 0    OXYGEN-AIR DELIVERY SYSTEMS 2 L/min by Nasal route daily. Continuous. Company is [employer]            Review of Systems:    HEENT: No epistaxis, no nasal drainage, no difficulty in swallowing, no redness in eyes  Respiratory: As stated above in HPI  Cardiovascular: no chest pain, no palpitations, no chronic leg edema, no syncope  Gastrointestinal: no abd pain, no vomiting, no diarrhea, no bleeding symptoms  Genitourinary: No urinary symptoms or hematuria  Integument/breast: No ulcers or rashes  Musculoskeletal: No leg / calf pain  Neurological: No focal weakness, no seizures, no headaches  Behvioral/Psych: No anxiety, no depression  Constitutional: No fever, chills or night sweats.   No decreased appetite or weight loss     Objective:     Visit Vitals  /66 (BP 1 Location: Right arm, BP Patient Position: Sitting) Pulse 74   Temp 97.4 °F (36.3 °C) (Oral)   Resp 18   Ht 5' 6\" (1.676 m)   Wt 60.1 kg (132 lb 9.6 oz)   SpO2 96% Comment: 2 LPM   BMI 21.40 kg/m²        PHYSICAL EXAM      General: Oriented to person, place, and time. Well-developed, well-nourished, and in no distress      Head:   Normocephalic, without obvious abnormality, atraumatic       Eyes:   Pupils reactive, conjunctivae / corneas clear. EOM's intact, no scleral icterus       Nose:   Nares normal, no drainage. Throat:    Lips, mucosa and tongue normal. Teeth and gums normal       Neck:   Supple, symmetrical, trachea midline. No adenopathy or thyroid swelling; no carotid bruit or JVD. CVS:    Regular rate and rhythm. S1S2 normal,  no murmurs       RS:      Symmetrical chest rise, moderate AE bilaterally. Lung sounds clear to auscultation bilaterally. No wheezing, rales or rhonchi, no accessory muscle use      Abd:     Soft, non-tender. No hepatosplenomegaly                                                     Neuro:   non focal, awake, alert and oriented to person, place, time and situation    Extrm:   no leg edema,  clubbing or cyanosis       Skin:   no rash    Data review:     Hospital Outpatient Visit on 02/17/2020   Component Date Value Ref Range Status    Creatinine, POC 02/17/2020 1.0  0.6 - 1.3 MG/DL Final    GFRAA, POC 02/17/2020 >60  >60 ml/min/1.73m2 Final    GFRNA, POC 02/17/2020 >60  >60 ml/min/1.73m2 Final    Comment: Estimated GFR is calculated using the IDMS-traceable Modification of Diet in Renal Disease (MDRD) Study equation, reported for both  Americans (GFRAA) and non- Americans (GFRNA), and normalized to 1.73m2 body surface area. The physician must decide which value applies to the patient. The MDRD study equation should only be used in individuals age 25 or older.  It has not been validated for the following: pregnant women, patients with serious comorbid conditions, or on certain medications, or persons with extremes of body size, muscle mass, or nutritional status. No results displayed because visit has over 200 results. Hospital Outpatient Visit on 01/20/2020   Component Date Value Ref Range Status    Crossmatch Expiration 01/20/2020 01/23/2020   Final    ABO/Rh(D) 01/20/2020 O POSITIVE   Final    Antibody screen 01/20/2020 NEG   Final    Unit number 01/20/2020 [id number]   Final    Blood component type 01/20/2020 RC LR   Final    Unit division 01/20/2020 00   Final    Status of unit 01/20/2020 TRANSFUSED   Final    Crossmatch result 01/20/2020 Compatible   Final    C-Reactive protein 01/20/2020 3.8* 0 - 0.3 mg/dL Final    Sed rate, automated 01/20/2020 67* 0 - 20 mm/hr Final    Ferritin 01/20/2020 357  8 - 388 NG/ML Final   Hospital Outpatient Visit on 01/16/2020   Component Date Value Ref Range Status    WBC 01/16/2020 9.8  4.5 - 13.0 K/uL Final    RBC 01/16/2020 2.55* 4.10 - 5.10 M/uL Final    HGB 01/16/2020 7.3* 12.0 - 16 g/dL Final    HCT 01/16/2020 22.9* 36 - 48 % Final    MCV 01/16/2020 89.8  78 - 102 FL Final    MCH 01/16/2020 28.6  25.0 - 35.0 PG Final    MCHC 01/16/2020 31.9  31 - 37 g/dL Final    RDW 01/16/2020 13.5  11.5 - 14.5 % Final    PLATELET 33/66/8661 869  140 - 440 K/uL Final    NEUTROPHILS 01/16/2020 80* 40 - 70 % Final    MIXED CELLS 01/16/2020 9  0.1 - 17 % Final    LYMPHOCYTES 01/16/2020 11* 14 - 44 % Final    ABS. NEUTROPHILS 01/16/2020 7.8  1.8 - 9.5 K/UL Final    ABS. MIXED CELLS 01/16/2020 0.9  0.0 - 2.3 K/uL Final    ABS. LYMPHOCYTES 01/16/2020 1.1  1.1 - 5.9 K/UL Final    Test performed at [address] [place] [address] or Outpatient Infusion Center Location. Reviewed by Medical Director.     DF 01/16/2020 AUTOMATED    Final   Hospital Outpatient Visit on 12/26/2019   Component Date Value Ref Range Status    WBC 12/26/2019 9.2  4.6 - 13.2 K/uL Final    RBC 12/26/2019 2.36* 4.70 - 5.50 M/uL Final    HGB 12/26/2019 6.6* 13.0 - 16.0 g/dL Final    HCT 12/26/2019 21.4* 36.0 - 48.0 % Final    MCV 12/26/2019 90.7  74.0 - 97.0 FL Final    MCH 12/26/2019 28.0  24.0 - 34.0 PG Final    MCHC 12/26/2019 30.8* 31.0 - 37.0 g/dL Final    RDW 12/26/2019 15.3* 11.6 - 14.5 % Final    PLATELET 55/56/2959 915  135 - 420 K/uL Final    MPV 12/26/2019 9.9  9.2 - 11.8 FL Final    NEUTROPHILS 12/26/2019 77* 40 - 73 % Final    LYMPHOCYTES 12/26/2019 13* 21 - 52 % Final    MONOCYTES 12/26/2019 9  3 - 10 % Final    EOSINOPHILS 12/26/2019 1  0 - 5 % Final    BASOPHILS 12/26/2019 0  0 - 2 % Final    ABS. NEUTROPHILS 12/26/2019 7.1  1.8 - 8.0 K/UL Final    ABS. LYMPHOCYTES 12/26/2019 1.2  0.9 - 3.6 K/UL Final    ABS. MONOCYTES 12/26/2019 0.8  0.05 - 1.2 K/UL Final    ABS. EOSINOPHILS 12/26/2019 0.1  0.0 - 0.4 K/UL Final    ABS.  BASOPHILS 12/26/2019 0.0  0.0 - 0.1 K/UL Final    DF 12/26/2019 AUTOMATED    Final    SMEAR SCANNED    PLATELET COMMENTS 84/54/5500 ADEQUATE PLATELETS    Final    RBC COMMENTS 12/26/2019     Final                    Value:ANISOCYTOSIS  1+      RBC COMMENTS 12/26/2019     Final                    Value:OVALOCYTES  PRESENT      Crossmatch Expiration 12/26/2019 12/29/2019   Final    ABO/Rh(D) 12/26/2019 O POSITIVE   Final    Antibody screen 12/26/2019 NEG   Final    Unit number 12/26/2019 [id number]   Final    Blood component type 12/26/2019 RC LR   Final    Unit division 12/26/2019 00   Final    Status of unit 12/26/2019 TRANSFUSED   Final    Crossmatch result 12/26/2019 Compatible   Final    Unit number 12/26/2019 [id number]   Final    Blood component type 12/26/2019 RC LR   Final    Unit division 12/26/2019 00   Final    Status of unit 12/26/2019 TRANSFUSED   Final    Crossmatch result 12/26/2019 Compatible   Final   Hospital Outpatient Visit on 12/19/2019   Component Date Value Ref Range Status    Sodium 12/19/2019 137  136 - 145 mmol/L Final    Potassium 12/19/2019 4.6  3.5 - 5.5 mmol/L Final    Chloride 12/19/2019 102  100 - 111 mmol/L Final    CO2 12/19/2019 29  21 - 32 mmol/L Final    Anion gap 12/19/2019 6  3.0 - 18 mmol/L Final    Glucose 12/19/2019 100* 74 - 99 mg/dL Final    BUN 12/19/2019 21* 7.0 - 18 MG/DL Final    Creatinine 12/19/2019 1.67* 0.6 - 1.3 MG/DL Final    BUN/Creatinine ratio 12/19/2019 13  12 - 20   Final    GFR est AA 12/19/2019 48* >60 ml/min/1.73m2 Final    GFR est non-AA 12/19/2019 40* >60 ml/min/1.73m2 Final    Comment: (NOTE)  Estimated GFR is calculated using the Modification of Diet in Renal   Disease (MDRD) Study equation, reported for both  Americans   (GFRAA) and non- Americans (GFRNA), and normalized to 1.73m2   body surface area. The physician must decide which value applies to   the patient. The MDRD study equation should only be used in   individuals age 25 or older. It has not been validated for the   following: pregnant women, patients with serious comorbid conditions,   or on certain medications, or persons with extremes of body size,   muscle mass, or nutritional status.  Calcium 12/19/2019 8.4* 8.5 - 10.1 MG/DL Final    Bilirubin, total 12/19/2019 0.5  0.2 - 1.0 MG/DL Final    ALT (SGPT) 12/19/2019 18  16 - 61 U/L Final    AST (SGOT) 12/19/2019 16  10 - 38 U/L Final    Alk.  phosphatase 12/19/2019 89  45 - 117 U/L Final    Protein, total 12/19/2019 7.3  6.4 - 8.2 g/dL Final    Albumin 12/19/2019 3.2* 3.4 - 5.0 g/dL Final    Globulin 12/19/2019 4.1* 2.0 - 4.0 g/dL Final    A-G Ratio 12/19/2019 0.8  0.8 - 1.7   Final   Hospital Outpatient Visit on 11/15/2019   Component Date Value Ref Range Status    Crossmatch Expiration 11/15/2019 11/18/2019   Final    ABO/Rh(D) 11/15/2019 O POSITIVE   Final    Antibody screen 11/15/2019 NEG   Final    Unit number 11/15/2019 [id number]   Final    Blood component type 11/15/2019 RC LR,2   Final    Unit division 11/15/2019 00   Final    Status of unit 11/15/2019 TRANSFUSED   Final  Crossmatch result 11/15/2019 Compatible   Final    Haptoglobin 11/15/2019 208* 30 - 200 mg/dL Final    LD 11/15/2019 119  81 - 234 U/L Final    KYLAH Poly 11/15/2019 NEG   Final    KYLAH IgG 11/15/2019 NEG   Final    KYLAH C3b/C3d 11/15/2019 NEG   Final   Hospital Outpatient Visit on 11/07/2019   Component Date Value Ref Range Status    Reticulocyte count 11/07/2019 2.6* 0.5 - 2.3 % Final    WBC 11/07/2019 10.4  4.6 - 13.2 K/uL Final    RBC 11/07/2019 2.59* 4.70 - 5.50 M/uL Final    HGB 11/07/2019 7.2* 13.0 - 16.0 g/dL Final    HCT 11/07/2019 23.6* 36.0 - 48.0 % Final    MCV 11/07/2019 91.1  74.0 - 97.0 FL Final    MCH 11/07/2019 27.8  24.0 - 34.0 PG Final    MCHC 11/07/2019 30.5* 31.0 - 37.0 g/dL Final    RDW 11/07/2019 17.9* 11.6 - 14.5 % Final    PLATELET 99/30/8875 451  135 - 420 K/uL Final    MPV 11/07/2019 9.6  9.2 - 11.8 FL Final    NEUTROPHILS 11/07/2019 83* 40 - 73 % Final    LYMPHOCYTES 11/07/2019 10* 21 - 52 % Final    MONOCYTES 11/07/2019 7  3 - 10 % Final    EOSINOPHILS 11/07/2019 0  0 - 5 % Final    BASOPHILS 11/07/2019 0  0 - 2 % Final    ABS. NEUTROPHILS 11/07/2019 8.7* 1.8 - 8.0 K/UL Final    ABS. LYMPHOCYTES 11/07/2019 1.0  0.9 - 3.6 K/UL Final    ABS. MONOCYTES 11/07/2019 0.7  0.05 - 1.2 K/UL Final    ABS. EOSINOPHILS 11/07/2019 0.0  0.0 - 0.4 K/UL Final    ABS.  BASOPHILS 11/07/2019 0.0  0.0 - 0.1 K/UL Final    DF 11/07/2019 AUTOMATED    Final    SMEAR SCANNED    PLATELET COMMENTS 50/22/2149 ADEQUATE PLATELETS    Final    RBC COMMENTS 11/07/2019     Final                    Value:MICROCYTOSIS  FEW  SCHISTOCYTES  FEW      Sodium 11/07/2019 136  136 - 145 mmol/L Final    Potassium 11/07/2019 4.5  3.5 - 5.5 mmol/L Final    Chloride 11/07/2019 103  100 - 111 mmol/L Final    CO2 11/07/2019 28  21 - 32 mmol/L Final    Anion gap 11/07/2019 5  3.0 - 18 mmol/L Final    Glucose 11/07/2019 106* 74 - 99 mg/dL Final    BUN 11/07/2019 28* 7.0 - 18 MG/DL Final    Creatinine 11/07/2019 1.52* 0.6 - 1.3 MG/DL Final    BUN/Creatinine ratio 11/07/2019 18  12 - 20   Final    GFR est AA 11/07/2019 54* >60 ml/min/1.73m2 Final    GFR est non-AA 11/07/2019 44* >60 ml/min/1.73m2 Final    Comment: (NOTE)  Estimated GFR is calculated using the Modification of Diet in Renal   Disease (MDRD) Study equation, reported for both  Americans   (GFRAA) and non- Americans (GFRNA), and normalized to 1.73m2   body surface area. The physician must decide which value applies to   the patient. The MDRD study equation should only be used in   individuals age 25 or older. It has not been validated for the   following: pregnant women, patients with serious comorbid conditions,   or on certain medications, or persons with extremes of body size,   muscle mass, or nutritional status.  Calcium 11/07/2019 7.8* 8.5 - 10.1 MG/DL Final    Erythropoietin 11/07/2019 25.5* 2.6 - 18.5 mIU/mL Final    Comment: (NOTE)  [facility] [employer] [facility] [address] obtained with different assay methods or kits cannot be used  interchangeably. Results cannot be interpreted as absolute evidence  of the presence or absence of malignant disease.   Performed At: [address] [id number]  Pj Lucas MD PQ:[id number]      Protein, total 11/07/2019 6.5  6.0 - 8.5 g/dL Final    Albumin 11/07/2019 3.0  2.9 - 4.4 g/dL Final    Alpha-1-globulin 11/07/2019 0.3  0.0 - 0.4 g/dL Final    ALPHA-2 GLOBULIN 11/07/2019 0.9  0.4 - 1.0 g/dL Final    Beta globulin 11/07/2019 0.9  0.7 - 1.3 g/dL Final    Gamma globulin 11/07/2019 1.3  0.4 - 1.8 g/dL Final    M-Navi 11/07/2019 Not Observed  Not Observed g/dL Final    Globulin, total 11/07/2019 3.5  2.2 - 3.9 g/dL Final    A/G ratio 11/07/2019 0.9  0.7 - 1.7   Final    Vitamin B12 11/07/2019 463  211 - 911 pg/mL Final    Folate 11/07/2019 15.4  3.10 - 17.50 ng/mL Final    Ferritin 11/07/2019 772* 8 - 388 NG/ML Final  Iron 11/07/2019 19* 50 - 175 ug/dL Final    Patients receiving metal-binding drugs (e.g. deferoxamine) may show spuriously depressed iron values, as chelated iron may not properly react in the iron assay.  TIBC 11/07/2019 204* 250 - 450 ug/dL Final    Iron % saturation 11/07/2019 9  % Final                   PULMONARY FUNCTION TESTS    Date FVC FEV1  FEV1/FVC [id number] TLC RV RV/TLC VC DLCO                                                         Imaging:  I have reviewed all of the available data including the patient's previous history external records and radiological imaging available for review. In addition, applicable cardiology and other lab data were also reviewed. XR Results (most recent):  Results from Hospital Encounter encounter on 01/26/20   XR SWALLOW FUNC VIDEO    Narrative EXAM:  Modified Barium Swallow (Video Barium Swallow). CLINICAL INDICATION/HISTORY:     - Right lower lobe infiltrate. - Dysphagia. - Bedside exam with moderate oropharyngeal dysphagia with strong cough following  consecutive swallows of thin liquid via straw. Full MBS felt to be needed by  the bedside exam.  - Assessment for aspiration. Evaluation for optimal diet placement. COMPARISON:  None. TECHNIQUE:      - This study was performed in conjunction with speech pathologist with the  patient in lateral upright position.    - Tested consistencies: Thin (via cup [place] triple), pudding, solid as well as  barium tablet challenge. - Fluoroscopy duration: 18 seconds. - Exposures:  6 cinefluoroscopy loops. FINDINGS:    - No definite penetration or aspiration was noted on any of the tested boluses. - Vallecular and piriform residuals were seen with all tested consistencies. Impression IMPRESSION:    No myra aspiration or penetration on any of the tested consistencies. Please see speech pathologist report for additional details and recommendations.            CT Results (most recent):  Results from Hospital Encounter encounter on 02/17/20   CTA CHEST ABD PELV W WO CONT    Narrative CTA CHEST,  ABDOMEN AND PELVIS WITHOUT AND WITH CONTRAST     COMPARISON: [place] 3, 2019    INDICATIONS:   Peripheral vascular disease ([address] [I73.9 (ICD-10-CM)]; AAA (abdominal aortic  aneurysm, ruptured) ([facility] [I71.3 (ICD-10-CM)]    Aneurysm, abdominal aorta    Previous stent grafting of the abdominal aorta    TECHNIQUE:     Noncontrast CT was done initially through the chest, abdomen, and pelvis. Contrast enhanced CT was then performed following the uneventful administration   of 100 cc of Isovue-300. Scanning was done with a multislice scanner. Volumetric data acquisition was  performed through the chest, abdomen, and pelvis. Reconstructions were created  in the axial, coronal, and sagittal planes. The data was also loaded on an  independent imaging workstation. Postprocessing was performed with indication  wheezing creation of MIPS as well as 3D shaded surface virtual reality  angiographic images without and with bone removal.     CT CHEST FINDINGS:    The lungs are emphysematous. There is diffuse bronchial thickening. Multisegment  and subsegmental atelectasis is present in the right lower lobe. .  There is a small volume of right pleural fluid. Mild adenopathy is evident in the mediastinum consistent with the chronic  pulmonary disease. There is multichamber cardiac enlargement. The chest wall is unremarkable. .  .    CT ABDOMEN FINDINGS:    Liver: Multiple well-circumscribed hypodensities consistent with cysts are again  noted. The hepatic veins are distended. No focal mass lesions are apparent. Gallbladder unremarkable. Biliary tree not dilated. Spleen: Negative with accessory splenule. Left Kidney: There is some scarring in the lower pole. Cysts are present ranging  up to 1.8 cm. Most are too small for characterization. .  Right Kidney: There is severe atrophy of the right kidney.  A hyperdense  subcentimeter cyst is present laterally. No hydronephrosis is present. .  Pancreas: Negative. Adrenal Glands: Normal.  Stomach, Small Bowel And Colon: There is diverticulosis without findings of  diverticulitis. The appendix is present of normal appearance however it does  extend into a right inguinal hernia. Lymph nodes: visible lymph nodes are not pathologically increased in size or  number: . The IVC is unremarkable  Peritoneal Spaces: There is no free fluid or free air. Bladder:Small diverticulum noted. Otherwise unremarkable      Vascular:    Ascending Aorta: The sinuses of Valsalva measures 3.6 x 3.8 x 3.3 cm. The aorta at the sinotubular ridge measures 3.3 x 3.5 cm. The maximal ascending  aortic diameter is 4.2 x 4.1 cm. .    Aortic Arch: The mid arch measures 3.5 x 3.5 cm with moderate calcified plaque. Marta Fay Vessels: Widely patent. Descending Thoracic Aorta: The proximal descending aorta measures 3.5 x 3.5 cm;  the mid 3.2 x 3.4 cm, and the distal 3.7 x 3.7 cm. .    Abdominal Aorta Proximally: At the diaphragm the aorta measures 3.4 x 2.4 cm. There is irregular expansion of the aorta immediately inferior to the SMA  takeoff. The appearance is different than it was in June 2019. The diameter of  the aorta measures 4.2 x 2.8 cm and there is either an intramural dissection or  penetrating ulcer anteriorly measuring 9 mm in diameter and 7 mm deep. The proximal landing zone of the stent graft begins just above the renal  arteries. Immediately inferior to the left renal vein at the IVC level (image  [id number]) aorta sac measures 4.9 x 5.1 cm which is increased from the previous 4.5 x  4.5 cm at the same level. A type I endoleak is present. At the level of the stent graft bifurcation the aortic diameter measures 4.1 x  3.9 cm not significantly changed from 3.7 x 4.1 cm. Both limbs are patent. The  stent graft fills the aortic sac at the bifurcation. The left common iliac is filled by the stent graft measuring 1.7 cm. There is  calcified and noncalcified plaque in the left iliofemoral system without  significant stenosis or aneurysm. Right common iliac is filled with stent graft proximally and 1.8 cm. 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 There is a stenosis of the right external iliac origin appears slightly more  severe than on previous is potentially hemodynamically significant. The internal  iliac is patent. The distal external iliac and the right femoral runoff is  unremarkable. Visceral Branches:    Celiac Axis: Widely patent. SMA: Widely patent. Renals: Moderate left origin stenosis. Right is occluded. THOMAS: Not opacified. Osseous Structures Of Abdomen And Pelvis: Normal for age. Impression IMPRESSION:     Chronic obstructive pulmonary disease. Multichamber cardiac enlargement. Colonic diverticulosis present. Left renal scarring  Small bladder diverticulum. Aortic dimensions as above. Primary change is  the enlargement at the upper end  of the abdominal aorta-just above the proximal landing zone. The aorta has  increased in diameter. Endoleak persists. . The diameter of the aortic sac has  increased about 5 mm both AP and transverse. There is also a penetrating ulcer  or intramural dissection present at this level anteriorly as described above. All CT scans at this facility are performed using dose optimization technique as  appropriate to the performed exam, to include automated exposure control,  adjustment of the mA and/or kV according to patient's size (Including  appropriate matching for site-specific examinations), or use of iterative  reconstruction technique.  Abdomen and pelvis        Patient Active Problem List   Diagnosis Code    Hypercholesterolemia E78.00    Congestive heart failure (CHF) ([facility]) I50.9    Peripheral vascular disease (HCC) I73.9    COPD (chronic obstructive pulmonary disease) ([facility] J44.9    HTN (hypertension) I10    Chronic atrial fibrillation I48.20    RLQ abdominal pain R10.31    Status post AAA (abdominal aortic aneurysm) repair Z98.890, Z86.79    Aneurysm of abdominal aorta (HCC) I71.4    Chronic constipation K59.09    Right inguinal hernia K40.90    Left inguinal hernia K40.90    CAD (coronary artery disease) I25.10    Hypoxemia requiring supplemental oxygen R09.02, Z99.81    Warfarin-induced coagulopathy ([facility]) D68.32, T45.515A    Intraabdominal fluid collection R18.8    Chronic back pain M54.9, G89.29    Sepsis ([facility]) A41.9    Vertebral osteomyelitis ([facility]) M46.20    Psoas abscess ([facility]) K68.12    Abscess L02.91    Discitis of lumbar region M46.46    Pulmonary hypertension ([facility]) I27.20    CHF exacerbation ([facility]) I50.9    Hyponatremia E87.1    Symptomatic anemia D64.9    Chronic pain syndrome G89.4    Chronic kidney disease N18.9    Iron deficiency anemia D50.9    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    TYRELL (acute kidney injury) ([facility]) N17.9    Enlarged prostate without lower urinary tract symptoms (luts) N40.0    GERD (gastroesophageal reflux disease) K21.9    Left arm swelling M79.89    Lethargy R53.83    Lower extremity weakness R29.898    Mediastinal lymphadenopathy R59.0    Spondylolysis M43.00    Age-related nuclear cataract, bilateral H25.13    Iron deficiency anemia due to chronic blood loss D50.0    Community acquired pneumonia J18.9    UTI (urinary tract infection) N39.0       IMPRESSION:   · COPD- s/p hospitalization for exacerbation and PNA  · Chronic respiratory failure - on supplmental O2 @ 2L daily and HS  · CHF  · Anemia - followed by hematology, receives iron infusions  · HTN  · History of tobacco use      RECOMMENDATIONS:     · Continue   Stiolto Respimat   and albuterol PRN.   Discussed appropriate use of respiratory  maintenance and rescue  medications with patient  · Continue supplemental O2 daily and HS  · Preventative immunizations reviewed and up-to-date  · Follow up with hematologist as scheduled  · Follow up in pulmonary clinic in   3 months    or sooner with worsening of symptoms  · Recommend healthy diet / weight / exercise as tolerated  · Report to ER with chest pain or difficulty breathing. Please note that this dictation was completed with [employer], the computer voice recognition software. Quite often unanticipated grammatical, syntax, homophones, and other interpretive errors are inadvertently transcribed by the computer software. Please disregard these errors. Please excuse any errors that have escaped final proofreading.               Nini Wolfe, NP

## 2020-02-24 NOTE — PATIENT INSTRUCTIONS
 Continue Stiolto Respimat   daily, rinse mouth out after use.        Continue albuterol nebulizer treatment or rescue inhaler, 2 inhalations every 4-6 hours as needed for shortness of breath, wheezing or cough    · Continue supplemental Oxygen daily and at night     Recommend healthy diet/weight and exercise as tolerated     Follow-up in pulmonary clinic in or sooner with worsening of symptoms     Report to the ER with chest pain or difficulty breathing

## 2020-02-25 NOTE — PROGRESS NOTES
Lanny Mercedes Chief Complaint Patient presents with  Abdominal Aortic Aneurysm History and Physical   
Mr. Thomas Keith is an 79-year-old male here today to go over a new CAT scan his head performed. He is treated for COPD requiring full-time oxygen. Complex cardiovascular disease. Chronic renal insufficiency. History of endovascular pair of aortic aneurysm where he has had degeneration of the visceral component of the aorta and there is loss of seal at the proximal stent saccular aneurysms along the visceral segments as well historically. Past Medical History:  
Diagnosis Date  Aneurysm ([address] AAA repair 2006 & 2012  Aorto-iliac duplex 02/13/2015 Tech difficult. Patent aorta bi-iliac endograft w/o leak or limb dysfunction.  Arrhythmia   
 a fib  Cardiac cath 11/01/2012 RCA (sm, nondom) patent. LM patent. LAD 25%. CX (dom) 45% mid. LVEDP 12 mmHg. No WMA. PA 27/12. W 10-12. CO/CI 5.2/2.6 (TD).  Cardiac echocardiogram, abnormal 02/20/2016 EF 55%. No WMA. Indeterminate diastolic fx. RVSP 60 mmHg. Severe LAE. Mild MR. Mod TR.  IVCE. Similar to study of 10/26/12.  Cardiovascular aorto-iliac duplex 02/13/2015 Patent aorta bi-iliac endovascular graft w/o leak or limb dysfunction. Sac measures 4.21 x 4.47 cm (4.4 x 4.6 cm on 1/31/14).  Cardiovascular LE peripheral arterial testing 07/29/2013 No significant LE arterial disease bilaterally. R GHADA 1. 12.  L GHADA 1. 12.  R DBI 0.83. L DBI 0.71. Exercise deferred.  Cardiovascular renal duplex 10/31/2012 No RA stenosis. Intrinsic/med disease in left kidney. Patent aortic endograft. Patent, thrombus-free renal veins bilaterally.  Carotid duplex 07/29/2013 Mild <50% bilateral ICA plaquing.  Chronic kidney disease  Chronic lung disease  Cigarette smoker  Congestive heart failure (CHF) ([address]  COPD (chronic obstructive pulmonary disease) (HCC) and emphysema; likely secondary to tobacco abuse  Difficult intubation  Dyslipidemia   
 low HDL  Emphysema   
 HTN (hypertension)  Hypercholesterolemia  Ill-defined condition   
 hernia  Increased prostate specific antigen (PSA) velocity  Long term (current) use of anticoagulants   
 coumadin  Osteoarthritis  Osteomyelitis ([address]  Paroxysmal atrial fibrillation (HCC)  Peripheral vascular disease ([address] AAA repair 12/2007  Persistent atrial Fibrillation  Persistent atrial fibrillation  Unspecified adverse effect of anesthesia   
 difficulty breathing placed in ICU Oct 2012 (AAA repair) Patient Active Problem List  
Diagnosis Code  Hypercholesterolemia E78.00  Congestive heart failure (CHF) ([facility]) I50.9  Peripheral vascular disease (HCC) I73.9  
 COPD (chronic obstructive pulmonary disease) ([facility]) J44.9  
 HTN (hypertension) I10  
 Chronic atrial fibrillation I48.20  RLQ abdominal pain R10.31  Status post AAA (abdominal aortic aneurysm) repair E80.411, Z86.79  
 Aneurysm of abdominal aorta ([facility]) I71.4  Chronic constipation K59.09  
 Right inguinal hernia K40.90  Left inguinal hernia K40.90  CAD (coronary artery disease) I25.10  Hypoxemia requiring supplemental oxygen R09.02, Z99.81  Warfarin-induced coagulopathy ([address] D68.32, T45.515A  Intraabdominal fluid collection R18.8  Chronic back pain M54.9, G89.29  
 Sepsis ([address] A41.9  Vertebral osteomyelitis ([address] M46.20  Psoas abscess ([facility]) K68.12  
 Abscess L02.91  
 Discitis of lumbar region M46.46  
 Pulmonary hypertension ([facility]) I27.20  
 CHF exacerbation ([facility]) I50.9  Hyponatremia E87.1  Symptomatic anemia D64.9  Chronic pain syndrome G89.4  Chronic kidney disease N18.9  Iron deficiency anemia D50.9  Endoleak of aortic graft ([address] T82.330A  TYRELL (acute kidney injury) ([address] N17.9  Enlarged prostate without lower urinary tract symptoms (luts) N40.0  GERD (gastroesophageal reflux disease) K21.9  Left arm swelling M79.89  Lethargy R53.83  Lower extremity weakness R29.898  Mediastinal lymphadenopathy R59.0  Spondylolysis M43.00  Age-related nuclear cataract, bilateral H25.13  
 Iron deficiency anemia due to chronic blood loss D50.0  Community acquired pneumonia J18.9  
 UTI (urinary tract infection) N39.0 Past Surgical History:  
Procedure Laterality Date  BRONCHOSCOPY DIAGNOSTIC  11/2/2012  CARDIAC CATHETERIZATION  11/1/2012  COLONOSCOPY N/A 7/26/2016 COLONOSCOPY with Polypectomies performed by Anjel Fuchs MD at [facility]  COLONOSCOPY N/A 5/28/2019 COLONOSCOPY with [facility]pectomy [facility] H[facility] AAA REPAIR    
 2006 & 2012  HX HEART CATHETERIZATION  3/4/2009 1. RCA small, nondominant; patent. 2. LMCA patent. 3. LAD is long, wrapped around apical vessel. Diffuse, 20-30% stenosis noted. 4. CCA is large, dominant vessel. Diffuse 20-30% stenosis. 5. LVEDP is 16 mmHg. 6. Overall left ventricular systolic function mildly diminshed with est. EF 45%. Mild, global hypokinesis noted. 7. No significant mitral regurgitation or aortic stenosis noted. (see report)  HX HERNIA REPAIR  2/2014  
 rt inguinal   
 HX HERNIA REPAIR  01/2016 LEFT INGUINAL HERNIA REPAIR DR. Lauren Keating  REPAIR ING HERNIA,5+Y/O,JESSIE Left 1-20-16 Dr. Mcfarland   YURIY LONG  11/1/2012 Current Outpatient Medications Medication Sig Dispense Refill  albuterol (PROVENTIL VENTOLIN) 2.5 mg /3 mL (0.083 %) nebu inhale contents of 1 vial in nebulizer every 4 hours if needed for wheezing 50 Nebule 5  
 tamsulosin (FLOMAX) 0.4 mg capsule Take 0.4 mg by mouth daily.  multivitamin, tx-iron-ca-min (THERA-M W/ IRON) 9 mg iron-400 mcg tab tablet Take 1 Tab by mouth daily.  sodium chloride (OCEAN NASAL) 0.65 % nasal squeeze bottle 2 Sprays by Both Nostrils route daily as needed for Congestion.  tiotropium-olodaterol (STIOLTO RESPIMAT) 2.5-2.5 mcg/actuation inhaler inhale 2 inhalations by mouth once daily 1 Inhaler 5  
 docusate sodium 100 mg tab Take 100 mg by mouth daily as needed for Other (constipation).  ferrous sulfate 325 mg (65 mg iron) tablet Take 325 mg by mouth every other day. Indications: anemia from inadequate iron  pantoprazole (PROTONIX) 40 mg tablet Take 1 Tab by mouth Daily (before breakfast). 30 Tab 0  
 HYDROcodone-acetaminophen (NORCO) 5-325 mg per tablet Take 0.5 Tabs by mouth two (2) times daily as needed for Pain.  traZODone (DESYREL) 50 mg tablet Take 50 mg by mouth nightly.  lisinopril (PRINIVIL, ZESTRIL) 20 mg tablet Take 1 Tab by mouth daily. 30 Tab 0  pravastatin (PRAVACHOL) 40 mg tablet Take 40 mg by mouth nightly.  polyethylene glycol (MIRALAX) 17 gram packet Take 17 g by mouth daily as needed (constipation).  digoxin (DIGITEK) 0.125 mg tablet take 1 tablet by mouth once daily 90 Tab 3  
 amLODIPine (NORVASC) 10 mg tablet Take 10 mg by mouth daily. 0  
 OXYGEN-AIR DELIVERY SYSTEMS 2 L/min by Nasal route daily. Continuous. Company is [employer] Allergies Allergen Reactions  Codeine Swelling  Morphine Itching  Sulfa (Sulfonamide Antibiotics) Other (comments) Kidney problems. Review of Systems A full review of systems was completed times ten organ systems and was deemed negative unless otherwise mentioned in the HPI. Physical  
Visit Vitals /56 (BP 1 Location: Left arm, BP Patient Position: Sitting) Pulse 71 Resp 20 Ht 5' 6\" (1.676 m) Wt 132 lb (59.9 kg) SpO2 98% BMI 21.31 kg/m² Is in good spirits today is not having any pain he walked from the elevator to my door without any leg pain. Tells me his blood pressure control is been going his way. Head is normocephalic and atraumatic no facial symmetry Neck no JVD Chest coarse Limited breath sounds bilateral 
Cardiac irregular Abdomen soft no guarding nontender Lower extremities do not feel pulses in his feet but his feet are warm no signs of acute arterial insufficiency No skin ulcerations good hair growth No edema Reviewed the CAT scan just shows a terrible aneurysm through the visceral's renals and this loss of contact of the wall of the proximal stent. Is grown a little bit in size since the last CT. Impression/Plan: ICD-10-CM ICD-9-CM 1. AAA (abdominal aortic aneurysm, ruptured) [facility] I71.3 441. 3 Apparently he went to Dr. Nancy Cuevas at [facility], they made a decision not to treat at that time. Since his been some progression of disease like him to rethink this. He has a there is anywhere closer he can go. I will see if this would be appropriate at [facility]. 
 
No orders of the defined types were placed in this encounter. Tyree Rutledge MD 
 
PLEASE NOTE: 
This document has been produced using voice recognition software. Unrecognized errors in transcription may be present.

## 2020-02-25 NOTE — PROGRESS NOTES
1. Have you been to an emergency room or urgent care clinic since your last visit? yes Hospitalized since your last visit? If yes, where, when, and reason for visit? 2. Have you seen or consulted any other health care providers outside of the [facility] since your last visit including any procedures, health maintenance items. If yes, where, when and reason for visit? yes

## 2020-02-29 NOTE — PROGRESS NOTES
Hematology/Oncology Note Name: Areli [place] 
Date: 3/5/2020 : 1940 Lashanda Rodriguez DO Subjective: Chief Complaint:  Follow up anemia History of Present Illness:  
Mr Toribio Jimenez is here today for follow-up after discharged. He was accompanied by his daughter during this visit. He received 2nd dose of Injectafer few weeks ago. He reported he's feeling better and has a good week. His appetite has been improving; he gained 2lbs since last visit. He saw Vascular Surgery recently, pending intervention of aneurysm. He will schedule with GI for follow-up. Since stopped taking iron pills, he did not notice further dark stool. Denies any obvious bleeding, melena, bloody stools, hematochezia, nausea, vomiting, or abdominal pain. Denied worsen dyspnea, severe headache, chest pain, cough, abdominal pain, nausea, vomiting, diarrhea, dysuria, new focal weakness or numbness. His EGD and Colonoscopy on 19 showed hiatal hernia, gastritis, ascending colon polyp; diverticulosis; and hemorroids. History Mr. Toribio Jimenez is a most pleasant [de-identified]y.o. year old male who was seen for anemia. He has multiple co morbidities which includes of chronic atrial fibrillation, peripheral vascular disease, hypertension, coronary artery disease, pulmonary hypertension, CHF exacerbation, chronic pain syndrome, iron deficiency anemia, chronic kidney disease, and benign prostate hypertrophy. He was diagnosed with iron deficiency anemia since last year and has been on iron pills without any tolerating issues. On May 2019 he had bloody stools and was diagnosed with GI bleeding. His EGD/colonoscopy with polypectomy on 19 reported hiatal hernia, mild erosive gastritis few flecks of coffee grounds,1 cm colon polyp, diverticulosis, internal hemorrhoids. He has been on PPI since then. He received blood transfusion/IV iron during his hospitalization.  For his iron deficiency anemia, he has received 4 doses of iron IV- Injectafer given by his GI doctor, last dose was on 10/15/2019. He has followed up with GI recently and no further procedure planned. He has history of chronic A-fib but was hold off anticoagulation due to GI bleeding issues. He has been on home oxygen at 2-3 L for his COPD. Oncologic History: No history exists. Past Medical History, Family History, and Social History: 
 
Past Medical History:  
Diagnosis Date  Aneurysm ([facility] [address] AAA repair 2006 & 2012  Aorto-iliac duplex 02/13/2015 Tech difficult. Patent aorta bi-iliac endograft w/o leak or limb dysfunction.  Arrhythmia   
 a fib  Cardiac cath 11/01/2012 RCA (sm, nondom) patent. LM patent. LAD 25%. CX (dom) 45% mid. LVEDP 12 mmHg. No WMA. PA 27/12. W 10-12. CO/CI 5.2/2.6 (TD).  Cardiac echocardiogram, abnormal 02/20/2016 EF 55%. No WMA. Indeterminate diastolic fx. RVSP 60 mmHg. Severe LAE. Mild MR. Mod TR.  IVCE. Similar to study of 10/26/12.  Cardiovascular aorto-iliac duplex 02/13/2015 Patent aorta bi-iliac endovascular graft w/o leak or limb dysfunction. Sac measures 4.21 x 4.47 cm (4.4 x 4.6 cm on 1/31/14).  Cardiovascular LE peripheral arterial testing 07/29/2013 No significant LE arterial disease bilaterally. R GHADA 1. 12.  L GHADA 1. 12.  R DBI 0.83. L DBI 0.71. Exercise deferred.  Cardiovascular renal duplex 10/31/2012 No RA stenosis. Intrinsic/med disease in left kidney. Patent aortic endograft. Patent, thrombus-free renal veins bilaterally.  Carotid duplex 07/29/2013 Mild <50% bilateral ICA plaquing.  Chronic kidney disease  Chronic lung disease  Cigarette smoker  Congestive heart failure (CHF) ([facility] [address]  COPD (chronic obstructive pulmonary disease) (HCC)   
 and emphysema; likely secondary to tobacco abuse  Difficult intubation  Dyslipidemia   
 low HDL  Emphysema   
 HTN (hypertension)  Hypercholesterolemia  Ill-defined condition   
 hernia  Increased prostate specific antigen (PSA) velocity  Long term (current) use of anticoagulants   
 coumadin  Osteoarthritis  Osteomyelitis ([facility] [address]  Paroxysmal atrial fibrillation (HCC)  Peripheral vascular disease ([facility] [address] AAA repair 12/2007  Persistent atrial Fibrillation  Persistent atrial fibrillation  Unspecified adverse effect of anesthesia   
 difficulty breathing placed in ICU Oct 2012 (AAA repair) Past Surgical History:  
Procedure Laterality Date  BRONCHOSCOPY DIAGNOSTIC  11/2/2012  CARDIAC CATHETERIZATION  11/1/2012  COLONOSCOPY N/A 7/26/2016 COLONOSCOPY with Polypectomies performed by Julian Echavarria MD at [facility] ENDOSCOPY  COLONOSCOPY N/A 5/28/2019 COLONOSCOPY with polypectomy performed by Mellissa Cm MD at [address] HX AAA REPAIR    
 2006 & 2012  HX HEART CATHETERIZATION  3/4/2009 1. RCA small, nondominant; patent. 2. LMCA patent. 3. LAD is long, wrapped around apical vessel. Diffuse, 20-30% stenosis noted. 4. CCA is large, dominant vessel. Diffuse 20-30% stenosis. 5. LVEDP is 16 mmHg. 6. Overall left ventricular systolic function mildly diminshed with est. EF 45%. Mild, global hypokinesis noted. 7. No significant mitral regurgitation or aortic stenosis noted. (see report)  HX HERNIA REPAIR  2/2014  
 rt inguinal   
 HX HERNIA REPAIR  01/2016 LEFT INGUINAL HERNIA REPAIR DR. Michelle Portillo  REPAIR ING HERNIA,5+Y/O,JESSIE Left 1-20-16 Dr. Valentino Mayers  SWAN GANZ  11/1/2012 Social History Socioeconomic History  Marital status:  Spouse name: Not on file  Number of children: Not on file  Years of education: Not on file  Highest education level: Not on file Occupational History  Not on file Social Needs  Financial resource strain: Not on file  Food insecurity:  
  Worry: Not on file Inability: Not on file  Transportation needs:  
  Medical: Not on file Non-medical: Not on file Tobacco Use  Smoking status: Former Smoker Packs/day: 1.50 Years: 54.00 Pack years: 81.00 Types: Cigarettes Last attempt to quit: 10/23/2012 Years since quittin.3  Smokeless tobacco: Never Used Substance and Sexual Activity  Alcohol use: No  
  Alcohol/week: 0.0 standard drinks Comment: quit drinking alcohol 26 years ago, patient states stopped in \"0321\"  Drug use: No  
 Sexual activity: Not Currently Lifestyle  Physical activity:  
  Days per week: Not on file Minutes per session: Not on file  Stress: Not on file Relationships  Social connections:  
  Talks on phone: Not on file Gets together: Not on file Attends [employer] service: Not on file Active member of club or organization: Not on file Attends meetings of clubs or organizations: Not on file Relationship status: Not on file  Intimate partner violence:  
  Fear of current or ex partner: Not on file Emotionally abused: Not on file Physically abused: Not on file Forced sexual activity: Not on file Other Topics Concern  Not on file Social History Narrative  Not on file Family History Problem Relation Age of Onset  Heart Surgery Father [facility]  Stroke Father  Heart Disease Father  Hypertension Father  Heart Attack Father  Heart Surgery Mother [facility]  Coronary Artery Disease Mother  Stroke Mother  Heart Disease Mother  Hypertension Mother  Diabetes Sister  Cancer Brother Current Outpatient Medications Medication Sig Dispense Refill  cephalexin 500 mg tab  ciprofloxacin HCl (CIPRO) 500 mg tablet  albuterol (PROVENTIL VENTOLIN) 2.5 mg /3 mL (0.083 %) nebu inhale contents of 1 vial in nebulizer every 4 hours if needed for wheezing 50 Nebule 5  
  tamsulosin (FLOMAX) 0.4 mg capsule Take 0.4 mg by mouth daily.  multivitamin, tx-iron-ca-min (THERA-M W/ IRON) 9 mg iron-400 mcg tab tablet Take 1 Tab by mouth daily.  sodium chloride (OCEAN NASAL) 0.65 % nasal squeeze bottle 2 Sprays by Both Nostrils route daily as needed for Congestion.  tiotropium-olodaterol (STIOLTO RESPIMAT) 2.5-2.5 mcg/actuation inhaler inhale 2 inhalations by mouth once daily 1 Inhaler 5  
 docusate sodium 100 mg tab Take 100 mg by mouth daily as needed for Other (constipation).  ferrous sulfate 325 mg (65 mg iron) tablet Take 325 mg by mouth every other day. Indications: anemia from inadequate iron  pantoprazole (PROTONIX) 40 mg tablet Take 1 Tab by mouth Daily (before breakfast). 30 Tab 0  
 HYDROcodone-acetaminophen (NORCO) 5-325 mg per tablet Take 0.5 Tabs by mouth two (2) times daily as needed for Pain.  traZODone (DESYREL) 50 mg tablet Take 50 mg by mouth nightly.  lisinopril (PRINIVIL, ZESTRIL) 20 mg tablet Take 1 Tab by mouth daily. 30 Tab 0  pravastatin (PRAVACHOL) 40 mg tablet Take 40 mg by mouth nightly.  polyethylene glycol (MIRALAX) 17 gram packet Take 17 g by mouth daily as needed (constipation).  digoxin (DIGITEK) 0.125 mg tablet take 1 tablet by mouth once daily 90 Tab 3  
 amLODIPine (NORVASC) 10 mg tablet Take 10 mg by mouth daily. 0  
 OXYGEN-AIR DELIVERY SYSTEMS 2 L/min by Nasal route daily. Continuous. Company is [employer] Review of Systems: 
Constitutional: The patient has no acute distress or discomfort. Fatigue has been better. HEENT: The patient denies recent head trauma, eye pain, blurred vision,  hearing deficit, oropharyngeal mucosal pain or lesions, and the patient denies throat pain or discomfort. Lymphatics: The patient denies palpable peripheral lymphadenopathy. Hematologic: The patient denies having bruising, bleeding.   
Respiratory: Patient denies having shortness of breath, cough, sputum production, fever. Cardiovascular: The patient denies having leg pain, leg swelling, heart palpitations, chest permit, or chest pain. The patient denies having progressive dyspnea on exertion. Gastrointestinal: The patient denies having nausea, emesis, or diarrhea. The patient denies having any hematemesis or blood in the stool. Genitourinary: Patient denies having urinary urgency, frequency, or dysuria. The patient denies having blood in the urine. Psychological: The patient denies having symptoms of nervousness, anxiety, depression, or thoughts of harming self. Skin: Patient denies having skin rashes, skin, ulcerations, or unexplained itching or pruritus. Musculoskeletal: The patient denies having pain in the joints or bones. Objective:  
 
Visit Vitals /62 Pulse 67 Temp 96.9 °F (36.1 °C) (Oral) Resp 18 Ht 5' 6\" (1.676 m) Wt 60.8 kg (134 lb) SpO2 98% BMI 21.63 kg/m² Physical Exam:  
Gen. Appearance: The patient is in no acute distress. On Home Oxygen 2L. Patient looks brighter today. Skin: There is no bruise or rash. HEENT: The exam is unremarkable. Neck: Supple without lymphadenopathy or thyromegaly. Lungs: Clear to auscultation and percussion; there are no wheezes or rhonchi. Heart: Regular rate and rhythm; there are no murmurs, gallops, or rubs. Abdomen: Bowel sounds are present and normal.  There is no guarding, tenderness, or hepatosplenomegaly. Extremities: There is no clubbing, cyanosis, or edema. Neurologic: There are no focal neurologic deficits. Lymphatics: There is no palpable peripheral lymphadenopathy. Musculoskeletal: The patient has full range of motion at all joints. There is no evidence of joint deformity or effusions. There is no focal joint tenderness. Psychological/psychiatric: There is no clinical evidence of anxiety, depression, or melancholy. Diagnostics: Results for orders placed or performed during the hospital encounter of 01/16/20 CBC WITH 3 PART DIFF     Status: Abnormal  
Result Value Ref Range Status WBC 9.8 4.5 - 13.0 K/uL Final  
 RBC 2.55 (L) 4.10 - 5.10 M/uL Final  
 HGB 7.3 (L) 12.0 - 16 g/dL Final  
 HCT 22.9 (L) 36 - 48 % Final  
 MCV 89.8 78 - 102 FL Final  
 MCH 28.6 25.0 - 35.0 PG Final  
 MCHC 31.9 31 - 37 g/dL Final  
 RDW 13.5 11.5 - 14.5 % Final  
 PLATELET 972 618 - 731 K/uL Final  
 NEUTROPHILS 80 (H) 40 - 70 % Final  
 MIXED CELLS 9 0.1 - 17 % Final  
 LYMPHOCYTES 11 (L) 14 - 44 % Final  
 ABS. NEUTROPHILS 7.8 1.8 - 9.5 K/UL Final  
 ABS. MIXED CELLS 0.9 0.0 - 2.3 K/uL Final  
 ABS. LYMPHOCYTES 1.1 1.1 - 5.9 K/UL Final  
  Comment: Test performed at [address] [place] [address] or Outpatient Infusion Center Location. Reviewed by Medical Director. DF AUTOMATED   Final  
 
 
Recent Results (from the past 2016 hour(s)) METABOLIC PANEL, COMPREHENSIVE Collection Time: 12/19/19  3:15 PM  
Result Value Ref Range Sodium 137 136 - 145 mmol/L Potassium 4.6 3.5 - 5.5 mmol/L Chloride 102 100 - 111 mmol/L  
 CO2 29 21 - 32 mmol/L Anion gap 6 3.0 - 18 mmol/L Glucose 100 (H) 74 - 99 mg/dL BUN 21 (H) 7.0 - 18 MG/DL Creatinine 1.67 (H) 0.6 - 1.3 MG/DL  
 BUN/Creatinine ratio 13 12 - 20 GFR est AA 48 (L) >60 ml/min/1.73m2 GFR est non-AA 40 (L) >60 ml/min/1.73m2 Calcium 8.4 (L) 8.5 - 10.1 MG/DL Bilirubin, total 0.5 0.2 - 1.0 MG/DL  
 ALT (SGPT) 18 16 - 61 U/L  
 AST (SGOT) 16 10 - 38 U/L Alk. phosphatase 89 45 - 117 U/L Protein, total 7.3 6.4 - 8.2 g/dL Albumin 3.2 (L) 3.4 - 5.0 g/dL Globulin 4.1 (H) 2.0 - 4.0 g/dL A-G Ratio 0.8 0.8 - 1.7    
CBC WITH AUTOMATED DIFF Collection Time: 12/26/19  2:10 PM  
Result Value Ref Range WBC 9.2 4.6 - 13.2 K/uL  
 RBC 2.36 (L) 4.70 - 5.50 M/uL HGB 6.6 (L) 13.0 - 16.0 g/dL HCT 21.4 (L) 36.0 - 48.0 %  MCV 90.7 74.0 - 97.0 FL  
 MCH 28.0 24.0 - 34.0 PG  
 MCHC 30.8 (L) 31.0 - 37.0 g/dL  
 RDW 15.3 (H) 11.6 - 14.5 % PLATELET 843 660 - 706 K/uL MPV 9.9 9.2 - 11.8 FL  
 NEUTROPHILS 77 (H) 40 - 73 % LYMPHOCYTES 13 (L) 21 - 52 % MONOCYTES 9 3 - 10 % EOSINOPHILS 1 0 - 5 % BASOPHILS 0 0 - 2 %  
 ABS. NEUTROPHILS 7.1 1.8 - 8.0 K/UL  
 ABS. LYMPHOCYTES 1.2 0.9 - 3.6 K/UL  
 ABS. MONOCYTES 0.8 0.05 - 1.2 K/UL  
 ABS. EOSINOPHILS 0.1 0.0 - 0.4 K/UL  
 ABS. BASOPHILS 0.0 0.0 - 0.1 K/UL  
 DF AUTOMATED PLATELET COMMENTS ADEQUATE PLATELETS    
 RBC COMMENTS ANISOCYTOSIS 1+ 
    
 RBC COMMENTS OVALOCYTES PRESENT 
    
TYPE + CROSSMATCH Collection Time: 12/26/19  2:45 PM  
Result Value Ref Range Crossmatch Expiration 12/29/2019 ABO/Rh(D) O POSITIVE Antibody screen NEG Unit number [id number] Blood component type [facility] Unit division 00 Status of unit TRANSFUSED Crossmatch result Compatible Unit number [id number] Blood component type [facility] Unit division 00 Status of unit TRANSFUSED Crossmatch result Compatible CBC WITH 3 PART DIFF Collection Time: 01/16/20  3:00 PM  
Result Value Ref Range WBC 9.8 4.5 - 13.0 K/uL  
 RBC 2.55 (L) 4.10 - 5.10 M/uL HGB 7.3 (L) 12.0 - 16 g/dL HCT 22.9 (L) 36 - 48 % MCV 89.8 78 - 102 FL  
 MCH 28.6 25.0 - 35.0 PG  
 MCHC 31.9 31 - 37 g/dL  
 RDW 13.5 11.5 - 14.5 % PLATELET 678 535 - 648 K/uL NEUTROPHILS 80 (H) 40 - 70 % MIXED CELLS 9 0.1 - 17 % LYMPHOCYTES 11 (L) 14 - 44 % ABS. NEUTROPHILS 7.8 1.8 - 9.5 K/UL  
 ABS. MIXED CELLS 0.9 0.0 - 2.3 K/uL  
 ABS. LYMPHOCYTES 1.1 1.1 - 5.9 K/UL  
 DF AUTOMATED    
TYPE + CROSSMATCH Collection Time: 01/20/20  2:15 PM  
Result Value Ref Range Crossmatch Expiration 01/23/2020 ABO/Rh(D) O POSITIVE Antibody screen NEG Unit number [id number] Blood component type  LR Unit division 00 Status of unit TRANSFUSED Crossmatch result Compatible C REACTIVE PROTEIN, QT Collection Time: 01/20/20  2:20 PM  
Result Value Ref Range C-Reactive protein 3.8 (H) 0 - 0.3 mg/dL SED RATE (ESR) Collection Time: 01/20/20  2:20 PM  
Result Value Ref Range Sed rate, automated 67 (H) 0 - 20 mm/hr FERRITIN Collection Time: 01/20/20  2:20 PM  
Result Value Ref Range Ferritin 357 8 - 388 NG/ML  
EKG, 12 LEAD, INITIAL Collection Time: 01/26/20  9:46 AM  
Result Value Ref Range Ventricular Rate 98 BPM  
 Atrial Rate 105 BPM  
 QRS Duration 84 ms Q-T Interval 334 ms QTC Calculation (Bezet) 426 ms Calculated R Axis -62 degrees Calculated T Axis 70 degrees Diagnosis Atrial fibrillation with premature ventricular or aberrantly conducted  
complexes Left axis deviation Anteroseptal infarct (cited on or before 25-AUG-2017) Abnormal ECG When compared with ECG of 02-JUN-2019 16:08, No significant change was found Confirmed by Jase Reich ([id number]) on 1/26/2020 2:24:56 PM 
  
CBC WITH AUTOMATED DIFF Collection Time: 01/26/20 10:03 AM  
Result Value Ref Range WBC 7.3 4.6 - 13.2 K/uL  
 RBC 3.16 (L) 4.70 - 5.50 M/uL HGB 8.8 (L) 13.0 - 16.0 g/dL HCT 27.8 (L) 36.0 - 48.0 % MCV 88.0 74.0 - 97.0 FL  
 MCH 27.8 24.0 - 34.0 PG  
 MCHC 31.7 31.0 - 37.0 g/dL  
 RDW 15.4 (H) 11.6 - 14.5 % PLATELET 892 810 - 237 K/uL MPV 9.0 (L) 9.2 - 11.8 FL  
 NEUTROPHILS 86 (H) 40 - 73 % LYMPHOCYTES 9 (L) 21 - 52 % MONOCYTES 5 3 - 10 % EOSINOPHILS 0 0 - 5 % BASOPHILS 0 0 - 2 %  
 ABS. NEUTROPHILS 6.3 1.8 - 8.0 K/UL  
 ABS. LYMPHOCYTES 0.7 (L) 0.9 - 3.6 K/UL  
 ABS. MONOCYTES 0.4 0.05 - 1.2 K/UL  
 ABS. EOSINOPHILS 0.0 0.0 - 0.4 K/UL  
 ABS. BASOPHILS 0.0 0.0 - 0.1 K/UL  
 DF AUTOMATED METABOLIC PANEL, BASIC Collection Time: 01/26/20 10:03 AM  
Result Value Ref Range Sodium 134 (L) 136 - 145 mmol/L Potassium 4.0 3.5 - 5.5 mmol/L  Chloride 100 100 - 111 mmol/L  
 CO2 27 21 - 32 mmol/L  
 Anion gap 7 3.0 - 18 mmol/L Glucose 129 (H) 74 - 99 mg/dL BUN 19 (H) 7.0 - 18 MG/DL Creatinine 1.53 (H) 0.6 - 1.3 MG/DL  
 BUN/Creatinine ratio 12 12 - 20 GFR est AA 53 (L) >60 ml/min/1.73m2 GFR est non-AA 44 (L) >60 ml/min/1.73m2 Calcium 8.5 8.5 - 10.1 MG/DL  
CARDIAC PANEL,(CK, CKMB & TROPONIN) Collection Time: 01/26/20 10:03 AM  
Result Value Ref Range CK 39 39 - 308 U/L  
 CK - MB 1.1 <3.6 ng/ml CK-MB Index 2.8 0.0 - 4.0 % Troponin-I, QT <0.02 0.0 - 0.045 NG/ML  
NT-PRO BNP Collection Time: 01/26/20 10:03 AM  
Result Value Ref Range NT pro-BNP 7,771 (H) 0 - 1,800 PG/ML  
PROTHROMBIN TIME + INR Collection Time: 01/26/20 10:03 AM  
Result Value Ref Range Prothrombin time 13.9 11.5 - 15.2 sec INR 1.1 0.8 - 1.2 DIGOXIN Collection Time: 01/26/20 10:03 AM  
Result Value Ref Range Digoxin level 0.8 (L) 0.9 - 2.0 NG/ML  
POC LACTIC ACID Collection Time: 01/26/20 11:39 AM  
Result Value Ref Range Lactic Acid (POC) 1.35 0.40 - 2.00 mmol/L  
CULTURE, BLOOD Collection Time: 01/26/20 11:42 AM  
Result Value Ref Range Special Requests: NO SPECIAL REQUESTS 
RIGHT Antecubital 
    
 Culture result: NO GROWTH 6 DAYS    
CULTURE, BLOOD Collection Time: 01/26/20 11:53 AM  
Result Value Ref Range Special Requests: LEFT Antecubital 
    
 Culture result: NO GROWTH 6 DAYS    
DRUG SCREEN, URINE Collection Time: 01/27/20  4:45 AM  
Result Value Ref Range BENZODIAZEPINES NEGATIVE  NEG    
 BARBITURATES NEGATIVE  NEG    
 THC (TH-CANNABINOL) NEGATIVE  NEG    
 OPIATES POSITIVE (A) NEG    
 PCP(PHENCYCLIDINE) NEGATIVE  NEG    
 COCAINE NEGATIVE  NEG    
 AMPHETAMINES NEGATIVE  NEG METHADONE NEGATIVE  NEG HDSCOM (NOTE) LEGIONELLA PNEUMOPHILA AG, URINE Collection Time: 01/27/20  4:45 AM  
Result Value Ref Range Legionella Ag, urine NEGATIVE  NEG    
STREP PNEUMO AG, URINE Collection Time: 01/27/20  4:45 AM  
Result Value Ref Range Strep pneumo Ag, urine NEGATIVE  NEG    
CBC WITH AUTOMATED DIFF Collection Time: 01/27/20  5:08 AM  
Result Value Ref Range WBC 1.4 (L) 4.6 - 13.2 K/uL  
 RBC 2.91 (L) 4.70 - 5.50 M/uL HGB 8.0 (L) 13.0 - 16.0 g/dL HCT 25.0 (L) 36.0 - 48.0 % MCV 85.9 74.0 - 97.0 FL  
 MCH 27.5 24.0 - 34.0 PG  
 MCHC 32.0 31.0 - 37.0 g/dL  
 RDW 15.3 (H) 11.6 - 14.5 % PLATELET 195 403 - 634 K/uL MPV 8.8 (L) 9.2 - 11.8 FL  
 NEUTROPHILS 75 42 - 75 % LYMPHOCYTES 24 20 - 51 % MONOCYTES 1 (L) 2 - 9 % EOSINOPHILS 0 0 - 5 % BASOPHILS 0 0 - 3 %  
 ABS. NEUTROPHILS 1.1 (L) 1.8 - 8.0 K/UL  
 ABS. LYMPHOCYTES 0.3 (L) 0.8 - 3.5 K/UL  
 ABS. MONOCYTES 0.0 0 - 1.0 K/UL  
 ABS. EOSINOPHILS 0.0 0.0 - 0.4 K/UL  
 ABS. BASOPHILS 0.0 0.0 - 0.06 K/UL  
 DF MANUAL PLATELET COMMENTS ADEQUATE PLATELETS    
 RBC COMMENTS ANISOCYTOSIS 
1+ METABOLIC PANEL, BASIC Collection Time: 01/27/20  5:08 AM  
Result Value Ref Range Sodium 132 (L) 136 - 145 mmol/L Potassium 3.4 (L) 3.5 - 5.5 mmol/L Chloride 97 (L) 100 - 111 mmol/L  
 CO2 26 21 - 32 mmol/L Anion gap 9 3.0 - 18 mmol/L Glucose 170 (H) 74 - 99 mg/dL BUN 20 (H) 7.0 - 18 MG/DL Creatinine 1.67 (H) 0.6 - 1.3 MG/DL  
 BUN/Creatinine ratio 12 12 - 20 GFR est AA 48 (L) >60 ml/min/1.73m2 GFR est non-AA 40 (L) >60 ml/min/1.73m2 Calcium 8.2 (L) 8.5 - 10.1 MG/DL  
WBC COUNT Collection Time: 01/27/20  6:45 AM  
Result Value Ref Range WBC 0.9 (LL) 4.6 - 13.2 K/uL CBC WITH AUTOMATED DIFF Collection Time: 01/27/20 12:45 PM  
Result Value Ref Range WBC 2.2 (L) 4.6 - 13.2 K/uL  
 RBC 2.87 (L) 4.70 - 5.50 M/uL HGB 7.9 (L) 13.0 - 16.0 g/dL HCT 24.6 (L) 36.0 - 48.0 % MCV 85.7 74.0 - 97.0 FL  
 MCH 27.5 24.0 - 34.0 PG  
 MCHC 32.1 31.0 - 37.0 g/dL  
 RDW 15.5 (H) 11.6 - 14.5 % PLATELET 822 591 - 852 K/uL MPV 9.2 9.2 - 11.8 FL  
 NEUTROPHILS 86 (H) 40 - 73 % LYMPHOCYTES 13 (L) 21 - 52 % MONOCYTES 1 (L) 3 - 10 % EOSINOPHILS 0 0 - 5 % BASOPHILS 0 0 - 2 %  
 ABS. NEUTROPHILS 1.9 1.8 - 8.0 K/UL  
 ABS. LYMPHOCYTES 0.3 (L) 0.9 - 3.6 K/UL  
 ABS. MONOCYTES 0.0 (L) 0.05 - 1.2 K/UL  
 ABS. EOSINOPHILS 0.0 0.0 - 0.4 K/UL  
 ABS. BASOPHILS 0.0 0.0 - 0.1 K/UL  
 DF AUTOMATED PERIPHERAL SMEAR Collection Time: 01/27/20 12:45 PM  
Result Value Ref Range PERIPHERAL SMEAR     
  PLEASE SEE PATHOLOGIST'S REVIEW IN THE [facility] SYSTEM, ACCESSION NUMBER:  
METABOLIC PANEL, COMPREHENSIVE Collection Time: 01/27/20 12:45 PM  
Result Value Ref Range Sodium 134 (L) 136 - 145 mmol/L Potassium 3.9 3.5 - 5.5 mmol/L Chloride 100 100 - 111 mmol/L  
 CO2 26 21 - 32 mmol/L Anion gap 8 3.0 - 18 mmol/L Glucose 182 (H) 74 - 99 mg/dL BUN 24 (H) 7.0 - 18 MG/DL Creatinine 1.48 (H) 0.6 - 1.3 MG/DL  
 BUN/Creatinine ratio 16 12 - 20 GFR est AA 55 (L) >60 ml/min/1.73m2 GFR est non-AA 46 (L) >60 ml/min/1.73m2 Calcium 8.3 (L) 8.5 - 10.1 MG/DL Bilirubin, total 0.7 0.2 - 1.0 MG/DL  
 ALT (SGPT) 21 16 - 61 U/L  
 AST (SGOT) 35 10 - 38 U/L Alk. phosphatase 77 45 - 117 U/L Protein, total 7.0 6.4 - 8.2 g/dL Albumin 2.9 (L) 3.4 - 5.0 g/dL Globulin 4.1 (H) 2.0 - 4.0 g/dL A-G Ratio 0.7 (L) 0.8 - 1.7    
CBC WITH AUTOMATED DIFF Collection Time: 01/28/20  5:28 AM  
Result Value Ref Range WBC 9.6 4.6 - 13.2 K/uL  
 RBC 2.82 (L) 4.70 - 5.50 M/uL HGB 7.8 (L) 13.0 - 16.0 g/dL HCT 24.4 (L) 36.0 - 48.0 % MCV 86.5 74.0 - 97.0 FL  
 MCH 27.7 24.0 - 34.0 PG  
 MCHC 32.0 31.0 - 37.0 g/dL  
 RDW 16.0 (H) 11.6 - 14.5 % PLATELET 036 601 - 389 K/uL MPV 9.3 9.2 - 11.8 FL  
 NEUTROPHILS 93 (H) 40 - 73 % LYMPHOCYTES 4 (L) 21 - 52 % MONOCYTES 3 3 - 10 % EOSINOPHILS 0 0 - 5 % BASOPHILS 0 0 - 2 %  
 ABS. NEUTROPHILS 8.9 (H) 1.8 - 8.0 K/UL  
 ABS. LYMPHOCYTES 0.4 (L) 0.9 - 3.6 K/UL  
 ABS. MONOCYTES 0.2 0.05 - 1.2 K/UL  
 ABS. EOSINOPHILS 0.0 0.0 - 0.4 K/UL  
 ABS. BASOPHILS 0.0 0.0 - 0.1 K/UL DF AUTOMATED METABOLIC PANEL, BASIC Collection Time: 01/28/20  5:28 AM  
Result Value Ref Range Sodium 132 (L) 136 - 145 mmol/L Potassium 3.7 3.5 - 5.5 mmol/L Chloride 100 100 - 111 mmol/L  
 CO2 26 21 - 32 mmol/L Anion gap 6 3.0 - 18 mmol/L Glucose 126 (H) 74 - 99 mg/dL BUN 25 (H) 7.0 - 18 MG/DL Creatinine 1.41 (H) 0.6 - 1.3 MG/DL  
 BUN/Creatinine ratio 18 12 - 20 GFR est AA 59 (L) >60 ml/min/1.73m2 GFR est non-AA 48 (L) >60 ml/min/1.73m2 Calcium 8.2 (L) 8.5 - 10.1 MG/DL  
DIGOXIN Collection Time: 01/28/20  5:28 AM  
Result Value Ref Range Digoxin level 1.0 0.9 - 2.0 NG/ML  
CBC WITH AUTOMATED DIFF Collection Time: 01/29/20  5:24 AM  
Result Value Ref Range WBC 8.8 4.6 - 13.2 K/uL  
 RBC 2.89 (L) 4.70 - 5.50 M/uL HGB 8.0 (L) 13.0 - 16.0 g/dL HCT 25.2 (L) 36.0 - 48.0 % MCV 87.2 74.0 - 97.0 FL  
 MCH 27.7 24.0 - 34.0 PG  
 MCHC 31.7 31.0 - 37.0 g/dL  
 RDW 16.4 (H) 11.6 - 14.5 % PLATELET 469 157 - 763 K/uL MPV 9.2 9.2 - 11.8 FL  
 NEUTROPHILS 93 (H) 40 - 73 % LYMPHOCYTES 5 (L) 21 - 52 % MONOCYTES 2 (L) 3 - 10 % EOSINOPHILS 0 0 - 5 % BASOPHILS 0 0 - 2 %  
 ABS. NEUTROPHILS 8.3 (H) 1.8 - 8.0 K/UL  
 ABS. LYMPHOCYTES 0.4 (L) 0.9 - 3.6 K/UL  
 ABS. MONOCYTES 0.2 0.05 - 1.2 K/UL  
 ABS. EOSINOPHILS 0.0 0.0 - 0.4 K/UL  
 ABS. BASOPHILS 0.0 0.0 - 0.1 K/UL  
 DF AUTOMATED METABOLIC PANEL, BASIC Collection Time: 01/29/20  5:24 AM  
Result Value Ref Range Sodium 133 (L) 136 - 145 mmol/L Potassium 3.7 3.5 - 5.5 mmol/L Chloride 102 100 - 111 mmol/L  
 CO2 27 21 - 32 mmol/L Anion gap 4 3.0 - 18 mmol/L Glucose 117 (H) 74 - 99 mg/dL BUN 25 (H) 7.0 - 18 MG/DL Creatinine 1.41 (H) 0.6 - 1.3 MG/DL  
 BUN/Creatinine ratio 18 12 - 20 GFR est AA 59 (L) >60 ml/min/1.73m2 GFR est non-AA 48 (L) >60 ml/min/1.73m2 Calcium 8.2 (L) 8.5 - 10.1 MG/DL  
DIGOXIN Collection Time: 01/29/20  5:24 AM  
Result Value Ref Range Digoxin level 1.0 0.9 - 2.0 NG/ML  
CBC WITH AUTOMATED DIFF Collection Time: 01/30/20  1:48 AM  
Result Value Ref Range WBC 8.0 4.6 - 13.2 K/uL  
 RBC 2.68 (L) 4.70 - 5.50 M/uL HGB 7.4 (L) 13.0 - 16.0 g/dL HCT 23.4 (L) 36.0 - 48.0 % MCV 87.3 74.0 - 97.0 FL  
 MCH 27.6 24.0 - 34.0 PG  
 MCHC 31.6 31.0 - 37.0 g/dL  
 RDW 16.3 (H) 11.6 - 14.5 % PLATELET 282 549 - 912 K/uL MPV 9.1 (L) 9.2 - 11.8 FL  
 NEUTROPHILS 90 (H) 40 - 73 % LYMPHOCYTES 5 (L) 21 - 52 % MONOCYTES 5 3 - 10 % EOSINOPHILS 0 0 - 5 % BASOPHILS 0 0 - 2 %  
 ABS. NEUTROPHILS 7.3 1.8 - 8.0 K/UL  
 ABS. LYMPHOCYTES 0.4 (L) 0.9 - 3.6 K/UL  
 ABS. MONOCYTES 0.4 0.05 - 1.2 K/UL  
 ABS. EOSINOPHILS 0.0 0.0 - 0.4 K/UL  
 ABS. BASOPHILS 0.0 0.0 - 0.1 K/UL  
 DF AUTOMATED METABOLIC PANEL, BASIC Collection Time: 01/30/20  1:48 AM  
Result Value Ref Range Sodium 139 136 - 145 mmol/L Potassium 3.8 3.5 - 5.5 mmol/L Chloride 105 100 - 111 mmol/L  
 CO2 28 21 - 32 mmol/L Anion gap 6 3.0 - 18 mmol/L Glucose 111 (H) 74 - 99 mg/dL BUN 25 (H) 7.0 - 18 MG/DL Creatinine 1.30 0.6 - 1.3 MG/DL  
 BUN/Creatinine ratio 19 12 - 20 GFR est AA >60 >60 ml/min/1.73m2 GFR est non-AA 53 (L) >60 ml/min/1.73m2 Calcium 8.1 (L) 8.5 - 10.1 MG/DL  
DIGOXIN Collection Time: 01/30/20  1:48 AM  
Result Value Ref Range Digoxin level 1.1 0.9 - 2.0 NG/ML  
CULTURE, RESPIRATORY/SPUTUM/BRONCH W GRAM STAIN Collection Time: 01/30/20  2:35 AM  
Result Value Ref Range Special Requests: NO SPECIAL REQUESTS    
 GRAM STAIN 10-25 WBC/lpf GRAM STAIN 10-25 EPI/lpf GRAM STAIN MUCUS PRESENT    
 GRAM STAIN FEW GRAM POSITIVE COCCI IN PAIRS IN CHAINS    
 GRAM STAIN FEW YEAST WITH PSEUDOHYPHAE Culture result: MODERATE CANDIDA ALBICANS (A) Culture result: MODERATE CANDIDA TROPICALIS (A) Culture result: FEW MYROIDES SPECIES (A) Culture result: FEW NORMAL RESPIRATORY RUDY Susceptibility Myroides species - COLIN Cefazolin ($) >=64 Resistant ug/mL Cefepime ($$) >=64 Resistant ug/mL Ceftazidime ($) >=64 Resistant ug/mL Ceftriaxone ($) >=64 Resistant ug/mL Ciprofloxacin ($) <=0.25 Susceptible ug/mL Gentamicin ($) <=1 Susceptible ug/mL Imipenem <=0.25 Susceptible ug/mL Levofloxacin ($) 0.25 Susceptible ug/mL Piperacillin/Tazobac ($) >=128 Resistant ug/mL Tobramycin ($) <=1 Susceptible ug/mL Trimeth-Sulfamethoxa <=20 Susceptible ug/mL CBC WITH AUTOMATED DIFF Collection Time: 01/31/20  1:36 AM  
Result Value Ref Range WBC 7.5 4.6 - 13.2 K/uL  
 RBC 2.67 (L) 4.70 - 5.50 M/uL HGB 7.5 (L) 13.0 - 16.0 g/dL HCT 23.4 (L) 36.0 - 48.0 % MCV 87.6 74.0 - 97.0 FL  
 MCH 28.1 24.0 - 34.0 PG  
 MCHC 32.1 31.0 - 37.0 g/dL  
 RDW 16.4 (H) 11.6 - 14.5 % PLATELET 782 262 - 475 K/uL MPV 9.3 9.2 - 11.8 FL  
 NEUTROPHILS 88 (H) 40 - 73 % LYMPHOCYTES 6 (L) 21 - 52 % MONOCYTES 6 3 - 10 % EOSINOPHILS 0 0 - 5 % BASOPHILS 0 0 - 2 %  
 ABS. NEUTROPHILS 6.7 1.8 - 8.0 K/UL  
 ABS. LYMPHOCYTES 0.4 (L) 0.9 - 3.6 K/UL  
 ABS. MONOCYTES 0.4 0.05 - 1.2 K/UL  
 ABS. EOSINOPHILS 0.0 0.0 - 0.4 K/UL  
 ABS. BASOPHILS 0.0 0.0 - 0.1 K/UL  
 DF AUTOMATED METABOLIC PANEL, BASIC Collection Time: 01/31/20  1:36 AM  
Result Value Ref Range Sodium 138 136 - 145 mmol/L Potassium 4.0 3.5 - 5.5 mmol/L Chloride 105 100 - 111 mmol/L  
 CO2 27 21 - 32 mmol/L Anion gap 6 3.0 - 18 mmol/L Glucose 95 74 - 99 mg/dL BUN 28 (H) 7.0 - 18 MG/DL Creatinine 1.36 (H) 0.6 - 1.3 MG/DL  
 BUN/Creatinine ratio 21 (H) 12 - 20 GFR est AA >60 >60 ml/min/1.73m2 GFR est non-AA 50 (L) >60 ml/min/1.73m2 Calcium 8.1 (L) 8.5 - 10.1 MG/DL  
DIGOXIN Collection Time: 01/31/20  1:36 AM  
Result Value Ref Range Digoxin level 1.2 0.9 - 2.0 NG/ML  
CREATININE, POC Collection Time: 02/17/20 10:46 AM  
Result Value Ref Range  Creatinine, POC 1.0 0.6 - 1.3 MG/DL  
 GFRAA, POC >60 >60 ml/min/1.73m2 GFRNA, POC >60 >60 ml/min/1.73m2 Assessment and Plan: # Chronic Normocytic Anemia 
-- He has hx iron deficiency anemia diagnosed last year and had been on iron pills. -- He has follow-up with Gastroenterology for GI bleeding. EGD/Colonoscopy on 5/28/19 showed hiatal hernia, gastritis, ascending colon polyp; diverticulosis; and hemorroids. -- 9/25/19 labs reported Iron 26, Iron saturation 10%, Ferritin 429, and TIBC 259. Has received IV Iron  - Injectafer x 4 for symptomatic iron deficiency anemia. Last dose of Injactafer was on 10/15/2019. 
-- 11/7/19 labs showed Ferritin 772. MCV improved to 91. Vitamin B12/ Folate, and SPEP reviewed WNL. Schistocytes noted on smear but low reticulocyte production index - RPI (inadequate response to correct the anemia) and negative for hemolytic panels (, Hapto 208, KYLAH neg). -- His chronic anemia likely multifactorial from iron deficiency anemia and anemia of chronic kidney disease. Planned to give Injectafer x 2 at last visit. -- Received Injectafer x 2. Clinically improving with less fatigue and better appetite. He will call his Gastroenterology for a follow-up. -- The patient was advised to seek a prompt medical care if any obvious bleeding or worsening symptoms or any concerns. Plan: 
-- Will check his labs today: CBC and Iron study, ferrtin. He will be notified if any intervention needed. -- We will see him back in 2 months for follow-up. Always sooner if required. Skyla Arzate All of patient's questions answered to their apparent satisfaction. They verbally show understanding and agreement with the plan. About 26 minutes were spent for this encounter with more than 50% of the time spent in face-to-face counseling and discussing on diagnosis and management plan going forward. Yuly Mayorga MD 
3/5/2020 Parts of this document has been produced using Dragon dictation system. Unrecognized errors in transcription may be present. Please do not hesitate to reach out for any questions or clarifications.

## 2020-03-05 NOTE — LETTER
3/5/20 Patient: Roberta Ramachandran YOB: 1940 Date of Visit: 3/5/2020 John Spain DO 
[address] [facility] [address] [facility] [address] VIA In Basket Dear John Spain DO, Thank you for referring Mr. Lisbeth Elliott to [facility] for evaluation. My notes for this consultation are attached. If you have questions, please do not hesitate to call me. I look forward to following your patient along with you. Sincerely, Yesica Edmond MD

## 2020-05-03 NOTE — PATIENT INSTRUCTIONS

## 2020-05-07 NOTE — LETTER
5/8/20 Patient: Abdulkadir Bundy YOB: 1940 Date of Visit: 5/7/2020 Claudio Coffman DO 
[address] [facility] [address] [facility] [address] VIA In Basket Dear Claudio Coffman DO, Thank you for referring Mr. Sylvain Boo to [facility] for evaluation. My notes for this consultation are attached. If you have questions, please do not hesitate to call me. I look forward to following your patient along with you. Sincerely, Patric Miller MD

## 2020-05-12 NOTE — PROGRESS NOTES
[facility] Lab Visit:    Bunnie Essex  1940  [id number]    1430:  Pt arrived ambulatory. Labs drawn peripherally in right ac and sent for processing. Pt scheduled to return on 5/13/20 for 2 units of RBC's. Departed [facility] ambulatory and in no distress.       Visit Vitals  /69 (BP 1 Location: Right arm, BP Patient Position: Sitting)   Pulse 65   Temp 97.7 °F (36.5 °C) (Oral)   Resp 20   SpO2 100%

## 2020-05-13 NOTE — PROGRESS NOTES
[facility] Progress Note    Date: May 13, 2020    Name: Izzy Murphy    MRN: [id number]         : 1940      MrKala Reis was assessed and education was provided. Mr. Jossue Townsend vitals were reviewed and patient was observed for 5 minutes prior to treatment. Visit Vitals  /65 (BP 1 Location: Left arm, BP Patient Position: At rest;Sitting)   Pulse (!) 59   Temp 97.8 °F (36.6 °C)   Resp 18   SpO2 100%       Blood transfusion consent in chart. Blood transfusion orders in chart. Pre transfusion tylenol 650 mg PO and benadryl 25 mg PO given. Two units packed red blood cells given via peripheral IV. Injectafer 750 mg IV given via peripheral IV. Mr. pJ Huerta tolerated the blood transfusion and Injectafer infusion, and had no complaints. Patient armband removed and shredded. Mr. Jp Huerta was discharged from [place] [address] in stable condition at [phone]. He is to return on 2020 at 1300 for his next appointment for Injectafer dose #2 of 2.     Jefferson Zimmerman RN  May 13, 2020

## 2020-05-20 NOTE — PROGRESS NOTES
[facility] Progress Note    Date: May 20, 2020    Name: Kerri Shin    MRN: [id number]         : 1940      Mr. Reis arrived in the [address] [place] [address] today at [id number], in stable condition, here for Dose # 2 of 2, IV Injectafer Infusion. He was assessed and education was provided. Mr. Kait Carroll vitals were reviewed. Visit Vitals  /56 (BP 1 Location: Left arm, BP Patient Position: Post activity; Sitting)   Pulse 81   Temp 98.3 °F (36.8 °C)   Resp 28   SpO2 100%            PIV # 24 G, was established in his left AC at 1330, without incident. Injectafer (Ferric Carboxymaltose) 750 mg IV, was added to a 250 ml IV NS Bag, and administered over approximately 20 minutes, per order, and without incident. After completion of the IV Injectafer, the PIV was flushed well with 20 ml NS, and then was clamped, and Mr. Reis was monitored for 30 minutes, per order, and also without incident. After completion of the observation period, the PIV was removed, and gauze/bandaid/coban, was applied. Mr. Abdi Middleton tolerated well, and had no complaints. Mr. Abdi Middleton was discharged from [place] [address] in stable condition at [phone]. He is to return on Friday, 20, at 1430, for his next appointment, to follow up with Dr. Siria Day. However, after the completion of today, he has no further [facility] appointments scheduled, because he has now completed both ordered doses of Injectafer.      Nila Harris RN  May 20, 2020  1:25 PM

## 2020-06-16 NOTE — PROGRESS NOTES
Melissapasquale Mallory presents today for Chief Complaint Patient presents with  Coronary Artery Disease 6 month follow up  Shortness of Breath  
  on oxygen  Leg Swelling  
  a little of ther swelling in the legs Maria T Lindsayjerica preferred language for health care discussion is english/other. Is someone accompanying this pt? no 
 
Is the patient using any DME equipment during [address]? Molly Kussmaul Depression Screening: 
3 most recent PHQ Screens 6/16/2020 Little interest or pleasure in doing things Not at all Feeling down, depressed, irritable, or hopeless Not at all Total Score PHQ 2 0 Learning Assessment: 
Learning Assessment 10/31/2018 PRIMARY LEARNER Patient BARRIERS PRIMARY LEARNER -  
PRIMARY LANGUAGE ENGLISH  
LEARNER PREFERENCE PRIMARY LISTENING  
ANSWERED BY patient RELATIONSHIP SELF Abuse Screening: 
Abuse Screening Questionnaire 6/16/2020 Do you ever feel afraid of your partner? Diana Sill Are you in a relationship with someone who physically or mentally threatens you? Diana Sill Is it safe for you to go home? Leonid Woods Fall Risk Fall Risk Assessment, last 12 mths 6/16/2020 Able to walk? Yes Fall in past 12 months? No  
 
 
Pt currently taking Anticoagulant therapy? no 
 
Coordination of Care: 1. Have you been to the ER, urgent care clinic since your last visit? Hospitalized since your last visit? Yes for CAD on 01-26-20 to 01-31-20 
 
2. Have you seen or consulted any other health care providers outside of the [address] [place] [address] since your last visit? Include any pap smears or colon screening.  no

## 2020-06-16 NOTE — PROGRESS NOTES
HISTORY OF PRESENT ILLNESS Ariana Winston is a 79 y.o. male. ASSESSMENT and PLAN Mr. Handy Esparza has history of chronic atrial fibrillation, mild CAD (by coronary angiography 2012), severe COPD on oxygen therapy, as well as history of AAA stent graft. He is back on Coumadin at this point. In January of 2016, he developed severe back pains, diagnosed with lumbar spine osteomyelitis and abscess. His echocardiogram in February of 2016 showed normal ejection fraction without wall motion abnormality, severely dilated left atrium, and severe pulmonary hypertension with peak pressure of 60 mmHg. His echocardiogram in April 2018 shows EF of 55%.  He has evidence of a pulmonary hypertension with RV systolic pressure 46 mmHg.  In June 2018, he was admitted to the hospital. [facility] was admitted with respiratory compromise, over diuresis and hyponatremia. Cristian Barnard was concern for HFpEF.  However, it was likely from COPD exacerbation and RV systolic strain. He was also told that his AAA stent graft has a leak. He was sent down to [facility] for second opinion; he was told that if he has open surgery, he may require hemodialysis and may not be able to come off the ventilator. Therefore, he declined that option. · CAD:    Coronary angiography in 2012 only showed mild CAD. · CAF:  Rate controlled. Because of significant GI bleed in the past, he has been off of aspirin and Coumadin. · BP:   Well controlled. · HR:    Stable. · CHF:    There is no evidence of decompensated CHF noted. · Weight:    His weight today is 131 pounds. His baseline weight is 168 pounds. Since August 2019, he has lost 16 pounds. I encouraged him not to lose any further weight. He has been having issues with nausea, and diarrhea. · Cholesterol:   Target LDL <90. Pravachol 40. · Anti-platelet:    Despite the fact that he has atrial fibrillation, because of GI bleed, he remains off of aspirin for Coumadin. His cardiac status appears reasonably stable. Obviously, his GI symptoms and continued weight loss is concerning. I will defer this to primary care team and gastroenterology. I will see him back in 9 months. Thank you. Encounter Diagnoses Name Primary?  Chronic atrial fibrillation (HCC) Yes  
 
current treatment plan is effective, no change in therapy 
lab results and schedule of future lab studies reviewed with patient 
reviewed diet, exercise and weight control 
cardiovascular risk and specific lipid/LDL goals reviewed HPI Today, Mr. Harini Vásquez has no complaints of chest pains. He has baseline dyspnea on exertion which is without change. Because of continued weight loss, he remains weak and fatigued. He is 24/7 oxygen support demand. Since 2018, he has lost 37 pounds. His biggest issue appears to be continued weight loss, increasing fatigue and malaise. He denies any orthopnea or PND. He denies any palpitations or dizziness. Review of Systems Constitutional: Positive for malaise/fatigue and weight loss. Respiratory: Positive for shortness of breath. Cardiovascular: Negative for chest pain, palpitations, orthopnea, claudication, leg swelling and PND. All other systems reviewed and are negative. Physical Exam 
Vitals signs and nursing note reviewed. Constitutional:   
   Appearance: He is ill-appearing. HENT:  
   Head: Normocephalic. Eyes:  
   Extraocular Movements: Extraocular movements intact. Conjunctiva/sclera: Conjunctivae normal.  
Neck: Musculoskeletal: No neck rigidity. Cardiovascular:  
   Rate and Rhythm: Normal rate. Rhythm irregular. Pulmonary:  
   Breath sounds: Normal breath sounds. Abdominal:  
   General: Bowel sounds are normal.  
   Palpations: Abdomen is soft. Musculoskeletal:     
   General: No swelling. Skin: 
   General: Skin is warm and dry. Neurological:  
   General: No focal deficit present. Mental Status: He is alert and oriented to person, place, and time. Psychiatric:     
   Mood and Affect: Mood normal.     
   Behavior: Behavior normal.  
 
 
 
PCP: Franck Pickett DO Past Medical History:  
Diagnosis Date  Aneurysm ([facility] AAA repair 2006 & 2012  Aorto-iliac duplex 02/13/2015 Tech difficult. Patent aorta bi-iliac endograft w/o leak or limb dysfunction.  Arrhythmia   
 a fib  Cardiac cath 11/01/2012 RCA (sm, nondom) patent. LM patent. LAD 25%. CX (dom) 45% mid. LVEDP 12 mmHg. No WMA. PA 27/12. W 10-12. CO/CI 5.2/2.6 (TD).  Cardiac echocardiogram, abnormal 02/20/2016 EF 55%. No WMA. Indeterminate diastolic fx. RVSP 60 mmHg. Severe LAE. Mild MR. Mod TR.  IVCE. Similar to study of 10/26/12.  Cardiovascular aorto-iliac duplex 02/13/2015 Patent aorta bi-iliac endovascular graft w/o leak or limb dysfunction. Sac measures 4.21 x 4.47 cm (4.4 x 4.6 cm on 1/31/14).  Cardiovascular LE peripheral arterial testing 07/29/2013 No significant LE arterial disease bilaterally. R GHADA 1. 12.  L GHADA 1. 12.  R DBI 0.83. L DBI 0.71. Exercise deferred.  Cardiovascular renal duplex 10/31/2012 No RA stenosis. Intrinsic/med disease in left kidney. Patent aortic endograft. Patent, thrombus-free renal veins bilaterally.  Carotid duplex 07/29/2013 Mild <50% bilateral ICA plaquing.  Chronic kidney disease  Chronic lung disease  Cigarette smoker  Congestive heart failure (CHF) ([facility]  COPD (chronic obstructive pulmonary disease) (HCC)   
 and emphysema; likely secondary to tobacco abuse  Difficult intubation  Dyslipidemia   
 low HDL  Emphysema   
 HTN (hypertension)  Hypercholesterolemia  Ill-defined condition   
 hernia  Increased prostate specific antigen (PSA) velocity  Long term (current) use of anticoagulants   
 coumadin  Osteoarthritis  Osteomyelitis ([facility]  Paroxysmal atrial fibrillation (HCC)  Peripheral vascular disease ([facility] AAA repair 12/2007  Persistent atrial Fibrillation  Persistent atrial fibrillation (HCC)  Unspecified adverse effect of anesthesia   
 difficulty breathing placed in ICU Oct 2012 (AAA repair) Past Surgical History:  
Procedure Laterality Date  BRONCHOSCOPY DIAGNOSTIC  11/2/2012  CARDIAC CATHETERIZATION  11/1/2012  COLONOSCOPY N/A 7/26/2016 COLONOSCOPY with Polypectomies performed by [place], MD at [facility]  COLONOSCOPY N/A 5/28/2019 COLONOSCOPY with polypectomy performed by Oneida Estrada MD at [address] HX AAA REPAIR    
 2006 & 2012  HX HEART CATHETERIZATION  3/4/2009 1. RCA small, nondominant; patent. 2. LMCA patent. 3. LAD is long, wrapped around apical vessel. Diffuse, 20-30% stenosis noted. 4. CCA is large, dominant vessel. Diffuse 20-30% stenosis. 5. LVEDP is 16 mmHg. 6. Overall left ventricular systolic function mildly diminshed with est. EF 45%. Mild, global hypokinesis noted. 7. No significant mitral regurgitation or aortic stenosis noted. (see report)  HX HERNIA REPAIR  2/2014  
 rt inguinal   
 HX HERNIA REPAIR  01/2016 LEFT INGUINAL HERNIA REPAIR DR. Clark Saldana  REPAIR ING HERNIA,5+Y/O,JESSIE Left 1-20-16 Dr. Farnaz LONG  11/1/2012 Current Outpatient Medications Medication Sig Dispense Refill  sucralfate (CARAFATE) 1 gram tablet  cephalexin 500 mg tab  ciprofloxacin HCl (CIPRO) 500 mg tablet  albuterol (PROVENTIL VENTOLIN) 2.5 mg /3 mL (0.083 %) nebu inhale contents of 1 vial in nebulizer every 4 hours if needed for wheezing 50 Nebule 5  
 tamsulosin (FLOMAX) 0.4 mg capsule Take 0.4 mg by mouth daily.  multivitamin, tx-iron-ca-min (THERA-M W/ IRON) 9 mg iron-400 mcg tab tablet Take 1 Tab by mouth daily.     
 sodium chloride (OCEAN NASAL) 0.65 % nasal squeeze bottle 2 Sprays by Both Nostrils route daily as needed for Congestion.  tiotropium-olodaterol (STIOLTO RESPIMAT) 2.5-2.5 mcg/actuation inhaler inhale 2 inhalations by mouth once daily 1 Inhaler 5  
 docusate sodium 100 mg tab Take 100 mg by mouth daily as needed for Other (constipation).  ferrous sulfate 325 mg (65 mg iron) tablet Take 325 mg by mouth every other day. Indications: anemia from inadequate iron  pantoprazole (PROTONIX) 40 mg tablet Take 1 Tab by mouth Daily (before breakfast). 30 Tab 0  
 HYDROcodone-acetaminophen (NORCO) 5-325 mg per tablet Take 0.5 Tabs by mouth two (2) times daily as needed for Pain.  traZODone (DESYREL) 50 mg tablet Take 50 mg by mouth nightly.  lisinopril (PRINIVIL, ZESTRIL) 20 mg tablet Take 1 Tab by mouth daily. 30 Tab 0  pravastatin (PRAVACHOL) 40 mg tablet Take 40 mg by mouth nightly.  polyethylene glycol (MIRALAX) 17 gram packet Take 17 g by mouth daily as needed (constipation).  digoxin (DIGITEK) 0.125 mg tablet take 1 tablet by mouth once daily 90 Tab 3  
 amLODIPine (NORVASC) 10 mg tablet Take 10 mg by mouth daily. 0  
 OXYGEN-AIR DELIVERY SYSTEMS 2 L/min by Nasal route daily. Continuous. Company is [employer] The patient has a family history of 
 
Social History Tobacco Use  Smoking status: Former Smoker Packs/day: 1.50 Years: 54.00 Pack years: 81.00 Types: Cigarettes Last attempt to quit: 10/23/2012 Years since quittin.6  Smokeless tobacco: Never Used Substance Use Topics  Alcohol use: No  
  Alcohol/week: 0.0 standard drinks Comment: quit drinking alcohol 26 years ago, patient states stopped in \"5139\"  Drug use: No  
 
 
Lab Results Component Value Date/Time HDL Cholesterol 25 (L) 2010 10:30 AM  
  
 
BP Readings from Last 3 Encounters:  
20 110/56  
20 129/70  
20 138/63 Pulse Readings from Last 3 Encounters:  
20 79  
20 91  
 05/13/20 65 Wt Readings from Last 3 Encounters:  
06/16/20 59.4 kg (131 lb) 05/07/20 63 kg (139 lb) 03/05/20 60.8 kg (134 lb) EKG: unchanged from previous tracings, nonspecific ST and T waves changes, atrial fibrillation, rate 79, Q waves in V1-V3.

## 2020-08-15 NOTE — PROGRESS NOTES
Hematology/Oncology Note Name: Elda Yeung : 1940 [place] DO Subjective: Chief Complaint:  Follow up anemia History of Present Illness:  
Mr Fabián Ortega is here today for follow-up of his anemia. He reported feeling tired recently. He was his GI with no further procedure was planned. Denies any obvious bleeding, melena, bloody stools, hematochezia, nausea, vomiting, or abdominal pain. Denied worsen dyspnea, severe headache, chest pain, cough, abdominal pain, nausea, vomiting, diarrhea, dysuria, new focal weakness or numbness. His EGD and Colonoscopy on 19 showed hiatal hernia, gastritis, ascending colon polyp; diverticulosis; and hemorroids. History Mr. Fabián Ortega is a most pleasant 79y.o. year old male who was seen for anemia. He has multiple co morbidities which includes of chronic atrial fibrillation, peripheral vascular disease, hypertension, coronary artery disease, pulmonary hypertension, CHF exacerbation, chronic pain syndrome, iron deficiency anemia, chronic kidney disease, and benign prostate hypertrophy. He was diagnosed with iron deficiency anemia since last year and has been on iron pills without any tolerating issues. On May 2019 he had bloody stools and was diagnosed with GI bleeding. His EGD/colonoscopy with polypectomy on 19 reported hiatal hernia, mild erosive gastritis few flecks of coffee grounds,1 cm colon polyp, diverticulosis, internal hemorrhoids. He has been on PPI since then. He received blood transfusion/IV iron during his hospitalization. For his iron deficiency anemia, he has received 4 doses of iron IV- Injectafer given by his GI doctor, last dose was on 10/15/2019. He has followed up with GI recently and no further procedure planned. He has history of chronic A-fib but was hold off anticoagulation due to GI bleeding issues. He has been on home oxygen at 2-3 L for his COPD. Oncologic History: Oncology History No history exists. Past Medical History, Family History, and Social History: 
 
Past Medical History:  
Diagnosis Date  Aneurysm ([facility] [address] AAA repair 2006 & 2012  Aorto-iliac duplex 02/13/2015 Tech difficult. Patent aorta bi-iliac endograft w/o leak or limb dysfunction.  Arrhythmia   
 a fib  Cardiac cath 11/01/2012 RCA (sm, nondom) patent. LM patent. LAD 25%. CX (dom) 45% mid. LVEDP 12 mmHg. No WMA. PA 27/12. W 10-12. CO/CI 5.2/2.6 (TD).  Cardiac echocardiogram, abnormal 02/20/2016 EF 55%. No WMA. Indeterminate diastolic fx. RVSP 60 mmHg. Severe LAE. Mild MR. Mod TR.  IVCE. Similar to study of 10/26/12.  Cardiovascular aorto-iliac duplex 02/13/2015 Patent aorta bi-iliac endovascular graft w/o leak or limb dysfunction. Sac measures 4.21 x 4.47 cm (4.4 x 4.6 cm on 1/31/14).  Cardiovascular LE peripheral arterial testing 07/29/2013 No significant LE arterial disease bilaterally. R GHADA 1. 12.  L GHADA 1. 12.  R DBI 0.83. L DBI 0.71. Exercise deferred.  Cardiovascular renal duplex 10/31/2012 No RA stenosis. Intrinsic/med disease in left kidney. Patent aortic endograft. Patent, thrombus-free renal veins bilaterally.  Carotid duplex 07/29/2013 Mild <50% bilateral ICA plaquing.  Chronic kidney disease  Chronic lung disease  Cigarette smoker  Congestive heart failure (CHF) ([facility] [address]  COPD (chronic obstructive pulmonary disease) (HCC)   
 and emphysema; likely secondary to tobacco abuse  Difficult intubation  Dyslipidemia   
 low HDL  Emphysema   
 HTN (hypertension)  Hypercholesterolemia  Ill-defined condition   
 hernia  Increased prostate specific antigen (PSA) velocity  Long term (current) use of anticoagulants   
 coumadin  Osteoarthritis  Osteomyelitis ([facility] [address]  Paroxysmal atrial fibrillation (HCC)  Peripheral vascular disease ([facility] [address] AAA repair 12/2007  Persistent atrial Fibrillation  Persistent atrial fibrillation (HCC)  Unspecified adverse effect of anesthesia   
 difficulty breathing placed in ICU Oct 2012 (AAA repair) Past Surgical History:  
Procedure Laterality Date  BRONCHOSCOPY DIAGNOSTIC  2012  CARDIAC CATHETERIZATION  2012  COLONOSCOPY N/A 2016 COLONOSCOPY with Polypectomies performed by Dominga Monzon MD at [facility] ENDOSCOPY  COLONOSCOPY N/A 2019 COLONOSCOPY with polypectomy performed by Cora Huggins MD at [facility] [place] [address] HX AAA REPAIR    
  &   HX HEART CATHETERIZATION  3/4/2009 1. RCA small, nondominant; patent. 2. LMCA patent. 3. LAD is long, wrapped around apical vessel. Diffuse, 20-30% stenosis noted. 4. CCA is large, dominant vessel. Diffuse 20-30% stenosis. 5. LVEDP is 16 mmHg. 6. Overall left ventricular systolic function mildly diminshed with est. EF 45%. Mild, global hypokinesis noted. 7. No significant mitral regurgitation or aortic stenosis noted. (see report)  HX HERNIA REPAIR  2014  
 rt inguinal   
 HX HERNIA REPAIR  2016 LEFT INGUINAL HERNIA REPAIR DR. Stephanie Hicks  REPAIR ING HERNIA,5+Y/O,JESSIE Left 16 Dr. Santiago LONG  2012 Social History Socioeconomic History  Marital status:  Spouse name: Not on file  Number of children: Not on file  Years of education: Not on file  Highest education level: Not on file Occupational History  Not on file Social Needs  Financial resource strain: Not on file  Food insecurity Worry: Not on file Inability: Not on file  Transportation needs Medical: Not on file Non-medical: Not on file Tobacco Use  Smoking status: Former Smoker Packs/day: 1.50 Years: 54.00 Pack years: 81.00 Types: Cigarettes Last attempt to quit: 10/23/2012 Years since quittin.8  Smokeless tobacco: Never Used Substance and Sexual Activity  Alcohol use: No  
  Alcohol/week: 0.0 standard drinks Comment: quit drinking alcohol 26 years ago, patient states stopped in \"2344\"  Drug use: No  
 Sexual activity: Not Currently Lifestyle  Physical activity Days per week: Not on file Minutes per session: Not on file  Stress: Not on file Relationships  Social connections Talks on phone: Not on file Gets together: Not on file Attends [employer] service: Not on file Active member of club or organization: Not on file Attends meetings of clubs or organizations: Not on file Relationship status: Not on file  Intimate partner violence Fear of current or ex partner: Not on file Emotionally abused: Not on file Physically abused: Not on file Forced sexual activity: Not on file Other Topics Concern  Not on file Social History Narrative  Not on file Family History Problem Relation Age of Onset  Heart Surgery Father [facility]  Stroke Father  Heart Disease Father  Hypertension Father  Heart Attack Father  Heart Surgery Mother [facility]  Coronary Artery Disease Mother  Stroke Mother  Heart Disease Mother  Hypertension Mother  Diabetes Sister  Cancer Brother Current Outpatient Medications Medication Sig Dispense Refill  omeprazole (PRILOSEC) 10 mg capsule Take 10 mg by mouth daily.  DULoxetine (CYMBALTA) 20 mg capsule Take 20 mg by mouth daily.  diclofenac (VOLTAREN) 1 % gel  lidocaine (LIDODERM) 5 %  tiotropium-olodateroL (Stiolto Respimat) 2.5-2.5 mcg/actuation inhaler inhale 2 inhalations by mouth once daily 1 Inhaler 5  
 sucralfate (CARAFATE) 1 gram tablet  cephalexin 500 mg tab  ciprofloxacin HCl (CIPRO) 500 mg tablet  albuterol (PROVENTIL VENTOLIN) 2.5 mg /3 mL (0.083 %) nebu inhale contents of 1 vial in nebulizer every 4 hours if needed for wheezing 50 Nebule 5  
 tamsulosin (FLOMAX) 0.4 mg capsule Take 0.4 mg by mouth daily.  multivitamin, tx-iron-ca-min (THERA-M W/ IRON) 9 mg iron-400 mcg tab tablet Take 1 Tab by mouth daily.  sodium chloride (OCEAN NASAL) 0.65 % nasal squeeze bottle 2 Sprays by Both Nostrils route daily as needed for Congestion.  docusate sodium 100 mg tab Take 100 mg by mouth daily as needed for Other (constipation).  ferrous sulfate 325 mg (65 mg iron) tablet Take 325 mg by mouth every other day. Indications: anemia from inadequate iron  pantoprazole (PROTONIX) 40 mg tablet Take 1 Tab by mouth Daily (before breakfast). 30 Tab 0  
 HYDROcodone-acetaminophen (NORCO) 5-325 mg per tablet Take 0.5 Tabs by mouth two (2) times daily as needed for Pain.  traZODone (DESYREL) 50 mg tablet Take 50 mg by mouth nightly.  lisinopril (PRINIVIL, ZESTRIL) 20 mg tablet Take 1 Tab by mouth daily. 30 Tab 0  pravastatin (PRAVACHOL) 40 mg tablet Take 40 mg by mouth nightly.  polyethylene glycol (MIRALAX) 17 gram packet Take 17 g by mouth daily as needed (constipation).  digoxin (DIGITEK) 0.125 mg tablet take 1 tablet by mouth once daily 90 Tab 3  
 amLODIPine (NORVASC) 10 mg tablet Take 10 mg by mouth daily. 0  
 OXYGEN-AIR DELIVERY SYSTEMS 2 L/min by Nasal route daily. Continuous. Company is [employer] Review of Systems: 
Constitutional: The patient has no acute distress or discomfort. Chronic fatigue. HEENT: The patient denies recent head trauma, eye pain, blurred vision,  hearing deficit, oropharyngeal mucosal pain or lesions, and the patient denies throat pain or discomfort. Lymphatics: The patient denies palpable peripheral lymphadenopathy. Hematologic: The patient denies having bruising, bleeding. Respiratory: Patient denies having shortness of breath, cough, sputum production, fever. Cardiovascular: The patient denies having leg pain, leg swelling, heart palpitations, chest permit, or chest pain. The patient denies having progressive dyspnea on exertion. Gastrointestinal: The patient denies having nausea, emesis, or diarrhea. The patient denies having any hematemesis or blood in the stool. Genitourinary: Patient denies having urinary urgency, frequency, or dysuria. The patient denies having blood in the urine. Psychological: The patient denies having symptoms of nervousness, anxiety, depression, or thoughts of harming self. Skin: Patient denies having skin rashes, skin, ulcerations, or unexplained itching or pruritus. Musculoskeletal: The patient denies having pain in the joints or bones. Objective:  
 
Visit Vitals /65 (BP Patient Position: Sitting) Pulse 66 Temp 98 °F (36.7 °C) (Oral) Resp 16 Wt 55.3 kg (122 lb) SpO2 100% BMI 19.69 kg/m² Physical Exam:  
Gen. Appearance: The patient is in no acute distress. On Home Oxygen 2L. Looks tired. Skin: There is no bruise or rash. HEENT: The exam is unremarkable. Neck: Supple without lymphadenopathy or thyromegaly. Lungs: Clear to auscultation and percussion; there are no wheezes or rhonchi. Heart: Regular rate and rhythm; there are no murmurs, gallops, or rubs. Abdomen: Bowel sounds are present and normal.  There is no guarding, tenderness, or hepatosplenomegaly. Extremities: There is no clubbing, cyanosis, or edema. Neurologic: There are no focal neurologic deficits. Lymphatics: There is no palpable peripheral lymphadenopathy. Musculoskeletal: The patient has full range of motion at all joints. There is no evidence of joint deformity or effusions. There is no focal joint tenderness. Psychological/psychiatric: There is no clinical evidence of anxiety, depression, or melancholy. Diagnostics: 
   
Results for orders placed or performed during the hospital encounter of 08/07/20 CBC WITH 3 PART DIFF     Status: Abnormal  
Result Value Ref Range Status WBC 15.6 (H) 4.5 - 13.0 K/uL Final  
 RBC 3.00 (L) 4.10 - 5.10 M/uL Final  
 HGB 8.2 (L) 12.0 - 16 g/dL Final  
 HCT 27.0 (L) 36 - 48 % Final  
 MCV 90.0 78 - 102 FL Final  
 MCH 27.3 25.0 - 35.0 PG Final  
 MCHC 30.4 (L) 31 - 37 g/dL Final  
 RDW 16.1 (H) 11.5 - 14.5 % Final  
 PLATELET 472 773 - 639 K/uL Final  
 NEUTROPHILS 88 (H) 40 - 70 % Final  
 MIXED CELLS 7 0.1 - 17 % Final  
 LYMPHOCYTES 5 (L) 14 - 44 % Final  
 ABS. NEUTROPHILS 13.7 (H) 1.8 - 9.5 K/UL Final  
 ABS. MIXED CELLS 1.1 0.0 - 2.3 K/uL Final  
 ABS. LYMPHOCYTES 0.8 (L) 1.1 - 5.9 K/UL Final  
  Comment: Test performed at [address] [place] [address] or Outpatient Infusion Center Location. Reviewed by Medical Director. DF AUTOMATED   Final  
 
 
Recent Results (from the past 2016 hour(s)) CBC W/O DIFF Collection Time: 06/04/20  1:05 PM  
Result Value Ref Range WBC 10.6 4.6 - 13.2 K/uL  
 RBC 3.16 (L) 4.70 - 5.50 M/uL HGB 8.9 (L) 13.0 - 16.0 g/dL HCT 27.8 (L) 36.0 - 48.0 % MCV 88.0 74.0 - 97.0 FL  
 MCH 28.2 24.0 - 34.0 PG  
 MCHC 32.0 31.0 - 37.0 g/dL  
 RDW 14.3 11.6 - 14.5 % PLATELET 177 865 - 396 K/uL MPV 9.5 9.2 - 11.8 FL  
CBC WITH 3 PART DIFF Collection Time: 08/07/20  3:02 PM  
Result Value Ref Range WBC 15.6 (H) 4.5 - 13.0 K/uL  
 RBC 3.00 (L) 4.10 - 5.10 M/uL HGB 8.2 (L) 12.0 - 16 g/dL HCT 27.0 (L) 36 - 48 % MCV 90.0 78 - 102 FL  
 MCH 27.3 25.0 - 35.0 PG  
 MCHC 30.4 (L) 31 - 37 g/dL  
 RDW 16.1 (H) 11.5 - 14.5 % PLATELET 248 466 - 874 K/uL NEUTROPHILS 88 (H) 40 - 70 % MIXED CELLS 7 0.1 - 17 % LYMPHOCYTES 5 (L) 14 - 44 % ABS. NEUTROPHILS 13.7 (H) 1.8 - 9.5 K/UL  
 ABS. MIXED CELLS 1.1 0.0 - 2.3 K/uL  
 ABS. LYMPHOCYTES 0.8 (L) 1.1 - 5.9 K/UL  
 DF AUTOMATED FERRITIN Collection Time: 08/07/20  3:02 PM  
Result Value Ref Range  Ferritin 1,231 (H) 8 - 388 NG/ML  
IRON PROFILE  
 Collection Time: 08/07/20  3:02 PM  
Result Value Ref Range Iron 16 (L) 50 - 175 ug/dL TIBC 146 (L) 250 - 450 ug/dL Iron % saturation 11 (L) 20 - 50 % METABOLIC PANEL, COMPREHENSIVE Collection Time: 08/07/20  3:02 PM  
Result Value Ref Range Sodium 133 (L) 136 - 145 mmol/L Potassium 3.8 3.5 - 5.5 mmol/L Chloride 97 (L) 100 - 111 mmol/L  
 CO2 26 21 - 32 mmol/L Anion gap 10 3.0 - 18 mmol/L Glucose 88 74 - 99 mg/dL BUN 18 7.0 - 18 MG/DL Creatinine 1.06 0.6 - 1.3 MG/DL  
 BUN/Creatinine ratio 17 12 - 20 GFR est AA >60 >60 ml/min/1.73m2 GFR est non-AA >60 >60 ml/min/1.73m2 Calcium 8.4 (L) 8.5 - 10.1 MG/DL Bilirubin, total 0.6 0.2 - 1.0 MG/DL  
 ALT (SGPT) 12 (L) 16 - 61 U/L  
 AST (SGOT) 20 10 - 38 U/L Alk. phosphatase 120 (H) 45 - 117 U/L Protein, total 6.9 6.4 - 8.2 g/dL Albumin 2.6 (L) 3.4 - 5.0 g/dL Globulin 4.3 (H) 2.0 - 4.0 g/dL A-G Ratio 0.6 (L) 0.8 - 1.7 Assessment and Plan: # Chronic Normocytic Anemia # Iron deficiency anemia 
-- He has hx iron deficiency anemia diagnosed last year and had been on iron pills. -- He has follow-up with Gastroenterology for GI bleeding. EGD/Colonoscopy on 5/28/19 showed hiatal hernia, gastritis, ascending colon polyp; diverticulosis; and hemorroids. -- 9/25/19 labs reported Iron 26, Iron saturation 10%, Ferritin 429, and TIBC 259. Has received IV Iron  - Injectafer x 4 for symptomatic iron deficiency anemia. -- 11/7/19 labs showed Ferritin 772. MCV improved to 91. Vitamin B12/ Folate, and SPEP reviewed WNL. Schistocytes noted on smear but low reticulocyte production index - RPI (inadequate response to correct the anemia) and negative for hemolytic panels (, Hapto 208, KYLAH neg). -- His chronic anemia likely multifactorial from iron deficiency anemia and anemia of chronic kidney disease. -- Received Injectafer x 2. on 02/11/2020 -- CBC on 03/05/2020 shows WBC of 9.3, H/H 9.3/30.5,  K, ferritin 842, iron 42, TIBC 283, iron sat 15%. -- He has seen his GI recently, no further procedure was planned per patient. -- He was reluctant to have bone marrow biopsy. Plan: 
-- Will check his labs today: CBC and Iron study, ferrtin. He will be notified if any intervention needed. -- We will see him back in 4 weeks for follow-up. Always sooner if required. Rodolfo Martínez -- The patient was advised to seek a prompt medical care if any obvious bleeding or worsening symptoms or any concerns. All of patient's questions answered to their apparent satisfaction. They verbally show understanding and agreement with the plan. About 25 minutes were spent for this encounter with more than 50% of the time spent in face-to-face counseling and discussing on diagnosis and management plan going forward. Batool Salas MD 
 
 
Parts of this document has been produced using Dragon dictation system. Unrecognized errors in transcription may be present. Please do not hesitate to reach out for any questions or clarifications.  
 
 
 
 
CC: Glenroy Ballard, DO

## 2020-08-23 NOTE — REMOTE MONITORING
Attempted to contact RN in regards to three hour sepsis bundle - Left message with [facility]. Shawna Norton RN, BSN Sepsis [facility] [phone]

## 2020-08-24 PROBLEM — K92.2 GI BLEED: Status: ACTIVE | Noted: 2020-01-01

## 2020-08-24 NOTE — PROGRESS NOTES
I have seen and examined the patient. Please see separate resident's note for additional assessment & plan. Mr. Yokasta Servin looked quite during rounds today, his BP had improved some from yesterday but remains soft- slight increase to IVFs, close monitoring H&H, NPO w/ NGT in place. He has a leukocytosis of unclear etiology- most likely intraabdominal, ?diverticular also given bleed, though no phlegmon on CT abd, UA is clean, we will continue broad spec abx while his blood and urine cultures are pending. No URI/pulm symptoms to suggest COVID. Appreciate consults to Vascular Surgery given thrombus burden (aorta, common iliac stents, Right renal artery occlusion), ?benefit of IVC, and progressive aneurysms (AAA w/ 1A endoleak), unable to tolerate anticoagulation. Of note, in my opinion Mr. Yokasta Servin appears to have full decisional capacity at this time (A&Ox4, oriented to day of week, even based on past conversations he was asking how my family has been doing). We discussed his code status. In the past he was full code, which we reviewed. At this time, he elects for PARTIAL CODE, where by he would consider intubation for potentially acutely reversible conditions if there was change for meaningful recovery- also, if needed he would defer this decision to his daughter, Kelsey Verma, who he confirms as mPOA. He chooses no CPR/ACLS for cardiac AND pulmonary arrest.  Will complete a POST form once he is feeling better prior to discharge home. Day and night resident teams to PM check. Low threshold to re-consult ICU for deterioration.  
 
 
Judy Benites MD

## 2020-08-24 NOTE — MED STUDENT NOTES
*ATTENTION:  This note has been created by a medical student for educational purposes only. Please do not refer to the content of this note for clinical decision-making, billing, or other purposes. Please see attending physicians note to obtain clinical information on this patient. * [facility] Medical Student Progress Note Patient: Diamond Jules MRN: [id number] SSN: [id number]  YOB: 1940 Age: 79 y.o. Sex: male Admit Date: 8/23/2020 LOS: 0 days Chief Complaint Patient presents with  Fatigue Subjective:  
 
Pt states that he is tired and is still having lower back and abdominal pain. He was asking for medication to help with his pain. ROS negative for fever, chills, chest pain, SOB, nausea, vomiting, dysuria Objective:  
 
Visit Vitals /59 Pulse 93 Temp 97.9 °F (36.6 °C) Resp 16 SpO2 99% Physical Exam:  
General appearance: alert, cooperative, no distress Lungs: clear to auscultation bilaterally on anterior and lateral fields Heart: irregular rhythm that was palpably irregular as well, regular rate, S1, S2 normal, no murmur, click, rub or gallop Abdomen: scaphoid, TTP in the Right and Left Lower quadrants, suprapubic, and periumbilical areas. Positive guarding, no rebound. Hypoactive bowel sounds. No masses,  no organomegaly Pulses: palpable DP pulse on the right, unable to palpate on left Skin: Pale skin Neuro: mental status, speech normal, alert and oriented x iii Exremities: 1+ pitting edema Intake and Output: 
Current Shift: No intake/output data recorded. Last three shifts: 08/22 1901 - 08/24 0700 In: 7900 [I.V.:2600] Out: -  
 
Lab/Data Review: 
Recent Results (from the past 12 hour(s)) URINALYSIS W/ RFLX MICROSCOPIC Collection Time: 08/24/20  3:58 AM  
Result Value Ref Range Color YELLOW Appearance CLEAR Specific gravity >1.030 (H) 1.005 - 1.030  
 pH (UA) 5.0 5.0 - 8.0 Protein TRACE (A) NEG mg/dL Glucose Negative NEG mg/dL Ketone Negative NEG mg/dL Bilirubin Negative NEG Blood Negative NEG Urobilinogen 1.0 0.2 - 1.0 EU/dL Nitrites Negative NEG Leukocyte Esterase Negative NEG    
HEMOGLOBIN Collection Time: 08/24/20  3:58 AM  
Result Value Ref Range HGB 8.9 (L) 13.0 - 16.0 g/dL URINE MICROSCOPIC ONLY Collection Time: 08/24/20  3:58 AM  
Result Value Ref Range WBC 0 to 3 0 - 4 /hpf  
 RBC Negative 0 - 5 /hpf Epithelial cells 1+ 0 - 5 /lpf Bacteria Negative NEG /hpf Hyaline cast 0 to 3 0 - 2 /lpf METABOLIC PANEL, COMPREHENSIVE Collection Time: 08/24/20  5:59 AM  
Result Value Ref Range Sodium 131 (L) 136 - 145 mmol/L Potassium 4.9 3.5 - 5.5 mmol/L Chloride 104 100 - 111 mmol/L  
 CO2 19 (L) 21 - 32 mmol/L Anion gap 8 3.0 - 18 mmol/L Glucose 115 (H) 74 - 99 mg/dL BUN 49 (H) 7.0 - 18 MG/DL Creatinine 1.19 0.6 - 1.3 MG/DL  
 BUN/Creatinine ratio 41 (H) 12 - 20 GFR est AA >60 >60 ml/min/1.73m2 GFR est non-AA 59 (L) >60 ml/min/1.73m2 Calcium 8.1 (L) 8.5 - 10.1 MG/DL Bilirubin, total 1.0 0.2 - 1.0 MG/DL  
 ALT (SGPT) 64 (H) 16 - 61 U/L  
 AST (SGOT) 183 (H) 10 - 38 U/L Alk. phosphatase 186 (H) 45 - 117 U/L Protein, total 6.4 6.4 - 8.2 g/dL Albumin 2.2 (L) 3.4 - 5.0 g/dL Globulin 4.2 (H) 2.0 - 4.0 g/dL A-G Ratio 0.5 (L) 0.8 - 1.7 MAGNESIUM Collection Time: 08/24/20  5:59 AM  
Result Value Ref Range Magnesium 1.9 1.6 - 2.6 mg/dL PHOSPHORUS Collection Time: 08/24/20  5:59 AM  
Result Value Ref Range Phosphorus 3.8 2.5 - 4.9 MG/DL POC LACTIC ACID Collection Time: 08/24/20  6:50 AM  
Result Value Ref Range Lactic Acid (POC) <0.30 (L) 0.40 - 2.00 mmol/L  
CBC WITH AUTOMATED DIFF Collection Time: 08/24/20  9:55 AM  
Result Value Ref Range WBC 19.2 (H) 4.6 - 13.2 K/uL  
 RBC 2.90 (L) 4.70 - 5.50 M/uL HGB 8.7 (L) 13.0 - 16.0 g/dL HCT 25.8 (L) 36.0 - 48.0 % MCV 89.0 74.0 - 97.0 FL  
 MCH 30.0 24.0 - 34.0 PG  
 MCHC 33.7 31.0 - 37.0 g/dL  
 RDW 17.5 (H) 11.6 - 14.5 % PLATELET 130 583 - 508 K/uL MPV 9.3 9.2 - 11.8 FL  
 NEUTROPHILS 93 (H) 42 - 75 % BAND NEUTROPHILS 2 0 - 5 % LYMPHOCYTES 2 (L) 20 - 51 % MONOCYTES 3 2 - 9 % EOSINOPHILS 0 0 - 5 % BASOPHILS 0 0 - 3 %  
 ABS. NEUTROPHILS 18.2 (H) 1.8 - 8.0 K/UL  
 ABS. LYMPHOCYTES 0.4 (L) 0.8 - 3.5 K/UL  
 ABS. MONOCYTES 0.6 0 - 1.0 K/UL  
 ABS. EOSINOPHILS 0.0 0.0 - 0.4 K/UL  
 ABS. BASOPHILS 0.0 0.0 - 0.06 K/UL  
 DF MANUAL PLATELET COMMENTS ADEQUATE PLATELETS    
 RBC COMMENTS ANISOCYTOSIS 1+ 
    
 RBC COMMENTS POLYCHROMASIA 1+ 
    
 RBC COMMENTS TARGET CELLS 1+ HEPATITIS PANEL, ACUTE Collection Time: 08/24/20  9:55 AM  
Result Value Ref Range Hepatitis A, IgM Negative NEG    
 __ Hepatitis B surface Ag <0.10 <1.00 Index Hep B surface Ag Interp. Negative NEG    
 __ Hepatitis B core, IgM Negative NEG    
 __ Hepatitis C virus Ab 0.06 <0.80 Index Hep C  virus Ab Interp. Negative NEG Hep C  virus Ab comment IRON PROFILE Collection Time: 08/24/20  9:55 AM  
Result Value Ref Range Iron 106 50 - 175 ug/dL TIBC 164 (L) 250 - 450 ug/dL Iron % saturation 65 (H) 20 - 50 % FERRITIN Collection Time: 08/24/20  9:55 AM  
Result Value Ref Range Ferritin 5,764 (H) 8 - 388 NG/ML Key Findings or tests: DATE TEST RESULT OR IMPRESSION  
8/23/2020 CTA C/A/P Per official read: 1. New strands of thrombus along the wall of the aortic and bilateral common iliac stents which protrudes into the central aortic lumen and place patient at risk for lower extremity embolic events. 2.  Increased aneurysm cranial to the aortobiiliac stent with resultant increased type I A endoleak. Similar type I B endoleak. Increased excluded aortic aneurysm measuring 5.8 cm (change of 2 cm at this level).  Minimal increased right common iliac aneurysm measuring 1.9 cm. 
3.  Increased moderate right and similar small left pleural effusions. 4.  Increased rectal stool burden. No active extravasation. 5.  Gas-filled gastric distention. 6.  Occlusion proximal right renal artery with worsened atrophy and 
hypoenhancement. 7.  Severe left circumflex coronary arteriosclerosis. 8.  Biatrial cardiac chamber enlargement. 9.  Moderate stenosis left renal artery, stenosis at the origin of the bilateral internal iliac arteries. 10.  Moderate emphysema. 11.  Gastroesophageal reflux. 12.  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 13.  Unchanged Incidental Amyand hernia. 14.  Right renal hemorrhagic/proteinaceous cyst. 
15.  Small bladder diverticulum. aortic aneurysm measuring 5.8 cm (change of 2 cm at this level). Minimal increased right common iliac aneurysm measuring 1.9 cm. 
3.  Increased moderate right and similar small left pleural effusions. 4.  Increased rectal stool burden. No active extravasation. 5.  Gas-filled gastric distention. 6.  Occlusion proximal right renal artery with worsened atrophy and 
hypoenhancement. 7.  Severe left circumflex coronary arteriosclerosis. 8.  Biatrial cardiac chamber enlargement. 9.  Moderate stenosis left renal artery, stenosis at the origin of the bilateral internal iliac arteries. 10.  Moderate emphysema. 11.  Gastroesophageal reflux. 12.  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 13.  Unchanged Incidental Amyand hernia. 14.  Right renal hemorrhagic/proteinaceous cyst. 
15.  Small bladder diverticulum. Telemetry Oxygen 3 L NC Assessment and Plan:  
 
79 y.o. male with PMH of AAA s/p endovascular repair, iron deficiency anemia, peptic ulcer disease, A-fib, CHF, CAD, COPD on 3 L NC at home, chronic back pain, and BPH who presented with a 3-4 week history of malaise, worsening back pain and weakness is now admitted for suspected GI bleed.  
  
Acute on chronic anemia Pt presented with 3-4 weeks of weakness and malaise. Hx of PUD. Stopped taking home carafate but continued to take protonix. Hemoglobin 6 on admission, and improved to 7.2 after 3 U of PRBCs. Melenotic stool in ED. POC lactic acid 7.14 and leukocytosis on admission. Pt afebrile. TTP with guarding but without rebound in bilateral lower quadrants, periumbilical, and suprapubic regions. CTA C/A/P with multiple pathological findings as listed above. Working ddx includes recurrent bleeding gastric ulcer vs malignancy vs mesenteric ischemia vs diverticular bleed. -NPO 
-Q4hrs H/H checks 
-cont to monitor vital signs and clinical status closely 
-Avoid NSAIDs  
-daily CBC, CMP, Mg, PO4 
-monitor PT/INR 
-f/u Vascular recs -IVF NS @110 mL/hr 
-cont IV Protonix  
-f/u PT/OT/CM 
-Per GI (appreciate recs): possible EGD if overt bleeding or precipitous drop in H/H, ordered liver panel 
  
Leukocytosis WBC 18.5 on admission. Pt afebrile and w/o tachycardia. Episodes of mild tachypnea recorded in VS, though could be due to underlying COPD or anemia. Blood cx w/o growth, UA WNL, CXR with evidence of right pleural effusion with underlying atelectasis. Abdominal tenderness on exam with hx of diverticular disease suggest diverticulitis as possibility source of elevated WBC count, though no clear infectious source is apparent. Periumbilical abdominal tenderness with elevated lactate on admission and worsening vascular disease evident on CTA C/A/P and pt hx of a-fib not currently on AC point to mesenteric ischemia as possible etiology of leukocytosis. -cont to follow Blood and Urine cx's 
-cont flagyl and rocephin for now until source of infection can be excluded 
  
Atrial Fibrillation/Congestive Heart Failure/Coronary Artery Disease ROS negative for chest pain or shortness of breath.  Heart rate WNL on admission, irregular rhythm noted on EKG and exam. Rate control with digoxin 0.125 mg Qday at home. Previously on ASA and warfarin for [facility], but stopped due to hx of GI bleed. CXR showed right pleural effusion and stable cardiomegaly. Coronary angiography in 2012 showed mild CAD, but CTA C/A/P demonstrated severe atherosclerosis of left coronary circumflex artery. -currently holding home medications as pt is NPO (amlodipine 10 mg Qday, digoxin 0.125 mg Qday, pravastatin 40 mg QHS, lisinopril 20 mg Qday) 
  
COPD Pt currently not short of breath. Does have hx of chronic hypoxic respiratory failure on 3 L NC at home. O2 sats WNL on 3 L NC. 
-cont supplemental O2 at 3 L on NC, increase if sats <92% -PRN dounebs 
  
Chronic back pain, now with abdominal pain Takes 5-325 mg Norco and duloxetine 20 mg QHS at home. Does not want to use lidocaine patches. Having continued pain this morning. Does have allergy to morphine (gets very itchy). -cont to hold home Norco and duloxetine while NPO 
-start IV Dilaudid 0.25 mg 
-monitor BP closely 
-monitor for signs of allergic reaction 
  
BPH On tamsulosin 0.4 mg Qday at home. Does have tenderness and fullness in suprapubic region. 
-cont holding tamsulosin while NPO 
-f/u bladder scan 
  
Insomnia Takes trazadone 50 mg QHS at home.  
-cont to hold trazadone while NPO 
  
  
Diet NPO  
DVT Prophylaxis contraindicated GI Prophylaxis protonix gtt Code status No CPR, Intubation ok Disposition > 2 MN  
  
Point of Contact Ciaran Ellisrosa Relationship: daughter 
[phone] Bill Rooney 
Daughter 
[phone] Demi Kidney, MS4  
[address] [facility] Pager: [phone] August 24, 2020, 8:52 AM

## 2020-08-24 NOTE — H&P
Admission History and Physical 
[facility] Patient: Michael Bruce MRN: [id number]  CSN: [id number] YOB: 1940  Age: 79 y.o. Sex: male DOA: 8/23/2020 HPI:  
 
Michael Bruce is a 79 y.o. male with PMH of AAA s/p repair, HF, COPD on 2L continuous O2, HTN/HLD, afib on no AC due to gastric ulcer, now presenting with complaint of fatigue and low back pain for over 2 weeks. He denies fevers and chest pain, has some SOB. No changes in BMs or hematochezia, and no urinary complaints. On presentation to the ER, he was afebrile, HR 79, BP 84/44. ED exam found to have BRBPR and guaiac positive stool. ED Course:  
LABS-> Lactic Acid, CBC, INR, CMP, cardiac panel, BNP, blood cultures IMAGING-> CTA chest/abd/pelvis, CXR, EKG MEDS-> Protonix, ceftriaxone, Flagyl, Fentanyl IVF-> NS 2.5L 
 
HPI, ROS, PMH, PSH, Family Hx, Social Hx, home medications, and allergies as above in intern H&P. I agree with history as above. Additional comments to the subjective history include: 
 
 
 
  
  
  
 
 
Physical Exam:  
 
Patient Vitals for the past 24 hrs: 
 Temp Pulse Resp BP SpO2  
08/23/20 2330  74 19 108/54 100 % 08/23/20 2300  77 19 113/52 100 % 08/23/20 2248  60 13 94/63 100 % 08/23/20 2245  82 15 103/59 100 % 08/23/20 2200  68 22 129/63 99 % 08/23/20 2130  73 18 96/65 100 % 08/23/20 2100  75 19 (!) 89/65 96 % 08/23/20 2015  90 22 118/42 100 % 08/23/20 2000  84 24 112/43 100 % 08/23/20 1955  79 21 97/40 100 % 08/23/20 1945  74 18 (!) 105/38 100 % 08/23/20 1936 98.1 °F (36.7 °C) 78 23 97/54 100 % 08/23/20 1935  83 18 97/54 100 % 08/23/20 1930  78 24 (!) 81/53 99 % 08/23/20 1928     100 % 08/23/20 1925  79 23 (!) 92/23   
08/23/20 1920  85 20 (!) 79/40   
08/23/20 1915  75 22 (!) 77/40   
08/23/20 1910 98.1 °F (36.7 °C) 74 22 (!) 68/39 100 % 08/23/20 1905  93 27 (!) 87/31 98 % 08/23/20 1900  79 22 (!) 83/39 (!) 84 % 08/23/20 1757 96.8 °F (36 °C) 79 18 (!) 84/44 94 % Physical Exam: 
General:  Alert and Responsive and in No acute distress. HEENT: Conjunctiva pale, sclera anicteric. Dry mucous membranes. No cervical, supraclavicular, occipital or submandibular lymphadenopathy. No other gross abnormalities present. CV:  RRR, no murmurs. No visible pulsations or thrills. RESP:  Unlabored breathing. Lungs clear to auscultation. no wheeze, rales, or rhonchi. Equal expansion bilaterally. ABD:  Soft, non-distended with non-specific pain in lower quadrants No hepatosplenomegaly. No suprapubic tenderness. MS:  No joint deformity or instability. +atrophy of extremities. Neuro:  Equal movement and strength bilaterally. A+Ox3. Ext: Mild pedal edema. 2+ radial and dp pulses bilaterally. Skin:  No rashes, lesions, or ulcers. Good turgor. PERTINENT LABS:  
Hemoglobin 6.0 IMAGING:  
No results found. Recent Results (from the past 12 hour(s)) EKG, 12 LEAD, INITIAL Collection Time: 08/23/20  5:15 PM  
Result Value Ref Range Ventricular Rate 71 BPM  
 Atrial Rate 288 BPM  
 QRS Duration 86 ms  
 Q-T Interval 350 ms QTC Calculation (Bezet) 380 ms Calculated R Axis -42 degrees Calculated T Axis 81 degrees Diagnosis Atrial fibrillation Left axis deviation Low voltage QRS Abnormal ECG When compared with ECG of 26-JAN-2020 09:46, 
Criteria for Anteroseptal infarct are no longer present CBC WITH AUTOMATED DIFF Collection Time: 08/23/20  5:35 PM  
Result Value Ref Range WBC 18.5 (H) 4.6 - 13.2 K/uL  
 RBC 2.02 (L) 4.70 - 5.50 M/uL HGB 6.0 (L) 13.0 - 16.0 g/dL HCT 18.5 (L) 36.0 - 48.0 % MCV 91.6 74.0 - 97.0 FL  
 MCH 29.7 24.0 - 34.0 PG  
 MCHC 32.4 31.0 - 37.0 g/dL RDW 20.6 (H) 11.6 - 14.5 % PLATELET 950 741 - 026 K/uL MPV 9.3 9.2 - 11.8 FL  
 NEUTROPHILS 88 (H) 40 - 73 % LYMPHOCYTES 5 (L) 21 - 52 % MONOCYTES 7 3 - 10 % EOSINOPHILS 0 0 - 5 % BASOPHILS 0 0 - 2 %  
 ABS. NEUTROPHILS 16.2 (H) 1.8 - 8.0 K/UL  
 ABS. LYMPHOCYTES 0.8 (L) 0.9 - 3.6 K/UL  
 ABS. MONOCYTES 1.4 (H) 0.05 - 1.2 K/UL  
 ABS. EOSINOPHILS 0.0 0.0 - 0.4 K/UL  
 ABS. BASOPHILS 0.0 0.0 - 0.1 K/UL  
 DF AUTOMATED METABOLIC PANEL, BASIC Collection Time: 08/23/20  5:35 PM  
Result Value Ref Range Sodium 132 (L) 136 - 145 mmol/L Potassium 4.8 3.5 - 5.5 mmol/L Chloride 98 (L) 100 - 111 mmol/L  
 CO2 18 (L) 21 - 32 mmol/L Anion gap 16 3.0 - 18 mmol/L Glucose 111 (H) 74 - 99 mg/dL BUN 48 (H) 7.0 - 18 MG/DL Creatinine 1.56 (H) 0.6 - 1.3 MG/DL  
 BUN/Creatinine ratio 31 (H) 12 - 20 GFR est AA 52 (L) >60 ml/min/1.73m2 GFR est non-AA 43 (L) >60 ml/min/1.73m2 Calcium 8.2 (L) 8.5 - 10.1 MG/DL  
LIPASE Collection Time: 08/23/20  5:35 PM  
Result Value Ref Range Lipase 57 (L) 73 - 393 U/L  
CARDIAC PANEL,(CK, CKMB & TROPONIN) Collection Time: 08/23/20  5:35 PM  
Result Value Ref Range CK - MB <1.0 <3.6 ng/ml CK-MB Index  0.0 - 4.0 % CALCULATION NOT PERFORMED WHEN RESULT IS BELOW LINEAR LIMIT  
 CK 35 (L) 39 - 308 U/L Troponin-I, QT <0.02 0.0 - 0.045 NG/ML  
NT-PRO BNP Collection Time: 08/23/20  5:35 PM  
Result Value Ref Range NT pro-BNP 7,243 (H) 0 - 1,800 PG/ML  
HEPATIC FUNCTION PANEL Collection Time: 08/23/20  5:35 PM  
Result Value Ref Range Protein, total 6.1 (L) 6.4 - 8.2 g/dL Albumin 2.0 (L) 3.4 - 5.0 g/dL Globulin 4.1 (H) 2.0 - 4.0 g/dL A-G Ratio 0.5 (L) 0.8 - 1.7 Bilirubin, total 0.5 0.2 - 1.0 MG/DL Bilirubin, direct 0.3 (H) 0.0 - 0.2 MG/DL Alk. phosphatase 183 (H) 45 - 117 U/L  
 AST (SGOT) 166 (H) 10 - 38 U/L  
 ALT (SGPT) 57 16 - 61 U/L  
GLUCOSE, POC Collection Time: 08/23/20  5:48 PM  
Result Value Ref Range Glucose (POC) 128 (H) 70 - 110 mg/dL POC LACTIC ACID Collection Time: 08/23/20  5:50 PM  
Result Value Ref Range Lactic Acid (POC) 7.14 (HH) 0.40 - 2.00 mmol/L  
TYPE + CROSSMATCH Collection Time: 08/23/20  6:05 PM  
Result Value Ref Range Crossmatch Expiration 08/26/2020 ABO/Rh(D) O POSITIVE Antibody screen NEG Unit number [id number] Blood component type [facility] Unit division 00 Status of unit ISSUED Crossmatch result Compatible Unit number [id number] Blood component type [facility] Unit division 00 Status of unit ISSUED Crossmatch result Compatible Unit number [id number] Blood component type [facility] Unit division 00 Status of unit ALLOCATED Crossmatch result Compatible Unit number [id number] Blood component type [facility] Unit division 00 Status of unit ALLOCATED Crossmatch result Compatible PROTHROMBIN TIME + INR Collection Time: 08/23/20 10:45 PM  
Result Value Ref Range Prothrombin time 19.3 (H) 11.5 - 15.2 sec INR 1.7 (H) 0.8 - 1.2 PTT Collection Time: 08/23/20 10:45 PM  
Result Value Ref Range aPTT 34.0 23.0 - 36.4 SEC HEMOGLOBIN Collection Time: 08/23/20 10:45 PM  
Result Value Ref Range HGB 7.2 (L) 13.0 - 16.0 g/dL POC LACTIC ACID Collection Time: 08/23/20 10:56 PM  
Result Value Ref Range Lactic Acid (POC) 4.33 (HH) 0.40 - 2.00 mmol/L Assessment/Plan:  
79 y.o. male with PMH of AAA s/p repair, HF, COPD, HTN/HLD, afib on no AC due to gastric ulcer , now admitted with symptomatic anemia. 1. Symptomatic Anemia, acute on chronic: Pt reports over 2 weeks of fatigue and low back pain. Hemoglobin was 6.0 on presentation to ER with BRBPR and guaiac + stool. There was initially some concern for an endoleak at the endograft repair site of his AAA. CT surgery came to evaluate patient in the ER, no significant bleeding source found on CT. GI also consulted from the ER, added to the treatment team. Pt was started on a Protonix drip, he has received 3 units of PRBCs. ICU also evaluated patient for possible admission, but his hemoglobin improved to 7.2 with stabilization of vital signs.  
- Admit to Stepdown Unit w/ monitoring 
- H&H every 4 hours - Continue Protonix gtt 
- Unit of FFP ordered - Repeat BMP 2. Sepsis: Meeting 2/4 SIRS criteria for tachypnea (RR 27) and leukocytosis (WBCs 18.5). qSOFA score 2. Pt with a lactic acidosis of 7.14 on presentation, improved to 4.33 after receiving blood and IV fluids. Broad-spectrum antibiotics started in the ER.  
- Broad Spectrum abx, continue ceftriaxone and FLagyl - Trend lactic acid 
- Daily CBC 
- UA and Urine culture - FU blood cultures - Continue IVF 3. AAA s/p EVAR:   Followed by Dr. Aisha Rubalcava office (vascular), last seen 2/25/2020. He went to [place] for surgical evaluation, was being considered for a complex repair.  
- F/U final read of CTA Chest/Abd/Pel 
- FU CT surgery consult note. - Consider vascular surgery referral 
 
 
 
Marty Hinojosa DO 
08/24/20    1:07 AM 
 
 
Veronica Shelby, D.O. PGY-3 
[facility]

## 2020-08-24 NOTE — CONSULTS
[facility] Pulmonary, Critical Care, and Sleep Medicine Name: Uriel Goss MRN: [id number] : 1940 Hospital: [address] [place] [address] Date: 2020 Critical Care Consult IMPRESSION:  
· GIB, suspect lower - Hgb initially 6.0. No active bleeding noted at this time. GI Consulted · Acute on Chronic Anemia - Likely currently 2/2 above most likely. Now s/p 3u PRBCs, 1u FFP. Repeat Hgb 7.2 and most recently 8.9. 
· Hypotension - Likely hypovolemic vs Hemorrhagic. Resolving s/p blood infusion and IVF resuscitation. · Leukocytosis - Likely 2/2 above. On ABX. · Lactic Acidosis - Initially LA 7. 14. Likely 2/2 above. Improving. Most recent now <0.30 this AM (). · Elevated INR - s/p 1u FFP. No active bleeding. On Warfarin at home for Afib. · TYRELL - Likely pre-renal, 2/2 above. Improving · Abd distension: Aerophagia vs SBO vs kyle's vs likely large stool impaction · Stool impaction · [address] · COPD: no evidence of exacerbation · Chronic resp failure: on 2-4LNC baseline, no change. Patient Active Problem List  
Diagnosis Code  Hypercholesterolemia E78.00  Congestive heart failure (CHF) ([facility]) I50.9  Peripheral vascular disease ([facility]) I73.9  
 COPD (chronic obstructive pulmonary disease) ([facility]) J44.9  
 HTN (hypertension) I10  
 Chronic atrial fibrillation (HCC) I48.20  RLQ abdominal pain R10.31  Status post AAA (abdominal aortic aneurysm) repair A13.961, Z86.79  
 Aneurysm of abdominal aorta (HCC) I71.4  Chronic constipation K59.09  
 Right inguinal hernia K40.90  Left inguinal hernia K40.90  CAD (coronary artery disease) I25.10  Hypoxemia requiring supplemental oxygen R09.02, Z99.81  Warfarin-induced coagulopathy ([address] D68.32, T45.515A  Intraabdominal fluid collection R18.8  Chronic back pain M54.9, G89.29  
 Sepsis ([address] A41.9  Vertebral osteomyelitis ([address] M46.20  Psoas abscess ([address] A20.92  
  Abscess L02.91  
 Discitis of lumbar region M46.46  
 Pulmonary hypertension (HCC) I27.20  
 CHF exacerbation (HCC) I50.9  Hyponatremia E87.1  Symptomatic anemia D64.9  Chronic pain syndrome G89.4  Chronic kidney disease N18.9  Iron deficiency anemia D50.9  Endoleak of aortic graft ([facility] T82.330A  TYRELL (acute kidney injury) ([facility] N17.9  Enlarged prostate without lower urinary tract symptoms (luts) N40.0  GERD (gastroesophageal reflux disease) K21.9  Left arm swelling M79.89  Lethargy R53.83  Lower extremity weakness R29.898  Mediastinal lymphadenopathy R59.0  Spondylolysis M43.00  Age-related nuclear cataract, bilateral H25.13  
 Iron deficiency anemia due to chronic blood loss D50.0  Community acquired pneumonia J18.9  
 UTI (urinary tract infection) N39.0  GI bleed K92.2 RECOMMENDATIONS:  
· Resp: Titrate supp O2 for SpO2 >94%; optimize bronchial hygiene. Encourage I-S. · I/D:Sepsis bundle per hospital protocol, f/u BxCx/UC, Sputum Cx's. Trend LA q4hrs until normal.  Continue ABX - Rocephin/Flagyl. Deescalate ABX once Cx's finalize. Trend temp and WBC curve. · Hem/Onc: Daily CBC; H/H, and plts are stable. Give 1u FFP (has since been given). Trend PT/INR. · CVS: HD stable, no pressors.;  Goal MAP >65mmHg. PRN albumin bolus for volume resuscitation if needed. Recommend holding HTN meds at this time until BP improves. · Metabolic: Daily BMP, mag, phos; monitor e-lytes; replace PRN 
· Renal: Trend Renal indices; monitor UOP. · Endocrine: POC Glucose PRN. · GI: Recommend Protonix gtt, Trend LFTs, Zofran PRN for N/V. Agree with GI consult. Recommend NGT placement for bowel decompression. · Musc/Skin: No acute issues · Neuro: No active issues · Fluids: NS at 90cc/hr. · Code Status: Full · Repeat BP showed improvement. Recommend admission to Hospitalist service and place pt in SDU. Will be available for questions. · Discussed with Dr. Joe Antunez. Best Practices/Safety Bundles: 
· Sepsis Bundle per Hospital Protocol · Glycemic control; avoid Hypoglycemia · Stress ulcer prophylaxis: Protonix gtt · DVT prophylaxis: SCD's · Need for Lines, lambert assessed. · Restraints need. · Palliative care evaluation - Beneficial but not consulted at this time. Subjective/History: This patient has been seen and evaluated at the request of Dr. Tamy Adhikari for GIB with current hypotension. 08/24/20 Patient is a 79 y.o. male with PMH of AAA, s/p endovascular repair, anemia, COPD, chronic hypoxic respiratory failure, who presented to [facility] ED  with complaints of malaise and weakness for the last 3 to 4 weeks PTA. ER w/o initially found the pt to be hypotensive. Additionally, patient was also found to have acute on chronic anemia with initial hgb of 6.0. He received 2 units PRBC so far, ER attending planning to transfuse a third. Patient blood pressure now improving, last verified /54, patient does not have any orthostatic hypotension currently. Patient's hypotension likely hypovolemic versus less likely hemorrhagic shock. Patient's lactic acidosis slowly improving with lactic acid initially 7.1, came down to 4.3 status post transfusion. Patient has anion gap metabolic acidosis with serum bicarb of 18 on initial labs, corrected anion gap 21. Hemoglobin status post transfusion came up to 7.2. Reviewed CTA chest abdomen pelvis, chest x-ray shows mildly enlarged right-sided pleural effusion, large abdominal gastric air bubble, no evidence of blood in small bowel or colon. Recommend serial H&H's, Protonix drip, agree with GI consult, recommend additional 1 unit FFP due to coagulopathy. Advise repeat BMP, serial lactic acid. If patient remains mildly hypotensive, advise administration of colloid, with albumin.   Also advise NG tube to low intermittent suction to decompress abdomen. Recommend stepdown admission -- ICU available if pt worsens but currently pt does not meet criteria for ICU admission. Past Medical History:  
Diagnosis Date  Aneurysm ([facility] AAA repair 2006 & 2012  Aorto-iliac duplex 02/13/2015 Tech difficult. Patent aorta bi-iliac endograft w/o leak or limb dysfunction.  Arrhythmia   
 a fib  Cardiac cath 11/01/2012 RCA (sm, nondom) patent. LM patent. LAD 25%. CX (dom) 45% mid. LVEDP 12 mmHg. No WMA. PA 27/12. W 10-12. CO/CI 5.2/2.6 (TD).  Cardiac echocardiogram, abnormal 02/20/2016 EF 55%. No WMA. Indeterminate diastolic fx. RVSP 60 mmHg. Severe LAE. Mild MR. Mod TR.  IVCE. Similar to study of 10/26/12.  Cardiovascular aorto-iliac duplex 02/13/2015 Patent aorta bi-iliac endovascular graft w/o leak or limb dysfunction. Sac measures 4.21 x 4.47 cm (4.4 x 4.6 cm on 1/31/14).  Cardiovascular LE peripheral arterial testing 07/29/2013 No significant LE arterial disease bilaterally. R GHADA 1. 12.  L GHADA 1. 12.  R DBI 0.83. L DBI 0.71. Exercise deferred.  Cardiovascular renal duplex 10/31/2012 No RA stenosis. Intrinsic/med disease in left kidney. Patent aortic endograft. Patent, thrombus-free renal veins bilaterally.  Carotid duplex 07/29/2013 Mild <50% bilateral ICA plaquing.  Chronic kidney disease  Chronic lung disease  Cigarette smoker  Congestive heart failure (CHF) ([facility]  COPD (chronic obstructive pulmonary disease) (HCC)   
 and emphysema; likely secondary to tobacco abuse  Difficult intubation  Dyslipidemia   
 low HDL  Emphysema   
 HTN (hypertension)  Hypercholesterolemia  Ill-defined condition   
 hernia  Increased prostate specific antigen (PSA) velocity  Long term (current) use of anticoagulants   
 coumadin  Osteoarthritis  Osteomyelitis ([facility]  Paroxysmal atrial fibrillation (HCC)  Peripheral vascular disease ([facility] AAA repair 12/2007  Persistent atrial Fibrillation  Persistent atrial fibrillation (HCC)  Unspecified adverse effect of anesthesia   
 difficulty breathing placed in ICU Oct 2012 (AAA repair) Past Surgical History:  
Procedure Laterality Date  BRONCHOSCOPY DIAGNOSTIC  11/2/2012  CARDIAC CATHETERIZATION  11/1/2012  COLONOSCOPY N/A 7/26/2016 COLONOSCOPY with Polypectomies performed by Pillo Ha MD at [facility] ENDOSCOPY  COLONOSCOPY N/A 5/28/2019 COLONOSCOPY with polypectomy performed by Paolo Massey MD at [address] [place] [address] HX AAA REPAIR    
 2006 & 2012  HX HEART CATHETERIZATION  3/4/2009 1. RCA small, nondominant; patent. 2. LMCA patent. 3. LAD is long, wrapped around apical vessel. Diffuse, 20-30% stenosis noted. 4. CCA is large, dominant vessel. Diffuse 20-30% stenosis. 5. LVEDP is 16 mmHg. 6. Overall left ventricular systolic function mildly diminshed with est. EF 45%. Mild, global hypokinesis noted. 7. No significant mitral regurgitation or aortic stenosis noted. (see report)  HX HERNIA REPAIR  2/2014  
 rt inguinal   
 HX HERNIA REPAIR  01/2016 LEFT INGUINAL HERNIA REPAIR DR. Lucas Mitchell  REPAIR ING HERNIA,5+Y/O,JESSIE Left 1-20-16 Dr. Chuy LONG  11/1/2012 Prior to Admission medications Medication Sig Start Date End Date Taking? Authorizing Provider  
omeprazole (PRILOSEC) 10 mg capsule Take 10 mg by mouth daily. Provider, Historical  
diclofenac (VOLTAREN) 1 % gel  7/24/20   Provider, Historical  
lidocaine (LIDODERM) 5 %  7/24/20   Provider, Historical  
DULoxetine (CYMBALTA) 20 mg capsule Take 20 mg by mouth daily.     Provider, Historical  
tiotropium-olodateroL (Stiolto Respimat) 2.5-2.5 mcg/actuation inhaler inhale 2 inhalations by mouth once daily 8/5/20   Génesis Schuster MD  
sucralfate (CARAFATE) 1 gram tablet  6/15/20   Provider, Historical  
 cephalexin 500 mg tab  1/17/20   Provider, Historical  
ciprofloxacin HCl (CIPRO) 500 mg tablet  1/23/20   Provider, Historical  
albuterol (PROVENTIL VENTOLIN) 2.5 mg /3 mL (0.083 %) nebu inhale contents of 1 vial in nebulizer every 4 hours if needed for wheezing 2/10/20   Jo Ann Norwood MD  
[facility]) 0.4 mg capsule Take 0.4 mg by mouth daily. Provider, Historical  
multivitamin, tx-iron-ca-min (THERA-M W/ IRON) 9 mg iron-400 mcg tab tablet Take 1 Tab by mouth daily. Provider, Historical  
sodium chloride (OCEAN NASAL) 0.65 % nasal squeeze bottle 2 Sprays by Both Nostrils route daily as needed for Congestion. Provider, Historical  
docusate sodium 100 mg tab Take 100 mg by mouth daily as needed for Other (constipation). Provider, Historical  
ferrous sulfate 325 mg (65 mg iron) tablet Take 325 mg by mouth every other day. Indications: anemia from inadequate iron    Provider, Historical  
pantoprazole (PROTONIX) 40 mg tablet Take 1 Tab by mouth Daily (before breakfast). 6/7/19   Coco Alexis,   
HYDROcodone-acetaminophen [facility]) 5-325 mg per tablet Take 0.5 Tabs by mouth two (2) times daily as needed for Pain. Provider, Historical  
traZODone (DESYREL) 50 mg tablet Take 50 mg by mouth nightly. Provider, Historical  
lisinopril (PRINIVIL, ZESTRIL) 20 mg tablet Take 1 Tab by mouth daily. 7/13/18   Nika Goyal DO  
pravastatin (PRAVACHOL) 40 mg tablet Take 40 mg by mouth nightly. Provider, Historical  
polyethylene glycol (MIRALAX) 17 gram packet Take 17 g by mouth daily as needed (constipation). Provider, Historical  
digoxin (DIGITEK) 0.125 mg tablet take 1 tablet by mouth once daily 9/26/17   Jorge Bloom NP  
amLODIPine (NORVASC) 10 mg tablet Take 10 mg by mouth daily. 6/4/17   Provider, Historical  
OXYGEN-AIR DELIVERY SYSTEMS 2 L/min by Nasal route daily. Continuous. Company is [employer]      Provider, Historical  
 
 
 Current Facility-Administered Medications Medication Dose Route Frequency  pantoprazole (PROTONIX) 40 mg in 0.9% sodium chloride (MBP/ADV) 50 mL MBP  8 mg/hr IntraVENous CONTINUOUS Allergies Allergen Reactions  Codeine Swelling  Morphine Itching  Sulfa (Sulfonamide Antibiotics) Other (comments) Kidney problems. Social History Tobacco Use  Smoking status: Former Smoker Packs/day: 1.50 Years: 54.00 Pack years: 81.00 Types: Cigarettes Last attempt to quit: 10/23/2012 Years since quittin.8  Smokeless tobacco: Never Used Substance Use Topics  Alcohol use: No  
  Alcohol/week: 0.0 standard drinks Comment: quit drinking alcohol 26 years ago, patient states stopped in \"[id number]\" Family History Problem Relation Age of Onset  Heart Surgery Father BYPASS  Stroke Father  Heart Disease Father  Hypertension Father  Heart Attack Father  Heart Surgery Mother BYPASS  Coronary Artery Disease Mother  Stroke Mother  Heart Disease Mother  Hypertension Mother  Diabetes Sister  Cancer Brother Review of Systems: 
Pertinent items are noted in HPI. Objective:  
Vital Signs:   
Visit Vitals /54 Pulse 74 Temp 98.1 °F (36.7 °C) Resp 19 SpO2 100% O2 Device: Nasal cannula O2 Flow Rate (L/min): 3 l/min Temp (24hrs), Av.7 °F (36.5 °C), Min:96.8 °F (36 °C), Max:98.1 °F (36.7 °C) Intake/Output:  
Last shift:      1901 - 700 In:  [I.V.:1100] Out: - Last 3 shifts: 701 - 1900 In: 1000 [I.V.:1000] Out: - Intake/Output Summary (Last 24 hours) at 2020 0036 Last data filed at 2020 2112 Gross per 24 hour Intake 3050 ml Output  Net 3050 ml Limited Physical Exam:  
 
General:  Alert, cooperative, NAD Head:  Normocephalic, without obvious abnormality, atraumatic. Eyes:  Conjunctivae/corneas clear. PERRL, Nose: Nares normal. Septum midline. Mucosa normal. No drainage or sinus tenderness. Throat: Lips, mucosa, and tongue normal. Teeth and gums normal.  
Neck: Supple, symmetrical, trachea midline, no adenopathy,No JVD. Lungs:   Symmetrical chest rise; good AE bilat; CTAB Heart:  NSR on monitor. Abdomen:   Soft, non-tender. No masses,  No organomegaly. Extremities: Extremities normal, atraumatic, no cyanosis or edema. Pulses: 2+ and symmetric all extremities. Skin: Skin color, texture, turgor normal. No rashes or lesions Neurologic: Grossly nonfocal  
Devices:  
  
 
Data:  
 
Recent Results (from the past 24 hour(s)) EKG, 12 LEAD, INITIAL Collection Time: 08/23/20  5:15 PM  
Result Value Ref Range Ventricular Rate 71 BPM  
 Atrial Rate 288 BPM  
 QRS Duration 86 ms  
 Q-T Interval 350 ms QTC Calculation (Bezet) 380 ms Calculated R Axis -42 degrees Calculated T Axis 81 degrees Diagnosis Atrial fibrillation Left axis deviation Low voltage QRS Abnormal ECG When compared with ECG of 26-JAN-2020 09:46, 
Criteria for Anteroseptal infarct are no longer present CBC WITH AUTOMATED DIFF Collection Time: 08/23/20  5:35 PM  
Result Value Ref Range WBC 18.5 (H) 4.6 - 13.2 K/uL  
 RBC 2.02 (L) 4.70 - 5.50 M/uL HGB 6.0 (L) 13.0 - 16.0 g/dL HCT 18.5 (L) 36.0 - 48.0 % MCV 91.6 74.0 - 97.0 FL  
 MCH 29.7 24.0 - 34.0 PG  
 MCHC 32.4 31.0 - 37.0 g/dL RDW 20.6 (H) 11.6 - 14.5 % PLATELET 922 622 - 256 K/uL MPV 9.3 9.2 - 11.8 FL  
 NEUTROPHILS 88 (H) 40 - 73 % LYMPHOCYTES 5 (L) 21 - 52 % MONOCYTES 7 3 - 10 % EOSINOPHILS 0 0 - 5 % BASOPHILS 0 0 - 2 %  
 ABS. NEUTROPHILS 16.2 (H) 1.8 - 8.0 K/UL  
 ABS. LYMPHOCYTES 0.8 (L) 0.9 - 3.6 K/UL  
 ABS. MONOCYTES 1.4 (H) 0.05 - 1.2 K/UL  
 ABS. EOSINOPHILS 0.0 0.0 - 0.4 K/UL  
 ABS. BASOPHILS 0.0 0.0 - 0.1 K/UL  
 DF AUTOMATED METABOLIC PANEL, BASIC  
 Collection Time: 08/23/20  5:35 PM  
Result Value Ref Range Sodium 132 (L) 136 - 145 mmol/L Potassium 4.8 3.5 - 5.5 mmol/L Chloride 98 (L) 100 - 111 mmol/L  
 CO2 18 (L) 21 - 32 mmol/L Anion gap 16 3.0 - 18 mmol/L Glucose 111 (H) 74 - 99 mg/dL BUN 48 (H) 7.0 - 18 MG/DL Creatinine 1.56 (H) 0.6 - 1.3 MG/DL  
 BUN/Creatinine ratio 31 (H) 12 - 20 GFR est AA 52 (L) >60 ml/min/1.73m2 GFR est non-AA 43 (L) >60 ml/min/1.73m2 Calcium 8.2 (L) 8.5 - 10.1 MG/DL  
LIPASE Collection Time: 08/23/20  5:35 PM  
Result Value Ref Range Lipase 57 (L) 73 - 393 U/L  
CARDIAC PANEL,(CK, CKMB & TROPONIN) Collection Time: 08/23/20  5:35 PM  
Result Value Ref Range CK - MB <1.0 <3.6 ng/ml CK-MB Index  0.0 - 4.0 % CALCULATION NOT PERFORMED WHEN RESULT IS BELOW LINEAR LIMIT  
 CK 35 (L) 39 - 308 U/L Troponin-I, QT <0.02 0.0 - 0.045 NG/ML  
NT-PRO BNP Collection Time: 08/23/20  5:35 PM  
Result Value Ref Range NT pro-BNP 7,243 (H) 0 - 1,800 PG/ML  
HEPATIC FUNCTION PANEL Collection Time: 08/23/20  5:35 PM  
Result Value Ref Range Protein, total 6.1 (L) 6.4 - 8.2 g/dL Albumin 2.0 (L) 3.4 - 5.0 g/dL Globulin 4.1 (H) 2.0 - 4.0 g/dL A-G Ratio 0.5 (L) 0.8 - 1.7 Bilirubin, total 0.5 0.2 - 1.0 MG/DL Bilirubin, direct 0.3 (H) 0.0 - 0.2 MG/DL Alk. phosphatase 183 (H) 45 - 117 U/L  
 AST (SGOT) 166 (H) 10 - 38 U/L  
 ALT (SGPT) 57 16 - 61 U/L  
GLUCOSE, POC Collection Time: 08/23/20  5:48 PM  
Result Value Ref Range Glucose (POC) 128 (H) 70 - 110 mg/dL POC LACTIC ACID Collection Time: 08/23/20  5:50 PM  
Result Value Ref Range Lactic Acid (POC) 7.14 (HH) 0.40 - 2.00 mmol/L  
TYPE + CROSSMATCH Collection Time: 08/23/20  6:05 PM  
Result Value Ref Range Crossmatch Expiration 08/26/2020 ABO/Rh(D) O POSITIVE Antibody screen NEG Unit number [id number] Blood component type RC LR Unit division 00  Status of unit ISSUED   
 Crossmatch result Compatible Unit number [id number] Blood component type RC LR Unit division 00 Status of unit ISSUED Crossmatch result Compatible Unit number [id number] Blood component type RC LR Unit division 00 Status of unit ALLOCATED Crossmatch result Compatible Unit number [id number] Blood component type RC LR Unit division 00 Status of unit ALLOCATED Crossmatch result Compatible PROTHROMBIN TIME + INR Collection Time: 08/23/20 10:45 PM  
Result Value Ref Range Prothrombin time 19.3 (H) 11.5 - 15.2 sec INR 1.7 (H) 0.8 - 1.2 PTT Collection Time: 08/23/20 10:45 PM  
Result Value Ref Range aPTT 34.0 23.0 - 36.4 SEC HEMOGLOBIN Collection Time: 08/23/20 10:45 PM  
Result Value Ref Range HGB 7.2 (L) 13.0 - 16.0 g/dL POC LACTIC ACID Collection Time: 08/23/20 10:56 PM  
Result Value Ref Range Lactic Acid (POC) 4.33 (HH) 0.40 - 2.00 mmol/L No results for input(s): FIO2I, IFO2, HCO3I, IHCO3, HCOPOC, PCO2I, PCOPOC, IPHI, PHI, PHPOC, PO2I, PO2POC in the last 72 hours. No lab exists for component: IPOC2 Telemetry:normal sinus rhythm Imaging: 
I have personally reviewed the patients radiographs and have reviewed the reports: 
 
CXR [08/23/20]: Findings: 
2 portable views of the chest. The orientation of the chest radiograph suggest 
the exam was performed with left side down decubitus positioning, but not 
specified. Lines/Tubes/Devices:  None LUNGS: Some hazy density in the right lung that appears to be associated with 
volume loss and may be related to atelectasis. There is an opacity along the 
medial aspect of the right hemithorax that may be layering pleural fluid. The 
left lung is clear. No left-sided pleural effusion. MEDIASTINUM: Heart is borderline size. Tortuosity of the descending thoracic 
aorta accentuated by the decubitus positioning.  
BONES/SOFT TISSUES: Unremarkable 
  
 IMPRESSION:  
1.  Right pleural effusion with hazy airspace opacities likely compressive atelectasis. Infiltrate could have a similar appearance. 2. Stable cardiac enlargement. CT CHEST/ABD/PELVIS W WO CONT [08/23/20]: 
Pending read SHANTEL ZambranoC 
08/24/20 Pulmonary Critical Care Medicine [facility] Late entry for DOS 8/24/20 I saw and evaluated the patient, performing the key elements of the service. I discussed the findings, assessment and plan with the PA and agree with the PA's findings and plan as documented in the PA's note. All edits and changes made above or are mentioned in my addendum. CCM time included on my progress note. Donavon Hernadez MD/MPH Pulmonary, Critical Care Medicine [facility]

## 2020-08-24 NOTE — PROGRESS NOTES
Reason for Admission:  GI bleed [K92.2] RUR Score:   25% Plan for utilizing home health: To be determined Likelihood of Readmission:   Moderate Do you (patient/family) have any concerns for transition/discharge?  no 
 
Transition of Care Plan:    
 
Initial assessment completed with patient. Cognitive status of patient: oriented to time, place, person and situation. Face sheet information confirmed:  yes. The patient designates Miguelito Ordonez, daughter ([phone]) to participate in his discharge plan and to receive any needed information. This patient lives in a single family home with brother. Patient is not able to navigate steps as needed. Prior to hospitalization, patient was considered to be independent with ADLs/IADLS : no . If not independent,  patient needs assist with : dressing, bathing, food preparation and grooming. Family members are caregivers. Patient has a current ACP document on file: yes The patient's family will be available to transport patient home upon discharge. The patient already has [place], W/C, Shower chair, BSC, nebulizer, and oxygen (tanks and concentrator with [employer]) medical equipment available in the home. Patient is not currently active with home health. Patient has stayed in a skilled nursing facility or rehab. Was  stay within last 60 days : no.  Patient was in SNF in 2016 and 2019. This patient is on dialysis :no 
 
 Freedom of choice signed: no, 
 
Currently, the discharge plan is  To be determined. Patient's family are caregivers. CM will continue to monitor for transitional needs. The patient states that he can obtain his medications from the pharmacy, and take his medications as directed with assistance. Patient's current insurance is Medicare Part A & B and [facility] Care Management Interventions PCP Verified by CM:  Yes 
 Mode of Transport at Discharge: (Family will transport.) Transition of Care Consult (CM Consult): Discharge Planning Discharge Durable Medical Equipment: No 
Physical Therapy Consult: No 
Occupational Therapy Consult: No 
Speech Therapy Consult: No 
Current Support Network: Own Home(Brother lives with patient. Saqib Llamas ) Confirm Follow Up Transport: Family Discharge Location Discharge Placement: (To be determined.) HOWARD DodsonN, RN Pager # [phone] Care Manager

## 2020-08-24 NOTE — CONSULTS
PCCM attending note: 
 
Patient seen and examined at bedside. Full consult to follow. 27-year-old male with history of AAA status post endovascular repair, anemia, COPD, chronic hypoxic respiratory failure, presented to [facility] with complaints of malaise and weakness for the last 3 to 4 weeks. Patient in the ER found to be hypotensive initially. Patient also found to have acute on chronic anemia with initial hemoglobin of 6.0. Patient received 2 units PRBC so far, ER attending planning to transfuse third. Patient blood pressure now improving, last verified /54, patient does not have any orthostatic hypotension currently. Patient's hypotension likely hypovolemic versus less likely hemorrhagic shock. Patient's lactic acidosis slowly improving with lactic acid initially 7.1, came down to 4.3 status post transfusion. Patient has anion gap metabolic acidosis with serum bicarb of 18 on initial labs, corrected anion gap 21. Hemoglobin status post transfusion came up to 7.2. Reviewed CTA chest abdomen pelvis, chest x-ray shows mildly enlarged right-sided pleural effusion, large abdominal gastric air bubble, no evidence of blood in small bowel or colon. Recommend serial H&H's, Protonix drip, agree with GI consult, recommend additional 1 unit FFP due to coagulopathy. Advise repeat BMP, serial lactic acid. If patient remains mildly hypotensive, advise administration of colloid, with albumin. Also advise NG tube to low intermittent suction to decompress abdomen. Recommend stepdown admission -- ICU available if pt worsens Total of 30 min critical care time spent at bedside during the course of care providing evaluation,management and care decisions and ordering appropriate treatment related to critical care problems exclusive of procedures.  
The reason for providing this level of medical care for this critically ill patient was due a critical illness that impaired one or more vital organ systems such that there was a high probability of imminent or life threatening deterioration in the patients condition. This care involved high complexity decision making to assess, manipulate, and support vital system functions, to treat this degree vital organ system failure and to prevent further life threatening deterioration of the patients condition. Kirstin Ray MD/MPH Pulmonary, Critical Care Medicine [facility]

## 2020-08-24 NOTE — CONSULTS
[employer] 
[phone] GASTROENTEROLOGY CONSULT Impression: 1. GI bleed - Grossly melanotic/maroon stool on ED exam, denies seeing blood in stool PTA. Last scopes 5/2019, EGD/[facility] showed mild erosive gastritis and TA polyp in ascending colon. 2. Acute on chronic anemia - likely due to #1, good response to tx, hgb now 8.9, long standing hx of SANTOS 3. Abnormal LFTs - hx ETOH, quit in 1990s, unclear if elevation due to infection or underlying liver disease. 4. Sepsis 5. Afib - no anticoagulation due to hx gastric ulcer 6. AAA s/p EVAR, followed by Dr. Maryann Baron 7. COPD - on 2L continuous O2 Plan: 1. Continue IV PPI, will consider EGD if overt bleeding or recurrent precipitous drop in h/h. 
2. Monitor h/h, transfuse per protocol 3. Will order liver work up 4. Await CTA results 5. No NSAIDs 6. Medical management per primary team 
 
 
Chief Complaint: GI Bleed, acute anemia HPI: 
Nino Preston is a 79 y.o. male who I am being asked to see in consultation for an opinion regarding the above. He presented to the ED with fatigue and low back pain for over 2 weeks. He denies melena or BRBPR PTA. He now complains of some lower abdominal pain. He is currently followed by [facility] for long standing SANTOS, last visit 7/9/2020 with Dr. Jabari Hearn, was to restart PPI at that time, last EGD/[facility] as noted above. PMH:  
Past Medical History:  
Diagnosis Date  Aneurysm ([address] AAA repair 2006 & 2012  Aorto-iliac duplex 02/13/2015 Tech difficult. Patent aorta bi-iliac endograft w/o leak or limb dysfunction.  Arrhythmia   
 a fib  Cardiac cath 11/01/2012 RCA (sm, nondom) patent. LM patent. LAD 25%. CX (dom) 45% mid. LVEDP 12 mmHg. No WMA. PA 27/12. W 10-12. CO/CI 5.2/2.6 (TD).  Cardiac echocardiogram, abnormal 02/20/2016 EF 55%. No WMA. Indeterminate diastolic fx. RVSP 60 mmHg. Severe LAE. Mild MR. Mod TR.  IVCE. Similar to study of 10/26/12.  Cardiovascular aorto-iliac duplex 02/13/2015 Patent aorta bi-iliac endovascular graft w/o leak or limb dysfunction. Sac measures 4.21 x 4.47 cm (4.4 x 4.6 cm on 1/31/14).  Cardiovascular LE peripheral arterial testing 07/29/2013 No significant LE arterial disease bilaterally. R GHADA 1. 12.  L GHADA 1. 12.  R DBI 0.83. L DBI 0.71. Exercise deferred.  Cardiovascular renal duplex 10/31/2012 No RA stenosis. Intrinsic/med disease in left kidney. Patent aortic endograft. Patent, thrombus-free renal veins bilaterally.  Carotid duplex 07/29/2013 Mild <50% bilateral ICA plaquing.  Chronic kidney disease  Chronic lung disease  Cigarette smoker  Congestive heart failure (CHF) ([address]  COPD (chronic obstructive pulmonary disease) (HCC)   
 and emphysema; likely secondary to tobacco abuse  Difficult intubation  Dyslipidemia   
 low HDL  Emphysema   
 HTN (hypertension)  Hypercholesterolemia  Ill-defined condition   
 hernia  Increased prostate specific antigen (PSA) velocity  Long term (current) use of anticoagulants   
 coumadin  Osteoarthritis  Osteomyelitis ([address]  Paroxysmal atrial fibrillation (HCC)  Peripheral vascular disease ([address] AAA repair 12/2007  Persistent atrial Fibrillation  Persistent atrial fibrillation (HCC)  Unspecified adverse effect of anesthesia   
 difficulty breathing placed in ICU Oct 2012 (AAA repair) PSH:  
Past Surgical History:  
Procedure Laterality Date  BRONCHOSCOPY DIAGNOSTIC  11/2/2012  CARDIAC CATHETERIZATION  11/1/2012  COLONOSCOPY N/A 7/26/2016 COLONOSCOPY with Polypectomies performed by Rafael Austin MD at [facility] END[facility]COPY  COLONOSCOPY N/A 5/28/2019 [facility] H[facility] REPAIR    
 2006 & 2012  HX HEART CATHETERIZATION  3/4/2009 1. RCA small, nondominant; patent. 2. LMCA patent. 3. LAD is long, wrapped around apical vessel. Diffuse, 20-30% stenosis noted. 4. CCA is large, dominant vessel. Diffuse 20-30% stenosis. 5. LVEDP is 16 mmHg. 6. Overall left ventricular systolic function mildly diminshed with est. EF 45%. Mild, global hypokinesis noted. 7. No significant mitral regurgitation or aortic stenosis noted. (see report)  HX HERNIA REPAIR  2014  
 rt inguinal   
 HX HERNIA REPAIR  2016 LEFT INGUINAL HERNIA REPAIR DR. Leeann Crespo  REPAIR ING HERNIA,5+Y/O,JESSIE Left 16 Dr. Annette LONG  2012 Social HX:  
Social History Socioeconomic History  Marital status:  Spouse name: Not on file  Number of children: Not on file  Years of education: Not on file  Highest education level: Not on file Occupational History  Not on file Social Needs  Financial resource strain: Not on file  Food insecurity Worry: Not on file Inability: Not on file  Transportation needs Medical: Not on file Non-medical: Not on file Tobacco Use  Smoking status: Former Smoker Packs/day: 1.50 Years: 54.00 Pack years: 81.00 Types: Cigarettes Last attempt to quit: 10/23/2012 Years since quittin.8  Smokeless tobacco: Never Used Substance and Sexual Activity  Alcohol use: No  
  Alcohol/week: 0.0 standard drinks Comment: quit drinking alcohol 26 years ago, patient states stopped in \"6034\"  Drug use: No  
 Sexual activity: Not Currently Lifestyle  Physical activity Days per week: Not on file Minutes per session: Not on file  Stress: Not on file Relationships  Social connections Talks on phone: Not on file Gets together: Not on file Attends Gnosticist service: Not on file Active member of club or organization: Not on file Attends meetings of clubs or organizations: Not on file Relationship status: Not on file  Intimate partner violence Fear of current or ex partner: Not on file Emotionally abused: Not on file Physically abused: Not on file Forced sexual activity: Not on file Other Topics Concern  Not on file Social History Narrative  Not on file FHX:  
Family History Problem Relation Age of Onset  Heart Surgery Father BYPASS  Stroke Father  Heart Disease Father  Hypertension Father  Heart Attack Father  Heart Surgery Mother BYPASS  Coronary Artery Disease Mother  Stroke Mother  Heart Disease Mother  Hypertension Mother  Diabetes Sister  Cancer Brother Allergy: Allergies Allergen Reactions  Codeine Swelling  Morphine Itching  Sulfa (Sulfonamide Antibiotics) Other (comments) Kidney problems. Patient Active Problem List  
Diagnosis Code  Hypercholesterolemia E78.00  Congestive heart failure (CHF) ([facility]) I50.9  Peripheral vascular disease ([facility]) I73.9  
 COPD (chronic obstructive pulmonary disease) ([facility]) J44.9  
 HTN (hypertension) I10  
 Chronic atrial fibrillation ([facility]) I48.20  RLQ abdominal pain R10.31  Status post AAA (abdominal aortic aneurysm) repair O96.597, Z86.79  
 Aneurysm of abdominal aorta ([facility]) I71.4  Chronic constipation K59.09  
 Right inguinal hernia K40.90  Left inguinal hernia K40.90  CAD (coronary artery disease) I25.10  Hypoxemia requiring supplemental oxygen R09.02, Z99.81  Warfarin-induced coagulopathy ([address] D68.32, T45.515A  Intraabdominal fluid collection R18.8  Chronic back pain M54.9, G89.29  
 Sepsis ([address] A41.9  Vertebral osteomyelitis ([address] M46.20  Psoas abscess ([facility]) K68.12  
 Abscess L02.91  
 Discitis of lumbar region M46.46  
 Pulmonary hypertension ([facility]) I27.20  
 CHF exacerbation ([facility]) I50.9  Hyponatremia E87.1  Symptomatic anemia D64.9  Chronic pain syndrome G89.4  Chronic kidney disease N18.9  Iron deficiency anemia D50.9  Endoleak of aortic graft ([facility] T82.330A  TYRELL (acute kidney injury) ([facility] N17.9  Enlarged prostate without lower urinary tract symptoms (luts) N40.0  GERD (gastroesophageal reflux disease) K21.9  Left arm swelling M79.89  Lethargy R53.83  Lower extremity weakness R29.898  Mediastinal lymphadenopathy R59.0  Spondylolysis M43.00  Age-related nuclear cataract, bilateral H25.13  
 Iron deficiency anemia due to chronic blood loss D50.0  Community acquired pneumonia J18.9  
 UTI (urinary tract infection) N39.0  GI bleed K92.2 Home Medications: (Not in a hospital admission) Review of Systems:  
 
Constitutional: Fatigue Skin: No rashes, pruritis, jaundice, ulcerations, erythema. HENT: No headaches, nosebleeds, sinus pressure, rhinorrhea, sore throat. Eyes: No visual changes, blurred vision, eye pain, photophobia, jaundice. Cardiovascular: No chest pain, heart palpitations. Respiratory: No cough, SOB, wheezing, chest discomfort, orthopnea. Gastrointestinal: Abdominal pain, melena Genitourinary: No dysuria, bleeding, discharge, pyuria. Musculoskeletal: No weakness, arthralgias, wasting. Endo: No sweats. Heme: No bruising, easy bleeding. Allergies: As noted. Neurological: Cranial nerves intact. Alert and oriented. Gait not assessed. Psychiatric:  No anxiety, depression, hallucinations. Visit Vitals /51 Pulse 81 Temp 97.9 °F (36.6 °C) Resp 21 SpO2 100% Physical Assessment:  
 
constitutional: appearance: thin, frail, in no acute distress. skin: inspection: no rashes, ulcers, icterus or other lesions; no clubbing or telangiectasias. palpation: no induration or subcutaneos nodules. eyes: inspection: normal conjunctivae and lids; no jaundice pupils: normal 
ENMT: mouth: normal oral mucosa,lips and gums; good dentition.  oropharynx: normal tongue, hard and soft palate; posterior pharynx without erithema, exudate or lesions. neck: thyroid: normal size, consistency and position; no masses or tenderness. respiratory: effort: decreased breath sounds bilaterally, O2 nasal cannula  
cardiovascular: abdominal aorta: normal size and position; no bruits. palpation: PMI of normal size and position; normal rhythm; no thrill or murmurs. abdominal: abdomen: normal consistency; mild LLQ tenderness no masses. hernias: no hernias appreciated. liver: normal size and consistency. spleen: not palpable. rectal: hemoccult/guaiac: not performed. musculoskeletal: grossly normal, gait not assessed, resting in bed. neurologic: cranial nerves: II-XII grossly normal. Pupils intact. psychiatric: judgement/insight: within normal limits. memory: within normal limits for recent and remote events. mood and affect: no evidence of depression, anxiety or agitation. orientation: oriented to time, space and person. Basic Metabolic Profile Recent Labs  
  08/24/20 
0559 * K 4.9  CO2 19* BUN 49* * CA 8.1*  
MG 1.9 PHOS 3.8  
  
  
CBC w/Diff Recent Labs  
  08/24/20 
0358  08/23/20 
1735 WBC  --   --  18.5*  
RBC  --   --  2.02* HGB 8.9*   < > 6.0*  
HCT  --   --  18.5* MCV  --   --  91.6 MCH  --   --  29.7 MCHC  --   --  32.4 RDW  --   --  20.6* PLT  --   --  376  
 < > = values in this interval not displayed. Recent Labs  
  08/23/20 
1735 GRANS 88* LYMPH 5*  
EOS 0 Hepatic Function Recent Labs  
  08/24/20 
0559 08/23/20 
1735 ALB 2.2* 2.0*  
TP 6.4 6.1* TBILI 1.0 0.5 * 183* LPSE  --  57* Coags Recent Labs  
  08/23/20 
2245 PTP 19.3* INR 1.7* APTT 34.0 DELIA Duran. [facility] [address] Cell: [phone] [email or web address] [email or web address]

## 2020-08-24 NOTE — ED NOTES
Pt has been resting and stable for several hours. Pt was transfused with 3 units of blood and now FFP. Pt is tolerating well. Vitals holding and stable. Initially at shift changed 1900 8/23/20 pt was weak, uncomfortable and hypotensive. Pt was medicated per order and fluids administered. Pt has been awake, alert and oriented x 3 with minimal distress. Pt has a 18 FR NG tube placed in the left nare per order. Pt tolerated well. Approximately 200 mL of dark brown liquid suctioned from GI tube. Pt had a large, BM earlier with dark brown and nearly black stools. Pt states he had been constipated for some time. Pt was cleaned and fresh brief applied. Small sore noted to coccyx/sacral area upon cleaning. Pt was unaware of sore. Admitting providers were previously at bedside for exam. No issues or concerns at this time. Primary complaint is chronic mild to moderate back back. Pt reused topical lidocaine patch for pain relief.

## 2020-08-24 NOTE — PROGRESS NOTES
Patient seen and examined Full consult to be dictated Elderly male oxygen dependent had endograft repair of AAA many years ago. Know perimesenteric degeneration and endoleak Was being considered for complex repair in [place] Presents with abdominal pain, hypotension and Hgb 6.0 Heme positive CT by my read shows an endoleak but I dont see any significant intraabdominal hematoma. Will see what the radiologist has to say and follow along.

## 2020-08-25 NOTE — PROGRESS NOTES
Physician Progress Note Valeria Avelar 
CSN #:                  [id number] :                       1940 ADMIT DATE:       2020 4:07 PM 
[address] [place] DATE: 
RESPONDING 
PROVIDER #:        Ermelinda Buitrago MD 
 
 
 
 
QUERY TEXT: 
 
Dear Family Medicine, 
 
Pt admitted with sepsis and has acute on chronic anemia documented. If possible, please document in progress notes and discharge summary further specificity regarding the acuity and type of anemia: 
 
The medical record reflects the following: 
Risk Factors: Hx of iron deficiency anemia; IVF in ER Clinical Indicators: Per H&P:  ED exam found to have BRBPR and guaiac; Symptomatic Anemia, acute on chronic   Hgb 6.0  Hct 18.5 
  Hgb 8.7  Hct 25.8 Treatment: received 3 units of PRBCs; H&H every 4 hours; Protonix gtt; FFP; GI consult Thank you, Deandre Modi RN/CCDS 
[phone] Options provided: 
-- Anemia due to acute blood loss 
-- Anemia due to chronic blood loss 
-- Anemia due to iron deficiency -- Dilutional anemia 
-- Other - I will add my own diagnosis -- Disagree - Not applicable / Not valid -- Disagree - Clinically unable to determine / Unknown 
-- Refer to Clinical Documentation Reviewer PROVIDER RESPONSE TEXT: 
 
This patient has acute blood loss anemia.  
 
Query created by: Yoli Swan on 2020 4:38 PM 
 
 
Electronically signed by:  Ermelinda Buitrago MD 2020 9:39 PM

## 2020-08-25 NOTE — PROGRESS NOTES
Spoke with Pt's daughter (Brent [place]) on the phone about options given to patient by Vascular Surgeon. Discussed code status for patient again, daughter would like to allow Mr. Ronny Cole to make that decision for himself. She understands the plan and available options and will call Mr. Bettye Link phone to speak with him.

## 2020-08-25 NOTE — MED STUDENT NOTES
*ATTENTION:  This note has been created by a medical student for educational purposes only. Please do not refer to the content of this note for clinical decision-making, billing, or other purposes. Please see attending physicians note to obtain clinical information on this patient. * [facility] Medical Student Progress Note Patient: Leah Mercado MRN: [id number] SSN: [id number]  YOB: 1940 Age: 79 y.o. Sex: male Admit Date: 8/23/2020 LOS: 1 day Chief Complaint Patient presents with  Fatigue Subjective:  
 
Pt is doing alright. He states that he feels a little better than yesterday. He still feels weak and has lower back and abdominal pain. He did feel that the Dilaudid helped with his pain (last dose around 2:30 AM). He says that the NG tube is irritating his throat. He reports that he is not having any issues with making BM's or urinating. ROS negative for chest pain, SOB, nausea, or vomiting. Objective:  
 
Visit Vitals /46 Pulse 74 Temp 97.9 °F (36.6 °C) Resp 15 SpO2 100% Physical Exam:  
General appearance: Laying comfortably in bed, alert, cooperative, appeared more awake and lively today, no distress Lungs: clear to auscultation bilaterally on anterior and lateral fields Heart: irregular rhythm but regular rate, S1, S2 normal, no murmur, click, rub or gallop Abdomen: scaphoid, soft, non-distended,  in LLQ and suprapubic area, tenderness seems mildly improved from yesterday. Hypoactive Bowel sounds. No masses,  no organomegaly Pulses: Irregular pulse, DP palpable on right but nonpalpable on left Skin: Pale skin though seemed to have more color today Neuro:  mental status, speech normal, alert and oriented x iii Exremities: 2-3+ pitting edema bilateral lower extremities extending 1/2-2/3 up the lower legs Intake and Output: 
Current Shift: No intake/output data recorded. Last three shifts: 08/23 0701 - 08/24 1900 In: 0878 [I.V.:2600] Out: -  
 
Lab/Data Review: 
Recent Results (from the past 12 hour(s)) HGB & HCT Collection Time: 08/24/20 11:18 PM  
Result Value Ref Range HGB 8.4 (L) 13.0 - 16.0 g/dL HCT 25.2 (L) 36.0 - 48.0 % Key Findings or tests: DATE TEST RESULT OR IMPRESSION Telemetry Oxygen 3 L NC Assessment and Plan:  
 
79 y. o. male with PMH of AAA s/p endovascular repair, iron deficiency anemia, peptic ulcer disease, A-fib, CHF, CAD, COPD on 3 L NC at home, chronic back pain, and BPH who presented with a 3-4 week history of malaise, worsening back pain and weakness is now admitted for suspected GI bleed.  
  
Acute on chronic anemia Pt presented with 3-4 weeks of weakness and malaise. Hx of PUD. Stopped taking home carafate but continued to take protonix. Hemoglobin 6 on admission, and improved to 7.2 after 3 U of PRBCs. Melenotic stool in ED. POC lactic acid 7.14 and leukocytosis on admission. Pt afebrile. TTP with guarding but without rebound in bilateral lower quadrants, periumbilical, and suprapubic regions. CTA C/A/P with multiple pathological findings as listed above. Working ddx includes recurrent bleeding gastric ulcer vs malignancy vs mesenteric ischemia vs diverticular bleed. 8/25/2020 Pt appears slightly better than yesterday. Vascular surgery does not feel that anemia is related to AAA. No immediate need for scope per GI. Pt's throat irritated from NGT. Pt appears fluid overloaded on exam.  
-NPO, -consider removing NGT and allowing PO meds 
-Q4hrs H/H checks 
-cont to monitor vital signs and clinical status closely 
-Avoid NSAIDs  
-daily CBC, CMP, Mg, PO4 
-monitor PT/INR 
-stopped IVF 
-cont IV Protonix  
-f/u PT/OT/CM 
-Per GI (appreciate recs): start carafate, f/u autoimmune liver panel 
-touch base with Vascular again in regards to CTA findings 
  
Leukocytosis WBC 18.5 on admission. Pt afebrile and w/o tachycardia. Episodes of mild tachypnea recorded in VS, though could be due to underlying COPD or anemia. Blood cx w/o growth, UA WNL, CXR with evidence of right pleural effusion with underlying atelectasis. Abdominal tenderness on exam with hx of diverticular disease suggest diverticulitis as possibility source of elevated WBC count, though no clear infectious source is apparent. Periumbilical abdominal tenderness with elevated lactate on admission and worsening vascular disease evident on CTA C/A/P and pt hx of a-fib not currently on AC point to mesenteric ischemia as possible etiology of leukocytosis. 8/25/2020 Pt still afebrile. Bcx and Ucx's still without growth. Pt still with abdominal pain though no evidence of diverticulitis/abscess noted on CTA. -cont flagyl and rocephin for now until source of infection can be excluded 
-monitor CBC (last WBC 19.2 on 8/24/2020) 
  Atrial Fibrillation/Congestive Heart Failure/Coronary Artery Disease ROS negative for chest pain or shortness of breath. Heart rate WNL on admission, irregular rhythm noted on EKG and exam. Rate control with digoxin 0.125 mg Qday at home. Previously on ASA and warfarin for [facility], but stopped due to hx of GI bleed. CXR showed right pleural effusion and stable cardiomegaly. Coronary angiography in 2012 showed mild CAD, but CTA C/A/P demonstrated severe atherosclerosis of left coronary circumflex artery. -currently holding home medications as pt is NPO (amlodipine 10 mg Qday, digoxin 0.125 mg Qday, pravastatin 40 mg QHS, lisinopril 20 mg Qday) 
  
COPD Pt currently not short of breath. Does have hx of chronic hypoxic respiratory failure on 3 L NC at home. O2 sats WNL on 3 L NC. 
-cont supplemental O2 at 3 L on NC, increase if sats <92% -PRN dounebs 
  
Chronic back pain, now with abdominal pain Takes 5-325 mg Norco and duloxetine 20 mg QHS at home.  Does not want to use lidocaine patches. Having continued pain this morning. Does have allergy to morphine (gets very itchy). -cont to hold home Norco and duloxetine while NPO 
-cont IV Dilaudid 0.2 mg Q4hrs PRN 
-monitor BP closely 
-monitor for signs of allergic reaction 
  
BPH On tamsulosin 0.4 mg Qday at home. Does have tenderness and fullness in suprapubic region. 
-cont holding tamsulosin while NPO 
-monitor I/Os 
  
Insomnia Takes trazadone 50 mg QHS at home.  
-cont to hold trazadone while NPO 
  
  
Diet NPO  
DVT Prophylaxis contraindicated GI Prophylaxis protonix gtt Code status No CPR, Intubation ok Disposition > 2 MN  
  
Point of Contact [address] Relationship: daughter 
[phone] [facility] 
Daughter 
[phone] Jackelin Abreu, MS4  
Renzo Newby Intern Pager: [phone] August 25, 2020, 6:51 AM

## 2020-08-25 NOTE — ED NOTES
TRANSFER - OUT REPORT: 
 
Verbal report given to Oscar Farrell RN(name) on Tawny Renae  being transferred to CVT stepdown(unit) for routine progression of care Report consisted of patients Situation, Background, Assessment and  
Recommendations(SBAR). Information from the following report(s) SBAR, ED Summary, [facility] and Recent Results was reviewed with the receiving nurse. Lines:  
Peripheral IV 08/24/20 Right Forearm (Active) Site Assessment Clean, dry, & intact 08/24/20 0151 Peripheral IV 08/25/20 Left Arm (Active) Site Assessment Clean, dry, & intact 08/25/20 1299 Phlebitis Assessment 0 08/25/20 0839 Infiltration Assessment 0 08/25/20 0839 Dressing Status Clean, dry, & intact 08/25/20 3150 Dressing Type Transparent 08/25/20 0777 Hub Color/Line Status Pink;Patent; Flushed 08/25/20 9878 Opportunity for questions and clarification was provided. Patient transported with: 
 O2 @ 3 liters

## 2020-08-25 NOTE — PROGRESS NOTES
.Received report from [facility] of ED. Patient came to Ed due to GI bleed. Still having active red & tarry stool. Patient received 3 units of PRBC 3 days ago PTA 
  
Code Status: Partial Code NO COMPRESSION - INTUBATION ONLY Dx: GI Bleed Hx:CHF,hypercholemia,Aneurysm,COPD,HTN,osteoarthritis,difficult to intubate,GERD. A & O: Patient alert & verbal.  
VS: WDL, afebrile Resp: room air Cardiac/Tele: 
GI: [place] sump to continuous suction w/ moderate -high output 
  - incontinent of bowel Corwin Lee is continent of bladder. & uses Urinal  
Diet:  NPO Skin:  
Edema:no edema Lines/Drains: 20g right FA,20 left arm Fluids:w/ continuous Protonix @ 10 ml/hr 
-patient recieved K+phos @ ED. Plan: GI plans to scope patient to find out if there is presence of bleeding somewhere ,but will wait until patient will be stable. - PVR was done @ ED w/ 83 ml residual. 
 
Mobility:able to walk w/ assist 
 
1924- Bedside and Verbal shift change report given to George Parikh RN (oncoming nurse) by Manpreet Nuñez RN (offgoing nurse).  Report included the following information SBAR, Kardex, [facility] and Recent Results

## 2020-08-25 NOTE — PROGRESS NOTES
[email or web address] 
[phone] Gastroenterology follow up-Progress note Impression: 1. GI bleed - Grossly melanotic/maroon stool on ED exam, denies seeing blood in stool PTA. Last scopes 5/2019, EGD/[facility] showed mild erosive gastritis and TA polyp in ascending colon. 2. Acute on chronic anemia - stable, hgb now 9.0, long standing hx of SANTOS 3. Abnormal LFTs - hx ETOH, quit in 1990s, unclear if elevation due to infection or underlying liver disease. 4. Sepsis 5. Afib - no anticoagulation due to hx gastric ulcer 6. AAA s/p EVAR, followed by Dr. Law Bautista 7. COPD - on 2L continuous O2 Plan: 1. Continue PPI drip, will add carafate, no plan for scopes at this time unless overt bleeding or recurrent precipitous drop in h/h. 
2. Monitor h/h, transfuse per protocol 3. Await liver labs - r/o autoimmune source for elevated LFTs 4. Bowel regimen as indicate 5. No NSAIDs 6. Medical management per primary team 
 
Chief Complaint: GI bleed, anemia Subjective:  Continued lower abdominal pain, states always there, long standing PTA. No further bleeding ROS: Denies any fevers, chills, rash. Eyes: conjunctiva normal, EOM normal  
Neck: ROM normal, supple and trachea normal  
Cardiovascular: heart normal, intact distal pulses, normal rate and regular rhythm Pulmonary/Chest Wall: breath sounds normal and effort normal  
Abdominal: appearance normal, bowel sounds normal and soft, non-acute, LLQ ttp Patient Active Problem List  
Diagnosis Code  Hypercholesterolemia E78.00  Congestive heart failure (CHF) (HCC) I50.9  Peripheral vascular disease (HCC) I73.9  
 COPD (chronic obstructive pulmonary disease) (HCC) J44.9  
 HTN (hypertension) I10  
 Chronic atrial fibrillation (HCC) I48.20  RLQ abdominal pain R10.31  Status post AAA (abdominal aortic aneurysm) repair L45.437, Z86.79  
 Aneurysm of abdominal aorta (HCC) I71.4  Chronic constipation K59.09  
  Right inguinal hernia K40.90  Left inguinal hernia K40.90  CAD (coronary artery disease) I25.10  Hypoxemia requiring supplemental oxygen R09.02, Z99.81  Warfarin-induced coagulopathy ([facility] D68.32, T45.515A  Intraabdominal fluid collection R18.8  Chronic back pain M54.9, G89.29  
 Sepsis ([facility] A41.9  Vertebral osteomyelitis ([facility] [facility] M46.20  Psoas abscess ([facility]) K68.12  
 Abscess L02.91  
 Discitis of lumbar region M46.46  
 Pulmonary hypertension ([facility]) I27.20  
 CHF exacerbation ([facility]) I50.9  Hyponatremia E87.1  Symptomatic anemia D64.9  Chronic pain syndrome G89.4  Chronic kidney disease N18.9  Iron deficiency anemia D50.9  Endoleak of aortic graft ([facility] [facility] T82.330A  TYRELL (acute kidney injury) ([facility] [address] [facility] N17.9  Enlarged prostate without lower urinary tract symptoms (luts) N40.0  GERD (gastroesophageal reflux disease) K21.9  Left arm swelling M79.89  Lethargy R53.83  Lower extremity weakness R29.898  Mediastinal lymphadenopathy R59.0  Spondylolysis M43.00  Age-related nuclear cataract, bilateral H25.13  
 Iron deficiency anemia due to chronic blood loss D50.0  Community acquired pneumonia J18.9  
 UTI (urinary tract infection) N39.0  GI bleed K92.2 Visit Vitals /46 Pulse 74 Temp 97.9 °F (36.6 °C) Resp 15 SpO2 100% No intake or output data in the 24 hours ending 08/25/20 0853 CBC w/Diff Lab Results Component Value Date/Time WBC 19.2 (H) 08/24/2020 09:55 AM  
 RBC 2.90 (L) 08/24/2020 09:55 AM  
 HGB 9.0 (L) 08/25/2020 07:56 AM  
 HCT 27.5 (L) 08/25/2020 07:56 AM  
 MCV 89.0 08/24/2020 09:55 AM  
 MCH 30.0 08/24/2020 09:55 AM  
 MCHC 33.7 08/24/2020 09:55 AM  
 RDW 17.5 (H) 08/24/2020 09:55 AM  
  08/24/2020 09:55 AM  
 Lab Results Component Value Date/Time  GRANS 93 (H) 08/24/2020 09:55 AM  
 LYMPH 2 (L) 08/24/2020 09:55 AM  
 EOS 0 08/24/2020 09:55 AM  
 BANDS 2 08/24/2020 09:55 AM  
 BASOS 0 2020 09:55 AM  
  
Basic Metabolic Profile Recent Labs  
  20 
3041   
K 3.8  CO2 19* BUN 37* CA 8.3*  
MG 2.1 PHOS 2.4* Hepatic Function Lab Results Component Value Date/Time ALB 2.2 (L) 2020 07:56 AM  
 TP 6.5 2020 07:56 AM  
  (H) 2020 07:56 AM  
 No results found for: TBIL Coags Recent Labs  
  20 
2245 PTP 19.3* INR 1.7* APTT 34.0 [facility]. [email or web address] Phone: [phone] Pager: [phone]

## 2020-08-25 NOTE — PROGRESS NOTES
Pt seen and examined. States abd pain improved. Visit Vitals /56 Pulse 79 Temp 97.9 °F (36.6 °C) Resp 16 SpO2 99% NAD 
RRR 
ABD soft/nt/nd Had a long discussion with . Genna Esparza in regards to his non ruptured AAA s/p EVAR with large Type I endoleak with aneurysm enlargement. Discussed previous plan of going to [place] which patient continues to refuse. Explained that other two options would be to do nothing and make himself DNR/DNI knowing that his aneurysm would rupture and kill him eventually. The other option would be to go to [facility] to see if they can fix his aneurysm. Pt asked if they would be able to fix it there and I said that he would need to be seen by them for them to be able to evaluate him. Pt is unsure still what he wants to do. Will re evaluate patient tomorrow.

## 2020-08-25 NOTE — PROGRESS NOTES
[address] [facility] Progress Note Patient: Cornelio Gray MRN: [id number] SSN: [id number]  YOB: 1940 Age: 79 y.o. Sex: male Admit Date: 8/23/2020 LOS: 1 day Chief Complaint Patient presents with  Fatigue Subjective:  
 
Pt resting in ED this morning. Still reporting pain in back which is a chronic issue. No chest pain, no sob, no palpitations, no other sx reported.  
 
  
Review of Systems Constitutional: Positive for malaise/fatigue and weight loss. Negative for chills, diaphoresis and fever. HENT: Negative for congestion, hearing loss, sore throat and tinnitus. Eyes: Positive for blurred vision (chronic) and discharge (chronic). Respiratory: Negative for cough, hemoptysis and shortness of breath. Cardiovascular: Negative for chest pain, palpitations and orthopnea. Gastrointestinal: Positive for nausea. Negative for abdominal pain, blood in stool, diarrhea, heartburn, melena and vomiting. Genitourinary: Negative for dysuria, frequency, hematuria and urgency. Musculoskeletal: Positive for back pain. Neurological: Positive for dizziness (chronic, on turning in bed) and weakness. Negative for tingling, sensory change and headaches. Psychiatric/Behavioral: Negative for depression and suicidal ideas. Objective:  
 
Visit Vitals /46 Pulse 74 Temp 97.9 °F (36.6 °C) Resp 15 SpO2 100% Physical Exam:  
General:  AAOx3, NAD, laying comfortably in bed HEENT: Conjunctiva pale with thick yellow discharge, sclera anicteric. PERRL. EOMI. Dry mucous membranes. Thyroid not enlarged, no nodules. No cervical, supraclavicular, occipital or submandibular lymphadenopathy. No other gross abnormalities present. CV:  Irregular rhythm, normal rate, no murmurs. No visible pulsations or thrills. RESP:  Unlabored breathing. Lungs clear to auscultation without adventitious breath sounds. Equal expansion bilaterally. ABD:  Soft, TTP RUQ, RLQ, LLQ and suprapubic, voluntary guarding, nondistended. BS (+). No hepatosplenomegaly. R inguinal hernia, reducible, nontender, no overlying skin changes RECTAL:  Deferred as pt had BM in diaper during interview. MS:  No joint deformity. BLE atrophy. Neuro:  CN II-XII grossly intact. 4/5 strength bilateral upper extremities and lower extremities. Ext:  No edema. 2+ radial and dp pulses bilaterally. Skin:  No rashes, lesions, or ulcers. Good turgor. Intake and Output: 
Current Shift: No intake/output data recorded. Last three shifts: 08/23 1901 - 08/25 0700 In: 5553 [I.V.:1600] Out: -  
 
Lab/Data Review: 
Recent Results (from the past 12 hour(s)) HGB & HCT Collection Time: 08/24/20 11:18 PM  
Result Value Ref Range HGB 8.4 (L) 13.0 - 16.0 g/dL HCT 25.2 (L) 36.0 - 48.0 % HGB & HCT Collection Time: 08/25/20  7:56 AM  
Result Value Ref Range HGB 9.0 (L) 13.0 - 16.0 g/dL HCT 27.5 (L) 36.0 - 48.0 % METABOLIC PANEL, COMPREHENSIVE Collection Time: 08/25/20  7:56 AM  
Result Value Ref Range Sodium 141 136 - 145 mmol/L Potassium 3.8 3.5 - 5.5 mmol/L Chloride 108 100 - 111 mmol/L  
 CO2 19 (L) 21 - 32 mmol/L Anion gap 14 3.0 - 18 mmol/L Glucose 79 74 - 99 mg/dL BUN 37 (H) 7.0 - 18 MG/DL Creatinine 0.90 0.6 - 1.3 MG/DL  
 BUN/Creatinine ratio 41 (H) 12 - 20 GFR est AA >60 >60 ml/min/1.73m2 GFR est non-AA >60 >60 ml/min/1.73m2 Calcium 8.3 (L) 8.5 - 10.1 MG/DL Bilirubin, total 0.5 0.2 - 1.0 MG/DL  
 ALT (SGPT) 48 16 - 61 U/L  
 AST (SGOT) 76 (H) 10 - 38 U/L Alk. phosphatase 176 (H) 45 - 117 U/L Protein, total 6.5 6.4 - 8.2 g/dL Albumin 2.2 (L) 3.4 - 5.0 g/dL Globulin 4.3 (H) 2.0 - 4.0 g/dL A-G Ratio 0.5 (L) 0.8 - 1.7 MAGNESIUM Collection Time: 08/25/20  7:56 AM  
Result Value Ref Range Magnesium 2.1 1.6 - 2.6 mg/dL PHOSPHORUS Collection Time: 08/25/20  7:56 AM  
Result Value Ref Range Phosphorus 2.4 (L) 2.5 - 4.9 MG/DL  
 
 
RECENT RESULTS MODALITY IMPRESSION  
XR Results from Hospital Encounter encounter on 08/23/20 XR CHEST PORT Narrative EXAM: AP portable chest. 
 
Indications: maliase Time stamp: 8/23/2020 1656 hours Comparison: Recent prior Findings: 
2 portable views of the chest. The orientation of the chest radiograph suggest 
the exam was performed with left side down decubitus positioning, but not 
specified. Lines/Tubes/Devices:  None LUNGS: Some hazy density in the right lung that appears to be associated with 
volume loss and may be related to atelectasis. There is an opacity along the 
medial aspect of the right hemithorax that may be layering pleural fluid. The 
left lung is clear. No left-sided pleural effusion. MEDIASTINUM: Heart is borderline size. Tortuosity of the descending thoracic 
aorta accentuated by the decubitus positioning. BONES/SOFT TISSUES: Unremarkable Impression IMPRESSION: 
 
1. Right pleural effusion with hazy airspace opacities likely compressive 
atelectasis. Infiltrate could have a similar appearance. 2. Stable cardiac enlargement. CT Results from [facility] Encounter encounter on 08/23/20 CTA CHEST ABD PELV W WO CONT Narrative CTA Chest, Abdomen And Pelvis With and without Enhancement INDICATION: Abdominal aortic aneurysm status post endovascular repair, peptic 
ulcer disease, heart failure, COPD with chronic respiratory failure on home 
oxygen, chronic back pain, BPH with malaise, worsening back pain, weakness for 
3-4 weeks, hypotension, acute on chronic anemia, melanotic stool.  
 
TECHNIQUE: 2.5 mm collimation axial images obtained from the thoracic inlet to 
the level of the pubic symphysis following the uneventful administration of 80 
mL Isovue-370 nonionic intravenous contrast.  Imaging performed during maximum 
aortic enhancement and is therefore suboptimal for evaluating solid organs and 
 bowel. Raw data reviewed along with three-dimensional volume rendered images 
and maximum intensity projection images to better evaluate the tortuous vascular 
structures. All CT scans at this facility are performed using dose optimization technique as 
appropriate to a performed exam, to include automated exposure control, 
adjustment of the mA and/or kV according to patient size (including appropriate 
matching first site-specific examinations), or use of iterative reconstruction 
technique. COMPARISON: 2/17/2020. Thyroid: Unremarkable. Heart: Severe regions of coronary arteriosclerosis in the left circumflex. Biatrial cardiac chamber enlargement. Pulmonary artery: Patent Lymph Nodes: 2 Similar 1.4 cm precarinal lymph nodes, likely reactive. Lung: Moderate centrilobular emphysema. Mild subsegmental atelectasis in the 
right lower lobe. Mild subsegmental atelectasis or scarring right upper and 
right middle lobes. Pleura: Increased moderate right and similar trace left pleural effusions. No 
pneumothorax. ABDOMEN FINDINGS:  
There is suboptimal evaluation of visceral organs secondary to timing of the 
exam. 
 
 
Liver: Similar scattered hepatic cysts measuring up to 2.6 cm in the left medial 
hepatic lobe at the dome and 2.4 cm right posterior hepatic lobe. There are a 
few too small to characterize hepatic lesions but statistically most likely 
benign. Spleen: Unremarkable. Pancreas: Pancreas is atrophic. Biliary: Possible adenomyomatosis gallbladder fundus. Bowel: There is a increased rectal stool burden. Gas-filled gastric distention 
third portion duodenum does not clearly cross midline into the left abdomen but 
there is no volvulus or bowel dilation. No bowel wall thickening. No 
pneumatosis. Appendix in the right inguinal hernia without inflammation, 
unchanged. There is fluid in the esophagus. Peritoneum/ Retroperitoneum: There is edema without free fluid or free air. Lymph Nodes: Unremarkable. Adrenal Glands: Unremarkable. Kidneys: Right kidney is 7.1 cm, previously 8.3 cm and the left is 10.7 cm, 
previously 10.4 cm. The right kidney hypoenhance is with cortical thinning. There is cortical scarring at the left lower pole. There is a exophytic 0.9 cm 
hyperdense lesion at the right lower pole without enhancement. 2 cm left lower 
pole cyst. Several too small to characterize renal lesions. PELVIS FINDINGS:  
 
Bladder/ Pelvic Organs: Small diverticulum at the left anterior bladder wall. Moderate bladder distention. Bones/Soft tissues: Mild anasarca. Osteopenia. Lumbar facet hypertrophy and 
arthropathy. Transitional lumbosacral junction. Degenerative disc disease. Central height loss superior endplate L3.   
 
 
AORTA FINDINGS: 
Thoracic aorta is not enlarged with mild atherosclerosis. 3 vessel arch anatomy 
with patent arch vessels. Subjectively, the aneurysm cranial to the proximal 
aortic stent landing zone is increased. There appears to be a similar 1 cm 
crescent of enhancement at the right anterior aspect with endoleak but there is 
increased nonopacified circumferential region measuring up to 0.6 cm on the left 
(series 3, image 138), not present on prior exam. The excluded aneurysm is 
subjectively larger. There is similar right type I B endoleak distal to the 
common iliac stent. The vessel measures 2.3 cm at this location, previously 2.1 
cm. The peripheral right common iliac is 1.9 cm, previously 1.7 cm. There are 
new linear regions of thrombus which protrude into the central aortic lumen at 
the more cranial aspect of the stent at the level of the left renal vein and 
within the bilateral common iliac stents protruding into the central lumen. Mild stenosis proximal celiac. Regions of stenosis splenic artery. Mild stenosis 
proximal SMA.  Moderate stenosis proximal left renal artery and proximal 
 occlusion right renal artery. There is stenosis at the origin of the right 
internal iliac, origin left internal iliac. Three-D generated aortic measurements: 
3.6, 3.2, 2.9 centimeter at the sinus of Valsalva. 3.5 x 3.1 centimeter at the sinotubular junction. 3.8 x 3.8 centimeter at the maximum diameter of the ascending aorta. 3.5 x 3.2 centimeter at the mid aortic arch. 3.4 x 3.2 centimeter at the proximal descending thoracic aorta. 3.6 x 3.1 centimeter at the mid descending aorta. 3.6 x 3.5 centimeter at the distal descending thoracic aorta at the level of the 
diaphragmatic hiatus. Aneurysm measurements: 
Volume 149.73 cc, previously 166.71 cc 
4.9 x 4.2 cm below the lowest renal artery including excluded aorta, previously 4.4 x 3.7 cm 
1 cm below the lowest renal artery within the stent: 3 x 2.6 cm; including 
excluded aorta: 5.8 x 4.4 cm, previously 3.9 x 3.9 cm 
3.1 x 3 cm within the stent at the top of the stent; 5.4 x 4.4 cm including 
excluded aorta, previously 4 x 3.6 cm 
2.7 x 2.7 cm within the stent below the lowest renal artery 2.7 x 2.7 cm Within the stent below the lowest renal artery 3 cm distance from the lowest renal artery to the top of the stent Impression IMPRESSION: 
1.  New strands of thrombus along the wall of the aortic and bilateral common 
iliac stents which protrudes into the central aortic lumen and place patient at 
risk for lower extremity embolic events. 2.  Increased aneurysm cranial to the aortobiiliac stent with resultant 
increased type I A endoleak. Similar type I B endoleak. Increased excluded 
aortic aneurysm measuring 5.8 cm (change of 2 cm at this level). Minimal 
increased right common iliac aneurysm measuring 1.9 cm. 
3.  Increased moderate right and similar small left pleural effusions. 4.  Increased rectal stool burden. No active extravasation. 5.  Gas-filled gastric distention.  
6.  Occlusion proximal right renal artery with worsened atrophy and 
 hypoenhancement. 7.  Severe left circumflex coronary arteriosclerosis. 8.  Biatrial cardiac chamber enlargement. 9.  Moderate stenosis left renal artery, stenosis at the origin of the bilateral 
internal iliac arteries. 10.  Moderate emphysema. 11.  Gastroesophageal reflux. 12.  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 13.  Unchanged Incidental Amyand hernia. 14.  Right renal hemorrhagic/proteinaceous cyst. 
15.  Small bladder diverticulum. Preliminary report provided by Dr. Ronny Sanchez on 8/23/2020 at 2234 hours prior to 
3-D image availability. MRI Results from Hospital Encounter encounter on 08/25/17 MRA ABD W WO CONT Narrative CPT CODE: 98277 MRA Abdomen. CLINICAL HISTORY: Patient with history of infrarenal AAA repair. Left ileus 
psoas abscess drained 2/22/2016, discitis L3/L4 with adjacent osteomyelitis February 2016, likely discitis T11/T12 August 2016, recurrent psoas collection 8/25/2017 with likely chronic osteomyelitis L3. 
 
TECHNIQUE: Multiplanar images of abdomen and pelvis using SSFSE, FIESTA  with Cine, T1, LAVA, opposed phase, 3-D inflow and multiphase postcontrast LAVA T1 FS 
post contrast (10 cc Gadavist) sequences. COMPARISON: Multiple prior studies including lumbar MRI 5/5/2016, CT 2/21/2016, 
CT 8/4/2016, lumbar MRI 2/23/2016, CT 8/25/2017. FINDINGS:   
 
Lung bases: Small effusions. Liver: Multiple low T1 and high T2 signal lesions in the liver without 
enhancement and stable since prior. Gallbladder: Focal fundal thickening with stranding of pearls appearance. Spleen: Normal size and enhancement. Pancreas: Normal signal and enhancement. No duct dilatation. Adrenal glands: Normal. 
 
Kidneys: Normal size. Symmetric enhancement. Few tiny cysts bilaterally 
including hemorrhagic cyst posteriorly on the right measuring 6 mm.  Mild 
cortical thinning lower pole left kidney with small cyst. 
 
 General: Body wall edema at the flanks, around the hips, dependent parts of the 
retroperitoneum. Right inguinal hernia containing appendix and fat. Bowel: Unremarkable. : Urinary bladder, included parts of the prostate are unremarkable. Retroperitoneum: There is endovascular stent traversing AAA with maximum 
dimension 4.8 cm, stable. Stable wall thickening at the abdominal aorta with low T2 signal and enhancement of the wall, a stable finding dating to 2016. There is 
edema posterior to the abdominal aorta from 3:00 to 7:00 position (series 7 
image 49). Graft is widely patent. Aneurysmal dilatation iliac vessels as 
before. Right common iliac artery measures 24 mm. Left common iliac artery 
measures 18 mm. There is a similar adjacent fluid collection in the left psoas that extends 
anteromedially to abut the aorta (series 7 image 45 and measures 4.4 x 1.5 x 5.2 
cm (series 7 image 45, series 2007 image 39). There is surrounding fat stranding 
and enhancement compatible with edema and inflammation. Small adjacent lymph 
nodes measuring up to 10 mm. Fluid collection begins lateral to the L2/L3 disc space, appears contiguous with 
fluid in the L3/L4 disc space (coronal series 2007 image 37), and ends in the 
psoas at mid L3 level. A drain has been placed, posterolateral to the majority 
of the collection today (coronal series 2007) and collection does not appear 
appreciably decreased in size. Enhancement at L3 vertebral body laterally on the 
left c/w osteomyelitis. Severe multifactorial stenosis L3/L4. Impression IMPRESSION: 
 
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 Stable size since 
drain placement. Psoas collection abuts the aorta which is stable in appearance with wall 
thickening and enhancement as before. Stable chronic inflammation related to 
adjacent abscess remains possible. ULTRASOUND Results from Hospital Encounter encounter on 08/25/17 US EXT NONVAS RT LTD Impression Impression:  Large fat-containing right inguinal hernia. On recent CT scan 
(August 25, 2017), the appendix is seen extending through the internal inguinal 
ring into the inguinal hernia. This is not redemonstrated on today's ultrasound 
but potentially if the appendix does not have air within it, it may be difficult 
to visualize sonographically. No adenopathy. Results from [facility] Encounter encounter on 02/18/16 US SCROTUM/TESTICLES Impression IMPRESSION: 
1. Increased internal vascularity of the left testicle suggests mild orchitis. Cardiology Procedures/Testing: MODALITY RESULTS  
EKG Results for orders placed or performed during the hospital encounter of 08/23/20 EKG, 12 LEAD, INITIAL Result Value Ref Range Ventricular Rate 71 BPM  
 Atrial Rate 288 BPM  
 QRS Duration 86 ms  
 Q-T Interval 350 ms QTC Calculation (Bezet) 380 ms Calculated R Axis -42 degrees Calculated T Axis 81 degrees Diagnosis Atrial fibrillation Left axis deviation Low voltage QRS Abnormal ECG When compared with ECG of 26-JAN-2020 09:46, 
Criteria for Anteroseptal infarct are no longer present Confirmed by Xavier Cornelius MD, Juancarlos Olivia ([id number]) on 8/24/2020 8:30:54 AM 
  
Results for orders placed or performed in visit on 08/10/18 AMB POC EKG ROUTINE W/ 12 LEADS, INTER & REP Narrative Read by Marichuy Mcintyre MD - atrial fibrillation, occasional ectopic ventricular beat. Nonspecific QRS widening and anterior fascicular block. Old anterior infarct. Possible nonspecific ST abnormality. ECHO 06/02/19 ECHO ADULT COMPLETE 06/04/2019 6/4/2019 Narrative · Left Ventricle: Estimated left ventricular ejection fraction is 56 -  
60%. Abnormal left ventricular septal motion. Interventricular septal  
\"bounce\". · Tricuspid Valve: Non-specific thickening of the tricuspid valve. Moderate tricuspid valve regurgitation is present. · Pulmonary Artery: Moderate pulmonary hypertension. Pulmonary arterial  
systolic pressure is 54.0 mmHg. · Pulmonic Valve: Mild pulmonic valve regurgitation is present. · Left Atrium: Severely dilated left atrium. · Right Atrium: Moderately dilated right atrium. Signed by: Jessika Richard MD  
  
 
Special Testing/Procedures: MODALITY RESULTS MICRO All Micro Results Procedure Component Value Units Date/Time CULTURE, BLOOD [[id number]] Collected:  08/23/20 1820 Order Status:  Completed Specimen:  Blood Updated:  08/25/20 9097 Special Requests: NO SPECIAL REQUESTS Culture result:    
  CULTURE IN PROGRESS,FURTHER UPDATES TO FOLLOW CULTURE, BLOOD [[id number]] Collected:  08/23/20 1805 Order Status:  Completed Specimen:  Blood Updated:  08/25/20 3073 Special Requests: NO SPECIAL REQUESTS Culture result: NO GROWTH 2 DAYS     
 CULTURE, URINE [[id number]] Collected:  08/24/20 0358 Order Status:  Completed Specimen:  Urine from Clean catch Updated:  08/24/20 1057 UA Results for orders placed or performed in visit on 04/07/14 AMB POC URINALYSIS DIP STICK AUTO W/O MICRO     Status: None Result Value Ref Range Status Color (UA POC) Yellow  Final  
 Clarity (UA POC) Clear  Final  
 Glucose (UA POC) Negative Negative Final  
 Bilirubin (UA POC) Negative Negative Final  
 Ketones (UA POC) Negative Negative Final  
 Specific gravity (UA POC) 1.010 1.001 - 1.035 Final  
 Blood (UA POC) Trace Negative Final  
 pH (UA POC) 7.0 4.6 - 8.0 Final  
 Protein (UA POC) Trace Negative mg/dL Final  
 Urobilinogen (UA POC) 0.2 mg/dL 0.2 - 1 Final  
 Nitrites (UA POC) Negative Negative Final  
 Leukocyte esterase (UA POC) Negative Negative Final  
  
PATH Assessment and Plan:  
 
79 y.o. male with PMH AAA s/p endovascular repair, Fe deficiency anemia, PUD, AFib, CHF, CAD, COPD with chronic hypoxic respiratory failure on home 3L NC, chronic back pain, and BPH, now admitted with acute on chronic anemia. 
  
1. Acute on chronic anemia. DDx to include PUD, diverticulosis/itis, malignancy, AVM. Initial Hgb 6.0, after 3u PRBC, Hgb 7.2. EGD and Colonoscopy by [facility] on 5/28/19 showed hiatal hernia, erosive gastritis with flecks of coffee grounds, ascending colon polyp; diverticulosis; and hemorroids. He saw Hem/Onc on 8/7/20 for surveillance labs; no new intervention noted. Favor recurrent bleeding gastric ulcer. Pt has gross melena with guaiac positive stool. Hx of gastritis and PUD, on home protonix and noncompliant with carafate x 1 month. Possible bleeding diverticulae. Hx diverticulosist. TTP LLQ and suprapubic area, but no reported ABD pain. Melena would not support diverticular bleed, but pt has large stool burden suggesting long transit time through sigmoid for blood to be digested. WBC 18.5 consistent with diverticulitis. Malignancy possible in the setting of painless GIB, although CT does not show any obvious intraluminal neoplasm. Pt has had unintentional weight loss. AVM less likely; not noted on CT A/P w/ IV contrast. 
- admit to stepdown with telemetry - H/H Q4H, transfuse if Hgb < 7 
- monitor VS per unit routine 
- CBC, CMP, Mag, Phos daily 
- PT/INR 
- Vascular does not think this is a an issue with an aneurysm, most likely due to GI Ulcerative dz and chronic blood loss - GI following, wants to continue PPI drip, will add carafate, no plan for scopes at this time unless overt bleeding or recurrent precipitous drop in h/h, awaiting liver labs to r/o autoimmune source for elevated LFTs 
- protonix gtt  
- NPO 
- NS @ 250mL/hr 
- PT/OT/CM 
  
Sepsis: 2/4 SIRS: tachypnea and WBC 18.5. Current DDx includes intraabdominal infection, diverticulitis, UTI, spine osteomyelitis/abscess. No evidence of intraabdomninal infection on wet read. LLQ TTP and melena support diverticulitis.   Possible UTI in light of t w/ hx of BPH and imaging shows distended bladder. Pt received flagyl and rocephin in ED. Hx of lumbar spine osteomyelitis and abscess, but no imaging suggestive of acute osteomyelitis. - FU UA and UCx 
- FU BCx 
- FU CT final read 
- continue flagyl and rocephin 
  
AFib/CHF/CAD: rate controlled on amlodipine; off ASA and Coumadin due to hx of GIB. No evidence of decompensated CHF noted in cardiology clinic visit (6/16/20). CXR shows large-moderate R pleural effusion. Coronary angiography in 2012 only showed mild CAD. - hold home amlodipine 10mg daily, digoxin 125mcg daily, pravastatin 40mg daily while NPO 
- hold lisinopril 20mg daily 
            - diurese pleural effusion once BP more stable 
  
COPD: C/b chronic hypoxic respiratory failure, on 3L NC at home. - supplemental O2 for sats < 92% 
- duoneb PRN 
  
Chronic back pain: on home norco 5-325 
- lidocaine patch 
- hold norco while NPO 
  
BPH  
- hold tamsulosin 0.4mg daily while NPO 
  
Insomnia: On two home sleeping aids; probably does not need both. Defer to day team to choose which one to continue 
- hold duloxetine 20mg QHS and trazadone 50mg QHS while NPO.   
  
Diet NPO  
DVT Prophylaxis contraindicated GI Prophylaxis protonix gtt Code status No CPR, Intubation ok Disposition > 2 MN  
  
Point of Contact Laney Lopez Relationship: daughter 
[phone] Guero Bernal 
Daughter 
[phone] Wilian Guerra DO, PGY-1  
[facility] Medicine Intern Pager: [phone] August 25, 2020, 9:55 AM

## 2020-08-25 NOTE — PROGRESS NOTES
[address] [facility] PM/Postop Progress Note Patient: Isadora Mora MRN: [id number] SSN: [id number]  YOB: 1940 Age: 79 y.o. Sex: male Admit Date: 8/23/2020 LOS: 0 days Chief Complaint Patient presents with  Fatigue Subjective:  
 
Mr. Jessica Palacios is doing well, going steady. Was comfortably talking in his sleep about \"barf bags and trash bags\". Still has back pain, but it is controlled well. No ABD pain, hematemesis, melena, cough, orthopnea, or increased SOB. Voiding and passing loose stool. ROS reported above Objective:  
 
Visit Vitals /46 Pulse 74 Temp 97.9 °F (36.6 °C) Resp 15 SpO2 100% Physical Exam: 
General:  Alert and Responsive and in No acute distress. Chronically ill-appearing HEENT: Conjunctiva pale, sclera anicteric. Dry mucous membranes. NGT in place CV:  RRR, no murmurs. No visible pulsations or thrills. RESP:  Unlabored breathing. Lungs coarse mainly at the bases. no wheeze, rales, or rhonchi. Equal expansion bilaterally. ABD:  Soft, non-distended, non tender, No hepatosplenomegaly. No suprapubic tenderness. Neuro: A+Ox3. Ext: Mild pedal edema. 2+ radial and dp pulses bilaterally. Skin:  No rashes, lesions, or ulcers. Good turgor. Lab/Data Review: 
Recent Results (from the past 12 hour(s)) CBC WITH AUTOMATED DIFF Collection Time: 08/24/20  9:55 AM  
Result Value Ref Range WBC 19.2 (H) 4.6 - 13.2 K/uL  
 RBC 2.90 (L) 4.70 - 5.50 M/uL HGB 8.7 (L) 13.0 - 16.0 g/dL HCT 25.8 (L) 36.0 - 48.0 % MCV 89.0 74.0 - 97.0 FL  
 MCH 30.0 24.0 - 34.0 PG  
 MCHC 33.7 31.0 - 37.0 g/dL  
 RDW 17.5 (H) 11.6 - 14.5 % PLATELET 811 146 - 187 K/uL MPV 9.3 9.2 - 11.8 FL  
 NEUTROPHILS 93 (H) 42 - 75 % BAND NEUTROPHILS 2 0 - 5 % LYMPHOCYTES 2 (L) 20 - 51 % MONOCYTES 3 2 - 9 % EOSINOPHILS 0 0 - 5 % BASOPHILS 0 0 - 3 %  
 ABS. NEUTROPHILS 18.2 (H) 1.8 - 8.0 K/UL ABS. LYMPHOCYTES 0.4 (L) 0.8 - 3.5 K/UL  
 ABS. MONOCYTES 0.6 0 - 1.0 K/UL  
 ABS. EOSINOPHILS 0.0 0.0 - 0.4 K/UL  
 ABS. BASOPHILS 0.0 0.0 - 0.06 K/UL  
 DF MANUAL PLATELET COMMENTS ADEQUATE PLATELETS    
 RBC COMMENTS ANISOCYTOSIS 1+ 
    
 RBC COMMENTS POLYCHROMASIA 1+ 
    
 RBC COMMENTS TARGET CELLS 1+ HEPATITIS PANEL, ACUTE Collection Time: 08/24/20  9:55 AM  
Result Value Ref Range Hepatitis A, IgM Negative NEG    
 __ Hepatitis B surface Ag <0.10 <1.00 Index Hep B surface Ag Interp. Negative NEG    
 __ Hepatitis B core, IgM Negative NEG    
 __ Hepatitis C virus Ab 0.06 <0.80 Index Hep C  virus Ab Interp. Negative NEG Hep C  virus Ab comment IRON PROFILE Collection Time: 08/24/20  9:55 AM  
Result Value Ref Range Iron 106 50 - 175 ug/dL TIBC 164 (L) 250 - 450 ug/dL Iron % saturation 65 (H) 20 - 50 % FERRITIN Collection Time: 08/24/20  9:55 AM  
Result Value Ref Range Ferritin 5,764 (H) 8 - 388 NG/ML Assessment and Plan:  
 
79 y.o. male with PMH of AAA s/p repair, HF, COPD, HTN/HLD, afib on no AC due to gastric ulcer , now admitted with anemia, likely due to acute GIB. 
 
VSS, no signs of continued hemorrhage; will contact ED to draw another H/H since the latest lab was over 12 hours ago. He is voiding, not sure how much since nothing is recorded. His lungs seem to be handling the increased IVF. No tachypnea, no hypoxia, no increased WOB, no orthopnea, lung sounds coarse but largely unchanged from yesterday. Diet NPO  
DVT Prophylaxis contraindicated GI Prophylaxis protonix gtt Code status Partial Code Disposition > 2 [place] of [place] Relationship: daughter 
[phone] Brent Jean 
Daughter 
[phone] Susan Singleton MD, PGY1 [address] Intern Pager: [phone] August 24, 2020, 9:51 PM

## 2020-08-25 NOTE — CONSULTS
[address] Name:  Melina Phelps 
MR#:   [id number] :  1940 ACCOUNT #:  [de-identified] DATE OF SERVICE:  2020 REASON FOR CONSULTATION:  Abdominal aortic aneurysm with leak. HISTORY OF PRESENT ILLNESS:  The patient is an 76-year-old male with multiple medical problems including dependence on home O2. 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 There have been discussions ongoing about sending him out of town to get this attended to, but nothing has been decided. He was last seen by Dr. Beau Vivar six months ago. He presents at this time with acute abdominal pain, syncope, and hypotension. We are asked to see him for evaluation of same. The patient says that he has had 40 pounds of weight loss over the past months and he has had steady abdominal pain over that time as well. PAST MEDICAL HISTORY:  He has COPD, heart failure, abdominal aortic aneurysm. FAMILY HISTORY:  Noncontributory. SOCIAL HISTORY:  He is a former smoker. He does not drink excessively. He does not use illicit drugs. ALLERGIES:  TO CODEINE, , AND SULFA. REVIEW OF SYSTEMS:  He has had weight change and fatigue. He denies any fever or chills. Denies chest pain. Denies shortness of breath more than usual.  Denies any nausea, vomiting, or diarrhea. Denies any blood in his stool. Denies any dysuria or urinary frequency. Denies any neurologic, hematologic, or psychiatric illnesses. PHYSICAL EXAMINATION: 
GENERAL:  An elderly male, ill appearing, in no acute distress. HEENT:  Sclerae anicteric. NECK:  Supple. No lymphadenopathy. LUNGS:  Coarse bilaterally. HEART:  Regular rate and rhythm. ABDOMEN:  Soft. Mildly tender. There is no guarding, rebound, or signs of generalized peritonitis. EXTREMITIES:  Warm and well perfused. IMAGING DATA:  CT scan of the abdomen does show an abdominal aortic aneurysm with an endoleak. I do not see any retroperitoneal hematoma. I do not see any extravasation of contrast outside the aneurysmal sac. ASSESSMENT AND PLAN:  An elderly male with history of aneurysm with an endoleak and now hypotension and abdominal pain. I do not think that this is from his aneurysm. I suspect he has ulcerative disease and chronic blood loss, which will be worked up further during his hospital stay. Thank you very much for asking us to see him. We will follow his hospital course with you. Tiara Dutta MD 
 
 
[id number] 
D:  08/24/2020 14:44 T:  08/24/2020 20:34 
JOB #:  [id number]

## 2020-08-26 NOTE — PROGRESS NOTES
[facility] Progress Note Patient: Michael Bruce MRN: [id number] SSN: [id number]  YOB: 1940 Age: 79 y.o. Sex: male Admit Date: 8/23/2020 LOS: 2 days Chief Complaint Patient presents with  Fatigue Subjective:  
 
Pt resting in bed this morning. No chest pain, no sob, no palpitations, no other sx reported.  
 
  
Review of Systems Constitutional: Positive for malaise/fatigue and weight loss. Negative for chills, diaphoresis and fever. HENT: Negative for congestion, hearing loss, sore throat and tinnitus. Eyes: Positive for blurred vision (chronic) and discharge (chronic). Respiratory: Negative for cough, hemoptysis and shortness of breath. Cardiovascular: Negative for chest pain, palpitations and orthopnea. Gastrointestinal: Negative for abdominal pain, blood in stool, diarrhea, heartburn, melena and vomiting. Genitourinary: Negative for dysuria, frequency, hematuria and urgency. Musculoskeletal: Positive for back pain. Neurological: Positive for dizziness (chronic, on turning in bed) and weakness. Negative for tingling, sensory change and headaches. Psychiatric/Behavioral: Negative for depression and suicidal ideas. Objective:  
 
Visit Vitals /61 Pulse 85 Temp 98.2 °F (36.8 °C) Resp 22 Ht 5' 6\" (1.676 m) Wt 67.7 kg (149 lb 4 oz) SpO2 100% BMI 24.09 kg/m² Physical Exam:  
General:  AAOx3, NAD, laying comfortably in bed HEENT: Conjunctiva pale with thick yellow discharge, sclera anicteric. PERRL. EOMI. Dry mucous membranes. Thyroid not enlarged, no nodules. No cervical, supraclavicular, occipital or submandibular lymphadenopathy. No other gross abnormalities present. CV:  Irregular rhythm, normal rate, no murmurs. No visible pulsations or thrills. RESP:  Unlabored breathing. Lungs clear to auscultation without adventitious breath sounds. Equal expansion bilaterally. ABD:  Soft, TTP RUQ, RLQ, LLQ and suprapubic, voluntary guarding, nondistended. BS (+). No hepatosplenomegaly. R inguinal hernia, reducible, nontender, no overlying skin changes RECTAL:  Deferred as pt had BM in diaper during interview. MS:  No joint deformity. BLE atrophy. Neuro:  CN II-XII grossly intact. 4/5 strength bilateral upper extremities and lower extremities. Ext:  No edema. 2+ radial and dp pulses bilaterally. Skin:  No rashes, lesions, or ulcers. Good turgor. Intake and Output: 
Current Shift: 08/25 1901 - 08/26 0700 In: -  
Out: 500 Last three shifts: 08/24 0701 - 08/25 1900 In: -  
Out: 995 [[id number]] Lab/Data Review: 
Recent Results (from the past 12 hour(s)) CBC WITH AUTOMATED DIFF Collection Time: 08/25/20  8:47 PM  
Result Value Ref Range WBC 19.3 (H) 4.6 - 13.2 K/uL  
 RBC 2.80 (L) 4.70 - 5.50 M/uL HGB 8.4 (L) 13.0 - 16.0 g/dL HCT 25.9 (L) 36.0 - 48.0 % MCV 92.5 74.0 - 97.0 FL  
 MCH 30.0 24.0 - 34.0 PG  
 MCHC 32.4 31.0 - 37.0 g/dL  
 RDW 18.7 (H) 11.6 - 14.5 % PLATELET 104 971 - 390 K/uL MPV 9.4 9.2 - 11.8 FL  
 NEUTROPHILS 93 (H) 42 - 75 % LYMPHOCYTES 6 (L) 20 - 51 % MONOCYTES 1 (L) 2 - 9 % EOSINOPHILS 0 0 - 5 % BASOPHILS 0 0 - 3 %  
 ABS. NEUTROPHILS 17.9 (H) 1.8 - 8.0 K/UL  
 ABS. LYMPHOCYTES 1.2 0.8 - 3.5 K/UL  
 ABS. MONOCYTES 0.2 0 - 1.0 K/UL  
 ABS. EOSINOPHILS 0.0 0.0 - 0.4 K/UL  
 ABS. BASOPHILS 0.0 0.0 - 0.06 K/UL  
 DF MANUAL PLATELET COMMENTS ADEQUATE PLATELETS    
 RBC COMMENTS ANISOCYTOSIS 1+ 
    
 RBC COMMENTS POLYCHROMASIA 1+ 
    
 RBC COMMENTS MAYRA CELLS 1+ RECENT RESULTS MODALITY IMPRESSION  
XR Results from Hospital Encounter encounter on 08/23/20 XR CHEST PORT Narrative EXAM: AP portable chest. 
 
Indications: maliase Time stamp: 8/23/2020 1656 hours Comparison: Recent prior Findings: 
2 portable views of the chest. The orientation of the chest radiograph suggest 
 the exam was performed with left side down decubitus positioning, but not 
specified. Lines/Tubes/Devices:  None LUNGS: Some hazy density in the right lung that appears to be associated with 
volume loss and may be related to atelectasis. There is an opacity along the 
medial aspect of the right hemithorax that may be layering pleural fluid. The 
left lung is clear. No left-sided pleural effusion. MEDIASTINUM: Heart is borderline size. Tortuosity of the descending thoracic 
aorta accentuated by the decubitus positioning. BONES/SOFT TISSUES: Unremarkable Impression IMPRESSION: 
 
1. Right pleural effusion with hazy airspace opacities likely compressive 
atelectasis. Infiltrate could have a similar appearance. 2. Stable cardiac enlargement. CT Results from [facility] [place] encounter on 08/23/20 CTA CHEST ABD PELV W WO CONT Narrative CTA Chest, Abdomen And Pelvis With and without Enhancement INDICATION: Abdominal aortic aneurysm status post endovascular repair, peptic 
ulcer disease, heart failure, COPD with chronic respiratory failure on home 
oxygen, chronic back pain, BPH with malaise, worsening back pain, weakness for 
3-4 weeks, hypotension, acute on chronic anemia, melanotic stool. TECHNIQUE: 2.5 mm collimation axial images obtained from the thoracic inlet to 
the level of the pubic symphysis following the uneventful administration of 80 
mL Isovue-370 nonionic intravenous contrast.  Imaging performed during maximum 
aortic enhancement and is therefore suboptimal for evaluating solid organs and 
bowel. Raw data reviewed along with three-dimensional volume rendered images 
and maximum intensity projection images to better evaluate the tortuous vascular 
structures.  
 
All CT scans at this facility are performed using dose optimization technique as 
appropriate to a performed exam, to include automated exposure control, 
 adjustment of the mA and/or kV according to patient size (including appropriate 
matching first site-specific examinations), or use of iterative reconstruction 
technique. COMPARISON: 2/17/2020. Thyroid: Unremarkable. Heart: Severe regions of coronary arteriosclerosis in the left circumflex. Biatrial cardiac chamber enlargement. Pulmonary artery: Patent Lymph Nodes: 2 Similar 1.4 cm precarinal lymph nodes, likely reactive. Lung: Moderate centrilobular emphysema. Mild subsegmental atelectasis in the 
right lower lobe. Mild subsegmental atelectasis or scarring right upper and 
right middle lobes. Pleura: Increased moderate right and similar trace left pleural effusions. No 
pneumothorax. ABDOMEN FINDINGS:  
There is suboptimal evaluation of visceral organs secondary to timing of the 
exam. 
 
 
Liver: Similar scattered hepatic cysts measuring up to 2.6 cm in the left medial 
hepatic lobe at the dome and 2.4 cm right posterior hepatic lobe. There are a 
few too small to characterize hepatic lesions but statistically most likely 
benign. Spleen: Unremarkable. Pancreas: Pancreas is atrophic. Biliary: Possible adenomyomatosis gallbladder fundus. Bowel: There is a increased rectal stool burden. Gas-filled gastric distention 
third portion duodenum does not clearly cross midline into the left abdomen but 
there is no volvulus or bowel dilation. No bowel wall thickening. No 
pneumatosis. Appendix in the right inguinal hernia without inflammation, 
unchanged. There is fluid in the esophagus. Peritoneum/ Retroperitoneum: There is edema without free fluid or free air. Lymph Nodes: Unremarkable. Adrenal Glands: Unremarkable. Kidneys: Right kidney is 7.1 cm, previously 8.3 cm and the left is 10.7 cm, 
previously 10.4 cm. The right kidney hypoenhance is with cortical thinning. There is cortical scarring at the left lower pole.  There is a exophytic 0.9 cm 
 hyperdense lesion at the right lower pole without enhancement. 2 cm left lower 
pole cyst. Several too small to characterize renal lesions. PELVIS FINDINGS:  
 
Bladder/ Pelvic Organs: Small diverticulum at the left anterior bladder wall. Moderate bladder distention. Bones/Soft tissues: Mild anasarca. Osteopenia. Lumbar facet hypertrophy and 
arthropathy. Transitional lumbosacral junction. Degenerative disc disease. Central height loss superior endplate L3.   
 
 
AORTA FINDINGS: 
Thoracic aorta is not enlarged with mild atherosclerosis. 3 vessel arch anatomy 
with patent arch vessels. Subjectively, the aneurysm cranial to the proximal 
aortic stent landing zone is increased. There appears to be a similar 1 cm 
crescent of enhancement at the right anterior aspect with endoleak but there is 
increased nonopacified circumferential region measuring up to 0.6 cm on the left 
(series 3, image 138), not present on prior exam. The excluded aneurysm is 
subjectively larger. There is similar right type I B endoleak distal to the 
common iliac stent. The vessel measures 2.3 cm at this location, previously 2.1 
cm. The peripheral right common iliac is 1.9 cm, previously 1.7 cm. There are 
new linear regions of thrombus which protrude into the central aortic lumen at 
the more cranial aspect of the stent at the level of the left renal vein and 
within the bilateral common iliac stents protruding into the central lumen. Mild stenosis proximal celiac. Regions of stenosis splenic artery. Mild stenosis 
proximal SMA. Moderate stenosis proximal left renal artery and proximal 
occlusion right renal artery. There is stenosis at the origin of the right 
internal iliac, origin left internal iliac. Three-D generated aortic measurements: 
3.6, 3.2, 2.9 centimeter at the sinus of Valsalva. 3.5 x 3.1 centimeter at the sinotubular junction. 3.8 x 3.8 centimeter at the maximum diameter of the ascending aorta. 3.5 x 3.2 centimeter at the mid aortic arch. 3.4 x 3.2 centimeter at the proximal descending thoracic aorta. 3.6 x 3.1 centimeter at the mid descending aorta. 3.6 x 3.5 centimeter at the distal descending thoracic aorta at the level of the 
diaphragmatic hiatus. Aneurysm measurements: 
Volume 149.73 cc, previously 166.71 cc 
4.9 x 4.2 cm below the lowest renal artery including excluded aorta, previously 4.4 x 3.7 cm 
1 cm below the lowest renal artery within the stent: 3 x 2.6 cm; including 
excluded aorta: 5.8 x 4.4 cm, previously 3.9 x 3.9 cm 
3.1 x 3 cm within the stent at the top of the stent; 5.4 x 4.4 cm including 
excluded aorta, previously 4 x 3.6 cm 
2.7 x 2.7 cm within the stent below the lowest renal artery 2.7 x 2.7 cm Within the stent below the lowest renal artery 3 cm distance from the lowest renal artery to the top of the stent Impression IMPRESSION: 
1.  New strands of thrombus along the wall of the aortic and bilateral common 
iliac stents which protrudes into the central aortic lumen and place patient at 
risk for lower extremity embolic events. 2.  Increased aneurysm cranial to the aortobiiliac stent with resultant 
increased type I A endoleak. Similar type I B endoleak. Increased excluded 
aortic aneurysm measuring 5.8 cm (change of 2 cm at this level). Minimal 
increased right common iliac aneurysm measuring 1.9 cm. 
3.  Increased moderate right and similar small left pleural effusions. 4.  Increased rectal stool burden. No active extravasation. 5.  Gas-filled gastric distention. 6.  Occlusion proximal right renal artery with worsened atrophy and 
hypoenhancement. 7.  Severe left circumflex coronary arteriosclerosis. 8.  Biatrial cardiac chamber enlargement. 9.  Moderate stenosis left renal artery, stenosis at the origin of the bilateral 
internal iliac arteries. 10.  Moderate emphysema. 11.  Gastroesophageal reflux. 12.  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 13.  Unchanged Incidental Amyand hernia. 14.  Right renal hemorrhagic/proteinaceous cyst. 
15.  Small bladder diverticulum. Preliminary report provided by Dr. Trihsa Donaldson on 8/23/2020 at 2234 hours prior to 
3-D image availability. MRI Results from Hospital Encounter encounter on 08/25/17 MRA ABD W WO CONT Narrative CPT CODE: 77924 MRA Abdomen. CLINICAL HISTORY: Patient with history of infrarenal AAA repair. Left ileus 
psoas abscess drained 2/22/2016, discitis L3/L4 with adjacent osteomyelitis February 2016, likely discitis T11/T12 August 2016, recurrent psoas collection 8/25/2017 with likely chronic osteomyelitis L3. 
 
TECHNIQUE: Multiplanar images of abdomen and pelvis using SSFSE, FIESTA  with Cine, T1, LAVA, opposed phase, 3-D inflow and multiphase postcontrast LAVA T1 FS 
post contrast (10 cc Gadavist) sequences. COMPARISON: Multiple prior studies including lumbar MRI 5/5/2016, CT 2/21/2016, 
CT 8/4/2016, lumbar MRI 2/23/2016, CT 8/25/2017. FINDINGS:   
 
Lung bases: Small effusions. Liver: Multiple low T1 and high T2 signal lesions in the liver without 
enhancement and stable since prior. Gallbladder: Focal fundal thickening with stranding of pearls appearance. Spleen: Normal size and enhancement. Pancreas: Normal signal and enhancement. No duct dilatation. Adrenal glands: Normal. 
 
Kidneys: Normal size. Symmetric enhancement. Few tiny cysts bilaterally 
including hemorrhagic cyst posteriorly on the right measuring 6 mm. Mild 
cortical thinning lower pole left kidney with small cyst. 
 
General: Body wall edema at the flanks, around the hips, dependent parts of the 
retroperitoneum. Right inguinal hernia containing appendix and fat. Bowel: Unremarkable. : Urinary bladder, included parts of the prostate are unremarkable. Retroperitoneum: There is endovascular stent traversing AAA with maximum dimension 4.8 cm, stable. Stable wall thickening at the abdominal aorta with low T2 signal and enhancement of the wall, a stable finding dating to 2016. There is 
edema posterior to the abdominal aorta from 3:00 to 7:00 position (series 7 
image 49). Graft is widely patent. Aneurysmal dilatation iliac vessels as 
before. Right common iliac artery measures 24 mm. Left common iliac artery 
measures 18 mm. There is a similar adjacent fluid collection in the left psoas that extends 
anteromedially to abut the aorta (series 7 image 45 and measures 4.4 x 1.5 x 5.2 
cm (series 7 image 45, series 2007 image 39). There is surrounding fat stranding 
and enhancement compatible with edema and inflammation. Small adjacent lymph 
nodes measuring up to 10 mm. Fluid collection begins lateral to the L2/L3 disc space, appears contiguous with 
fluid in the L3/L4 disc space (coronal series 2007 image 37), and ends in the 
psoas at mid L3 level. A drain has been placed, posterolateral to the majority 
of the collection today (coronal series 2007) and collection does not appear 
appreciably decreased in size. Enhancement at L3 vertebral body laterally on the 
left c/w osteomyelitis. Severe multifactorial stenosis L3/L4. Impression IMPRESSION: 
 
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 Stable size since 
drain placement. Psoas collection abuts the aorta which is stable in appearance with wall 
thickening and enhancement as before. Stable chronic inflammation related to 
adjacent abscess remains possible. ULTRASOUND Results from Hospital Encounter encounter on 08/25/17 US EXT NONVAS RT LTD Impression Impression:  Large fat-containing right inguinal hernia. On recent CT scan 
(August 25, 2017), the appendix is seen extending through the internal inguinal 
ring into the inguinal hernia. This is not redemonstrated on today's ultrasound but potentially if the appendix does not have air within it, it may be difficult 
to visualize sonographically. No adenopathy. Results from [facility] [place] on 02/18/16 US SCROTUM/TESTICLES Impression IMPRESSION: 
1. Increased internal vascularity of the left testicle suggests mild orchitis. Cardiology Procedures/Testing: MODALITY RESULTS  
EKG Results for orders placed or performed during the hospital encounter of 08/23/20 EKG, 12 LEAD, INITIAL Result Value Ref Range Ventricular Rate 71 BPM  
 Atrial Rate 288 BPM  
 QRS Duration 86 ms  
 Q-T Interval 350 ms QTC Calculation (Bezet) 380 ms Calculated R Axis -42 degrees Calculated T Axis 81 degrees Diagnosis Atrial fibrillation Left axis deviation Low voltage QRS Abnormal ECG When compared with ECG of 26-JAN-2020 09:46, 
Criteria for Anteroseptal infarct are no longer present Confirmed by Ivan Norris MD, Leigh Giron ([id number]) on 8/24/2020 8:30:54 AM 
  
Results for orders placed or performed in visit on 08/10/18 AMB POC EKG ROUTINE W/ 12 LEADS, INTER & REP Narrative Read by Marzena Salas MD - atrial fibrillation, occasional ectopic ventricular beat. Nonspecific QRS widening and anterior fascicular block. Old anterior infarct. Possible nonspecific ST abnormality. ECHO 06/02/19 ECHO ADULT COMPLETE 06/04/2019 6/4/2019 Narrative · Left Ventricle: Estimated left ventricular ejection fraction is 56 -  
60%. Abnormal left ventricular septal motion. Interventricular septal  
\"bounce\". · Tricuspid Valve: Non-specific thickening of the tricuspid valve. Moderate tricuspid valve regurgitation is present. · Pulmonary Artery: Moderate pulmonary hypertension. Pulmonary arterial  
systolic pressure is 23.8 mmHg. · Pulmonic Valve: Mild pulmonic valve regurgitation is present. · Left Atrium: Severely dilated left atrium. · Right Atrium: Moderately dilated right atrium.  
   
  Signed by: Lemuel Jose MD  
 Special Testing/Procedures: MODALITY RESULTS MICRO All Micro Results Procedure Component Value Units Date/Time CULTURE, BLOOD [[id number]] Collected:  08/23/20 1805 Order Status:  Completed Specimen:  Blood Updated:  08/25/20 2055 Special Requests: NO SPECIAL REQUESTS     
  GRAM STAIN    
  ANAEROBIC BOTTLE NO ORGANISMS SEEN Culture result:    
  Sent to [facility] for ID/Susceptibility if indicated. CULTURE, BLOOD [[id number]] Collected:  08/23/20 1820 Order Status:  Completed Specimen:  Blood Updated:  08/25/20 1407 Special Requests: NO SPECIAL REQUESTS Culture result: No organisms seen on gram stain. Multiple subcultures are in progress. Tree Rodríguez [[id number]] Collected:  08/24/20 0358 Order Status:  Completed Specimen:  Urine from Clean catch Updated:  08/25/20 1026 Special Requests: NO SPECIAL REQUESTS Culture result: No growth (<1,000 CFU/ML) UA Results for orders placed or performed in visit on 04/07/14 AMB POC URINALYSIS DIP STICK AUTO W/O MICRO     Status: None Result Value Ref Range Status Color (UA POC) Yellow  Final  
 Clarity (UA POC) Clear  Final  
 Glucose (UA POC) Negative Negative Final  
 Bilirubin (UA POC) Negative Negative Final  
 Ketones (UA POC) Negative Negative Final  
 Specific gravity (UA POC) 1.010 1.001 - 1.035 Final  
 Blood (UA POC) Trace Negative Final  
 pH (UA POC) 7.0 4.6 - 8.0 Final  
 Protein (UA POC) Trace Negative mg/dL Final  
 Urobilinogen (UA POC) 0.2 mg/dL 0.2 - 1 Final  
 Nitrites (UA POC) Negative Negative Final  
 Leukocyte esterase (UA POC) Negative Negative Final  
  
PATH Assessment and Plan:  
 
79 y.o. male with PMH AAA s/p endovascular repair, Fe deficiency anemia, PUD, AFib, CHF, CAD, COPD with chronic hypoxic respiratory failure on home 3L NC, chronic back pain, and BPH, now admitted with acute on chronic anemia. 
  
 1. Acute on chronic anemia. DDx to include PUD, diverticulosis/itis, malignancy, AVM. Initial Hgb 6.0, after 3u PRBC, Hgb 7.2. EGD and Colonoscopy by [facility] on 5/28/19 showed hiatal hernia, erosive gastritis with flecks of coffee grounds, ascending colon polyp; diverticulosis; and hemorroids. He saw Hem/Onc on 8/7/20 for surveillance labs; no new intervention noted. Favor recurrent bleeding gastric ulcer. Pt has gross melena with guaiac positive stool. Hx of gastritis and PUD, on home protonix and noncompliant with carafate x 1 month. Possible bleeding diverticulae. Hx diverticulosist. TTP LLQ and suprapubic area, but no reported ABD pain. Melena would not support diverticular bleed, but pt has large stool burden suggesting long transit time through sigmoid for blood to be digested. WBC 18.5 consistent with diverticulitis. Malignancy possible in the setting of painless GIB, although CT does not show any obvious intraluminal neoplasm. Pt has had unintentional weight loss. AVM less likely; not noted on CT A/P w/ IV contrast. 
- admit to stepdown with telemetry - H/H Q4H, transfuse if Hgb < 7 
- monitor VS per unit routine 
- CBC, CMP, Mag, Phos daily 
- PT/INR 
- Vascular does not think this is a an issue with an aneurysm, most likely due to GI Ulcerative dz and chronic blood loss - GI following, wants to continue PPI drip, will add carafate, no plan for scopes at this time unless overt bleeding or recurrent precipitous drop in h/h, awaiting liver labs to r/o autoimmune source for elevated LFTs 
- protonix gtt  
- NPO 
- NS @ 250mL/hr 
- PT/OT/CM 
  
Sepsis: 2/4 SIRS: tachypnea and WBC 19.3. Current DDx includes intraabdominal infection, diverticulitis, UTI, spine osteomyelitis/abscess. No evidence of intraabdomninal infection on wet read. LLQ TTP and melena support diverticulitis. Possible UTI in light of t w/ hx of BPH and imaging shows distended bladder.  Pt received flagyl and rocephin in ED. Hx of lumbar spine osteomyelitis and abscess, but no imaging suggestive of acute osteomyelitis. - FU UA and UCx 
- FU BCx 
- FU CT final read 
- continue flagyl and rocephin 
  
AFib/CHF/CAD: rate controlled on amlodipine; off ASA and Coumadin due to hx of GIB. No evidence of decompensated CHF noted in cardiology clinic visit (6/16/20). CXR shows large-moderate R pleural effusion. Coronary angiography in 2012 only showed mild CAD. - hold home amlodipine 10mg daily, digoxin 125mcg daily, pravastatin 40mg daily while NPO 
- hold lisinopril 20mg daily 
            - diurese pleural effusion once BP more stable 
  
COPD: C/b chronic hypoxic respiratory failure, on 3L NC at home. - supplemental O2 for sats < 92% 
- duoneb PRN 
  
Chronic back pain: on home norco 5-325 
- lidocaine patch 
- hold norco while NPO 
  
BPH  
- hold tamsulosin 0.4mg daily while NPO 
  
Insomnia: On two home sleeping aids; probably does not need both. Defer to day team to choose which one to continue 
- hold duloxetine 20mg QHS and trazadone 50mg QHS while NPO.   
  
Diet NPO  
DVT Prophylaxis contraindicated GI Prophylaxis protonix gtt Code status No CPR, Intubation ok Disposition > 2 MN  
  
[facility] Relationship: daughter 
[phone] Muna Mccullough 
Daughter 
[phone] Justin Donahue DO, PGY-1  
[facility] Medicine Intern Pager: [phone] August 26, 2020, 7:00 AM

## 2020-08-26 NOTE — PROGRESS NOTES
[email or web address] 
[phone] Gastroenterology follow up-Progress note Impression: 1. GI bleed - Grossly melanotic/maroon stool on ED exam, denies seeing blood in stool PTA. Last scopes 5/2019, EGD/[place] showed mild erosive gastritis and TA polyp in ascending colon. - now w/ bright red blood via NGT suction. Suspect suction ulcer due to suction being set too high. Now turned off. 
2. Acute on chronic anemia - stable, hgb now 8.8, long standing hx of SANTOS 3. Abnormal LFTs - hx ETOH, quit in 1990s, unclear if elevation due to infection or underlying liver disease. 4. Sepsis 5. Afib - no anticoagulation due to hx gastric ulcer 6. AAA s/p EVAR, followed by Dr. Miranda Alvarez 7. COPD - on 2L continuous O2 Plan: 1. Continue PPI and carafate 2. Monitor H/H, transfuse per protocol 3. Discontinue and remove NGT 4. Liquid diet as tolerated 5. NPO after midnight 6. EGD tomorrow Chief Complaint: GI bleed Subjective:  Patient was noted to have 100-200cc blood in suction canister earlier today, suction was set to moderate continuous suction. Notified by RN and reviewed. Patient hemodynamically stable. Last BM yesterday, bloody but no additional details available. Patient denies abdominal pain or nausea at this time. Laying flat in bed. ROS: Denies any fevers, chills, rash. General: well developed, well nourished, no acute distress Eyes: conjunctiva normal, EOM normal 
Cardiovascular: heart normal, intact distal pulses, normal rate and regular rhythm Pulmonary: breath sounds normal and effort normal 
Abdominal: appearance normal, bowel sounds normal and soft, non-acute, diffuse tenderness w/o guarding Patient Active Problem List  
Diagnosis Code  Hypercholesterolemia E78.00  Congestive heart failure (CHF) (HCC) I50.9  Peripheral vascular disease (HCC) I73.9  
 COPD (chronic obstructive pulmonary disease) (HCC) J44.9  
 HTN (hypertension) I10  
  Chronic atrial fibrillation (HCC) I48.20  RLQ abdominal pain R10.31  Status post AAA (abdominal aortic aneurysm) repair Q33.379, Z86.79  
 Aneurysm of abdominal aorta (HCC) I71.4  Chronic constipation K59.09  
 Right inguinal hernia K40.90  Left inguinal hernia K40.90  CAD (coronary artery disease) I25.10  Hypoxemia requiring supplemental oxygen R09.02, Z99.81  Warfarin-induced coagulopathy ([address] D68.32, T45.515A  Intraabdominal fluid collection R18.8  Chronic back pain M54.9, G89.29  
 Sepsis ([address] A41.9  Vertebral osteomyelitis ([address] M46.20  Psoas abscess ([facility]) K68.12  
 Abscess L02.91  
 Discitis of lumbar region M46.46  
 Pulmonary hypertension ([facility]) I27.20  
 CHF exacerbation ([facility]) I50.9  Hyponatremia E87.1  Symptomatic anemia D64.9  Chronic pain syndrome G89.4  Chronic kidney disease N18.9  Iron deficiency anemia D50.9  [place] of aortic graft ([address] T82.330A  TYRELL (acute kidney injury) ([address] N17.9  Enlarged prostate without lower urinary tract symptoms (luts) N40.0  GERD (gastroesophageal reflux disease) K21.9  Left arm swelling M79.89  Lethargy R53.83  Lower extremity weakness R29.898  Mediastinal lymphadenopathy R59.0  Spondylolysis M43.00  Age-related nuclear cataract, bilateral H25.13  
 Iron deficiency anemia due to chronic blood loss D50.0  Community acquired pneumonia J18.9  
 UTI (urinary tract infection) N39.0  GI bleed K92.2 Visit Vitals /71 (BP 1 Location: Right arm, BP Patient Position: At rest) Pulse 83 Temp 98 °F (36.7 °C) Resp 17 Ht 5' 6\" (1.676 m) Wt 67.7 kg (149 lb 4 oz) SpO2 100% BMI 24.09 kg/m² Intake/Output Summary (Last 24 hours) at 8/26/2020 1427 Last data filed at 8/26/2020 1611 Gross per 24 hour Intake 0 ml Output 700 ml Net -700 ml CBC w/Diff Lab Results Component Value Date/Time  WBC 18.9 (H) 08/26/2020 05:45 AM  
 RBC 2.89 (L) 08/26/2020 05:45 AM  
 HGB 8.9 (L) 08/26/2020 05:45 AM  
 HGB 8.8 (L) 08/26/2020 05:45 AM  
 HCT 27.6 (L) 08/26/2020 05:45 AM  
 HCT 27.0 (L) 08/26/2020 05:45 AM  
 MCV 93.4 08/26/2020 05:45 AM  
 MCH 30.4 08/26/2020 05:45 AM  
 MCHC 32.6 08/26/2020 05:45 AM  
 RDW 19.1 (H) 08/26/2020 05:45 AM  
  08/26/2020 05:45 AM  
 Lab Results Component Value Date/Time GRANS 91 (H) 08/26/2020 05:45 AM  
 LYMPH 4 (L) 08/26/2020 05:45 AM  
 EOS 0 08/26/2020 05:45 AM  
 BANDS 1 08/26/2020 05:45 AM  
 BASOS 0 08/26/2020 05:45 AM  
  
Basic Metabolic Profile Recent Labs  
  08/26/20 
0545   
K 3.9  CO2 20* BUN 31*  
CA 8.5 MG 2.2 PHOS 2.1* Hepatic Function Lab Results Component Value Date/Time ALB 2.4 (L) 08/26/2020 05:45 AM  
 TP 6.4 08/26/2020 05:45 AM  
  (H) 08/26/2020 05:45 AM  
 No results found for: TBIL Coags Recent Labs  
  08/23/20 
2245 PTP 19.3* INR 1.7* APTT 34.0 DELIA Chauhan 
08/26/20, 3:18 PM  
[facility]. [email or web address] Phone: [phone] Pager: [phone]

## 2020-08-26 NOTE — ROUTINE PROCESS
Bedside shift change report given to James Campbell (oncoming nurse) by PRAVEEN Wright (offgoing nurse). Report included the following information SBAR, Kardex, Intake/Output, MAR and Recent Results.

## 2020-08-26 NOTE — PROGRESS NOTES
0705-Bedside and Verbal shift change report received from  Kashif Zelaya ,[facility] (off going nurse). Report included the following information SBAR, Kardex, MAR and Recent Results. Assumed care,fall prevention program maintained,call bell and bedside table within reach. Will continue care. 0845-Medication given ,held the suction for 30 min. 1130- Noticed blood coming out from the suction canister and NGT, decrease suction to low intermittent. Will inform GI. 
1204-Medication given ,held the suction for 30 min. 1214-Call and informed GI (DELIA Petersen) regarding the bloody output on the suction canister. She said she is on her way to see patient.

## 2020-08-26 NOTE — MED STUDENT NOTES
*ATTENTION:  This note has been created by a medical student for educational purposes only. Please do not refer to the content of this note for clinical decision-making, billing, or other purposes. Please see attending physicians note to obtain clinical information on this patient. * [facility] Medical Student Progress Note Patient: Ritu Alvarez MRN: [id number] SSN: [id number]  YOB: 1940 Age: 79 y.o. Sex: male Admit Date: 8/23/2020 LOS: 2 days Chief Complaint Patient presents with  Fatigue Subjective:  
 
Pt is doing alright today. He reports that he may feel a little better to about the same today as he did yesterday. He still has some back pain and abdominal pain. The Dilaudid continues to help though (last dose 5AM). He is having BM's that he is told are still bloody. He is not having any issues urinating (no blood or dysuria). He says that his cough may have increased some. He is thirsty and says that his mouth is dry and wants to have the NGT out and drink some water. ROS negative for CP, SOB, dizziness, lightheadedness, fever, chills, nausea, vomiting Objective:  
 
Visit Vitals /67 (BP 1 Location: Right arm, BP Patient Position: At rest;Head of bed elevated (Comment degrees)) Pulse 88 Temp 98.2 °F (36.8 °C) Resp 20 Ht 5' 6\" (1.676 m) Wt 67.7 kg (149 lb 4 oz) SpO2 100% BMI 24.09 kg/m² Physical Exam:  
General appearance: Laying comfortably in bed, alert, cooperative, no distress Lungs: clear to auscultation bilaterally on anterior and lateral fields, decreased breath sounds at right base Heart: irregular rhythm but regular rate, S1, S2 normal, no murmur, click, rub or gallop Abdomen: scaphoid, soft, non-distended, still with mild tenderness in LLQ and suprapubic area, tenderness has improved from yesterday. + Bowel sounds. No masses,  no organomegaly Pulses: Irregular pulse, DP palpable on right but nonpalpable on left Neuro:  mental status, speech normal, alert and oriented x iii Exremities: maybe 1+ pitting edema bilateral lower extremities today but appears improved from yesterday Of note pt's bedside alarm went off while rolling to side to listen to lungs from back. Monitor read VTach, pt's pulse in the low 100s. Pt appeared in mild distress but denies CP or SOB. Alarm eventually resolved as pt rested on back, and monitor read A-fib with pulse in the 70-80s. Intake and Output: 
Current Shift: No intake/output data recorded. Last three shifts: 08/24 1901 - 08/26 0700 In: -  
Out: 850 [Urine:350] Lab/Data Review: 
Recent Results (from the past 12 hour(s)) MAGNESIUM Collection Time: 08/26/20  5:45 AM  
Result Value Ref Range Magnesium 2.2 1.6 - 2.6 mg/dL PHOSPHORUS Collection Time: 08/26/20  5:45 AM  
Result Value Ref Range Phosphorus 2.1 (L) 2.5 - 4.9 MG/DL  
HGB & HCT Collection Time: 08/26/20  5:45 AM  
Result Value Ref Range HGB 8.9 (L) 13.0 - 16.0 g/dL HCT 27.6 (L) 36.0 - 48.0 % METABOLIC PANEL, COMPREHENSIVE Collection Time: 08/26/20  5:45 AM  
Result Value Ref Range Sodium 143 136 - 145 mmol/L Potassium 3.9 3.5 - 5.5 mmol/L Chloride 111 100 - 111 mmol/L  
 CO2 20 (L) 21 - 32 mmol/L Anion gap 12 3.0 - 18 mmol/L Glucose 84 74 - 99 mg/dL BUN 31 (H) 7.0 - 18 MG/DL Creatinine 0.97 0.6 - 1.3 MG/DL  
 BUN/Creatinine ratio 32 (H) 12 - 20 GFR est AA >60 >60 ml/min/1.73m2 GFR est non-AA >60 >60 ml/min/1.73m2 Calcium 8.5 8.5 - 10.1 MG/DL Bilirubin, total 0.5 0.2 - 1.0 MG/DL  
 ALT (SGPT) 39 16 - 61 U/L  
 AST (SGOT) 47 (H) 10 - 38 U/L Alk. phosphatase 181 (H) 45 - 117 U/L Protein, total 6.4 6.4 - 8.2 g/dL Albumin 2.4 (L) 3.4 - 5.0 g/dL Globulin 4.0 2.0 - 4.0 g/dL A-G Ratio 0.6 (L) 0.8 - 1.7    
CBC WITH AUTOMATED DIFF  Collection Time: 08/26/20  5:45 AM  
 Result Value Ref Range WBC 18.9 (H) 4.6 - 13.2 K/uL  
 RBC 2.89 (L) 4.70 - 5.50 M/uL HGB 8.8 (L) 13.0 - 16.0 g/dL HCT 27.0 (L) 36.0 - 48.0 % MCV 93.4 74.0 - 97.0 FL  
 MCH 30.4 24.0 - 34.0 PG  
 MCHC 32.6 31.0 - 37.0 g/dL  
 RDW 19.1 (H) 11.6 - 14.5 % PLATELET 593 075 - 159 K/uL MPV 9.2 9.2 - 11.8 FL  
 NEUTROPHILS PENDING % LYMPHOCYTES PENDING % MONOCYTES PENDING % EOSINOPHILS PENDING % BASOPHILS PENDING %  
 ABS. NEUTROPHILS PENDING K/UL  
 ABS. LYMPHOCYTES PENDING K/UL  
 ABS. MONOCYTES PENDING K/UL  
 ABS. EOSINOPHILS PENDING K/UL  
 ABS. BASOPHILS PENDING K/UL  
 DF PENDING [place] Findings or tests: DATE TEST RESULT OR IMPRESSION  
8/25/2020 Autoimmune Liver Panel  Pending 8/25/2020 Bladder Scan 83 mL residual volume Telemetry Oxygen 3 L NC Assessment and Plan:  
 
79 y. o. male with PMH of AAA s/p endovascular repair, iron deficiency anemia, peptic ulcer disease, A-fib, CHF, CAD, COPD on 3 L NC at home, chronic back pain, and BPH who presented with a 3-4 week history of malaise, worsening back pain and weakness is now admitted for suspected GI bleed.  
  
Acute on chronic anemia Pt presented with 3-4 weeks of weakness and malaise. Hx of PUD. Stopped taking home carafate but continued to take protonix. Hemoglobin 6 on admission, and improved to 7.2 after 3 U of PRBCs. Melenotic stool in ED. POC lactic acid 7.14 and leukocytosis on admission. Pt afebrile. TTP with guarding but without rebound in bilateral lower quadrants, periumbilical, and suprapubic regions. CTA C/A/P with multiple pathological findings as listed above. Working ddx includes recurrent bleeding gastric ulcer vs malignancy vs mesenteric ischemia vs diverticular bleed. 8/25/2020 Pt appears slightly better than yesterday. Vascular surgery does not feel that anemia is related to AAA.  No immediate need for scope per GI. Pt's throat irritated from NGT. Pt appears fluid overloaded on exam.  
8/26/2020 Pt around the same clinically today as yesterday. Hemoglobin stable around high 8 to low 9, still with blood in stool and lower abdominal pain. Still remains hemodynamically stable. -NPO with NGT 
-f/u with GI recs regarding possibility of scope 
-Q4hrs H/H checks 
-cont to monitor vital signs and clinical status closely 
-Avoid NSAIDs  
-daily CBC, CMP, Mg, PO4 
-monitor PT/INR 
-cont IVF NS at 90 mL/hr, monitor volume status 
-cont IV Protonix  
-f/u PT/OT/CM 
-cont Carafate 1 g 4x daily AAA s/p endovascular repair CTA C/A/P showing Type I endoleak and enlarging aneurysm. Vascular surgery discussed options with pt. Pt does not want to go to [place]. Unsure if he would like to do nothing vs evaluation at [facility]. -f/u with Vascular surgery and pt's decision 
-cont to monitor VS 
  
Leukocytosis WBC 18.5 on admission. Pt afebrile and w/o tachycardia. Episodes of mild tachypnea recorded in VS, though could be due to underlying COPD or anemia. Blood cx w/o growth, UA WNL, CXR with evidence of right pleural effusion with underlying atelectasis. Abdominal tenderness on exam with hx of diverticular disease suggest diverticulitis as possibility source of elevated WBC count, though no clear infectious source is apparent. Periumbilical abdominal tenderness with elevated lactate on admission and worsening vascular disease evident on CTA C/A/P and pt hx of a-fib not currently on AC point to mesenteric ischemia as possible etiology of leukocytosis. 8/25/2020 Pt still afebrile. Bcx and Ucx's still without growth. Pt still with abdominal pain though no evidence of diverticulitis/abscess noted on CTA.  
8/26/2020 Pt still afebrile. No growth in Bcx and Ucx. Abdominal pain appears to be improving, possible increased cough from baseline. Continued leukocytosis with neutrophil predominance. -cont flagyl and rocephin for now until source of infection can be excluded 
-cont to monitor with daily CBC  
-consider obtaining sputum cx due to mild increased cough 
-may consider evaluating for underlying hematologic malignancy if infection ruled out but leukocytosis persists, especially given recent hx of unintentional weight loss 
  
Atrial Fibrillation/Congestive Heart Failure/Coronary Artery Disease ROS negative for chest pain or shortness of breath. Heart rate WNL on admission, irregular rhythm noted on EKG and exam. Rate control with digoxin 0.125 mg Qday at home. Previously on ASA and warfarin for [facility], but stopped due to hx of GI bleed. CXR showed right pleural effusion and stable cardiomegaly. Coronary angiography in 2012 showed mild CAD, but CTA C/A/P demonstrated severe atherosclerosis of left coronary circumflex artery. 8/26/2020 Pt still in A-fib. Episode of possible nonsustained Vtach on exam today. -currently holding home medications as pt is NPO (amlodipine 10 mg Qday, digoxin 0.125 mg Qday, lisinopril 20 mg Qday) -receiving pravastatin 40 mg QHS 
-nursing notified of possible Vtach, will cont to monitor, if multiple episodes of Vtach noted will consult Cardiology Metabolic derangement 8/26/2020 PO4 at 2.1 today. -replete phosphate 0.32 mmol/kg over 4hrs Tansaminitis LFT's elevated on admission. Continuing to downtrend. Negative viral hepatitis panel. Currently on statin. GI ordered autoimmune liver panel 
-cont to monitor with daily CMP 
-f/u autoimmune liver panel COPD Pt currently not short of breath. Does have hx of chronic hypoxic respiratory failure on 3 L NC at home. O2 sats WNL on 3 L NC. 
-cont supplemental O2 at 3 L on NC, increase if sats <92% -PRN dounebs 
  
Chronic back pain, now with abdominal pain Takes 5-325 mg Norco and duloxetine 20 mg QHS at home. Does not want to use lidocaine patches. Having continued pain this morning.  Does have allergy to morphine (gets very itchy). -cont to hold home Norco and duloxetine while NPO 
-cont IV Dilaudid 0.2 mg Q4hrs PRN 
-monitor BP closely 
-monitor for signs of allergic reaction 
  
BPH On tamsulosin 0.4 mg Qday at home. Does have tenderness and fullness in suprapubic region. 8/26/2020 Bladder Scan 83 mL post void 
-cont holding tamsulosin while NPO 
-monitor I/Os 
  
Insomnia Takes trazadone 50 mg QHS at home.  
-cont to hold trazadone while NPO 
  
  
Diet NPO  
DVT Prophylaxis contraindicated GI Prophylaxis protonix gtt Code status No CPR, Intubation ok Disposition > 2 MN  
  
Point of Contact Alexis Lopez Relationship: daughter 
[phone] Yohannes Concepcion 
Daughter 
[phone] Dearl GLORIA Babcock Intern Pager: [phone] August 26, 2020, 8:16 AM

## 2020-08-27 NOTE — ANESTHESIA PREPROCEDURE EVALUATION
Anesthetic History No history of anesthetic complications Review of Systems / Medical History Patient summary reviewed and pertinent labs reviewed Pulmonary COPD: moderate Shortness of breath Neuro/Psych Cardiovascular Hypertension: well controlled Dysrhythmias : atrial fibrillation CAD and PAD Exercise tolerance: <4 METS 
  
GI/Hepatic/Renal 
Within defined limits Endo/Other Blood dyscrasia, arthritis and anemia Other Findings Comments: Home O2 Physical Exam 
 
Airway Mallampati: II 
TM Distance: 4 - 6 cm Neck ROM: normal range of motion Mouth opening: Normal 
 
 Cardiovascular Regular rate and rhythm,  S1 and S2 normal,  no murmur, click, rub, or gallop Dental 
 
Dentition: Full upper dentures, Full lower dentures and Edentulous Pulmonary Breath sounds clear to auscultation Abdominal 
GI exam deferred Other Findings Anesthetic Plan ASA: 4 Anesthesia type: MAC Anesthetic plan and risks discussed with: Patient

## 2020-08-27 NOTE — PROGRESS NOTES
Echocardiogram completed. Patient returned to room with armband in place. Report to follow.  
 
[address] [facility]

## 2020-08-27 NOTE — PROGRESS NOTES
MRI Safety Screening form needs to be filled out and FAXED to [phone] MRI can be scheduled. If unable to acquire information from patient  MPOA must be contacted, or screening xrays will need to be ordered.    
IF pt is Claustrophobic or will need Pain Meds please have these ordered in advance to help facilitate MRI exam.

## 2020-08-27 NOTE — PERIOP NOTES
TRANSFER - IN REPORT: 
 
Verbal report received from [facility]) on Laxmi Prow  being received from 2315(unit) for routine progression of care Report consisted of patients Situation, Background, Assessment and  
Recommendations(SBAR). Information from the following report(s) Procedure Verification was reviewed with the receiving nurse. Opportunity for questions and clarification was provided. Assessment completed upon patients arrival to unit and care assumed.

## 2020-08-27 NOTE — MED STUDENT NOTES
*ATTENTION:  This note has been created by a medical student for educational purposes only. Please do not refer to the content of this note for clinical decision-making, billing, or other purposes. Please see attending physicians note to obtain clinical information on this patient. * [address] [facility] Medical Student Progress Note Patient: Elda Yeung MRN: [id number] SSN: [id number]  YOB: 1940 Age: 79 y.o. Sex: male Admit Date: 8/23/2020 LOS: 3 days Chief Complaint Patient presents with  Fatigue Subjective:  
 
Pt states he feels about the same as yesterday. Still reports back and abdominal pain, and he still feels weak. He was happy that his NGT was out. He says he was able to have some broth and tea without any nausea, vomiting, or other issues. He reports that his cough is improved. He is urinating without any issues. He is still passing flatus and making BM's but unsure if they contain blood. ROS negative for chest pain, SOB, fever or chills Objective:  
 
Visit Vitals /79 Pulse (!) 110 Temp 97.9 °F (36.6 °C) Resp 16 Ht 5' 6\" (1.676 m) Wt 61.2 kg (134 lb 14.7 oz) SpO2 100% BMI 21.78 kg/m² Physical Exam:  
General appearance: laying comfortably in bed, alert, cooperative, no distress Lungs: clear to auscultation bilaterally on anterior and lateral fields Heart: regular rate but irregular rhythm, S1, S2 normal, no murmur, click, rub or gallop Abdomen: flat, soft, mild diffuse tenderness. Bowel sounds normal. No masses,  no organomegaly Pulses: 2+ radial, irregular, DP palpable on right foot but not left Skin: Skin color, texture, turgor normal. No rashes or lesions Neuro:  mental status, speech normal, alert and oriented x iii Exremities: approximately 1+ pitting edema of bilateral lower extremities Intake and Output: 
Current Shift: 08/27 0701 - 08/27 1900 In: -  
Out: 200 [Urine:200] Last three shifts: 08/25 1901 - 08/27 0700 In: 340 [P.O.:240; I.V.:100] Out: 640 Lab/Data Review: 
Recent Results (from the past 12 hour(s)) HGB & HCT Collection Time: 08/27/20  1:30 AM  
Result Value Ref Range HGB 8.5 (L) 13.0 - 16.0 g/dL HCT 26.5 (L) 36.0 - 48.0 % MAGNESIUM Collection Time: 08/27/20  5:23 AM  
Result Value Ref Range Magnesium 2.0 1.6 - 2.6 mg/dL PHOSPHORUS Collection Time: 08/27/20  5:23 AM  
Result Value Ref Range Phosphorus 1.9 (L) 2.5 - 4.9 MG/DL  
CBC WITH AUTOMATED DIFF Collection Time: 08/27/20  5:23 AM  
Result Value Ref Range WBC 14.3 (H) 4.6 - 13.2 K/uL  
 RBC 2.77 (L) 4.70 - 5.50 M/uL HGB 8.4 (L) 13.0 - 16.0 g/dL HCT 26.2 (L) 36.0 - 48.0 % MCV 94.6 74.0 - 97.0 FL  
 MCH 30.3 24.0 - 34.0 PG  
 MCHC 32.1 31.0 - 37.0 g/dL  
 RDW 19.8 (H) 11.6 - 14.5 % PLATELET 793 (L) 917 - 420 K/uL MPV 9.6 9.2 - 11.8 FL  
 NEUTROPHILS 89 (H) 40 - 73 % LYMPHOCYTES 5 (L) 21 - 52 % MONOCYTES 6 3 - 10 % EOSINOPHILS 0 0 - 5 % BASOPHILS 0 0 - 2 %  
 ABS. NEUTROPHILS 12.7 (H) 1.8 - 8.0 K/UL  
 ABS. LYMPHOCYTES 0.7 (L) 0.9 - 3.6 K/UL  
 ABS. MONOCYTES 0.9 0.05 - 1.2 K/UL  
 ABS. EOSINOPHILS 0.0 0.0 - 0.4 K/UL  
 ABS. BASOPHILS 0.0 0.0 - 0.1 K/UL  
 DF AUTOMATED METABOLIC PANEL, COMPREHENSIVE Collection Time: 08/27/20  5:23 AM  
Result Value Ref Range Sodium 144 136 - 145 mmol/L Potassium 3.5 3.5 - 5.5 mmol/L Chloride 112 (H) 100 - 111 mmol/L  
 CO2 26 21 - 32 mmol/L Anion gap 6 3.0 - 18 mmol/L Glucose 164 (H) 74 - 99 mg/dL BUN 25 (H) 7.0 - 18 MG/DL Creatinine 0.90 0.6 - 1.3 MG/DL  
 BUN/Creatinine ratio 28 (H) 12 - 20 GFR est AA >60 >60 ml/min/1.73m2 GFR est non-AA >60 >60 ml/min/1.73m2 Calcium 8.6 8.5 - 10.1 MG/DL Bilirubin, total 0.5 0.2 - 1.0 MG/DL  
 ALT (SGPT) 28 16 - 61 U/L  
 AST (SGOT) 27 10 - 38 U/L Alk. phosphatase 158 (H) 45 - 117 U/L Protein, total 6.7 6.4 - 8.2 g/dL Albumin 2.4 (L) 3.4 - 5.0 g/dL Globulin 4.3 (H) 2.0 - 4.0 g/dL A-G Ratio 0.6 (L) 0.8 - 1.7 Key Findings or tests: DATE TEST RESULT OR IMPRESSION  
8/26/2020 Echo Pending 8/26/2020 CXR Pending 8/26/2020 Repeat Blood Cx No growth 14 hrs  
8/26/2020 Sputum Cx Pending 8/26/2020 Peripheral Smear Pending 8/23/2020  Blood cx Eikenella corrodens, 1 anearobic bottle, beta lactamase negative, sensitivities pending Telemetry Oxygen 3 L NC Assessment and Plan:  
 
79 y. o. male with PMH of AAA s/p endovascular repair, iron deficiency anemia, peptic ulcer disease, A-fib, CHF, CAD, COPD on 3 L NC at home, chronic back pain, and BPH who presented with a 3-4 week history of malaise, worsening back pain and weakness is now admitted for suspected GI bleed.  
  
Acute on chronic anemia Pt presented with 3-4 weeks of weakness and malaise. Hx of PUD. Stopped taking home carafate but continued to take protonix. Hemoglobin 6 on admission, and improved to 7.2 after 3 U of PRBCs. Melenotic stool in ED. POC lactic acid 7.14 and leukocytosis on admission. Pt afebrile. TTP with guarding but without rebound in bilateral lower quadrants, periumbilical, and suprapubic regions. CTA C/A/P with multiple pathological findings as listed above. Working ddx includes recurrent bleeding gastric ulcer vs malignancy vs mesenteric ischemia vs diverticular bleed.  
8/25/2020 Pt appears slightly better than yesterday. Vascular surgery does not feel that anemia is related to AAA. No immediate need for scope per GI. Pt's throat irritated from NGT. Pt appears fluid overloaded on exam.  
8/26/2020 Pt around the same clinically today as yesterday. Hemoglobin stable around high 8 to low 9, still with blood in stool and lower abdominal pain. Still remains hemodynamically stable. 8/27/2020 Pt remains stable. Doing well with NGT out.  
-NGT out but NPO for EGD today, will f/u (appreciate GI's support) 
-Q12hrs H/H checks -cont to monitor vital signs and clinical status closely 
-Avoid NSAIDs  
-daily CBC, CMP, Mg, PO4 
-monitor PT/INR 
-cont IVF D5 1/2 NS with 10 K at 90 mL/hr, consider increasing to 20 K, monitor volume status 
-cont IV Protonix  
-cont Carafate 1 g 4x daily Generalized weakness -PT/OT after EGD today  
  
AAA s/p endovascular repair CTA C/A/P showing Type I endoleak and enlarging aneurysm. Vascular surgery discussed options with pt. Pt does not want to go to [place]. Unsure if he would like to do nothing vs evaluation at [facility]. 
8/27/2020 Vascular surgery does not recommend IVC filter at this time.  
-cont to f/u with further Vascular surgery recs (appreciate support) and pt's decision about possible AAA repair 
-cont to monitor VS 
  
Leukocytosis WBC 18.5 on admission. Pt afebrile and w/o tachycardia. Episodes of mild tachypnea recorded in VS, though could be due to underlying COPD or anemia. Blood cx w/o growth, UA WNL, CXR with evidence of right pleural effusion with underlying atelectasis. Abdominal tenderness on exam with hx of diverticular disease suggest diverticulitis as possibility source of elevated WBC count, though no clear infectious source is apparent. Periumbilical abdominal tenderness with elevated lactate on admission and worsening vascular disease evident on CTA C/A/P and pt hx of a-fib not currently on AC point to mesenteric ischemia as possible etiology of leukocytosis.  
8/25/2020 Pt still afebrile. Bcx and Ucx's still without growth. Pt still with abdominal pain though no evidence of diverticulitis/abscess noted on CTA.  
8/26/2020 Pt still afebrile. No growth in Bcx and Ucx. Abdominal pain appears to be improving, possible increased cough from baseline. Continued leukocytosis with neutrophil predominance. 8/27/2020 Pt remains afebrile. Bcx from 8/23/2020 growing Eikenella corrodens in 1 anaerobic bottle. Beta lactamase negative, susceptibilities pending. Stopped Flagyl and Rocephin. Zosyn started. ID following (appreciate recs). -cont Zosyn 
-cont to monitor with daily CBC  
-f/u sputum cx, repeat blood cx, echo and CXR 
-f/u peripheral smear 
  
Atrial Fibrillation/Congestive Heart Failure/Coronary Artery Disease ROS negative for chest pain or shortness of breath. Heart rate WNL on admission, irregular rhythm noted on EKG and exam. Rate control with digoxin 0.125 mg Qday at home. Previously on ASA and warfarin for [facility], but stopped due to hx of GI bleed. CXR showed right pleural effusion and stable cardiomegaly. Coronary angiography in 2012 showed mild CAD, but CTA C/A/P demonstrated severe atherosclerosis of left coronary circumflex artery. 8/26/2020 Pt still in A-fib. Episode of possible nonsustained Vtach on exam today. -currently holding home medications as pt is NPO (amlodipine 10 mg Qday, digoxin 0.125 mg Qday, lisinopril 20 mg Qday) -receiving pravastatin 40 mg QHS 
-nursing notified of possible Vtach, will cont to monitor, if multiple episodes of Vtach noted will consult Cardiology 
-consider restarting PO meds after EGD today  
  
Metabolic derangement 8/26/2020 PO4 at 2.1 today. 8/27/2020 PO4 down to 1.9 today 
-replete phosphate 0.32 mmol/kg over 4hrs 
  
Tansaminitis LFT's elevated on admission. Continuing to downtrend. Negative viral hepatitis panel. Currently on statin. GI ordered autoimmune liver panel 8/27/2020 Continuing to resolve. Smooth muscle antibody weakly positive, probably false positive.  
-cont to monitor with daily CMP 
-f/u GI input 
  
COPD Pt currently not short of breath. Does have hx of chronic hypoxic respiratory failure on 3 L NC at home. O2 sats WNL on 3 L NC. 
-cont supplemental O2 at 3 L on NC, increase if sats <92% -PRN dounebs 
  
Chronic back pain, now with abdominal pain Takes 5-325 mg Norco and duloxetine 20 mg QHS at home.  Does not want to use lidocaine patches. Having continued pain this morning. Does have allergy to morphine (gets very itchy). -cont to hold home Norco and duloxetine while NPO 
-cont IV Dilaudid 0.2 mg Q4hrs PRN 
-monitor BP closely 
-monitor for signs of allergic reaction 
-cont lidocaine patch 
  
BPH On tamsulosin 0.4 mg Qday at home. Does have tenderness and fullness in suprapubic region. 8/26/2020 Bladder Scan 83 mL post void 
-cont holding tamsulosin while NPO 
-monitor I/Os 
  
Insomnia Takes trazadone 50 mg QHS at home.  
-cont to hold trazadone while NPO 
  
  
Diet NPO  
DVT Prophylaxis contraindicated GI Prophylaxis protonix gtt Code status No CPR, Intubation ok Disposition > 2 MN  
  
Point of Contact Ciaran Lopez Relationship: daughter 
[phone] Rosanne Irena 
Daughter 
[phone] Bakari Holland, MS4  
[address] Intern Pager: [phone] August 27, 2020, 8:39 AM

## 2020-08-27 NOTE — PROGRESS NOTES
Mr. Juancarlos Fernandez is been evaluated and offered treatment at [facility] by Dr. Jennifer Coelho He is now agreeable to consider treatment here at this location. Once he is stable to leave the hospital regarding infection, he can be transferred or discharged and referred back to [facility] to Dr. Jennifer Coelho to reevaluate. He will need this new CAT scan as there has been changes.

## 2020-08-27 NOTE — CONSULTS
Infectious Disease Consultation Note Reason: eikenella bloodstream infection Current abx Prior abx Pip/tazo since 8/27 Ceftriaxone, metronidazole 8/23-8/26 Lines:  
 
 
Assessment : 
 
 79 y.o. male with PMH AAA s/p endovascular repair, Fe deficiency anemia, PUD, AFib, CHF, CAD, COPD with chronic hypoxic respiratory failure on home 3L NC, chronic back pain, and BPH, who presented to ED with complaint of malaise, worsening back pain and weakness. CTA 8/23- New strands of thrombus along the wall of the aortic and bilateral common iliac stents which protrudes into the central aortic lumen and place patient at risk for lower extremity embolic events. Increased aneurysm cranial to the aortobiiliac stent with resultant increased type I A endoleak. Similar type I B endoleak. Increased aortic aneurysm measuring 5.8 cm (change of 2 cm at this level). Minimal increased right common iliac aneurysm measuring 1.9 cm. 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 Rule out lumbar spine discitis/osteomyelitis. Most likely source of Eikenella bloodstream infection is evolving aortoenteric fistula, ? Infected endovascular stent Recommendations: 1. Recommend piperacillin/tazobactam for now 2. Obtain MRA abdomen 3. Will discuss with vascular surgeon about possibility of aortoenteric fistula, ? Need for further work-up 4. Follow-up repeat blood cultures 8/26 Prog: guarded. Thank you for consultation request. Above plan was discussed in details with patient. Will d/w dr. Mel Ellis, primary team. Please call me if any further questions or concerns. Will continue to participate in the care of this patient.  
 
 
HPI: 
 79 y.o. male with PMH AAA s/p endovascular repair, Fe deficiency anemia, PUD, AFib, CHF, CAD, COPD with chronic hypoxic respiratory failure on home 3L NC, chronic back pain, and BPH, who presented to ED with complaint of malaise, worsening back pain and weakness. In the ED, found to be hypotensive with acute on chronic anemia and initial hemoglobin of 6.0. Guaiac positive and grossly melenotic stool noted as well. Patient received 3 units PRBC; BP improved to 113/52 and Hgb up 7.2 CTA 8/23- New strands of thrombus along the wall of the aortic and bilateral common iliac stents which protrudes into the central aortic lumen and place patient at risk for lower extremity embolic events. Increased aneurysm cranial to the aortobiiliac stent with resultant increased type I A endoleak. Similar type I B endoleak. Increased aortic aneurysm measuring 5.8 cm (change of 2 cm at this level). Minimal increased right common iliac aneurysm measuring 1.9 cm.\"  He was started on empiric antibiotics. GI, vascular surgery were consulted. Blood cultures 8/23 now positive for Eikenella. I have been consulted for further recommendations. Patient denies any subjective fever or chills throughout this time. He states that he is having lower back pain for about 2 months. He started having increased abdominal pain since about 3 weeks. Patient denies headaches, visual disturbances, sore throat, runny nose, earaches, cp, sob, chills, cough, diarrhea, burning micturition, pain or weakness in extremities. denies recent sick contacts. No h/o recent travel. No known h/o MRSA colonization or infection in the past. 
 
 
 
Past Medical History:  
Diagnosis Date  Aneurysm ([address] AAA repair 2006 & 2012  Aorto-iliac duplex 02/13/2015 Tech difficult. Patent aorta bi-iliac endograft w/o leak or limb dysfunction.  Arrhythmia   
 a fib  Cardiac cath 11/01/2012 RCA (sm, nondom) patent. LM patent. LAD 25%. CX (dom) 45% mid. LVEDP 12 mmHg. No WMA. PA 27/12. W 10-12. CO/CI 5.2/2.6 (TD).  Cardiac echocardiogram, abnormal 02/20/2016 EF 55%. No WMA. Indeterminate diastolic fx. RVSP 60 mmHg. Severe LAE. Mild MR. Mod TR.  IVCE. Similar to study of 10/26/12.  Cardiovascular aorto-iliac duplex 02/13/2015 Patent aorta bi-iliac endovascular graft w/o leak or limb dysfunction. Sac measures 4.21 x 4.47 cm (4.4 x 4.6 cm on 1/31/14).  Cardiovascular LE peripheral arterial testing 07/29/2013 No significant LE arterial disease bilaterally. R GHADA 1. 12.  L GHADA 1. 12.  R DBI 0.83. L DBI 0.71. Exercise deferred.  Cardiovascular renal duplex 10/31/2012 No RA stenosis. Intrinsic/med disease in left kidney. Patent aortic endograft. Patent, thrombus-free renal veins bilaterally.  Carotid duplex 07/29/2013 Mild <50% bilateral ICA plaquing.  Chronic kidney disease  Chronic lung disease  Cigarette smoker  Congestive heart failure (CHF) ([address]  COPD (chronic obstructive pulmonary disease) (HCC)   
 and emphysema; likely secondary to tobacco abuse  Difficult intubation  Dyslipidemia   
 low HDL  Emphysema   
 HTN (hypertension)  Hypercholesterolemia  Ill-defined condition   
 hernia  Increased prostate specific antigen (PSA) velocity  Long term (current) use of anticoagulants   
 coumadin  Osteoarthritis  Osteomyelitis ([address]  Paroxysmal atrial fibrillation (HCC)  Peripheral vascular disease ([address] AAA repair 12/2007  Persistent atrial Fibrillation  Persistent atrial fibrillation (HCC)  Unspecified adverse effect of anesthesia   
 difficulty breathing placed in ICU Oct 2012 (AAA repair) Past Surgical History:  
Procedure Laterality Date  BRONCHOSCOPY DIAGNOSTIC  11/2/2012  CARDIAC CATHETERIZATION  11/1/2012  COLONOSCOPY N/A 7/26/2016 COLONOSCOPY with Polypectomies performed by Luzma Park MD at [facility] END[facility]COPY  COLONOSCOPY N/A 5/28/2019 COLONOSCOPY with polypectomy performed by Bettye Washington MD at [address]  HX AAA REPAIR    
 2006 & 2012  HX HEART CATHETERIZATION  3/4/2009 1. RCA small, nondominant; patent. 2. LMCA patent. 3. LAD is long, wrapped around apical vessel. Diffuse, 20-30% stenosis noted. 4. CCA is large, dominant vessel. Diffuse 20-30% stenosis. 5. LVEDP is 16 mmHg. 6. Overall left ventricular systolic function mildly diminshed with est. EF 45%. Mild, global hypokinesis noted. 7. No significant mitral regurgitation or aortic stenosis noted. (see report)  HX HERNIA REPAIR  2/2014  
 rt inguinal   
 HX HERNIA REPAIR  01/2016 LEFT INGUINAL HERNIA REPAIR DR. Alexis Cancino  REPAIR ING HERNIA,5+Y/O,JESSIE Left 1-20-16 Dr. Sandip LONG  11/1/2012  
    
 
 
home Medication List  
 Details DULoxetine (CYMBALTA) 20 mg capsule Take 20 mg by mouth daily. albuterol (PROVENTIL VENTOLIN) 2.5 mg /3 mL (0.083 %) nebu inhale contents of 1 vial in nebulizer every 4 hours if needed for wheezing 
Qty: 50 Nebule, Refills: 5  
  
tamsulosin (FLOMAX) 0.4 mg capsule Take 0.4 mg by mouth daily. pantoprazole (PROTONIX) 40 mg tablet Take 1 Tab by mouth Daily (before breakfast). Qty: 30 Tab, Refills: 0 HYDROcodone-acetaminophen (NORCO) 5-325 mg per tablet Take 0.5 Tabs by mouth two (2) times daily as needed for Pain. traZODone (DESYREL) 50 mg tablet Take 50 mg by mouth nightly. lisinopril (PRINIVIL, ZESTRIL) 20 mg tablet Take 1 Tab by mouth daily. Qty: 30 Tab, Refills: 0  
  
pravastatin (PRAVACHOL) 40 mg tablet Take 40 mg by mouth nightly. digoxin (DIGITEK) 0.125 mg tablet take 1 tablet by mouth once daily 
Qty: 90 Tab, Refills: 3  
  
amLODIPine (NORVASC) 10 mg tablet Take 10 mg by mouth daily. Refills: 0 OXYGEN-AIR DELIVERY SYSTEMS 2 L/min by Nasal route daily. Continuous. Company is [employer] 
   
  
polyethylene glycol (MIRALAX) 17 gram packet Take 17 g by mouth daily. PRN constipation  
  
ascorbic acid, vitamin C, (Vitamin C) 500 mg tablet Take 500 mg by mouth daily. lidocaine (LIDODERM) 5 %   
  
tiotropium-olodateroL (Stiolto Respimat) 2.5-2.5 mcg/actuation inhaler inhale 2 inhalations by mouth once daily 
Qty: 1 Inhaler, Refills: 5  
  
sucralfate (CARAFATE) 1 gram tablet   
  
sodium chloride (OCEAN NASAL) 0.65 % nasal squeeze bottle 2 Sprays by Both Nostrils route daily as needed for Congestion. ferrous sulfate 325 mg (65 mg iron) tablet Take 325 mg by mouth every other day. Indications: anemia from inadequate iron Current Facility-Administered Medications Medication Dose Route Frequency  dextrose 5% - 0.45% NaCl with KCl 10 mEq/L infusion  90 mL/hr IntraVENous CONTINUOUS  
 HYDROmorphone (DILAUDID) syringe 0.2 mg  0.2 mg IntraVENous Q4H PRN  piperacillin-tazobactam (ZOSYN) 3.375 g in 0.9% sodium chloride (MBP/ADV) 100 mL MBP  3.375 g IntraVENous Q6H  
 sucralfate (CARAFATE) tablet 1 g  1 g Oral AC&HS  
 0.9% sodium chloride infusion 250 mL  250 mL IntraVENous PRN  
 lidocaine 4 % patch 1 Patch  1 Patch TransDERmal Q24H  
 albuterol-ipratropium (DUO-NEB) 2.5 MG-0.5 MG/3 ML  3 mL Nebulization Q4H PRN  
 [Held by provider] amLODIPine (NORVASC) tablet 10 mg  10 mg Oral DAILY  [Held by provider] ascorbic acid (vitamin C) (VITAMIN C) tablet 500 mg  500 mg Oral DAILY  [Held by provider] digoxin (LANOXIN) tablet 0.125 mg  0.125 mg Oral DAILY  [Held by provider] DULoxetine (CYMBALTA) capsule 20 mg  20 mg Oral DAILY  [Held by provider] ferrous sulfate tablet 325 mg  325 mg Oral EVERY OTHER DAY  [Held by provider] HYDROcodone-acetaminophen (NORCO) 5-325 mg per tablet 0.5 Tab  0.5 Tab Oral Q6H PRN  
 [Held by provider] lisinopriL (PRINIVIL, ZESTRIL) tablet 20 mg  20 mg Oral DAILY  [Held by provider] polyethylene glycol (MIRALAX) packet 17 g  17 g Oral DAILY  pravastatin (PRAVACHOL) tablet 40 mg  40 mg Oral QHS  [Held by provider] tamsulosin (FLOMAX) capsule 0.4 mg  0.4 mg Oral DAILY  [Held by provider] traZODone (DESYREL) tablet 50 mg  50 mg Oral QHS  sodium chloride (NS) flush 5-10 mL  5-10 mL IntraVENous PRN  pantoprazole (PROTONIX) 40 mg in 0.9% sodium chloride (MBP/ADV) 50 mL MBP  8 mg/hr IntraVENous CONTINUOUS Allergies: Codeine; Morphine; and Sulfa (sulfonamide antibiotics) Family History Problem Relation Age of Onset  Heart Surgery Father BYPASS  Stroke Father  Heart Disease Father  Hypertension Father  Heart Attack Father  Heart Surgery Mother BYPASS  Coronary Artery Disease Mother  Stroke Mother  Heart Disease Mother  Hypertension Mother  Diabetes Sister  Cancer Brother Social History Socioeconomic History  Marital status:  Spouse name: Not on file  Number of children: Not on file  Years of education: Not on file  Highest education level: Not on file Occupational History  Not on file Social Needs  Financial resource strain: Not on file  Food insecurity Worry: Not on file Inability: Not on file  Transportation needs Medical: Not on file Non-medical: Not on file Tobacco Use  Smoking status: Former Smoker Packs/day: 1.50 Years: 54.00 Pack years: 81.00 Types: Cigarettes Last attempt to quit: 10/23/2012 Years since quittin.8  Smokeless tobacco: Never Used Substance and Sexual Activity  Alcohol use: No  
  Alcohol/week: 0.0 standard drinks Comment: quit drinking alcohol 26 years ago, patient states stopped in \"4847\"  Drug use: No  
 Sexual activity: Not Currently Lifestyle  Physical activity Days per week: Not on file Minutes per session: Not on file  Stress: Not on file Relationships  Social connections Talks on phone: Not on file Gets together: Not on file Attends Taoism service: Not on file Active member of club or organization: Not on file Attends meetings of clubs or organizations: Not on file Relationship status: Not on file  Intimate partner violence Fear of current or ex partner: Not on file Emotionally abused: Not on file Physically abused: Not on file Forced sexual activity: Not on file Other Topics Concern  Not on file Social History Narrative  Not on file Social History Tobacco Use Smoking Status Former Smoker  Packs/day: 1.50  Years: 54.00  
 Pack years: 81.00  Types: Cigarettes  Last attempt to quit: 10/23/2012  Years since quittin.8 Smokeless Tobacco Never Used Temp (24hrs), Av °F (36.7 °C), Min:97.6 °F (36.4 °C), Max:98.3 °F (36.8 °C) Visit Vitals /79 Pulse (!) 110 Temp 97.9 °F (36.6 °C) Resp 16 Ht 5' 6\" (1.676 m) Wt 61.2 kg (134 lb 14.7 oz) SpO2 100% BMI 21.78 kg/m² ROS: 12 point ROS obtained in details. Pertinent positives as mentioned in HPI,  
otherwise negative Physical Exam: 
 
General: Thin male laying on the bed AAOx3 in no acute distress. HEENT:  Normocephalic, atraumatic, ; no conjunctival hemmohage;  nasal and oral mucous are moist and without evidence of lesions. No thrush. Edentulous. Neck supple, no bruits. Lungs:   non-labored, bilaterally clear to auscultation- no crackles wheezes rales or rhonchi Heart:  RRR, s1 and s2; no rubs or gallops, no edema, + pedal pulses Abdomen:  soft, non-distended, active bowel sounds, no hepatomegaly, no splenomegaly. RUQ/periumbilical tenderness on deep palpation, no guarding, no rigidity Genitourinary:  deferred Extremities:   no clubbing, cyanosis; no joint effusions or swelling;  Full ROM of all large joints to the upper and lower extremities; muscle mass appropriate for age Neurologic:  No gross focal sensory abnormalities; 5/5 muscle strength to upper and lower extremities. Speech appropriate. Cranial nerves intact Skin:  No rash or ulcers noted Back:  Minimal tenderness on palpation of lumbar spine around L2/L3 region with some left paraspinal tenderness. No tenderness elsewhere on the spine Psychiatric:  No suicidal or homicidal ideations, appropriate mood and affect Labs: Results:  
Chemistry Recent Labs  
  08/27/20 
8833 08/26/20 
0545 08/25/20 
3890 * 84 79  143 141  
K 3.5 3.9 3.8 * 111 108 CO2 26 20* 19*  
BUN 25* 31* 37* CREA 0.90 0.97 0.90  
CA 8.6 8.5 8.3* AGAP 6 12 14 BUCR 28* 32* 41* * 181* 176* TP 6.7 6.4 6.5 ALB 2.4* 2.4* 2.2*  
GLOB 4.3* 4.0 4.3* AGRAT 0.6* 0.6* 0.5* CBC w/Diff Recent Labs  
  08/27/20 
0523 08/27/20 
0130 08/26/20 
0545 08/25/20 
2047 WBC 14.3*  --  18.9* 19.3*  
RBC 2.77*  --  2.89* 2.80* HGB 8.4* 8.5* 8.8*  8.9* 8.4* HCT 26.2* 26.5* 27.0*  27.6* 25.9*  
*  --  169 163 GRANS 89*  --  91* 93* LYMPH 5*  --  4* 6*  
EOS 0  --  0 0 Microbiology Recent Labs  
  08/26/20 
1503 CULT NO GROWTH AFTER 14 HOURS  
  
 
 
RADIOLOGY: 
 
All available imaging studies/reports in [facility] for this admission were reviewed Dr. Alexis Wallace, Infectious Disease Specialist 
[phone] August 27, 2020 
8:26 AM

## 2020-08-27 NOTE — PERIOP NOTES
TRANSFER - OUT REPORT: 
 
Verbal report given to [address] RN(name) on Susan Resendez  being transferred to CVT Stepdown(unit) for routine post - op Report consisted of patients Situation, Background, Assessment and  
Recommendations(SBAR). Information from the following report(s) SBAR and Procedure Summary was reviewed with the receiving nurse. Lines:  
Peripheral IV 08/24/20 Right Forearm (Active) Site Assessment Intact; Red 08/27/20 1255 Phlebitis Assessment 1 08/27/20 1255 Infiltration Assessment 0 08/27/20 0400 Dressing Status Loose; Wet 08/27/20 1255 Dressing Type Tape;Transparent 08/27/20 1255 Hub Color/Line Status Pink; Infusing;Flushed 08/27/20 1255 Action Taken Dressing changed;Dressing reinforced 08/27/20 1255 Alcohol Cap Used No 08/27/20 1255 Peripheral IV 08/27/20 Right;Upper Arm (Active) Site Assessment Clean, dry, & intact 08/27/20 1255 Phlebitis Assessment 0 08/27/20 1255 Dressing Status Clean, dry, & intact 08/27/20 1255 Dressing Type Tape;Transparent 08/27/20 1255 Hub Color/Line Status Infusing;Blue;Flushed 08/27/20 1255 Action Taken Dressing reinforced 08/27/20 1255 Alcohol Cap Used No 08/27/20 1255 Opportunity for questions and clarification was provided. Patient transported with: 
 O2 @ 3 liters Registered Nurse

## 2020-08-27 NOTE — PROGRESS NOTES
[facility] Progress Note Patient: Matt Cousin MRN: [id number] SSN: [id number]  YOB: 1940 Age: 79 y.o. Sex: male Admit Date: 8/23/2020 LOS: 3 days Chief Complaint Patient presents with  Fatigue Subjective:  
 
Pt resting in bed this morning. Reports cough is better than yesterday. No chest pain, no sob, no palpitations, no other sx reported.  
 
  
Review of Systems Constitutional: Positive for malaise/fatigue and weight loss. Negative for chills, diaphoresis and fever. HENT: Negative for congestion, hearing loss, sore throat and tinnitus. Eyes: Positive for blurred vision (chronic) and discharge (chronic). Respiratory: Negative for cough, hemoptysis and shortness of breath. Cardiovascular: Negative for chest pain, palpitations and orthopnea. Gastrointestinal: Negative for abdominal pain, blood in stool, diarrhea, heartburn, melena and vomiting. Genitourinary: Negative for dysuria, frequency, hematuria and urgency. Musculoskeletal: Positive for back pain. Neurological: Positive for dizziness (chronic, on turning in bed) and weakness. Negative for tingling, sensory change and headaches. Psychiatric/Behavioral: Negative for depression and suicidal ideas. Objective:  
 
Visit Vitals /62 Pulse 74 Temp 97.6 °F (36.4 °C) Resp 16 Ht 5' 6\" (1.676 m) Wt 61.2 kg (134 lb 14.7 oz) SpO2 100% BMI 21.78 kg/m² Physical Exam:  
General:  AAOx3, NAD, laying comfortably in bed HEENT: Conjunctiva pale with thick yellow discharge, sclera anicteric. PERRL. EOMI. Dry mucous membranes. Thyroid not enlarged, no nodules. No cervical, supraclavicular, occipital or submandibular lymphadenopathy. No other gross abnormalities present. CV:  Irregular rhythm, normal rate, no murmurs. No visible pulsations or thrills. RESP:  Unlabored breathing.   Lungs clear to auscultation without adventitious breath sounds. Equal expansion bilaterally. ABD:  Soft, TTP RUQ, RLQ, LLQ and suprapubic, voluntary guarding, nondistended. BS (+). No hepatosplenomegaly. R inguinal hernia, reducible, nontender, no overlying skin changes RECTAL:  Deferred as pt had BM in diaper during interview. MS:  No joint deformity. BLE atrophy. Neuro:  CN II-XII grossly intact. 4/5 strength bilateral upper extremities and lower extremities. Ext:  No edema. 2+ radial and dp pulses bilaterally. Skin:  No rashes, lesions, or ulcers. Good turgor. Intake and Output: 
Current Shift: No intake/output data recorded. Last three shifts: 08/25 1901 - 08/27 0700 In: 340 [P.O.:240; I.V.:100] Out: 640 Lab/Data Review: 
Recent Results (from the past 12 hour(s)) HGB & HCT Collection Time: 08/27/20  1:30 AM  
Result Value Ref Range HGB 8.5 (L) 13.0 - 16.0 g/dL HCT 26.5 (L) 36.0 - 48.0 % MAGNESIUM Collection Time: 08/27/20  5:23 AM  
Result Value Ref Range Magnesium 2.0 1.6 - 2.6 mg/dL PHOSPHORUS Collection Time: 08/27/20  5:23 AM  
Result Value Ref Range Phosphorus 1.9 (L) 2.5 - 4.9 MG/DL  
CBC WITH AUTOMATED DIFF Collection Time: 08/27/20  5:23 AM  
Result Value Ref Range WBC 14.3 (H) 4.6 - 13.2 K/uL  
 RBC 2.77 (L) 4.70 - 5.50 M/uL HGB 8.4 (L) 13.0 - 16.0 g/dL HCT 26.2 (L) 36.0 - 48.0 % MCV 94.6 74.0 - 97.0 FL  
 MCH 30.3 24.0 - 34.0 PG  
 MCHC 32.1 31.0 - 37.0 g/dL  
 RDW 19.8 (H) 11.6 - 14.5 % PLATELET 343 (L) 182 - 420 K/uL MPV 9.6 9.2 - 11.8 FL  
 NEUTROPHILS 89 (H) 40 - 73 % LYMPHOCYTES 5 (L) 21 - 52 % MONOCYTES 6 3 - 10 % EOSINOPHILS 0 0 - 5 % BASOPHILS 0 0 - 2 %  
 ABS. NEUTROPHILS 12.7 (H) 1.8 - 8.0 K/UL  
 ABS. LYMPHOCYTES 0.7 (L) 0.9 - 3.6 K/UL  
 ABS. MONOCYTES 0.9 0.05 - 1.2 K/UL  
 ABS. EOSINOPHILS 0.0 0.0 - 0.4 K/UL  
 ABS. BASOPHILS 0.0 0.0 - 0.1 K/UL  
 DF AUTOMATED METABOLIC PANEL, COMPREHENSIVE  Collection Time: 08/27/20  5:23 AM  
 Result Value Ref Range Sodium 144 136 - 145 mmol/L Potassium 3.5 3.5 - 5.5 mmol/L Chloride 112 (H) 100 - 111 mmol/L  
 CO2 26 21 - 32 mmol/L Anion gap 6 3.0 - 18 mmol/L Glucose 164 (H) 74 - 99 mg/dL BUN 25 (H) 7.0 - 18 MG/DL Creatinine 0.90 0.6 - 1.3 MG/DL  
 BUN/Creatinine ratio 28 (H) 12 - 20 GFR est AA >60 >60 ml/min/1.73m2 GFR est non-AA >60 >60 ml/min/1.73m2 Calcium 8.6 8.5 - 10.1 MG/DL Bilirubin, total 0.5 0.2 - 1.0 MG/DL  
 ALT (SGPT) 28 16 - 61 U/L  
 AST (SGOT) 27 10 - 38 U/L Alk. phosphatase 158 (H) 45 - 117 U/L Protein, total 6.7 6.4 - 8.2 g/dL Albumin 2.4 (L) 3.4 - 5.0 g/dL Globulin 4.3 (H) 2.0 - 4.0 g/dL A-G Ratio 0.6 (L) 0.8 - 1.7 RECENT RESULTS MODALITY IMPRESSION  
XR Results from Hospital Encounter encounter on 08/23/20 XR CHEST PORT Narrative EXAM: AP portable chest. 
 
Indications: maliase Time stamp: 8/23/2020 1656 hours Comparison: Recent prior Findings: 
2 portable views of the chest. The orientation of the chest radiograph suggest 
the exam was performed with left side down decubitus positioning, but not 
specified. Lines/Tubes/Devices:  None LUNGS: Some hazy density in the right lung that appears to be associated with 
volume loss and may be related to atelectasis. There is an opacity along the 
medial aspect of the right hemithorax that may be layering pleural fluid. The 
left lung is clear. No left-sided pleural effusion. MEDIASTINUM: Heart is borderline size. Tortuosity of the descending thoracic 
aorta accentuated by the decubitus positioning. BONES/SOFT TISSUES: Unremarkable Impression IMPRESSION: 
 
1. Right pleural effusion with hazy airspace opacities likely compressive 
atelectasis. Infiltrate could have a similar appearance. 2. Stable cardiac enlargement. CT [place] [facility] [place] CTA CHEST ABD PELV W WO CONT Narrative CTA Chest, Abdomen And Pelvis With and without Enhancement INDICATION: Abdominal aortic aneurysm status post endovascular repair, peptic 
ulcer disease, heart failure, COPD with chronic respiratory failure on home 
oxygen, chronic back pain, BPH with malaise, worsening back pain, weakness for 
3-4 weeks, hypotension, acute on chronic anemia, melanotic stool. TECHNIQUE: 2.5 mm collimation axial images obtained from the thoracic inlet to 
the level of the pubic symphysis following the uneventful administration of 80 
mL Isovue-370 nonionic intravenous contrast.  Imaging performed during maximum 
aortic enhancement and is therefore suboptimal for evaluating solid organs and 
bowel. Raw data reviewed along with three-dimensional volume rendered images 
and maximum intensity projection images to better evaluate the tortuous vascular 
structures. All CT scans at this facility are performed using dose optimization technique as 
appropriate to a performed exam, to include automated exposure control, 
adjustment of the mA and/or kV according to patient size (including appropriate 
matching first site-specific examinations), or use of iterative reconstruction 
technique. COMPARISON: 2/17/2020. Thyroid: Unremarkable. Heart: Severe regions of coronary arteriosclerosis in the left circumflex. Biatrial cardiac chamber enlargement. Pulmonary artery: Patent Lymph Nodes: 2 Similar 1.4 cm precarinal lymph nodes, likely reactive. Lung: Moderate centrilobular emphysema. Mild subsegmental atelectasis in the 
right lower lobe. Mild subsegmental atelectasis or scarring right upper and 
right middle lobes. Pleura: Increased moderate right and similar trace left pleural effusions. No 
pneumothorax.  
 
ABDOMEN FINDINGS:  
There is suboptimal evaluation of visceral organs secondary to timing of the 
exam. 
 
 
Liver: Similar scattered hepatic cysts measuring up to 2.6 cm in the left medial 
 hepatic lobe at the dome and 2.4 cm right posterior hepatic lobe. There are a 
few too small to characterize hepatic lesions but statistically most likely 
benign. Spleen: Unremarkable. Pancreas: Pancreas is atrophic. Biliary: Possible adenomyomatosis gallbladder fundus. Bowel: There is a increased rectal stool burden. Gas-filled gastric distention 
third portion duodenum does not clearly cross midline into the left abdomen but 
there is no volvulus or bowel dilation. No bowel wall thickening. No 
pneumatosis. Appendix in the right inguinal hernia without inflammation, 
unchanged. There is fluid in the esophagus. Peritoneum/ Retroperitoneum: There is edema without free fluid or free air. Lymph Nodes: Unremarkable. Adrenal Glands: Unremarkable. Kidneys: Right kidney is 7.1 cm, previously 8.3 cm and the left is 10.7 cm, 
previously 10.4 cm. The right kidney hypoenhance is with cortical thinning. There is cortical scarring at the left lower pole. There is a exophytic 0.9 cm 
hyperdense lesion at the right lower pole without enhancement. 2 cm left lower 
pole cyst. Several too small to characterize renal lesions. PELVIS FINDINGS:  
 
Bladder/ Pelvic Organs: Small diverticulum at the left anterior bladder wall. Moderate bladder distention. Bones/Soft tissues: Mild anasarca. Osteopenia. Lumbar facet hypertrophy and 
arthropathy. Transitional lumbosacral junction. Degenerative disc disease. Central height loss superior endplate L3.   
 
 
AORTA FINDINGS: 
Thoracic aorta is not enlarged with mild atherosclerosis. 3 vessel arch anatomy 
with patent arch vessels. Subjectively, the aneurysm cranial to the proximal 
aortic stent landing zone is increased. There appears to be a similar 1 cm 
crescent of enhancement at the right anterior aspect with endoleak but there is 
increased nonopacified circumferential region measuring up to 0.6 cm on the left (series 3, image 138), not present on prior exam. The excluded aneurysm is 
subjectively larger. There is similar right type I B endoleak distal to the 
common iliac stent. The vessel measures 2.3 cm at this location, previously 2.1 
cm. The peripheral right common iliac is 1.9 cm, previously 1.7 cm. There are 
new linear regions of thrombus which protrude into the central aortic lumen at 
the more cranial aspect of the stent at the level of the left renal vein and 
within the bilateral common iliac stents protruding into the central lumen. Mild stenosis proximal celiac. Regions of stenosis splenic artery. Mild stenosis 
proximal SMA. Moderate stenosis proximal left renal artery and proximal 
occlusion right renal artery. There is stenosis at the origin of the right 
internal iliac, origin left internal iliac. Three-D generated aortic measurements: 
3.6, 3.2, 2.9 centimeter at the sinus of Valsalva. 3.5 x 3.1 centimeter at the sinotubular junction. 3.8 x 3.8 centimeter at the maximum diameter of the ascending aorta. 3.5 x 3.2 centimeter at the mid aortic arch. 3.4 x 3.2 centimeter at the proximal descending thoracic aorta. 3.6 x 3.1 centimeter at the mid descending aorta. 3.6 x 3.5 centimeter at the distal descending thoracic aorta at the level of the 
diaphragmatic hiatus. Aneurysm measurements: 
Volume 149.73 cc, previously 166.71 cc 
4.9 x 4.2 cm below the lowest renal artery including excluded aorta, previously 4.4 x 3.7 cm 
1 cm below the lowest renal artery within the stent: 3 x 2.6 cm; including 
excluded aorta: 5.8 x 4.4 cm, previously 3.9 x 3.9 cm 
3.1 x 3 cm within the stent at the top of the stent; 5.4 x 4.4 cm including 
excluded aorta, previously 4 x 3.6 cm 
2.7 x 2.7 cm within the stent below the lowest renal artery 2.7 x 2.7 cm Within the stent below the lowest renal artery 3 cm distance from the lowest renal artery to the top of the stent  Impression IMPRESSION: 
 1.  New strands of thrombus along the wall of the aortic and bilateral common 
iliac stents which protrudes into the central aortic lumen and place patient at 
risk for lower extremity embolic events. 2.  Increased aneurysm cranial to the aortobiiliac stent with resultant 
increased type I A endoleak. Similar type I B endoleak. Increased excluded 
aortic aneurysm measuring 5.8 cm (change of 2 cm at this level). Minimal 
increased right common iliac aneurysm measuring 1.9 cm. 
3.  Increased moderate right and similar small left pleural effusions. 4.  Increased rectal stool burden. No active extravasation. 5.  Gas-filled gastric distention. 6.  Occlusion proximal right renal artery with worsened atrophy and 
hypoenhancement. 7.  Severe left circumflex coronary arteriosclerosis. 8.  Biatrial cardiac chamber enlargement. 9.  Moderate stenosis left renal artery, stenosis at the origin of the bilateral 
internal iliac arteries. 10.  Moderate emphysema. 11.  Gastroesophageal reflux. 12.  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 13.  Unchanged Incidental Amyand hernia. 14.  Right renal hemorrhagic/proteinaceous cyst. 
15.  Small bladder diverticulum. Preliminary report provided by Dr. Jonathan Randolph on 8/23/2020 at 2234 hours prior to 
3-D image availability. MRI Results from Hospital Encounter encounter on 08/25/17 MRA ABD W WO CONT Narrative CPT CODE: 61375 MRA Abdomen. CLINICAL HISTORY: Patient with history of infrarenal AAA repair. Left ileus 
psoas abscess drained 2/22/2016, discitis L3/L4 with adjacent osteomyelitis February 2016, likely discitis T11/T12 August 2016, recurrent psoas collection 8/25/2017 with likely chronic osteomyelitis L3. 
 
TECHNIQUE: Multiplanar images of abdomen and pelvis using SSFSE, FIESTA  with Cine, T1, LAVA, opposed phase, 3-D inflow and multiphase postcontrast LAVA T1 FS 
post contrast (10 cc Gadavist) sequences. COMPARISON: Multiple prior studies including lumbar MRI 5/5/2016, CT 2/21/2016, 
CT 8/4/2016, lumbar MRI 2/23/2016, CT 8/25/2017. FINDINGS:   
 
Lung bases: Small effusions. Liver: Multiple low T1 and high T2 signal lesions in the liver without 
enhancement and stable since prior. Gallbladder: Focal fundal thickening with stranding of pearls appearance. Spleen: Normal size and enhancement. Pancreas: Normal signal and enhancement. No duct dilatation. Adrenal glands: Normal. 
 
Kidneys: Normal size. Symmetric enhancement. Few tiny cysts bilaterally 
including hemorrhagic cyst posteriorly on the right measuring 6 mm. Mild 
cortical thinning lower pole left kidney with small cyst. 
 
General: Body wall edema at the flanks, around the hips, dependent parts of the 
retroperitoneum. Right inguinal hernia containing appendix and fat. Bowel: Unremarkable. : Urinary bladder, included parts of the prostate are unremarkable. Retroperitoneum: There is endovascular stent traversing AAA with maximum 
dimension 4.8 cm, stable. Stable wall thickening at the abdominal aorta with low T2 signal and enhancement of the wall, a stable finding dating to 2016. There is 
edema posterior to the abdominal aorta from 3:00 to 7:00 position (series 7 
image 49). Graft is widely patent. Aneurysmal dilatation iliac vessels as 
before. Right common iliac artery measures 24 mm. Left common iliac artery 
measures 18 mm. There is a similar adjacent fluid collection in the left psoas that extends 
anteromedially to abut the aorta (series 7 image 45 and measures 4.4 x 1.5 x 5.2 
cm (series 7 image 45, series 2007 image 39). There is surrounding fat stranding 
and enhancement compatible with edema and inflammation. Small adjacent lymph 
nodes measuring up to 10 mm. Fluid collection begins lateral to the L2/L3 disc space, appears contiguous with fluid in the L3/L4 disc space (coronal series 2007 image 37), and ends in the 
psoas at mid L3 level. A drain has been placed, posterolateral to the majority 
of the collection today (coronal series 2007) and collection does not appear 
appreciably decreased in size. Enhancement at L3 vertebral body laterally on the 
left c/w osteomyelitis. Severe multifactorial stenosis L3/L4. Impression IMPRESSION: 
 
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 Stable size since 
drain placement. Psoas collection abuts the aorta which is stable in appearance with wall 
thickening and enhancement as before. Stable chronic inflammation related to 
adjacent abscess remains possible. ULTRASOUND Results from Hospital Encounter encounter on 08/25/17 US EXT NONVAS RT LTD Impression Impression:  Large fat-containing right inguinal hernia. On recent CT scan 
(August 25, 2017), the appendix is seen extending through the internal inguinal 
ring into the inguinal hernia. This is not redemonstrated on today's ultrasound 
but potentially if the appendix does not have air within it, it may be difficult 
to visualize sonographically. No adenopathy. [place] [facility] [place] SCROTUM/TESTICLES Impression IMPRESSION: 
1. Increased internal vascularity of the left testicle suggests mild orchitis. Cardiology Procedures/Testing: MODALITY RESULTS  
EKG Results for orders placed or performed during the hospital encounter of 08/23/20 EKG, 12 LEAD, INITIAL Result Value Ref Range Ventricular Rate 71 BPM  
 Atrial Rate 288 BPM  
 QRS Duration 86 ms  
 Q-T Interval 350 ms QTC Calculation (Bezet) 380 ms Calculated R Axis -42 degrees Calculated T Axis 81 degrees Diagnosis Atrial fibrillation Left axis deviation Low voltage QRS Abnormal ECG When compared with ECG of 26-JAN-2020 09:46, 
Criteria for Anteroseptal infarct are no longer present Confirmed by Zeb Hodge MD, Minerva Barrett ([id number]) on 8/24/2020 8:30:54 AM 
  
Results for orders placed or performed in visit on 08/10/18 AMB POC EKG ROUTINE W/ 12 LEADS, INTER & REP Narrative Read by Johnny Campos MD - atrial fibrillation, occasional ectopic ventricular beat. Nonspecific QRS widening and anterior fascicular block. Old anterior infarct. Possible nonspecific ST abnormality. ECHO 06/02/19 ECHO ADULT COMPLETE 06/04/2019 6/4/2019 Narrative · Left Ventricle: Estimated left ventricular ejection fraction is 56 -  
60%. Abnormal left ventricular septal motion. Interventricular septal  
\"bounce\". · Tricuspid Valve: Non-specific thickening of the tricuspid valve. Moderate tricuspid valve regurgitation is present. · Pulmonary Artery: Moderate pulmonary hypertension. Pulmonary arterial  
systolic pressure is 51.8 mmHg. · Pulmonic Valve: Mild pulmonic valve regurgitation is present. · Left Atrium: Severely dilated left atrium. · Right Atrium: Moderately dilated right atrium. Signed by: Ana Ireland MD  
  
 
Special Testing/Procedures: MODALITY RESULTS MICRO All Micro Results Procedure Component Value Units Date/Time CULTURE, BLOOD [[id number]]  (Abnormal) Collected:  08/23/20 1820 Order Status:  Completed Specimen:  Blood Updated:  08/27/20 1031 Special Requests: NO SPECIAL REQUESTS Culture result:    
  EIKENELLA CORRODENS , BETA LACTAMASE NEGATIVE , ISOLATED FROM THE ANAEROBIC BOTTLE  
     
 CULTURE, BLOOD [[id number]] Collected:  08/26/20 1503 Order Status:  Completed Specimen:  Blood Updated:  08/27/20 8718 Special Requests: NO SPECIAL REQUESTS Culture result: NO GROWTH AFTER 14 HOURS     
 SUSCEPTIBILITY, AER + ANAEROB [[id number]] Collected:  08/23/20 1820 Order Status:  Completed Updated:  08/26/20 1435 CULTURE, BLOOD [[id number]] Collected:  08/23/20 1805 Order Status:  Completed Specimen:  Blood Updated:  08/26/20 1131 Special Requests: NO SPECIAL REQUESTS     
  GRAM STAIN    
  ANAEROBIC BOTTLE NO ORGANISMS SEEN Culture result: BOTTLE FLAGGED POSITIVE BY INSTRUMENTATION No organisms seen on gram stain. Multiple subcultures are in progress. CULTURE, RESPIRATORY/SPUTUM/BRONCH Taye Prost STAIN [[id number]] Order Status:  Sent Specimen:  Sputum Kathy Needle [[id number]] Collected:  08/24/20 0358 Order Status:  Completed Specimen:  Urine from Clean catch Updated:  08/25/20 1026 Special Requests: NO SPECIAL REQUESTS Culture result: No growth (<1,000 CFU/ML) UA Results for orders placed or performed in visit on 04/07/14 AMB POC URINALYSIS DIP STICK AUTO W/O MICRO     Status: None Result Value Ref Range Status Color (UA POC) Yellow  Final  
 Clarity (UA POC) Clear  Final  
 Glucose (UA POC) Negative Negative Final  
 Bilirubin (UA POC) Negative Negative Final  
 Ketones (UA POC) Negative Negative Final  
 Specific gravity (UA POC) 1.010 1.001 - 1.035 Final  
 Blood (UA POC) Trace Negative Final  
 pH (UA POC) 7.0 4.6 - 8.0 Final  
 Protein (UA POC) Trace Negative mg/dL Final  
 Urobilinogen (UA POC) 0.2 mg/dL 0.2 - 1 Final  
 Nitrites (UA POC) Negative Negative Final  
 Leukocyte esterase (UA POC) Negative Negative Final  
  
PATH Assessment and Plan:  
 
79 y.o. male with PMH AAA s/p endovascular repair, Fe deficiency anemia, PUD, AFib, CHF, CAD, COPD with chronic hypoxic respiratory failure on home 3L NC, chronic back pain, and BPH, now admitted with acute on chronic anemia. 
  
Acute on chronic anemia. DDx to include PUD, diverticulosis/itis, malignancy, AVM. Initial Hgb 6.0, after 3u PRBC, Hgb 7.2. EGD and Colonoscopy by [facility] on 5/28/19 showed hiatal hernia, erosive gastritis with flecks of coffee grounds, ascending colon polyp; diverticulosis; and hemorroids.  He saw Hem/Onc on 8/7/20 for surveillance labs; no new intervention noted. Favor recurrent bleeding gastric ulcer. Pt has gross melena with guaiac positive stool. Hx of gastritis and PUD, on home protonix and noncompliant with carafate x 1 month. Possible bleeding diverticulae. Hx diverticulosist. TTP LLQ and suprapubic area, but no reported ABD pain. Melena would not support diverticular bleed, but pt has large stool burden suggesting long transit time through sigmoid for blood to be digested. WBC 18.5 consistent with diverticulitis. Malignancy possible in the setting of painless GIB, although CT does not show any obvious intraluminal neoplasm. Pt has had unintentional weight loss. AVM less likely; not noted on CT A/P w/ IV contrast. 
- GI following, EGD today, NGT removed - Repeat CXR, sputum cx, peripheral smear - H/H Q4H, transfuse if Hgb < 7 
- monitor VS per unit routine 
- CBC, CMP, Mag, Phos daily 
- PT/INR 
- Vascular: no need for IVC filter - protonix gtt  
- NPO 
- NS @ 250mL/hr 
- PT/OT/CM 
  
Sepsis: 2/4 SIRS: tachypnea and WBC 19.3. Current DDx includes intraabdominal infection, diverticulitis, UTI, spine osteomyelitis/abscess. No evidence of intraabdomninal infection on wet read. LLQ TTP and melena support diverticulitis. Possible UTI in light of t w/ hx of BPH and imaging shows distended bladder. Pt received flagyl and rocephin in ED. Hx of lumbar spine osteomyelitis and abscess, but no imaging suggestive of acute osteomyelitis. - FU UA and UCx 
- FU BCx 
- FU CT final read - Consult to ID for Eikenella bacteremia, started on Zosyn 
  
AFib/CHF/CAD: rate controlled on amlodipine; off ASA and Coumadin due to hx of GIB. No evidence of decompensated CHF noted in cardiology clinic visit (6/16/20). CXR shows large-moderate R pleural effusion. Coronary angiography in 2012 only showed mild CAD. - hold home amlodipine 10mg daily, digoxin 125mcg daily, pravastatin 40mg daily while NPO 
- hold lisinopril 20mg daily - diurese pleural effusion once BP more stable 
  
COPD: C/b chronic hypoxic respiratory failure, on 3L NC at home. - supplemental O2 for sats < 92% 
- duoneb PRN 
  
Chronic back pain: on home norco 5-325 
- lidocaine patch 
- hold norco while NPO 
  
BPH  
- hold tamsulosin 0.4mg daily while NPO 
  
Insomnia: On two home sleeping aids; probably does not need both. Defer to day team to choose which one to continue 
- hold duloxetine 20mg QHS and trazadone 50mg QHS while NPO.   
  
Diet NPO  
DVT Prophylaxis contraindicated GI Prophylaxis protonix gtt Code status No CPR, Intubation ok Disposition > 2 MN  
  
Point of Contact [address] Relationship: daughter 
[phone] Debbie Sesar 
Daughter 
[phone] Idris Queen DO, PGY-1  
[facility] PSYCHIATRIC [facility] Medicine Intern Pager: [phone] August 27, 2020, 7:00 AM

## 2020-08-27 NOTE — PROGRESS NOTES
Patient is not available to be assessed at this time. Patient was sleeping and unable to be assessed at this time. Jada Kiser [address] [facility]  
[phone]

## 2020-08-27 NOTE — ROUTINE PROCESS
Bedside shift change report given to Beau Alfaro (oncoming nurse) by Cheryle Helms (offgoing nurse). Report included the following information SBAR, Kardex, Intake/Output, MAR, Recent Results and Cardiac Rhythm NSR.

## 2020-08-27 NOTE — ANESTHESIA POSTPROCEDURE EVALUATION
Procedure(s): ENDOSCOPY. MAC Anesthesia Post Evaluation Multimodal analgesia: multimodal analgesia used between 6 hours prior to anesthesia start to PACU discharge Patient location during evaluation: PACU Patient participation: complete - patient participated Level of consciousness: sleepy but conscious Pain management: adequate Airway patency: patent Anesthetic complications: no 
Cardiovascular status: acceptable Respiratory status: acceptable Hydration status: acceptable Post anesthesia nausea and vomiting:  none Final Post Anesthesia Temperature Assessment:  Normothermia (36.0-37.5 degrees C) INITIAL Post-op Vital signs:  
Vitals Value Taken Time /58 8/27/2020  2:29 PM  
Temp 36.8 °C (98.2 °F) 8/27/2020  2:29 PM  
Pulse 61 8/27/2020  2:30 PM  
Resp 19 8/27/2020  2:30 PM  
SpO2 100 % 8/27/2020  2:30 PM  
Vitals shown include unvalidated device data.

## 2020-08-27 NOTE — PROCEDURES
[email or web address] 
[phone] Endoscopic Gastroduodenoscopy Procedure Note Syed Kaplan 1940 
[id number] Indication: 1. GI bleed  2. Anemia : Marta Ward MD 
 
Surgical Assistants: none Referring Provider:  Nola Carlisle DO Anesthesia/Sedation:  MAC anesthesia Propofol Procedure Details After infomed consent was obtained for the procedure, with all risks and benefits of procedure explained the patient was taken to the endoscopy suite and placed in the left lateral decubitus position. Following sequential administration of sedation as per above, the endoscope was inserted into the mouth and advanced under direct vision to second portion of the duodenum. A careful inspection was made as the gastroscope was withdrawn, including a retroflexed view of the proximal stomach; findings and interventions are described below. Findings:  
Esophagus:small sliding hiatal hernia without esophagitis Stomach: normal, no signs of active or recent hemorrhage Duodenum/jejunum: normal, no signs of active or recent hemorrhage Therapies:  none Specimens: * No specimens in log * Complications:   None; patient tolerated the procedure well. EBL:  None. Tissue Implant Device: None Impression:    1. Small hiatal hernia  2. No signs of active or recent hemorrhage Recommendations: 
- Daily PPI 
- Avoid NSAIDs 
- Monitor for recurrent overt hemorrhage - Monitor H/H and transfuse as indicated - May resume diet Marta Ward MD 
8/27/2020  2:19 PM 
 
Marta Ward MD 
[facility] Office/pager - [phone] cell - [phone] 
[email or web address]

## 2020-08-27 NOTE — PROGRESS NOTES
0730 - Bedside and Verbal shift change report given to Jodi Jackson (oncoming nurse) by Lauren Peters, PRAVEEN (offgoing nurse). Report included the following information SBAR, Kardex, Procedure Summary, Intake/Output, MAR, Med Rec Status and Cardiac Rhythm A fib.  
 
0906 - Pt off the floor for ECHO.  
 
1022 - Pt off the floor for EGD procedure. 1450 - Received report from PACU, Yogesh Sullivan. Awaiting patient arrival on the floor. 1930 - Bedside and Verbal shift change report given to Franck Grubbs RN (oncoming nurse) by Sebastien Mata RN (offgoing nurse). Report included the following information SBAR, Kardex, Procedure Summary, Intake/Output, Recent Results, Med Rec Status and Cardiac Rhythm A fib .

## 2020-08-28 NOTE — PROGRESS NOTES
Problem: Self Care Deficits Care Plan (Adult) Goal: *Acute Goals and Plan of Care (Insert Text) Outcome: Resolved/Met OCCUPATIONAL THERAPY EVALUATION/DISCHARGE Patient: Ronit Meeks (79 y.o. male) Date: 8/28/2020 Primary Diagnosis: GI bleed [K92.2] Procedure(s) (LRB): ENDOSCOPY (N/A) 1 Day Post-Op Precautions:   Fall PLOF: Pt reports he was able to feed and groom self, but required max assist for UB/LB dressing and bathing PTA. Pt lives with his brother in a [facility] but daughter comes by everyday to assist with ADLs. Pt reports he does most ADLs at bed level and states this has been for months now. Pt does not have a hospital bed at home but has a bed that does elevate. Pt has a BSC, shower chair, rolling walker, and wheelchair. ASSESSMENT AND RECOMMENDATIONS: 
Pt cleared to participate in OT evaluation by Rn. Upon entering room, pt supine with HOB elevated, sleeping, but easily awoke to verbal stimuli. Pt agreeable to OT eval. Based on the objective data described below, the patient presents to be at baseline with basic self-care/ADLs. Pt on O2 via NC. Pt refusing bed mob this session, stating he performed with PT earlier this AM, therefore assessment of BUEs was done at bed level. Pt able to feed and groom self with setup. Pt requiring min assist for doffing/donning of NC for grooming task. Pt reports he lives with his brother but daughter provides assist with UB/LB ADLs at bed level every day. Pt states he stays in bed the majority of the day and that this has been his baseline for months now. Will defer to PT for further functional balance and mobility. Pt's SpO2 >95% throughout session. Pt reports he has a supportive daughter to provide assist PRN. As pt presents he is at his baseline, skilled occupational therapy is not indicated at this time, therefore OT to d/c pt from caseload. At the end of  the session, pt resting in bed, call bell in reach, with all needs met. Discharge Recommendations: Home Health with 24/7 Supervision/Assist 
Further Equipment Recommendations for Discharge: N/A; Pt has all recommended equipment to safely return home SUBJECTIVE:  
Patient stated my daughter helps me\" OBJECTIVE DATA SUMMARY:  
 
Past Medical History:  
Diagnosis Date  Aneurysm ([facility] AAA repair 2006 & 2012  Aorto-iliac duplex 02/13/2015 Tech difficult. Patent aorta bi-iliac endograft w/o leak or limb dysfunction.  Arrhythmia   
 a fib  Cardiac cath 11/01/2012 RCA (sm, nondom) patent. LM patent. LAD 25%. CX (dom) 45% mid. LVEDP 12 mmHg. No WMA. PA 27/12. W 10-12. CO/CI 5.2/2.6 (TD).  Cardiac echocardiogram, abnormal 02/20/2016 EF 55%. No WMA. Indeterminate diastolic fx. RVSP 60 mmHg. Severe LAE. Mild MR. Mod TR.  IVCE. Similar to study of 10/26/12.  Cardiovascular aorto-iliac duplex 02/13/2015 Patent aorta bi-iliac endovascular graft w/o leak or limb dysfunction. Sac measures 4.21 x 4.47 cm (4.4 x 4.6 cm on 1/31/14).  Cardiovascular LE peripheral arterial testing 07/29/2013 No significant LE arterial disease bilaterally. R GHADA 1. 12.  L GHADA 1. 12.  R DBI 0.83. L DBI 0.71. Exercise deferred.  Cardiovascular renal duplex 10/31/2012 No RA stenosis. Intrinsic/med disease in left kidney. Patent aortic endograft. Patent, thrombus-free renal veins bilaterally.  Carotid duplex 07/29/2013 Mild <50% bilateral ICA plaquing.  Chronic kidney disease  Chronic lung disease  Cigarette smoker  Congestive heart failure (CHF) ([facility]  COPD (chronic obstructive pulmonary disease) (HCC)   
 and emphysema; likely secondary to tobacco abuse  Difficult intubation  Dyslipidemia   
 low HDL  Emphysema   
 HTN (hypertension)  Hypercholesterolemia  Ill-defined condition   
 hernia  Increased prostate specific antigen (PSA) velocity  Long term (current) use of anticoagulants   
 coumadin  Osteoarthritis  Osteomyelitis ([facility]  Paroxysmal atrial fibrillation (HCC)  Peripheral vascular disease ([facility] AAA repair 12/2007  Persistent atrial Fibrillation  Persistent atrial fibrillation (HCC)  Unspecified adverse effect of anesthesia   
 difficulty breathing placed in ICU Oct 2012 (AAA repair) Past Surgical History:  
Procedure Laterality Date  BRONCHOSCOPY DIAGNOSTIC  11/2/2012  CARDIAC CATHETERIZATION  11/1/2012  COLONOSCOPY N/A 7/26/2016 COLONOSCOPY with Polypectomies performed by Pollo Corbett MD at [facility] ENDOSCOPY  COLONOSCOPY N/A 5/28/2019 COLONOSCOPY with polypectomy performed by Kate Brown MD at [facility] [place] [address] HX AAA REPAIR    
 2006 & 2012  HX HEART CATHETERIZATION  3/4/2009 1. RCA small, nondominant; patent. 2. LMCA patent. 3. LAD is long, wrapped around apical vessel. Diffuse, 20-30% stenosis noted. 4. CCA is large, dominant vessel. Diffuse 20-30% stenosis. 5. LVEDP is 16 mmHg. 6. Overall left ventricular systolic function mildly diminshed with est. EF 45%. Mild, global hypokinesis noted. 7. No significant mitral regurgitation or aortic stenosis noted. (see report)  HX HERNIA REPAIR  2/2014  
 rt inguinal   
 HX HERNIA REPAIR  01/2016 LEFT INGUINAL HERNIA REPAIR DR. Kwan Villalobos  REPAIR ING HERNIA,5+Y/O,JESSIE Left 1-20-16 Dr. Shelly LONG  11/1/2012 Barriers to Learning/Limitations: yes;  physical 
Compensate with: visual, verbal, tactile, kinesthetic cues/model Home Situation:  
Home Situation Home Environment: Private residence # Steps to Enter: 5 Hand Rails : Right One/Two Story Residence: Two story, live on 1st floor Living Alone: No 
Support Systems: Family member(s) Patient Expects to be Discharged to[de-identified] Private residence Current DME Used/Available at Home: Hospital bed, Wheelchair Tub or Shower Type: Shower [x]     Right hand dominant   []     Left hand dominant Cognitive/Behavioral Status: 
Neurologic State: Alert Orientation Level: Oriented X4 Cognition: Follows commands Safety/Judgement: Fall prevention Skin: Visible skin appeared intact Edema: None noted Coordination: BUE Coordination: Generally decreased, functional 
Fine Motor Skills-Upper: Left Intact; Right Intact Gross Motor Skills-Upper: Left Intact; Right Intact Balance: 
Sitting: (Pt refused) Strength: BUE Strength: Generally decreased, functional 
 
Tone & Sensation: BUE Tone: Normal 
Sensation: Intact Range of Motion: BUE 
AROM: Generally decreased, functional 
 
 
Functional Mobility and Transfers for ADLs: 
Bed Mobility: 
Pt refused to participate in bed mob this session despite max encouragement. Transfers: 
NT 
 
ADL Assessment: 
Feeding: Setup Oral Facial Hygiene/Grooming: Setup;Stand-by assistance Bathing: Maximum assistance Upper Body Dressing: Moderate assistance Lower Body Dressing: Maximum assistance Toileting: Maximum assistance ADL Intervention: 
Grooming Grooming Assistance: Set-up Position Performed: (supine with HOB slightly elevated) Washing Face: Set-up Cognitive Retraining Safety/Judgement: Fall prevention Pain: 
Pain level pre-treatment: -/10 (Pt reports pain in stomach and back but did not provide a number on the pain level scale) Pain level post-treatment: -/10 Pain Intervention(s): Medication (see MAR); Rest, Ice, Repositioning Response to intervention: Nurse notified, See doc flow Activity Tolerance:  
Fair Please refer to the flowsheet for vital signs taken during this treatment. After treatment:  
[]  Patient left in no apparent distress sitting up in chair 
[x]  Patient left in no apparent distress in bed 
[x]  Call bell left within reach [x]  Nursing notified 
[]  Caregiver present 
[]  Bed alarm activated COMMUNICATION/EDUCATION:  
[x]      Role of Occupational Therapy in the acute care setting [x]      Home safety education was provided and the patient/caregiver indicated understanding. [x]      Patient/family have participated as able and agree with findings and recommendations. []      Patient is unable to participate in plan of care at this time. Thank you for this referral. 
Chun Sarkar MS, OTR/L Time Calculation: 13 mins Eval Complexity: History: MEDIUM Complexity : Expanded review of history including physical, cognitive and psychosocial  history ; Examination: LOW Complexity : 1-3 performance deficits relating to physical, cognitive , or psychosocial skils that result in activity limitations and / or participation restrictions ; Decision Making:MEDIUM Complexity : Patient may present with comorbidities that affect occupational performnce. Miniml to moderate modification of tasks or assistance (eg, physical or verbal ) with assesment(s) is necessary to enable patient to complete evaluation

## 2020-08-28 NOTE — PROGRESS NOTES
S: Did post-procedure/evening check tonight. Patient is awake, feeling well. No nausea or vomiting, he has tolerated some liquids PO. He is asking if he is due for pain meds. O:  
Visit Vitals /69 (BP 1 Location: Left arm, BP Patient Position: At rest) Pulse 87 Temp 97.6 °F (36.4 °C) Resp 25 Ht 5' 6\" (1.676 m) Wt 61.2 kg (135 lb) SpO2 100% BMI 21.79 kg/m² GEN: A&O, NAD, comfortable-appearing A/P: 80yo male s/p EGD today, findings overall negative with the exception of a small hiatal hernia. Tolerating PO. Patient is requesting Dilaudid 0.2mg q4h prn every 4 hours, despite appearing very comfortable and telling me he is feeling well. - Advance diet as tolerated - Vascular surgery note reviewed - Wean down Dilaudid, as he cannot discharge on this. Reuben Cruz D.O. PGY-3 
[facility]

## 2020-08-28 NOTE — PROGRESS NOTES
Infectious Disease progress Note Reason: eikenella bloodstream infection Current abx Prior abx Pip/tazo since 8/27 Ceftriaxone, metronidazole 8/23-8/26 Lines:  
 
 
Assessment : 
 
 79 y.o. male with PMH AAA s/p endovascular repair, Fe deficiency anemia, PUD, AFib, CHF, CAD, COPD with chronic hypoxic respiratory failure on home 3L NC, chronic back pain, and BPH, who presented to ED with complaint of malaise, worsening back pain and weakness. CTA 8/23- New strands of thrombus along the wall of the aortic and bilateral common iliac stents which protrudes into the central aortic lumen and place patient at risk for lower extremity embolic events. Increased aneurysm cranial to the aortobiiliac stent with resultant increased type I A endoleak. Similar type I B endoleak. Increased aortic aneurysm measuring 5.8 cm (change of 2 cm at this level). Minimal increased right common iliac aneurysm measuring 1.9 cm. 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 Rule out lumbar spine discitis/osteomyelitis. Most likely source of Eikenella bloodstream infection is evolving aortoenteric fistula, ? Infected endovascular stent Recommendations: 1. Recommend piperacillin/tazobactam for now 2. Obtain MRA abdomen- d/w MRI dept. To expedite testing 3. Follow-up vascular surgery recommendations 4. Follow-up repeat blood cultures 8/26 5. Agree with recommendations to transfer patient to [facility] Prog: guarded. Above plan was discussed in details with patient.,dr. Schultz/dr. Wilson, . Please call me if any further questions or concerns. Will continue to participate in the care of this patient. HPI: 
 Patient denies any subjective fever or chills throughout this time.   He states that he is having lower back pain for about 2 months. He started having increased abdominal pain since about 3 weeks. Patient denies headaches, visual disturbances, sore throat, runny nose, earaches, cp, sob, chills, cough, diarrhea, burning micturition, pain or weakness in extremities. home Medication List  
 Details DULoxetine (CYMBALTA) 20 mg capsule Take 20 mg by mouth daily. albuterol (PROVENTIL VENTOLIN) 2.5 mg /3 mL (0.083 %) nebu inhale contents of 1 vial in nebulizer every 4 hours if needed for wheezing 
Qty: 50 Nebule, Refills: 5  
  
tamsulosin (FLOMAX) 0.4 mg capsule Take 0.4 mg by mouth daily. pantoprazole (PROTONIX) 40 mg tablet Take 1 Tab by mouth Daily (before breakfast). Qty: 30 Tab, Refills: 0 HYDROcodone-acetaminophen (NORCO) 5-325 mg per tablet Take 0.5 Tabs by mouth two (2) times daily as needed for Pain. traZODone (DESYREL) 50 mg tablet Take 50 mg by mouth nightly. lisinopril (PRINIVIL, ZESTRIL) 20 mg tablet Take 1 Tab by mouth daily. Qty: 30 Tab, Refills: 0  
  
pravastatin (PRAVACHOL) 40 mg tablet Take 40 mg by mouth nightly. digoxin (DIGITEK) 0.125 mg tablet take 1 tablet by mouth once daily 
Qty: 90 Tab, Refills: 3  
  
amLODIPine (NORVASC) 10 mg tablet Take 10 mg by mouth daily. Refills: 0 OXYGEN-AIR DELIVERY SYSTEMS 2 L/min by Nasal route daily. Continuous. Company is [employer] 
   
  
polyethylene glycol (MIRALAX) 17 gram packet Take 17 g by mouth daily. PRN constipation  
  
ascorbic acid, vitamin C, (Vitamin C) 500 mg tablet Take 500 mg by mouth daily. lidocaine (LIDODERM) 5 %   
  
tiotropium-olodateroL (Stiolto Respimat) 2.5-2.5 mcg/actuation inhaler inhale 2 inhalations by mouth once daily 
Qty: 1 Inhaler, Refills: 5  
  
sucralfate (CARAFATE) 1 gram tablet   
  
sodium chloride (OCEAN NASAL) 0.65 % nasal squeeze bottle 2 Sprays by Both Nostrils route daily as needed for Congestion. ferrous sulfate 325 mg (65 mg iron) tablet Take 325 mg by mouth every other day. Indications: anemia from inadequate iron Current Facility-Administered Medications Medication Dose Route Frequency  pantoprazole (PROTONIX) 40 mg in 0.9% sodium chloride 10 mL injection  40 mg IntraVENous Q24H  
 dextrose 5% - 0.45% NaCl with KCl 10 mEq/L infusion  90 mL/hr IntraVENous CONTINUOUS  
 HYDROmorphone (DILAUDID) syringe 0.2 mg  0.2 mg IntraVENous Q4H PRN  piperacillin-tazobactam (ZOSYN) 3.375 g in 0.9% sodium chloride (MBP/ADV) 100 mL MBP  3.375 g IntraVENous Q6H  
 sucralfate (CARAFATE) tablet 1 g  1 g Oral AC&HS  
 0.9% sodium chloride infusion 250 mL  250 mL IntraVENous PRN  
 lidocaine 4 % patch 1 Patch  1 Patch TransDERmal Q24H  
 albuterol-ipratropium (DUO-NEB) 2.5 MG-0.5 MG/3 ML  3 mL Nebulization Q4H PRN  
 [Held by provider] amLODIPine (NORVASC) tablet 10 mg  10 mg Oral DAILY  [Held by provider] ascorbic acid (vitamin C) (VITAMIN C) tablet 500 mg  500 mg Oral DAILY  [Held by provider] digoxin (LANOXIN) tablet 0.125 mg  0.125 mg Oral DAILY  [Held by provider] DULoxetine (CYMBALTA) capsule 20 mg  20 mg Oral DAILY  [Held by provider] ferrous sulfate tablet 325 mg  325 mg Oral EVERY OTHER DAY  
 HYDROcodone-acetaminophen (NORCO) 5-325 mg per tablet 0.5 Tab  0.5 Tab Oral Q6H PRN  
 [Held by provider] lisinopriL (PRINIVIL, ZESTRIL) tablet 20 mg  20 mg Oral DAILY  [Held by provider] polyethylene glycol (MIRALAX) packet 17 g  17 g Oral DAILY  pravastatin (PRAVACHOL) tablet 40 mg  40 mg Oral QHS  [Held by provider] tamsulosin (FLOMAX) capsule 0.4 mg  0.4 mg Oral DAILY  [Held by provider] traZODone (DESYREL) tablet 50 mg  50 mg Oral QHS  sodium chloride (NS) flush 5-10 mL  5-10 mL IntraVENous PRN Allergies: Codeine; Morphine; and Sulfa (sulfonamide antibiotics) Temp (24hrs), Av.9 °F (36.6 °C), Min:97.4 °F (36.3 °C), Max:98.4 °F (36.9 °C) Visit Vitals /77 (BP 1 Location: Left arm, BP Patient Position: At rest) Pulse 86 Temp 98.4 °F (36.9 °C) Resp 17 Ht 5' 6\" (1.676 m) Wt 63.4 kg (139 lb 12.4 oz) SpO2 95% BMI 22.56 kg/m² ROS: 12 point ROS obtained in details. Pertinent positives as mentioned in HPI,  
otherwise negative Physical Exam: 
 
General: Thin male laying on the bed AAOx3 in no acute distress. HEENT:  Normocephalic, atraumatic, ; no conjunctival hemmohage;  nasal and oral mucous are moist and without evidence of lesions. No thrush. Edentulous. Neck supple, no bruits. Lungs:   non-labored, bilaterally clear to auscultation- no crackles wheezes rales or rhonchi Heart:  RRR, s1 and s2; no rubs or gallops, no edema, + pedal pulses Abdomen:  soft, non-distended, active bowel sounds, no hepatomegaly, no splenomegaly. RUQ/periumbilical tenderness on deep palpation, no guarding, no rigidity Genitourinary:  deferred Extremities:   no clubbing, cyanosis; no joint effusions or swelling; Full ROM of all large joints to the upper and lower extremities; muscle mass appropriate for age Neurologic:  No gross focal sensory abnormalities; 5/5 muscle strength to upper and lower extremities. Speech appropriate. Cranial nerves intact Skin:  No rash or ulcers noted Back:  Minimal tenderness on palpation of lumbar spine around L2/L3 region with some left paraspinal tenderness. No tenderness elsewhere on the spine Psychiatric:  No suicidal or homicidal ideations, appropriate mood and affect Labs: Results:  
Chemistry Recent Labs  
  08/28/20 
0019 08/27/20 
6052 08/26/20 
9805 * 164* 84  144 143  
K 3.4* 3.5 3.9 * 112* 111 CO2 28 26 20* BUN 16 25* 31* CREA 0.69 0.90 0.97  
CA 7.9* 8.6 8.5 AGAP 4 6 12 BUCR 23* 28* 32* * 158* 181* TP 6.2* 6.7 6.4 ALB 2.2* 2.4* 2.4*  
GLOB 4.0 4.3* 4.0 AGRAT 0.6* 0.6* 0.6* CBC w/Diff Recent Labs  
  08/28/20 0019 08/27/20 
5463 08/27/20 
0130 08/26/20 
9701 WBC 11.6 14.3*  --  18.9*  
RBC 2.55* 2.77*  --  2.89* HGB 7.6* 8.4* 8.5* 8.8*  8.9* HCT 24.2* 26.2* 26.5* 27.0*  27.6*  
* 131*  --  169 GRANS 87* 89*  --  91* LYMPH 7* 5*  --  4*  
EOS 0 0  --  0 Microbiology Recent Labs  
  08/26/20 
1503 CULT NO GROWTH 2 DAYS  
  
 
 
RADIOLOGY: 
 
All available imaging studies/reports in [facility] for this admission were reviewed Dr. Migue Oliver, Infectious Disease Specialist 
[phone] August 28, 2020 
8:26 AM

## 2020-08-28 NOTE — PROGRESS NOTES
[email or web address] 
[phone] Gastroenterology follow up-Progress note Impression: 1. GI bleed - Grossly melanotic/maroon stool on ED exam, denies seeing blood in stool PTA. Last scopes 5/2019, EGD/[facility] showed mild erosive gastritis and TA polyp in ascending colon. - now w/ bright red blood via NGT suction. Suspect suction ulcer due to suction being set too high. Now turned off. 
- s/p EGD 8/27/20 - small hiatale hernia and otherwise normal, no signs of active or recent hemorrhage 2. Acute on chronic anemia - H/H 7.3/22.8, long standing hx of SANTOS 3. Abnormal LFTs - hx ETOH, quit in 1990s, unclear if elevation due to infection or underlying liver disease. 4. Sepsis - eikenella bacteremia 5. Afib - no anticoagulation due to hx gastric ulcer 6. AAA s/p EVAR, followed by Dr. Priyanka Alicia 7. COPD - on 2L continuous O2 Plan: 1. Continue PPI 2. Monitor H/H, transfuse per protocol 3. Note plans for MRA ordered by Dr. Mica Travis to r/o aortoenteric fistula Chief Complaint: GI bleed Subjective:  Patient denies any acute complaints. He is unaware if he still has GI bleeding. Per RN did have black tarry stool overnight. ROS: Denies any fevers, chills, rash. General: well developed, well nourished, no acute distress Eyes: conjunctiva normal, EOM normal 
Cardiovascular: heart normal, intact distal pulses, normal rate and regular rhythm Pulmonary: breath sounds normal and effort normal 
Abdominal: appearance normal, bowel sounds normal and soft, non-acute, non tender Patient Active Problem List  
Diagnosis Code  Hypercholesterolemia E78.00  Congestive heart failure (CHF) (HCC) I50.9  Peripheral vascular disease (HCC) I73.9  
 COPD (chronic obstructive pulmonary disease) (HCC) J44.9  
 HTN (hypertension) I10  
 Chronic atrial fibrillation (HCC) I48.20  RLQ abdominal pain R10.31  Status post AAA (abdominal aortic aneurysm) repair J52.592, Z86.79  
  Aneurysm of abdominal aorta ([facility]) I71.4  Chronic constipation K59.09  
 Right inguinal hernia K40.90  Left inguinal hernia K40.90  CAD (coronary artery disease) I25.10  Hypoxemia requiring supplemental oxygen R09.02, Z99.81  Warfarin-induced coagulopathy ([address] D68.32, T45.515A  Intraabdominal fluid collection R18.8  Chronic back pain M54.9, G89.29  
 Sepsis ([facility] A41.9  Vertebral osteomyelitis ([facility] M46.20  Psoas abscess ([facility]) K68.12  
 Abscess L02.91  
 Discitis of lumbar region M46.46  
 Pulmonary hypertension ([facility]) I27.20  
 CHF exacerbation ([facility]) I50.9  Hyponatremia E87.1  Symptomatic anemia D64.9  Chronic pain syndrome G89.4  Chronic kidney disease N18.9  Iron deficiency anemia D50.9  [place] of aortic graft ([facility] T82.330A  TYRELL (acute kidney injury) ([facility] N17.9  Enlarged prostate without lower urinary tract symptoms (luts) N40.0  GERD (gastroesophageal reflux disease) K21.9  Left arm swelling M79.89  Lethargy R53.83  Lower extremity weakness R29.898  Mediastinal lymphadenopathy R59.0  Spondylolysis M43.00  Age-related nuclear cataract, bilateral H25.13  
 Iron deficiency anemia due to chronic blood loss D50.0  Community acquired pneumonia J18.9  
 UTI (urinary tract infection) N39.0  GI bleed K92.2 Visit Vitals /76 (BP 1 Location: Left arm, BP Patient Position: At rest) Pulse 76 Temp 97.8 °F (36.6 °C) Resp 15 Ht 5' 6\" (1.676 m) Wt 63.4 kg (139 lb 12.4 oz) SpO2 98% BMI 22.56 kg/m² Intake/Output Summary (Last 24 hours) at 8/28/2020 1337 Last data filed at 8/28/2020 6945 Gross per 24 hour Intake 1829.5 ml Output 1985 ml Net -155.5 ml  
 
 
CBC w/Diff Lab Results Component Value Date/Time  WBC 11.6 08/28/2020 12:19 AM  
 RBC 2.55 (L) 08/28/2020 12:19 AM  
 HGB 7.3 (L) 08/28/2020 01:00 PM  
 HCT 22.8 (L) 08/28/2020 01:00 PM  
 MCV 94.9 08/28/2020 12:19 AM  
 MCH 29.8 08/28/2020 12:19 AM  
 MCHC 31.4 08/28/2020 12:19 AM  
 RDW 19.7 (H) 08/28/2020 12:19 AM  
  (L) 08/28/2020 12:19 AM  
 Lab Results Component Value Date/Time GRANS 87 (H) 08/28/2020 12:19 AM  
 LYMPH 7 (L) 08/28/2020 12:19 AM  
 EOS 0 08/28/2020 12:19 AM  
 BANDS 1 08/26/2020 05:45 AM  
 BASOS 0 08/28/2020 12:19 AM  
  
Basic Metabolic Profile Recent Labs  
  08/28/20 
0019   
K 3.4*  
* CO2 28 BUN 16  
CA 7.9*  
MG 1.9 PHOS 1.8* Hepatic Function Lab Results Component Value Date/Time ALB 2.2 (L) 08/28/2020 12:19 AM  
 TP 6.2 (L) 08/28/2020 12:19 AM  
  (H) 08/28/2020 12:19 AM  
 No results found for: TBIL Coags No results for input(s): PTP, INR, APTT, INREXT, INREXT in the last 72 hours. DELIA Sauer 
08/28/20, 1:41 PM  
[facility]. [email or web address] Phone: [phone] Pager: [phone]

## 2020-08-28 NOTE — PROGRESS NOTES
0730: Bedside report received from Franck Grubbs RN. 
 
0845:Administered PRN dilaudid for 8/10 pain. 5721: Instructed Pt to spit in sputum specimen collector when he's able to produce sputum. Pt states his pain is better. 1200: Pt sleeping in bed.  
 
1400: Pt said he couldn't produce sputum to collect. 1515: MRI screening form complete. Verified with MRI of receipt. Form filed in Pt's chart. 1810: Pt departed for MRI.  
 
1910: Verbal shift change report given to Franck Grubbs RN (oncoming nurse) by Jenna Blackburn RN (offgoing nurse). Report included the following information SBAR, Kardex, Intake/Output, MAR, Recent Results and Med Rec Status.

## 2020-08-28 NOTE — PROGRESS NOTES
Problem: Mobility Impaired (Adult and Pediatric) Goal: *Acute Goals and Plan of Care (Insert Text) Description: 1. Pt will be seen for functional movement program to perform exercises and ROM during stay in hospital to prevent deconditioning. Rehab tech aware and educated on proper techniques specific to this patient. PLOF: Pt is bed-bound at home and is under the care of his brother 24/7 who he lives with. He has a hospital bed and wheelchair, but patient says he stays in bed and his brother helps him dress and bath. Pt uses 3L O2 nasal cannula at home. He has many family members who also live nearby. Pt states he has not sat EOB in a month. Outcome: Progressing Towards Goal 
  
PHYSICAL THERAPY EVALUATION Patient: Hamilton Braun (79 y.o. male) Date: 8/28/2020 Primary Diagnosis: GI bleed [K92.2] Procedure(s) (LRB): ENDOSCOPY (N/A) 1 Day Post-Op Precautions:  Fall ASSESSMENT : 
Based on the objective data described below, the patient presents with generalized deconditioning and decreased strength, poor functional mobility capacity, and decreased endurance. RN cleared PT to work with patient. Pt found supine in bed, on 3L O2 nasal cannula, willing to work with PT. Pt states he is bed bound at home and his brother takes care of him 24/7. He uses 3 L O2 at home. His brothers assists him with all bed mobility and dressing/bathing. Pt reports not sitting up out of bed for the last month. He has many family members who lives nearby that assist him. On MMT screen, patient has 3+/5 strength throughout all 4 limbs. Pt able to rolling to R and L with CGA in bed utilizing bed rails for support. Pt able to perform exercises in bed per flow sheet with rest between each set. Pt states the light exercises were fatigueing to him. Pt left supine in bed, call bell nearby, on 3L O2, no new questions or concerns.  RN made aware pf patients performance and placing pt on FMP to preserve strength and ROM while in hospital. Rehab tech educated on correct handling techniques and exercises. Patient will benefit from skilled intervention to address the above impairments. Patient's rehabilitation potential is considered to be Fair Factors which may influence rehabilitation potential include:  
[]         None noted 
[]         Mental ability/status [x]         Medical condition 
[]         Home/family situation and support systems 
[]         Safety awareness 
[]         Pain tolerance/management 
[]         Other: PLAN : 
Recommendations and Planned Interventions:  
[x]           Bed Mobility Training             []    Neuromuscular Re-Education []           Transfer Training                   []    Orthotic/Prosthetic Training 
[]           Gait Training                          []    Modalities [x]           Therapeutic Exercises           []    Edema Management/Control 
[x]           Therapeutic Activities            [x]    Family Training/Education 
[x]           Patient Education 
[]           Other (comment): Frequency/Duration: Patient will be followed by physical therapy 1-2 times per day/4-7 days per week to address goals. Discharge Recommendations: Home Health with 24/7 assist from family Further Equipment Recommendations for Discharge: N/A  
 
SUBJECTIVE:  
Patient stated I think you knocked the pain medication out of me.  OBJECTIVE DATA SUMMARY:  
 
Past Medical History:  
Diagnosis Date  Aneurysm ([address] AAA repair 2006 & 2012  Aorto-iliac duplex 02/13/2015 Tech difficult. Patent aorta bi-iliac endograft w/o leak or limb dysfunction.  Arrhythmia   
 a fib  Cardiac cath 11/01/2012 RCA (sm, nondom) patent. LM patent. LAD 25%. CX (dom) 45% mid. LVEDP 12 mmHg. No WMA. PA 27/12. W 10-12. CO/CI 5.2/2.6 (TD).  Cardiac echocardiogram, abnormal 02/20/2016 EF 55%. No WMA. Indeterminate diastolic fx. RVSP 60 mmHg. Severe LAE. Mild MR. Mod TR.  IVCE. Similar to study of 10/26/12.  Cardiovascular aorto-iliac duplex 02/13/2015 Patent aorta bi-iliac endovascular graft w/o leak or limb dysfunction. Sac measures 4.21 x 4.47 cm (4.4 x 4.6 cm on 1/31/14).  Cardiovascular LE peripheral arterial testing 07/29/2013 No significant LE arterial disease bilaterally. R GHADA 1. 12.  L GHADA 1. 12.  R DBI 0.83. L DBI 0.71. Exercise deferred.  Cardiovascular renal duplex 10/31/2012 No RA stenosis. Intrinsic/med disease in left kidney. Patent aortic endograft. Patent, thrombus-free renal veins bilaterally.  Carotid duplex 07/29/2013 Mild <50% bilateral ICA plaquing.  Chronic kidney disease  Chronic lung disease  Cigarette smoker  Congestive heart failure (CHF) ([address]  COPD (chronic obstructive pulmonary disease) (HCC)   
 and emphysema; likely secondary to tobacco abuse  Difficult intubation  Dyslipidemia   
 low HDL  Emphysema   
 HTN (hypertension)  Hypercholesterolemia  Ill-defined condition   
 hernia  Increased prostate specific antigen (PSA) velocity  Long term (current) use of anticoagulants   
 coumadin  Osteoarthritis  Osteomyelitis ([address]  Paroxysmal atrial fibrillation (HCC)  Peripheral vascular disease ([facility] AAA repair 12/2007  Persistent atrial Fibrillation  Persistent atrial fibrillation (HCC)  Unspecified adverse effect of anesthesia   
 difficulty breathing placed in ICU Oct 2012 (AAA repair) Past Surgical History:  
Procedure Laterality Date  BRONCHOSCOPY DIAGNOSTIC  11/2/2012  CARDIAC CATHETERIZATION  11/1/2012  COLONOSCOPY N/A 7/26/2016 COLONOSCOPY with Polypectomies performed by Quintin Shin MD at [facility] ENDOSCOPY  COLONOSCOPY N/A 5/28/2019 COLONOSCOPY with polypectomy performed by Bin Lambert MD at [address] HX AAA REPAIR    
 2006 & 2012  HX HEART CATHETERIZATION  3/4/2009 1. RCA small, nondominant; patent. 2. LMCA patent. 3. LAD is long, wrapped around apical vessel. Diffuse, 20-30% stenosis noted. 4. CCA is large, dominant vessel. Diffuse 20-30% stenosis. 5. LVEDP is 16 mmHg. 6. Overall left ventricular systolic function mildly diminshed with est. EF 45%. Mild, global hypokinesis noted. 7. No significant mitral regurgitation or aortic stenosis noted. (see report)  HX HERNIA REPAIR  2/2014  
 rt inguinal   
 HX HERNIA REPAIR  01/2016 LEFT INGUINAL HERNIA REPAIR DR. Stephanie Hicks  REPAIR ING HERNIA,5+Y/O,JESSIE Left 1-20-16 Dr. Santiago LONG  11/1/2012 Barriers to Learning/Limitations: None Compensate with: N/A Home Situation: 
Home Situation Home Environment: Private residence # Steps to Enter: 5 Hand Rails : Right One/Two Story Residence: Two story, live on 1st floor Living Alone: No 
Support Systems: Family member(s) Patient Expects to be Discharged to[de-identified] Private residence Current DME Used/Available at Home: Hospital bed, Wheelchair Tub or Shower Type: Shower Critical Behavior: 
Neurologic State: Alert Orientation Level: Oriented X4 Cognition: Follows commands Safety/Judgement: Good awareness of safety precautions Strength:  
Grossly decreased, nonfunctional  
Tone & Sensation:  
Intact Range Of Motion: 
Grossly decreased, functional  
Functional Mobility: 
Bed Mobility: 
Rolling: Contact guard assistance(to R and L) Therapeutic Exercises: Pt performed exercises supine in bed per flow sheet. EXERCISE Sets Reps Active Active Assist  
Passive Self ROM Comments Ankle Pumps 1 15  [x] [] [] [] Quad Sets/Glut Sets   [] [] [] [] Hamstring Sets   [] [] [] [] Short Arc Quads   [] [] [] [] Heel Slides 1 8 [x] [] [] [] Straight Leg Raises   [] [] [] [] Hip Abd/Add 1 10 [x] [] [] [] Long Arc Quads   [] [] [] [] Seated Marching   [] [] [] []   
Standing Marching   [] [] [] []   
   [] [] [] [] Pain: 
Pain level pre-treatment: 6/10 Pain level post-treatment: 7/10 Pain Intervention(s) : Medication (see MAR); Rest, Repositioning Response to intervention: Nurse notified, See doc flow Activity Tolerance: Pt wear weak but able to tolerated light exercises laying in bed Please refer to the flowsheet for vital signs taken during this treatment. After treatment:  
[]         Patient left in no apparent distress sitting up in chair 
[x]         Patient left in no apparent distress in bed 
[x]         Call bell left within reach [x]         Nursing notified 
[]         Caregiver present 
[]         Bed alarm activated 
[]         SCDs applied COMMUNICATION/EDUCATION:  
[x]         Role of Physical Therapy in the acute care setting. [x]         Fall prevention education was provided and the patient/caregiver indicated understanding. [x]         Patient/family have participated as able in goal setting and plan of care. []         Patient/family agree to work toward stated goals and plan of care. []         Patient understands intent and goals of therapy, but is neutral about his/her participation. []         Patient is unable to participate in goal setting/plan of care: ongoing with therapy staff. 
[]         Other: Thank you for this referral. 
Wilmer Bhatti Time Calculation: 22 mins Eval Complexity: History: LOW Complexity : Zero comorbidities / personal factors that will impact the outcome / POCExam:LOW Complexity : 1-2 Standardized tests and measures addressing body structure, function, activity limitation and / or participation in recreation  Presentation: LOW Complexity : Stable, uncomplicated  Clinical Decision Making:Low Complexity   Overall Complexity:LOW

## 2020-08-28 NOTE — PROGRESS NOTES
bedside turnover given to me by PRAVEEN Wilburn. Included SBAR< MAR, ED summary and a chance to ask questions. Pt is resting quietly in bed on the cardiac telemetry monitor, no signs of distress. Bed is in the lowest position with the wheels locked and call bell within reach on the bedside table with his personal effects.  pt c/o pain and asked for his IV pain medication, all meds given with ice water and gingerale, pt refuses to eat any of his dinner, asked to keep his fruit bowel at bedside.  evening meds given with pain medication, thermostat adjusted pain needs assessed, physical assessment completed and urinal emptied. Bed remains in the lowest position with wheels locked, door is open and call bell on the table within reach.  pt is sleeping in bed on the cardiac telemetry monitor, no signs of pain or distress post pain medication. Will continue to hourly round and assess frequently. 8358 called and spoke with Dr. Scotty Galvez regarding pt's potassium albumin hematocrit and hemoglobin and phosphorus lab results and informed pt was typed and screened due to his being . 730 bedside turnover SBAR< MAR< ED summary given to RN ___with a chance to ask questions.  Pt is in bed on the cardiac telemetry monitor in stable condition with the bed is in the lowest position wheels locked and call bell within reach with a new cup of ice water at bedside./

## 2020-08-28 NOTE — PROGRESS NOTES
[facility] Progress Note Patient: Izzy Murphy MRN: [id number] SSN: [id number]  YOB: 1940 Age: 79 y.o. Sex: male Admit Date: 8/23/2020 LOS: 4 days Chief Complaint Patient presents with  Fatigue Subjective:  
 
Pt resting in bed this morning. No chest pain, no sob, no palpitations, no other sx reported.  
 
  
Review of Systems Constitutional: Positive for malaise/fatigue and weight loss. Negative for chills, diaphoresis and fever. HENT: Negative for congestion, hearing loss, sore throat and tinnitus. Eyes: Positive for blurred vision (chronic) and discharge (chronic). Respiratory: Negative for cough, hemoptysis and shortness of breath. Cardiovascular: Negative for chest pain, palpitations and orthopnea. Gastrointestinal: Negative for abdominal pain, blood in stool, diarrhea, heartburn, melena and vomiting. Genitourinary: Negative for dysuria, frequency, hematuria and urgency. Musculoskeletal: Positive for back pain. Neurological: Positive for dizziness (chronic, on turning in bed) and weakness. Negative for tingling, sensory change and headaches. Psychiatric/Behavioral: Negative for depression and suicidal ideas. Objective:  
 
Visit Vitals /77 (BP 1 Location: Left arm, BP Patient Position: At rest) Pulse 86 Temp 98.4 °F (36.9 °C) Resp 17 Ht 5' 6\" (1.676 m) Wt 63.4 kg (139 lb 12.4 oz) SpO2 95% BMI 22.56 kg/m² Physical Exam:  
General:  AAOx3, NAD, laying comfortably in bed HEENT: Conjunctiva pale with thick yellow discharge, sclera anicteric. PERRL. EOMI. Dry mucous membranes. Thyroid not enlarged, no nodules. No cervical, supraclavicular, occipital or submandibular lymphadenopathy. No other gross abnormalities present. CV:  Irregular rhythm, normal rate, no murmurs. No visible pulsations or thrills. RESP:  Unlabored breathing.   Lungs clear to auscultation without adventitious breath sounds. Equal expansion bilaterally. ABD:  Soft, TTP RUQ, RLQ, LLQ and suprapubic, voluntary guarding, nondistended. BS (+). No hepatosplenomegaly. R inguinal hernia, reducible, nontender, no overlying skin changes RECTAL:  Deferred as pt had BM in diaper during interview. MS:  No joint deformity. BLE atrophy. Neuro:  CN II-XII grossly intact. 4/5 strength bilateral upper extremities and lower extremities. Ext:  No edema. 2+ radial and dp pulses bilaterally. Skin:  No rashes, lesions, or ulcers. Good turgor. Intake and Output: 
Current Shift: No intake/output data recorded. Last three shifts: 08/26 1901 - 08/28 0700 In: 3869.2 [P.O.:240; I.V.:3629.2] Out: 2115 [Urine:2115] Lab/Data Review: 
Recent Results (from the past 12 hour(s)) MAGNESIUM Collection Time: 08/28/20 12:19 AM  
Result Value Ref Range Magnesium 1.9 1.6 - 2.6 mg/dL PHOSPHORUS Collection Time: 08/28/20 12:19 AM  
Result Value Ref Range Phosphorus 1.8 (L) 2.5 - 4.9 MG/DL  
CBC WITH AUTOMATED DIFF Collection Time: 08/28/20 12:19 AM  
Result Value Ref Range WBC 11.6 4.6 - 13.2 K/uL  
 RBC 2.55 (L) 4.70 - 5.50 M/uL HGB 7.6 (L) 13.0 - 16.0 g/dL HCT 24.2 (L) 36.0 - 48.0 % MCV 94.9 74.0 - 97.0 FL  
 MCH 29.8 24.0 - 34.0 PG  
 MCHC 31.4 31.0 - 37.0 g/dL  
 RDW 19.7 (H) 11.6 - 14.5 % PLATELET 246 (L) 473 - 420 K/uL MPV 9.7 9.2 - 11.8 FL  
 NEUTROPHILS 87 (H) 40 - 73 % LYMPHOCYTES 7 (L) 21 - 52 % MONOCYTES 6 3 - 10 % EOSINOPHILS 0 0 - 5 % BASOPHILS 0 0 - 2 %  
 ABS. NEUTROPHILS 10.1 (H) 1.8 - 8.0 K/UL  
 ABS. LYMPHOCYTES 0.8 (L) 0.9 - 3.6 K/UL  
 ABS. MONOCYTES 0.7 0.05 - 1.2 K/UL  
 ABS. EOSINOPHILS 0.0 0.0 - 0.4 K/UL  
 ABS. BASOPHILS 0.0 0.0 - 0.1 K/UL  
 DF AUTOMATED METABOLIC PANEL, COMPREHENSIVE Collection Time: 08/28/20 12:19 AM  
Result Value Ref Range Sodium 145 136 - 145 mmol/L Potassium 3.4 (L) 3.5 - 5.5 mmol/L  Chloride 113 (H) 100 - 111 mmol/L  
 CO2 28 21 - 32 mmol/L Anion gap 4 3.0 - 18 mmol/L Glucose 124 (H) 74 - 99 mg/dL BUN 16 7.0 - 18 MG/DL Creatinine 0.69 0.6 - 1.3 MG/DL  
 BUN/Creatinine ratio 23 (H) 12 - 20 GFR est AA >60 >60 ml/min/1.73m2 GFR est non-AA >60 >60 ml/min/1.73m2 Calcium 7.9 (L) 8.5 - 10.1 MG/DL Bilirubin, total 0.6 0.2 - 1.0 MG/DL  
 ALT (SGPT) 23 16 - 61 U/L  
 AST (SGOT) 20 10 - 38 U/L Alk. phosphatase 128 (H) 45 - 117 U/L Protein, total 6.2 (L) 6.4 - 8.2 g/dL Albumin 2.2 (L) 3.4 - 5.0 g/dL Globulin 4.0 2.0 - 4.0 g/dL A-G Ratio 0.6 (L) 0.8 - 1.7 TYPE & SCREEN Collection Time: 08/28/20 12:19 AM  
Result Value Ref Range Crossmatch Expiration 08/31/2020 ABO/Rh(D) O POSITIVE Antibody screen NEG   
 
 
RECENT RESULTS MODALITY IMPRESSION  
XR Results from Hospital Encounter encounter on 08/23/20 XR CHEST PORT Narrative CHEST PORTABLE INDICATIONS: Atrial fibrillation, COPD, CHF with worsening cough. COMPARISON: 8/23/2020 FINDINGS:  
 
Support lines/catheters: External monitoring leads overlie the chest. 
 
Lungs: Slightly hazy right lung opacity compared to the left which may represent 
a layering pleural effusion. Cardiac silhouette and mediastinal contours: Stable unfolding of the aortic 
contour. Mildly enlarged cardiac silhouette. Bones and soft tissues: Chronic right sixth rib fracture. Impression Impression: 
 
Hazy opacity in the right lung most likely represents a layering pleural 
effusion. Stable mild cardiomegaly with aortic ectasia. CT [place] [facility] CTA CHEST ABD PELV W WO CONT Narrative CTA Chest, Abdomen And Pelvis With and without Enhancement INDICATION: Abdominal aortic aneurysm status post endovascular repair, peptic 
ulcer disease, heart failure, COPD with chronic respiratory failure on home 
oxygen, chronic back pain, BPH with malaise, worsening back pain, weakness for 3-4 weeks, hypotension, acute on chronic anemia, melanotic stool. TECHNIQUE: 2.5 mm collimation axial images obtained from the thoracic inlet to 
the level of the pubic symphysis following the uneventful administration of 80 
mL Isovue-370 nonionic intravenous contrast.  Imaging performed during maximum 
aortic enhancement and is therefore suboptimal for evaluating solid organs and 
bowel. Raw data reviewed along with three-dimensional volume rendered images 
and maximum intensity projection images to better evaluate the tortuous vascular 
structures. All CT scans at this facility are performed using dose optimization technique as 
appropriate to a performed exam, to include automated exposure control, 
adjustment of the mA and/or kV according to patient size (including appropriate 
matching first site-specific examinations), or use of iterative reconstruction 
technique. COMPARISON: 2/17/2020. Thyroid: Unremarkable. Heart: Severe regions of coronary arteriosclerosis in the left circumflex. Biatrial cardiac chamber enlargement. Pulmonary artery: Patent Lymph Nodes: 2 Similar 1.4 cm precarinal lymph nodes, likely reactive. Lung: Moderate centrilobular emphysema. Mild subsegmental atelectasis in the 
right lower lobe. Mild subsegmental atelectasis or scarring right upper and 
right middle lobes. Pleura: Increased moderate right and similar trace left pleural effusions. No 
pneumothorax. ABDOMEN FINDINGS:  
There is suboptimal evaluation of visceral organs secondary to timing of the 
exam. 
 
 
Liver: Similar scattered hepatic cysts measuring up to 2.6 cm in the left medial 
hepatic lobe at the dome and 2.4 cm right posterior hepatic lobe. There are a 
few too small to characterize hepatic lesions but statistically most likely 
benign. Spleen: Unremarkable. Pancreas: Pancreas is atrophic. Biliary: Possible adenomyomatosis gallbladder fundus. Bowel: There is a increased rectal stool burden. Gas-filled gastric distention 
third portion duodenum does not clearly cross midline into the left abdomen but 
there is no volvulus or bowel dilation. No bowel wall thickening. No 
pneumatosis. Appendix in the right inguinal hernia without inflammation, 
unchanged. There is fluid in the esophagus. Peritoneum/ Retroperitoneum: There is edema without free fluid or free air. Lymph Nodes: Unremarkable. Adrenal Glands: Unremarkable. Kidneys: Right kidney is 7.1 cm, previously 8.3 cm and the left is 10.7 cm, 
previously 10.4 cm. The right kidney hypoenhance is with cortical thinning. There is cortical scarring at the left lower pole. There is a exophytic 0.9 cm 
hyperdense lesion at the right lower pole without enhancement. 2 cm left lower 
pole cyst. Several too small to characterize renal lesions. PELVIS FINDINGS:  
 
Bladder/ Pelvic Organs: Small diverticulum at the left anterior bladder wall. Moderate bladder distention. Bones/Soft tissues: Mild anasarca. Osteopenia. Lumbar facet hypertrophy and 
arthropathy. Transitional lumbosacral junction. Degenerative disc disease. Central height loss superior endplate L3.   
 
 
AORTA FINDINGS: 
Thoracic aorta is not enlarged with mild atherosclerosis. 3 vessel arch anatomy 
with patent arch vessels. Subjectively, the aneurysm cranial to the proximal 
aortic stent landing zone is increased. There appears to be a similar 1 cm 
crescent of enhancement at the right anterior aspect with endoleak but there is 
increased nonopacified circumferential region measuring up to 0.6 cm on the left 
(series 3, image 138), not present on prior exam. The excluded aneurysm is 
subjectively larger. There is similar right type I B endoleak distal to the 
common iliac stent. The vessel measures 2.3 cm at this location, previously 2.1 
cm. The peripheral right common iliac is 1.9 cm, previously 1.7 cm. There are new linear regions of thrombus which protrude into the central aortic lumen at 
the more cranial aspect of the stent at the level of the left renal vein and 
within the bilateral common iliac stents protruding into the central lumen. Mild stenosis proximal celiac. Regions of stenosis splenic artery. Mild stenosis 
proximal SMA. Moderate stenosis proximal left renal artery and proximal 
occlusion right renal artery. There is stenosis at the origin of the right 
internal iliac, origin left internal iliac. Three-D generated aortic measurements: 
3.6, 3.2, 2.9 centimeter at the sinus of Valsalva. 3.5 x 3.1 centimeter at the sinotubular junction. 3.8 x 3.8 centimeter at the maximum diameter of the ascending aorta. 3.5 x 3.2 centimeter at the mid aortic arch. 3.4 x 3.2 centimeter at the proximal descending thoracic aorta. 3.6 x 3.1 centimeter at the mid descending aorta. 3.6 x 3.5 centimeter at the distal descending thoracic aorta at the level of the 
diaphragmatic hiatus. Aneurysm measurements: 
Volume 149.73 cc, previously 166.71 cc 
4.9 x 4.2 cm below the lowest renal artery including excluded aorta, previously 4.4 x 3.7 cm 
1 cm below the lowest renal artery within the stent: 3 x 2.6 cm; including 
excluded aorta: 5.8 x 4.4 cm, previously 3.9 x 3.9 cm 
3.1 x 3 cm within the stent at the top of the stent; 5.4 x 4.4 cm including 
excluded aorta, previously 4 x 3.6 cm 
2.7 x 2.7 cm within the stent below the lowest renal artery 2.7 x 2.7 cm Within the stent below the lowest renal artery 3 cm distance from the lowest renal artery to the top of the stent Impression IMPRESSION: 
1.  New strands of thrombus along the wall of the aortic and bilateral common 
iliac stents which protrudes into the central aortic lumen and place patient at 
risk for lower extremity embolic events. 2.  Increased aneurysm cranial to the aortobiiliac stent with resultant increased type I A endoleak. Similar type I B endoleak. Increased excluded 
aortic aneurysm measuring 5.8 cm (change of 2 cm at this level). Minimal 
increased right common iliac aneurysm measuring 1.9 cm. 
3.  Increased moderate right and similar small left pleural effusions. 4.  Increased rectal stool burden. No active extravasation. 5.  Gas-filled gastric distention. 6.  Occlusion proximal right renal artery with worsened atrophy and 
hypoenhancement. 7.  Severe left circumflex coronary arteriosclerosis. 8.  Biatrial cardiac chamber enlargement. 9.  Moderate stenosis left renal artery, stenosis at the origin of the bilateral 
internal iliac arteries. 10.  Moderate emphysema. 11.  Gastroesophageal reflux. 12.  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 13.  Unchanged Incidental Amyand hernia. 14.  Right renal hemorrhagic/proteinaceous cyst. 
15.  Small bladder diverticulum. Preliminary report provided by Dr. Tyree Kennedy on 8/23/2020 at 2234 hours prior to 
3-D image availability. MRI Results from Hospital Encounter encounter on 08/25/17 MRA ABD W WO CONT Narrative CPT CODE: 36339 MRA Abdomen. CLINICAL HISTORY: Patient with history of infrarenal AAA repair. Left ileus 
psoas abscess drained 2/22/2016, discitis L3/L4 with adjacent osteomyelitis February 2016, likely discitis T11/T12 August 2016, recurrent psoas collection 8/25/2017 with likely chronic osteomyelitis L3. 
 
TECHNIQUE: Multiplanar images of abdomen and pelvis using SSFSE, FIESTA  with Cine, T1, LAVA, opposed phase, 3-D inflow and multiphase postcontrast LAVA T1 FS 
post contrast (10 cc Gadavist) sequences. COMPARISON: Multiple prior studies including lumbar MRI 5/5/2016, CT 2/21/2016, 
CT 8/4/2016, lumbar MRI 2/23/2016, CT 8/25/2017. FINDINGS:   
 
Lung bases: Small effusions. Liver: Multiple low T1 and high T2 signal lesions in the liver without 
enhancement and stable since prior. Gallbladder: Focal fundal thickening with stranding of pearls appearance. Spleen: Normal size and enhancement. Pancreas: Normal signal and enhancement. No duct dilatation. Adrenal glands: Normal. 
 
Kidneys: Normal size. Symmetric enhancement. Few tiny cysts bilaterally 
including hemorrhagic cyst posteriorly on the right measuring 6 mm. Mild 
cortical thinning lower pole left kidney with small cyst. 
 
General: Body wall edema at the flanks, around the hips, dependent parts of the 
retroperitoneum. Right inguinal hernia containing appendix and fat. Bowel: Unremarkable. : Urinary bladder, included parts of the prostate are unremarkable. Retroperitoneum: There is endovascular stent traversing AAA with maximum 
dimension 4.8 cm, stable. Stable wall thickening at the abdominal aorta with low T2 signal and enhancement of the wall, a stable finding dating to 2016. There is 
edema posterior to the abdominal aorta from 3:00 to 7:00 position (series 7 
image 49). Graft is widely patent. Aneurysmal dilatation iliac vessels as 
before. Right common iliac artery measures 24 mm. Left common iliac artery 
measures 18 mm. There is a similar adjacent fluid collection in the left psoas that extends 
anteromedially to abut the aorta (series 7 image 45 and measures 4.4 x 1.5 x 5.2 
cm (series 7 image 45, series 2007 image 39). There is surrounding fat stranding 
and enhancement compatible with edema and inflammation. Small adjacent lymph 
nodes measuring up to 10 mm. Fluid collection begins lateral to the L2/L3 disc space, appears contiguous with 
fluid in the L3/L4 disc space (coronal series 2007 image 37), and ends in the 
psoas at mid L3 level. A drain has been placed, posterolateral to the majority 
of the collection today (coronal series 2007) and collection does not appear 
appreciably decreased in size.  Enhancement at L3 vertebral body laterally on the 
 left c/w osteomyelitis. Severe multifactorial stenosis L3/L4. Impression IMPRESSION: 
 
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 Stable size since 
drain placement. Psoas collection abuts the aorta which is stable in appearance with wall 
thickening and enhancement as before. Stable chronic inflammation related to 
adjacent abscess remains possible. ULTRASOUND Results from Hospital Encounter encounter on 08/25/17 US EXT NONVAS RT LTD Impression Impression:  Large fat-containing right inguinal hernia. On recent CT scan 
(August 25, 2017), the appendix is seen extending through the internal inguinal 
ring into the inguinal hernia. This is not redemonstrated on today's ultrasound 
but potentially if the appendix does not have air within it, it may be difficult 
to visualize sonographically. No adenopathy. [place] from [facility] [place]18[place]16 US SCROTUM/TESTICLES Impression IMPRESSION: 
1. Increased internal vascularity of the left testicle suggests mild orchitis. Cardiology Procedures/Testing: MODALITY RESULTS  
EKG Results for orders placed or performed during the hospital encounter of 08/23/20 EKG, 12 LEAD, INITIAL Result Value Ref Range Ventricular Rate 71 BPM  
 Atrial Rate 288 BPM  
 QRS Duration 86 ms  
 Q-T Interval 350 ms QTC Calculation (Bezet) 380 ms Calculated R Axis -42 degrees Calculated T Axis 81 degrees Diagnosis Atrial fibrillation Left axis deviation Low voltage QRS Abnormal ECG When compared with ECG of 26-JAN-2020 09:46, 
Criteria for Anteroseptal infarct are no longer present Confirmed by Kierra Calhoun MD, Angélica Jackson ([id number]) on 8/24/2020 8:30:54 AM 
  
Results for orders placed or performed in visit on 08/10/18 AMB POC EKG ROUTINE W/ 12 LEADS, INTER & REP Narrative  Read by Syed Hinojosa MD - atrial fibrillation, occasional ectopic ventricular beat. Nonspecific QRS widening and anterior fascicular block. Old anterior infarct. Possible nonspecific ST abnormality. ECHO 06/02/19 ECHO ADULT COMPLETE 06/04/2019 6/4/2019 Narrative · Left Ventricle: Estimated left ventricular ejection fraction is 56 -  
60%. Abnormal left ventricular septal motion. Interventricular septal  
\"bounce\". · Tricuspid Valve: Non-specific thickening of the tricuspid valve. Moderate tricuspid valve regurgitation is present. · Pulmonary Artery: Moderate pulmonary hypertension. Pulmonary arterial  
systolic pressure is 30.4 mmHg. · Pulmonic Valve: Mild pulmonic valve regurgitation is present. · Left Atrium: Severely dilated left atrium. · Right Atrium: Moderately dilated right atrium. Signed by: Chelsey Barba MD  
  
 
Special Testing/Procedures: MODALITY RESULTS MICRO All Micro Results Procedure Component Value Units Date/Time CULTURE, BLOOD [[id number]] Collected:  08/26/20 1503 Order Status:  Completed Specimen:  Blood Updated:  08/28/20 9316 Special Requests: NO SPECIAL REQUESTS Culture result: NO GROWTH 2 DAYS SUSCEPTIBILITY, AER + ANAEROB [[id number]] Collected:  08/23/20 1820 Order Status:  Completed Specimen:  MICROBIAL ISOLATE Updated:  08/27/20 1237 Source BLOOD Susceptibility, Aer + Anaerob Preliminary report Comment: (NOTE) Performed At: [address] [id number] Srinivasa Byrd MD [id number] Margaret Al REFL [[id number]]  (Abnormal) Collected:  08/23/20 1820 Order Status:  Completed Specimen:  Miscellaneous sample Updated:  08/27/20 1237 Source PENDING Aer+Anaer Suscept Result 1 Eikenella corrodens Comment: (NOTE) Identification performed by account, not confirmed by this 
laboratory. Performed At: [address] [id number] Srinivasa Byrd MD [id number] CULTURE, BLOOD [[id number]]  (Abnormal) Collected:  08/23/20 1805 Order Status:  Completed Specimen:  Blood Updated:  08/27/20 1223 Special Requests: NO SPECIAL REQUESTS     
  GRAM STAIN    
  ANAEROBIC BOTTLE NO ORGANISMS SEEN Culture result:    
  EIKENELLA CORRODENS GROWING IN THE ANAEROBIC BOTTLE BETA LACTAMASE NEGATIVE CULTURE, BLOOD [[id number]]  (Abnormal) Collected:  08/23/20 1820 Order Status:  Completed Specimen:  Blood Updated:  08/27/20 8796 Special Requests: NO SPECIAL REQUESTS Culture result:    
  EIKENELLA CORRODENS , BETA LACTAMASE NEGATIVE , ISOLATED FROM THE ANAEROBIC BOTTLE  
     
 CULTURE, RESPIRATORY/SPUTUM/BRONCH Rolena Dana STAIN [[id number]] Order Status:  Sent Specimen:  Sputum Caden Parody [[id number]] Collected:  08/24/20 0358 Order Status:  Completed Specimen:  Urine from Clean catch Updated:  08/25/20 1026 Special Requests: NO SPECIAL REQUESTS Culture result: No growth (<1,000 CFU/ML) UA Results for orders placed or performed in visit on 04/07/14 AMB POC URINALYSIS DIP STICK AUTO W/O MICRO     Status: None Result Value Ref Range Status Color (UA POC) Yellow  Final  
 Clarity (UA POC) Clear  Final  
 Glucose (UA POC) Negative Negative Final  
 Bilirubin (UA POC) Negative Negative Final  
 Ketones (UA POC) Negative Negative Final  
 Specific gravity (UA POC) 1.010 1.001 - 1.035 Final  
 Blood (UA POC) Trace Negative Final  
 pH (UA POC) 7.0 4.6 - 8.0 Final  
 Protein (UA POC) Trace Negative mg/dL Final  
 Urobilinogen (UA POC) 0.2 mg/dL 0.2 - 1 Final  
 Nitrites (UA POC) Negative Negative Final  
 Leukocyte esterase (UA POC) Negative Negative Final  
  
PATH Assessment and Plan:  
 
79 y.o. male with PMH AAA s/p endovascular repair, Fe deficiency anemia, PUD, AFib, CHF, CAD, COPD with chronic hypoxic respiratory failure on home 3L NC, chronic back pain, and BPH, now admitted with acute on chronic anemia. 
  
Acute on chronic anemia. DDx to include PUD, diverticulosis/itis, malignancy, AVM. Initial Hgb 6.0, after 3u PRBC, Hgb 7.2. EGD and Colonoscopy by [facility] on 5/28/19 showed hiatal hernia, erosive gastritis with flecks of coffee grounds, ascending colon polyp; diverticulosis; and hemorroids. He saw Hem/Onc on 8/7/20 for surveillance labs; no new intervention noted. Favor recurrent bleeding gastric ulcer. Pt has gross melena with guaiac positive stool. Hx of gastritis and PUD, on home protonix and noncompliant with carafate x 1 month. Possible bleeding diverticulae. Hx diverticulosist. TTP LLQ and suprapubic area, but no reported ABD pain. Melena would not support diverticular bleed, but pt has large stool burden suggesting long transit time through sigmoid for blood to be digested. WBC 18.5 consistent with diverticulitis. Malignancy possible in the setting of painless GIB, although CT does not show any obvious intraluminal neoplasm. Pt has had unintentional weight loss. AVM less likely; not noted on CT A/P w/ IV contrast.  
- GI following, EGD yesterday, no significant findings - Repeat CXR, sputum cx, peripheral smear - H/H Q4H, transfuse if Hgb < 7 
- monitor VS per unit routine 
- CBC, CMP, Mag, Phos daily 
- PT/INR 
- Vascular: no need for IVC filter - protonix gtt  
- resuming diet as tolerated 
- NS @ 250mL/hr 
- PT/OT/CM 
  
Sepsis: 2/4 SIRS: tachypnea and WBC 19.3. Current DDx includes intraabdominal infection, diverticulitis, UTI, spine osteomyelitis/abscess. No evidence of intraabdomninal infection on wet read. LLQ TTP and melena support diverticulitis. Possible UTI in light of t w/ hx of BPH and imaging shows distended bladder. Pt received flagyl and rocephin in ED. Hx of lumbar spine osteomyelitis and abscess, but no imaging suggestive of acute osteomyelitis. - FU UA and UCx 
- FU BCx 
- FU CT final read - Consult to ID for Eikenella bacteremia, started on Zosyn 
  
 AFib/CHF/CAD: rate controlled on amlodipine; off ASA and Coumadin due to hx of GIB. No evidence of decompensated CHF noted in cardiology clinic visit (6/16/20). CXR shows large-moderate R pleural effusion. Coronary angiography in 2012 only showed mild CAD. - hold home amlodipine 10mg daily, digoxin 125mcg daily, pravastatin 40mg daily while NPO 
- hold lisinopril 20mg daily 
            - diurese pleural effusion once BP more stable 
  
COPD: C/b chronic hypoxic respiratory failure, on 3L NC at home. - supplemental O2 for sats < 92% 
- duoneb PRN 
  
Chronic back pain: on home norco 5-325 
- lidocaine patch 
- restart norco 
  
BPH  
- hold tamsulosin 0.4mg daily while NPO 
  
Insomnia: On two home sleeping aids; probably does not need both. Defer to day team to choose which one to continue 
- hold duloxetine 20mg QHS and trazadone 50mg QHS while NPO.   
  
Diet NPO  
DVT Prophylaxis contraindicated GI Prophylaxis protonix gtt Code status No CPR, Intubation ok Disposition > 2 MN  
  
Point of Contact Rachelus Corralrosa Relationship: daughter 
[phone] Briana Poe 
Daughter 
[phone] Arnaud Adams DO, PGY-1  
[facility] Medicine Intern Pager: [phone] August 28, 2020, 7:00 AM

## 2020-08-28 NOTE — MED STUDENT NOTES
*ATTENTION:  This note has been created by a medical student for educational purposes only. Please do not refer to the content of this note for clinical decision-making, billing, or other purposes. Please see attending physicians note to obtain clinical information on this patient. * 120 [facility] Medical Student Progress Note Patient: Diamond Jules MRN: [id number] SSN: [id number]  YOB: 1940 Age: 79 y.o. Sex: male Admit Date: 8/23/2020 LOS: 4 days Chief Complaint Patient presents with  Fatigue Subjective:  
 
Pt is doing about the same as yesterday. He still reports pain and has been asking for his Dilaudid often. He states that his home Norco \"won't cut it\" when discussing transitioning to home meds. Tolerating diet without issues. Per nursing the pt's last BM was last night and it was dark and tarry. ROS negative for CP, SOB, n/v, dizziness, lightheadedness, dysuria, hematuria Objective:  
 
Visit Vitals /77 (BP 1 Location: Left arm, BP Patient Position: At rest) Pulse 86 Temp 98.4 °F (36.9 °C) Resp 17 Ht 5' 6\" (1.676 m) Wt 63.4 kg (139 lb 12.4 oz) SpO2 95% BMI 22.56 kg/m² Physical Exam:  
General appearance: resting comfortably in bed, alert, cooperative, no distress Lungs: expiratory wheezes anteriorly and laterally Heart: regular rate but irregular rhythm, S1, S2 normal, no murmur, click, rub or gallop Abdomen: soft, very minimal tenderness if any in epigastrium. Bowel sounds normal. No masses,  no organomegaly Pulses: irregular Skin: Skin color, texture, turgor normal. No rashes or lesions Neuro:  mental status, speech normal, alert and oriented x iii Exremities: approximately 1+ pitting edema of bilateral lower extremities Intake and Output: 
Current Shift: 08/28 0701 - 08/28 1900 In: -  
Out: 170 [Urine:170] Last three shifts: 08/26 1901 - 08/28 0700 In: 3869.2 [P.O.:240; I.V.:3629.2] Out: 2115 [Urine:2115] Lab/Data Review: 
Recent Results (from the past 12 hour(s)) MAGNESIUM Collection Time: 08/28/20 12:19 AM  
Result Value Ref Range Magnesium 1.9 1.6 - 2.6 mg/dL PHOSPHORUS Collection Time: 08/28/20 12:19 AM  
Result Value Ref Range Phosphorus 1.8 (L) 2.5 - 4.9 MG/DL  
CBC WITH AUTOMATED DIFF Collection Time: 08/28/20 12:19 AM  
Result Value Ref Range WBC 11.6 4.6 - 13.2 K/uL  
 RBC 2.55 (L) 4.70 - 5.50 M/uL HGB 7.6 (L) 13.0 - 16.0 g/dL HCT 24.2 (L) 36.0 - 48.0 % MCV 94.9 74.0 - 97.0 FL  
 MCH 29.8 24.0 - 34.0 PG  
 MCHC 31.4 31.0 - 37.0 g/dL  
 RDW 19.7 (H) 11.6 - 14.5 % PLATELET 460 (L) 367 - 420 K/uL MPV 9.7 9.2 - 11.8 FL  
 NEUTROPHILS 87 (H) 40 - 73 % LYMPHOCYTES 7 (L) 21 - 52 % MONOCYTES 6 3 - 10 % EOSINOPHILS 0 0 - 5 % BASOPHILS 0 0 - 2 %  
 ABS. NEUTROPHILS 10.1 (H) 1.8 - 8.0 K/UL  
 ABS. LYMPHOCYTES 0.8 (L) 0.9 - 3.6 K/UL  
 ABS. MONOCYTES 0.7 0.05 - 1.2 K/UL  
 ABS. EOSINOPHILS 0.0 0.0 - 0.4 K/UL  
 ABS. BASOPHILS 0.0 0.0 - 0.1 K/UL  
 DF AUTOMATED METABOLIC PANEL, COMPREHENSIVE Collection Time: 08/28/20 12:19 AM  
Result Value Ref Range Sodium 145 136 - 145 mmol/L Potassium 3.4 (L) 3.5 - 5.5 mmol/L Chloride 113 (H) 100 - 111 mmol/L  
 CO2 28 21 - 32 mmol/L Anion gap 4 3.0 - 18 mmol/L Glucose 124 (H) 74 - 99 mg/dL BUN 16 7.0 - 18 MG/DL Creatinine 0.69 0.6 - 1.3 MG/DL  
 BUN/Creatinine ratio 23 (H) 12 - 20 GFR est AA >60 >60 ml/min/1.73m2 GFR est non-AA >60 >60 ml/min/1.73m2 Calcium 7.9 (L) 8.5 - 10.1 MG/DL Bilirubin, total 0.6 0.2 - 1.0 MG/DL  
 ALT (SGPT) 23 16 - 61 U/L  
 AST (SGOT) 20 10 - 38 U/L Alk. phosphatase 128 (H) 45 - 117 U/L Protein, total 6.2 (L) 6.4 - 8.2 g/dL Albumin 2.2 (L) 3.4 - 5.0 g/dL Globulin 4.0 2.0 - 4.0 g/dL A-G Ratio 0.6 (L) 0.8 - 1.7 TYPE & SCREEN Collection Time: 08/28/20 12:19 AM  
Result Value Ref Range Crossmatch Expiration 08/31/2020 ABO/Rh(D) O POSITIVE Antibody screen NEG Key Findings or tests:  
    
    
    
    
    
8/27/2020 MRA ABD Pending 8/26/2020 Echo No vegitations 8/26/2020 CXR Right pleural effusion, unchanged from previous 8/26/2020 Repeat Blood Cx No growth 2 days 8/26/2020 Sputum Cx Pending 8/26/2020 Peripheral Smear Granulocytosis with slight left shift, suggestive of infection  
  
8/23/2020                 Blood cx        Eikenella corrodens, 1 anearobic bottle, beta lactamase negative, sensitivities pending Telemetry Oxygen 2 L NC Assessment and Plan:  
 
79 y. o. male with PMH of AAA s/p endovascular repair, iron deficiency anemia, peptic ulcer disease, A-fib, CHF, CAD, COPD on 3 L NC at home, chronic back pain, and BPH who presented with a 3-4 week history of malaise, worsening back pain and weakness is now admitted for suspected GI bleed.  
  
Acute on chronic anemia Pt presented with 3-4 weeks of weakness and malaise. Hx of PUD. Stopped taking home carafate but continued to take protonix. Hemoglobin 6 on admission, and improved to 7.2 after 3 U of PRBCs. Melenotic stool in ED. POC lactic acid 7.14 and leukocytosis on admission. Pt afebrile. TTP with guarding but without rebound in bilateral lower quadrants, periumbilical, and suprapubic regions. CTA C/A/P with multiple pathological findings as listed above. Working ddx includes recurrent bleeding gastric ulcer vs malignancy vs mesenteric ischemia vs diverticular bleed.  
8/25/2020 Pt appears slightly better than yesterday. Vascular surgery does not feel that anemia is related to AAA. No immediate need for scope per GI. Pt's throat irritated from NGT. Pt appears fluid overloaded on exam.  
8/26/2020 Pt around the same clinically today as yesterday. Hemoglobin stable around high 8 to low 9, still with blood in stool and lower abdominal pain. Still remains hemodynamically stable.  
8/27/2020 Pt remains stable. Doing well with NGT out.  
8/28/2020 Pt still stable though H/H down today. Tolerating diet well. BM remain melanotic. BP are rising. Home meds still held. EGD without significant findings (small hiatal hernia, no signs of active or recent hemorrhage). -Q12hrs H/H checks 
-cont to monitor vital signs and clinical status closely 
-Avoid NSAIDs  
-daily CBC, CMP, Mg, PO4 
-monitor PT/INR 
-cont IVF D5 1/2 NS with 10 K at 90 mL/hr, consider increasing to 20 K, monitor volume status 
-cont IV Protonix, consider switching to PO  
-cont Carafate 1 g 4x daily 
-cont diet as tolerated 
  
Leukocytosis WBC 18.5 on admission. Pt afebrile and w/o tachycardia. Episodes of mild tachypnea recorded in VS, though could be due to underlying COPD or anemia. Blood cx w/o growth, UA WNL, CXR with evidence of right pleural effusion with underlying atelectasis. Abdominal tenderness on exam with hx of diverticular disease suggest diverticulitis as possibility source of elevated WBC count, though no clear infectious source is apparent. Periumbilical abdominal tenderness with elevated lactate on admission and worsening vascular disease evident on CTA C/A/P and pt hx of a-fib not currently on AC point to mesenteric ischemia as possible etiology of leukocytosis.  
8/25/2020 Pt still afebrile. Bcx and Ucx's still without growth. Pt still with abdominal pain though no evidence of diverticulitis/abscess noted on CTA.  
8/26/2020 Pt still afebrile. No growth in Bcx and Ucx. Abdominal pain appears to be improving, possible increased cough from baseline. Continued leukocytosis with neutrophil predominance.  
8/27/2020 Pt remains afebrile. Bcx from 8/23/2020 growing Eikenella corrodens in 1 anaerobic bottle. Beta lactamase negative, susceptibilities pending. Stopped Flagyl and Rocephin. Zosyn started. ID following (appreciate recs). 8/28/2020 Pt still afebrile. Now able to take meds PO. Leukocytosis improving.  Echo negative for valvular vegetations. CXR unchanged from previous. Peripheral smear suggestive of infectious etiology. Repeat blood cx without growth for 2 days. Still possible that extensive thrombus burden is source of infection. Awaiting MRA Abd results to rule out discitis, psoas abscess or aortoenteric fistula. -cont Zosyn, consider switching to PO option (Augmentin) -cont to monitor with daily CBC  
-f/u sputum cx, repeat blood cx 
-f/u MRA ABD 
 
AAA s/p endovascular repair CTA C/A/P showing Type I endoleak and enlarging aneurysm. Vascular surgery discussed options with pt. Pt does not want to go to [place]. Unsure if he would like to do nothing vs evaluation at [facility]. 
8/27/2020 Vascular surgery does not recommend IVC filter at this time. 8/28/2020 Pt agrees to be evaluated at [facility] by Dr. Alphia Jeans. Appreciate Vascular surgery's support.  
-discuss decision with pt today  
-cont to monitor VS 
 
Generalized weakness 
-f/u PT/OT   
  
Atrial Fibrillation/Congestive Heart Failure/Coronary Artery Disease ROS negative for chest pain or shortness of breath. Heart rate WNL on admission, irregular rhythm noted on EKG and exam. Rate control with digoxin 0.125 mg Qday at home. Previously on ASA and warfarin for [facility], but stopped due to hx of GI bleed. CXR showed right pleural effusion and stable cardiomegaly. Coronary angiography in 2012 showed mild CAD, but CTA C/A/P demonstrated severe atherosclerosis of left coronary circumflex artery.  
8/26/2020 Pt still in A-fib. Episode of possible nonsustained Vtach on exam today.  
-currently holding home medications as pt is NPO (amlodipine 10 mg Qday, digoxin 0.125 mg Qday, lisinopril 20 mg Qday) -receiving pravastatin 40 mg QHS 
-consider restarting PO meds after EGD today  
  
Metabolic derangement 8/26/2020 PO4 at 2.1 today. 8/27/2020 PO4 down to 1.9 today 8/28/2020 PO4 1.8 
-replete phosphate 0.32 mmol/kg over 4hrs 
  
Tansaminitis LFT's elevated on admission. Continuing to downtrend. Negative viral hepatitis panel. Currently on statin. GI ordered autoimmune liver panel 8/27/2020 Continuing to resolve. Smooth muscle antibody weakly positive, probably false positive.  
-cont to monitor with daily CMP 
-f/u GI input 
  
COPD Pt currently not short of breath. Does have hx of chronic hypoxic respiratory failure on 3 L NC at home. O2 sats WNL on 3 L NC. 
-cont supplemental O2 at 3 L on NC, increase if sats <92% -PRN dounebs 
  
Chronic back pain, now with abdominal pain Takes 5-325 mg Norco and duloxetine 20 mg QHS at home. Does not want to use lidocaine patches. Having continued pain this morning. Does have allergy to morphine (gets very itchy). -consider restarting home norco and duloxetine 
-consider adding tramadol 
-cont IV Dilaudid 0.2 mg Q4hrs PRN, wean as tolerated 
-monitor BP closely  
-monitor for signs of allergic reaction 
-cont lidocaine patch 
  
BPH On tamsulosin 0.4 mg Qday at home. Does have tenderness and fullness in suprapubic region. 8/26/2020 Bladder Scan 83 mL post void 
-consider restarting tamsulosin 
-monitor I/Os 
  
Insomnia Takes trazadone 50 mg QHS at home.  
-consider restarting trazadone 
  
  
Diet NPO  
DVT Prophylaxis contraindicated GI Prophylaxis protonix gtt Code status No CPR, Intubation ok Disposition > 2 MN  
  
Point of Contact [address] Relationship: daughter 
[phone] Christina Parent 
Daughter 
[phone] Arun Rooney, MS4  
[facility] [place] Intern Pager: [phone] August 28, 2020, 9:17 AM

## 2020-08-28 NOTE — PROGRESS NOTES
[address] [facility] Progress Note Patient: Tanya Dawson MRN: [id number] SSN: [id number]  YOB: 1940 Age: 79 y.o. Sex: male Admit Date: 8/23/2020 LOS: 5 days Chief Complaint Patient presents with  Fatigue Subjective:  
 
Pt resting in bed this morning. He was annoyed with the imaging last night. He does not have an appetite, so hasn't been eating much. Requested Ensures. Denies F/C, NVD, HA, lightheadedness, dizziness, SOB, cough, CP, palpitations, ABD pain, bowel complaints, urinary complaints, edema, numbness, or tingling.  
  
 
  
Review of Systems Constitutional: Positive for malaise/fatigue and weight loss. Negative for chills, diaphoresis and fever. HENT: Negative for congestion, hearing loss, sore throat and tinnitus. Eyes: Positive for blurred vision (chronic) and discharge (chronic). Respiratory: Negative for cough, hemoptysis and shortness of breath. Cardiovascular: Negative for chest pain, palpitations and orthopnea. Gastrointestinal: Negative for abdominal pain, blood in stool, diarrhea, heartburn, melena and vomiting. Genitourinary: Negative for dysuria, frequency, hematuria and urgency. Musculoskeletal: Positive for back pain. Neurological: Positive for dizziness (chronic, on turning in bed) and weakness. Negative for tingling, sensory change and headaches. Psychiatric/Behavioral: Negative for depression and suicidal ideas. Objective:  
 
Visit Vitals /79 Pulse 86 Temp 98.3 °F (36.8 °C) Resp 19 Ht 5' 6\" (1.676 m) Wt 63 kg (138 lb 14.2 oz) SpO2 98% BMI 22.42 kg/m² Physical Exam:  
General:  AAOx3, NAD, laying comfortably in bed HEENT: Conjunctiva pale with thick yellow discharge, sclera anicteric. PERRL. EOMI. Dry mucous membranes. Thyroid not enlarged, no nodules. No cervical, supraclavicular, occipital or submandibular lymphadenopathy. No other gross abnormalities present. CV:  Irregular rhythm, normal rate, no murmurs. No visible pulsations or thrills. RESP:  Unlabored breathing. Lungs clear to auscultation without adventitious breath sounds. Equal expansion bilaterally. ABD:  Soft, nontender, nondistended. BS (+). No hepatosplenomegaly. R inguinal hernia, reducible, nontender, no overlying skin changes MS:  No joint deformity. BLE atrophy. Neuro:  CN II-XII grossly intact. 4/5 strength bilateral upper extremities and lower extremities. Ext:  No edema. 2+ radial and dp pulses bilaterally. Skin:  No rashes, lesions, or ulcers. Good turgor. Intake and Output: 
Current Shift: 08/29 0701 - 08/29 1900 In: -  
Out: 225 [Urine:225] Last three shifts: 08/27 1901 - 08/29 0700 In: 3696.7 [P.O.:480; I.V.:3216.7] Out: 2242 [FSRQO:5589] Lab/Data Review: 
Recent Results (from the past 12 hour(s)) MAGNESIUM Collection Time: 08/29/20  2:05 AM  
Result Value Ref Range Magnesium 1.6 1.6 - 2.6 mg/dL PHOSPHORUS Collection Time: 08/29/20  2:05 AM  
Result Value Ref Range Phosphorus 2.5 2.5 - 4.9 MG/DL  
CBC WITH AUTOMATED DIFF Collection Time: 08/29/20  2:05 AM  
Result Value Ref Range WBC 9.0 4.6 - 13.2 K/uL  
 RBC 2.36 (L) 4.70 - 5.50 M/uL HGB 7.2 (L) 13.0 - 16.0 g/dL HCT 22.4 (L) 36.0 - 48.0 % MCV 94.9 74.0 - 97.0 FL  
 MCH 30.5 24.0 - 34.0 PG  
 MCHC 32.1 31.0 - 37.0 g/dL  
 RDW 19.6 (H) 11.6 - 14.5 % PLATELET 95 (L) 786 - 420 K/uL MPV 9.5 9.2 - 11.8 FL  
 NEUTROPHILS 82 (H) 40 - 73 % LYMPHOCYTES 10 (L) 21 - 52 % MONOCYTES 8 3 - 10 % EOSINOPHILS 0 0 - 5 % BASOPHILS 0 0 - 2 %  
 ABS. NEUTROPHILS 7.4 1.8 - 8.0 K/UL  
 ABS. LYMPHOCYTES 0.9 0.9 - 3.6 K/UL  
 ABS. MONOCYTES 0.7 0.05 - 1.2 K/UL  
 ABS. EOSINOPHILS 0.0 0.0 - 0.4 K/UL  
 ABS. BASOPHILS 0.0 0.0 - 0.1 K/UL  
 DF AUTOMATED METABOLIC PANEL, COMPREHENSIVE Collection Time: 08/29/20  2:05 AM  
Result Value Ref Range  Sodium 139 136 - 145 mmol/L  
 Potassium 3.5 3.5 - 5.5 mmol/L Chloride 106 100 - 111 mmol/L  
 CO2 28 21 - 32 mmol/L Anion gap 5 3.0 - 18 mmol/L Glucose 93 74 - 99 mg/dL BUN 10 7.0 - 18 MG/DL Creatinine 0.64 0.6 - 1.3 MG/DL  
 BUN/Creatinine ratio 16 12 - 20 GFR est AA >60 >60 ml/min/1.73m2 GFR est non-AA >60 >60 ml/min/1.73m2 Calcium 7.7 (L) 8.5 - 10.1 MG/DL Bilirubin, total 0.5 0.2 - 1.0 MG/DL  
 ALT (SGPT) 20 16 - 61 U/L  
 AST (SGOT) 18 10 - 38 U/L Alk. phosphatase 106 45 - 117 U/L Protein, total 5.9 (L) 6.4 - 8.2 g/dL Albumin 2.1 (L) 3.4 - 5.0 g/dL Globulin 3.8 2.0 - 4.0 g/dL A-G Ratio 0.6 (L) 0.8 - 1.7 RECENT RESULTS MODALITY IMPRESSION  
XR Results from Hospital Encounter encounter on 08/23/20 XR CHEST PORT Narrative CHEST PORTABLE INDICATIONS: Atrial fibrillation, COPD, CHF with worsening cough. COMPARISON: 8/23/2020 FINDINGS:  
 
Support lines/catheters: External monitoring leads overlie the chest. 
 
Lungs: Slightly hazy right lung opacity compared to the left which may represent 
a layering pleural effusion. Cardiac silhouette and mediastinal contours: Stable unfolding of the aortic 
contour. Mildly enlarged cardiac silhouette. Bones and soft tissues: Chronic right sixth rib fracture. Impression Impression: 
 
Hazy opacity in the right lung most likely represents a layering pleural 
effusion. Stable mild cardiomegaly with aortic ectasia. CT Results from [facility] Encounter encounter on 08/23/20 CTA CHEST ABD PELV W WO CONT Narrative CTA Chest, Abdomen And Pelvis With and without Enhancement INDICATION: Abdominal aortic aneurysm status post endovascular repair, peptic 
ulcer disease, heart failure, COPD with chronic respiratory failure on home 
oxygen, chronic back pain, BPH with malaise, worsening back pain, weakness for 
3-4 weeks, hypotension, acute on chronic anemia, melanotic stool. TECHNIQUE: 2.5 mm collimation axial images obtained from the thoracic inlet to 
the level of the pubic symphysis following the uneventful administration of 80 
mL Isovue-370 nonionic intravenous contrast.  Imaging performed during maximum 
aortic enhancement and is therefore suboptimal for evaluating solid organs and 
bowel. Raw data reviewed along with three-dimensional volume rendered images 
and maximum intensity projection images to better evaluate the tortuous vascular 
structures. All CT scans at this facility are performed using dose optimization technique as 
appropriate to a performed exam, to include automated exposure control, 
adjustment of the mA and/or kV according to patient size (including appropriate 
matching first site-specific examinations), or use of iterative reconstruction 
technique. COMPARISON: 2/17/2020. Thyroid: Unremarkable. Heart: Severe regions of coronary arteriosclerosis in the left circumflex. Biatrial cardiac chamber enlargement. Pulmonary artery: Patent Lymph Nodes: 2 Similar 1.4 cm precarinal lymph nodes, likely reactive. Lung: Moderate centrilobular emphysema. Mild subsegmental atelectasis in the 
right lower lobe. Mild subsegmental atelectasis or scarring right upper and 
right middle lobes. Pleura: Increased moderate right and similar trace left pleural effusions. No 
pneumothorax. ABDOMEN FINDINGS:  
There is suboptimal evaluation of visceral organs secondary to timing of the 
exam. 
 
 
Liver: Similar scattered hepatic cysts measuring up to 2.6 cm in the left medial 
hepatic lobe at the dome and 2.4 cm right posterior hepatic lobe. There are a 
few too small to characterize hepatic lesions but statistically most likely 
benign. Spleen: Unremarkable. Pancreas: Pancreas is atrophic. Biliary: Possible adenomyomatosis gallbladder fundus. Bowel: There is a increased rectal stool burden. Gas-filled gastric distention third portion duodenum does not clearly cross midline into the left abdomen but 
there is no volvulus or bowel dilation. No bowel wall thickening. No 
pneumatosis. Appendix in the right inguinal hernia without inflammation, 
unchanged. There is fluid in the esophagus. Peritoneum/ Retroperitoneum: There is edema without free fluid or free air. Lymph Nodes: Unremarkable. Adrenal Glands: Unremarkable. Kidneys: Right kidney is 7.1 cm, previously 8.3 cm and the left is 10.7 cm, 
previously 10.4 cm. The right kidney hypoenhance is with cortical thinning. There is cortical scarring at the left lower pole. There is a exophytic 0.9 cm 
hyperdense lesion at the right lower pole without enhancement. 2 cm left lower 
pole cyst. Several too small to characterize renal lesions. PELVIS FINDINGS:  
 
Bladder/ Pelvic Organs: Small diverticulum at the left anterior bladder wall. Moderate bladder distention. Bones/Soft tissues: Mild anasarca. Osteopenia. Lumbar facet hypertrophy and 
arthropathy. Transitional lumbosacral junction. Degenerative disc disease. Central height loss superior endplate L3.   
 
 
AORTA FINDINGS: 
Thoracic aorta is not enlarged with mild atherosclerosis. 3 vessel arch anatomy 
with patent arch vessels. Subjectively, the aneurysm cranial to the proximal 
aortic stent landing zone is increased. There appears to be a similar 1 cm 
crescent of enhancement at the right anterior aspect with endoleak but there is 
increased nonopacified circumferential region measuring up to 0.6 cm on the left 
(series 3, image 138), not present on prior exam. The excluded aneurysm is 
subjectively larger. There is similar right type I B endoleak distal to the 
common iliac stent. The vessel measures 2.3 cm at this location, previously 2.1 
cm. The peripheral right common iliac is 1.9 cm, previously 1.7 cm.  There are 
new linear regions of thrombus which protrude into the central aortic lumen at 
 the more cranial aspect of the stent at the level of the left renal vein and 
within the bilateral common iliac stents protruding into the central lumen. Mild stenosis proximal celiac. Regions of stenosis splenic artery. Mild stenosis 
proximal SMA. Moderate stenosis proximal left renal artery and proximal 
occlusion right renal artery. There is stenosis at the origin of the right 
internal iliac, origin left internal iliac. Three-D generated aortic measurements: 
3.6, 3.2, 2.9 centimeter at the sinus of Valsalva. 3.5 x 3.1 centimeter at the sinotubular junction. 3.8 x 3.8 centimeter at the maximum diameter of the ascending aorta. 3.5 x 3.2 centimeter at the mid aortic arch. 3.4 x 3.2 centimeter at the proximal descending thoracic aorta. 3.6 x 3.1 centimeter at the mid descending aorta. 3.6 x 3.5 centimeter at the distal descending thoracic aorta at the level of the 
diaphragmatic hiatus. Aneurysm measurements: 
Volume 149.73 cc, previously 166.71 cc 
4.9 x 4.2 cm below the lowest renal artery including excluded aorta, previously 4.4 x 3.7 cm 
1 cm below the lowest renal artery within the stent: 3 x 2.6 cm; including 
excluded aorta: 5.8 x 4.4 cm, previously 3.9 x 3.9 cm 
3.1 x 3 cm within the stent at the top of the stent; 5.4 x 4.4 cm including 
excluded aorta, previously 4 x 3.6 cm 
2.7 x 2.7 cm within the stent below the lowest renal artery 2.7 x 2.7 cm Within the stent below the lowest renal artery 3 cm distance from the lowest renal artery to the top of the stent Impression IMPRESSION: 
1.  New strands of thrombus along the wall of the aortic and bilateral common 
iliac stents which protrudes into the central aortic lumen and place patient at 
risk for lower extremity embolic events. 2.  Increased aneurysm cranial to the aortobiiliac stent with resultant 
increased type I A endoleak. Similar type I B endoleak. Increased excluded aortic aneurysm measuring 5.8 cm (change of 2 cm at this level). Minimal 
increased right common iliac aneurysm measuring 1.9 cm. 
3.  Increased moderate right and similar small left pleural effusions. 4.  Increased rectal stool burden. No active extravasation. 5.  Gas-filled gastric distention. 6.  Occlusion proximal right renal artery with worsened atrophy and 
hypoenhancement. 7.  Severe left circumflex coronary arteriosclerosis. 8.  Biatrial cardiac chamber enlargement. 9.  Moderate stenosis left renal artery, stenosis at the origin of the bilateral 
internal iliac arteries. 10.  Moderate emphysema. 11.  Gastroesophageal reflux. 12.  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 13.  Unchanged Incidental Amyand hernia. 14.  Right renal hemorrhagic/proteinaceous cyst. 
15.  Small bladder diverticulum. Preliminary report provided by Dr. Elda Mathur on 8/23/2020 at 2234 hours prior to 
3-D image availability. MRI Results from Hospital Encounter encounter on 08/25/17 MRA ABD W WO CONT Narrative CPT CODE: 51917 MRA Abdomen. CLINICAL HISTORY: Patient with history of infrarenal AAA repair. Left ileus 
psoas abscess drained 2/22/2016, discitis L3/L4 with adjacent osteomyelitis February 2016, likely discitis T11/T12 August 2016, recurrent psoas collection 8/25/2017 with likely chronic osteomyelitis L3. 
 
TECHNIQUE: Multiplanar images of abdomen and pelvis using SSFSE, FIESTA  with Cine, T1, LAVA, opposed phase, 3-D inflow and multiphase postcontrast LAVA T1 FS 
post contrast (10 cc Gadavist) sequences. COMPARISON: Multiple prior studies including lumbar MRI 5/5/2016, CT 2/21/2016, 
CT 8/4/2016, lumbar MRI 2/23/2016, CT 8/25/2017. FINDINGS:   
 
Lung bases: Small effusions. Liver: Multiple low T1 and high T2 signal lesions in the liver without 
enhancement and stable since prior. Gallbladder: Focal fundal thickening with stranding of pearls appearance. Spleen: Normal size and enhancement. Pancreas: Normal signal and enhancement. No duct dilatation. Adrenal glands: Normal. 
 
Kidneys: Normal size. Symmetric enhancement. Few tiny cysts bilaterally 
including hemorrhagic cyst posteriorly on the right measuring 6 mm. Mild 
cortical thinning lower pole left kidney with small cyst. 
 
General: Body wall edema at the flanks, around the hips, dependent parts of the 
retroperitoneum. Right inguinal hernia containing appendix and fat. Bowel: Unremarkable. : Urinary bladder, included parts of the prostate are unremarkable. Retroperitoneum: There is endovascular stent traversing AAA with maximum 
dimension 4.8 cm, stable. Stable wall thickening at the abdominal aorta with low T2 signal and enhancement of the wall, a stable finding dating to 2016. There is 
edema posterior to the abdominal aorta from 3:00 to 7:00 position (series 7 
image 49). Graft is widely patent. Aneurysmal dilatation iliac vessels as 
before. Right common iliac artery measures 24 mm. Left common iliac artery 
measures 18 mm. There is a similar adjacent fluid collection in the left psoas that extends 
anteromedially to abut the aorta (series 7 image 45 and measures 4.4 x 1.5 x 5.2 
cm (series 7 image 45, series 2007 image 39). There is surrounding fat stranding 
and enhancement compatible with edema and inflammation. Small adjacent lymph 
nodes measuring up to 10 mm. Fluid collection begins lateral to the L2/L3 disc space, appears contiguous with 
fluid in the L3/L4 disc space (coronal series 2007 image 37), and ends in the 
psoas at mid L3 level. A drain has been placed, posterolateral to the majority 
of the collection today (coronal series 2007) and collection does not appear 
appreciably decreased in size. Enhancement at L3 vertebral body laterally on the 
left c/w osteomyelitis. Severe multifactorial stenosis L3/L4.  
 
  
 Impression IMPRESSION: 
 
 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 Stable size since 
drain placement. Psoas collection abuts the aorta which is stable in appearance with wall 
thickening and enhancement as before. Stable chronic inflammation related to 
adjacent abscess remains possible. ULTRASOUND Results from Hospital Encounter encounter on 08/25/17 US EXT NONVAS RT LTD Impression Impression:  Large fat-containing right inguinal hernia. On recent CT scan 
(August 25, 2017), the appendix is seen extending through the internal inguinal 
ring into the inguinal hernia. This is not redemonstrated on today's ultrasound 
but potentially if the appendix does not have air within it, it may be difficult 
to visualize sonographically. No adenopathy. [place] [facility] [place]18[place]16 US SCROTUM/TESTICLES Impression IMPRESSION: 
1. Increased internal vascularity of the left testicle suggests mild orchitis. Cardiology Procedures/Testing: MODALITY RESULTS  
EKG Results for orders placed or performed during the hospital encounter of 08/23/20 EKG, 12 LEAD, INITIAL Result Value Ref Range Ventricular Rate 71 BPM  
 Atrial Rate 288 BPM  
 QRS Duration 86 ms  
 Q-T Interval 350 ms QTC Calculation (Bezet) 380 ms Calculated R Axis -42 degrees Calculated T Axis 81 degrees Diagnosis Atrial fibrillation Left axis deviation Low voltage QRS Abnormal ECG When compared with ECG of 26-JAN-2020 09:46, 
Criteria for Anteroseptal infarct are no longer present Confirmed by Ursula Gordillo MD, [facility] ([id number] on 8/24/2020 8:30:54 AM 
  
Results for orders placed or performed in visit on 08/10/18 AMB POC EKG ROUTINE W/ 12 LEADS, INTER & REP Narrative Read by Deena Recinos MD - atrial fibrillation, occasional ectopic ventricular beat. Nonspecific QRS widening and anterior fascicular block. Old anterior infarct. Possible nonspecific ST abnormality. ECHO 06/02/19 ECHO ADULT COMPLETE 06/04/2019 6/4/2019 Narrative · Left Ventricle: Estimated left ventricular ejection fraction is 56 -  
60%. Abnormal left ventricular septal motion. Interventricular septal  
\"bounce\". · Tricuspid Valve: Non-specific thickening of the tricuspid valve. Moderate tricuspid valve regurgitation is present. · Pulmonary Artery: Moderate pulmonary hypertension. Pulmonary arterial  
systolic pressure is 11.1 mmHg. · Pulmonic Valve: Mild pulmonic valve regurgitation is present. · Left Atrium: Severely dilated left atrium. · Right Atrium: Moderately dilated right atrium. Signed by: Keith Chambers MD  
  
 
Special Testing/Procedures: MODALITY RESULTS MICRO All Micro Results Procedure Component Value Units Date/Time CULTURE, BLOOD [[id number]] Collected:  08/28/20 1300 Order Status:  Completed Specimen:  Blood Updated:  08/29/20 4320 Special Requests: NO SPECIAL REQUESTS Culture result: NO GROWTH AFTER 17 HOURS     
 CULTURE, BLOOD [[id number]] Collected:  08/26/20 1503 Order Status:  Completed Specimen:  Blood Updated:  08/29/20 2359 Special Requests: NO SPECIAL REQUESTS Culture result: NO GROWTH 3 DAYS SUSCEPTIBILITY, AER + ANAEROB [[id number]] Collected:  08/23/20 1820 Order Status:  Completed Specimen:  MICROBIAL ISOLATE Updated:  08/28/20 1037 Source BLOOD Susceptibility, Aer + Anaerob Preliminary report Comment: (NOTE) Performed At: [address] [id number] Martin Brandt MD ZH:[id number] Isaiah Gomez REFL [[id number]]  (Abnormal) Collected:  08/23/20 1820 Order Status:  Completed Specimen:  Miscellaneous sample Updated:  08/28/20 1037 Source BLOOD Aer+Anaer Suscept Result 1 Eikenella corrodens Comment: (NOTE) Identification performed by account, not confirmed by this 
laboratory. Performed At: [facility] [address] [place], [place] [id number] Alecia Scott MD [id number] CULTURE, BLOOD [[id number]]  (Abnormal) Collected:  08/23/20 1805 Order Status:  Completed Specimen:  Blood Updated:  08/27/20 1223 Special Requests: NO SPECIAL REQUESTS     
  GRAM STAIN    
  ANAEROBIC BOTTLE NO ORGANISMS SEEN Culture result:    
  EIKENELLA CORRODENS GROWING IN THE ANAEROBIC BOTTLE BETA LACTAMASE NEGATIVE CULTURE, BLOOD [[id number]]  (Abnormal) Collected:  08/23/20 1820 Order Status:  Completed Specimen:  Blood Updated:  08/27/20 8359 Special Requests: NO SPECIAL REQUESTS Culture result:    
  EIKENELLA CORRODENS , BETA LACTAMASE NEGATIVE , ISOLATED FROM THE ANAEROBIC BOTTLE  
     
 CULTURE, RESPIRATORY/SPUTUM/BRONCH [place] STAIN [[id number]] Collected:  08/26/20 1030 Order Status:  Canceled Specimen:  Sputum Lyn Balls [[id number]] Collected:  08/24/20 0358 Order Status:  Completed Specimen:  Urine from Clean catch Updated:  08/25/20 1026 Special Requests: NO SPECIAL REQUESTS Culture result: No growth (<1,000 CFU/ML) UA Results for orders placed or performed in visit on 04/07/14 AMB POC URINALYSIS DIP STICK AUTO W/O MICRO     Status: None Result Value Ref Range Status Color (UA POC) Yellow  Final  
 Clarity (UA POC) Clear  Final  
 Glucose (UA POC) Negative Negative Final  
 Bilirubin (UA POC) Negative Negative Final  
 Ketones (UA POC) Negative Negative Final  
 Specific gravity (UA POC) 1.010 1.001 - 1.035 Final  
 Blood (UA POC) Trace Negative Final  
 pH (UA POC) 7.0 4.6 - 8.0 Final  
 Protein (UA POC) Trace Negative mg/dL Final  
 Urobilinogen (UA POC) 0.2 mg/dL 0.2 - 1 Final  
 Nitrites (UA POC) Negative Negative Final  
 Leukocyte esterase (UA POC) Negative Negative Final  
  
PATH Assessment and Plan:  
 
79 y.o. male with PMH AAA s/p endovascular repair, Fe deficiency anemia, PUD, AFib, CHF, CAD, COPD with chronic hypoxic respiratory failure on home 3L NC, chronic back pain, and BPH, now admitted with acute on chronic anemia. 
  
Acute on chronic anemia. DDx to include PUD, diverticulosis/itis, malignancy, AVM. Initial Hgb 6.0, after 3u PRBC, Hgb 7.2. EGD and Colonoscopy by [facility] on 5/28/19 showed hiatal hernia, erosive gastritis with flecks of coffee grounds, ascending colon polyp; diverticulosis; and hemorroids. He saw Hem/Onc on 8/7/20 for surveillance labs; no new intervention noted. Favor recurrent bleeding gastric ulcer. Pt has gross melena with guaiac positive stool. Hx of gastritis and PUD, on home protonix and noncompliant with carafate x 1 month. Possible bleeding diverticulae. Hx diverticulosist. TTP LLQ and suprapubic area, but no reported ABD pain. Melena would not support diverticular bleed, but pt has large stool burden suggesting long transit time through sigmoid for blood to be digested. WBC 18.5 consistent with diverticulitis. Malignancy possible in the setting of painless GIB, although CT does not show any obvious intraluminal neoplasm. Pt has had unintentional weight loss. AVM less likely; not noted on CT A/P w/ IV contrast.  
- GI following, EGD 8/27, no signs active or recent bleeding 
- H/H Q12H, transfuse if Hgb < 7 
- monitor VS per unit routine 
- CBC, CMP, Mag, Phos daily 
- PT/INR 
- Vascular: no need for IVC filter; planned transfer to [facility] for complex graph repair - protonix gtt - D5 1/2NS w/ KCl @ 90mL/hr 
- PT/OT/CM 
  
Sepsis: 2/4 SIRS: tachypnea and WBC 19.3. Current DDx includes intraabdominal infection, diverticulitis, UTI, spine osteomyelitis/abscess. No evidence of intraabdomninal infection on wet read. LLQ TTP and melena support diverticulitis. Possible UTI in light of t w/ hx of BPH and imaging shows distended bladder. Pt received flagyl and rocephin in ED.   Hx of lumbar spine osteomyelitis and abscess, but no imaging suggestive of acute osteomyelitis. - FU BCx susceptibilities - appreciate ID recs; continue Zosyn - FU MRA ABD and MRI L-spine 
  
AFib/CHF/CAD: rate controlled on amlodipine; off ASA and Coumadin due to hx of GIB. No evidence of decompensated CHF noted in cardiology clinic visit (6/16/20). CXR shows large-moderate R pleural effusion. Coronary angiography in 2012 only showed mild CAD. - home amlodipine 10mg daily, digoxin 125mcg daily, pravastatin 40mg daily while NPO 
- home lisinopril 20mg daily 
  
COPD: C/b chronic hypoxic respiratory failure, on 3L NC at home. - supplemental O2 for sats < 92% 
- duoneb PRN 
  
Chronic back pain: on home norco 5-325 
- lidocaine patch 
- norco and dilaudid PRN 
  
BPH  
- tamsulosin 0.4mg daily 
  
Insomnia: On two home sleeping aids; probably does not need both. - duloxetine 20mg QHS and trazadone 50mg QHS   
  
Diet cardiac DVT Prophylaxis contra indicated GI Prophylaxis protonix gtt Code status No CPR, Intubation ok Disposition > 2 MN  
  
Point of Contact Ciaran Lopez Relationship: daughter 
[phone] [employer] 
Daughter 
[phone] Juliana Paul MD, PGY-1  
[facility] Medicine Intern Pager: [phone] August 29, 2020, 7:00 AM

## 2020-08-29 NOTE — PROGRESS NOTES
0710-Bedside and Verbal shift change report received from  Franck Grubbs ,RN (off going nurse). Report included the following information SBAR, Kardex, MAR and Recent Results. Assumed care,fall prevention program maintained,call bell and bedside table within reach. Will continue care. 0825-Patient had been eating while laying,tried to place the whole bed to reverse trend ,but patient got upset and he asked to put the bed down flat. Education given regarding aspiration but patient does not want his head up still. 0930-Sputum sample obtained & sent. 1110-Dr. Karen Nicole was informed regarding the possibility of aspiration due to patient is insisting to eat while [facility] is down. And patient is complaining why his pain medication was decrease to every 6 hrs. MD will speak w/ the patient. 1357- 1 unit of PRBC started. Results for Dayanna Mariscal (MRN [id number]) as of 8/29/2020 14:19 Ref. Range 8/29/2020 02:05 HGB Latest Ref Range: 13.0 - 16.0 g/dL 7.2 (L) HCT Latest Ref Range: 36.0 - 48.0 % 22.4 (L)  
 
1755- blood transfusion completed. 1925-Bedside and Verbal shift change report given to Franck Grubbs RN (oncoming nurse) by Corby Klein RN (offgoing nurse). Report included the following information SBAR, Kardex, MAR and Recent Results.

## 2020-08-29 NOTE — CONSULTS
Called regarding mri finding of discitis and psoas abscess. Patient neuro intact and clinically slowly improving. 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 Patient evidently requires extensive revision vascular procedure with high morbidity and mortality. Has been considered for transfer to [facility] or [facility]. If patients medical condition allows and patient pain warrants or infection stops improving discitis at L1/2 and L45 can be debrided and fused, but this is a significant surgical intervention. It ist clear that patient could tolerate it at this time. 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 Consideration of palliaitve care has been considered and that would appear reasonable given circumstances.

## 2020-08-29 NOTE — PROGRESS NOTES
Problem: Pressure Injury - Risk of 
Goal: *Prevention of pressure injury Description: Document Claude Scale and appropriate interventions in the flowsheet. Outcome: Progressing Towards Goal 
Note: Pressure Injury Interventions: 
Sensory Interventions: Assess changes in LOC Moisture Interventions: Absorbent underpads Activity Interventions: Pressure redistribution bed/mattress(bed type) Mobility Interventions: Pressure redistribution bed/mattress (bed type) Nutrition Interventions: Document food/fluid/supplement intake, Discuss nutritional consult with provider Friction and Shear Interventions: Minimize layers Problem: Aspiration - Risk of 
Goal: *Absence of aspiration Outcome: Progressing Towards Goal 
  
Problem: Pain Goal: *Control of Pain Outcome: Progressing Towards Goal 
  
Problem: Patient Education: Go to Patient Education Activity Goal: Patient/Family Education Outcome: Progressing Towards Goal

## 2020-08-29 NOTE — PROGRESS NOTES
Small BM this morning according to patient nurse said there was blood in it. No pain. Ate small breakfast and is not hungry. PE:  
Visit Vitals /79 Pulse 86 Temp 98.3 °F (36.8 °C) Resp 19 Ht 5' 6\" (1.676 m) Wt 63 kg (138 lb 14.2 oz) SpO2 98% BMI 22.42 kg/m² ABdomen benign No distress. Neuro alert and communicates well. Recent Results (from the past 12 hour(s)) MAGNESIUM Collection Time: 08/29/20  2:05 AM  
Result Value Ref Range Magnesium 1.6 1.6 - 2.6 mg/dL PHOSPHORUS Collection Time: 08/29/20  2:05 AM  
Result Value Ref Range Phosphorus 2.5 2.5 - 4.9 MG/DL  
CBC WITH AUTOMATED DIFF Collection Time: 08/29/20  2:05 AM  
Result Value Ref Range WBC 9.0 4.6 - 13.2 K/uL  
 RBC 2.36 (L) 4.70 - 5.50 M/uL HGB 7.2 (L) 13.0 - 16.0 g/dL HCT 22.4 (L) 36.0 - 48.0 % MCV 94.9 74.0 - 97.0 FL  
 MCH 30.5 24.0 - 34.0 PG  
 MCHC 32.1 31.0 - 37.0 g/dL  
 RDW 19.6 (H) 11.6 - 14.5 % PLATELET 95 (L) 210 - 420 K/uL MPV 9.5 9.2 - 11.8 FL  
 NEUTROPHILS 82 (H) 40 - 73 % LYMPHOCYTES 10 (L) 21 - 52 % MONOCYTES 8 3 - 10 % EOSINOPHILS 0 0 - 5 % BASOPHILS 0 0 - 2 %  
 ABS. NEUTROPHILS 7.4 1.8 - 8.0 K/UL  
 ABS. LYMPHOCYTES 0.9 0.9 - 3.6 K/UL  
 ABS. MONOCYTES 0.7 0.05 - 1.2 K/UL  
 ABS. EOSINOPHILS 0.0 0.0 - 0.4 K/UL  
 ABS. BASOPHILS 0.0 0.0 - 0.1 K/UL  
 DF AUTOMATED METABOLIC PANEL, COMPREHENSIVE Collection Time: 08/29/20  2:05 AM  
Result Value Ref Range Sodium 139 136 - 145 mmol/L Potassium 3.5 3.5 - 5.5 mmol/L Chloride 106 100 - 111 mmol/L  
 CO2 28 21 - 32 mmol/L Anion gap 5 3.0 - 18 mmol/L Glucose 93 74 - 99 mg/dL BUN 10 7.0 - 18 MG/DL Creatinine 0.64 0.6 - 1.3 MG/DL  
 BUN/Creatinine ratio 16 12 - 20 GFR est AA >60 >60 ml/min/1.73m2 GFR est non-AA >60 >60 ml/min/1.73m2 Calcium 7.7 (L) 8.5 - 10.1 MG/DL  Bilirubin, total 0.5 0.2 - 1.0 MG/DL  
 ALT (SGPT) 20 16 - 61 U/L  
 AST (SGOT) 18 10 - 38 U/L  
 Alk. phosphatase 106 45 - 117 U/L Protein, total 5.9 (L) 6.4 - 8.2 g/dL Albumin 2.1 (L) 3.4 - 5.0 g/dL Globulin 3.8 2.0 - 4.0 g/dL A-G Ratio 0.6 (L) 0.8 - 1.7 A/P. GI bleed. EGD showed hiatal hernia. Continue PPI. Continue diet. Transfuse as needed. No plan to repeat endoscopic procedures. Will sign off. Call us as needed.  
 
Soraida Oseguera MD

## 2020-08-30 NOTE — PROGRESS NOTES
Bedside turnover given to PRAVEEN Knox. Pt is in bed on the cardiac monitor in bed with the head flat, every time I attempt to raise the head above 20 degrees pt says it hurts his back to much and has me put it back down. Bed is in the lowest position with the wheels locked and call bell on the bed with him. Encouraged him to eat breakfast, he took one bite of his eggs and asked me to remove the tray. Found a strawberry ensure in the pantry and with encouragement patient drank about half. Had difficulty getting him to swallow pills, he stated \"They get stuck in my throat\" I broke them all in half and the sucrafate in thirds for him to be able to swallow.

## 2020-08-30 NOTE — PROGRESS NOTES
1940 bedside turnover given to me by RN Darion Coombs. Pt is in bed on the cardiac telemetry monitor no signs of distress, denies pain and denies shortness of breath at this time. Given a cup of ice water and a warm blanket. No additional needs at this time. Asked questions to complete his admission documentation. 2020 discussed with him his meal intake today, has not been eating. Vitals assessed. 2300 pt c/o feeling nauseous, set up suction for him. 2335 spoke with [facility] doctor and she is ordering zofran 
0020 zofran given 
0100 pt stated \"The nausea is better now\". Still did not want to swallow his sucrafate, stated the pill even broken in half was too big\", doesn't want to gag. He is not even drinking water regularly like he has been the last two nights, the same cup has been at bedside all night.

## 2020-08-30 NOTE — PROGRESS NOTES
Received bedside report from [facility]. Pt Aox4, A-fib, Nc-3 liters, D5 [email or web address] 10 meq @90ml/hr, pt resting in bed/bed in low position, wheels locked. 1900-Bedside and Verbal shift change report given to [employer] (oncoming nurse) by [place] RN(offgoing nurse). Report included the following information Anamika, SBAR.

## 2020-08-30 NOTE — PROGRESS NOTES
[address] [facility] Progress Note Patient: Tierney Osborne MRN: [id number] SSN: [id number]  YOB: 1940 Age: 79 y.o. Sex: male Admit Date: 8/23/2020 LOS: 7 days Chief Complaint Patient presents with  Fatigue Subjective: No overnight events Pt reports overall condition unchanged, no increased back pain, no LE paresthesias or increased weakness, he is able to fully empty his bladder, no f/c. Pt would like for IR consult for treatment of psoas abscess ROS: Denies F/C, NVD, HA, lightheadedness, dizziness, SOB, cough, CP, palpitations, ABD pain, bowel complaints, urinary complaints, edema, numbness, or tingling. Objective:  
 
Visit Vitals /75 Pulse 72 Temp 97.4 °F (36.3 °C) Resp 16 Ht 5' 6\" (1.676 m) Wt 65.2 kg (143 lb 10.8 oz) SpO2 97% BMI 23.19 kg/m² Physical Exam:  
General:  AAOx3, NAD, laying comfortably in bed HEENT: Conjunctiva pale with thick yellow discharge, sclera anicteric. PERRL. EOMI. Dry mucous membranes. Thyroid not enlarged, no nodules. No cervical, supraclavicular, occipital or submandibular lymphadenopathy. No other gross abnormalities present. CV:  Irregular rhythm, normal rate, no murmurs. No visible pulsations or thrills. RESP:  Unlabored breathing. Lungs clear to auscultation without adventitious breath sounds. Equal expansion bilaterally. ABD:  Soft, nontender, nondistended. BS (+). No hepatosplenomegaly. R inguinal hernia, reducible, nontender, no overlying skin changes MS:  No joint deformity. BLE atrophy. Neuro:  CN II-XII grossly intact. 4/5 strength bilateral upper extremities and lower extremities. Ext:  No edema. 2+ radial and dp pulses bilaterally. Skin:  No rashes, lesions, or ulcers. Good turgor. Intake and Output: 
Current Shift: No intake/output data recorded. Last three shifts: 08/29 0701 - 08/30 1900 In: 4230.3 [P.O.:660; I.V.:3207] Out: 1625 [[id number]] Lab/Data Review: 
Recent Results (from the past 12 hour(s)) MAGNESIUM Collection Time: 08/31/20 12:34 AM  
Result Value Ref Range Magnesium 1.6 1.6 - 2.6 mg/dL PHOSPHORUS Collection Time: 08/31/20 12:34 AM  
Result Value Ref Range Phosphorus 2.9 2.5 - 4.9 MG/DL  
CBC WITH AUTOMATED DIFF Collection Time: 08/31/20 12:34 AM  
Result Value Ref Range WBC 8.1 4.6 - 13.2 K/uL  
 RBC 2.48 (L) 4.70 - 5.50 M/uL HGB 7.6 (L) 13.0 - 16.0 g/dL HCT 23.2 (L) 36.0 - 48.0 % MCV 93.5 74.0 - 97.0 FL  
 MCH 30.6 24.0 - 34.0 PG  
 MCHC 32.8 31.0 - 37.0 g/dL  
 RDW 18.6 (H) 11.6 - 14.5 % PLATELET 197 (L) 221 - 420 K/uL MPV 10.1 9.2 - 11.8 FL  
 NEUTROPHILS 82 (H) 40 - 73 % LYMPHOCYTES 9 (L) 21 - 52 % MONOCYTES 7 3 - 10 % EOSINOPHILS 2 0 - 5 % BASOPHILS 0 0 - 2 %  
 ABS. NEUTROPHILS 6.7 1.8 - 8.0 K/UL  
 ABS. LYMPHOCYTES 0.8 (L) 0.9 - 3.6 K/UL  
 ABS. MONOCYTES 0.6 0.05 - 1.2 K/UL  
 ABS. EOSINOPHILS 0.1 0.0 - 0.4 K/UL  
 ABS. BASOPHILS 0.0 0.0 - 0.1 K/UL  
 DF AUTOMATED METABOLIC PANEL, COMPREHENSIVE Collection Time: 08/31/20 12:34 AM  
Result Value Ref Range Sodium 141 136 - 145 mmol/L Potassium 3.5 3.5 - 5.5 mmol/L Chloride 108 100 - 111 mmol/L  
 CO2 30 21 - 32 mmol/L Anion gap 3 3.0 - 18 mmol/L Glucose 93 74 - 99 mg/dL BUN 8 7.0 - 18 MG/DL Creatinine 0.62 0.6 - 1.3 MG/DL  
 BUN/Creatinine ratio 13 12 - 20 GFR est AA >60 >60 ml/min/1.73m2 GFR est non-AA >60 >60 ml/min/1.73m2 Calcium 7.5 (L) 8.5 - 10.1 MG/DL Bilirubin, total 0.4 0.2 - 1.0 MG/DL  
 ALT (SGPT) 13 (L) 16 - 61 U/L  
 AST (SGOT) 14 10 - 38 U/L Alk. phosphatase 91 45 - 117 U/L Protein, total 5.5 (L) 6.4 - 8.2 g/dL Albumin 1.9 (L) 3.4 - 5.0 g/dL Globulin 3.6 2.0 - 4.0 g/dL A-G Ratio 0.5 (L) 0.8 - 1.7 RECENT RESULTS MODALITY IMPRESSION  
XR Results from Hospital Encounter encounter on 08/23/20 XR CHEST PORT  Narrative CHEST PORTABLE  
 
 INDICATIONS: Atrial fibrillation, COPD, CHF with worsening cough. COMPARISON: 8/23/2020 FINDINGS:  
 
Support lines/catheters: External monitoring leads overlie the chest. 
 
Lungs: Slightly hazy right lung opacity compared to the left which may represent 
a layering pleural effusion. Cardiac silhouette and mediastinal contours: Stable unfolding of the aortic 
contour. Mildly enlarged cardiac silhouette. Bones and soft tissues: Chronic right sixth rib fracture. Impression Impression: 
 
Hazy opacity in the right lung most likely represents a layering pleural 
effusion. Stable mild cardiomegaly with aortic ectasia. CT Results from [facility] [place] [place] Encounter encounter on 08/23/20 CTA CHEST ABD PELV W WO CONT Narrative CTA Chest, Abdomen And Pelvis With and without Enhancement INDICATION: Abdominal aortic aneurysm status post endovascular repair, peptic 
ulcer disease, heart failure, COPD with chronic respiratory failure on home 
oxygen, chronic back pain, BPH with malaise, worsening back pain, weakness for 
3-4 weeks, hypotension, acute on chronic anemia, melanotic stool. TECHNIQUE: 2.5 mm collimation axial images obtained from the thoracic inlet to 
the level of the pubic symphysis following the uneventful administration of 80 
mL Isovue-370 nonionic intravenous contrast.  Imaging performed during maximum 
aortic enhancement and is therefore suboptimal for evaluating solid organs and 
bowel. Raw data reviewed along with three-dimensional volume rendered images 
and maximum intensity projection images to better evaluate the tortuous vascular 
structures. All CT scans at this facility are performed using dose optimization technique as 
appropriate to a performed exam, to include automated exposure control, 
adjustment of the mA and/or kV according to patient size (including appropriate 
matching first site-specific examinations), or use of iterative reconstruction 
technique. COMPARISON: 2/17/2020. Thyroid: Unremarkable. Heart: Severe regions of coronary arteriosclerosis in the left circumflex. Biatrial cardiac chamber enlargement. Pulmonary artery: Patent Lymph Nodes: 2 Similar 1.4 cm precarinal lymph nodes, likely reactive. Lung: Moderate centrilobular emphysema. Mild subsegmental atelectasis in the 
right lower lobe. Mild subsegmental atelectasis or scarring right upper and 
right middle lobes. Pleura: Increased moderate right and similar trace left pleural effusions. No 
pneumothorax. ABDOMEN FINDINGS:  
There is suboptimal evaluation of visceral organs secondary to timing of the 
exam. 
 
 
Liver: Similar scattered hepatic cysts measuring up to 2.6 cm in the left medial 
hepatic lobe at the dome and 2.4 cm right posterior hepatic lobe. There are a 
few too small to characterize hepatic lesions but statistically most likely 
benign. Spleen: Unremarkable. Pancreas: Pancreas is atrophic. Biliary: Possible adenomyomatosis gallbladder fundus. Bowel: There is a increased rectal stool burden. Gas-filled gastric distention 
third portion duodenum does not clearly cross midline into the left abdomen but 
there is no volvulus or bowel dilation. No bowel wall thickening. No 
pneumatosis. Appendix in the right inguinal hernia without inflammation, 
unchanged. There is fluid in the esophagus. Peritoneum/ Retroperitoneum: There is edema without free fluid or free air. Lymph Nodes: Unremarkable. Adrenal Glands: Unremarkable. Kidneys: Right kidney is 7.1 cm, previously 8.3 cm and the left is 10.7 cm, 
previously 10.4 cm. The right kidney hypoenhance is with cortical thinning. There is cortical scarring at the left lower pole. There is a exophytic 0.9 cm 
hyperdense lesion at the right lower pole without enhancement. 2 cm left lower 
pole cyst. Several too small to characterize renal lesions.  
 
 
PELVIS FINDINGS:  
 
 Bladder/ Pelvic Organs: Small diverticulum at the left anterior bladder wall. Moderate bladder distention. Bones/Soft tissues: Mild anasarca. Osteopenia. Lumbar facet hypertrophy and 
arthropathy. Transitional lumbosacral junction. Degenerative disc disease. Central height loss superior endplate L3.   
 
 
AORTA FINDINGS: 
Thoracic aorta is not enlarged with mild atherosclerosis. 3 vessel arch anatomy 
with patent arch vessels. Subjectively, the aneurysm cranial to the proximal 
aortic stent landing zone is increased. There appears to be a similar 1 cm 
crescent of enhancement at the right anterior aspect with endoleak but there is 
increased nonopacified circumferential region measuring up to 0.6 cm on the left 
(series 3, image 138), not present on prior exam. The excluded aneurysm is 
subjectively larger. There is similar right type I B endoleak distal to the 
common iliac stent. The vessel measures 2.3 cm at this location, previously 2.1 
cm. The peripheral right common iliac is 1.9 cm, previously 1.7 cm. There are 
new linear regions of thrombus which protrude into the central aortic lumen at 
the more cranial aspect of the stent at the level of the left renal vein and 
within the bilateral common iliac stents protruding into the central lumen. Mild stenosis proximal celiac. Regions of stenosis splenic artery. Mild stenosis 
proximal SMA. Moderate stenosis proximal left renal artery and proximal 
occlusion right renal artery. There is stenosis at the origin of the right 
internal iliac, origin left internal iliac. Three-D generated aortic measurements: 
3.6, 3.2, 2.9 centimeter at the sinus of Valsalva. 3.5 x 3.1 centimeter at the sinotubular junction. 3.8 x 3.8 centimeter at the maximum diameter of the ascending aorta. 3.5 x 3.2 centimeter at the mid aortic arch. 3.4 x 3.2 centimeter at the proximal descending thoracic aorta. 3.6 x 3.1 centimeter at the mid descending aorta. 3.6 x 3.5 centimeter at the distal descending thoracic aorta at the level of the 
diaphragmatic hiatus. Aneurysm measurements: 
Volume 149.73 cc, previously 166.71 cc 
4.9 x 4.2 cm below the lowest renal artery including excluded aorta, previously 4.4 x 3.7 cm 
1 cm below the lowest renal artery within the stent: 3 x 2.6 cm; including 
excluded aorta: 5.8 x 4.4 cm, previously 3.9 x 3.9 cm 
3.1 x 3 cm within the stent at the top of the stent; 5.4 x 4.4 cm including 
excluded aorta, previously 4 x 3.6 cm 
2.7 x 2.7 cm within the stent below the lowest renal artery 2.7 x 2.7 cm Within the stent below the lowest renal artery 3 cm distance from the lowest renal artery to the top of the stent Impression IMPRESSION: 
1.  New strands of thrombus along the wall of the aortic and bilateral common 
iliac stents which protrudes into the central aortic lumen and place patient at 
risk for lower extremity embolic events. 2.  Increased aneurysm cranial to the aortobiiliac stent with resultant 
increased type I A endoleak. Similar type I B endoleak. Increased excluded 
aortic aneurysm measuring 5.8 cm (change of 2 cm at this level). Minimal 
increased right common iliac aneurysm measuring 1.9 cm. 
3.  Increased moderate right and similar small left pleural effusions. 4.  Increased rectal stool burden. No active extravasation. 5.  Gas-filled gastric distention. 6.  Occlusion proximal right renal artery with worsened atrophy and 
hypoenhancement. 7.  Severe left circumflex coronary arteriosclerosis. 8.  Biatrial cardiac chamber enlargement. 9.  Moderate stenosis left renal artery, stenosis at the origin of the bilateral 
internal iliac arteries. 10.  Moderate emphysema. 11.  Gastroesophageal reflux. 12.  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 13.  Unchanged Incidental Amyand hernia. 14.  Right renal hemorrhagic/proteinaceous cyst. 
15.  Small bladder diverticulum. Preliminary report provided by Dr. Ryann Acevedo on 8/23/2020 at 2234 hours prior to 
3-D image availability. MRI Results from [place] encounter on 08/23/20 MRI LUMB SPINE W CONT Narrative EXAM: MRI LUMBAR SPINE  CPT CODE: 69326 CLINICAL INDICATION/HISTORY: Evaluate discitis. COMPARISON: CTA chest, abdomen pelvis of 23 August 2020. TECHNIQUE: MRI of the lumbar spine is performed by using standard pre- and 
post-gadolinium protocol pulse sequences with axial images from T12/L1 through S1/S2. Contrast: MultiHance - 14 mL IV given in conjunction with prior MRA 
abdomen; unremarkable administration. FINDINGS: There is a subtle retrolisthesis of L2 on L3 and mild anterolisthesis 
of L4 on L5. There is a mild scoliosis convex to the left centered about L2. Other vertebral body alignment is normal.  S1 is a transitional vertebra with 
lumbarization and a rudimentary S1/S2 disc. There is mild increased signal 
intensity in the bone marrow of L1 and L2 on STIR images accompanied by marrow 
enhancement of both vertebrae on post-gadolinium images where then may be some 
relative sparing of the inferior endplate marrow of S2. Other marrow signal 
intensity is relatively normal.  Bright signal intensity is seen within the 
intervertebral disc at L1/L2 with evidence of some subligamentous extrusion 
posteriorly and more significantly anteriorly. There is inflammatory change in 
enhancement of the paraspinal soft tissues and psoas muscles, bilaterally, more 
focally at L2 on the left and more broadly and longitudinally on the right from S2 through L5 with a focal 1.9 cm x 1.4 cm x 2.5 cm abscess in the right psoas 
muscle at S2/S3 level. There is a probable insufficiency fracture of the 
superior L3 vertebra with degenerative changes and L2-3 disc. There is 
moderately severe disc space narrowing at L4/L5 and moderate narrowing at L5/S1. Mild-to-moderate narrowing may be present at T12/L1. L3/L4 is preserved. No 
cord lesions are seen and the conus medullaris ends normally at L1. There is 
relatively minimal enhancement of the anterior epidural soft tissues at the 
level of the disc inflammation, somewhat asymmetric to the left. L5/S1: There is a broad-based disc bulge and mild central stenosis. There is 
moderate bilateral foraminal encroachment by disc protrusion and some facet 
arthropathy. There may be mild impression on the exiting root sleeves 
bilaterally. L4/L5: There is mild-to-moderate canal stenosis from the listhesis and facet 
arthropathy with an AP diameter of about 8 mm. There is mild-to-moderate right 
and moderately severe left foraminal stenosis. L3/L4: The central canal is patent without significant disc protrusion. There 
is mild-to-moderate bilateral foraminal stenosis from small foraminal 
protrusions. L2/L3: There is no significant canal stenosis. There is mild-to-moderate 
bilateral foraminal stenosis from foraminal protrusions, right slightly more 
prominent than left. L1/L2: There is mild encroachment on the thecal sac from a subtle broad-based 
protrusion and some inflammation and thickening in the anterior epidural soft 
tissues somewhat asymmetric to the left. Soft tissue inflammation extends into 
the exit foramina, bilaterally with mild-to-moderate bony canal stenosis. T12/L1: There is a mild right lateral to foraminal disc protrusion with subtle 
canal encroachment asymmetric to the right. There may be mild right foraminal 
encroachment; the left foramen is patent. Multiple small cortical cysts are seen in both kidneys as well as several cysts 
in the visualized inferior portion of the right lobe of the liver. Note that 
the right kidney is [id number] atrophic. Again noted is the distal abdominal aortic aneurysm treated with an aortobiiliac endograft stent. Note that the right limb of the stent supplies the left common 
iliac artery and the left limb the right common iliac artery. There is a 
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 
not significantly changed Impression IMPRESSION: 
 
L1/L2 discitis. Minimal posterior disc protrusion and canal with some mild 
focal inflammation in the anterior epidural space slightly asymmetric to the 
left resulting in mild canal encroachment. Soft tissue inflammation and 
enhancement extends into both L1/L2 foramina. There is significant inflammatory 
change in the right paraspinal soft tissues and psoas muscle from L2 through L5 
and a small 2 cm abscess in the right psoas at about L2/L3. There is focal 
paraspinal inflammation on the left at about L2. Persistent spondylitic and disc degenerative changes with mild-to-moderate canal 
stenosis, particularly at L4/L5, and multilevel foraminal stenoses most 
significant at L5-S1. Status post treatment distal aortic aneurysm with aortobiiliac stent. Persistent aneurysm distal right common iliac artery at the end of the stent. Several hepatic cysts and bilateral renal cortical cysts. There is 
mild-to-moderate atrophy of the right kidney. Note: A congruent preliminary report was provided by Dr. Constance Chakraborty at the time of 
the study. ULTRASOUND Results from Hospital Encounter encounter on 08/25/17 US EXT NONVAS RT LTD Impression Impression:  Large fat-containing right inguinal hernia. On recent CT scan 
(August 25, 2017), the appendix is seen extending through the internal inguinal 
ring into the inguinal hernia. This is not redemonstrated on today's ultrasound 
but potentially if the appendix does not have air within it, it may be difficult 
to visualize sonographically. No adenopathy. Results from [facility] [place] Encounter encounter on 02/18/16 US SCROTUM/TESTICLES  Impression IMPRESSION: 
 1.  Increased internal vascularity of the left testicle suggests mild orchitis. Cardiology Procedures/Testing: MODALITY RESULTS  
EKG Results for orders placed or performed during the hospital encounter of 08/23/20 EKG, 12 LEAD, INITIAL Result Value Ref Range Ventricular Rate 71 BPM  
 Atrial Rate 288 BPM  
 QRS Duration 86 ms  
 Q-T Interval 350 ms QTC Calculation (Bezet) 380 ms Calculated R Axis -42 degrees Calculated T Axis 81 degrees Diagnosis Atrial fibrillation Left axis deviation Low voltage QRS Abnormal ECG When compared with ECG of 26-JAN-2020 09:46, 
Criteria for Anteroseptal infarct are no longer present Confirmed by Dennie Gift, MD, Zoila Sepulveda ([id number]) on 8/24/2020 8:30:54 AM 
  
Results for orders placed or performed in visit on 08/10/18 AMB POC EKG ROUTINE W/ 12 LEADS, INTER & REP Narrative Read by Shola Rowland MD - atrial fibrillation, occasional ectopic ventricular beat. Nonspecific QRS widening and anterior fascicular block. Old anterior infarct. Possible nonspecific ST abnormality. ECHO 06/02/19 ECHO ADULT COMPLETE 06/04/2019 6/4/2019 Narrative · Left Ventricle: Estimated left ventricular ejection fraction is 56 -  
60%. Abnormal left ventricular septal motion. Interventricular septal  
\"bounce\". · Tricuspid Valve: Non-specific thickening of the tricuspid valve. Moderate tricuspid valve regurgitation is present. · Pulmonary Artery: Moderate pulmonary hypertension. Pulmonary arterial  
systolic pressure is 44.1 mmHg. · Pulmonic Valve: Mild pulmonic valve regurgitation is present. · Left Atrium: Severely dilated left atrium. · Right Atrium: Moderately dilated right atrium. Signed by: Preston Williamson MD  
  
 
Special Testing/Procedures: MODALITY RESULTS MICRO All Micro Results Procedure Component Value Units Date/Time CULTURE, RESPIRATORY/SPUTUM/BRONCH Aneita [place] STAIN [[id number]]  (Abnormal) Collected:  08/29/20 0810 Order Status:  Completed Specimen:  Sputum Updated:  20 1254 Special Requests: NO SPECIAL REQUESTS     
  GRAM STAIN OCCASIONAL WBCS SEEN     
      
  RARE EPITHELIAL CELLS SEEN  
     
   OCCASIONAL YEAST Culture result: LIGHT YEAST     
      
  NO NORMAL RESPIRATORY RUDY ISOLATED SO FAR  
     
 CULTURE, BLOOD [[id number]] Collected:  20 1300 Order Status:  Completed Specimen:  Blood Updated:  20 0839 Special Requests: NO SPECIAL REQUESTS Culture result: NO GROWTH 2 DAYS     
 CULTURE, BLOOD [[id number]] Collected:  20 1503 Order Status:  Completed Specimen:  Blood Updated:  20 4025 Special Requests: NO SPECIAL REQUESTS Culture result: NO GROWTH 4 DAYS Moya Check REFL [[id number]]  (Abnormal) Collected:  20 Order Status:  Completed Specimen:  Miscellaneous sample Updated:  20 1136 Source BLOOD Aer+Anaer Suscept Result 1 Eikenella corrodens Comment: (NOTE) Identification performed by account, not confirmed by this 
laboratory. Testing performed by broth microdilution. AZITHROMYCIN = 0.5 ug/ml = SUSCEPTIBLE 
CEFOTAXIME   = <=0.25 ug/ml = SUSCEPTIBLE Antimicrobial suscept. Comment Comment: (NOTE) ** S = Susceptible; I = Intermediate; R = Resistant ** P = Positive; N = Negative MICS are expressed in micrograms per mL Antibiotic                 RSLT#1    RSLT#2    RSLT#3    RSLT#4 Ampicillin                     S 
Ceftriaxone                    S 
Chloramphenicol                S 
Levofloxacin                   S 
Meropenem                      S 
Penicillin                     S 
Tetracycline                   S 
Trimethoprim/Sulfa             S 
Performed At: [address] [id number] Damion Muse MD  SUSCEPTIBILITY, AER + ANAEROB [[id number]] Collected:  20 Order Status:  Completed Specimen:  MICROBIAL ISOLATE Updated:  08/29/20 1136 Source BLOOD Susceptibility, Aer + Anaerob Final Report Below Comment: (NOTE) Performed At: [address] [id number] Noel Weaver MD [id number] CORRECTED ON 08/29 AT 1136: PREVIOUSLY REPORTED AS Preliminary report CULTURE, BLOOD [[id number]]  (Abnormal) Collected:  08/23/20 1805 Order Status:  Completed Specimen:  Blood Updated:  08/27/20 1223 Special Requests: NO SPECIAL REQUESTS     
  GRAM STAIN    
  ANAEROBIC BOTTLE NO ORGANISMS SEEN Culture result:    
  EIKENELLA CORRODENS GROWING IN THE ANAEROBIC BOTTLE BETA LACTAMASE NEGATIVE CULTURE, BLOOD [[id number]]  (Abnormal) Collected:  08/23/20 1820 Order Status:  Completed Specimen:  Blood Updated:  08/27/20 9811 Special Requests: NO SPECIAL REQUESTS Culture result:    
  EIKENELLA CORRODENS , BETA LACTAMASE NEGATIVE , ISOLATED FROM THE ANAEROBIC BOTTLE  
     
 CULTURE, RESPIRATORY/SPUTUM/BRONCH [place] STAIN [[id number]] Collected:  08/26/20 1030 Order Status:  Canceled Specimen:  Sputum Parthenia Fiona [[id number]] Collected:  08/24/20 0358 Order Status:  Completed Specimen:  Urine from Clean catch Updated:  08/25/20 1026 Special Requests: NO SPECIAL REQUESTS Culture result: No growth (<1,000 CFU/ML) UA Results for orders placed or performed in visit on 04/07/14 AMB POC URINALYSIS DIP STICK AUTO W/O MICRO     Status: None Result Value Ref Range Status  Color (UA POC) Yellow  Final  
 Clarity (UA POC) Clear  Final  
 Glucose (UA POC) Negative Negative Final  
 Bilirubin (UA POC) Negative Negative Final  
 Ketones (UA POC) Negative Negative Final  
 Specific gravity (UA POC) 1.010 1.001 - 1.035 Final  
 Blood (UA POC) Trace Negative Final  
 pH (UA POC) 7.0 4.6 - 8.0 Final  
 Protein (UA POC) Trace Negative mg/dL Final  
 Urobilinogen (UA POC) 0.2 mg/dL 0.2 - 1 Final  
 Nitrites (UA POC) Negative Negative Final  
 Leukocyte esterase (UA POC) Negative Negative Final  
  
PATH Assessment and Plan:  
 
79 y.o. male with PMH AAA s/p endovascular repair, Fe deficiency anemia, PUD, AFib, CHF, CAD, COPD with chronic hypoxic respiratory failure on home 3L NC, chronic back pain, and BPH, now admitted with acute on chronic anemia. 
  
Acute on chronic anemia: Pt was followed by ID for a GI bleed that was resolved. He also has a AAA with type I A endoleak that requires repair for which he was hoping to transfer to [facility] however, he is not a stable surgery candidate. However, pt was recently diagnosed with a psoas abscess at L2-L3 that will require drainage by IR. 
- GI following, EGD 8/27, no signs active or recent bleeding 
- H/H Q12H, transfuse if Hgb < 7 
- Hgb 8.2 after last transfusion, Hgb today 7.6 
- CBC, CMP, Mag, Phos daily - Vascular: no need for IVC filter; planned transfer to [facility] for complex graph repair - protonix gtt - D5 1/2NS w/ KCl @ 90mL/hr 
- PT/OT/CM 
  
Sepsis: Respiratory culture on 8/29 showed light yeast, ID recs appreciated Blood culture on 8/23 showed EIKENELLA CORRODENS , BETA LACTAMASE NEGATIVE, pt was started on Piperacillin/Tazobactam.   
- FU BCx susceptibilities - appreciate ID recs; continue Zosyn - FU MRA ABD and MRI L-spine - discussed w/ Dr. Nik Beck; MRI spine w/ L1/2 discitis, focal anterior epidural inflammation w/ mild canal encroachment, L2/L3 2cm Right psoas abscess 
- discuss with IR/ID about psoas drainage and abx coverage 
  
AFib/CHF/CAD: rate controlled on Amlodipine; off ASA and Coumadin due to hx of GIB. No evidence of decompensated CHF noted in cardiology clinic visit (6/16/20). CXR shows large-moderate R pleural effusion. Coronary angiography in 2012 only showed mild CAD. - Continue Amlodipine 10mg QD, Digoxin 125mcg QD, Pravastatin 40mg QD, Lisinopril 20mg QD 
  
 COPD: Complicated by Chronic Hypoxic Respiratory Failure, on 3L NC at home. - Supplemental O2 for sats < 92% - Duoneb PRN 
  
Chronic back pain: - Lidocaine Patch 
- Oxycodone and Hydromorphone PRN 
  
BPH  
- Tamsulosin 0.4mg daily 
  
Insomnia: On two home sleeping aids - Duloxetine 20mg QHS and Trazadone 50mg QHS   
  
Diet Cardiac DVT Prophylaxis Contraindicated GI Prophylaxis Protonix gtt Code status No CPR, Intubation Ok Disposition > 2 MN  
  
Point of Contact [address] [facility] Relationship: daughter 
[phone] Kailyn Cordero 
Daughter 
[phone] Robyn Demarco DO PGY1 [address] Intern Pager: [phone] August 31, 2020, 6:57 AM

## 2020-08-30 NOTE — PROGRESS NOTES
SUBJECTIVE:  
Called RN to check on patient. She states that he was having nausea and cough, possibly 2/2 aspiration. He seems more awake tonight than previous nights. Pain is well controlled with increase in Norco earlier today. OBJECTIVE: 
Visit Vitals /80 Pulse 82 Temp 97.6 °F (36.4 °C) Resp 18 Ht 5' 6\" (1.676 m) Wt 65.3 kg (143 lb 15.4 oz) SpO2 100% BMI 23.24 kg/m² PLAN: 
- Give Zofran 4mg IV x1, then 4mg oral q6h prn 
- Consider repeat CXR in AM 
- See daily progress note for detailed plan of other problems Darshana Rutledge. Edilson Riggs MD, PGY-3 
[address] 08/29/20 11:38 PM

## 2020-08-30 NOTE — PROGRESS NOTES
[facility] Progress Note Patient: Marcos Gamez MRN: [id number] SSN: [id number]  YOB: 1940 Age: 79 y.o. Sex: male Admit Date: 8/23/2020 LOS: 6 days Chief Complaint Patient presents with  Fatigue Subjective:  
 
Pt resting in bed this morning. Denies F/C, NVD, HA, lightheadedness, dizziness, SOB, cough, CP, palpitations, ABD pain, bowel complaints, urinary complaints, edema, numbness, or tingling.  
  
 
  
Review of Systems Constitutional: Positive for malaise/fatigue and weight loss. Negative for chills, diaphoresis and fever. HENT: Negative for congestion, hearing loss, sore throat and tinnitus. Eyes: Positive for blurred vision (chronic) and discharge (chronic). Respiratory: Negative for cough, hemoptysis and shortness of breath. Cardiovascular: Negative for chest pain, palpitations and orthopnea. Gastrointestinal: Negative for abdominal pain, blood in stool, diarrhea, heartburn, melena and vomiting. Genitourinary: Negative for dysuria, frequency, hematuria and urgency. Musculoskeletal: Positive for back pain. Neurological: Positive for dizziness (chronic, on turning in bed) and weakness. Negative for tingling, sensory change and headaches. Psychiatric/Behavioral: Negative for depression and suicidal ideas. Objective:  
 
Visit Vitals /75 Pulse 86 Temp 97.6 °F (36.4 °C) Resp 18 Ht 5' 6\" (1.676 m) Wt 65.3 kg (143 lb 15.4 oz) SpO2 98% BMI 23.24 kg/m² Physical Exam:  
General:  AAOx3, NAD, laying comfortably in bed HEENT: Conjunctiva pale with thick yellow discharge, sclera anicteric. PERRL. EOMI. Dry mucous membranes. Thyroid not enlarged, no nodules. No cervical, supraclavicular, occipital or submandibular lymphadenopathy. No other gross abnormalities present. CV:  Irregular rhythm, normal rate, no murmurs. No visible pulsations or thrills. RESP:  Unlabored breathing. Lungs clear to auscultation without adventitious breath sounds. Equal expansion bilaterally. ABD:  Soft, nontender, nondistended. BS (+). No hepatosplenomegaly. R inguinal hernia, reducible, nontender, no overlying skin changes MS:  No joint deformity. BLE atrophy. Neuro:  CN II-XII grossly intact. 4/5 strength bilateral upper extremities and lower extremities. Ext:  No edema. 2+ radial and dp pulses bilaterally. Skin:  No rashes, lesions, or ulcers. Good turgor. Intake and Output: 
Current Shift: No intake/output data recorded. Last three shifts: 08/28 1901 - 08/30 0700 In: 3643.9 [P.O.:1260; I.V.:2020.6] Out: 1175 [ZIAZE:[id number]] Lab/Data Review: 
Recent Results (from the past 12 hour(s)) HGB & HCT Collection Time: 08/29/20  9:48 PM  
Result Value Ref Range HGB 8.6 (L) 13.0 - 16.0 g/dL HCT 26.5 (L) 36.0 - 48.0 % MAGNESIUM Collection Time: 08/30/20 12:55 AM  
Result Value Ref Range Magnesium 1.7 1.6 - 2.6 mg/dL PHOSPHORUS Collection Time: 08/30/20 12:55 AM  
Result Value Ref Range Phosphorus 2.3 (L) 2.5 - 4.9 MG/DL  
CBC WITH AUTOMATED DIFF Collection Time: 08/30/20 12:55 AM  
Result Value Ref Range WBC 10.5 4.6 - 13.2 K/uL  
 RBC 2.74 (L) 4.70 - 5.50 M/uL HGB 8.2 (L) 13.0 - 16.0 g/dL HCT 25.5 (L) 36.0 - 48.0 % MCV 93.1 74.0 - 97.0 FL  
 MCH 29.9 24.0 - 34.0 PG  
 MCHC 32.2 31.0 - 37.0 g/dL  
 RDW 18.9 (H) 11.6 - 14.5 % PLATELET 90 (L) 614 - 420 K/uL MPV 10.0 9.2 - 11.8 FL  
 NEUTROPHILS 86 (H) 40 - 73 % LYMPHOCYTES 7 (L) 21 - 52 % MONOCYTES 6 3 - 10 % EOSINOPHILS 1 0 - 5 % BASOPHILS 0 0 - 2 %  
 ABS. NEUTROPHILS 9.0 (H) 1.8 - 8.0 K/UL  
 ABS. LYMPHOCYTES 0.7 (L) 0.9 - 3.6 K/UL  
 ABS. MONOCYTES 0.7 0.05 - 1.2 K/UL  
 ABS. EOSINOPHILS 0.1 0.0 - 0.4 K/UL  
 ABS. BASOPHILS 0.0 0.0 - 0.1 K/UL  
 DF AUTOMATED METABOLIC PANEL, COMPREHENSIVE Collection Time: 08/30/20 12:55 AM  
Result Value Ref Range Sodium 139 136 - 145 mmol/L Potassium 3.6 3.5 - 5.5 mmol/L Chloride 108 100 - 111 mmol/L  
 CO2 28 21 - 32 mmol/L Anion gap 3 3.0 - 18 mmol/L Glucose 107 (H) 74 - 99 mg/dL BUN 8 7.0 - 18 MG/DL Creatinine 0.64 0.6 - 1.3 MG/DL  
 BUN/Creatinine ratio 13 12 - 20 GFR est AA >60 >60 ml/min/1.73m2 GFR est non-AA >60 >60 ml/min/1.73m2 Calcium 7.5 (L) 8.5 - 10.1 MG/DL Bilirubin, total 0.7 0.2 - 1.0 MG/DL  
 ALT (SGPT) 16 16 - 61 U/L  
 AST (SGOT) 16 10 - 38 U/L Alk. phosphatase 106 45 - 117 U/L Protein, total 5.9 (L) 6.4 - 8.2 g/dL Albumin 2.0 (L) 3.4 - 5.0 g/dL Globulin 3.9 2.0 - 4.0 g/dL A-G Ratio 0.5 (L) 0.8 - 1.7 RECENT RESULTS MODALITY IMPRESSION  
XR Results from Hospital Encounter encounter on 08/23/20 XR CHEST PORT Narrative CHEST PORTABLE INDICATIONS: Atrial fibrillation, COPD, CHF with worsening cough. COMPARISON: 8/23/2020 FINDINGS:  
 
Support lines/catheters: External monitoring leads overlie the chest. 
 
Lungs: Slightly hazy right lung opacity compared to the left which may represent 
a layering pleural effusion. Cardiac silhouette and mediastinal contours: Stable unfolding of the aortic 
contour. Mildly enlarged cardiac silhouette. Bones and soft tissues: Chronic right sixth rib fracture. Impression Impression: 
 
Hazy opacity in the right lung most likely represents a layering pleural 
effusion. Stable mild cardiomegaly with aortic ectasia. CT Results from [facility] Encounter encounter on 08/23/20 CTA CHEST ABD PELV W WO CONT Narrative CTA Chest, Abdomen And Pelvis With and without Enhancement INDICATION: Abdominal aortic aneurysm status post endovascular repair, peptic 
ulcer disease, heart failure, COPD with chronic respiratory failure on home 
oxygen, chronic back pain, BPH with malaise, worsening back pain, weakness for 
3-4 weeks, hypotension, acute on chronic anemia, melanotic stool. TECHNIQUE: 2.5 mm collimation axial images obtained from the thoracic inlet to 
the level of the pubic symphysis following the uneventful administration of 80 
mL Isovue-370 nonionic intravenous contrast.  Imaging performed during maximum 
aortic enhancement and is therefore suboptimal for evaluating solid organs and 
bowel. Raw data reviewed along with three-dimensional volume rendered images 
and maximum intensity projection images to better evaluate the tortuous vascular 
structures. All CT scans at this facility are performed using dose optimization technique as 
appropriate to a performed exam, to include automated exposure control, 
adjustment of the mA and/or kV according to patient size (including appropriate 
matching first site-specific examinations), or use of iterative reconstruction 
technique. COMPARISON: 2/17/2020. Thyroid: Unremarkable. Heart: Severe regions of coronary arteriosclerosis in the left circumflex. Biatrial cardiac chamber enlargement. Pulmonary artery: Patent Lymph Nodes: 2 Similar 1.4 cm precarinal lymph nodes, likely reactive. Lung: Moderate centrilobular emphysema. Mild subsegmental atelectasis in the 
right lower lobe. Mild subsegmental atelectasis or scarring right upper and 
right middle lobes. Pleura: Increased moderate right and similar trace left pleural effusions. No 
pneumothorax. ABDOMEN FINDINGS:  
There is suboptimal evaluation of visceral organs secondary to timing of the 
exam. 
 
 
Liver: Similar scattered hepatic cysts measuring up to 2.6 cm in the left medial 
hepatic lobe at the dome and 2.4 cm right posterior hepatic lobe. There are a 
few too small to characterize hepatic lesions but statistically most likely 
benign. Spleen: Unremarkable. Pancreas: Pancreas is atrophic. Biliary: Possible adenomyomatosis gallbladder fundus. Bowel: There is a increased rectal stool burden. Gas-filled gastric distention third portion duodenum does not clearly cross midline into the left abdomen but 
there is no volvulus or bowel dilation. No bowel wall thickening. No 
pneumatosis. Appendix in the right inguinal hernia without inflammation, 
unchanged. There is fluid in the esophagus. Peritoneum/ Retroperitoneum: There is edema without free fluid or free air. Lymph Nodes: Unremarkable. Adrenal Glands: Unremarkable. Kidneys: Right kidney is 7.1 cm, previously 8.3 cm and the left is 10.7 cm, 
previously 10.4 cm. The right kidney hypoenhance is with cortical thinning. There is cortical scarring at the left lower pole. There is a exophytic 0.9 cm 
hyperdense lesion at the right lower pole without enhancement. 2 cm left lower 
pole cyst. Several too small to characterize renal lesions. PELVIS FINDINGS:  
 
Bladder/ Pelvic Organs: Small diverticulum at the left anterior bladder wall. Moderate bladder distention. Bones/Soft tissues: Mild anasarca. Osteopenia. Lumbar facet hypertrophy and 
arthropathy. Transitional lumbosacral junction. Degenerative disc disease. Central height loss superior endplate L3.   
 
 
AORTA FINDINGS: 
Thoracic aorta is not enlarged with mild atherosclerosis. 3 vessel arch anatomy 
with patent arch vessels. Subjectively, the aneurysm cranial to the proximal 
aortic stent landing zone is increased. There appears to be a similar 1 cm 
crescent of enhancement at the right anterior aspect with endoleak but there is 
increased nonopacified circumferential region measuring up to 0.6 cm on the left 
(series 3, image 138), not present on prior exam. The excluded aneurysm is 
subjectively larger. There is similar right type I B endoleak distal to the 
common iliac stent. The vessel measures 2.3 cm at this location, previously 2.1 
cm. The peripheral right common iliac is 1.9 cm, previously 1.7 cm.  There are 
new linear regions of thrombus which protrude into the central aortic lumen at 
 the more cranial aspect of the stent at the level of the left renal vein and 
within the bilateral common iliac stents protruding into the central lumen. Mild stenosis proximal celiac. Regions of stenosis splenic artery. Mild stenosis 
proximal SMA. Moderate stenosis proximal left renal artery and proximal 
occlusion right renal artery. There is stenosis at the origin of the right 
internal iliac, origin left internal iliac. Three-D generated aortic measurements: 
3.6, 3.2, 2.9 centimeter at the sinus of Valsalva. 3.5 x 3.1 centimeter at the sinotubular junction. 3.8 x 3.8 centimeter at the maximum diameter of the ascending aorta. 3.5 x 3.2 centimeter at the mid aortic arch. 3.4 x 3.2 centimeter at the proximal descending thoracic aorta. 3.6 x 3.1 centimeter at the mid descending aorta. 3.6 x 3.5 centimeter at the distal descending thoracic aorta at the level of the 
diaphragmatic hiatus. Aneurysm measurements: 
Volume 149.73 cc, previously 166.71 cc 
4.9 x 4.2 cm below the lowest renal artery including excluded aorta, previously 4.4 x 3.7 cm 
1 cm below the lowest renal artery within the stent: 3 x 2.6 cm; including 
excluded aorta: 5.8 x 4.4 cm, previously 3.9 x 3.9 cm 
3.1 x 3 cm within the stent at the top of the stent; 5.4 x 4.4 cm including 
excluded aorta, previously 4 x 3.6 cm 
2.7 x 2.7 cm within the stent below the lowest renal artery 2.7 x 2.7 cm Within the stent below the lowest renal artery 3 cm distance from the lowest renal artery to the top of the stent Impression IMPRESSION: 
1.  New strands of thrombus along the wall of the aortic and bilateral common 
iliac stents which protrudes into the central aortic lumen and place patient at 
risk for lower extremity embolic events. 2.  Increased aneurysm cranial to the aortobiiliac stent with resultant 
increased type I A endoleak. Similar type I B endoleak. Increased excluded aortic aneurysm measuring 5.8 cm (change of 2 cm at this level). Minimal 
increased right common iliac aneurysm measuring 1.9 cm. 
3.  Increased moderate right and similar small left pleural effusions. 4.  Increased rectal stool burden. No active extravasation. 5.  Gas-filled gastric distention. 6.  Occlusion proximal right renal artery with worsened atrophy and 
hypoenhancement. 7.  Severe left circumflex coronary arteriosclerosis. 8.  Biatrial cardiac chamber enlargement. 9.  Moderate stenosis left renal artery, stenosis at the origin of the bilateral 
internal iliac arteries. 10.  Moderate emphysema. 11.  Gastroesophageal reflux. 12.  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 13.  Unchanged Incidental Amyand hernia. 14.  Right renal hemorrhagic/proteinaceous cyst. 
15.  Small bladder diverticulum. Preliminary report provided by Dr. Juliane Calixto on 8/23/2020 at 2234 hours prior to 
3-D image availability. MRI Results from [place] encounter on 08/23/20 MRI LUMB SPINE W CONT Narrative EXAM: MRI LUMBAR SPINE  CPT CODE: 78527 CLINICAL INDICATION/HISTORY: Evaluate discitis. COMPARISON: CTA chest, abdomen pelvis of 23 August 2020. TECHNIQUE: MRI of the lumbar spine is performed by using standard pre- and 
post-gadolinium protocol pulse sequences with axial images from T12/L1 through S1/S2. Contrast: MultiHance - 14 mL IV given in conjunction with prior MRA 
abdomen; unremarkable administration. FINDINGS: There is a subtle retrolisthesis of L2 on L3 and mild anterolisthesis 
of L4 on L5. There is a mild scoliosis convex to the left centered about L2. Other vertebral body alignment is normal.  S1 is a transitional vertebra with 
lumbarization and a rudimentary S1/S2 disc. There is mild increased signal 
intensity in the bone marrow of L1 and L2 on STIR images accompanied by marrow 
enhancement of both vertebrae on post-gadolinium images where then may be some relative sparing of the inferior endplate marrow of S2. Other marrow signal 
intensity is relatively normal.  Bright signal intensity is seen within the 
intervertebral disc at L1/L2 with evidence of some subligamentous extrusion 
posteriorly and more significantly anteriorly. There is inflammatory change in 
enhancement of the paraspinal soft tissues and psoas muscles, bilaterally, more 
focally at L2 on the left and more broadly and longitudinally on the right from S2 through L5 with a focal 1.9 cm x 1.4 cm x 2.5 cm abscess in the right psoas 
muscle at S2/S3 level. There is a probable insufficiency fracture of the 
superior L3 vertebra with degenerative changes and L2-3 disc. There is 
moderately severe disc space narrowing at L4/L5 and moderate narrowing at L5/S1. Mild-to-moderate narrowing may be present at T12/L1. L3/L4 is preserved. No 
cord lesions are seen and the conus medullaris ends normally at L1. There is 
relatively minimal enhancement of the anterior epidural soft tissues at the 
level of the disc inflammation, somewhat asymmetric to the left. L5/S1: There is a broad-based disc bulge and mild central stenosis. There is 
moderate bilateral foraminal encroachment by disc protrusion and some facet 
arthropathy. There may be mild impression on the exiting root sleeves 
bilaterally. L4/L5: There is mild-to-moderate canal stenosis from the listhesis and facet 
arthropathy with an AP diameter of about 8 mm. There is mild-to-moderate right 
and moderately severe left foraminal stenosis. L3/L4: The central canal is patent without significant disc protrusion. There 
is mild-to-moderate bilateral foraminal stenosis from small foraminal 
protrusions. L2/L3: There is no significant canal stenosis. There is mild-to-moderate 
bilateral foraminal stenosis from foraminal protrusions, right slightly more 
prominent than left. L1/L2: There is mild encroachment on the thecal sac from a subtle broad-based 
protrusion and some inflammation and thickening in the anterior epidural soft 
tissues somewhat asymmetric to the left. Soft tissue inflammation extends into 
the exit foramina, bilaterally with mild-to-moderate bony canal stenosis. T12/L1: There is a mild right lateral to foraminal disc protrusion with subtle 
canal encroachment asymmetric to the right. There may be mild right foraminal 
encroachment; the left foramen is patent. Multiple small cortical cysts are seen in both kidneys as well as several cysts 
in the visualized inferior portion of the right lobe of the liver. Note that 
the right kidney is [id number] atrophic. Again noted is the distal abdominal aortic aneurysm treated with an aortobiiliac 
endograft stent. Note that the right limb of the stent supplies the left common 
iliac artery and the left limb the right common iliac artery. There is a 
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 
not significantly changed Impression IMPRESSION: 
 
L1/L2 discitis. Minimal posterior disc protrusion and canal with some mild 
focal inflammation in the anterior epidural space slightly asymmetric to the 
left resulting in mild canal encroachment. Soft tissue inflammation and 
enhancement extends into both L1/L2 foramina. There is significant inflammatory 
change in the right paraspinal soft tissues and psoas muscle from L2 through L5 
and a small 2 cm abscess in the right psoas at about L2/L3. There is focal 
paraspinal inflammation on the left at about L2. Persistent spondylitic and disc degenerative changes with mild-to-moderate canal 
stenosis, particularly at L4/L5, and multilevel foraminal stenoses most 
significant at L5-S1. Status post treatment distal aortic aneurysm with aortobiiliac stent. Persistent aneurysm distal right common iliac artery at the end of the stent. Several hepatic cysts and bilateral renal cortical cysts. There is 
mild-to-moderate atrophy of the right kidney. Note: A congruent preliminary report was provided by Dr. Hannah Gilliam at the time of 
the study. ULTRASOUND Results from Hospital Encounter encounter on 08/25/17 US EXT NONVAS RT LTD Impression Impression:  Large fat-containing right inguinal hernia. On recent CT scan 
(August 25, 2017), the appendix is seen extending through the internal inguinal 
ring into the inguinal hernia. This is not redemonstrated on today's ultrasound 
but potentially if the appendix does not have air within it, it may be difficult 
to visualize sonographically. No adenopathy. [place] [facility] [place]18[place]16 US SCROTUM/TESTICLES Impression IMPRESSION: 
1. Increased internal vascularity of the left testicle suggests mild orchitis. Cardiology Procedures/Testing: MODALITY RESULTS  
EKG Results for orders placed or performed during the hospital encounter of 08/23/20 EKG, 12 LEAD, INITIAL Result Value Ref Range Ventricular Rate 71 BPM  
 Atrial Rate 288 BPM  
 QRS Duration 86 ms  
 Q-T Interval 350 ms QTC Calculation (Bezet) 380 ms Calculated R Axis -42 degrees Calculated T Axis 81 degrees Diagnosis Atrial fibrillation Left axis deviation Low voltage QRS Abnormal ECG When compared with ECG of 26-JAN-2020 09:46, 
Criteria for Anteroseptal infarct are no longer present Confirmed by Law Oneill MD, Jamel Nyasia ([id number]) on 8/24/2020 8:30:54 AM 
  
Results for orders placed or performed in visit on 08/10/18 AMB POC EKG ROUTINE W/ 12 LEADS, INTER & REP Narrative Read by Shey Tuttle MD - atrial fibrillation, occasional ectopic ventricular beat. Nonspecific QRS widening and anterior fascicular block. Old anterior infarct. Possible nonspecific ST abnormality. ECHO 06/02/19 ECHO ADULT COMPLETE 06/04/2019 6/4/2019 Narrative · Left Ventricle: Estimated left ventricular ejection fraction is 56 -  
60%. Abnormal left ventricular septal motion. Interventricular septal  
\"bounce\". · Tricuspid Valve: Non-specific thickening of the tricuspid valve. Moderate tricuspid valve regurgitation is present. · Pulmonary Artery: Moderate pulmonary hypertension. Pulmonary arterial  
systolic pressure is 86.4 mmHg. · Pulmonic Valve: Mild pulmonic valve regurgitation is present. · Left Atrium: Severely dilated left atrium. · Right Atrium: Moderately dilated right atrium. Signed by: Serina Fraser MD  
  
 
Special Testing/Procedures: MODALITY RESULTS MICRO All Micro Results Procedure Component Value Units Date/Time CULTURE, BLOOD [[id number]] Collected:  08/28/20 1300 Order Status:  Completed Specimen:  Blood Updated:  08/30/20 1057 Special Requests: NO SPECIAL REQUESTS Culture result: NO GROWTH 2 DAYS     
 CULTURE, BLOOD [[id number]] Collected:  08/26/20 1503 Order Status:  Completed Specimen:  Blood Updated:  08/30/20 5039 Special Requests: NO SPECIAL REQUESTS Culture result: NO GROWTH 4 DAYS     
 CULTURE, RESPIRATORY/SPUTUM/BRONCH Shivani Captain STAIN [[id number]] Collected:  08/29/20 0810 Order Status:  Completed Specimen:  Sputum Updated:  08/29/20 1819 Special Requests: NO SPECIAL REQUESTS     
  GRAM STAIN OCCASIONAL WBCS SEEN     
      
  RARE EPITHELIAL CELLS SEEN  
     
   OCCASIONAL YEAST Culture result: PENDING  
 Cb BORDEN [[id number]]  (Abnormal) Collected:  08/23/20 1820 Order Status:  Completed Specimen:  Miscellaneous sample Updated:  08/29/20 1136 Source BLOOD Aer+Anaer Suscept Result 1 Eikenella corrodens Comment: (NOTE) Identification performed by account, not confirmed by this 
laboratory. Testing performed by broth microdilution.  
AZITHROMYCIN = 0.5 ug/ml = SUSCEPTIBLE 
 CEFOTAXIME   = <=0.25 ug/ml = SUSCEPTIBLE Antimicrobial suscept. Comment Comment: (NOTE) ** S = Susceptible; I = Intermediate; R = Resistant ** P = Positive; N = Negative MICS are expressed in micrograms per mL Antibiotic                 RSLT#1    RSLT#2    RSLT#3    RSLT#4 Ampicillin                     S 
Ceftriaxone                    S 
Chloramphenicol                S 
Levofloxacin                   S 
Meropenem                      S 
Penicillin                     S 
Tetracycline                   S 
Trimethoprim/Sulfa             S 
Performed At: [address] [id number] Eloy Stewart MD [id number] SUSCEPTIBILITY, AER + ANAEROB [[id number]] Collected:  08/23/20 1820 Order Status:  Completed Specimen:  MICROBIAL ISOLATE Updated:  08/29/20 1136 Source BLOOD Susceptibility, Aer + Anaerob Final Report Below Comment: (NOTE) Performed At: [address] [id number] Eloy Stewart MD [id number] CORRECTED ON 08/29 AT 1136: PREVIOUSLY REPORTED AS Preliminary report CULTURE, BLOOD [[id number]]  (Abnormal) Collected:  08/23/20 1805 Order Status:  Completed Specimen:  Blood Updated:  08/27/20 1223 Special Requests: NO SPECIAL REQUESTS     
  GRAM STAIN    
  ANAEROBIC BOTTLE NO ORGANISMS SEEN Culture result:    
  EIKENELLA CORRODENS GROWING IN THE ANAEROBIC BOTTLE BETA LACTAMASE NEGATIVE CULTURE, BLOOD [[id number]]  (Abnormal) Collected:  08/23/20 1820 Order Status:  Completed Specimen:  Blood Updated:  08/27/20 0225 Special Requests: NO SPECIAL REQUESTS Culture result:    
  EIKENELLA CORRODENS , BETA LACTAMASE NEGATIVE , ISOLATED FROM THE ANAEROBIC BOTTLE  
     
 CULTURE, RESPIRATORY/SPUTUM/BRONCH Carlynn Lizzie STAIN [[id number]] Collected:  08/26/20 1030 Order Status:  Canceled Specimen:  Sputum Francine Velazquez [[id number]] Collected:  08/24/20 0358 Order Status:  Completed Specimen:  Urine from Clean catch Updated:  08/25/20 1026 Special Requests: NO SPECIAL REQUESTS Culture result: No growth (<1,000 CFU/ML) UA Results for orders placed or performed in visit on 04/07/14 AMB POC URINALYSIS DIP STICK AUTO W/O MICRO     Status: None Result Value Ref Range Status Color (UA POC) Yellow  Final  
 Clarity (UA POC) Clear  Final  
 Glucose (UA POC) Negative Negative Final  
 Bilirubin (UA POC) Negative Negative Final  
 Ketones (UA POC) Negative Negative Final  
 Specific gravity (UA POC) 1.010 1.001 - 1.035 Final  
 Blood (UA POC) Trace Negative Final  
 pH (UA POC) 7.0 4.6 - 8.0 Final  
 Protein (UA POC) Trace Negative mg/dL Final  
 Urobilinogen (UA POC) 0.2 mg/dL 0.2 - 1 Final  
 Nitrites (UA POC) Negative Negative Final  
 Leukocyte esterase (UA POC) Negative Negative Final  
  
PATH Assessment and Plan:  
 
79 y.o. male with PMH AAA s/p endovascular repair, Fe deficiency anemia, PUD, AFib, CHF, CAD, COPD with chronic hypoxic respiratory failure on home 3L NC, chronic back pain, and BPH, now admitted with acute on chronic anemia. 
  
Acute on chronic anemia. DDx to include PUD, diverticulosis/itis, malignancy, AVM. Initial Hgb 6.0, after 3u PRBC, Hgb 7.2. EGD and Colonoscopy by [facility] on 5/28/19 showed hiatal hernia, erosive gastritis with flecks of coffee grounds, ascending colon polyp; diverticulosis; and hemorroids. He saw Hem/Onc on 8/7/20 for surveillance labs; no new intervention noted. Favor recurrent bleeding gastric ulcer. Pt has gross melena with guaiac positive stool. Hx of gastritis and PUD, on home protonix and noncompliant with carafate x 1 month. Possible bleeding diverticulae. Hx diverticulosist. TTP LLQ and suprapubic area, but no reported ABD pain.   Melena would not support diverticular bleed, but pt has large stool burden suggesting long transit time through sigmoid for blood to be digested. WBC 18.5 consistent with diverticulitis. Malignancy possible in the setting of painless GIB, although CT does not show any obvious intraluminal neoplasm. Pt has had unintentional weight loss. AVM less likely; not noted on CT A/P w/ IV contrast.  
- GI following, EGD 8/27, no signs active or recent bleeding 
- H/H Q12H, transfuse if Hgb < 7 
- yesterday Hgb 7.2 and persistent melanotic stools, expect some lag in H&H drop; transfused 1 unit pRBCs - last Hgb 8.2 
- monitor VS per unit routine 
- CBC, CMP, Mag, Phos daily 
- PT/INR 
- Vascular: no need for IVC filter; planned transfer to [facility] [place] for complex graph repair - protonix gtt - D5 1/2NS w/ KCl @ 90mL/hr 
- PT/OT/CM 
  
Sepsis: 2/4 SIRS: tachypnea and WBC 19.3. Current DDx includes intraabdominal infection, diverticulitis, UTI, spine osteomyelitis/abscess. No evidence of intraabdomninal infection on wet read. LLQ TTP and melena support diverticulitis. Possible UTI in light of t w/ hx of BPH and imaging shows distended bladder. Pt received flagyl and rocephin in ED. Hx of lumbar spine osteomyelitis and abscess, but no imaging suggestive of acute osteomyelitis. - FU BCx susceptibilities - appreciate ID recs; continue Zosyn - FU MRA ABD and MRI L-spine - discussed w/ Dr. Leonardo Estrada; MRI spine w/ L1/2 discitis, focal anterior epidural inflammation w/ mild canal encroachment, L2/L3 2cm Right psoas abscess 
- discuss with IR/ID about psoas drainage  
  
AFib/CHF/CAD: rate controlled on amlodipine; off ASA and Coumadin due to hx of GIB. No evidence of decompensated CHF noted in cardiology clinic visit (6/16/20). CXR shows large-moderate R pleural effusion. Coronary angiography in 2012 only showed mild CAD.   
- home amlodipine 10mg daily, digoxin 125mcg daily, pravastatin 40mg daily while NPO 
- home lisinopril 20mg daily 
  
 COPD: C/b chronic hypoxic respiratory failure, on 3L NC at home. - supplemental O2 for sats < 92% 
- duoneb PRN 
  
Chronic back pain: on home norco 5-325 
- lidocaine patch 
- norco and dilaudid PRN 
  
BPH  
- tamsulosin 0.4mg daily 
  
Insomnia: On two home sleeping aids; probably does not need both. - duloxetine 20mg QHS and trazadone 50mg QHS   
  
Diet cardiac DVT Prophylaxis contra indicated GI Prophylaxis protonix gtt Code status No CPR, Intubation ok Disposition > 2 MN  
  
Point of Contact Priyanka Lopez Relationship: daughter 
[phone] Aki Mosher 
Daughter 
[phone] Donald Tam DO PGY1 [address] Intern Pager: [phone] August 30, 2020, 9:00 AM

## 2020-08-31 NOTE — CONSULTS
Interventional Radiology Consulted to eval psoas muscle for asp/drainage. Dr. Júnior Suazo D/W Dr. Zaina Whelan. Images and chart reviewed. Collection is to small of percutaneous access recommend conservative management. Please advise if we can be further assistance. No IR intervention planned at this time Luc Gonzalez PA-C

## 2020-08-31 NOTE — PROGRESS NOTES
1950 bedside turnover given to me by PRAVEEN Henry. Pt is on the cardiac telemetry monitor in stable condition. Gave him a strawberry milkshake, he took a few sips. Vitals assessed, all are wnl. Pt denies needs at this time. 2015 watching tv, discussed his pain and the need to cut back on the IV medication due to not having it when discharged, also he informed me that he may have to go to a nursing facility due to not being able to walk. His daughter has been helping him with cleaning him up due to his not being able to ambulate to the bathroom. 2115 he has lost his appetite and lays at only a 20 degree angle, food cannot be placed on tables he places it on the bed next to the pillow and lays on his side, he stated \"To raise me head hurts my back so much that I can't eat\". 2315 meds given pt took all oral meds, that were broken in half and he takes them one at a time. No coughing nor abnormalities. Aki Walker had a large black tarry bowel movement in bed, all linen and gown were changed. Pt was bathed with chlorhexidine wipes. Mouth care provided. Given an ice water and asked for an apple sauce. 9624 bedside turnover given to PRAVEEN Gonzalez. He is on the cardiac telemetry monitor, stable condition, just received dose of IV dilaudid.

## 2020-08-31 NOTE — PROGRESS NOTES
0730 - Bedside and Verbal shift change report given to Valiant Meckel, RN (oncoming nurse) by Rajeev Ovalle RN (offgoing nurse). Report included the following information SBAR, Kardex, Procedure Summary, Intake/Output and MAR.  
 
1900 - Bedside and Verbal shift change report given to Alba Eid RN (oncoming nurse) by Valiant Meckel, RN (offgoing nurse). Report included the following information SBAR, Procedure Summary, Intake/Output, MAR, Accordion, Recent Results, Med Rec Status and Cardiac Rhythm NSR.

## 2020-08-31 NOTE — CONSULTS
Consult Patient: Tierney Osborne MRN: [id number]  SSN: [id number] YOB: 1940  Age: 79 y.o. Sex: male Subjective:  
  
Tierney Osborne is a 79 y.o. male who is being seen for discitis / OM . Patient is a vasculopath with an end stage failled AAA graft that requires a high risk revision and may be complicated with endovascular infection and fistula. Has been offered surgery at [facility]- but has nevere taken option. Has severe o2 dependent COPD. CAD, with now increasing back pain . MRI shows fulmnant L1/2 discitis with psoas abscess, on Right, also early disc signal changes at L4 and L5. Past Medical History:  
Diagnosis Date  Aneurysm ([address] AAA repair 2006 & 2012  Aorto-iliac duplex 02/13/2015 Tech difficult. Patent aorta bi-iliac endograft w/o leak or limb dysfunction.  Arrhythmia   
 a fib  Cardiac cath 11/01/2012 RCA (sm, nondom) patent. LM patent. LAD 25%. CX (dom) 45% mid. LVEDP 12 mmHg. No WMA. PA 27/12. W 10-12. CO/CI 5.2/2.6 (TD).  Cardiac echocardiogram, abnormal 02/20/2016 EF 55%. No WMA. Indeterminate diastolic fx. RVSP 60 mmHg. Severe LAE. Mild MR. Mod TR.  IVCE. Similar to study of 10/26/12.  Cardiovascular aorto-iliac duplex 02/13/2015 Patent aorta bi-iliac endovascular graft w/o leak or limb dysfunction. Sac measures 4.21 x 4.47 cm (4.4 x 4.6 cm on 1/31/14).  Cardiovascular LE peripheral arterial testing 07/29/2013 No significant LE arterial disease bilaterally. R GHADA 1. 12.  L GHADA 1. 12.  R DBI 0.83. L DBI 0.71. Exercise deferred.  Cardiovascular renal duplex 10/31/2012 No RA stenosis. Intrinsic/med disease in left kidney. Patent aortic endograft. Patent, thrombus-free renal veins bilaterally.  Carotid duplex 07/29/2013 Mild <50% bilateral ICA plaquing.  Chronic kidney disease  Chronic lung disease  Cigarette smoker  Congestive heart failure (CHF) ([address]  COPD (chronic obstructive pulmonary disease) (HCC)   
 and emphysema; likely secondary to tobacco abuse  Difficult intubation  Dyslipidemia   
 low HDL  Emphysema   
 HTN (hypertension)  Hypercholesterolemia  Ill-defined condition   
 hernia  Increased prostate specific antigen (PSA) velocity  Long term (current) use of anticoagulants   
 coumadin  Osteoarthritis  Osteomyel[facility] [address]  Paroxysmal atrial fibrillation (HCC)  Peripheral vascular disease ([facility] [address] [facility] AAA repair 12/2007  Persistent atrial Fibrillation  Persistent atrial fibrillation (HCC)  Unspecified adverse effect of anesthesia   
 difficulty breathing placed in ICU Oct 2012 (AAA repair) Past Surgical History:  
Procedure Laterality Date  BRONCHOSCOPY DIAGNOSTIC  11/2/2012  CARDIAC CATHETERIZATION  11/1/2012  COLONOSCOPY N/A 7/26/2016 COLONOSCOPY with Polypectomies performed by Marlene Zimmerman MD at [facility] ENDOSCOPY  COLONOSCOPY N/A 5/28/2019 COLONOSCOPY with polypectomy performed by Felix Lang MD at [address] [place] [address] HX AAA REPAIR    
 2006 & 2012  HX HEART CATHETERIZATION  3/4/2009 1. RCA small, nondominant; patent. 2. LMCA patent. 3. LAD is long, wrapped around apical vessel. Diffuse, 20-30% stenosis noted. 4. CCA is large, dominant vessel. Diffuse 20-30% stenosis. 5. LVEDP is 16 mmHg. 6. Overall left ventricular systolic function mildly diminshed with est. EF 45%. Mild, global hypokinesis noted. 7. No significant mitral regurgitation or aortic stenosis noted. (see report)  HX HERNIA REPAIR  2/2014  
 rt inguinal   
 HX HERNIA REPAIR  01/2016 LEFT INGUINAL HERNIA REPAIR DR. Seay Public  REPAIR ING HERNIA,5+Y/O,JESSIE Left 1-20-16 Dr. Abena LONG  11/1/2012 Family History Problem Relation Age of Onset  Heart Surgery Father BYPASS  Stroke Father  Heart Disease Father  Hypertension Father  Heart Attack Father  Heart Surgery Mother BYPASS  Coronary Artery Disease Mother  Stroke Mother  Heart Disease Mother  Hypertension Mother  Diabetes Sister  Cancer Brother Social History Tobacco Use  Smoking status: Former Smoker Packs/day: 1.50 Years: 54.00 Pack years: 81.00 Types: Cigarettes Last attempt to quit: 10/23/2012 Years since quittin.8  Smokeless tobacco: Never Used Substance Use Topics  Alcohol use: No  
  Alcohol/week: 0.0 standard drinks Comment: quit drinking alcohol 26 years ago, patient states stopped in \"5162\" Current Facility-Administered Medications Medication Dose Route Frequency Provider Last Rate Last Dose  oxyCODONE IR (ROXICODONE) tablet 5 mg  5 mg Oral Q4H PRN Gaurav Yan H, DO   5 mg at 20 2881  acetaminophen (TYLENOL) tablet 650 mg  650 mg Oral TID [facility] H, DO   650 mg at 20 2212  therapeutic multivitamin [facility]) tablet 1 Tab  1 Tab Oral DAILY Eb Mac [address] [place] [address] Drive, DO   1 Tab at 20 1818  vitamin B complex-folic acid 0.4 mg tablet  1 Tab Oral DAILY Gaurav Yan H, DO   1 Tab at 20 1818  
 HYDROmorphone (DILAUDID) syringe 0.2 mg  0.2 mg IntraVENous Q6H PRN [facility] H, DO   0.2 mg at 20 2214  
 0.9% sodium chloride infusion 250 mL  250 mL IntraVENous PRN Bryce Waddell MD 15 mL/hr at 20 1357 250 mL at 20 1357  ondansetron (ZOFRAN ODT) tablet 4 mg  4 mg Oral Q6H PRN Bryce Waddell MD      
 piperacillin-tazobactam (ZOSYN) 3.375 g in 0.9% sodium chloride (MBP/ADV) 100 mL MBP  3.375 g IntraVENous Q6H Gaurav Williamson H,  mL/hr at 20 0014 3.375 g at 20 0014  pantoprazole (PROTONIX) 40 mg in 0.9% sodium chloride 10 mL injection  40 mg IntraVENous Q24H Josee Silverman MD   40 mg at 20 1818  dextrose 5% - 0.45% NaCl with KCl 10 mEq/L infusion  90 mL/hr IntraVENous CONTINUOUS Anel CRESPO MD 90 mL/hr at 08/31/20 0125 90 mL/hr at 08/31/20 0125  
 sucralfate (CARAFATE) tablet 1 g  1 g Oral AC&HS Garret Helton Pi ALBA, PA   1 g at 08/30/20 2212  
 0.9% sodium chloride infusion 250 mL  250 mL IntraVENous PRN Sandro Boothe MD      
 lidocaine 4 % patch 1 Patch  1 Patch TransDERmal Q24H Sandro Boothe MD   1 Patch at 08/30/20 6450  
 albuterol-ipratropium (DUO-NEB) 2.5 MG-0.5 MG/3 ML  3 mL Nebulization Q4H PRN Sandro Boothe MD      
 amLODIPine (NORVASC) tablet 10 mg  10 mg Oral DAILY Sandro Boothe MD   10 mg at 08/30/20 5377  ascorbic acid (vitamin C) (VITAMIN C) tablet 500 mg  500 mg Oral DAILY Sandro Boothe MD   500 mg at 08/30/20 6682  digoxin (LANOXIN) tablet 0.125 mg  0.125 mg Oral DAILY Sandro Boothe MD   0.125 mg at 08/30/20 5673  DULoxetine (CYMBALTA) capsule 20 mg  20 mg Oral DAILY Sandro Boothe MD   20 mg at 08/30/20 1064  ferrous sulfate tablet 325 mg  325 mg Oral EVERY OTHER DAY Hiro Boothe MD   325 mg at 08/30/20 0537  
 lisinopriL (PRINIVIL, ZESTRIL) tablet 20 mg  20 mg Oral DAILY Sandro Boothe MD   20 mg at 08/30/20 4950  polyethylene glycol (MIRALAX) packet 17 g  17 g Oral DAILY Sandro Boothe MD   17 g at 08/30/20 1779  pravastatin (PRAVACHOL) tablet 40 mg  40 mg Oral QHS Sandro Boothe MD   40 mg at 08/30/20 2253  tamsulosin (FLOMAX) capsule 0.4 mg  0.4 mg Oral DAILY Sandro Boothe MD   0.4 mg at 08/30/20 0364  
 traZODone (DESYREL) tablet 50 mg  50 mg Oral QHS Sandro Boothe MD   50 mg at 08/30/20 2051  sodium chloride (NS) flush 5-10 mL  5-10 mL IntraVENous PRN Sandro Boothe MD   5 mL at 08/26/20 1121 Allergies Allergen Reactions  Codeine Swelling  Morphine Itching  Sulfa (Sulfonamide Antibiotics) Other (comments) Kidney problems. Review of Systems: Pertinent items are noted in the History of Present Illness. Objective:  
 
Vitals:  
 08/31/20 0300 08/31/20 0400 08/31/20 0500 08/31/20 0710 BP: 114/65 136/64 142/75 131/62 Pulse: (!) 58 62 72 76 Resp: 15 17 16 16 Temp:      
SpO2: 96% 96% 97% 100% Weight:      
Height:      
  
 
Physical Exam: 
General:  Alert, cooperative, chchectic ill Eyes:  Conjunctivae/corneas clear. PERRL, EOMs intact. Fundi benign Ears:  Normal TMs and external ear canals both ears. Nose: Nares normal. Septum midline. Mucosa normal. No drainage or sinus tenderness. Mouth/Throat: Lips, mucosa, and tongue normal. Teeth and gums normal.  
Neck: Supple, symmetrical, trachea midline, no adenopathy, thyroid: no enlargment/tenderness/nodules, no carotid bruit and no JVD. Back:   Symmetric, no curvature. Lungs:   Clear to auscultation bilaterally. Heart:  Regular rate and rhythm, S1, S2 normal, no murmur, click, rub or gallop. Abdomen:   Soft, non-tender. Bowel sounds normal. No masses,  No organomegaly. Extremities: Extremities normal, atraumatic, no cyanosis or edema. Pulses: 0+   
Skin: Skin color, texture, turgor normal. No rashes or lesions Lymph nodes: Cervical, supraclavicular, and axillary nodes normal.  
3/5 motor in LE, edema,  
Back pain in upper and lower back Cannot sit up without pain Assessment:  
 
 
Chronically ill COPD vasculopath with usual infection/ sepsis and now discitis and paraspinal abscess, no evident epidural abscess. Infection primarily at L1/2 but I have concern about L4 and L5 I do not believe patient could tolerate general anesthesia and anterior surgery on spine would also endanger manipulation of fragile aorta. Patient is essentially terminal from a vascular situation, unless heroic intervention is considered done at [facility]. I do not believe even it can be done now given current infection status.  
 
I will have IR evaluate for percutaneous drain placement of abscess, rx infection with abx and see his course. I have discussed with patient his poor prognosis, which I believe is essentially no prognosis. Would consider palliative care/ hospice approach given the realities of his medical condition and prognosis. I believe that would offer him the best quality of his remaining life. Plan:  
 
IR for drain of psoas collection ABx per ID Reconsider surgical alternatives whould it be felt that patient can tolerate major open surgery Consider palliative care/ hospice Signed By: Joseluis Louis MD   
 August 31, 2020

## 2020-08-31 NOTE — CONSULTS
Comprehensive Nutrition Assessment Type and Reason for Visit: Initial, Consult Nutrition Recommendations/Plan: 
- Continue oral nutrition supplements & daily MVI to optimize nutritional status - Monitor and encourage po intake as tolerated. Nutrition Assessment:  Reports poor intake of recent meals, 25% or less. Mainly consuming supplements (multiple ordered), like pudding, ice cream and applesuce. Soft solids ordered for poor dentition, pt stated he has dentures at home. Malnutrition Assessment: 
Malnutrition Status:  Severe malnutrition Context:  Chronic illness Findings of the 6 clinical characteristics of malnutrition:  
Energy Intake:  7 - 75% or less est energy requirements for 1 month or longer Weight Loss:  No significant weight loss Body Fat Loss:  7 - Severe body fat loss, Orbital  
Muscle Mass Loss:  7 - Severe muscle mass loss, Temples (temporalis), Clavicles (pectoralis &deltoids) Fluid Accumulation:  Unable to assess, 
 Strength:  Not performed Nutrition History and Allergies: PMH of AAA s/p repair, HF, COPD on O2, HTN, HLD, gastric ulcer. Presented to ED with c/o fatigue and lower back pain for over 2 weeks. Reports inadequate intake x several months PTA with a 50-60# wt loss over the past 6 months, question pt statements, per chart weight gain PTA. NKFA Estimated Daily Nutrient Needs: 
Energy (kcal):  8390-1335 Protein (g):  52-65 Fluid (ml/day):  2385-2742 Nutrition Related Findings:  BM 8/28. + Edema. C/o some mild abdominal pain. Wounds:   
None Current Nutrition Therapies: DIET NUTRITIONAL SUPPLEMENTS Lunch, Dinner; [employer] DIET NUTRITIONAL SUPPLEMENTS Dinner, Lunch; [employer] DIET NUTRITIONAL SUPPLEMENTS AM Snack, PM Snack; Ensure [employer] DIET CARDIAC Soft Solids Anthropometric Measures: 
· Height:  5' 6\" (167.6 cm) · Current Body Wt:  64.9 kg (143 lb 1.3 oz) · Admission Body Wt:  149 lb · Ideal Body Wt:  142 lbs:  100.8 % · BMI Category:  Normal weight (BMI 22.0-24.9) age over 72 Nutrition Diagnosis:  
· Inadequate oral intake related to early satiety(decreased appetite) as evidenced by intake 0-25% Nutrition Interventions:  
Food and/or Nutrient Delivery: Continue current diet, Continue oral nutrition supplement, IV fluid delivery, Vitamin supplement Nutrition Education and Counseling: Education not indicated Coordination of Nutrition Care: Continued inpatient monitoring Goals: 
PO nutrition intake will meet >75% of patient estimated nutritional needs within the next 7 days. Nutrition Monitoring and Evaluation:  
Food/Nutrient Intake Outcomes: Food and nutrient intake, Supplement intake, IVF intake, Vitamin/mineral intake Physical Signs/Symptoms Outcomes: Biochemical data, Chewing or swallowing, Nutrition focused physical findings Discharge Planning:   
Continue oral nutrition supplement, Continue current diet Electronically signed by Ross Mathew RD on 8/31/2020 at 1:05 PM 
 
Contact: [phone]

## 2020-08-31 NOTE — PROGRESS NOTES
[facility] Brief Progress Note SUBJECTIVE Pt seen at bedside. Conversation on the phone with Mr. Medina Fuchs and his daughter Lynda Bro. Pt wanted to update his daughter (Divine) about changing his code status to DNI as he does not wish to be intubated anymore. He is also requesting palliative care consultation. Daughter was very understanding of the patient's wishes and would like to do as he wishes. OBJECTIVE Visit Vitals /65 (BP 1 Location: Left arm) Pulse 70 Temp 98.8 °F (37.1 °C) Resp 19 Ht 5' 6\" (1.676 m) Wt 64.9 kg (143 lb 1.9 oz) SpO2 100% BMI 23.10 kg/m² Recent Results (from the past 12 hour(s)) HGB & HCT Collection Time: 08/31/20 12:47 PM  
Result Value Ref Range HGB 8.9 (L) 13.0 - 16.0 g/dL HCT 27.7 (L) 36.0 - 48.0 % ASSESSMENT/PLAN Changing Pt's code status to DNI. Placing Palliative care onsult. Cheyenne Miramontes DO, PGY-1  
[facility] Medicine Intern Pager: [phone] August 31, 2020, 3:28 PM

## 2020-08-31 NOTE — PROGRESS NOTES
Patient completed ROM as follows. Bilateral Lower Extremity Exercise: 
 
 
EXERCISE Sets Reps Active Active Assist  
Passive Self ROM Comments Ankle Pumps 2 10  [x] [] [] [] Heel Slides 2 10  [x] [] [] [] Hip Abd/adduction 1 5 [x] [] [] []   
 
 
Pain:  
Pain Level Before FMP: 7/10 in his back Pain Level After FMP: 8/10 in his back [x]  Alerted nurse. [x]  Call bell and phone within patient reach. [x]   Patient resting in bed with no apparent distress . NOTE: Pt cleared by nursing to participate in [facility]. Pt alert and awake upon arrival. Pt was able to perform all exercises actively on is own. While doing hip adduction/abduction pt stated that it was hurting his back . Thank you, 
Mesfin Pinto, Rehab Technician

## 2020-08-31 NOTE — PROGRESS NOTES
Patients desire to be DNI and to go a palliative route noted. IR feels abscess to small to be needled. Agree with palliative route. Will sign off, please re- consult if new issues arise.

## 2020-08-31 NOTE — PROGRESS NOTES
Infectious Disease progress Note Reason: eikenella bloodstream infection Current abx Prior abx Pip/tazo since 8/27 Ceftriaxone, metronidazole 8/23-8/26 Lines:  
 
 
Assessment : 
 
 79 y.o. male with PMH AAA s/p endovascular repair, Fe deficiency anemia, PUD, AFib, CHF, CAD, COPD with chronic hypoxic respiratory failure on home 3L NC, chronic back pain, and BPH, who presented to ED with complaint of malaise, worsening back pain and weakness. CTA 8/23- New strands of thrombus along the wall of the aortic and bilateral common iliac stents which protrudes into the central aortic lumen and place patient at risk for lower extremity embolic events. Increased aneurysm cranial to the aortobiiliac stent with resultant increased type I A endoleak. Similar type I B endoleak. Increased aortic aneurysm measuring 5.8 cm (change of 2 cm at this level). Minimal increased right common iliac aneurysm measuring 1.9 cm. 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 Back pain likely due to lumbar spine discitis, psoas abscess. 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 Spine surgery follow-up appreciated. Plans for possible interventional radiology guided drainage noted. Most likely source of Eikenella bloodstream infection is evolving aortoenteric fistula, ? Infected endovascular stent. Vascular surgery follow-up appreciated. Clinically same. Recommendations: 1. Continue piperacillin/tazobactam for now 2. I agree with Dr. Velez Low recommendation regarding hospice consult since looking at the complexity of problems, patient's age; chances of meaningful recovery is slim. longterm prognosis: poor Above plan was discussed in details with patient.,dr. Arturo Swanson. D/w primary team previously. Please call me if any further questions or concerns. Will continue to participate in the care of this patient. HPI: 
 
States that he feels good. Continues to have low back pain. Denies abdominal pain, nausea, vomiting, hematemesis. Patient denies headaches, visual disturbances, sore throat, runny nose, earaches, cp, sob, chills, cough, diarrhea, burning micturition, pain or weakness in extremities. home Medication List  
 Details DULoxetine (CYMBALTA) 20 mg capsule Take 20 mg by mouth daily. albuterol (PROVENTIL VENTOLIN) 2.5 mg /3 mL (0.083 %) nebu inhale contents of 1 vial in nebulizer every 4 hours if needed for wheezing 
Qty: 50 Nebule, Refills: 5  
  
tamsulosin (FLOMAX) 0.4 mg capsule Take 0.4 mg by mouth daily. pantoprazole (PROTONIX) 40 mg tablet Take 1 Tab by mouth Daily (before breakfast). Qty: 30 Tab, Refills: 0 HYDROcodone-acetaminophen (NORCO) 5-325 mg per tablet Take 0.5 Tabs by mouth two (2) times daily as needed for Pain. traZODone (DESYREL) 50 mg tablet Take 50 mg by mouth nightly. lisinopril (PRINIVIL, ZESTRIL) 20 mg tablet Take 1 Tab by mouth daily. Qty: 30 Tab, Refills: 0  
  
pravastatin (PRAVACHOL) 40 mg tablet Take 40 mg by mouth nightly. digoxin (DIGITEK) 0.125 mg tablet take 1 tablet by mouth once daily 
Qty: 90 Tab, Refills: 3  
  
amLODIPine (NORVASC) 10 mg tablet Take 10 mg by mouth daily. Refills: 0 OXYGEN-AIR DELIVERY SYSTEMS 2 L/min by Nasal route daily. Continuous. Company is [employer] 
   
  
polyethylene glycol (MIRALAX) 17 gram packet Take 17 g by mouth daily. PRN constipation  
  
ascorbic acid, vitamin C, (Vitamin C) 500 mg tablet Take 500 mg by mouth daily. lidocaine (LIDODERM) 5 %   
  
tiotropium-olodateroL (Stiolto Respimat) 2.5-2.5 mcg/actuation inhaler inhale 2 inhalations by mouth once daily 
Qty: 1 Inhaler, Refills: 5 sucralfate (CARAFATE) 1 gram tablet   
  
sodium chloride (OCEAN NASAL) 0.65 % nasal squeeze bottle 2 Sprays by Both Nostrils route daily as needed for Congestion. ferrous sulfate 325 mg (65 mg iron) tablet Take 325 mg by mouth every other day. Indications: anemia from inadequate iron Current Facility-Administered Medications Medication Dose Route Frequency  oxyCODONE IR (ROXICODONE) tablet 5 mg  5 mg Oral Q4H PRN  
 acetaminophen (TYLENOL) tablet 650 mg  650 mg Oral TID  therapeutic multivitamin (THERAGRAN) tablet 1 Tab  1 Tab Oral DAILY  vitamin B complex-folic acid 0.4 mg tablet  1 Tab Oral DAILY  HYDROmorphone (DILAUDID) syringe 0.2 mg  0.2 mg IntraVENous Q6H PRN  
 0.9% sodium chloride infusion 250 mL  250 mL IntraVENous PRN  
 ondansetron (ZOFRAN ODT) tablet 4 mg  4 mg Oral Q6H PRN  piperacillin-tazobactam (ZOSYN) 3.375 g in 0.9% sodium chloride (MBP/ADV) 100 mL MBP  3.375 g IntraVENous Q6H  
 pantoprazole (PROTONIX) 40 mg in 0.9% sodium chloride 10 mL injection  40 mg IntraVENous Q24H  
 dextrose 5% - 0.45% NaCl with KCl 10 mEq/L infusion  90 mL/hr IntraVENous CONTINUOUS  
 sucralfate (CARAFATE) tablet 1 g  1 g Oral AC&HS  
 0.9% sodium chloride infusion 250 mL  250 mL IntraVENous PRN  
 lidocaine 4 % patch 1 Patch  1 Patch TransDERmal Q24H  
 albuterol-ipratropium (DUO-NEB) 2.5 MG-0.5 MG/3 ML  3 mL Nebulization Q4H PRN  
 amLODIPine (NORVASC) tablet 10 mg  10 mg Oral DAILY  ascorbic acid (vitamin C) (VITAMIN C) tablet 500 mg  500 mg Oral DAILY  digoxin (LANOXIN) tablet 0.125 mg  0.125 mg Oral DAILY  DULoxetine (CYMBALTA) capsule 20 mg  20 mg Oral DAILY  ferrous sulfate tablet 325 mg  325 mg Oral EVERY OTHER DAY  lisinopriL (PRINIVIL, ZESTRIL) tablet 20 mg  20 mg Oral DAILY  polyethylene glycol (MIRALAX) packet 17 g  17 g Oral DAILY  pravastatin (PRAVACHOL) tablet 40 mg  40 mg Oral QHS  tamsulosin (FLOMAX) capsule 0.4 mg  0.4 mg Oral DAILY  traZODone (DESYREL) tablet 50 mg  50 mg Oral QHS  sodium chloride (NS) flush 5-10 mL  5-10 mL IntraVENous PRN Allergies: Codeine; Morphine; and Sulfa (sulfonamide antibiotics) Temp (24hrs), Av.8 °F (36.6 °C), Min:97.4 °F (36.3 °C), Max:98.1 °F (36.7 °C) Visit Vitals /62 (BP 1 Location: Left arm, BP Patient Position: At rest) Pulse 76 Temp 98.1 °F (36.7 °C) Resp 16 Ht 5' 6\" (1.676 m) Wt 64.9 kg (143 lb 1.9 oz) SpO2 100% BMI 23.10 kg/m² ROS: 12 point ROS obtained in details. Pertinent positives as mentioned in HPI,  
otherwise negative Physical Exam: 
 
General: Thin male laying on the bed AAOx3 in no acute distress. HEENT:  Normocephalic, atraumatic, ; no conjunctival hemmohage;  nasal and oral mucous are moist and without evidence of lesions. No thrush. Edentulous. Neck supple, no bruits. Lungs:   non-labored, bilaterally clear to auscultation- no crackles wheezes rales or rhonchi Heart:  RRR, s1 and s2; no rubs or gallops, no edema, + pedal pulses Abdomen:  soft, non-distended, active bowel sounds, no hepatomegaly, no splenomegaly. No tenderness, no guarding, no rigidity Genitourinary:  deferred Extremities:   no clubbing, cyanosis; no joint effusions or swelling; Full ROM of all large joints to the upper and lower extremities; muscle mass appropriate for age Neurologic:  No gross focal sensory abnormalities; 5/5 muscle strength to upper and lower extremities. Speech appropriate. Cranial nerves intact Skin:  No rash or ulcers noted Back:  Minimal tenderness on palpation of lumbar spine around L2/L3 region with some left paraspinal tenderness. No tenderness elsewhere on the spine Psychiatric:  No suicidal or homicidal ideations, appropriate mood and affect Labs: Results:  
Chemistry Recent Labs 20 
0034 20 
9639 20 
0205 GLU 93 107* 93  139 139  
K 3.5 3.6 3.5  108 106 CO2 30 28 28 BUN 8 8 10 CREA 0.62 0.64 0.64 CA 7.5* 7.5* 7.7* AGAP 3 3 5 BUCR 13 13 16 AP 91 106 106  
TP 5.5* 5.9* 5.9* ALB 1.9* 2.0* 2.1*  
GLOB 3.6 3.9 3.8 AGRAT 0.5* 0.5* 0.6* CBC w/Diff Recent Labs 08/31/20 
0034 08/30/20 
1320 08/30/20 
0055  08/29/20 
0205 WBC 8.1  --  10.5  --  9.0  
RBC 2.48*  --  2.74*  --  2.36* HGB 7.6* 8.2* 8.2*   < > 7.2* HCT 23.2* 25.6* 25.5*   < > 22.4*  
*  --  90*  --  95* GRANS 82*  --  86*  --  82* LYMPH 9*  --  7*  --  10* EOS 2  --  1  --  0  
 < > = values in this interval not displayed. Microbiology Recent Labs  
  08/29/20 
0810 08/28/20 
1300 CULT LIGHT YEAST*  NO NORMAL RESPIRATORY RUDY ISOLATED SO FAR NO GROWTH 2 DAYS  
  
 
 
RADIOLOGY: 
 
All available imaging studies/reports in [facility] care for this admission were reviewed Total time spent >35 minutes. High complexity decision making was performed during the evaluation of this patient at high risk for decompensation with multiple organ involvement Above mentioned total time spent on reviewing the case/medical record/data/notes/EMR/patient examination/documentation/coordinating care with nurse/consultants, exclusive of procedures with complex decision making performed and > 50% time spent in face to face evaluation. Dr. Marques Pace, Infectious Disease Specialist 
[phone] August 31, 2020 
8:26 AM

## 2020-08-31 NOTE — PROGRESS NOTES
[address] [facility] Progress Note Patient: Dorn Oppenheim MRN: [id number] SSN: [id number]  YOB: 1940 Age: 79 y.o. Sex: male Admit Date: 8/23/2020 LOS: 7 days Chief Complaint Patient presents with  Fatigue Subjective: No overnight events Pt reports overall condition unchanged, no increased back pain, no LE paresthesias or increased weakness. He is looking forward to speaking to his daughter today on video call. ROS: Denies F/C, NVD, HA, lightheadedness, dizziness, SOB, cough, CP, palpitations, ABD pain, bowel complaints, urinary complaints, edema, numbness, or tingling. Objective:  
 
Visit Vitals /73 (BP 1 Location: Left arm, BP Patient Position: At rest) Pulse 95 Temp 97.9 °F (36.6 °C) Resp 13 Ht 5' 6\" (1.676 m) Wt 64.9 kg (143 lb 1.9 oz) SpO2 100% BMI 23.10 kg/m² Physical Exam:  
General:  AAOx3, NAD, laying comfortably in bed HEENT: Conjunctiva pale with thick yellow discharge, sclera anicteric. PERRL. EOMI. Dry mucous membranes. Thyroid not enlarged, no nodules. No cervical, supraclavicular, occipital or submandibular lymphadenopathy. No other gross abnormalities present. CV:  Irregular rhythm, normal rate, no murmurs. No visible pulsations or thrills. RESP:  Unlabored breathing. Lungs clear to auscultation without adventitious breath sounds. Equal expansion bilaterally. ABD:  Soft, nontender, nondistended. BS (+). No hepatosplenomegaly. R inguinal hernia, reducible, nontender, no overlying skin changes MS:  No joint deformity. BLE atrophy. Neuro:  CN II-XII grossly intact. 4/5 strength bilateral upper extremities and lower extremities. Ext:  No edema. 2+ radial and dp pulses bilaterally. Skin:  No rashes, lesions, or ulcers. Good turgor. Intake and Output: 
Current Shift: 08/31 0701 - 08/31 1900 In: 120 [P.O.:120] Out: 200 [Urine:200] Last three shifts: 08/29 1901 - 08/31 0700 In: 2827 [P.O.:360; I.V.:2467] Out: 1000 [Urine:1000] Lab/Data Review: 
Recent Results (from the past 12 hour(s)) MAGNESIUM Collection Time: 08/31/20 12:34 AM  
Result Value Ref Range Magnesium 1.6 1.6 - 2.6 mg/dL PHOSPHORUS Collection Time: 08/31/20 12:34 AM  
Result Value Ref Range Phosphorus 2.9 2.5 - 4.9 MG/DL  
CBC WITH AUTOMATED DIFF Collection Time: 08/31/20 12:34 AM  
Result Value Ref Range WBC 8.1 4.6 - 13.2 K/uL  
 RBC 2.48 (L) 4.70 - 5.50 M/uL HGB 7.6 (L) 13.0 - 16.0 g/dL HCT 23.2 (L) 36.0 - 48.0 % MCV 93.5 74.0 - 97.0 FL  
 MCH 30.6 24.0 - 34.0 PG  
 MCHC 32.8 31.0 - 37.0 g/dL  
 RDW 18.6 (H) 11.6 - 14.5 % PLATELET 515 (L) 047 - 420 K/uL MPV 10.1 9.2 - 11.8 FL  
 NEUTROPHILS 82 (H) 40 - 73 % LYMPHOCYTES 9 (L) 21 - 52 % MONOCYTES 7 3 - 10 % EOSINOPHILS 2 0 - 5 % BASOPHILS 0 0 - 2 %  
 ABS. NEUTROPHILS 6.7 1.8 - 8.0 K/UL  
 ABS. LYMPHOCYTES 0.8 (L) 0.9 - 3.6 K/UL  
 ABS. MONOCYTES 0.6 0.05 - 1.2 K/UL  
 ABS. EOSINOPHILS 0.1 0.0 - 0.4 K/UL  
 ABS. BASOPHILS 0.0 0.0 - 0.1 K/UL  
 DF AUTOMATED METABOLIC PANEL, COMPREHENSIVE Collection Time: 08/31/20 12:34 AM  
Result Value Ref Range Sodium 141 136 - 145 mmol/L Potassium 3.5 3.5 - 5.5 mmol/L Chloride 108 100 - 111 mmol/L  
 CO2 30 21 - 32 mmol/L Anion gap 3 3.0 - 18 mmol/L Glucose 93 74 - 99 mg/dL BUN 8 7.0 - 18 MG/DL Creatinine 0.62 0.6 - 1.3 MG/DL  
 BUN/Creatinine ratio 13 12 - 20 GFR est AA >60 >60 ml/min/1.73m2 GFR est non-AA >60 >60 ml/min/1.73m2 Calcium 7.5 (L) 8.5 - 10.1 MG/DL Bilirubin, total 0.4 0.2 - 1.0 MG/DL  
 ALT (SGPT) 13 (L) 16 - 61 U/L  
 AST (SGOT) 14 10 - 38 U/L Alk. phosphatase 91 45 - 117 U/L Protein, total 5.5 (L) 6.4 - 8.2 g/dL Albumin 1.9 (L) 3.4 - 5.0 g/dL Globulin 3.6 2.0 - 4.0 g/dL A-G Ratio 0.5 (L) 0.8 - 1.7 RECENT RESULTS MODALITY IMPRESSION  
XR Results from Hospital Encounter encounter on 08/23/20 XR CHEST PORT Narrative CHEST PORTABLE INDICATIONS: Atrial fibrillation, COPD, CHF with worsening cough. COMPARISON: 8/23/2020 FINDINGS:  
 
Support lines/catheters: External monitoring leads overlie the chest. 
 
Lungs: Slightly hazy right lung opacity compared to the left which may represent 
a layering pleural effusion. Cardiac silhouette and mediastinal contours: Stable unfolding of the aortic 
contour. Mildly enlarged cardiac silhouette. Bones and soft tissues: Chronic right sixth rib fracture. Impression Impression: 
 
Hazy opacity in the right lung most likely represents a layering pleural 
effusion. Stable mild cardiomegaly with aortic ectasia. CT Results from [facility] Encounter encounter on 08/23/20 CTA CHEST ABD PELV W WO CONT Narrative CTA Chest, Abdomen And Pelvis With and without Enhancement INDICATION: Abdominal aortic aneurysm status post endovascular repair, peptic 
ulcer disease, heart failure, COPD with chronic respiratory failure on home 
oxygen, chronic back pain, BPH with malaise, worsening back pain, weakness for 
3-4 weeks, hypotension, acute on chronic anemia, melanotic stool. TECHNIQUE: 2.5 mm collimation axial images obtained from the thoracic inlet to 
the level of the pubic symphysis following the uneventful administration of 80 
mL Isovue-370 nonionic intravenous contrast.  Imaging performed during maximum 
aortic enhancement and is therefore suboptimal for evaluating solid organs and 
bowel. Raw data reviewed along with three-dimensional volume rendered images 
and maximum intensity projection images to better evaluate the tortuous vascular 
structures. All CT scans at this facility are performed using dose optimization technique as 
appropriate to a performed exam, to include automated exposure control, 
adjustment of the mA and/or kV according to patient size (including appropriate matching first site-specific examinations), or use of iterative reconstruction 
technique. COMPARISON: 2/17/2020. Thyroid: Unremarkable. Heart: Severe regions of coronary arteriosclerosis in the left circumflex. Biatrial cardiac chamber enlargement. Pulmonary artery: Patent Lymph Nodes: 2 Similar 1.4 cm precarinal lymph nodes, likely reactive. Lung: Moderate centrilobular emphysema. Mild subsegmental atelectasis in the 
right lower lobe. Mild subsegmental atelectasis or scarring right upper and 
right middle lobes. Pleura: Increased moderate right and similar trace left pleural effusions. No 
pneumothorax. ABDOMEN FINDINGS:  
There is suboptimal evaluation of visceral organs secondary to timing of the 
exam. 
 
 
Liver: Similar scattered hepatic cysts measuring up to 2.6 cm in the left medial 
hepatic lobe at the dome and 2.4 cm right posterior hepatic lobe. There are a 
few too small to characterize hepatic lesions but statistically most likely 
benign. Spleen: Unremarkable. Pancreas: Pancreas is atrophic. Biliary: Possible adenomyomatosis gallbladder fundus. Bowel: There is a increased rectal stool burden. Gas-filled gastric distention 
third portion duodenum does not clearly cross midline into the left abdomen but 
there is no volvulus or bowel dilation. No bowel wall thickening. No 
pneumatosis. Appendix in the right inguinal hernia without inflammation, 
unchanged. There is fluid in the esophagus. Peritoneum/ Retroperitoneum: There is edema without free fluid or free air. Lymph Nodes: Unremarkable. Adrenal Glands: Unremarkable. Kidneys: Right kidney is 7.1 cm, previously 8.3 cm and the left is 10.7 cm, 
previously 10.4 cm. The right kidney hypoenhance is with cortical thinning. There is cortical scarring at the left lower pole. There is a exophytic 0.9 cm 
hyperdense lesion at the right lower pole without enhancement. 2 cm left lower pole cyst. Several too small to characterize renal lesions. PELVIS FINDINGS:  
 
Bladder/ Pelvic Organs: Small diverticulum at the left anterior bladder wall. Moderate bladder distention. Bones/Soft tissues: Mild anasarca. Osteopenia. Lumbar facet hypertrophy and 
arthropathy. Transitional lumbosacral junction. Degenerative disc disease. Central height loss superior endplate L3.   
 
 
AORTA FINDINGS: 
Thoracic aorta is not enlarged with mild atherosclerosis. 3 vessel arch anatomy 
with patent arch vessels. Subjectively, the aneurysm cranial to the proximal 
aortic stent landing zone is increased. There appears to be a similar 1 cm 
crescent of enhancement at the right anterior aspect with endoleak but there is 
increased nonopacified circumferential region measuring up to 0.6 cm on the left 
(series 3, image 138), not present on prior exam. The excluded aneurysm is 
subjectively larger. There is similar right type I B endoleak distal to the 
common iliac stent. The vessel measures 2.3 cm at this location, previously 2.1 
cm. The peripheral right common iliac is 1.9 cm, previously 1.7 cm. There are 
new linear regions of thrombus which protrude into the central aortic lumen at 
the more cranial aspect of the stent at the level of the left renal vein and 
within the bilateral common iliac stents protruding into the central lumen. Mild stenosis proximal celiac. Regions of stenosis splenic artery. Mild stenosis 
proximal SMA. Moderate stenosis proximal left renal artery and proximal 
occlusion right renal artery. There is stenosis at the origin of the right 
internal iliac, origin left internal iliac. Three-D generated aortic measurements: 
3.6, 3.2, 2.9 centimeter at the sinus of Valsalva. 3.5 x 3.1 centimeter at the sinotubular junction. 3.8 x 3.8 centimeter at the maximum diameter of the ascending aorta. 3.5 x 3.2 centimeter at the mid aortic arch. 3.4 x 3.2 centimeter at the proximal descending thoracic aorta. 3.6 x 3.1 centimeter at the mid descending aorta. 3.6 x 3.5 centimeter at the distal descending thoracic aorta at the level of the 
diaphragmatic hiatus. Aneurysm measurements: 
Volume 149.73 cc, previously 166.71 cc 
4.9 x 4.2 cm below the lowest renal artery including excluded aorta, previously 4.4 x 3.7 cm 
1 cm below the lowest renal artery within the stent: 3 x 2.6 cm; including 
excluded aorta: 5.8 x 4.4 cm, previously 3.9 x 3.9 cm 
3.1 x 3 cm within the stent at the top of the stent; 5.4 x 4.4 cm including 
excluded aorta, previously 4 x 3.6 cm 
2.7 x 2.7 cm within the stent below the lowest renal artery 2.7 x 2.7 cm Within the stent below the lowest renal artery 3 cm distance from the lowest renal artery to the top of the stent Impression IMPRESSION: 
1.  New strands of thrombus along the wall of the aortic and bilateral common 
iliac stents which protrudes into the central aortic lumen and place patient at 
risk for lower extremity embolic events. 2.  Increased aneurysm cranial to the aortobiiliac stent with resultant 
increased type I A endoleak. Similar type I B endoleak. Increased excluded 
aortic aneurysm measuring 5.8 cm (change of 2 cm at this level). Minimal 
increased right common iliac aneurysm measuring 1.9 cm. 
3.  Increased moderate right and similar small left pleural effusions. 4.  Increased rectal stool burden. No active extravasation. 5.  Gas-filled gastric distention. 6.  Occlusion proximal right renal artery with worsened atrophy and 
hypoenhancement. 7.  Severe left circumflex coronary arteriosclerosis. 8.  Biatrial cardiac chamber enlargement. 9.  Moderate stenosis left renal artery, stenosis at the origin of the bilateral 
internal iliac arteries. 10.  Moderate emphysema. 11.  Gastroesophageal reflux. 12.  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 13.  Unchanged Incidental Amyand hernia. 14.  Right renal hemorrhagic/proteinaceous cyst. 
15.  Small bladder diverticulum. Preliminary report provided by Dr. Judie Pringle on 8/23/2020 at 2234 hours prior to 
3-D image availability. MRI Results from [place] encounter on 08/23/20 MRI LUMB SPINE W CONT Narrative EXAM: MRI LUMBAR SPINE  CPT CODE: 83272 CLINICAL INDICATION/HISTORY: Evaluate discitis. COMPARISON: CTA chest, abdomen pelvis of 23 August 2020. TECHNIQUE: MRI of the lumbar spine is performed by using standard pre- and 
post-gadolinium protocol pulse sequences with axial images from T12/L1 through S1/S2. Contrast: MultiHance - 14 mL IV given in conjunction with prior MRA 
abdomen; unremarkable administration. FINDINGS: There is a subtle retrolisthesis of L2 on L3 and mild anterolisthesis 
of L4 on L5. There is a mild scoliosis convex to the left centered about L2. Other vertebral body alignment is normal.  S1 is a transitional vertebra with 
lumbarization and a rudimentary S1/S2 disc. There is mild increased signal 
intensity in the bone marrow of L1 and L2 on STIR images accompanied by marrow 
enhancement of both vertebrae on post-gadolinium images where then may be some 
relative sparing of the inferior endplate marrow of S2. Other marrow signal 
intensity is relatively normal.  Bright signal intensity is seen within the 
intervertebral disc at L1/L2 with evidence of some subligamentous extrusion 
posteriorly and more significantly anteriorly. There is inflammatory change in 
enhancement of the paraspinal soft tissues and psoas muscles, bilaterally, more 
focally at L2 on the left and more broadly and longitudinally on the right from S2 through L5 with a focal 1.9 cm x 1.4 cm x 2.5 cm abscess in the right psoas 
muscle at S2/S3 level. There is a probable insufficiency fracture of the 
superior L3 vertebra with degenerative changes and L2-3 disc. There is moderately severe disc space narrowing at L4/L5 and moderate narrowing at L5/S1. Mild-to-moderate narrowing may be present at T12/L1. L3/L4 is preserved. No 
cord lesions are seen and the conus medullaris ends normally at L1. There is 
relatively minimal enhancement of the anterior epidural soft tissues at the 
level of the disc inflammation, somewhat asymmetric to the left. L5/S1: There is a broad-based disc bulge and mild central stenosis. There is 
moderate bilateral foraminal encroachment by disc protrusion and some facet 
arthropathy. There may be mild impression on the exiting root sleeves 
bilaterally. L4/L5: There is mild-to-moderate canal stenosis from the listhesis and facet 
arthropathy with an AP diameter of about 8 mm. There is mild-to-moderate right 
and moderately severe left foraminal stenosis. L3/L4: The central canal is patent without significant disc protrusion. There 
is mild-to-moderate bilateral foraminal stenosis from small foraminal 
protrusions. L2/L3: There is no significant canal stenosis. There is mild-to-moderate 
bilateral foraminal stenosis from foraminal protrusions, right slightly more 
prominent than left. L1/L2: There is mild encroachment on the thecal sac from a subtle broad-based 
protrusion and some inflammation and thickening in the anterior epidural soft 
tissues somewhat asymmetric to the left. Soft tissue inflammation extends into 
the exit foramina, bilaterally with mild-to-moderate bony canal stenosis. T12/L1: There is a mild right lateral to foraminal disc protrusion with subtle 
canal encroachment asymmetric to the right. There may be mild right foraminal 
encroachment; the left foramen is patent. Multiple small cortical cysts are seen in both kidneys as well as several cysts 
in the visualized inferior portion of the right lobe of the liver. Note that 
the right kidney is [id number] atrophic. Again noted is the distal abdominal aortic aneurysm treated with an aortobiiliac 
endograft stent. Note that the right limb of the stent supplies the left common 
iliac artery and the left limb the right common iliac artery. There is a 
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 
not significantly changed Impression IMPRESSION: 
 
L1/L2 discitis. Minimal posterior disc protrusion and canal with some mild 
focal inflammation in the anterior epidural space slightly asymmetric to the 
left resulting in mild canal encroachment. Soft tissue inflammation and 
enhancement extends into both L1/L2 foramina. There is significant inflammatory 
change in the right paraspinal soft tissues and psoas muscle from L2 through L5 
and a small 2 cm abscess in the right psoas at about L2/L3. There is focal 
paraspinal inflammation on the left at about L2. Persistent spondylitic and disc degenerative changes with mild-to-moderate canal 
stenosis, particularly at L4/L5, and multilevel foraminal stenoses most 
significant at L5-S1. Status post treatment distal aortic aneurysm with aortobiiliac stent. Persistent aneurysm distal right common iliac artery at the end of the stent. Several hepatic cysts and bilateral renal cortical cysts. There is 
mild-to-moderate atrophy of the right kidney. Note: A congruent preliminary report was provided by Dr. Daksha Gtz at the time of 
the study. ULTRASOUND Results from Hospital Encounter encounter on 08/25/17 US EXT NONVAS RT LTD Impression Impression:  Large fat-containing right inguinal hernia. On recent CT scan 
(August 25, 2017), the appendix is seen extending through the internal inguinal 
ring into the inguinal hernia. This is not redemonstrated on today's ultrasound 
but potentially if the appendix does not have air within it, it may be difficult 
to visualize sonographically. No adenopathy. Results from [facility] [place] Encounter encounter on 02/18/16 US SCROTUM/TESTICLES Impression IMPRESSION: 
1. Increased internal vascularity of the left testicle suggests mild orchitis. Cardiology Procedures/Testing: MODALITY RESULTS  
EKG Results for orders placed or performed during the hospital encounter of 08/23/20 EKG, 12 LEAD, INITIAL Result Value Ref Range Ventricular Rate 71 BPM  
 Atrial Rate 288 BPM  
 QRS Duration 86 ms  
 Q-T Interval 350 ms QTC Calculation (Bezet) 380 ms Calculated R Axis -42 degrees Calculated T Axis 81 degrees Diagnosis Atrial fibrillation Left axis deviation Low voltage QRS Abnormal ECG When compared with ECG of 26-JAN-2020 09:46, 
Criteria for Anteroseptal infarct are no longer present Confirmed by Jalyn See MD, Janneth Tate ([id number]) on 8/24/2020 8:30:54 AM 
  
Results for orders placed or performed in visit on 08/10/18 AMB POC EKG ROUTINE W/ 12 LEADS, INTER & REP Narrative Read by Jeanne Shaw MD - atrial fibrillation, occasional ectopic ventricular beat. Nonspecific QRS widening and anterior fascicular block. Old anterior infarct. Possible nonspecific ST abnormality. ECHO 06/02/19 ECHO ADULT COMPLETE 06/04/2019 6/4/2019 Narrative · Left Ventricle: Estimated left ventricular ejection fraction is 56 -  
60%. Abnormal left ventricular septal motion. Interventricular septal  
\"bounce\". · Tricuspid Valve: Non-specific thickening of the tricuspid valve. Moderate tricuspid valve regurgitation is present. · Pulmonary Artery: Moderate pulmonary hypertension. Pulmonary arterial  
systolic pressure is 71.0 mmHg. · Pulmonic Valve: Mild pulmonic valve regurgitation is present. · Left Atrium: Severely dilated left atrium. · Right Atrium: Moderately dilated right atrium. Signed by: Keith Chambers MD  
  
 
Special Testing/Procedures: MODALITY RESULTS MICRO All Micro Results Procedure Component Value Units Date/Time CULTURE, RESPIRATORY/SPUTUM/BRONCH Randine Host STAIN [[id number]]  (Abnormal) Collected:  08/29/20 0810 Order Status:  Completed Specimen:  Sputum Updated:  08/31/20 1108 Special Requests: NO SPECIAL REQUESTS     
  GRAM STAIN OCCASIONAL WBCS SEEN     
      
  RARE EPITHELIAL CELLS SEEN  
     
   OCCASIONAL YEAST Culture result:    
  LIGHT YEAST, (APPARENT CANDIDA ALBICANS) MODERATE NORMAL RESPIRATORY RUDY  
     
 CULTURE, BLOOD [[id number]] Collected:  08/28/20 1300 Order Status:  Completed Specimen:  Blood Updated:  08/31/20 1056 Special Requests: NO SPECIAL REQUESTS Culture result: NO GROWTH 3 DAYS     
 CULTURE, BLOOD [[id number]] Collected:  08/26/20 1503 Order Status:  Completed Specimen:  Blood Updated:  08/31/20 1056 Special Requests: NO SPECIAL REQUESTS Culture result: NO GROWTH 5 DAYS Clayborn Brome REFL [[id number]]  (Abnormal) Collected:  08/23/20 1820 Order Status:  Completed Specimen:  Miscellaneous sample Updated:  08/29/20 1136 Source BLOOD Aer+Anaer Suscept Result 1 Eikenella corrodens Comment: (NOTE) Identification performed by account, not confirmed by this 
laboratory. Testing performed by broth microdilution. AZITHROMYCIN = 0.5 ug/ml = SUSCEPTIBLE 
CEFOTAXIME   = <=0.25 ug/ml = SUSCEPTIBLE Antimicrobial suscept. Comment Comment: (NOTE) ** S = Susceptible; I = Intermediate; R = Resistant ** P = Positive; N = Negative MICS are expressed in micrograms per mL Antibiotic                 RSLT#1    RSLT#2    RSLT#3    RSLT#4 Ampicillin                     S 
Ceftriaxone                    S 
Chloramphenicol                S 
Levofloxacin                   S 
Meropenem                      S 
Penicillin                     S 
Tetracycline                   S 
Trimethoprim/Sulfa             S 
Performed At: [facility] [address] [id number] Palak Heredia MD ZR:[id number] SUSCEPTIBILITY, AER + ANAEROB [[id number]] Collected:  08/23/20 1820 Order Status:  Completed Specimen:  MICROBIAL ISOLATE Updated:  08/29/20 1136 Source BLOOD Susceptibility, Aer + Anaerob Final Report Below Comment: (NOTE) Performed At: [address] [id number] Palak Heredia MD [id number] CORRECTED ON 08/29 AT 1136: PREVIOUSLY REPORTED AS Preliminary report CULTURE, BLOOD [[id number]]  (Abnormal) Collected:  08/23/20 1805 Order Status:  Completed Specimen:  Blood Updated:  08/27/20 1223 Special Requests: NO SPECIAL REQUESTS     
  GRAM STAIN    
  ANAEROBIC BOTTLE NO ORGANISMS SEEN Culture result:    
  EIKENELLA CORRODENS GROWING IN THE ANAEROBIC BOTTLE BETA LACTAMASE NEGATIVE CULTURE, BLOOD [[id number]]  (Abnormal) Collected:  08/23/20 1820 Order Status:  Completed Specimen:  Blood Updated:  08/27/20 4094 Special Requests: NO SPECIAL REQUESTS Culture result:    
  EIKENELLA CORRODENS , BETA LACTAMASE NEGATIVE , ISOLATED FROM THE ANAEROBIC BOTTLE  
     
 CULTURE, RESPIRATORY/SPUTUM/BRONCH Marget Pennant STAIN [[id number]] Collected:  08/26/20 1030 Order Status:  Canceled Specimen:  Sputum Maxim Farrell [[id number]] Collected:  08/24/20 0358 Order Status:  Completed Specimen:  Urine from Clean catch Updated:  08/25/20 1026 Special Requests: NO SPECIAL REQUESTS Culture result: No growth (<1,000 CFU/ML) UA Results for orders placed or performed in visit on 04/07/14 AMB POC URINALYSIS DIP STICK AUTO W/O MICRO     Status: None Result Value Ref Range Status  Color (UA POC) Yellow  Final  
 Clarity (UA POC) Clear  Final  
 Glucose (UA POC) Negative Negative Final  
 Bilirubin (UA POC) Negative Negative Final  
 Ketones (UA POC) Negative Negative Final  
 Specific gravity (UA POC) 1.010 1.001 - 1.035 Final  
 Blood (UA POC) Trace Negative Final  
 pH (UA POC) 7.0 4.6 - 8.0 Final  
 Protein (UA POC) Trace Negative mg/dL Final  
 Urobilinogen (UA POC) 0.2 mg/dL 0.2 - 1 Final  
 Nitrites (UA POC) Negative Negative Final  
 Leukocyte esterase (UA POC) Negative Negative Final  
  
PATH Assessment and Plan:  
 
79 y.o. male with PMH AAA s/p endovascular repair, Fe deficiency anemia, PUD, AFib, CHF, CAD, COPD with chronic hypoxic respiratory failure on home 3L NC, chronic back pain, and BPH, now admitted with acute on chronic anemia. 
  
Acute on chronic anemia: Pt was followed by ID for a GI bleed that was resolved. He also has a AAA with type I A endoleak that requires repair for which he was hoping to transfer to [facility] however, he is not a stable surgery candidate.  
- GI following, EGD 8/27, no signs active or recent bleeding 
- H/H Q12H, transfuse if Hgb < 7 
- Hgb 8.2 after last transfusion, Hgb today 8.4 
- CBC, CMP, Mag, Phos daily - Vascular: no need for IVC filter; planned transfer to [facility] for complex graph repair, however pt not a good surgical candidate - protonix gtt - D5 1/2NS w/ KCl @ 90mL/hr 
- PT/OT/CM 
- Spoke to daughter regarding code status, he is now DNI 
  
Sepsis: Respiratory culture on 8/29 showed light yeast, ID recs appreciated Blood culture on 8/23 showed EIKENELLA CORRODENS , BETA LACTAMASE NEGATIVE, pt was started on Piperacillin/Tazobactam. 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 [place] - FU BCx susceptibilities - FU MRA ABD and MRI L-spine - discussed w/ Dr. Conchis Campos; MRI spine w/ L1/2 discitis, focal anterior epidural inflammation w/ mild canal encroachment, L2/L3 2cm Right psoas abscess - ID recommending: continue Zosyn - IR recs: abscess too small to drain, recommend conservative management 
  
AFib/CHF/CAD: rate controlled on Amlodipine; off ASA and Coumadin due to hx of GIB. No evidence of decompensated CHF noted in cardiology clinic visit (6/16/20). CXR shows large-moderate R pleural effusion. Coronary angiography in 2012 only showed mild CAD. - Continue Amlodipine 10mg QD, Digoxin 125mcg QD, Pravastatin 40mg QD, Lisinopril 20mg QD 
  
COPD: Complicated by Chronic Hypoxic Respiratory Failure, on 3L NC at home. - Supplemental O2 for sats < 92% - Duoneb PRN 
  
Chronic back pain: - Lidocaine Patch 
- Oxycodone and Hydromorphone PRN 
  
BPH  
- Tamsulosin 0.4mg daily 
  
Insomnia: On two home sleeping aids - Duloxetine 20mg QHS and Trazadone 50mg QHS   
  
Diet Cardiac DVT Prophylaxis Contraindicated GI Prophylaxis Protonix gtt Code status No CPR, DNI Disposition > 2 MN  
  
[facility] [place] [facility] Relationship: daughter 
[phone] Peyton Nat 
Daughter 
[phone] Edgardo Crooks DO PGY1 [address] Intern Pager: [phone] August 31, 2020, 6:57 AM

## 2020-09-01 NOTE — ACP (ADVANCE CARE PLANNING)
Palliative Medicine Advanced Steps Advance Care Planning Conversation Indy (Physician Orders for Scope of Treatment) Date of conversation: 9/1/2020  Location: [facility] Length (minutes): 25 
 
Participants: 
 [x] Patient Advanced Steps® ACP Facilitator: Thais Carvalho RN Conversation Topics (If Patient does not have decision making capacity, Agent/Surrogate responds based on understanding of how patient would respond if capable) Understanding of Medical Condition/s AND Potential Complications: 
 
Patient response:  Knows that he has an AAA endoleak that is unable to be repaired because he is a poor surgical candidate. Knows that their is a high chance that the AAA will rupture. Pt also aware that he has an infection in his lumbar spine along with an abscess that is too small to be drained. Knows his lungs are compromised from COPD and that he is on chronic O2 at home. Pt realizes that aggressive medical management will not make him better. He states that he is tired and would like to focus on a more comfort based approach with the support of hospice. Needs to discuss with spiritual/Confucianism advisor: [] Yes  [x] No 
 
Needs more information about illness and complications:  [] Yes  [x] No 
 
 
Cardiopulmonary Resuscitation \"What do you understand about CPR? \" Response: No I wouldn't want that. Order Elected for CPR:  []  Attempt Resuscitation [x]  Do Not Attempt Resuscitation When NOT in Cardiopulmonary Arrest, Order Elected:   
 
[x] Comfort Measures 
[] Limited Additional Interventions [] Full Interventions Artificially Administered Nutrition, Order Elected: 
 
[x] No Feeding Tube  
[] Feeding Tube for a defined trial period 
[] Feeding Tube long-term if indicated The following was provided (check all that apply): Healthcare Decision Information Cards: 
 [] Help with Breathing Facts 
 [] Tube Feeding Facts [] CPR Facts [] Review of existing Advance Directive 
[] Assistance with Completion of New Advance Directive [x] Review of [place] POST Form Meeting Outcomes: 
 [] ACP discussion completed 
 [x] nIdy form completed 
[x] Indy prepared for Provider review and signature 
 [x] Original placed on Chart, if in facility (form to be sent with patient at discharge) [] Copy given to healthcare agent  
 [] Copy scanned to electronic medical record Follow-up plan:   
 [] Schedule follow-up conversation to continue planning 
 [x] Referred individual to Provider for additional questions/concerns [x] Advised patient/agent/surrogate to review completed POST form and update if needed with changes in condition, patient preferences or care setting  
 
[] This note routed to one or more involved healthcare providers Pt remains DNR/DNI. Will transition to comfort measures starting today. Hospice consult to be placed. JARED TUBBS and BSRN updated on plan. Potential dispo plan will be home with hospice support. Thank you for the Palliative Medicine consult and allowing us to participate in the care of Mr. Medina Fuchs. Will continue to monitor and provide support. VIC Pérez Palliative Medicine Inpatient RN DR. PARIKH[facility] Palliative [place] Line: [phone] ([phone])

## 2020-09-01 NOTE — PROGRESS NOTES
Chart reviewed. Pt with AAA s/p repair, now with type I A endoleak requiring complex repair. Note palliative care consult Pt now DNI and leaning towards comfort care Will f/up palliative care note and family plans regarding care decisions

## 2020-09-01 NOTE — CONSULTS
Palliative Medicine Consult Patient Name: Michael Bruce YOB: 1940 Date of Initial Consult: 9/1/2020 Reason for Consult: goals of care discussions Requesting Provider: Stacey Jacob DO 
Primary Care Physician: Jose Vasquez DO 
  
 SUMMARY:  
Michael Bruce is a 79 y.o. with a past history of AAA s/p endovascular repair, Fe deficiency anemia, PUD, AFib, CHF, CAD, COPD with chronic hypoxic respiratory failure on home O2 at 3L NC, chronic back pain, and BPH, who was admitted on 8/23/2020 from home with a diagnosis of acute on chronic anemia, AAA with type I A endoleak, and sepsis. Current medical issues leading to Palliative Medicine involvement include: support and care decisions. PALLIATIVE DIAGNOSES:  
1. Goals of care discussions 2. AAA endoleak 3. Sepsis 4. Debility PLAN:  
1. Goals of care discussions: Palliative medicine team including MIK Granado RN and I met with patient at patient's bedside. Patient is awake, alert, and oriented x 4. Complains of unmanaged back pain, 8/10 on pain scale, exacerbated with any movement, has to lay completely flat in a supine position for any relief. Discussed our role as palliative medicine, and support offered as patient shares he is tired and wants to go home. Discussed the benefits and burdens of  Continuing aggressive therapies versus implementing comfort measures only, with discharge home with hospice support. Patient wishes to stop antibiotic therapy, stop IV fluids, stop lab draws, and does not desire any surgical repair for AAA endoleak. Patient agreeable to initiating comfort measures while in the hospital with plan to d/c home with hospice. Called patient's daughter and [address] [place] [address] who is supportive of patient's goals. Patient has AMD on file. Confirmed with patient that he is a DNR/DNI.  POST form introduced and signed by patient with the following measures: DNR/DNI, comfort measures, NO feeding tubes. Discussed a pain management regimen with patient. He is receiving acetaminophen 650 mg every 6 hours scheduled, lidocaine patch for back, and I ordered dilaudid 1 mg IV every 3 hours as needed for back pain. Noted allergy list but has been tolerating low dose dilaudid without difficulty. Benadryl ordered as needed for any itching. Will calculate 24 hour MME tomorrow and convert to fentanyl patch so patient can d/c home. Will follow along for support and symptom management while hospitalized. 2. AAA endoleak:  type I A endoleak, originally planned for [facility] transfer for repair, but declines surgical intervention. Wishes to go home and live the life he has left with his loved ones. 3. Sepsis:  Positive resp culture for light yeast 8/29, with chronic resp failure on home O2. Denies breathing difficulties. +Blood culture, ID was managing ABT therapy. L1-2 discitis, focal anterior epidural inflammation with mild canal encroachment, L2-L3 2cm Right psoas abscess with no plans for IR to drain as abscess is too small. Patient wishes to stop antibiotic therapy at this time and go home with hospice support. 4. Debility: Chronic respiratory failure, baseline dyspnea with exertion. Needs assist with all ADLs, but able to feed self. 5. Initial consult note routed to primary continuity provider 6. Communicated plan of care with: Palliative IDT, patient, family, attending, CM, RN, hospice liasion. GOALS OF CARE / TREATMENT PREFERENCES:  
[====Goals of Care====] GOALS OF CARE: DNR, DNI, comfort measures only. Patient/Health Care Proxy Stated Goals: Comfort TREATMENT PREFERENCES:  
Code Status: DNR Advance Care Planning: 
Advance Care Planning 9/1/2020 Patient's Healthcare Decision Maker is: Named in scanned ACP document Primary Decision Maker Name -  
Primary Decision Maker Phone Number -  
Primary Decision Maker Relationship to Patient -  
 Confirm Advance Directive Yes, on file Patient Would Like to Complete Advance Directive - Does the patient have other document types MOST/MOLST/POST/POLST Medical Interventions: Comfort measures Artificially Administered Nutrition: No feeding tube The palliative care team has discussed with patient / health care proxy about goals of care / treatment preferences for patient. 
[====Goals of Care====] HISTORY:  
 
History obtained from: patient, family, chart CHIEF COMPLAINT: back pain 8/10 HPI/SUBJECTIVE: The patient is:  
[x] Verbal and participatory [] Non-participatory due to:  
Oriented x 4 Clinical Pain Assessment (nonverbal scale for severity on nonverbal patients):  
Clinical Pain Assessment Severity: 8 FUNCTIONAL ASSESSMENT:  
 
Palliative Performance Scale (PPS): PPS: 50 PSYCHOSOCIAL/SPIRITUAL SCREENING:  
 
Advance Care Planning: 
Advance Care Planning 9/1/2020 Patient's Healthcare Decision Maker is: Named in scanned ACP document Primary Decision Maker Name -  
Primary Decision Maker Phone Number -  
Primary Decision Maker Relationship to Patient -  
Confirm Advance Directive Yes, on file Patient Would Like to Complete Advance Directive - Does the patient have other document types MOST/MOLST/POST/POLST Any spiritual / Voodoo concerns: 
[] Yes /  [x] No 
 
Caregiver Burnout: 
[] Yes /  [] No /  [x] No Caregiver Present Anticipatory grief assessment:  
[x] Normal  / [] Maladaptive REVIEW OF SYSTEMS:  
 
Positive and pertinent negative findings in ROS are noted above in HPI. The following systems were [x] reviewed / [] unable to be reviewed as noted in HPI Other findings are noted below. Systems: constitutional, ears/nose/mouth/throat, respiratory, gastrointestinal, genitourinary, musculoskeletal, integumentary, neurologic, psychiatric, endocrine. Positive findings noted below. Modified ESAS Completed by: provider Fatigue: 6 Depression: 0 Pain: 8 Anxiety: 0 Nausea: 0 Dyspnea: 3(baseline) Constipation: No  
  Stool Occurrence(s): 1 PHYSICAL EXAM:  
 
From RN flowsheet: 
Wt Readings from Last 3 Encounters:  
09/01/20 69.8 kg (153 lb 14.1 oz) 08/07/20 55.3 kg (122 lb)  
06/16/20 59.4 kg (131 lb) Blood pressure 119/72, pulse 72, temperature 98.6 °F (37 °C), resp. rate 16, height 5' 6\" (1.676 m), weight 69.8 kg (153 lb 14.1 oz), SpO2 100 %. Pain Scale 1: Numeric (0 - 10) Pain Intensity 1: 5 Pain Onset 1: chronic Pain Location 1: Back Pain Orientation 1: Lower Pain Description 1: Aching Pain Intervention(s) 1: Medication (see MAR) Constitutional: Awake, alert, mildly distressed ENMT: no nasal discharge, moist mucous membranes Cardiovascular: distal pulses intact Respiratory: breathing not labored, symmetric Gastrointestinal: soft non-tender, +bowel sounds Musculoskeletal: no deformity, no tenderness to palpation Skin: warm, dry Neurologic: following commands, moving all extremities Psychiatric: full affect, no hallucinations HISTORY:  
 
Principal Problem: 
  GI bleed (8/24/2020) Past Medical History:  
Diagnosis Date  Aneurysm ([address] AAA repair 2006 & 2012  Aorto-iliac duplex 02/13/2015 Tech difficult. Patent aorta bi-iliac endograft w/o leak or limb dysfunction.  Arrhythmia   
 a fib  Cardiac cath 11/01/2012 RCA (sm, nondom) patent. LM patent. LAD 25%. CX (dom) 45% mid. LVEDP 12 mmHg. No WMA. PA 27/12. W 10-12. CO/CI 5.2/2.6 (TD).  Cardiac echocardiogram, abnormal 02/20/2016 EF 55%. No WMA. Indeterminate diastolic fx. RVSP 60 mmHg. Severe LAE. Mild MR. Mod TR.  IVCE. Similar to study of 10/26/12.  Cardiovascular aorto-iliac duplex 02/13/2015 Patent aorta bi-iliac endovascular graft w/o leak or limb dysfunction. Sac measures 4.21 x 4.47 cm (4.4 x 4.6 cm on 1/31/14).  Cardiovascular LE peripheral arterial testing 07/29/2013 No significant LE arterial disease bilaterally. R GHADA 1. 12.  L GHADA 1. 12.  R DBI 0.83. L DBI 0.71. Exercise deferred.  Cardiovascular renal duplex 10/31/2012 No RA stenosis. Intrinsic/med disease in left kidney. Patent aortic endograft. Patent, thrombus-free renal veins bilaterally.  Carotid duplex 07/29/2013 Mild <50% bilateral ICA plaquing.  Chronic kidney disease  Chronic lung disease  Cigarette smoker  Congestive heart failure (CHF) ([address]  COPD (chronic obstructive pulmonary disease) (HCC)   
 and emphysema; likely secondary to tobacco abuse  Difficult intubation  Dyslipidemia   
 low HDL  Emphysema   
 HTN (hypertension)  Hypercholesterolemia  Ill-defined condition   
 hernia  Increased prostate specific antigen (PSA) velocity  Long term (current) use of anticoagulants   
 coumadin  Osteoarthritis  Osteomyelitis ([address]  Paroxysmal atrial fibrillation (HCC)  Peripheral vascular disease ([facility] AAA repair 12/2007  Persistent atrial Fibrillation  Persistent atrial fibrillation (HCC)  Unspecified adverse effect of anesthesia   
 difficulty breathing placed in ICU Oct 2012 (AAA repair) Past Surgical History:  
Procedure Laterality Date  BRONCHOSCOPY DIAGNOSTIC  11/2/2012  CARDIAC CATHETERIZATION  11/1/2012  COLONOSCOPY N/A 7/26/2016 COLONOSCOPY with Polypectomies performed by Lola Hernandez MD at [facility] ENDOSCOPY  COLONOSCOPY N/A 5/28/2019 COLONOSCOPY with polypectomy performed by Juan Ramon Tan MD at [address] HX AAA REPAIR    
 2006 & 2012  HX HEART CATHETERIZATION  3/4/2009 1. RCA small, nondominant; patent. 2. LMCA patent. 3. LAD is long, wrapped around apical vessel. Diffuse, 20-30% stenosis noted. 4. CCA is large, dominant vessel. Diffuse 20-30% stenosis. 5. LVEDP is 16 mmHg.  6. Overall left ventricular systolic function mildly diminshed with est. EF 45%. Mild, global hypokinesis noted. 7. No significant mitral regurgitation or aortic stenosis noted. (see report)  HX HERNIA REPAIR  2014  
 rt inguinal   
 HX HERNIA REPAIR  2016 LEFT INGUINAL HERNIA REPAIR DR. Otto Stark  REPAIR ING HERNIA,5+Y/O,JESSIE Left 16 Dr. Robert HASKINSJONA MERCEDES  2012 Family History Problem Relation Age of Onset  Heart Surgery Father BYPASS  Stroke Father  Heart Disease Father  Hypertension Father  Heart Attack Father  Heart Surgery Mother BYPASS  Coronary Artery Disease Mother  Stroke Mother  Heart Disease Mother  Hypertension Mother  Diabetes Sister  Cancer Brother History reviewed, no pertinent family history. Social History Tobacco Use  Smoking status: Former Smoker Packs/day: 1.50 Years: 54.00 Pack years: 81.00 Types: Cigarettes Last attempt to quit: 10/23/2012 Years since quittin.8  Smokeless tobacco: Never Used Substance Use Topics  Alcohol use: No  
  Alcohol/week: 0.0 standard drinks Comment: quit drinking alcohol 26 years ago, patient states stopped in \"3\" Allergies Allergen Reactions  Codeine Swelling  Morphine Itching  Sulfa (Sulfonamide Antibiotics) Other (comments) Kidney problems. Current Facility-Administered Medications Medication Dose Route Frequency  LORazepam (INTENSOL) 2 mg/mL oral concentrate 0.5 mg  0.5 mg Oral Q1H PRN  
 ondansetron (ZOFRAN ODT) tablet 4 mg  4 mg Oral Q6H PRN  
 scopolamine (TRANSDERM-SCOP) 1 mg over 3 days 1 Patch  1 Patch TransDERmal Q72H PRN  
 bisacodyL (DULCOLAX) suppository 10 mg  10 mg Rectal DAILY PRN  
 HYDROmorphone (DILAUDID) injection 1 mg  1 mg IntraVENous Q3H PRN  
 diphenhydrAMINE (BENADRYL) capsule 25 mg  25 mg Oral Q6H PRN  
  acetaminophen (TYLENOL) tablet 650 mg  650 mg Oral TID  pantoprazole (PROTONIX) 40 mg in 0.9% sodium chloride 10 mL injection  40 mg IntraVENous Q24H  
 sucralfate (CARAFATE) tablet 1 g  1 g Oral AC&HS  lidocaine 4 % patch 1 Patch  1 Patch TransDERmal Q24H  
 albuterol-ipratropium (DUO-NEB) 2.5 MG-0.5 MG/3 ML  3 mL Nebulization Q4H PRN  
 amLODIPine (NORVASC) tablet 10 mg  10 mg Oral DAILY  digoxin (LANOXIN) tablet 0.125 mg  0.125 mg Oral DAILY  DULoxetine (CYMBALTA) capsule 20 mg  20 mg Oral DAILY  lisinopriL (PRINIVIL, ZESTRIL) tablet 20 mg  20 mg Oral DAILY  polyethylene glycol (MIRALAX) packet 17 g  17 g Oral DAILY  tamsulosin (FLOMAX) capsule 0.4 mg  0.4 mg Oral DAILY  traZODone (DESYREL) tablet 50 mg  50 mg Oral QHS  
 
 
 
 LAB AND IMAGING FINDINGS:  
 
Lab Results Component Value Date/Time WBC 8.1 09/01/2020 05:54 AM  
 HGB 8.9 (L) 09/01/2020 02:45 PM  
 PLATELET 969 20/73/4621 05:54 AM  
 
Lab Results Component Value Date/Time Sodium 140 09/01/2020 05:54 AM  
 Potassium 3.9 09/01/2020 05:54 AM  
 Chloride 108 09/01/2020 05:54 AM  
 CO2 28 09/01/2020 05:54 AM  
 BUN 8 09/01/2020 05:54 AM  
 Creatinine 0.68 09/01/2020 05:54 AM  
 Calcium 7.5 (L) 09/01/2020 05:54 AM  
 Magnesium 1.7 09/01/2020 05:54 AM  
 Phosphorus 2.3 (L) 09/01/2020 05:54 AM  
  
Lab Results Component Value Date/Time Alk. phosphatase 91 08/31/2020 12:34 AM  
 Protein, total 5.5 (L) 08/31/2020 12:34 AM  
 Albumin 1.9 (L) 08/31/2020 12:34 AM  
 Globulin 3.6 08/31/2020 12:34 AM  
 
Lab Results Component Value Date/Time INR 1.7 (H) 08/23/2020 10:45 PM  
 Prothrombin time 19.3 (H) 08/23/2020 10:45 PM  
 aPTT 34.0 08/23/2020 10:45 PM  
  
Lab Results Component Value Date/Time  Iron 106 08/24/2020 09:55 AM  
 TIBC 164 (L) 08/24/2020 09:55 AM  
 Iron % saturation 65 (H) 08/24/2020 09:55 AM  
 Ferritin 5,764 (H) 08/24/2020 09:55 AM  
  
No results found for: PH, PCO2, PO2 
 No components found for: [place] Lab Results Component Value Date/Time CK 35 (L) 08/23/2020 05:35 PM  
 CK - MB <1.0 08/23/2020 05:35 PM  
  
 
 
   
 
Total time: 70 minutes Counseling / coordination time, spent as noted above: 60 minutes 
> 50% counseling / coordination?: yes, patient, family, attending, CM, RN, hospice liaison Prolonged service was provided for  []30 min   []75 min in face to face time in the presence of the patient, spent as noted above. Time Start:  
Time End:  
Note: this can only be billed with 62329 (initial) or 32243 (follow up). If multiple start / stop times, list each separately.

## 2020-09-01 NOTE — PROGRESS NOTES
Plan is for patient to return home upon medical clearance, janice provides 24/7 care, awaiting Palliative conversations, plan is home health vs hospice. BLS transport needed. Has home O2. Has wheelchair. Mary uLu MSW Case Management [phone]

## 2020-09-01 NOTE — PROGRESS NOTES
Problem: Falls - Risk of 
Goal: *Absence of Falls Description: Document Tila Sargent Fall Risk and appropriate interventions in the flowsheet. Outcome: Progressing Towards Goal 
Note: Fall Risk Interventions: 
Mobility Interventions: Bed/chair exit alarm, Patient to call before getting OOB, Strengthening exercises (ROM-active/passive) Medication Interventions: Bed/chair exit alarm, Teach patient to arise slowly Elimination Interventions: Bed/chair exit alarm, Call light in reach, Patient to call for help with toileting needs, Toileting schedule/hourly rounds, Urinal in reach Problem: Patient Education: Go to Patient Education Activity Goal: Patient/Family Education Outcome: Progressing Towards Goal 
  
Problem: Pressure Injury - Risk of 
Goal: *Prevention of pressure injury Description: Document Claude Scale and appropriate interventions in the flowsheet. Outcome: Progressing Towards Goal 
Note: Pressure Injury Interventions: 
Sensory Interventions: Assess changes in LOC, Keep linens dry and wrinkle-free, Minimize linen layers Moisture Interventions: Absorbent underpads, Minimize layers, Maintain skin hydration (lotion/cream), Moisture barrier Activity Interventions: Pressure redistribution bed/mattress(bed type) Mobility Interventions: HOB 30 degrees or less, Pressure redistribution bed/mattress (bed type) Nutrition Interventions: Document food/fluid/supplement intake Friction and Shear Interventions: HOB 30 degrees or less, Minimize layers, Apply protective barrier, creams and emollients Problem: Patient Education: Go to Patient Education Activity Goal: Patient/Family Education Outcome: Progressing Towards Goal 
  
Problem: Patient Education: Go to Patient Education Activity Goal: Patient/Family Education Outcome: Progressing Towards Goal 
  
Problem: Patient Education: Go to Patient Education Activity Goal: Patient/Family Education Outcome: Progressing Towards Goal 
  
Problem: Aspiration - Risk of 
Goal: *Absence of aspiration Outcome: Progressing Towards Goal 
  
Problem: Patient Education: Go to Patient Education Activity Goal: Patient/Family Education Outcome: Progressing Towards Goal 
  
Problem: Pain Goal: *Control of Pain Outcome: Progressing Towards Goal 
  
Problem: Nutrition Deficit Goal: *Optimize nutritional status Outcome: Progressing Towards Goal

## 2020-09-01 NOTE — PROGRESS NOTES
Infectious Disease progress Note Reason: eikenella bloodstream infection Current abx Prior abx Pip/tazo since 8/27 Ceftriaxone, metronidazole 8/23-8/26 Lines:  
 
 
Assessment : 
 
 79 y.o. male with PMH AAA s/p endovascular repair, Fe deficiency anemia, PUD, AFib, CHF, CAD, COPD with chronic hypoxic respiratory failure on home 3L NC, chronic back pain, and BPH, who presented to ED with complaint of malaise, worsening back pain and weakness. CTA 8/23- New strands of thrombus along the wall of the aortic and bilateral common iliac stents which protrudes into the central aortic lumen and place patient at risk for lower extremity embolic events. Increased aneurysm cranial to the aortobiiliac stent with resultant increased type I A endoleak. Similar type I B endoleak. Increased aortic aneurysm measuring 5.8 cm (change of 2 cm at this level). Minimal increased right common iliac aneurysm measuring 1.9 cm. 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 Back pain likely due to lumbar spine discitis, psoas abscess. 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 Spine surgery follow-up appreciated. Abscess too small. Not amenable to CT-guided drainage. Most likely source of Eikenella bloodstream infection is evolving aortoenteric fistula, ? Infected endovascular stent. Vascular surgery follow-up appreciated. Clinically same. Recommendations: 1. Continue piperacillin/tazobactam for now 2. Await palliative care recommendations 
 
longterm prognosis: poor Above plan was discussed in details with patient.,dr. cali. Please call me if any further questions or concerns. Will continue to participate in the care of this patient. HPI: 
 
States that he feels good. Continues to have low back pain. Denies abdominal pain, nausea, vomiting, hematemesis. Patient denies headaches, visual disturbances, sore throat, runny nose, earaches, cp, sob, chills, cough, diarrhea, burning micturition, pain or weakness in extremities. home Medication List  
 Details DULoxetine (CYMBALTA) 20 mg capsule Take 20 mg by mouth daily. albuterol (PROVENTIL VENTOLIN) 2.5 mg /3 mL (0.083 %) nebu inhale contents of 1 vial in nebulizer every 4 hours if needed for wheezing 
Qty: 50 Nebule, Refills: 5  
  
tamsulosin (FLOMAX) 0.4 mg capsule Take 0.4 mg by mouth daily. pantoprazole (PROTONIX) 40 mg tablet Take 1 Tab by mouth Daily (before breakfast). Qty: 30 Tab, Refills: 0 HYDROcodone-acetaminophen (NORCO) 5-325 mg per tablet Take 0.5 Tabs by mouth two (2) times daily as needed for Pain. traZODone (DESYREL) 50 mg tablet Take 50 mg by mouth nightly. lisinopril (PRINIVIL, ZESTRIL) 20 mg tablet Take 1 Tab by mouth daily. Qty: 30 Tab, Refills: 0  
  
pravastatin (PRAVACHOL) 40 mg tablet Take 40 mg by mouth nightly. digoxin (DIGITEK) 0.125 mg tablet take 1 tablet by mouth once daily 
Qty: 90 Tab, Refills: 3  
  
amLODIPine (NORVASC) 10 mg tablet Take 10 mg by mouth daily. Refills: 0 OXYGEN-AIR DELIVERY SYSTEMS 2 L/min by Nasal route daily. Continuous. Company is [employer] 
   
  
polyethylene glycol (MIRALAX) 17 gram packet Take 17 g by mouth daily. PRN constipation  
  
ascorbic acid, vitamin C, (Vitamin C) 500 mg tablet Take 500 mg by mouth daily. lidocaine (LIDODERM) 5 %   
  
tiotropium-olodateroL (Stiolto Respimat) 2.5-2.5 mcg/actuation inhaler inhale 2 inhalations by mouth once daily 
Qty: 1 Inhaler, Refills: 5  
  
sucralfate (CARAFATE) 1 gram tablet   
  
sodium chloride (OCEAN NASAL) 0.65 % nasal squeeze bottle 2 Sprays by Both Nostrils route daily as needed for Congestion. ferrous sulfate 325 mg (65 mg iron) tablet Take 325 mg by mouth every other day. Indications: anemia from inadequate iron Current Facility-Administered Medications Medication Dose Route Frequency  sodium phosphate 10 mmol in 0.9% sodium chloride 100 mL infusion   IntraVENous ONCE  
 oxyCODONE IR (ROXICODONE) tablet 5 mg  5 mg Oral Q4H PRN  
 acetaminophen (TYLENOL) tablet 650 mg  650 mg Oral TID  therapeutic multivitamin (THERAGRAN) tablet 1 Tab  1 Tab Oral DAILY  vitamin B complex-folic acid 0.4 mg tablet  1 Tab Oral DAILY  HYDROmorphone (DILAUDID) syringe 0.2 mg  0.2 mg IntraVENous Q6H PRN  
 0.9% sodium chloride infusion 250 mL  250 mL IntraVENous PRN  
 ondansetron (ZOFRAN ODT) tablet 4 mg  4 mg Oral Q6H PRN  piperacillin-tazobactam (ZOSYN) 3.375 g in 0.9% sodium chloride (MBP/ADV) 100 mL MBP  3.375 g IntraVENous Q6H  
 pantoprazole (PROTONIX) 40 mg in 0.9% sodium chloride 10 mL injection  40 mg IntraVENous Q24H  
 dextrose 5% - 0.45% NaCl with KCl 10 mEq/L infusion  90 mL/hr IntraVENous CONTINUOUS  
 sucralfate (CARAFATE) tablet 1 g  1 g Oral AC&HS  
 0.9% sodium chloride infusion 250 mL  250 mL IntraVENous PRN  
 lidocaine 4 % patch 1 Patch  1 Patch TransDERmal Q24H  
 albuterol-ipratropium (DUO-NEB) 2.5 MG-0.5 MG/3 ML  3 mL Nebulization Q4H PRN  
 amLODIPine (NORVASC) tablet 10 mg  10 mg Oral DAILY  ascorbic acid (vitamin C) (VITAMIN C) tablet 500 mg  500 mg Oral DAILY  digoxin (LANOXIN) tablet 0.125 mg  0.125 mg Oral DAILY  DULoxetine (CYMBALTA) capsule 20 mg  20 mg Oral DAILY  ferrous sulfate tablet 325 mg  325 mg Oral EVERY OTHER DAY  lisinopriL (PRINIVIL, ZESTRIL) tablet 20 mg  20 mg Oral DAILY  polyethylene glycol (MIRALAX) packet 17 g  17 g Oral DAILY  pravastatin (PRAVACHOL) tablet 40 mg  40 mg Oral QHS  tamsulosin (FLOMAX) capsule 0.4 mg  0.4 mg Oral DAILY  traZODone (DESYREL) tablet 50 mg  50 mg Oral QHS  sodium chloride (NS) flush 5-10 mL  5-10 mL IntraVENous PRN Allergies: Codeine; Morphine; and Sulfa (sulfonamide antibiotics) Temp (24hrs), Av.7 °F (36.5 °C), Min:97 °F (36.1 °C), Max:98.8 °F (37.1 °C) Visit Vitals /79 (BP 1 Location: Left arm, BP Patient Position: At rest;Head of bed elevated (Comment degrees)) Pulse 70 Temp 97.9 °F (36.6 °C) Resp 19 Ht 5' 6\" (1.676 m) Wt 69.8 kg (153 lb 14.1 oz) SpO2 100% BMI 24.84 kg/m² ROS: 12 point ROS obtained in details. Pertinent positives as mentioned in HPI,  
otherwise negative Physical Exam: 
 
General: Thin male laying on the bed AAOx3 in no acute distress. HEENT:  Normocephalic, atraumatic, ; no conjunctival hemmohage;  nasal and oral mucous are moist and without evidence of lesions. No thrush. Edentulous. Neck supple, no bruits. Lungs:   non-labored, bilaterally clear to auscultation- no crackles wheezes rales or rhonchi Heart:  RRR, s1 and s2; no rubs or gallops, no edema, + pedal pulses Abdomen:  soft, non-distended, active bowel sounds, no hepatomegaly, no splenomegaly. No tenderness, no guarding, no rigidity Genitourinary:  deferred Extremities:   no clubbing, cyanosis; no joint effusions or swelling; Full ROM of all large joints to the upper and lower extremities; muscle mass appropriate for age Neurologic:  No gross focal sensory abnormalities; 5/5 muscle strength to upper and lower extremities. Speech appropriate. Cranial nerves intact Skin:  No rash or ulcers noted Back:  Minimal tenderness on palpation of lumbar spine around L2/L3 region with some left paraspinal tenderness. No tenderness elsewhere on the spine Psychiatric:  No suicidal or homicidal ideations, appropriate mood and affect Labs: Results:  
Chemistry Recent Labs  
  20 
4450 20 
0034 20 
8701 GLU 85 93 107*  141 139  
K 3.9 3.5 3.6  108 108 CO2 28 30 28 BUN 8 8 8 CREA 0.68 0.62 0.64 CA 7.5* 7.5* 7.5* AGAP 4 3 3 BUCR 12 13 13 AP  --  91 106 TP  --  5.5* 5.9* ALB  --  1.9* 2.0*  
GLOB  --  3.6 3.9 AGRAT  --  0.5* 0.5* CBC w/Diff Recent Labs  
  09/01/20 
0554 08/31/20 
1247 08/31/20 
0034  08/30/20 
0055 WBC 8.1  --  8.1  --  10.5 RBC 2.80*  --  2.48*  --  2.74* HGB 8.4* 8.9* 7.6*   < > 8.2* HCT 26.6* 27.7* 23.2*   < > 25.5*  
  --  108*  --  90* GRANS 82*  --  82*  --  86* LYMPH 10*  --  9*  --  7*  
EOS 2  --  2  --  1  
 < > = values in this interval not displayed. Microbiology No results for input(s): CULT in the last 72 hours. RADIOLOGY: 
 
All available imaging studies/reports in connect care for this admission were reviewed Total time spent >35 minutes. High complexity decision making was performed during the evaluation of this patient at high risk for decompensation with multiple organ involvement Above mentioned total time spent on reviewing the case/medical record/data/notes/EMR/patient examination/documentation/coordinating care with nurse/consultants, exclusive of procedures with complex decision making performed and > 50% time spent in face to face evaluation. Dr. Wilma Frost, Infectious Disease Specialist 
[phone] September 1, 2020 
8:26 AM

## 2020-09-02 NOTE — CONSULTS
Palliative Medicine Consult Patient Name: Izzy Murphy YOB: 1940 Date of Initial Consult: 9/1/2020, 9/2/2020 Reason for Consult: goals of care discussions Requesting Provider: Haider Morel DO 
Primary Care Physician: Lenora Vo DO 
  
 SUMMARY:  
Izzy Murphy is a 79 y.o. with a past history of AAA s/p endovascular repair, Fe deficiency anemia, PUD, AFib, CHF, CAD, COPD with chronic hypoxic respiratory failure on home O2 at 3L NC, chronic back pain, and BPH, who was admitted on 8/23/2020 from home with a diagnosis of acute on chronic anemia, AAA with type I A endoleak, and sepsis. Current medical issues leading to Palliative Medicine involvement include: support and care decisions. 9/2/2020: Patient lying in bed, remains flat in supine position for back pain relief, pain improving with increased dilaudid dose, now pain is at a 5/10, no drowsiness, sedation, itching, or swelling. Tolerating well. Converted IV dilaudid dose to fentanyl patch with po dilaudid for breakthrough pain so patient can go home on hospice. PALLIATIVE DIAGNOSES:  
1. Goals of care discussions 2. AAA endoleak 3. Sepsis 4. Debility PLAN:  
9/2/2020: Patient lying in bed, remains flat in supine position for back pain relief, pain improving with increased dilaudid dose, now pain is at a 5/10, no drowsiness, sedation, itching, or swelling. Tolerating well. Converted IV dilaudid dose to fentanyl patch with po dilaudid for breakthrough pain so patient can go home on hospice. Plan to d/c home with hospice at 11 AM on 9/3/2020. Support offered to patient. Patient remains a DNR/DNI, comfort measures, NO feeding tubes. Will continue to follow for support and symptom management while hospitalized. See previous conversations below: 
 
9/1/2020: Goals of care discussions: Palliative medicine team including MIK Pavon RN and I met with patient at patient's bedside.  Patient is awake, alert, and oriented x 4. Complains of unmanaged back pain, 8/10 on pain scale, exacerbated with any movement, has to lay completely flat in a supine position for any relief. Discussed our role as palliative medicine, and support offered as patient shares he is tired and wants to go home. Discussed the benefits and burdens of  Continuing aggressive therapies versus implementing comfort measures only, with discharge home with hospice support. Patient wishes to stop antibiotic therapy, stop IV fluids, stop lab draws, and does not desire any surgical repair for AAA endoleak. Patient agreeable to initiating comfort measures while in the hospital with plan to d/c home with hospice. Called patient's daughter and [facility] who is supportive of patient's goals. Patient has AMD on file. Confirmed with patient that he is a DNR/DNI. POST form introduced and signed by patient with the following measures: DNR/DNI, comfort measures, NO feeding tubes. Discussed a pain management regimen with patient. He is receiving acetaminophen 650 mg every 6 hours scheduled, lidocaine patch for back, and I ordered dilaudid 1 mg IV every 3 hours as needed for back pain. Noted allergy list but has been tolerating low dose dilaudid without difficulty. Benadryl ordered as needed for any itching. Will calculate 24 hour MME tomorrow and convert to fentanyl patch so patient can d/c home. Will follow along for support and symptom management while hospitalized. 1. AAA endoleak:  type I A endoleak, originally planned for [facility] transfer for repair, but declines surgical intervention. Wishes to go home and live the life he has left with his loved ones. 2. Sepsis:  Positive resp culture for light yeast 8/29, with chronic resp failure on home O2. Denies breathing difficulties. +Blood culture, ID was managing ABT therapy.  L1-2 discitis, focal anterior epidural inflammation with mild canal encroachment, L2-L3 2cm Right psoas abscess with no plans for IR to drain as abscess is too small. Patient wishes to stop antibiotic therapy at this time and go home with hospice support. 3. Debility: Chronic respiratory failure, baseline dyspnea with exertion. Needs assist with all ADLs, but able to feed self. 4. Initial consult note routed to primary continuity provider 5. Communicated plan of care with: Palliative IDT, patient, family, attending, CM, RN, hospice liasion. GOALS OF CARE / TREATMENT PREFERENCES:  
[====Goals of Care====] GOALS OF CARE: DNR, DNI, comfort measures only. Patient/Health Care Proxy Stated Goals: Comfort TREATMENT PREFERENCES:  
Code Status: DNR Advance Care Planning: 
Advance Care Planning 9/1/2020 Patient's Healthcare Decision Maker is: Named in scanned ACP document Primary Decision Maker Name -  
Primary Decision Maker Phone Number -  
Primary Decision Maker Relationship to Patient -  
Confirm Advance Directive Yes, on file Patient Would Like to Complete Advance Directive - Does the patient have other document types MOST/MOLST/POST/POLST Medical Interventions: Comfort measures Artificially Administered Nutrition: No feeding tube The palliative care team has discussed with patient / health care proxy about goals of care / treatment preferences for patient. 
[====Goals of Care====] HISTORY:  
 
History obtained from: patient, family, chart CHIEF COMPLAINT: back pain 5/10 HPI/SUBJECTIVE: The patient is:  
[x] Verbal and participatory [] Non-participatory due to:  
Oriented x 4 Clinical Pain Assessment (nonverbal scale for severity on nonverbal patients):  
Clinical Pain Assessment Severity: 5 FUNCTIONAL ASSESSMENT:  
 
Palliative Performance Scale (PPS): PPS: 50 PSYCHOSOCIAL/SPIRITUAL SCREENING:  
 
Advance Care Planning: 
Advance Care Planning 9/1/2020 Patient's Healthcare Decision Maker is: Named in scanned ACP document Primary Decision Maker Name -  
 Primary Decision Maker Phone Number -  
Primary Decision Maker Relationship to Patient -  
Confirm Advance Directive Yes, on file Patient Would Like to Complete Advance Directive - Does the patient have other document types MOST/MOLST/POST/POLST Any spiritual / Jehovah's witness concerns: 
[] Yes /  [x] No 
 
Caregiver Burnout: 
[] Yes /  [] No /  [x] No Caregiver Present Anticipatory grief assessment:  
[x] Normal  / [] Maladaptive REVIEW OF SYSTEMS:  
 
Positive and pertinent negative findings in ROS are noted above in HPI. The following systems were [x] reviewed / [] unable to be reviewed as noted in HPI Other findings are noted below. Systems: constitutional, ears/nose/mouth/throat, respiratory, gastrointestinal, genitourinary, musculoskeletal, integumentary, neurologic, psychiatric, endocrine. Positive findings noted below. Modified ESAS Completed by: provider Fatigue: 6 Depression: 0 Pain: 5 Anxiety: 0 Nausea: 0 Dyspnea: 0 Constipation: No  
  Stool Occurrence(s): 1 PHYSICAL EXAM:  
 
From RN flowsheet: 
Wt Readings from Last 3 Encounters:  
09/02/20 64.9 kg (143 lb 1.3 oz) 08/07/20 55.3 kg (122 lb)  
06/16/20 59.4 kg (131 lb) Blood pressure 129/62, pulse 74, temperature 98.3 °F (36.8 °C), resp. rate 16, height 5' 6\" (1.676 m), weight 64.9 kg (143 lb 1.3 oz), SpO2 98 %. Pain Scale 1: Numeric (0 - 10) Pain Intensity 1: 6 Pain Onset 1: chronic Pain Location 1: Back Pain Orientation 1: Lower Pain Description 1: Aching Pain Intervention(s) 1: Medication (see MAR) Constitutional: Awake, alert, mildly distressed ENMT: no nasal discharge, moist mucous membranes Cardiovascular: distal pulses intact Respiratory: breathing not labored, symmetric Gastrointestinal: soft non-tender, +bowel sounds Musculoskeletal: no deformity, no tenderness to palpation Skin: warm, dry Neurologic: following commands, moving all extremities Psychiatric: full affect, no hallucinations HISTORY:  
 
Principal Problem: 
  GI bleed (8/24/2020) Past Medical History:  
Diagnosis Date  Aneurysm ([facility] [address] AAA repair 2006 & 2012  Aorto-iliac duplex 02/13/2015 Tech difficult. Patent aorta bi-iliac endograft w/o leak or limb dysfunction.  Arrhythmia   
 a fib  Cardiac cath 11/01/2012 RCA (sm, nondom) patent. LM patent. LAD 25%. CX (dom) 45% mid. LVEDP 12 mmHg. No WMA. PA 27/12. W 10-12. CO/CI 5.2/2.6 (TD).  Cardiac echocardiogram, abnormal 02/20/2016 EF 55%. No WMA. Indeterminate diastolic fx. RVSP 60 mmHg. Severe LAE. Mild MR. Mod TR.  IVCE. Similar to study of 10/26/12.  Cardiovascular aorto-iliac duplex 02/13/2015 Patent aorta bi-iliac endovascular graft w/o leak or limb dysfunction. Sac measures 4.21 x 4.47 cm (4.4 x 4.6 cm on 1/31/14).  Cardiovascular LE peripheral arterial testing 07/29/2013 No significant LE arterial disease bilaterally. R GHADA 1. 12.  L GHADA 1. 12.  R DBI 0.83. L DBI 0.71. Exercise deferred.  Cardiovascular renal duplex 10/31/2012 No RA stenosis. Intrinsic/med disease in left kidney. Patent aortic endograft. Patent, thrombus-free renal veins bilaterally.  Carotid duplex 07/29/2013 Mild <50% bilateral ICA plaquing.  Chronic kidney disease  Chronic lung disease  Cigarette smoker  Congestive heart failure (CHF) ([address]  COPD (chronic obstructive pulmonary disease) (HCC)   
 and emphysema; likely secondary to tobacco abuse  Difficult intubation  Dyslipidemia   
 low HDL  Emphysema   
 HTN (hypertension)  Hypercholesterolemia  Ill-defined condition   
 hernia  Increased prostate specific antigen (PSA) velocity  Long term (current) use of anticoagulants   
 coumadin  Osteoarthritis  Osteomyelitis ([address]  Paroxysmal atrial fibrillation (HCC)  Peripheral vascular disease ([facility] [address] AAA repair 12/2007  Persistent atrial Fibrillation  Persistent atrial fibrillation (HCC)  Unspecified adverse effect of anesthesia   
 difficulty breathing placed in ICU Oct 2012 (AAA repair) Past Surgical History:  
Procedure Laterality Date  BRONCHOSCOPY DIAGNOSTIC  2012  CARDIAC CATHETERIZATION  2012  COLONOSCOPY N/A 2016 COLONOSCOPY with Polypectomies performed by Rebecca Guido MD at [facility]COPY  COLONOSCOPY N/A 2019 [facility] with [facility]PAIR    
  &   HX HEART CATHETERIZATION  3/4/2009 1. RCA small, nondominant; patent. 2. LMCA patent. 3. LAD is long, wrapped around apical vessel. Diffuse, 20-30% stenosis noted. 4. CCA is large, dominant vessel. Diffuse 20-30% stenosis. 5. LVEDP is 16 mmHg. 6. Overall left ventricular systolic function mildly diminshed with est. EF 45%. Mild, global hypokinesis noted. 7. No significant mitral regurgitation or aortic stenosis noted. (see report)  HX HERNIA REPAIR  2014  
 rt inguinal   
 HX HERNIA REPAIR  2016 LEFT INGUINAL HERNIA REPAIR DR. Kaley Espinal  REPAIR ING HERNIA,5+Y/O,JESSIE Left 16 Dr. Terry LONG  2012 Family History Problem Relation Age of Onset  Heart Surgery Father BYPASS  Stroke Father  Heart Disease Father  Hypertension Father  Heart Attack Father  Heart Surgery Mother BYPASS  Coronary Artery Disease Mother  Stroke Mother  Heart Disease Mother  Hypertension Mother  Diabetes Sister  Cancer Brother History reviewed, no pertinent family history. Social History Tobacco Use  Smoking status: Former Smoker Packs/day: 1.50 Years: 54.00 Pack years: 81.00 Types: Cigarettes Last attempt to quit: 10/23/2012 Years since quittin.8  Smokeless tobacco: Never Used Substance Use Topics  Alcohol use: No  
  Alcohol/week: 0.0 standard drinks Comment: quit drinking alcohol 26 years ago, patient states stopped in \"6633\" Allergies Allergen Reactions  Codeine Swelling  Morphine Itching  Sulfa (Sulfonamide Antibiotics) Other (comments) Kidney problems. Current Facility-Administered Medications Medication Dose Route Frequency  fentaNYL (DURAGESIC) 12 mcg/hr patch 1 Patch  1 Patch TransDERmal Q72H  
 HYDROmorphone (DILAUDID) tablet 4 mg  4 mg Oral Q4H PRN  
 LORazepam (INTENSOL) 2 mg/mL oral concentrate 0.5 mg  0.5 mg Oral Q1H PRN  
 ondansetron (ZOFRAN ODT) tablet 4 mg  4 mg Oral Q6H PRN  
 scopolamine (TRANSDERM-SCOP) 1 mg over 3 days 1 Patch  1 Patch TransDERmal Q72H PRN  
 bisacodyL (DULCOLAX) suppository 10 mg  10 mg Rectal DAILY PRN  
 diphenhydrAMINE (BENADRYL) capsule 25 mg  25 mg Oral Q6H PRN  
 acetaminophen (TYLENOL) tablet 650 mg  650 mg Oral TID  pantoprazole (PROTONIX) 40 mg in 0.9% sodium chloride 10 mL injection  40 mg IntraVENous Q24H  
 sucralfate (CARAFATE) tablet 1 g  1 g Oral AC&HS  lidocaine 4 % patch 1 Patch  1 Patch TransDERmal Q24H  
 albuterol-ipratropium (DUO-NEB) 2.5 MG-0.5 MG/3 ML  3 mL Nebulization Q4H PRN  
 amLODIPine (NORVASC) tablet 10 mg  10 mg Oral DAILY  digoxin (LANOXIN) tablet 0.125 mg  0.125 mg Oral DAILY  DULoxetine (CYMBALTA) capsule 20 mg  20 mg Oral DAILY  lisinopriL (PRINIVIL, ZESTRIL) tablet 20 mg  20 mg Oral DAILY  polyethylene glycol (MIRALAX) packet 17 g  17 g Oral DAILY  tamsulosin (FLOMAX) capsule 0.4 mg  0.4 mg Oral DAILY  traZODone (DESYREL) tablet 50 mg  50 mg Oral QHS  
 
 
 
 LAB AND IMAGING FINDINGS:  
 
Lab Results Component Value Date/Time WBC 8.1 09/01/2020 05:54 AM  
 HGB 8.9 (L) 09/01/2020 02:45 PM  
 PLATELET 882 26/88/5007 05:54 AM  
 
Lab Results Component Value Date/Time  Sodium 140 09/01/2020 05:54 AM  
 Potassium 3.9 09/01/2020 05:54 AM  
 Chloride 108 09/01/2020 05:54 AM  
 CO2 28 09/01/2020 05:54 AM  
 BUN 8 09/01/2020 05:54 AM  
 Creatinine 0.68 09/01/2020 05:54 AM  
 Calcium 7.5 (L) 09/01/2020 05:54 AM  
 Magnesium 1.7 09/01/2020 05:54 AM  
 Phosphorus 2.3 (L) 09/01/2020 05:54 AM  
  
Lab Results Component Value Date/Time Alk. phosphatase 91 08/31/2020 12:34 AM  
 Protein, total 5.5 (L) 08/31/2020 12:34 AM  
 Albumin 1.9 (L) 08/31/2020 12:34 AM  
 Globulin 3.6 08/31/2020 12:34 AM  
 
Lab Results Component Value Date/Time INR 1.7 (H) 08/23/2020 10:45 PM  
 Prothrombin time 19.3 (H) 08/23/2020 10:45 PM  
 aPTT 34.0 08/23/2020 10:45 PM  
  
Lab Results Component Value Date/Time Iron 106 08/24/2020 09:55 AM  
 TIBC 164 (L) 08/24/2020 09:55 AM  
 Iron % saturation 65 (H) 08/24/2020 09:55 AM  
 Ferritin 5,764 (H) 08/24/2020 09:55 AM  
  
No results found for: PH, PCO2, PO2 No components found for: [place] Lab Results Component Value Date/Time CK 35 (L) 08/23/2020 05:35 PM  
 CK - MB <1.0 08/23/2020 05:35 PM  
  
 
 
   
 
Total time: 15 minutes Counseling / coordination time, spent as noted above: 10 minutes 
> 50% counseling / coordination?: yes, patient, family, attending, CM, RN, hospice liaison Prolonged service was provided for  []30 min   []75 min in face to face time in the presence of the patient, spent as noted above. Time Start:  
Time End:  
Note: this can only be billed with 36996 (initial) or 51205 (follow up). If multiple start / stop times, list each separately.

## 2020-09-02 NOTE — PROGRESS NOTES
1035:Patient requesting pain med for 7/10 chronic back pain. 1mg Dilaudid given IV. Patient has 2 PIVs with only one working properly. Will need to establish another site if continued IV pain medications. 1100:Patient had small, brown, loose stool. Performed liv-area care with clean up and changed linens.

## 2020-09-02 NOTE — HOSPICE
Pt/family pursuing hospice:yes Admission dx approved by Hospice Medical Director: chronic respiratory failure secondary to COPD and CHF Anticipated hospital d/c date:9/3/20 Discussion occurred with patient and/or family about preference of hospitalization once admitted to hospice: yes Reviewed and discussed hospice philosophy and keeping the patient comfortable in their residence: yes (Document additional information below) Discussion with patient/family regarding around the clock caregiver in place due to patient safety in the home once discharged: yes Identified caregiver: (Document specifics discussed and/or any caregiver concerns identified). Narrative of events and/or assessment if applicable: [facility] [place]- hospital bed w/full rails, gel overlay, and over the bed table - scheduled to be delivered today by 630pm - [employer] [id number]. Transportation requested for 11am tomorrow. Meagan Thakkar RN [address], [address] [place], [address] [place] [address] Str. 
[phone] Email: [email or web address]

## 2020-09-02 NOTE — PROGRESS NOTES
Problem: Falls - Risk of 
Goal: *Absence of Falls Description: Document Rebekah Priest Fall Risk and appropriate interventions in the flowsheet. Outcome: Progressing Towards Goal 
Note: Fall Risk Interventions: 
Mobility Interventions: Bed/chair exit alarm, Strengthening exercises (ROM-active/passive) Medication Interventions: Bed/chair exit alarm, Patient to call before getting OOB, Evaluate medications/consider consulting pharmacy Elimination Interventions: Bed/chair exit alarm, Call light in reach, Urinal in reach Problem: Patient Education: Go to Patient Education Activity Goal: Patient/Family Education Outcome: Progressing Towards Goal 
  
Problem: Pressure Injury - Risk of 
Goal: *Prevention of pressure injury Description: Document Claude Scale and appropriate interventions in the flowsheet. Outcome: Progressing Towards Goal 
Note: Pressure Injury Interventions: 
Sensory Interventions: Assess changes in LOC, Minimize linen layers, Keep linens dry and wrinkle-free Moisture Interventions: Absorbent underpads, Minimize layers, Check for incontinence Q2 hours and as needed, Apply protective barrier, creams and emollients Activity Interventions: Pressure redistribution bed/mattress(bed type) Mobility Interventions: HOB 30 degrees or less, Pressure redistribution bed/mattress (bed type) Nutrition Interventions: Document food/fluid/supplement intake Friction and Shear Interventions: Minimize layers Problem: Patient Education: Go to Patient Education Activity Goal: Patient/Family Education Outcome: Progressing Towards Goal 
  
Problem: Patient Education: Go to Patient Education Activity Goal: Patient/Family Education Outcome: Progressing Towards Goal 
  
Problem: Patient Education: Go to Patient Education Activity Goal: Patient/Family Education Outcome: Progressing Towards Goal 
  
Problem: Pain Goal: *Control of Pain Outcome: Progressing Towards Goal 
  
 Problem: Nutrition Deficit Goal: *Optimize nutritional status Outcome: Progressing Towards Goal

## 2020-09-02 NOTE — PROGRESS NOTES
[facility] Progress Note Patient: Jerod Campoverde MRN: [id number] SSN: [id number]  YOB: 1940 Age: 79 y.o. Sex: male Admit Date: 8/23/2020 LOS: 9 days Chief Complaint Patient presents with  Fatigue Subjective: No overnight events Pt reports overall condition unchanged, no increased back pain, no LE paresthesias or increased weakness. He feels comfortable with his plan to move forward with palliative care and hospice. ROS: Denies F/C, NVD, HA, lightheadedness, dizziness, SOB, cough, CP, palpitations, ABD pain, bowel complaints, urinary complaints, edema, numbness, or tingling. Objective:  
 
Visit Vitals /70 (BP 1 Location: Right arm, BP Patient Position: At rest) Pulse 85 Temp 98.4 °F (36.9 °C) Resp 19 Ht 5' 6\" (1.676 m) Wt 64.9 kg (143 lb 1.3 oz) SpO2 99% BMI 23.09 kg/m² Physical Exam:  
General:  AAOx3, NAD, laying comfortably in bed HEENT: Conjunctiva pale with thick yellow discharge, sclera anicteric. PERRL. EOMI. Dry mucous membranes. Thyroid not enlarged, no nodules. No cervical, supraclavicular, occipital or submandibular lymphadenopathy. No other gross abnormalities present. CV:  Irregular rhythm, normal rate, no murmurs. No visible pulsations or thrills. RESP:  Unlabored breathing. Lungs clear to auscultation without adventitious breath sounds. Equal expansion bilaterally. ABD:  Soft, nontender, nondistended. BS (+). No hepatosplenomegaly. R inguinal hernia, reducible, nontender, no overlying skin changes MS:  No joint deformity. BLE atrophy. Neuro:  CN II-XII grossly intact. 4/5 strength bilateral upper extremities and lower extremities. Ext:  No edema. 2+ radial and dp pulses bilaterally. Skin:  No rashes, lesions, or ulcers. Good turgor. Intake and Output: 
Current Shift: No intake/output data recorded. Last three shifts: 08/31 1901 - 09/02 0700 In: 2533 [P.O.:480; I.V.:3137] Out: 1600 [Urine:1600] Lab/Data Review: No results found for this or any previous visit (from the past 12 hour(s)). RECENT RESULTS MODALITY IMPRESSION  
XR Results from Hospital Encounter encounter on 08/23/20 XR CHEST PORT Narrative CHEST PORTABLE INDICATIONS: Atrial fibrillation, COPD, CHF with worsening cough. COMPARISON: 8/23/2020 FINDINGS:  
 
Support lines/catheters: External monitoring leads overlie the chest. 
 
Lungs: Slightly hazy right lung opacity compared to the left which may represent 
a layering pleural effusion. Cardiac silhouette and mediastinal contours: Stable unfolding of the aortic 
contour. Mildly enlarged cardiac silhouette. Bones and soft tissues: Chronic right sixth rib fracture. Impression Impression: 
 
Hazy opacity in the right lung most likely represents a layering pleural 
effusion. Stable mild cardiomegaly with aortic ectasia. CT Results from [facility] Encounter encounter on 08/23/20 CTA CHEST ABD PELV W WO CONT Narrative CTA Chest, Abdomen And Pelvis With and without Enhancement INDICATION: Abdominal aortic aneurysm status post endovascular repair, peptic 
ulcer disease, heart failure, COPD with chronic respiratory failure on home 
oxygen, chronic back pain, BPH with malaise, worsening back pain, weakness for 
3-4 weeks, hypotension, acute on chronic anemia, melanotic stool. TECHNIQUE: 2.5 mm collimation axial images obtained from the thoracic inlet to 
the level of the pubic symphysis following the uneventful administration of 80 
mL Isovue-370 nonionic intravenous contrast.  Imaging performed during maximum 
aortic enhancement and is therefore suboptimal for evaluating solid organs and 
bowel. Raw data reviewed along with three-dimensional volume rendered images 
and maximum intensity projection images to better evaluate the tortuous vascular 
structures. All CT scans at this facility are performed using dose optimization technique as 
appropriate to a performed exam, to include automated exposure control, 
adjustment of the mA and/or kV according to patient size (including appropriate 
matching first site-specific examinations), or use of iterative reconstruction 
technique. COMPARISON: 2/17/2020. Thyroid: Unremarkable. Heart: Severe regions of coronary arteriosclerosis in the left circumflex. Biatrial cardiac chamber enlargement. Pulmonary artery: Patent Lymph Nodes: 2 Similar 1.4 cm precarinal lymph nodes, likely reactive. Lung: Moderate centrilobular emphysema. Mild subsegmental atelectasis in the 
right lower lobe. Mild subsegmental atelectasis or scarring right upper and 
right middle lobes. Pleura: Increased moderate right and similar trace left pleural effusions. No 
pneumothorax. ABDOMEN FINDINGS:  
There is suboptimal evaluation of visceral organs secondary to timing of the 
exam. 
 
 
Liver: Similar scattered hepatic cysts measuring up to 2.6 cm in the left medial 
hepatic lobe at the dome and 2.4 cm right posterior hepatic lobe. There are a 
few too small to characterize hepatic lesions but statistically most likely 
benign. Spleen: Unremarkable. Pancreas: Pancreas is atrophic. Biliary: Possible adenomyomatosis gallbladder fundus. Bowel: There is a increased rectal stool burden. Gas-filled gastric distention 
third portion duodenum does not clearly cross midline into the left abdomen but 
there is no volvulus or bowel dilation. No bowel wall thickening. No 
pneumatosis. Appendix in the right inguinal hernia without inflammation, 
unchanged. There is fluid in the esophagus. Peritoneum/ Retroperitoneum: There is edema without free fluid or free air. Lymph Nodes: Unremarkable. Adrenal Glands: Unremarkable.  
Kidneys: Right kidney is 7.1 cm, previously 8.3 cm and the left is 10.7 cm, 
 previously 10.4 cm. The right kidney hypoenhance is with cortical thinning. There is cortical scarring at the left lower pole. There is a exophytic 0.9 cm 
hyperdense lesion at the right lower pole without enhancement. 2 cm left lower 
pole cyst. Several too small to characterize renal lesions. PELVIS FINDINGS:  
 
Bladder/ Pelvic Organs: Small diverticulum at the left anterior bladder wall. Moderate bladder distention. Bones/Soft tissues: Mild anasarca. Osteopenia. Lumbar facet hypertrophy and 
arthropathy. Transitional lumbosacral junction. Degenerative disc disease. Central height loss superior endplate L3.   
 
 
AORTA FINDINGS: 
Thoracic aorta is not enlarged with mild atherosclerosis. 3 vessel arch anatomy 
with patent arch vessels. Subjectively, the aneurysm cranial to the proximal 
aortic stent landing zone is increased. There appears to be a similar 1 cm 
crescent of enhancement at the right anterior aspect with endoleak but there is 
increased nonopacified circumferential region measuring up to 0.6 cm on the left 
(series 3, image 138), not present on prior exam. The excluded aneurysm is 
subjectively larger. There is similar right type I B endoleak distal to the 
common iliac stent. The vessel measures 2.3 cm at this location, previously 2.1 
cm. The peripheral right common iliac is 1.9 cm, previously 1.7 cm. There are 
new linear regions of thrombus which protrude into the central aortic lumen at 
the more cranial aspect of the stent at the level of the left renal vein and 
within the bilateral common iliac stents protruding into the central lumen. Mild stenosis proximal celiac. Regions of stenosis splenic artery. Mild stenosis 
proximal SMA. Moderate stenosis proximal left renal artery and proximal 
occlusion right renal artery. There is stenosis at the origin of the right 
internal iliac, origin left internal iliac. Three-D generated aortic measurements: 3. 6, 3.2, 2.9 centimeter at the sinus of Valsalva. 3.5 x 3.1 centimeter at the sinotubular junction. 3.8 x 3.8 centimeter at the maximum diameter of the ascending aorta. 3.5 x 3.2 centimeter at the mid aortic arch. 3.4 x 3.2 centimeter at the proximal descending thoracic aorta. 3.6 x 3.1 centimeter at the mid descending aorta. 3.6 x 3.5 centimeter at the distal descending thoracic aorta at the level of the 
diaphragmatic hiatus. Aneurysm measurements: 
Volume 149.73 cc, previously 166.71 cc 
4.9 x 4.2 cm below the lowest renal artery including excluded aorta, previously 4.4 x 3.7 cm 
1 cm below the lowest renal artery within the stent: 3 x 2.6 cm; including 
excluded aorta: 5.8 x 4.4 cm, previously 3.9 x 3.9 cm 
3.1 x 3 cm within the stent at the top of the stent; 5.4 x 4.4 cm including 
excluded aorta, previously 4 x 3.6 cm 
2.7 x 2.7 cm within the stent below the lowest renal artery 2.7 x 2.7 cm Within the stent below the lowest renal artery 3 cm distance from the lowest renal artery to the top of the stent Impression IMPRESSION: 
1.  New strands of thrombus along the wall of the aortic and bilateral common 
iliac stents which protrudes into the central aortic lumen and place patient at 
risk for lower extremity embolic events. 2.  Increased aneurysm cranial to the aortobiiliac stent with resultant 
increased type I A endoleak. Similar type I B endoleak. Increased excluded 
aortic aneurysm measuring 5.8 cm (change of 2 cm at this level). Minimal 
increased right common iliac aneurysm measuring 1.9 cm. 
3.  Increased moderate right and similar small left pleural effusions. 4.  Increased rectal stool burden. No active extravasation. 5.  Gas-filled gastric distention. 6.  Occlusion proximal right renal artery with worsened atrophy and 
hypoenhancement. 7.  Severe left circumflex coronary arteriosclerosis. 8.  Biatrial cardiac chamber enlargement. 9.  Moderate stenosis left renal artery, stenosis at the origin of the bilateral 
internal iliac arteries. 10.  Moderate emphysema. 11.  Gastroesophageal reflux. 12.  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 13.  Unchanged Incidental Amyand hernia. 14.  Right renal hemorrhagic/proteinaceous cyst. 
15.  Small bladder diverticulum. Preliminary report provided by Dr. Santana Mathur on 8/23/2020 at 2234 hours prior to 
3-D image availability. MRI Results from [place] encounter on 08/23/20 MRI LUMB SPINE W CONT Narrative EXAM: MRI LUMBAR SPINE  CPT CODE: 38931 CLINICAL INDICATION/HISTORY: Evaluate discitis. COMPARISON: CTA chest, abdomen pelvis of 23 August 2020. TECHNIQUE: MRI of the lumbar spine is performed by using standard pre- and 
post-gadolinium protocol pulse sequences with axial images from T12/L1 through S1/S2. Contrast: MultiHance - 14 mL IV given in conjunction with prior MRA 
abdomen; unremarkable administration. FINDINGS: There is a subtle retrolisthesis of L2 on L3 and mild anterolisthesis 
of L4 on L5. There is a mild scoliosis convex to the left centered about L2. Other vertebral body alignment is normal.  S1 is a transitional vertebra with 
lumbarization and a rudimentary S1/S2 disc. There is mild increased signal 
intensity in the bone marrow of L1 and L2 on STIR images accompanied by marrow 
enhancement of both vertebrae on post-gadolinium images where then may be some 
relative sparing of the inferior endplate marrow of S2. Other marrow signal 
intensity is relatively normal.  Bright signal intensity is seen within the 
intervertebral disc at L1/L2 with evidence of some subligamentous extrusion 
posteriorly and more significantly anteriorly. There is inflammatory change in 
enhancement of the paraspinal soft tissues and psoas muscles, bilaterally, more 
focally at L2 on the left and more broadly and longitudinally on the right from S2 through L5 with a focal 1.9 cm x 1.4 cm x 2.5 cm abscess in the right psoas 
muscle at S2/S3 level. There is a probable insufficiency fracture of the 
superior L3 vertebra with degenerative changes and L2-3 disc. There is 
moderately severe disc space narrowing at L4/L5 and moderate narrowing at L5/S1. Mild-to-moderate narrowing may be present at T12/L1. L3/L4 is preserved. No 
cord lesions are seen and the conus medullaris ends normally at L1. There is 
relatively minimal enhancement of the anterior epidural soft tissues at the 
level of the disc inflammation, somewhat asymmetric to the left. L5/S1: There is a broad-based disc bulge and mild central stenosis. There is 
moderate bilateral foraminal encroachment by disc protrusion and some facet 
arthropathy. There may be mild impression on the exiting root sleeves 
bilaterally. L4/L5: There is mild-to-moderate canal stenosis from the listhesis and facet 
arthropathy with an AP diameter of about 8 mm. There is mild-to-moderate right 
and moderately severe left foraminal stenosis. L3/L4: The central canal is patent without significant disc protrusion. There 
is mild-to-moderate bilateral foraminal stenosis from small foraminal 
protrusions. L2/L3: There is no significant canal stenosis. There is mild-to-moderate 
bilateral foraminal stenosis from foraminal protrusions, right slightly more 
prominent than left. L1/L2: There is mild encroachment on the thecal sac from a subtle broad-based 
protrusion and some inflammation and thickening in the anterior epidural soft 
tissues somewhat asymmetric to the left. Soft tissue inflammation extends into 
the exit foramina, bilaterally with mild-to-moderate bony canal stenosis. T12/L1: There is a mild right lateral to foraminal disc protrusion with subtle 
canal encroachment asymmetric to the right. There may be mild right foraminal 
encroachment; the left foramen is patent. Multiple small cortical cysts are seen in both kidneys as well as several cysts 
in the visualized inferior portion of the right lobe of the liver. Note that 
the right kidney is [id number] atrophic. Again noted is the distal abdominal aortic aneurysm treated with an aortobiiliac 
endograft stent. Note that the right limb of the stent supplies the left common 
iliac artery and the left limb the right common iliac artery. There is a 
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 
not significantly changed Impression IMPRESSION: 
 
L1/L2 discitis. Minimal posterior disc protrusion and canal with some mild 
focal inflammation in the anterior epidural space slightly asymmetric to the 
left resulting in mild canal encroachment. Soft tissue inflammation and 
enhancement extends into both L1/L2 foramina. There is significant inflammatory 
change in the right paraspinal soft tissues and psoas muscle from L2 through L5 
and a small 2 cm abscess in the right psoas at about L2/L3. There is focal 
paraspinal inflammation on the left at about L2. Persistent spondylitic and disc degenerative changes with mild-to-moderate canal 
stenosis, particularly at L4/L5, and multilevel foraminal stenoses most 
significant at L5-S1. Status post treatment distal aortic aneurysm with aortobiiliac stent. Persistent aneurysm distal right common iliac artery at the end of the stent. Several hepatic cysts and bilateral renal cortical cysts. There is 
mild-to-moderate atrophy of the right kidney. Note: A congruent preliminary report was provided by Dr. Constance Chakraborty at the time of 
the study. ULTRASOUND Results from Hospital Encounter encounter on 08/25/17 US EXT NONVAS RT LTD Impression Impression:  Large fat-containing right inguinal hernia.  On recent CT scan 
(August 25, 2017), the appendix is seen extending through the internal inguinal 
 ring into the inguinal hernia. This is not redemonstrated on today's ultrasound 
but potentially if the appendix does not have air within it, it may be difficult 
to visualize sonographically. No adenopathy. [facility] US SCROTUM/TESTICLES Impression IMPRESSION: 
1. Increased internal vascularity of the left testicle suggests mild orchitis. Cardiology Procedures/Testing: MODALITY RESULTS  
EKG Results for orders placed or performed during the hospital encounter of 08/23/20 EKG, 12 LEAD, INITIAL Result Value Ref Range Ventricular Rate 71 BPM  
 Atrial Rate 288 BPM  
 QRS Duration 86 ms  
 Q-T Interval 350 ms QTC Calculation (Bezet) 380 ms Calculated R Axis -42 degrees Calculated T Axis 81 degrees Diagnosis Atrial fibrillation Left axis deviation Low voltage QRS Abnormal ECG When compared with ECG of 26-JAN-2020 09:46, 
Criteria for Anteroseptal infarct are no longer present Confirmed by Teri Rhodes MD, Subha Horse ([id number]) on 8/24/2020 8:30:54 AM 
  
Results for orders placed or performed in visit on 08/10/18 AMB POC EKG ROUTINE W/ 12 LEADS, INTER & REP Narrative Read by Geannie Kussmaul, MD - atrial fibrillation, occasional ectopic ventricular beat. Nonspecific QRS widening and anterior fascicular block. Old anterior infarct. Possible nonspecific ST abnormality. ECHO 06/02/19 ECHO ADULT COMPLETE 06/04/2019 6/4/2019 Narrative · Left Ventricle: Estimated left ventricular ejection fraction is 56 -  
60%. Abnormal left ventricular septal motion. Interventricular septal  
\"bounce\". · Tricuspid Valve: Non-specific thickening of the tricuspid valve. Moderate tricuspid valve regurgitation is present. · Pulmonary Artery: Moderate pulmonary hypertension. Pulmonary arterial  
systolic pressure is 75.8 mmHg. · Pulmonic Valve: Mild pulmonic valve regurgitation is present. · Left Atrium: Severely dilated left atrium. · Right Atrium: Moderately dilated right atrium. Signed by: Jose Miguel Peña MD  
  
 
Special Testing/Procedures: MODALITY RESULTS MICRO All Micro Results Procedure Component Value Units Date/Time CULTURE, BLOOD [[id number]] Collected:  08/28/20 1300 Order Status:  Completed Specimen:  Blood Updated:  09/01/20 8648 Special Requests: NO SPECIAL REQUESTS Culture result: NO GROWTH 4 DAYS     
 CULTURE, BLOOD [[id number]] Collected:  08/26/20 1503 Order Status:  Completed Specimen:  Blood Updated:  09/01/20 8838 Special Requests: NO SPECIAL REQUESTS Culture result: NO GROWTH 6 DAYS     
 CULTURE, RESPIRATORY/SPUTUM/BRONCH Ashley Shaker STAIN [[id number]]  (Abnormal) Collected:  08/29/20 0810 Order Status:  Completed Specimen:  Sputum Updated:  08/31/20 1108 Special Requests: NO SPECIAL REQUESTS     
  GRAM STAIN OCCASIONAL WBCS SEEN     
      
  RARE EPITHELIAL CELLS SEEN  
     
   OCCASIONAL YEAST Culture result:    
  LIGHT YEAST, (APPARENT CANDIDA ALBICANS) MODERATE NORMAL RESPIRATORY RUDY Azell Martinez REFL [[id number]]  (Abnormal) Collected:  08/23/20 1820 Order Status:  Completed Specimen:  Miscellaneous sample Updated:  08/29/20 1136 Source BLOOD Aer+Anaer Suscept Result 1 Eikenella corrodens Comment: (NOTE) Identification performed by account, not confirmed by this 
laboratory. Testing performed by broth microdilution. AZITHROMYCIN = 0.5 ug/ml = SUSCEPTIBLE 
CEFOTAXIME   = <=0.25 ug/ml = SUSCEPTIBLE Antimicrobial suscept. Comment Comment: (NOTE) ** S = Susceptible; I = Intermediate; R = Resistant ** P = Positive; N = Negative MICS are expressed in micrograms per mL Antibiotic                 RSLT#1    RSLT#2    RSLT#3    RSLT#4 Ampicillin                     S 
Ceftriaxone                    S 
Chloramphenicol                S 
 Levofloxacin                   S 
Meropenem                      S 
Penicillin                     S 
Tetracycline                   S 
Trimethoprim/Sulfa             S 
Performed At: [address] [id number] Landen Enrique MD [id number] SUSCEPTIBILITY, AER + ANAEROB [[id number]] Collected:  08/23/20 1820 Order Status:  Completed Specimen:  MICROBIAL ISOLATE Updated:  08/29/20 1136 Source BLOOD Susceptibility, Aer + Anaerob Final Report Below Comment: (NOTE) Performed At: [address] [id number] Landen Enrique MD [id number] CORRECTED ON 08/29 AT 1136: PREVIOUSLY REPORTED AS Preliminary report CULTURE, BLOOD [[id number]]  (Abnormal) Collected:  08/23/20 1805 Order Status:  Completed Specimen:  Blood Updated:  08/27/20 1223 Special Requests: NO SPECIAL REQUESTS     
  GRAM STAIN    
  ANAEROBIC BOTTLE NO ORGANISMS SEEN Culture result:    
  EIKENELLA CORRODENS GROWING IN THE ANAEROBIC BOTTLE BETA LACTAMASE NEGATIVE CULTURE, BLOOD [[id number]]  (Abnormal) Collected:  08/23/20 1820 Order Status:  Completed Specimen:  Blood Updated:  08/27/20 4996 Special Requests: NO SPECIAL REQUESTS Culture result:    
  EIKENELLA CORRODENS , BETA LACTAMASE NEGATIVE , ISOLATED FROM THE ANAEROBIC BOTTLE  
     
 CULTURE, RESPIRATORY/SPUTUM/BRONCH Magnolia Juliet STAIN [[id number]] Collected:  08/26/20 1030 Order Status:  Canceled Specimen:  Sputum Sherlon Kanaris [[id number]] Collected:  08/24/20 0358 Order Status:  Completed Specimen:  Urine from Clean catch Updated:  08/25/20 1026 Special Requests: NO SPECIAL REQUESTS Culture result: No growth (<1,000 CFU/ML) UA Results for orders placed or performed in visit on 04/07/14 AMB POC URINALYSIS DIP STICK AUTO W/O MICRO     Status: None Result Value Ref Range Status  Color (UA POC) Yellow  Final  
 Clarity (UA POC) Clear  Final  
 Glucose (UA POC) Negative Negative Final  
 Bilirubin (UA POC) Negative Negative Final  
 Ketones (UA POC) Negative Negative Final  
 Specific gravity (UA POC) 1.010 1.001 - 1.035 Final  
 Blood (UA POC) Trace Negative Final  
 pH (UA POC) 7.0 4.6 - 8.0 Final  
 Protein (UA POC) Trace Negative mg/dL Final  
 Urobilinogen (UA POC) 0.2 mg/dL 0.2 - 1 Final  
 Nitrites (UA POC) Negative Negative Final  
 Leukocyte esterase (UA POC) Negative Negative Final  
  
PATH Assessment and Plan:  
 
79 y.o. male with PMH AAA s/p endovascular repair, Fe deficiency anemia, PUD, AFib, CHF, CAD, COPD with chronic hypoxic respiratory failure on home 3L NC, chronic back pain, and BPH, now admitted with acute on chronic anemia. 
  
Acute on chronic anemia: Pt was followed by ID for a GI bleed that was resolved. He also has a AAA with type I A endoleak that requires repair for which he was hoping to transfer to [facility] however, he is not a stable surgery candidate.  
- GI following, EGD 8/27, no signs active or recent bleeding 
- H/H Q12H, transfuse if Hgb < 7 
- Hgb 8.2 after last transfusion, Hgb today 8.9 
- Pt would like to stop lab draws, stop IV fluids and stop antibiotic therapy - Vascular: AAA Type 1 A Endoleak, pt now declining surgical intervention, would like to go home and be with loved ones - protonix gtt  
  
Sepsis: Respiratory culture on 8/29 showed light yeast, ID recs appreciated Blood culture on 8/23 showed EIKENELLA CORRODENS , BETA LACTAMASE NEGATIVE, pt was started on Piperacillin/Tazobactam. 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 William Kwan - [place] MRA ABD and MRI L-spine - discussed w/ Dr. Lj Beaulieu; MRI spine w/ L1/2 discitis, focal anterior epidural inflammation w/ mild canal encroachment, L2/L3 2cm Right psoas abscess - Pt would like to stop lab draws and stop antibiotic therapy 
  
AFib/CHF/CAD: rate controlled on Amlodipine; off ASA and Coumadin due to hx of GIB. No evidence of decompensated CHF noted in cardiology clinic visit (6/16/20). CXR shows large-moderate R pleural effusion. Coronary angiography in 2012 only showed mild CAD. - Continue Amlodipine 10mg QD, Digoxin 125mcg QD, Pravastatin 40mg QD, Lisinopril 20mg QD 
  
COPD: Complicated by Chronic Hypoxic Respiratory Failure, on 3L NC at home. - Supplemental O2 for sats < 92% - Duoneb PRN 
  
Chronic back pain: - Lidocaine Patch 
- Oxycodone and Hydromorphone PRN 
  
BPH  
- Tamsulosin 0.4mg daily 
  
Insomnia: On two home sleeping aids - Duloxetine 20mg QHS and Trazadone 50mg QHS   
  
Diet Cardiac DVT Prophylaxis Contraindicated GI Prophylaxis Protonix gtt Code status DNR/DNI (comfort measures only) Disposition > 2 MN  
  
Point of Contact An Lopez Relationship: daughter 
[phone] Mariela Porras 
Daughter 
[phone] Ruth Arango DO PGY1 [address] Intern Pager: [phone] September 2, 2020, 6:57 AM

## 2020-09-02 NOTE — HOSPICE
Met with Mr. Francis Edmond at the bedside Discussed [facility] philosophy, services, criteria, and IDT. Discussed caregiver need for round the clock care with Mr. Francis Edmond 
primary caregiver identified as Tara Guzman, pt's daughter and other family members Caregiver concerns identified as n/a Answered all questions. Received a return call from Tara Guzman, pt's daughter. She advised she could have the bed taken out today and will be ready for our hospital bed this evening. She ask that pt be transported home in the morning. Pt and daughter agreeable to comfort measures and home with hospice. Gave brochure with 24/7 contact information. Thank you for the referral to [employer]. If we can be of further assistance please contact [phone]. Bernadine Newman RN [address], [address] [place], [facility] 
[phone] Email: [email or web address]

## 2020-09-02 NOTE — HOSPICE
VM left for Marlen Bermeo, pt's daughter requesting a return call to discuss hospice goals and philosophy. Hospice referral received. Chart review in process. Thank you for the referral to [facility]. If we can be of further assistance please contact [phone] Esther Smith RN [place] [address] [place], [address] 
[phone] Email: [email or web address]

## 2020-09-02 NOTE — DISCHARGE SUMMARY
[address] [place] Discharge Summary Patient: Laxmi Bautista MRN: [id number]  CSN: [id number] YOB: 1940  Age: 79 y.o. Sex: male Admission Date: 8/23/2020 Discharge Date: 9/3/2020 Attending: Zeus Frederick MD PCP: Michelle Duffy, DO  
 
=================================================================== Reason for Admission: GI bleed [K92.2] Discharge Diagnoses:  
Acute on Chronic Anemia Sepsis Afib/CHF/CAD 
COPD Chronic Back Pain BPH Insomnia Discharge Medications:    
Current Discharge Medication List  
  
 
 
Disposition: Hospice Consultants:   
Palliative Care GI 
ID Vascular Surgery IR Brief Hospital Course (including pertinent history and physical findings) 
  
79 y. o. male with PMH AAA s/p endovascular repair, Fe deficiency anemia, PUD, AFib, CHF, CAD, COPD with chronic hypoxic respiratory failure on home 3L NC, chronic back pain, and BPH, admitted with acute on chronic anemia. 
  
Acute on chronic anemia: Pt was followed by ID for a GI bleed, an EGD was done and as not impressive. that was He also has a AAA with type I A endoleak that requires repair for which he was hoping to transfer to [facility] however, he is not a stable surgery candidate.  
- Pt received 2 transfusions and Hgb on discharge was 8.9 
- Pt had a conversation with his daughters and palliative care and decided would like to stop lab draws, stop IV fluids and stop antibiotic therapy and be placed on comfort care 
  
Sepsis: Respiratory culture on 8/29 showed light yeast, ID recs appreciated Blood culture on 8/23 showed EIKENELLA CORRODENS , BETA LACTAMASE NEGATIVE, pt was started on Piperacillin/Tazobactam. 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 [facility] - FU MRA ABD and MRI L-spine - discussed w/ Dr. Hernan Freeman; MRI spine w/ L1/2 discitis, focal anterior epidural inflammation w/ mild canal encroachment, L2/L3 2cm Right psoas abscess - IR evaluated imaging and recommended conservative therapy vs procedure bc the abscess is too small to drain - Pt decided he would like to stop lab draws and stop antibiotic therapy 
  
AFib/CHF/CAD: rate controlled on Amlodipine; off ASA and Coumadin due to hx of GIB. No evidence of decompensated CHF noted in cardiology clinic visit (6/16/20). CXR shows large-moderate R pleural effusion. Coronary angiography in 2012 only showed mild CAD.   
- Continue Amlodipine 10mg QD, Digoxin 125mcg QD, Pravastatin 40mg QD, Lisinopril 20mg QD 
  
COPD: Complicated by Chronic Hypoxic Respiratory Failure, on 3L NC at home. - Supplemental O2 for sats < 92% - Duoneb PRN 
  
 
CURRENT ADMISSION IMAGING RESULTS [address] Result Date: 8/29/2020 IMPRESSION: L1/L2 discitis. Minimal posterior disc protrusion and canal with some mild focal inflammation in the anterior epidural space slightly asymmetric to the left resulting in mild canal encroachment. Soft tissue inflammation and enhancement extends into both L1/L2 foramina. There is significant inflammatory change in the right paraspinal soft tissues and psoas muscle from L2 through L5 and a small 2 cm abscess in the right psoas at about L2/L3. There is focal paraspinal inflammation on the left at about L2. Persistent spondylitic and disc degenerative changes with mild-to-moderate canal stenosis, particularly at L4/L5, and multilevel foraminal stenoses most significant at L5-S1. Status post treatment distal aortic aneurysm with aortobiiliac stent. Persistent aneurysm distal right common iliac artery at the end of the stent. Several hepatic cysts and bilateral renal cortical cysts. There is mild-to-moderate atrophy of the right kidney. Note: A congruent preliminary report was provided by Dr. Deric Ceron at the time of the study. Mra Abd W Wo Cont Result Date: 8/30/2020 IMPRESSION: 1. L1/L2 discitis, with adjacent inflammatory changes in the right psoas muscle and a 2 cm right psoas abscess. These findings are better evaluated on subsequent MR spine 8/28/2020. Please see that report for complete details. 2. Study is inadequate for evaluation of an aortoenteric fistula. No gross fistula is appreciated. 3. Vascular findings are similar to recent prior CTA chest/abdomen/pelvis 8/23/2020. The aortobiiliac stent graft is incompletely evaluated on this study. Similar appearance of abdominal aortic aneurysm cranial to the stent landing zone. 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 Endoleaks and thrombi within the walls of the aortic and bilateral common iliac stents are better evaluated on that prior study. A preliminary report was provided by Dr. Michelle Heredia on 8/28/2020. Cta Chest Abd Pelv W Wo Cont Result Date: 8/24/2020 IMPRESSION: 1.  New strands of thrombus along the wall of the aortic and bilateral common iliac stents which protrudes into the central aortic lumen and place patient at risk for lower extremity embolic events. 2.  Increased aneurysm cranial to the aortobiiliac stent with resultant increased type I A endoleak. Similar type I B endoleak. Increased excluded aortic aneurysm measuring 5.8 cm (change of 2 cm at this level). Minimal increased right common iliac aneurysm measuring 1.9 cm. 3.  Increased moderate right and similar small left pleural effusions. 4.  Increased rectal stool burden. No active extravasation. 5.  Gas-filled gastric distention. 6.  Occlusion proximal right renal artery with worsened atrophy and hypoenhancement. 7.  Severe left circumflex coronary arteriosclerosis. 8.  Biatrial cardiac chamber enlargement. 9.  Moderate stenosis left renal artery, stenosis at the origin of the bilateral internal iliac arteries. 10.  Moderate emphysema. 11.  Gastroesophageal reflux. 12.  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 13.  Unchanged Incidental Amyand hernia.  14.  Right renal hemorrhagic/proteinaceous cyst. 15.  Small bladder diverticulum. Preliminary report provided by Dr. Aamir Avina on 8/23/2020 at 2234 hours prior to 3-D image availability. Xr Chest [facility] Result Date: 8/27/2020 Impression: Hazy opacity in the right lung most likely represents a layering pleural effusion. Stable mild cardiomegaly with aortic ectasia. Xr Chest [facility] Result Date: 8/24/2020 IMPRESSION: 1. Right pleural effusion with hazy airspace opacities likely compressive atelectasis. Infiltrate could have a similar appearance. 2. Stable cardiac enlargement. Cardiology Procedures/Testing: MODALITY RESULTS  
EKG Results for orders placed or performed during the hospital encounter of 08/23/20 EKG, 12 LEAD, INITIAL Result Value Ref Range Ventricular Rate 71 BPM  
 Atrial Rate 288 BPM  
 QRS Duration 86 ms  
 Q-T Interval 350 ms QTC Calculation (Bezet) 380 ms Calculated R Axis -42 degrees Calculated T Axis 81 degrees Diagnosis Atrial fibrillation Left axis deviation Low voltage QRS Abnormal ECG When compared with ECG of 26-JAN-2020 09:46, 
Criteria for Anteroseptal infarct are no longer present Confirmed by Jocy Russell MD, Cleo Sing ([id number]) on 8/24/2020 8:30:54 AM 
  
Results for orders placed or performed in visit on 08/10/18 AMB POC EKG ROUTINE W/ 12 LEADS, INTER & REP Narrative Read by Alfredo Jeffers MD - atrial fibrillation, occasional ectopic ventricular beat. Nonspecific QRS widening and anterior fascicular block. Old anterior infarct. Possible nonspecific ST abnormality. ECHO 08/23/20 ECHO ADULT COMPLETE 08/27/2020 8/27/2020 Narrative · Image quality for this study was technically difficult. · AV: Aortic valve leaflet calcification present. Aortic valve sclerosis  
is noted. No obvious vegetation is noted. · TV: Mild tricuspid valve regurgitation is present. · LV: Estimated LVEF is 55 - 60%. Visually measured ejection fraction. Normal systolic function (ejection fraction normal). Wall motion: normal.  
Inconclusive left ventricular diastolic function. · MV: Mitral annular calcification. Mild mitral valve regurgitation is  
present. · PA: Mild pulmonary hypertension. Pulmonary arterial systolic pressure is 47 mmHg. Signed by: Candido Singleton MD  
  
Nuclear Medicine Results from [facility] Encounter encounter on 08/03/16 Diane Braswell BODY Impression IMPRESSION: 
 
Mildly heterogeneous but matched sulfur colloid and white cell activity at the 
L3-L4 level corresponding with area of abnormality on MRI. The scintigraphic 
findings are nonspecific and may be reflective of the signal changes in this 
area. -There is no discordant white cell localization which may be more suggestive of 
acute spine infection however indium white blood cell scan sensitivity may be 
decreased for vertebral osteomyelitis/discitis compared to other sites in the 
body. -If there is high clinical concern for evaluating infection in the spine region 
consider bone gallium scan correlation. Alternatively continued MRI follow-up 
for stability or other change would also be an additional imaging option to 
consider. Very subtle white blood cell localization associated with the abdominal aorta. The degree of radiotracer localization is very mild and nonspecific. Given the 
CT appearance of chronic stranding and wall thickening, white cell activity 
likely reflects this inflammatory change and may benefit from continued CT 
follow-up for stability. This examination could serve as a baseline for follow 
up for the aorta, as warranted clinically. NM INFLAM PROC WH BODY Impression IMPRESSION: 
 
Mildly heterogeneous but matched sulfur colloid and white cell activity at the 
L3-L4 level corresponding with area of abnormality on MRI. The scintigraphic 
findings are nonspecific and may be reflective of the signal changes in this 
area. -There is no discordant white cell localization which may be more suggestive of 
acute spine infection however indium white blood cell scan sensitivity may be 
decreased for vertebral osteomyelitis/discitis compared to other sites in the 
body. -If there is high clinical concern for evaluating infection in the spine region 
consider bone gallium scan correlation. Alternatively continued MRI follow-up 
for stability or other change would also be an additional imaging option to 
consider. Very subtle white blood cell localization associated with the abdominal aorta. The degree of radiotracer localization is very mild and nonspecific. Given the 
CT appearance of chronic stranding and wall thickening, white cell activity 
likely reflects this inflammatory change and may benefit from continued CT 
follow-up for stability. This examination could serve as a baseline for follow 
up for the aorta, as warranted clinically. IR Results from Hospital Encounter encounter on 08/25/17 IR PICC INSERT WO PORT OVER 5 YEARS Impression IMPRESSION:  
 
Successful placement dual lumen PICC catheter. PICC line catheter can be used 
immediately. Results from [facility] Encounter encounter on 02/18/16 IR PICC INSERT WO PORT OVER 5 YEARS Impression IMPRESSION:  
 
Successful placement dual lumen PICC catheter. PICC line catheter can be used 
immediately. CATH Special Testing/Procedures: MODALITY RESULTS MICRO All Micro Results Procedure Component Value Units Date/Time CULTURE, BLOOD [[id number]] Collected:  08/28/20 1300 Order Status:  Completed Specimen:  Blood Updated:  09/03/20 6493 Special Requests: NO SPECIAL REQUESTS Culture result: NO GROWTH 6 DAYS     
 CULTURE, BLOOD [[id number]] Collected:  08/26/20 1503 Order Status:  Completed Specimen:  Blood Updated:  09/01/20 0514 Special Requests: NO SPECIAL REQUESTS Culture result: NO GROWTH 6 DAYS CULTURE, RESPIRATORY/SPUTUM/BRONCH Rolena Dana STAIN [[id number]]  (Abnormal) Collected:  08/29/20 0810 Order Status:  Completed Specimen:  Sputum Updated:  08/31/20 1108 Special Requests: NO SPECIAL REQUESTS     
  GRAM STAIN OCCASIONAL WBCS SEEN     
      
  RARE EPITHELIAL CELLS SEEN  
     
   OCCASIONAL YEAST Culture result:    
  LIGHT YEAST, (APPARENT CANDIDA ALBICANS) MODERATE NORMAL RESPIRATORY RUDY Cleveland Del Angel REFL [[id number]]  (Abnormal) Collected:  08/23/20 1820 Order Status:  Completed Specimen:  Miscellaneous sample Updated:  08/29/20 1136 Source BLOOD Aer+Anaer Suscept Result 1 Eikenella corrodens Comment: (NOTE) Identification performed by account, not confirmed by this 
laboratory. Testing performed by broth microdilution. AZITHROMYCIN = 0.5 ug/ml = SUSCEPTIBLE 
CEFOTAXIME   = <=0.25 ug/ml = SUSCEPTIBLE Antimicrobial suscept. Comment Comment: (NOTE) ** S = Susceptible; I = Intermediate; R = Resistant ** P = Positive; N = Negative MICS are expressed in micrograms per mL Antibiotic                 RSLT#1    RSLT#2    RSLT#3    RSLT#4 Ampicillin                     S 
Ceftriaxone                    S 
Chloramphenicol                S 
Levofloxacin                   S 
Meropenem                      S 
Penicillin                     S 
Tetracycline                   S 
Trimethoprim/Sulfa             S 
Performed At: [address] [id number] Aba Eldridge MD WI:[id number] SUSCEPTIBILITY, AER + ANAEROB [[id number]] Collected:  08/23/20 1820 Order Status:  Completed Specimen:  MICROBIAL ISOLATE Updated:  08/29/20 1136 Source BLOOD Susceptibility, Aer + Anaerob Final Report Below Comment: (NOTE) Performed At: [address] [id number] Aba Eldridge MD SQ:[id number] CORRECTED ON 08/29 AT 1136: PREVIOUSLY REPORTED AS Preliminary report CULTURE, BLOOD [[id number]]  (Abnormal) Collected:  08/23/20 1805 Order Status:  Completed Specimen:  Blood Updated:  08/27/20 1223 Special Requests: NO SPECIAL REQUESTS     
  GRAM STAIN    
  ANAEROBIC BOTTLE NO ORGANISMS SEEN Culture result:    
  EIKENELLA CORRODENS GROWING IN THE ANAEROBIC BOTTLE BETA LACTAMASE NEGATIVE CULTURE, BLOOD [[id number]]  (Abnormal) Collected:  08/23/20 1820 Order Status:  Completed Specimen:  Blood Updated:  08/27/20 7211 Special Requests: NO SPECIAL REQUESTS Culture result:    
  EIKENELLA CORRODENS , BETA LACTAMASE NEGATIVE , ISOLATED FROM THE ANAEROBIC BOTTLE  
     
 CULTURE, RESPIRATORY/SPUTUM/BRONCH Randine Host STAIN [[id number]] Collected:  08/26/20 1030 Order Status:  Canceled Specimen:  Sputum Marijean Counter [[id number]] Collected:  08/24/20 0358 Order Status:  Completed Specimen:  Urine from Clean catch Updated:  08/25/20 1026 Special Requests: NO SPECIAL REQUESTS Culture result: No growth (<1,000 CFU/ML) ABG Lab Results Component Value Date/Time pH (POC) 7.355 06/03/2019 01:55 AM  
 pCO2 (POC) 37.2 06/03/2019 01:55 AM  
 pO2 (POC) 95 06/03/2019 01:55 AM  
 HCO3 (POC) 20.8 (L) 06/03/2019 01:55 AM  
 FIO2 (POC) 35 06/03/2019 01:55 AM  
  
UA Results for orders placed or performed in visit on 04/07/14 AMB POC URINALYSIS DIP STICK AUTO W/O MICRO     Status: None Result Value Ref Range Status  Color (UA POC) Yellow  Final  
 Clarity (UA POC) Clear  Final  
 Glucose (UA POC) Negative Negative Final  
 Bilirubin (UA POC) Negative Negative Final  
 Ketones (UA POC) Negative Negative Final  
 Specific gravity (UA POC) 1.010 1.001 - 1.035 Final  
 Blood (UA POC) Trace Negative Final  
 pH (UA POC) 7.0 4.6 - 8.0 Final  
 Protein (UA POC) Trace Negative mg/dL Final  
 Urobilinogen (UA POC) 0.2 mg/dL 0.2 - 1 Final  
 Nitrites (UA POC) Negative Negative Final  
 Leukocyte esterase (UA POC) Negative Negative Final  
  
ENDO [unfilled] [unfilled] PATH Laboratory Results: 
LABORATORY RESULTS  
HEMATOLOGY Lab Results Component Value Date/Time WBC 8.1 09/01/2020 05:54 AM  
 Hemoglobin, POC 10.9 (L) 07/26/2016 10:50 AM  
 HGB 8.9 (L) 09/01/2020 02:45 PM  
 Hematocrit, POC 32 (L) 07/26/2016 10:50 AM  
 HCT 28.1 (L) 09/01/2020 02:45 PM  
 PLATELET 036 51/29/4995 05:54 AM  
 MCV 95.0 09/01/2020 05:54 AM  
   
CHEMISTRIES Lab Results Component Value Date/Time Sodium 140 09/01/2020 05:54 AM  
 Potassium 3.9 09/01/2020 05:54 AM  
 Chloride 108 09/01/2020 05:54 AM  
 CO2 28 09/01/2020 05:54 AM  
 Anion gap 4 09/01/2020 05:54 AM  
 Glucose 85 09/01/2020 05:54 AM  
 BUN 8 09/01/2020 05:54 AM  
 Creatinine 0.68 09/01/2020 05:54 AM  
 BUN/Creatinine ratio 12 09/01/2020 05:54 AM  
 GFR est AA >60 09/01/2020 05:54 AM  
 GFR est non-AA >60 09/01/2020 05:54 AM  
 Calcium 7.5 (L) 09/01/2020 05:54 AM  
  
HEPATIC FUNCTION Lab Results Component Value Date/Time Albumin 1.9 (L) 08/31/2020 12:34 AM  
 Bilirubin, direct 0.3 (H) 08/23/2020 05:35 PM  
 Bilirubin, total 0.4 08/31/2020 12:34 AM  
 Protein, total 5.5 (L) 08/31/2020 12:34 AM  
 Globulin 3.6 08/31/2020 12:34 AM  
 A-G Ratio 0.5 (L) 08/31/2020 12:34 AM  
 ALT (SGPT) 13 (L) 08/31/2020 12:34 AM  
 Alk. phosphatase 91 08/31/2020 12:34 AM  
   
LACTIC ACID Lab Results Component Value Date/Time Lactic acid 1.1 06/03/2019 05:56 AM  
 Lactic acid 1.0 02/19/2016 06:08 AM  
 Lactic acid 0.7 02/18/2016 11:35 PM  
  
CARDIAC PANEL Lab Results Component Value Date/Time CK 35 (L) 08/23/2020 05:35 PM  
 CK - MB <1.0 08/23/2020 05:35 PM  
 CK-MB Index  08/23/2020 05:35 PM  
  CALCULATION NOT PERFORMED WHEN RESULT IS BELOW LINEAR LIMIT Troponin-I, QT <0.02 08/23/2020 05:35 PM  
  
NT-proBNP Lab Results Component Value Date/Time  NT pro-BNP 7,243 (H) 08/23/2020 05:35 PM  
 NT pro-BNP 7,771 (H) 01/26/2020 10:03 AM  
 NT pro-BNP 7,673 (H) 06/02/2019 04:18 PM  
 NT pro-BNP 12,922 (H) 06/27/2018 05:08 PM  
 NT pro-BNP 2426 (H) 01/19/2013 08:00 PM  
  
THYROID No results found for: TSH, TSHEXT, TSH2, TSH3, TSHP, TSHELE, TT3, T3U, T3UP, FRT3, FT3, T3T, FT4, FT4P, T4, T4P, FT4T, TT7, T5RZCLXBY, TSHEXT, TSHEXT  
LIPID PANEL Lab Results Component Value Date/Time HDL Cholesterol 25 (L) 03/26/2010 10:30 AM  
   
 
RISK CALCULATORS: 
SCORE RESULT  
ASCVD The ASCVD Risk score (Lesly Swain, et al., 2013) failed to calculate for the following reasons: 
  ASCVD risk score not calculated [id number] HAS-BLED READMISSION RISK SCORE High Risk 35 Total Score 3 Patient Length of Stay (>5 days = 3) 4 IP Visits Last 12 Months (1-3=4, 4=9, >4=11) 5 Pt. Coverage (Medicare=5 , Medicaid, or Self-Pay=4) 21 Charlson Comorbidity Score (Age + Comorbid Conditions) Criteria that do not apply:  
 Has Seen PCP in Last 6 Months (Yes=3, No=0) . Living with Significant Other. Assisted Living. LTAC. SNF. or  
Rehab Functional status and cognitive function:   
Ambulates with: Wheelchair Condition: STABLE Disposition: Hospice Code status and advanced care plan: DNR [place] Relationship: daughter 
[phone] Bill Rooney 
Daughter 
[phone]  
  
 
Follow-up:  
Follow-up Information None 
  
 
 
================================================================= Kimberly Heaton DO, PGY-1  
[facility] Medicine Intern Pager: [phone] September 3, 2020, 10:37 AM

## 2020-09-02 NOTE — PROGRESS NOTES
DME being delivered to home this evening, plan for d/c in AM. Transport set up with [facility] for GRETA Otoole Case Management [phone]

## 2020-09-03 NOTE — PROGRESS NOTES
Problem: Falls - Risk of 
Goal: *Absence of Falls Description: Document Sahil Josué Fall Risk and appropriate interventions in the flowsheet. Outcome: Progressing Towards Goal 
Note: Fall Risk Interventions: 
Mobility Interventions: Bed/chair exit alarm, Strengthening exercises (ROM-active/passive) Medication Interventions: Bed/chair exit alarm, Patient to call before getting OOB, Evaluate medications/consider consulting pharmacy Elimination Interventions: Bed/chair exit alarm, Call light in reach, Urinal in reach Problem: Pressure Injury - Risk of 
Goal: *Prevention of pressure injury Description: Document Claude Scale and appropriate interventions in the flowsheet. Outcome: Progressing Towards Goal 
Note: Pressure Injury Interventions: 
Sensory Interventions: Assess changes in LOC, Minimize linen layers, Keep linens dry and wrinkle-free Moisture Interventions: Absorbent underpads, Minimize layers, Check for incontinence Q2 hours and as needed, Apply protective barrier, creams and emollients Activity Interventions: Pressure redistribution bed/mattress(bed type) Mobility Interventions: HOB 30 degrees or less, Pressure redistribution bed/mattress (bed type) Nutrition Interventions: Document food/fluid/supplement intake Friction and Shear Interventions: Minimize layers Problem: Aspiration - Risk of 
Goal: *Absence of aspiration Outcome: Progressing Towards Goal 
  
Problem: Nutrition Deficit Goal: *Optimize nutritional status Outcome: Progressing Towards Goal 
  
Problem: Pain Goal: *Control of Pain Outcome: Progressing Towards Goal

## 2020-09-03 NOTE — PROGRESS NOTES
Problem: Falls - Risk of 
Goal: *Absence of Falls Description: Document Gaston Salas Fall Risk and appropriate interventions in the flowsheet. 9/3/2020 1152 by Caty Tate RN Outcome: Resolved/Not Met 
9/3/2020 1019 by Caty Tate RN Outcome: Progressing Towards Goal 
Note: Fall Risk Interventions: 
Mobility Interventions: Bed/chair exit alarm, Strengthening exercises (ROM-active/passive) Medication Interventions: Bed/chair exit alarm, Patient to call before getting OOB, Evaluate medications/consider consulting pharmacy Elimination Interventions: Bed/chair exit alarm, Call light in reach, Urinal in reach Problem: Patient Education: Go to Patient Education Activity Goal: Patient/Family Education 9/3/2020 1152 by Caty Tate RN Outcome: Resolved/Not Met 
9/3/2020 1019 by Caty Tate RN Outcome: Progressing Towards Goal

## 2020-09-03 NOTE — PROGRESS NOTES
Spoke with samaria Ortega to confirm equipment was received and someone would be home to receive patient this morning. Samaria Ortega is also aware to  prescriptions ASAP from pharmacy for patient. Transport set for [facility] with [facility]. Hospice aware. Jeannette Abdalla, MSW Case Management [phone]

## 2020-09-03 NOTE — PROGRESS NOTES
[address] [facility] Progress Note Patient: Bunnie Essex MRN: [id number] SSN: [id number]  YOB: 1940 Age: 79 y.o. Sex: male Admit Date: 8/23/2020 LOS: 10 days Chief Complaint Patient presents with  Fatigue Subjective: No overnight events Pt reports overall condition unchanged, no increased back pain, no LE paresthesias or increased weakness. He feels comfortable with his plan to move forward with palliative care and hospice. ROS: Denies F/C, NVD, HA, lightheadedness, dizziness, SOB, cough, CP, palpitations, ABD pain, bowel complaints, urinary complaints, edema, numbness, or tingling. Objective:  
 
Visit Vitals BP (!) 112/91 (BP 1 Location: Right arm, BP Patient Position: At rest) Pulse 79 Temp 98 °F (36.7 °C) Resp 20 Ht 5' 6\" (1.676 m) Wt 66.2 kg (145 lb 15.1 oz) SpO2 100% BMI 23.56 kg/m² Physical Exam:  
General:  AAOx3, NAD, laying comfortably in bed HEENT: Conjunctiva pale with thick yellow discharge, sclera anicteric. PERRL. EOMI. Dry mucous membranes. Thyroid not enlarged, no nodules. No cervical, supraclavicular, occipital or submandibular lymphadenopathy. No other gross abnormalities present. CV:  Irregular rhythm, normal rate, no murmurs. No visible pulsations or thrills. RESP:  Unlabored breathing. Lungs clear to auscultation without adventitious breath sounds. Equal expansion bilaterally. ABD:  Soft, nontender, nondistended. BS (+). No hepatosplenomegaly. R inguinal hernia, reducible, nontender, no overlying skin changes MS:  No joint deformity. BLE atrophy. Neuro:  CN II-XII grossly intact. 4/5 strength bilateral upper extremities and lower extremities. Ext:  No edema. 2+ radial and dp pulses bilaterally. Skin:  No rashes, lesions, or ulcers. Good turgor. Intake and Output: 
Current Shift: No intake/output data recorded. Last three shifts: 09/01 1901 - 09/03 0700 In: 360 [P.O.:360] Out: 625 [Urine:625] Lab/Data Review: No results found for this or any previous visit (from the past 12 hour(s)). RECENT RESULTS MODALITY IMPRESSION  
XR Results from Hospital Encounter encounter on 08/23/20 XR CHEST PORT Narrative CHEST PORTABLE INDICATIONS: Atrial fibrillation, COPD, CHF with worsening cough. COMPARISON: 8/23/2020 FINDINGS:  
 
Support lines/catheters: External monitoring leads overlie the chest. 
 
Lungs: Slightly hazy right lung opacity compared to the left which may represent 
a layering pleural effusion. Cardiac silhouette and mediastinal contours: Stable unfolding of the aortic 
contour. Mildly enlarged cardiac silhouette. Bones and soft tissues: Chronic right sixth rib fracture. Impression Impression: 
 
Hazy opacity in the right lung most likely represents a layering pleural 
effusion. Stable mild cardiomegaly with aortic ectasia. CT Results from [facility] Encounter encounter on 08/23/20 CTA CHEST ABD PELV W WO CONT Narrative CTA Chest, Abdomen And Pelvis With and without Enhancement INDICATION: Abdominal aortic aneurysm status post endovascular repair, peptic 
ulcer disease, heart failure, COPD with chronic respiratory failure on home 
oxygen, chronic back pain, BPH with malaise, worsening back pain, weakness for 
3-4 weeks, hypotension, acute on chronic anemia, melanotic stool. TECHNIQUE: 2.5 mm collimation axial images obtained from the thoracic inlet to 
the level of the pubic symphysis following the uneventful administration of 80 
mL Isovue-370 nonionic intravenous contrast.  Imaging performed during maximum 
aortic enhancement and is therefore suboptimal for evaluating solid organs and 
bowel. Raw data reviewed along with three-dimensional volume rendered images 
and maximum intensity projection images to better evaluate the tortuous vascular 
structures. All CT scans at this facility are performed using dose optimization technique as 
appropriate to a performed exam, to include automated exposure control, 
adjustment of the mA and/or kV according to patient size (including appropriate 
matching first site-specific examinations), or use of iterative reconstruction 
technique. COMPARISON: 2/17/2020. Thyroid: Unremarkable. Heart: Severe regions of coronary arteriosclerosis in the left circumflex. Biatrial cardiac chamber enlargement. Pulmonary artery: Patent Lymph Nodes: 2 Similar 1.4 cm precarinal lymph nodes, likely reactive. Lung: Moderate centrilobular emphysema. Mild subsegmental atelectasis in the 
right lower lobe. Mild subsegmental atelectasis or scarring right upper and 
right middle lobes. Pleura: Increased moderate right and similar trace left pleural effusions. No 
pneumothorax. ABDOMEN FINDINGS:  
There is suboptimal evaluation of visceral organs secondary to timing of the 
exam. 
 
 
Liver: Similar scattered hepatic cysts measuring up to 2.6 cm in the left medial 
hepatic lobe at the dome and 2.4 cm right posterior hepatic lobe. There are a 
few too small to characterize hepatic lesions but statistically most likely 
benign. Spleen: Unremarkable. Pancreas: Pancreas is atrophic. Biliary: Possible adenomyomatosis gallbladder fundus. Bowel: There is a increased rectal stool burden. Gas-filled gastric distention 
third portion duodenum does not clearly cross midline into the left abdomen but 
there is no volvulus or bowel dilation. No bowel wall thickening. No 
pneumatosis. Appendix in the right inguinal hernia without inflammation, 
unchanged. There is fluid in the esophagus. Peritoneum/ Retroperitoneum: There is edema without free fluid or free air. Lymph Nodes: Unremarkable. Adrenal Glands: Unremarkable.  
Kidneys: Right kidney is 7.1 cm, previously 8.3 cm and the left is 10.7 cm, 
 previously 10.4 cm. The right kidney hypoenhance is with cortical thinning. There is cortical scarring at the left lower pole. There is a exophytic 0.9 cm 
hyperdense lesion at the right lower pole without enhancement. 2 cm left lower 
pole cyst. Several too small to characterize renal lesions. PELVIS FINDINGS:  
 
Bladder/ Pelvic Organs: Small diverticulum at the left anterior bladder wall. Moderate bladder distention. Bones/Soft tissues: Mild anasarca. Osteopenia. Lumbar facet hypertrophy and 
arthropathy. Transitional lumbosacral junction. Degenerative disc disease. Central height loss superior endplate L3.   
 
 
AORTA FINDINGS: 
Thoracic aorta is not enlarged with mild atherosclerosis. 3 vessel arch anatomy 
with patent arch vessels. Subjectively, the aneurysm cranial to the proximal 
aortic stent landing zone is increased. There appears to be a similar 1 cm 
crescent of enhancement at the right anterior aspect with endoleak but there is 
increased nonopacified circumferential region measuring up to 0.6 cm on the left 
(series 3, image 138), not present on prior exam. The excluded aneurysm is 
subjectively larger. There is similar right type I B endoleak distal to the 
common iliac stent. The vessel measures 2.3 cm at this location, previously 2.1 
cm. The peripheral right common iliac is 1.9 cm, previously 1.7 cm. There are 
new linear regions of thrombus which protrude into the central aortic lumen at 
the more cranial aspect of the stent at the level of the left renal vein and 
within the bilateral common iliac stents protruding into the central lumen. Mild stenosis proximal celiac. Regions of stenosis splenic artery. Mild stenosis 
proximal SMA. Moderate stenosis proximal left renal artery and proximal 
occlusion right renal artery. There is stenosis at the origin of the right 
internal iliac, origin left internal iliac. Three-D generated aortic measurements: 3. 6, 3.2, 2.9 centimeter at the sinus of Valsalva. 3.5 x 3.1 centimeter at the sinotubular junction. 3.8 x 3.8 centimeter at the maximum diameter of the ascending aorta. 3.5 x 3.2 centimeter at the mid aortic arch. 3.4 x 3.2 centimeter at the proximal descending thoracic aorta. 3.6 x 3.1 centimeter at the mid descending aorta. 3.6 x 3.5 centimeter at the distal descending thoracic aorta at the level of the 
diaphragmatic hiatus. Aneurysm measurements: 
Volume 149.73 cc, previously 166.71 cc 
4.9 x 4.2 cm below the lowest renal artery including excluded aorta, previously 4.4 x 3.7 cm 
1 cm below the lowest renal artery within the stent: 3 x 2.6 cm; including 
excluded aorta: 5.8 x 4.4 cm, previously 3.9 x 3.9 cm 
3.1 x 3 cm within the stent at the top of the stent; 5.4 x 4.4 cm including 
excluded aorta, previously 4 x 3.6 cm 
2.7 x 2.7 cm within the stent below the lowest renal artery 2.7 x 2.7 cm Within the stent below the lowest renal artery 3 cm distance from the lowest renal artery to the top of the stent Impression IMPRESSION: 
1.  New strands of thrombus along the wall of the aortic and bilateral common 
iliac stents which protrudes into the central aortic lumen and place patient at 
risk for lower extremity embolic events. 2.  Increased aneurysm cranial to the aortobiiliac stent with resultant 
increased type I A endoleak. Similar type I B endoleak. Increased excluded 
aortic aneurysm measuring 5.8 cm (change of 2 cm at this level). Minimal 
increased right common iliac aneurysm measuring 1.9 cm. 
3.  Increased moderate right and similar small left pleural effusions. 4.  Increased rectal stool burden. No active extravasation. 5.  Gas-filled gastric distention. 6.  Occlusion proximal right renal artery with worsened atrophy and 
hypoenhancement. 7.  Severe left circumflex coronary arteriosclerosis. 8.  Biatrial cardiac chamber enlargement. 9.  Moderate stenosis left renal artery, stenosis at the origin of the bilateral 
internal iliac arteries. 10.  Moderate emphysema. 11.  Gastroesophageal reflux. 12.  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 13.  Unchanged Incidental Amyand hernia. 14.  Right renal hemorrhagic/proteinaceous cyst. 
15.  Small bladder diverticulum. Preliminary report provided by Dr. Jose Lopes on 8/23/2020 at 2234 hours prior to 
3-D image availability. MRI Results from [place] encounter on 08/23/20 MRI LUMB SPINE W CONT Narrative EXAM: MRI LUMBAR SPINE  CPT CODE: 68483 CLINICAL INDICATION/HISTORY: Evaluate discitis. COMPARISON: CTA chest, abdomen pelvis of 23 August 2020. TECHNIQUE: MRI of the lumbar spine is performed by using standard pre- and 
post-gadolinium protocol pulse sequences with axial images from T12/L1 through S1/S2. Contrast: MultiHance - 14 mL IV given in conjunction with prior MRA 
abdomen; unremarkable administration. FINDINGS: There is a subtle retrolisthesis of L2 on L3 and mild anterolisthesis 
of L4 on L5. There is a mild scoliosis convex to the left centered about L2. Other vertebral body alignment is normal.  S1 is a transitional vertebra with 
lumbarization and a rudimentary S1/S2 disc. There is mild increased signal 
intensity in the bone marrow of L1 and L2 on STIR images accompanied by marrow 
enhancement of both vertebrae on post-gadolinium images where then may be some 
relative sparing of the inferior endplate marrow of S2. Other marrow signal 
intensity is relatively normal.  Bright signal intensity is seen within the 
intervertebral disc at L1/L2 with evidence of some subligamentous extrusion 
posteriorly and more significantly anteriorly. There is inflammatory change in 
enhancement of the paraspinal soft tissues and psoas muscles, bilaterally, more 
focally at L2 on the left and more broadly and longitudinally on the right from S2 through L5 with a focal 1.9 cm x 1.4 cm x 2.5 cm abscess in the right psoas 
muscle at S2/S3 level. There is a probable insufficiency fracture of the 
superior L3 vertebra with degenerative changes and L2-3 disc. There is 
moderately severe disc space narrowing at L4/L5 and moderate narrowing at L5/S1. Mild-to-moderate narrowing may be present at T12/L1. L3/L4 is preserved. No 
cord lesions are seen and the conus medullaris ends normally at L1. There is 
relatively minimal enhancement of the anterior epidural soft tissues at the 
level of the disc inflammation, somewhat asymmetric to the left. L5/S1: There is a broad-based disc bulge and mild central stenosis. There is 
moderate bilateral foraminal encroachment by disc protrusion and some facet 
arthropathy. There may be mild impression on the exiting root sleeves 
bilaterally. L4/L5: There is mild-to-moderate canal stenosis from the listhesis and facet 
arthropathy with an AP diameter of about 8 mm. There is mild-to-moderate right 
and moderately severe left foraminal stenosis. L3/L4: The central canal is patent without significant disc protrusion. There 
is mild-to-moderate bilateral foraminal stenosis from small foraminal 
protrusions. L2/L3: There is no significant canal stenosis. There is mild-to-moderate 
bilateral foraminal stenosis from foraminal protrusions, right slightly more 
prominent than left. L1/L2: There is mild encroachment on the thecal sac from a subtle broad-based 
protrusion and some inflammation and thickening in the anterior epidural soft 
tissues somewhat asymmetric to the left. Soft tissue inflammation extends into 
the exit foramina, bilaterally with mild-to-moderate bony canal stenosis. T12/L1: There is a mild right lateral to foraminal disc protrusion with subtle 
canal encroachment asymmetric to the right. There may be mild right foraminal 
encroachment; the left foramen is patent. Multiple small cortical cysts are seen in both kidneys as well as several cysts 
in the visualized inferior portion of the right lobe of the liver. Note that 
the right kidney is [id number] atrophic. Again noted is the distal abdominal aortic aneurysm treated with an aortobiiliac 
endograft stent. Note that the right limb of the stent supplies the left common 
iliac artery and the left limb the right common iliac artery. There is a 
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 
not significantly changed Impression IMPRESSION: 
 
L1/L2 discitis. Minimal posterior disc protrusion and canal with some mild 
focal inflammation in the anterior epidural space slightly asymmetric to the 
left resulting in mild canal encroachment. Soft tissue inflammation and 
enhancement extends into both L1/L2 foramina. There is significant inflammatory 
change in the right paraspinal soft tissues and psoas muscle from L2 through L5 
and a small 2 cm abscess in the right psoas at about L2/L3. There is focal 
paraspinal inflammation on the left at about L2. Persistent spondylitic and disc degenerative changes with mild-to-moderate canal 
stenosis, particularly at L4/L5, and multilevel foraminal stenoses most 
significant at L5-S1. Status post treatment distal aortic aneurysm with aortobiiliac stent. Persistent aneurysm distal right common iliac artery at the end of the stent. Several hepatic cysts and bilateral renal cortical cysts. There is 
mild-to-moderate atrophy of the right kidney. Note: A congruent preliminary report was provided by Dr. Natalie Phelps at the time of 
the study. ULTRASOUND Results from Hospital Encounter encounter on 08/25/17 US EXT NONVAS RT LTD Impression Impression:  Large fat-containing right inguinal hernia.  On recent CT scan 
(August 25, 2017), the appendix is seen extending through the internal inguinal 
 ring into the inguinal hernia. This is not redemonstrated on today's ultrasound 
but potentially if the appendix does not have air within it, it may be difficult 
to visualize sonographically. No adenopathy. Results from [facility] Encounter encounter on 02/18/16 US SCROTUM/TESTICLES Impression IMPRESSION: 
1. Increased internal vascularity of the left testicle suggests mild orchitis. Cardiology Procedures/Testing: MODALITY RESULTS  
EKG Results for orders placed or performed during the hospital encounter of 08/23/20 EKG, 12 LEAD, INITIAL Result Value Ref Range Ventricular Rate 71 BPM  
 Atrial Rate 288 BPM  
 QRS Duration 86 ms  
 Q-T Interval 350 ms QTC Calculation (Bezet) 380 ms Calculated R Axis -42 degrees Calculated T Axis 81 degrees Diagnosis Atrial fibrillation Left axis deviation Low voltage QRS Abnormal ECG When compared with ECG of 26-JAN-2020 09:46, 
Criteria for Anteroseptal infarct are no longer present Confirmed by Audra Landeros MD, [place] ([id number]) on 8/24/2020 8:30:54 AM 
  
Results for orders placed or performed in visit on 08/10/18 AMB POC EKG ROUTINE W/ 12 LEADS, INTER & REP Narrative Read by Emili Avitia MD - atrial fibrillation, occasional ectopic ventricular beat. Nonspecific QRS widening and anterior fascicular block. Old anterior infarct. Possible nonspecific ST abnormality. ECHO 06/02/19 ECHO ADULT COMPLETE 06/04/2019 6/4/2019 Narrative · Left Ventricle: Estimated left ventricular ejection fraction is 56 -  
60%. Abnormal left ventricular septal motion. Interventricular septal  
\"bounce\". · Tricuspid Valve: Non-specific thickening of the tricuspid valve. Moderate tricuspid valve regurgitation is present. · Pulmonary Artery: Moderate pulmonary hypertension. Pulmonary arterial  
systolic pressure is 86.9 mmHg. · Pulmonic Valve: Mild pulmonic valve regurgitation is present. · Left Atrium: Severely dilated left atrium. · Right Atrium: Moderately dilated right atrium. Signed by: Gennaro Chairez MD  
  
 
Special Testing/Procedures: MODALITY RESULTS MICRO All Micro Results Procedure Component Value Units Date/Time CULTURE, BLOOD [[id number]] Collected:  08/28/20 1300 Order Status:  Completed Specimen:  Blood Updated:  09/03/20 3555 Special Requests: NO SPECIAL REQUESTS Culture result: NO GROWTH 6 DAYS     
 CULTURE, BLOOD [[id number]] Collected:  08/26/20 1503 Order Status:  Completed Specimen:  Blood Updated:  09/01/20 5090 Special Requests: NO SPECIAL REQUESTS Culture result: NO GROWTH 6 DAYS     
 CULTURE, RESPIRATORY/SPUTUM/BRONCH Thereasa Gravely STAIN [[id number]]  (Abnormal) Collected:  08/29/20 0810 Order Status:  Completed Specimen:  Sputum Updated:  08/31/20 1108 Special Requests: NO SPECIAL REQUESTS     
  GRAM STAIN OCCASIONAL WBCS SEEN     
      
  RARE EPITHELIAL CELLS SEEN  
     
   OCCASIONAL YEAST Culture result:    
  LIGHT YEAST, (APPARENT CANDIDA ALBICANS) MODERATE NORMAL RESPIRATORY RUDY Melvena Part REFL [[id number]]  (Abnormal) Collected:  08/23/20 1820 Order Status:  Completed Specimen:  Miscellaneous sample Updated:  08/29/20 1136 Source BLOOD Aer+Anaer Suscept Result 1 Eikenella corrodens Comment: (NOTE) Identification performed by account, not confirmed by this 
laboratory. Testing performed by broth microdilution. AZITHROMYCIN = 0.5 ug/ml = SUSCEPTIBLE 
CEFOTAXIME   = <=0.25 ug/ml = SUSCEPTIBLE Antimicrobial suscept. Comment Comment: (NOTE) ** S = Susceptible; I = Intermediate; R = Resistant ** P = Positive; N = Negative MICS are expressed in micrograms per mL Antibiotic                 RSLT#1    RSLT#2    RSLT#3    RSLT#4 Ampicillin                     S 
Ceftriaxone                    S 
Chloramphenicol                S 
 Levofloxacin                   S 
Meropenem                      S 
Penicillin                     S 
Tetracycline                   S 
Trimethoprim/Sulfa             S 
Performed At: [address] [id number] Harper Rosales MD A SUSCEPTIBILITY, AER + ANAEROB [[id number]] Collected:  20 Order Status:  Completed Specimen:  MICROBIAL ISOLATE Updated:  20 1136 Source BLOOD Susceptibility, Aer + Anaerob Final Report Below Comment: (NOTE) Performed At: [address] [id number] Harper Rosales MD AI:[id number] CORRECTED ON  AT 1136: PREVIOUSLY REPORTED AS Preliminary report CULTURE, BLOOD [[id number]]  (Abnormal) Collected:  20 Order Status:  Completed Specimen:  Blood Updated:  20 1223 Special Requests: NO SPECIAL REQUESTS     
  GRAM STAIN    
  ANAEROBIC BOTTLE NO ORGANISMS SEEN Culture result:    
  EIKENELLA CORRODENS GROWING IN THE ANAEROBIC BOTTLE BETA LACTAMASE NEGATIVE CULTURE, BLOOD [[id number]]  (Abnormal) Collected:  20 Order Status:  Completed Specimen:  Blood Updated:  20 7165 Special Requests: NO SPECIAL REQUESTS Culture result:    
  EIKENELLA CORRODENS , BETA LACTAMASE NEGATIVE , ISOLATED FROM THE ANAEROBIC BOTTLE  
     
 CULTURE, RESPIRATORY/SPUTUM/BRONCH Elissa Jefferson STAIN [[id number]] Collected:  20 1030 Order Status:  Canceled Specimen:  Sputum Alba Like [[id number]] Collected:  20 0358 Order Status:  Completed Specimen:  Urine from Clean catch Updated:  20 1026 Special Requests: NO SPECIAL REQUESTS Culture result: No growth (<1,000 CFU/ML) UA Results for orders placed or performed in visit on 14 AMB POC URINALYSIS DIP STICK AUTO W/O MICRO     Status: None Result Value Ref Range Status  Color (UA POC) Yellow  Final  
 Clarity (UA POC) Clear  Final  
 Glucose (UA POC) Negative Negative Final  
 Bilirubin (UA POC) Negative Negative Final  
 Ketones (UA POC) Negative Negative Final  
 Specific gravity (UA POC) 1.010 1.001 - 1.035 Final  
 Blood (UA POC) Trace Negative Final  
 pH (UA POC) 7.0 4.6 - 8.0 Final  
 Protein (UA POC) Trace Negative mg/dL Final  
 Urobilinogen (UA POC) 0.2 mg/dL 0.2 - 1 Final  
 Nitrites (UA POC) Negative Negative Final  
 Leukocyte esterase (UA POC) Negative Negative Final  
  
PATH Assessment and Plan:  
 
79 y.o. male with PMH AAA s/p endovascular repair, Fe deficiency anemia, PUD, AFib, CHF, CAD, COPD with chronic hypoxic respiratory failure on home 3L NC, chronic back pain, and BPH, now admitted with acute on chronic anemia. Comfort care: - Hospice consulted, waiting on medication recs - Palliative care consulted - pt comfortable with the decision to transition to hospice, family also comfortable with these decisions - pt leaving today at 11AM  
  
Acute on chronic anemia: Pt was followed by ID for a GI bleed that was resolved. He also has a AAA with type I A endoleak that requires repair for which he was hoping to transfer to [facility] however, he is not a stable surgery candidate.  
- GI following, EGD 8/27, no signs active or recent bleeding 
- H/H Q12H, transfuse if Hgb < 7 
- Hgb 8.2 after last transfusion, last Hgb 8.9 
- Pt would like to stop lab draws, stop IV fluids and stop antibiotic therapy - Vascular: AAA Type 1 A Endoleak, pt now declining surgical intervention, would like to go home and be with loved ones - protonix gtt  
  
Sepsis: Respiratory culture on 8/29 showed light yeast, ID recs appreciated Blood culture on 8/23 showed EIKENELLA CORRODENS , BETA LACTAMASE NEGATIVE, pt was started on Piperacillin/Tazobactam. 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 Rodolfo Martínez - FU MRA ABD and MRI L-spine - discussed w/ Dr. Felicita Sepulveda; MRI spine w/ L1/2 discitis, focal anterior epidural inflammation w/ mild canal encroachment, L2/L3 2cm Right psoas abscess - Pt would like to stop lab draws and stop antibiotic therapy 
  
AFib/CHF/CAD: rate controlled on Amlodipine; off ASA and Coumadin due to hx of GIB. No evidence of decompensated CHF noted in cardiology clinic visit (6/16/20). CXR shows large-moderate R pleural effusion. Coronary angiography in 2012 only showed mild CAD. - Continue Amlodipine 10mg QD, Digoxin 125mcg QD, Pravastatin 40mg QD, Lisinopril 20mg QD 
  
COPD: Complicated by Chronic Hypoxic Respiratory Failure, on 3L NC at home. - Supplemental O2 for sats < 92% - Duoneb PRN 
  
Chronic back pain: - Lidocaine Patch 
- Oxycodone and Hydromorphone PRN 
  
BPH  
- Tamsulosin 0.4mg daily 
  
Insomnia: On two home sleeping aids - Duloxetine 20mg QHS and Trazadone 50mg QHS   
  
Diet Cardiac DVT Prophylaxis Contraindicated GI Prophylaxis Protonix gtt Code status DNR/DNI (comfort measures only) Disposition > 2 MN  
  
Point of Contact [facility] Relationship: daughter 
[phone] Viral Love 
Daughter 
[phone] Lupe Charles DO PGY1 [address] Intern Pager: [phone] September 3, 2020, 6:57 AM

## 2020-09-03 NOTE — CONSULTS
Palliative Medicine Consult Patient Name: iDamond Jules YOB: 1940 Date of Initial Consult: 9/1/2020, 9/2/2020, 9/3/2020 Reason for Consult: goals of care discussions Requesting Provider: Robbi Barba DO 
Primary Care Physician: Lisbeth Solitario DO 
  
 SUMMARY:  
Diamond Jules is a 79 y.o. with a past history of AAA s/p endovascular repair, Fe deficiency anemia, PUD, AFib, CHF, CAD, COPD with chronic hypoxic respiratory failure on home O2 at 3L [place], chronic back pain, and BPH, who was admitted on 8/23/2020 from home with a diagnosis of acute on chronic anemia, AAA with type I A endoleak, and sepsis. Current medical issues leading to Palliative Medicine involvement include: support and care decisions. 9/2/2020: Patient lying in bed, remains flat in supine position for back pain relief, pain improving with increased dilaudid dose, now pain is at a 5/10, no drowsiness, sedation, itching, or swelling. Tolerating well. Converted IV dilaudid dose to fentanyl patch with po dilaudid for breakthrough pain so patient can go home on hospice. 9/3/2020: Patient lying in bed, looking forward to going home today, leaving with hospice services. Pain is netter controlled, rates pain a 4/10 today. No drowsiness, sedation, itching, or swelling. Tolerating well. Recommend continuing fentanyl patch and po dilaudid as ordered, discussed with [address] [place] [address] primary team.  
 PALLIATIVE DIAGNOSES:  
1. Goals of care discussions 2. AAA endoleak 3. Sepsis 4. Debility PLAN:  
9/3/2020: Patient lying in bed, looking forward to going home today, leaving with hospice services. Pain is better controlled, rates pain a 4/10 today. No drowsiness, sedation, itching, or swelling. Tolerating well.  Recommend continuing comfort meds, including fentanyl patch, po dilaudid, and intensol as ordered, [facility] team who will print scripts for patient at discharge. Support offered to patient. Patient remains a DNR/DNI, comfort measures, NO feeding tubes. Will continue to follow for support and symptom management while hospitalized. See previous conversations below: 
 
9/2/2020: Patient lying in bed, remains flat in supine position for back pain relief, pain improving with increased dilaudid dose, now pain is at a 5/10, no drowsiness, sedation, itching, or swelling. Tolerating well. Converted IV dilaudid dose to fentanyl patch with po dilaudid for breakthrough pain so patient can go home on hospice. Plan to d/c home with hospice at 11 AM on 9/3/2020. Support offered to patient. Patient remains a DNR/DNI, comfort measures, NO feeding tubes. Will continue to follow for support and symptom management while hospitalized. 9/1/2020: Goals of care discussions: Palliative medicine team including [employer] RN and I met with patient at patient's bedside. Patient is awake, alert, and oriented x 4. Complains of unmanaged back pain, 8/10 on pain scale, exacerbated with any movement, has to lay completely flat in a supine position for any relief. Discussed our role as palliative medicine, and support offered as patient shares he is tired and wants to go home. Discussed the benefits and burdens of  Continuing aggressive therapies versus implementing comfort measures only, with discharge home with hospice support. Patient wishes to stop antibiotic therapy, stop IV fluids, stop lab draws, and does not desire any surgical repair for AAA endoleak. Patient agreeable to initiating comfort measures while in the hospital with plan to d/c home with hospice. Called patient's daughter and [address] [place] [address] who is supportive of patient's goals. Patient has AMD on file. Confirmed with patient that he is a DNR/DNI.  POST form introduced and signed by patient with the following measures: DNR/DNI, comfort measures, NO feeding tubes. Discussed a pain management regimen with patient. He is receiving acetaminophen 650 mg every 6 hours scheduled, lidocaine patch for back, and I ordered dilaudid 1 mg IV every 3 hours as needed for back pain. Noted allergy list but has been tolerating low dose dilaudid without difficulty. Benadryl ordered as needed for any itching. Will calculate 24 hour MME tomorrow and convert to fentanyl patch so patient can d/c home. Will follow along for support and symptom management while hospitalized. 1. AAA endoleak:  type I A endoleak, originally planned for [facility] transfer for repair, but declines surgical intervention. Wishes to go home and live the life he has left with his loved ones. 2. Sepsis:  Positive resp culture for light yeast 8/29, with chronic resp failure on home O2. Denies breathing difficulties. +Blood culture, ID was managing ABT therapy. L1-2 discitis, focal anterior epidural inflammation with mild canal encroachment, L2-L3 2cm Right psoas abscess with no plans for IR to drain as abscess is too small. Patient wishes to stop antibiotic therapy at this time and go home with hospice support. 3. Debility: Chronic respiratory failure, baseline dyspnea with exertion. Needs assist with all ADLs, but able to feed self. 4. Initial consult note routed to primary continuity provider 5. Communicated plan of care with: Palliative IDT, patient, family, attending, CM, RN, hospice liasion. GOALS OF CARE / TREATMENT PREFERENCES:  
[====Goals of Care====] GOALS OF CARE: DNR, DNI, comfort measures only. Patient/Health Care Proxy Stated Goals: Comfort TREATMENT PREFERENCES:  
Code Status: DNR Advance Care Planning: 
Advance Care Planning 9/1/2020 Patient's Healthcare Decision Maker is: Named in scanned ACP document Primary Decision Maker Name -  
Primary Decision Maker Phone Number -  
Primary Decision Maker Relationship to Patient -  
 Confirm Advance Directive Yes, on file Patient Would Like to Complete Advance Directive - Does the patient have other document types MOST/MOLST/POST/POLST Medical Interventions: Comfort measures Artificially Administered Nutrition: No feeding tube The palliative care team has discussed with patient / health care proxy about goals of care / treatment preferences for patient. 
[====Goals of Care====] HISTORY:  
 
History obtained from: patient, family, chart CHIEF COMPLAINT: back pain 5/10 HPI/SUBJECTIVE: The patient is:  
[x] Verbal and participatory [] Non-participatory due to:  
Oriented x 4 Clinical Pain Assessment (nonverbal scale for severity on nonverbal patients):  
Clinical Pain Assessment Severity: 5 FUNCTIONAL ASSESSMENT:  
 
Palliative Performance Scale (PPS): PPS: 50 PSYCHOSOCIAL/SPIRITUAL SCREENING:  
 
Advance Care Planning: 
Advance Care Planning 9/1/2020 Patient's Healthcare Decision Maker is: Named in scanned ACP document Primary Decision Maker Name -  
Primary Decision Maker Phone Number -  
Primary Decision Maker Relationship to Patient -  
Confirm Advance Directive Yes, on file Patient Would Like to Complete Advance Directive - Does the patient have other document types MOST/MOLST/POST/POLST Any spiritual / Yazidism concerns: 
[] Yes /  [x] No 
 
Caregiver Burnout: 
[] Yes /  [] No /  [x] No Caregiver Present Anticipatory grief assessment:  
[x] Normal  / [] Maladaptive REVIEW OF SYSTEMS:  
 
Positive and pertinent negative findings in ROS are noted above in HPI. The following systems were [x] reviewed / [] unable to be reviewed as noted in HPI Other findings are noted below. Systems: constitutional, ears/nose/mouth/throat, respiratory, gastrointestinal, genitourinary, musculoskeletal, integumentary, neurologic, psychiatric, endocrine. Positive findings noted below. Modified ESAS Completed by: provider Fatigue: 6 Depression: 0 Pain: 5 Anxiety: 0 Nausea: 0 Dyspnea: 0 Constipation: No  
  Stool Occurrence(s): 1 PHYSICAL EXAM:  
 
From RN flowsheet: 
Wt Readings from Last 3 Encounters:  
09/03/20 66.2 kg (145 lb 15.1 oz) 08/07/20 55.3 kg (122 lb)  
06/16/20 59.4 kg (131 lb) Blood pressure (!) 112/91, pulse 79, temperature 98 °F (36.7 °C), resp. rate 20, height 5' 6\" (1.676 m), weight 66.2 kg (145 lb 15.1 oz), SpO2 100 %. Pain Scale 1: Numeric (0 - 10) Pain Intensity 1: 6 Pain Onset 1: chronic Pain Location 1: Back Pain Orientation 1: Lower Pain Description 1: Aching Pain Intervention(s) 1: Medication (see MAR) Constitutional: Awake, alert, NAD 
ENMT: no nasal discharge, moist mucous membranes Cardiovascular: distal pulses intact Respiratory: breathing not labored, symmetric Gastrointestinal: soft non-tender, +bowel sounds Musculoskeletal: no deformity, no tenderness to palpation Skin: warm, dry Neurologic: following commands, moving all extremities Psychiatric: full affect, no hallucinations HISTORY:  
 
Principal Problem: 
  GI bleed (8/24/2020) Past Medical History:  
Diagnosis Date  Aneurysm ([address] AAA repair 2006 & 2012  Aorto-iliac duplex 02/13/2015 Tech difficult. Patent aorta bi-iliac endograft w/o leak or limb dysfunction.  Arrhythmia   
 a fib  Cardiac cath 11/01/2012 RCA (sm, nondom) patent. LM patent. LAD 25%. CX (dom) 45% mid. LVEDP 12 mmHg. No WMA. PA 27/12. W 10-12. CO/CI 5.2/2.6 (TD).  Cardiac echocardiogram, abnormal 02/20/2016 EF 55%. No WMA. Indeterminate diastolic fx. RVSP 60 mmHg. Severe LAE. Mild MR. Mod TR.  IVCE. Similar to study of 10/26/12.  Cardiovascular aorto-iliac duplex 02/13/2015 Patent aorta bi-iliac endovascular graft w/o leak or limb dysfunction. Sac measures 4.21 x 4.47 cm (4.4 x 4.6 cm on 1/31/14).  Cardiovascular LE peripheral arterial testing 07/29/2013 No significant LE arterial disease bilaterally. R GHADA 1. 12.  L GHADA 1. 12.  R DBI 0.83. L DBI 0.71. Exercise deferred.  Cardiovascular renal duplex 10/31/2012 No RA stenosis. Intrinsic/med disease in left kidney. Patent aortic endograft. Patent, thrombus-free renal veins bilaterally.  Carotid duplex 07/29/2013 Mild <50% bilateral ICA plaquing.  Chronic kidney disease  Chronic lung disease  Cigarette smoker  Congestive heart failure (CHF) ([address]  COPD (chronic obstructive pulmonary disease) (HCC)   
 and emphysema; likely secondary to tobacco abuse  Difficult intubation  Dyslipidemia   
 low HDL  Emphysema   
 HTN (hypertension)  Hypercholesterolemia  Ill-defined condition   
 hernia  Increased prostate specific antigen (PSA) velocity  Long term (current) use of anticoagulants   
 coumadin  Osteoarthritis  Osteomyelitis ([address]  Paroxysmal atrial fibrillation (HCC)  Peripheral vascular disease ([facility] AAA repair 12/2007  Persistent atrial Fibrillation  Persistent atrial fibrillation (HCC)  Unspecified adverse effect of anesthesia   
 difficulty breathing placed in ICU Oct 2012 (AAA repair) Past Surgical History:  
Procedure Laterality Date  BRONCHOSCOPY DIAGNOSTIC  11/2/2012  CARDIAC CATHETERIZATION  11/1/2012  COLONOSCOPY N/A 7/26/2016 COLONOSCOPY with Polypectomies performed by Fidelia Merritt MD at [facility] ENDOSCOPY  COLONOSCOPY N/A 5/28/2019 COLONOSCOPY with polypectomy performed by Keila Roe MD at [address] [place] [address] AAA REPAIR    
 2006 & 2012  HX HEART CATHETERIZATION  3/4/2009 1. RCA small, nondominant; patent. 2. LMCA patent. 3. LAD is long, wrapped around apical vessel. Diffuse, 20-30% stenosis noted. 4. CCA is large, dominant vessel. Diffuse 20-30% stenosis. 5. LVEDP is 16 mmHg.  6. Overall left ventricular systolic function mildly diminshed with est. EF 45%. Mild, global hypokinesis noted. 7. No significant mitral regurgitation or aortic stenosis noted. (see report)  HX HERNIA REPAIR  2014  
 rt inguinal   
 HX HERNIA REPAIR  2016 LEFT INGUINAL HERNIA REPAIR DR. Lashonda Zambrano  REPAIR ING HERNIA,5+Y/O,JESSIE Left 16 Dr. Clifton Ahumada  SWAN GANZ  2012 Family History Problem Relation Age of Onset  Heart Surgery Father BYPASS  Stroke Father  Heart Disease Father  Hypertension Father  Heart Attack Father  Heart Surgery Mother BYPASS  Coronary Artery Disease Mother  Stroke Mother  Heart Disease Mother  Hypertension Mother  Diabetes Sister  Cancer Brother History reviewed, no pertinent family history. Social History Tobacco Use  Smoking status: Former Smoker Packs/day: 1.50 Years: 54.00 Pack years: 81.00 Types: Cigarettes Last attempt to quit: 10/23/2012 Years since quittin.8  Smokeless tobacco: Never Used Substance Use Topics  Alcohol use: No  
  Alcohol/week: 0.0 standard drinks Comment: quit drinking alcohol 26 years ago, patient states stopped in \"7996\" Allergies Allergen Reactions  Codeine Swelling  Morphine Itching  Sulfa (Sulfonamide Antibiotics) Other (comments) Kidney problems. Current Facility-Administered Medications Medication Dose Route Frequency  fentaNYL (DURAGESIC) 12 mcg/hr patch 1 Patch  1 Patch TransDERmal Q72H  
 HYDROmorphone (DILAUDID) tablet 4 mg  4 mg Oral Q4H PRN  
 LORazepam (INTENSOL) 2 mg/mL oral concentrate 0.5 mg  0.5 mg Oral Q1H PRN  
 ondansetron (ZOFRAN ODT) tablet 4 mg  4 mg Oral Q6H PRN  
 scopolamine (TRANSDERM-SCOP) 1 mg over 3 days 1 Patch  1 Patch TransDERmal Q72H PRN  
 bisacodyL (DULCOLAX) suppository 10 mg  10 mg Rectal DAILY PRN  
 diphenhydrAMINE (BENADRYL) capsule 25 mg  25 mg Oral Q6H PRN  
 acetaminophen (TYLENOL) tablet 650 mg  650 mg Oral TID  pantoprazole (PROTONIX) 40 mg in 0.9% sodium chloride 10 mL injection  40 mg IntraVENous Q24H  
 sucralfate (CARAFATE) tablet 1 g  1 g Oral AC&HS  lidocaine 4 % patch 1 Patch  1 Patch TransDERmal Q24H  
 albuterol-ipratropium (DUO-NEB) 2.5 MG-0.5 MG/3 ML  3 mL Nebulization Q4H PRN  
 amLODIPine (NORVASC) tablet 10 mg  10 mg Oral DAILY  digoxin (LANOXIN) tablet 0.125 mg  0.125 mg Oral DAILY  DULoxetine (CYMBALTA) capsule 20 mg  20 mg Oral DAILY  lisinopriL (PRINIVIL, ZESTRIL) tablet 20 mg  20 mg Oral DAILY  polyethylene glycol (MIRALAX) packet 17 g  17 g Oral DAILY  tamsulosin (FLOMAX) capsule 0.4 mg  0.4 mg Oral DAILY  traZODone (DESYREL) tablet 50 mg  50 mg Oral QHS  
 
 
 
 LAB AND IMAGING FINDINGS:  
 
Lab Results Component Value Date/Time WBC 8.1 09/01/2020 05:54 AM  
 HGB 8.9 (L) 09/01/2020 02:45 PM  
 PLATELET 112 20/33/0730 05:54 AM  
 
Lab Results Component Value Date/Time Sodium 140 09/01/2020 05:54 AM  
 Potassium 3.9 09/01/2020 05:54 AM  
 Chloride 108 09/01/2020 05:54 AM  
 CO2 28 09/01/2020 05:54 AM  
 BUN 8 09/01/2020 05:54 AM  
 Creatinine 0.68 09/01/2020 05:54 AM  
 Calcium 7.5 (L) 09/01/2020 05:54 AM  
 Magnesium 1.7 09/01/2020 05:54 AM  
 Phosphorus 2.3 (L) 09/01/2020 05:54 AM  
  
Lab Results Component Value Date/Time Alk. phosphatase 91 08/31/2020 12:34 AM  
 Protein, total 5.5 (L) 08/31/2020 12:34 AM  
 Albumin 1.9 (L) 08/31/2020 12:34 AM  
 Globulin 3.6 08/31/2020 12:34 AM  
 
Lab Results Component Value Date/Time INR 1.7 (H) 08/23/2020 10:45 PM  
 Prothrombin time 19.3 (H) 08/23/2020 10:45 PM  
 aPTT 34.0 08/23/2020 10:45 PM  
  
Lab Results Component Value Date/Time Iron 106 08/24/2020 09:55 AM  
 TIBC 164 (L) 08/24/2020 09:55 AM  
 Iron % saturation 65 (H) 08/24/2020 09:55 AM  
 Ferritin 5,764 (H) 08/24/2020 09:55 AM  
  
No results found for: PH, PCO2, PO2 No components found for: [place] Lab Results Component Value Date/Time CK 35 (L) 08/23/2020 05:35 PM  
 CK - MB <1.0 08/23/2020 05:35 PM  
  
 
 
   
 
Total time: 15 minutes Counseling / coordination time, spent as noted above: 10 minutes 
> 50% counseling / coordination?: yes, patient, family, attending, CM, RN, hospice liaison Prolonged service was provided for  []30 min   []75 min in face to face time in the presence of the patient, spent as noted above. Time Start:  
Time End:  
Note: this can only be billed with 80454 (initial) or 57282 (follow up). If multiple start / stop times, list each separately.

## 2020-09-03 NOTE — ROUTINE PROCESS
Bedside shift change report given to Alfredo Ballard (oncoming nurse) by Renan Silva RN (offgoing nurse). Report included the following information SBAR, Kardex, Intake/Output and MAR.

## 2020-09-05 PROBLEM — Z51.5 HOSPICE CARE PATIENT: Status: ACTIVE | Noted: 2020-01-01

## 2020-11-02 ENCOUNTER — HOME CARE VISIT (OUTPATIENT)
Dept: HOSPICE | Facility: HOSPICE | Age: 80
End: 2020-11-02
Payer: MEDICARE

## 2021-03-05 NOTE — PROGRESS NOTES
Asumed care for the pt, AXO, 4 No pain, will continue to monitor pt Pt ambulated to yellow 54  Visual acuity completed.  Both eye: 20/25  Left eye: 20/25  Right eye :20/30

## 2021-04-24 NOTE — PROCEDURES
[facility] [address] [facility], [address] Endoscopic Gastroduodenoscopy  and Colonscopy Procedure Note Shawnee Padilla 1940 
[id number] Indication:  Gastrointestinal hemorrhage, unspecified : Hortensia Alas MD 
 
Referring Provider:  Tricia Aponte DO Anesthesia/Sedation:  MAC anesthesia Propofol EGD Procedure Details After infomed consent was obtained for the procedure, with all risks and benefits of procedure explained the patient was taken to the endoscopy suite and placed in the left lateral decubitus position. Following sequential administration of sedation as per above, the endoscope was inserted into the mouth and advanced under direct vision to third portion of the duodenum. A careful inspection was made as the gastroscope was withdrawn, including a retroflexed view of the proximal stomach; findings and interventions are described below. Findings:  
Esophagus:Hiatal hernia noted. Stomach: mild erosive gastritis few flecks of coffee grounds. Duodenum/jejunum: normal 
 
Therapies:  none Specimens:  
ID Type Source Tests Collected by Time Destination 1 : ascending colon polyp Preservative Colon, Ascending  Caitlin Salas MD 5/28/2019 1350 Pathology Impression:    Endo: hiatal hernia, gastritis Bruner: ascending colon polyp; diverticulosis; hemorroids Colonscopy Procedure Details:  After informed consent was obtained with all risks and benefits of procedure explained and preoperative exam completed, the patient was taken to the endoscopy suite and placed in the left lateral decubitus position.   Upon sequential sedation as per above, a digital rectal exam was performed and was normal.  The Olympus videocolonoscope was inserted in the rectum and carefully advanced to the cecum, which was identified by the ileocecal valve and appendiceal orifice. The quality of preparation was excellent. The colonoscope was slowly withdrawn with careful evaluation between folds. Retroflexion in the rectum was performed and was normal. 
 
Findings:  
Rectum: Internal hemorrhoids noted. Sigmoid:     - Diverticulosis Descending Colon:     - Diverticulosis Transverse Colon: normal 
Ascending Colon: 1 cm polyp which was removed piece meal with Endo cut hot.; 
Cecum: normal 
Terminal Ileum: not intubated Interventions:  none Specimen Removed:  specimen #1, 10 mm in size, located in the ascending colon removed by snare cautery and retrieved for pathology Complications: None. EBL:  None. Recommendations: -Await pathology. Transfuse as needed. Infuse iron if deficient. NO FURTHER SCREENING COLO AT HIS AGE 
-Regular diet. Can resume anticoagulation with inherent risks of bleeding. Resume normal medication(s).   
  
 
Rubi Guerrero MD 
5/28/2019  2:09 PM 
 
 
 
 
 
 Glycopyrrolate Pregnancy And Lactation Text: This medication is Pregnancy Category B and is considered safe during pregnancy. It is unknown if it is excreted breast milk.

## 2023-08-15 NOTE — H&P
Admission History and Physical 
[facility] Patient: Syed Kaplan MRN: [id number]  [facility]: [id number] YOB: 1940  Age: 79 y.o. Sex: male Admission Date: 8/23/2020 HPI:  
 
Syed Kaplan is a 79 y.o. male with PMH AAA s/p endovascular repair, Fe deficiency anemia, PUD, AFib, CHF, CAD, COPD with chronic hypoxic respiratory failure on home 3L NC, chronic back pain, and BPH, who presented to ED with complaint of malaise, worsening back pain and weakness for the last 3 to 4 weeks. In the ED, found to be hypotensive with acute on chronic anemia and initial hemoglobin of 6.0. Guaiac positive and grossly melenotic stool noted as well. Patient received 3 units PRBC; BP improved to 113/52 and Hgb up 7.2. Mr. Erin Pelayo reports hx PUD w/ melena for which he was prescribed carafate and protonix. He has not taken the carafate x 1 month for fear of an interaction with digoxin. He denies any pre or post prandial ABD pain since taking protonix and has not noticed any blood in his stool. He also reports anorexia, nausea induced by the smell of food, and unintentional 40lbs weight loss. Denies F/C, diaphoresis, emesis, HA, lightheadedness, SOB, cough, CP, palpitations, ABD pain, bowel complaints, urinary complaints, edema, numbness, or tingling. While in the ED, vascular surgery evaluated him for any complications involving his AAA endograft. Wet read identified known endoleak but no significant intraabdominal hematoma. Vascular will continue to follow. Per ED note, GI was consulted as well. It is unclear whether GI evaluated the pt; no note available at this time. ED Course (See objective for values/interpretations): 
Labs obtained: CBC, CMP, PT/INR, lactate, Medications administered: pantoprazole, fentanyl, rocephin, flagyl Imaging obtained: CXR, CT chest ABD pelvis EKG: AFib Review of Systems Constitutional: Positive for malaise/fatigue and weight loss. Negative for chills, diaphoresis and fever. HENT: Negative for congestion, hearing loss, sore throat and tinnitus. Eyes: Positive for blurred vision (chronic) and discharge (chronic). Respiratory: Negative for cough, hemoptysis and shortness of breath. Cardiovascular: Negative for chest pain, palpitations and orthopnea. Gastrointestinal: Positive for nausea. Negative for abdominal pain, blood in stool, diarrhea, heartburn, melena and vomiting. Genitourinary: Negative for dysuria, frequency, hematuria and urgency. Musculoskeletal: Positive for back pain. Neurological: Positive for dizziness (chronic, on turning in bed) and weakness. Negative for tingling, sensory change and headaches. Psychiatric/Behavioral: Negative for depression and suicidal ideas. Past Medical History:  
Diagnosis Date  Aneurysm ([address] AAA repair 2006 & 2012  Aorto-iliac duplex 02/13/2015 Tech difficult. Patent aorta bi-iliac endograft w/o leak or limb dysfunction.  Arrhythmia   
 a fib  Cardiac cath 11/01/2012 RCA (sm, nondom) patent. LM patent. LAD 25%. CX (dom) 45% mid. LVEDP 12 mmHg. No WMA. PA 27/12. W 10-12. CO/CI 5.2/2.6 (TD).  Cardiac echocardiogram, abnormal 02/20/2016 EF 55%. No WMA. Indeterminate diastolic fx. RVSP 60 mmHg. Severe LAE. Mild MR. Mod TR.  IVCE. Similar to study of 10/26/12.  Cardiovascular aorto-iliac duplex 02/13/2015 Patent aorta bi-iliac endovascular graft w/o leak or limb dysfunction. Sac measures 4.21 x 4.47 cm (4.4 x 4.6 cm on 1/31/14).  Cardiovascular LE peripheral arterial testing 07/29/2013 No significant LE arterial disease bilaterally. R GHADA 1. 12.  L GHADA 1. 12.  R DBI 0.83. L DBI 0.71. Exercise deferred.  Cardiovascular renal duplex 10/31/2012 No RA stenosis. Intrinsic/med disease in left kidney.   Patent aortic endograft. Patent, thrombus-free renal veins bilaterally.  Carotid duplex 07/29/2013 Mild <50% bilateral ICA plaquing.  Chronic kidney disease  Chronic lung disease  Cigarette smoker  Congestive heart failure (CHF) ([address]  COPD (chronic obstructive pulmonary disease) (HCC)   
 and emphysema; likely secondary to tobacco abuse  Difficult intubation  Dyslipidemia   
 low HDL  Emphysema   
 HTN (hypertension)  Hypercholesterolemia  Ill-defined condition   
 hernia  Increased prostate specific antigen (PSA) velocity  Long term (current) use of anticoagulants   
 coumadin  Osteoarthritis  Osteomyelitis ([facility]  Paroxysmal atrial fibrillation (HCC)  Peripheral vascular disease ([facility] AAA repair 12/2007  Persistent atrial Fibrillation  Persistent atrial fibrillation (HCC)  Unspecified adverse effect of anesthesia   
 difficulty breathing placed in ICU Oct 2012 (AAA repair) Past Surgical History:  
Procedure Laterality Date  BRONCHOSCOPY DIAGNOSTIC  11/2/2012  CARDIAC CATHETERIZATION  11/1/2012  COLONOSCOPY N/A 7/26/2016 COLONOSCOPY with Polypectomies performed by Pedro Borjas MD at [facility] ENDOSCOPY  COLONOSCOPY N/A 5/28/2019 COLONOSCOPY with polypectomy performed by Lisbeth Krishna MD at [address] [place] [address] HX AAA REPAIR    
 2006 & 2012  HX HEART CATHETERIZATION  3/4/2009 1. RCA small, nondominant; patent. 2. LMCA patent. 3. LAD is long, wrapped around apical vessel. Diffuse, 20-30% stenosis noted. 4. CCA is large, dominant vessel. Diffuse 20-30% stenosis. 5. LVEDP is 16 mmHg. 6. Overall left ventricular systolic function mildly diminshed with est. EF 45%. Mild, global hypokinesis noted. 7. No significant mitral regurgitation or aortic stenosis noted. (see report)  HX HERNIA REPAIR  2/2014  
 rt inguinal   
 HX HERNIA REPAIR  01/2016 LEFT INGUINAL HERNIA REPAIR DR. Richie Cisse  REPAIR ING HERNIA,5+Y/O,JESSIE Left 16 Dr. Darion LONG  2012 Family History Problem Relation Age of Onset  Heart Surgery Father BYPASS  Stroke Father  Heart Disease Father  Hypertension Father  Heart Attack Father  Heart Surgery Mother BYPASS  Coronary Artery Disease Mother  Stroke Mother  Heart Disease Mother  Hypertension Mother  Diabetes Sister  Cancer Brother Social History Socioeconomic History  Marital status:  Spouse name: Not on file  Number of children: Not on file  Years of education: Not on file  Highest education level: Not on file Tobacco Use  Smoking status: Former Smoker Packs/day: 1.50 Years: 54.00 Pack years: 81.00 Types: Cigarettes Last attempt to quit: 10/23/2012 Years since quittin.8  Smokeless tobacco: Never Used Substance and Sexual Activity  Alcohol use: No  
  Alcohol/week: 0.0 standard drinks Comment: quit drinking alcohol 26 years ago, patient states stopped in \"[id number]\"  Drug use: No  
 Sexual activity: Not Currently Allergies Allergen Reactions  Codeine Swelling  Morphine Itching  Sulfa (Sulfonamide Antibiotics) Other (comments) Kidney problems. Prior to Admission Medications Prescriptions Last Dose Informant Patient Reported? Taking? DULoxetine (CYMBALTA) 20 mg capsule   Yes No  
Sig: Take 20 mg by mouth daily. HYDROcodone-acetaminophen (NORCO) 5-325 mg per tablet   Yes No  
Sig: Take 0.5 Tabs by mouth two (2) times daily as needed for Pain. OXYGEN-AIR DELIVERY SYSTEMS   Yes No  
Si L/min by Nasal route daily. Continuous.  Company is [employer] 
   
albuterol (PROVENTIL VENTOLIN) 2.5 mg /3 mL (0.083 %) nebu   No No  
Sig: inhale contents of 1 vial in nebulizer every 4 hours if needed for wheezing  
amLODIPine (NORVASC) 10 mg tablet   Yes No  
 Sig: Take 10 mg by mouth daily. cephalexin 500 mg tab   Yes No  
ciprofloxacin HCl (CIPRO) 500 mg tablet   Yes No  
diclofenac (VOLTAREN) 1 % gel   Yes No  
digoxin (DIGITEK) 0.125 mg tablet   No No  
Sig: take 1 tablet by mouth once daily  
docusate sodium 100 mg tab   Yes No  
Sig: Take 100 mg by mouth daily as needed for Other (constipation). ferrous sulfate 325 mg (65 mg iron) tablet   Yes No  
Sig: Take 325 mg by mouth every other day. Indications: anemia from inadequate iron  
lidocaine (LIDODERM) 5 %   Yes No  
lisinopril (PRINIVIL, ZESTRIL) 20 mg tablet   No No  
Sig: Take 1 Tab by mouth daily. multivitamin, tx-iron-ca-min (THERA-M W/ IRON) 9 mg iron-400 mcg tab tablet   Yes No  
Sig: Take 1 Tab by mouth daily. omeprazole (PRILOSEC) 10 mg capsule   Yes No  
Sig: Take 10 mg by mouth daily. pantoprazole (PROTONIX) 40 mg tablet   No No  
Sig: Take 1 Tab by mouth Daily (before breakfast). polyethylene glycol (MIRALAX) 17 gram packet   Yes No  
Sig: Take 17 g by mouth daily as needed (constipation). pravastatin (PRAVACHOL) 40 mg tablet   Yes No  
Sig: Take 40 mg by mouth nightly.  
sodium chloride (OCEAN NASAL) 0.65 % nasal squeeze bottle   Yes No  
Si Sprays by Both Nostrils route daily as needed for Congestion. sucralfate (CARAFATE) 1 gram tablet   Yes No  
tamsulosin (FLOMAX) 0.4 mg capsule   Yes No  
Sig: Take 0.4 mg by mouth daily. tiotropium-olodateroL (Stiolto Respimat) 2.5-2.5 mcg/actuation inhaler   No No  
Sig: inhale 2 inhalations by mouth once daily  
traZODone (DESYREL) 50 mg tablet   Yes No  
Sig: Take 50 mg by mouth nightly. Facility-Administered Medications: None Physical Exam:  
 
Patient Vitals for the past 24 hrs: 
 Temp Pulse Resp BP SpO2  
20 2330  74 19 108/54 100 % 20 2300  77 19 113/52 100 % 20 2248  60 13 94/63 100 % 20 2245  82 15 103/59 100 % 20 2200  68 22 129/63 99 % 20 2130  73 18 96/65 100 % 08/23/20 2100  75 19 (!) 89/65 96 % 08/23/20 2015  90 22 118/42 100 % 08/23/20 2000  84 24 112/43 100 % 08/23/20 1955  79 21 97/40 100 % 08/23/20 1945  74 18 (!) 105/38 100 % 08/23/20 1936 98.1 °F (36.7 °C) 78 23 97/54 100 % 08/23/20 1935  83 18 97/54 100 % 08/23/20 1930  78 24 (!) 81/53 99 % 08/23/20 1928     100 % 08/23/20 1925  79 23 (!) 92/23   
08/23/20 1920  85 20 (!) 79/40   
08/23/20 1915  75 22 (!) 77/40   
08/23/20 1910 98.1 °F (36.7 °C) 74 22 (!) 68/39 100 % 08/23/20 1905  93 27 (!) 87/31 98 % 08/23/20 1900  79 22 (!) 83/39 (!) 84 % 08/23/20 1757 96.8 °F (36 °C) 79 18 (!) 84/44 94 % Physical Exam:  
General:  AAOx3, NAD, laying comfortably in bed HEENT: Conjunctiva pale with thick yellow discharge, sclera anicteric. PERRL. EOMI. Dry mucous membranes. Thyroid not enlarged, no nodules. No cervical, supraclavicular, occipital or submandibular lymphadenopathy. No other gross abnormalities present. CV:  Irregular rhythm, normal rate, no murmurs. No visible pulsations or thrills. RESP:  Unlabored breathing. Lungs clear to auscultation without adventitious breath sounds. Equal expansion bilaterally. ABD:  Soft, TTP RUQ, RLQ, LLQ and suprapubic, voluntary guarding, nondistended. BS (+). No hepatosplenomegaly. R inguinal hernia, reducible, nontender, no overlying skin changes RECTAL:  Deferred as pt had BM in diaper during interview. MS:  No joint deformity. BLE atrophy. Neuro:  CN II-XII grossly intact. 4/5 strength bilateral upper extremities and lower extremities. Ext:  No edema. 2+ radial and dp pulses bilaterally. Skin:  No rashes, lesions, or ulcers. Good turgor. Chemistry Recent Labs  
  08/23/20 
1735 * * K 4.8  
CL 98* CO2 18* BUN 48* CREA 1.56* CA 8.2* AGAP 16 BUCR 31* * TP 6.1* ALB 2.0*  
GLOB 4.1* AGRAT 0.5* CBC w/Diff Recent Labs  
  08/23/20 
2245 08/23/20 
3791 WBC  --  18.5*  
RBC  --  2.02* HGB 7.2* 6.0*  
HCT  --  18.5* PLT  --  376 GRANS  --  88* LYMPH  --  5* EOS  --  0 Liver Enzymes Protein, total  
Date Value Ref Range Status 08/23/2020 6.1 (L) 6.4 - 8.2 g/dL Final  
 
Albumin Date Value Ref Range Status 08/23/2020 2.0 (L) 3.4 - 5.0 g/dL Final  
 
Globulin Date Value Ref Range Status 08/23/2020 4.1 (H) 2.0 - 4.0 g/dL Final  
 
A-G Ratio Date Value Ref Range Status 08/23/2020 0.5 (L) 0.8 - 1.7   Final  
 
Alk. phosphatase Date Value Ref Range Status 08/23/2020 183 (H) 45 - 117 U/L Final  
 
Recent Labs  
  08/23/20 
1735 TP 6.1* ALB 2.0*  
GLOB 4.1* AGRAT 0.5* * Lactic Acid Lactic acid Date Value Ref Range Status 06/03/2019 1.1 0.4 - 2.0 MMOL/L Final  
 
No results for input(s): LAC in the last 72 hours. BNP No results found for: BNP, BNPP, XBNPT Cardiac Enzymes Lab Results Component Value Date/Time CPK 35 (L) 08/23/2020 05:35 PM  
 CKMB <1.0 08/23/2020 05:35 PM  
 CKND1  08/23/2020 05:35 PM  
  CALCULATION NOT PERFORMED WHEN RESULT IS BELOW LINEAR LIMIT  
 Dru Ackerman <0.02 08/23/2020 05:35 PM  
  
 
Coagulation Recent Labs  
  08/23/20 
2245 PTP 19.3* INR 1.7* APTT 34.0 Thyroid  No results found for: T4, T3U, TSH, TSHEXT Lipid Panel Lab Results Component Value Date/Time HDL Cholesterol 25 (L) 03/26/2010 10:30 AM  
  
 
ABG No results for input(s): PHI, PHI, POC2, PCO2I, PO2, PO2I, HCO3, HCO3I, FIO2, FIO2I in the last 72 hours. Urinalysis Lab Results Component Value Date/Time  Color YELLOW 06/02/2019 10:00 PM  
 Appearance CLEAR 06/02/2019 10:00 PM  
 Specific gravity >1.030 (H) 06/02/2019 10:00 PM  
 pH (UA) 5.5 06/02/2019 10:00 PM  
 Protein >1,000 (A) 06/02/2019 10:00 PM  
 Glucose NEGATIVE  06/02/2019 10:00 PM  
 Ketone NEGATIVE  06/02/2019 10:00 PM  
 Bilirubin NEGATIVE  06/02/2019 10:00 PM  
 Urobilinogen 1.0 06/02/2019 10:00 PM  
 Nitrites NEGATIVE  06/02/2019 10:00 PM  
 Leukocyte Esterase NEGATIVE  06/02/2019 10:00 PM  
 Epithelial cells FEW 06/02/2019 10:00 PM  
 Bacteria NEGATIVE  06/02/2019 10:00 PM  
 WBC 0 to 3 06/02/2019 10:00 PM  
 RBC 0 to 3 06/02/2019 10:00 PM  
  
 
Micro No results for input(s): SDES, CULT in the last 72 hours. No results for input(s): CULT in the last 72 hours. Imaging: 
XR (Most Recent). Results from [facility] Encounter encounter on 07/24/20 XR SPINE LUMB MIN 4 V Narrative LUMBAR SPINE COMPLETE 
 
CPT code: 23676 INDICATION: Low back pain. Dorsalgia. TECHNIQUE: A.P., lateral and bilateral oblique views of the lumbar spine 
obtained. COMPARISON: CT 2/17/2020 FINDINGS: 
Alignment preserved on the AP view. Cannot well visualized vertebrae at all due 
to superimposed aortic stent graft however. Graft obscures majority of the vertebrae on both oblique views. There does not appear to be any subluxation. Facet joint narrowing from L4 
inferiorly with sclerosis. Lateral view demonstrates transitional final lumbosacral vertebrae. Grade 1 
retrolisthesis of L5 upon this vertebrae with moderate diffuse disc space 
narrowing and vacuum disc change. Fairly severe diffuse disc space narrowing L4-5. Mild smooth anterior narrowing L1 vertebrae similar to prior CT. Mild narrowing T12 vertebrae similar to prior CT. Questionable mild increased sclerosis along the superior L3 endplate. No 
prior plain film for comparison however. Not strongly suspected but cannot 
exclude mild superior endplate compression deformity of L3. If there is high 
clinical concern, or mechanism for such, could consider further imaging with CT 
or MR if feasible. Impression IMPRESSION: 
1. Significant disc space narrowing L5 upon transitional level with grade 1 
retrolisthesis, similar. 2. Questionable increased superior L3 endplate sclerosis as above. Correlate clinically in regards to concern for compression fracture. 3. Multilevel degenerative discogenic and facet disease CT (Most Recent) Results from [facility] Encounter encounter on 02/17/20 CTA CHEST ABD PELV W WO CONT Narrative CTA CHEST,  ABDOMEN AND PELVIS WITHOUT AND WITH CONTRAST  
 
COMPARISON: [place] 3, 2019 INDICATIONS:  
Peripheral vascular disease (HCC) [I73.9 (ICD-10-CM)]; AAA (abdominal aortic 
aneurysm, ruptured) ([facility] [address] [address] [I71.3 (ICD-10-CM)] Aneurysm, abdominal aorta Previous stent grafting of the abdominal aorta TECHNIQUE:  
 
Noncontrast CT was done initially through the chest, abdomen, and pelvis. Contrast enhanced CT was then performed following the uneventful administration  
of 100 cc of Isovue-[id number]. Scanning was done with a multislice scanner. Volumetric data acquisition was 
performed through the chest, abdomen, and pelvis. Reconstructions were created 
in the axial, coronal, and sagittal planes. The data was also loaded on an 
independent imaging workstation. Postprocessing was performed with indication 
wheezing creation of MIPS as well as 3D shaded surface virtual reality 
angiographic images without and with bone removal.  
 
CT CHEST FINDINGS: 
 
The lungs are emphysematous. There is diffuse bronchial thickening. Multisegment 
and subsegmental atelectasis is present in the right lower lobe. Kayleen Frizzle There is a small volume of right pleural fluid. Mild adenopathy is evident in the mediastinum consistent with the chronic 
pulmonary disease. There is multichamber cardiac enlargement. The chest wall is unremarkable. . 
. 
 
CT ABDOMEN FINDINGS: 
 
Liver: Multiple well-circumscribed hypodensities consistent with cysts are again 
noted. The hepatic veins are distended. No focal mass lesions are apparent. Gallbladder unremarkable. Biliary tree not dilated. Spleen: Negative with accessory splenule. Left Kidney: There is some scarring in the lower pole.  Cysts are present ranging 
up to 1.8 cm. Most are too small for characterization. Galileo Esteban Right Kidney: There is severe atrophy of the right kidney. A hyperdense 
subcentimeter cyst is present laterally. No hydronephrosis is present. Galileo Esteban Pancreas: Negative. Adrenal Glands: Normal. 
Stomach, Small Bowel And Colon: There is diverticulosis without findings of 
diverticulitis. The appendix is present of normal appearance however it does 
extend into a right inguinal hernia. Lymph nodes: visible lymph nodes are not pathologically increased in size or 
number: . The IVC is unremarkable Peritoneal Spaces: There is no free fluid or free air. Bladder:Small diverticulum noted. Otherwise unremarkable Vascular: 
 
Ascending Aorta: The sinuses of Valsalva measures 3.6 x 3.8 x 3.3 cm. The aorta at the sinotubular ridge measures 3.3 x 3.5 cm. The maximal ascending 
aortic diameter is 4.2 x 4.1 cm. Galileo Esteban Aortic Arch: The mid arch measures 3.5 x 3.5 cm with moderate calcified plaque. Galileo Esteban Great Vessels: Widely patent. Descending Thoracic Aorta: The proximal descending aorta measures 3.5 x 3.5 cm; 
the mid 3.2 x 3.4 cm, and the distal 3.7 x 3.7 cm. Galileo Esteban Abdominal Aorta Proximally: At the diaphragm the aorta measures 3.4 x 2.4 cm. There is irregular expansion of the aorta immediately inferior to the SMA 
takeoff. The appearance is different than it was in June 2019. The diameter of 
the aorta measures 4.2 x 2.8 cm and there is either an intramural dissection or 
penetrating ulcer anteriorly measuring 9 mm in diameter and 7 mm deep. The proximal landing zone of the stent graft begins just above the renal 
arteries. Immediately inferior to the left renal vein at the IVC level (image 134) aorta sac measures 4.9 x 5.1 cm which is increased from the previous 4.5 x 
4.5 cm at the same level. A type I endoleak is present.  
 
At the level of the stent graft bifurcation the aortic diameter measures 4.1 x 
 3.9 cm not significantly changed from 3.7 x 4.1 cm. Both limbs are patent. The 
stent graft fills the aortic sac at the bifurcation. The left common iliac is filled by the stent graft measuring 1.7 cm. There is 
calcified and noncalcified plaque in the left iliofemoral system without 
significant stenosis or aneurysm. Right common iliac is filled with stent graft proximally and 1.8 cm. Just below 
the stent There is a common iliac aneurysm measuring 2.4 cm, not significantly changed. There is a stenosis of the right external iliac origin appears slightly more 
severe than on previous is potentially hemodynamically significant. The internal 
iliac is patent. The distal external iliac and the right femoral runoff is 
unremarkable. Visceral Branches: 
 
Celiac Axis: Widely patent. SMA: Widely patent. Renals: Moderate left origin stenosis. Right is occluded. THOMAS: Not opacified. Osseous Structures Of Abdomen And Pelvis: Normal for age. Impression IMPRESSION:  
 
Chronic obstructive pulmonary disease. Multichamber cardiac enlargement. Colonic diverticulosis present. Left renal scarring Small bladder diverticulum. Aortic dimensions as above. Primary change is  the enlargement at the upper end 
of the abdominal aorta-just above the proximal landing zone. The aorta has 
increased in diameter. Endoleak persists. . The diameter of the aortic sac has 
increased about 5 mm both AP and transverse. There is also a penetrating ulcer 
or intramural dissection present at this level anteriorly as described above. All CT scans at this facility are performed using dose optimization technique as 
appropriate to the performed exam, to include automated exposure control, 
adjustment of the mA and/or kV according to patient's size (Including 
appropriate matching for site-specific examinations), or use of iterative 
reconstruction technique. Abdomen and pelvis ECHO No results found for this or any previous visit. EKG Results for orders placed or performed in visit on 08/10/18 AMB POC EKG ROUTINE W/ 12 LEADS, INTER & REP     Status: None Narrative Read by Bruno Shi MD - atrial fibrillation, occasional ectopic ventricular beat. Nonspecific QRS widening and anterior fascicular block. Old anterior infarct. Possible nonspecific ST abnormality. Recent Results (from the past 12 hour(s)) EKG, 12 LEAD, INITIAL Collection Time: 08/23/20  5:15 PM  
Result Value Ref Range Ventricular Rate 71 BPM  
 Atrial Rate 288 BPM  
 QRS Duration 86 ms  
 Q-T Interval 350 ms QTC Calculation (Bezet) 380 ms Calculated R Axis -42 degrees Calculated T Axis 81 degrees Diagnosis Atrial fibrillation Left axis deviation Low voltage QRS Abnormal ECG When compared with ECG of 26-JAN-2020 09:46, 
Criteria for Anteroseptal infarct are no longer present CBC WITH AUTOMATED DIFF Collection Time: 08/23/20  5:35 PM  
Result Value Ref Range WBC 18.5 (H) 4.6 - 13.2 K/uL  
 RBC 2.02 (L) 4.70 - 5.50 M/uL HGB 6.0 (L) 13.0 - 16.0 g/dL HCT 18.5 (L) 36.0 - 48.0 % MCV 91.6 74.0 - 97.0 FL  
 MCH 29.7 24.0 - 34.0 PG  
 MCHC 32.4 31.0 - 37.0 g/dL RDW 20.6 (H) 11.6 - 14.5 % PLATELET 782 863 - 386 K/uL MPV 9.3 9.2 - 11.8 FL  
 NEUTROPHILS 88 (H) 40 - 73 % LYMPHOCYTES 5 (L) 21 - 52 % MONOCYTES 7 3 - 10 % EOSINOPHILS 0 0 - 5 % BASOPHILS 0 0 - 2 %  
 ABS. NEUTROPHILS 16.2 (H) 1.8 - 8.0 K/UL  
 ABS. LYMPHOCYTES 0.8 (L) 0.9 - 3.6 K/UL  
 ABS. MONOCYTES 1.4 (H) 0.05 - 1.2 K/UL  
 ABS. EOSINOPHILS 0.0 0.0 - 0.4 K/UL  
 ABS. BASOPHILS 0.0 0.0 - 0.1 K/UL  
 DF AUTOMATED METABOLIC PANEL, BASIC Collection Time: 08/23/20  5:35 PM  
Result Value Ref Range Sodium 132 (L) 136 - 145 mmol/L Potassium 4.8 3.5 - 5.5 mmol/L Chloride 98 (L) 100 - 111 mmol/L  
 CO2 18 (L) 21 - 32 mmol/L  Anion gap 16 3.0 - 18 mmol/L  
 Glucose 111 (H) 74 - 99 mg/dL BUN 48 (H) 7.0 - 18 MG/DL Creatinine 1.56 (H) 0.6 - 1.3 MG/DL  
 BUN/Creatinine ratio 31 (H) 12 - 20 GFR est AA 52 (L) >60 ml/min/1.73m2 GFR est non-AA 43 (L) >60 ml/min/1.73m2 Calcium 8.2 (L) 8.5 - 10.1 MG/DL  
LIPASE Collection Time: 08/23/20  5:35 PM  
Result Value Ref Range Lipase 57 (L) 73 - 393 U/L  
CARDIAC PANEL,(CK, CKMB & TROPONIN) Collection Time: 08/23/20  5:35 PM  
Result Value Ref Range CK - MB <1.0 <3.6 ng/ml CK-MB Index  0.0 - 4.0 % CALCULATION NOT PERFORMED WHEN RESULT IS BELOW LINEAR LIMIT  
 CK 35 (L) 39 - 308 U/L Troponin-I, QT <0.02 0.0 - 0.045 NG/ML  
NT-PRO BNP Collection Time: 08/23/20  5:35 PM  
Result Value Ref Range NT pro-BNP 7,243 (H) 0 - 1,800 PG/ML  
HEPATIC FUNCTION PANEL Collection Time: 08/23/20  5:35 PM  
Result Value Ref Range Protein, total 6.1 (L) 6.4 - 8.2 g/dL Albumin 2.0 (L) 3.4 - 5.0 g/dL Globulin 4.1 (H) 2.0 - 4.0 g/dL A-G Ratio 0.5 (L) 0.8 - 1.7 Bilirubin, total 0.5 0.2 - 1.0 MG/DL Bilirubin, direct 0.3 (H) 0.0 - 0.2 MG/DL Alk. phosphatase 183 (H) 45 - 117 U/L  
 AST (SGOT) 166 (H) 10 - 38 U/L  
 ALT (SGPT) 57 16 - 61 U/L  
GLUCOSE, POC Collection Time: 08/23/20  5:48 PM  
Result Value Ref Range Glucose (POC) 128 (H) 70 - 110 mg/dL POC LACTIC ACID Collection Time: 08/23/20  5:50 PM  
Result Value Ref Range Lactic Acid (POC) 7.14 (HH) 0.40 - 2.00 mmol/L  
TYPE + CROSSMATCH Collection Time: 08/23/20  6:05 PM  
Result Value Ref Range Crossmatch Expiration 08/26/2020 ABO/Rh(D) O POSITIVE Antibody screen NEG Unit number [id number] Blood component type [facility] Unit division 00 Status of unit ISSUED Crossmatch result Compatible Unit number [id number] Blood component type [facility] Unit division 00 Status of unit ISSUED Crossmatch result Compatible Unit number [id number]  Blood component type [facility]   
 Unit division 00 Status of unit ALLOCATED Crossmatch result Compatible Unit number [id number] Blood component type [facility] Unit division 00 Status of unit ISSUED Crossmatch result Compatible PROTHROMBIN TIME + INR Collection Time: 08/23/20 10:45 PM  
Result Value Ref Range Prothrombin time 19.3 (H) 11.5 - 15.2 sec INR 1.7 (H) 0.8 - 1.2 PTT Collection Time: 08/23/20 10:45 PM  
Result Value Ref Range aPTT 34.0 23.0 - 36.4 SEC HEMOGLOBIN Collection Time: 08/23/20 10:45 PM  
Result Value Ref Range HGB 7.2 (L) 13.0 - 16.0 g/dL POC LACTIC ACID Collection Time: 08/23/20 10:56 PM  
Result Value Ref Range Lactic Acid (POC) 4.33 (HH) 0.40 - 2.00 mmol/L Assessment/Plan:  
79 y.o. male with PMH AAA s/p endovascular repair, Fe deficiency anemia, PUD, AFib, CHF, CAD, COPD with chronic hypoxic respiratory failure on home 3L NC, chronic back pain, and BPH, now admitted with acute on chronic anemia. 1. Acute on chronic anemia. DDx to include PUD, diverticulosis/itis, malignancy, AVM. Initial Hgb 6.0, after 3u PRBC, Hgb 7.2. EGD and Colonoscopy by [facility] on 5/28/19 showed hiatal hernia, erosive gastritis with flecks of coffee grounds, ascending colon polyp; diverticulosis; and hemorroids. He saw Hem/Onc on 8/7/20 for surveillance labs; no new intervention noted. Favor recurrent bleeding gastric ulcer. Pt has gross melena with guaiac positive stool. Hx of gastritis and PUD, on home protonix and noncompliant with carafate x 1 month. Possible bleeding diverticulae. Hx diverticulosist. TTP LLQ and suprapubic area, but no reported ABD pain. Melena would not support diverticular bleed, but pt has large stool burden suggesting long transit time through sigmoid for blood to be digested. WBC 18.5 consistent with diverticulitis.  Malignancy possible in the setting of painless GIB, although CT does not show any obvious intraluminal neoplasm. Pt has had unintentional weight loss. AVM less likely; not noted on CT A/P w/ IV contrast. 
- admit to stepdown with telemetry - H/H Q4H, transfuse if Hgb < 7 
- monitor VS per unit routine 
- CBC, CMP, Mag, Phos daily 
- PT/INR 
- FU vascular recs 
- confirm GI involvement in AM 
- protonix gtt  
- NPO 
- NS @ 90mL/hr 
- PT/OT/CM Sepsis: 2/4 SIRS: tachypnea and WBC 18.5. Current DDx includes intraabdominal infection, diverticulitis, UTI, spine osteomyelitis/abscess. No evidence of intraabdomninal infection on wet read. LLQ TTP and melena support diverticulitis. Possible UTI in light of t w/ hx of BPH and imaging shows distended bladder. Pt received flagyl and rocephin in ED. Hx of lumbar spine osteomyelitis and abscess, but no imaging suggestive of acute osteomyelitis. - FU UA and UCx 
- FU BCx 
- FU CT final read 
- continue flagyl and rocephin AFib/CHF/CAD: rate controlled on amlodipine; off ASA and Coumadin due to hx of GIB. No evidence of decompensated CHF noted in cardiology clinic visit (6/16/20). CXR shows large-moderate R pleural effusion. Coronary angiography in 2012 only showed mild CAD. - hold home amlodipine 10mg daily, digoxin 125mcg daily, pravastatin 40mg daily while NPO 
- hold lisinopril 20mg daily 
 - diurese pleural effusion once BP more stable COPD: C/b chronic hypoxic respiratory failure, on 3L NC at home. - supplemental O2 for sats < 92% 
- duoneb PRN Chronic back pain: on home norco 5-325 
- lidocaine patch 
- hold norco while NPO 
 
BPH  
- hold tamsulosin 0.4mg daily while NPO Insomnia: On two home sleeping aids; probably does not need both. Defer to day team to choose which one to continue 
- hold duloxetine 20mg QHS and trazadone 50mg QHS while NPO. Diet NPO  
DVT Prophylaxis contraindicated GI Prophylaxis protonix gtt Code status No CPR, Intubation ok Disposition > 2 [place] Relationship: daughter 
[phone] Kailyn Cordero 
Daughter 
[phone] Marisela Escobar MD , PGY-1  
[facility] Medicine Intern Pager: [phone] August 24, 2020, 1:23 AM  
 
 
 
Admission History and Physical 
[address] [facility] Patient: Elda Yeung MRN: [id number]  CSN: [id number] YOB: 1940  Age: 79 y.o. Sex: male DOA: 8/23/2020 HPI:  
 
Elda Yeung is a 79 y.o. male with PMH of AAA s/p repair, HF, COPD on 2L continuous O2, HTN/HLD, afib on no AC due to gastric ulcer, now presenting with complaint of fatigue and low back pain for over 2 weeks. He denies fevers and chest pain, has some SOB. No changes in BMs or hematochezia, and no urinary complaints. On presentation to the ER, he was afebrile, HR 79, BP 84/44. ED exam found to have BRBPR and guaiac positive stool. ED Course:  
LABS-> Lactic Acid, CBC, INR, CMP, cardiac panel, BNP, blood cultures IMAGING-> CTA chest/abd/pelvis, CXR, EKG MEDS-> Protonix, ceftriaxone, Flagyl, Fentanyl IVF-> NS 2.5L 
 
HPI, ROS, PMH, PSH, Family Hx, Social Hx, home medications, and allergies as above in intern H&P. I agree with history as above. Additional comments to the subjective history include: 
 
 
 
  
  
  
 
 
Physical Exam:  
 
Patient Vitals for the past 24 hrs: 
 Temp Pulse Resp BP SpO2  
08/23/20 2330  74 19 108/54 100 % 08/23/20 2300  77 19 113/52 100 % 08/23/20 2248  60 13 94/63 100 % 08/23/20 2245  82 15 103/59 100 % 08/23/20 2200  68 22 129/63 99 % 08/23/20 2130  73 18 96/65 100 % 08/23/20 2100  75 19 (!) 89/65 96 % 08/23/20 2015  90 22 118/42 100 % 08/23/20 2000  84 24 112/43 100 % 08/23/20 1955  79 21 97/40 100 % 08/23/20 1945  74 18 (!) 105/38 100 % 08/23/20 1936 98.1 °F (36.7 °C) 78 23 97/54 100 % 08/23/20 1935  83 18 97/54 100 % 08/23/20 1930  78 24 (!) 81/53 99 % 08/23/20 1928     100 % 08/23/20 1925  79 23 (!) 92/23   
08/23/20 1920  85 20 (!) 79/40   
08/23/20 1915  75 22 (!) 77/40   
08/23/20 1910 98.1 °F (36.7 °C) 74 22 (!) 68/39 100 % 08/23/20 1905  93 27 (!) 87/31 98 % 08/23/20 1900  79 22 (!) 83/39 (!) 84 % 08/23/20 1757 96.8 °F (36 °C) 79 18 (!) 84/44 94 % Physical Exam: 
General:  Alert and Responsive and in No acute distress. HEENT: Conjunctiva pale, sclera anicteric. Dry mucous membranes. No cervical, supraclavicular, occipital or submandibular lymphadenopathy. No other gross abnormalities present. CV:  RRR, no murmurs. No visible pulsations or thrills. RESP:  Unlabored breathing. Lungs clear to auscultation. no wheeze, rales, or rhonchi. Equal expansion bilaterally. ABD:  Soft, non-distended with non-specific pain in lower quadrants No hepatosplenomegaly. No suprapubic tenderness. MS:  No joint deformity or instability. +atrophy of extremities. Neuro:  Equal movement and strength bilaterally. A+Ox3. Ext: Mild pedal edema. 2+ radial and dp pulses bilaterally. Skin:  No rashes, lesions, or ulcers. Good turgor. PERTINENT LABS:  
Hemoglobin 6.0 IMAGING:  
No results found. Recent Results (from the past 12 hour(s)) EKG, 12 LEAD, INITIAL Collection Time: 08/23/20  5:15 PM  
Result Value Ref Range Ventricular Rate 71 BPM  
 Atrial Rate 288 BPM  
 QRS Duration 86 ms  
 Q-T Interval 350 ms QTC Calculation (Bezet) 380 ms Calculated R Axis -42 degrees Calculated T Axis 81 degrees Diagnosis Atrial fibrillation Left axis deviation Low voltage QRS Abnormal ECG When compared with ECG of 26-JAN-2020 09:46, 
Criteria for Anteroseptal infarct are no longer present CBC WITH AUTOMATED DIFF Collection Time: 08/23/20  5:35 PM  
Result Value Ref Range WBC 18.5 (H) 4.6 - 13.2 K/uL  
 RBC 2.02 (L) 4.70 - 5.50 M/uL HGB 6.0 (L) 13.0 - 16.0 g/dL HCT 18.5 (L) 36.0 - 48.0 %  MCV 91.6 74.0 - 97.0 FL  
 MCH 29.7 24.0 - 34.0 PG  
 MCHC 32.4 31.0 - 37.0 g/dL RDW 20.6 (H) 11.6 - 14.5 % PLATELET 395 426 - 093 K/uL MPV 9.3 9.2 - 11.8 FL  
 NEUTROPHILS 88 (H) 40 - 73 % LYMPHOCYTES 5 (L) 21 - 52 % MONOCYTES 7 3 - 10 % EOSINOPHILS 0 0 - 5 % BASOPHILS 0 0 - 2 %  
 ABS. NEUTROPHILS 16.2 (H) 1.8 - 8.0 K/UL  
 ABS. LYMPHOCYTES 0.8 (L) 0.9 - 3.6 K/UL  
 ABS. MONOCYTES 1.4 (H) 0.05 - 1.2 K/UL  
 ABS. EOSINOPHILS 0.0 0.0 - 0.4 K/UL  
 ABS. BASOPHILS 0.0 0.0 - 0.1 K/UL  
 DF AUTOMATED METABOLIC PANEL, BASIC Collection Time: 08/23/20  5:35 PM  
Result Value Ref Range Sodium 132 (L) 136 - 145 mmol/L Potassium 4.8 3.5 - 5.5 mmol/L Chloride 98 (L) 100 - 111 mmol/L  
 CO2 18 (L) 21 - 32 mmol/L Anion gap 16 3.0 - 18 mmol/L Glucose 111 (H) 74 - 99 mg/dL BUN 48 (H) 7.0 - 18 MG/DL Creatinine 1.56 (H) 0.6 - 1.3 MG/DL  
 BUN/Creatinine ratio 31 (H) 12 - 20 GFR est AA 52 (L) >60 ml/min/1.73m2 GFR est non-AA 43 (L) >60 ml/min/1.73m2 Calcium 8.2 (L) 8.5 - 10.1 MG/DL  
LIPASE Collection Time: 08/23/20  5:35 PM  
Result Value Ref Range Lipase 57 (L) 73 - 393 U/L  
CARDIAC PANEL,(CK, CKMB & TROPONIN) Collection Time: 08/23/20  5:35 PM  
Result Value Ref Range CK - MB <1.0 <3.6 ng/ml CK-MB Index  0.0 - 4.0 % CALCULATION NOT PERFORMED WHEN RESULT IS BELOW LINEAR LIMIT  
 CK 35 (L) 39 - 308 U/L Troponin-I, QT <0.02 0.0 - 0.045 NG/ML  
NT-PRO BNP Collection Time: 08/23/20  5:35 PM  
Result Value Ref Range NT pro-BNP 7,243 (H) 0 - 1,800 PG/ML  
HEPATIC FUNCTION PANEL Collection Time: 08/23/20  5:35 PM  
Result Value Ref Range Protein, total 6.1 (L) 6.4 - 8.2 g/dL Albumin 2.0 (L) 3.4 - 5.0 g/dL Globulin 4.1 (H) 2.0 - 4.0 g/dL A-G Ratio 0.5 (L) 0.8 - 1.7 Bilirubin, total 0.5 0.2 - 1.0 MG/DL Bilirubin, direct 0.3 (H) 0.0 - 0.2 MG/DL Alk.  phosphatase 183 (H) 45 - 117 U/L  
 AST (SGOT) 166 (H) 10 - 38 U/L  
 ALT (SGPT) 57 16 - 61 U/L  
 GLUCOSE, POC Collection Time: 08/23/20  5:48 PM  
Result Value Ref Range Glucose (POC) 128 (H) 70 - 110 mg/dL POC LACTIC ACID Collection Time: 08/23/20  5:50 PM  
Result Value Ref Range Lactic Acid (POC) 7.14 (HH) 0.40 - 2.00 mmol/L  
TYPE + CROSSMATCH Collection Time: 08/23/20  6:05 PM  
Result Value Ref Range Crossmatch Expiration 08/26/2020 ABO/Rh(D) O POSITIVE Antibody screen NEG Unit number [id number] Blood component type [facility] Unit division 00 Status of unit ISSUED Crossmatch result Compatible Unit number [id number] Blood component type [facility] Unit division 00 Status of unit ISSUED Crossmatch result Compatible Unit number [id number] Blood component type [facility] Unit division 00 Status of unit ALLOCATED Crossmatch result Compatible Unit number [id number] Blood component type [facility] Unit division 00 Status of unit ALLOCATED Crossmatch result Compatible PROTHROMBIN TIME + INR Collection Time: 08/23/20 10:45 PM  
Result Value Ref Range Prothrombin time 19.3 (H) 11.5 - 15.2 sec INR 1.7 (H) 0.8 - 1.2 PTT Collection Time: 08/23/20 10:45 PM  
Result Value Ref Range aPTT 34.0 23.0 - 36.4 SEC HEMOGLOBIN Collection Time: 08/23/20 10:45 PM  
Result Value Ref Range HGB 7.2 (L) 13.0 - 16.0 g/dL POC LACTIC ACID Collection Time: 08/23/20 10:56 PM  
Result Value Ref Range Lactic Acid (POC) 4.33 (HH) 0.40 - 2.00 mmol/L Assessment/Plan:  
79 y.o. male with PMH of AAA s/p repair, HF, COPD, HTN/HLD, afib on no AC due to gastric ulcer , now admitted with symptomatic anemia. 1. Symptomatic Anemia, acute on chronic: Pt reports over 2 weeks of fatigue and low back pain. Hemoglobin was 6.0 on presentation to ER with BRBPR and guaiac + stool. There was initially some concern for an endoleak at the endograft repair site of his AAA.  CT surgery came to evaluate patient in the ER, no significant bleeding source found on CT. GI also consulted from the ER, added to the treatment team. Pt was started on a Protonix drip, he has received 3 units of PRBCs. ICU also evaluated patient for possible admission, but his hemoglobin improved to 7.2 with stabilization of vital signs.  
- Admit to Stepdown Unit w/ monitoring 
- H&H every 4 hours - Continue Protonix gtt 
- Unit of FFP ordered - Repeat BMP 2. Sepsis: Meeting 2/4 SIRS criteria for tachypnea (RR 27) and leukocytosis (WBCs 18.5). qSOFA score 2. Pt with a lactic acidosis of 7.14 on presentation, improved to 4.33 after receiving blood and IV fluids. Broad-spectrum antibiotics started in the ER.  
- Broad Spectrum abx, continue ceftriaxone and FLagyl - Trend lactic acid 
- Daily CBC 
- UA and Urine culture - FU blood cultures - Continue IVF 3. AAA s/p EVAR:   Followed by Dr. Wolf Medicine office (vascular), last seen 2/25/2020. He went to [place] for surgical evaluation, was being considered for a complex repair.  
- F/U final read of CTA Chest/Abd/Pel 
- FU CT surgery consult note. - Consider vascular surgery referral 
 
 
 
Morna Babinski, DO 
08/24/20    1:07 AM 
 
 
Monisha Saha D.O. PGY-3 
[address] Klisyri Counseling:  I discussed with the patient the risks of Klisyri including but not limited to erythema, scaling, itching, weeping, crusting, and pain.

## 2024-08-01 NOTE — DISCHARGE INSTRUCTIONS
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 Please follow the instructions below to securely access and download your online medical record. [facility] allows you to send messages to your doctor, view your test results, renew your prescriptions, schedule appointments, and more. How Do I Sign Up? 1. In your internet browser, go to [email or web address]  2. Click on the First Time User? Click Here link in the Sign In box. You will be redirect to the New Member Sign Up page. 3. Enter your [facility] Access Code exactly as it appears below. You will not need to use this code after youve completed the sign-up process. If you do not sign up before the expiration date, you must request a new code. [facility] Access Code: [email or web address]  Expires: 2019  3:00 PM (This is the date your [facility] access code will )    4. Enter the last four digits of your Social Security Number (xxxx) and Date of Birth (mm/dd/yyyy) as indicated and click Submit. You will be taken to the next sign-up page. 5. Create a [facility] ID. This will be your [facility] login ID and cannot be changed, so think of one that is secure and easy to remember. 6. Create a [facility] password. You can change your password at any time. 7. Enter your Password Reset Question and Answer. This can be used at a later time if you forget your password. 8. Enter your e-mail address. You will receive e-mail notification when new information is available in [address]. 9. Click Sign Up. You can now view and download portions of your medical record. 10. Click the Download Summary menu link to download a portable copy of your medical information. Additional Information    If you have questions, please visit the Frequently Asked Questions section of the [facility] website at [email or web address] Remember, [facility] is NOT to be used for urgent needs. For medical emergencies, dial 911. Heart Failure: Care Instructions  Your Care Instructions    Heart failure occurs when your heart does not pump as much blood as the body needs. Failure does not mean that the heart has stopped pumping but rather that it is not pumping as well as it should. Over time, this causes fluid buildup in your lungs and other parts of your body. Fluid buildup can cause shortness of breath, fatigue, swollen ankles, and other problems. By taking medicines regularly, reducing sodium (salt) in your diet, checking your weight every day, and making lifestyle changes, you can feel better and live longer. Follow-up care is a key part of your treatment and safety. Be sure to make and go to all appointments, and call your doctor if you are having problems. It's also a good idea to know your test results and keep a list of the medicines you take. How can you care for yourself at home? Medicines    · Be safe with medicines. Take your medicines exactly as prescribed. Call your doctor if you think you are having a problem with your medicine.     · Do not take any vitamins, over-the-counter medicine, or herbal products without talking to your doctor first. Debra Benavidezlin not take ibuprofen (Advil or Motrin) and naproxen (Aleve) without talking to your doctor first. They could make your heart failure worse.     · You may be taking some of the following medicine. ? Beta-blockers can slow heart rate, decrease blood pressure, and improve your condition. Taking a beta-blocker may lower your chance of needing to be hospitalized. ? Angiotensin-converting enzyme inhibitors (ACEIs) reduce the heart's workload, lower blood pressure, and reduce swelling. Taking an ACEI may lower your chance of needing to be hospitalized again. ? Angiotensin II receptor blockers (ARBs) work like ACEIs. Your doctor may prescribe them instead of ACEIs. ? Diuretics, also called water pills, reduce swelling. ?  Potassium supplements replace this important mineral, which is sometimes lost with diuretics. ? Aspirin and other blood thinners prevent blood clots, which can cause a stroke or heart attack.    You will get more details on the specific medicines your doctor prescribes. Diet    · Your doctor may suggest that you limit sodium to 2,000 milligrams (mg) a day or less. That is less than 1 teaspoon of salt a day, including all the salt you eat in cooking or in packaged foods. People get most of their sodium from processed foods. Fast food and restaurant meals also tend to be very high in sodium.     · Ask your doctor how much liquid you can drink each day. You may have to limit liquids.    Weight    · Weigh yourself without clothing at the same time each day. Record your weight. Call your doctor if you have a sudden weight gain, such as more than 2 to 3 pounds in a day or 5 pounds in a week. (Your doctor may suggest a different range of weight gain.) A sudden weight gain may mean that your heart failure is getting worse.    Activity level    · Start light exercise (if your doctor says it is okay). Even if you can only do a small amount, exercise will help you get stronger, have more energy, and manage your weight and your stress. Walking is an easy way to get exercise. Start out by walking a little more than you did before. Bit by bit, increase the amount you walk.     · When you exercise, watch for signs that your heart is working too hard. You are pushing yourself too hard if you cannot talk while you are exercising. If you become short of breath or dizzy or have chest pain, stop, sit down, and rest.     · If you feel \"wiped out\" the day after you exercise, walk slower or for a shorter distance until you can work up to a better pace.     · Get enough rest at night. Sleeping with 1 or 2 pillows under your upper body and head may help you breathe easier.    Lifestyle changes    · Do not smoke.  Smoking can make a heart condition worse. If you need help quitting, talk to your doctor about stop-smoking programs and medicines. These can increase your chances of quitting for good. Quitting smoking may be the most important step you can take to protect your heart.     · Limit alcohol to 2 drinks a day for men and 1 drink a day for women. Too much alcohol can cause health problems.     · Avoid getting sick from colds and the flu. Get a pneumococcal vaccine shot. If you have had one before, ask your doctor whether you need another dose. Get a flu shot each year. If you must be around people with colds or the flu, wash your hands often. When should you call for help? Call 911 if you have symptoms of sudden heart failure such as:    · You have severe trouble breathing.     · You cough up pink, foamy mucus.     · You have a new irregular or rapid heartbeat.    Call your doctor now or seek immediate medical care if:    · You have new or increased shortness of breath.     · You are dizzy or lightheaded, or you feel like you may faint.     · You have sudden weight gain, such as more than 2 to 3 pounds in a day or 5 pounds in a week. (Your doctor may suggest a different range of weight gain.)     · You have increased swelling in your legs, ankles, or feet.     · You are suddenly so tired or weak that you cannot do your usual activities.    Watch closely for changes in your health, and be sure to contact your doctor if you develop new symptoms. Where can you learn more? Go to http://brittney-eilseo.info/. Enter Z545 in the search box to learn more about \"Heart Failure: Care Instructions. \"  Current as of: July 22, 2018  Content Version: 11.9  © 5766-9364 [employer]. Care instructions adapted under license by [employer] (which disclaims liability or warranty for this information).  If you have questions about a medical condition or this instruction, always ask your healthcare professional. [employer] disclaims any warranty or liability for your use of this information. Endovascular Aortic Aneurysm Repair: Before Your Procedure  What is endovascular aortic aneurysm repair? Endovascular aortic aneurysm repair fixes an aneurysm in your aorta. An aneurysm is a weak or bulging part of a vein or artery. Your aorta is a large artery. It carries blood from your heart through your belly to the rest of your body. If you don't fix this problem, your aorta could burst. And this can cause death. Your doctor will use a special man-made tube to fix your aorta. This is called a stent graft. After the procedure, your blood will flow through the stent graft. It will not push on the aneurysm. To do the procedure, the doctor makes two cuts in your groin area. These are called incisions. Then the doctor puts small tubes into the arteries in that area. The tubes are called catheters. The doctor first uses the catheters to put dye in your arteries. The dye makes your aorta show up on X-rays. Next, the doctor uses wires inside the catheters to move the stent graft through the arteries and up to your aorta. After the stent graft is in place, the doctor takes out the catheters and wires. Then he or she uses stitches to close the incisions. You will have scars that fade with time. You will probably spend 1 to 3 days in the hospital. Ivan Christianson may be able to return to work and many of your daily activities 1 to 2 weeks after the procedure. Follow-up care is a key part of your treatment and safety. Be sure to make and go to all appointments, and call your doctor if you are having problems. It's also a good idea to know your test results and keep a list of the medicines you take. What happens before the procedure?   Preparing for the procedure    · Understand exactly what procedure is planned, along with the risks, benefits, and other options.     · Tell your doctors ALL the medicines, vitamins, supplements, and Poor oral intake within the last 24 hours herbal remedies you take. Some of these can increase the risk of bleeding or interact with anesthesia.     · If you take blood thinners, such as warfarin (Coumadin), clopidogrel (Plavix), or aspirin, be sure to talk to your doctor. He or she will tell you if you should stop taking these medicines before your procedure. Make sure that you understand exactly what your doctor wants you to do.     · Your doctor will tell you which medicines to take or stop before your procedure. You may need to stop taking certain medicines a week or more before the procedure. So talk to your doctor as soon as you can.     · If you have an advance directive, let your doctor know. It may include a living will and a durable power of  for health care. Bring a copy to the hospital. If you don't have one, you may want to prepare one. It lets your doctor and loved ones know your health care wishes. Doctors advise that everyone prepare these papers before any type of surgery or procedure. Procedures can be stressful. This information will help you understand what you can expect. And it will help you safely prepare for your procedure. What happens on the day of the procedure? · Follow the instructions exactly about when to stop eating and drinking. If you don't, your procedure may be canceled. If your doctor told you to take your medicines on the day of the procedure, take them with only a sip of water.     · Take a bath or shower before you come in for your procedure. Do not apply lotions, perfumes, deodorants, or nail polish.     · Take off all jewelry and piercings. And take out contact lenses, if you wear them.    At the hospital or surgery center   · Bring a picture ID.     · You will be kept comfortable and safe by your anesthesia provider. You will be asleep during the procedure.     · The procedure will take about 1 to 4 hours. Going home   · Be sure you have someone to drive you home.  Anesthesia and pain medicine make it unsafe for you to drive.     · You will be given more specific instructions about recovering from your procedure. They will cover things like diet, wound care, follow-up care, driving, and getting back to your normal routine. When should you call your doctor? · You have questions or concerns.     · You don't understand how to prepare for your procedure.     · You become ill before the procedure (such as fever, flu, or a cold).     · You need to reschedule or have changed your mind about having the procedure. Where can you learn more? Go to http://brittney-eliseo.info/. Enter 53 [id number] in the search box to learn more about \"Endovascular Aortic Aneurysm Repair: Before Your Procedure. \"  Current as of: September 26, 2018  Content Version: 11.9  © 3857-7299 [employer]. Care instructions adapted under license by [employer] (which disclaims liability or warranty for this information). If you have questions about a medical condition or this instruction, always ask your healthcare professional. [place] [address] any warranty or liability for your use of this information.

## (undated) DEVICE — MEDI-VAC SUCTION HIGH CAPACITY: Brand: CARDINAL HEALTH

## (undated) DEVICE — FCPS RAD JAW 4LC 240CM W/NDL -- BX/20 RADIAL JAW 4

## (undated) DEVICE — CATHETER SUCT TR FL TIP 14FR W/ O CTRL

## (undated) DEVICE — SYRINGE MED 20ML STD CLR PLAS LUERLOCK TIP N CTRL DISP

## (undated) DEVICE — SYR 50ML SLIP TIP NSAF LF STRL --

## (undated) DEVICE — BITE BLOCK ENDOSCP UNIV AD 6 TO 9.4 MM

## (undated) DEVICE — FLUFF AND POLYMER UNDERPAD,EXTRA HEAVY: Brand: WINGS

## (undated) DEVICE — ENDOSCOPY PUMP TUBING/ CAP SET: Brand: ERBE

## (undated) DEVICE — SOLUTION IRRIG 1000ML H2O STRL BLT

## (undated) DEVICE — REM POLYHESIVE ADULT PATIENT RETURN ELECTRODE: Brand: VALLEYLAB

## (undated) DEVICE — STERILE POLYISOPRENE POWDER-FREE SURGICAL GLOVES: Brand: PROTEXIS

## (undated) DEVICE — (D)GLOVE EXAM LG NITRL NS -- DISC BY MFR NO SUB

## (undated) DEVICE — GAUZE,SPONGE,4"X4",16PLY,STRL,LF,10/TRAY: Brand: MEDLINE

## (undated) DEVICE — AIRLIFE™ NASAL OXYGEN CANNULA CURVED, FLARED TIP WITH 14 FOOT (4.3 M) CRUSH-RESISTANT TUBING, OVER-THE-EAR STYLE: Brand: AIRLIFE™

## (undated) DEVICE — TRAP SPEC COLL POLYP POLYSTYR --

## (undated) DEVICE — SYRINGE MED 25GA 3ML L5/8IN SUBQ PLAS W/ DETACH NDL SFTY

## (undated) DEVICE — MEDI-VAC NON-CONDUCTIVE SUCTION TUBING: Brand: CARDINAL HEALTH

## (undated) DEVICE — FLEX ADVANTAGE 3000CC: Brand: FLEX ADVANTAGE

## (undated) DEVICE — AIRLIFE™ NASAL OXYGEN CANNULA CURVED, NONFLARED TIP WITH 14 FOOT (4.3 M) CRUSH-RESISTANT TUBING, OVER-THE-EAR STYLE: Brand: AIRLIFE™

## (undated) DEVICE — GAUZE SPONGES,16 PLY: Brand: CURITY

## (undated) DEVICE — BASIN EMESIS 500CC ROSE 250/CS 60/PLT: Brand: MEDEGEN MEDICAL PRODUCTS, LLC

## (undated) DEVICE — GOWN ISOL IMPERV UNIV, DISP, OPEN BACK, BLUE --

## (undated) DEVICE — SYR 10ML LUER LOK 1/5ML GRAD --

## (undated) DEVICE — CANNULA ORIG TL CLR W FOAM CUSHIONS AND 14FT SUPL TB 3 CHN

## (undated) DEVICE — SNARE POLYP M W27MMXL240CM OVL STIFF DISP CAPTIVATOR